# Patient Record
Sex: MALE | Race: WHITE | NOT HISPANIC OR LATINO | Employment: UNEMPLOYED | URBAN - METROPOLITAN AREA
[De-identification: names, ages, dates, MRNs, and addresses within clinical notes are randomized per-mention and may not be internally consistent; named-entity substitution may affect disease eponyms.]

---
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
## 2018-01-01 LAB
ABO + RH BLD: NORMAL
ANISOCYTOSIS BLD QL SMEAR: SLIGHT
BASOPHILS # BLD AUTO: 0.01 K/UL
BASOPHILS NFR BLD: 1.4 %
BLD GP AB SCN CELLS X3 SERPL QL: NORMAL
BLD PROD TYP BPU: NORMAL
BLOOD UNIT EXPIRATION DATE: NORMAL
BLOOD UNIT TYPE CODE: 6200
BLOOD UNIT TYPE: NORMAL
CODING SYSTEM: NORMAL
DIFFERENTIAL METHOD: ABNORMAL
DISPENSE STATUS: NORMAL
EOSINOPHIL # BLD AUTO: 0 K/UL
EOSINOPHIL NFR BLD: 0 %
ERYTHROCYTE [DISTWIDTH] IN BLOOD BY AUTOMATED COUNT: 13.8 %
HCT VFR BLD AUTO: 23.1 %
HGB BLD-MCNC: 8.6 G/DL
IMM GRANULOCYTES # BLD AUTO: 0.01 K/UL
IMM GRANULOCYTES NFR BLD AUTO: 1.4 %
LYMPHOCYTES # BLD AUTO: 0.6 K/UL
LYMPHOCYTES NFR BLD: 87 %
MCH RBC QN AUTO: 30.5 PG
MCHC RBC AUTO-ENTMCNC: 37.2 G/DL
MCV RBC AUTO: 82 FL
MONOCYTES # BLD AUTO: 0 K/UL
MONOCYTES NFR BLD: 5.8 %
NEUTROPHILS # BLD AUTO: 0 K/UL
NEUTROPHILS NFR BLD: 4.4 %
NRBC BLD-RTO: 0 /100 WBC
NUM UNITS TRANS WBC-POOR PLATPHERESIS: NORMAL
PLATELET # BLD AUTO: 7 K/UL
PLATELET BLD QL SMEAR: ABNORMAL
PMV BLD AUTO: ABNORMAL FL
RBC # BLD AUTO: 2.82 M/UL
WBC # BLD AUTO: 0.69 K/UL

## 2018-01-01 PROCEDURE — 25000003 PHARM REV CODE 250: Performed by: PEDIATRICS

## 2018-01-01 PROCEDURE — 25000003 PHARM REV CODE 250: Performed by: STUDENT IN AN ORGANIZED HEALTH CARE EDUCATION/TRAINING PROGRAM

## 2018-01-01 PROCEDURE — 63600175 PHARM REV CODE 636 W HCPCS: Performed by: PEDIATRICS

## 2018-01-01 PROCEDURE — 86644 CMV ANTIBODY: CPT

## 2018-01-01 PROCEDURE — 63600175 PHARM REV CODE 636 W HCPCS: Performed by: STUDENT IN AN ORGANIZED HEALTH CARE EDUCATION/TRAINING PROGRAM

## 2018-01-01 PROCEDURE — 99232 SBSQ HOSP IP/OBS MODERATE 35: CPT | Mod: ,,, | Performed by: PEDIATRICS

## 2018-01-01 PROCEDURE — 11300000 HC PEDIATRIC PRIVATE ROOM

## 2018-01-01 PROCEDURE — P9037 PLATE PHERES LEUKOREDU IRRAD: HCPCS

## 2018-01-01 PROCEDURE — 86920 COMPATIBILITY TEST SPIN: CPT

## 2018-01-01 PROCEDURE — 85025 COMPLETE CBC W/AUTO DIFF WBC: CPT

## 2018-01-01 PROCEDURE — 86850 RBC ANTIBODY SCREEN: CPT

## 2018-01-01 RX ORDER — HYDROCODONE BITARTRATE AND ACETAMINOPHEN 500; 5 MG/1; MG/1
TABLET ORAL
Status: DISCONTINUED | OUTPATIENT
Start: 2018-01-01 | End: 2018-01-04

## 2018-01-01 RX ADMIN — DIPHENHYDRAMINE HYDROCHLORIDE 25 MG: 25 CAPSULE ORAL at 10:01

## 2018-01-01 RX ADMIN — ACETAMINOPHEN 650 MG: 325 TABLET, FILM COATED ORAL at 10:01

## 2018-01-01 RX ADMIN — ACYCLOVIR 400 MG: 200 CAPSULE ORAL at 08:01

## 2018-01-01 RX ADMIN — ACYCLOVIR 400 MG: 200 CAPSULE ORAL at 09:01

## 2018-01-01 RX ADMIN — HEPARIN 300 UNITS: 100 SYRINGE at 10:01

## 2018-01-01 RX ADMIN — PANTOPRAZOLE SODIUM 40 MG: 40 TABLET, DELAYED RELEASE ORAL at 09:01

## 2018-01-01 RX ADMIN — CEFEPIME 2 G: 2 INJECTION, POWDER, FOR SOLUTION INTRAVENOUS at 10:01

## 2018-01-01 RX ADMIN — POSACONAZOLE 300 MG: 100 TABLET, COATED ORAL at 09:01

## 2018-01-01 RX ADMIN — CEFEPIME 2 G: 2 INJECTION, POWDER, FOR SOLUTION INTRAVENOUS at 08:01

## 2018-01-01 RX ADMIN — HEPARIN 300 UNITS: 100 SYRINGE at 04:01

## 2018-01-01 NOTE — PLAN OF CARE
Problem: Patient Care Overview  Goal: Plan of Care Review  Outcome: Ongoing (interventions implemented as appropriate)  Mom and leydi present at the bedside this AM. Pt resting in between care. Meds administered as ordered. Pt remains on Cefepime every 12hrs. VSS. Afebrile. No distress noted. Pt tolerating PO. Dennies pain,nausea, or vomiting. Pt received unit of plts. Tolerated well. No reaction noted. Premedicated with tylenol and benadryl. Plan of care reviewed. Verbalized understanding. Will monitor.

## 2018-01-01 NOTE — SUBJECTIVE & OBJECTIVE
Interval History: NAEON.    Scheduled Meds:   acyclovir  400 mg Oral BID    ceFEPIme in dextrose 5%  2 g Intravenous Q12H    pantoprazole  40 mg Oral Daily    posaconazole  300 mg Oral Daily     Continuous Infusions:  PRN Meds:sodium chloride, acetaminophen, diphenhydrAMINE, heparin, porcine (PF), ondansetron, polyethylene glycol    Review of Systems  Objective:     Vital Signs (Most Recent):  Temp: 99.2 °F (37.3 °C) (01/01/18 0418)  Pulse: 96 (01/01/18 0418)  Resp: 20 (01/01/18 0418)  BP: (!) 113/58 (01/01/18 0418)  SpO2: 100 % (01/01/18 0418) Vital Signs (24h Range):  Temp:  [97 °F (36.1 °C)-99.2 °F (37.3 °C)] 99.2 °F (37.3 °C)  Pulse:  [] 96  Resp:  [16-20] 20  SpO2:  [97 %-100 %] 100 %  BP: (103-121)/(51-58) 113/58     Patient Vitals for the past 72 hrs (Last 3 readings):   Weight   12/31/17 2018 78 kg (171 lb 15.3 oz)   12/30/17 1945 78.2 kg (172 lb 6.4 oz)   12/29/17 2010 78.3 kg (172 lb 9.9 oz)     Body mass index is 26.93 kg/m².    Intake/Output - Last 3 Shifts       12/30 0700 - 12/31 0659 12/31 0700 - 01/01 0659 01/01 0700 - 01/02 0659    P.O. 600 1800     Blood       IV Piggyback 100 50     Total Intake(mL/kg) 700 (9) 1850 (23.7)     Net +700 +1850             Stool Occurrence  3 x           Lines/Drains/Airways     Central Venous Catheter Line                 Port A Cath Double Lumen 10/31/17 1826 left subclavian 61 days                Physical Exam   Constitutional: He is oriented to person, place, and time. He appears well-developed. No distress.   Awake, interactive   HENT:   Head: Normocephalic.   Mouth/Throat: Oropharynx is clear and moist.   alopecia   Eyes: Conjunctivae are normal.   Neck: Normal range of motion.   Cardiovascular: Normal rate and regular rhythm.    No murmur heard.  Pulmonary/Chest: Breath sounds normal. No respiratory distress. He has no wheezes.   Abdominal: Soft. Bowel sounds are normal. He exhibits no distension.   Neurological: He is alert and oriented to person,  place, and time.   Skin: Skin is warm and dry. Capillary refill takes less than 2 seconds.   Scattered ecchymoses       Significant Labs:  Recent Results (from the past 24 hour(s))   CBC auto differential    Collection Time: 01/01/18  4:26 AM   Result Value Ref Range    WBC 0.69 (LL) 3.90 - 12.70 K/uL    RBC 2.82 (L) 4.60 - 6.20 M/uL    Hemoglobin 8.6 (L) 14.0 - 18.0 g/dL    Hematocrit 23.1 (L) 40.0 - 54.0 %    MCV 82 82 - 98 fL    MCH 30.5 27.0 - 31.0 pg    MCHC 37.2 (H) 32.0 - 36.0 g/dL    RDW 13.8 11.5 - 14.5 %    Platelets 7 (LL) 150 - 350 K/uL    MPV SEE COMMENT 9.2 - 12.9 fL    Immature Granulocytes 1.4 (H) 0.0 - 0.5 %    Gran # 0.0 (L) 1.8 - 7.7 K/uL    Immature Grans (Abs) 0.01 0.00 - 0.04 K/uL    Lymph # 0.6 (L) 1.0 - 4.8 K/uL    Mono # 0.0 (L) 0.3 - 1.0 K/uL    Eos # 0.0 0.0 - 0.5 K/uL    Baso # 0.01 0.00 - 0.20 K/uL    nRBC 0 0 /100 WBC    Gran% 4.4 (L) 38.0 - 73.0 %    Lymph% 87.0 (H) 18.0 - 48.0 %    Mono% 5.8 4.0 - 15.0 %    Eosinophil% 0.0 0.0 - 8.0 %    Basophil% 1.4 0.0 - 1.9 %    Platelet Estimate Decreased (A)     Aniso Slight     Differential Method Automated    Type & Screen    Collection Time: 01/01/18  4:26 AM   Result Value Ref Range    Group & Rh AB POS     Indirect Nathan NEG          Significant Imaging: I have reviewed and interpreted all pertinent imaging results/findings within the past 24 hours.

## 2018-01-01 NOTE — ASSESSMENT & PLAN NOTE
Hema, 18 yo white male with AML, s/p induction 2 of treatment with cytarabine, doxorubicin & etoposide on 12/20/17.  Now presents with fever and neutropenia. Well-appearing except for pallor. Remains stable and afebrile since hospitalization.    #Neutropenia and fever: Afebrile. Awaiting count recovery  - Continue Cefepime.  - Posaconazole prophylaxis  - Bactrim Fri/Sat/Sun  - CBC daily  - BMP every other day  - repeat blood culture if febrile    #Anemia/Thrombocytopenia:  -Transfuse w/ leukoreduced & irradiated pRBC for Hg < 7  -Transfuse for PLT <10 - PLT of 7 this AM    Dispo:  -Pending evidence of count recovery

## 2018-01-01 NOTE — PLAN OF CARE
Problem: Patient Care Overview  Goal: Plan of Care Review  Outcome: Ongoing (interventions implemented as appropriate)  Reviewed plan of care with pt and he verbalized understanding and concerns addressed.Pt vss, afebrile, no distress noted. Pt playing video games/watching netflix, and awake throughout the shift. Pt ambulated this evening to celebrate new year fireworks view on different floor. Pt reaccessed and Administered new  antibx time as ordered. Labs collected, both ports + blood return noted, and both hep locked. Tolerating po well, voiding well. Will continue to monitor.

## 2018-01-01 NOTE — PROGRESS NOTES
"[facility] Medicine  Progress Note    Patient Name: Hema Kuo  MRN: [id number]  Admission Date: 12/24/2017  Hospital Length of Stay: 8  Code Status: Full Code   Primary Care Physician: Ann Reyna NP  Principal Problem: Neutropenic fever    Subjective:     HPI:  Hema Kuo 18yo white male, with AML, s/p cycle 2 of treatment from 12/13/17 to 12/20/17. He received cytarabine, doxorobicin & etoposide.  He was discharged home on 12/20/2017.  He came in today reporting "not feeling well" and fever at home.  Reports decrease appetite otherwise well.  Denies fatigue, activity change, n/v/d or c.    Went to [facility]; started on abx and labs drawn and reports Hb=6 & plt=7k.    Hospital Course:  No notes on file    Scheduled Meds:   acyclovir  400 mg Oral BID    ceFEPIme in dextrose 5%  2 g Intravenous Q12H    pantoprazole  40 mg Oral Daily    posaconazole  300 mg Oral Daily     Continuous Infusions:  PRN Meds:sodium chloride, acetaminophen, diphenhydrAMINE, heparin, porcine (PF), ondansetron, polyethylene glycol    Interval History: NAEON.    Scheduled Meds:   acyclovir  400 mg Oral BID    ceFEPIme in dextrose 5%  2 g Intravenous Q12H    pantoprazole  40 mg Oral Daily    posaconazole  300 mg Oral Daily     Continuous Infusions:  PRN Meds:sodium chloride, acetaminophen, diphenhydrAMINE, heparin, porcine (PF), ondansetron, polyethylene glycol    Review of Systems  Objective:     Vital Signs (Most Recent):  Temp: 99.2 °F (37.3 °C) (01/01/18 0418)  Pulse: 96 (01/01/18 0418)  Resp: 20 (01/01/18 0418)  BP: (!) 113/58 (01/01/18 0418)  SpO2: 100 % (01/01/18 0418) Vital Signs (24h Range):  Temp:  [97 °F (36.1 °C)-99.2 °F (37.3 °C)] 99.2 °F (37.3 °C)  Pulse:  [] 96  Resp:  [16-20] 20  SpO2:  [97 %-100 %] 100 %  BP: (103-121)/(51-58) 113/58     Patient Vitals for the past 72 hrs (Last 3 readings):   Weight   12/31/17 2018 78 kg (171 lb 15.3 oz)   12/30/17 1945 78.2 kg " (172 lb 6.4 oz)   12/29/17 2010 78.3 kg (172 lb 9.9 oz)     Body mass index is 26.93 kg/m².    Intake/Output - Last 3 Shifts       12/30 0700 - 12/31 0659 12/31 0700 - 01/01 0659 01/01 0700 - 01/02 0659    P.O. 600 1800     Blood       IV Piggyback 100 50     Total Intake(mL/kg) 700 (9) 1850 (23.7)     Net +700 +1850             Stool Occurrence  3 x           Lines/Drains/Airways     Central Venous Catheter Line                 Port A Cath Double Lumen 10/31/17 1826 left subclavian 61 days                Physical Exam   Constitutional: He is oriented to person, place, and time. He appears well-developed. No distress.   Awake, interactive   HENT:   Head: Normocephalic.   Mouth/Throat: Oropharynx is clear and moist.   alopecia   Eyes: Conjunctivae are normal.   Neck: Normal range of motion.   Cardiovascular: Normal rate and regular rhythm.    No murmur heard.  Pulmonary/Chest: Breath sounds normal. No respiratory distress. He has no wheezes.   Abdominal: Soft. Bowel sounds are normal. He exhibits no distension.   Neurological: He is alert and oriented to person, place, and time.   Skin: Skin is warm and dry. Capillary refill takes less than 2 seconds.   Scattered ecchymoses       Significant Labs:  Recent Results (from the past 24 hour(s))   CBC auto differential    Collection Time: 01/01/18  4:26 AM   Result Value Ref Range    WBC 0.69 (LL) 3.90 - 12.70 K/uL    RBC 2.82 (L) 4.60 - 6.20 M/uL    Hemoglobin 8.6 (L) 14.0 - 18.0 g/dL    Hematocrit 23.1 (L) 40.0 - 54.0 %    MCV 82 82 - 98 fL    MCH 30.5 27.0 - 31.0 pg    MCHC 37.2 (H) 32.0 - 36.0 g/dL    RDW 13.8 11.5 - 14.5 %    Platelets 7 (LL) 150 - 350 K/uL    MPV SEE COMMENT 9.2 - 12.9 fL    Immature Granulocytes 1.4 (H) 0.0 - 0.5 %    Gran # 0.0 (L) 1.8 - 7.7 K/uL    Immature Grans (Abs) 0.01 0.00 - 0.04 K/uL    Lymph # 0.6 (L) 1.0 - 4.8 K/uL    Mono # 0.0 (L) 0.3 - 1.0 K/uL    Eos # 0.0 0.0 - 0.5 K/uL    Baso # 0.01 0.00 - 0.20 K/uL    nRBC 0 0 /100 WBC    Gran%  4.4 (L) 38.0 - 73.0 %    Lymph% 87.0 (H) 18.0 - 48.0 %    Mono% 5.8 4.0 - 15.0 %    Eosinophil% 0.0 0.0 - 8.0 %    Basophil% 1.4 0.0 - 1.9 %    Platelet Estimate Decreased (A)     Aniso Slight     Differential Method Automated    Type & Screen    Collection Time: 01/01/18  4:26 AM   Result Value Ref Range    Group & Rh AB POS     Indirect Nathan NEG          Significant Imaging: I have reviewed and interpreted all pertinent imaging results/findings within the past 24 hours.    Assessment/Plan:     Oncology   * Neutropenic fever    Hema, 16 yo white male with AML, s/p induction 2 of treatment with cytarabine, doxorubicin & etoposide on 12/20/17.  Now presents with fever and neutropenia. Well-appearing except for pallor. Remains stable and afebrile since hospitalization.    #Neutropenia and fever: Afebrile. Awaiting count recovery  - Continue Cefepime.  - Posaconazole prophylaxis  - Bactrim Fri/Sat/Sun  - CBC daily  - BMP every other day  - repeat blood culture if febrile    #Anemia/Thrombocytopenia:  -Transfuse w/ leukoreduced & irradiated pRBC for Hg < 7  -Transfuse for PLT <10 - PLT of 7 this AM; transfuse 1 unit  - No petichiae, signs of mucosal bleeding on exam.    Dispo:  -Pending evidence of count recovery                Anticipated Disposition: Home or Self Care    Deyvi Hu MD  Pediatric Hospital Medicine   [facility]

## 2018-01-02 LAB
ANION GAP SERPL CALC-SCNC: 9 MMOL/L
ANISOCYTOSIS BLD QL SMEAR: SLIGHT
BASOPHILS # BLD AUTO: 0 K/UL
BASOPHILS NFR BLD: 0 %
BUN SERPL-MCNC: 12 MG/DL
CALCIUM SERPL-MCNC: 9 MG/DL
CHLORIDE SERPL-SCNC: 103 MMOL/L
CO2 SERPL-SCNC: 28 MMOL/L
CREAT SERPL-MCNC: 0.8 MG/DL
DIFFERENTIAL METHOD: ABNORMAL
EOSINOPHIL # BLD AUTO: 0 K/UL
EOSINOPHIL NFR BLD: 0 %
ERYTHROCYTE [DISTWIDTH] IN BLOOD BY AUTOMATED COUNT: 13.5 %
EST. GFR  (AFRICAN AMERICAN): >60 ML/MIN/1.73 M^2
EST. GFR  (NON AFRICAN AMERICAN): >60 ML/MIN/1.73 M^2
GLUCOSE SERPL-MCNC: 118 MG/DL
HCT VFR BLD AUTO: 23.3 %
HGB BLD-MCNC: 8.7 G/DL
IMM GRANULOCYTES # BLD AUTO: 0 K/UL
IMM GRANULOCYTES NFR BLD AUTO: 0 %
LYMPHOCYTES # BLD AUTO: 0.6 K/UL
LYMPHOCYTES NFR BLD: 97 %
MCH RBC QN AUTO: 30.5 PG
MCHC RBC AUTO-ENTMCNC: 37.3 G/DL
MCV RBC AUTO: 82 FL
MONOCYTES # BLD AUTO: 0 K/UL
MONOCYTES NFR BLD: 1.5 %
NEUTROPHILS # BLD AUTO: 0 K/UL
NEUTROPHILS NFR BLD: 1.5 %
NRBC BLD-RTO: 0 /100 WBC
PLATELET # BLD AUTO: 20 K/UL
PLATELET BLD QL SMEAR: ABNORMAL
PMV BLD AUTO: 9.7 FL
POTASSIUM SERPL-SCNC: 3.7 MMOL/L
RBC # BLD AUTO: 2.85 M/UL
SODIUM SERPL-SCNC: 140 MMOL/L
WBC # BLD AUTO: 0.66 K/UL

## 2018-01-02 PROCEDURE — 63600175 PHARM REV CODE 636 W HCPCS: Performed by: STUDENT IN AN ORGANIZED HEALTH CARE EDUCATION/TRAINING PROGRAM

## 2018-01-02 PROCEDURE — 80048 BASIC METABOLIC PNL TOTAL CA: CPT

## 2018-01-02 PROCEDURE — 25000003 PHARM REV CODE 250: Performed by: PEDIATRICS

## 2018-01-02 PROCEDURE — 25000003 PHARM REV CODE 250: Performed by: STUDENT IN AN ORGANIZED HEALTH CARE EDUCATION/TRAINING PROGRAM

## 2018-01-02 PROCEDURE — 63600175 PHARM REV CODE 636 W HCPCS: Performed by: PEDIATRICS

## 2018-01-02 PROCEDURE — 11300000 HC PEDIATRIC PRIVATE ROOM

## 2018-01-02 PROCEDURE — 99233 SBSQ HOSP IP/OBS HIGH 50: CPT | Mod: ,,, | Performed by: PEDIATRICS

## 2018-01-02 PROCEDURE — 36592 COLLECT BLOOD FROM PICC: CPT

## 2018-01-02 PROCEDURE — 85025 COMPLETE CBC W/AUTO DIFF WBC: CPT

## 2018-01-02 RX ADMIN — CEFEPIME 2 G: 2 INJECTION, POWDER, FOR SOLUTION INTRAVENOUS at 08:01

## 2018-01-02 RX ADMIN — HEPARIN 300 UNITS: 100 SYRINGE at 03:01

## 2018-01-02 RX ADMIN — ACYCLOVIR 400 MG: 200 CAPSULE ORAL at 09:01

## 2018-01-02 RX ADMIN — HEPARIN 300 UNITS: 100 SYRINGE at 10:01

## 2018-01-02 RX ADMIN — CEFEPIME 2 G: 2 INJECTION, POWDER, FOR SOLUTION INTRAVENOUS at 09:01

## 2018-01-02 RX ADMIN — HEPARIN 300 UNITS: 100 SYRINGE at 11:01

## 2018-01-02 RX ADMIN — PANTOPRAZOLE SODIUM 40 MG: 40 TABLET, DELAYED RELEASE ORAL at 09:01

## 2018-01-02 RX ADMIN — POSACONAZOLE 300 MG: 100 TABLET, COATED ORAL at 09:01

## 2018-01-02 NOTE — ASSESSMENT & PLAN NOTE
Hema, 16 yo white male with AML, s/p induction 2 of treatment with cytarabine, doxorubicin & etoposide on 12/20/17.  Now presents with fever and neutropenia. Well-appearing except for pallor. Remains stable and afebrile since hospitalization.    #Neutropenia and fever: Afebrile. Awaiting count recovery  - Continue Cefepime.  - Posaconazole prophylaxis  - Bactrim Fri/Sat/Sun  - CBC daily  - BMP every other day  - repeat blood culture if febrile    #Anemia/Thrombocytopenia:  -Transfuse w/ leukoreduced & irradiated pRBC for Hg < 7  -Transfuse for PLT <10    Dispo:  -Pending evidence of count recovery

## 2018-01-02 NOTE — PLAN OF CARE
Problem: Patient Care Overview  Goal: Plan of Care Review  Outcome: Ongoing (interventions implemented as appropriate)  Reviewed plan of care with pt and he verbalized understanding and concerns addressed.Pt vss, afebrile, no distress noted. Pt playing video games/watching netflix, and awake throughout the shift. Pt ambulated this evening to another pt room to hang out.  Labs collected, both ports + blood return noted, and both hep locked. Tolerating po well, voiding well. Will continue to monitor.

## 2018-01-02 NOTE — PROGRESS NOTES
"[facility] Medicine  Progress Note    Patient Name: Hema Kuo  MRN: [id number]  Admission Date: 12/24/2017  Hospital Length of Stay: 9  Code Status: Full Code   Primary Care Physician: Ann Reyna NP  Principal Problem: Neutropenic fever    Subjective:     HPI:  Hema Kuo 18yo white male, with AML, s/p cycle 2 of treatment from 12/13/17 to 12/20/17. He received cytarabine, doxorobicin & etoposide.  He was discharged home on 12/20/2017.  He came in today reporting "not feeling well" and fever at home.  Reports decrease appetite otherwise well.  Denies fatigue, activity change, n/v/d or c.    Went to [facility]; started on abx and labs drawn and reports Hb=6 & plt=7k.    Hospital Course:  No notes on file    Scheduled Meds:   acyclovir  400 mg Oral BID    ceFEPIme in dextrose 5%  2 g Intravenous Q12H    pantoprazole  40 mg Oral Daily    posaconazole  300 mg Oral Daily     Continuous Infusions:  PRN Meds:sodium chloride, sodium chloride, acetaminophen, diphenhydrAMINE, heparin, porcine (PF), ondansetron, polyethylene glycol    Interval History: NAEON. S/p 1 unit PLT transfusion yesterday. Tolerated well. No calls to night float overnight.    Scheduled Meds:   acyclovir  400 mg Oral BID    ceFEPIme in dextrose 5%  2 g Intravenous Q12H    pantoprazole  40 mg Oral Daily    posaconazole  300 mg Oral Daily     Continuous Infusions:  PRN Meds:sodium chloride, sodium chloride, acetaminophen, diphenhydrAMINE, heparin, porcine (PF), ondansetron, polyethylene glycol    Review of Systems  Objective:     Vital Signs (Most Recent):  Temp: 98.6 °F (37 °C) (01/02/18 0344)  Pulse: (!) 125 (01/02/18 0344)  Resp: 20 (01/02/18 0344)  BP: 103/61 (01/02/18 0344)  SpO2: 100 % (01/02/18 0344) Vital Signs (24h Range):  Temp:  [97.6 °F (36.4 °C)-98.6 °F (37 °C)] 98.6 °F (37 °C)  Pulse:  [] 125  Resp:  [16-20] 20  SpO2:  [98 %-100 %] 100 %  BP: ()/(51-61) 103/61     Patient " Vitals for the past 72 hrs (Last 3 readings):   Weight   01/01/18 1935 79.7 kg (175 lb 11.3 oz)   12/31/17 2018 78 kg (171 lb 15.3 oz)   12/30/17 1945 78.2 kg (172 lb 6.4 oz)     Body mass index is 27.52 kg/m².    Intake/Output - Last 3 Shifts       12/31 0700 - 01/01 0659 01/01 0700 - 01/02 0659 01/02 0700 - 01/03 0659    P.O. 1800 600     I.V. (mL/kg)  30 (0.4)     IV Piggyback 50 100     Total Intake(mL/kg) 1850 (23.7) 730 (9.2)     Net +1850 +730             Urine Occurrence  2 x     Stool Occurrence 3 x            Lines/Drains/Airways     Central Venous Catheter Line                 Port A Cath Double Lumen 10/31/17 1826 left subclavian 62 days                Physical Exam    Significant Labs:  Recent Results (from the past 24 hour(s))   CBC auto differential    Collection Time: 01/02/18  3:46 AM   Result Value Ref Range    WBC 0.66 (LL) 3.90 - 12.70 K/uL    RBC 2.85 (L) 4.60 - 6.20 M/uL    Hemoglobin 8.7 (L) 14.0 - 18.0 g/dL    Hematocrit 23.3 (L) 40.0 - 54.0 %    MCV 82 82 - 98 fL    MCH 30.5 27.0 - 31.0 pg    MCHC 37.3 (H) 32.0 - 36.0 g/dL    RDW 13.5 11.5 - 14.5 %    Platelets 20 (LL) 150 - 350 K/uL    MPV 9.7 9.2 - 12.9 fL    Immature Granulocytes 0.0 0.0 - 0.5 %    Gran # 0.0 (L) 1.8 - 7.7 K/uL    Immature Grans (Abs) 0.00 0.00 - 0.04 K/uL    Lymph # 0.6 (L) 1.0 - 4.8 K/uL    Mono # 0.0 (L) 0.3 - 1.0 K/uL    Eos # 0.0 0.0 - 0.5 K/uL    Baso # 0.00 0.00 - 0.20 K/uL    nRBC 0 0 /100 WBC    Gran% 1.5 (L) 38.0 - 73.0 %    Lymph% 97.0 (H) 18.0 - 48.0 %    Mono% 1.5 (L) 4.0 - 15.0 %    Eosinophil% 0.0 0.0 - 8.0 %    Basophil% 0.0 0.0 - 1.9 %    Platelet Estimate Decreased (A)     Aniso Slight     Differential Method Automated    Basic metabolic panel    Collection Time: 01/02/18  3:46 AM   Result Value Ref Range    Sodium 140 136 - 145 mmol/L    Potassium 3.7 3.5 - 5.1 mmol/L    Chloride 103 95 - 110 mmol/L    CO2 28 23 - 29 mmol/L    Glucose 118 (H) 70 - 110 mg/dL    BUN, Bld 12 6 - 20 mg/dL    Creatinine 0.8  0.5 - 1.4 mg/dL    Calcium 9.0 8.7 - 10.5 mg/dL    Anion Gap 9 8 - 16 mmol/L    eGFR if African American >60.0 >60 mL/min/1.73 m^2    eGFR if non African American >60.0 >60 mL/min/1.73 m^2         Significant Imaging: I have reviewed and interpreted all pertinent imaging results/findings within the past 24 hours.    Assessment/Plan:     Oncology   * Neutropenic fever    Hema, 18 yo white male with AML, s/p induction 2 of treatment with cytarabine, doxorubicin & etoposide on 12/20/17.  Now presents with fever and neutropenia. Well-appearing except for pallor. Remains stable and afebrile since hospitalization.    #Neutropenia and fever: Afebrile. Awaiting count recovery  - Continue Cefepime.  - Posaconazole prophylaxis  - Bactrim Fri/Sat/Sun  - CBC daily  - BMP every other day  - repeat blood culture if febrile    #Anemia/Thrombocytopenia:  -Transfuse w/ leukoreduced & irradiated pRBC for Hg < 7  -Transfuse for PLT <10    Dispo:  -Pending evidence of count recovery                Anticipated Disposition: Home or Self Care    Deyvi Hu MD  Pediatric Hospital Medicine   [facility]

## 2018-01-02 NOTE — SUBJECTIVE & OBJECTIVE
Interval History: NAEON. S/p 1 unit PLT transfusion yesterday. Tolerated well. No calls to night float overnight.    Scheduled Meds:   acyclovir  400 mg Oral BID    ceFEPIme in dextrose 5%  2 g Intravenous Q12H    pantoprazole  40 mg Oral Daily    posaconazole  300 mg Oral Daily     Continuous Infusions:  PRN Meds:sodium chloride, sodium chloride, acetaminophen, diphenhydrAMINE, heparin, porcine (PF), ondansetron, polyethylene glycol    Review of Systems  Objective:     Vital Signs (Most Recent):  Temp: 98.6 °F (37 °C) (01/02/18 0344)  Pulse: (!) 125 (01/02/18 0344)  Resp: 20 (01/02/18 0344)  BP: 103/61 (01/02/18 0344)  SpO2: 100 % (01/02/18 0344) Vital Signs (24h Range):  Temp:  [97.6 °F (36.4 °C)-98.6 °F (37 °C)] 98.6 °F (37 °C)  Pulse:  [] 125  Resp:  [16-20] 20  SpO2:  [98 %-100 %] 100 %  BP: ()/(51-61) 103/61     Patient Vitals for the past 72 hrs (Last 3 readings):   Weight   01/01/18 1935 79.7 kg (175 lb 11.3 oz)   12/31/17 2018 78 kg (171 lb 15.3 oz)   12/30/17 1945 78.2 kg (172 lb 6.4 oz)     Body mass index is 27.52 kg/m².    Intake/Output - Last 3 Shifts       12/31 0700 - 01/01 0659 01/01 0700 - 01/02 0659 01/02 0700 - 01/03 0659    P.O. 1800 600     I.V. (mL/kg)  30 (0.4)     IV Piggyback 50 100     Total Intake(mL/kg) 1850 (23.7) 730 (9.2)     Net +1850 +730             Urine Occurrence  2 x     Stool Occurrence 3 x            Lines/Drains/Airways     Central Venous Catheter Line                 Port A Cath Double Lumen 10/31/17 1826 left subclavian 62 days                Physical Exam    Significant Labs:  Recent Results (from the past 24 hour(s))   CBC auto differential    Collection Time: 01/02/18  3:46 AM   Result Value Ref Range    WBC 0.66 (LL) 3.90 - 12.70 K/uL    RBC 2.85 (L) 4.60 - 6.20 M/uL    Hemoglobin 8.7 (L) 14.0 - 18.0 g/dL    Hematocrit 23.3 (L) 40.0 - 54.0 %    MCV 82 82 - 98 fL    MCH 30.5 27.0 - 31.0 pg    MCHC 37.3 (H) 32.0 - 36.0 g/dL    RDW 13.5 11.5 - 14.5 %     Platelets 20 (LL) 150 - 350 K/uL    MPV 9.7 9.2 - 12.9 fL    Immature Granulocytes 0.0 0.0 - 0.5 %    Gran # 0.0 (L) 1.8 - 7.7 K/uL    Immature Grans (Abs) 0.00 0.00 - 0.04 K/uL    Lymph # 0.6 (L) 1.0 - 4.8 K/uL    Mono # 0.0 (L) 0.3 - 1.0 K/uL    Eos # 0.0 0.0 - 0.5 K/uL    Baso # 0.00 0.00 - 0.20 K/uL    nRBC 0 0 /100 WBC    Gran% 1.5 (L) 38.0 - 73.0 %    Lymph% 97.0 (H) 18.0 - 48.0 %    Mono% 1.5 (L) 4.0 - 15.0 %    Eosinophil% 0.0 0.0 - 8.0 %    Basophil% 0.0 0.0 - 1.9 %    Platelet Estimate Decreased (A)     Aniso Slight     Differential Method Automated    Basic metabolic panel    Collection Time: 01/02/18  3:46 AM   Result Value Ref Range    Sodium 140 136 - 145 mmol/L    Potassium 3.7 3.5 - 5.1 mmol/L    Chloride 103 95 - 110 mmol/L    CO2 28 23 - 29 mmol/L    Glucose 118 (H) 70 - 110 mg/dL    BUN, Bld 12 6 - 20 mg/dL    Creatinine 0.8 0.5 - 1.4 mg/dL    Calcium 9.0 8.7 - 10.5 mg/dL    Anion Gap 9 8 - 16 mmol/L    eGFR if African American >60.0 >60 mL/min/1.73 m^2    eGFR if non African American >60.0 >60 mL/min/1.73 m^2         Significant Imaging: I have reviewed and interpreted all pertinent imaging results/findings within the past 24 hours.

## 2018-01-03 LAB
ANISOCYTOSIS BLD QL SMEAR: SLIGHT
BASOPHILS # BLD AUTO: 0 K/UL
BASOPHILS NFR BLD: 0 %
DIFFERENTIAL METHOD: ABNORMAL
EOSINOPHIL # BLD AUTO: 0 K/UL
EOSINOPHIL NFR BLD: 0 %
ERYTHROCYTE [DISTWIDTH] IN BLOOD BY AUTOMATED COUNT: 13.3 %
HCT VFR BLD AUTO: 20.8 %
HGB BLD-MCNC: 7.8 G/DL
HYPOCHROMIA BLD QL SMEAR: ABNORMAL
IMM GRANULOCYTES # BLD AUTO: 0.03 K/UL
IMM GRANULOCYTES NFR BLD AUTO: 4.3 %
LYMPHOCYTES # BLD AUTO: 0.6 K/UL
LYMPHOCYTES NFR BLD: 85.5 %
MCH RBC QN AUTO: 30.2 PG
MCHC RBC AUTO-ENTMCNC: 37.5 G/DL
MCV RBC AUTO: 81 FL
MONOCYTES # BLD AUTO: 0 K/UL
MONOCYTES NFR BLD: 5.8 %
NEUTROPHILS # BLD AUTO: 0 K/UL
NEUTROPHILS NFR BLD: 4.4 %
NRBC BLD-RTO: 0 /100 WBC
OVALOCYTES BLD QL SMEAR: ABNORMAL
PLATELET # BLD AUTO: 12 K/UL
PMV BLD AUTO: 10.9 FL
POIKILOCYTOSIS BLD QL SMEAR: SLIGHT
POLYCHROMASIA BLD QL SMEAR: ABNORMAL
RBC # BLD AUTO: 2.58 M/UL
WBC # BLD AUTO: 0.69 K/UL

## 2018-01-03 PROCEDURE — 36592 COLLECT BLOOD FROM PICC: CPT

## 2018-01-03 PROCEDURE — 63600175 PHARM REV CODE 636 W HCPCS: Performed by: STUDENT IN AN ORGANIZED HEALTH CARE EDUCATION/TRAINING PROGRAM

## 2018-01-03 PROCEDURE — 25000003 PHARM REV CODE 250: Performed by: STUDENT IN AN ORGANIZED HEALTH CARE EDUCATION/TRAINING PROGRAM

## 2018-01-03 PROCEDURE — 25000003 PHARM REV CODE 250: Performed by: PEDIATRICS

## 2018-01-03 PROCEDURE — 11300000 HC PEDIATRIC PRIVATE ROOM

## 2018-01-03 PROCEDURE — 99233 SBSQ HOSP IP/OBS HIGH 50: CPT | Mod: ,,, | Performed by: PEDIATRICS

## 2018-01-03 PROCEDURE — 85025 COMPLETE CBC W/AUTO DIFF WBC: CPT

## 2018-01-03 RX ADMIN — HEPARIN 300 UNITS: 100 SYRINGE at 09:01

## 2018-01-03 RX ADMIN — ACYCLOVIR 400 MG: 200 CAPSULE ORAL at 08:01

## 2018-01-03 RX ADMIN — POSACONAZOLE 300 MG: 100 TABLET, COATED ORAL at 09:01

## 2018-01-03 RX ADMIN — CEFEPIME 2 G: 2 INJECTION, POWDER, FOR SOLUTION INTRAVENOUS at 08:01

## 2018-01-03 RX ADMIN — ACYCLOVIR 400 MG: 200 CAPSULE ORAL at 09:01

## 2018-01-03 RX ADMIN — HEPARIN 300 UNITS: 100 SYRINGE at 04:01

## 2018-01-03 RX ADMIN — PANTOPRAZOLE SODIUM 40 MG: 40 TABLET, DELAYED RELEASE ORAL at 09:01

## 2018-01-03 RX ADMIN — CEFEPIME 2 G: 2 INJECTION, POWDER, FOR SOLUTION INTRAVENOUS at 09:01

## 2018-01-03 NOTE — PLAN OF CARE
Problem: Patient Care Overview  Goal: Plan of Care Review  Awake, alert. Vss. Denies discomfort. No change in labs. Will cont cefepine, labs in am. Mom at bedside, mom and erasmo verb plan of care

## 2018-01-03 NOTE — PLAN OF CARE
Problem: Patient Care Overview  Goal: Plan of Care Review  Outcome: Ongoing (interventions implemented as appropriate)  Mom present at the bedside throughout this shift. Pt resting in between care. No distress noted. Tmax 100.1 this shift. Pt tolerating PO. Denies pain, nausea, or vomiting. Playing video games this afternoon. Plan of care reviewed. Verbalized understanding. Will monitor.

## 2018-01-03 NOTE — SUBJECTIVE & OBJECTIVE
Interval History: NAEON.    Scheduled Meds:   acyclovir  400 mg Oral BID    ceFEPIme in dextrose 5%  2 g Intravenous Q12H    pantoprazole  40 mg Oral Daily    posaconazole  300 mg Oral Daily     Continuous Infusions:  PRN Meds:sodium chloride, sodium chloride, acetaminophen, diphenhydrAMINE, heparin, porcine (PF), ondansetron, polyethylene glycol    Review of Systems  Objective:     Vital Signs (Most Recent):  Temp: 99.6 °F (37.6 °C) (01/03/18 0301)  Pulse: 98 (01/03/18 0301)  Resp: 20 (01/03/18 0301)  BP: (!) 116/55 (01/03/18 0301)  SpO2: 100 % (01/03/18 0301) Vital Signs (24h Range):  Temp:  [99 °F (37.2 °C)-100.3 °F (37.9 °C)] 99.6 °F (37.6 °C)  Pulse:  [] 98  Resp:  [16-20] 20  SpO2:  [97 %-100 %] 100 %  BP: (105-120)/(52-61) 116/55     Patient Vitals for the past 72 hrs (Last 3 readings):   Weight   01/02/18 2005 78.7 kg (173 lb 8 oz)   01/01/18 1935 79.7 kg (175 lb 11.3 oz)   12/31/17 2018 78 kg (171 lb 15.3 oz)     Body mass index is 27.17 kg/m².    Intake/Output - Last 3 Shifts       01/01 0700 - 01/02 0659 01/02 0700 - 01/03 0659 01/03 0700 - 01/04 0659    P.O. 600 1200     I.V. (mL/kg) 30 (0.4)      IV Piggyback 100 100     Total Intake(mL/kg) 730 (9.2) 1300 (16.5)     Net +730 +1300             Urine Occurrence 2 x 5 x           Lines/Drains/Airways     Central Venous Catheter Line                 Port A Cath Double Lumen 10/31/17 1826 left subclavian 63 days                Physical Exam   Constitutional: He is oriented to person, place, and time. He appears well-developed. No distress.   Awake, interactive   HENT:   Head: Normocephalic.   Mouth/Throat: Oropharynx is clear and moist.   alopecia   Eyes: Conjunctivae are normal.   Neck: Normal range of motion.   Cardiovascular: Normal rate and regular rhythm.    No murmur heard.  Pulmonary/Chest: Breath sounds normal. No respiratory distress. He has no wheezes.   Abdominal: Soft. Bowel sounds are normal. He exhibits no distension.   Neurological:  He is alert and oriented to person, place, and time.   Skin: Skin is warm and dry. Capillary refill takes less than 2 seconds.   Scattered ecchymoses; no petichiae       Significant Labs:  Recent Results (from the past 24 hour(s))   CBC auto differential    Collection Time: 01/03/18  4:22 AM   Result Value Ref Range    WBC 0.69 (LL) 3.90 - 12.70 K/uL    RBC 2.58 (L) 4.60 - 6.20 M/uL    Hemoglobin 7.8 (L) 14.0 - 18.0 g/dL    Hematocrit 20.8 (L) 40.0 - 54.0 %    MCV 81 (L) 82 - 98 fL    MCH 30.2 27.0 - 31.0 pg    MCHC 37.5 (H) 32.0 - 36.0 g/dL    RDW 13.3 11.5 - 14.5 %    Platelets 12 (LL) 150 - 350 K/uL    MPV 10.9 9.2 - 12.9 fL    Immature Granulocytes 4.3 (H) 0.0 - 0.5 %    Gran # 0.0 (L) 1.8 - 7.7 K/uL    Immature Grans (Abs) 0.03 0.00 - 0.04 K/uL    Lymph # 0.6 (L) 1.0 - 4.8 K/uL    Mono # 0.0 (L) 0.3 - 1.0 K/uL    Eos # 0.0 0.0 - 0.5 K/uL    Baso # 0.00 0.00 - 0.20 K/uL    nRBC 0 0 /100 WBC    Gran% 4.4 (L) 38.0 - 73.0 %    Lymph% 85.5 (H) 18.0 - 48.0 %    Mono% 5.8 4.0 - 15.0 %    Eosinophil% 0.0 0.0 - 8.0 %    Basophil% 0.0 0.0 - 1.9 %    Aniso Slight     Poik Slight     Poly Occasional     Hypo Occasional     Ovalocytes Occasional     Differential Method Automated        Significant Imaging: I have reviewed and interpreted all pertinent imaging results/findings within the past 24 hours.

## 2018-01-03 NOTE — PLAN OF CARE
Problem: Patient Care Overview  Goal: Plan of Care Review  Outcome: Ongoing (interventions implemented as appropriate)  Reviewed plan of care with pt and mom,  they verbalized understanding and concerns addressed.Pt vss, afebrile, but did have elevated temp of 100.3, notified dr. gomez which resolved without intervention, no distress noted. Pt playing video games/watching netflix, and awake till approx 2 am this shift. Labs collected, both ports + blood return noted, and both hep locked. Tolerating po well, voiding well. Will continue to monitor.

## 2018-01-03 NOTE — PROGRESS NOTES
"[facility] Pediatric Hospital Medicine  Progress Note    Patient Name: Hema Kuo  MRN: [id number]  Admission Date: 12/24/2017  Hospital Length of Stay: 10  Code Status: Full Code   Primary Care Physician: Ann Reyna NP  Principal Problem: Neutropenic fever    Subjective:     HPI:  Hema Kuo 16yo white male, with AML, s/p cycle 2 of treatment from 12/13/17 to 12/20/17. He received cytarabine, doxorobicin & etoposide.  He was discharged home on 12/20/2017.  He came in today reporting "not feeling well" and fever at home.  Reports decrease appetite otherwise well.  Denies fatigue, activity change, n/v/d or c.    Went to [facility]; started on abx and labs drawn and reports Hb=6 & plt=7k.    Hospital Course:  No notes on file    Scheduled Meds:   acyclovir  400 mg Oral BID    ceFEPIme in dextrose 5%  2 g Intravenous Q12H    pantoprazole  40 mg Oral Daily    posaconazole  300 mg Oral Daily     Continuous Infusions:  PRN Meds:sodium chloride, sodium chloride, acetaminophen, diphenhydrAMINE, heparin, porcine (PF), ondansetron, polyethylene glycol    Interval History: NAEON. Temps to 100.3, resolved quickly with removing blankets.    Scheduled Meds:   acyclovir  400 mg Oral BID    ceFEPIme in dextrose 5%  2 g Intravenous Q12H    pantoprazole  40 mg Oral Daily    posaconazole  300 mg Oral Daily     Continuous Infusions:  PRN Meds:sodium chloride, sodium chloride, acetaminophen, diphenhydrAMINE, heparin, porcine (PF), ondansetron, polyethylene glycol    Review of Systems  Objective:     Vital Signs (Most Recent):  Temp: 99.6 °F (37.6 °C) (01/03/18 0301)  Pulse: 98 (01/03/18 0301)  Resp: 20 (01/03/18 0301)  BP: (!) 116/55 (01/03/18 0301)  SpO2: 100 % (01/03/18 0301) Vital Signs (24h Range):  Temp:  [99 °F (37.2 °C)-100.3 °F (37.9 °C)] 99.6 °F (37.6 °C)  Pulse:  [] 98  Resp:  [16-20] 20  SpO2:  [97 %-100 %] 100 %  BP: (105-120)/(52-61) 116/55     Patient Vitals for the past 72 hrs " (Last 3 readings):   Weight   01/02/18 2005 78.7 kg (173 lb 8 oz)   01/01/18 1935 79.7 kg (175 lb 11.3 oz)   12/31/17 2018 78 kg (171 lb 15.3 oz)     Body mass index is 27.17 kg/m².    Intake/Output - Last 3 Shifts       01/01 0700 - 01/02 0659 01/02 0700 - 01/03 0659 01/03 0700 - 01/04 0659    P.O. 600 1200     I.V. (mL/kg) 30 (0.4)      IV Piggyback 100 100     Total Intake(mL/kg) 730 (9.2) 1300 (16.5)     Net +730 +1300             Urine Occurrence 2 x 5 x           Lines/Drains/Airways     Central Venous Catheter Line                 Port A Cath Double Lumen 10/31/17 1826 left subclavian 63 days                Physical Exam   Constitutional: He is oriented to person, place, and time. He appears well-developed. No distress.   Awake, interactive   HENT:   Head: Normocephalic.   Mouth/Throat: Oropharynx is clear and moist.   alopecia   Eyes: Conjunctivae are normal.   Neck: Normal range of motion.   Cardiovascular: Normal rate and regular rhythm.    No murmur heard.  Pulmonary/Chest: Breath sounds normal. No respiratory distress. He has no wheezes.   Abdominal: Soft. Bowel sounds are normal. He exhibits no distension.   Neurological: He is alert and oriented to person, place, and time.   Skin: Skin is warm and dry. Capillary refill takes less than 2 seconds.   Scattered ecchymoses; no petichiae       Significant Labs:  Recent Results (from the past 24 hour(s))   CBC auto differential    Collection Time: 01/03/18  4:22 AM   Result Value Ref Range    WBC 0.69 (LL) 3.90 - 12.70 K/uL    RBC 2.58 (L) 4.60 - 6.20 M/uL    Hemoglobin 7.8 (L) 14.0 - 18.0 g/dL    Hematocrit 20.8 (L) 40.0 - 54.0 %    MCV 81 (L) 82 - 98 fL    MCH 30.2 27.0 - 31.0 pg    MCHC 37.5 (H) 32.0 - 36.0 g/dL    RDW 13.3 11.5 - 14.5 %    Platelets 12 (LL) 150 - 350 K/uL    MPV 10.9 9.2 - 12.9 fL    Immature Granulocytes 4.3 (H) 0.0 - 0.5 %    Gran # 0.0 (L) 1.8 - 7.7 K/uL    Immature Grans (Abs) 0.03 0.00 - 0.04 K/uL    Lymph # 0.6 (L) 1.0 - 4.8 K/uL     Mono # 0.0 (L) 0.3 - 1.0 K/uL    Eos # 0.0 0.0 - 0.5 K/uL    Baso # 0.00 0.00 - 0.20 K/uL    nRBC 0 0 /100 WBC    Gran% 4.4 (L) 38.0 - 73.0 %    Lymph% 85.5 (H) 18.0 - 48.0 %    Mono% 5.8 4.0 - 15.0 %    Eosinophil% 0.0 0.0 - 8.0 %    Basophil% 0.0 0.0 - 1.9 %    Aniso Slight     Poik Slight     Poly Occasional     Hypo Occasional     Ovalocytes Occasional     Differential Method Automated        Significant Imaging: I have reviewed and interpreted all pertinent imaging results/findings within the past 24 hours.    Assessment/Plan:     Oncology   * Neutropenic fever    Hema, 16 yo white male with AML, s/p induction 2 of treatment with cytarabine, doxorubicin & etoposide on 12/20/17.  Now presents with fever and neutropenia. Well-appearing except for pallor. Remains stable and afebrile since hospitalization.    #Neutropenia and fever: Afebrile. Awaiting count recovery  - Continue Cefepime  - CBC daily  - BMP every other day  - repeat blood culture if febrile    #immunosuppression 2/2 Chemotherapy  -posaconazole prophylaxis  -acylovir prophylaxis  -bactrim fri/sat/sun    #Anemia/Thrombocytopenia:  -Transfuse w/ leukoreduced & irradiated pRBC for Hg < 7  -Transfuse for PLT <10    Dispo:  -Pending evidence of count recovery              Anticipated Disposition: Home or Self Care    Deyvi Hu MD  Pediatric Hospital Medicine   [facility]

## 2018-01-03 NOTE — PLAN OF CARE
01/02/18 1630   Discharge Reassessment   Assessment Type Discharge Planning Assessment   Provided patient/caregiver education on the expected discharge date and the discharge plan Yes   Do you have any problems affording any of your prescribed medications? Yes   Discharge Plan A Home with family   Discharge Plan B Home with family   Patient choice form signed by patient/caregiver N/A   Can the patient answer the patient profile reliably? Yes, cognitively intact   How does the patient rate their overall health at the present time? Fair   Pt remains neutropenic, may dc this weekend if counts recover. Will follow for dc needs.

## 2018-01-04 LAB
ABO + RH BLD: NORMAL
ANION GAP SERPL CALC-SCNC: 7 MMOL/L
ANISOCYTOSIS BLD QL SMEAR: SLIGHT
BASOPHILS # BLD AUTO: 0 K/UL
BASOPHILS NFR BLD: 0 %
BLD GP AB SCN CELLS X3 SERPL QL: NORMAL
BLD PROD TYP BPU: NORMAL
BLOOD UNIT EXPIRATION DATE: NORMAL
BLOOD UNIT TYPE CODE: 8400
BLOOD UNIT TYPE: NORMAL
BUN SERPL-MCNC: 9 MG/DL
CALCIUM SERPL-MCNC: 8.9 MG/DL
CHLORIDE SERPL-SCNC: 102 MMOL/L
CO2 SERPL-SCNC: 27 MMOL/L
CODING SYSTEM: NORMAL
CREAT SERPL-MCNC: 0.7 MG/DL
DIFFERENTIAL METHOD: ABNORMAL
DISPENSE STATUS: NORMAL
EOSINOPHIL # BLD AUTO: 0 K/UL
EOSINOPHIL NFR BLD: 0 %
ERYTHROCYTE [DISTWIDTH] IN BLOOD BY AUTOMATED COUNT: 13.4 %
EST. GFR  (AFRICAN AMERICAN): >60 ML/MIN/1.73 M^2
EST. GFR  (NON AFRICAN AMERICAN): >60 ML/MIN/1.73 M^2
GLUCOSE SERPL-MCNC: 100 MG/DL
HCT VFR BLD AUTO: 21.2 %
HGB BLD-MCNC: 7.9 G/DL
HYPOCHROMIA BLD QL SMEAR: ABNORMAL
IMM GRANULOCYTES # BLD AUTO: 0.02 K/UL
IMM GRANULOCYTES NFR BLD AUTO: 2.7 %
LYMPHOCYTES # BLD AUTO: 0.7 K/UL
LYMPHOCYTES NFR BLD: 89.2 %
MCH RBC QN AUTO: 30.2 PG
MCHC RBC AUTO-ENTMCNC: 37.3 G/DL
MCV RBC AUTO: 81 FL
MONOCYTES # BLD AUTO: 0 K/UL
MONOCYTES NFR BLD: 4.1 %
NEUTROPHILS # BLD AUTO: 0 K/UL
NEUTROPHILS NFR BLD: 4 %
NRBC BLD-RTO: 0 /100 WBC
NUM UNITS TRANS WBC-POOR PLATPHERESIS: NORMAL
OVALOCYTES BLD QL SMEAR: ABNORMAL
PLATELET # BLD AUTO: 9 K/UL
PMV BLD AUTO: 11.2 FL
POIKILOCYTOSIS BLD QL SMEAR: SLIGHT
POLYCHROMASIA BLD QL SMEAR: ABNORMAL
POTASSIUM SERPL-SCNC: 4.1 MMOL/L
RBC # BLD AUTO: 2.62 M/UL
SODIUM SERPL-SCNC: 136 MMOL/L
WBC # BLD AUTO: 0.74 K/UL

## 2018-01-04 PROCEDURE — 86920 COMPATIBILITY TEST SPIN: CPT

## 2018-01-04 PROCEDURE — 36592 COLLECT BLOOD FROM PICC: CPT

## 2018-01-04 PROCEDURE — 85025 COMPLETE CBC W/AUTO DIFF WBC: CPT

## 2018-01-04 PROCEDURE — 25000003 PHARM REV CODE 250: Performed by: PEDIATRICS

## 2018-01-04 PROCEDURE — P9038 RBC IRRADIATED: HCPCS

## 2018-01-04 PROCEDURE — 80048 BASIC METABOLIC PNL TOTAL CA: CPT

## 2018-01-04 PROCEDURE — 11300000 HC PEDIATRIC PRIVATE ROOM

## 2018-01-04 PROCEDURE — 86644 CMV ANTIBODY: CPT

## 2018-01-04 PROCEDURE — 27201040 HC RC 50 FILTER

## 2018-01-04 PROCEDURE — 63600175 PHARM REV CODE 636 W HCPCS: Performed by: STUDENT IN AN ORGANIZED HEALTH CARE EDUCATION/TRAINING PROGRAM

## 2018-01-04 PROCEDURE — 25000003 PHARM REV CODE 250: Performed by: STUDENT IN AN ORGANIZED HEALTH CARE EDUCATION/TRAINING PROGRAM

## 2018-01-04 PROCEDURE — P9037 PLATE PHERES LEUKOREDU IRRAD: HCPCS

## 2018-01-04 PROCEDURE — 36430 TRANSFUSION BLD/BLD COMPNT: CPT

## 2018-01-04 PROCEDURE — 86850 RBC ANTIBODY SCREEN: CPT

## 2018-01-04 PROCEDURE — 99233 SBSQ HOSP IP/OBS HIGH 50: CPT | Mod: ,,, | Performed by: PEDIATRICS

## 2018-01-04 RX ORDER — HYDROCODONE BITARTRATE AND ACETAMINOPHEN 500; 5 MG/1; MG/1
TABLET ORAL
Status: DISCONTINUED | OUTPATIENT
Start: 2018-01-04 | End: 2018-01-05 | Stop reason: HOSPADM

## 2018-01-04 RX ADMIN — ACYCLOVIR 400 MG: 200 CAPSULE ORAL at 08:01

## 2018-01-04 RX ADMIN — ACYCLOVIR 400 MG: 200 CAPSULE ORAL at 10:01

## 2018-01-04 RX ADMIN — DIPHENHYDRAMINE HYDROCHLORIDE 25 MG: 25 CAPSULE ORAL at 11:01

## 2018-01-04 RX ADMIN — PANTOPRAZOLE SODIUM 40 MG: 40 TABLET, DELAYED RELEASE ORAL at 10:01

## 2018-01-04 RX ADMIN — ACETAMINOPHEN 650 MG: 325 TABLET, FILM COATED ORAL at 11:01

## 2018-01-04 RX ADMIN — POLYETHYLENE GLYCOL 3350 17 G: 17 POWDER, FOR SOLUTION ORAL at 08:01

## 2018-01-04 RX ADMIN — POSACONAZOLE 300 MG: 100 TABLET, COATED ORAL at 10:01

## 2018-01-04 RX ADMIN — HEPARIN 300 UNITS: 100 SYRINGE at 11:01

## 2018-01-04 RX ADMIN — HEPARIN 300 UNITS: 100 SYRINGE at 03:01

## 2018-01-04 NOTE — PSYCH
Attempted to meet with patient, who is known to this writer, but he was asleep.  Will continue to follow throughout inpatient hospitalization.    No LOS

## 2018-01-04 NOTE — PROGRESS NOTES
"[facility] Pediatric [facility] Medicine  Progress Note    Patient Name: Hema Kuo  MRN: [id number]  Admission Date: 12/24/2017  Hospital Length of Stay: 11  Code Status: Full Code   Primary Care Physician: Ann Reyna NP  Principal Problem: Neutropenic fever    Subjective:     HPI:  Hema Kuo 18yo white male, with AML, s/p cycle 2 of treatment from 12/13/17 to 12/20/17. He received cytarabine, doxorobicin & etoposide.  He was discharged home on 12/20/2017.  He came in today reporting "not feeling well" and fever at home.  Reports decrease appetite otherwise well.  Denies fatigue, activity change, n/v/d or c.    Went to [facility]; started on abx and labs drawn and reports Hb=6 & plt=7k.    Hospital Course:  No notes on file    Scheduled Meds:   acyclovir  400 mg Oral BID    ceFEPIme in dextrose 5%  2 g Intravenous Q12H    pantoprazole  40 mg Oral Daily    posaconazole  300 mg Oral Daily     Continuous Infusions:  PRN Meds:sodium chloride, sodium chloride, acetaminophen, diphenhydrAMINE, heparin, porcine (PF), ondansetron, polyethylene glycol    Interval History: NAEON. Highest temp yesterday was 100.1 at 9am. PLT of 9 this morning. ANC ~30 today.    Scheduled Meds:   acyclovir  400 mg Oral BID    ceFEPIme in dextrose 5%  2 g Intravenous Q12H    pantoprazole  40 mg Oral Daily    posaconazole  300 mg Oral Daily     Continuous Infusions:  PRN Meds:sodium chloride, sodium chloride, acetaminophen, diphenhydrAMINE, heparin, porcine (PF), ondansetron, polyethylene glycol    Review of Systems  Objective:     Vital Signs (Most Recent):  Temp: 99.2 °F (37.3 °C) (01/04/18 0400)  Pulse: 95 (01/04/18 0400)  Resp: 20 (01/04/18 0400)  BP: (!) 109/53 (01/04/18 0400)  SpO2: 100 % (01/04/18 0400) Vital Signs (24h Range):  Temp:  [98.5 °F (36.9 °C)-100.1 °F (37.8 °C)] 99.2 °F (37.3 °C)  Pulse:  [94-97] 95  Resp:  [18-20] 20  SpO2:  [98 %-100 %] 100 %  BP: (109-117)/(53-62) 109/53     Patient " Vitals for the past 72 hrs (Last 3 readings):   Weight   01/03/18 2037 78.2 kg (172 lb 6.4 oz)   01/02/18 2005 78.7 kg (173 lb 8 oz)   01/01/18 1935 79.7 kg (175 lb 11.3 oz)     Body mass index is 27 kg/m².    Intake/Output - Last 3 Shifts       01/02 0700 - 01/03 0659 01/03 0700 - 01/04 0659    P.O. 1200 480    IV Piggyback 100 100    Total Intake(mL/kg) 1300 (16.5) 580 (7.4)    Net +1300 +580          Urine Occurrence 5 x 2 x          Lines/Drains/Airways     Central Venous Catheter Line                 Port A Cath Double Lumen 10/31/17 1826 left subclavian 64 days                Physical Exam   Constitutional: He is oriented to person, place, and time. He appears well-developed. No distress.   Awake, interactive   HENT:   Head: Normocephalic.   Mouth/Throat: Oropharynx is clear and moist.   alopecia   Eyes: Conjunctivae are normal.   Neck: Normal range of motion. Neck supple.   Cardiovascular: Normal rate and regular rhythm.    No murmur heard.  Pulmonary/Chest: Breath sounds normal. No respiratory distress. He has no wheezes.   Abdominal: Soft. Bowel sounds are normal. He exhibits no distension.   Neurological: He is alert and oriented to person, place, and time.   Skin: Skin is warm and dry. Capillary refill takes less than 2 seconds.   Scattered ecchymoses; no petichiae       Significant Labs:  .  Recent Results (from the past 24 hour(s))   CBC auto differential    Collection Time: 01/04/18  4:05 AM   Result Value Ref Range    WBC 0.74 (LL) 3.90 - 12.70 K/uL    RBC 2.62 (L) 4.60 - 6.20 M/uL    Hemoglobin 7.9 (L) 14.0 - 18.0 g/dL    Hematocrit 21.2 (L) 40.0 - 54.0 %    MCV 81 (L) 82 - 98 fL    MCH 30.2 27.0 - 31.0 pg    MCHC 37.3 (H) 32.0 - 36.0 g/dL    RDW 13.4 11.5 - 14.5 %    Platelets 9 (LL) 150 - 350 K/uL    MPV 11.2 9.2 - 12.9 fL    Immature Granulocytes 2.7 (H) 0.0 - 0.5 %    Gran # 0.0 (L) 1.8 - 7.7 K/uL    Immature Grans (Abs) 0.02 0.00 - 0.04 K/uL    Lymph # 0.7 (L) 1.0 - 4.8 K/uL    Mono # 0.0 (L)  0.3 - 1.0 K/uL    Eos # 0.0 0.0 - 0.5 K/uL    Baso # 0.00 0.00 - 0.20 K/uL    nRBC 0 0 /100 WBC    Gran% 4.0 (L) 38.0 - 73.0 %    Lymph% 89.2 (H) 18.0 - 48.0 %    Mono% 4.1 4.0 - 15.0 %    Eosinophil% 0.0 0.0 - 8.0 %    Basophil% 0.0 0.0 - 1.9 %    Aniso Slight     Poik Slight     Poly Occasional     Hypo Occasional     Ovalocytes Occasional     Differential Method Automated    Basic metabolic panel    Collection Time: 01/04/18  4:05 AM   Result Value Ref Range    Sodium 136 136 - 145 mmol/L    Potassium 4.1 3.5 - 5.1 mmol/L    Chloride 102 95 - 110 mmol/L    CO2 27 23 - 29 mmol/L    Glucose 100 70 - 110 mg/dL    BUN, Bld 9 6 - 20 mg/dL    Creatinine 0.7 0.5 - 1.4 mg/dL    Calcium 8.9 8.7 - 10.5 mg/dL    Anion Gap 7 (L) 8 - 16 mmol/L    eGFR if African American >60.0 >60 mL/min/1.73 m^2    eGFR if non African American >60.0 >60 mL/min/1.73 m^2         Significant Imaging: I have reviewed and interpreted all pertinent imaging results/findings within the past 24 hours.    Assessment/Plan:     Oncology   * Neutropenic fever    Hema, 16 yo white male with AML, s/p induction 2 of treatment with cytarabine, doxorubicin & etoposide on 12/20/17.  Admitted here for fever and neutropenia. Well-appearing except for pallor. Remains stable and afebrile since hospitalization.    #Neutropenia and fever: Afebrile. Awaiting count recovery  - Disontinue Cefepime after dose today.  - CBC daily  - BMP every other day  - repeat blood culture if febrile    #Immunosuppression 2/2 chemotherapy  - posaconazole prophylaxis  - acyclovic prophylaxis  - bactrim fri/sat/sun  - will go home on home cipro    #Anemia/Thrombocytopenia:  -Transfuse w/ leukoreduced & irradiated pRBC for Hg < 7; giving 2 units pRBC in PM today  -Transfuse for PLT <10; giving 1 unit PLT in AM today    Dispo:  -Pending evidence of count recovery                Anticipated Disposition: Home or Self Care    Deyvi Hu MD  Pediatric Hospital Medicine   [facility]

## 2018-01-04 NOTE — PLAN OF CARE
Problem: Patient Care Overview  Goal: Plan of Care Review  Outcome: Ongoing (interventions implemented as appropriate)  VS stable, afebrile, no distress noted. all meds given per order no PRN meds given. tolerating PO intake. voiding well, no BM this shift.  both ports hep locked. labs collected this AM, blood return noted. WBC 0.74, platelets 9, Dr. Ga notified, no new orders at this time. Plan of care reviewed with pt and mother, verbalized understanding, will continue to monitor.

## 2018-01-04 NOTE — SUBJECTIVE & OBJECTIVE
Interval History: NAEON. Highest temp yesterday was 100.1 at 9am. PLT of 9 this morning. ANC ~30 today.    Scheduled Meds:   acyclovir  400 mg Oral BID    ceFEPIme in dextrose 5%  2 g Intravenous Q12H    pantoprazole  40 mg Oral Daily    posaconazole  300 mg Oral Daily     Continuous Infusions:  PRN Meds:sodium chloride, sodium chloride, acetaminophen, diphenhydrAMINE, heparin, porcine (PF), ondansetron, polyethylene glycol    Review of Systems  Objective:     Vital Signs (Most Recent):  Temp: 99.2 °F (37.3 °C) (01/04/18 0400)  Pulse: 95 (01/04/18 0400)  Resp: 20 (01/04/18 0400)  BP: (!) 109/53 (01/04/18 0400)  SpO2: 100 % (01/04/18 0400) Vital Signs (24h Range):  Temp:  [98.5 °F (36.9 °C)-100.1 °F (37.8 °C)] 99.2 °F (37.3 °C)  Pulse:  [94-97] 95  Resp:  [18-20] 20  SpO2:  [98 %-100 %] 100 %  BP: (109-117)/(53-62) 109/53     Patient Vitals for the past 72 hrs (Last 3 readings):   Weight   01/03/18 2037 78.2 kg (172 lb 6.4 oz)   01/02/18 2005 78.7 kg (173 lb 8 oz)   01/01/18 1935 79.7 kg (175 lb 11.3 oz)     Body mass index is 27 kg/m².    Intake/Output - Last 3 Shifts       01/02 0700 - 01/03 0659 01/03 0700 - 01/04 0659    P.O. 1200 480    IV Piggyback 100 100    Total Intake(mL/kg) 1300 (16.5) 580 (7.4)    Net +1300 +580          Urine Occurrence 5 x 2 x          Lines/Drains/Airways     Central Venous Catheter Line                 Port A Cath Double Lumen 10/31/17 1826 left subclavian 64 days                Physical Exam   Constitutional: He is oriented to person, place, and time. He appears well-developed. No distress.   Awake, interactive   HENT:   Head: Normocephalic.   Mouth/Throat: Oropharynx is clear and moist.   alopecia   Eyes: Conjunctivae are normal.   Neck: Normal range of motion. Neck supple.   Cardiovascular: Normal rate and regular rhythm.    No murmur heard.  Pulmonary/Chest: Breath sounds normal. No respiratory distress. He has no wheezes.   Abdominal: Soft. Bowel sounds are normal. He  exhibits no distension.   Neurological: He is alert and oriented to person, place, and time.   Skin: Skin is warm and dry. Capillary refill takes less than 2 seconds.   Scattered ecchymoses; no petichiae       Significant Labs:  .  Recent Results (from the past 24 hour(s))   CBC auto differential    Collection Time: 01/04/18  4:05 AM   Result Value Ref Range    WBC 0.74 (LL) 3.90 - 12.70 K/uL    RBC 2.62 (L) 4.60 - 6.20 M/uL    Hemoglobin 7.9 (L) 14.0 - 18.0 g/dL    Hematocrit 21.2 (L) 40.0 - 54.0 %    MCV 81 (L) 82 - 98 fL    MCH 30.2 27.0 - 31.0 pg    MCHC 37.3 (H) 32.0 - 36.0 g/dL    RDW 13.4 11.5 - 14.5 %    Platelets 9 (LL) 150 - 350 K/uL    MPV 11.2 9.2 - 12.9 fL    Immature Granulocytes 2.7 (H) 0.0 - 0.5 %    Gran # 0.0 (L) 1.8 - 7.7 K/uL    Immature Grans (Abs) 0.02 0.00 - 0.04 K/uL    Lymph # 0.7 (L) 1.0 - 4.8 K/uL    Mono # 0.0 (L) 0.3 - 1.0 K/uL    Eos # 0.0 0.0 - 0.5 K/uL    Baso # 0.00 0.00 - 0.20 K/uL    nRBC 0 0 /100 WBC    Gran% 4.0 (L) 38.0 - 73.0 %    Lymph% 89.2 (H) 18.0 - 48.0 %    Mono% 4.1 4.0 - 15.0 %    Eosinophil% 0.0 0.0 - 8.0 %    Basophil% 0.0 0.0 - 1.9 %    Aniso Slight     Poik Slight     Poly Occasional     Hypo Occasional     Ovalocytes Occasional     Differential Method Automated    Basic metabolic panel    Collection Time: 01/04/18  4:05 AM   Result Value Ref Range    Sodium 136 136 - 145 mmol/L    Potassium 4.1 3.5 - 5.1 mmol/L    Chloride 102 95 - 110 mmol/L    CO2 27 23 - 29 mmol/L    Glucose 100 70 - 110 mg/dL    BUN, Bld 9 6 - 20 mg/dL    Creatinine 0.7 0.5 - 1.4 mg/dL    Calcium 8.9 8.7 - 10.5 mg/dL    Anion Gap 7 (L) 8 - 16 mmol/L    eGFR if African American >60.0 >60 mL/min/1.73 m^2    eGFR if non African American >60.0 >60 mL/min/1.73 m^2         Significant Imaging: I have reviewed and interpreted all pertinent imaging results/findings within the past 24 hours.

## 2018-01-04 NOTE — ASSESSMENT & PLAN NOTE
Hema, 18 yo white male with AML, s/p induction 2 of treatment with cytarabine, doxorubicin & etoposide on 12/20/17.  Admitted here for fever and neutropenia. Well-appearing except for pallor. Remains stable and afebrile since hospitalization.    #Neutropenia and fever: Afebrile. Awaiting count recovery  - Continue Cefepime.  - CBC daily  - BMP every other day  - repeat blood culture if febrile    #Immunosuppression 2/2 chemotherapy  - posaconazole prophylaxis  - acyclovic prophylaxis  - bactrim fri/sat/sun    #Anemia/Thrombocytopenia:  -Transfuse w/ leukoreduced & irradiated pRBC for Hg < 7  -Transfuse for PLT <10    Dispo:  -Pending evidence of count recovery

## 2018-01-05 VITALS
BODY MASS INDEX: 27.2 KG/M2 | HEART RATE: 72 BPM | HEIGHT: 67 IN | SYSTOLIC BLOOD PRESSURE: 112 MMHG | TEMPERATURE: 98 F | WEIGHT: 173.31 LBS | OXYGEN SATURATION: 100 % | RESPIRATION RATE: 18 BRPM | DIASTOLIC BLOOD PRESSURE: 55 MMHG

## 2018-01-05 PROBLEM — Z51.5 COMFORT MEASURES ONLY STATUS: Status: ACTIVE | Noted: 2018-01-05

## 2018-01-05 LAB
ANISOCYTOSIS BLD QL SMEAR: SLIGHT
BASOPHILS # BLD AUTO: 0 K/UL
BASOPHILS NFR BLD: 0 %
BLD PROD TYP BPU: NORMAL
BLOOD UNIT EXPIRATION DATE: NORMAL
BLOOD UNIT TYPE CODE: 5100
BLOOD UNIT TYPE CODE: 8400
BLOOD UNIT TYPE: NORMAL
CODING SYSTEM: NORMAL
DACRYOCYTES BLD QL SMEAR: ABNORMAL
DIFFERENTIAL METHOD: ABNORMAL
DISPENSE STATUS: NORMAL
EOSINOPHIL # BLD AUTO: 0 K/UL
EOSINOPHIL NFR BLD: 0 %
ERYTHROCYTE [DISTWIDTH] IN BLOOD BY AUTOMATED COUNT: 13.2 %
HCT VFR BLD AUTO: 25.5 %
HGB BLD-MCNC: 9.3 G/DL
HYPOCHROMIA BLD QL SMEAR: ABNORMAL
IMM GRANULOCYTES # BLD AUTO: 0.02 K/UL
IMM GRANULOCYTES NFR BLD AUTO: 1.8 %
LYMPHOCYTES # BLD AUTO: 1 K/UL
LYMPHOCYTES NFR BLD: 87.6 %
MCH RBC QN AUTO: 29.2 PG
MCHC RBC AUTO-ENTMCNC: 36.5 G/DL
MCV RBC AUTO: 80 FL
MONOCYTES # BLD AUTO: 0.1 K/UL
MONOCYTES NFR BLD: 6.2 %
NEUTROPHILS # BLD AUTO: 0.1 K/UL
NEUTROPHILS NFR BLD: 4.4 %
NRBC BLD-RTO: 0 /100 WBC
NUM UNITS TRANS PACKED RBC: NORMAL
NUM UNITS TRANS WBC-POOR PLATPHERESIS: NORMAL
OVALOCYTES BLD QL SMEAR: ABNORMAL
PLATELET # BLD AUTO: 18 K/UL
PLATELET BLD QL SMEAR: ABNORMAL
PMV BLD AUTO: 9 FL
POIKILOCYTOSIS BLD QL SMEAR: SLIGHT
POLYCHROMASIA BLD QL SMEAR: ABNORMAL
RBC # BLD AUTO: 3.19 M/UL
WBC # BLD AUTO: 1.13 K/UL

## 2018-01-05 PROCEDURE — P9037 PLATE PHERES LEUKOREDU IRRAD: HCPCS

## 2018-01-05 PROCEDURE — P9038 RBC IRRADIATED: HCPCS

## 2018-01-05 PROCEDURE — 85025 COMPLETE CBC W/AUTO DIFF WBC: CPT

## 2018-01-05 PROCEDURE — 86644 CMV ANTIBODY: CPT

## 2018-01-05 PROCEDURE — 99239 HOSP IP/OBS DSCHRG MGMT >30: CPT | Mod: ,,, | Performed by: PEDIATRICS

## 2018-01-05 PROCEDURE — 25000003 PHARM REV CODE 250: Performed by: PEDIATRICS

## 2018-01-05 PROCEDURE — 36430 TRANSFUSION BLD/BLD COMPNT: CPT

## 2018-01-05 PROCEDURE — 36592 COLLECT BLOOD FROM PICC: CPT

## 2018-01-05 PROCEDURE — 63600175 PHARM REV CODE 636 W HCPCS: Performed by: STUDENT IN AN ORGANIZED HEALTH CARE EDUCATION/TRAINING PROGRAM

## 2018-01-05 PROCEDURE — 25000003 PHARM REV CODE 250: Performed by: STUDENT IN AN ORGANIZED HEALTH CARE EDUCATION/TRAINING PROGRAM

## 2018-01-05 RX ORDER — CIPROFLOXACIN 500 MG/1
500 TABLET ORAL EVERY 12 HOURS
Qty: 120 TABLET | Refills: 3 | Status: ON HOLD | OUTPATIENT
Start: 2018-01-05 | End: 2018-05-31 | Stop reason: HOSPADM

## 2018-01-05 RX ORDER — POSACONAZOLE 40 MG/ML
300 SUSPENSION ORAL
Qty: 225 ML | Refills: 4 | Status: SHIPPED | OUTPATIENT
Start: 2018-01-05 | End: 2018-02-04

## 2018-01-05 RX ORDER — ACYCLOVIR 400 MG/1
400 TABLET ORAL 2 TIMES DAILY
Qty: 60 TABLET | Refills: 11 | Status: SHIPPED | OUTPATIENT
Start: 2018-01-05 | End: 2018-07-20

## 2018-01-05 RX ORDER — HYDROCODONE BITARTRATE AND ACETAMINOPHEN 500; 5 MG/1; MG/1
TABLET ORAL
Status: DISCONTINUED | OUTPATIENT
Start: 2018-01-05 | End: 2018-01-05 | Stop reason: HOSPADM

## 2018-01-05 RX ADMIN — POSACONAZOLE 300 MG: 100 TABLET, COATED ORAL at 10:01

## 2018-01-05 RX ADMIN — PANTOPRAZOLE SODIUM 40 MG: 40 TABLET, DELAYED RELEASE ORAL at 10:01

## 2018-01-05 RX ADMIN — HEPARIN 300 UNITS: 100 SYRINGE at 11:01

## 2018-01-05 RX ADMIN — DIPHENHYDRAMINE HYDROCHLORIDE 25 MG: 25 CAPSULE ORAL at 10:01

## 2018-01-05 RX ADMIN — ACETAMINOPHEN 650 MG: 325 TABLET, FILM COATED ORAL at 10:01

## 2018-01-05 RX ADMIN — ACYCLOVIR 400 MG: 200 CAPSULE ORAL at 10:01

## 2018-01-05 RX ADMIN — HEPARIN 300 UNITS: 100 SYRINGE at 05:01

## 2018-01-05 NOTE — PLAN OF CARE
Problem: Patient Care Overview  Goal: Plan of Care Review  Outcome: Ongoing (interventions implemented as appropriate)  Patient doing well this shift. 1 unit of Plts given per orders, tolerated well. 2 units PRBCs to be given in PM, T&S ordered and collected. Free from distress throughout shift. IV abx dc'd. VSS, afebrile. Good PO intake, good UOP, no BM this shift. Plan of care discussed with patient and mother throughout shift, verbalized understanding to all.

## 2018-01-05 NOTE — PLAN OF CARE
01/05/18 1102   Discharge Reassessment   Assessment Type Discharge Planning Reassessment   Provided patient/caregiver education on the expected discharge date and the discharge plan Yes   Do you have any problems affording any of your prescribed medications? No   Discharge Plan A Home with family   Discharge Plan B Home with family   Patient choice form signed by patient/caregiver N/A   Can the patient answer the patient profile reliably? Yes, cognitively intact   How does the patient rate their overall health at the present time? Fair   Describe the patient's ability to walk at the present time. No restrictions   How often would a person be available to care for the patient? Whenever needed   Number of comorbid conditions (as recorded on the chart) None   Pt to discharge home today after finishes plt transfusion, labs to be drawn in [place] on Monday. No other dc needs at this time.

## 2018-01-05 NOTE — HOSPITAL COURSE
Hema Kuo is a 17 year old man with AML s/p cycle 2 of his chemotherapy regimen of cytarabine, doxorubicin and etoposide. He was admitted for febrile neutropenia. His ANC remained less than 50 for his entire hospitalization. He received cefepime for the majority of his stay as well as prophylactic acyclovir, posaconazole and bactrim. He remained afebrile and symptomatic throughout his stay. All blood cultures and urine cultures from admission remained negative. He received multiple platelet transfusions and pRBC transfusions for thrombocytopenia and anemia 2/2 chemotherapy.    He was discharged on ciprofloxacin, acyclovir, posaconazole and bactrim.     By problem:    #Neutropenia and fever  Received cefepime.     #Immunosuppression 2/2 chemotherapy  - discharged on ciprofloxacin, posaconazole, acyclovir and bactrim fri/sat/sun prophylaxis     #Anemia/Thrombocytopenia:  -Transfused w/ leukoreduced & irradiated pRBC for Hg < 7  -Transfused for PLT <10

## 2018-01-05 NOTE — SUBJECTIVE & OBJECTIVE
Interval History: NAEON.    Scheduled Meds:   acyclovir  400 mg Oral BID    pantoprazole  40 mg Oral Daily    posaconazole  300 mg Oral Daily     Continuous Infusions:  PRN Meds:sodium chloride, sodium chloride, acetaminophen, diphenhydrAMINE, heparin, porcine (PF), ondansetron, polyethylene glycol    Review of Systems  Objective:     Vital Signs (Most Recent):  Temp: 97.6 °F (36.4 °C) (01/05/18 0430)  Pulse: 72 (01/05/18 0430)  Resp: 20 (01/05/18 0430)  BP: (!) 101/51 (01/05/18 0430)  SpO2: 99 % (01/05/18 0430) Vital Signs (24h Range):  Temp:  [97.2 °F (36.2 °C)-99.3 °F (37.4 °C)] 97.6 °F (36.4 °C)  Pulse:  [] 72  Resp:  [14-20] 20  SpO2:  [98 %-100 %] 99 %  BP: ()/(40-62) 101/51     Patient Vitals for the past 72 hrs (Last 3 readings):   Weight   01/04/18 1952 78.6 kg (173 lb 4.5 oz)   01/03/18 2037 78.2 kg (172 lb 6.4 oz)   01/02/18 2005 78.7 kg (173 lb 8 oz)     Body mass index is 27.14 kg/m².    Intake/Output - Last 3 Shifts       01/03 0700 - 01/04 0659 01/04 0700 - 01/05 0659 01/05 0700 - 01/06 0659    P.O. 480 1770     Blood  865.1     IV Piggyback 100      Total Intake(mL/kg) 580 (7.4) 2635.1 (33.5)     Net +580 +2635.1             Urine Occurrence 2 x 5 x     Stool Occurrence  0 x     Emesis Occurrence  0 x           Lines/Drains/Airways     Central Venous Catheter Line                 Port A Cath Double Lumen 10/31/17 1826 left subclavian 65 days                Physical Exam   Constitutional: He is oriented to person, place, and time. He appears well-developed. No distress.   Awake, interactive   HENT:   Head: Normocephalic.   Mouth/Throat: Oropharynx is clear and moist.   alopecia   Eyes: Conjunctivae are normal.   Neck: Normal range of motion. Neck supple.   Cardiovascular: Normal rate and regular rhythm.    No murmur heard.  Pulmonary/Chest: Breath sounds normal. No respiratory distress. He has no wheezes.   Abdominal: Soft. Bowel sounds are normal. He exhibits no distension.    Neurological: He is alert and oriented to person, place, and time.   Skin: Skin is warm and dry. Capillary refill takes less than 2 seconds.   Scattered ecchymoses; no petichiae       Significant Labs:    Recent Results (from the past 24 hour(s))   Type & Screen    Collection Time: 01/04/18  5:40 PM   Result Value Ref Range    Group & Rh AB POS     Indirect Nathan NEG    Prepare RBC 1 Unit    Collection Time: 01/04/18  5:40 PM   Result Value Ref Range    UNIT NUMBER [id number]     PRODUCT CODE F8468L92     DISPENSE STATUS ISSUED     CODING SYSTEM TQHY479     Unit Blood Type Code 5100     Unit Blood Type O POS     Unit Expiration [id number]    CBC auto differential    Collection Time: 01/05/18  5:35 AM   Result Value Ref Range    WBC 1.13 (LL) 3.90 - 12.70 K/uL    RBC 3.19 (L) 4.60 - 6.20 M/uL    Hemoglobin 9.3 (L) 14.0 - 18.0 g/dL    Hematocrit 25.5 (L) 40.0 - 54.0 %    MCV 80 (L) 82 - 98 fL    MCH 29.2 27.0 - 31.0 pg    MCHC 36.5 (H) 32.0 - 36.0 g/dL    RDW 13.2 11.5 - 14.5 %    MPV 9.0 (L) 9.2 - 12.9 fL         Significant Imaging: I have reviewed and interpreted all pertinent imaging results/findings within the past 24 hours.

## 2018-01-05 NOTE — NURSING
Reviewed d/c instructions inc meds, when to call md, and f/u appt. Mom verb understanding. D/c to home with mom and instructions

## 2018-01-05 NOTE — PROGRESS NOTES
"[facility] Pediatric Hospital Medicine  Progress Note    Patient Name: Hema Kuo  MRN: [id number]  Admission Date: 12/24/2017  Hospital Length of Stay: 12  Code Status: Full Code   Primary Care Physician: Ann Reyna NP  Principal Problem: Neutropenic fever    Subjective:     HPI:  Hema Kuo 18yo white male, with AML, s/p cycle 2 of treatment from 12/13/17 to 12/20/17. He received cytarabine, doxorobicin & etoposide.  He was discharged home on 12/20/2017.  He came in today reporting "not feeling well" and fever at home.  Reports decrease appetite otherwise well.  Denies fatigue, activity change, n/v/d or c.    Went to [facility]; started on abx and labs drawn and reports Hb=6 & plt=7k.    Hospital Course:  No notes on file    Scheduled Meds:   acyclovir  400 mg Oral BID    pantoprazole  40 mg Oral Daily    posaconazole  300 mg Oral Daily     Continuous Infusions:  PRN Meds:sodium chloride, sodium chloride, acetaminophen, diphenhydrAMINE, heparin, porcine (PF), ondansetron, polyethylene glycol    Interval History: NAEON.    Scheduled Meds:   acyclovir  400 mg Oral BID    pantoprazole  40 mg Oral Daily    posaconazole  300 mg Oral Daily     Continuous Infusions:  PRN Meds:sodium chloride, sodium chloride, acetaminophen, diphenhydrAMINE, heparin, porcine (PF), ondansetron, polyethylene glycol    Review of Systems  Objective:     Vital Signs (Most Recent):  Temp: 97.6 °F (36.4 °C) (01/05/18 0430)  Pulse: 72 (01/05/18 0430)  Resp: 20 (01/05/18 0430)  BP: (!) 101/51 (01/05/18 0430)  SpO2: 99 % (01/05/18 0430) Vital Signs (24h Range):  Temp:  [97.2 °F (36.2 °C)-99.3 °F (37.4 °C)] 97.6 °F (36.4 °C)  Pulse:  [] 72  Resp:  [14-20] 20  SpO2:  [98 %-100 %] 99 %  BP: ()/(40-62) 101/51     Patient Vitals for the past 72 hrs (Last 3 readings):   Weight   01/04/18 1952 78.6 kg (173 lb 4.5 oz)   01/03/18 2037 78.2 kg (172 lb 6.4 oz)   01/02/18 2005 78.7 kg (173 lb 8 oz)     Body mass " index is 27.14 kg/m².    Intake/Output - Last 3 Shifts       01/03 0700 - 01/04 0659 01/04 0700 - 01/05 0659 01/05 0700 - 01/06 0659    P.O. 480 1770     Blood  865.1     IV Piggyback 100      Total Intake(mL/kg) 580 (7.4) 2635.1 (33.5)     Net +580 +2635.1             Urine Occurrence 2 x 5 x     Stool Occurrence  0 x     Emesis Occurrence  0 x           Lines/Drains/Airways     Central Venous Catheter Line                 Port A Cath Double Lumen 10/31/17 1826 left subclavian 65 days                Physical Exam   Constitutional: He is oriented to person, place, and time. He appears well-developed. No distress.   Awake, interactive   HENT:   Head: Normocephalic.   Mouth/Throat: Oropharynx is clear and moist.   alopecia   Eyes: Conjunctivae are normal.   Neck: Normal range of motion. Neck supple.   Cardiovascular: Normal rate and regular rhythm.    No murmur heard.  Pulmonary/Chest: Breath sounds normal. No respiratory distress. He has no wheezes.   Abdominal: Soft. Bowel sounds are normal. He exhibits no distension.   Neurological: He is alert and oriented to person, place, and time.   Skin: Skin is warm and dry. Capillary refill takes less than 2 seconds.   Scattered ecchymoses; no petichiae       Significant Labs:    Recent Results (from the past 24 hour(s))   Type & Screen    Collection Time: 01/04/18  5:40 PM   Result Value Ref Range    Group & Rh AB POS     Indirect Nathan NEG    Prepare RBC 1 Unit    Collection Time: 01/04/18  5:40 PM   Result Value Ref Range    UNIT NUMBER [id number]     PRODUCT CODE [id number]     DISPENSE STATUS ISSUED     CODING SYSTEM ISNR349     Unit Blood Type Code 5100     Unit Blood Type O POS     Unit Expiration [id number]    CBC auto differential    Collection Time: 01/05/18  5:35 AM   Result Value Ref Range    WBC 1.13 (LL) 3.90 - 12.70 K/uL    RBC 3.19 (L) 4.60 - 6.20 M/uL    Hemoglobin 9.3 (L) 14.0 - 18.0 g/dL    Hematocrit 25.5 (L) 40.0 - 54.0 %    MCV 80 (L) 82 - 98 fL     MCH 29.2 27.0 - 31.0 pg    MCHC 36.5 (H) 32.0 - 36.0 g/dL    RDW 13.2 11.5 - 14.5 %    MPV 9.0 (L) 9.2 - 12.9 fL         Significant Imaging: I have reviewed and interpreted all pertinent imaging results/findings within the past 24 hours.    Assessment/Plan:     Oncology   * Neutropenic fever    Hema, 18 yo white male with AML, s/p induction 2 of treatment with cytarabine, doxorubicin & etoposide on 12/20/17.  Admitted here for fever and neutropenia. Well-appearing except for pallor. Remains stable and afebrile since hospitalization.    #Neutropenia and fever: Afebrile. Awaiting count recovery  - CBC daily  - BMP every other day  - repeat blood culture if febrile (100.3 or greater)    #Immunosuppression 2/2 chemotherapy  - will go home on ciprofloxacin  - posaconazole prophylaxis  - acyclovic prophylaxis  - bactrim fri/sat/sun    #Anemia/Thrombocytopenia:  -Transfuse w/ leukoreduced & irradiated pRBC for Hg < 7  -Transfuse for PLT <10    Dispo:  -Pending evidence of count recovery or 2 weeks of being afebrile in hospital                Anticipated Disposition: Home or Self Care    Deyvi Hu MD  Pediatric Hospital Medicine   [facility]

## 2018-01-05 NOTE — ASSESSMENT & PLAN NOTE
Hema, 16 yo white male with AML, s/p induction 2 of treatment with cytarabine, doxorubicin & etoposide on 12/20/17.  Admitted here for fever and neutropenia. Well-appearing except for pallor. Remains stable and afebrile since hospitalization.    #Neutropenia and fever: Afebrile. Awaiting count recovery  - CBC daily  - BMP every other day  - repeat blood culture if febrile (100.3 or greater)    #Immunosuppression 2/2 chemotherapy  - will go home on ciprofloxacin  - posaconazole prophylaxis  - acyclovic prophylaxis  - bactrim fri/sat/sun    #Anemia/Thrombocytopenia:  -Transfuse w/ leukoreduced & irradiated pRBC for Hg < 7  -Transfuse for PLT <10    Dispo:  -Pending evidence of count recovery or 2 weeks of being afebrile in hospital

## 2018-01-05 NOTE — PLAN OF CARE
01/05/18 1538   Final Note   Assessment Type Final Discharge Note   Discharge Disposition Home

## 2018-01-05 NOTE — PLAN OF CARE
Problem: Patient Care Overview  Goal: Plan of Care Review  Outcome: Outcome(s) achieved Date Met: 01/05/18  sleeping quietly, easily aroused. Denies discomfort. Vss. Afebrile>24hrs off abx. anc improving, 49 this am. Received 1 unit platlets, tolerated well. deaccessed PAC.  Will d/c to home

## 2018-01-05 NOTE — PLAN OF CARE
Problem: Patient Care Overview  Goal: Plan of Care Review  Outcome: Ongoing (interventions implemented as appropriate)  Afebrile. No distress noted. Pt received 2 units PRBCs overnight; tolerated well with no s/s of reaction. Voiding well. + blood return noted to both PACs; currently HL. POC discussed with mother and pt; verbalized understanding. Will continue to monitor.

## 2018-01-09 ENCOUNTER — HISTORICAL (OUTPATIENT)
Dept: INFUSION THERAPY | Facility: HOSPITAL | Age: 18
End: 2018-01-09

## 2018-01-09 LAB
ABS NEUT (OLG): 0.12 X10(3)/MCL (ref 2.1–9.2)
ANISOCYTOSIS BLD QL SMEAR: ABNORMAL
BASOPHILS NFR BLD MANUAL: 0 %
EOSINOPHIL NFR BLD MANUAL: 0 %
ERYTHROCYTE [DISTWIDTH] IN BLOOD BY AUTOMATED COUNT: 12.9 % (ref 11.5–14.5)
GRANULOCYTES NFR BLD MANUAL: 8 % (ref 43–75)
HCT VFR BLD AUTO: 31.7 % (ref 40–51)
HGB BLD-MCNC: 11.5 GM/DL (ref 13.5–17.5)
HYPOCHROMIA BLD QL SMEAR: ABNORMAL
LYMPHOCYTES NFR BLD MANUAL: 8 %
LYMPHOCYTES NFR BLD MANUAL: 80 % (ref 20.5–51.1)
MACROCYTES BLD QL SMEAR: ABNORMAL
MCH RBC QN AUTO: 29.7 PG (ref 26–34)
MCHC RBC AUTO-ENTMCNC: 36.3 GM/DL (ref 31–37)
MCV RBC AUTO: 81.9 FL (ref 80–100)
MONOCYTES NFR BLD MANUAL: 4 % (ref 2–9)
PLATELET # BLD AUTO: 10 X10(3)/MCL (ref 130–400)
PLATELET # BLD EST: ABNORMAL 10*3/UL
PMV BLD AUTO: 8.4 FL (ref 7.4–10.4)
POIKILOCYTOSIS BLD QL SMEAR: ABNORMAL
RBC # BLD AUTO: 3.87 X10(6)/MCL (ref 4.5–5.9)
RBC MORPH BLD: ABNORMAL
RET# (OHS): 0.01 X10(6)/MCL (ref 0.02–0.09)
RETICULOCYTE COUNT AUTOMATED (OLG): 0.2 % (ref 0.5–1.5)
TRANSFUSION ORDER: NORMAL
WBC # SPEC AUTO: 1.7 X10(3)/MCL (ref 4.5–11)

## 2018-01-09 NOTE — DISCHARGE SUMMARY
"[facility] Pediatric Hospital Medicine  Discharge Summary      Patient Name: Hema Kuo  MRN: [id number]  Admission Date: 12/24/2017  Hospital Length of Stay: 12 days  Discharge Date and Time:  01/09/2018 3:51 PM  Discharging Provider: Deyvi Hu MD  Primary Care Provider: Ann Reyna NP    Reason for Admission: Febrile Neutropenia 2/2 Chemotherapy    HPI:   Hema Kuo is 18yo male, with AML, s/p cycle 2 of treatment from 12/13/17 to 12/20/17. He received cytarabine, doxorobicin & etoposide.  He was discharged home on 12/20/2017.  He came in today reporting "not feeling well" and fever at home.  Reports decrease appetite otherwise well.  Denies fatigue, activity change, n/v/d or c.    Went to [facility]; started on abx and labs drawn and reports Hb=6 & plt=7k.    * No surgery found *      Indwelling Lines/Drains at time of discharge:   Lines/Drains/Airways     Central Venous Catheter Line                 Port A Cath Double Lumen 10/31/17 1826 left subclavian 69 days                Hospital Course: Hema Kuo is a 17 year old man with AML s/p cycle 2 of his chemotherapy regimen of cytarabine, doxorubicin and etoposide. He was admitted for febrile neutropenia. His ANC remained less than 50 for his entire hospitalization. He received cefepime for the majority of his stay as well as prophylactic acyclovir, posaconazole and bactrim. He remained afebrile and symptomatic throughout his stay. All blood cultures and urine cultures from admission remained negative. He received multiple platelet transfusions and pRBC transfusions for thrombocytopenia and anemia 2/2 chemotherapy.    He was discharged on ciprofloxacin, acyclovir, posaconazole and bactrim.     By problem:    #Neutropenia and fever  Received cefepime.     #Immunosuppression 2/2 chemotherapy  - discharged on ciprofloxacin, posaconazole, acyclovir and bactrim fri/sat/sun prophylaxis     #Anemia/Thrombocytopenia:  -Transfused w/ " leukoreduced & irradiated pRBC for Hg < 7  -Transfused for PLT <10     Consults:     Significant Labs: All pertinent lab results from the past 24 hours have been reviewed.    Significant Imaging: I have reviewed and interpreted all pertinent imaging results/findings within the past 24 hours.    Pending Diagnostic Studies:     None          Final Active Diagnoses:    Diagnosis Date Noted POA    PRINCIPAL PROBLEM:  Neutropenic fever [D70.9, R50.81] 12/24/2017 Yes    Comfort measures only status [Z51.5] 01/05/2018 Not Applicable      Problems Resolved During this Admission:    Diagnosis Date Noted Date Resolved POA        Discharged Condition: stable    Disposition: Home or Self Care    Follow Up:  Follow-up Information     Sanford Bucio MD. Go in 2 weeks.    Specialties:  Pediatric Hematology and Oncology, Pediatric Hematology  Contact information:  [address]  [facility] [id number] [phone]                 Patient Instructions:     Notify your health care provider if you experience any of the following:  temperature >100.4     Notify your health care provider if you experience any of the following:  redness, tenderness, or signs of infection (pain, swelling, redness, odor or green/yellow discharge around incision site)     Notify your health care provider if you experience any of the following:  severe persistent headache     Notify your health care provider if you experience any of the following:  persistent nausea and vomiting or diarrhea       Medications:  Reconciled Home Medications:   Discharge Medication List as of 1/5/2018 12:01 PM      START taking these medications    Details   acyclovir (ZOVIRAX) 400 MG tablet Take 1 tablet (400 mg total) by mouth 2 (two) times daily., Starting Fri 1/5/2018, Until Sat 1/5/2019, Normal      ciprofloxacin HCl (CIPRO) 500 MG tablet Take 1 tablet (500 mg total) by mouth every 12 (twelve) hours., Starting Fri 1/5/2018, Normal      posaconazole 200 mg/5ml, 40  mg/ml, (NOXAFIL) 200 mg/5 mL (40 mg/mL) Susp Take 7.5 mLs (300 mg total) by mouth daily with breakfast., Starting Fri 1/5/2018, Until Sun 2/4/2018, Normal         CONTINUE these medications which have NOT CHANGED    Details   sulfamethoxazole-trimethoprim 800-160mg (BACTRIM DS) 800-160 mg Tab Take 1 tablet by mouth twice daily only on Friday, Saturday, Sunday., Starting Fri 11/24/2017, Normal      acyclovir (ZOVIRAX) 200 MG capsule Take 2 capsules (400 mg total) by mouth 2 (two) times daily., Starting Mon 11/20/2017, Until Wed 12/20/2017, Normal      lisdexamfetamine (VYVANSE) 60 MG capsule Take 60 mg by mouth every morning., Historical Med      ondansetron (ZOFRAN-ODT) 8 MG TbDL Take 1 tablet (8 mg total) by mouth every 8 (eight) hours as needed., Starting Mon 11/20/2017, Normal      pantoprazole (PROTONIX) 40 MG tablet Take 1 tablet (40 mg total) by mouth once daily., Starting Wed 12/20/2017, Until Fri 1/19/2018, Normal      polyethylene glycol (GLYCOLAX) 17 gram PwPk Take 17 g by mouth daily as needed (No bowel movement)., Starting Mon 11/20/2017, Normal              Deyvi Hu MD  Pediatric Hospital Medicine  [facility]

## 2018-01-12 ENCOUNTER — HISTORICAL (OUTPATIENT)
Dept: HEMATOLOGY/ONCOLOGY | Facility: CLINIC | Age: 18
End: 2018-01-12

## 2018-01-12 LAB
ABS NEUT (OLG): 0.26 X10(3)/MCL
ANISOCYTOSIS BLD QL SMEAR: ABNORMAL
BASOPHILS NFR BLD MANUAL: 0 %
EOSINOPHIL NFR BLD MANUAL: 0 %
ERYTHROCYTE [DISTWIDTH] IN BLOOD BY AUTOMATED COUNT: 12.7 % (ref 11.5–14.5)
GRANULOCYTES NFR BLD MANUAL: 9 % (ref 43–75)
HCT VFR BLD AUTO: 28.6 % (ref 40–51)
HGB BLD-MCNC: 10.3 GM/DL (ref 13.5–17.5)
HYPOCHROMIA BLD QL SMEAR: ABNORMAL
LYMPHOCYTES NFR BLD MANUAL: 6 %
LYMPHOCYTES NFR BLD MANUAL: 75 % (ref 20.5–51.1)
MACROCYTES BLD QL SMEAR: ABNORMAL
MCH RBC QN AUTO: 29.3 PG (ref 26–34)
MCHC RBC AUTO-ENTMCNC: 36 GM/DL (ref 31–37)
MCV RBC AUTO: 81.5 FL (ref 80–100)
MICROCYTES BLD QL SMEAR: ABNORMAL
MONOCYTES NFR BLD MANUAL: 8 % (ref 2–9)
NEUTS BAND NFR BLD MANUAL: 2 % (ref 0–10)
OVALOCYTES BLD QL SMEAR: ABNORMAL
PLATELET # BLD AUTO: 24 X10(3)/MCL (ref 130–400)
PLATELET # BLD EST: ABNORMAL 10*3/UL
PMV BLD AUTO: 12.2 FL (ref 7.4–10.4)
POIKILOCYTOSIS BLD QL SMEAR: ABNORMAL
RBC # BLD AUTO: 3.51 X10(6)/MCL (ref 4.5–5.9)
RBC MORPH BLD: ABNORMAL
RET# (OHS): 0.01
RETICULOCYTE COUNT AUTOMATED (OLG): 0.2 % (ref 0.5–1.5)
SCHISTOCYTES BLD QL AUTO: ABNORMAL
WBC # SPEC AUTO: 2.6 X10(3)/MCL (ref 4.5–11)

## 2018-01-15 ENCOUNTER — HISTORICAL (OUTPATIENT)
Dept: HEMATOLOGY/ONCOLOGY | Facility: CLINIC | Age: 18
End: 2018-01-15

## 2018-01-15 LAB
ABS NEUT (OLG): 0.29 X10(3)/MCL
ANISOCYTOSIS BLD QL SMEAR: ABNORMAL
BASOPHILS NFR BLD MANUAL: 0 %
EOSINOPHIL NFR BLD MANUAL: 0 %
ERYTHROCYTE [DISTWIDTH] IN BLOOD BY AUTOMATED COUNT: 12.7 % (ref 11.5–14.5)
GRANULOCYTES NFR BLD MANUAL: 19 % (ref 43–75)
HCT VFR BLD AUTO: 27.1 % (ref 40–51)
HGB BLD-MCNC: 10 GM/DL (ref 13.5–17.5)
HYPOCHROMIA BLD QL SMEAR: ABNORMAL
LYMPHOCYTES NFR BLD MANUAL: 77 % (ref 20.5–51.1)
MCH RBC QN AUTO: 29.9 PG (ref 26–34)
MCHC RBC AUTO-ENTMCNC: 36.9 GM/DL (ref 31–37)
MCV RBC AUTO: 80.9 FL (ref 80–100)
MICROCYTES BLD QL SMEAR: ABNORMAL
MONOCYTES NFR BLD MANUAL: 4 % (ref 2–9)
PLATELET # BLD AUTO: 16 X10(3)/MCL (ref 130–400)
PLATELET # BLD EST: ABNORMAL 10*3/UL
PMV BLD AUTO: 12 FL (ref 7.4–10.4)
POIKILOCYTOSIS BLD QL SMEAR: ABNORMAL
RBC # BLD AUTO: 3.35 X10(6)/MCL (ref 4.5–5.9)
RBC MORPH BLD: ABNORMAL
RET# (OHS): 0.01
RETICULOCYTE COUNT AUTOMATED (OLG): 0.4 % (ref 0.5–1.5)
WBC # SPEC AUTO: 2.6 X10(3)/MCL (ref 4.5–11)

## 2018-01-19 ENCOUNTER — HISTORICAL (OUTPATIENT)
Dept: HEMATOLOGY/ONCOLOGY | Facility: CLINIC | Age: 18
End: 2018-01-19

## 2018-01-19 LAB
ABS NEUT (OLG): 0.32 X10(3)/MCL (ref 2.1–9.2)
ANISOCYTOSIS BLD QL SMEAR: ABNORMAL
BASOPHILS NFR BLD MANUAL: 0 %
EOSINOPHIL NFR BLD MANUAL: 0 %
ERYTHROCYTE [DISTWIDTH] IN BLOOD BY AUTOMATED COUNT: 12.5 % (ref 11.5–14.5)
GRANULOCYTES NFR BLD MANUAL: 16 % (ref 43–75)
HCT VFR BLD AUTO: 22.5 % (ref 40–51)
HGB BLD-MCNC: 8.1 GM/DL (ref 13.5–17.5)
HYPOCHROMIA BLD QL SMEAR: ABNORMAL
LYMPHOCYTES NFR BLD MANUAL: 78 % (ref 20.5–51.1)
MCH RBC QN AUTO: 29.5 PG (ref 26–34)
MCHC RBC AUTO-ENTMCNC: 36 GM/DL (ref 31–37)
MCV RBC AUTO: 81.8 FL (ref 80–100)
MICROCYTES BLD QL SMEAR: ABNORMAL
MONOCYTES NFR BLD MANUAL: 2 % (ref 2–9)
NEUTS BAND NFR BLD MANUAL: 4 % (ref 0–10)
PLATELET # BLD AUTO: 14 X10(3)/MCL (ref 130–400)
PLATELET # BLD EST: ABNORMAL 10*3/UL
PMV BLD AUTO: 12 FL (ref 7.4–10.4)
POLYCHROMASIA BLD QL SMEAR: ABNORMAL
RBC # BLD AUTO: 2.75 X10(6)/MCL (ref 4.5–5.9)
RBC MORPH BLD: ABNORMAL
RET# (OHS): 0.02 X10(6)/MCL (ref 0.02–0.09)
RETICULOCYTE COUNT AUTOMATED (OLG): 0.7 % (ref 0.5–1.5)
WBC # SPEC AUTO: 1.8 X10(3)/MCL (ref 4.5–11)

## 2018-01-22 ENCOUNTER — PATIENT MESSAGE (OUTPATIENT)
Dept: PEDIATRIC HEMATOLOGY/ONCOLOGY | Facility: CLINIC | Age: 18
End: 2018-01-22

## 2018-01-22 ENCOUNTER — TELEPHONE (OUTPATIENT)
Dept: PEDIATRIC HEMATOLOGY/ONCOLOGY | Facility: CLINIC | Age: 18
End: 2018-01-22

## 2018-01-22 ENCOUNTER — HISTORICAL (OUTPATIENT)
Dept: HEMATOLOGY/ONCOLOGY | Facility: CLINIC | Age: 18
End: 2018-01-22

## 2018-01-22 LAB
ABS NEUT (OLG): 0.24 X10(3)/MCL
ANISOCYTOSIS BLD QL SMEAR: NORMAL
ERYTHROCYTE [DISTWIDTH] IN BLOOD BY AUTOMATED COUNT: 12.8 % (ref 11.5–14.5)
HCT VFR BLD AUTO: 23.3 % (ref 40–51)
HGB BLD-MCNC: 8.3 GM/DL (ref 13.5–17.5)
LYMPHOCYTES # BLD AUTO: 1.68 X10(3)/MCL
LYMPHOCYTES NFR BLD AUTO: 81 % (ref 23–43)
MCH RBC QN AUTO: 29.6 PG (ref 26–34)
MCHC RBC AUTO-ENTMCNC: 35.6 GM/DL (ref 31–37)
MCV RBC AUTO: 83.2 FL (ref 80–100)
MICROCYTES BLD QL SMEAR: NORMAL
MONOCYTES # BLD AUTO: 0.16 X10(3)/MCL
MONOCYTES NFR BLD AUTO: 8 % (ref 0–10)
NEUTROPHILS # BLD AUTO: 0.24 X10(3)/MCL
NEUTROPHILS NFR BLD AUTO: 12 %
PLATELET # BLD AUTO: 21 X10(3)/MCL (ref 130–400)
PLATELET # BLD EST: NORMAL 10*3/UL
PMV BLD AUTO: 12.3 FL (ref 7.4–10.4)
RBC # BLD AUTO: 2.8 X10(6)/MCL (ref 4.5–5.9)
RBC MORPH BLD: NORMAL
RET# (OHS): 0.03
RETICULOCYTE COUNT AUTOMATED (OLG): 1 % (ref 0.5–1.5)
WBC # SPEC AUTO: 2.1 X10(3)/MCL (ref 4.5–11)

## 2018-01-22 NOTE — TELEPHONE ENCOUNTER
Spoke to pt mother, Madeline, and informed her that I received her [facility] message with pt questions. Informed her of the pt lab results today and that it is taking him so long to recover from the last round of chemo because the more chemo you get the longer it takes and that this is completely normal. Stated that based off of his counts, we are guessing that he will be cleared for next course of treatment in 2 weeks if platelets greater than 75 and ANC greater than 1000. Stated that I did fax the pt PCP a letter for him to continue Vyvanse. Pt mother stated that the pt no longer has primary insurance and will now have Medicaid. Stated that they received a letter from Medicaid stating that they will not cover the noxafil the way it is written and that she will email us the letter to see if we can help them out as it is $2800. Informed her that I will look at the letter and we will figure it out as soon as we get it. She stated that he has enough med to last until next admit hopefully. No further needs noted.

## 2018-01-24 ENCOUNTER — PATIENT MESSAGE (OUTPATIENT)
Dept: INFUSION THERAPY | Facility: HOSPITAL | Age: 18
End: 2018-01-24

## 2018-01-29 ENCOUNTER — HISTORICAL (OUTPATIENT)
Dept: INFUSION THERAPY | Facility: HOSPITAL | Age: 18
End: 2018-01-29

## 2018-01-29 ENCOUNTER — TELEPHONE (OUTPATIENT)
Dept: INFUSION THERAPY | Facility: HOSPITAL | Age: 18
End: 2018-01-29

## 2018-01-29 LAB
ABS NEUT (OLG): 0.15 X10(3)/MCL (ref 2.1–9.2)
ANISOCYTOSIS BLD QL SMEAR: ABNORMAL
BASOPHILS NFR BLD MANUAL: 0 %
CROSSMATCH INTERPRETATION: NORMAL
DACRYOCYTES BLD QL SMEAR: ABNORMAL
EOSINOPHIL NFR BLD MANUAL: 0 %
ERYTHROCYTE [DISTWIDTH] IN BLOOD BY AUTOMATED COUNT: 14.9 % (ref 11.5–14.5)
GRANULOCYTES NFR BLD MANUAL: 14 % (ref 43–75)
HCT VFR BLD AUTO: 19.4 % (ref 40–51)
HGB BLD-MCNC: 6.8 GM/DL (ref 13.5–17.5)
HYPOCHROMIA BLD QL SMEAR: ABNORMAL
LYMPHOCYTES # BLD AUTO: 1.16 X10(3)/MCL
LYMPHOCYTES NFR BLD AUTO: 80 % (ref 23–43)
LYMPHOCYTES NFR BLD MANUAL: 78 % (ref 20.5–51.1)
MACROCYTES BLD QL SMEAR: ABNORMAL
MCH RBC QN AUTO: 30.2 PG (ref 26–34)
MCHC RBC AUTO-ENTMCNC: 35.1 GM/DL (ref 31–37)
MCV RBC AUTO: 86.2 FL (ref 80–100)
MONOCYTES # BLD AUTO: 0.15 X10(3)/MCL
MONOCYTES NFR BLD AUTO: 10 % (ref 0–10)
MONOCYTES NFR BLD MANUAL: 6 % (ref 2–9)
NEUTROPHILS # BLD AUTO: 0.15 X10(3)/MCL
NEUTROPHILS NFR BLD AUTO: 10 X10(3)/MCL
NEUTS BAND NFR BLD MANUAL: 2 % (ref 0–10)
OVALOCYTES BLD QL SMEAR: ABNORMAL
PLATELET # BLD AUTO: 25 X10(3)/MCL (ref 130–400)
PLATELET # BLD EST: ABNORMAL 10*3/UL
PMV BLD AUTO: 11.5 FL (ref 7.4–10.4)
POIKILOCYTOSIS BLD QL SMEAR: ABNORMAL
POLYCHROMASIA BLD QL SMEAR: ABNORMAL
PRODUCT READY: NORMAL
RBC # BLD AUTO: 2.25 X10(6)/MCL (ref 4.5–5.9)
RBC MORPH BLD: ABNORMAL
RET# (OHS): 0.07 X10(6)/MCL (ref 0.02–0.09)
RETICULOCYTE COUNT AUTOMATED (OLG): 3 % (ref 0.5–1.5)
TRANSFUSION ORDER: NORMAL
WBC # SPEC AUTO: 1.5 X10(3)/MCL (ref 4.5–11)

## 2018-01-29 NOTE — TELEPHONE ENCOUNTER
CTRB:  Received phone call from Rigoberto @ [facility] lab with panic Hgb 6.8, Hct 19.4, + platelet 25 this am.  Dr. Bucio notified.  Pt to be transfused @ local hospital.

## 2018-02-05 ENCOUNTER — TELEPHONE (OUTPATIENT)
Dept: PEDIATRIC HEMATOLOGY/ONCOLOGY | Facility: CLINIC | Age: 18
End: 2018-02-05

## 2018-02-05 ENCOUNTER — HISTORICAL (OUTPATIENT)
Dept: HEMATOLOGY/ONCOLOGY | Facility: CLINIC | Age: 18
End: 2018-02-05

## 2018-02-05 LAB
ABS NEUT (OLG): 0.25 X10(3)/MCL
ANISOCYTOSIS BLD QL SMEAR: NORMAL
ERYTHROCYTE [DISTWIDTH] IN BLOOD BY AUTOMATED COUNT: 20.1 % (ref 11.5–14.5)
HCT VFR BLD AUTO: 25.3 % (ref 40–51)
HGB BLD-MCNC: 8.6 GM/DL (ref 13.5–17.5)
LYMPHOCYTES # BLD AUTO: 1.01 X10(3)/MCL
LYMPHOCYTES NFR BLD AUTO: 72 % (ref 23–43)
MCH RBC QN AUTO: 29.9 PG (ref 26–34)
MCHC RBC AUTO-ENTMCNC: 34 GM/DL (ref 31–37)
MCV RBC AUTO: 87.8 FL (ref 80–100)
MONOCYTES # BLD AUTO: 0.14 X10(3)/MCL
MONOCYTES NFR BLD AUTO: 10 % (ref 0–10)
NEUTROPHILS # BLD AUTO: 0.25 X10(3)/MCL
NEUTROPHILS NFR BLD AUTO: 18 %
PLATELET # BLD AUTO: 35 X10(3)/MCL (ref 130–400)
PLATELET # BLD EST: NORMAL 10*3/UL
PMV BLD AUTO: 11.4 FL (ref 7.4–10.4)
POLYCHROMASIA BLD QL SMEAR: NORMAL
RBC # BLD AUTO: 2.88 X10(6)/MCL (ref 4.5–5.9)
RBC MORPH BLD: NORMAL
RET# (OHS): 0.1 X10(6)/MCL (ref 0.02–0.09)
RETICULOCYTE COUNT AUTOMATED (OLG): 3.3 % (ref 0.5–1.5)
SCHISTOCYTES BLD QL AUTO: NORMAL
WBC # SPEC AUTO: 1.4 X10(3)/MCL (ref 4.5–11)

## 2018-02-05 NOTE — TELEPHONE ENCOUNTER
Pt had labs done locally, reviewed by Dr Bucio--WBC 1.4, hgb 8.6, plt 35, , states for pt to repeat counts locally next Monday. Spoke with Lulú at [facility], informed her of above, states she will schedule pt for next Monday morning for repeat CBC and retic. Called pt, informed him of above, he repeated back and verbalized complete understanding.

## 2018-02-07 ENCOUNTER — TELEPHONE (OUTPATIENT)
Dept: PEDIATRIC HEMATOLOGY/ONCOLOGY | Facility: CLINIC | Age: 18
End: 2018-02-07

## 2018-02-07 NOTE — TELEPHONE ENCOUNTER
Pt's mom called, reports pt woke up with a sore throat, runny nose, and cough, states pt's brother had URI last week. Mom states pt has not run fever. Informed mom to encourage pt to drink plenty of fluids, and he can have anything over the counter for above symptoms as long as it does not have tylenol, ibuprofen, or aspirin in it. Mom states she wants to give pt Mucinex DM, does not have any ingredient in it that treats fever. Told mom that would be fine, and to call back if pt runs fever > 100.4. Mom verbalized complete understanding. Dr Bucio informed of above, no new orders given.

## 2018-02-12 ENCOUNTER — HISTORICAL (OUTPATIENT)
Dept: HEMATOLOGY/ONCOLOGY | Facility: CLINIC | Age: 18
End: 2018-02-12

## 2018-02-12 LAB
ACANTHOCYTES (OLG): ABNORMAL
ANISOCYTOSIS BLD QL SMEAR: ABNORMAL
BASOPHILS NFR BLD MANUAL: 2 %
DACRYOCYTES BLD QL SMEAR: ABNORMAL
EOSINOPHIL NFR BLD MANUAL: 0 %
ERYTHROCYTE [DISTWIDTH] IN BLOOD BY AUTOMATED COUNT: 23.6 % (ref 11.5–14.5)
GRANULOCYTES NFR BLD MANUAL: 26 % (ref 43–75)
HCT VFR BLD AUTO: 26.9 % (ref 40–51)
HGB BLD-MCNC: 9.2 GM/DL (ref 13.5–17.5)
HYPOCHROMIA BLD QL SMEAR: ABNORMAL
LYMPHOCYTES NFR BLD MANUAL: 68 % (ref 20.5–51.1)
MCH RBC QN AUTO: 31 PG (ref 26–34)
MCHC RBC AUTO-ENTMCNC: 34.2 GM/DL (ref 31–37)
MCV RBC AUTO: 90.6 FL (ref 80–100)
MONOCYTES NFR BLD MANUAL: 2 % (ref 2–9)
NEUTS BAND NFR BLD MANUAL: 2 % (ref 0–10)
PLATELET # BLD AUTO: 53 X10(3)/MCL (ref 130–400)
PLATELET # BLD EST: ABNORMAL 10*3/UL
PMV BLD AUTO: 11.5 FL (ref 7.4–10.4)
POLYCHROMASIA BLD QL SMEAR: ABNORMAL
RBC # BLD AUTO: 2.97 X10(6)/MCL (ref 4.5–5.9)
RBC MORPH BLD: ABNORMAL
RET# (OHS): 0.09 X10(6)/MCL (ref 0.02–0.09)
RETICULOCYTE COUNT AUTOMATED (OLG): 2.9 % (ref 0.5–1.5)
WBC # SPEC AUTO: 2 X10(3)/MCL (ref 4.5–11)

## 2018-02-14 ENCOUNTER — TELEPHONE (OUTPATIENT)
Dept: PEDIATRIC HEMATOLOGY/ONCOLOGY | Facility: CLINIC | Age: 18
End: 2018-02-14

## 2018-02-14 DIAGNOSIS — C92.00 AML (ACUTE MYELOBLASTIC LEUKEMIA): Primary | ICD-10-CM

## 2018-02-14 NOTE — TELEPHONE ENCOUNTER
Spoke to pt and mother and informed them of pt lab results from Monday and stated that Dr. Bucio believes that his blood work will be ready to start chemotherapy again next week. Pt and mother given appt information for BM/LP/chemo admit and verbalized an understanding with no further needs noted.

## 2018-02-15 DIAGNOSIS — C92.01 ACUTE MYELOID LEUKEMIA IN REMISSION: Primary | ICD-10-CM

## 2018-02-15 RX ORDER — LORAZEPAM 2 MG/ML
1 INJECTION INTRAMUSCULAR EVERY 6 HOURS PRN
Status: CANCELLED | OUTPATIENT
Start: 2018-02-20 | End: 2018-02-21

## 2018-02-15 RX ORDER — SODIUM CHLORIDE 0.9 % (FLUSH) 0.9 %
10 SYRINGE (ML) INJECTION
Status: CANCELLED | OUTPATIENT
Start: 2018-02-20

## 2018-02-15 RX ORDER — ONDANSETRON 2 MG/ML
16 INJECTION INTRAMUSCULAR; INTRAVENOUS EVERY 24 HOURS
Status: CANCELLED | OUTPATIENT
Start: 2018-02-20

## 2018-02-15 RX ORDER — HEPARIN 100 UNIT/ML
300 SYRINGE INTRAVENOUS
Status: CANCELLED | OUTPATIENT
Start: 2018-02-20

## 2018-02-15 RX ORDER — DIPHENHYDRAMINE HYDROCHLORIDE 50 MG/ML
25 INJECTION INTRAMUSCULAR; INTRAVENOUS EVERY 6 HOURS PRN
Status: CANCELLED | OUTPATIENT
Start: 2018-02-20 | End: 2018-02-21

## 2018-02-20 ENCOUNTER — HOSPITAL ENCOUNTER (INPATIENT)
Facility: HOSPITAL | Age: 18
LOS: 5 days | Discharge: HOME | DRG: 837 | End: 2018-02-25
Attending: PEDIATRICS | Admitting: PEDIATRICS
Payer: MEDICAID

## 2018-02-20 ENCOUNTER — OFFICE VISIT (OUTPATIENT)
Dept: PEDIATRIC HEMATOLOGY/ONCOLOGY | Facility: CLINIC | Age: 18
DRG: 837 | End: 2018-02-20
Payer: MEDICAID

## 2018-02-20 ENCOUNTER — PATIENT MESSAGE (OUTPATIENT)
Dept: PEDIATRIC HEMATOLOGY/ONCOLOGY | Facility: CLINIC | Age: 18
End: 2018-02-20

## 2018-02-20 ENCOUNTER — HOSPITAL ENCOUNTER (OUTPATIENT)
Dept: INFUSION THERAPY | Facility: HOSPITAL | Age: 18
Discharge: HOME | End: 2018-02-20
Attending: NURSE PRACTITIONER
Payer: MEDICAID

## 2018-02-20 ENCOUNTER — ANESTHESIA (OUTPATIENT)
Dept: SURGERY | Facility: HOSPITAL | Age: 18
DRG: 837 | End: 2018-02-20
Payer: MEDICAID

## 2018-02-20 ENCOUNTER — ANESTHESIA EVENT (OUTPATIENT)
Dept: SURGERY | Facility: HOSPITAL | Age: 18
DRG: 837 | End: 2018-02-20
Payer: MEDICAID

## 2018-02-20 VITALS
DIASTOLIC BLOOD PRESSURE: 56 MMHG | TEMPERATURE: 98 F | HEART RATE: 81 BPM | HEIGHT: 65 IN | RESPIRATION RATE: 20 BRPM | WEIGHT: 194.88 LBS | SYSTOLIC BLOOD PRESSURE: 108 MMHG | BODY MASS INDEX: 32.47 KG/M2

## 2018-02-20 VITALS
BODY MASS INDEX: 32.47 KG/M2 | HEIGHT: 65 IN | HEART RATE: 81 BPM | TEMPERATURE: 98 F | SYSTOLIC BLOOD PRESSURE: 108 MMHG | DIASTOLIC BLOOD PRESSURE: 56 MMHG | WEIGHT: 194.88 LBS | RESPIRATION RATE: 20 BRPM

## 2018-02-20 DIAGNOSIS — T45.1X5A IMMUNOSUPPRESSED DUE TO CHEMOTHERAPY: ICD-10-CM

## 2018-02-20 DIAGNOSIS — C92.01 ACUTE MYELOID LEUKEMIA IN REMISSION: ICD-10-CM

## 2018-02-20 DIAGNOSIS — T45.1X5A ANTINEOPLASTIC CHEMOTHERAPY INDUCED PANCYTOPENIA: ICD-10-CM

## 2018-02-20 DIAGNOSIS — D84.821 IMMUNOSUPPRESSED DUE TO CHEMOTHERAPY: ICD-10-CM

## 2018-02-20 DIAGNOSIS — C92.01 ACUTE MYELOID LEUKEMIA IN REMISSION: Primary | ICD-10-CM

## 2018-02-20 DIAGNOSIS — D61.810 ANTINEOPLASTIC CHEMOTHERAPY INDUCED PANCYTOPENIA: ICD-10-CM

## 2018-02-20 DIAGNOSIS — Z79.899 IMMUNOSUPPRESSED DUE TO CHEMOTHERAPY: ICD-10-CM

## 2018-02-20 DIAGNOSIS — C92.00 AML (ACUTE MYELOBLASTIC LEUKEMIA): Primary | ICD-10-CM

## 2018-02-20 PROBLEM — Z51.5 COMFORT MEASURES ONLY STATUS: Status: RESOLVED | Noted: 2018-01-05 | Resolved: 2018-02-20

## 2018-02-20 PROBLEM — R50.81 NEUTROPENIC FEVER: Status: RESOLVED | Noted: 2017-12-24 | Resolved: 2018-02-20

## 2018-02-20 PROBLEM — D61.818 ACQUIRED PANCYTOPENIA: Status: RESOLVED | Noted: 2017-11-01 | Resolved: 2018-02-20

## 2018-02-20 PROBLEM — D70.9 NEUTROPENIC FEVER: Status: RESOLVED | Noted: 2017-12-24 | Resolved: 2018-02-20

## 2018-02-20 LAB
ALBUMIN SERPL BCP-MCNC: 3.6 G/DL
ALP SERPL-CCNC: 69 U/L
ALT SERPL W/O P-5'-P-CCNC: 27 U/L
ANION GAP SERPL CALC-SCNC: 7 MMOL/L
ANISOCYTOSIS BLD QL SMEAR: SLIGHT
AST SERPL-CCNC: 17 U/L
BASOPHILS # BLD AUTO: 0.01 K/UL
BASOPHILS NFR BLD: 0.4 %
BASOPHILS NFR CSF MANUAL: 1 %
BILIRUB SERPL-MCNC: 0.5 MG/DL
BUN SERPL-MCNC: 11 MG/DL
CALCIUM SERPL-MCNC: 8.7 MG/DL
CHLORIDE SERPL-SCNC: 107 MMOL/L
CLARITY CSF: ABNORMAL
CO2 SERPL-SCNC: 26 MMOL/L
COLOR CSF: ABNORMAL
CREAT SERPL-MCNC: 0.7 MG/DL
DIFFERENTIAL METHOD: ABNORMAL
EOSINOPHIL # BLD AUTO: 0 K/UL
EOSINOPHIL NFR BLD: 0.4 %
EOSINOPHIL NFR CSF MANUAL: 2 %
ERYTHROCYTE [DISTWIDTH] IN BLOOD BY AUTOMATED COUNT: 26.3 %
EST. GFR  (AFRICAN AMERICAN): >60 ML/MIN/1.73 M^2
EST. GFR  (NON AFRICAN AMERICAN): >60 ML/MIN/1.73 M^2
GLUCOSE SERPL-MCNC: 95 MG/DL
HCT VFR BLD AUTO: 28.4 %
HGB BLD-MCNC: 9.2 G/DL
LYMPHOCYTES # BLD AUTO: 1.1 K/UL
LYMPHOCYTES NFR BLD: 38.6 %
LYMPHOCYTES NFR CSF MANUAL: 44 %
MCH RBC QN AUTO: 31.5 PG
MCHC RBC AUTO-ENTMCNC: 32.4 G/DL
MCV RBC AUTO: 97 FL
MONOCYTES # BLD AUTO: 0.3 K/UL
MONOCYTES NFR BLD: 10.1 %
MONOS+MACROS NFR CSF MANUAL: 4 %
NEUTROPHILS # BLD AUTO: 1.4 K/UL
NEUTROPHILS NFR BLD: 50.5 %
NEUTROPHILS NFR CSF MANUAL: 49 %
PATHOLOGIST INTERPRETATION, HEM/ONC CSF: NORMAL
PLATELET # BLD AUTO: 65 K/UL
PLATELET BLD QL SMEAR: ABNORMAL
PMV BLD AUTO: 10.1 FL
POLYCHROMASIA BLD QL SMEAR: ABNORMAL
POTASSIUM SERPL-SCNC: 4.2 MMOL/L
PROT SERPL-MCNC: 6.5 G/DL
RBC # BLD AUTO: 2.92 M/UL
RBC # CSF: 3235 /CU MM
RETICS/RBC NFR AUTO: 2.8 %
SODIUM SERPL-SCNC: 140 MMOL/L
SPECIMEN VOL CSF: 0.7 ML
WBC # BLD AUTO: 2.77 K/UL
WBC # CSF: 3 /CU MM

## 2018-02-20 PROCEDURE — 36415 COLL VENOUS BLD VENIPUNCTURE: CPT | Mod: PO

## 2018-02-20 PROCEDURE — 88341 IMHCHEM/IMCYTCHM EA ADD ANTB: CPT | Performed by: PATHOLOGY

## 2018-02-20 PROCEDURE — 88313 SPECIAL STAINS GROUP 2: CPT | Mod: 26,,, | Performed by: PATHOLOGY

## 2018-02-20 PROCEDURE — 85045 AUTOMATED RETICULOCYTE COUNT: CPT | Mod: PO

## 2018-02-20 PROCEDURE — 80053 COMPREHEN METABOLIC PANEL: CPT

## 2018-02-20 PROCEDURE — 25000003 PHARM REV CODE 250: Performed by: PEDIATRICS

## 2018-02-20 PROCEDURE — 88342 IMHCHEM/IMCYTCHM 1ST ANTB: CPT | Performed by: PATHOLOGY

## 2018-02-20 PROCEDURE — 07DR3ZX EXTRACTION OF ILIAC BONE MARROW, PERCUTANEOUS APPROACH, DIAGNOSTIC: ICD-10-PCS | Performed by: PEDIATRICS

## 2018-02-20 PROCEDURE — 36591 DRAW BLOOD OFF VENOUS DEVICE: CPT | Mod: PO

## 2018-02-20 PROCEDURE — 88185 FLOWCYTOMETRY/TC ADD-ON: CPT | Mod: 59 | Performed by: PATHOLOGY

## 2018-02-20 PROCEDURE — 88237 TISSUE CULTURE BONE MARROW: CPT

## 2018-02-20 PROCEDURE — 85025 COMPLETE CBC W/AUTO DIFF WBC: CPT | Mod: PO

## 2018-02-20 PROCEDURE — 88184 FLOWCYTOMETRY/ TC 1 MARKER: CPT | Performed by: PATHOLOGY

## 2018-02-20 PROCEDURE — 25000003 PHARM REV CODE 250: Performed by: NURSE ANESTHETIST, CERTIFIED REGISTERED

## 2018-02-20 PROCEDURE — D9220A PRA ANESTHESIA: Mod: CRNA,,, | Performed by: NURSE ANESTHETIST, CERTIFIED REGISTERED

## 2018-02-20 PROCEDURE — 63600175 PHARM REV CODE 636 W HCPCS: Performed by: NURSE ANESTHETIST, CERTIFIED REGISTERED

## 2018-02-20 PROCEDURE — 63600175 PHARM REV CODE 636 W HCPCS: Performed by: PEDIATRICS

## 2018-02-20 PROCEDURE — 99223 1ST HOSP IP/OBS HIGH 75: CPT | Mod: ,,, | Performed by: PEDIATRICS

## 2018-02-20 PROCEDURE — 25000003 PHARM REV CODE 250: Performed by: STUDENT IN AN ORGANIZED HEALTH CARE EDUCATION/TRAINING PROGRAM

## 2018-02-20 PROCEDURE — 88305 TISSUE EXAM BY PATHOLOGIST: CPT | Mod: 26,,, | Performed by: PATHOLOGY

## 2018-02-20 PROCEDURE — 88271 CYTOGENETICS DNA PROBE: CPT

## 2018-02-20 PROCEDURE — D9220A PRA ANESTHESIA: Mod: ANES,,, | Performed by: ANESTHESIOLOGY

## 2018-02-20 PROCEDURE — 88342 IMHCHEM/IMCYTCHM 1ST ANTB: CPT | Mod: 26,59,, | Performed by: PATHOLOGY

## 2018-02-20 PROCEDURE — 89051 BODY FLUID CELL COUNT: CPT

## 2018-02-20 PROCEDURE — 25000003 PHARM REV CODE 250: Mod: PO | Performed by: PEDIATRICS

## 2018-02-20 PROCEDURE — 00HU33Z INSERTION OF INFUSION DEVICE INTO SPINAL CANAL, PERCUTANEOUS APPROACH: ICD-10-PCS | Performed by: PEDIATRICS

## 2018-02-20 PROCEDURE — 88275 CYTOGENETICS 100-300: CPT

## 2018-02-20 PROCEDURE — 88189 FLOWCYTOMETRY/READ 16 & >: CPT | Mod: ,,, | Performed by: PATHOLOGY

## 2018-02-20 PROCEDURE — 88305 TISSUE EXAM BY PATHOLOGIST: CPT | Performed by: PATHOLOGY

## 2018-02-20 PROCEDURE — 99214 OFFICE O/P EST MOD 30 MIN: CPT | Mod: PBBFAC,25 | Performed by: PEDIATRICS

## 2018-02-20 PROCEDURE — 96523 IRRIG DRUG DELIVERY DEVICE: CPT | Mod: PO

## 2018-02-20 PROCEDURE — 99999 PR PBB SHADOW E&M-EST. PATIENT-LVL IV: CPT | Mod: PBBFAC,,, | Performed by: PEDIATRICS

## 2018-02-20 PROCEDURE — 85097 BONE MARROW INTERPRETATION: CPT | Mod: ,,, | Performed by: PATHOLOGY

## 2018-02-20 PROCEDURE — 88341 IMHCHEM/IMCYTCHM EA ADD ANTB: CPT | Mod: 26,59,, | Performed by: PATHOLOGY

## 2018-02-20 PROCEDURE — 20600001 HC STEP DOWN PRIVATE ROOM

## 2018-02-20 PROCEDURE — 88311 DECALCIFY TISSUE: CPT | Mod: 26,,, | Performed by: PATHOLOGY

## 2018-02-20 PROCEDURE — 63600175 PHARM REV CODE 636 W HCPCS: Mod: PO | Performed by: PEDIATRICS

## 2018-02-20 RX ORDER — LORAZEPAM 2 MG/ML
1 INJECTION INTRAMUSCULAR EVERY 6 HOURS PRN
Status: ACTIVE | OUTPATIENT
Start: 2018-02-20 | End: 2018-02-21

## 2018-02-20 RX ORDER — SODIUM CHLORIDE 0.9 % (FLUSH) 0.9 %
10 SYRINGE (ML) INJECTION
Status: DISCONTINUED | OUTPATIENT
Start: 2018-02-20 | End: 2018-02-25 | Stop reason: HOSPADM

## 2018-02-20 RX ORDER — MIDAZOLAM HYDROCHLORIDE 1 MG/ML
INJECTION, SOLUTION INTRAMUSCULAR; INTRAVENOUS
Status: DISCONTINUED | OUTPATIENT
Start: 2018-02-20 | End: 2018-02-20

## 2018-02-20 RX ORDER — ACETAMINOPHEN 650 MG/20.3ML
650 LIQUID ORAL EVERY 4 HOURS PRN
Status: DISCONTINUED | OUTPATIENT
Start: 2018-02-20 | End: 2018-02-20

## 2018-02-20 RX ORDER — FENTANYL CITRATE 50 UG/ML
INJECTION, SOLUTION INTRAMUSCULAR; INTRAVENOUS
Status: DISCONTINUED | OUTPATIENT
Start: 2018-02-20 | End: 2018-02-20

## 2018-02-20 RX ORDER — ACETAMINOPHEN 325 MG/1
650 TABLET ORAL EVERY 4 HOURS PRN
Status: DISCONTINUED | OUTPATIENT
Start: 2018-02-20 | End: 2018-02-25 | Stop reason: HOSPADM

## 2018-02-20 RX ORDER — CHLORHEXIDINE GLUCONATE ORAL RINSE 1.2 MG/ML
15 SOLUTION DENTAL 4 TIMES DAILY
Status: DISCONTINUED | OUTPATIENT
Start: 2018-02-20 | End: 2018-02-25 | Stop reason: HOSPADM

## 2018-02-20 RX ORDER — SODIUM CHLORIDE 9 MG/ML
INJECTION, SOLUTION INTRAVENOUS CONTINUOUS PRN
Status: DISCONTINUED | OUTPATIENT
Start: 2018-02-20 | End: 2018-02-20

## 2018-02-20 RX ORDER — PROPOFOL 10 MG/ML
VIAL (ML) INTRAVENOUS CONTINUOUS PRN
Status: DISCONTINUED | OUTPATIENT
Start: 2018-02-20 | End: 2018-02-20

## 2018-02-20 RX ORDER — PANTOPRAZOLE SODIUM 40 MG/1
40 TABLET, DELAYED RELEASE ORAL DAILY
Status: DISCONTINUED | OUTPATIENT
Start: 2018-02-21 | End: 2018-02-25 | Stop reason: HOSPADM

## 2018-02-20 RX ORDER — CHLORHEXIDINE GLUCONATE ORAL RINSE 1.2 MG/ML
SOLUTION DENTAL
Refills: 0 | COMMUNITY
Start: 2018-01-05 | End: 2018-07-20

## 2018-02-20 RX ORDER — DIPHENHYDRAMINE HYDROCHLORIDE 50 MG/ML
25 INJECTION INTRAMUSCULAR; INTRAVENOUS EVERY 6 HOURS PRN
Status: ACTIVE | OUTPATIENT
Start: 2018-02-20 | End: 2018-02-21

## 2018-02-20 RX ORDER — SULFAMETHOXAZOLE AND TRIMETHOPRIM 800; 160 MG/1; MG/1
1 TABLET ORAL
Status: DISCONTINUED | OUTPATIENT
Start: 2018-02-23 | End: 2018-02-25

## 2018-02-20 RX ORDER — ACYCLOVIR 200 MG/1
400 CAPSULE ORAL 2 TIMES DAILY
Status: DISCONTINUED | OUTPATIENT
Start: 2018-02-20 | End: 2018-02-25 | Stop reason: HOSPADM

## 2018-02-20 RX ORDER — ONDANSETRON 2 MG/ML
16 INJECTION INTRAMUSCULAR; INTRAVENOUS EVERY 24 HOURS
Status: DISCONTINUED | OUTPATIENT
Start: 2018-02-20 | End: 2018-02-25 | Stop reason: HOSPADM

## 2018-02-20 RX ORDER — LIDOCAINE HCL/PF 100 MG/5ML
SYRINGE (ML) INTRAVENOUS
Status: DISCONTINUED | OUTPATIENT
Start: 2018-02-20 | End: 2018-02-20

## 2018-02-20 RX ORDER — HEPARIN 100 UNIT/ML
300 SYRINGE INTRAVENOUS
Status: DISCONTINUED | OUTPATIENT
Start: 2018-02-20 | End: 2018-02-25 | Stop reason: HOSPADM

## 2018-02-20 RX ORDER — OXYCODONE HYDROCHLORIDE 5 MG/1
5 TABLET ORAL EVERY 6 HOURS PRN
Status: DISCONTINUED | OUTPATIENT
Start: 2018-02-20 | End: 2018-02-25 | Stop reason: HOSPADM

## 2018-02-20 RX ORDER — PROPOFOL 10 MG/ML
VIAL (ML) INTRAVENOUS
Status: DISCONTINUED | OUTPATIENT
Start: 2018-02-20 | End: 2018-02-20

## 2018-02-20 RX ADMIN — SODIUM CHLORIDE: 0.9 INJECTION, SOLUTION INTRAVENOUS at 12:02

## 2018-02-20 RX ADMIN — PROPOFOL 60 MG: 10 INJECTION, EMULSION INTRAVENOUS at 01:02

## 2018-02-20 RX ADMIN — FENTANYL CITRATE 25 MCG: 50 INJECTION, SOLUTION INTRAMUSCULAR; INTRAVENOUS at 01:02

## 2018-02-20 RX ADMIN — ONDANSETRON 16 MG: 2 INJECTION INTRAMUSCULAR; INTRAVENOUS at 09:02

## 2018-02-20 RX ADMIN — PROPOFOL 200 MCG/KG/MIN: 10 INJECTION, EMULSION INTRAVENOUS at 01:02

## 2018-02-20 RX ADMIN — PROPOFOL 40 MG: 10 INJECTION, EMULSION INTRAVENOUS at 01:02

## 2018-02-20 RX ADMIN — ACYCLOVIR 400 MG: 200 CAPSULE ORAL at 10:02

## 2018-02-20 RX ADMIN — CYTARABINE 1940 MG: 100 INJECTION, SOLUTION INTRATHECAL; INTRAVENOUS; SUBCUTANEOUS at 10:02

## 2018-02-20 RX ADMIN — OXYCODONE HYDROCHLORIDE 5 MG: 5 TABLET ORAL at 11:02

## 2018-02-20 RX ADMIN — PROPOFOL 20 MG: 10 INJECTION, EMULSION INTRAVENOUS at 01:02

## 2018-02-20 RX ADMIN — LIDOCAINE HYDROCHLORIDE 40 MG: 20 INJECTION, SOLUTION INTRAVENOUS at 01:02

## 2018-02-20 RX ADMIN — CYTARABINE 70 MG: 20 INJECTION, SOLUTION INTRATHECAL; INTRAVENOUS; SUBCUTANEOUS at 01:02

## 2018-02-20 RX ADMIN — MIDAZOLAM HYDROCHLORIDE 2 MG: 1 INJECTION, SOLUTION INTRAMUSCULAR; INTRAVENOUS at 01:02

## 2018-02-20 RX ADMIN — DEXAMETHASONE 2 DROP: 1 SUSPENSION OPHTHALMIC at 09:02

## 2018-02-20 NOTE — ANESTHESIA POSTPROCEDURE EVALUATION
"Anesthesia Post Evaluation    Patient: Hema Kuo    Procedure(s) Performed: Procedure(s) (LRB):  BIOPSY-BONE MARROW (N/A)  PUNCTURE-LUMBAR (N/A)    Final Anesthesia Type: general  Patient location during evaluation: PACU  Patient participation: Yes- Able to Participate  Level of consciousness: awake and alert  Post-procedure vital signs: reviewed and stable  Pain management: adequate  Airway patency: patent  PONV status at discharge: No PONV  Anesthetic complications: no      Cardiovascular status: blood pressure returned to baseline  Respiratory status: unassisted, spontaneous ventilation and room air  Hydration status: euvolemic  Follow-up not needed.        Visit Vitals  BP (!) 97/55   Pulse 65   Temp 36.7 °C (98 °F) (Temporal)   Resp 20   Ht 5' 6" (1.676 m)   Wt 88.4 kg (194 lb 14.2 oz)   SpO2 99%   BMI 31.46 kg/m²       Pain/Waldo Score: Pain Assessment Performed: Yes (2/20/2018  3:00 PM)  Presence of Pain: denies (2/20/2018  3:00 PM)  Waldo Score: 10 (2/20/2018  3:00 PM)      "

## 2018-02-20 NOTE — SUBJECTIVE & OBJECTIVE
Oncology Treatment Plan:     PEDS [id number]  ARM A - LR Risk Patients    Medications:  Continuous Infusions:  Scheduled Meds:   acyclovir  400 mg Oral BID    chlorhexidine  15 mL Mouth/Throat QID    [START ON 2/21/2018] pantoprazole  40 mg Oral Daily    [START ON 2/23/2018] sulfamethoxazole-trimethoprim 800-160mg  1 tablet Oral twice daily on Friday, Saturday, Sunday     PRN Meds:     Review of patient's allergies indicates:   Allergen Reactions    Nsaids (non-steroidal anti-inflammatory drug) Anaphylaxis    Nuts [tree nut] Anaphylaxis    [place]         Past Medical History:   Diagnosis Date    AML (acute myeloblastic leukemia) 10/2017    Asthma     Encounter for blood transfusion     Seasonal allergies      Past Surgical History:   Procedure Laterality Date    PORTACATH PLACEMENT  10/31/2017    TONSILLECTOMY       Family History     None        Social History Main Topics    Smoking status: Former Smoker     Packs/day: 0.50     Types: Cigarettes     Quit date: 10/30/2017    Smokeless tobacco: Never Used    Alcohol use Yes      Comment: rare occasions    Drug use: No    Sexual activity: Yes     Partners: Female       Review of Systems   Constitutional: Negative for activity change, appetite change, fatigue and fever.   HENT: Negative for congestion, ear pain, mouth sores, rhinorrhea, sinus pain and sore throat.    Eyes: Negative for photophobia and pain.   Respiratory: Negative for cough and shortness of breath.    Cardiovascular: Negative for chest pain and palpitations.   Gastrointestinal: Negative for abdominal pain, diarrhea, nausea and vomiting.   Genitourinary: Negative for dysuria.   Musculoskeletal: Negative for gait problem, neck pain and neck stiffness.   Skin: Negative for pallor and rash.   Allergic/Immunologic: Positive for immunocompromised state.   Neurological: Negative for weakness and headaches.   Hematological: Negative for adenopathy. Does not bruise/bleed easily.      Objective:     Vital Signs (Most Recent):  Temp: 97.8 °F (36.6 °C) (02/20/18 1608)  Pulse: 68 (02/20/18 1608)  Resp: 17 (02/20/18 1608)  BP: (!) 104/52 (02/20/18 1608)  SpO2: 98 % (02/20/18 1608) Vital Signs (24h Range):  Temp:  [97.8 °F (36.6 °C)-98.6 °F (37 °C)] 97.8 °F (36.6 °C)  Pulse:  [61-81] 68  Resp:  [17-20] 17  SpO2:  [97 %-100 %] 98 %  BP: ()/(34-56) 104/52     Weight: 89 kg (196 lb 1.6 oz)  Body mass index is 31.65 kg/m².  Body surface area is 2.04 meters squared.    No intake or output data in the 24 hours ending 02/20/18 1659    Physical Exam   Constitutional: He is oriented to person, place, and time. He appears well-developed and well-nourished. No distress.   Resting comfortably in bed, watching TV.   HENT:   Head: Normocephalic and atraumatic.   Mouth/Throat: Oropharynx is clear and moist.   No mouth lesions.   Eyes: Conjunctivae and EOM are normal. Pupils are equal, round, and reactive to light.   Neck: Normal range of motion. Neck supple.   Cardiovascular: Normal rate, regular rhythm, normal heart sounds and intact distal pulses.  Exam reveals no friction rub.    No murmur heard.  Pulmonary/Chest: Effort normal and breath sounds normal. No respiratory distress. He has no wheezes.   Port at R chest accessed, nontender without erythema, dressing c/d/i.   Abdominal: Soft. Bowel sounds are normal. He exhibits no distension and no mass. There is no tenderness. There is no guarding.   Musculoskeletal: Normal range of motion. He exhibits no edema, tenderness or deformity.   BM biopsy site at R hip with dressing c/d/i.   Lymphadenopathy:     He has no cervical adenopathy.   Neurological: He is alert and oriented to person, place, and time.   Skin: Skin is warm and dry. Capillary refill takes less than 2 seconds. No rash noted.   Psychiatric: He has a normal mood and affect.   Vitals reviewed.      Labs:   Recent Results (from the past 24 hour(s))   Comprehensive metabolic panel    Collection  Time: 02/20/18  8:40 AM   Result Value Ref Range    Sodium 140 136 - 145 mmol/L    Potassium 4.2 3.5 - 5.1 mmol/L    Chloride 107 95 - 110 mmol/L    CO2 26 23 - 29 mmol/L    Glucose 95 70 - 110 mg/dL    BUN, Bld 11 6 - 20 mg/dL    Creatinine 0.7 0.5 - 1.4 mg/dL    Calcium 8.7 8.7 - 10.5 mg/dL    Total Protein 6.5 6.0 - 8.4 g/dL    Albumin 3.6 3.5 - 5.2 g/dL    Total Bilirubin 0.5 0.1 - 1.0 mg/dL    Alkaline Phosphatase 69 55 - 135 U/L    AST 17 10 - 40 U/L    ALT 27 10 - 44 U/L    Anion Gap 7 (L) 8 - 16 mmol/L    eGFR if African American >60.0 >60 mL/min/1.73 m^2    eGFR if non African American >60.0 >60 mL/min/1.73 m^2   CBC auto differential    Collection Time: 02/20/18  9:04 AM   Result Value Ref Range    WBC 2.77 (L) 3.90 - 12.70 K/uL    RBC 2.92 (L) 4.60 - 6.20 M/uL    Hemoglobin 9.2 (L) 14.0 - 18.0 g/dL    Hematocrit 28.4 (L) 40.0 - 54.0 %    MCV 97 82 - 98 fL    MCH 31.5 (H) 27.0 - 31.0 pg    MCHC 32.4 32.0 - 36.0 g/dL    RDW 26.3 (H) 11.5 - 14.5 %    Platelets 65 (L) 150 - 350 K/uL    MPV 10.1 9.2 - 12.9 fL    Gran # (ANC) 1.4 (L) 1.8 - 7.7 K/uL    Lymph # 1.1 1.0 - 4.8 K/uL    Mono # 0.3 0.3 - 1.0 K/uL    Eos # 0.0 0.0 - 0.5 K/uL    Baso # 0.01 0.00 - 0.20 K/uL    Gran% 50.5 38.0 - 73.0 %    Lymph% 38.6 18.0 - 48.0 %    Mono% 10.1 4.0 - 15.0 %    Eosinophil% 0.4 0.0 - 8.0 %    Basophil% 0.4 0.0 - 1.9 %    Platelet Estimate Decreased (A)     Aniso Slight     Poly Occasional     Differential Method Automated    Reticulocytes    Collection Time: 02/20/18  9:04 AM   Result Value Ref Range    Retic 2.8 (H) 0.4 - 2.0 %   CSF cell count with differential    Collection Time: 02/20/18 10:44 AM   Result Value Ref Range    Heme Aliquot 0.7 mL    Appearance, CSF Cloudy (A) Clear    Color, CSF Red (A) Colorless    WBC, CSF 3 0 - 5 /cu mm    RBC, CSF 3235 (A) 0 /cu mm    Segmented Neutrophils, CSF 49 (H) 0 - 6 %    Lymphs, CSF 44 40 - 80 %    Mono/Macrophage, CSF 4 (L) 15 - 45 %    Eosinophils, CSF 2 (A) %    Baso, CSF 1  (A) %   Pathologist Interpretation, Hem/Onc CSF    Collection Time: 02/20/18 10:44 AM   Result Value Ref Range    Path Interpretation Hem/Onc CSF Review required    Leukemia/Lymphoma Screen - Bone Marrow Right Posterior Iliac Crest    Collection Time: 02/20/18 10:44 AM   Result Value Ref Range    Clinical Diagnosis - Bone Marrow LEUK     Body Site - Bone Marrow RPI      Diagnostic Results:  None.

## 2018-02-20 NOTE — NURSING
Blood counts checked per Dr. Bucio.  OK to proceed with procedure + admit into hospital for next round of chemotherapy. Pt sent across street for LP with IT chemo + bone marrow biopsy.  Accompanied by mom.  Left double upper chest PAC left in place, tegaderm intact.  Site without any noted redness or swelling.  Npo status maintained.

## 2018-02-20 NOTE — NURSING
Left upper chest double PAC accessed without difficulty using sterile technique.  See doc flowsheet for full assessment.  Good blood return noted to left outer pac, then labs drawn as ordered.  Labs labeled @ bedside, then sent to lab as ordered.  Both inner + outer left upper chest PAC each flushed with normal saline.  Needles left in place to await for lab results.  Tegaderm applied.  Pt tolerated procedure well. Will monitor pt closely.

## 2018-02-20 NOTE — PLAN OF CARE
Problem: Patient Care Overview  Goal: Plan of Care Review  1.  Pt has a left double upper chest pac.  2.  Use 1 inch needle to access each PAC.  3.  Pt likes to keller.  Outcome: Ongoing (interventions implemented as appropriate)  Pt stated that he has been doing well @ home.  His only problem today is that he is starving from fasting for procedures, + he is ready to eat.  Pt's blood counts good to start next cycle of chemo today.  Pt sent to [facility] for procedures.

## 2018-02-20 NOTE — ANESTHESIA PREPROCEDURE EVALUATION
Past Medical History:   Diagnosis Date    AML (acute myeloblastic leukemia) 10/2017    Asthma     Encounter for blood transfusion     Seasonal allergies      Past Surgical History:   Procedure Laterality Date    PORTACATH PLACEMENT  10/31/2017    TONSILLECTOMY       Patient Active Problem List   Diagnosis    Acute leukemia    Acquired pancytopenia    Acute leukemia not having achieved remission    Psychological factor affecting cancer    Antineoplastic chemotherapy induced pancytopenia    Immunosuppressed due to chemotherapy    AML (acute myeloblastic leukemia)    Antineoplastic and immunosuppressive drugs causing adverse effect in therapeutic use    Neutropenic fever    Comfort measures only status     Please See ROS/PMH and Active Problem List above                                                                                                             02/20/2018  Hema Kuo is a 17 y.o., male.    Anesthesia Evaluation    I have reviewed the Patient Summary Reports.    I have reviewed the Nursing Notes.   I have reviewed the Medications.     Review of Systems  Anesthesia Hx:  No problems with previous Anesthesia    Hematology/Oncology:        Hematology Comments: AML   Cardiovascular:  Cardiovascular Normal     Pulmonary:  Pulmonary Normal    Renal/:  Renal/ Normal     Neurological:  Neurology Normal        Physical Exam  General:  Obesity, Well nourished    Airway/Jaw/Neck:  Airway Findings: Mouth Opening: Normal Tongue: Normal  General Airway Assessment: Adult  Mallampati: II  Improves to II with phonation.  TM Distance: Normal, at least 6 cm      Dental:  Dental Findings: In tact   Chest/Lungs:  Chest/Lungs Findings: Clear to auscultation     Heart/Vascular:  Heart Findings: Rate: Normal  Rhythm: Regular Rhythm  Sounds: Normal        Mental Status:  Mental Status Findings:  Cooperative, Alert and Oriented         Anesthesia Plan  Type of Anesthesia, risks & benefits  discussed:  Anesthesia Type:  general  Patient's Preference: gen  Intra-op Monitoring Plan: standard ASA monitors  Intra-op Monitoring Plan Comments:   Post Op Pain Control Plan:   Post Op Pain Control Plan Comments: Iv>po  Induction:   IV  Beta Blocker:  Patient is not currently on a Beta-Blocker (No further documentation required).       Informed Consent: Patient understands risks and agrees with Anesthesia plan.  Questions answered. Anesthesia consent signed with patient.  ASA Score: 2     Day of Surgery Review of History & Physical:    H&P update referred to the surgeon.     Anesthesia Plan Notes: PL 65K        Ready For Surgery From Anesthesia Perspective.

## 2018-02-20 NOTE — TRANSFER OF CARE
"Anesthesia Transfer of Care Note    Patient: Hema Kuo    Procedure(s) Performed: Procedure(s) (LRB):  BIOPSY-BONE MARROW (N/A)  PUNCTURE-LUMBAR (N/A)    Patient location: PACU    Anesthesia Type: general    Transport from OR: Transported from OR on 6-10 L/min O2 by face mask with adequate spontaneous ventilation    Post pain: adequate analgesia    Post assessment: no apparent anesthetic complications and tolerated procedure well    Post vital signs: stable    Level of consciousness: awake and alert    Nausea/Vomiting: no nausea/vomiting    Complications: none    Transfer of care protocol was followed      Last vitals:   Visit Vitals  BP 92/37   Pulse 72   Temp 37 °C (98.6 °F) (Temporal)   Resp 12   Ht 5' 6" (1.676 m)   Wt 88.4 kg (194 lb 14.2 oz)   SpO2 100%   BMI 31.46 kg/m²     "

## 2018-02-20 NOTE — HPI
Hema is a 17-year-old M with AML (t8;21) receiving chemotherapy under [facility] [id number] Arm A who is admitted today s/p bone marrow biopsy and LP to begin Intensification I with high dose cytarabine and etoposide x5 days. His AML is t8;21, c-kit mutation negative, CNS negative, and MRD negative after Induction I. He reports feeling well, with no recent fevers, decrease in appetite, nausea/vomiting, rash, or bruising.

## 2018-02-20 NOTE — H&P
[facility]  Pediatric Hematology/Oncology  H&P    Patient Name: Hema Kuo  MRN: [id number]  Admission Date: 2/20/2018  Code Status: Full Code   Attending Provider: Christian Emerson MD  Primary Care Physician: Ann Reyna NP    Subjective:     Principal Problem:AML (acute myeloblastic leukemia)    HPI:  Hema is a 17-year-old M with AML (t8;21) receiving chemotherapy under [facility] [id number] Arm A who is admitted today s/p bone marrow biopsy and LP to begin Intensification I with high dose cytarabine and etoposide x5 days. His AML is t8;21, c-kit mutation negative, CNS negative, and MRD negative after Induction I. He reports feeling well, with no recent fevers, decrease in appetite, nausea/vomiting, rash, or bruising.    Oncology Treatment Plan:     PEDS [id number]  ARM A - LR Risk Patients    Medications:  Continuous Infusions:  Scheduled Meds:   acyclovir  400 mg Oral BID    chlorhexidine  15 mL Mouth/Throat QID    [START ON 2/21/2018] pantoprazole  40 mg Oral Daily    [START ON 2/23/2018] sulfamethoxazole-trimethoprim 800-160mg  1 tablet Oral twice daily on Friday, Saturday, Sunday     PRN Meds:     Review of patient's allergies indicates:   Allergen Reactions    Nsaids (non-steroidal anti-inflammatory drug) Anaphylaxis    Nuts [tree nut] Anaphylaxis    [place]         Past Medical History:   Diagnosis Date    AML (acute myeloblastic leukemia) 10/2017    Asthma     Encounter for blood transfusion     Seasonal allergies      Past Surgical History:   Procedure Laterality Date    PORTACATH PLACEMENT  10/31/2017    TONSILLECTOMY       Family History     None        Social History Main Topics    Smoking status: Former Smoker     Packs/day: 0.50     Types: Cigarettes     Quit date: 10/30/2017    Smokeless tobacco: Never Used    Alcohol use Yes      Comment: rare occasions    Drug use: No    Sexual activity: Yes     Partners: Female       Review of Systems   Constitutional: Negative  for activity change, appetite change, fatigue and fever.   HENT: Negative for congestion, ear pain, mouth sores, rhinorrhea, sinus pain and sore throat.    Eyes: Negative for photophobia and pain.   Respiratory: Negative for cough and shortness of breath.    Cardiovascular: Negative for chest pain and palpitations.   Gastrointestinal: Negative for abdominal pain, diarrhea, nausea and vomiting.   Genitourinary: Negative for dysuria.   Musculoskeletal: Negative for gait problem, neck pain and neck stiffness.   Skin: Negative for pallor and rash.   Allergic/Immunologic: Positive for immunocompromised state.   Neurological: Negative for weakness and headaches.   Hematological: Negative for adenopathy. Does not bruise/bleed easily.     Objective:     Vital Signs (Most Recent):  Temp: 97.8 °F (36.6 °C) (02/20/18 1608)  Pulse: 68 (02/20/18 1608)  Resp: 17 (02/20/18 1608)  BP: (!) 104/52 (02/20/18 1608)  SpO2: 98 % (02/20/18 1608) Vital Signs (24h Range):  Temp:  [97.8 °F (36.6 °C)-98.6 °F (37 °C)] 97.8 °F (36.6 °C)  Pulse:  [61-81] 68  Resp:  [17-20] 17  SpO2:  [97 %-100 %] 98 %  BP: ()/(34-56) 104/52     Weight: 89 kg (196 lb 1.6 oz)  Body mass index is 31.65 kg/m².  Body surface area is 2.04 meters squared.    No intake or output data in the 24 hours ending 02/20/18 1659    Physical Exam   Constitutional: He is oriented to person, place, and time. He appears well-developed and well-nourished. No distress.   Resting comfortably in bed, watching TV.   HENT:   Head: Normocephalic and atraumatic.   Mouth/Throat: Oropharynx is clear and moist.   No mouth lesions.   Eyes: Conjunctivae and EOM are normal. Pupils are equal, round, and reactive to light.   Neck: Normal range of motion. Neck supple.   Cardiovascular: Normal rate, regular rhythm, normal heart sounds and intact distal pulses.  Exam reveals no friction rub.    No murmur heard.  Pulmonary/Chest: Effort normal and breath sounds normal. No respiratory distress. He  has no wheezes.   Port at R chest accessed, nontender without erythema, dressing c/d/i.   Abdominal: Soft. Bowel sounds are normal. He exhibits no distension and no mass. There is no tenderness. There is no guarding.   Musculoskeletal: Normal range of motion. He exhibits no edema, tenderness or deformity.   BM biopsy site at R hip with dressing c/d/i.   Lymphadenopathy:     He has no cervical adenopathy.   Neurological: He is alert and oriented to person, place, and time.   Skin: Skin is warm and dry. Capillary refill takes less than 2 seconds. No rash noted.   Psychiatric: He has a normal mood and affect.   Vitals reviewed.      Labs:   Recent Results (from the past 24 hour(s))   Comprehensive metabolic panel    Collection Time: 02/20/18  8:40 AM   Result Value Ref Range    Sodium 140 136 - 145 mmol/L    Potassium 4.2 3.5 - 5.1 mmol/L    Chloride 107 95 - 110 mmol/L    CO2 26 23 - 29 mmol/L    Glucose 95 70 - 110 mg/dL    BUN, Bld 11 6 - 20 mg/dL    Creatinine 0.7 0.5 - 1.4 mg/dL    Calcium 8.7 8.7 - 10.5 mg/dL    Total Protein 6.5 6.0 - 8.4 g/dL    Albumin 3.6 3.5 - 5.2 g/dL    Total Bilirubin 0.5 0.1 - 1.0 mg/dL    Alkaline Phosphatase 69 55 - 135 U/L    AST 17 10 - 40 U/L    ALT 27 10 - 44 U/L    Anion Gap 7 (L) 8 - 16 mmol/L    eGFR if African American >60.0 >60 mL/min/1.73 m^2    eGFR if non African American >60.0 >60 mL/min/1.73 m^2   CBC auto differential    Collection Time: 02/20/18  9:04 AM   Result Value Ref Range    WBC 2.77 (L) 3.90 - 12.70 K/uL    RBC 2.92 (L) 4.60 - 6.20 M/uL    Hemoglobin 9.2 (L) 14.0 - 18.0 g/dL    Hematocrit 28.4 (L) 40.0 - 54.0 %    MCV 97 82 - 98 fL    MCH 31.5 (H) 27.0 - 31.0 pg    MCHC 32.4 32.0 - 36.0 g/dL    RDW 26.3 (H) 11.5 - 14.5 %    Platelets 65 (L) 150 - 350 K/uL    MPV 10.1 9.2 - 12.9 fL    Gran # (ANC) 1.4 (L) 1.8 - 7.7 K/uL    Lymph # 1.1 1.0 - 4.8 K/uL    Mono # 0.3 0.3 - 1.0 K/uL    Eos # 0.0 0.0 - 0.5 K/uL    Baso # 0.01 0.00 - 0.20 K/uL    Gran% 50.5 38.0 - 73.0  %    Lymph% 38.6 18.0 - 48.0 %    Mono% 10.1 4.0 - 15.0 %    Eosinophil% 0.4 0.0 - 8.0 %    Basophil% 0.4 0.0 - 1.9 %    Platelet Estimate Decreased (A)     Aniso Slight     Poly Occasional     Differential Method Automated    Reticulocytes    Collection Time: 02/20/18  9:04 AM   Result Value Ref Range    Retic 2.8 (H) 0.4 - 2.0 %   CSF cell count with differential    Collection Time: 02/20/18 10:44 AM   Result Value Ref Range    Heme Aliquot 0.7 mL    Appearance, CSF Cloudy (A) Clear    Color, CSF Red (A) Colorless    WBC, CSF 3 0 - 5 /cu mm    RBC, CSF 3235 (A) 0 /cu mm    Segmented Neutrophils, CSF 49 (H) 0 - 6 %    Lymphs, CSF 44 40 - 80 %    Mono/Macrophage, CSF 4 (L) 15 - 45 %    Eosinophils, CSF 2 (A) %    Baso, CSF 1 (A) %   Pathologist Interpretation, Hem/Onc CSF    Collection Time: 02/20/18 10:44 AM   Result Value Ref Range    Path Interpretation Hem/Onc CSF Review required    Leukemia/Lymphoma Screen - Bone Marrow Right Posterior Iliac Crest    Collection Time: 02/20/18 10:44 AM   Result Value Ref Range    Clinical Diagnosis - Bone Marrow LEUK     Body Site - Bone Marrow RPI      Diagnostic Results:  None.    Assessment/Plan:     Acute leukemia    Hema is a 17-year-old M with AML (t8;21) receiving chemotherapy under [facility] [id number] Arm A who is admitted today s/p bone marrow biopsy and LP to begin Intensification I with high dose cytarabine and etoposide x5 days. ANC 1398. He is currently afebrile and comfortable.    Acute myelogenous leukemia:  - High dose cytarabine followed by etoposide daily x5 days (start 2/20)  - No need for prehydration  - Dexamethasone eye drops q6hr for prevention of keratitis while receiving cytarabine  - CBC and CMP on Day 4 of treatment    Chemotherapy-induced nausea and vomiting:  - Ondansetron 16mg IV daily prior to cytarabine  - Diphenhydramine 25mg IV q6hr PRN nausea (1st choice)  - Lorazepam 1mg IV q6hr PRN nausea (2nd choice)    At risk for infection:  - Continue  prophylactic TMP/SMX 1 tab PO BID on Fri/Sat/Sun  - Continue acyclovir 400mg PO BID  - Hold ciprofloxacin and posaconazole prophylaxis until chemotherapy completed  - If febrile:   - Obtain blood culture   - Start empiric ceftriaxone and vancomycin    Diet:  - Regular diet as tolerated  - No need for maintenance IVF  - Continue pantoprazole 40mg PO daily    Dispo: Pending completion of chemotherapy x5 days.              Steph Avendaño MD  Pediatric Hematology/Oncology  [facility]

## 2018-02-20 NOTE — PLAN OF CARE
Problem: Patient Care Overview  Goal: Plan of Care Review  Outcome: Ongoing (interventions implemented as appropriate)  POC reviewed with patient; understanding verbalized. Pt is independent. He voids in the urinal; no BM this shift. Regular diet with good appetite. Pt. with nonskid footwear on, bed in lowest position, and locked with bed rails up x2.  Pt. has call light and personal items within reach.  VSS and afebrile this shift. All questions and concerns address at this time. Will continue to monitor.

## 2018-02-20 NOTE — ANESTHESIA RELEASE NOTE
Anesthesia Release from PACU Note    Patient: Hema Kuo    Procedure(s) Performed: Procedure(s) (LRB):  BIOPSY-BONE MARROW (N/A)  PUNCTURE-LUMBAR (N/A)    Anesthesia type: general    Post pain: Adequate analgesia    Post assessment: no apparent anesthetic complications and tolerated procedure well    Last Vitals:   Vitals:    02/20/18 1500   BP: (!) 97/55   Pulse: 65   Resp: 20   Temp:          Post vital signs: stable    Level of consciousness: awake and alert     Nausea/Vomiting: no nausea/no vomiting    Complications: none    Airway Patency: patent    Respiratory: unassisted    Cardiovascular: stable and blood pressure at baseline    Hydration: euvolemic

## 2018-02-20 NOTE — ASSESSMENT & PLAN NOTE
Hema is a 17-year-old M with AML (t8;21) receiving chemotherapy under [facility] [id number] [facility] who is admitted today s/p bone marrow biopsy and LP to begin Intensification I with high dose cytarabine and etoposide x5 days. ANC 1398. He is currently afebrile and comfortable.    Acute myelogenous leukemia:  - High dose cytarabine followed by etoposide daily x5 days (start 2/20)  - No need for prehydration  - Dexamethasone eye drops q6hr for prevention of keratitis while receiving cytarabine  - CBC and CMP on Day 4 of treatment    Chemotherapy-induced nausea and vomiting:  - Ondansetron 16mg IV daily prior to cytarabine  - Diphenhydramine 25mg IV q6hr PRN nausea (1st choice)  - Lorazepam 1mg IV q6hr PRN nausea (2nd choice)    At risk for infection:  - Continue prophylactic TMP/SMX 1 tab PO BID on Fri/Sat/Sun  - Continue acyclovir 400mg PO BID  - Hold ciprofloxacin and posaconazole prophylaxis until chemotherapy completed  - If febrile:   - Obtain blood culture   - Start empiric ceftriaxone and vancomycin    Diet:  - Regular diet as tolerated  - No need for maintenance IVF  - Continue pantoprazole 40mg PO daily    Dispo: Pending completion of chemotherapy x5 days.

## 2018-02-20 NOTE — PROGRESS NOTES
Pediatric Hematology Note      Subjective:       Patient ID: Hema Kuo is a 17 y.o. male      Chief Complaint:    Chief Complaint   Patient presents with    AML in remission       History of Present Illness:   Hema Kuo is a 17 y.o. male with AML s/p Induction therapy following [id number] (Arm A) here today for start of Intensification therapy. He is accompanied by his mother today.  Hema reports that he has been feeling very well since last visit    He reports no fevers, no abdominal pain, vomiting, diarrhea or constipation and no excessive bruising or unusual bleeding.  Good appetite and energy level.  No other complaints.      Past Medical History:   Diagnosis Date    AML (acute myeloblastic leukemia) 10/2017    Asthma     Encounter for blood transfusion     Seasonal allergies      Past Surgical History:   Procedure Laterality Date    PORTACATH PLACEMENT  10/31/2017    TONSILLECTOMY         ROS:   Gen: Negative for fever. Negative for night sweats.    HEENT: Negative for nosebleeds.  Negative for sore throat.  Negative for visual problems.  Pulm: Negative for cough.  Negative for shortness of breath.  CV: Negative for chest pain.  Negative for cyanosis.  GI: Negative for abdominal pain, nausea or vomiting.    : Negative for changes in frequency or dysuria.   Skin: Negative for bruising.  Negative for rash.    MS: Negative for joint swelling or pain.  Neuro:  Negative for headaches, seizures, weakness.  Hematology:  Positive for AML.  Positive for recent chemotherapy  Endocrine:  Negative for heat or cold intolerance.  Negative for increased thirst.  Psych: Negative for hyperactivity.  Negative for behavioral issues.      Physical Examination:   Vitals:    02/20/18 0839   BP: (!) 108/56   Pulse: 81   Resp: 20   Temp: 98.3 °F (36.8 °C)     General: well developed, well nourished, no distress.    HENT: Head:normocephalic, atraumatic. Ears:bilateral TM's  and external ear canals normal. Nose: Nares normal. Mucosa normal. No discharge. Throat: lips, mucosa, and tongue normal; teeth and gums normal and no throat erythema.  Eyes: conjunctivae pale/corneas clear. PERRL.   Neck: supple, symmetrical, and thyroid not enlarged, symmetric, no tenderness/mass/nodules  Lungs:  clear to auscultation bilaterally and normal respiratory effort  Cardiovascular: regular rate and rhythm, S1, S2 normal, no murmur, click, rub or gallop.   Chest Wall: Port site healing  Extremities: no cyanosis or edema, or clubbing. Pulses: 2+ and symmetric.  Abdomen: soft, non-tender non-distented; bowel sounds normal; no masses,  no organomegaly.   Skin: Pale.  Texture and turgor normal. No rashes or lesions  Musculoskeletal: No obvious joint swelling or erythema  Lymph Nodes: No cervical or supraclavicular adenopathy. No axillary or inguinal adenopathy.  Neurologic: Cranial nerves intact.  Normal strength and tone. No focal numbness or weakness  Psych: appropriate mood and affect    Objective:     Pathology:  Initial BM bx:    BONE MARROW, RIGHT ILIAC CREST, ASPIRATE, CLOT, AND CORE BIOPSY:  --Cellular marrow, positive for acute myeloid leukemia, with blasts representing approximately 25% of cellularity, see comment  --Background developing myeloid cells present with some cytologic atypia, see comment  COMMENT: Concomitantly submitted flow cytometric analysis detects an increased population of blasts consistent with acute myeloid leukemia, non-M3 subtype. Additional immunophenotyping was performed on peripheral blood flow cytometric studies (please see separate report). The marrow blasts showing expression of CD34, CD13, and cytoplasmic myeloperoxidase as well as dim CD19. The population however is negative for TdT, and cytoplasmic CD22 and CD79a. These findings are similar to that seen on the peripheral blood study and are consistent with acute myeloid leukemia, non-M3 subtype by JOVANNI classification.  " Given the dim expression of CD19, a translocation of chromosomes 8 and 21 is a diagnostic possibility. The cytologic atypia/dysplasia seen in the myeloid series can also be seen with this translocation. Clinical correlation and correlation with any available cytogenetic and molecular studies is required for definitive classification of this process.    AML FISH: The result is abnormal and indicates RBWH1L7/RUNX1 fusion in 90.6% of nuclei. This result is most consistent with acute myeloid leukemia with t(8;21)(q22;q22) (Grimwade et al., Blood 116:354-65, 2010; Zander et al., Am J Hematol 89:3423-1459, 2014; Bernarda and Radha, Am J Clin Pathol 144:6-18, 2015). For monitoring response to therapy in this patient, we suggest using FISH for GRCY1F0/RUNX1 fusion."    Peripheral blood, FLT3 mutation analysis: Negative. Neither expansion of the region susceptible to internal tandem duplication (FLT-ITD) nor changes involving codon D835 and/or I836 in the tyrosine kinase domain (FLT3-TKD) was detected."     KIT genetic alteration analysis, exons 8, 9, 10, 11, and 17: Negative.  No pathogenic genetic alterations were detected in the  KIT  gene, exons 8, 9, 10, 11, and 17.     Cytogenetics:  45,X,-Y,nevin(8)t(8;21)(q22;q22),nevin(20)t(20;21)(p13;q22)t(8;21),nevin(21)t(8;21)t(20;21)[17]/46,XY[3]  Comments: The result is abnormal. Of 20 metaphases, 3 metaphases were normal, and 17 metaphases had a complex translocation involving chromosomes 8, 21, and 20. FISH studies confirmed QLET2O1/RUNX1 fusion associated with this translocation (reported separately). Fusion of KYZY6J9/RUNX1 is reportedly associated with a favorable prognosis in AML (Grimwade et al., Blood 116:354-365, 2010). For monitoring response to therapy in this patient, FISH for EVFK4D1/RUNX1 fusion is available, test AMLF (AML, FISH)."    End of Induction I:  BONE MARROW, RIGHT ILIAC CREST, ASPIRATE AND CLOT:  --Cellular marrow, approximately 60%, with trilineage " "hematopoiesis, see comment  --No definitive morphologic evidence of residual/recurrent acute myeloid leukemia, see comment  --Increased stainable iron  COMMENT: Concomitantly submitted flow cytometric analysis detects no diagnostic abnormal hematopoietic population. B cells are polyclonal with a subset of CD19/CD10 positive cells favored to be hematogones, and T cells are immunophenotypically unremarkable. The blast gate is not increased.    AML FISH:  This result is within normal limits for the DDTG5Z3 and RUNX1 gene regions."      Labs:   Results for HEMA BHATIA (MRN [id number]) as of 2/20/2018 11:32   2/20/2018 08:40 2/20/2018 09:04   WBC  2.77 (L)   RBC  2.92 (L)   Hemoglobin  9.2 (L)   Hematocrit  28.4 (L)   MCV  97   MCH  31.5 (H)   MCHC  32.4   RDW  26.3 (H)   Platelets  65 (L)   MPV  10.1   Gran%  50.5   Gran # (ANC)  1.4 (L)   Lymph%  38.6   Lymph #  1.1   Mono%  10.1   Mono #  0.3   Eosinophil%  0.4   Eos #  0.0   Basophil%  0.4   Baso #  0.01   Aniso  Slight   Poly  Occasional   Platelet Estimate  Decreased (A)   Retic  2.8 (H)   Sodium 140    Potassium 4.2    Chloride 107    CO2 26    Anion Gap 7 (L)    BUN, Bld 11    Creatinine 0.7    eGFR if non African American >60.0    eGFR if African American >60.0    Glucose 95    Calcium 8.7    Alkaline Phosphatase 69    Total Protein 6.5    Albumin 3.6    Total Bilirubin 0.5    AST 17    ALT 27    Differential Method  Automated           Assessment/Plan:   Hema was seen today for aml in remission.    Diagnoses and all orders for this visit:    AML (acute myeloblastic leukemia)  -     CBC auto differential  -     Reticulocytes  -     Comprehensive metabolic panel  -     CBC auto differential; Standing  -     CBC auto differential  -     Reticulocytes; Standing  -     Reticulocytes  -     Place in Outpatient; Standing  -     CSF cell count with differential  -     Pathologist Interpretation, Hem/Onc CSF  -     Leukemia/Lymphoma Screen - Bone Marrow Right Posterior " Iliac Crest; Standing  -     Acute Myeloid Leukemia, FISH, Bone Marrow; Standing  -     Bone Marrow Prep and Stain; Standing  -     Tissue Specimen to Pathology, Bone Marrow Aspiration/Biopsy Procedure; Standing  -     Leukemia/Lymphoma Screen - Bone Marrow Right Posterior Iliac Crest  -     Acute Myeloid Leukemia, FISH, Bone Marrow  -     Bone Marrow Prep and Stain  -     Tissue Specimen to Pathology, Bone Marrow Aspiration/Biopsy Procedure        Discussion:   17 y.o. young man with AML (t 8;21) here for chemotherapy.  He is Day 25 of Cycle I of therapy following [id number] (Arm A).  He reports feeling well since discharge home and was well appearing today in clinic.    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  CNS negative.  MRD negative after Induction I.    -Treating per [facility] [id number] (ARM A).  Intensification I today.    For his chemotherapy induced pancytopenia  - Resolving. No transfusions today.     For his immunosuppression secondary to chemotherapy  -He will resume ciprofloxacin and posaconazole prophylaxis after completion of chemotherapy.  -Continue Bactrim on Fri, Sat and Sun  -Continue Peridex    He have labs in [place] next Wednesday.      Sanfodr Bucio    Total time 30 minutes with >50% spent in face-to-face counseling regarding plan of care, chemo side effects and arranging coordination of care.

## 2018-02-21 LAB
BODY SITE - BONE MARROW: NORMAL
BONE MARROW WRIGHT STAIN COMMENT: NORMAL
CLINICAL DIAGNOSIS - BONE MARROW: NORMAL
FLOW CYTOMETRY ANTIBODIES ANALYZED - BONE MARROW: NORMAL
FLOW CYTOMETRY COMMENT - BONE MARROW: NORMAL
FLOW CYTOMETRY INTERPRETATION - BONE MARROW: NORMAL
PATH INTERP FLD-IMP: NORMAL

## 2018-02-21 PROCEDURE — 25000003 PHARM REV CODE 250: Performed by: STUDENT IN AN ORGANIZED HEALTH CARE EDUCATION/TRAINING PROGRAM

## 2018-02-21 PROCEDURE — 25000003 PHARM REV CODE 250: Performed by: PEDIATRICS

## 2018-02-21 PROCEDURE — 99233 SBSQ HOSP IP/OBS HIGH 50: CPT | Mod: ,,, | Performed by: PEDIATRICS

## 2018-02-21 PROCEDURE — 20600001 HC STEP DOWN PRIVATE ROOM

## 2018-02-21 PROCEDURE — 63600175 PHARM REV CODE 636 W HCPCS: Performed by: PEDIATRICS

## 2018-02-21 RX ORDER — DIPHENHYDRAMINE HYDROCHLORIDE 50 MG/ML
25 INJECTION INTRAMUSCULAR; INTRAVENOUS EVERY 6 HOURS PRN
Status: DISCONTINUED | OUTPATIENT
Start: 2018-02-21 | End: 2018-02-25 | Stop reason: HOSPADM

## 2018-02-21 RX ORDER — OXYCODONE HYDROCHLORIDE 5 MG/1
5 TABLET ORAL ONCE
Status: COMPLETED | OUTPATIENT
Start: 2018-02-21 | End: 2018-02-21

## 2018-02-21 RX ORDER — LORAZEPAM 2 MG/ML
1 INJECTION INTRAMUSCULAR EVERY 6 HOURS PRN
Status: DISCONTINUED | OUTPATIENT
Start: 2018-02-21 | End: 2018-02-25 | Stop reason: HOSPADM

## 2018-02-21 RX ADMIN — CHLORHEXIDINE GLUCONATE 15 ML: 1.2 RINSE ORAL at 05:02

## 2018-02-21 RX ADMIN — CHLORHEXIDINE GLUCONATE 15 ML: 1.2 RINSE ORAL at 11:02

## 2018-02-21 RX ADMIN — ACYCLOVIR 400 MG: 200 CAPSULE ORAL at 09:02

## 2018-02-21 RX ADMIN — OXYCODONE HYDROCHLORIDE 5 MG: 5 TABLET ORAL at 07:02

## 2018-02-21 RX ADMIN — CYTARABINE 1940 MG: 100 INJECTION, SOLUTION INTRATHECAL; INTRAVENOUS; SUBCUTANEOUS at 06:02

## 2018-02-21 RX ADMIN — DIPHENHYDRAMINE HYDROCHLORIDE 25 MG: 50 INJECTION, SOLUTION INTRAMUSCULAR; INTRAVENOUS at 06:02

## 2018-02-21 RX ADMIN — ACYCLOVIR 400 MG: 200 CAPSULE ORAL at 08:02

## 2018-02-21 RX ADMIN — CHLORHEXIDINE GLUCONATE 15 ML: 1.2 RINSE ORAL at 06:02

## 2018-02-21 RX ADMIN — ONDANSETRON 16 MG: 2 INJECTION INTRAMUSCULAR; INTRAVENOUS at 08:02

## 2018-02-21 RX ADMIN — DEXAMETHASONE 2 DROP: 1 SUSPENSION OPHTHALMIC at 11:02

## 2018-02-21 RX ADMIN — ETOPOSIDE 292 MG: 20 INJECTION, SOLUTION INTRAVENOUS at 09:02

## 2018-02-21 RX ADMIN — OXYCODONE HYDROCHLORIDE 5 MG: 5 TABLET ORAL at 09:02

## 2018-02-21 RX ADMIN — CYTARABINE 1940 MG: 100 INJECTION, SOLUTION INTRATHECAL; INTRAVENOUS; SUBCUTANEOUS at 08:02

## 2018-02-21 RX ADMIN — DEXAMETHASONE 2 DROP: 1 SUSPENSION OPHTHALMIC at 05:02

## 2018-02-21 RX ADMIN — DEXAMETHASONE 2 DROP: 1 SUSPENSION OPHTHALMIC at 06:02

## 2018-02-21 RX ADMIN — ETOPOSIDE 292 MG: 20 INJECTION, SOLUTION INTRAVENOUS at 12:02

## 2018-02-21 RX ADMIN — PANTOPRAZOLE SODIUM 40 MG: 40 TABLET, DELAYED RELEASE ORAL at 08:02

## 2018-02-21 NOTE — PROGRESS NOTES
Chemo note: cytarabine (PF) (CYTOSAR) 1,000 mg/m2 = 1,940 mg in sodium chloride 0.9% 250 mL chemo infusion started infusing to L chest PAC. Positive blood return noted. Consent in chart. Chemo precautions maintained. Premedications given as ordered. Will monitor patient.     0100: etoposide (VEPESID) 150 mg/m2 = 292 mg in sodium chloride 0.9% 500 mL chemo infusion started infusion to L chest PAC. Blood return noted. Will monitor.

## 2018-02-21 NOTE — SUBJECTIVE & OBJECTIVE
Subjective:     Interval History: Started on high dose cytarabine and etoposide. Afebrile overnight. Oxy x 1 for back pain.     Oncology Treatment Plan:     PEDS [id number]  ARM A - LR Risk Patients    Medications:  Continuous Infusions:  Scheduled Meds:   acyclovir  400 mg Oral BID    chlorhexidine  15 mL Mouth/Throat QID    cytarabine (CYTOSAR) chemo infusion  1,000 mg/m2 (Treatment Plan Recorded) Intravenous Q12H    dexamethasone  2 drop Both Eyes Q6H    etoposide (VEPESID) chemo infusion  150 mg/m2 (Treatment Plan Recorded) Intravenous Q24H    ondansetron  16 mg Intravenous Daily    pantoprazole  40 mg Oral Daily    [START ON 2/23/2018] sulfamethoxazole-trimethoprim 800-160mg  1 tablet Oral twice daily on Friday, Saturday, Sunday     PRN Meds:acetaminophen, alteplase, diphenhydrAMINE, heparin, porcine (PF), lorazepam, oxyCODONE, sodium chloride 0.9%     Review of Systems   Constitutional: Negative for activity change, appetite change, fatigue and fever.   HENT: Negative for congestion, ear pain, mouth sores, rhinorrhea, sinus pain and sore throat.    Eyes: Negative for photophobia and pain.   Respiratory: Negative for cough and shortness of breath.    Cardiovascular: Negative for chest pain and palpitations.   Gastrointestinal: Negative for abdominal pain, diarrhea, nausea and vomiting.   Genitourinary: Negative for dysuria.   Musculoskeletal: Negative for gait problem, neck pain and neck stiffness.   Skin: Negative for pallor and rash.   Allergic/Immunologic: Positive for immunocompromised state.   Neurological: Negative for weakness and headaches.   Hematological: Negative for adenopathy. Does not bruise/bleed easily.     Objective:     Vital Signs (Most Recent):  Temp: 96.1 °F (35.6 °C) (02/21/18 0318)  Pulse: 77 (02/21/18 0318)  Resp: 20 (02/21/18 0318)  BP: (!) 118/58 (02/21/18 0318)  SpO2: (!) 93 % (02/21/18 0318) Vital Signs (24h Range):  Temp:  [96.1 °F (35.6 °C)-98.6 °F (37 °C)] 96.1 °F (35.6  °C)  Pulse:  [61-81] 77  Resp:  [16-20] 20  SpO2:  [93 %-100 %] 93 %  BP: ()/(34-58) 118/58     Weight: 89 kg (196 lb 1.6 oz)  Body mass index is 31.65 kg/m².  Body surface area is 2.04 meters squared.    No intake or output data in the 24 hours ending 02/21/18 0630    Physical Exam    Labs:   Recent Lab Results       02/20/18  1426 02/20/18  1045 02/20/18  1044 02/20/18  0904 02/20/18  0840      Eosinophils, CSF   2(A)       Appearance, CSF   Cloudy(A)       Mono/Macrophage, CSF   4(L)       Baso, CSF   1(A)       Segmented Neutrophils, CSF   49(H)       Heme Aliquot   0.7       WBC, CSF   3       RBC, CSF   3235(A)       Lymphs, CSF   44       Path Interpretation Hem/Onc CSF   Review required       Albumin     3.6     Alkaline Phosphatase     69     ALT     27     AML FISH Reason for Referral (BM)  AML        AML FISH Specimen Source (BM)  Bone Marrow        Anion Gap     7(L)     Aniso    Slight      AST     17     Baso #    0.01      Basophil%    0.4      Total Bilirubin     0.5  Comment:  For infants and newborns, interpretation of results should be based  on gestational age, weight and in agreement with clinical  observations.  Premature Infant recommended reference ranges:  Up to 24 hours.............<8.0 mg/dL  Up to 48 hours............<12.0 mg/dL  3-5 days..................<15.0 mg/dL  6-29 days.................<15.0 mg/dL       Body Site - Bone Marrow   RPI       BUN, Bld     11     Calcium     8.7     Chloride     107     Clinical Diagnosis - Bone Marrow   LEUK       CO2     26     Color, CSF   Red(A)       Creatinine     0.7     Differential Method    Automated      eGFR if      >60.0     eGFR if non      >60.0  Comment:  Calculation used to obtain the estimated glomerular filtration  rate (eGFR) is the CKD-EPI equation.        Eos #    0.0      Eosinophil%    0.4      Glucose     95     Gran # (ANC)    1.4(L)      Gran%    50.5      Hematocrit    28.4(L)       Hemoglobin    9.2(L)      Lymph #    1.1      Lymph%    38.6      MCH    31.5(H)      MCHC    32.4      MCV    97      Mono #    0.3      Mono%    10.1      MPV    10.1      Platelet Estimate    Decreased(A)      Platelets    65(L)      Poly    Occasional      Potassium     4.2     Total Protein     6.5     RBC    2.92(L)      RDW    26.3(H)      Reason for Referral AML         Retic    2.8(H)      Sodium     140     WBC    2.77(L)

## 2018-02-21 NOTE — PROGRESS NOTES
[facility]  Pediatric Hematology/Oncology  Progress Note    Patient Name: Hema Kuo  Admission Date: 2/20/2018  Hospital Length of Stay: 1 days  Code Status: Full Code     Subjective:     Interval History: S/P LP and BM biopsy. Started on high dose cytarabine and etoposide. Afebrile overnight. Oxy x 1 for back pain. Benadryl x 1 for nausea and vomiting.     Oncology Treatment Plan:     PEDS [id number]  ARM A - LR Risk Patients    Medications:  Continuous Infusions:  Scheduled Meds:   acyclovir  400 mg Oral BID    chlorhexidine  15 mL Mouth/Throat QID    cytarabine (CYTOSAR) chemo infusion  1,000 mg/m2 (Treatment Plan Recorded) Intravenous Q12H    dexamethasone  2 drop Both Eyes Q6H    etoposide (VEPESID) chemo infusion  150 mg/m2 (Treatment Plan Recorded) Intravenous Q24H    ondansetron  16 mg Intravenous Daily    pantoprazole  40 mg Oral Daily    [START ON 2/23/2018] sulfamethoxazole-trimethoprim 800-160mg  1 tablet Oral twice daily on Friday, Saturday, Sunday     PRN Meds:acetaminophen, alteplase, diphenhydrAMINE, heparin, porcine (PF), lorazepam, oxyCODONE, sodium chloride 0.9%     Review of Systems   Constitutional: Negative for activity change, appetite change, fatigue and fever.   HENT: Negative for congestion, ear pain, mouth sores, rhinorrhea, sinus pain and sore throat.    Eyes: Negative for photophobia and pain.   Respiratory: Negative for cough and shortness of breath.    Cardiovascular: Negative for chest pain and palpitations.   Gastrointestinal: Negative for abdominal pain, diarrhea, nausea and vomiting.   Genitourinary: Negative for dysuria.   Musculoskeletal: Negative for gait problem, neck pain and neck stiffness.   Skin: Negative for pallor and rash.   Allergic/Immunologic: Positive for immunocompromised state.   Neurological: Negative for weakness and headaches.   Hematological: Negative for adenopathy. Does not bruise/bleed easily.     Objective:     Vital Signs (Most  Recent):  Temp: 96.1 °F (35.6 °C) (02/21/18 0318)  Pulse: 77 (02/21/18 0318)  Resp: 20 (02/21/18 0318)  BP: (!) 118/58 (02/21/18 0318)  SpO2: (!) 93 % (02/21/18 0318) Vital Signs (24h Range):  Temp:  [96.1 °F (35.6 °C)-98.6 °F (37 °C)] 96.1 °F (35.6 °C)  Pulse:  [61-81] 77  Resp:  [16-20] 20  SpO2:  [93 %-100 %] 93 %  BP: ()/(34-58) 118/58     Weight: 89 kg (196 lb 1.6 oz)  Body mass index is 31.65 kg/m².  Body surface area is 2.04 meters squared.    No intake or output data in the 24 hours ending 02/21/18 0630    Physical Exam    Labs:   Recent Lab Results       02/20/18  1426 02/20/18  1045 02/20/18  1044 02/20/18  0904 02/20/18  0840      Eosinophils, CSF   2(A)       Appearance, CSF   Cloudy(A)       Mono/Macrophage, CSF   4(L)       Baso, CSF   1(A)       Segmented Neutrophils, CSF   49(H)       Heme Aliquot   0.7       WBC, CSF   3       RBC, CSF   3235(A)       Lymphs, CSF   44       Path Interpretation Hem/Onc CSF   Review required       Albumin     3.6     Alkaline Phosphatase     69     ALT     27     AML FISH Reason for Referral (BM)  AML        AML FISH Specimen Source (BM)  Bone Marrow        Anion Gap     7(L)     Aniso    Slight      AST     17     Baso #    0.01      Basophil%    0.4      Total Bilirubin     0.5  Comment:  For infants and newborns, interpretation of results should be based  on gestational age, weight and in agreement with clinical  observations.  Premature Infant recommended reference ranges:  Up to 24 hours.............<8.0 mg/dL  Up to 48 hours............<12.0 mg/dL  3-5 days..................<15.0 mg/dL  6-29 days.................<15.0 mg/dL       Body Site - Bone Marrow   RPI       BUN, Bld     11     Calcium     8.7     Chloride     107     Clinical Diagnosis - Bone Marrow   LEUK       CO2     26     Color, CSF   Red(A)       Creatinine     0.7     Differential Method    Automated      eGFR if      >60.0     eGFR if non African American      >60.0  Comment:  Calculation used to obtain the estimated glomerular filtration  rate (eGFR) is the CKD-EPI equation.        Eos #    0.0      Eosinophil%    0.4      Glucose     95     Gran # (ANC)    1.4(L)      Gran%    50.5      Hematocrit    28.4(L)      Hemoglobin    9.2(L)      Lymph #    1.1      Lymph%    38.6      MCH    31.5(H)      MCHC    32.4      MCV    97      Mono #    0.3      Mono%    10.1      MPV    10.1      Platelet Estimate    Decreased(A)      Platelets    65(L)      Poly    Occasional      Potassium     4.2     Total Protein     6.5     RBC    2.92(L)      RDW    26.3(H)      Reason for Referral AML         Retic    2.8(H)      Sodium     140     WBC    2.77(L)                          Assessment/Plan:     Acute leukemia    Hema is a 17-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 ANC 1398. He is currently afebrile and comfortable.    Acute myelogenous leukemia:  - High dose cytarabine followed by etoposide daily x5 days. Day 2 today. (started on 2/20)  - No need for prehydration  - Dexamethasone eye drops q6hr for prevention of keratitis while receiving cytarabine  - CBC and CMP on Day 4 of treatment    Chemotherapy-induced nausea and vomiting:  - Ondansetron 16mg IV daily prior to cytarabine  - Diphenhydramine 25mg IV q6hr PRN nausea (1st choice)  - Lorazepam 1mg IV q6hr PRN nausea (2nd choice)    At risk for infection:  - Continue prophylactic TMP/SMX 1 tab PO BID on Fri/Sat/Sun  - Continue acyclovir 400mg PO BID  - Hold ciprofloxacin and posaconazole prophylaxis until chemotherapy completed  - If febrile:   - Obtain blood culture   - Start empiric ceftriaxone and vancomycin    Diet:  - Regular diet as tolerated  - No need for maintenance IVF  - Continue pantoprazole 40mg PO daily    Dispo: Pending completion of chemotherapy  x5 days.              Beata Bunn MD  Pediatric Hematology/Oncology  [facility]         [id number] Comprehensive

## 2018-02-21 NOTE — PLAN OF CARE
Problem: Patient Care Overview  Goal: Plan of Care Review  Outcome: Ongoing (interventions implemented as appropriate)  Pt admitted for chemotherapy. Tolerated without complaints. Pain to IT chemo site. Oxycodone administered with full relief. Instructed patient to call for assistance, bed low and locked, call bell within reach, nonskid socks on, patient verbalized understanding.

## 2018-02-21 NOTE — ASSESSMENT & PLAN NOTE
Hema is a 17-year-old M with AML (t8;21) receiving cycle 3 of chemotherapy under [facility] [id number] Arm A who is admitted s/p bone marrow biopsy and LP to begin Intensification I with high dose cytarabine and etoposide x5 days. ANC 1398. He is currently afebrile and comfortable.    Acute myelogenous leukemia:  - High dose cytarabine followed by etoposide daily x5 days. Day 2 today. (started on 2/20)  - No need for prehydration  - Dexamethasone eye drops q6hr for prevention of keratitis while receiving cytarabine  - CBC and CMP on Day 4 of treatment    Chemotherapy-induced nausea and vomiting:  - Ondansetron 16mg IV daily prior to cytarabine  - Diphenhydramine 25mg IV q6hr PRN nausea (1st choice)  - Lorazepam 1mg IV q6hr PRN nausea (2nd choice)    At risk for infection:  - Continue prophylactic TMP/SMX 1 tab PO BID on Fri/Sat/Sun  - Continue acyclovir 400mg PO BID  - Hold ciprofloxacin and posaconazole prophylaxis until chemotherapy completed  - If febrile:   - Obtain blood culture   - Start empiric ceftriaxone and vancomycin    Diet:  - Regular diet as tolerated  - No need for maintenance IVF  - Continue pantoprazole 40mg PO daily    Dispo: Pending completion of chemotherapy x5 days.

## 2018-02-21 NOTE — PROGRESS NOTES
02/21/18 0757   Vital Signs   BP (!) 90/44   BP Location Left arm   BP Method Manual   Patient Position Lying    MD notified about hypotension. No new orders at this time. Okay to hang chemo. Will continue to monitor.

## 2018-02-21 NOTE — PROGRESS NOTES
Chemotherapy Note:  Consent & CAR in chart. Cytarabine and patient verified at bedside by two chemo certified RNs, Corey & Kina. Positive blood noted to left chest port. Cytarabine 1940mg in sodium chloride 0.9% 250 mL IVPB administered to left chest port  over 2 hours at 125ml/hr. Chemotherapy precautions maintained & patient educated. Frequent vital signs done as ordered. Will continue to monitor.

## 2018-02-21 NOTE — PLAN OF CARE
Problem: Patient Care Overview  Goal: Plan of Care Review  Outcome: Ongoing (interventions implemented as appropriate)  POC reviewed with patient; understanding verbalized. Chemo administered this shift. Pt C/O nausea and received PRN Diphenhydramine. Pt is independent. He voids in the urinal; no BM this shift. Regular diet with poor appetite. Boost ordered. Pt. with nonskid footwear on, bed in lowest position, and locked with bed rails up x2.  Pt. has call light and personal items within reach.  VSS and afebrile this shift. All questions and concerns address at this time. Will continue to monitor.

## 2018-02-22 PROCEDURE — 25000003 PHARM REV CODE 250: Performed by: STUDENT IN AN ORGANIZED HEALTH CARE EDUCATION/TRAINING PROGRAM

## 2018-02-22 PROCEDURE — 99233 SBSQ HOSP IP/OBS HIGH 50: CPT | Mod: ,,, | Performed by: PEDIATRICS

## 2018-02-22 PROCEDURE — 63600175 PHARM REV CODE 636 W HCPCS: Performed by: PEDIATRICS

## 2018-02-22 PROCEDURE — 25000003 PHARM REV CODE 250: Performed by: PEDIATRICS

## 2018-02-22 PROCEDURE — 20600001 HC STEP DOWN PRIVATE ROOM

## 2018-02-22 RX ORDER — OXYCODONE HCL 10 MG/1
10 TABLET, FILM COATED, EXTENDED RELEASE ORAL ONCE
Status: DISCONTINUED | OUTPATIENT
Start: 2018-02-22 | End: 2018-02-22

## 2018-02-22 RX ORDER — DEXTROSE MONOHYDRATE AND SODIUM CHLORIDE 5; .9 G/100ML; G/100ML
INJECTION, SOLUTION INTRAVENOUS CONTINUOUS
Status: DISCONTINUED | OUTPATIENT
Start: 2018-02-22 | End: 2018-02-25 | Stop reason: HOSPADM

## 2018-02-22 RX ORDER — OXYCODONE HYDROCHLORIDE 5 MG/1
10 TABLET ORAL ONCE
Status: COMPLETED | OUTPATIENT
Start: 2018-02-22 | End: 2018-02-22

## 2018-02-22 RX ADMIN — PANTOPRAZOLE SODIUM 40 MG: 40 TABLET, DELAYED RELEASE ORAL at 08:02

## 2018-02-22 RX ADMIN — DEXTROSE AND SODIUM CHLORIDE: 5; .9 INJECTION, SOLUTION INTRAVENOUS at 08:02

## 2018-02-22 RX ADMIN — CYTARABINE 1940 MG: 100 INJECTION, SOLUTION INTRATHECAL; INTRAVENOUS; SUBCUTANEOUS at 05:02

## 2018-02-22 RX ADMIN — LORAZEPAM 1 MG: 2 INJECTION INTRAMUSCULAR; INTRAVENOUS at 04:02

## 2018-02-22 RX ADMIN — CHLORHEXIDINE GLUCONATE 15 ML: 1.2 RINSE ORAL at 05:02

## 2018-02-22 RX ADMIN — ACETAMINOPHEN 650 MG: 325 TABLET ORAL at 08:02

## 2018-02-22 RX ADMIN — DEXTROSE AND SODIUM CHLORIDE: 5; .9 INJECTION, SOLUTION INTRAVENOUS at 09:02

## 2018-02-22 RX ADMIN — LORAZEPAM 1 MG: 2 INJECTION INTRAMUSCULAR; INTRAVENOUS at 09:02

## 2018-02-22 RX ADMIN — OXYCODONE HYDROCHLORIDE 5 MG: 5 TABLET ORAL at 09:02

## 2018-02-22 RX ADMIN — OXYCODONE HYDROCHLORIDE 10 MG: 5 TABLET ORAL at 09:02

## 2018-02-22 RX ADMIN — ONDANSETRON 16 MG: 2 INJECTION INTRAMUSCULAR; INTRAVENOUS at 05:02

## 2018-02-22 RX ADMIN — OXYCODONE HYDROCHLORIDE 5 MG: 5 TABLET ORAL at 12:02

## 2018-02-22 RX ADMIN — CHLORHEXIDINE GLUCONATE 15 ML: 1.2 RINSE ORAL at 11:02

## 2018-02-22 RX ADMIN — ACYCLOVIR 400 MG: 200 CAPSULE ORAL at 08:02

## 2018-02-22 RX ADMIN — DIPHENHYDRAMINE HYDROCHLORIDE 25 MG: 50 INJECTION, SOLUTION INTRAMUSCULAR; INTRAVENOUS at 12:02

## 2018-02-22 RX ADMIN — DIPHENHYDRAMINE HYDROCHLORIDE 25 MG: 50 INJECTION, SOLUTION INTRAMUSCULAR; INTRAVENOUS at 09:02

## 2018-02-22 RX ADMIN — DEXAMETHASONE 2 DROP: 1 SUSPENSION OPHTHALMIC at 05:02

## 2018-02-22 RX ADMIN — OXYCODONE HYDROCHLORIDE 5 MG: 5 TABLET ORAL at 04:02

## 2018-02-22 RX ADMIN — ETOPOSIDE 292 MG: 20 INJECTION, SOLUTION INTRAVENOUS at 09:02

## 2018-02-22 RX ADMIN — DEXAMETHASONE 2 DROP: 1 SUSPENSION OPHTHALMIC at 11:02

## 2018-02-22 NOTE — PROGRESS NOTES
[facility]  Pediatric Hematology/Oncology  Progress Note    Patient Name: Hema Kuo  Admission Date: 2/20/2018  Hospital Length of Stay: 2 days  Code Status: Full Code     Subjective:     Interval History: 3rd day of high dose cytarabine and etoposide. Afebrile. Oxy x 3 for back pain. Benadryl x 2 for nausea and vomiting. Decreased Po intake.     Oncology Treatment Plan:     PEDS [id number]  ARM A - LR Risk Patients    Medications:  Continuous Infusions:  Scheduled Meds:   acyclovir  400 mg Oral BID    chlorhexidine  15 mL Mouth/Throat QID    cytarabine (CYTOSAR) chemo infusion  1,000 mg/m2 (Treatment Plan Recorded) Intravenous Q12H    dexamethasone  2 drop Both Eyes Q6H    etoposide (VEPESID) chemo infusion  150 mg/m2 (Treatment Plan Recorded) Intravenous Q24H    ondansetron  16 mg Intravenous Daily    pantoprazole  40 mg Oral Daily    [START ON 2/23/2018] sulfamethoxazole-trimethoprim 800-160mg  1 tablet Oral twice daily on Friday, Saturday, Sunday     PRN Meds:acetaminophen, alteplase, diphenhydrAMINE, heparin, porcine (PF), lorazepam, oxyCODONE, sodium chloride 0.9%     Review of Systems  Objective:     Vital Signs (Most Recent):  Temp: 98.3 °F (36.8 °C) (02/22/18 0415)  Pulse: 60 (02/22/18 0415)  Resp: 14 (02/22/18 0415)  BP: (!) 109/54 (02/22/18 0415)  SpO2: 99 % (02/22/18 0415) Vital Signs (24h Range):  Temp:  [96.2 °F (35.7 °C)-98.5 °F (36.9 °C)] 98.3 °F (36.8 °C)  Pulse:  [60-95] 60  Resp:  [14-20] 14  SpO2:  [97 %-100 %] 99 %  BP: ()/(44-76) 109/54     Weight: 89 kg (196 lb 1.6 oz)  Body mass index is 31.65 kg/m².  Body surface area is 2.04 meters squared.      Intake/Output Summary (Last 24 hours) at 02/22/18 0601  Last data filed at 02/22/18 0558   Gross per 24 hour   Intake             1680 ml   Output              500 ml   Net             1180 ml       Physical Exam   Constitutional: He appears well-developed and well-nourished. No distress.   Eyes: Conjunctivae and  EOM are normal. Right eye exhibits no discharge. Left eye exhibits no discharge. No scleral icterus.   Mouth: No oral sores   Neck: Normal range of motion. Neck supple.   Cardiovascular: Normal rate, regular rhythm and normal heart sounds.    Pulmonary/Chest: Effort normal and breath sounds normal. No respiratory distress. Port in place.  Abdominal: Soft. He exhibits no distension and no mass. There is no tenderness. There is no rebound and no guarding. No hernia.   Musculoskeletal: Normal range of motion.   Neurological: He is alert.   Skin: Skin is warm. Capillary refill takes less than 2 seconds. He is not diaphoretic.             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 ANC 1398. He is currently afebrile and comfortable.    Acute myelogenous leukemia:  - High dose cytarabine followed by etoposide daily x5 days. Day 3 today. (started on 2/20)  - Dexamethasone eye drops q6hr for prevention of keratitis while receiving cytarabine  - CBC and CMP on Day 4 of treatment    Chemotherapy-induced nausea and vomiting:  - Ondansetron 16mg IV daily prior to cytarabine  - Diphenhydramine 25mg IV q6hr PRN nausea (1st choice)  - Lorazepam 1mg IV q6hr PRN nausea (2nd choice)    At risk for infection:  - Continue prophylactic TMP/SMX 1 tab PO BID on Fri/Sat/Sun  - Continue acyclovir 400mg PO BID  - Hold ciprofloxacin and posaconazole prophylaxis until chemotherapy completed  - If febrile:   - Obtain blood culture   - Start empiric ceftriaxone and vancomycin    Diet:  - Regular diet as tolerated  - Started on mIVF d5NS  - Continue pantoprazole 40mg PO daily    Dispo: Pending completion of chemotherapy x5 days.              Beata Bunn MD  Pediatric Hematology/Oncology  [facility]

## 2018-02-22 NOTE — PROGRESS NOTES
etoposide (VEPESID) 150 mg/m2 = 292 mg in sodium chloride 0.9% 500 mL chemo infusion started infusion to L chest PAC. Blood return noted. Will monitor

## 2018-02-22 NOTE — PROGRESS NOTES
Chemo note: cytarabine (PF) (CYTOSAR) 1,000 mg/m2 = 1,940 mg in sodium chloride 0.9% 250 mL chemo infusion started infusing to L chest PAC. Positive blood return noted. Consent in chart. Chemo precautions maintained. Will monitor patient.

## 2018-02-22 NOTE — PLAN OF CARE
Problem: Patient Care Overview  Goal: Plan of Care Review  Outcome: Ongoing (interventions implemented as appropriate)  Pt receiving chemo as ordered. Prn nausea medications given as needed. Oxycodone given x 3 for pain to back. Mother at bedside. Instructed patient to call for assistance, bed low and locked, call bell within reach, nonskid socks on, patient verbalized understanding.

## 2018-02-22 NOTE — SUBJECTIVE & OBJECTIVE
Subjective:     Interval History: 2nd day of high dose cytarabine and etoposide. Afebrile. Oxy x 3 for back pain. Benadryl x 2 for nausea and vomiting.     Oncology Treatment Plan:     [facility] [id number]  ARM A - LR Risk Patients    Medications:  Continuous Infusions:  Scheduled Meds:   acyclovir  400 mg Oral BID    chlorhexidine  15 mL Mouth/Throat QID    cytarabine (CYTOSAR) chemo infusion  1,000 mg/m2 (Treatment Plan Recorded) Intravenous Q12H    dexamethasone  2 drop Both Eyes Q6H    etoposide (VEPESID) chemo infusion  150 mg/m2 (Treatment Plan Recorded) Intravenous Q24H    ondansetron  16 mg Intravenous Daily    pantoprazole  40 mg Oral Daily    [START ON 2/23/2018] sulfamethoxazole-trimethoprim 800-160mg  1 tablet Oral twice daily on Friday, Saturday, Sunday     PRN Meds:acetaminophen, alteplase, diphenhydrAMINE, heparin, porcine (PF), lorazepam, oxyCODONE, sodium chloride 0.9%     Review of Systems  Objective:     Vital Signs (Most Recent):  Temp: 98.3 °F (36.8 °C) (02/22/18 0415)  Pulse: 60 (02/22/18 0415)  Resp: 14 (02/22/18 0415)  BP: (!) 109/54 (02/22/18 0415)  SpO2: 99 % (02/22/18 0415) Vital Signs (24h Range):  Temp:  [96.2 °F (35.7 °C)-98.5 °F (36.9 °C)] 98.3 °F (36.8 °C)  Pulse:  [60-95] 60  Resp:  [14-20] 14  SpO2:  [97 %-100 %] 99 %  BP: ()/(44-76) 109/54     Weight: 89 kg (196 lb 1.6 oz)  Body mass index is 31.65 kg/m².  Body surface area is 2.04 meters squared.      Intake/Output Summary (Last 24 hours) at 02/22/18 0638  Last data filed at 02/22/18 0558   Gross per 24 hour   Intake             1680 ml   Output              500 ml   Net             1180 ml       Physical Exam   Constitutional: He appears well-developed and well-nourished. No distress.   Eyes: Conjunctivae and EOM are normal. Right eye exhibits no discharge. Left eye exhibits no discharge. No scleral icterus.   Neck: Normal range of motion. Neck supple.   Cardiovascular: Normal rate, regular rhythm and normal heart  sounds.    Pulmonary/Chest: Effort normal and breath sounds normal. No respiratory distress.   Abdominal: Soft. He exhibits no distension and no mass. There is no tenderness. There is no rebound and no guarding. No hernia.   Musculoskeletal: Normal range of motion.   Neurological: He is alert.   Skin: Skin is warm. Capillary refill takes less than 2 seconds. He is not diaphoretic.

## 2018-02-22 NOTE — ASSESSMENT & PLAN NOTE
Hema is a 17-year-old M with AML (t8;21) receiving cycle 3 of [place] [id number] who is admitted s/p bone marrow biopsy and LP to begin Intensification I with high dose cytarabine and etoposide x5 days. ANC 1398. He is currently afebrile and comfortable.    Acute myelogenous leukemia:  - High dose cytarabine followed by etoposide daily x5 days. Day 3 today. (started on 2/20)  - Dexamethasone eye drops q6hr for prevention of keratitis while receiving cytarabine  - CBC and CMP on Day 4 of treatment    Chemotherapy-induced nausea and vomiting:  - Ondansetron 16mg IV daily prior to cytarabine  - Diphenhydramine 25mg IV q6hr PRN nausea (1st choice)  - Lorazepam 1mg IV q6hr PRN nausea (2nd choice)    At risk for infection:  - Continue prophylactic TMP/SMX 1 tab PO BID on Fri/Sat/Sun  - Continue acyclovir 400mg PO BID  - Hold ciprofloxacin and posaconazole prophylaxis until chemotherapy completed  - If febrile:   - Obtain blood culture   - Start empiric ceftriaxone and vancomycin    Diet:  - Regular diet as tolerated  - Started on mIVF d5NS  - Continue pantoprazole 40mg PO daily    Dispo: Pending completion of chemotherapy x5 days.

## 2018-02-23 PROCEDURE — 63600175 PHARM REV CODE 636 W HCPCS: Performed by: STUDENT IN AN ORGANIZED HEALTH CARE EDUCATION/TRAINING PROGRAM

## 2018-02-23 PROCEDURE — 63600175 PHARM REV CODE 636 W HCPCS: Performed by: PEDIATRICS

## 2018-02-23 PROCEDURE — 25000003 PHARM REV CODE 250: Performed by: STUDENT IN AN ORGANIZED HEALTH CARE EDUCATION/TRAINING PROGRAM

## 2018-02-23 PROCEDURE — 25000003 PHARM REV CODE 250: Performed by: PEDIATRICS

## 2018-02-23 PROCEDURE — 11300000 HC PEDIATRIC PRIVATE ROOM

## 2018-02-23 PROCEDURE — 99233 SBSQ HOSP IP/OBS HIGH 50: CPT | Mod: ,,, | Performed by: PEDIATRICS

## 2018-02-23 RX ORDER — ONDANSETRON 2 MG/ML
16 INJECTION INTRAMUSCULAR; INTRAVENOUS ONCE
Status: COMPLETED | OUTPATIENT
Start: 2018-02-23 | End: 2018-02-23

## 2018-02-23 RX ORDER — LIDOCAINE AND PRILOCAINE 25; 25 MG/G; MG/G
CREAM TOPICAL ONCE
Status: COMPLETED | OUTPATIENT
Start: 2018-02-23 | End: 2018-02-23

## 2018-02-23 RX ORDER — ONDANSETRON 8 MG/1
16 TABLET, ORALLY DISINTEGRATING ORAL ONCE
Status: DISCONTINUED | OUTPATIENT
Start: 2018-02-23 | End: 2018-02-23

## 2018-02-23 RX ADMIN — OXYCODONE HYDROCHLORIDE 5 MG: 5 TABLET ORAL at 08:02

## 2018-02-23 RX ADMIN — ACETAMINOPHEN 650 MG: 325 TABLET ORAL at 10:02

## 2018-02-23 RX ADMIN — SULFAMETHOXAZOLE AND TRIMETHOPRIM 1 TABLET: 800; 160 TABLET ORAL at 08:02

## 2018-02-23 RX ADMIN — LIDOCAINE AND PRILOCAINE: 25; 25 CREAM TOPICAL at 09:02

## 2018-02-23 RX ADMIN — PANTOPRAZOLE SODIUM 40 MG: 40 TABLET, DELAYED RELEASE ORAL at 08:02

## 2018-02-23 RX ADMIN — OXYCODONE HYDROCHLORIDE 5 MG: 5 TABLET ORAL at 04:02

## 2018-02-23 RX ADMIN — ETOPOSIDE 292 MG: 20 INJECTION, SOLUTION INTRAVENOUS at 07:02

## 2018-02-23 RX ADMIN — DEXTROSE AND SODIUM CHLORIDE: 5; .9 INJECTION, SOLUTION INTRAVENOUS at 08:02

## 2018-02-23 RX ADMIN — DEXAMETHASONE 2 DROP: 1 SUSPENSION OPHTHALMIC at 11:02

## 2018-02-23 RX ADMIN — CHLORHEXIDINE GLUCONATE 15 ML: 1.2 RINSE ORAL at 11:02

## 2018-02-23 RX ADMIN — SULFAMETHOXAZOLE AND TRIMETHOPRIM 1 TABLET: 800; 160 TABLET ORAL at 10:02

## 2018-02-23 RX ADMIN — ACYCLOVIR 400 MG: 200 CAPSULE ORAL at 08:02

## 2018-02-23 RX ADMIN — CHLORHEXIDINE GLUCONATE 15 ML: 1.2 RINSE ORAL at 06:02

## 2018-02-23 RX ADMIN — CYTARABINE 1940 MG: 100 INJECTION, SOLUTION INTRATHECAL; INTRAVENOUS; SUBCUTANEOUS at 06:02

## 2018-02-23 RX ADMIN — ONDANSETRON 16 MG: 2 INJECTION INTRAMUSCULAR; INTRAVENOUS at 09:02

## 2018-02-23 RX ADMIN — HEPARIN 300 UNITS: 100 SYRINGE at 10:02

## 2018-02-23 RX ADMIN — DEXAMETHASONE 2 DROP: 1 SUSPENSION OPHTHALMIC at 06:02

## 2018-02-23 RX ADMIN — CYTARABINE 1940 MG: 100 INJECTION, SOLUTION INTRATHECAL; INTRAVENOUS; SUBCUTANEOUS at 03:02

## 2018-02-23 RX ADMIN — ALTEPLASE 2 MG: 2.2 INJECTION, POWDER, LYOPHILIZED, FOR SOLUTION INTRAVENOUS at 06:02

## 2018-02-23 RX ADMIN — HEPARIN 300 UNITS: 100 SYRINGE at 09:02

## 2018-02-23 RX ADMIN — ACETAMINOPHEN 650 MG: 325 TABLET ORAL at 06:02

## 2018-02-23 RX ADMIN — HEPARIN 300 UNITS: 100 SYRINGE at 06:02

## 2018-02-23 RX ADMIN — OXYCODONE HYDROCHLORIDE 5 MG: 5 TABLET ORAL at 10:02

## 2018-02-23 RX ADMIN — ACYCLOVIR 400 MG: 200 CAPSULE ORAL at 10:02

## 2018-02-23 NOTE — SUBJECTIVE & OBJECTIVE
Subjective:     Interval History: 4th day of chemo. Got Oxy 5mg x 2 and 10 mg x 1.   Benadryl x 1, Lorazepam x 2  Oncology Treatment Plan:     PEDS [id number]  ARM A - LR Risk Patients    Medications:  Continuous Infusions:   dextrose 5 % and 0.9 % NaCl 100 mL/hr at 02/22/18 2021     Scheduled Meds:   acyclovir  400 mg Oral BID    chlorhexidine  15 mL Mouth/Throat QID    cytarabine (CYTOSAR) chemo infusion  1,000 mg/m2 (Treatment Plan Recorded) Intravenous Q12H    dexamethasone  2 drop Both Eyes Q6H    etoposide (VEPESID) chemo infusion  150 mg/m2 (Treatment Plan Recorded) Intravenous Q24H    ondansetron  16 mg Intravenous Daily    pantoprazole  40 mg Oral Daily    sulfamethoxazole-trimethoprim 800-160mg  1 tablet Oral twice daily on Friday, Saturday, Sunday     PRN Meds:acetaminophen, alteplase, diphenhydrAMINE, heparin, porcine (PF), lorazepam, oxyCODONE, sodium chloride 0.9%     Review of Systems  Objective:     Vital Signs (Most Recent):  Temp: 98.3 °F (36.8 °C) (02/23/18 0400)  Pulse: 64 (02/23/18 0400)  Resp: 20 (02/23/18 0400)  BP: (!) 103/50 (02/23/18 0417)  SpO2: 99 % (02/23/18 0400) Vital Signs (24h Range):  Temp:  [97.7 °F (36.5 °C)-98.3 °F (36.8 °C)] 98.3 °F (36.8 °C)  Pulse:  [59-72] 64  Resp:  [14-20] 20  SpO2:  [95 %-99 %] 99 %  BP: (102-114)/(45-55) 103/50     Weight: 89 kg (196 lb 1.6 oz)  Body mass index is 31.65 kg/m².  Body surface area is 2.04 meters squared.      Intake/Output Summary (Last 24 hours) at 02/23/18 0658  Last data filed at 02/22/18 2102   Gross per 24 hour   Intake           163.33 ml   Output             1000 ml   Net          -836.67 ml       Physical Exam   Constitutional: He appears well-developed and well-nourished. No distress.   Eyes: Conjunctivae and EOM are normal. Right eye exhibits no discharge. Left eye exhibits no discharge. No scleral icterus.   Neck: Normal range of motion. Neck supple.   Cardiovascular: Normal rate, regular rhythm and normal heart sounds.     Pulmonary/Chest: Effort normal and breath sounds normal. No respiratory distress.   Abdominal: Soft. He exhibits no distension and no mass. There is no tenderness. There is no rebound and no guarding. No hernia.   Musculoskeletal: Normal range of motion.   Neurological: He is alert.   Skin: Skin is warm. Capillary refill takes less than 2 seconds. He is not diaphoretic.

## 2018-02-23 NOTE — PLAN OF CARE
"Problem: Patient Care Overview  Goal: Plan of Care Review  Outcome: Ongoing (interventions implemented as appropriate)  Patient AAOX4 and up independently to bathroom. Day 3 of chemotherapy. Oral hydration and nutrition encouraged; patient stating, "I'm not hungry." PRN oxycodone given for pain, as ordered. Patient stable, will continue to monitor.      "

## 2018-02-23 NOTE — PROGRESS NOTES
Chemotherapy consent in chart. Drug, dose, and two patient identifiers verified by second chemotherapy certified RN. Small blood flash noted to PAC, prior to infusion, but is very positional. Cytarabine 1940 mg infusing to right lumen of PAC. Patient instructed to call with issues, call light within reach. Will continue to monitor.

## 2018-02-23 NOTE — PLAN OF CARE
Problem: Patient Care Overview  Goal: Plan of Care Review  Outcome: Ongoing (interventions implemented as appropriate)  AAOx4, bed in low and locked positon, call bell within reach, voids via urinal. Patient complained of nausea and vomited twice; benadryl was administered. Oxy 5mg was given and a one time dose of oxy 10mg for pain. Chemo regimen continues through half of shift. Low blood pressure was reported to PEDS MD; no new orders were given. Patient remains afebrile and free from falls. Patient stable, vitals stable, will continue to monitor.

## 2018-02-23 NOTE — PROGRESS NOTES
[facility]  Pediatric Hematology/Oncology  Progress Note    Patient Name: Hema Kuo  Admission Date: 2/20/2018  Hospital Length of Stay: 3 days  Code Status: Full Code     Subjective:     Interval History: 4th day of chemo. Got Oxy 5mg x 2 and 10 mg x 1.   Benadryl x 1, Lorazepam x 2  Oncology Treatment Plan:     PEDS [id number]  ARM A - LR Risk Patients    Medications:  Continuous Infusions:   dextrose 5 % and 0.9 % NaCl 100 mL/hr at 02/22/18 2021     Scheduled Meds:   acyclovir  400 mg Oral BID    chlorhexidine  15 mL Mouth/Throat QID    cytarabine (CYTOSAR) chemo infusion  1,000 mg/m2 (Treatment Plan Recorded) Intravenous Q12H    dexamethasone  2 drop Both Eyes Q6H    etoposide (VEPESID) chemo infusion  150 mg/m2 (Treatment Plan Recorded) Intravenous Q24H    ondansetron  16 mg Intravenous Daily    pantoprazole  40 mg Oral Daily    sulfamethoxazole-trimethoprim 800-160mg  1 tablet Oral twice daily on Friday, Saturday, Sunday     PRN Meds:acetaminophen, alteplase, diphenhydrAMINE, heparin, porcine (PF), lorazepam, oxyCODONE, sodium chloride 0.9%     Review of Systems  Objective:     Vital Signs (Most Recent):  Temp: 98.3 °F (36.8 °C) (02/23/18 0400)  Pulse: 64 (02/23/18 0400)  Resp: 20 (02/23/18 0400)  BP: (!) 103/50 (02/23/18 0417)  SpO2: 99 % (02/23/18 0400) Vital Signs (24h Range):  Temp:  [97.7 °F (36.5 °C)-98.3 °F (36.8 °C)] 98.3 °F (36.8 °C)  Pulse:  [59-72] 64  Resp:  [14-20] 20  SpO2:  [95 %-99 %] 99 %  BP: (102-114)/(45-55) 103/50     Weight: 89 kg (196 lb 1.6 oz)  Body mass index is 31.65 kg/m².  Body surface area is 2.04 meters squared.      Intake/Output Summary (Last 24 hours) at 02/23/18 0658  Last data filed at 02/22/18 2102   Gross per 24 hour   Intake           163.33 ml   Output             1000 ml   Net          -836.67 ml       Physical Exam   Constitutional: He appears well-developed and well-nourished. No distress.   Eyes: Conjunctivae and EOM are normal. Right eye  exhibits no discharge. Left eye exhibits no discharge. No scleral icterus.   Neck: Normal range of motion. Neck supple.   Cardiovascular: Normal rate, regular rhythm and normal heart sounds.    Pulmonary/Chest: Effort normal and breath sounds normal. No respiratory distress.   Abdominal: Soft. He exhibits no distension and no mass. There is no tenderness. There is no rebound and no guarding. No hernia.   Musculoskeletal: Normal range of motion.   Neurological: He is alert.   Skin: Skin is warm. Capillary refill takes less than 2 seconds. He is not diaphoretic.             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 ANC 1398. He is currently afebrile and comfortable.    Acute myelogenous leukemia:  - High dose cytarabine followed by etoposide daily x5 days. Day 4 today. (started on 2/20)  - Dexamethasone eye drops q6hr for prevention of keratitis while receiving cytarabine  - CBC and CMP in AM    Chemotherapy-induced nausea and vomiting:  - Ondansetron 16mg IV daily prior to cytarabine  - Diphenhydramine 25mg IV q6hr PRN nausea (1st choice)  - Lorazepam 1mg IV q6hr PRN nausea (2nd choice)    At risk for infection:  - Continue prophylactic TMP/SMX 1 tab PO BID on Fri/Sat/Sun  - Continue acyclovir 400mg PO BID  - Hold ciprofloxacin and posaconazole prophylaxis until chemotherapy completed  - If febrile:   - Obtain blood culture   - Start empiric ceftriaxone and vancomycin    Back pain:    - Oxy 5 mg q6 prn  - Tylenol prn    Diet:  - Regular diet as tolerated  - On mIVF d5NS  - Continue pantoprazole 40mg PO daily    Dispo: Pending completion of chemotherapy x5 days.              Beata Bunn MD  Pediatric Hematology/Oncology  [facility]

## 2018-02-23 NOTE — PLAN OF CARE
Dr Emerson asked to arrange a Bhouse room with the [employer] for 2/25. Sw arranged for the room should pt want to use it.

## 2018-02-23 NOTE — PROGRESS NOTES
02/23/18 0400   Vital Signs   Temp 98.3 °F (36.8 °C)   Temp src Oral   Pulse 64   Heart Rate Source Monitor   Resp 20   SpO2 99 %   O2 Device (Oxygen Therapy) room air   BP (!) 102/45   MAP (mmHg) 65   BP Location Right arm   Patient Position Lying     MD notified of blood pressure; no new orders at this time.

## 2018-02-23 NOTE — ASSESSMENT & PLAN NOTE
Hema is a 17-year-old M with AML (t8;21) receiving cycle 3 of chemotherapy under [facility] [id number] Arm A who is admitted s/p bone marrow biopsy and LP to begin Intensification I with high dose cytarabine and etoposide x5 days. ANC 1398. He is currently afebrile and comfortable.    Acute myelogenous leukemia:  - High dose cytarabine followed by etoposide daily x5 days. Day 4 today. (started on 2/20)  - Dexamethasone eye drops q6hr for prevention of keratitis while receiving cytarabine  - CBC and CMP in AM    Chemotherapy-induced nausea and vomiting:  - Ondansetron 16mg IV daily prior to cytarabine  - Diphenhydramine 25mg IV q6hr PRN nausea (1st choice)  - Lorazepam 1mg IV q6hr PRN nausea (2nd choice)    At risk for infection:  - Continue prophylactic TMP/SMX 1 tab PO BID on Fri/Sat/Sun  - Continue acyclovir 400mg PO BID  - Hold ciprofloxacin and posaconazole prophylaxis until chemotherapy completed  - If febrile:   - Obtain blood culture   - Start empiric ceftriaxone and vancomycin    Diet:  - Regular diet as tolerated  - On mIVF d5NS  - Continue pantoprazole 40mg PO daily    Dispo: Pending completion of chemotherapy x5 days.

## 2018-02-23 NOTE — PLAN OF CARE
Problem: Patient Care Overview  Goal: Plan of Care Review  Outcome: Ongoing (interventions implemented as appropriate)  Fall, infection , and chemotherapy precautions continued. Chemotherapy given as ordered. Nausea and pain managed with PRN medications. Patient stable, will continue to monitor.

## 2018-02-23 NOTE — ASSESSMENT & PLAN NOTE
Hema is a 17-year-old M with AML (t8;21) receiving cycle 3 of chemotherapy [place] [id number] who is admitted s/p bone marrow biopsy and LP to begin Intensification I with high dose cytarabine and etoposide x5 days.     Acute myelogenous leukemia:  - High dose cytarabine followed by etoposide daily x5 days. Day 5 today. (started on 2/20)  - Dexamethasone eye drops q6hr for prevention of keratitis while receiving cytarabine    Chemotherapy-induced nausea and vomiting:  - Ondansetron 16mg IV daily prior to cytarabine  - Diphenhydramine 25mg IV q6hr PRN nausea (1st choice)  - Lorazepam 1mg IV q6hr PRN nausea (2nd choice)    At risk for infection:  - Continue prophylactic TMP/SMX 1 tab PO BID on Fri/Sat/Sun  - Continue acyclovir 400mg PO BID  - Hold ciprofloxacin and posaconazole prophylaxis until chemotherapy completed  - If febrile:   - Obtain blood culture   - Start empiric ceftriaxone and vancomycin    Back pain:  - Oxy 5 mg q6 prn  - Tylenol prn    Diet:  - Regular diet as tolerated  - On mIVF d5NS  - Continue pantoprazole 40mg PO daily  - Start famotidine 20 mg BID to help with epigastric pain    Dispo: Pending completion of chemotherapy x5 days. D/C tomorrow to stay at [facility].

## 2018-02-23 NOTE — PROGRESS NOTES
Chemotherapy orders checked and CAR in chart with consent. Port site assessed and blood return noted before chemo administration. Cytarabine checked with Leydi Holcomb RN. Infusion to complete over two hours as ordered. Patient stable, will continue to monitor.

## 2018-02-24 LAB
ALBUMIN SERPL BCP-MCNC: 3.4 G/DL
ALP SERPL-CCNC: 70 U/L
ALT SERPL W/O P-5'-P-CCNC: 38 U/L
ANION GAP SERPL CALC-SCNC: 10 MMOL/L
ANISOCYTOSIS BLD QL SMEAR: SLIGHT
AST SERPL-CCNC: 24 U/L
BASOPHILS NFR BLD: 0 %
BILIRUB SERPL-MCNC: 0.8 MG/DL
BUN SERPL-MCNC: 9 MG/DL
CALCIUM SERPL-MCNC: 8.6 MG/DL
CHLORIDE SERPL-SCNC: 107 MMOL/L
CO2 SERPL-SCNC: 23 MMOL/L
CREAT SERPL-MCNC: 0.7 MG/DL
DIFFERENTIAL METHOD: ABNORMAL
EOSINOPHIL NFR BLD: 0 %
ERYTHROCYTE [DISTWIDTH] IN BLOOD BY AUTOMATED COUNT: ABNORMAL %
EST. GFR  (AFRICAN AMERICAN): >60 ML/MIN/1.73 M^2
EST. GFR  (NON AFRICAN AMERICAN): >60 ML/MIN/1.73 M^2
GLUCOSE SERPL-MCNC: 114 MG/DL
HCT VFR BLD AUTO: 23.7 %
HGB BLD-MCNC: 8.5 G/DL
HYPOCHROMIA BLD QL SMEAR: ABNORMAL
IMM GRANULOCYTES # BLD AUTO: ABNORMAL K/UL
IMM GRANULOCYTES NFR BLD AUTO: ABNORMAL %
LYMPHOCYTES NFR BLD: 8 %
MCH RBC QN AUTO: 32.2 PG
MCHC RBC AUTO-ENTMCNC: 35.9 G/DL
MCV RBC AUTO: 90 FL
MONOCYTES NFR BLD: 0 %
NEUTROPHILS NFR BLD: 92 %
NRBC BLD-RTO: 0 /100 WBC
OVALOCYTES BLD QL SMEAR: ABNORMAL
PLATELET # BLD AUTO: 46 K/UL
PLATELET BLD QL SMEAR: ABNORMAL
PMV BLD AUTO: 11.2 FL
POIKILOCYTOSIS BLD QL SMEAR: SLIGHT
POLYCHROMASIA BLD QL SMEAR: ABNORMAL
POTASSIUM SERPL-SCNC: 3.9 MMOL/L
PROT SERPL-MCNC: 6.3 G/DL
RBC # BLD AUTO: 2.64 M/UL
SODIUM SERPL-SCNC: 140 MMOL/L
WBC # BLD AUTO: 1.79 K/UL

## 2018-02-24 PROCEDURE — 80053 COMPREHEN METABOLIC PANEL: CPT

## 2018-02-24 PROCEDURE — 11300000 HC PEDIATRIC PRIVATE ROOM

## 2018-02-24 PROCEDURE — 27000135

## 2018-02-24 PROCEDURE — 85027 COMPLETE CBC AUTOMATED: CPT

## 2018-02-24 PROCEDURE — 36592 COLLECT BLOOD FROM PICC: CPT

## 2018-02-24 PROCEDURE — 25000003 PHARM REV CODE 250: Performed by: STUDENT IN AN ORGANIZED HEALTH CARE EDUCATION/TRAINING PROGRAM

## 2018-02-24 PROCEDURE — 85007 BL SMEAR W/DIFF WBC COUNT: CPT

## 2018-02-24 PROCEDURE — 63600175 PHARM REV CODE 636 W HCPCS: Performed by: PEDIATRICS

## 2018-02-24 PROCEDURE — 25000003 PHARM REV CODE 250: Performed by: PEDIATRICS

## 2018-02-24 PROCEDURE — 27000210 HC KIT, CHEMO WASTE

## 2018-02-24 PROCEDURE — 99233 SBSQ HOSP IP/OBS HIGH 50: CPT | Mod: ,,, | Performed by: PEDIATRICS

## 2018-02-24 RX ORDER — FAMOTIDINE 20 MG/1
20 TABLET, FILM COATED ORAL 2 TIMES DAILY
Status: DISCONTINUED | OUTPATIENT
Start: 2018-02-24 | End: 2018-02-25 | Stop reason: HOSPADM

## 2018-02-24 RX ADMIN — DEXAMETHASONE 2 DROP: 1 SUSPENSION OPHTHALMIC at 11:02

## 2018-02-24 RX ADMIN — CYTARABINE 1940 MG: 100 INJECTION, SOLUTION INTRATHECAL; INTRAVENOUS; SUBCUTANEOUS at 04:02

## 2018-02-24 RX ADMIN — DEXTROSE AND SODIUM CHLORIDE: 5; .9 INJECTION, SOLUTION INTRAVENOUS at 12:02

## 2018-02-24 RX ADMIN — ONDANSETRON 16 MG: 2 INJECTION INTRAMUSCULAR; INTRAVENOUS at 12:02

## 2018-02-24 RX ADMIN — CHLORHEXIDINE GLUCONATE 15 ML: 1.2 RINSE ORAL at 07:02

## 2018-02-24 RX ADMIN — FAMOTIDINE 20 MG: 20 TABLET, FILM COATED ORAL at 07:02

## 2018-02-24 RX ADMIN — OXYCODONE HYDROCHLORIDE 5 MG: 5 TABLET ORAL at 05:02

## 2018-02-24 RX ADMIN — SULFAMETHOXAZOLE AND TRIMETHOPRIM 1 TABLET: 800; 160 TABLET ORAL at 10:02

## 2018-02-24 RX ADMIN — ACETAMINOPHEN 650 MG: 325 TABLET ORAL at 03:02

## 2018-02-24 RX ADMIN — ETOPOSIDE 292 MG: 20 INJECTION, SOLUTION INTRAVENOUS at 03:02

## 2018-02-24 RX ADMIN — CHLORHEXIDINE GLUCONATE 15 ML: 1.2 RINSE ORAL at 06:02

## 2018-02-24 RX ADMIN — DEXAMETHASONE 2 DROP: 1 SUSPENSION OPHTHALMIC at 06:02

## 2018-02-24 RX ADMIN — CYTARABINE 1940 MG: 100 INJECTION, SOLUTION INTRATHECAL; INTRAVENOUS; SUBCUTANEOUS at 05:02

## 2018-02-24 RX ADMIN — PANTOPRAZOLE SODIUM 40 MG: 40 TABLET, DELAYED RELEASE ORAL at 10:02

## 2018-02-24 RX ADMIN — DEXAMETHASONE 2 DROP: 1 SUSPENSION OPHTHALMIC at 07:02

## 2018-02-24 RX ADMIN — CHLORHEXIDINE GLUCONATE 15 ML: 1.2 RINSE ORAL at 12:02

## 2018-02-24 RX ADMIN — FAMOTIDINE 20 MG: 20 TABLET, FILM COATED ORAL at 10:02

## 2018-02-24 RX ADMIN — ACYCLOVIR 400 MG: 200 CAPSULE ORAL at 10:02

## 2018-02-24 RX ADMIN — SULFAMETHOXAZOLE AND TRIMETHOPRIM 1 TABLET: 800; 160 TABLET ORAL at 07:02

## 2018-02-24 RX ADMIN — ACYCLOVIR 400 MG: 200 CAPSULE ORAL at 07:02

## 2018-02-24 NOTE — SUBJECTIVE & OBJECTIVE
Interval History: Port was not flushing adequately and pt was in pain when meds/fluids infused through it. Etoposide paused, pt deaccessed and reaccessed, port functioning well after this with no pain with infusion. Also c/o pain to procedure sites and epigastric abdominal pain-stated he thinks it's acid reflux. Received oxy and scheduled PPI.     Scheduled Meds:   acyclovir  400 mg Oral BID    chlorhexidine  15 mL Mouth/Throat QID    cytarabine (CYTOSAR) chemo infusion  1,000 mg/m2 (Treatment Plan Recorded) Intravenous Q12H    dexamethasone  2 drop Both Eyes Q6H    etoposide (VEPESID) chemo infusion  150 mg/m2 (Treatment Plan Recorded) Intravenous Q24H    famotidine  20 mg Oral BID    ondansetron  16 mg Intravenous Daily    pantoprazole  40 mg Oral Daily    sulfamethoxazole-trimethoprim 800-160mg  1 tablet Oral twice daily on Friday, Saturday, Sunday     Continuous Infusions:   dextrose 5 % and 0.9 % NaCl 100 mL/hr at 02/24/18 0009     PRN Meds:acetaminophen, alteplase, diphenhydrAMINE, heparin, porcine (PF), lorazepam, oxyCODONE, sodium chloride 0.9%    Review of Systems   Constitutional: Positive for appetite change and fatigue. Negative for activity change and fever.   HENT: Negative for congestion, ear pain, mouth sores, rhinorrhea, sinus pain and sore throat.    Eyes: Negative for photophobia and pain.   Respiratory: Negative for cough and shortness of breath.    Cardiovascular: Negative for chest pain and palpitations.   Gastrointestinal: Positive for abdominal pain and nausea. Negative for diarrhea and vomiting.   Genitourinary: Negative for dysuria.   Musculoskeletal: Negative for gait problem, neck pain and neck stiffness.   Skin: Negative for pallor and rash.   Allergic/Immunologic: Positive for immunocompromised state.   Neurological: Negative for weakness and headaches.   Hematological: Negative for adenopathy. Does not bruise/bleed easily.     Objective:     Vital Signs (Most Recent):  Temp:  98.5 °F (36.9 °C) (02/24/18 0411)  Pulse: (!) 50 (02/24/18 0411)  Resp: 16 (02/24/18 0411)  BP: (!) 116/56 (02/24/18 0411)  SpO2: 100 % (02/24/18 0411) Vital Signs (24h Range):  Temp:  [97.8 °F (36.6 °C)-98.5 °F (36.9 °C)] 98.5 °F (36.9 °C)  Pulse:  [50-70] 50  Resp:  [16-20] 16  SpO2:  [98 %-100 %] 100 %  BP: (111-130)/(53-70) 116/56     Patient Vitals for the past 72 hrs (Last 3 readings):   Weight   02/23/18 2235 84.9 kg (187 lb 2.7 oz)     Body mass index is 30.21 kg/m².    Intake/Output - Last 3 Shifts       02/22 0700 - 02/23 0659 02/23 0700 - 02/24 0659 02/24 0700 - 02/25 0659    P.O. 50 120     I.V. (mL/kg) 113.3 (1.3) 2871.3 (33.8)     IV Piggyback 750 1075     Total Intake(mL/kg) 913.3 (10.3) 4066.3 (47.9)     Urine (mL/kg/hr) 1000 (0.5) 900 (0.4)     Emesis/NG output 0 (0) 0 (0)     Stool  0 (0)     Total Output 1000 900      Net -86.7 +3166.3             Urine Occurrence  5 x     Stool Occurrence  0 x     Emesis Occurrence 2 x 1 x           Lines/Drains/Airways     Central Venous Catheter Line                 Port A Cath Double Lumen 10/31/17 1826 left subclavian 115 days                Physical Exam   Constitutional: He appears well-developed and well-nourished. No distress.   HENT:   Head: Normocephalic and atraumatic.   Right Ear: External ear normal.   Left Ear: External ear normal.   Nose: Nose normal.   Eyes: Conjunctivae and EOM are normal. Right eye exhibits no discharge. Left eye exhibits no discharge. No scleral icterus.   Neck: Normal range of motion. Neck supple.   Cardiovascular: Normal rate, regular rhythm and normal heart sounds.    Double lumen port dressing c/d/i  No surrounding erythema   Pulmonary/Chest: Effort normal and breath sounds normal. No respiratory distress.   Abdominal: Soft. He exhibits no distension and no mass. There is no tenderness. There is no rebound and no guarding. No hernia.   Musculoskeletal: Normal range of motion.   Neurological: He is alert.   Skin: Skin is  warm. Capillary refill takes less than 2 seconds. He is not diaphoretic.       Significant Labs:  No results for input(s): POCTGLUCOSE in the last 48 hours.        Significant Imaging: none

## 2018-02-24 NOTE — PLAN OF CARE
Problem: Patient Care Overview  Goal: Plan of Care Review  Outcome: Ongoing (interventions implemented as appropriate)  Pt stable, afebrile, ate 1/2 bowl of cereal with milk this shift, pt has no appetite, complained of nausea before dose of Zofran was given, Tylenol x 1 for backache with good results noted, Oxycodone x 1 for headache 10/10 pain rating, pt fell asleep shortly after oxycodone given, Left chest double PAC positive for blood return from both lines, inner port heparin locked, outer port infusing day 4/5 Chemo meds and fluids, pt received etoposide without incidence, cytarabine infusing at this time, plan of care reviewed, will continue to monitor

## 2018-02-24 NOTE — PROGRESS NOTES
[facility]  Pediatric Hematology/Oncology  Progress Note    Patient Name: Hema Kuo  Admission Date: 2/20/2018  Hospital Length of Stay: 4 days  Code Status: Full Code     Subjective:     Interval History: Port was not flushing adequately and pt was in pain when meds/fluids infused through it. Etoposide paused, pt deaccessed and reaccessed, port functioning well after this with no pain with infusion. Also c/o pain to procedure sites and epigastric abdominal pain-stated he thinks it's acid reflux. Received oxy and scheduled PPI.     Oncology Treatment Plan:     PEDS [id number]  ARM A - LR Risk Patients    Medications:  Continuous Infusions:   dextrose 5 % and 0.9 % NaCl 100 mL/hr at 02/24/18 0009     Scheduled Meds:   acyclovir  400 mg Oral BID    chlorhexidine  15 mL Mouth/Throat QID    cytarabine (CYTOSAR) chemo infusion  1,000 mg/m2 (Treatment Plan Recorded) Intravenous Q12H    dexamethasone  2 drop Both Eyes Q6H    etoposide (VEPESID) chemo infusion  150 mg/m2 (Treatment Plan Recorded) Intravenous Q24H    famotidine  20 mg Oral BID    ondansetron  16 mg Intravenous Daily    pantoprazole  40 mg Oral Daily    sulfamethoxazole-trimethoprim 800-160mg  1 tablet Oral twice daily on Friday, Saturday, Sunday     PRN Meds:acetaminophen, alteplase, diphenhydrAMINE, heparin, porcine (PF), lorazepam, oxyCODONE, sodium chloride 0.9%     Review of Systems   Constitutional: Positive for appetite change and fatigue. Negative for activity change and fever.   HENT: Negative for congestion, ear pain, mouth sores, rhinorrhea, sinus pain and sore throat.    Eyes: Negative for photophobia and pain.   Respiratory: Negative for cough and shortness of breath.    Cardiovascular: Negative for chest pain and palpitations.   Gastrointestinal: Positive for abdominal pain and nausea. Negative for diarrhea and vomiting.   Genitourinary: Negative for dysuria.   Musculoskeletal: Negative for gait problem, neck pain  and neck stiffness.   Skin: Negative for pallor and rash.   Allergic/Immunologic: Positive for immunocompromised state.   Neurological: Negative for weakness and headaches.   Hematological: Negative for adenopathy. Does not bruise/bleed easily.     Objective:     Vital Signs (Most Recent):  Temp: 98.5 °F (36.9 °C) (02/24/18 0411)  Pulse: (!) 50 (02/24/18 0411)  Resp: 16 (02/24/18 0411)  BP: (!) 116/56 (02/24/18 0411)  SpO2: 100 % (02/24/18 0411) Vital Signs (24h Range):  Temp:  [97.8 °F (36.6 °C)-98.5 °F (36.9 °C)] 98.5 °F (36.9 °C)  Pulse:  [50-70] 50  Resp:  [16-20] 16  SpO2:  [99 %-100 %] 100 %  BP: (111-130)/(53-70) 116/56     Weight: 84.9 kg (187 lb 2.7 oz)  Body mass index is 30.21 kg/m².  Body surface area is 1.99 meters squared.      Intake/Output Summary (Last 24 hours) at 02/24/18 0754  Last data filed at 02/24/18 0600   Gross per 24 hour   Intake          4066.33 ml   Output              900 ml   Net          3166.33 ml       Physical Exam   Constitutional: He appears well-developed and well-nourished. No distress.   HENT:   Head: Normocephalic and atraumatic.   Right Ear: External ear normal.   Left Ear: External ear normal.   Nose: Nose normal.   Eyes: Conjunctivae and EOM are normal. Right eye exhibits no discharge. Left eye exhibits no discharge. No scleral icterus.   Neck: Normal range of motion. Neck supple.   Cardiovascular: Normal rate, regular rhythm and normal heart sounds.    Double lumen port dressing c/d/i  No surrounding erythema   Pulmonary/Chest: Effort normal and breath sounds normal. No respiratory distress.   Abdominal: Soft. He exhibits no distension and no mass. There is no tenderness. There is no rebound and no guarding. No hernia.   Musculoskeletal: Normal range of motion.   Neurological: He is alert.   Skin: Skin is warm. Capillary refill takes less than 2 seconds. He is not diaphoretic.       Labs:   Recent Lab Results       02/24/18  0410      Albumin 3.4(L)     Alkaline  Phosphatase 70     ALT 38     Anion Gap 10     AST 24     Total Bilirubin 0.8  Comment:  For infants and newborns, interpretation of results should be based  on gestational age, weight and in agreement with clinical  observations.  Premature Infant recommended reference ranges:  Up to 24 hours.............<8.0 mg/dL  Up to 48 hours............<12.0 mg/dL  3-5 days..................<15.0 mg/dL  6-29 days.................<15.0 mg/dL       BUN, Bld 9     Calcium 8.6(L)     Chloride 107     CO2 23     Creatinine 0.7     eGFR if African American >60.0     eGFR if non  >60.0  Comment:  Calculation used to obtain the estimated glomerular filtration  rate (eGFR) is the CKD-EPI equation.        Glucose 114(H)     Hematocrit 23.7(L)     Hemoglobin 8.5(L)     MCH 32.2(H)     MCHC 35.9     MCV 90     MPV 11.2     Platelets 46(L)     Potassium 3.9     Total Protein 6.3     RBC 2.64(L)     RDW SEE COMMENT  Comment:  Result not available.     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     Acute myelogenous leukemia:  - High dose cytarabine followed by etoposide daily x5 days. Day 5 today. (started on 2/20)  - Dexamethasone eye drops q6hr for prevention of keratitis while receiving cytarabine    Chemotherapy-induced nausea and vomiting:  - Ondansetron 16mg IV daily prior to cytarabine  - Diphenhydramine 25mg IV q6hr PRN nausea (1st choice)  - Lorazepam 1mg IV q6hr PRN nausea (2nd choice)    At risk for infection:  - Continue prophylactic TMP/SMX 1 tab PO BID on Fri/Sat/Sun  - Continue acyclovir 400mg PO BID  - Hold  ciprofloxacin and posaconazole prophylaxis until chemotherapy completed  - If febrile:   - Obtain blood culture   - Start empiric ceftriaxone and vancomycin    Back pain:  - Oxy 5 mg q6 prn  - Tylenol prn    Diet:  - Regular diet as tolerated  - On mIVF d5NS  - Continue pantoprazole 40mg PO daily  - Start famotidine 20 mg BID to help with epigastric pain    Dispo: Pending completion of chemotherapy x5 days. D/C tomorrow to stay at [facility].               Jeanne Orozco MD   Pediatrics, PGY-1  Pediatric Hematology/Oncology  [facility]

## 2018-02-24 NOTE — PLAN OF CARE
Problem: Patient Care Overview  Goal: Plan of Care Review  Outcome: Ongoing (interventions implemented as appropriate)  Pt c/o chest pain 8/10 on arrival to floor. Dr. Bunn notified and aware. VSS. Sats 98% on room air. No respiratory distress. Pt states that pain is sharp but that he is not having trouble breathing. Pt states he think it may be from vomiting yesterday. Cytarabine infusing at this time. L chest double PAC cdi; Dressing changed, line flushing easier now. Etoposide to be given next per orders. Decadron eye drops to be given q6h.  All questions and concerns addressed. Verbalized understanding. Will continue to monitor.

## 2018-02-24 NOTE — SUBJECTIVE & OBJECTIVE
Subjective:     Interval History: Port was not flushing adequately and pt was in pain when meds/fluids infused through it. Etoposide paused, pt deaccessed and reaccessed, port functioning well after this with no pain with infusion. Also c/o pain to procedure sites and epigastric abdominal pain-stated he thinks it's acid reflux. Received oxy and scheduled PPI.     Oncology Treatment Plan:     PEDS [id number]  ARM A - LR Risk Patients    Medications:  Continuous Infusions:   dextrose 5 % and 0.9 % NaCl 100 mL/hr at 02/24/18 0009     Scheduled Meds:   acyclovir  400 mg Oral BID    chlorhexidine  15 mL Mouth/Throat QID    cytarabine (CYTOSAR) chemo infusion  1,000 mg/m2 (Treatment Plan Recorded) Intravenous Q12H    dexamethasone  2 drop Both Eyes Q6H    etoposide (VEPESID) chemo infusion  150 mg/m2 (Treatment Plan Recorded) Intravenous Q24H    famotidine  20 mg Oral BID    ondansetron  16 mg Intravenous Daily    pantoprazole  40 mg Oral Daily    sulfamethoxazole-trimethoprim 800-160mg  1 tablet Oral twice daily on Friday, Saturday, Sunday     PRN Meds:acetaminophen, alteplase, diphenhydrAMINE, heparin, porcine (PF), lorazepam, oxyCODONE, sodium chloride 0.9%     Review of Systems   Constitutional: Positive for appetite change and fatigue. Negative for activity change and fever.   HENT: Negative for congestion, ear pain, mouth sores, rhinorrhea, sinus pain and sore throat.    Eyes: Negative for photophobia and pain.   Respiratory: Negative for cough and shortness of breath.    Cardiovascular: Negative for chest pain and palpitations.   Gastrointestinal: Positive for abdominal pain and nausea. Negative for diarrhea and vomiting.   Genitourinary: Negative for dysuria.   Musculoskeletal: Negative for gait problem, neck pain and neck stiffness.   Skin: Negative for pallor and rash.   Allergic/Immunologic: Positive for immunocompromised state.   Neurological: Negative for weakness and headaches.   Hematological:  Negative for adenopathy. Does not bruise/bleed easily.     Objective:     Vital Signs (Most Recent):  Temp: 98.5 °F (36.9 °C) (02/24/18 0411)  Pulse: (!) 50 (02/24/18 0411)  Resp: 16 (02/24/18 0411)  BP: (!) 116/56 (02/24/18 0411)  SpO2: 100 % (02/24/18 0411) Vital Signs (24h Range):  Temp:  [97.8 °F (36.6 °C)-98.5 °F (36.9 °C)] 98.5 °F (36.9 °C)  Pulse:  [50-70] 50  Resp:  [16-20] 16  SpO2:  [99 %-100 %] 100 %  BP: (111-130)/(53-70) 116/56     Weight: 84.9 kg (187 lb 2.7 oz)  Body mass index is 30.21 kg/m².  Body surface area is 1.99 meters squared.      Intake/Output Summary (Last 24 hours) at 02/24/18 0754  Last data filed at 02/24/18 0600   Gross per 24 hour   Intake          4066.33 ml   Output              900 ml   Net          3166.33 ml       Physical Exam   Constitutional: He appears well-developed and well-nourished. No distress.   HENT:   Head: Normocephalic and atraumatic.   Right Ear: External ear normal.   Left Ear: External ear normal.   Nose: Nose normal.   Eyes: Conjunctivae and EOM are normal. Right eye exhibits no discharge. Left eye exhibits no discharge. No scleral icterus.   Neck: Normal range of motion. Neck supple.   Cardiovascular: Normal rate, regular rhythm and normal heart sounds.    Double lumen port dressing c/d/i  No surrounding erythema   Pulmonary/Chest: Effort normal and breath sounds normal. No respiratory distress.   Abdominal: Soft. He exhibits no distension and no mass. There is no tenderness. There is no rebound and no guarding. No hernia.   Musculoskeletal: Normal range of motion.   Neurological: He is alert.   Skin: Skin is warm. Capillary refill takes less than 2 seconds. He is not diaphoretic.       Labs:   Recent Lab Results       02/24/18 0410      Albumin 3.4(L)     Alkaline Phosphatase 70     ALT 38     Anion Gap 10     AST 24     Total Bilirubin 0.8  Comment:  For infants and newborns, interpretation of results should be based  on gestational age, weight and in  agreement with clinical  observations.  Premature Infant recommended reference ranges:  Up to 24 hours.............<8.0 mg/dL  Up to 48 hours............<12.0 mg/dL  3-5 days..................<15.0 mg/dL  6-29 days.................<15.0 mg/dL       BUN, Bld 9     Calcium 8.6(L)     Chloride 107     CO2 23     Creatinine 0.7     eGFR if African American >60.0     eGFR if non  >60.0  Comment:  Calculation used to obtain the estimated glomerular filtration  rate (eGFR) is the CKD-EPI equation.        Glucose 114(H)     Hematocrit 23.7(L)     Hemoglobin 8.5(L)     MCH 32.2(H)     MCHC 35.9     MCV 90     MPV 11.2     Platelets 46(L)     Potassium 3.9     Total Protein 6.3     RBC 2.64(L)     RDW SEE COMMENT  Comment:  Result not available.     Sodium 140     WBC 1.79  Comment:  WBC   critical result(s) called and verbal readback obtained from   Meredith Bancroft, RN, 02/24/2018 05:42  (LL)           Diagnostic Results:

## 2018-02-24 NOTE — ASSESSMENT & PLAN NOTE
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 ANC 1398. He is currently afebrile, will give famotidine to help with epigastric pain.      Acute myelogenous leukemia:  - High dose cytarabine followed by etoposide daily x5 days. Day 5 today. (started on 2/20)  - Dexamethasone eye drops q6hr for prevention of keratitis while receiving cytarabine  - CBC and CMP in AM     Chemotherapy-induced nausea and vomiting:  - Ondansetron 16mg IV daily prior to cytarabine  - Diphenhydramine 25mg IV q6hr PRN nausea (1st choice)  - Lorazepam 1mg IV q6hr PRN nausea (2nd choice)     At risk for infection:  - Continue prophylactic TMP/SMX 1 tab PO BID on Fri/Sat/Sun  - Continue acyclovir 400mg PO BID  - Hold ciprofloxacin and posaconazole prophylaxis until chemotherapy completed  - If febrile:              - Obtain blood culture              - Start empiric ceftriaxone and vancomycin     Back pain:  - Oxy 5 mg q6 prn  - Tylenol prn     Diet:  - Regular diet as tolerated  - On mIVF d5NS  - Continue pantoprazole 40mg PO daily  - famotidine 20 mg BID- to help with epigastric pain     Dispo: Pending completion of chemotherapy x5 days. D/c tomorrow.

## 2018-02-24 NOTE — PLAN OF CARE
Problem: Patient Care Overview  Goal: Plan of Care Review  Outcome: Ongoing (interventions implemented as appropriate)  Reviewed plan of care with patient. Awake, alert, interactive on exam. Reports pain tonight. Patient complained of head and neck pain intermittently throughout the night, Tylenol and Oxycodone given as needed. Patient also complaining of pain to left side of chest around port, pain worsens with flushing, port difficult to flush and positional but blood return noted from both sites. Etoposide infusing when patient complained of pain, paused infusion, no redness or swelling noted but tender with palpation, Dr. MARY Orozco notified. Patient deaccessed and emla cream applied to site. Both ports reaccessed. Patient denies pain to port site after reaccess, ports flush and have positive blood return. Etoposide restarted, see MAR for times. Vital signs stable, afebrile. Respirations even and non labored. Breath sounds clear. Bowel sounds active in all four quadrants. Poor appetite, only drinking soda tonight. Vomiting x1, daily dose of zofran given with relief reported. Patient also complaining of mid chest/epigastric-like pain tonight. Dr. Orozco aware. Patient also reports mild photosensitivity, MDs aware, on Dex eye drops. Voids per urinal. Occasional family and friends visiting intermittently tonight. Monitoring

## 2018-02-25 ENCOUNTER — NURSE TRIAGE (OUTPATIENT)
Dept: ADMINISTRATIVE | Facility: CLINIC | Age: 18
End: 2018-02-25

## 2018-02-25 VITALS
OXYGEN SATURATION: 100 % | HEIGHT: 66 IN | BODY MASS INDEX: 30.29 KG/M2 | SYSTOLIC BLOOD PRESSURE: 112 MMHG | TEMPERATURE: 98 F | RESPIRATION RATE: 18 BRPM | HEART RATE: 65 BPM | WEIGHT: 188.5 LBS | DIASTOLIC BLOOD PRESSURE: 56 MMHG

## 2018-02-25 PROCEDURE — 25000003 PHARM REV CODE 250: Performed by: STUDENT IN AN ORGANIZED HEALTH CARE EDUCATION/TRAINING PROGRAM

## 2018-02-25 PROCEDURE — 25000003 PHARM REV CODE 250: Performed by: PEDIATRICS

## 2018-02-25 PROCEDURE — 99239 HOSP IP/OBS DSCHRG MGMT >30: CPT | Mod: ,,, | Performed by: PEDIATRICS

## 2018-02-25 PROCEDURE — 63600175 PHARM REV CODE 636 W HCPCS: Performed by: PEDIATRICS

## 2018-02-25 RX ORDER — SULFAMETHOXAZOLE AND TRIMETHOPRIM 800; 160 MG/1; MG/1
1 TABLET ORAL ONCE
Status: COMPLETED | OUTPATIENT
Start: 2018-02-25 | End: 2018-02-25

## 2018-02-25 RX ORDER — ACYCLOVIR 200 MG/1
400 CAPSULE ORAL ONCE
Status: COMPLETED | OUTPATIENT
Start: 2018-02-25 | End: 2018-02-25

## 2018-02-25 RX ADMIN — HEPARIN 300 UNITS: 100 SYRINGE at 12:02

## 2018-02-25 RX ADMIN — DEXAMETHASONE 2 DROP: 1 SUSPENSION OPHTHALMIC at 10:02

## 2018-02-25 RX ADMIN — PANTOPRAZOLE SODIUM 40 MG: 40 TABLET, DELAYED RELEASE ORAL at 10:02

## 2018-02-25 RX ADMIN — ACYCLOVIR 400 MG: 200 CAPSULE ORAL at 12:02

## 2018-02-25 RX ADMIN — DEXAMETHASONE 2 DROP: 1 SUSPENSION OPHTHALMIC at 12:02

## 2018-02-25 RX ADMIN — SULFAMETHOXAZOLE AND TRIMETHOPRIM 1 TABLET: 800; 160 TABLET ORAL at 10:02

## 2018-02-25 RX ADMIN — SULFAMETHOXAZOLE AND TRIMETHOPRIM 1 TABLET: 800; 160 TABLET ORAL at 12:02

## 2018-02-25 RX ADMIN — CHLORHEXIDINE GLUCONATE 15 ML: 1.2 RINSE ORAL at 12:02

## 2018-02-25 RX ADMIN — OXYCODONE HYDROCHLORIDE 5 MG: 5 TABLET ORAL at 02:02

## 2018-02-25 RX ADMIN — FAMOTIDINE 20 MG: 20 TABLET, FILM COATED ORAL at 10:02

## 2018-02-25 RX ADMIN — DEXTROSE AND SODIUM CHLORIDE: 5; .9 INJECTION, SOLUTION INTRAVENOUS at 02:02

## 2018-02-25 RX ADMIN — ACYCLOVIR 400 MG: 200 CAPSULE ORAL at 10:02

## 2018-02-25 RX ADMIN — CYTARABINE 1940 MG: 100 INJECTION, SOLUTION INTRATHECAL; INTRAVENOUS; SUBCUTANEOUS at 04:02

## 2018-02-25 NOTE — ASSESSMENT & PLAN NOTE
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     Acute myelogenous leukemia:  - High dose cytarabine followed by etoposide daily x5 days. Post chemo today. (started on 2/20)  - Dexamethasone eye drops q6hr for prevention of keratitis while receiving cytarabine    Chemotherapy-induced nausea and vomiting:  - Ondansetron 16mg IV daily prior to cytarabine  - Diphenhydramine 25mg IV q6hr PRN nausea (1st choice)  - Lorazepam 1mg IV q6hr PRN nausea (2nd choice)    At risk for infection:  - Continue prophylactic TMP/SMX 1 tab PO BID on Fri/Sat/Sun  - Continue acyclovir 400mg PO BID  - Ciprofloxacin and posaconazole prophylaxis held until chemotherapy completed  - If febrile:   - Obtain blood culture   - Start empiric ceftriaxone and vancomycin    Back pain:  - Oxy 5 mg q6 prn  - Tylenol prn    Diet:  - Regular diet as tolerated  - Continue pantoprazole 40mg PO daily    Dispo: D/C today to stay at [facility].

## 2018-02-25 NOTE — SUBJECTIVE & OBJECTIVE
Subjective:     Interval History: Afebrile. Oxy x 1 for headache. Chemo completed.     Oncology Treatment Plan:     PEDS [id number]  ARM A - LR Risk Patients    Medications:  Continuous Infusions:   dextrose 5 % and 0.9 % NaCl 100 mL/hr at 02/25/18 0201     Scheduled Meds:   acyclovir  400 mg Oral BID    chlorhexidine  15 mL Mouth/Throat QID    dexamethasone  2 drop Both Eyes Q6H    famotidine  20 mg Oral BID    ondansetron  16 mg Intravenous Daily    pantoprazole  40 mg Oral Daily    sulfamethoxazole-trimethoprim 800-160mg  1 tablet Oral twice daily on Friday, Saturday, Sunday     PRN Meds:acetaminophen, alteplase, diphenhydrAMINE, heparin, porcine (PF), lorazepam, oxyCODONE, sodium chloride 0.9%     Review of Systems  Objective:     Vital Signs (Most Recent):  Temp: 97.6 °F (36.4 °C) (02/25/18 1012)  Pulse: (!) 57 (02/25/18 1012)  Resp: 18 (02/25/18 1012)  BP: (!) 105/51 (02/25/18 1012)  SpO2: 100 % (02/25/18 1012) Vital Signs (24h Range):  Temp:  [97.6 °F (36.4 °C)-98 °F (36.7 °C)] 97.6 °F (36.4 °C)  Pulse:  [54-69] 57  Resp:  [16-20] 18  SpO2:  [96 %-100 %] 100 %  BP: (105-117)/(49-59) 105/51     Weight: 85.5 kg (188 lb 7.9 oz)  Body mass index is 30.42 kg/m².  Body surface area is 2 meters squared.      Intake/Output Summary (Last 24 hours) at 02/25/18 1024  Last data filed at 02/25/18 0700   Gross per 24 hour   Intake             2819 ml   Output             2000 ml   Net              819 ml       Physical Exam   Constitutional: He appears well-developed and well-nourished. No distress.   HENT:   Head: Normocephalic and atraumatic.   Right Ear: External ear normal.   Left Ear: External ear normal.   Nose: Nose normal.   Eyes: Conjunctivae and EOM are normal. Right eye exhibits no discharge. Left eye exhibits no discharge. No scleral icterus.   Neck: Normal range of motion. Neck supple.   Cardiovascular: Normal rate, regular rhythm and normal heart sounds.    Double lumen port dressing c/d/i  No  surrounding erythema   Pulmonary/Chest: Effort normal and breath sounds normal. No respiratory distress.   Abdominal: Soft. He exhibits no distension and no mass. There is no tenderness. There is no rebound and no guarding. No hernia.   Musculoskeletal: Normal range of motion.   Neurological: He is alert.   Skin: Skin is warm. Capillary refill takes less than 2 seconds. He is not diaphoretic.       Labs:   Recent Lab Results     None

## 2018-02-25 NOTE — PLAN OF CARE
"Problem: Patient Care Overview  Goal: Plan of Care Review  Outcome: Ongoing (interventions implemented as appropriate)  Pt stable overnight, VSS, headache relieved with oxycodone x1, pt reports feeling fatigued/"sick" with activity, no other prns required. L chest PAC x2 remain intact, inner remains infusing IVF and chemo without difficulty, good blood return noted, outer remains HL. Chemo completed, pt administering decadron eye drops and mouth wash with reinforcement. Pt with minimal PO intake, adequate UOP, no BM this shift. Stepfather and brother visited at bedside, attentive and appropriate, aware of plan of care.      "

## 2018-02-25 NOTE — PROGRESS NOTES
[facility]  Pediatric Hematology/Oncology  Progress Note    Patient Name: Hema Kuo  Admission Date: 2/20/2018  Hospital Length of Stay: 5 days  Code Status: Full Code     Subjective:     Interval History: Afebrile. Oxy x 1 for headache. Chemo completed.     Oncology Treatment Plan:     PEDS [id number]  ARM A - LR Risk Patients    Medications:  Continuous Infusions:   dextrose 5 % and 0.9 % NaCl 100 mL/hr at 02/25/18 0201     Scheduled Meds:   acyclovir  400 mg Oral BID    chlorhexidine  15 mL Mouth/Throat QID    dexamethasone  2 drop Both Eyes Q6H    famotidine  20 mg Oral BID    ondansetron  16 mg Intravenous Daily    pantoprazole  40 mg Oral Daily    sulfamethoxazole-trimethoprim 800-160mg  1 tablet Oral twice daily on Friday, Saturday, Sunday     PRN Meds:acetaminophen, alteplase, diphenhydrAMINE, heparin, porcine (PF), lorazepam, oxyCODONE, sodium chloride 0.9%     Review of Systems  Objective:     Vital Signs (Most Recent):  Temp: 97.6 °F (36.4 °C) (02/25/18 1012)  Pulse: (!) 57 (02/25/18 1012)  Resp: 18 (02/25/18 1012)  BP: (!) 105/51 (02/25/18 1012)  SpO2: 100 % (02/25/18 1012) Vital Signs (24h Range):  Temp:  [97.6 °F (36.4 °C)-98 °F (36.7 °C)] 97.6 °F (36.4 °C)  Pulse:  [54-69] 57  Resp:  [16-20] 18  SpO2:  [96 %-100 %] 100 %  BP: (105-117)/(49-59) 105/51     Weight: 85.5 kg (188 lb 7.9 oz)  Body mass index is 30.42 kg/m².  Body surface area is 2 meters squared.      Intake/Output Summary (Last 24 hours) at 02/25/18 1024  Last data filed at 02/25/18 0700   Gross per 24 hour   Intake             2819 ml   Output             2000 ml   Net              819 ml       Physical Exam   Constitutional: He appears well-developed and well-nourished. No distress.   HENT:   Head: Normocephalic and atraumatic.   Right Ear: External ear normal.   Left Ear: External ear normal.   Nose: Nose normal.   Eyes: Conjunctivae and EOM are normal. Right eye exhibits no discharge. Left eye exhibits no  discharge. No scleral icterus.   Neck: Normal range of motion. Neck supple.   Cardiovascular: Normal rate, regular rhythm and normal heart sounds.    Double lumen port dressing c/d/i  No surrounding erythema   Pulmonary/Chest: Effort normal and breath sounds normal. No respiratory distress.   Abdominal: Soft. He exhibits no distension and no mass. There is no tenderness. There is no rebound and no guarding. No hernia.   Musculoskeletal: Normal range of motion.   Neurological: He is alert.   Skin: Skin is warm. Capillary refill takes less than 2 seconds. He is not diaphoretic.       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     Acute myelogenous leukemia:  - High dose cytarabine followed by etoposide daily x5 days. Post chemo today. (started on 2/20)  - Dexamethasone eye drops q6hr for prevention of keratitis while receiving cytarabine    Chemotherapy-induced nausea and vomiting:  - Ondansetron 16mg IV daily prior to cytarabine  - Diphenhydramine 25mg IV q6hr PRN nausea (1st choice)  - Lorazepam 1mg IV q6hr PRN nausea (2nd choice)    At risk for infection:  - Continue prophylactic TMP/SMX 1 tab PO BID on Fri/Sat/Sun  - Continue acyclovir 400mg PO BID  - Ciprofloxacin and posaconazole prophylaxis held until chemotherapy completed  - If febrile:   - Obtain blood culture   - Start empiric ceftriaxone and vancomycin    Back pain:  - Oxy 5 mg q6 prn  - Tylenol prn    Diet:  - Regular diet as tolerated  - Continue pantoprazole 40mg PO daily    Dispo: D/C today to stay at [facility].               Beata Bunn MD  Pediatric Hematology/Oncology  [facility]

## 2018-02-25 NOTE — PROGRESS NOTES
Pt stable, afebrile, tolerating po intake, left chest double port de-accessed, both ports are positive for blood return, both ports flushed with NS and heparin locked, discharge instructions given to pt verbally and in printed form including follow-up appointment and review of medications, pt verbalized understanding of said instructions, pt off unit in wheelchair with his brother at his side, pt being discharged to the [facility]

## 2018-02-26 ENCOUNTER — CLINICAL SUPPORT (OUTPATIENT)
Dept: PEDIATRIC HEMATOLOGY/ONCOLOGY | Facility: CLINIC | Age: 18
DRG: 837 | End: 2018-02-26
Payer: MEDICAID

## 2018-02-26 VITALS
WEIGHT: 185.5 LBS | SYSTOLIC BLOOD PRESSURE: 106 MMHG | RESPIRATION RATE: 20 BRPM | BODY MASS INDEX: 30.91 KG/M2 | TEMPERATURE: 98 F | HEIGHT: 65 IN | DIASTOLIC BLOOD PRESSURE: 53 MMHG | HEART RATE: 74 BPM

## 2018-02-26 DIAGNOSIS — C92.01 ACUTE MYELOID LEUKEMIA IN REMISSION: ICD-10-CM

## 2018-02-26 LAB
ABO + RH BLD: NORMAL
BLD GP AB SCN CELLS X3 SERPL QL: NORMAL

## 2018-02-26 PROCEDURE — 99214 OFFICE O/P EST MOD 30 MIN: CPT | Mod: S$PBB,,, | Performed by: PEDIATRICS

## 2018-02-26 PROCEDURE — 96523 IRRIG DRUG DELIVERY DEVICE: CPT | Mod: PBBFAC

## 2018-02-26 PROCEDURE — 36591 DRAW BLOOD OFF VENOUS DEVICE: CPT | Mod: PBBFAC

## 2018-02-26 PROCEDURE — 86901 BLOOD TYPING SEROLOGIC RH(D): CPT

## 2018-02-26 PROCEDURE — 99999 PR PBB SHADOW E&M-EST. PATIENT-LVL III: CPT | Mod: PBBFAC,,, | Performed by: PEDIATRICS

## 2018-02-26 PROCEDURE — 99213 OFFICE O/P EST LOW 20 MIN: CPT | Mod: PBBFAC,25 | Performed by: PEDIATRICS

## 2018-02-26 PROCEDURE — 36415 COLL VENOUS BLD VENIPUNCTURE: CPT | Mod: PO

## 2018-02-26 PROCEDURE — 3008F BODY MASS INDEX DOCD: CPT | Mod: ,,, | Performed by: PEDIATRICS

## 2018-02-26 RX ORDER — LORAZEPAM 1 MG/1
1 TABLET ORAL EVERY 6 HOURS PRN
Qty: 20 TABLET | Refills: 2 | Status: ON HOLD | OUTPATIENT
Start: 2018-02-26 | End: 2018-05-31 | Stop reason: HOSPADM

## 2018-02-26 RX ORDER — ONDANSETRON 8 MG/1
8 TABLET, ORALLY DISINTEGRATING ORAL EVERY 8 HOURS PRN
Qty: 30 TABLET | Refills: 3 | Status: SHIPPED | OUTPATIENT
Start: 2018-02-26 | End: 2018-07-20

## 2018-02-26 NOTE — TELEPHONE ENCOUNTER
Reason for Disposition   [1] Fever AND [2] weak immune system (sickle cell disease, HIV, splenectomy, chemotherapy, organ transplant, chronic oral steroids, etc)    Protocols used: ST NAUSEA-P-AH    Patient called with c/o severe nausea and vomiting and he just had chemo today. Patient advised to go to the ED for an evaluation. He verbalized understanding.

## 2018-02-26 NOTE — PROGRESS NOTES
1150--Pt's L upper chest inner PAC remains accessed from ED visit yesterday. + blood return obtained, T&S drawn, labeled at bedside and sent to lab, and PAC flushed with 10 ml NS, heplocked with 500 units heparin, and deaccessed per central line guidelines. Needle intact, dry gauze with bandaid applied. Pt tolerated procedure well. Pt to return to clinic Wed 2/28 at 0945 to check counts and for possible transfusion, appt slip given to pt, and instructed him to call with any needs or concerns prior to Wed appt. Pt repeated back and verbalized complete understanding.

## 2018-02-26 NOTE — DISCHARGE SUMMARY
[facility]  Pediatric Hematology/Oncology  Discharge Summary      Patient Name: Hema Kuo  MRN: [id number]  Admission Date: 2/20/2018  Hospital Length of Stay: 5 days  Discharge Date and Time: 2/25/2018  1:15 PM  Attending Physician: No att. providers found   Discharging Provider: Beata Bunn MD  Primary Care Provider: Ann Reyna NP    HPI:  Hema is a 17-year-old M with AML (t8;21) receiving chemotherapy under [facility] [place] [id number] Arm A who is admitted today s/p bone marrow biopsy and LP to begin Intensification I with high dose cytarabine and etoposide x5 days. His AML is t8;21, c-kit mutation negative, CNS negative, and MRD negative after Induction I. He reports feeling well, with no recent fevers, decrease in appetite, nausea/vomiting, rash, or bruising.      Hospital Course:  Hema is a 17-year-old M with AML (t8;21) receiving cycle 3 of chemotherapy under [facility] [id number] Arm A. He was admitted s/p bone marrow biopsy and LP to begin Intensification I with high dose cytarabine and etoposide x 5 days. He received Dexamethasone eye drops q6hr for prevention of keratitis. Nausea and vomiting was managed with Ondansetron prior to cytarabine, Diphenhydramine, Lorazepam. For minimal PO intake he was started on mIVF. Pantoprazole 40mg PO daily was continued. He was given Oxy q6 prn for back pain. Infection prophylaxis with TMP/SMX on Fri/Sat/Sun and Acyclovir 400mg PO BID. Ciprofloxacin and posaconazole prophylaxis were held. He remained vitally stable and tolerated chemo.         Significant Diagnostic Studies: Labs:   CMP   Recent Labs  Lab 02/24/18  0410      K 3.9      CO2 23   *   BUN 9   CREATININE 0.7   CALCIUM 8.6*   PROT 6.3   ALBUMIN 3.4*   BILITOT 0.8   ALKPHOS 70   AST 24   ALT 38   ANIONGAP 10   ESTGFRAFRICA >60.0   EGFRNONAA >60.0    and CBC   Recent Labs  Lab 02/24/18  0410   WBC 1.79*   HGB 8.5*   HCT 23.7*   PLT 46*       Pending Diagnostic Studies:     None         Final Active Diagnoses:    Diagnosis Date Noted POA    PRINCIPAL PROBLEM:  AML (acute myeloblastic leukemia) [C92.00] 12/12/2017 Yes    Acute leukemia [C95.00] 10/30/2017 Yes      Problems Resolved During this Admission:    Diagnosis Date Noted Date Resolved POA      Discharged Condition: good    Disposition: Home or Self Care    Follow Up:  Follow-up Information     Sanford Bucio MD On 2/26/2018.    Specialties:  Pediatric Hematology and Oncology, Pediatric Hematology  Why:  At 11:00am to check in.  Contact information:  [address]  [facility] [address] [phone]                 Patient Instructions:     Activity as tolerated     Notify your health care provider if you experience any of the following:  temperature >100.4     Notify your health care provider if you experience any of the following:  persistent nausea and vomiting or diarrhea     Notify your health care provider if you experience any of the following:  severe uncontrolled pain     Notify your health care provider if you experience any of the following:  persistent dizziness, light-headedness, or visual disturbances     Notify your health care provider if you experience any of the following:  increased confusion or weakness       Medications:         Current Outpatient Prescriptions   Medication Sig Dispense Refill    acyclovir (ZOVIRAX) 400 MG tablet Take 1 tablet (400 mg total) by mouth 2 (two) times daily. 60 tablet 11    ciprofloxacin HCl (CIPRO) 500 MG tablet Take 1 tablet (500 mg total) by mouth every 12 (twelve) hours. 120 tablet 3    pantoprazole (PROTONIX) 40 MG tablet Take 1 tablet (40 mg total) by mouth once daily. 30 tablet 1    sulfamethoxazole-trimethoprim 800-160mg (BACTRIM DS) 800-160 mg Tab Take 1 tablet by mouth twice daily only on Friday, Saturday, Sunday. 30 tablet 3    chlorhexidine (PERIDEX) 0.12 % solution   0    lisdexamfetamine (VYVANSE) 60 MG capsule Take 60 mg by mouth every morning.       ondansetron (ZOFRAN-ODT) 8 MG TbDL Take 1 tablet (8 mg total) by mouth every 8 (eight) hours as needed. 30 tablet 3    polyethylene glycol (GLYCOLAX) 17 gram PwPk Take 17 g by mouth daily as needed (No bowel movement). 100 each 0       Beata Bunn MD  Pediatric Hematology/Oncology  [facility]

## 2018-02-26 NOTE — PROGRESS NOTES
Pediatric Hematology Note      Subjective:       Patient ID: Hema Kuo is a 17 y.o. male      Chief Complaint:    Chief Complaint   Patient presents with    Follow-up       History of Present Illness:   Hema Kuo is a 17 y.o. male with AML s/p Induction therapy following [id number] (Arm A) here today for f/u from hospitalization for Intensification therapy.  He was seen in ED last night for refractory N/V, received Emend + Dex with improvement, states he is feeling better today.  He is accompanied by his mother today.  Hema reports that he has been feeling very well since last visit    He reports no fevers, no abdominal pain, vomiting, diarrhea or constipation and no excessive bruising or unusual bleeding.  Good appetite and energy level.  No other complaints.      Past Medical History:   Diagnosis Date    AML (acute myeloblastic leukemia) 10/2017    Asthma     Encounter for blood transfusion     Seasonal allergies      Past Surgical History:   Procedure Laterality Date    PORTACATH PLACEMENT  10/31/2017    TONSILLECTOMY         ROS:   Gen: Negative for fever. Negative for night sweats.    HEENT: Negative for nosebleeds.  Negative for sore throat.  Negative for visual problems.  Pulm: Negative for cough.  Negative for shortness of breath.  CV: Negative for chest pain.  Negative for cyanosis.  GI: Negative for abdominal pain, nausea or vomiting.    : Negative for changes in frequency or dysuria.   Skin: Negative for bruising.  Negative for rash.    MS: Negative for joint swelling or pain.  Neuro:  Negative for headaches, seizures, weakness.  Hematology:  Positive for AML.  Positive for recent chemotherapy  Endocrine:  Negative for heat or cold intolerance.  Negative for increased thirst.  Psych: Negative for hyperactivity.  Negative for behavioral issues.      Physical Examination:   Vitals:    02/26/18 1110   BP: (!) 106/53   Pulse: 74   Resp: 20   Temp:  98.3 °F (36.8 °C)     General: well developed, well nourished, no distress.    HENT: Head:normocephalic, atraumatic. Ears:bilateral TM's and external ear canals normal. Nose: Nares normal. Mucosa normal. No discharge. Throat: lips, mucosa, and tongue normal; teeth and gums normal and no throat erythema.  Eyes: conjunctivae pale/corneas clear. PERRL.   Neck: supple, symmetrical, and thyroid not enlarged, symmetric, no tenderness/mass/nodules  Lungs:  clear to auscultation bilaterally and normal respiratory effort  Cardiovascular: regular rate and rhythm, S1, S2 normal, no murmur, click, rub or gallop.   Chest Wall: Port site healing  Extremities: no cyanosis or edema, or clubbing. Pulses: 2+ and symmetric.  Abdomen: soft, non-tender non-distented; bowel sounds normal; no masses,  no organomegaly.   Skin: Pale.  Texture and turgor normal. No rashes or lesions  Musculoskeletal: No obvious joint swelling or erythema  Lymph Nodes: No cervical or supraclavicular adenopathy. No axillary or inguinal adenopathy.  Neurologic: Cranial nerves intact.  Normal strength and tone. No focal numbness or weakness  Psych: appropriate mood and affect    Objective:     Pathology:  Initial BM bx:    BONE MARROW, RIGHT ILIAC CREST, ASPIRATE, CLOT, AND CORE BIOPSY:  --Cellular marrow, positive for acute myeloid leukemia, with blasts representing approximately 25% of cellularity, see comment  --Background developing myeloid cells present with some cytologic atypia, see comment  COMMENT: Concomitantly submitted flow cytometric analysis detects an increased population of blasts consistent with acute myeloid leukemia, non-M3 subtype. Additional immunophenotyping was performed on peripheral blood flow cytometric studies (please see separate report). The marrow blasts showing expression of CD34, CD13, and cytoplasmic myeloperoxidase as well as dim CD19. The population however is negative for TdT, and cytoplasmic CD22 and CD79a. These findings are  "similar to that seen on the peripheral blood study and are consistent with acute myeloid leukemia, non-M3 subtype by JOVANNI classification.  Given the dim expression of CD19, a translocation of chromosomes 8 and 21 is a diagnostic possibility. The cytologic atypia/dysplasia seen in the myeloid series can also be seen with this translocation. Clinical correlation and correlation with any available cytogenetic and molecular studies is required for definitive classification of this process.    AML FISH: The result is abnormal and indicates JURK8C6/RUNX1 fusion in 90.6% of nuclei. This result is most consistent with acute myeloid leukemia with t(8;21)(q22;q22) (Grimwade et al., Blood 116:354-65, 2010; Solh et al., Am J Hematol 89:6087-7553, 2014; Bernarda and Radha, Am J Clin Pathol 144:6-18, 2015). For monitoring response to therapy in this patient, we suggest using FISH for XFBG9G2/RUNX1 fusion."    Peripheral blood, FLT3 mutation analysis: Negative. Neither expansion of the region susceptible to internal tandem duplication (FLT-ITD) nor changes involving codon D835 and/or I836 in the tyrosine kinase domain (FLT3-TKD) was detected."     KIT genetic alteration analysis, exons 8, 9, 10, 11, and 17: Negative.  No pathogenic genetic alterations were detected in the  KIT  gene, exons 8, 9, 10, 11, and 17.     Cytogenetics:  45,X,-Y,nevin(8)t(8;21)(q22;q22),nevin(20)t(20;21)(p13;q22)t(8;21),nevin(21)t(8;21)t(20;21)[17]/46,XY[3]  Comments: The result is abnormal. Of 20 metaphases, 3 metaphases were normal, and 17 metaphases had a complex translocation involving chromosomes 8, 21, and 20. FISH studies confirmed SDTD6R7/RUNX1 fusion associated with this translocation (reported separately). Fusion of KDYX7V3/RUNX1 is reportedly associated with a favorable prognosis in AML (Grimwade et al., Blood 116:354-365, 2010). For monitoring response to therapy in this patient, FISH for TJWX4J0/RUNX1 fusion is available, test AMLF (AML, " "FISH)."    End of Induction I:  BONE MARROW, RIGHT ILIAC CREST, ASPIRATE AND CLOT:  --Cellular marrow, approximately 60%, with trilineage hematopoiesis, see comment  --No definitive morphologic evidence of residual/recurrent acute myeloid leukemia, see comment  --Increased stainable iron  COMMENT: Concomitantly submitted flow cytometric analysis detects no diagnostic abnormal hematopoietic population. B cells are polyclonal with a subset of CD19/CD10 positive cells favored to be hematogones, and T cells are immunophenotypically unremarkable. The blast gate is not increased.    AML FISH:  This result is within normal limits for the HBWY2N0 and RUNX1 gene regions."      Labs:   Results for HEMA BHATIA (MRN [id number]) as of 2/27/2018 11:18   2/24/2018 04:10   WBC 1.79 (LL)   RBC 2.64 (L)   Hemoglobin 8.5 (L)   Hematocrit 23.7 (L)   MCV 90   MCH 32.2 (H)   MCHC 35.9   RDW SEE COMMENT   Platelets 46 (L)   MPV 11.2   Gran% 92.0 (H)   Immature Granulocytes CANCELED   Immature Grans (Abs) CANCELED   Lymph% 8.0 (L)   Mono% 0.0 (L)   Eosinophil% 0.0   Basophil% 0.0   nRBC 0   Ovalocytes Occasional   Aniso Slight   Poik Slight   Poly Occasional   Hypo Occasional   Platelet Estimate Decreased (A)   Sodium 140   Potassium 3.9   Chloride 107   CO2 23   Anion Gap 10   BUN, Bld 9   Creatinine 0.7   eGFR if non African American >60.0   eGFR if African American >60.0   Glucose 114 (H)   Calcium 8.6 (L)   Alkaline Phosphatase 70   Total Protein 6.3   Albumin 3.4 (L)   Total Bilirubin 0.8   AST 24   ALT 38   Differential Method Manual       Assessment/Plan:   Hema was seen today for follow-up.    Diagnoses and all orders for this visit:    Acute myeloid leukemia in remission  -     ondansetron (ZOFRAN-ODT) 8 MG TbDL; Take 1 tablet (8 mg total) by mouth every 8 (eight) hours as needed.  -     LORazepam (ATIVAN) 1 MG tablet; Take 1 tablet (1 mg total) by mouth every 6 (six) hours as needed (Nausea).    Other orders  -     Type & Screen; " Standing  -     Type & Screen        Discussion:   17 y.o. young man with AML (t 8;21) here for chemotherapy.  He is Day 25 of Cycle I of therapy following [id number] (Arm A).  He reports feeling well since discharge home and was well appearing today in clinic.    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  CNS negative.  MRD negative after Induction I.    -Treating per COG [id number] (ARM A).  Intensification I day 7 today.    For his chemotherapy induced pancytopenia  -No transfusions today.   -Discussed inpatient vs outpatient management of neutropenia    For his immunosuppression secondary to chemotherapy  -He will resume acyclovir, ciprofloxacin and posaconazole prophylaxis today  -Continue Bactrim on Fri, Sat and Sun  -Continue Peridex    Return to clinic Wednesday.      Sanford Bucio    Total time 30 minutes with >50% spent in face-to-face counseling regarding plan of care, chemo side effects and arranging coordination of care.

## 2018-02-26 NOTE — HOSPITAL COURSE
Hema is a 17-year-old M with AML (t8;21) receiving cycle 3 of chemotherapy under [facility] [id number] Arm A. He was admitted s/p bone marrow biopsy and LP to begin Intensification I with high dose cytarabine and etoposide x 5 days. He received Dexamethasone eye drops q6hr for prevention of keratitis. Nausea and vomiting was managed with Ondansetron prior to cytarabine, Diphenhydramine, Lorazepam. For minimal PO intake he was started on mIVF. Pantoprazole 40mg PO daily was continued. He was given Oxy q6 prn for back pain. Infection prophylaxis with TMP/SMX on Fri/Sat/Sun and Acyclovir 400mg PO BID. Ciprofloxacin and posaconazole prophylaxis were held. He remained vitally stable and tolerated chemo.

## 2018-02-27 LAB
AML FISH ADDITIONAL INFORMATION (BM): NORMAL
AML FISH DISCLAIMER (BM): NORMAL
AML FISH REASON FOR REFERRAL (BM): NORMAL
AML FISH RELEASED BY (BM): NORMAL
AML FISH RESULT (BM): NORMAL
AML FISH RESULT SUMMARY (BM): NORMAL
AML FISH RESULT TABLE (BM): NORMAL
CLINICAL CYTOGENETICIST REVIEW: NORMAL
FAMLB SPECIMEN: NORMAL
REF LAB TEST METHOD: NORMAL
SPECIMEN SOURCE: NORMAL

## 2018-02-28 ENCOUNTER — HOSPITAL ENCOUNTER (OUTPATIENT)
Dept: INFUSION THERAPY | Facility: HOSPITAL | Age: 18
Discharge: HOME | End: 2018-02-28
Attending: NURSE PRACTITIONER
Payer: MEDICAID

## 2018-02-28 ENCOUNTER — OFFICE VISIT (OUTPATIENT)
Dept: PEDIATRIC HEMATOLOGY/ONCOLOGY | Facility: CLINIC | Age: 18
End: 2018-02-28
Payer: MEDICAID

## 2018-02-28 VITALS
BODY MASS INDEX: 30.41 KG/M2 | DIASTOLIC BLOOD PRESSURE: 55 MMHG | RESPIRATION RATE: 20 BRPM | WEIGHT: 184.75 LBS | HEART RATE: 80 BPM | TEMPERATURE: 98 F | SYSTOLIC BLOOD PRESSURE: 105 MMHG

## 2018-02-28 VITALS
HEART RATE: 86 BPM | DIASTOLIC BLOOD PRESSURE: 57 MMHG | TEMPERATURE: 98 F | RESPIRATION RATE: 20 BRPM | WEIGHT: 184.75 LBS | SYSTOLIC BLOOD PRESSURE: 111 MMHG | BODY MASS INDEX: 30.41 KG/M2

## 2018-02-28 DIAGNOSIS — C92.01 AML (ACUTE MYELOID LEUKEMIA) IN REMISSION: Primary | ICD-10-CM

## 2018-02-28 DIAGNOSIS — T45.1X5A ANTINEOPLASTIC CHEMOTHERAPY INDUCED PANCYTOPENIA: ICD-10-CM

## 2018-02-28 DIAGNOSIS — T45.1X5A IMMUNOSUPPRESSED DUE TO CHEMOTHERAPY: ICD-10-CM

## 2018-02-28 DIAGNOSIS — C92.01 ACUTE MYELOID LEUKEMIA IN REMISSION: ICD-10-CM

## 2018-02-28 DIAGNOSIS — Z79.899 IMMUNOSUPPRESSED DUE TO CHEMOTHERAPY: ICD-10-CM

## 2018-02-28 DIAGNOSIS — C92.01 ACUTE MYELOID LEUKEMIA IN REMISSION: Primary | ICD-10-CM

## 2018-02-28 DIAGNOSIS — D84.821 IMMUNOSUPPRESSED DUE TO CHEMOTHERAPY: ICD-10-CM

## 2018-02-28 DIAGNOSIS — D61.810 ANTINEOPLASTIC CHEMOTHERAPY INDUCED PANCYTOPENIA: ICD-10-CM

## 2018-02-28 LAB
ALBUMIN SERPL BCP-MCNC: 3.7 G/DL
ALP SERPL-CCNC: 73 U/L
ALT SERPL W/O P-5'-P-CCNC: 29 U/L
ANION GAP SERPL CALC-SCNC: 10 MMOL/L
AST SERPL-CCNC: 14 U/L
BASOPHILS # BLD AUTO: 0 K/UL
BASOPHILS NFR BLD: 0 %
BILIRUB SERPL-MCNC: 0.7 MG/DL
BLD PROD TYP BPU: NORMAL
BLD PROD TYP BPU: NORMAL
BLOOD UNIT EXPIRATION DATE: NORMAL
BLOOD UNIT EXPIRATION DATE: NORMAL
BLOOD UNIT TYPE CODE: 2800
BLOOD UNIT TYPE CODE: 6200
BLOOD UNIT TYPE: NORMAL
BLOOD UNIT TYPE: NORMAL
BUN SERPL-MCNC: 16 MG/DL
CALCIUM SERPL-MCNC: 8.9 MG/DL
CHLORIDE SERPL-SCNC: 106 MMOL/L
CHROM BANDING METHOD: NORMAL
CHROMOSOME ANALYSIS BM ADDITIONAL INFORMATION: NORMAL
CHROMOSOME ANALYSIS BM RELEASED BY: NORMAL
CHROMOSOME ANALYSIS BM RESULT SUMMARY: NORMAL
CLINICAL CYTOGENETICIST REVIEW: NORMAL
CO2 SERPL-SCNC: 21 MMOL/L
CODING SYSTEM: NORMAL
CODING SYSTEM: NORMAL
CREAT SERPL-MCNC: 0.7 MG/DL
DIFFERENTIAL METHOD: ABNORMAL
DISPENSE STATUS: NORMAL
DISPENSE STATUS: NORMAL
EOSINOPHIL # BLD AUTO: 0 K/UL
EOSINOPHIL NFR BLD: 0 %
ERYTHROCYTE [DISTWIDTH] IN BLOOD BY AUTOMATED COUNT: 21.4 %
EST. GFR  (AFRICAN AMERICAN): >60 ML/MIN/1.73 M^2
EST. GFR  (NON AFRICAN AMERICAN): >60 ML/MIN/1.73 M^2
GLUCOSE SERPL-MCNC: 89 MG/DL
HCT VFR BLD AUTO: 24.4 %
HGB BLD-MCNC: 8.3 G/DL
KARYOTYP MAR: NORMAL
LYMPHOCYTES # BLD AUTO: 0.4 K/UL
LYMPHOCYTES NFR BLD: 76.1 %
MCH RBC QN AUTO: 31.7 PG
MCHC RBC AUTO-ENTMCNC: 34 G/DL
MCV RBC AUTO: 93 FL
MONOCYTES # BLD AUTO: 0 K/UL
MONOCYTES NFR BLD: 0 %
NEUTROPHILS # BLD AUTO: 0.1 K/UL
NEUTROPHILS NFR BLD: 23.9 %
NUM UNITS TRANS PACKED RBC: NORMAL
NUM UNITS TRANS WBC-POOR PLATPHERESIS: NORMAL
PLATELET # BLD AUTO: 14 K/UL
PLATELET BLD QL SMEAR: ABNORMAL
PMV BLD AUTO: 11.1 FL
POTASSIUM SERPL-SCNC: 3.9 MMOL/L
PROT SERPL-MCNC: 6.4 G/DL
RBC # BLD AUTO: 2.62 M/UL
REASON FOR REFERRAL (NARRATIVE): NORMAL
REF LAB TEST METHOD: NORMAL
RETICS/RBC NFR AUTO: 0.1 %
SODIUM SERPL-SCNC: 137 MMOL/L
SPECIMEN SOURCE: NORMAL
SPECIMEN: NORMAL
WBC # BLD AUTO: 0.46 K/UL

## 2018-02-28 PROCEDURE — 99999 PR PBB SHADOW E&M-EST. PATIENT-LVL IV: CPT | Mod: PBBFAC,,, | Performed by: PEDIATRICS

## 2018-02-28 PROCEDURE — 96365 THER/PROPH/DIAG IV INF INIT: CPT | Mod: PO

## 2018-02-28 PROCEDURE — A4216 STERILE WATER/SALINE, 10 ML: HCPCS | Mod: PO | Performed by: PEDIATRICS

## 2018-02-28 PROCEDURE — 3008F BODY MASS INDEX DOCD: CPT | Mod: ,,, | Performed by: PEDIATRICS

## 2018-02-28 PROCEDURE — 27201040 HC RC 50 FILTER

## 2018-02-28 PROCEDURE — 36430 TRANSFUSION BLD/BLD COMPNT: CPT | Mod: PO

## 2018-02-28 PROCEDURE — 85045 AUTOMATED RETICULOCYTE COUNT: CPT | Mod: PO

## 2018-02-28 PROCEDURE — 85025 COMPLETE CBC W/AUTO DIFF WBC: CPT | Mod: PO

## 2018-02-28 PROCEDURE — P9038 RBC IRRADIATED: HCPCS

## 2018-02-28 PROCEDURE — P9037 PLATE PHERES LEUKOREDU IRRAD: HCPCS

## 2018-02-28 PROCEDURE — 86920 COMPATIBILITY TEST SPIN: CPT

## 2018-02-28 PROCEDURE — 25000003 PHARM REV CODE 250: Mod: PO | Performed by: PEDIATRICS

## 2018-02-28 PROCEDURE — 63600175 PHARM REV CODE 636 W HCPCS: Mod: PO | Performed by: PEDIATRICS

## 2018-02-28 PROCEDURE — 80053 COMPREHEN METABOLIC PANEL: CPT

## 2018-02-28 PROCEDURE — 99214 OFFICE O/P EST MOD 30 MIN: CPT | Mod: PBBFAC | Performed by: PEDIATRICS

## 2018-02-28 PROCEDURE — 99214 OFFICE O/P EST MOD 30 MIN: CPT | Mod: S$PBB,,, | Performed by: PEDIATRICS

## 2018-02-28 RX ORDER — HEPARIN 100 UNIT/ML
500 SYRINGE INTRAVENOUS
Status: DISCONTINUED | OUTPATIENT
Start: 2018-02-28 | End: 2018-03-01 | Stop reason: HOSPADM

## 2018-02-28 RX ORDER — ACETAMINOPHEN 325 MG/1
650 TABLET ORAL
Status: COMPLETED | OUTPATIENT
Start: 2018-02-28 | End: 2018-02-28

## 2018-02-28 RX ORDER — SODIUM CHLORIDE 0.9 % (FLUSH) 0.9 %
10 SYRINGE (ML) INJECTION
Status: DISCONTINUED | OUTPATIENT
Start: 2018-02-28 | End: 2018-03-01 | Stop reason: HOSPADM

## 2018-02-28 RX ORDER — DIPHENHYDRAMINE HYDROCHLORIDE 50 MG/ML
25 INJECTION INTRAMUSCULAR; INTRAVENOUS
Status: COMPLETED | OUTPATIENT
Start: 2018-02-28 | End: 2018-02-28

## 2018-02-28 RX ORDER — HYDROCODONE BITARTRATE AND ACETAMINOPHEN 500; 5 MG/1; MG/1
TABLET ORAL ONCE
Status: COMPLETED | OUTPATIENT
Start: 2018-02-28 | End: 2018-02-28

## 2018-02-28 RX ADMIN — SODIUM CHLORIDE: 9 INJECTION, SOLUTION INTRAVENOUS at 02:02

## 2018-02-28 RX ADMIN — SODIUM CHLORIDE, PRESERVATIVE FREE 10 ML: 5 INJECTION INTRAVENOUS at 02:02

## 2018-02-28 RX ADMIN — HEPARIN 500 UNITS: 100 SYRINGE at 02:02

## 2018-02-28 RX ADMIN — ACETAMINOPHEN 650 MG: 325 TABLET, FILM COATED ORAL at 10:02

## 2018-02-28 RX ADMIN — DIPHENHYDRAMINE HYDROCHLORIDE 25 MG: 50 INJECTION INTRAMUSCULAR; INTRAVENOUS at 11:02

## 2018-02-28 NOTE — NURSING
Pt's L upper chest outer PAC accessed with 20 g 1 inch currie needle using sterile technique at 1000, + blood obtained. Labs were drawn, labeled at bedside and sent to lab. Tegaderm applied to PAC, remains clean, dry, and intact. Labs reviewed by Dr Bucio, states pt to get 1 unit PRBC and 1 unit platelets in clinic today, recheck counts locally on Monday morning 3/5. Updated pt and grandmother on above plan, they repeated back and verbalized complete understanding. Called [facility], [phone], left message for Rigoberto that pt coming Monday AM for labs.

## 2018-02-28 NOTE — NURSING
1 unit PRBC transfusion complete at this time. Patient tolerated the transfusion without difficulty. No S+S of adverse reactions noted. Vital signs stable and afebrile. + blood return noted from left upper chest PAC, then flushed with 10 ml normal saline, hep locked with 500 units heparin. Deaccessed per central line guidelines. Needle intact. Gauze and band aid applied to site. Patient tolerated procedure well. Patient instructed to go for local labs on 3/5/18. Patient instructed to call for any problems or concerns. Patient repeated back instructions, and verbalized understanding.

## 2018-02-28 NOTE — NURSING
Benadryl infusion complete at this time. Patient tolerated the infusion without difficulty. No S+S of adverse reactions noted.Patient PAC flushed with 10 ml normal saline.

## 2018-02-28 NOTE — PROGRESS NOTES
Pediatric Hematology Note      Subjective:       Patient ID: Hema Kuo is a 17 y.o. male      Chief Complaint:    Chief Complaint   Patient presents with    Follow-up       History of Present Illness:   Hema Kuo is a 17 y.o. male with AML s/p Induction therapy following [id number] (Arm A) here today for f/u from hospitalization for Intensification therapy.  He was seen in ED last night for refractory N/V, received Emend + Dex with improvement, states he is feeling better today.  He is accompanied by his mother today.  Hema reports that he has been feeling very well since last visit    He reports no fevers, no abdominal pain, vomiting, diarrhea or constipation and no excessive bruising or unusual bleeding.  Good appetite and energy level.  No other complaints.      Past Medical History:   Diagnosis Date    AML (acute myeloblastic leukemia) 10/2017    Asthma     Encounter for blood transfusion     Seasonal allergies      Past Surgical History:   Procedure Laterality Date    PORTACATH PLACEMENT  10/31/2017    TONSILLECTOMY         ROS:   Gen: Negative for fever. Negative for night sweats.    HEENT: Negative for nosebleeds.  Negative for sore throat.  Negative for visual problems.  Pulm: Negative for cough.  Negative for shortness of breath.  CV: Negative for chest pain.  Negative for cyanosis.  GI: Negative for abdominal pain, nausea or vomiting.    : Negative for changes in frequency or dysuria.   Skin: Negative for bruising.  Negative for rash.    MS: Negative for joint swelling or pain.  Neuro:  Negative for headaches, seizures, weakness.  Hematology:  Positive for AML.  Positive for recent chemotherapy  Endocrine:  Negative for heat or cold intolerance.  Negative for increased thirst.  Psych: Negative for hyperactivity.  Negative for behavioral issues.      Physical Examination:   Vitals:    02/28/18 0943   BP: (!) 111/57   Pulse: 86   Resp: 20   Temp:  98 °F (36.7 °C)     General: well developed, well nourished, no distress.    HENT: Head:normocephalic, atraumatic. Ears:bilateral TM's and external ear canals normal. Nose: Nares normal. Mucosa normal. No discharge. Throat: lips, mucosa, and tongue normal; teeth and gums normal and no throat erythema.  Eyes: conjunctivae pale/corneas clear. PERRL.   Neck: supple, symmetrical, and thyroid not enlarged, symmetric, no tenderness/mass/nodules  Lungs:  clear to auscultation bilaterally and normal respiratory effort  Cardiovascular: regular rate and rhythm, S1, S2 normal, no murmur, click, rub or gallop.   Chest Wall: Port site healing  Extremities: no cyanosis or edema, or clubbing. Pulses: 2+ and symmetric.  Abdomen: soft, non-tender non-distented; bowel sounds normal; no masses,  no organomegaly.   Skin: Pale.  Texture and turgor normal. No rashes or lesions  Musculoskeletal: No obvious joint swelling or erythema  Lymph Nodes: No cervical or supraclavicular adenopathy. No axillary or inguinal adenopathy.  Neurologic: Cranial nerves intact.  Normal strength and tone. No focal numbness or weakness  Psych: appropriate mood and affect    Objective:     Pathology:  Initial BM bx:    BONE MARROW, RIGHT ILIAC CREST, ASPIRATE, CLOT, AND CORE BIOPSY:  --Cellular marrow, positive for acute myeloid leukemia, with blasts representing approximately 25% of cellularity, see comment  --Background developing myeloid cells present with some cytologic atypia, see comment  COMMENT: Concomitantly submitted flow cytometric analysis detects an increased population of blasts consistent with acute myeloid leukemia, non-M3 subtype. Additional immunophenotyping was performed on peripheral blood flow cytometric studies (please see separate report). The marrow blasts showing expression of CD34, CD13, and cytoplasmic myeloperoxidase as well as dim CD19. The population however is negative for TdT, and cytoplasmic CD22 and CD79a. These findings are  "similar to that seen on the peripheral blood study and are consistent with acute myeloid leukemia, non-M3 subtype by JOVANNI classification.  Given the dim expression of CD19, a translocation of chromosomes 8 and 21 is a diagnostic possibility. The cytologic atypia/dysplasia seen in the myeloid series can also be seen with this translocation. Clinical correlation and correlation with any available cytogenetic and molecular studies is required for definitive classification of this process.    AML FISH: The result is abnormal and indicates IKYB2E3/RUNX1 fusion in 90.6% of nuclei. This result is most consistent with acute myeloid leukemia with t(8;21)(q22;q22) (Grimwade et al., Blood 116:354-65, 2010; Solh et al., Am J Hematol 89:7657-2576, 2014; Bernarda and Radha, Am J Clin Pathol 144:6-18, 2015). For monitoring response to therapy in this patient, we suggest using FISH for ACXV5J9/RUNX1 fusion."    Peripheral blood, FLT3 mutation analysis: Negative. Neither expansion of the region susceptible to internal tandem duplication (FLT-ITD) nor changes involving codon D835 and/or I836 in the tyrosine kinase domain (FLT3-TKD) was detected."     KIT genetic alteration analysis, exons 8, 9, 10, 11, and 17: Negative.  No pathogenic genetic alterations were detected in the  KIT  gene, exons 8, 9, 10, 11, and 17.     Cytogenetics:  45,X,-Y,nevin(8)t(8;21)(q22;q22),nevin(20)t(20;21)(p13;q22)t(8;21),nevin(21)t(8;21)t(20;21)[17]/46,XY[3]  Comments: The result is abnormal. Of 20 metaphases, 3 metaphases were normal, and 17 metaphases had a complex translocation involving chromosomes 8, 21, and 20. FISH studies confirmed WRNT7D7/RUNX1 fusion associated with this translocation (reported separately). Fusion of EIHS5W4/RUNX1 is reportedly associated with a favorable prognosis in AML (Grimwade et al., Blood 116:354-365, 2010). For monitoring response to therapy in this patient, FISH for PPVJ8V8/RUNX1 fusion is available, test AMLF (AML, " "FISH)."    End of Induction I:  BONE MARROW, RIGHT ILIAC CREST, ASPIRATE AND CLOT:  --Cellular marrow, approximately 60%, with trilineage hematopoiesis, see comment  --No definitive morphologic evidence of residual/recurrent acute myeloid leukemia, see comment  --Increased stainable iron  COMMENT: Concomitantly submitted flow cytometric analysis detects no diagnostic abnormal hematopoietic population. B cells are polyclonal with a subset of CD19/CD10 positive cells favored to be hematogones, and T cells are immunophenotypically unremarkable. The blast gate is not increased.    AML FISH:  This result is within normal limits for the RTXD2D6 and RUNX1 gene regions."      Labs:   Results for HEMA BHATIA (MRN [id number]) as of 2/28/2018 10:59   2/28/2018 09:46   WBC 0.46 (LL)   RBC 2.62 (L)   Hemoglobin 8.3 (L)   Hematocrit 24.4 (L)   MCV 93   MCH 31.7 (H)   MCHC 34.0   RDW 21.4 (H)   Platelets 14 (LL)   MPV 11.1   Gran% 23.9 (L)   Gran # (ANC) 0.1 (L)   Lymph% 76.1 (H)   Lymph # 0.4 (L)   Mono% 0.0 (L)   Mono # 0.0 (L)   Eosinophil% 0.0   Eos # 0.0   Basophil% 0.0   Baso # 0.00   Platelet Estimate Decreased (A)   Retic 0.1 (L)   Differential Method Automated       Assessment/Plan:   Hema was seen today for follow-up.    Diagnoses and all orders for this visit:    AML (acute myeloblastic leukemia)  -     Comprehensive metabolic panel    AML (acute myeloid leukemia) in remission  -     CBC auto differential  -     Reticulocytes        Discussion:   17 y.o. young man with AML (t 8;21) here for chemotherapy.  He is Day 25 of Cycle I of therapy following [id number] (Arm A).  He reports feeling well since discharge home and was well appearing today in clinic.    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  CNS negative.  MRD negative after Induction I.    -Treating per COG [id number] (ARM A).  Intensification I day 9 today.  -BM biopsy at start of Intensification shows CR.  FISH for t(8;21) negative.    For his chemotherapy " induced pancytopenia  -Transfuse PRBCs and Plts today  -Discussed inpatient vs outpatient management of neutropenia, will tentatively plan to admit next Monday.    For his immunosuppression secondary to chemotherapy  -He will resume acyclovir, ciprofloxacin and posaconazole prophylaxis today  -Continue Bactrim on Fri, Sat and Sun  -Continue Peridex    Return to clinic Monday.      Sanford Bucio    Total time 30 minutes with >50% spent in face-to-face counseling regarding plan of care, chemo side effects and arranging coordination of care.

## 2018-02-28 NOTE — PLAN OF CARE
Problem: Patient Care Overview  Goal: Plan of Care Review  1.  Pt has a left double upper chest pac.  2.  Use 1 inch needle to access each PAC.  3.  Pt likes to keller.   Outcome: Ongoing (interventions implemented as appropriate)  Patient tolerated platelet and PRBC transfusion without difficulty. No S+S of adverse reactions noted. Vital signs stable and afebrile throughout the transfusion. Patient slept comfortably in chair the transfusion. Patient updated on plan of care.

## 2018-02-28 NOTE — NURSING
Good blood return noted from left upper chest PAC. Benadryl infusion initiated at this time. Will continue to monitor the patient.

## 2018-02-28 NOTE — NURSING
1 unit platelets complete at this time. Patient tolerated the transfusion without difficulty. No S+S of adverse reactions noted. Vital signs stable and afebrile. PAC flushed with 10 ml normal saline. Good blood return noted from PAC.

## 2018-03-05 ENCOUNTER — HISTORICAL (OUTPATIENT)
Dept: INFUSION THERAPY | Facility: HOSPITAL | Age: 18
End: 2018-03-05

## 2018-03-05 ENCOUNTER — HOSPITAL ENCOUNTER (INPATIENT)
Facility: HOSPITAL | Age: 18
LOS: 18 days | Discharge: HOME | DRG: 809 | End: 2018-03-23
Attending: PEDIATRICS | Admitting: PEDIATRICS
Payer: MEDICAID

## 2018-03-05 DIAGNOSIS — C92.00 AML (ACUTE MYELOBLASTIC LEUKEMIA): ICD-10-CM

## 2018-03-05 DIAGNOSIS — R50.81 FEVER AND NEUTROPENIA: ICD-10-CM

## 2018-03-05 DIAGNOSIS — Z79.899 IMMUNOSUPPRESSED DUE TO CHEMOTHERAPY: ICD-10-CM

## 2018-03-05 DIAGNOSIS — C92.01 ACUTE MYELOID LEUKEMIA IN REMISSION: Primary | ICD-10-CM

## 2018-03-05 DIAGNOSIS — D70.9 FEVER AND NEUTROPENIA: ICD-10-CM

## 2018-03-05 DIAGNOSIS — K12.31 NEUTROPENIA ASSOCIATED WITH MUCOSITIS DUE TO ANTINEOPLASTIC THERAPY: ICD-10-CM

## 2018-03-05 DIAGNOSIS — C92.01 ACUTE MYELOID LEUKEMIA IN REMISSION: ICD-10-CM

## 2018-03-05 DIAGNOSIS — T45.1X5A ANTINEOPLASTIC CHEMOTHERAPY INDUCED PANCYTOPENIA: Primary | ICD-10-CM

## 2018-03-05 DIAGNOSIS — T45.1X5S NEUTROPENIA ASSOCIATED WITH MUCOSITIS DUE TO ANTINEOPLASTIC THERAPY: ICD-10-CM

## 2018-03-05 DIAGNOSIS — D61.810 ANTINEOPLASTIC CHEMOTHERAPY INDUCED PANCYTOPENIA: Primary | ICD-10-CM

## 2018-03-05 DIAGNOSIS — R22.1 MASS OF LATERAL NECK: ICD-10-CM

## 2018-03-05 DIAGNOSIS — D70.1 NEUTROPENIA ASSOCIATED WITH MUCOSITIS DUE TO ANTINEOPLASTIC THERAPY: ICD-10-CM

## 2018-03-05 DIAGNOSIS — D84.821 IMMUNOSUPPRESSED DUE TO CHEMOTHERAPY: ICD-10-CM

## 2018-03-05 DIAGNOSIS — I88.9 LYMPHADENITIS: ICD-10-CM

## 2018-03-05 DIAGNOSIS — T45.1X5A IMMUNOSUPPRESSED DUE TO CHEMOTHERAPY: ICD-10-CM

## 2018-03-05 LAB
ABS NEUT (OLG): 0.01 X10(3)/MCL
ANISOCYTOSIS BLD QL SMEAR: ABNORMAL
BASOPHILS NFR BLD MANUAL: 0 %
EOSINOPHIL NFR BLD MANUAL: 0 %
ERYTHROCYTE [DISTWIDTH] IN BLOOD BY AUTOMATED COUNT: 18.7 % (ref 11.5–14.5)
GRANULOCYTES NFR BLD MANUAL: 0 % (ref 43–75)
HCT VFR BLD AUTO: 21.7 % (ref 40–51)
HGB BLD-MCNC: 7.8 GM/DL (ref 13.5–17.5)
HYPOCHROMIA BLD QL SMEAR: ABNORMAL
LYMPHOCYTES NFR BLD MANUAL: 99 % (ref 20.5–51.1)
MCH RBC QN AUTO: 32.1 PG (ref 26–34)
MCHC RBC AUTO-ENTMCNC: 35.9 GM/DL (ref 31–37)
MCV RBC AUTO: 89.3 FL (ref 80–100)
MICROCYTES BLD QL SMEAR: ABNORMAL
MONOCYTES NFR BLD MANUAL: 1 % (ref 2–9)
PLATELET # BLD AUTO: 7 X10(3)/MCL (ref 130–400)
PLATELET # BLD EST: ABNORMAL 10*3/UL
PMV BLD AUTO: 8.7 FL (ref 7.4–10.4)
POIKILOCYTOSIS BLD QL SMEAR: ABNORMAL
RBC # BLD AUTO: 2.43 X10(6)/MCL (ref 4.5–5.9)
RBC MORPH BLD: ABNORMAL
RET# (OHS): 0.01
RETICULOCYTE COUNT AUTOMATED (OLG): 0.2 % (ref 0.5–1.5)
TRANSFUSION ORDER: NORMAL
WBC # SPEC AUTO: 0.8 X10(3)/MCL (ref 4.5–11)

## 2018-03-05 PROCEDURE — 11300000 HC PEDIATRIC PRIVATE ROOM

## 2018-03-06 ENCOUNTER — PATIENT MESSAGE (OUTPATIENT)
Dept: PEDIATRIC HEMATOLOGY/ONCOLOGY | Facility: CLINIC | Age: 18
End: 2018-03-06

## 2018-03-06 PROCEDURE — 25000003 PHARM REV CODE 250: Performed by: STUDENT IN AN ORGANIZED HEALTH CARE EDUCATION/TRAINING PROGRAM

## 2018-03-06 PROCEDURE — 99223 1ST HOSP IP/OBS HIGH 75: CPT | Mod: ,,, | Performed by: PEDIATRICS

## 2018-03-06 PROCEDURE — 20600001 HC STEP DOWN PRIVATE ROOM

## 2018-03-06 RX ORDER — ACYCLOVIR 200 MG/1
400 CAPSULE ORAL 2 TIMES DAILY
Status: DISCONTINUED | OUTPATIENT
Start: 2018-03-06 | End: 2018-03-13

## 2018-03-06 RX ORDER — POLYETHYLENE GLYCOL 3350 17 G/17G
17 POWDER, FOR SOLUTION ORAL DAILY PRN
Status: DISCONTINUED | OUTPATIENT
Start: 2018-03-06 | End: 2018-03-23 | Stop reason: HOSPADM

## 2018-03-06 RX ORDER — SULFAMETHOXAZOLE AND TRIMETHOPRIM 800; 160 MG/1; MG/1
1 TABLET ORAL
Status: DISCONTINUED | OUTPATIENT
Start: 2018-03-09 | End: 2018-03-23 | Stop reason: HOSPADM

## 2018-03-06 RX ORDER — CIPROFLOXACIN 500 MG/1
500 TABLET ORAL EVERY 12 HOURS
Status: DISCONTINUED | OUTPATIENT
Start: 2018-03-06 | End: 2018-03-23 | Stop reason: HOSPADM

## 2018-03-06 RX ORDER — CHLORHEXIDINE GLUCONATE ORAL RINSE 1.2 MG/ML
15 SOLUTION DENTAL 2 TIMES DAILY
Status: DISCONTINUED | OUTPATIENT
Start: 2018-03-06 | End: 2018-03-23 | Stop reason: HOSPADM

## 2018-03-06 RX ORDER — ONDANSETRON 8 MG/1
8 TABLET, ORALLY DISINTEGRATING ORAL EVERY 8 HOURS PRN
Status: DISCONTINUED | OUTPATIENT
Start: 2018-03-06 | End: 2018-03-23 | Stop reason: HOSPADM

## 2018-03-06 RX ORDER — LISDEXAMFETAMINE DIMESYLATE 60 MG/1
60 CAPSULE ORAL EVERY MORNING
Status: DISCONTINUED | OUTPATIENT
Start: 2018-03-06 | End: 2018-03-06

## 2018-03-06 RX ORDER — LORAZEPAM 0.5 MG/1
1 TABLET ORAL EVERY 6 HOURS PRN
Status: DISCONTINUED | OUTPATIENT
Start: 2018-03-06 | End: 2018-03-23 | Stop reason: HOSPADM

## 2018-03-06 RX ORDER — POSACONAZOLE 100 MG/1
300 TABLET, DELAYED RELEASE ORAL 2 TIMES DAILY
Status: DISCONTINUED | OUTPATIENT
Start: 2018-03-06 | End: 2018-03-23 | Stop reason: HOSPADM

## 2018-03-06 RX ADMIN — ACYCLOVIR 400 MG: 200 CAPSULE ORAL at 09:03

## 2018-03-06 RX ADMIN — POSACONAZOLE 300 MG: 100 TABLET, COATED ORAL at 08:03

## 2018-03-06 RX ADMIN — CIPROFLOXACIN HYDROCHLORIDE 500 MG: 500 TABLET, FILM COATED ORAL at 09:03

## 2018-03-06 RX ADMIN — ACYCLOVIR 400 MG: 200 CAPSULE ORAL at 08:03

## 2018-03-06 RX ADMIN — Medication 5 ML: at 02:03

## 2018-03-06 RX ADMIN — CHLORHEXIDINE GLUCONATE 15 ML: 1.2 RINSE ORAL at 09:03

## 2018-03-06 RX ADMIN — POSACONAZOLE 300 MG: 100 TABLET, COATED ORAL at 11:03

## 2018-03-06 RX ADMIN — CHLORHEXIDINE GLUCONATE 15 ML: 1.2 RINSE ORAL at 08:03

## 2018-03-06 RX ADMIN — Medication 5 ML: at 08:03

## 2018-03-06 RX ADMIN — CIPROFLOXACIN HYDROCHLORIDE 500 MG: 500 TABLET, FILM COATED ORAL at 08:03

## 2018-03-06 NOTE — SUBJECTIVE & OBJECTIVE
Chief Complaint:  neutropenia    Past Medical History:   Diagnosis Date    AML (acute myeloblastic leukemia) 10/2017    Asthma     Encounter for blood transfusion     Seasonal allergies        Past Surgical History:   Procedure Laterality Date    PORTACATH PLACEMENT  10/31/2017    TONSILLECTOMY         Review of patient's allergies indicates:   Allergen Reactions    Nsaids (non-steroidal anti-inflammatory drug) Anaphylaxis    Nuts [tree nut] Anaphylaxis    Strawberry        No current facility-administered medications on file prior to encounter.      Current Outpatient Prescriptions on File Prior to Encounter   Medication Sig    acyclovir (ZOVIRAX) 400 MG tablet Take 1 tablet (400 mg total) by mouth 2 (two) times daily.    chlorhexidine (PERIDEX) 0.12 % solution     ciprofloxacin HCl (CIPRO) 500 MG tablet Take 1 tablet (500 mg total) by mouth every 12 (twelve) hours.    lisdexamfetamine (VYVANSE) 60 MG capsule Take 60 mg by mouth every morning.    LORazepam (ATIVAN) 1 MG tablet Take 1 tablet (1 mg total) by mouth every 6 (six) hours as needed (Nausea).    ondansetron (ZOFRAN-ODT) 8 MG TbDL Take 1 tablet (8 mg total) by mouth every 8 (eight) hours as needed.    polyethylene glycol (GLYCOLAX) 17 gram PwPk Take 17 g by mouth daily as needed (No bowel movement).    sulfamethoxazole-trimethoprim 800-160mg (BACTRIM DS) 800-160 mg Tab Take 1 tablet by mouth twice daily only on Friday, Saturday, Sunday.    pantoprazole (PROTONIX) 40 MG tablet Take 1 tablet (40 mg total) by mouth once daily.        Family History     Problem Relation (Age of Onset)    Cancer Mother    Heart disease Mother    No Known Problems Father        Social History Main Topics    Smoking status: Former Smoker     Packs/day: 0.50     Types: Cigarettes     Quit date: 10/30/2017    Smokeless tobacco: Never Used    Alcohol use Yes      Comment: rare occasions    Drug use: No    Sexual activity: Yes     Partners: Female     Review of  Systems   Constitutional: Positive for fatigue (mild). Negative for activity change, appetite change, chills, diaphoresis, fever and unexpected weight change.   HENT: Negative for congestion, facial swelling, hearing loss, mouth sores, nosebleeds, postnasal drip, rhinorrhea, sinus pressure, sneezing, sore throat, tinnitus, trouble swallowing and voice change.    Eyes: Negative for photophobia, pain, discharge, redness, itching and visual disturbance.   Respiratory: Negative for apnea, cough, choking, chest tightness, shortness of breath, wheezing and stridor.    Cardiovascular: Negative for chest pain, palpitations and leg swelling.   Gastrointestinal: Negative for abdominal distention, abdominal pain, constipation, diarrhea, nausea and vomiting.   Endocrine: Negative for cold intolerance, heat intolerance, polydipsia, polyphagia and polyuria.   Genitourinary: Negative for decreased urine volume, difficulty urinating, dysuria, enuresis, frequency, hematuria and urgency.   Musculoskeletal: Negative for arthralgias, back pain, gait problem, joint swelling, myalgias, neck pain and neck stiffness.   Skin: Negative for color change, pallor, rash and wound.   Allergic/Immunologic: Positive for immunocompromised state.   Neurological: Negative for dizziness, tremors, seizures, syncope, facial asymmetry, speech difficulty, weakness, light-headedness, numbness and headaches.   Hematological: Negative for adenopathy. Does not bruise/bleed easily.   Psychiatric/Behavioral: Negative for agitation and confusion.     Objective:     Vital Signs (Most Recent):  Temp: 98.4 °F (36.9 °C) (03/06/18 0426)  Pulse: 86 (03/06/18 0426)  Resp: (!) 24 (03/06/18 0426)  BP: 125/60 (03/06/18 0426)  SpO2: 100 % (03/06/18 0426) Vital Signs (24h Range):  Temp:  [98.1 °F (36.7 °C)-98.8 °F (37.1 °C)] 98.4 °F (36.9 °C)  Pulse:  [] 86  Resp:  [16-24] 24  SpO2:  [100 %] 100 %  BP: (125-127)/(60-73) 125/60     Patient Vitals for the past 72 hrs  (Last 3 readings):   Weight   03/05/18 2030 83.9 kg (184 lb 15.5 oz)     Body mass index is 30.78 kg/m².    Intake/Output - Last 3 Shifts       03/04 0700 - 03/05 0659 03/05 0700 - 03/06 0659    P.O.  600    Total Intake(mL/kg)  600 (7.2)    Urine (mL/kg/hr)  240    Total Output   240    Net   +360                Lines/Drains/Airways     Central Venous Catheter Line                 Port A Cath Double Lumen 10/31/17 1826 left subclavian 125 days                Physical Exam   Constitutional: He is oriented to person, place, and time. He appears well-developed and well-nourished. No distress.   HENT:   Head: Normocephalic and atraumatic.   Right Ear: External ear normal.   Left Ear: External ear normal.   Nose: Nose normal.   Mouth/Throat: Oropharynx is clear and moist. No oropharyngeal exudate.   Eyes: Conjunctivae and EOM are normal. Pupils are equal, round, and reactive to light. Right eye exhibits no discharge. Left eye exhibits no discharge. No scleral icterus.   Neck: Normal range of motion. Neck supple. No JVD present. No tracheal deviation present. No thyromegaly present.   Cardiovascular: Normal rate, regular rhythm, normal heart sounds and intact distal pulses.  Exam reveals no friction rub.    No murmur heard.  Pulmonary/Chest: Breath sounds normal. No stridor. No respiratory distress. He has no wheezes. He has no rales. He exhibits no tenderness.   Abdominal: Soft. Bowel sounds are normal. He exhibits no distension and no mass. There is no tenderness. There is no rebound and no guarding. No hernia.   Musculoskeletal: Normal range of motion. He exhibits no edema, tenderness or deformity.   Lymphadenopathy:     He has no cervical adenopathy.   Neurological: He is alert and oriented to person, place, and time.   Skin: Skin is warm. Capillary refill takes less than 2 seconds. He is not diaphoretic.   Psychiatric: He has a normal mood and affect.   Nursing note and vitals reviewed.      Significant Labs:    No  results found for this or any previous visit (from the past 24 hour(s)).]    Significant Imaging:    Imaging Results    None

## 2018-03-06 NOTE — HPI
Mr. Hema Kuo is a 17 y.o. male with AML (t8;21) receiving chemotherapy following COG [id number] ([facility] A) here today for neutropenia (without fever).  patient underwent bone marrow biopsy and lumbar puncture last month and began Intensification I Phase of his chemo regimen with high dose cytarabine and etoposide x 5 days. Following Intensification, he was seen in ED for refractory N/V, received Emend + Dex with improvement.  Since then, he states he has been feeling fine. Endorses mild tiredness, but otherwise he reports no fevers, no abdominal pain, vomiting, diarrhea or constipation and no excessive bruising or unusual bleeding.  Good appetite.  No other complaints.      The day of admission, he received a platelet transfusion and post-transfusion labs were notable for neutropenia so patient was sent [place] [facility] [place] for [place]orpenia (no fevers)

## 2018-03-06 NOTE — SUBJECTIVE & OBJECTIVE
Subjective:     Interval History: No acute events overnight. Patient was afebrile. No complaints of pain.     Oncology Treatment Plan:     PEDS [id number]-1031  ARM A - LR Risk Patients    Medications:  Continuous Infusions:  Scheduled Meds:   acyclovir  400 mg Oral BID    chlorhexidine  15 mL Mouth/Throat BID    ciprofloxacin HCl  500 mg Oral Q12H    lisdexamfetamine  60 mg Oral QAM    [START ON 3/9/2018] sulfamethoxazole-trimethoprim 800-160mg  1 tablet Oral twice daily on Friday, Saturday, Sunday     PRN Meds:LORazepam, ondansetron, polyethylene glycol     Review of Systems  Objective:     Vital Signs (Most Recent):  Temp: 98.4 °F (36.9 °C) (03/06/18 0426)  Pulse: 86 (03/06/18 0426)  Resp: (!) 24 (03/06/18 0426)  BP: 125/60 (03/06/18 0426)  SpO2: 100 % (03/06/18 0426) Vital Signs (24h Range):  Temp:  [98.1 °F (36.7 °C)-98.8 °F (37.1 °C)] 98.4 °F (36.9 °C)  Pulse:  [] 86  Resp:  [16-24] 24  SpO2:  [100 %] 100 %  BP: (125-127)/(60-73) 125/60     Weight: 83.9 kg (184 lb 15.5 oz)  Body mass index is 30.78 kg/m².  Body surface area is 1.96 meters squared.      Intake/Output Summary (Last 24 hours) at 03/06/18 0718  Last data filed at 03/05/18 2300   Gross per 24 hour   Intake              600 ml   Output              240 ml   Net              360 ml       Physical Exam   Constitutional: He is oriented to person, place, and time. He appears well-developed and well-nourished. No distress.   HENT:   Head: Normocephalic and atraumatic.   Right Ear: External ear normal.   Left Ear: External ear normal.   Nose: Nose normal.   Mouth/Throat: Oropharynx is clear and moist. No oropharyngeal exudate.   Eyes: Conjunctivae and EOM are normal. Pupils are equal, round, and reactive to light. Right eye exhibits no discharge. Left eye exhibits no discharge. No scleral icterus.   Neck: Normal range of motion. Neck supple. No JVD present. No tracheal deviation present. No thyromegaly present.   Cardiovascular: Normal rate,  regular rhythm, normal heart sounds and intact distal pulses.  Exam reveals no friction rub.    No murmur heard.  Pulmonary/Chest: Breath sounds normal. No stridor. No respiratory distress. He has no wheezes. He has no rales. He exhibits no tenderness.   Abdominal: Soft. Bowel sounds are normal. He exhibits no distension and no mass. There is no tenderness. There is no rebound and no guarding. No hernia.   Musculoskeletal: Normal range of motion. He exhibits no edema, tenderness or deformity.   Lymphadenopathy:     He has no cervical adenopathy.   Neurological: He is alert and oriented to person, place, and time.   Skin: Skin is warm. Capillary refill takes less than 2 seconds. He is not diaphoretic.   Psychiatric: He has a normal mood and affect.   Nursing note and vitals reviewed.      Labs:   Recent Lab Results     None        Labs:   None    Imaging:   None    Micro:  None

## 2018-03-06 NOTE — PLAN OF CARE
Problem: Patient Care Overview  Goal: Plan of Care Review  Outcome: Ongoing (interventions implemented as appropriate)  Pt has done well throughout the day. Pt has remained afebrile with VSS. Pt tolerating PO well for lunch. Pain noted to R inner lower lip lesion site. Magic mouthwash used for pain relief with good results. Pt remains on neutropenic precautions. Pt updated on plan of care. Will monitor pt status.

## 2018-03-06 NOTE — PROGRESS NOTES
[facility]  Pediatric Hematology/Oncology  Progress Note    Patient Name: Hema Kuo  Admission Date: 3/5/2018  Hospital Length of Stay: 1 days  Code Status: Full Code     Subjective:     Interval History: No acute events overnight. Patient was afebrile. No complaints of pain.     Oncology Treatment Plan:     PEDS AAML-1031  ARM A - LR Risk Patients    Medications:  Continuous Infusions:  Scheduled Meds:   acyclovir  400 mg Oral BID    chlorhexidine  15 mL Mouth/Throat BID    ciprofloxacin HCl  500 mg Oral Q12H    lisdexamfetamine  60 mg Oral QAM    [START ON 3/9/2018] sulfamethoxazole-trimethoprim 800-160mg  1 tablet Oral twice daily on Friday, Saturday, Sunday     PRN Meds:LORazepam, ondansetron, polyethylene glycol     Review of Systems  Objective:     Vital Signs (Most Recent):  Temp: 98.4 °F (36.9 °C) (03/06/18 0426)  Pulse: 86 (03/06/18 0426)  Resp: (!) 24 (03/06/18 0426)  BP: 125/60 (03/06/18 0426)  SpO2: 100 % (03/06/18 0426) Vital Signs (24h Range):  Temp:  [98.1 °F (36.7 °C)-98.8 °F (37.1 °C)] 98.4 °F (36.9 °C)  Pulse:  [] 86  Resp:  [16-24] 24  SpO2:  [100 %] 100 %  BP: (125-127)/(60-73) 125/60     Weight: 83.9 kg (184 lb 15.5 oz)  Body mass index is 30.78 kg/m².  Body surface area is 1.96 meters squared.      Intake/Output Summary (Last 24 hours) at 03/06/18 0718  Last data filed at 03/05/18 2300   Gross per 24 hour   Intake              600 ml   Output              240 ml   Net              360 ml     Physical Exam   Constitutional: He is oriented to person, place, and time. He appears well-developed and well-nourished. No distress.   HENT:   Head: Normocephalic and atraumatic.   Right Ear: External ear normal.   Left Ear: External ear normal.   Nose: Nose normal.   Mouth/Throat: Oropharynx is clear and moist. No oropharyngeal exudate.   Eyes: Conjunctivae and EOM are normal. Pupils are equal, round, and reactive to light. Right eye exhibits no discharge. Left eye exhibits  no discharge. No scleral icterus.   Neck: Normal range of motion. Neck supple. No JVD present. No tracheal deviation present. No thyromegaly present.   Cardiovascular: Normal rate, regular rhythm, normal heart sounds and intact distal pulses.  Exam reveals no friction rub.    No murmur heard.  Pulmonary/Chest: Breath sounds normal. No stridor. No respiratory distress. He has no wheezes. He has no rales. He exhibits no tenderness.   Abdominal: Soft. Bowel sounds are normal. He exhibits no distension and no mass. There is no tenderness. There is no rebound and no guarding. No hernia.   Musculoskeletal: Normal range of motion. He exhibits no edema, tenderness or deformity.   Lymphadenopathy:     He has no cervical adenopathy.   Neurological: He is alert and oriented to person, place, and time.   Skin: Skin is warm. Capillary refill takes less than 2 seconds. He is not diaphoretic.   Psychiatric: He has a normal mood and affect.   Nursing note and vitals reviewed.      Labs:   Recent Lab Results     None        Labs:   None    Imaging:   None    Micro:  None    Assessment/Plan:     Hema is a 17 y.o. male with AML (t8;21) receiving chemotherapy following COG [id number] ([place] A) here today for neutropenia (without fever).  Currently does not need platelets or fluids.    - CBC, CMP, Type and Screen MWF  - Continue all home meds   - Acyclovir 400 mg Oral BID   - Chlorhexidine 15 ml Oral BID   - Ciprofloxacin 500 mg Oral q12 hours   - Lisdexamfetamine 60 mg oral qAM   - Bactrim 800-160 mg Oral BID on Friday, Saturday, and Sunday   - Posaconazole 200 mg TID  PRN Meds: Lorazepam, Ondansetron, Miralax  - Continue cipro, posconazole, and bactrim  - if febrile, can culture from both bports and start Vanc and cefpeime     Dispo: pending resolution of counts    Pascual Archer MD, MARKY  Pediatric Hematology/Oncology  [facility]

## 2018-03-06 NOTE — PLAN OF CARE
"Problem: Patient Care Overview  Goal: Plan of Care Review  Outcome: Ongoing (interventions implemented as appropriate)  Pt in good spirits this shift. VS stable; afebrile. Denies pain. Port-a-Cath accessed prior to admission from [facility] in [place], [place]. Tolerating diet; voiding; no BM this shift. Sore in lip noted; no drainage. Pt reports he "has a bad habit of biting his lip and bit it before last chemo started and it never healed". No family at bedside at this time. Reviewed plan of care with pt; verbalized understanding; safety maintained; neutropenic precautions maintained; will continue to monitor.      "

## 2018-03-06 NOTE — H&P
[facility] Pediatric Hospital Medicine  History & Physical    Patient Name: Hema Kuo  MRN: [id number]  Admission Date: 3/5/2018  Code Status: Full Code   Primary Care Physician: Ann Reyna NP  Principal Problem:<principal problem not specified>    Patient information was obtained from patient and past medical records    Subjective:     HPI:   Mr. Hema Kuo is a 17 y.o. male with AML (t8;21) receiving chemotherapy following COG [id number] ([place] A) here today for neutropenia (without fever).   patient underwent bone marrow biopsy and lumbar puncture last month and began Intensification I Phase of his chemo regimen with high dose cytarabine and etoposide x 5 days. Following Intensification, he was seen in ED for refractory N/V, received Emend + Dex with improvement.  Since then, he states he has been feeling fine. Endorses mild tiredness, but otherwise he reports no fevers, no abdominal pain, vomiting, diarrhea or constipation and no excessive bruising or unusual bleeding.  Good appetite.  No other complaints.      The day of admission, he received a platelet transfusion and post-transfusion labs were notable for neutropenia so patient was sent to [facility] for admission for neutorpenia (no fevers)       Chief Complaint:  neutropenia    Past Medical History:   Diagnosis Date    AML (acute myeloblastic leukemia) 10/2017    Asthma     Encounter for blood transfusion     Seasonal allergies        Past Surgical History:   Procedure Laterality Date    PORTACATH PLACEMENT  10/31/2017    TONSILLECTOMY         Review of patient's allergies indicates:   Allergen Reactions    Nsaids (non-steroidal anti-inflammatory drug) Anaphylaxis    Nuts [tree nut] Anaphylaxis    Strawberry        No current facility-administered medications on file prior to encounter.      Current Outpatient Prescriptions on File Prior to Encounter   Medication Sig    acyclovir (ZOVIRAX) 400 MG tablet Take 1 tablet (400 mg total) by  mouth 2 (two) times daily.    chlorhexidine (PERIDEX) 0.12 % solution     ciprofloxacin HCl (CIPRO) 500 MG tablet Take 1 tablet (500 mg total) by mouth every 12 (twelve) hours.    lisdexamfetamine (VYVANSE) 60 MG capsule Take 60 mg by mouth every morning.    LORazepam (ATIVAN) 1 MG tablet Take 1 tablet (1 mg total) by mouth every 6 (six) hours as needed (Nausea).    ondansetron (ZOFRAN-ODT) 8 MG TbDL Take 1 tablet (8 mg total) by mouth every 8 (eight) hours as needed.    polyethylene glycol (GLYCOLAX) 17 gram PwPk Take 17 g by mouth daily as needed (No bowel movement).    sulfamethoxazole-trimethoprim 800-160mg (BACTRIM DS) 800-160 mg Tab Take 1 tablet by mouth twice daily only on Friday, Saturday, Sunday.    pantoprazole (PROTONIX) 40 MG tablet Take 1 tablet (40 mg total) by mouth once daily.        Family History     Problem Relation (Age of Onset)    Cancer Mother    Heart disease Mother    No Known Problems Father        Social History Main Topics    Smoking status: Former Smoker     Packs/day: 0.50     Types: Cigarettes     Quit date: 10/30/2017    Smokeless tobacco: Never Used    Alcohol use Yes      Comment: rare occasions    Drug use: No    Sexual activity: Yes     Partners: Female     Review of Systems   Constitutional: Positive for fatigue (mild). Negative for activity change, appetite change, chills, diaphoresis, fever and unexpected weight change.   HENT: Negative for congestion, facial swelling, hearing loss, mouth sores, nosebleeds, postnasal drip, rhinorrhea, sinus pressure, sneezing, sore throat, tinnitus, trouble swallowing and voice change.    Eyes: Negative for photophobia, pain, discharge, redness, itching and visual disturbance.   Respiratory: Negative for apnea, cough, choking, chest tightness, shortness of breath, wheezing and stridor.    Cardiovascular: Negative for chest pain, palpitations and leg swelling.   Gastrointestinal: Negative for abdominal distention, abdominal pain,  constipation, diarrhea, nausea and vomiting.   Endocrine: Negative for cold intolerance, heat intolerance, polydipsia, polyphagia and polyuria.   Genitourinary: Negative for decreased urine volume, difficulty urinating, dysuria, enuresis, frequency, hematuria and urgency.   Musculoskeletal: Negative for arthralgias, back pain, gait problem, joint swelling, myalgias, neck pain and neck stiffness.   Skin: Negative for color change, pallor, rash and wound.   Allergic/Immunologic: Positive for immunocompromised state.   Neurological: Negative for dizziness, tremors, seizures, syncope, facial asymmetry, speech difficulty, weakness, light-headedness, numbness and headaches.   Hematological: Negative for adenopathy. Does not bruise/bleed easily.   Psychiatric/Behavioral: Negative for agitation and confusion.     Objective:     Vital Signs (Most Recent):  Temp: 98.4 °F (36.9 °C) (03/06/18 0426)  Pulse: 86 (03/06/18 0426)  Resp: (!) 24 (03/06/18 0426)  BP: 125/60 (03/06/18 0426)  SpO2: 100 % (03/06/18 0426) Vital Signs (24h Range):  Temp:  [98.1 °F (36.7 °C)-98.8 °F (37.1 °C)] 98.4 °F (36.9 °C)  Pulse:  [] 86  Resp:  [16-24] 24  SpO2:  [100 %] 100 %  BP: (125-127)/(60-73) 125/60     Patient Vitals for the past 72 hrs (Last 3 readings):   Weight   03/05/18 2030 83.9 kg (184 lb 15.5 oz)     Body mass index is 30.78 kg/m².    Intake/Output - Last 3 Shifts       03/04 0700 - 03/05 0659 03/05 0700 - 03/06 0659    P.O.  600    Total Intake(mL/kg)  600 (7.2)    Urine (mL/kg/hr)  240    Total Output   240    Net   +360                Lines/Drains/Airways     Central Venous Catheter Line                 Port A Cath Double Lumen 10/31/17 1826 left subclavian 125 days                Physical Exam   Constitutional: He is oriented to person, place, and time. He appears well-developed and well-nourished. No distress.   HENT:   Head: Normocephalic and atraumatic.   Right Ear: External ear normal.   Left Ear: External ear normal.    Nose: Nose normal.   Mouth/Throat: Oropharynx is clear and moist. No oropharyngeal exudate.   Eyes: Conjunctivae and EOM are normal. Pupils are equal, round, and reactive to light. Right eye exhibits no discharge. Left eye exhibits no discharge. No scleral icterus.   Neck: Normal range of motion. Neck supple. No JVD present. No tracheal deviation present. No thyromegaly present.   Cardiovascular: Normal rate, regular rhythm, normal heart sounds and intact distal pulses.  Exam reveals no friction rub.    No murmur heard.  Pulmonary/Chest: Breath sounds normal. No stridor. No respiratory distress. He has no wheezes. He has no rales. He exhibits no tenderness.   Abdominal: Soft. Bowel sounds are normal. He exhibits no distension and no mass. There is no tenderness. There is no rebound and no guarding. No hernia.   Musculoskeletal: Normal range of motion. He exhibits no edema, tenderness or deformity.   Lymphadenopathy:     He has no cervical adenopathy.   Neurological: He is alert and oriented to person, place, and time.   Skin: Skin is warm. Capillary refill takes less than 2 seconds. He is not diaphoretic.   Psychiatric: He has a normal mood and affect.   Nursing note and vitals reviewed.      Significant Labs:    No results found for this or any previous visit (from the past 24 hour(s)).]    Significant Imaging:    Imaging Results    None           Assessment and Plan:     Oncology   AML (acute myeloblastic leukemia)    This  is a 17 y.o. male with AML (t8;21) receiving chemotherapy following COG [id number] ([place] A) here today for neutropenia (without fever).  Currently does not need platelets or fluids.  - CBC, CMP, Type and Screen MWF  - cw all home meds  - cw cipro, posconazole, and bactrim  - if febrile, can culture from both bports and start Vanc and cefpeime    Dispo: pending resolution of counts             Tylor Adamson MD [facility] Internal Medicine/Pediatrics - PGY I  [facility]

## 2018-03-06 NOTE — PLAN OF CARE
03/06/18 1722   Discharge Assessment   Assessment Type Discharge Planning Assessment   Confirmed/corrected address and phone number on facesheet? Yes   Assessment information obtained from? Patient   Expected Length of Stay (days) 7   Communicated expected length of stay with patient/caregiver yes   Prior to hospitilization cognitive status: Alert/Oriented   Prior to hospitalization functional status: Independent   Current cognitive status: Alert/Oriented   Current Functional Status: Independent   Lives With parent(s);sibling(s)   Able to Return to Prior Arrangements yes   Is patient able to care for self after discharge? Yes   Who are your caregiver(s) and their phone number(s)? mother: Madeline Coughlin [phone]   Patient's perception of discharge disposition admitted as an inpatient   Readmission Within The Last 30 Days no previous admission in last 30 days   Patient currently being followed by outpatient case management? No   Patient currently receives any other outside agency services? No   Equipment Currently Used at Home none   Do you have any problems affording any of your prescribed medications? No   Is the patient taking medications as prescribed? yes   Does the patient have transportation home? Yes   Transportation Available car   Does the patient receive services at the [facility]? No   Discharge Plan A Home with family   Discharge Plan B Home with family   Patient/Family In Agreement With Plan yes   Pt admitted with neutropenia post chemo, watching counts, will start abx after cultures if febrile. Pt lives with his mother and siblings, has transportation home and has [facility] for inusurance. Verified al information as correct with pt, will follow for dc needs.

## 2018-03-06 NOTE — ASSESSMENT & PLAN NOTE
This  is a 17 y.o. male with AML (t8;21) receiving chemotherapy following COG [id number] (Arm A) here today for neutropenia (without fever).  Currently does not need platelets or fluids.  - CBC, CMP, Type and Screen MWF  - cw all home meds  - cw cipro, posconazole, and bactrim  - if febrile, can culture from both bports and start Vanc and cefpeime    Dispo: pending resolution of counts

## 2018-03-07 ENCOUNTER — TELEPHONE (OUTPATIENT)
Dept: PEDIATRIC HEMATOLOGY/ONCOLOGY | Facility: CLINIC | Age: 18
End: 2018-03-07

## 2018-03-07 LAB
ABO + RH BLD: NORMAL
ALBUMIN SERPL BCP-MCNC: 3.7 G/DL
ALP SERPL-CCNC: 85 U/L
ALT SERPL W/O P-5'-P-CCNC: 19 U/L
ANION GAP SERPL CALC-SCNC: 8 MMOL/L
AST SERPL-CCNC: 10 U/L
BASOPHILS # BLD AUTO: 0 K/UL
BASOPHILS NFR BLD: 0 %
BILIRUB SERPL-MCNC: 0.8 MG/DL
BLD GP AB SCN CELLS X3 SERPL QL: NORMAL
BLD PROD TYP BPU: NORMAL
BLD PROD TYP BPU: NORMAL
BLOOD UNIT EXPIRATION DATE: NORMAL
BLOOD UNIT EXPIRATION DATE: NORMAL
BLOOD UNIT TYPE CODE: 6200
BLOOD UNIT TYPE CODE: 8400
BLOOD UNIT TYPE: NORMAL
BLOOD UNIT TYPE: NORMAL
BUN SERPL-MCNC: 15 MG/DL
CALCIUM SERPL-MCNC: 9.3 MG/DL
CHLORIDE SERPL-SCNC: 104 MMOL/L
CO2 SERPL-SCNC: 26 MMOL/L
CODING SYSTEM: NORMAL
CODING SYSTEM: NORMAL
CREAT SERPL-MCNC: 0.8 MG/DL
DIFFERENTIAL METHOD: ABNORMAL
DISPENSE STATUS: NORMAL
DISPENSE STATUS: NORMAL
EOSINOPHIL # BLD AUTO: 0 K/UL
EOSINOPHIL NFR BLD: 1.4 %
ERYTHROCYTE [DISTWIDTH] IN BLOOD BY AUTOMATED COUNT: 18.4 %
EST. GFR  (AFRICAN AMERICAN): >60 ML/MIN/1.73 M^2
EST. GFR  (NON AFRICAN AMERICAN): >60 ML/MIN/1.73 M^2
GLUCOSE SERPL-MCNC: 101 MG/DL
HCT VFR BLD AUTO: 18.4 %
HGB BLD-MCNC: 6.6 G/DL
IMM GRANULOCYTES # BLD AUTO: 0 K/UL
IMM GRANULOCYTES NFR BLD AUTO: 0 %
LYMPHOCYTES # BLD AUTO: 0.7 K/UL
LYMPHOCYTES NFR BLD: 98.6 %
MCH RBC QN AUTO: 31.6 PG
MCHC RBC AUTO-ENTMCNC: 35.9 G/DL
MCV RBC AUTO: 88 FL
MONOCYTES # BLD AUTO: 0 K/UL
MONOCYTES NFR BLD: 0 %
NEUTROPHILS # BLD AUTO: 0 K/UL
NEUTROPHILS NFR BLD: 0 %
NRBC BLD-RTO: 0 /100 WBC
NUM UNITS TRANS PACKED RBC: NORMAL
NUM UNITS TRANS PACKED RBC: NORMAL
PLATELET # BLD AUTO: 17 K/UL
PMV BLD AUTO: 12.7 FL
POTASSIUM SERPL-SCNC: 3.8 MMOL/L
PROT SERPL-MCNC: 6.7 G/DL
RBC # BLD AUTO: 2.09 M/UL
SODIUM SERPL-SCNC: 138 MMOL/L
WBC # BLD AUTO: 0.74 K/UL

## 2018-03-07 PROCEDURE — 36430 TRANSFUSION BLD/BLD COMPNT: CPT

## 2018-03-07 PROCEDURE — 25000003 PHARM REV CODE 250: Performed by: STUDENT IN AN ORGANIZED HEALTH CARE EDUCATION/TRAINING PROGRAM

## 2018-03-07 PROCEDURE — P9038 RBC IRRADIATED: HCPCS

## 2018-03-07 PROCEDURE — 99233 SBSQ HOSP IP/OBS HIGH 50: CPT | Mod: ,,, | Performed by: PEDIATRICS

## 2018-03-07 PROCEDURE — 86920 COMPATIBILITY TEST SPIN: CPT

## 2018-03-07 PROCEDURE — 27201040 HC RC 50 FILTER: Mod: 91

## 2018-03-07 PROCEDURE — 86850 RBC ANTIBODY SCREEN: CPT

## 2018-03-07 PROCEDURE — 80053 COMPREHEN METABOLIC PANEL: CPT

## 2018-03-07 PROCEDURE — 86644 CMV ANTIBODY: CPT

## 2018-03-07 PROCEDURE — 85025 COMPLETE CBC W/AUTO DIFF WBC: CPT

## 2018-03-07 PROCEDURE — 20600001 HC STEP DOWN PRIVATE ROOM

## 2018-03-07 RX ORDER — DIPHENHYDRAMINE HCL 25 MG
25 CAPSULE ORAL ONCE
Status: COMPLETED | OUTPATIENT
Start: 2018-03-07 | End: 2018-03-07

## 2018-03-07 RX ORDER — HYDROCODONE BITARTRATE AND ACETAMINOPHEN 500; 5 MG/1; MG/1
TABLET ORAL
Status: DISCONTINUED | OUTPATIENT
Start: 2018-03-07 | End: 2018-03-13

## 2018-03-07 RX ORDER — DIPHENHYDRAMINE HCL 25 MG
25 CAPSULE ORAL ONCE
Status: DISCONTINUED | OUTPATIENT
Start: 2018-03-07 | End: 2018-05-31 | Stop reason: HOSPADM

## 2018-03-07 RX ORDER — ACETAMINOPHEN 325 MG/1
650 TABLET ORAL EVERY 6 HOURS PRN
Status: DISCONTINUED | OUTPATIENT
Start: 2018-03-07 | End: 2018-03-09

## 2018-03-07 RX ADMIN — ACYCLOVIR 400 MG: 200 CAPSULE ORAL at 08:03

## 2018-03-07 RX ADMIN — POSACONAZOLE 300 MG: 100 TABLET, COATED ORAL at 08:03

## 2018-03-07 RX ADMIN — DIPHENHYDRAMINE HYDROCHLORIDE 25 MG: 25 CAPSULE ORAL at 10:03

## 2018-03-07 RX ADMIN — CHLORHEXIDINE GLUCONATE 15 ML: 1.2 RINSE ORAL at 08:03

## 2018-03-07 RX ADMIN — ACETAMINOPHEN 650 MG: 325 TABLET, FILM COATED ORAL at 10:03

## 2018-03-07 RX ADMIN — CIPROFLOXACIN HYDROCHLORIDE 500 MG: 500 TABLET, FILM COATED ORAL at 08:03

## 2018-03-07 RX ADMIN — Medication 5 ML: at 08:03

## 2018-03-07 NOTE — PROGRESS NOTES
1st of 2 ordered units RBCs transfusing at this time per MD order for H/H=6.6/18.4.  Blood consent in chart; type and screen current.  Pt premedicated with tylenol and benadryl prior to start of transfusion.  RBCs checked against order and patient at bedside by 2 RNs per protocol.  Pt tolerating well; no distress noted; all VSS.  Will continue to monitor.

## 2018-03-07 NOTE — TELEPHONE ENCOUNTER
Spoke to pt mother, Madeline, and informed her that I received her message and that the pt was moved to the 8th floor due to him being over 18 and the Peds floor being full and needing beds for pts under 18. Informed her that it may be possible for the pt to be moved back but that the same issue may arise again and that they may just leave him on the 8th floor to prevent the issue again. Gave the pt mother lab results from today and informed her of his status and that if she would like a daily update on the pt, that her best bet to know what is going on is for her son to call her and put her on speaker phone when the MDs are rounding on him to discuss care. Pt mother agreed and stated she will discuss with the pt. No further needs noted. Dr. Bucio notified and Dr. Emerson who is on service this week.

## 2018-03-07 NOTE — PLAN OF CARE
Problem: Patient Care Overview  Goal: Plan of Care Review  Outcome: Ongoing (interventions implemented as appropriate)  Has done well throughout shift. Ambulating on arrival, but fatigued since. Oriented to room, call light placed in reach, communication board up to date. Juice provided on request. No other needs to address.Remained afebrile with VSS. Denies pain. Magic mouthwash for PRN  oral pain relief. Neutropenic precautions maintained. Updated on plan of care. Will continue to monitor .

## 2018-03-07 NOTE — PROGRESS NOTES
[facility]  Pediatric Hematology/Oncology  Progress Note    Patient Name: Hema Kuo  Admission Date: 3/5/2018  Hospital Length of Stay: 2 days  Code Status: Full Code     Subjective:     Interval History: No acute events overnight. Patient transferred from Peds floor to eighth floor. Patient has no complaints. Denies any pain. Afebrile overnight. Will transfuse two units of PRBC's this morning for hemoglobin of 6.6. Will likely transfuse platelets on 3/9, depending on platelet level.     Oncology Treatment Plan:     [facility] [id number]  ARM A - LR Risk Patients    Medications:  Continuous Infusions:  Scheduled Meds:   acyclovir  400 mg Oral BID    chlorhexidine  15 mL Mouth/Throat BID    ciprofloxacin HCl  500 mg Oral Q12H    posaconazole  300 mg Oral BID    [START ON 3/9/2018] sulfamethoxazole-trimethoprim 800-160mg  1 tablet Oral twice daily on Friday, Saturday, Sunday     PRN Meds:(Magic mouthwash) 1:1:1 Benadryl 12.5mg/5ml liq, aluminum & magnesium hydroxide-simehticone (Maalox), lidocaine viscous 2%, LORazepam, ondansetron, polyethylene glycol     Review of Systems  Objective:     Vital Signs (Most Recent):  Temp: 98.4 °F (36.9 °C) (03/07/18 0331)  Pulse: 101 (03/07/18 0331)  Resp: 17 (03/07/18 0331)  BP: (!) 99/53 (03/07/18 0331)  SpO2: 99 % (03/07/18 0331) Vital Signs (24h Range):  Temp:  [98.3 °F (36.8 °C)-99.2 °F (37.3 °C)] 98.4 °F (36.9 °C)  Pulse:  [] 101  Resp:  [17-20] 17  SpO2:  [98 %-100 %] 99 %  BP: ()/(53-64) 99/53     Weight: 83.9 kg (184 lb 15.5 oz)  Body mass index is 30.78 kg/m².  Body surface area is 1.96 meters squared.    Intake/Output Summary (Last 24 hours) at 03/07/18 0643  Last data filed at 03/06/18 1823   Gross per 24 hour   Intake              905 ml   Output              840 ml   Net               65 ml     Physical Exam   Constitutional: He is oriented to person, place, and time. He appears well-developed and well-nourished. No distress.   HENT:    Head: Normocephalic and atraumatic.   Right Ear: External ear normal.   Left Ear: External ear normal.   Nose: Nose normal.   Mouth/Throat: Oropharynx is clear and moist. No oropharyngeal exudate.   Eyes: Conjunctivae and EOM are normal. Pupils are equal, round, and reactive to light. Right eye exhibits no discharge. Left eye exhibits no discharge. No scleral icterus.   Neck: Normal range of motion. Neck supple. No JVD present. No tracheal deviation present. No thyromegaly present.   Cardiovascular: Normal rate, regular rhythm, normal heart sounds and intact distal pulses.  Exam reveals no friction rub.    No murmur heard.  Pulmonary/Chest: Breath sounds normal. No stridor. No respiratory distress. He has no wheezes. He has no rales. He exhibits no tenderness.   Abdominal: Soft. Bowel sounds are normal. He exhibits no distension and no mass. There is no tenderness. There is no rebound and no guarding. No hernia.   Musculoskeletal: Normal range of motion. He exhibits no edema, tenderness or deformity.   Lymphadenopathy:     He has no cervical adenopathy.   Neurological: He is alert and oriented to person, place, and time.   Skin: Skin is warm. Capillary refill takes less than 2 seconds. He is not diaphoretic.   Psychiatric: He has a normal mood and affect.   Nursing note and vitals reviewed.    Labs:   Results for SEGUN BHATIA (MRN [id number]) as of 3/7/2018 06:48   3/7/2018 03:58   WBC 0.74 (LL)   RBC 2.09 (L)   Hemoglobin 6.6 (L)   Hematocrit 18.4 (LL)   MCV 88   MCH 31.6 (H)   MCHC 35.9   RDW 18.4 (H)   Platelets 17 (LL)   MPV 12.7   Gran% 0.0 (L)   Gran # (ANC) 0.0 (L)   Immature Granulocytes 0.0   Immature Grans (Abs) 0.00   Lymph% 98.6 (H)   Lymph # 0.7 (L)   Mono% 0.0 (L)   Mono # 0.0 (L)   Eosinophil% 1.4   Eos # 0.0   Basophil% 0.0   Baso # 0.00   nRBC 0   Sodium 138   Potassium 3.8   Chloride 104   CO2 26   Anion Gap 8   BUN, Bld 15   Creatinine 0.8   eGFR if non African American >60.0   eGFR if   American >60.0   Glucose 101   Calcium 9.3   Alkaline Phosphatase 85   Total Protein 6.7   Albumin 3.7   Total Bilirubin 0.8   AST 10   ALT 19     Imaging:   None    Micro:   None    Assessment/Plan:     Hema is a 17 y.o. male with AML (t8;21) receiving chemotherapy following COG [id number] (Arm A) here today for neutropenia (without fever).  Currently does not need platelets or fluids.     - CBC, CMP, Type and Screen MWF  - Continue all home meds              - Acyclovir 400 mg Oral BID              - Chlorhexidine 15 ml Oral BID              - Ciprofloxacin 500 mg Oral q12 hours              - Lisdexamfetamine 60 mg oral qAM              - Bactrim 800-160 mg Oral BID on Friday, Saturday, and Sunday              - Posaconazole 300 mg BID  PRN Meds: Lorazepam, Ondansetron, Miralax  - Continue cipro, posconazole, and bactrim  - Transfuse 2 units PRBC's on 3/7 for hemoglobin of 6.6  - if febrile, can culture from both bports and start Vanc and cefpeime     Dispo: pending resolution of counts    Pascual Archer MD, MARKY  Pediatric Hematology/Oncology  [facility]

## 2018-03-07 NOTE — PLAN OF CARE
Problem: Patient Care Overview  Goal: Plan of Care Review  Outcome: Ongoing (interventions implemented as appropriate)  Pt involved in plan of care and communicating needs throughout shift.  Up in room independently; no c/o pain or discomfort today.  Tolerating regular diet, voiding without difficulty.  2 units RBCs transfusing this afternoon as ordered for H/H=6.6/18.4; pt tolerating well.  All VSS; no acute events so far this shift.  Pt remaining free from falls or injury throughout shift; bed in lowest position; call light within reach.  Pt instructed to call for assistance as needed.  Q1H rounding done on pt.

## 2018-03-07 NOTE — PROGRESS NOTES
2nd of 2 ordered units RBCs transfusing at this time per MD order for H/H=6.6/18.4.  Blood consent in chart; type and screen current.  Pt premedicated with tylenol and benadryl prior to start of prior transfusion.  RBCs checked against order and patient at bedside by 2 RNs per protocol.  Pt tolerating well; no distress noted; all VSS.  Will continue to monitor.

## 2018-03-07 NOTE — NURSING TRANSFER
Nursing Transfer Note    Sending Transfer Note      3/6/2018 8:41 PM  Transfer via wheelchair  From Peds 449 to Oncology 808   Transfered with belongings.  Transported by: JULIANN Ocampo  Report given as documented in PER Handoff on Doc Flowsheet  VS's per Doc Flowsheet  Medicines sent: Yes  Chart sent with patient: Yes  ERIN Quiñones RN  3/6/2018 8:41 PM

## 2018-03-07 NOTE — SUBJECTIVE & OBJECTIVE
Subjective:     Interval History:     Oncology Treatment Plan:     [facility] [id number]  ARM A - LR Risk Patients    Medications:  Continuous Infusions:  Scheduled Meds:   acyclovir  400 mg Oral BID    chlorhexidine  15 mL Mouth/Throat BID    ciprofloxacin HCl  500 mg Oral Q12H    posaconazole  300 mg Oral BID    [START ON 3/9/2018] sulfamethoxazole-trimethoprim 800-160mg  1 tablet Oral twice daily on Friday, Saturday, Sunday     PRN Meds:(Magic mouthwash) 1:1:1 Benadryl 12.5mg/5ml liq, aluminum & magnesium hydroxide-simehticone (Maalox), lidocaine viscous 2%, LORazepam, ondansetron, polyethylene glycol     Review of Systems  Objective:     Vital Signs (Most Recent):  Temp: 98.4 °F (36.9 °C) (03/07/18 0331)  Pulse: 101 (03/07/18 0331)  Resp: 17 (03/07/18 0331)  BP: (!) 99/53 (03/07/18 0331)  SpO2: 99 % (03/07/18 0331) Vital Signs (24h Range):  Temp:  [98.3 °F (36.8 °C)-99.2 °F (37.3 °C)] 98.4 °F (36.9 °C)  Pulse:  [] 101  Resp:  [17-20] 17  SpO2:  [98 %-100 %] 99 %  BP: ()/(53-64) 99/53     Weight: 83.9 kg (184 lb 15.5 oz)  Body mass index is 30.78 kg/m².  Body surface area is 1.96 meters squared.      Intake/Output Summary (Last 24 hours) at 03/07/18 0643  Last data filed at 03/06/18 1823   Gross per 24 hour   Intake              905 ml   Output              840 ml   Net               65 ml       Physical Exam   Constitutional: He is oriented to person, place, and time. He appears well-developed and well-nourished. No distress.   HENT:   Head: Normocephalic and atraumatic.   Right Ear: External ear normal.   Left Ear: External ear normal.   Nose: Nose normal.   Mouth/Throat: Oropharynx is clear and moist. No oropharyngeal exudate.   Eyes: Conjunctivae and EOM are normal. Pupils are equal, round, and reactive to light. Right eye exhibits no discharge. Left eye exhibits no discharge. No scleral icterus.   Neck: Normal range of motion. Neck supple. No JVD present. No tracheal deviation present. No  thyromegaly present.   Cardiovascular: Normal rate, regular rhythm, normal heart sounds and intact distal pulses.  Exam reveals no friction rub.    No murmur heard.  Pulmonary/Chest: Breath sounds normal. No stridor. No respiratory distress. He has no wheezes. He has no rales. He exhibits no tenderness.   Abdominal: Soft. Bowel sounds are normal. He exhibits no distension and no mass. There is no tenderness. There is no rebound and no guarding. No hernia.   Musculoskeletal: Normal range of motion. He exhibits no edema, tenderness or deformity.   Lymphadenopathy:     He has no cervical adenopathy.   Neurological: He is alert and oriented to person, place, and time.   Skin: Skin is warm. Capillary refill takes less than 2 seconds. He is not diaphoretic.   Psychiatric: He has a normal mood and affect.   Nursing note and vitals reviewed.    Labs:     Imaging:     Micro:

## 2018-03-08 PROCEDURE — 25000003 PHARM REV CODE 250: Performed by: STUDENT IN AN ORGANIZED HEALTH CARE EDUCATION/TRAINING PROGRAM

## 2018-03-08 PROCEDURE — 99233 SBSQ HOSP IP/OBS HIGH 50: CPT | Mod: ,,, | Performed by: PEDIATRICS

## 2018-03-08 PROCEDURE — 20600001 HC STEP DOWN PRIVATE ROOM

## 2018-03-08 RX ADMIN — ACYCLOVIR 400 MG: 200 CAPSULE ORAL at 09:03

## 2018-03-08 RX ADMIN — CIPROFLOXACIN HYDROCHLORIDE 500 MG: 500 TABLET, FILM COATED ORAL at 08:03

## 2018-03-08 RX ADMIN — CHLORHEXIDINE GLUCONATE 15 ML: 1.2 RINSE ORAL at 08:03

## 2018-03-08 RX ADMIN — POSACONAZOLE 300 MG: 100 TABLET, COATED ORAL at 08:03

## 2018-03-08 RX ADMIN — Medication 5 ML: at 01:03

## 2018-03-08 RX ADMIN — POSACONAZOLE 300 MG: 100 TABLET, COATED ORAL at 09:03

## 2018-03-08 RX ADMIN — CHLORHEXIDINE GLUCONATE 15 ML: 1.2 RINSE ORAL at 09:03

## 2018-03-08 RX ADMIN — ACYCLOVIR 400 MG: 200 CAPSULE ORAL at 08:03

## 2018-03-08 RX ADMIN — CIPROFLOXACIN HYDROCHLORIDE 500 MG: 500 TABLET, FILM COATED ORAL at 09:03

## 2018-03-08 NOTE — PLAN OF CARE
Problem: Patient Care Overview  Goal: Plan of Care Review  Outcome: Ongoing (interventions implemented as appropriate)  Patient AAOx4, VSS, afebrile, and without injury. Fall precautions maintained. Patient instructed on how to contact the nurse. Patient without distress on room air; on regular diet with good appetite. No complaints of nausea; complaints of pain related to lesions in mouth (inner lower lip, and left cheek). Chlorhexidine proves to be more helpful than the magic mouthwash. Patient up ad chilo. Questions and concerns have been addressed; will continue to monitor.

## 2018-03-08 NOTE — SUBJECTIVE & OBJECTIVE
Subjective:     Interval History: 2nd of 2 ordered units RBCs transfusing at this time per MD order for H/H=6.6/18.4.  Blood consent in chart; type and screen current.  Pt premedicated with tylenol and benadryl prior to start of prior transfusion.  RBCs checked against order and patient at bedside by 2 RNs per protocol.  Pt tolerating well; no distress noted; all VSS.  Will continue to monitor.   Pt involved in plan of care and communicating needs throughout shift.  Up in room independently; no c/o pain or discomfort today.  Tolerating regular diet, voiding without difficulty.  2 units RBCs transfusing this afternoon as ordered for H/H=6.6/18.4; pt tolerating well.  All VSS; no acute events so far this shift.  Pt remaining free from falls or injury throughout shift; bed in lowest position; call light within reach.  Pt instructed to call for assistance as needed.  Q1H rounding done on pt.    1st of 2 ordered units RBCs transfusing at this time per MD order for H/H=6.6/18.4.  Blood consent in chart; type and screen current.  Pt premedicated with tylenol and benadryl prior to start of transfusion.  RBCs checked against order and patient at bedside by 2 RNs per protocol.  Pt tolerating well; no distress noted; all VSS.  Will continue to monitor.       Oncology Treatment Plan:     [facility] [id number]  ARM A - LR Risk Patients    Medications:  Continuous Infusions:  Scheduled Meds:   acyclovir  400 mg Oral BID    chlorhexidine  15 mL Mouth/Throat BID    ciprofloxacin HCl  500 mg Oral Q12H    posaconazole  300 mg Oral BID    [START ON 3/9/2018] sulfamethoxazole-trimethoprim 800-160mg  1 tablet Oral twice daily on Friday, Saturday, Sunday     PRN Meds:(Magic mouthwash) 1:1:1 Benadryl 12.5mg/5ml liq, aluminum & magnesium hydroxide-simehticone (Maalox), lidocaine viscous 2%, sodium chloride, acetaminophen, LORazepam, ondansetron, polyethylene glycol     Review of Systems  Objective:     Vital Signs (Most Recent):  Temp:  99.7 °F (37.6 °C) (03/08/18 0345)  Pulse: 85 (03/08/18 0345)  Resp: 20 (03/08/18 0345)  BP: (!) 100/51 (03/08/18 0345)  SpO2: 98 % (03/08/18 0345) Vital Signs (24h Range):  Temp:  [98.3 °F (36.8 °C)-99.7 °F (37.6 °C)] 99.7 °F (37.6 °C)  Pulse:  [77-99] 85  Resp:  [15-20] 20  SpO2:  [98 %-100 %] 98 %  BP: (100-128)/(51-63) 100/51     Weight: 83.4 kg (183 lb 15.6 oz)  Body mass index is 30.61 kg/m².  Body surface area is 1.96 meters squared.      Intake/Output Summary (Last 24 hours) at 03/08/18 0634  Last data filed at 03/07/18 2230   Gross per 24 hour   Intake             1700 ml   Output              680 ml   Net             1020 ml       Physical Exam   Constitutional: He is oriented to person, place, and time. He appears well-developed and well-nourished. No distress.   HENT:   Head: Normocephalic and atraumatic.   Right Ear: External ear normal.   Left Ear: External ear normal.   Nose: Nose normal.   Mouth/Throat: Oropharynx is clear and moist. No oropharyngeal exudate.   Eyes: Conjunctivae and EOM are normal. Pupils are equal, round, and reactive to light. Right eye exhibits no discharge. Left eye exhibits no discharge. No scleral icterus.   Neck: Normal range of motion. Neck supple. No JVD present. No tracheal deviation present. No thyromegaly present.   Cardiovascular: Normal rate, regular rhythm, normal heart sounds and intact distal pulses.  Exam reveals no friction rub.    No murmur heard.  Pulmonary/Chest: Breath sounds normal. No stridor. No respiratory distress. He has no wheezes. He has no rales. He exhibits no tenderness.   Abdominal: Soft. Bowel sounds are normal. He exhibits no distension and no mass. There is no tenderness. There is no rebound and no guarding. No hernia.   Musculoskeletal: Normal range of motion. He exhibits no edema, tenderness or deformity.   Lymphadenopathy:     He has no cervical adenopathy.   Neurological: He is alert and oriented to person, place, and time.   Skin: Skin is  warm. Capillary refill takes less than 2 seconds. He is not diaphoretic.   Psychiatric: He has a normal mood and affect.   Nursing note and vitals reviewed.      Labs:     Imaging:     Micro:

## 2018-03-09 LAB
ALBUMIN SERPL BCP-MCNC: 3.5 G/DL
ALP SERPL-CCNC: 90 U/L
ALT SERPL W/O P-5'-P-CCNC: 30 U/L
ANION GAP SERPL CALC-SCNC: 8 MMOL/L
ANISOCYTOSIS BLD QL SMEAR: SLIGHT
AST SERPL-CCNC: 16 U/L
BASOPHILS # BLD AUTO: ABNORMAL K/UL
BASOPHILS NFR BLD: 0 %
BILIRUB SERPL-MCNC: 0.8 MG/DL
BLD PROD TYP BPU: NORMAL
BLOOD UNIT EXPIRATION DATE: NORMAL
BLOOD UNIT TYPE CODE: 2800
BLOOD UNIT TYPE CODE: 2800
BLOOD UNIT TYPE CODE: 6200
BLOOD UNIT TYPE: NORMAL
BUN SERPL-MCNC: 9 MG/DL
CALCIUM SERPL-MCNC: 9.3 MG/DL
CHLORIDE SERPL-SCNC: 104 MMOL/L
CO2 SERPL-SCNC: 27 MMOL/L
CODING SYSTEM: NORMAL
CREAT SERPL-MCNC: 0.8 MG/DL
DIFFERENTIAL METHOD: ABNORMAL
DISPENSE STATUS: NORMAL
EOSINOPHIL # BLD AUTO: ABNORMAL K/UL
EOSINOPHIL NFR BLD: 1.3 %
ERYTHROCYTE [DISTWIDTH] IN BLOOD BY AUTOMATED COUNT: 17.2 %
EST. GFR  (AFRICAN AMERICAN): >60 ML/MIN/1.73 M^2
EST. GFR  (NON AFRICAN AMERICAN): >60 ML/MIN/1.73 M^2
GLUCOSE SERPL-MCNC: 103 MG/DL
HCT VFR BLD AUTO: 19.5 %
HGB BLD-MCNC: 6.9 G/DL
HYPOCHROMIA BLD QL SMEAR: ABNORMAL
IMM GRANULOCYTES # BLD AUTO: ABNORMAL K/UL
IMM GRANULOCYTES NFR BLD AUTO: ABNORMAL %
LYMPHOCYTES # BLD AUTO: ABNORMAL K/UL
LYMPHOCYTES NFR BLD: 98.7 %
MCH RBC QN AUTO: 30.7 PG
MCHC RBC AUTO-ENTMCNC: 36.3 G/DL
MCV RBC AUTO: 84 FL
MONOCYTES # BLD AUTO: ABNORMAL K/UL
MONOCYTES NFR BLD: 0 %
NEUTROPHILS NFR BLD: 0 %
NRBC BLD-RTO: 0 /100 WBC
NUM UNITS TRANS PACKED RBC: NORMAL
NUM UNITS TRANS PACKED RBC: NORMAL
NUM UNITS TRANS WBC-POOR PLATPHERESIS: NORMAL
PLATELET # BLD AUTO: 7 K/UL
PLATELET BLD QL SMEAR: ABNORMAL
PMV BLD AUTO: ABNORMAL FL
POTASSIUM SERPL-SCNC: 3.7 MMOL/L
PROT SERPL-MCNC: 6.5 G/DL
RBC # BLD AUTO: 2.25 M/UL
SODIUM SERPL-SCNC: 139 MMOL/L
WBC # BLD AUTO: 0.69 K/UL

## 2018-03-09 PROCEDURE — 86644 CMV ANTIBODY: CPT

## 2018-03-09 PROCEDURE — 25000003 PHARM REV CODE 250: Performed by: STUDENT IN AN ORGANIZED HEALTH CARE EDUCATION/TRAINING PROGRAM

## 2018-03-09 PROCEDURE — P9037 PLATE PHERES LEUKOREDU IRRAD: HCPCS

## 2018-03-09 PROCEDURE — 99233 SBSQ HOSP IP/OBS HIGH 50: CPT | Mod: ,,, | Performed by: PEDIATRICS

## 2018-03-09 PROCEDURE — P9040 RBC LEUKOREDUCED IRRADIATED: HCPCS

## 2018-03-09 PROCEDURE — 36430 TRANSFUSION BLD/BLD COMPNT: CPT

## 2018-03-09 PROCEDURE — 20600001 HC STEP DOWN PRIVATE ROOM

## 2018-03-09 PROCEDURE — 25000003 PHARM REV CODE 250: Performed by: PEDIATRICS

## 2018-03-09 PROCEDURE — 80053 COMPREHEN METABOLIC PANEL: CPT

## 2018-03-09 PROCEDURE — 63600175 PHARM REV CODE 636 W HCPCS: Performed by: STUDENT IN AN ORGANIZED HEALTH CARE EDUCATION/TRAINING PROGRAM

## 2018-03-09 PROCEDURE — 85025 COMPLETE CBC W/AUTO DIFF WBC: CPT

## 2018-03-09 RX ORDER — ACETAMINOPHEN 325 MG/1
650 TABLET ORAL EVERY 6 HOURS PRN
Status: DISCONTINUED | OUTPATIENT
Start: 2018-03-09 | End: 2018-03-10

## 2018-03-09 RX ORDER — LIDOCAINE HYDROCHLORIDE 20 MG/ML
5 SOLUTION OROPHARYNGEAL
Status: DISCONTINUED | OUTPATIENT
Start: 2018-03-09 | End: 2018-03-09

## 2018-03-09 RX ORDER — LIDOCAINE HYDROCHLORIDE 20 MG/ML
5 SOLUTION OROPHARYNGEAL
Status: DISCONTINUED | OUTPATIENT
Start: 2018-03-09 | End: 2018-03-23 | Stop reason: HOSPADM

## 2018-03-09 RX ORDER — DIPHENHYDRAMINE HCL 25 MG
CAPSULE ORAL
Status: DISCONTINUED
Start: 2018-03-09 | End: 2018-03-09 | Stop reason: WASHOUT

## 2018-03-09 RX ORDER — DIPHENHYDRAMINE HYDROCHLORIDE 50 MG/ML
INJECTION INTRAMUSCULAR; INTRAVENOUS
Status: DISPENSED
Start: 2018-03-09 | End: 2018-03-09

## 2018-03-09 RX ORDER — DIPHENHYDRAMINE HYDROCHLORIDE 50 MG/ML
25 INJECTION INTRAMUSCULAR; INTRAVENOUS EVERY 6 HOURS PRN
Status: DISCONTINUED | OUTPATIENT
Start: 2018-03-09 | End: 2018-03-19

## 2018-03-09 RX ORDER — HYDROCODONE BITARTRATE AND ACETAMINOPHEN 500; 5 MG/1; MG/1
TABLET ORAL
Status: DISCONTINUED | OUTPATIENT
Start: 2018-03-09 | End: 2018-03-13

## 2018-03-09 RX ADMIN — ACETAMINOPHEN 650 MG: 325 TABLET, FILM COATED ORAL at 11:03

## 2018-03-09 RX ADMIN — ACYCLOVIR 400 MG: 200 CAPSULE ORAL at 08:03

## 2018-03-09 RX ADMIN — CHLORHEXIDINE GLUCONATE 15 ML: 1.2 RINSE ORAL at 08:03

## 2018-03-09 RX ADMIN — CIPROFLOXACIN HYDROCHLORIDE 500 MG: 500 TABLET, FILM COATED ORAL at 08:03

## 2018-03-09 RX ADMIN — LIDOCAINE HYDROCHLORIDE 5 ML: 20 SOLUTION ORAL; TOPICAL at 03:03

## 2018-03-09 RX ADMIN — ACETAMINOPHEN 650 MG: 325 TABLET, FILM COATED ORAL at 08:03

## 2018-03-09 RX ADMIN — DIPHENHYDRAMINE HYDROCHLORIDE 25 MG: 50 INJECTION, SOLUTION INTRAMUSCULAR; INTRAVENOUS at 08:03

## 2018-03-09 RX ADMIN — DIPHENHYDRAMINE HYDROCHLORIDE 25 MG: 50 INJECTION, SOLUTION INTRAMUSCULAR; INTRAVENOUS at 11:03

## 2018-03-09 RX ADMIN — SULFAMETHOXAZOLE AND TRIMETHOPRIM 1 TABLET: 800; 160 TABLET ORAL at 08:03

## 2018-03-09 RX ADMIN — POSACONAZOLE 300 MG: 100 TABLET, COATED ORAL at 08:03

## 2018-03-09 NOTE — NURSING
Patient received 1 unit of blood at this time, premeds of tylenol 650mg po and benadryl 25mg po.  Initially started at 75cc/hr and will increase to 100cc after 15 min.

## 2018-03-09 NOTE — PROGRESS NOTES
[facility]  Pediatric Hematology/Oncology  Progress Note    Patient Name: Hema Kuo  Admission Date: 3/5/2018  Hospital Length of Stay: 4 days  Code Status: Full Code     Subjective:     Interval History: No acute events overnight. Will transfuse platelets this morning.     Oncology Treatment Plan:     PEDS [id number]  ARM A - LR Risk Patients    Medications:  Continuous Infusions:  Scheduled Meds:   acyclovir  400 mg Oral BID    chlorhexidine  15 mL Mouth/Throat BID    ciprofloxacin HCl  500 mg Oral Q12H    posaconazole  300 mg Oral BID    sulfamethoxazole-trimethoprim 800-160mg  1 tablet Oral twice daily on Friday, Saturday, Sunday     PRN Meds:(Magic mouthwash) 1:1:1 Benadryl 12.5mg/5ml liq, aluminum & magnesium hydroxide-simehticone (Maalox), lidocaine viscous 2%, sodium chloride, acetaminophen, LORazepam, ondansetron, polyethylene glycol     Review of Systems  Objective:     Vital Signs (Most Recent):  Temp: 98.4 °F (36.9 °C) (03/09/18 0445)  Pulse: 81 (03/09/18 0445)  Resp: 18 (03/09/18 0445)  BP: (!) 118/59 (03/09/18 0445)  SpO2: 100 % (03/09/18 0445) Vital Signs (24h Range):  Temp:  [98.1 °F (36.7 °C)-99.1 °F (37.3 °C)] 98.4 °F (36.9 °C)  Pulse:  [80-92] 81  Resp:  [18-20] 18  SpO2:  [97 %-100 %] 100 %  BP: (103-125)/(50-59) 118/59     Weight: 83.4 kg (183 lb 15.6 oz)  Body mass index is 30.61 kg/m².  Body surface area is 1.96 meters squared.      Intake/Output Summary (Last 24 hours) at 03/09/18 0659  Last data filed at 03/08/18 1714   Gross per 24 hour   Intake              960 ml   Output                0 ml   Net              960 ml       Physical Exam   Constitutional: He is oriented to person, place, and time. He appears well-developed and well-nourished. No distress.   HENT:   Head: Normocephalic and atraumatic.   Right Ear: External ear normal.   Left Ear: External ear normal.   Nose: Nose normal.   Mouth/Throat: Oropharynx is clear and moist. No oropharyngeal exudate.    Eyes: Conjunctivae and EOM are normal. Pupils are equal, round, and reactive to light. Right eye exhibits no discharge. Left eye exhibits no discharge. No scleral icterus.   Neck: Normal range of motion. Neck supple. No JVD present. No tracheal deviation present. No thyromegaly present.   Cardiovascular: Normal rate, regular rhythm, normal heart sounds and intact distal pulses.  Exam reveals no friction rub.    No murmur heard.  Pulmonary/Chest: Breath sounds normal. No stridor. No respiratory distress. He has no wheezes. He has no rales. He exhibits no tenderness.   Abdominal: Soft. Bowel sounds are normal. He exhibits no distension and no mass. There is no tenderness. There is no rebound and no guarding. No hernia.   Musculoskeletal: Normal range of motion. He exhibits no edema, tenderness or deformity.   Lymphadenopathy:     He has no cervical adenopathy.   Neurological: He is alert and oriented to person, place, and time.   Skin: Skin is warm. Capillary refill takes less than 2 seconds. He is not diaphoretic.   Psychiatric: He has a normal mood and affect.   Nursing note and vitals reviewed.    Labs:   Results for HEMA BHATIA (MRN [id number]) as of 3/9/2018 06:59   3/9/2018 04:30   WBC 0.69 (LL)   RBC 2.25 (L)   Hemoglobin 6.9 (L)   Hematocrit 19.5 (LL)   MCV 84   MCH 30.7   MCHC 36.3 (H)   RDW 17.2 (H)   MPV SEE COMMENT   Sodium 139   Potassium 3.7   Chloride 104   CO2 27   Anion Gap 8   BUN, Bld 9   Creatinine 0.8   eGFR if non African American >60.0   eGFR if African American >60.0   Glucose 103   Calcium 9.3   Alkaline Phosphatase 90   Total Protein 6.5   Albumin 3.5   Total Bilirubin 0.8   AST 16   ALT 30     Micro:   None    Imaging:   None    Assessment/Plan:     Hema is a 17 y.o. male with AML (t8;21) receiving chemotherapy following COG [id number] (Arm A) here today for neutropenia (without fever).  Currently does not need platelets or fluids.     - CBC, CMP, Type and Screen MWF  - Continue all home  meds              - Acyclovir 400 mg Oral BID              - Chlorhexidine 15 ml Oral BID              - Ciprofloxacin 500 mg Oral q12 hours              - Lisdexamfetamine 60 mg oral qAM              - Bactrim 800-160 mg Oral BID on Friday, Saturday, and Sunday              - Posaconazole 300 mg BID  PRN Meds: Lorazepam, Ondansetron, Miralax  - Continue cipro, posconazole, and bactrim  - Transfuse 2 units PRBC's on 3/7 for hemoglobin of 6.6  - if febrile, can culture from both bports and start Vanc and cefpeime     Dispo: pending resolution of counts     Pascual Archer MD, MARKY  Pediatric Hematology/Oncology  [facility]

## 2018-03-09 NOTE — PLAN OF CARE
Problem: Patient Care Overview  Goal: Plan of Care Review  Outcome: Ongoing (interventions implemented as appropriate)  Plan of care reviewed with patient.  Patient received 1 unit of blood and need 1 more and 1 unit of platelets today.  Family at the bedside.  No complaints voiced.  Afebrile.  Ambulating, voiding and tolerating a regular diet without difficulty.

## 2018-03-09 NOTE — NURSING
Patient received 2nd unit of blood at this time.  Premeds given prior to first unit of blood tylenol 650mg and benadryl 25mg po.  Initially started at 100cc/hr and will increase to 125cc/hr without difficulty.

## 2018-03-09 NOTE — SUBJECTIVE & OBJECTIVE
Subjective:     Interval History:     Oncology Treatment Plan:     [facility] AAML-1031  ARM A - LR Risk Patients    Medications:  Continuous Infusions:  Scheduled Meds:   acyclovir  400 mg Oral BID    chlorhexidine  15 mL Mouth/Throat BID    ciprofloxacin HCl  500 mg Oral Q12H    posaconazole  300 mg Oral BID    sulfamethoxazole-trimethoprim 800-160mg  1 tablet Oral twice daily on Friday, Saturday, Sunday     PRN Meds:(Magic mouthwash) 1:1:1 Benadryl 12.5mg/5ml liq, aluminum & magnesium hydroxide-simehticone (Maalox), lidocaine viscous 2%, sodium chloride, acetaminophen, LORazepam, ondansetron, polyethylene glycol     Review of Systems  Objective:     Vital Signs (Most Recent):  Temp: 98.4 °F (36.9 °C) (03/09/18 0445)  Pulse: 81 (03/09/18 0445)  Resp: 18 (03/09/18 0445)  BP: (!) 118/59 (03/09/18 0445)  SpO2: 100 % (03/09/18 0445) Vital Signs (24h Range):  Temp:  [98.1 °F (36.7 °C)-99.1 °F (37.3 °C)] 98.4 °F (36.9 °C)  Pulse:  [80-92] 81  Resp:  [18-20] 18  SpO2:  [97 %-100 %] 100 %  BP: (103-125)/(50-59) 118/59     Weight: 83.4 kg (183 lb 15.6 oz)  Body mass index is 30.61 kg/m².  Body surface area is 1.96 meters squared.      Intake/Output Summary (Last 24 hours) at 03/09/18 0659  Last data filed at 03/08/18 1714   Gross per 24 hour   Intake              960 ml   Output                0 ml   Net              960 ml       Physical Exam   Constitutional: He is oriented to person, place, and time. He appears well-developed and well-nourished. No distress.   HENT:   Head: Normocephalic and atraumatic.   Right Ear: External ear normal.   Left Ear: External ear normal.   Nose: Nose normal.   Mouth/Throat: Oropharynx is clear and moist. No oropharyngeal exudate.   Eyes: Conjunctivae and EOM are normal. Pupils are equal, round, and reactive to light. Right eye exhibits no discharge. Left eye exhibits no discharge. No scleral icterus.   Neck: Normal range of motion. Neck supple. No JVD present. No tracheal deviation  present. No thyromegaly present.   Cardiovascular: Normal rate, regular rhythm, normal heart sounds and intact distal pulses.  Exam reveals no friction rub.    No murmur heard.  Pulmonary/Chest: Breath sounds normal. No stridor. No respiratory distress. He has no wheezes. He has no rales. He exhibits no tenderness.   Abdominal: Soft. Bowel sounds are normal. He exhibits no distension and no mass. There is no tenderness. There is no rebound and no guarding. No hernia.   Musculoskeletal: Normal range of motion. He exhibits no edema, tenderness or deformity.   Lymphadenopathy:     He has no cervical adenopathy.   Neurological: He is alert and oriented to person, place, and time.   Skin: Skin is warm. Capillary refill takes less than 2 seconds. He is not diaphoretic.   Psychiatric: He has a normal mood and affect.   Nursing note and vitals reviewed.    Labs:   Results for SEGUN BHATIA (MRN [id number]) as of 3/9/2018 06:59   3/9/2018 04:30   WBC 0.69 (LL)   RBC 2.25 (L)   Hemoglobin 6.9 (L)   Hematocrit 19.5 (LL)   MCV 84   MCH 30.7   MCHC 36.3 (H)   RDW 17.2 (H)   MPV SEE COMMENT   Sodium 139   Potassium 3.7   Chloride 104   CO2 27   Anion Gap 8   BUN, Bld 9   Creatinine 0.8   eGFR if non African American >60.0   eGFR if African American >60.0   Glucose 103   Calcium 9.3   Alkaline Phosphatase 90   Total Protein 6.5   Albumin 3.5   Total Bilirubin 0.8   AST 16   ALT 30

## 2018-03-09 NOTE — PLAN OF CARE
Problem: Patient Care Overview  Goal: Plan of Care Review  Outcome: Ongoing (interventions implemented as appropriate)  Pt is AAOx3.Pt able to perform all ADL's without assistance. Pt denies pian and/or discomfort at this time.No breakdown noted, po fluids encouraged.Standard precautions maintained.  Pt turns independently, pt is aware of bony area and pressure reduction positions V/S stable, within normal limits.Pt remains injury and fall free, non skid footwear donned, call light within reach, personal items within reach, bed in low/locked position, pt able to voice needs all needs voiced have been met at this time.

## 2018-03-10 PROCEDURE — 99233 SBSQ HOSP IP/OBS HIGH 50: CPT | Mod: ,,, | Performed by: PEDIATRICS

## 2018-03-10 PROCEDURE — 25000003 PHARM REV CODE 250: Performed by: PEDIATRICS

## 2018-03-10 PROCEDURE — 25000003 PHARM REV CODE 250: Performed by: STUDENT IN AN ORGANIZED HEALTH CARE EDUCATION/TRAINING PROGRAM

## 2018-03-10 PROCEDURE — 36430 TRANSFUSION BLD/BLD COMPNT: CPT

## 2018-03-10 PROCEDURE — 11300000 HC PEDIATRIC PRIVATE ROOM

## 2018-03-10 RX ORDER — ACETAMINOPHEN 325 MG/1
650 TABLET ORAL EVERY 6 HOURS PRN
Status: DISCONTINUED | OUTPATIENT
Start: 2018-03-10 | End: 2018-03-23 | Stop reason: HOSPADM

## 2018-03-10 RX ADMIN — LIDOCAINE HYDROCHLORIDE 5 ML: 20 SOLUTION ORAL; TOPICAL at 06:03

## 2018-03-10 RX ADMIN — SULFAMETHOXAZOLE AND TRIMETHOPRIM 1 TABLET: 800; 160 TABLET ORAL at 09:03

## 2018-03-10 RX ADMIN — CIPROFLOXACIN HYDROCHLORIDE 500 MG: 500 TABLET, FILM COATED ORAL at 08:03

## 2018-03-10 RX ADMIN — ACYCLOVIR 400 MG: 200 CAPSULE ORAL at 08:03

## 2018-03-10 RX ADMIN — POSACONAZOLE 300 MG: 100 TABLET, COATED ORAL at 09:03

## 2018-03-10 RX ADMIN — POSACONAZOLE 300 MG: 100 TABLET, COATED ORAL at 08:03

## 2018-03-10 RX ADMIN — ACYCLOVIR 400 MG: 200 CAPSULE ORAL at 09:03

## 2018-03-10 RX ADMIN — CHLORHEXIDINE GLUCONATE 15 ML: 1.2 RINSE ORAL at 08:03

## 2018-03-10 RX ADMIN — CHLORHEXIDINE GLUCONATE 15 ML: 1.2 RINSE ORAL at 09:03

## 2018-03-10 RX ADMIN — SULFAMETHOXAZOLE AND TRIMETHOPRIM 1 TABLET: 800; 160 TABLET ORAL at 08:03

## 2018-03-10 RX ADMIN — CIPROFLOXACIN HYDROCHLORIDE 500 MG: 500 TABLET, FILM COATED ORAL at 09:03

## 2018-03-10 NOTE — ASSESSMENT & PLAN NOTE
This  is a 17 y.o. male with AML (t8;21) receiving chemotherapy following [facility] [id number] ([place]) here w neutropenia (without fever).      #neutorpenia: in setting og AML (t8;21) being treated with chemo per [facility] AAML 1031 (ARM A). S/p 2 units PRBC's on 3/7 for hemoglobin of 6.6; 2 U of pRBCs and 1U Platelets 3/9.  - CBC, CMP, Type and Screen MWF  - cw all home meds              - Acyclovir 400 mg Oral BID              - Chlorhexidine 15 ml Oral BID              - Ciprofloxacin 500 mg Oral q12 hours              - Lisdexamfetamine 60 mg oral qAM              - Bactrim 800-160 mg Oral BID on Friday, Saturday, and Sunday              - Posaconazole 300 mg BID  - PRN Meds: Lorazepam, Ondansetron, Miralax  - if febrile, can culture from both bports and start Vanc and cefpeime    Dispo: pending resolution of counts

## 2018-03-10 NOTE — PLAN OF CARE
Problem: Patient Care Overview  Goal: Plan of Care Review  Outcome: Ongoing (interventions implemented as appropriate)  Pt remained free from falls during shift. VS stable. AAOx4. Up ad chilo. Playing video games on couch. Pt participating in plan of care. Call light within reach. Will continue to monitor.

## 2018-03-10 NOTE — PLAN OF CARE
Problem: Patient Care Overview  Goal: Plan of Care Review  Outcome: Ongoing (interventions implemented as appropriate)  AAOx4, bed in low and locked position, call bell within reach, voids via urinal. Patient has 1 unit of platelets administered; tolerated well. No complaints of pain or nausea and he remains afebrile. No falls or injuries during shift. Patient stable, vitals stable, will continue to monitor.

## 2018-03-10 NOTE — SUBJECTIVE & OBJECTIVE
Interval History: NAEON. Received platelets and prbcs yesterday. Tolerated well. Continues to play significant amounts of video games.    Scheduled Meds:   acyclovir  400 mg Oral BID    chlorhexidine  15 mL Mouth/Throat BID    ciprofloxacin HCl  500 mg Oral Q12H    posaconazole  300 mg Oral BID    sulfamethoxazole-trimethoprim 800-160mg  1 tablet Oral twice daily on Friday, Saturday, Sunday     Continuous Infusions:  PRN Meds:(Magic mouthwash) 1:1:1 Benadryl 12.5mg/5ml liq, aluminum & magnesium hydroxide-simehticone (Maalox), lidocaine viscous 2%, sodium chloride, sodium chloride, acetaminophen, diphenhydrAMINE, lidocaine HCl 2%, LORazepam, ondansetron, polyethylene glycol    Review of Systems   Constitutional: Positive for fatigue (mild). Negative for activity change, appetite change, chills, diaphoresis, fever and unexpected weight change.   HENT: Negative for congestion, facial swelling, hearing loss, mouth sores, nosebleeds, postnasal drip, rhinorrhea, sinus pressure, sneezing, sore throat, tinnitus, trouble swallowing and voice change.    Eyes: Negative for photophobia, pain, discharge, redness, itching and visual disturbance.   Respiratory: Negative for apnea, cough, choking, chest tightness, shortness of breath, wheezing and stridor.    Cardiovascular: Negative for chest pain, palpitations and leg swelling.   Gastrointestinal: Negative for abdominal distention, abdominal pain, constipation, diarrhea, nausea and vomiting.   Endocrine: Negative for cold intolerance, heat intolerance, polydipsia, polyphagia and polyuria.   Genitourinary: Negative for decreased urine volume, difficulty urinating, dysuria, enuresis, frequency, hematuria and urgency.   Musculoskeletal: Negative for arthralgias, back pain, gait problem, joint swelling, myalgias, neck pain and neck stiffness.   Skin: Negative for color change, pallor, rash and wound.   Allergic/Immunologic: Positive for immunocompromised state.   Neurological:  Negative for dizziness, tremors, seizures, syncope, facial asymmetry, speech difficulty, weakness, light-headedness, numbness and headaches.   Hematological: Negative for adenopathy. Does not bruise/bleed easily.   Psychiatric/Behavioral: Negative for agitation and confusion.     Objective:     Vital Signs (Most Recent):  Temp: 98.6 °F (37 °C) (03/10/18 0329)  Pulse: 70 (03/10/18 0329)  Resp: 20 (03/10/18 0329)  BP: (!) 123/59 (03/10/18 0329)  SpO2: 99 % (03/10/18 0329) Vital Signs (24h Range):  Temp:  [98.3 °F (36.8 °C)-99.9 °F (37.7 °C)] 98.6 °F (37 °C)  Pulse:  [] 70  Resp:  [16-20] 20  SpO2:  [99 %-100 %] 99 %  BP: (103-128)/(54-65) 123/59     Patient Vitals for the past 72 hrs (Last 3 readings):   Weight   03/10/18 0400 83.5 kg (184 lb 1.4 oz)   03/08/18 0400 83.4 kg (183 lb 15.6 oz)     Body mass index is 30.63 kg/m².    Intake/Output - Last 3 Shifts       03/08 0700 - 03/09 0659 03/09 0700 - 03/10 0659    P.O. 960 680    Blood  986    Total Intake(mL/kg) 960 (11.5) 1666 (20)    Net +960 +1666          Urine Occurrence 3 x 5 x    Stool Occurrence  1 x          Lines/Drains/Airways     Central Venous Catheter Line                 Port A Cath Double Lumen 10/31/17 1826 left subclavian 129 days                Physical Exam   Constitutional: He is oriented to person, place, and time. He appears well-developed and well-nourished. No distress.   HENT:   Head: Normocephalic and atraumatic.   Right Ear: External ear normal.   Left Ear: External ear normal.   Nose: Nose normal.   Mouth/Throat: Oropharynx is clear and moist. No oropharyngeal exudate.   Eyes: Conjunctivae and EOM are normal. Pupils are equal, round, and reactive to light. Right eye exhibits no discharge. Left eye exhibits no discharge. No scleral icterus.   Neck: Normal range of motion. Neck supple. No JVD present. No tracheal deviation present. No thyromegaly present.   Cardiovascular: Normal rate, regular rhythm, normal heart sounds and intact  distal pulses.  Exam reveals no friction rub.    No murmur heard.  Pulmonary/Chest: Breath sounds normal. No stridor. No respiratory distress. He has no wheezes. He has no rales. He exhibits no tenderness.   Abdominal: Soft. Bowel sounds are normal. He exhibits no distension and no mass. There is no tenderness. There is no rebound and no guarding. No hernia.   Musculoskeletal: Normal range of motion. He exhibits no edema, tenderness or deformity.   Lymphadenopathy:     He has no cervical adenopathy.   Neurological: He is alert and oriented to person, place, and time.   Skin: Skin is warm. Capillary refill takes less than 2 seconds. He is not diaphoretic.   Psychiatric: He has a normal mood and affect.   Nursing note and vitals reviewed.      Significant Labs:  No results found for this or any previous visit (from the past 24 hour(s)).]    Significant Imaging:   Imaging Results    None

## 2018-03-10 NOTE — PROGRESS NOTES
[facility] Pediatric Hospital Medicine  Progress Note    Patient Name: Hema Kuo  MRN: [id number]  Admission Date: 3/5/2018  Hospital Length of Stay: 5  Code Status: Full Code   Primary Care Physician: Ann Reyna NP  Principal Problem: <principal problem not specified>    Subjective:     HPI:  Mr. Hema Kuo is a 17 y.o. male with AML (t8;21) receiving chemotherapy following [facility] [id number] ([place] A) here today for neutropenia (without fever).   patient underwent bone marrow biopsy and lumbar puncture last month and began Intensification I Phase of his chemo regimen with high dose cytarabine and etoposide x 5 days. Following Intensification, he was seen in ED for refractory N/V, received Emend + Dex with improvement.  Since then, he states he has been feeling fine. Endorses mild tiredness, but otherwise he reports no fevers, no abdominal pain, vomiting, diarrhea or constipation and no excessive bruising or unusual bleeding.  Good appetite.  No other complaints.      The day of admission, he received a platelet transfusion and post-transfusion labs were notable for neutropenia so patient was sent to [facility] [place] for admission for neutorpenia (no fevers)       Hospital Course:  No notes on file    Scheduled Meds:   acyclovir  400 mg Oral BID    chlorhexidine  15 mL Mouth/Throat BID    ciprofloxacin HCl  500 mg Oral Q12H    posaconazole  300 mg Oral BID    sulfamethoxazole-trimethoprim 800-160mg  1 tablet Oral twice daily on Friday, Saturday, Sunday     Continuous Infusions:  PRN Meds:(Magic mouthwash) 1:1:1 Benadryl 12.5mg/5ml liq, aluminum & magnesium hydroxide-simehticone (Maalox), lidocaine viscous 2%, sodium chloride, sodium chloride, acetaminophen, diphenhydrAMINE, lidocaine HCl 2%, LORazepam, ondansetron, polyethylene glycol    Interval History: NAEON. Received platelets and prbcs yesterday. Tolerated well. Continues to play significant amounts of video games.    Scheduled Meds:   acyclovir  400  mg Oral BID    chlorhexidine  15 mL Mouth/Throat BID    ciprofloxacin HCl  500 mg Oral Q12H    posaconazole  300 mg Oral BID    sulfamethoxazole-trimethoprim 800-160mg  1 tablet Oral twice daily on Friday, Saturday, Sunday     Continuous Infusions:  PRN Meds:(Magic mouthwash) 1:1:1 Benadryl 12.5mg/5ml liq, aluminum & magnesium hydroxide-simehticone (Maalox), lidocaine viscous 2%, sodium chloride, sodium chloride, acetaminophen, diphenhydrAMINE, lidocaine HCl 2%, LORazepam, ondansetron, polyethylene glycol    Review of Systems   Constitutional: Positive for fatigue (mild). Negative for activity change, appetite change, chills, diaphoresis, fever and unexpected weight change.   HENT: Negative for congestion, facial swelling, hearing loss, mouth sores, nosebleeds, postnasal drip, rhinorrhea, sinus pressure, sneezing, sore throat, tinnitus, trouble swallowing and voice change.    Eyes: Negative for photophobia, pain, discharge, redness, itching and visual disturbance.   Respiratory: Negative for apnea, cough, choking, chest tightness, shortness of breath, wheezing and stridor.    Cardiovascular: Negative for chest pain, palpitations and leg swelling.   Gastrointestinal: Negative for abdominal distention, abdominal pain, constipation, diarrhea, nausea and vomiting.   Endocrine: Negative for cold intolerance, heat intolerance, polydipsia, polyphagia and polyuria.   Genitourinary: Negative for decreased urine volume, difficulty urinating, dysuria, enuresis, frequency, hematuria and urgency.   Musculoskeletal: Negative for arthralgias, back pain, gait problem, joint swelling, myalgias, neck pain and neck stiffness.   Skin: Negative for color change, pallor, rash and wound.   Allergic/Immunologic: Positive for immunocompromised state.   Neurological: Negative for dizziness, tremors, seizures, syncope, facial asymmetry, speech difficulty, weakness, light-headedness, numbness and headaches.   Hematological: Negative for  adenopathy. Does not bruise/bleed easily.   Psychiatric/Behavioral: Negative for agitation and confusion.     Objective:     Vital Signs (Most Recent):  Temp: 98.6 °F (37 °C) (03/10/18 0329)  Pulse: 70 (03/10/18 0329)  Resp: 20 (03/10/18 0329)  BP: (!) 123/59 (03/10/18 0329)  SpO2: 99 % (03/10/18 0329) Vital Signs (24h Range):  Temp:  [98.3 °F (36.8 °C)-99.9 °F (37.7 °C)] 98.6 °F (37 °C)  Pulse:  [] 70  Resp:  [16-20] 20  SpO2:  [99 %-100 %] 99 %  BP: (103-128)/(54-65) 123/59     Patient Vitals for the past 72 hrs (Last 3 readings):   Weight   03/10/18 0400 83.5 kg (184 lb 1.4 oz)   03/08/18 0400 83.4 kg (183 lb 15.6 oz)     Body mass index is 30.63 kg/m².    Intake/Output - Last 3 Shifts       03/08 0700 - 03/09 0659 03/09 0700 - 03/10 0659    P.O. 960 680    Blood  986    Total Intake(mL/kg) 960 (11.5) 1666 (20)    Net +960 +1666          Urine Occurrence 3 x 5 x    Stool Occurrence  1 x          Lines/Drains/Airways     Central Venous Catheter Line                 Port A Cath Double Lumen 10/31/17 1826 left subclavian 129 days                Physical Exam   Constitutional: He is oriented to person, place, and time. He appears well-developed and well-nourished. No distress.   HENT:   Head: Normocephalic and atraumatic.   Right Ear: External ear normal.   Left Ear: External ear normal.   Nose: Nose normal.   Mouth/Throat: Oropharynx is clear and moist. No oropharyngeal exudate.   Eyes: Conjunctivae and EOM are normal. Pupils are equal, round, and reactive to light. Right eye exhibits no discharge. Left eye exhibits no discharge. No scleral icterus.   Neck: Normal range of motion. Neck supple. No JVD present. No tracheal deviation present. No thyromegaly present.   Cardiovascular: Normal rate, regular rhythm, normal heart sounds and intact distal pulses.  Exam reveals no friction rub.    No murmur heard.  Pulmonary/Chest: Breath sounds normal. No stridor. No respiratory distress. He has no wheezes. He has no  rales. He exhibits no tenderness.   Abdominal: Soft. Bowel sounds are normal. He exhibits no distension and no mass. There is no tenderness. There is no rebound and no guarding. No hernia.   Musculoskeletal: Normal range of motion. He exhibits no edema, tenderness or deformity.   Lymphadenopathy:     He has no cervical adenopathy.   Neurological: He is alert and oriented to person, place, and time.   Skin: Skin is warm. Capillary refill takes less than 2 seconds. He is not diaphoretic.   Psychiatric: He has a normal mood and affect.   Nursing note and vitals reviewed.      Significant Labs:  No results found for this or any previous visit (from the past 24 hour(s)).]    Significant Imaging:   Imaging Results    None           Assessment/Plan:     Oncology   AML (acute myeloblastic leukemia)    This  is a 17 y.o. male with AML (t8;21) receiving chemotherapy following [facility] [place] [place] [id number] ([place] A) here w neutropenia (without fever).      #neutorpenia: in setting og AML (t8;21) being treated with chemo per [facility] AAML 1031 (ARM A). S/p 2 units PRBC's on 3/7 for hemoglobin of 6.6; 2 U of pRBCs and 1U Platelets 3/9.  - CBC, CMP, Type and Screen MWF  - cw all home meds              - Acyclovir 400 mg Oral BID              - Chlorhexidine 15 ml Oral BID              - Ciprofloxacin 500 mg Oral q12 hours              - Lisdexamfetamine 60 mg oral qAM              - Bactrim 800-160 mg Oral BID on Friday, Saturday, and Sunday              - Posaconazole 300 mg BID  - PRN Meds: Lorazepam, Ondansetron, Miralax  - if febrile, can culture from both bports and start Vanc and cefpeime    Dispo: pending resolution of counts          Anticipated Disposition: Home or Self Care    Tylor Adamson MD [facility] Internal Medicine/Pediatrics - PGY I  [facility]

## 2018-03-11 PROCEDURE — 11300000 HC PEDIATRIC PRIVATE ROOM

## 2018-03-11 PROCEDURE — 25000003 PHARM REV CODE 250: Performed by: STUDENT IN AN ORGANIZED HEALTH CARE EDUCATION/TRAINING PROGRAM

## 2018-03-11 PROCEDURE — 99233 SBSQ HOSP IP/OBS HIGH 50: CPT | Mod: ,,, | Performed by: PEDIATRICS

## 2018-03-11 PROCEDURE — 25000003 PHARM REV CODE 250: Performed by: PEDIATRICS

## 2018-03-11 RX ADMIN — POSACONAZOLE 300 MG: 100 TABLET, COATED ORAL at 10:03

## 2018-03-11 RX ADMIN — CIPROFLOXACIN HYDROCHLORIDE 500 MG: 500 TABLET, FILM COATED ORAL at 08:03

## 2018-03-11 RX ADMIN — LIDOCAINE HYDROCHLORIDE 5 ML: 20 SOLUTION ORAL; TOPICAL at 08:03

## 2018-03-11 RX ADMIN — Medication 5 ML: at 08:03

## 2018-03-11 RX ADMIN — CIPROFLOXACIN HYDROCHLORIDE 500 MG: 500 TABLET, FILM COATED ORAL at 10:03

## 2018-03-11 RX ADMIN — CHLORHEXIDINE GLUCONATE 15 ML: 1.2 RINSE ORAL at 08:03

## 2018-03-11 RX ADMIN — SULFAMETHOXAZOLE AND TRIMETHOPRIM 1 TABLET: 800; 160 TABLET ORAL at 08:03

## 2018-03-11 RX ADMIN — ACYCLOVIR 400 MG: 200 CAPSULE ORAL at 08:03

## 2018-03-11 RX ADMIN — ACETAMINOPHEN 650 MG: 325 TABLET ORAL at 11:03

## 2018-03-11 RX ADMIN — ACYCLOVIR 400 MG: 200 CAPSULE ORAL at 10:03

## 2018-03-11 RX ADMIN — POSACONAZOLE 300 MG: 100 TABLET, COATED ORAL at 08:03

## 2018-03-11 RX ADMIN — CHLORHEXIDINE GLUCONATE 15 ML: 1.2 RINSE ORAL at 10:03

## 2018-03-11 RX ADMIN — SULFAMETHOXAZOLE AND TRIMETHOPRIM 1 TABLET: 800; 160 TABLET ORAL at 10:03

## 2018-03-11 NOTE — NURSING TRANSFER
Nursing Transfer Note      3/11/2018     Transfer: 8th floor to Peds rm 440    Transfer via ambulatory    Transfer with RN    Transported by RN    Medicines sent: Yes    Chart send with patient: Yes    Notified: Yes    Patient reassessed at: 3/11/17 9978    Upon arrival to floor:

## 2018-03-11 NOTE — NURSING TRANSFER
Nursing Transfer Note      3/10/2018     Transfer To:  4th floor  Transfer via  Ambulated with nurse  Transfer with  Belongings  Transported by nurse    Medicines sent: no    Chart send with patient: Yes    Notified: patient will notify his own family    Report given via phone

## 2018-03-11 NOTE — SUBJECTIVE & OBJECTIVE
Subjective:     Interval History: Afebrile. Mouth sore causing pain.   Oncology Treatment Plan:     [facility] AAML-1031  ARM A - LR Risk Patients    Medications:  Continuous Infusions:  Scheduled Meds:   acyclovir  400 mg Oral BID    chlorhexidine  15 mL Mouth/Throat BID    ciprofloxacin HCl  500 mg Oral Q12H    posaconazole  300 mg Oral BID    sulfamethoxazole-trimethoprim 800-160mg  1 tablet Oral twice daily on Friday, Saturday, Sunday     PRN Meds:(Magic mouthwash) 1:1:1 Benadryl 12.5mg/5ml liq, aluminum & magnesium hydroxide-simehticone (Maalox), lidocaine viscous 2%, sodium chloride, sodium chloride, acetaminophen, diphenhydrAMINE, lidocaine HCl 2%, LORazepam, ondansetron, polyethylene glycol     Review of Systems  Objective:     Vital Signs (Most Recent):  Temp: 99.6 °F (37.6 °C) (03/11/18 0413)  Pulse: 94 (03/11/18 0413)  Resp: 16 (03/11/18 0413)  BP: 137/65 (03/11/18 0413)  SpO2: 100 % (03/11/18 0413) Vital Signs (24h Range):  Temp:  [98 °F (36.7 °C)-99.6 °F (37.6 °C)] 99.6 °F (37.6 °C)  Pulse:  [] 94  Resp:  [16-20] 16  SpO2:  [98 %-100 %] 100 %  BP: (111-137)/(57-67) 137/65     Weight: 83.5 kg (184 lb 1.4 oz)  Body mass index is 30.63 kg/m².  Body surface area is 1.96 meters squared.      Intake/Output Summary (Last 24 hours) at 03/11/18 0919  Last data filed at 03/11/18 0628   Gross per 24 hour   Intake             1560 ml   Output                0 ml   Net             1560 ml       Physical Exam     Constitutional: He is oriented to person, place, and time. He appears well-developed and well-nourished. No distress.   HENT:   Head: Normocephalic and atraumatic.   Right Ear: External ear normal.   Left Ear: External ear normal.   Nose: Nose normal.   Mouth/Throat: Oropharynx is clear and moist. No oropharyngeal exudate. A sore on the inner side of lower lip.  Eyes: Conjunctivae and EOM are normal. Pupils are equal, round, and reactive to light. Right eye exhibits no discharge. Left eye exhibits no  discharge. No scleral icterus.   Neck: Normal range of motion. Neck supple. No JVD present. No tracheal deviation present. No thyromegaly present.   Cardiovascular: Normal rate, regular rhythm, normal heart sounds and intact distal pulses.  Exam reveals no friction rub.    No murmur heard.  Pulmonary/Chest: Breath sounds normal. No stridor. No respiratory distress. He has no wheezes. He has no rales. He exhibits no tenderness.   Abdominal: Soft. Bowel sounds are normal. He exhibits no distension and no mass. There is no tenderness. There is no rebound and no guarding. No hernia.   Musculoskeletal: Normal range of motion. He exhibits no edema, tenderness or deformity.   Lymphadenopathy:     He has no cervical adenopathy.   Neurological: He is alert and oriented to person, place, and time.   Skin: Skin is warm. Capillary refill takes less than 2 seconds. He is not diaphoretic.   Psychiatric: He has a normal mood and affect.     Labs:   Recent Lab Results     None

## 2018-03-11 NOTE — PROGRESS NOTES
[facility]  Pediatric Hematology/Oncology  Progress Note    Patient Name: Hema Kuo  Admission Date: 3/5/2018  Hospital Length of Stay: 6 days  Code Status: Full Code     Subjective:     Interval History: Afebrile. Mouth sore causing pain.   Oncology Treatment Plan:     PEDS AAML-1031  ARM A - LR Risk Patients    Medications:  Continuous Infusions:  Scheduled Meds:   acyclovir  400 mg Oral BID    chlorhexidine  15 mL Mouth/Throat BID    ciprofloxacin HCl  500 mg Oral Q12H    posaconazole  300 mg Oral BID    sulfamethoxazole-trimethoprim 800-160mg  1 tablet Oral twice daily on Friday, Saturday, Sunday     PRN Meds:(Magic mouthwash) 1:1:1 Benadryl 12.5mg/5ml liq, aluminum & magnesium hydroxide-simehticone (Maalox), lidocaine viscous 2%, sodium chloride, sodium chloride, acetaminophen, diphenhydrAMINE, lidocaine HCl 2%, LORazepam, ondansetron, polyethylene glycol     Review of Systems  Objective:     Vital Signs (Most Recent):  Temp: 99.6 °F (37.6 °C) (03/11/18 0413)  Pulse: 94 (03/11/18 0413)  Resp: 16 (03/11/18 0413)  BP: 137/65 (03/11/18 0413)  SpO2: 100 % (03/11/18 0413) Vital Signs (24h Range):  Temp:  [98 °F (36.7 °C)-99.6 °F (37.6 °C)] 99.6 °F (37.6 °C)  Pulse:  [] 94  Resp:  [16-20] 16  SpO2:  [98 %-100 %] 100 %  BP: (111-137)/(57-67) 137/65     Weight: 83.5 kg (184 lb 1.4 oz)  Body mass index is 30.63 kg/m².  Body surface area is 1.96 meters squared.      Intake/Output Summary (Last 24 hours) at 03/11/18 0919  Last data filed at 03/11/18 0628   Gross per 24 hour   Intake             1560 ml   Output                0 ml   Net             1560 ml       Physical Exam     Constitutional: He is oriented to person, place, and time. He appears well-developed and well-nourished. No distress.   HENT:   Head: Normocephalic and atraumatic.   Right Ear: External ear normal.   Left Ear: External ear normal.   Nose: Nose normal.   Mouth/Throat: Oropharynx is clear and moist. No oropharyngeal  exudate. A sore on the inner side of lower lip.  Eyes: Conjunctivae and EOM are normal. Pupils are equal, round, and reactive to light. Right eye exhibits no discharge. Left eye exhibits no discharge. No scleral icterus.   Neck: Normal range of motion. Neck supple. No JVD present. No tracheal deviation present. No thyromegaly present.   Cardiovascular: Normal rate, regular rhythm, normal heart sounds and intact distal pulses.  Exam reveals no friction rub.    No murmur heard.  Pulmonary/Chest: Breath sounds normal. No stridor. No respiratory distress. He has no wheezes. He has no rales. He exhibits no tenderness.   Abdominal: Soft. Bowel sounds are normal. He exhibits no distension and no mass. There is no tenderness. There is no rebound and no guarding. No hernia.   Musculoskeletal: Normal range of motion. He exhibits no edema, tenderness or deformity.   Lymphadenopathy:     He has no cervical adenopathy.   Neurological: He is alert and oriented to person, place, and time.   Skin: Skin is warm. Capillary refill takes less than 2 seconds. He is not diaphoretic.   Psychiatric: He has a normal mood and affect.     Labs:   Recent Lab Results     None              Assessment/Plan:     AML (acute myeloblastic leukemia)    This  is a 17 y.o. male with AML (t8;21) receiving chemotherapy following [facility] [id number] ([place]) here w neutropenia (without fever).       # Neutopenia: in setting og AML (t8;21) being treated with chemo per [facility]G AAML 1031 (ARM A). S/p 2 units PRBC's on 3/7 for hemoglobin of 6.6; 2 U of pRBCs and 1U Platelets 3/9.  - CBC, CMP, Type and Screen MWF  - cw all home meds              - Acyclovir 400 mg Oral BID              - Chlorhexidine 15 ml Oral BID              - Ciprofloxacin 500 mg Oral q12 hours              - Lisdexamfetamine 60 mg oral qAM              - Bactrim 800-160 mg Oral BID on Friday, Saturday, and Sunday              - Posaconazole 300 mg BID  - PRN Meds: Lorazepam, Ondansetron, Miralax  -  if febrile, culture from both bports and start Vanc and cefpeime     Dispo: pending resolution of counts              Beata Bunn MD  Pediatric Hematology/Oncology  [facility]

## 2018-03-11 NOTE — PROGRESS NOTES
Pt reported increased pain on inside of right lower lip, flaking skin on inside of oral mucosa, and firmness at the site. Increased swelling noted w/ open wound. Pt given PRN viscous lidocaine for pain. Peds ONC team notified and will come to bedside.

## 2018-03-11 NOTE — RESIDENT HANDOFF
Hema Kuo is a 17 y.o. male with AML (t8;21) receiving chemotherapy following [facility] [id number] ([place]) here today for neutropenia (without fever). Patient underwent bone marrow biopsy and lumbar puncture last month and began Intensification I Phase of his chemo regimen with high dose cytarabine and etoposide x 5 days. Following Intensification, he was seen in ED for refractory N/V, received Emend + Dex with improvement but was found to be neutropenic.     While admitted, he was continued on Cipro, Posaconazole, Acyclovir, and Bactrim. Transfusion threshold was < 10,000 for platelets and < 7,000 for Hemoglobin. Patient was transfused two units of PRBC's on 3/7 foe hemoglobin of 6.6. He was transfused an additional two units of PRBC's as well as one unit of platelets on 3/9. Patient was monitored daily for fevers while neutropenic.

## 2018-03-11 NOTE — ASSESSMENT & PLAN NOTE
This  is a 17 y.o. male with AML (t8;21) receiving chemotherapy following [facility] [id number] ([place]) here w neutropenia (without fever).       # Neutopenia: in setting og AML (t8;21) being treated with chemo per [facility] AAML 1031 (ARM A). S/p 2 units PRBC's on 3/7 for hemoglobin of 6.6; 2 U of pRBCs and 1U Platelets 3/9.  - CBC, CMP, Type and Screen MWF  - cw all home meds              - Acyclovir 400 mg Oral BID              - Chlorhexidine 15 ml Oral BID              - Ciprofloxacin 500 mg Oral q12 hours              - Lisdexamfetamine 60 mg oral qAM              - Bactrim 800-160 mg Oral BID on Friday, Saturday, and Sunday              - Posaconazole 300 mg BID  - PRN Meds: Lorazepam, Ondansetron, Miralax  - if febrile, culture from both bports and start Vanc and cefpeime     Dispo: pending resolution of counts

## 2018-03-11 NOTE — NURSING
Plan of care reviewed with patient. VS stable. Port site CDI. Lesion to bottom lip and inside mouth. Patient PO intake well. Voiding well. All questions and concerns answered. Safety maintained. Will continue to monitor.

## 2018-03-12 LAB
ALBUMIN SERPL BCP-MCNC: 3.9 G/DL
ALP SERPL-CCNC: 101 U/L
ALT SERPL W/O P-5'-P-CCNC: 41 U/L
ANION GAP SERPL CALC-SCNC: 11 MMOL/L
ANISOCYTOSIS BLD QL SMEAR: SLIGHT
AST SERPL-CCNC: 19 U/L
BASOPHILS NFR BLD: 0 %
BILIRUB SERPL-MCNC: 1.3 MG/DL
BUN SERPL-MCNC: 8 MG/DL
CALCIUM SERPL-MCNC: 9.3 MG/DL
CHLORIDE SERPL-SCNC: 104 MMOL/L
CO2 SERPL-SCNC: 23 MMOL/L
CREAT SERPL-MCNC: 0.9 MG/DL
DIFFERENTIAL METHOD: ABNORMAL
EOSINOPHIL NFR BLD: 0 %
ERYTHROCYTE [DISTWIDTH] IN BLOOD BY AUTOMATED COUNT: 15.5 %
EST. GFR  (AFRICAN AMERICAN): >60 ML/MIN/1.73 M^2
EST. GFR  (NON AFRICAN AMERICAN): >60 ML/MIN/1.73 M^2
GLUCOSE SERPL-MCNC: 118 MG/DL
HCT VFR BLD AUTO: 29 %
HGB BLD-MCNC: 9.4 G/DL
IMM GRANULOCYTES # BLD AUTO: ABNORMAL K/UL
IMM GRANULOCYTES NFR BLD AUTO: ABNORMAL %
LYMPHOCYTES NFR BLD: 100 %
MCH RBC QN AUTO: 31 PG
MCHC RBC AUTO-ENTMCNC: 32 G/DL
MCV RBC AUTO: 94 FL
MONOCYTES NFR BLD: 0 %
NEUTROPHILS NFR BLD: 0 %
NRBC BLD-RTO: 0 /100 WBC
PLATELET # BLD AUTO: 20 K/UL
PLATELET BLD QL SMEAR: ABNORMAL
PMV BLD AUTO: 11.7 FL
POTASSIUM SERPL-SCNC: 3.3 MMOL/L
PROT SERPL-MCNC: 7.4 G/DL
RBC # BLD AUTO: 3.08 M/UL
SODIUM SERPL-SCNC: 138 MMOL/L
WBC # BLD AUTO: 0.73 K/UL

## 2018-03-12 PROCEDURE — 99233 SBSQ HOSP IP/OBS HIGH 50: CPT | Mod: ,,, | Performed by: PEDIATRICS

## 2018-03-12 PROCEDURE — 11300000 HC PEDIATRIC PRIVATE ROOM

## 2018-03-12 PROCEDURE — 25000003 PHARM REV CODE 250: Performed by: STUDENT IN AN ORGANIZED HEALTH CARE EDUCATION/TRAINING PROGRAM

## 2018-03-12 PROCEDURE — 27000135

## 2018-03-12 PROCEDURE — 80053 COMPREHEN METABOLIC PANEL: CPT

## 2018-03-12 PROCEDURE — 25000003 PHARM REV CODE 250: Performed by: PEDIATRICS

## 2018-03-12 RX ORDER — MORPHINE SULFATE 1 MG/ML
INJECTION INTRAVENOUS CONTINUOUS
Status: DISCONTINUED | OUTPATIENT
Start: 2018-03-12 | End: 2018-03-19

## 2018-03-12 RX ORDER — NALOXONE HCL 0.4 MG/ML
0.4 VIAL (ML) INJECTION
Status: DISCONTINUED | OUTPATIENT
Start: 2018-03-12 | End: 2018-03-23

## 2018-03-12 RX ADMIN — LIDOCAINE HYDROCHLORIDE 5 ML: 20 SOLUTION ORAL; TOPICAL at 09:03

## 2018-03-12 RX ADMIN — POSACONAZOLE 300 MG: 100 TABLET, COATED ORAL at 08:03

## 2018-03-12 RX ADMIN — CIPROFLOXACIN HYDROCHLORIDE 500 MG: 500 TABLET, FILM COATED ORAL at 08:03

## 2018-03-12 RX ADMIN — ACETAMINOPHEN 650 MG: 325 TABLET ORAL at 09:03

## 2018-03-12 RX ADMIN — Medication: at 12:03

## 2018-03-12 RX ADMIN — CHLORHEXIDINE GLUCONATE 15 ML: 1.2 RINSE ORAL at 08:03

## 2018-03-12 RX ADMIN — Medication 5 ML: at 09:03

## 2018-03-12 RX ADMIN — ACYCLOVIR 400 MG: 200 CAPSULE ORAL at 08:03

## 2018-03-12 RX ADMIN — POSACONAZOLE 300 MG: 100 TABLET, COATED ORAL at 09:03

## 2018-03-12 RX ADMIN — CHLORHEXIDINE GLUCONATE 15 ML: 1.2 RINSE ORAL at 09:03

## 2018-03-12 RX ADMIN — CIPROFLOXACIN HYDROCHLORIDE 500 MG: 500 TABLET, FILM COATED ORAL at 09:03

## 2018-03-12 RX ADMIN — ACYCLOVIR 400 MG: 200 CAPSULE ORAL at 09:03

## 2018-03-12 NOTE — ASSESSMENT & PLAN NOTE
This  is a 17 y.o. male with AML (t8;21) receiving chemotherapy following [facility] [id number] ([place]) here w neutropenia (without fever).  Today is day 22 of cycle.    #neutropenia: in setting of AML (t8;21) being treated with chemo per COG AAML 1031 (ARM A). S/p 2 units PRBC's on 3/7 for hemoglobin of 6.6; 2 U of pRBCs and 1U Platelets 3/9.  - Daily CBC and CMP. Type and Screen q72h.  - cw all home meds              - Acyclovir 400 mg Oral BID              - Chlorhexidine 15 ml Oral BID              - Ciprofloxacin 500 mg Oral q12 hours              - Lisdexamfetamine 60 mg oral qAM              - Bactrim 800-160 mg Oral BID on Friday, Saturday, and Sunday              - Posaconazole 300 mg BID  - PRN Meds: Lorazepam, Ondansetron, Miralax  - if febrile, culture from both lumens and start Vanc and cefpeime    Mucositis:  - morphine PCA : 0.5mg/h basal rate, 1mg on-demand 15min lock-out (max total 4.5mg/h)  - monitor PO, electrolytes, daily weights      Dispo: pending resolution of counts

## 2018-03-12 NOTE — PROGRESS NOTES
[facility] Pediatric Hospital Medicine  Progress Note     Patient Name: Hema Kuo  MRN: [id number]  Admission Date: 3/5/2018  Hospital Length of Stay: 7  Code Status: Full Code   Primary Care Physician: Ann Reyna NP  Principal Problem: <principal problem not specified>     Subjective:      HPI:  Mr. Hema Kuo is a 17 y.o. male with AML (t8;21) receiving chemotherapy following [facility]G [id number] ([place]) here today for neutropenia (without fever).   patient underwent bone marrow biopsy and lumbar puncture last month and began Intensification I Phase of his chemo regimen with high dose cytarabine and etoposide x 5 days. Following Intensification, he was seen in ED for refractory N/V, received Emend + Dex with improvement.  Since then, he states he has been feeling fine. Endorses mild tiredness, but otherwise he reports no fevers, no abdominal pain, vomiting, diarrhea or constipation and no excessive bruising or unusual bleeding.  Good appetite.  No other complaints.       The day of admission, he received a platelet transfusion and post-transfusion labs were notable for neutropenia so patient was sent to [facility] [place] admission for neutorpenia (no fevers)        Hospital Course:  No notes on file     Scheduled Meds:   acyclovir  400 mg Oral BID    chlorhexidine  15 mL Mouth/Throat BID    ciprofloxacin HCl  500 mg Oral Q12H    posaconazole  300 mg Oral BID    sulfamethoxazole-trimethoprim 800-160mg  1 tablet Oral twice daily on Friday, Saturday, Sunday      Continuous Infusions:   morphine        PRN Meds:(Magic mouthwash) 1:1:1 Benadryl 12.5mg/5ml liq, aluminum & magnesium hydroxide-simehticone (Maalox), lidocaine viscous 2%, sodium chloride, sodium chloride, acetaminophen, diphenhydrAMINE, lidocaine HCl 2%, LORazepam, naloxone, ondansetron, polyethylene glycol     Interval History: NAEON. Developed mouth sore on R side lower lip. Magic mouthwash not particularly effective. Today day 22 of  cycle.     Scheduled Meds:   acyclovir  400 mg Oral BID    chlorhexidine  15 mL Mouth/Throat BID    ciprofloxacin HCl  500 mg Oral Q12H    posaconazole  300 mg Oral BID    sulfamethoxazole-trimethoprim 800-160mg  1 tablet Oral twice daily on Friday, Saturday, Sunday      Continuous Infusions:   morphine        PRN Meds:(Magic mouthwash) 1:1:1 Benadryl 12.5mg/5ml liq, aluminum & magnesium hydroxide-simehticone (Maalox), lidocaine viscous 2%, sodium chloride, sodium chloride, acetaminophen, diphenhydrAMINE, lidocaine HCl 2%, LORazepam, naloxone, ondansetron, polyethylene glycol     Review of Systems   Constitutional: Negative for fever.   HENT: Positive for mouth sores. Negative for congestion and rhinorrhea.    Eyes: Negative for photophobia and discharge.   Respiratory: Negative for shortness of breath.    Gastrointestinal: Negative for abdominal pain, constipation, diarrhea, nausea and vomiting.   Genitourinary: Negative for dysuria and flank pain.   Musculoskeletal: Negative for joint swelling.   Skin: Negative for pallor and rash.   Neurological: Negative for headaches.   Psychiatric/Behavioral: Negative for sleep disturbance.      Objective:      Vital Signs (Most Recent):  Temp: 98.4 °F (36.9 °C) (03/12/18 1253)  Pulse: 71 (03/12/18 1253)  Resp: 13 (03/12/18 1440)  BP: (!) 104/51 (03/12/18 1253)  SpO2: 99 % (03/12/18 1253) Vital Signs (24h Range):  Temp:  [98.4 °F (36.9 °C)-99.2 °F (37.3 °C)] 98.4 °F (36.9 °C)  Pulse:  [] 71  Resp:  [12-20] 13  SpO2:  [96 %-100 %] 99 %  BP: (104-134)/(51-66) 104/51      Patient Vitals for the past 72 hrs (Last 3 readings):    Weight   03/11/18 2000 84.4 kg (186 lb 1.1 oz)   03/10/18 0400 83.5 kg (184 lb 1.4 oz)      Body mass index is 30.96 kg/m².            Intake/Output - Last 3 Shifts        03/10 0700 - 03/11 0659 03/11 0700 - 03/12 0659 03/12 0700 - 03/13 0659     P.O. 1560 480 480     Blood           Total Intake(mL/kg) 1560 (18.7) 480 (5.7) 480 (5.7)     Net  +1560 +480 +480                 Urine Occurrence 4 x 5 x                   Lines/Drains/Airways            Central Venous Catheter Line                          Port A Cath Double Lumen 10/31/17 1826 left subclavian 131 days                     Physical Exam   Constitutional: He is oriented to person, place, and time. He appears well-developed and well-nourished. No distress.   HENT:   Head: Normocephalic and atraumatic.   Right Ear: External ear normal.   Left Ear: External ear normal.   Nose: Nose normal.   Mouth/Throat: Oropharynx is clear and moist. No oropharyngeal exudate.   Whitish ulcerative mucosal lesion on R lower lip   Eyes: Conjunctivae and EOM are normal. Pupils are equal, round, and reactive to light. Right eye exhibits no discharge. Left eye exhibits no discharge. No scleral icterus.   Neck: Normal range of motion. Neck supple. No JVD present. No tracheal deviation present. No thyromegaly present.   Cardiovascular: Normal rate, regular rhythm, normal heart sounds and intact distal pulses.  Exam reveals no friction rub.    No murmur heard.  Pulmonary/Chest: Breath sounds normal. No stridor. No respiratory distress. He has no wheezes. He has no rales. He exhibits no tenderness.   Abdominal: Soft. Bowel sounds are normal. He exhibits no distension and no mass. There is no tenderness. There is no rebound and no guarding. No hernia.   Genitourinary:   Genitourinary Comments: deferred   Musculoskeletal: Normal range of motion. He exhibits no edema, tenderness or deformity.   Lymphadenopathy:     He has no cervical adenopathy.   Neurological: He is alert and oriented to person, place, and time.   Skin: Skin is warm. Capillary refill takes less than 2 seconds. He is not diaphoretic.   Psychiatric: He has a normal mood and affect.   Nursing note and vitals reviewed.        Significant Labs:  No results for input(s): POCTGLUCOSE in the last 48 hours.           Recent Lab Results        03/12/18  9733        Immature Granulocytes CANCELED  Comment:  Result canceled by the ancillary       Immature Grans (Abs) CANCELED  Comment:  Mild elevation in immature granulocytes is non specific and   can be seen in a variety of conditions including stress response,   acute inflammation, trauma and pregnancy. Correlation with other   laboratory and clinical findings is essential.     Result canceled by the ancillary          Albumin 3.9       Alkaline Phosphatase 101       ALT 41       Anion Gap 11       Aniso Slight       AST 19       Basophil% 0.0  Comment:  Corrected result; previously reported as 1.4 on %DDDDDDDD% at %TTT%.[C]       Total Bilirubin 1.3  Comment:  For infants and newborns, interpretation of results should be based  on gestational age, weight and in agreement with clinical  observations.  Premature Infant recommended reference ranges:  Up to 24 hours.............<8.0 mg/dL  Up to 48 hours............<12.0 mg/dL  3-5 days..................<15.0 mg/dL  6-29 days.................<15.0 mg/dL  (H)       BUN, Bld 8       Calcium 9.3       Chloride 104       CO2 23       Creatinine 0.9       Differential Method Manual       eGFR if  >60.0       eGFR if non  >60.0  Comment:  Calculation used to obtain the estimated glomerular filtration  rate (eGFR) is the CKD-EPI equation.           Eosinophil% 0.0       Glucose 118(H)       Gran% 0.0  Comment:  Corrected result; previously reported as 6.9 on %DDDDDDDD% at %TTT%.(L)[C]       Hematocrit 29.0(L)       Hemoglobin 9.4(L)       Lymph% 100.0  Comment:  Corrected result; previously reported as 78.1 on %DDDDDDDD% at %TTT%.(H)[C]       MCH 31.0       MCHC 32.0       MCV 94       Mono% 0.0  Comment:  Corrected result; previously reported as 6.8 on %DDDDDDDD% at %TTT%.(L)[C]       MPV 11.7       nRBC 0       Platelet Estimate Decreased(A)       Platelets 20  Comment:  Preliminary Platelet critical result(s) called and verbal readback   obtained from  Ana Mcgraw RN, result confirmed and finalized.,   03/12/2018 05:28  (LL)       Potassium 3.3(L)       Total Protein 7.4       RBC 3.08(L)       RDW 15.5(H)       Sodium 138       WBC 0.73  Comment:  wbc  critical result(s) called and verbal readback obtained from   ana mcgraw rn, 03/12/2018 04:36  (LL)               Significant Imaging: CT: No results found in the last 24 hours.  CXR: No results found in the last 24 hours.  U/S: No results found in the last 24 hours.     Assessment/Plan:          Oncology   AML (acute myeloblastic leukemia)     This  is a 17 y.o. male with AML (t8;21) receiving chemotherapy following [facility] [id number] ([place]) here w neutropenia (without fever).  Today is day 22 of cycle.     #neutropenia: in setting of AML (t8;21) being treated with chemo per COG AAML 1031 (ARM A). S/p 2 units PRBC's on 3/7 for hemoglobin of 6.6; 2 U of pRBCs and 1U Platelets 3/9.  - Daily CBC and CMP. Type and Screen q72h.  - cw all home meds              - Acyclovir 400 mg Oral BID              - Chlorhexidine 15 ml Oral BID              - Ciprofloxacin 500 mg Oral q12 hours              - Lisdexamfetamine 60 mg oral qAM              - Bactrim 800-160 mg Oral BID on Friday, Saturday, and Sunday              - Posaconazole 300 mg BID  - PRN Meds: Lorazepam, Ondansetron, Miralax  - if febrile, culture from both lumens and start Vanc and cefpeime     Mucositis:  - morphine PCA : 0.5mg/h basal rate, 1mg on-demand 15min lock-out (max total 4.5mg/h)  - monitor PO, electrolytes, daily weights        Dispo: pending resolution of counts                   Anticipated Disposition: Home or Self Care     Victoria Araya MD  Pediatric Hospital Medicine   [facility]

## 2018-03-12 NOTE — PLAN OF CARE
03/12/18 0946   Discharge Reassessment   Assessment Type Discharge Planning Reassessment   Provided patient/caregiver education on the expected discharge date and the discharge plan Yes   Do you have any problems affording any of your prescribed medications? Yes   Discharge Plan A Home with family   Discharge Plan B Home with family   Patient choice form signed by patient/caregiver N/A   Can the patient answer the patient profile reliably? Yes, cognitively intact   How does the patient rate their overall health at the present time? Good   Describe the patient's ability to walk at the present time. No restrictions   How often would a person be available to care for the patient? Whenever needed   Number of comorbid conditions (as recorded on the chart) None   During the past month, has the patient often been bothered by feeling down, depressed or hopeless? No   During the past month, has the patient often been bothered by little interest or pleasure in doing things? No   Pt remains on peds floor, still neutropenic, on prophylactic abx, no fevers. Remaining in house until counts recover.

## 2018-03-12 NOTE — SUBJECTIVE & OBJECTIVE
Interval History: NAEON. Developed mouth sore on R side lower lip. Magic mouthwash not particularly effective. Today day 22 of cycle.    Scheduled Meds:   acyclovir  400 mg Oral BID    chlorhexidine  15 mL Mouth/Throat BID    ciprofloxacin HCl  500 mg Oral Q12H    posaconazole  300 mg Oral BID    sulfamethoxazole-trimethoprim 800-160mg  1 tablet Oral twice daily on Friday, Saturday, Sunday     Continuous Infusions:   morphine       PRN Meds:(Magic mouthwash) 1:1:1 Benadryl 12.5mg/5ml liq, aluminum & magnesium hydroxide-simehticone (Maalox), lidocaine viscous 2%, sodium chloride, sodium chloride, acetaminophen, diphenhydrAMINE, lidocaine HCl 2%, LORazepam, naloxone, ondansetron, polyethylene glycol    Review of Systems   Constitutional: Negative for fever.   HENT: Positive for mouth sores. Negative for congestion and rhinorrhea.    Eyes: Negative for photophobia and discharge.   Respiratory: Negative for shortness of breath.    Gastrointestinal: Negative for abdominal pain, constipation, diarrhea, nausea and vomiting.   Genitourinary: Negative for dysuria and flank pain.   Musculoskeletal: Negative for joint swelling.   Skin: Negative for pallor and rash.   Neurological: Negative for headaches.   Psychiatric/Behavioral: Negative for sleep disturbance.     Objective:     Vital Signs (Most Recent):  Temp: 98.4 °F (36.9 °C) (03/12/18 1253)  Pulse: 71 (03/12/18 1253)  Resp: 13 (03/12/18 1440)  BP: (!) 104/51 (03/12/18 1253)  SpO2: 99 % (03/12/18 1253) Vital Signs (24h Range):  Temp:  [98.4 °F (36.9 °C)-99.2 °F (37.3 °C)] 98.4 °F (36.9 °C)  Pulse:  [] 71  Resp:  [12-20] 13  SpO2:  [96 %-100 %] 99 %  BP: (104-134)/(51-66) 104/51     Patient Vitals for the past 72 hrs (Last 3 readings):   Weight   03/11/18 2000 84.4 kg (186 lb 1.1 oz)   03/10/18 0400 83.5 kg (184 lb 1.4 oz)     Body mass index is 30.96 kg/m².    Intake/Output - Last 3 Shifts       03/10 0700 - 03/11 0659 03/11 0700 - 03/12 0659 03/12 0700 -  03/13 0659    P.O. 1560 480 480    Blood       Total Intake(mL/kg) 1560 (18.7) 480 (5.7) 480 (5.7)    Net +1560 +480 +480           Urine Occurrence 4 x 5 x           Lines/Drains/Airways     Central Venous Catheter Line                 Port A Cath Double Lumen 10/31/17 1826 left subclavian 131 days                Physical Exam   Constitutional: He is oriented to person, place, and time. He appears well-developed and well-nourished. No distress.   HENT:   Head: Normocephalic and atraumatic.   Right Ear: External ear normal.   Left Ear: External ear normal.   Nose: Nose normal.   Mouth/Throat: Oropharynx is clear and moist. No oropharyngeal exudate.   Whitish ulcerative mucosal lesion on R lower lip   Eyes: Conjunctivae and EOM are normal. Pupils are equal, round, and reactive to light. Right eye exhibits no discharge. Left eye exhibits no discharge. No scleral icterus.   Neck: Normal range of motion. Neck supple. No JVD present. No tracheal deviation present. No thyromegaly present.   Cardiovascular: Normal rate, regular rhythm, normal heart sounds and intact distal pulses.  Exam reveals no friction rub.    No murmur heard.  Pulmonary/Chest: Breath sounds normal. No stridor. No respiratory distress. He has no wheezes. He has no rales. He exhibits no tenderness.   Abdominal: Soft. Bowel sounds are normal. He exhibits no distension and no mass. There is no tenderness. There is no rebound and no guarding. No hernia.   Genitourinary:   Genitourinary Comments: deferred   Musculoskeletal: Normal range of motion. He exhibits no edema, tenderness or deformity.   Lymphadenopathy:     He has no cervical adenopathy.   Neurological: He is alert and oriented to person, place, and time.   Skin: Skin is warm. Capillary refill takes less than 2 seconds. He is not diaphoretic.   Psychiatric: He has a normal mood and affect.   Nursing note and vitals reviewed.      Significant Labs:  No results for input(s): POCTGLUCOSE in the last  48 hours.    Recent Lab Results       03/12/18  0312      Immature Granulocytes CANCELED  Comment:  Result canceled by the ancillary     Immature Grans (Abs) CANCELED  Comment:  Mild elevation in immature granulocytes is non specific and   can be seen in a variety of conditions including stress response,   acute inflammation, trauma and pregnancy. Correlation with other   laboratory and clinical findings is essential.    Result canceled by the ancillary       Albumin 3.9     Alkaline Phosphatase 101     ALT 41     Anion Gap 11     Aniso Slight     AST 19     Basophil% 0.0  Comment:  Corrected result; previously reported as 1.4 on %DDDDDDDD% at %TTT%.[C]     Total Bilirubin 1.3  Comment:  For infants and newborns, interpretation of results should be based  on gestational age, weight and in agreement with clinical  observations.  Premature Infant recommended reference ranges:  Up to 24 hours.............<8.0 mg/dL  Up to 48 hours............<12.0 mg/dL  3-5 days..................<15.0 mg/dL  6-29 days.................<15.0 mg/dL  (H)     BUN, Bld 8     Calcium 9.3     Chloride 104     CO2 23     Creatinine 0.9     Differential Method Manual     eGFR if African American >60.0     eGFR if non  >60.0  Comment:  Calculation used to obtain the estimated glomerular filtration  rate (eGFR) is the CKD-EPI equation.        Eosinophil% 0.0     Glucose 118(H)     Gran% 0.0  Comment:  Corrected result; previously reported as 6.9 on %DDDDDDDD% at %TTT%.(L)[C]     Hematocrit 29.0(L)     Hemoglobin 9.4(L)     Lymph% 100.0  Comment:  Corrected result; previously reported as 78.1 on %DDDDDDDD% at %TTT%.(H)[C]     MCH 31.0     MCHC 32.0     MCV 94     Mono% 0.0  Comment:  Corrected result; previously reported as 6.8 on %DDDDDDDD% at %TTT%.(L)[C]     MPV 11.7     nRBC 0     Platelet Estimate Decreased(A)     Platelets 20  Comment:  Preliminary Platelet critical result(s) called and verbal readback   obtained from Guadalupe  JULIANN Mcgraw, result confirmed and finalized.,   03/12/2018 05:28  (LL)     Potassium 3.3(L)     Total Protein 7.4     RBC 3.08(L)     RDW 15.5(H)     Sodium 138     WBC 0.73  Comment:  wbc  critical result(s) called and verbal readback obtained from   ana mcgraw rn, 03/12/2018 04:36  (LL)           Significant Imaging: CT: No results found in the last 24 hours.  CXR: No results found in the last 24 hours.  U/S: No results found in the last 24 hours.

## 2018-03-12 NOTE — PROGRESS NOTES
[facility] Pediatric Hospital Medicine  Progress Note    Patient Name: Hema Kuo  MRN: [id number]  Admission Date: 3/5/2018  Hospital Length of Stay: 7  Code Status: Full Code   Primary Care Physician: Ann Reyna NP  Principal Problem: <principal problem not specified>    Subjective:     HPI:  Mr. Hema Kuo is a 17 y.o. male with AML (t8;21) receiving chemotherapy following [facility] [id number] (Arm A) here today for neutropenia (without fever).   patient underwent bone marrow biopsy and lumbar puncture last month and began Intensification I Phase of his chemo regimen with high dose cytarabine and etoposide x 5 days. Following Intensification, he was seen in ED for refractory N/V, received Emend + Dex with improvement.  Since then, he states he has been feeling fine. Endorses mild tiredness, but otherwise he reports no fevers, no abdominal pain, vomiting, diarrhea or constipation and no excessive bruising or unusual bleeding.  Good appetite.  No other complaints.      The day of admission, he received a platelet transfusion and post-transfusion labs were notable for neutropenia so patient was sent to [facility] [place] [facility] for admission for neutorpenia (no fevers)       Hospital Course:  No notes on file    Scheduled Meds:   acyclovir  400 mg Oral BID    chlorhexidine  15 mL Mouth/Throat BID    ciprofloxacin HCl  500 mg Oral Q12H    posaconazole  300 mg Oral BID    sulfamethoxazole-trimethoprim 800-160mg  1 tablet Oral twice daily on Friday, Saturday, Sunday     Continuous Infusions:   morphine       PRN Meds:(Magic mouthwash) 1:1:1 Benadryl 12.5mg/5ml liq, aluminum & magnesium hydroxide-simehticone (Maalox), lidocaine viscous 2%, sodium chloride, sodium chloride, acetaminophen, diphenhydrAMINE, lidocaine HCl 2%, LORazepam, naloxone, ondansetron, polyethylene glycol    Interval History: NAEON. Developed mouth sore on R side lower lip. Magic mouthwash not particularly effective. Today day 22 of cycle.    Scheduled  Meds:   acyclovir  400 mg Oral BID    chlorhexidine  15 mL Mouth/Throat BID    ciprofloxacin HCl  500 mg Oral Q12H    posaconazole  300 mg Oral BID    sulfamethoxazole-trimethoprim 800-160mg  1 tablet Oral twice daily on Friday, Saturday, Sunday     Continuous Infusions:   morphine       PRN Meds:(Magic mouthwash) 1:1:1 Benadryl 12.5mg/5ml liq, aluminum & magnesium hydroxide-simehticone (Maalox), lidocaine viscous 2%, sodium chloride, sodium chloride, acetaminophen, diphenhydrAMINE, lidocaine HCl 2%, LORazepam, naloxone, ondansetron, polyethylene glycol    Review of Systems   Constitutional: Negative for fever.   HENT: Positive for mouth sores. Negative for congestion and rhinorrhea.    Eyes: Negative for photophobia and discharge.   Respiratory: Negative for shortness of breath.    Gastrointestinal: Negative for abdominal pain, constipation, diarrhea, nausea and vomiting.   Genitourinary: Negative for dysuria and flank pain.   Musculoskeletal: Negative for joint swelling.   Skin: Negative for pallor and rash.   Neurological: Negative for headaches.   Psychiatric/Behavioral: Negative for sleep disturbance.     Objective:     Vital Signs (Most Recent):  Temp: 98.4 °F (36.9 °C) (03/12/18 1253)  Pulse: 71 (03/12/18 1253)  Resp: 13 (03/12/18 1440)  BP: (!) 104/51 (03/12/18 1253)  SpO2: 99 % (03/12/18 1253) Vital Signs (24h Range):  Temp:  [98.4 °F (36.9 °C)-99.2 °F (37.3 °C)] 98.4 °F (36.9 °C)  Pulse:  [] 71  Resp:  [12-20] 13  SpO2:  [96 %-100 %] 99 %  BP: (104-134)/(51-66) 104/51     Patient Vitals for the past 72 hrs (Last 3 readings):   Weight   03/11/18 2000 84.4 kg (186 lb 1.1 oz)   03/10/18 0400 83.5 kg (184 lb 1.4 oz)     Body mass index is 30.96 kg/m².    Intake/Output - Last 3 Shifts       03/10 0700 - 03/11 0659 03/11 0700 - 03/12 0659 03/12 0700 - 03/13 0659    P.O. 1560 480 480    Blood       Total Intake(mL/kg) 1560 (18.7) 480 (5.7) 480 (5.7)    Net +1560 +480 +480           Urine Occurrence 4  x 5 x           Lines/Drains/Airways     Central Venous Catheter Line                 Port A Cath Double Lumen 10/31/17 1826 left subclavian 131 days                Physical Exam   Constitutional: He is oriented to person, place, and time. He appears well-developed and well-nourished. No distress.   HENT:   Head: Normocephalic and atraumatic.   Right Ear: External ear normal.   Left Ear: External ear normal.   Nose: Nose normal.   Mouth/Throat: Oropharynx is clear and moist. No oropharyngeal exudate.   Whitish ulcerative mucosal lesion on R lower lip   Eyes: Conjunctivae and EOM are normal. Pupils are equal, round, and reactive to light. Right eye exhibits no discharge. Left eye exhibits no discharge. No scleral icterus.   Neck: Normal range of motion. Neck supple. No JVD present. No tracheal deviation present. No thyromegaly present.   Cardiovascular: Normal rate, regular rhythm, normal heart sounds and intact distal pulses.  Exam reveals no friction rub.    No murmur heard.  Pulmonary/Chest: Breath sounds normal. No stridor. No respiratory distress. He has no wheezes. He has no rales. He exhibits no tenderness.   Abdominal: Soft. Bowel sounds are normal. He exhibits no distension and no mass. There is no tenderness. There is no rebound and no guarding. No hernia.   Genitourinary:   Genitourinary Comments: deferred   Musculoskeletal: Normal range of motion. He exhibits no edema, tenderness or deformity.   Lymphadenopathy:     He has no cervical adenopathy.   Neurological: He is alert and oriented to person, place, and time.   Skin: Skin is warm. Capillary refill takes less than 2 seconds. He is not diaphoretic.   Psychiatric: He has a normal mood and affect.   Nursing note and vitals reviewed.      Significant Labs:  No results for input(s): POCTGLUCOSE in the last 48 hours.    Recent Lab Results       03/12/18  0312      Immature Granulocytes CANCELED  Comment:  Result canceled by the ancillary     Immature Grans  (Abs) CANCELED  Comment:  Mild elevation in immature granulocytes is non specific and   can be seen in a variety of conditions including stress response,   acute inflammation, trauma and pregnancy. Correlation with other   laboratory and clinical findings is essential.    Result canceled by the ancillary       Albumin 3.9     Alkaline Phosphatase 101     ALT 41     Anion Gap 11     Aniso Slight     AST 19     Basophil% 0.0  Comment:  Corrected result; previously reported as 1.4 on %DDDDDDDD% at %TTT%.[C]     Total Bilirubin 1.3  Comment:  For infants and newborns, interpretation of results should be based  on gestational age, weight and in agreement with clinical  observations.  Premature Infant recommended reference ranges:  Up to 24 hours.............<8.0 mg/dL  Up to 48 hours............<12.0 mg/dL  3-5 days..................<15.0 mg/dL  6-29 days.................<15.0 mg/dL  (H)     BUN, Bld 8     Calcium 9.3     Chloride 104     CO2 23     Creatinine 0.9     Differential Method Manual     eGFR if African American >60.0     eGFR if non  >60.0  Comment:  Calculation used to obtain the estimated glomerular filtration  rate (eGFR) is the CKD-EPI equation.        Eosinophil% 0.0     Glucose 118(H)     Gran% 0.0  Comment:  Corrected result; previously reported as 6.9 on %DDDDDDDD% at %TTT%.(L)[C]     Hematocrit 29.0(L)     Hemoglobin 9.4(L)     Lymph% 100.0  Comment:  Corrected result; previously reported as 78.1 on %DDDDDDDD% at %TTT%.(H)[C]     MCH 31.0     MCHC 32.0     MCV 94     Mono% 0.0  Comment:  Corrected result; previously reported as 6.8 on %DDDDDDDD% at %TTT%.(L)[C]     MPV 11.7     nRBC 0     Platelet Estimate Decreased(A)     Platelets 20  Comment:  Preliminary Platelet critical result(s) called and verbal readback   obtained from Guadalupe Vaughn RN, result confirmed and finalized.,   03/12/2018 05:28  (LL)     Potassium 3.3(L)     Total Protein 7.4     RBC 3.08(L)     RDW 15.5(H)      Sodium 138     WBC 0.73  Comment:  wbc  critical result(s) called and verbal readback obtained from   ana mcgraw rn, 03/12/2018 04:36  (LL)           Significant Imaging: CT: No results found in the last 24 hours.  CXR: No results found in the last 24 hours.  U/S: No results found in the last 24 hours.    Assessment/Plan:     Oncology   AML (acute myeloblastic leukemia)    This  is a 17 y.o. male with AML (t8;21) receiving chemotherapy following [facility] [id number] (Arm A) here w neutropenia (without fever).  Today is day 22 of cycle.    #neutropenia: in setting of AML (t8;21) being treated with chemo per [facility]G AAML 1031 (ARM A). S/p 2 units PRBC's on 3/7 for hemoglobin of 6.6; 2 U of pRBCs and 1U Platelets 3/9.  - Daily CBC and CMP. Type and Screen q72h.  - cw all home meds              - Acyclovir 400 mg Oral BID              - Chlorhexidine 15 ml Oral BID              - Ciprofloxacin 500 mg Oral q12 hours              - Lisdexamfetamine 60 mg oral qAM              - Bactrim 800-160 mg Oral BID on Friday, Saturday, and Sunday              - Posaconazole 300 mg BID  - PRN Meds: Lorazepam, Ondansetron, Miralax  - if febrile, culture from both lumens and start Vanc and cefpeime    Mucositis:  - morphine PCA : 0.5mg/h basal rate, 1mg on-demand 15min lock-out (max total 4.5mg/h)  - monitor PO, electrolytes, daily weights      Dispo: pending resolution of counts              Anticipated Disposition: Home or Self Care    Victoria Araya MD  Pediatric Hospital Medicine   [facility]

## 2018-03-12 NOTE — PLAN OF CARE
Plan of care reviewed with patient. VS stable. Port site CDI. Patient re-accessed and dressing changed. Lesion to bottom lip and inside mouth. Complaints of 10/10 pain. Tylenol given x1 for pain. Patient drinking well. Will not eat due to lip pain. Voiding well. All questions and concerns answered. Safety maintained. Will continue to monitor.

## 2018-03-12 NOTE — PLAN OF CARE
Problem: Patient Care Overview  Goal: Plan of Care Review  Outcome: Ongoing (interventions implemented as appropriate)  Patient's vss, t-max 99.4 oral today. Patient taking po fluids ( cold water mostly ) well, only pudding eaten today  - tried other solids ( like burger on bun ) reported too painful with mouth/lip lesion present. Voiding well. No stool noted or reported today. Patient noted sleeping in morning - aroused to voice - he reported he was up late , last night . Left chest double pac's noted, one pac noted accessed,clampe/heparin locked - dressing noted clean,dry and intact - no redness,swelling or drainage noted at site. No active bleeding noted or reported. Neutropenic precautions maintained. Patient noted with mask in use when out of room. No visitors/family noted or reported at bedside today.

## 2018-03-13 PROBLEM — D70.1 NEUTROPENIA ASSOCIATED WITH MUCOSITIS DUE TO ANTINEOPLASTIC THERAPY: Status: ACTIVE | Noted: 2018-03-13

## 2018-03-13 PROBLEM — T45.1X5S NEUTROPENIA ASSOCIATED WITH MUCOSITIS DUE TO ANTINEOPLASTIC THERAPY: Status: ACTIVE | Noted: 2018-03-13

## 2018-03-13 PROBLEM — K12.31 NEUTROPENIA ASSOCIATED WITH MUCOSITIS DUE TO ANTINEOPLASTIC THERAPY: Status: ACTIVE | Noted: 2018-03-13

## 2018-03-13 PROBLEM — D70.9 FEVER AND NEUTROPENIA: Status: ACTIVE | Noted: 2018-03-13

## 2018-03-13 PROBLEM — R50.81 FEVER AND NEUTROPENIA: Status: ACTIVE | Noted: 2018-03-13

## 2018-03-13 LAB
ABO + RH BLD: NORMAL
ALBUMIN SERPL BCP-MCNC: 3.3 G/DL
ALP SERPL-CCNC: 91 U/L
ALT SERPL W/O P-5'-P-CCNC: 35 U/L
ANION GAP SERPL CALC-SCNC: 8 MMOL/L
ANISOCYTOSIS BLD QL SMEAR: SLIGHT
AST SERPL-CCNC: 20 U/L
BASOPHILS # BLD AUTO: 0 K/UL
BASOPHILS NFR BLD: 0 %
BILIRUB SERPL-MCNC: 1.3 MG/DL
BLD GP AB SCN CELLS X3 SERPL QL: NORMAL
BUN SERPL-MCNC: 8 MG/DL
CALCIUM SERPL-MCNC: 8.8 MG/DL
CHLORIDE SERPL-SCNC: 101 MMOL/L
CO2 SERPL-SCNC: 24 MMOL/L
CREAT SERPL-MCNC: 0.9 MG/DL
DIFFERENTIAL METHOD: ABNORMAL
EOSINOPHIL # BLD AUTO: 0 K/UL
EOSINOPHIL NFR BLD: 0 %
ERYTHROCYTE [DISTWIDTH] IN BLOOD BY AUTOMATED COUNT: 14.9 %
EST. GFR  (AFRICAN AMERICAN): >60 ML/MIN/1.73 M^2
EST. GFR  (NON AFRICAN AMERICAN): >60 ML/MIN/1.73 M^2
GLUCOSE SERPL-MCNC: 106 MG/DL
HCT VFR BLD AUTO: 21.1 %
HGB BLD-MCNC: 7.9 G/DL
IMM GRANULOCYTES # BLD AUTO: 0 K/UL
IMM GRANULOCYTES NFR BLD AUTO: 0 %
LYMPHOCYTES # BLD AUTO: 0.5 K/UL
LYMPHOCYTES NFR BLD: 100 %
MCH RBC QN AUTO: 30.9 PG
MCHC RBC AUTO-ENTMCNC: 37.4 G/DL
MCV RBC AUTO: 82 FL
MONOCYTES # BLD AUTO: 0 K/UL
MONOCYTES NFR BLD: 0 %
NEUTROPHILS # BLD AUTO: 0 K/UL
NEUTROPHILS NFR BLD: 0 %
NRBC BLD-RTO: 0 /100 WBC
PLATELET # BLD AUTO: 11 K/UL
PLATELET BLD QL SMEAR: ABNORMAL
PMV BLD AUTO: 12.5 FL
POTASSIUM SERPL-SCNC: 4 MMOL/L
PROT SERPL-MCNC: 6.3 G/DL
RBC # BLD AUTO: 2.56 M/UL
SODIUM SERPL-SCNC: 133 MMOL/L
WBC # BLD AUTO: 0.51 K/UL

## 2018-03-13 PROCEDURE — 25000003 PHARM REV CODE 250: Performed by: STUDENT IN AN ORGANIZED HEALTH CARE EDUCATION/TRAINING PROGRAM

## 2018-03-13 PROCEDURE — 25500020 PHARM REV CODE 255: Performed by: PEDIATRICS

## 2018-03-13 PROCEDURE — 86920 COMPATIBILITY TEST SPIN: CPT

## 2018-03-13 PROCEDURE — 87040 BLOOD CULTURE FOR BACTERIA: CPT | Mod: 59

## 2018-03-13 PROCEDURE — 25000003 PHARM REV CODE 250: Performed by: PEDIATRICS

## 2018-03-13 PROCEDURE — 63600175 PHARM REV CODE 636 W HCPCS: Performed by: PEDIATRICS

## 2018-03-13 PROCEDURE — 63600175 PHARM REV CODE 636 W HCPCS: Performed by: STUDENT IN AN ORGANIZED HEALTH CARE EDUCATION/TRAINING PROGRAM

## 2018-03-13 PROCEDURE — 99233 SBSQ HOSP IP/OBS HIGH 50: CPT | Mod: ,,, | Performed by: PEDIATRICS

## 2018-03-13 PROCEDURE — 85025 COMPLETE CBC W/AUTO DIFF WBC: CPT

## 2018-03-13 PROCEDURE — 80053 COMPREHEN METABOLIC PANEL: CPT

## 2018-03-13 PROCEDURE — 86850 RBC ANTIBODY SCREEN: CPT

## 2018-03-13 PROCEDURE — 11300000 HC PEDIATRIC PRIVATE ROOM

## 2018-03-13 PROCEDURE — 87103 BLOOD FUNGUS CULTURE: CPT

## 2018-03-13 PROCEDURE — 36592 COLLECT BLOOD FROM PICC: CPT

## 2018-03-13 RX ORDER — HEPARIN 100 UNIT/ML
330 SYRINGE INTRAVENOUS
Status: DISCONTINUED | OUTPATIENT
Start: 2018-03-13 | End: 2018-03-23 | Stop reason: HOSPADM

## 2018-03-13 RX ORDER — VANCOMYCIN/0.9 % SOD CHLORIDE 1 G/100 ML
1000 PLASTIC BAG, INJECTION (ML) INTRAVENOUS
Status: DISCONTINUED | OUTPATIENT
Start: 2018-03-13 | End: 2018-03-15

## 2018-03-13 RX ORDER — ACYCLOVIR 200 MG/1
800 CAPSULE ORAL 3 TIMES DAILY
Status: DISCONTINUED | OUTPATIENT
Start: 2018-03-13 | End: 2018-03-22

## 2018-03-13 RX ADMIN — CHLORHEXIDINE GLUCONATE 15 ML: 1.2 RINSE ORAL at 09:03

## 2018-03-13 RX ADMIN — CEFEPIME HYDROCHLORIDE 2 G: 2 INJECTION, POWDER, FOR SOLUTION INTRAVENOUS at 05:03

## 2018-03-13 RX ADMIN — Medication 1 G: at 06:03

## 2018-03-13 RX ADMIN — HEPARIN 330 UNITS: 100 SYRINGE at 04:03

## 2018-03-13 RX ADMIN — DIPHENHYDRAMINE HYDROCHLORIDE 25 MG: 50 INJECTION, SOLUTION INTRAMUSCULAR; INTRAVENOUS at 06:03

## 2018-03-13 RX ADMIN — Medication: at 01:03

## 2018-03-13 RX ADMIN — IOHEXOL 75 ML: 350 INJECTION, SOLUTION INTRAVENOUS at 06:03

## 2018-03-13 RX ADMIN — CEFEPIME HYDROCHLORIDE 2 G: 2 INJECTION, POWDER, FOR SOLUTION INTRAVENOUS at 04:03

## 2018-03-13 RX ADMIN — CIPROFLOXACIN HYDROCHLORIDE 500 MG: 500 TABLET, FILM COATED ORAL at 09:03

## 2018-03-13 RX ADMIN — ACYCLOVIR 800 MG: 200 CAPSULE ORAL at 03:03

## 2018-03-13 RX ADMIN — POSACONAZOLE 300 MG: 100 TABLET, COATED ORAL at 09:03

## 2018-03-13 RX ADMIN — ACYCLOVIR 800 MG: 200 CAPSULE ORAL at 09:03

## 2018-03-13 RX ADMIN — DIPHENHYDRAMINE HYDROCHLORIDE 25 MG: 50 INJECTION, SOLUTION INTRAMUSCULAR; INTRAVENOUS at 12:03

## 2018-03-13 RX ADMIN — ACYCLOVIR 400 MG: 200 CAPSULE ORAL at 09:03

## 2018-03-13 RX ADMIN — ACETAMINOPHEN 650 MG: 325 TABLET ORAL at 03:03

## 2018-03-13 RX ADMIN — ACETAMINOPHEN 650 MG: 325 TABLET ORAL at 05:03

## 2018-03-13 RX ADMIN — Medication 1 G: at 05:03

## 2018-03-13 RX ADMIN — Medication: at 07:03

## 2018-03-13 NOTE — PLAN OF CARE
Problem: Patient Care Overview  Goal: Plan of Care Review  Outcome: Ongoing (interventions implemented as appropriate)  Pt is currently resting in bed. Tmax this shift 101.7. Tylenol administered and MD notified. Blood cultures and antibiotics ordered. Antibiotics administered per MD orders. Good oral intake of liquids and good urine output noted. Pt reported pain on right side of neck this shift. Swelling noted, MD notified. No changes in Mucositis. Right lower lip and left inner cheek lesions noted. Good urine output noted this shift. No BM reported. Lab reported critical values of WBC: 0.51 and Platelets: 11. POC reviewed, verbalized understanding.

## 2018-03-13 NOTE — SUBJECTIVE & OBJECTIVE
Subjective:     Interval History: Fever overnight.     Oncology Treatment Plan:     PEDS [id number]  ARM A - LR Risk Patients    Medications:  Continuous Infusions:   morphine       Scheduled Meds:   acyclovir  400 mg Oral BID    cefepime 2 g in dextrose 5% 50 mL IVPB (ready to mix system)  2 g Intravenous Q12H    chlorhexidine  15 mL Mouth/Throat BID    ciprofloxacin HCl  500 mg Oral Q12H    posaconazole  300 mg Oral BID    sulfamethoxazole-trimethoprim 800-160mg  1 tablet Oral twice daily on Friday, Saturday, Sunday    vancomycin (VANCOCIN) IVPB  1,000 mg Intravenous Q12H     PRN Meds:(Magic mouthwash) 1:1:1 Benadryl 12.5mg/5ml liq, aluminum & magnesium hydroxide-simehticone (Maalox), lidocaine viscous 2%, sodium chloride, sodium chloride, acetaminophen, diphenhydrAMINE, heparin, porcine (PF), lidocaine HCl 2%, LORazepam, naloxone, ondansetron, polyethylene glycol     Review of Systems   Constitutional: Positive for fever.   HENT: Positive for mouth sores. Negative for congestion and rhinorrhea.         R-side neck pain   Eyes: Negative for photophobia and discharge.   Respiratory: Negative for shortness of breath.    Gastrointestinal: Negative for abdominal pain, constipation, diarrhea, nausea and vomiting.   Genitourinary: Negative for dysuria and flank pain.   Musculoskeletal: Negative for joint swelling.   Skin: Negative for pallor and rash.   Neurological: Negative for headaches.   Psychiatric/Behavioral: Negative for sleep disturbance.     Objective:     Vital Signs (Most Recent):  Temp: (!) 101.7 °F (38.7 °C) (03/13/18 0341)  Pulse: 85 (03/13/18 0341)  Resp: 13 (03/13/18 0341)  BP: (!) 128/57 (03/13/18 0341)  SpO2: 98 % (03/13/18 0341) Vital Signs (24h Range):  Temp:  [98.3 °F (36.8 °C)-101.7 °F (38.7 °C)] 101.7 °F (38.7 °C)  Pulse:  [59-92] 85  Resp:  [11-18] 13  SpO2:  [96 %-99 %] 98 %  BP: (104-130)/(51-60) 128/57     Weight: 83 kg (182 lb 15.7 oz)  Body mass index is 30.45 kg/m².  Body surface  area is 1.95 meters squared.      Intake/Output Summary (Last 24 hours) at 03/13/18 0557  Last data filed at 03/12/18 1930   Gross per 24 hour   Intake           1457.3 ml   Output                0 ml   Net           1457.3 ml       Physical Exam   Constitutional: He is oriented to person, place, and time. He appears well-developed. No distress.   HENT:   Whitish ulcerative mucosal lesion on R lower lip.  Diffuse erythematous, warm, swelling of R neck post to SCM. Tender to palpation. Non-fluctuant.   Eyes: Conjunctivae and EOM are normal. Pupils are equal, round, and reactive to light. Right eye exhibits no discharge. Left eye exhibits no discharge. No scleral icterus.   Neck: Normal range of motion. Neck supple.   Cardiovascular: Normal rate, regular rhythm, normal heart sounds and intact distal pulses.  Exam reveals no friction rub.    No murmur heard.  Pulmonary/Chest: Effort normal and breath sounds normal. No stridor. No respiratory distress. He has no wheezes.   Abdominal: Soft. Bowel sounds are normal. He exhibits no distension. There is no tenderness. There is no guarding.   Genitourinary:   Genitourinary Comments: deferred   Musculoskeletal: Normal range of motion. He exhibits no edema, tenderness or deformity.   Neurological: He is alert and oriented to person, place, and time.   Skin: Skin is warm. Capillary refill takes less than 2 seconds.   Psychiatric: He has a normal mood and affect.   Nursing note and vitals reviewed.      Labs:   Recent Lab Results       03/13/18  0425      Immature Granulocytes 0.0     Immature Grans (Abs) 0.00  Comment:  Mild elevation in immature granulocytes is non specific and   can be seen in a variety of conditions including stress response,   acute inflammation, trauma and pregnancy. Correlation with other   laboratory and clinical findings is essential.       Aniso Slight     Baso # 0.00     Basophil% 0.0     Differential Method Automated     Eos # 0.0     Eosinophil% 0.0      Gran # (ANC) 0.0(L)     Gran% 0.0(L)     Group & Rh AB POS     Hematocrit 21.1(L)     Hemoglobin 7.9(L)     INDIRECT LARRY NEG     Lymph # 0.5(L)     Lymph% 100.0(H)     MCH 30.9     MCHC 37.4(H)     MCV 82     Mono # 0.0(L)     Mono% 0.0(L)     MPV 12.5     nRBC 0     Platelet Estimate Decreased(A)     Platelets 11  Comment:  PLT COUNT  critical result(s) called and verbal readback obtained   from TAYLA AGUILAR RN, 03/13/2018 05:17  (LL)     RBC 2.56(L)     RDW 14.9(H)     WBC 0.51  Comment:  WBC   critical result(s) called and verbal readback obtained from   Tayla Aguilar RN. Prelim PLT given, 03/13/2018 04:52  (LL)           Diagnostic Results:  none

## 2018-03-13 NOTE — ASSESSMENT & PLAN NOTE
Oncology   AML (acute myeloblastic leukemia)     This  is a 17 y.o. male with AML (t8;21) receiving chemotherapy following [facility] [id number] (Arm A) here w neutropenia (without fever initially). Today is day 23 of cycle. Febrile to 101.3 night of 3/12. cultured port (both lumens), added cefepime and vanc.11 button pushes of morphine PCA overnight.     #neutropenia: in setting of AML (t8;21) being treated with chemo per [facility] AAML 1031 (ARM A). S/p 2 units PRBC's on 3/7 for hemoglobin of 6.6; 2 U of pRBCs and 1U Platelets 3/9.  - Daily CBC and CMP. Type and Screen q72h.  - cw all home meds              - Chlorhexidine 15 ml Oral BID              - Ciprofloxacin 500 mg Oral q12 hours              - Lisdexamfetamine 60 mg oral qAM              - Bactrim 800-160 mg Oral BID on Friday, Saturday, and Sunday              - Posaconazole 300 mg BID  - PRN Meds: Lorazepam, Ondansetron, Miralax  -f/u cultures (from both lumens)  - Vancomycin (since 3/12)  - cefepime (since 3/12)  - daily cultures x3d, reculture also if new fever or hypotensive  - monitor for bleeding, transfuse platelets if actively bleeding  - monitor BPs closely     Mucositis:  - morphine PCA : 0.5mg/h basal rate, 1mg on-demand 15min lock-out (max total 4.5mg/h), consider increasing basal rate overnight  - monitor PO, electrolytes, daily weights  - Acyclovir 800 mg TID PO        Dispo: pending resolution of counts

## 2018-03-13 NOTE — NURSING
RN called to bedside by pt. Pt reports feeling his R neck swelling and pain has worsened. MD Mcginnis aware, radiology contacted to expedite ordered CT scan. Radiology informed nursing that one CT machine down and 7+ stat CTs ordered need done prior to pt turn. Safety in place, will monitor.

## 2018-03-13 NOTE — PROGRESS NOTES
[facility]  Pediatric Hematology/Oncology  Progress Note    Patient Name: Hema Kuo  Admission Date: 3/5/2018  Hospital Length of Stay: 8 days  Code Status: Full Code     Subjective:     Interval History: Fever overnight.     Oncology Treatment Plan:     PEDS [id number]-1031  ARM A - LR Risk Patients    Medications:  Continuous Infusions:   morphine       Scheduled Meds:   acyclovir  400 mg Oral BID    cefepime 2 g in dextrose 5% 50 mL IVPB (ready to mix system)  2 g Intravenous Q12H    chlorhexidine  15 mL Mouth/Throat BID    ciprofloxacin HCl  500 mg Oral Q12H    posaconazole  300 mg Oral BID    sulfamethoxazole-trimethoprim 800-160mg  1 tablet Oral twice daily on Friday, Saturday, Sunday    vancomycin (VANCOCIN) IVPB  1,000 mg Intravenous Q12H     PRN Meds:(Magic mouthwash) 1:1:1 Benadryl 12.5mg/5ml liq, aluminum & magnesium hydroxide-simehticone (Maalox), lidocaine viscous 2%, sodium chloride, sodium chloride, acetaminophen, diphenhydrAMINE, heparin, porcine (PF), lidocaine HCl 2%, LORazepam, naloxone, ondansetron, polyethylene glycol     Review of Systems   Constitutional: Positive for fever.   HENT: Positive for mouth sores. Negative for congestion and rhinorrhea.         R-side neck pain   Eyes: Negative for photophobia and discharge.   Respiratory: Negative for shortness of breath.    Gastrointestinal: Negative for abdominal pain, constipation, diarrhea, nausea and vomiting.   Genitourinary: Negative for dysuria and flank pain.   Musculoskeletal: Negative for joint swelling.   Skin: Negative for pallor and rash.   Neurological: Negative for headaches.   Psychiatric/Behavioral: Negative for sleep disturbance.     Objective:     Vital Signs (Most Recent):  Temp: (!) 101.7 °F (38.7 °C) (03/13/18 0341)  Pulse: 85 (03/13/18 0341)  Resp: 13 (03/13/18 0341)  BP: (!) 128/57 (03/13/18 0341)  SpO2: 98 % (03/13/18 0341) Vital Signs (24h Range):  Temp:  [98.3 °F (36.8 °C)-101.7 °F (38.7 °C)] 101.7  °F (38.7 °C)  Pulse:  [59-92] 85  Resp:  [11-18] 13  SpO2:  [96 %-99 %] 98 %  BP: (104-130)/(51-60) 128/57     Weight: 83 kg (182 lb 15.7 oz)  Body mass index is 30.45 kg/m².  Body surface area is 1.95 meters squared.      Intake/Output Summary (Last 24 hours) at 03/13/18 0557  Last data filed at 03/12/18 1930   Gross per 24 hour   Intake           1457.3 ml   Output                0 ml   Net           1457.3 ml       Physical Exam   Constitutional: He is oriented to person, place, and time. He appears well-developed. No distress.   HENT:   Whitish ulcerative mucosal lesion on R lower lip.  Diffuse erythematous, warm, swelling of R neck post to SCM. Tender to palpation. Non-fluctuant.   Eyes: Conjunctivae and EOM are normal. Pupils are equal, round, and reactive to light. Right eye exhibits no discharge. Left eye exhibits no discharge. No scleral icterus.   Neck: Normal range of motion. Neck supple.   Cardiovascular: Normal rate, regular rhythm, normal heart sounds and intact distal pulses.  Exam reveals no friction rub.    No murmur heard.  Pulmonary/Chest: Effort normal and breath sounds normal. No stridor. No respiratory distress. He has no wheezes.   Abdominal: Soft. Bowel sounds are normal. He exhibits no distension. There is no tenderness. There is no guarding.   Genitourinary:   Genitourinary Comments: deferred   Musculoskeletal: Normal range of motion. He exhibits no edema, tenderness or deformity.   Neurological: He is alert and oriented to person, place, and time.   Skin: Skin is warm. Capillary refill takes less than 2 seconds.   Psychiatric: He has a normal mood and affect.   Nursing note and vitals reviewed.      Labs:   Recent Lab Results       03/13/18  0425      Immature Granulocytes 0.0     Immature Grans (Abs) 0.00  Comment:  Mild elevation in immature granulocytes is non specific and   can be seen in a variety of conditions including stress response,   acute inflammation, trauma and pregnancy.  Correlation with other   laboratory and clinical findings is essential.       Aniso Slight     Baso # 0.00     Basophil% 0.0     Differential Method Automated     Eos # 0.0     Eosinophil% 0.0     Gran # (ANC) 0.0(L)     Gran% 0.0(L)     Group & Rh AB POS     Hematocrit 21.1(L)     Hemoglobin 7.9(L)     INDIRECT LARRY NEG     Lymph # 0.5(L)     Lymph% 100.0(H)     MCH 30.9     MCHC 37.4(H)     MCV 82     Mono # 0.0(L)     Mono% 0.0(L)     MPV 12.5     nRBC 0     Platelet Estimate Decreased(A)     Platelets 11  Comment:  PLT COUNT  critical result(s) called and verbal readback obtained   from TAYLA AGUILAR RN, 03/13/2018 05:17  (LL)     RBC 2.56(L)     RDW 14.9(H)     WBC 0.51  Comment:  WBC   critical result(s) called and verbal readback obtained from   Tayla Aguilar RN. Prelim PLT given, 03/13/2018 04:52  (LL)           Diagnostic Results:  none        Assessment/Plan:     AML (acute myeloblastic leukemia)      Oncology   AML (acute myeloblastic leukemia)     This  is a 17 y.o. male with AML (t8;21) receiving chemotherapy following [facility]G [id number] ([place]) here w neutropenia (without fever initially). Today is day 23 of cycle. Febrile to 101.3 night of 3/12.  cultured port (both lumens), added cefepime and vanc.11 button pushes of morphine PCA overnight.     #neutropenia: in setting of AML (t8;21) being treated with chemo per COG AAML 1031 (ARM A). S/p 2 units PRBC's on 3/7 for hemoglobin of 6.6; 2 U of pRBCs and 1U Platelets 3/9.  - Daily CBC and CMP. Type and Screen q72h.  - cw all home meds              - Chlorhexidine 15 ml Oral BID              - Ciprofloxacin 500 mg Oral q12 hours              - Lisdexamfetamine 60 mg oral qAM              - Bactrim 800-160 mg Oral BID on Friday, Saturday, and Sunday              - Posaconazole 300 mg BID  - PRN Meds: Lorazepam, Ondansetron, Miralax  -f/u cultures (from both lumens)  - Vancomycin (since 3/12)  - cefepime (since 3/12)  - daily cultures x3d,  reculture also if new fever or hypotensive  - monitor for bleeding, transfuse platelets if actively bleeding  - monitor BPs closely     Mucositis:  - morphine PCA : 0.5mg/h basal rate, 1mg on-demand 15min lock-out (max total 4.5mg/h), consider increasing basal rate overnight  - monitor PO, electrolytes, daily weights  - Acyclovir 800 mg TID PO        Dispo: pending resolution of counts                 Victoria Araya MD  Pediatric Hematology/Oncology  [facility]

## 2018-03-13 NOTE — ASSESSMENT & PLAN NOTE
AML (acute myeloblastic leukemia)  This  is a 17 y.o. male with AML (t8;21) receiving chemotherapy following [facility] [id number] ([place] A) here w neutropenia (without fever initially). Today is day 23 of cycle. Febrile to 101.3 night of 3/12. cultured port (both lumens), added cefepime and vanc.  Not eating much 2/2 pain. Neck swelling increased. Pain better well-controlled on morphine pca.  - Afebrile since 2am 3/14.   - Better pain control overnight        Fever and neutropenia:   In setting of AML (t8;21) being treated with chemo per [facility] AAML 1031 (ARM A). S/p 2 units PRBC's on 3/7 for hemoglobin of 6.6; 2 U of pRBCs and 1U Platelets 3/9. On 3/14 received 1u plts and 2u pRBCs.  - Daily CBC and CMP. Type and Screen q72h.  - Continue all home meds              - Chlorhexidine 15 ml Oral BID              - Ciprofloxacin 500 mg Oral q12 hours              - Lisdexamfetamine 60 mg oral qAM              - Bactrim 800-160 mg Oral BID on Friday, Saturday, and Sunday              - Posaconazole 300 mg BID  - PRN Meds: Lorazepam, Ondansetron, Miralax  -f/u cultures (from both lumens)  - Vancomycin, follow trough (since 3/12)  - Cefepime (since 3/12)--> transition to meropenem 3/15  - 3x blood cx NGTD, reculture also if new fever or hypotensive  - Monitor for bleeding, transfuse platelets if actively bleeding  - Monitor BPs closely     Mucositis:  - Morphine PCA : 1mg/h basal rate, 1.5mg on-demand 15min lock-out (max total 4.5mg/h), consider increasing basal rate overnight  - Monitor PO, electrolytes, daily weights  - Acyclovir 800 mg TID PO  - Miralax and cholace     Hematemesis:   - night of 3/14: 20-30 ml juan brown/red blood noted with small clots  - transfused 1u plts and 2u pRBCs, with no further episodes  - continue to monitor      Neck swelling  - Pain control with morphine pca as above  - Antimicrobial coverage as above  - Consulted ENT. Appreciate recommendations.     Disposition: Pending resolution of counts and clinical  improvement.

## 2018-03-13 NOTE — SUBJECTIVE & OBJECTIVE
Interval History: Patient was seen by ID and ENT yesterday. ID recommended blood CMV PCR, HSV PCR of mucosal lesion and TB testing. ENT evaluated patient and recommended no surgical intervention at this point. Repeat Ultrasound suggestive of suppurative lymph node in right side of neck.    Scheduled Meds:   acyclovir  800 mg Oral TID    cefepime 2 g in dextrose 5% 50 mL IVPB (ready to mix system)  2 g Intravenous Q12H    chlorhexidine  15 mL Mouth/Throat BID    ciprofloxacin HCl  500 mg Oral Q12H    posaconazole  300 mg Oral BID    sulfamethoxazole-trimethoprim 800-160mg  1 tablet Oral twice daily on Friday, Saturday, Sunday    vancomycin (VANCOCIN) IVPB  1,000 mg Intravenous Q12H     Continuous Infusions:   morphine       PRN Meds:(Magic mouthwash) 1:1:1 Benadryl 12.5mg/5ml liq, aluminum & magnesium hydroxide-simehticone (Maalox), lidocaine viscous 2%, sodium chloride, sodium chloride, acetaminophen, diphenhydrAMINE, heparin, porcine (PF), lidocaine HCl 2%, LORazepam, naloxone, ondansetron, polyethylene glycol    Review of Systems   Constitutional: Negative for fever. Activity change: improved. Appetite change: improved.   HENT: Positive for mouth sores. Negative for congestion and rhinorrhea.         R-side neck pain   Eyes: Negative for photophobia and discharge.   Respiratory: Negative for shortness of breath.    Gastrointestinal: Negative for abdominal pain, constipation, diarrhea, nausea and vomiting.   Endocrine: Negative for cold intolerance and heat intolerance.   Genitourinary: Negative for dysuria and flank pain.   Musculoskeletal: Negative for joint swelling.   Skin: Negative for pallor and rash.   Allergic/Immunologic: Positive for immunocompromised state.   Neurological: Negative for numbness and headaches.   Psychiatric/Behavioral: Negative for sleep disturbance.     Objective:     Vital Signs (Most Recent):  Temp: (!) 100.4 °F (38 °C) (03/13/18 1606)  Pulse: 96 (03/13/18 1606)  Resp: 16  (03/13/18 1606)  BP: (!) 115/57 (03/13/18 1606)  SpO2: 97 % (03/13/18 1606) Vital Signs (24h Range):  Temp:  [98.3 °F (36.8 °C)-101.7 °F (38.7 °C)] 100.4 °F (38 °C)  Pulse:  [77-96] 96  Resp:  [11-18] 16  SpO2:  [96 %-98 %] 97 %  BP: (103-130)/(52-79) 115/57     Patient Vitals for the past 72 hrs (Last 3 readings):   Weight   03/12/18 1925 83 kg (182 lb 15.7 oz)   03/11/18 2000 84.4 kg (186 lb 1.1 oz)     Body mass index is 30.45 kg/m².    Intake/Output - Last 3 Shifts       03/11 0700 - 03/12 0659 03/12 0700 - 03/13 0659 03/13 0700 - 03/14 0659    P.O. 480 1700 480    I.V. (mL/kg)  145.2 (1.7) 383 (4.6)    IV Piggyback  300     Total Intake(mL/kg) 480 (5.7) 2145.2 (25.8) 863 (10.4)    Urine (mL/kg/hr)   675 (0.8)    Total Output     675    Net +480 +2145.2 +188           Urine Occurrence 5 x 3 x     Stool Occurrence  0 x     Emesis Occurrence  0 x           Lines/Drains/Airways     Central Venous Catheter Line                 Port A Cath Double Lumen 10/31/17 1826 left subclavian 132 days                Physical Exam   Constitutional: He is oriented to person, place, and time. He appears well-developed. No distress.   HENT:   Head: Normocephalic.   White ulcerative mucosal lesion on R lower lip. Second lesion on the left inner cheek (mucosal surface_  Diffuse nonerythematous, swelling of R neck post to SCM. Tender to palpation. Non-fluctuant.   Eyes: Conjunctivae and EOM are normal. Pupils are equal, round, and reactive to light. Right eye exhibits no discharge. Left eye exhibits no discharge. No scleral icterus.   Neck: Normal range of motion. Neck supple.   Cardiovascular: Normal rate, regular rhythm, normal heart sounds and intact distal pulses.  Exam reveals no friction rub.    No murmur heard.  Pulmonary/Chest: Effort normal and breath sounds normal. No stridor. No respiratory distress. He has no wheezes.   Abdominal: Soft. Bowel sounds are normal. He exhibits no distension. There is no tenderness. There is no  guarding.   Genitourinary:   Genitourinary Comments: deferred   Musculoskeletal: Normal range of motion. He exhibits no edema, tenderness or deformity.   Neurological: He is alert and oriented to person, place, and time.   Skin: Skin is warm. Capillary refill takes less than 2 seconds.   Psychiatric: He has a normal mood and affect.   Nursing note and vitals reviewed.      Significant Labs:  Lab Results   Component Value Date    WBC 0.60 (LL) 03/16/2018    RBC 3.14 (L) 03/16/2018    HGB 9.3 (L) 03/16/2018    HCT 25.2 (L) 03/16/2018    MCV 80 (L) 03/16/2018    MCH 29.6 03/16/2018    MCHC 36.9 (H) 03/16/2018    RDW 14.0 03/16/2018    PLT 15 (LL) 03/16/2018    MPV SEE COMMENT 03/16/2018    GRAN 0.0 (L) 03/16/2018    LYMPH Test Not Performed 03/16/2018    LYMPH 100.0 (H) 03/16/2018    MONO Test Not Performed 03/16/2018    MONO 0.0 (L) 03/16/2018    EOS Test Not Performed 03/16/2018    BASO Test Not Performed 03/16/2018    EOSINOPHIL 0.0 03/16/2018    BASOPHIL 0.0 03/16/2018     CMP  Sodium   Date Value Ref Range Status   03/16/2018 137 136 - 145 mmol/L Final     Potassium   Date Value Ref Range Status   03/16/2018 4.2 3.5 - 5.1 mmol/L Final     Chloride   Date Value Ref Range Status   03/16/2018 101 95 - 110 mmol/L Final     CO2   Date Value Ref Range Status   03/16/2018 28 23 - 29 mmol/L Final     Glucose   Date Value Ref Range Status   03/16/2018 100 70 - 110 mg/dL Final     BUN, Bld   Date Value Ref Range Status   03/16/2018 7 6 - 20 mg/dL Final     Creatinine   Date Value Ref Range Status   03/16/2018 0.7 0.5 - 1.4 mg/dL Final     Calcium   Date Value Ref Range Status   03/16/2018 8.6 (L) 8.7 - 10.5 mg/dL Final     Total Protein   Date Value Ref Range Status   03/16/2018 6.2 6.0 - 8.4 g/dL Final     Albumin   Date Value Ref Range Status   03/16/2018 3.0 (L) 3.5 - 5.2 g/dL Final     Total Bilirubin   Date Value Ref Range Status   03/16/2018 0.8 0.1 - 1.0 mg/dL Final     Comment:     For infants and newborns,  interpretation of results should be based  on gestational age, weight and in agreement with clinical  observations.  Premature Infant recommended reference ranges:  Up to 24 hours.............<8.0 mg/dL  Up to 48 hours............<12.0 mg/dL  3-5 days..................<15.0 mg/dL  6-29 days.................<15.0 mg/dL       Alkaline Phosphatase   Date Value Ref Range Status   03/16/2018 126 55 - 135 U/L Final     AST   Date Value Ref Range Status   03/16/2018 20 10 - 40 U/L Final     ALT   Date Value Ref Range Status   03/16/2018 46 (H) 10 - 44 U/L Final     Anion Gap   Date Value Ref Range Status   03/16/2018 8 8 - 16 mmol/L Final     eGFR if    Date Value Ref Range Status   03/16/2018 >60.0 >60 mL/min/1.73 m^2 Final     eGFR if non    Date Value Ref Range Status   03/16/2018 >60.0 >60 mL/min/1.73 m^2 Final     Comment:     Calculation used to obtain the estimated glomerular filtration  rate (eGFR) is the CKD-EPI equation.            Significant Imaging:   Ultrasound Neck  3/15/2018  Abnormal hypoechoic mass posterior to the right sternocleidomastoid muscle with significant internal vascular flow.  While non-specific, this is favored to represent a pathologic or suppurative lymph node.  Clinical correlation is advised.  Histologic sampling may be warranted.

## 2018-03-13 NOTE — PLAN OF CARE
Problem: Patient Care Overview  Goal: Plan of Care Review  Outcome: Ongoing (interventions implemented as appropriate)  Patient's vss afebrile , t-max 99.2 today. Patient with c/o oral /lip pain due to lesions noted. Prn ;tylenol, magic mouth wash,&  topical lidocaine in use- little improvement in relief of discomfort/pain noted/ reported this am. Morphine pca begun today at 1230 as ordered - continuous and pca dosing ordered. Patient reported improvement in pain control. Patient taking cold water well today, also drank 8oz. Boost , kid essential drink . Voiding well. No stool noted or reported today. Patient denies any abdominal discomfort or nausea. Patient's wbc 0.73, plts - 20,000, Hct 29, Hgb 9.4. Patient maintains mask in use when out of room / in harmon. Patient awake for afternoon/ evening - playing video games .

## 2018-03-13 NOTE — PROGRESS NOTES
03/13/18 0341   Vital Signs   Temp (!) 101.7 °F (38.7 °C)   Dr. Cormier notified. Tylenol administered. Will continue to monitor.

## 2018-03-14 LAB
ALBUMIN SERPL BCP-MCNC: 3.6 G/DL
ALP SERPL-CCNC: 186 U/L
ALT SERPL W/O P-5'-P-CCNC: 70 U/L
ANION GAP SERPL CALC-SCNC: 10 MMOL/L
AST SERPL-CCNC: 40 U/L
BASOPHILS # BLD AUTO: ABNORMAL K/UL
BASOPHILS NFR BLD: 0 %
BILIRUB SERPL-MCNC: 1.1 MG/DL
BLD PROD TYP BPU: NORMAL
BLOOD UNIT EXPIRATION DATE: NORMAL
BLOOD UNIT TYPE CODE: 6200
BLOOD UNIT TYPE: NORMAL
BUN SERPL-MCNC: 8 MG/DL
CALCIUM SERPL-MCNC: 9 MG/DL
CHLORIDE SERPL-SCNC: 102 MMOL/L
CO2 SERPL-SCNC: 25 MMOL/L
CODING SYSTEM: NORMAL
CREAT SERPL-MCNC: 0.9 MG/DL
DIFFERENTIAL METHOD: ABNORMAL
DISPENSE STATUS: NORMAL
EOSINOPHIL # BLD AUTO: ABNORMAL K/UL
EOSINOPHIL NFR BLD: 0 %
ERYTHROCYTE [DISTWIDTH] IN BLOOD BY AUTOMATED COUNT: 14.8 %
EST. GFR  (AFRICAN AMERICAN): >60 ML/MIN/1.73 M^2
EST. GFR  (NON AFRICAN AMERICAN): >60 ML/MIN/1.73 M^2
GLUCOSE SERPL-MCNC: 108 MG/DL
HCT VFR BLD AUTO: 17.9 %
HGB BLD-MCNC: 6.7 G/DL
IMM GRANULOCYTES # BLD AUTO: ABNORMAL K/UL
IMM GRANULOCYTES NFR BLD AUTO: ABNORMAL %
LYMPHOCYTES # BLD AUTO: ABNORMAL K/UL
LYMPHOCYTES NFR BLD: 100 %
MCH RBC QN AUTO: 29.6 PG
MCHC RBC AUTO-ENTMCNC: 37.4 G/DL
MCV RBC AUTO: 79 FL
MONOCYTES # BLD AUTO: ABNORMAL K/UL
MONOCYTES NFR BLD: 0 %
NEUTROPHILS NFR BLD: 0 %
NRBC BLD-RTO: 0 /100 WBC
NUM UNITS TRANS WBC-POOR PLATPHERESIS: NORMAL
PLATELET # BLD AUTO: 16 K/UL
PLATELET BLD QL SMEAR: ABNORMAL
PMV BLD AUTO: 12.3 FL
POTASSIUM SERPL-SCNC: 3.9 MMOL/L
PROT SERPL-MCNC: 7.1 G/DL
RBC # BLD AUTO: 2.26 M/UL
SODIUM SERPL-SCNC: 137 MMOL/L
VANCOMYCIN TROUGH SERPL-MCNC: 7.8 UG/ML
WBC # BLD AUTO: 1.15 K/UL

## 2018-03-14 PROCEDURE — 80202 ASSAY OF VANCOMYCIN: CPT

## 2018-03-14 PROCEDURE — 85007 BL SMEAR W/DIFF WBC COUNT: CPT

## 2018-03-14 PROCEDURE — 63600175 PHARM REV CODE 636 W HCPCS: Performed by: STUDENT IN AN ORGANIZED HEALTH CARE EDUCATION/TRAINING PROGRAM

## 2018-03-14 PROCEDURE — P9038 RBC IRRADIATED: HCPCS

## 2018-03-14 PROCEDURE — 99233 SBSQ HOSP IP/OBS HIGH 50: CPT | Mod: ,,, | Performed by: PEDIATRICS

## 2018-03-14 PROCEDURE — P9037 PLATE PHERES LEUKOREDU IRRAD: HCPCS

## 2018-03-14 PROCEDURE — 11300000 HC PEDIATRIC PRIVATE ROOM

## 2018-03-14 PROCEDURE — 94640 AIRWAY INHALATION TREATMENT: CPT

## 2018-03-14 PROCEDURE — 94761 N-INVAS EAR/PLS OXIMETRY MLT: CPT

## 2018-03-14 PROCEDURE — 25000003 PHARM REV CODE 250: Performed by: STUDENT IN AN ORGANIZED HEALTH CARE EDUCATION/TRAINING PROGRAM

## 2018-03-14 PROCEDURE — 63600175 PHARM REV CODE 636 W HCPCS: Performed by: PEDIATRICS

## 2018-03-14 PROCEDURE — 80053 COMPREHEN METABOLIC PANEL: CPT

## 2018-03-14 PROCEDURE — 25000242 PHARM REV CODE 250 ALT 637 W/ HCPCS: Performed by: PEDIATRICS

## 2018-03-14 PROCEDURE — 25000003 PHARM REV CODE 250: Performed by: PEDIATRICS

## 2018-03-14 PROCEDURE — 87040 BLOOD CULTURE FOR BACTERIA: CPT | Mod: 59

## 2018-03-14 PROCEDURE — 36592 COLLECT BLOOD FROM PICC: CPT

## 2018-03-14 PROCEDURE — 85027 COMPLETE CBC AUTOMATED: CPT

## 2018-03-14 PROCEDURE — 36415 COLL VENOUS BLD VENIPUNCTURE: CPT

## 2018-03-14 RX ORDER — ALBUTEROL SULFATE 0.83 MG/ML
5 SOLUTION RESPIRATORY (INHALATION) ONCE
Status: DISCONTINUED | OUTPATIENT
Start: 2018-03-15 | End: 2018-03-14

## 2018-03-14 RX ORDER — HYDROCODONE BITARTRATE AND ACETAMINOPHEN 500; 5 MG/1; MG/1
TABLET ORAL
Status: DISCONTINUED | OUTPATIENT
Start: 2018-03-14 | End: 2018-03-15

## 2018-03-14 RX ORDER — ALBUTEROL SULFATE 0.83 MG/ML
5 SOLUTION RESPIRATORY (INHALATION) ONCE
Status: COMPLETED | OUTPATIENT
Start: 2018-03-14 | End: 2018-03-14

## 2018-03-14 RX ORDER — DIPHENHYDRAMINE HCL 25 MG
25 CAPSULE ORAL EVERY 6 HOURS PRN
Status: DISCONTINUED | OUTPATIENT
Start: 2018-03-14 | End: 2018-03-23 | Stop reason: HOSPADM

## 2018-03-14 RX ORDER — SODIUM CHLORIDE 0.9 % (FLUSH) 0.9 %
5 SYRINGE (ML) INJECTION
Status: DISCONTINUED | OUTPATIENT
Start: 2018-03-14 | End: 2018-03-23

## 2018-03-14 RX ADMIN — Medication 1 G: at 06:03

## 2018-03-14 RX ADMIN — CEFEPIME HYDROCHLORIDE 2 G: 2 INJECTION, POWDER, FOR SOLUTION INTRAVENOUS at 05:03

## 2018-03-14 RX ADMIN — Medication: at 11:03

## 2018-03-14 RX ADMIN — Medication: at 01:03

## 2018-03-14 RX ADMIN — ACYCLOVIR 800 MG: 200 CAPSULE ORAL at 11:03

## 2018-03-14 RX ADMIN — DIPHENHYDRAMINE HYDROCHLORIDE 25 MG: 25 CAPSULE ORAL at 07:03

## 2018-03-14 RX ADMIN — CIPROFLOXACIN HYDROCHLORIDE 500 MG: 500 TABLET, FILM COATED ORAL at 08:03

## 2018-03-14 RX ADMIN — ALBUTEROL SULFATE 5 MG: 2.5 SOLUTION RESPIRATORY (INHALATION) at 11:03

## 2018-03-14 RX ADMIN — POSACONAZOLE 300 MG: 100 TABLET, COATED ORAL at 09:03

## 2018-03-14 RX ADMIN — HEPARIN 330 UNITS: 100 SYRINGE at 08:03

## 2018-03-14 RX ADMIN — CIPROFLOXACIN HYDROCHLORIDE 500 MG: 500 TABLET, FILM COATED ORAL at 09:03

## 2018-03-14 RX ADMIN — ACYCLOVIR 800 MG: 200 CAPSULE ORAL at 08:03

## 2018-03-14 RX ADMIN — HEPARIN 330 UNITS: 100 SYRINGE at 05:03

## 2018-03-14 RX ADMIN — Medication: at 12:03

## 2018-03-14 RX ADMIN — CHLORHEXIDINE GLUCONATE 15 ML: 1.2 RINSE ORAL at 09:03

## 2018-03-14 RX ADMIN — ACYCLOVIR 800 MG: 200 CAPSULE ORAL at 05:03

## 2018-03-14 RX ADMIN — DIPHENHYDRAMINE HYDROCHLORIDE 25 MG: 50 INJECTION, SOLUTION INTRAMUSCULAR; INTRAVENOUS at 05:03

## 2018-03-14 RX ADMIN — CHLORHEXIDINE GLUCONATE 15 ML: 1.2 RINSE ORAL at 08:03

## 2018-03-14 NOTE — PROGRESS NOTES
03/13/18 1606   Vital Signs   Temp (!) 100.4 °F (38 °C)   Temp src Oral     MD Mcginnis notified. Tylenol admin. Blood and fungal cultures sent from 2/2 lumen to PAC. Will cont to monitor.

## 2018-03-14 NOTE — SUBJECTIVE & OBJECTIVE
Subjective:     Interval History: Fever around 1700 yesterday following which blood and fungal cultures were drawn. CT scan overnight was performed. Pain in neck worse overnight requiring increase in PCA dosing. Patient had fever of upto 100.7 overnight.     This morning he reports feeling better and is in good spirits. Reporting better pain control now.    Oncology Treatment Plan:     PEDS [id number]  ARM A - LR Risk Patients    Medications:  Continuous Infusions:   morphine       Scheduled Meds:   acyclovir  800 mg Oral TID    cefepime 2 g in dextrose 5% 50 mL IVPB (ready to mix system)  2 g Intravenous Q12H    chlorhexidine  15 mL Mouth/Throat BID    ciprofloxacin HCl  500 mg Oral Q12H    posaconazole  300 mg Oral BID    sulfamethoxazole-trimethoprim 800-160mg  1 tablet Oral twice daily on Friday, Saturday, Sunday    vancomycin (VANCOCIN) IVPB  1,000 mg Intravenous Q12H     PRN Meds:(Magic mouthwash) 1:1:1 Benadryl 12.5mg/5ml liq, aluminum & magnesium hydroxide-simehticone (Maalox), lidocaine viscous 2%, acetaminophen, diphenhydrAMINE, heparin, porcine (PF), lidocaine HCl 2%, LORazepam, naloxone, ondansetron, polyethylene glycol     Review of Systems   Constitutional: Positive for diaphoresis. Negative for activity change and fever (afebrile for 24h). Appetite change: Improved.   HENT: Positive for mouth sores. Negative for congestion, rhinorrhea, sore throat and trouble swallowing.         R-side neck pain   Eyes: Negative for photophobia and discharge.   Respiratory: Negative for cough and shortness of breath.    Gastrointestinal: Positive for constipation (last stooled 3d ago) and vomiting (hematemesis x1). Negative for abdominal pain, diarrhea and nausea.   Genitourinary: Negative for dysuria and flank pain.   Musculoskeletal: Negative for joint swelling.   Skin: Negative for pallor and rash.   Neurological: Negative for headaches.   Psychiatric/Behavioral: Negative for sleep disturbance.      Objective:     Vital Signs (Most Recent):  Temp: 99.5 °F (37.5 °C) (03/14/18 0845)  Pulse: 92 (03/14/18 0845)  Resp: 14 (03/14/18 0845)  BP: (!) 126/59 (03/14/18 0845)  SpO2: 95 % (03/14/18 0845) Vital Signs (24h Range):  Temp:  [98.9 °F (37.2 °C)-100.7 °F (38.2 °C)] 99.5 °F (37.5 °C)  Pulse:  [84-99] 92  Resp:  [11-20] 14  SpO2:  [93 %-98 %] 95 %  BP: (115-133)/(55-79) 126/59     Weight: 83 kg (182 lb 15.7 oz)  Body mass index is 30.45 kg/m².  Body surface area is 1.95 meters squared.      Intake/Output Summary (Last 24 hours) at 03/14/18 0951  Last data filed at 03/14/18 0617   Gross per 24 hour   Intake          3448.65 ml   Output             1425 ml   Net          2023.65 ml       Physical Exam   Constitutional: He is oriented to person, place, and time. He appears well-developed. No distress.   HENT:   Whitish ulcerative mucosal lesion on R lower lip with surrounding mucosal swelling and erythema. Second lesion on left side mucosal surface in the mouth anterior to tonsillar pillar.  Lips swollen.   Eyes: Conjunctivae and EOM are normal. Pupils are equal, round, and reactive to light. Right eye exhibits no discharge. Left eye exhibits no discharge. No scleral icterus.   Neck: Normal range of motion. Neck supple.   Diffuse swelling of R neck post to SCM, increased in size from yesterday. Tender to palpation. Firm. Non-fluctuant.   Cardiovascular: Normal rate, regular rhythm, normal heart sounds and intact distal pulses.  Exam reveals no friction rub.    No murmur heard.  Pulmonary/Chest: Effort normal and breath sounds normal. No stridor. No respiratory distress. He has no wheezes.   Abdominal: Soft. Bowel sounds are normal. He exhibits no distension. There is no tenderness. There is no guarding.   Genitourinary:   Genitourinary Comments: deferred   Musculoskeletal: Normal range of motion. He exhibits no edema, tenderness or deformity.   Neurological: He is alert and oriented to person, place, and time.    Skin: Skin is warm. Capillary refill takes less than 2 seconds.   Psychiatric: He has a normal mood and affect.   Nursing note and vitals reviewed.      Labs:     Lab Results   Component Value Date    WBC 1.15 (LL) 03/14/2018    RBC 2.26 (L) 03/14/2018    HGB 6.7 (L) 03/14/2018    HCT 17.9 (LL) 03/14/2018    MCV 79 (L) 03/14/2018    MCH 29.6 03/14/2018    MCHC 37.4 (H) 03/14/2018    RDW 14.8 (H) 03/14/2018    PLT 16 (LL) 03/14/2018    MPV 12.3 03/14/2018    GRAN 0.0 (L) 03/14/2018    LYMPH Test Not Performed 03/14/2018    LYMPH 100.0 (H) 03/14/2018    MONO Test Not Performed 03/14/2018    MONO 0.0 (L) 03/14/2018    EOS Test Not Performed 03/14/2018    BASO Test Not Performed 03/14/2018    EOSINOPHIL 0.0 03/14/2018    BASOPHIL 0.0 03/14/2018     CMP  Sodium   Date Value Ref Range Status   03/14/2018 137 136 - 145 mmol/L Final     Potassium   Date Value Ref Range Status   03/14/2018 3.9 3.5 - 5.1 mmol/L Final     Chloride   Date Value Ref Range Status   03/14/2018 102 95 - 110 mmol/L Final     CO2   Date Value Ref Range Status   03/14/2018 25 23 - 29 mmol/L Final     Glucose   Date Value Ref Range Status   03/14/2018 108 70 - 110 mg/dL Final     BUN, Bld   Date Value Ref Range Status   03/14/2018 8 6 - 20 mg/dL Final     Creatinine   Date Value Ref Range Status   03/14/2018 0.9 0.5 - 1.4 mg/dL Final     Calcium   Date Value Ref Range Status   03/14/2018 9.0 8.7 - 10.5 mg/dL Final     Total Protein   Date Value Ref Range Status   03/14/2018 7.1 6.0 - 8.4 g/dL Final     Albumin   Date Value Ref Range Status   03/14/2018 3.6 3.5 - 5.2 g/dL Final     Total Bilirubin   Date Value Ref Range Status   03/14/2018 1.1 (H) 0.1 - 1.0 mg/dL Final     Comment:     For infants and newborns, interpretation of results should be based  on gestational age, weight and in agreement with clinical  observations.  Premature Infant recommended reference ranges:  Up to 24 hours.............<8.0 mg/dL  Up to 48 hours............<12.0  mg/dL  3-5 days..................<15.0 mg/dL  6-29 days.................<15.0 mg/dL       Alkaline Phosphatase   Date Value Ref Range Status   03/14/2018 186 (H) 55 - 135 U/L Final     AST   Date Value Ref Range Status   03/14/2018 40 10 - 40 U/L Final     ALT   Date Value Ref Range Status   03/14/2018 70 (H) 10 - 44 U/L Final     Anion Gap   Date Value Ref Range Status   03/14/2018 10 8 - 16 mmol/L Final     eGFR if    Date Value Ref Range Status   03/14/2018 >60.0 >60 mL/min/1.73 m^2 Final     eGFR if non    Date Value Ref Range Status   03/14/2018 >60.0 >60 mL/min/1.73 m^2 Final     Comment:     Calculation used to obtain the estimated glomerular filtration  rate (eGFR) is the CKD-EPI equation.          Recent Lab Results       03/14/18  0200 03/13/18  1714      Immature Granulocytes CANCELED  Comment:  Result canceled by the ancillary      Immature Grans (Abs) CANCELED  Comment:  Mild elevation in immature granulocytes is non specific and   can be seen in a variety of conditions including stress response,   acute inflammation, trauma and pregnancy. Correlation with other   laboratory and clinical findings is essential.    Result canceled by the ancillary        Albumin 3.6      Alkaline Phosphatase 186(H)      ALT 70(H)      Anion Gap 10      AST 40      Baso # Test Not Performed  Comment:  Corrected result; previously reported as 0.00 on %DDDDDDDD% at %TTT%.[C]      Basophil% 0.0      Total Bilirubin 1.1  Comment:  For infants and newborns, interpretation of results should be based  on gestational age, weight and in agreement with clinical  observations.  Premature Infant recommended reference ranges:  Up to 24 hours.............<8.0 mg/dL  Up to 48 hours............<12.0 mg/dL  3-5 days..................<15.0 mg/dL  6-29 days.................<15.0 mg/dL  (H)      Blood Culture, Routine  No Growth to date[P]       No Growth to date[P]     BUN, Bld 8      Calcium 9.0      Chloride  102      CO2 25      Creatinine 0.9      Differential Method Manual      eGFR if African American >60.0      eGFR if non  >60.0  Comment:  Calculation used to obtain the estimated glomerular filtration  rate (eGFR) is the CKD-EPI equation.         Eos # Test Not Performed  Comment:  Corrected result; previously reported as 0.0 on %DDDDDDDD% at %TTT%.[C]      Eosinophil% 0.0      Glucose 108      Gran% 0.0  Comment:  Corrected result; previously reported as 0.8 on %DDDDDDDD% at %TTT%.(L)[C]      Hematocrit 17.9  Comment:  WBC HCT critical result(s) called and verbal readback obtained from   April Vergara RN, 03/14/2018 02:49  (LL)      Hemoglobin 6.7(L)      Lymph # Test Not Performed  Comment:  Corrected result; previously reported as 1.1 on %DDDDDDDD% at %TTT%.[C]      Lymph% 100.0  Comment:  Corrected result; previously reported as 98.3 on %DDDDDDDD% at %TTT%.(H)[C]      MCH 29.6      MCHC 37.4(H)      MCV 79(L)      Mono # Test Not Performed  Comment:  Corrected result; previously reported as 0.0 on %DDDDDDDD% at %TTT%.[C]      Mono% 0.0  Comment:  Corrected result; previously reported as 0.9 on %DDDDDDDD% at %TTT%.(L)[C]      MPV 12.3      nRBC 0      Platelet Estimate Decreased(A)      Platelets 16  Comment:  Preliminary Platelet critical result(s) called and verbal readback   obtained from April Vergara RN, result confirmed and finalized.,   03/14/2018 03:57  (LL)      Potassium 3.9      Total Protein 7.1      RBC 2.26(L)      RDW 14.8(H)      Sodium 137      WBC 1.15  Comment:  WBC HCT critical result(s) called and verbal readback obtained from   April Vergara RN, 03/14/2018 02:49  (LL)            Diagnostic Results:  CT Soft Tissue Neck with and without contrast  03/13/2018  CT: 2.2 x 1.5 cm enhancing soft tissue lesion posterior to the right sternocleidomastoid muscle which is swollen relative to the other side with adjacent overlying soft tissue swelling. Findings may represent  an underlying infectious process such as cellulitis with reactive/suppurative lymph node/phlegmon in this patient with history of AML.  Neoplastic process is not excluded.  Clinical correlation recommended.

## 2018-03-14 NOTE — PHYSICIAN QUERY
PT Name: Hema Kuo  MR #: [id number]    Physician Query Form - Cause and Effect Relationship Clarification      CDS/: Sophia Aguilar RN              Contact information: [phone]    This form is a permanent document in the medical record.     Query Date: March 14, 2018    By submitting this query, we are merely seeking further clarification of documentation. Please utilize your independent clinical judgment when addressing the question(s) below.    The Medical record contains the following:  Supporting Clinical Findings   Location in record     AML- day 23 of cycle    Receiving chemotherapy .....                                                                                                                                                                                        Pediatric Hematology note 3/14                                                                          mucositis           Whitish ulcerative mucosal lesion on R lower lip. Second lesion on left side mucosal surface in the mouth anterior to tonsillar pillar                                                                                                                Pediatric Hematology note 3/14         Provider, please clarify if there is any correlation between ___ oral mucositis ________ and ____chemotherapy______________.           Are the conditions:     [X] Due to or associated with each other     [  ] Unrelated to each other     [  ] Other (Please Specify): _________________________     [  ] Clinically Undetermined

## 2018-03-14 NOTE — PLAN OF CARE
Problem: Patient Care Overview  Goal: Plan of Care Review  Outcome: Ongoing (interventions implemented as appropriate)  Pt VVS, afebrile, and no distress noted. PT complains of lip, mouth, and neck pain 10/10, with some relief from PCA pump, 7/10. Moderate liquid intake during shift. Poor PO intake, encouraged to order food throughout shift, pt denies wanting food.  Good urine output this shift. No BM this shift. Antibiotics administrated per MD orders. Pt verbalizes plan of care. No furthur questions at this time. Will continue to monitor.

## 2018-03-14 NOTE — PLAN OF CARE
Problem: Patient Care Overview  Goal: Plan of Care Review  Outcome: Ongoing (interventions implemented as appropriate)  T max 100.7; self resolved. Pt with increased swelling to right neck since beginning of shift; Dr. Cormier aware. Pain well controlled with PCA and pt reports improving relief since increased in dosage. Great fluid intake noted; pt ordered sushi for dinner but only able to eat 2 sushi rolls. Voiding. No BM this shift. Blood return noted to PAC x2. Blood clx from each lumen drawn this AM. Pt ambulating in harmon and wearing mask ad chilo. POC discussed with pt; verbalized understanding. Will continue to monitor closely.

## 2018-03-14 NOTE — PROGRESS NOTES
[facility]  Pediatric Hematology/Oncology  Progress Note    Patient Name: Hema Kuo  Admission Date: 3/5/2018  Hospital Length of Stay: 9 days  Code Status: Full Code     Subjective:     Interval History: Fever around 1700 yesterday following which blood and fungal cultures were drawn. CT scan overnight was performed. Pain in neck worse overnight requiring increase in PCA dosing. Patient had fever of upto 100.7 overnight.     This morning he reports feeling better and is in good spirits. Reporting better pain control now.    Oncology Treatment Plan:     PEDS AAML-1031  ARM A - LR Risk Patients    Medications:  Continuous Infusions:   morphine       Scheduled Meds:   acyclovir  800 mg Oral TID    cefepime 2 g in dextrose 5% 50 mL IVPB (ready to mix system)  2 g Intravenous Q12H    chlorhexidine  15 mL Mouth/Throat BID    ciprofloxacin HCl  500 mg Oral Q12H    posaconazole  300 mg Oral BID    sulfamethoxazole-trimethoprim 800-160mg  1 tablet Oral twice daily on Friday, Saturday, Sunday    vancomycin (VANCOCIN) IVPB  1,000 mg Intravenous Q12H     PRN Meds:(Magic mouthwash) 1:1:1 Benadryl 12.5mg/5ml liq, aluminum & magnesium hydroxide-simehticone (Maalox), lidocaine viscous 2%, acetaminophen, diphenhydrAMINE, heparin, porcine (PF), lidocaine HCl 2%, LORazepam, naloxone, ondansetron, polyethylene glycol     Review of Systems   Constitutional: Positive for fever. Negative for activity change. Appetite change: Improved.   HENT: Positive for mouth sores. Negative for congestion and rhinorrhea.         R-side neck pain   Eyes: Negative for photophobia and discharge.   Respiratory: Negative for shortness of breath.    Gastrointestinal: Negative for abdominal pain, constipation, diarrhea, nausea and vomiting.   Genitourinary: Negative for dysuria and flank pain.   Musculoskeletal: Negative for joint swelling.   Skin: Negative for pallor and rash.   Neurological: Negative for headaches.    Psychiatric/Behavioral: Negative for sleep disturbance.     Objective:     Vital Signs (Most Recent):  Temp: 99.5 °F (37.5 °C) (03/14/18 0845)  Pulse: 92 (03/14/18 0845)  Resp: 14 (03/14/18 0845)  BP: (!) 126/59 (03/14/18 0845)  SpO2: 95 % (03/14/18 0845) Vital Signs (24h Range):  Temp:  [98.9 °F (37.2 °C)-100.7 °F (38.2 °C)] 99.5 °F (37.5 °C)  Pulse:  [84-99] 92  Resp:  [11-20] 14  SpO2:  [93 %-98 %] 95 %  BP: (115-133)/(55-79) 126/59     Weight: 83 kg (182 lb 15.7 oz)  Body mass index is 30.45 kg/m².  Body surface area is 1.95 meters squared.      Intake/Output Summary (Last 24 hours) at 03/14/18 0951  Last data filed at 03/14/18 0617   Gross per 24 hour   Intake          3448.65 ml   Output             1425 ml   Net          2023.65 ml       Physical Exam   Constitutional: He is oriented to person, place, and time. He appears well-developed. No distress.   HENT:   Whitish ulcerative mucosal lesion on R lower lip. Second lesion on left side mucosal surface in the mouth anterior to tonsillar pillar   Eyes: Conjunctivae and EOM are normal. Pupils are equal, round, and reactive to light. Right eye exhibits no discharge. Left eye exhibits no discharge. No scleral icterus.   Neck: Normal range of motion. Neck supple.   Diffuse erythematous, warm, swelling of R neck post to SCM. Tender to palpation. Non-fluctuant.   Cardiovascular: Normal rate, regular rhythm, normal heart sounds and intact distal pulses.  Exam reveals no friction rub.    No murmur heard.  Pulmonary/Chest: Effort normal and breath sounds normal. No stridor. No respiratory distress. He has no wheezes.   Abdominal: Soft. Bowel sounds are normal. He exhibits no distension. There is no tenderness. There is no guarding.   Genitourinary:   Genitourinary Comments: deferred   Musculoskeletal: Normal range of motion. He exhibits no edema, tenderness or deformity.   Neurological: He is alert and oriented to person, place, and time.   Skin: Skin is warm.  Capillary refill takes less than 2 seconds.   Psychiatric: He has a normal mood and affect.   Nursing note and vitals reviewed.      Labs:     Lab Results   Component Value Date    WBC 1.15 (LL) 03/14/2018    RBC 2.26 (L) 03/14/2018    HGB 6.7 (L) 03/14/2018    HCT 17.9 (LL) 03/14/2018    MCV 79 (L) 03/14/2018    MCH 29.6 03/14/2018    MCHC 37.4 (H) 03/14/2018    RDW 14.8 (H) 03/14/2018    PLT 16 (LL) 03/14/2018    MPV 12.3 03/14/2018    GRAN 0.0 (L) 03/14/2018    LYMPH Test Not Performed 03/14/2018    LYMPH 100.0 (H) 03/14/2018    MONO Test Not Performed 03/14/2018    MONO 0.0 (L) 03/14/2018    EOS Test Not Performed 03/14/2018    BASO Test Not Performed 03/14/2018    EOSINOPHIL 0.0 03/14/2018    BASOPHIL 0.0 03/14/2018     CMP  Sodium   Date Value Ref Range Status   03/14/2018 137 136 - 145 mmol/L Final     Potassium   Date Value Ref Range Status   03/14/2018 3.9 3.5 - 5.1 mmol/L Final     Chloride   Date Value Ref Range Status   03/14/2018 102 95 - 110 mmol/L Final     CO2   Date Value Ref Range Status   03/14/2018 25 23 - 29 mmol/L Final     Glucose   Date Value Ref Range Status   03/14/2018 108 70 - 110 mg/dL Final     BUN, Bld   Date Value Ref Range Status   03/14/2018 8 6 - 20 mg/dL Final     Creatinine   Date Value Ref Range Status   03/14/2018 0.9 0.5 - 1.4 mg/dL Final     Calcium   Date Value Ref Range Status   03/14/2018 9.0 8.7 - 10.5 mg/dL Final     Total Protein   Date Value Ref Range Status   03/14/2018 7.1 6.0 - 8.4 g/dL Final     Albumin   Date Value Ref Range Status   03/14/2018 3.6 3.5 - 5.2 g/dL Final     Total Bilirubin   Date Value Ref Range Status   03/14/2018 1.1 (H) 0.1 - 1.0 mg/dL Final     Comment:     For infants and newborns, interpretation of results should be based  on gestational age, weight and in agreement with clinical  observations.  Premature Infant recommended reference ranges:  Up to 24 hours.............<8.0 mg/dL  Up to 48 hours............<12.0 mg/dL  3-5  days..................<15.0 mg/dL  6-29 days.................<15.0 mg/dL       Alkaline Phosphatase   Date Value Ref Range Status   03/14/2018 186 (H) 55 - 135 U/L Final     AST   Date Value Ref Range Status   03/14/2018 40 10 - 40 U/L Final     ALT   Date Value Ref Range Status   03/14/2018 70 (H) 10 - 44 U/L Final     Anion Gap   Date Value Ref Range Status   03/14/2018 10 8 - 16 mmol/L Final     eGFR if    Date Value Ref Range Status   03/14/2018 >60.0 >60 mL/min/1.73 m^2 Final     eGFR if non    Date Value Ref Range Status   03/14/2018 >60.0 >60 mL/min/1.73 m^2 Final     Comment:     Calculation used to obtain the estimated glomerular filtration  rate (eGFR) is the CKD-EPI equation.          Recent Lab Results       03/14/18  0200 03/13/18  1714      Immature Granulocytes CANCELED  Comment:  Result canceled by the ancillary      Immature Grans (Abs) CANCELED  Comment:  Mild elevation in immature granulocytes is non specific and   can be seen in a variety of conditions including stress response,   acute inflammation, trauma and pregnancy. Correlation with other   laboratory and clinical findings is essential.    Result canceled by the ancillary        Albumin 3.6      Alkaline Phosphatase 186(H)      ALT 70(H)      Anion Gap 10      AST 40      Baso # Test Not Performed  Comment:  Corrected result; previously reported as 0.00 on %DDDDDDDD% at %TTT%.[C]      Basophil% 0.0      Total Bilirubin 1.1  Comment:  For infants and newborns, interpretation of results should be based  on gestational age, weight and in agreement with clinical  observations.  Premature Infant recommended reference ranges:  Up to 24 hours.............<8.0 mg/dL  Up to 48 hours............<12.0 mg/dL  3-5 days..................<15.0 mg/dL  6-29 days.................<15.0 mg/dL  (H)      Blood Culture, Routine  No Growth to date[P]       No Growth to date[P]     BUN, Bld 8      Calcium 9.0      Chloride 102       CO2 25      Creatinine 0.9      Differential Method Manual      eGFR if African American >60.0      eGFR if non  >60.0  Comment:  Calculation used to obtain the estimated glomerular filtration  rate (eGFR) is the CKD-EPI equation.         Eos # Test Not Performed  Comment:  Corrected result; previously reported as 0.0 on %DDDDDDDD% at %TTT%.[C]      Eosinophil% 0.0      Glucose 108      Gran% 0.0  Comment:  Corrected result; previously reported as 0.8 on %DDDDDDDD% at %TTT%.(L)[C]      Hematocrit 17.9  Comment:  WBC HCT critical result(s) called and verbal readback obtained from   April Vergara RN, 03/14/2018 02:49  (LL)      Hemoglobin 6.7(L)      Lymph # Test Not Performed  Comment:  Corrected result; previously reported as 1.1 on %DDDDDDDD% at %TTT%.[C]      Lymph% 100.0  Comment:  Corrected result; previously reported as 98.3 on %DDDDDDDD% at %TTT%.(H)[C]      MCH 29.6      MCHC 37.4(H)      MCV 79(L)      Mono # Test Not Performed  Comment:  Corrected result; previously reported as 0.0 on %DDDDDDDD% at %TTT%.[C]      Mono% 0.0  Comment:  Corrected result; previously reported as 0.9 on %DDDDDDDD% at %TTT%.(L)[C]      MPV 12.3      nRBC 0      Platelet Estimate Decreased(A)      Platelets 16  Comment:  Preliminary Platelet critical result(s) called and verbal readback   obtained from April Vergara RN, result confirmed and finalized.,   03/14/2018 03:57  (LL)      Potassium 3.9      Total Protein 7.1      RBC 2.26(L)      RDW 14.8(H)      Sodium 137      WBC 1.15  Comment:  WBC HCT critical result(s) called and verbal readback obtained from   April Vergara RN, 03/14/2018 02:49  (LL)            Diagnostic Results:  CT Soft Tissue Neck with and without contrast  03/13/2018  CT: 2.2 x 1.5 cm enhancing soft tissue lesion posterior to the right sternocleidomastoid muscle which is swollen relative to the other side with adjacent overlying soft tissue swelling. Findings may represent an  underlying infectious process such as cellulitis with reactive/suppurative lymph node/phlegmon in this patient with history of AML.  Neoplastic process is not excluded.  Clinical correlation recommended.        Assessment/Plan:     AML (acute myeloblastic leukemia)      Oncology   AML (acute myeloblastic leukemia)     This  is a 17 y.o. male with AML (t8;21) receiving [place] [facility] [id number] here w neutropenia (without fever initially). Today is day 23 of cycle. Febrile to 101.3 night of 3/12.  cultured port (both lumens), added cefepime and vanc.11 button pushes of morphine PCA overnight.     #neutropenia: in setting of AML (t8;21) being treated with chemo [place] [facility] [place]AML 10[place] (ARM A). S/p 2 units PRBC's on 3/7 for hemoglobin of 6.6; 2 U of pRBCs and 1U Platelets 3/9.  - Daily CBC and CMP. Type and Screen q72h.  - cw all home meds              - Chlorhexidine 15 ml Oral BID              - Ciprofloxacin 500 mg Oral q12 hours              - Lisdexamfetamine 60 mg oral qAM              - Bactrim 800-160 mg Oral BID on Friday, Saturday, and Sunday              - Posaconazole 300 mg BID  - PRN Meds: Lorazepam, Ondansetron, Miralax  -f/u cultures (from both lumens)  - Vancomycin (since 3/12)  - cefepime (since 3/12)  - daily cultures x3d, reculture also if new fever or hypotensive  - monitor for bleeding, transfuse platelets if actively bleeding  - monitor BPs closely     Mucositis:  - morphine PCA : 1mg/h basal rate, 1.5mg on-demand 15min lock-out (max total 4.5mg/h), consider increasing basal rate overnight  - monitor PO, electrolytes, daily weights  - Acyclovir 800 mg TID PO     Neck Swelling:  - CT scan indicating that no requirement of drainage. Likely secondary to herpetic lesion.  - Will continue to clinically monitor      Dispo: Pending resolution of counts                 Farshad Mcginnis MD  Pediatric Hematology/Oncology  [facility]

## 2018-03-14 NOTE — PROGRESS NOTES
03/14/18 0209   Vital Signs   Temp (!) 100.7 °F (38.2 °C)   Temp src Axillary   Pulse 98   Resp 18   SpO2 98 %   ETCO2 (mmHg) 50 mmHg   O2 Device (Oxygen Therapy) room air   /65   MAP (mmHg) 89   BP Location Right arm   Patient Position Sitting     Dr. Cormier aware. Labs and Blood clx drawn. Pt denies wanting Tylenol at this time. Will continue to monitor closely.

## 2018-03-15 ENCOUNTER — PATIENT MESSAGE (OUTPATIENT)
Dept: PEDIATRIC HEMATOLOGY/ONCOLOGY | Facility: CLINIC | Age: 18
End: 2018-03-15

## 2018-03-15 PROBLEM — R22.1 MASS OF LATERAL NECK: Status: ACTIVE | Noted: 2018-03-15

## 2018-03-15 LAB
ALBUMIN SERPL BCP-MCNC: 3.2 G/DL
ALP SERPL-CCNC: 141 U/L
ALT SERPL W/O P-5'-P-CCNC: 56 U/L
ANION GAP SERPL CALC-SCNC: 8 MMOL/L
AST SERPL-CCNC: 27 U/L
BASOPHILS # BLD AUTO: 0 K/UL
BASOPHILS NFR BLD: 0 %
BILIRUB SERPL-MCNC: 1.6 MG/DL
BLD PROD TYP BPU: NORMAL
BLD PROD TYP BPU: NORMAL
BLOOD UNIT EXPIRATION DATE: NORMAL
BLOOD UNIT EXPIRATION DATE: NORMAL
BLOOD UNIT TYPE CODE: 8400
BLOOD UNIT TYPE CODE: 8400
BLOOD UNIT TYPE: NORMAL
BLOOD UNIT TYPE: NORMAL
BUN SERPL-MCNC: 8 MG/DL
CALCIUM SERPL-MCNC: 8.8 MG/DL
CHLORIDE SERPL-SCNC: 104 MMOL/L
CO2 SERPL-SCNC: 27 MMOL/L
CODING SYSTEM: NORMAL
CODING SYSTEM: NORMAL
CREAT SERPL-MCNC: 0.8 MG/DL
DIFFERENTIAL METHOD: ABNORMAL
DISPENSE STATUS: NORMAL
DISPENSE STATUS: NORMAL
EOSINOPHIL # BLD AUTO: 0 K/UL
EOSINOPHIL NFR BLD: 0 %
ERYTHROCYTE [DISTWIDTH] IN BLOOD BY AUTOMATED COUNT: 14.2 %
EST. GFR  (AFRICAN AMERICAN): >60 ML/MIN/1.73 M^2
EST. GFR  (NON AFRICAN AMERICAN): >60 ML/MIN/1.73 M^2
GLUCOSE SERPL-MCNC: 97 MG/DL
HCT VFR BLD AUTO: 26.9 %
HGB BLD-MCNC: 9.6 G/DL
IMM GRANULOCYTES # BLD AUTO: 0 K/UL
IMM GRANULOCYTES NFR BLD AUTO: 0 %
LYMPHOCYTES # BLD AUTO: 1.1 K/UL
LYMPHOCYTES NFR BLD: 97.3 %
MCH RBC QN AUTO: 29.9 PG
MCHC RBC AUTO-ENTMCNC: 35.7 G/DL
MCV RBC AUTO: 84 FL
MONOCYTES # BLD AUTO: 0 K/UL
MONOCYTES NFR BLD: 1.8 %
NEUTROPHILS # BLD AUTO: 0 K/UL
NEUTROPHILS NFR BLD: 0.9 %
NRBC BLD-RTO: 0 /100 WBC
NUM UNITS TRANS PACKED RBC: NORMAL
NUM UNITS TRANS PACKED RBC: NORMAL
PLATELET # BLD AUTO: 21 K/UL
PLATELET BLD QL SMEAR: ABNORMAL
PMV BLD AUTO: 10.7 FL
POTASSIUM SERPL-SCNC: 4.3 MMOL/L
PROT SERPL-MCNC: 6.5 G/DL
RBC # BLD AUTO: 3.21 M/UL
SODIUM SERPL-SCNC: 139 MMOL/L
WBC # BLD AUTO: 1.1 K/UL

## 2018-03-15 PROCEDURE — 25000003 PHARM REV CODE 250: Performed by: STUDENT IN AN ORGANIZED HEALTH CARE EDUCATION/TRAINING PROGRAM

## 2018-03-15 PROCEDURE — 99233 SBSQ HOSP IP/OBS HIGH 50: CPT | Mod: ,,, | Performed by: PEDIATRICS

## 2018-03-15 PROCEDURE — 86580 TB INTRADERMAL TEST: CPT | Performed by: STUDENT IN AN ORGANIZED HEALTH CARE EDUCATION/TRAINING PROGRAM

## 2018-03-15 PROCEDURE — 80053 COMPREHEN METABOLIC PANEL: CPT

## 2018-03-15 PROCEDURE — 99232 SBSQ HOSP IP/OBS MODERATE 35: CPT | Mod: ,,, | Performed by: OTOLARYNGOLOGY

## 2018-03-15 PROCEDURE — 97802 MEDICAL NUTRITION INDIV IN: CPT

## 2018-03-15 PROCEDURE — 63600175 PHARM REV CODE 636 W HCPCS: Performed by: PEDIATRICS

## 2018-03-15 PROCEDURE — 85025 COMPLETE CBC W/AUTO DIFF WBC: CPT

## 2018-03-15 PROCEDURE — 25000003 PHARM REV CODE 250: Performed by: PEDIATRICS

## 2018-03-15 PROCEDURE — 87529 HSV DNA AMP PROBE: CPT | Mod: 59

## 2018-03-15 PROCEDURE — 63600175 PHARM REV CODE 636 W HCPCS: Performed by: STUDENT IN AN ORGANIZED HEALTH CARE EDUCATION/TRAINING PROGRAM

## 2018-03-15 PROCEDURE — 11300000 HC PEDIATRIC PRIVATE ROOM

## 2018-03-15 PROCEDURE — 87040 BLOOD CULTURE FOR BACTERIA: CPT | Mod: 59

## 2018-03-15 PROCEDURE — P9038 RBC IRRADIATED: HCPCS

## 2018-03-15 RX ORDER — MORPHINE SULFATE 2 MG/ML
1.5 INJECTION, SOLUTION INTRAMUSCULAR; INTRAVENOUS ONCE
Status: COMPLETED | OUTPATIENT
Start: 2018-03-15 | End: 2018-03-15

## 2018-03-15 RX ORDER — POLYETHYLENE GLYCOL 3350 17 G/17G
17 POWDER, FOR SOLUTION ORAL DAILY
Status: DISCONTINUED | OUTPATIENT
Start: 2018-03-15 | End: 2018-03-23 | Stop reason: HOSPADM

## 2018-03-15 RX ORDER — MEROPENEM 1 G/1
1 INJECTION, POWDER, FOR SOLUTION INTRAVENOUS
Status: DISCONTINUED | OUTPATIENT
Start: 2018-03-15 | End: 2018-03-15

## 2018-03-15 RX ORDER — MEROPENEM AND SODIUM CHLORIDE 1 G/50ML
1 INJECTION, SOLUTION INTRAVENOUS
Status: DISCONTINUED | OUTPATIENT
Start: 2018-03-15 | End: 2018-03-23 | Stop reason: HOSPADM

## 2018-03-15 RX ORDER — DOCUSATE SODIUM 100 MG/1
100 CAPSULE, LIQUID FILLED ORAL DAILY
Status: DISCONTINUED | OUTPATIENT
Start: 2018-03-15 | End: 2018-03-23 | Stop reason: HOSPADM

## 2018-03-15 RX ADMIN — POSACONAZOLE 300 MG: 100 TABLET, COATED ORAL at 08:03

## 2018-03-15 RX ADMIN — CEFEPIME HYDROCHLORIDE 2 G: 2 INJECTION, POWDER, FOR SOLUTION INTRAVENOUS at 04:03

## 2018-03-15 RX ADMIN — Medication: at 06:03

## 2018-03-15 RX ADMIN — Medication: at 08:03

## 2018-03-15 RX ADMIN — DIPHENHYDRAMINE HYDROCHLORIDE 25 MG: 25 CAPSULE ORAL at 03:03

## 2018-03-15 RX ADMIN — POLYETHYLENE GLYCOL 3350 17 G: 17 POWDER, FOR SOLUTION ORAL at 11:03

## 2018-03-15 RX ADMIN — CHLORHEXIDINE GLUCONATE 15 ML: 1.2 RINSE ORAL at 08:03

## 2018-03-15 RX ADMIN — DOCUSATE SODIUM 100 MG: 100 CAPSULE, LIQUID FILLED ORAL at 11:03

## 2018-03-15 RX ADMIN — CHLORHEXIDINE GLUCONATE 15 ML: 1.2 RINSE ORAL at 09:03

## 2018-03-15 RX ADMIN — ACYCLOVIR 800 MG: 200 CAPSULE ORAL at 09:03

## 2018-03-15 RX ADMIN — MEROPENEM AND SODIUM CHLORIDE 1 G: 1 INJECTION, SOLUTION INTRAVENOUS at 11:03

## 2018-03-15 RX ADMIN — CIPROFLOXACIN HYDROCHLORIDE 500 MG: 500 TABLET, FILM COATED ORAL at 09:03

## 2018-03-15 RX ADMIN — ACYCLOVIR 800 MG: 200 CAPSULE ORAL at 08:03

## 2018-03-15 RX ADMIN — ACYCLOVIR 800 MG: 200 CAPSULE ORAL at 03:03

## 2018-03-15 RX ADMIN — MEROPENEM AND SODIUM CHLORIDE 1 G: 1 INJECTION, SOLUTION INTRAVENOUS at 07:03

## 2018-03-15 RX ADMIN — Medication 1 G: at 06:03

## 2018-03-15 RX ADMIN — CIPROFLOXACIN HYDROCHLORIDE 500 MG: 500 TABLET, FILM COATED ORAL at 08:03

## 2018-03-15 RX ADMIN — HEPARIN 330 UNITS: 100 SYRINGE at 06:03

## 2018-03-15 RX ADMIN — VANCOMYCIN HYDROCHLORIDE 1250 MG: 1 INJECTION, POWDER, LYOPHILIZED, FOR SOLUTION INTRAVENOUS at 06:03

## 2018-03-15 RX ADMIN — Medication 5 UNITS: at 06:03

## 2018-03-15 RX ADMIN — ACETAMINOPHEN 650 MG: 325 TABLET ORAL at 02:03

## 2018-03-15 RX ADMIN — MORPHINE SULFATE 1.5 MG: 2 INJECTION, SOLUTION INTRAMUSCULAR; INTRAVENOUS at 09:03

## 2018-03-15 NOTE — PLAN OF CARE
Plan of care reviewed with patient. VS stable. Patient remains afebrile. Port-a-cath side CDI. Flushes and draws back without complication. x1 episode of moderate bloody emesis. Platelets and 2 units of blood given to patient. Patient developed wheezing in all lung fields between platelets and first unit of blood. Blood paused and stat albuterol treatment given. Patient lung fields improved to clear. Blood restarted. No further complications with blood transfusions. Vancomycin and cefepime given per order. Patient drinking well. Voiding well. Complains of right neck and right lower lip pain. Morphine PCA infusing continuous per order. Safety maintained. Tylenol given x1 for headache. All questions and concerns answered. Will continue to monitor.

## 2018-03-15 NOTE — PLAN OF CARE
Problem: Patient Care Overview  Goal: Plan of Care Review  Outcome: Ongoing (interventions implemented as appropriate)  Pt VSS, afebrile, and no acute distress noted. Pt slept most of the day.  Pt consumed one meal today. Pt drank water and 1 chocolate Boost during the shift. Pt is concerned about neck swelling feel as if the pain and swelling is increasing. Pt sent to Ct at 1815. Administered ordered TB test during shift. Pain has been controlled through PCA pump, changed the syringe twice during the shift. Will continue to monitor.

## 2018-03-15 NOTE — PROGRESS NOTES
Dr. Cormier notified of pt's bloody emesis.  Approximately 20-30 ml juan brown/red blood noted with small clots.  Orders present for platelets overnight, will release and transfuse asap

## 2018-03-15 NOTE — PROGRESS NOTES
Nutrition Assessment    Dx: neutropenia    Weight: 84.6kg  Height: 165.1cm  BMI: 30.78    Percentiles   Weight/Age: 84%  Height/Age: 5%  BMI/Age: 96%    Estimated Needs:  2369kcals (28kcal/kg)  85-102g protein (1-1.2g/kg protein)  1mL/kcal    Diet: Regular    Meds: reviewed  Labs: Alb 3.2    24 hr I/Os:   Total intake: 4178.8mL (49.4mL/kg)  UOP: 1.3mL/kg/hr, +I/O    Nutrition Hx: 18yo male with hx AML, gets chemo. Pt reports poor appetite, only ate 1 piece of pizza yesterday. Pt reports mouth sores affecting his appetite. States that he does like Boost. Encouraged cold, soft foods like ice cream and pudding.     Nutrition Diagnosis: Inadequate energy intake r/t decreased appetite AEB PO intake <25%, mouth sores - new.     Intervention:   1. Continue current diet order.     2. Recommend Boost Plus TID.     3. Encouraged good PO intake of meals and to consume soft, cold foods for mouth sores.     4. Weights weekly.     Goal: Pt to meet % EEN and EPN - new.   Monitor: PO intake, wts, labs  1X/week    Nutrition Discharge Planning: D/c with regular diet.

## 2018-03-15 NOTE — PROGRESS NOTES
[facility]  Pediatric Hematology/Oncology  Progress Note    Patient Name: Hema Kuo  Admission Date: 3/5/2018  Hospital Length of Stay: 10 days  Code Status: Full Code     Subjective:     Interval History: hematemesis x1 yesterday evening, transfused plts and pRBCs. Developed wheezing during transfusion, got albuterol x1 with resolution of symptoms.    Oncology Treatment Plan:     PEDS [id number]-1031  ARM A - LR Risk Patients    Medications:  Continuous Infusions:   morphine       Scheduled Meds:   acyclovir  800 mg Oral TID    cefepime 2 g in dextrose 5% 50 mL IVPB (ready to mix system)  2 g Intravenous Q12H    chlorhexidine  15 mL Mouth/Throat BID    ciprofloxacin HCl  500 mg Oral Q12H    posaconazole  300 mg Oral BID    sulfamethoxazole-trimethoprim 800-160mg  1 tablet Oral twice daily on Friday, Saturday, Sunday    vancomycin (VANCOCIN) IVPB  1,000 mg Intravenous Q12H     PRN Meds:(Magic mouthwash) 1:1:1 Benadryl 12.5mg/5ml liq, aluminum & magnesium hydroxide-simehticone (Maalox), lidocaine viscous 2%, acetaminophen, diphenhydrAMINE, heparin, porcine (PF), lidocaine HCl 2%, LORazepam, naloxone, ondansetron, polyethylene glycol     Review of Systems   Constitutional:  Negative for activity change and fever (afebrile for 24h).    HENT: Positive for mouth sores. Negative for congestion, rhinorrhea, sore throat and trouble swallowing.         R-side neck pain   Eyes: Negative for photophobia and discharge.   Respiratory: Negative for cough and shortness of breath.    Gastrointestinal: Positive for constipation (last stooled 3d ago) and vomiting (hematemesis x1). Negative for abdominal pain, diarrhea and nausea.   Genitourinary: Negative for dysuria and flank pain.   Musculoskeletal: Negative for joint swelling.   Skin: Negative for pallor and rash.   Neurological: Negative for headaches.   Psychiatric/Behavioral: Negative for sleep disturbance.     Objective:     Vital Signs (Most  Recent):  Temp: 99.5 °F (37.5 °C) (03/14/18 0845)  Pulse: 92 (03/14/18 0845)  Resp: 14 (03/14/18 0845)  BP: (!) 126/59 (03/14/18 0845)  SpO2: 95 % (03/14/18 0845) Vital Signs (24h Range):  Temp:  [98.9 °F (37.2 °C)-100.7 °F (38.2 °C)] 99.5 °F (37.5 °C)  Pulse:  [84-99] 92  Resp:  [11-20] 14  SpO2:  [93 %-98 %] 95 %  BP: (115-133)/(55-79) 126/59     Weight: 83 kg (182 lb 15.7 oz)  Body mass index is 30.45 kg/m².  Body surface area is 1.95 meters squared.      Intake/Output Summary (Last 24 hours) at 03/14/18 0951  Last data filed at 03/14/18 0617   Gross per 24 hour   Intake          3448.65 ml   Output             1425 ml   Net          2023.65 ml       Physical Exam   Constitutional: He is oriented to person, place, and time. He appears well-developed. No distress.   HENT:   Whitish ulcerative mucosal lesion on R lower lip with surrounding mucosal swelling and erythema. Second lesion on left side mucosal surface in the mouth anterior to tonsillar pillar.  Lips swollen.   Eyes: Conjunctivae and EOM are normal. Pupils are equal, round, and reactive to light. Right eye exhibits no discharge. Left eye exhibits no discharge. No scleral icterus.   Neck: Normal range of motion. Neck supple.   Diffuse swelling of R neck post to SCM, increased in size from yesterday. Tender to palpation. Firm. Non-fluctuant.   Cardiovascular: Normal rate, regular rhythm, normal heart sounds and intact distal pulses.  Exam reveals no friction rub.    No murmur heard.  Pulmonary/Chest: Effort normal and breath sounds normal. No stridor. No respiratory distress. He has no wheezes.   Abdominal: Soft. Bowel sounds are normal. He exhibits no distension. There is no tenderness. There is no guarding.   Genitourinary:   Genitourinary Comments: deferred   Musculoskeletal: Normal range of motion. He exhibits no edema, tenderness or deformity.   Neurological: He is alert and oriented to person, place, and time.   Skin: Skin is warm. Capillary refill  takes less than 2 seconds.   Psychiatric: He has a normal mood and affect.   Nursing note and vitals reviewed.      Labs:     Lab Results   Component Value Date    WBC 1.15 (LL) 03/14/2018    RBC 2.26 (L) 03/14/2018    HGB 6.7 (L) 03/14/2018    HCT 17.9 (LL) 03/14/2018    MCV 79 (L) 03/14/2018    MCH 29.6 03/14/2018    MCHC 37.4 (H) 03/14/2018    RDW 14.8 (H) 03/14/2018    PLT 16 (LL) 03/14/2018    MPV 12.3 03/14/2018    GRAN 0.0 (L) 03/14/2018    LYMPH Test Not Performed 03/14/2018    LYMPH 100.0 (H) 03/14/2018    MONO Test Not Performed 03/14/2018    MONO 0.0 (L) 03/14/2018    EOS Test Not Performed 03/14/2018    BASO Test Not Performed 03/14/2018    EOSINOPHIL 0.0 03/14/2018    BASOPHIL 0.0 03/14/2018     CMP  Sodium   Date Value Ref Range Status   03/14/2018 137 136 - 145 mmol/L Final     Potassium   Date Value Ref Range Status   03/14/2018 3.9 3.5 - 5.1 mmol/L Final     Chloride   Date Value Ref Range Status   03/14/2018 102 95 - 110 mmol/L Final     CO2   Date Value Ref Range Status   03/14/2018 25 23 - 29 mmol/L Final     Glucose   Date Value Ref Range Status   03/14/2018 108 70 - 110 mg/dL Final     BUN, Bld   Date Value Ref Range Status   03/14/2018 8 6 - 20 mg/dL Final     Creatinine   Date Value Ref Range Status   03/14/2018 0.9 0.5 - 1.4 mg/dL Final     Calcium   Date Value Ref Range Status   03/14/2018 9.0 8.7 - 10.5 mg/dL Final     Total Protein   Date Value Ref Range Status   03/14/2018 7.1 6.0 - 8.4 g/dL Final     Albumin   Date Value Ref Range Status   03/14/2018 3.6 3.5 - 5.2 g/dL Final     Total Bilirubin   Date Value Ref Range Status   03/14/2018 1.1 (H) 0.1 - 1.0 mg/dL Final     Comment:     For infants and newborns, interpretation of results should be based  on gestational age, weight and in agreement with clinical  observations.  Premature Infant recommended reference ranges:  Up to 24 hours.............<8.0 mg/dL  Up to 48 hours............<12.0 mg/dL  3-5 days..................<15.0  mg/dL  6-29 days.................<15.0 mg/dL       Alkaline Phosphatase   Date Value Ref Range Status   03/14/2018 186 (H) 55 - 135 U/L Final     AST   Date Value Ref Range Status   03/14/2018 40 10 - 40 U/L Final     ALT   Date Value Ref Range Status   03/14/2018 70 (H) 10 - 44 U/L Final     Anion Gap   Date Value Ref Range Status   03/14/2018 10 8 - 16 mmol/L Final     eGFR if    Date Value Ref Range Status   03/14/2018 >60.0 >60 mL/min/1.73 m^2 Final     eGFR if non    Date Value Ref Range Status   03/14/2018 >60.0 >60 mL/min/1.73 m^2 Final     Comment:     Calculation used to obtain the estimated glomerular filtration  rate (eGFR) is the CKD-EPI equation.          Recent Lab Results       03/14/18  0200 03/13/18  1714      Immature Granulocytes CANCELED  Comment:  Result canceled by the ancillary      Immature Grans (Abs) CANCELED  Comment:  Mild elevation in immature granulocytes is non specific and   can be seen in a variety of conditions including stress response,   acute inflammation, trauma and pregnancy. Correlation with other   laboratory and clinical findings is essential.    Result canceled by the ancillary        Albumin 3.6      Alkaline Phosphatase 186(H)      ALT 70(H)      Anion Gap 10      AST 40      Baso # Test Not Performed  Comment:  Corrected result; previously reported as 0.00 on %DDDDDDDD% at %TTT%.[C]      Basophil% 0.0      Total Bilirubin 1.1  Comment:  For infants and newborns, interpretation of results should be based  on gestational age, weight and in agreement with clinical  observations.  Premature Infant recommended reference ranges:  Up to 24 hours.............<8.0 mg/dL  Up to 48 hours............<12.0 mg/dL  3-5 days..................<15.0 mg/dL  6-29 days.................<15.0 mg/dL  (H)      Blood Culture, Routine  No Growth to date[P]       No Growth to date[P]     BUN, Bld 8      Calcium 9.0      Chloride 102      CO2 25      Creatinine 0.9       Differential Method Manual      eGFR if African American >60.0      eGFR if non  >60.0  Comment:  Calculation used to obtain the estimated glomerular filtration  rate (eGFR) is the CKD-EPI equation.         Eos # Test Not Performed  Comment:  Corrected result; previously reported as 0.0 on %DDDDDDDD% at %TTT%.[C]      Eosinophil% 0.0      Glucose 108      Gran% 0.0  Comment:  Corrected result; previously reported as 0.8 on %DDDDDDDD% at %TTT%.(L)[C]      Hematocrit 17.9  Comment:  WBC HCT critical result(s) called and verbal readback obtained from   April Vergara RN, 03/14/2018 02:49  (LL)      Hemoglobin 6.7(L)      Lymph # Test Not Performed  Comment:  Corrected result; previously reported as 1.1 on %DDDDDDDD% at %TTT%.[C]      Lymph% 100.0  Comment:  Corrected result; previously reported as 98.3 on %DDDDDDDD% at %TTT%.(H)[C]      MCH 29.6      MCHC 37.4(H)      MCV 79(L)      Mono # Test Not Performed  Comment:  Corrected result; previously reported as 0.0 on %DDDDDDDD% at %TTT%.[C]      Mono% 0.0  Comment:  Corrected result; previously reported as 0.9 on %DDDDDDDD% at %TTT%.(L)[C]      MPV 12.3      nRBC 0      Platelet Estimate Decreased(A)      Platelets 16  Comment:  Preliminary Platelet critical result(s) called and verbal readback   obtained from April Vergara RN, result confirmed and finalized.,   03/14/2018 03:57  (LL)      Potassium 3.9      Total Protein 7.1      RBC 2.26(L)      RDW 14.8(H)      Sodium 137      WBC 1.15  Comment:  WBC HCT critical result(s) called and verbal readback obtained from   April Vergara RN, 03/14/2018 02:49  (LL)            Diagnostic Results:  CT Soft Tissue Neck with and without contrast  03/13/2018  CT: 2.2 x 1.5 cm enhancing soft tissue lesion posterior to the right sternocleidomastoid muscle which is swollen relative to the other side with adjacent overlying soft tissue swelling. Findings may represent an underlying infectious process such  as cellulitis with reactive/suppurative lymph node/phlegmon in this patient with history of AML.  Neoplastic process is not excluded.  Clinical correlation recommended.        Assessment/Plan:     AML (acute myeloblastic leukemia)      Oncology   AML (acute myeloblastic leukemia)     This  is a 17 y.o. male with AML (t8;21) [place] [facility] [id number] here w neutropenia (without fever initially). Today is day 23 of cycle. Febrile to 101.3 night of 3/12. cultured port (both lumens), added cefepime and vanc.  Not eating much 2/2 pain. Neck swelling increased. Pain moderately well-controlled on morphine pca.   afebrile since 2am 3/14.   18 button pushes, 13 delivered. 1 episode hematemesis yesterday, transfused plts and prbcs.      #fever and neutropenia: in setting of AML (t8;21) being treated with chemo per COG AAML 1031 (ARM A). S/p 2 units PRBC's on 3/7 for hemoglobin of 6.6; 2 U of pRBCs and 1U Platelets 3/9. On 3/14 received 1u plts and 2u pRBCs.  - Daily CBC and CMP. Type and Screen q72h.  - cw all home meds              - Chlorhexidine 15 ml Oral BID              - Ciprofloxacin 500 mg Oral q12 hours              - Lisdexamfetamine 60 mg oral qAM              - Bactrim 800-160 mg Oral BID on Friday, Saturday, and Sunday              - Posaconazole 300 mg BID  - PRN Meds: Lorazepam, Ondansetron, Miralax  -f/u cultures (from both lumens)  - Vancomycin, follow trough (since 3/12)  - cefepime (since 3/12)--> transition to meropenem 3/15  - 3x blood cx NGTD, reculture also if new fever or hypotensive  - monitor for bleeding, transfuse platelets if actively bleeding  - monitor BPs closely     Mucositis:  - morphine PCA : 1mg/h basal rate, 1.5mg on-demand 15min lock-out (max total 4.5mg/h), consider increasing basal rate overnight  - monitor PO, electrolytes, daily weights  - Acyclovir 800 mg TID PO  - miralax and cholace     Hematemesis:   - night of 3/14: 20-30 ml juan brown/red blood  noted with small clots  - transfused 1u plts and 2u pRBCs, with no further episodes  - continue to monitor     Neck swelling  - pain control with morphine pca as above  - antimicrobial coverage as above  - consult ENT    Dispo: pending resolution of counts                 Victoria Araya MD  Pediatric Hematology/Oncology  [facility]

## 2018-03-15 NOTE — NURSING
Notified Dr. Mcginnis that quanteferon gold could only be collected M-TH before noon and lab would not be drawn until Monday. No new orders noted.

## 2018-03-15 NOTE — ASSESSMENT & PLAN NOTE
Oncology   AML (acute myeloblastic leukemia)     This  is a 17 y.o. male with AML (t8;21) receiving chemotherapy following COG [id number] (Arm A) here w neutropenia (without fever initially). Today is day 23 of cycle. Febrile to 101.3 night of 3/12. cultured port (both lumens), added cefepime and vanc.  Not eating much 2/2 pain. Neck swelling increased. Pain moderately well-controlled on morphine pca.   afebrile since 2am 3/14.   18 button pushes, 13 delivered. 1 episode hematemesis yesterday, transfused plts and prbcs.      #fever and neutropenia: in setting of AML (t8;21) being treated with chemo per COG AAML 1031 (ARM A). S/p 2 units PRBC's on 3/7 for hemoglobin of 6.6; 2 U of pRBCs and 1U Platelets 3/9. On 3/14 received 1u plts and 2u pRBCs.  - Daily CBC and CMP. Type and Screen q72h.  - cw all home meds              - Chlorhexidine 15 ml Oral BID              - Ciprofloxacin 500 mg Oral q12 hours              - Lisdexamfetamine 60 mg oral qAM              - Bactrim 800-160 mg Oral BID on Friday, Saturday, and Sunday              - Posaconazole 300 mg BID  - PRN Meds: Lorazepam, Ondansetron, Miralax  -f/u cultures (from both lumens)  - Vancomycin (since 3/12)  - cefepime (since 3/12)  - daily cultures x3d, reculture also if new fever or hypotensive  - monitor for bleeding, transfuse platelets if actively bleeding  - monitor BPs closely     Mucositis:  - morphine PCA : 1mg/h basal rate, 1.5mg on-demand 15min lock-out (max total 4.5mg/h), consider increasing basal rate overnight  - monitor PO, electrolytes, daily weights  - Acyclovir 800 mg TID PO     Hematemesis:   - night of 3/14: 20-30 ml juan brown/red blood noted with small clots  - transfused 1u plts and 2u pRBCs, with no further episodes  - continue to monitor     Dispo: pending resolution of counts

## 2018-03-15 NOTE — NURSING
Patient complaining of wheezing in chest. All lung fields expiratory wheezes upon auscultation. Dr. Cormier notified. Orders to stop blood. Patient to receive stat albuterol treatment. Will continue to transfuse blood if lungs fields improve.

## 2018-03-15 NOTE — CONSULTS
[facility]  Otorhinolaryngology-Head & Neck Surgery  Consult Note    Patient Name: Hema Kuo  MRN: [id number]  Code Status: Full Code  Admission Date: 3/5/2018  Hospital Length of Stay: 10 days  Attending Physician: Christian Emerson MD  Primary Care Provider: Ann Reyna NP    Patient information was obtained from patient and past medical records.     Inpatient consult to Pediatric ENT  Consult performed by: MYLENE MELARA  Consult ordered by: RICHARDSON MEANS        Subjective:     Chief Complaint/Reason for Admission: Right neck mass    History of Present Illness: Hema Kuo is a 18yo young man undergoing treatment for AML admitted on 3/15 for neutropenia. He has right neck pain and swelling worsening for the last 4 days, limiting his active ROM.  He also has painful ulcers in his mouth.      Subjective: Patient sleeping, easily awakened and pleasant.  Complains of right neck tenderness, warmth, and swelling.  He denies difficulty breathing or speaking.  He is able to swallow but has painful oral ulcers.      Medications:  Continuous Infusions:   morphine       Scheduled Meds:   acyclovir  800 mg Oral TID    chlorhexidine  15 mL Mouth/Throat BID    ciprofloxacin HCl  500 mg Oral Q12H    docusate sodium  100 mg Oral Daily    meropenem-0.9% sodium chloride  1 g Intravenous Q8H    polyethylene glycol  17 g Oral Daily    posaconazole  300 mg Oral BID    sulfamethoxazole-trimethoprim 800-160mg  1 tablet Oral twice daily on Friday, Saturday, Sunday    tuberculin  5 Units Intradermal Once    vancomycin (VANCOCIN) IVPB  1,250 mg Intravenous Q12H     PRN Meds:(Magic mouthwash) 1:1:1 Benadryl 12.5mg/5ml liq, aluminum & magnesium hydroxide-simehticone (Maalox), lidocaine viscous 2%, acetaminophen, diphenhydrAMINE, diphenhydrAMINE, heparin, porcine (PF), lidocaine HCl 2%, LORazepam, naloxone, ondansetron, polyethylene glycol, sodium chloride 0.9%     No current facility-administered  medications on file prior to encounter.      Current Outpatient Prescriptions on File Prior to Encounter   Medication Sig    acyclovir (ZOVIRAX) 400 MG tablet Take 1 tablet (400 mg total) by mouth 2 (two) times daily.    chlorhexidine (PERIDEX) 0.12 % solution     ciprofloxacin HCl (CIPRO) 500 MG tablet Take 1 tablet (500 mg total) by mouth every 12 (twelve) hours.    lisdexamfetamine (VYVANSE) 60 MG capsule Take 60 mg by mouth every morning.    LORazepam (ATIVAN) 1 MG tablet Take 1 tablet (1 mg total) by mouth every 6 (six) hours as needed (Nausea).    ondansetron (ZOFRAN-ODT) 8 MG TbDL Take 1 tablet (8 mg total) by mouth every 8 (eight) hours as needed.    polyethylene glycol (GLYCOLAX) 17 gram PwPk Take 17 g by mouth daily as needed (No bowel movement).    sulfamethoxazole-trimethoprim 800-160mg (BACTRIM DS) 800-160 mg Tab Take 1 tablet by mouth twice daily only on Friday, Saturday, Sunday.    pantoprazole (PROTONIX) 40 MG tablet Take 1 tablet (40 mg total) by mouth once daily.       Review of patient's allergies indicates:   Allergen Reactions    Nsaids (non-steroidal anti-inflammatory drug) Anaphylaxis    Nuts [tree nut] Anaphylaxis    Strawberry     Vancomycin analogues Itching     Premedicate with benadryl and admin over 2hr.       Past Medical History:   Diagnosis Date    AML (acute myeloblastic leukemia) 10/2017    Asthma     Encounter for blood transfusion     Seasonal allergies      Past Surgical History:   Procedure Laterality Date    PORTACATH PLACEMENT  10/31/2017    TONSILLECTOMY       Family History     Problem Relation (Age of Onset)    Cancer Mother    Heart disease Mother    No Known Problems Father        Social History Main Topics    Smoking status: Former Smoker     Packs/day: 0.50     Types: Cigarettes     Quit date: 10/30/2017    Smokeless tobacco: Never Used    Alcohol use Yes      Comment: rare occasions    Drug use: No    Sexual activity: Yes     Partners: Female      Review of Systems  Objective:     Vital Signs (Most Recent):  Temp: 99.1 °F (37.3 °C) (03/15/18 1551)  Pulse: 86 (03/15/18 1551)  Resp: 12 (03/15/18 1551)  BP: (!) 116/54 (03/15/18 1551)  SpO2: (!) 94 % (03/15/18 1551) Vital Signs (24h Range):  Temp:  [97.8 °F (36.6 °C)-100 °F (37.8 °C)] 99.1 °F (37.3 °C)  Pulse:  [] 86  Resp:  [12-20] 12  SpO2:  [94 %-100 %] 94 %  BP: (115-143)/(53-67) 116/54     Weight: 84.6 kg (186 lb 8.2 oz)  Body mass index is 31.04 kg/m².     Physical Exam  Sleeping easily awakened, appropriate and pleasant  Normocephalic  EOMI, pupils equal round and reactive  External nose normal  No epistaxis, no nasal lesions on anterior rhinoscopy  Oral cavity with superficial erythematous erosions of oral vestibule and buccal mucosa  Right neck erythematous and warm, tender along SCM, no palpable fluctuance; left neck normal to palpation  Normal voice, normal work of breathing, no stridor    Significant Labs:  BMP:   Recent Labs  Lab 03/15/18  0700   GLU 97      CO2 27   BUN 8   CREATININE 0.8   CALCIUM 8.8     CBC:   Recent Labs  Lab 03/15/18  0700   WBC 1.10*   RBC 3.21*   HGB 9.6*   HCT 26.9*   PLT 21*   MCV 84   MCH 29.9   MCHC 35.7       Significant Diagnostics:  CT: I have reviewed all pertinent results/findings within the past 24 hours and my personal findings are:  right high level 2b heterogenous mass, possibly a suppurative lymph node    Assessment/Plan:     Mass of lateral neck    Seen and discussed with staff Dr. Lee  Posterior right neck mass is concerning for an infective lymphadenitis vs. neoplasm  Recommend ultrasound to evaluate for fluid collection  If fluid present, would recommend US-guided drainage over surgery given neutropenia/thrombocytopenia            VTE Risk Mitigation         Ordered     High Risk of VTE  Once      03/13/18 1517     Reason for no Mechanical VTE Prophylaxis  Once      03/13/18 1517     heparin, porcine (PF) 100 unit/mL injection flush 330  Units  As needed (PRN)     Route:  Intravenous        03/13/18 0413     Reason for no Mechanical VTE Prophylaxis  Once      03/12/18 1817          Thank you for your consult. I will follow-up with patient. Please contact us if you have any additional questions.    Marco A Echeverria MD  Otorhinolaryngology-Head & Neck Surgery  [facility]

## 2018-03-15 NOTE — SUBJECTIVE & OBJECTIVE
Subjective: Patient sleeping, easily awakened and pleasant.  Complains of right neck tenderness, warmth, and swelling.  He denies difficulty breathing or speaking.  He is able to swallow but has painful oral ulcers.      Medications:  Continuous Infusions:   morphine       Scheduled Meds:   acyclovir  800 mg Oral TID    chlorhexidine  15 mL Mouth/Throat BID    ciprofloxacin HCl  500 mg Oral Q12H    docusate sodium  100 mg Oral Daily    meropenem-0.9% sodium chloride  1 g Intravenous Q8H    polyethylene glycol  17 g Oral Daily    posaconazole  300 mg Oral BID    sulfamethoxazole-trimethoprim 800-160mg  1 tablet Oral twice daily on Friday, Saturday, Sunday    tuberculin  5 Units Intradermal Once    vancomycin (VANCOCIN) IVPB  1,250 mg Intravenous Q12H     PRN Meds:(Magic mouthwash) 1:1:1 Benadryl 12.5mg/5ml liq, aluminum & magnesium hydroxide-simehticone (Maalox), lidocaine viscous 2%, acetaminophen, diphenhydrAMINE, diphenhydrAMINE, heparin, porcine (PF), lidocaine HCl 2%, LORazepam, naloxone, ondansetron, polyethylene glycol, sodium chloride 0.9%     No current facility-administered medications on file prior to encounter.      Current Outpatient Prescriptions on File Prior to Encounter   Medication Sig    acyclovir (ZOVIRAX) 400 MG tablet Take 1 tablet (400 mg total) by mouth 2 (two) times daily.    chlorhexidine (PERIDEX) 0.12 % solution     ciprofloxacin HCl (CIPRO) 500 MG tablet Take 1 tablet (500 mg total) by mouth every 12 (twelve) hours.    lisdexamfetamine (VYVANSE) 60 MG capsule Take 60 mg by mouth every morning.    LORazepam (ATIVAN) 1 MG tablet Take 1 tablet (1 mg total) by mouth every 6 (six) hours as needed (Nausea).    ondansetron (ZOFRAN-ODT) 8 MG TbDL Take 1 tablet (8 mg total) by mouth every 8 (eight) hours as needed.    polyethylene glycol (GLYCOLAX) 17 gram PwPk Take 17 g by mouth daily as needed (No bowel movement).    sulfamethoxazole-trimethoprim 800-160mg (BACTRIM DS) 800-160  mg Tab Take 1 tablet by mouth twice daily only on Friday, Saturday, Sunday.    pantoprazole (PROTONIX) 40 MG tablet Take 1 tablet (40 mg total) by mouth once daily.       Review of patient's allergies indicates:   Allergen Reactions    Nsaids (non-steroidal anti-inflammatory drug) Anaphylaxis    Nuts [tree nut] Anaphylaxis    Strawberry     Vancomycin analogues Itching     Premedicate with benadryl and admin over 2hr.       Past Medical History:   Diagnosis Date    AML (acute myeloblastic leukemia) 10/2017    Asthma     Encounter for blood transfusion     Seasonal allergies      Past Surgical History:   Procedure Laterality Date    PORTACATH PLACEMENT  10/31/2017    TONSILLECTOMY       Family History     Problem Relation (Age of Onset)    Cancer Mother    Heart disease Mother    No Known Problems Father        Social History Main Topics    Smoking status: Former Smoker     Packs/day: 0.50     Types: Cigarettes     Quit date: 10/30/2017    Smokeless tobacco: Never Used    Alcohol use Yes      Comment: rare occasions    Drug use: No    Sexual activity: Yes     Partners: Female     Review of Systems  Objective:     Vital Signs (Most Recent):  Temp: 99.1 °F (37.3 °C) (03/15/18 1551)  Pulse: 86 (03/15/18 1551)  Resp: 12 (03/15/18 1551)  BP: (!) 116/54 (03/15/18 1551)  SpO2: (!) 94 % (03/15/18 1551) Vital Signs (24h Range):  Temp:  [97.8 °F (36.6 °C)-100 °F (37.8 °C)] 99.1 °F (37.3 °C)  Pulse:  [] 86  Resp:  [12-20] 12  SpO2:  [94 %-100 %] 94 %  BP: (115-143)/(53-67) 116/54     Weight: 84.6 kg (186 lb 8.2 oz)  Body mass index is 31.04 kg/m².     Physical Exam  Sleeping easily awakened, appropriate and pleasant  Normocephalic  EOMI, pupils equal round and reactive  External nose normal  No epistaxis, no nasal lesions on anterior rhinoscopy  Oral cavity with superficial erythematous erosions of oral vestibule and buccal mucosa  Right neck erythematous and warm, tender along SCM, no palpable fluctuance;  left neck normal to palpation  Normal voice, normal work of breathing, no stridor    Significant Labs:  BMP:   Recent Labs  Lab 03/15/18  0700   GLU 97      CO2 27   BUN 8   CREATININE 0.8   CALCIUM 8.8     CBC:   Recent Labs  Lab 03/15/18  0700   WBC 1.10*   RBC 3.21*   HGB 9.6*   HCT 26.9*   PLT 21*   MCV 84   MCH 29.9   MCHC 35.7       Significant Diagnostics:  CT: I have reviewed all pertinent results/findings within the past 24 hours and my personal findings are:  right high level 2b heterogenous mass, possibly a suppurative lymph node

## 2018-03-15 NOTE — HPI
Hema Kuo is a 16yo young man undergoing treatment for AML admitted on 3/15 for neutropenia. He has right neck pain and swelling worsening for the last 4 days, limiting his active ROM.  He also has painful ulcers in his mouth.

## 2018-03-15 NOTE — CONSULTS
[facility]  Pediatric Infectious Disease  Consult Note    Patient Name: Hema Kuo  MRN: [id number]  Admission Date: 3/5/2018  Hospital Length of Stay: 10 days  Attending Physician: Christian Emerson MD  Primary Care Provider: Ann Reyna NP     Isolation Status: No active isolations    Patient information was obtained from patient and past medical records.      Inpatient consult to Pediatric Infectious Disease  Consult performed by: VEDA ROSAS  Consult ordered by: RICHARDSON MEANS        Subjective:     Principal Problem:     HPI: Hema Kuo is a 18yo young man with 8:21 AML in CR1, was admitted on 3/15 for neutropenia without fever. He is currently day 25 of intensification 1 (he received cytarabine, etoposide). Upon admission, he was on ciprofloxacin, posaconazole, acyclovir, bactrim for prophylaxis. He had been complaining of mucositis starting around 3/10. On 3/11 complained of right neck pain and swelling. On 3/12/2018 he developed a fever, blood cultures were taken, and he was started on vancomycin and cefepime. Changed to meropenem on 3/15. On 3/13 also changed to treatment dosing of acyclovir secondary to mouth ulcers, and got imaging of neck swelling. Had one episode of hematemesis yesterday evening.     The neck swelling has been growing since it was first noticed, and becoming a lot more painful, and somewhat limiting his neck ROM because of pain. He states he had some pain in that area for a few months now, but not enough to really bother him, and he had never felt any masses there before. The neck is hurting him a lot more than the ulcers in his mouth. He has had no travel out of the country, no recent travel, last dental work was last summer (almost a year ago), though he has had some tooth pain recently on and off. He has not been exposed to anyone with Tuberculosis, nor any prisoners, though knows some people who work in a long-term, he does not live with those people.      Past Medical History:   Diagnosis Date    AML (acute myeloblastic leukemia) 10/2017    Asthma     Encounter for blood transfusion     Seasonal allergies        Past Surgical History:   Procedure Laterality Date    PORTACATH PLACEMENT  10/31/2017    TONSILLECTOMY         Review of patient's allergies indicates:   Allergen Reactions    Nsaids (non-steroidal anti-inflammatory drug) Anaphylaxis    Nuts [tree nut] Anaphylaxis    Strawberry     Vancomycin analogues Itching     Premedicate with benadryl and admin over 2hr.       Medications:  Prescriptions Prior to Admission   Medication Sig    acyclovir (ZOVIRAX) 400 MG tablet Take 1 tablet (400 mg total) by mouth 2 (two) times daily.    chlorhexidine (PERIDEX) 0.12 % solution     ciprofloxacin HCl (CIPRO) 500 MG tablet Take 1 tablet (500 mg total) by mouth every 12 (twelve) hours.    lisdexamfetamine (VYVANSE) 60 MG capsule Take 60 mg by mouth every morning.    LORazepam (ATIVAN) 1 MG tablet Take 1 tablet (1 mg total) by mouth every 6 (six) hours as needed (Nausea).    ondansetron (ZOFRAN-ODT) 8 MG TbDL Take 1 tablet (8 mg total) by mouth every 8 (eight) hours as needed.    polyethylene glycol (GLYCOLAX) 17 gram PwPk Take 17 g by mouth daily as needed (No bowel movement).    sulfamethoxazole-trimethoprim 800-160mg (BACTRIM DS) 800-160 mg Tab Take 1 tablet by mouth twice daily only on Friday, Saturday, Sunday.    pantoprazole (PROTONIX) 40 MG tablet Take 1 tablet (40 mg total) by mouth once daily.     Antibiotics     Start     Stop Route Frequency Ordered    03/15/18 1730  vancomycin (VANCOCIN) 1,250 mg in sodium chloride 0.9% 250 mL IVPB      -- IV Every 12 hours (non-standard times) 03/15/18 0954    03/15/18 1130  meropenem-0.9% sodium chloride 1 g/50 mL IVPB      -- IV Every 8 hours (non-standard times) 03/15/18 1028    03/09/18 0900  sulfamethoxazole-trimethoprim 800-160mg per tablet 1 tablet      -- Oral twice daily on Friday, Saturday, Sunday  03/06/18 0408    03/06/18 0900  ciprofloxacin HCl tablet 500 mg      -- Oral Every 12 hours 03/06/18 0408        Antifungals     Start     Stop Route Frequency Ordered    03/06/18 1115  posaconazole tablet 300 mg      -- Oral 2 times daily 03/06/18 1013        Antivirals         Stop Route Frequency     acyclovir      -- Oral 3 times daily           There is no immunization history for the selected administration types on file for this patient.    Family History     Problem Relation (Age of Onset)    Cancer Mother    Heart disease Mother    No Known Problems Father        Social History     Social History    Marital status: Single     Spouse name: N/A    Number of children: N/A    Years of education: N/A     Social History Main Topics    Smoking status: Former Smoker     Packs/day: 0.50     Types: Cigarettes     Quit date: 10/30/2017    Smokeless tobacco: Never Used    Alcohol use Yes      Comment: rare occasions    Drug use: No    Sexual activity: Yes     Partners: Female     Other Topics Concern    None     Social History Narrative    None     Travel History:   Has patient traveled outside of the [facility]?  No  Has patient traveled outside of [place]? No      Review of Systems   Constitutional: Positive for fever.   HENT: Positive for mouth sores, nosebleeds (With cannula in nose) and sore throat. Negative for congestion, ear discharge, ear pain, rhinorrhea, sinus pressure and trouble swallowing.    Eyes: Negative for photophobia, pain, discharge, redness, itching and visual disturbance.   Respiratory: Negative for apnea, cough, choking, chest tightness, shortness of breath and wheezing.    Cardiovascular: Negative for chest pain.   Gastrointestinal: Negative for abdominal distention, abdominal pain, constipation, diarrhea and vomiting.   Genitourinary: Negative for difficulty urinating.   Musculoskeletal: Positive for neck stiffness (Secondary to pain with movement of neck). Negative for joint  swelling and myalgias.   Skin: Negative for rash (No rashes outside of mouth and neck as per HPI, and petechia).   Neurological: Negative for dizziness and headaches.   Psychiatric/Behavioral: Negative for confusion.     Objective:     Vital Signs (Most Recent):  Temp: 97.8 °F (36.6 °C) (03/15/18 0553)  Pulse: 72 (03/15/18 0823)  Resp: 16 (03/15/18 0553)  BP: 115/62 (03/15/18 0823)  SpO2: 97 % (03/15/18 0823) Vital Signs (24h Range):  Temp:  [97.8 °F (36.6 °C)-100 °F (37.8 °C)] 97.8 °F (36.6 °C)  Pulse:  [] 72  Resp:  [14-20] 16  SpO2:  [95 %-100 %] 97 %  BP: (115-143)/(53-70) 115/62     Weight: 84.6 kg (186 lb 8.2 oz)  Body mass index is 31.04 kg/m².    Estimated Creatinine Clearance: 148.5 mL/min (based on SCr of 0.8 mg/dL).    Physical Exam   Constitutional: He is oriented to person, place, and time. He appears well-developed and well-nourished.   HENT:   There are two ulcerative lesions, one in the right inner lower lip, and one on the left cheek. They are small, painful, and with a light green film. There is no other obvious mucositis visible in the oral cavity or pharynx.    Eyes: Conjunctivae and EOM are normal. Right eye exhibits no discharge. Left eye exhibits no discharge.   Neck:   There is a mass on the right neck, with no obvious overlying erythema or cellulitis. It is hard and not fluctuant in nature, but exquisitely tender to palpation. The mass extends along the sternocleidomastoid and just anterior to the ear. It is not mobile. There is some warmth over the area. The patient is able to move his head side to side, but ROM is limited by pain that this causes. He can touch his chin to chest and look up at the ceiling. He is not at all tender of his cervical vertebra. There is some tenderness in the right supraclavicular, though there is no node there that I can palpate. I cannot feel any lymphadenopathy on the left.    Cardiovascular: Normal rate, regular rhythm, normal heart sounds and intact  distal pulses.    Pulmonary/Chest: Effort normal and breath sounds normal. No respiratory distress. He has no wheezes.   Abdominal: Soft. He exhibits no distension. There is no tenderness. There is no guarding.   No palpable HSM   Musculoskeletal: Normal range of motion. He exhibits no edema or tenderness.   Neurological: He is alert and oriented to person, place, and time. He exhibits normal muscle tone.   Skin: Skin is warm. Capillary refill takes less than 2 seconds.   There is a petechial rash on the arms. There are no other lesions that I can see.    Psychiatric: His behavior is normal.       Significant Labs:   Results for SEGUN BHATIA (MRN [id number]) as of 3/15/2018 12:05   Ref. Range 3/13/2018 04:25 3/14/2018 02:00 3/15/2018 07:00   WBC Latest Ref Range: 3.90 - 12.70 K/uL 0.51 (LL) 1.15 (LL) 1.10 (LL)   RBC Latest Ref Range: 4.60 - 6.20 M/uL 2.56 (L) 2.26 (L) 3.21 (L)   Hemoglobin Latest Ref Range: 14.0 - 18.0 g/dL 7.9 (L) 6.7 (L) 9.6 (L)   Hematocrit Latest Ref Range: 40.0 - 54.0 % 21.1 (L) 17.9 (LL) 26.9 (L)   MCV Latest Ref Range: 82 - 98 fL 82 79 (L) 84   MCH Latest Ref Range: 27.0 - 31.0 pg 30.9 29.6 29.9   MCHC Latest Ref Range: 32.0 - 36.0 g/dL 37.4 (H) 37.4 (H) 35.7   RDW Latest Ref Range: 11.5 - 14.5 % 14.9 (H) 14.8 (H) 14.2   Platelets Latest Ref Range: 150 - 350 K/uL 11 (LL) 16 (LL) 21 (LL)   MPV Latest Ref Range: 9.2 - 12.9 fL 12.5 12.3 10.7   Gran% Latest Ref Range: 38.0 - 73.0 % 0.0 (L) 0.0 (L) 0.9 (L)   Gran # (ANC) Latest Ref Range: 1.8 - 7.7 K/uL 0.0 (L)  0.0 (L)   Immature Granulocytes Latest Ref Range: 0.0 - 0.5 % 0.0 CANCELED 0.0   Immature Grans (Abs) Latest Ref Range: 0.00 - 0.04 K/uL 0.00 CANCELED 0.00   Lymph% Latest Ref Range: 18.0 - 48.0 % 100.0 (H) 100.0 (H) 97.3 (H)   Lymph # Latest Ref Range: 1.0 - 4.8 K/uL 0.5 (L) Test Not Performed 1.1   Mono% Latest Ref Range: 4.0 - 15.0 % 0.0 (L) 0.0 (L) 1.8 (L)   Mono # Latest Ref Range: 0.3 - 1.0 K/uL 0.0 (L) Test Not Performed 0.0 (L)        Significant Imaging:  CT Soft Tissue Neck with and without contrast                03/13/2018  CT: 2.2 x 1.5 cm enhancing soft tissue lesion posterior to the right sternocleidomastoid muscle which is swollen relative to the other side with adjacent overlying soft tissue swelling. Findings may represent an underlying infectious process such as cellulitis with reactive/suppurative lymph node/phlegmon in this patient with history of AML.  Neoplastic process is not excluded.  Clinical correlation recommended.    Assessment/Plan:     Active Diagnoses:    Diagnosis Date Noted POA    Neutropenia associated with mucositis due to antineoplastic therapy [D70.8, K12.32] 03/13/2018 Yes    Fever and neutropenia [D70.9, R50.81] 03/13/2018 No    AML (acute myeloblastic leukemia) [C92.00] 12/12/2017 Yes      Problems Resolved During this Admission:    Diagnosis Date Noted Date Resolved SOREN Garrido is a 16yo young man with AML, neutropenic and febrile, with a right neck mass and oral ulcers. Right neck mass has been growing over the last few days, and on imaging appeared as a lesion posterior to the right sternocleidomastoid. Could represent lymphadenitis versus other soft tissue lesion. Bacterial lymphadenitis is certainly a possibility given the rapid progression, although the patient has been broadly covered and continues to have worsening. Agree with broadening coverage to include anaerobes this am. Mycobacteria are also classically associated with lymphadenitis, though the patient doesn't have any clear risk factors for MTB. Viral causes can result in inflammation and also mouth ulcers, particularly CMV. Finally, given the patient's neutropenia, fungal disease must be on the differential. Given the broad differential, a biopsy of the specimen would be very helpful in making the diagnosis. The mouth ulcers are not classically herpetic appearing, but would continue treatment dose of acyclovir pending the HSV lesion  PCR.     - CMV PCR and CMV IgM  - TST and Quant gold  - HSV PCR of the lesion  - If biopsy is taken, send specimen for: bacterial (anaerobic and aerobic) culture, fungal culture, AFB culture, and send the specimen for pathology  - Consider repeat imaging to better understand progression of the lesion  - Continue current antimicrobials:        - Vancomycin: Currently receiving about 15mg/kg q12 hours. Last vanc trough was 7.8. Had been receiving 1g q12 previous to this. Would keep vancomycin trough at least 10-15. If it needs to be adjusted after this most recent change, can go from q12 to q8.         - Acyclovir: receiving about 10mg/kg/dose q8 hours         - Meropenem: 1g q8 hours    Thank you for your consult. I will follow-up with patient. Please contact us if you have any additional questions.    Maureen Tabor MD  Pediatric Infectious Disease  [facility]

## 2018-03-15 NOTE — ASSESSMENT & PLAN NOTE
Seen and discussed with staff Dr. Lee  Posterior right neck mass is concerning for an infective lymphadenitis vs. neoplasm  Recommend ultrasound to evaluate for fluid collection  If fluid present, would recommend US-guided drainage over surgery given neutropenia/thrombocytopenia

## 2018-03-16 PROBLEM — R22.1 MASS OF LATERAL NECK: Status: ACTIVE | Noted: 2018-03-16

## 2018-03-16 LAB
ABO + RH BLD: NORMAL
ALBUMIN SERPL BCP-MCNC: 3 G/DL
ALP SERPL-CCNC: 126 U/L
ALT SERPL W/O P-5'-P-CCNC: 46 U/L
ANION GAP SERPL CALC-SCNC: 8 MMOL/L
ANISOCYTOSIS BLD QL SMEAR: SLIGHT
AST SERPL-CCNC: 20 U/L
BASOPHILS # BLD AUTO: ABNORMAL K/UL
BASOPHILS NFR BLD: 0 %
BILIRUB SERPL-MCNC: 0.8 MG/DL
BLD GP AB SCN CELLS X3 SERPL QL: NORMAL
BUN SERPL-MCNC: 7 MG/DL
CALCIUM SERPL-MCNC: 8.6 MG/DL
CHLORIDE SERPL-SCNC: 101 MMOL/L
CO2 SERPL-SCNC: 28 MMOL/L
CREAT SERPL-MCNC: 0.7 MG/DL
DIFFERENTIAL METHOD: ABNORMAL
EOSINOPHIL # BLD AUTO: ABNORMAL K/UL
EOSINOPHIL NFR BLD: 0 %
ERYTHROCYTE [DISTWIDTH] IN BLOOD BY AUTOMATED COUNT: 14 %
EST. GFR  (AFRICAN AMERICAN): >60 ML/MIN/1.73 M^2
EST. GFR  (NON AFRICAN AMERICAN): >60 ML/MIN/1.73 M^2
GLUCOSE SERPL-MCNC: 100 MG/DL
HCT VFR BLD AUTO: 25.2 %
HGB BLD-MCNC: 9.3 G/DL
HSV1 DNA SPEC QL NAA+PROBE: NEGATIVE
HSV1 DNA SPEC QL NAA+PROBE: NEGATIVE
HSV2 DNA SPEC QL NAA+PROBE: NEGATIVE
HSV2 DNA SPEC QL NAA+PROBE: NEGATIVE
HYPOCHROMIA BLD QL SMEAR: ABNORMAL
IMM GRANULOCYTES # BLD AUTO: ABNORMAL K/UL
IMM GRANULOCYTES NFR BLD AUTO: ABNORMAL %
LYMPHOCYTES # BLD AUTO: ABNORMAL K/UL
LYMPHOCYTES NFR BLD: 100 %
MCH RBC QN AUTO: 29.6 PG
MCHC RBC AUTO-ENTMCNC: 36.9 G/DL
MCV RBC AUTO: 80 FL
MONOCYTES # BLD AUTO: ABNORMAL K/UL
MONOCYTES NFR BLD: 0 %
NEUTROPHILS NFR BLD: 0 %
NRBC BLD-RTO: 0 /100 WBC
OVALOCYTES BLD QL SMEAR: ABNORMAL
PLATELET # BLD AUTO: 15 K/UL
PMV BLD AUTO: ABNORMAL FL
POIKILOCYTOSIS BLD QL SMEAR: SLIGHT
POLYCHROMASIA BLD QL SMEAR: ABNORMAL
POTASSIUM SERPL-SCNC: 4.2 MMOL/L
PROT SERPL-MCNC: 6.2 G/DL
RBC # BLD AUTO: 3.14 M/UL
SODIUM SERPL-SCNC: 137 MMOL/L
SPECIMEN SOURCE: NORMAL
SPECIMEN SOURCE: NORMAL
WBC # BLD AUTO: 0.6 K/UL

## 2018-03-16 PROCEDURE — 63600175 PHARM REV CODE 636 W HCPCS: Performed by: STUDENT IN AN ORGANIZED HEALTH CARE EDUCATION/TRAINING PROGRAM

## 2018-03-16 PROCEDURE — 99233 SBSQ HOSP IP/OBS HIGH 50: CPT | Mod: ,,, | Performed by: PEDIATRICS

## 2018-03-16 PROCEDURE — 25000003 PHARM REV CODE 250: Performed by: STUDENT IN AN ORGANIZED HEALTH CARE EDUCATION/TRAINING PROGRAM

## 2018-03-16 PROCEDURE — 86481 TB AG RESPONSE T-CELL SUSP: CPT

## 2018-03-16 PROCEDURE — 99232 SBSQ HOSP IP/OBS MODERATE 35: CPT | Mod: ,,, | Performed by: OTOLARYNGOLOGY

## 2018-03-16 PROCEDURE — 36415 COLL VENOUS BLD VENIPUNCTURE: CPT

## 2018-03-16 PROCEDURE — 85027 COMPLETE CBC AUTOMATED: CPT

## 2018-03-16 PROCEDURE — 11300000 HC PEDIATRIC PRIVATE ROOM

## 2018-03-16 PROCEDURE — 85007 BL SMEAR W/DIFF WBC COUNT: CPT

## 2018-03-16 PROCEDURE — 36592 COLLECT BLOOD FROM PICC: CPT

## 2018-03-16 PROCEDURE — 63600175 PHARM REV CODE 636 W HCPCS: Performed by: PEDIATRICS

## 2018-03-16 PROCEDURE — 80053 COMPREHEN METABOLIC PANEL: CPT

## 2018-03-16 PROCEDURE — 63600175 PHARM REV CODE 636 W HCPCS: Mod: JG | Performed by: STUDENT IN AN ORGANIZED HEALTH CARE EDUCATION/TRAINING PROGRAM

## 2018-03-16 PROCEDURE — 86645 CMV ANTIBODY IGM: CPT

## 2018-03-16 PROCEDURE — 86850 RBC ANTIBODY SCREEN: CPT

## 2018-03-16 RX ADMIN — CHLORHEXIDINE GLUCONATE 15 ML: 1.2 RINSE ORAL at 08:03

## 2018-03-16 RX ADMIN — MEROPENEM AND SODIUM CHLORIDE 1 G: 1 INJECTION, SOLUTION INTRAVENOUS at 08:03

## 2018-03-16 RX ADMIN — Medication: at 03:03

## 2018-03-16 RX ADMIN — VANCOMYCIN HYDROCHLORIDE 1250 MG: 1 INJECTION, POWDER, LYOPHILIZED, FOR SOLUTION INTRAVENOUS at 05:03

## 2018-03-16 RX ADMIN — CIPROFLOXACIN HYDROCHLORIDE 500 MG: 500 TABLET, FILM COATED ORAL at 08:03

## 2018-03-16 RX ADMIN — POSACONAZOLE 300 MG: 100 TABLET, COATED ORAL at 08:03

## 2018-03-16 RX ADMIN — DIPHENHYDRAMINE HYDROCHLORIDE 25 MG: 50 INJECTION, SOLUTION INTRAMUSCULAR; INTRAVENOUS at 04:03

## 2018-03-16 RX ADMIN — SULFAMETHOXAZOLE AND TRIMETHOPRIM 1 TABLET: 800; 160 TABLET ORAL at 08:03

## 2018-03-16 RX ADMIN — ACYCLOVIR 800 MG: 200 CAPSULE ORAL at 08:03

## 2018-03-16 RX ADMIN — DIPHENHYDRAMINE HYDROCHLORIDE 25 MG: 25 CAPSULE ORAL at 08:03

## 2018-03-16 RX ADMIN — DIPHENHYDRAMINE HYDROCHLORIDE 25 MG: 25 CAPSULE ORAL at 05:03

## 2018-03-16 RX ADMIN — MEROPENEM AND SODIUM CHLORIDE 1 G: 1 INJECTION, SOLUTION INTRAVENOUS at 03:03

## 2018-03-16 RX ADMIN — HEPARIN 330 UNITS: 100 SYRINGE at 11:03

## 2018-03-16 RX ADMIN — ACYCLOVIR 800 MG: 200 CAPSULE ORAL at 03:03

## 2018-03-16 RX ADMIN — ALTEPLASE 2 MG: 2.2 INJECTION, POWDER, LYOPHILIZED, FOR SOLUTION INTRAVENOUS at 05:03

## 2018-03-16 RX ADMIN — MEROPENEM AND SODIUM CHLORIDE 1 G: 1 INJECTION, SOLUTION INTRAVENOUS at 11:03

## 2018-03-16 NOTE — ASSESSMENT & PLAN NOTE
-Exam concerning for an  lymphadenitis vs. neoplasm   US done, no definite drainable fluid collection  -Favor image guided biopsy (for path/specimen/cultures) over open surgery given thrombocytopenia   Will discuss with staff  -Will continue to follow

## 2018-03-16 NOTE — ASSESSMENT & PLAN NOTE
-Exam concerning for an infective lymphadenitis vs. neoplasm   US done  -Favor image guided biopsy (for path/specimen/cultures) over open surgery given thrombocytopenia   Will discuss with staff  -Will continue to follow

## 2018-03-16 NOTE — SUBJECTIVE & OBJECTIVE
Interval History: NAEON. Patient obtained neck US yesterday. Stating that right neck mass seems more tender this AM.     Medications:  Continuous Infusions:   morphine       Scheduled Meds:   acyclovir  800 mg Oral TID    chlorhexidine  15 mL Mouth/Throat BID    ciprofloxacin HCl  500 mg Oral Q12H    docusate sodium  100 mg Oral Daily    meropenem-0.9% sodium chloride  1 g Intravenous Q8H    polyethylene glycol  17 g Oral Daily    posaconazole  300 mg Oral BID    sulfamethoxazole-trimethoprim 800-160mg  1 tablet Oral twice daily on Friday, Saturday, Sunday    vancomycin (VANCOCIN) IVPB  1,250 mg Intravenous Q12H     PRN Meds:(Magic mouthwash) 1:1:1 Benadryl 12.5mg/5ml liq, aluminum & magnesium hydroxide-simehticone (Maalox), lidocaine viscous 2%, acetaminophen, diphenhydrAMINE, diphenhydrAMINE, heparin, porcine (PF), lidocaine HCl 2%, LORazepam, naloxone, ondansetron, polyethylene glycol, sodium chloride 0.9%     Review of patient's allergies indicates:   Allergen Reactions    Nsaids (non-steroidal anti-inflammatory drug) Anaphylaxis    Nuts [tree nut] Anaphylaxis    Strawberry     Vancomycin analogues Itching     Premedicate with benadryl and admin over 2hr.     Objective:     Vital Signs (24h Range):  Temp:  [99 °F (37.2 °C)-99.8 °F (37.7 °C)] 99 °F (37.2 °C)  Pulse:  [75-95] 75  Resp:  [10-16] 16  SpO2:  [94 %-98 %] 98 %  BP: (116-140)/(54-72) 125/62       Date 03/16/18 0700 - 03/17/18 0659   Shift 6663-1708 9734-0523 0727-1127 24 Hour Total   I  N  T  A  K  E   P.O. 100   100    Shift Total  (mL/kg) 100  (1.2)   100  (1.2)   O  U  T  P  U  T   Urine  (mL/kg/hr) 800   800    Shift Total  (mL/kg) 800  (9.5)   800  (9.5)   Weight (kg) 84.6 84.6 84.6 84.6     Lines/Drains/Airways     Central Venous Catheter Line                 Port A Cath Double Lumen 10/31/17 1826 left subclavian 135 days                Physical Exam    Sleeping easily awakened, appropriate and pleasant  Normocephalic  EOMI, pupils  equal round and reactive  External nose normal  No epistaxis, no nasal lesions on anterior rhinoscopy  Oral cavity with superficial erythematous erosions of oral vestibule and buccal mucosa  Right neck erythematous and warm, tender along SCM, no palpable fluctuance; left neck normal to palpation  Normal voice, normal work of breathing, no stridor      Significant Labs:  BMP:     Recent Labs  Lab 03/16/18  0456         CO2 28   BUN 7   CREATININE 0.7   CALCIUM 8.6*     CBC:     Recent Labs  Lab 03/16/18  0456   WBC 0.60*   RBC 3.14*   HGB 9.3*   HCT 25.2*   PLT 15*   MCV 80*   MCH 29.6   MCHC 36.9*       Significant Diagnostics:  US: No definite fluid collection    Review of Systems

## 2018-03-16 NOTE — SUBJECTIVE & OBJECTIVE
Interval History: NAEON. Patient obtained neck US yesterday. Stating that right neck mass seems more tender this AM.     Medications:  Continuous Infusions:   morphine       Scheduled Meds:   acyclovir  800 mg Oral TID    chlorhexidine  15 mL Mouth/Throat BID    ciprofloxacin HCl  500 mg Oral Q12H    docusate sodium  100 mg Oral Daily    meropenem-0.9% sodium chloride  1 g Intravenous Q8H    polyethylene glycol  17 g Oral Daily    posaconazole  300 mg Oral BID    sulfamethoxazole-trimethoprim 800-160mg  1 tablet Oral twice daily on Friday, Saturday, Sunday    vancomycin (VANCOCIN) IVPB  1,250 mg Intravenous Q12H     PRN Meds:(Magic mouthwash) 1:1:1 Benadryl 12.5mg/5ml liq, aluminum & magnesium hydroxide-simehticone (Maalox), lidocaine viscous 2%, acetaminophen, diphenhydrAMINE, diphenhydrAMINE, heparin, porcine (PF), lidocaine HCl 2%, LORazepam, naloxone, ondansetron, polyethylene glycol, sodium chloride 0.9%     Review of patient's allergies indicates:   Allergen Reactions    Nsaids (non-steroidal anti-inflammatory drug) Anaphylaxis    Nuts [tree nut] Anaphylaxis    Strawberry     Vancomycin analogues Itching     Premedicate with benadryl and admin over 2hr.     Objective:     Vital Signs (24h Range):  Temp:  [99 °F (37.2 °C)-99.8 °F (37.7 °C)] 99.1 °F (37.3 °C)  Pulse:  [72-95] 82  Resp:  [10-16] 11  SpO2:  [94 %-100 %] 95 %  BP: (115-140)/(54-72) 138/63        Lines/Drains/Airways     Central Venous Catheter Line                 Port A Cath Double Lumen 10/31/17 1826 left subclavian 135 days                Physical Exam  Sleeping easily awakened, appropriate and pleasant  Normocephalic  EOMI, pupils equal round and reactive  External nose normal  No epistaxis, no nasal lesions on anterior rhinoscopy  Oral cavity with superficial erythematous erosions of oral vestibule and buccal mucosa  Right neck erythematous and warm, tender along SCM, no palpable fluctuance; left neck normal to palpation  Normal  voice, normal work of breathing, no stridor      Significant Labs:  BMP:   Recent Labs  Lab 03/16/18  0456         CO2 28   BUN 7   CREATININE 0.7   CALCIUM 8.6*     CBC:   Recent Labs  Lab 03/15/18  0700 03/16/18  0456   WBC 1.10* 0.60*   RBC 3.21* 3.14*   HGB 9.6* 9.3*   HCT 26.9* 25.2*   PLT 21*  --    MCV 84 80*   MCH 29.9 29.6   MCHC 35.7 36.9*       Significant Diagnostics:  US: I have reviewed all pertinent results/findings within the past 24 hours and my personal findings are:  Per read, favors supporative lymph node or pathologic process

## 2018-03-16 NOTE — PROGRESS NOTES
[facility]  Pediatric Hematology/Oncology  Progress Note    Patient Name: Hema Kuo  Admission Date: 3/5/2018  Hospital Length of Stay: 11 days  Code Status: Full Code   Interval History: Patient was seen by ID and ENT yesterday. ID recommended blood CMV PCR, HSV PCR of mucosal lesion and TB testing. ENT evaluated patient and recommended no surgical intervention at this point. Repeat Ultrasound suggestive of suppurative lymph node in right side of neck.    Scheduled Meds:   acyclovir  800 mg Oral TID    cefepime 2 g in dextrose 5% 50 mL IVPB (ready to mix system)  2 g Intravenous Q12H    chlorhexidine  15 mL Mouth/Throat BID    ciprofloxacin HCl  500 mg Oral Q12H    posaconazole  300 mg Oral BID    sulfamethoxazole-trimethoprim 800-160mg  1 tablet Oral twice daily on Friday, Saturday, Sunday    vancomycin (VANCOCIN) IVPB  1,000 mg Intravenous Q12H     Continuous Infusions:   morphine       PRN Meds:(Magic mouthwash) 1:1:1 Benadryl 12.5mg/5ml liq, aluminum & magnesium hydroxide-simehticone (Maalox), lidocaine viscous 2%, sodium chloride, sodium chloride, acetaminophen, diphenhydrAMINE, heparin, porcine (PF), lidocaine HCl 2%, LORazepam, naloxone, ondansetron, polyethylene glycol    Review of Systems   Constitutional: Negative for fever. Activity change: improved. Appetite change: improved.   HENT: Positive for mouth sores. Negative for congestion and rhinorrhea.         R-side neck pain   Eyes: Negative for photophobia and discharge.   Respiratory: Negative for shortness of breath.    Gastrointestinal: Negative for abdominal pain, constipation, diarrhea, nausea and vomiting.   Endocrine: Negative for cold intolerance and heat intolerance.   Genitourinary: Negative for dysuria and flank pain.   Musculoskeletal: Negative for joint swelling.   Skin: Negative for pallor and rash.   Allergic/Immunologic: Positive for immunocompromised state.   Neurological: Negative for numbness and headaches.    Psychiatric/Behavioral: Negative for sleep disturbance.     Objective:     Vital Signs (Most Recent):  Temp: (!) 100.4 °F (38 °C) (03/13/18 1606)  Pulse: 96 (03/13/18 1606)  Resp: 16 (03/13/18 1606)  BP: (!) 115/57 (03/13/18 1606)  SpO2: 97 % (03/13/18 1606) Vital Signs (24h Range):  Temp:  [98.3 °F (36.8 °C)-101.7 °F (38.7 °C)] 100.4 °F (38 °C)  Pulse:  [77-96] 96  Resp:  [11-18] 16  SpO2:  [96 %-98 %] 97 %  BP: (103-130)/(52-79) 115/57     Patient Vitals for the past 72 hrs (Last 3 readings):   Weight   03/12/18 1925 83 kg (182 lb 15.7 oz)   03/11/18 2000 84.4 kg (186 lb 1.1 oz)     Body mass index is 30.45 kg/m².    Intake/Output - Last 3 Shifts       03/11 0700 - 03/12 0659 03/12 0700 - 03/13 0659 03/13 0700 - 03/14 0659    P.O. 480 1700 480    I.V. (mL/kg)  145.2 (1.7) 383 (4.6)    IV Piggyback  300     Total Intake(mL/kg) 480 (5.7) 2145.2 (25.8) 863 (10.4)    Urine (mL/kg/hr)   675 (0.8)    Total Output     675    Net +480 +2145.2 +188           Urine Occurrence 5 x 3 x     Stool Occurrence  0 x     Emesis Occurrence  0 x           Lines/Drains/Airways     Central Venous Catheter Line                 Port A Cath Double Lumen 10/31/17 1826 left subclavian 132 days                Physical Exam   Constitutional: He is oriented to person, place, and time. He appears well-developed. No distress.   HENT:   Head: Normocephalic.   White ulcerative mucosal lesion on R lower lip. Second lesion on the left inner cheek (mucosal surface_  Diffuse nonerythematous, swelling of R neck post to SCM. Tender to palpation. Non-fluctuant.   Eyes: Conjunctivae and EOM are normal. Pupils are equal, round, and reactive to light. Right eye exhibits no discharge. Left eye exhibits no discharge. No scleral icterus.   Neck: Normal range of motion. Neck supple.   Cardiovascular: Normal rate, regular rhythm, normal heart sounds and intact distal pulses.  Exam reveals no friction rub.    No murmur heard.  Pulmonary/Chest: Effort normal and  breath sounds normal. No stridor. No respiratory distress. He has no wheezes.   Abdominal: Soft. Bowel sounds are normal. He exhibits no distension. There is no tenderness. There is no guarding.   Genitourinary:   Genitourinary Comments: deferred   Musculoskeletal: Normal range of motion. He exhibits no edema, tenderness or deformity.   Neurological: He is alert and oriented to person, place, and time.   Skin: Skin is warm. Capillary refill takes less than 2 seconds.   Psychiatric: He has a normal mood and affect.   Nursing note and vitals reviewed.      Significant Labs:  Lab Results   Component Value Date    WBC 0.60 (LL) 03/16/2018    RBC 3.14 (L) 03/16/2018    HGB 9.3 (L) 03/16/2018    HCT 25.2 (L) 03/16/2018    MCV 80 (L) 03/16/2018    MCH 29.6 03/16/2018    MCHC 36.9 (H) 03/16/2018    RDW 14.0 03/16/2018    PLT 15 (LL) 03/16/2018    MPV SEE COMMENT 03/16/2018    GRAN 0.0 (L) 03/16/2018    LYMPH Test Not Performed 03/16/2018    LYMPH 100.0 (H) 03/16/2018    MONO Test Not Performed 03/16/2018    MONO 0.0 (L) 03/16/2018    EOS Test Not Performed 03/16/2018    BASO Test Not Performed 03/16/2018    EOSINOPHIL 0.0 03/16/2018    BASOPHIL 0.0 03/16/2018     CMP  Sodium   Date Value Ref Range Status   03/16/2018 137 136 - 145 mmol/L Final     Potassium   Date Value Ref Range Status   03/16/2018 4.2 3.5 - 5.1 mmol/L Final     Chloride   Date Value Ref Range Status   03/16/2018 101 95 - 110 mmol/L Final     CO2   Date Value Ref Range Status   03/16/2018 28 23 - 29 mmol/L Final     Glucose   Date Value Ref Range Status   03/16/2018 100 70 - 110 mg/dL Final     BUN, Bld   Date Value Ref Range Status   03/16/2018 7 6 - 20 mg/dL Final     Creatinine   Date Value Ref Range Status   03/16/2018 0.7 0.5 - 1.4 mg/dL Final     Calcium   Date Value Ref Range Status   03/16/2018 8.6 (L) 8.7 - 10.5 mg/dL Final     Total Protein   Date Value Ref Range Status   03/16/2018 6.2 6.0 - 8.4 g/dL Final     Albumin   Date Value Ref Range  Status   03/16/2018 3.0 (L) 3.5 - 5.2 g/dL Final     Total Bilirubin   Date Value Ref Range Status   03/16/2018 0.8 0.1 - 1.0 mg/dL Final     Comment:     For infants and newborns, interpretation of results should be based  on gestational age, weight and in agreement with clinical  observations.  Premature Infant recommended reference ranges:  Up to 24 hours.............<8.0 mg/dL  Up to 48 hours............<12.0 mg/dL  3-5 days..................<15.0 mg/dL  6-29 days.................<15.0 mg/dL       Alkaline Phosphatase   Date Value Ref Range Status   03/16/2018 126 55 - 135 U/L Final     AST   Date Value Ref Range Status   03/16/2018 20 10 - 40 U/L Final     ALT   Date Value Ref Range Status   03/16/2018 46 (H) 10 - 44 U/L Final     Anion Gap   Date Value Ref Range Status   03/16/2018 8 8 - 16 mmol/L Final     eGFR if    Date Value Ref Range Status   03/16/2018 >60.0 >60 mL/min/1.73 m^2 Final     eGFR if non    Date Value Ref Range Status   03/16/2018 >60.0 >60 mL/min/1.73 m^2 Final     Comment:     Calculation used to obtain the estimated glomerular filtration  rate (eGFR) is the CKD-EPI equation.            Significant Imaging:   Ultrasound Neck  3/15/2018  Abnormal hypoechoic mass posterior to the right sternocleidomastoid muscle with significant internal vascular flow.  While non-specific, this is favored to represent a pathologic or suppurative lymph node.  Clinical correlation is advised.  Histologic sampling may be warranted.      Assessment/Plan:     AML (acute myeloblastic leukemia)    AML (acute myeloblastic leukemia)  This  is a 17 y.o. male with AML (t8;21) receiving chemotherapy following COG [id number] ([facility]) here w neutropenia (without fever initially). Today is day 23 of cycle. Febrile to 101.3 night of 3/12. cultured port (both lumens), added cefepime and vanc.  Not eating much 2/2 pain. Neck swelling increased. Pain better well-controlled on morphine pca.  -  Afebrile since 2am 3/14.   - Better pain control overnight        Fever and neutropenia:   In setting of AML (t8;21) being treated with chemo per COG AAML 1031 (ARM A). S/p 2 units PRBC's on 3/7 for hemoglobin of 6.6; 2 U of pRBCs and 1U Platelets 3/9. On 3/14 received 1u plts and 2u pRBCs.  - Daily CBC and CMP. Type and Screen q72h.  - Continue all home meds              - Chlorhexidine 15 ml Oral BID              - Ciprofloxacin 500 mg Oral q12 hours              - Lisdexamfetamine 60 mg oral qAM              - Bactrim 800-160 mg Oral BID on Friday, Saturday, and Sunday              - Posaconazole 300 mg BID  - PRN Meds: Lorazepam, Ondansetron, Miralax  -f/u cultures (from both lumens)  - Vancomycin, follow trough (since 3/12)  - Cefepime (since 3/12)--> transition to meropenem 3/15  - 3x blood cx NGTD, reculture also if new fever or hypotensive  - Monitor for bleeding, transfuse platelets if actively bleeding  - Monitor BPs closely     Mucositis:  - Morphine PCA : 1mg/h basal rate, 1.5mg on-demand 15min lock-out (max total 4.5mg/h), consider increasing basal rate overnight  - Monitor PO, electrolytes, daily weights  - Acyclovir 800 mg TID PO  - Miralax and cholace     Hematemesis:   - night of 3/14: 20-30 ml juan brown/red blood noted with small clots  - transfused 1u plts and 2u pRBCs, with no further episodes  - continue to monitor      Neck swelling  - Pain control with morphine pca as above  - Antimicrobial coverage as above  - Consulted ENT. Appreciate recommendations.     Disposition: Pending resolution of counts and clinical improvement.                  Farshad Mcginnis MD  Pediatric Hematology/Oncology  [facility]

## 2018-03-16 NOTE — PLAN OF CARE
Problem: Patient Care Overview  Goal: Plan of Care Review  Outcome: Ongoing (interventions implemented as appropriate)  Pt stable overnight, afebrile. Right lateral neck area swollen, pain ranged 7-10 in right lateral neck, reports pain well controlled with PCA. Mouth and lip lesions still present, swish and swallow mouth solution administered. Developed rash on bilateral arms, benadryl administered. Good PO intake. Plan of care reviewed with pt, verbalized understanding. Will continue to monitor.

## 2018-03-16 NOTE — PROGRESS NOTES
[facility]  Otorhinolaryngology-Head & Neck Surgery  Progress Note    Subjective:     Post-Op Info:  * No surgery found *      Hospital Day: 12     Interval History: NAEON. Patient obtained neck US yesterday. Stating that right neck mass seems more tender this AM.     Medications:  Continuous Infusions:   morphine       Scheduled Meds:   acyclovir  800 mg Oral TID    chlorhexidine  15 mL Mouth/Throat BID    ciprofloxacin HCl  500 mg Oral Q12H    docusate sodium  100 mg Oral Daily    meropenem-0.9% sodium chloride  1 g Intravenous Q8H    polyethylene glycol  17 g Oral Daily    posaconazole  300 mg Oral BID    sulfamethoxazole-trimethoprim 800-160mg  1 tablet Oral twice daily on Friday, Saturday, Sunday    vancomycin (VANCOCIN) IVPB  1,250 mg Intravenous Q12H     PRN Meds:(Magic mouthwash) 1:1:1 Benadryl 12.5mg/5ml liq, aluminum & magnesium hydroxide-simehticone (Maalox), lidocaine viscous 2%, acetaminophen, diphenhydrAMINE, diphenhydrAMINE, heparin, porcine (PF), lidocaine HCl 2%, LORazepam, naloxone, ondansetron, polyethylene glycol, sodium chloride 0.9%     Review of patient's allergies indicates:   Allergen Reactions    Nsaids (non-steroidal anti-inflammatory drug) Anaphylaxis    Nuts [tree nut] Anaphylaxis    Strawberry     Vancomycin analogues Itching     Premedicate with benadryl and admin over 2hr.     Objective:     Vital Signs (24h Range):  Temp:  [99 °F (37.2 °C)-99.8 °F (37.7 °C)] 99.1 °F (37.3 °C)  Pulse:  [72-95] 82  Resp:  [10-16] 11  SpO2:  [94 %-100 %] 95 %  BP: (115-140)/(54-72) 138/63        Lines/Drains/Airways     Central Venous Catheter Line                 Port A Cath Double Lumen 10/31/17 1826 left subclavian 135 days                Physical Exam  Sleeping easily awakened, appropriate and pleasant  Normocephalic  EOMI, pupils equal round and reactive  External nose normal  No epistaxis, no nasal lesions on anterior rhinoscopy  Oral cavity with superficial  erythematous erosions of oral vestibule and buccal mucosa  Right neck erythematous and warm, tender along SCM, no palpable fluctuance; left neck normal to palpation  Normal voice, normal work of breathing, no stridor      Significant Labs:  BMP:   Recent Labs  Lab 03/16/18  0456         CO2 28   BUN 7   CREATININE 0.7   CALCIUM 8.6*     CBC:   Recent Labs  Lab 03/15/18  0700 03/16/18  0456   WBC 1.10* 0.60*   RBC 3.21* 3.14*   HGB 9.6* 9.3*   HCT 26.9* 25.2*   PLT 21*  --    MCV 84 80*   MCH 29.9 29.6   MCHC 35.7 36.9*       Significant Diagnostics:  US: I have reviewed all pertinent results/findings within the past 24 hours and my personal findings are:  Per read, favors supporative lymph node or pathologic process    Assessment/Plan:     Mass of lateral neck    -Exam concerning for an infective lymphadenitis vs. neoplasm   US done  -Favor image guided biopsy (for path/specimen/cultures) over open surgery given thrombocytopenia   Will discuss with staff  -Will continue to follow            Yuval Gerber MD  Otorhinolaryngology-Head & Neck Surgery  [facility]

## 2018-03-16 NOTE — PROGRESS NOTES
[facility]  Otorhinolaryngology-Head & Neck Surgery  Progress Note    Subjective:     Post-Op Info:  * No surgery found *      Hospital Day: 12     Interval History: NAEON. Patient obtained neck US yesterday. Stating that right neck mass seems more tender this AM.     Medications:  Continuous Infusions:   morphine       Scheduled Meds:   acyclovir  800 mg Oral TID    chlorhexidine  15 mL Mouth/Throat BID    ciprofloxacin HCl  500 mg Oral Q12H    docusate sodium  100 mg Oral Daily    meropenem-0.9% sodium chloride  1 g Intravenous Q8H    polyethylene glycol  17 g Oral Daily    posaconazole  300 mg Oral BID    sulfamethoxazole-trimethoprim 800-160mg  1 tablet Oral twice daily on Friday, Saturday, Sunday    vancomycin (VANCOCIN) IVPB  1,250 mg Intravenous Q12H     PRN Meds:(Magic mouthwash) 1:1:1 Benadryl 12.5mg/5ml liq, aluminum & magnesium hydroxide-simehticone (Maalox), lidocaine viscous 2%, acetaminophen, diphenhydrAMINE, diphenhydrAMINE, heparin, porcine (PF), lidocaine HCl 2%, LORazepam, naloxone, ondansetron, polyethylene glycol, sodium chloride 0.9%     Review of patient's allergies indicates:   Allergen Reactions    Nsaids (non-steroidal anti-inflammatory drug) Anaphylaxis    Nuts [tree nut] Anaphylaxis    Strawberry     Vancomycin analogues Itching     Premedicate with benadryl and admin over 2hr.     Objective:     Vital Signs (24h Range):  Temp:  [99 °F (37.2 °C)-99.8 °F (37.7 °C)] 99 °F (37.2 °C)  Pulse:  [75-95] 75  Resp:  [10-16] 16  SpO2:  [94 %-98 %] 98 %  BP: (116-140)/(54-72) 125/62       Date 03/16/18 0700 - 03/17/18 0659   Shift 5079-6866 7854-1858 5288-2207 24 Hour Total   I  N  T  A  K  E   P.O. 100   100    Shift Total  (mL/kg) 100  (1.2)   100  (1.2)   O  U  T  P  U  T   Urine  (mL/kg/hr) 800   800    Shift Total  (mL/kg) 800  (9.5)   800  (9.5)   Weight (kg) 84.6 84.6 84.6 84.6     Lines/Drains/Airways     Central Venous Catheter Line                 Port A  Cath Double Lumen 10/31/17 1826 left subclavian 135 days                Physical Exam    Sleeping easily awakened, appropriate and pleasant  Normocephalic  EOMI, pupils equal round and reactive  External nose normal  No epistaxis, no nasal lesions on anterior rhinoscopy  Oral cavity with superficial erythematous erosions of oral vestibule and buccal mucosa  Right neck erythematous and warm, tender along SCM, no palpable fluctuance; left neck normal to palpation  Normal voice, normal work of breathing, no stridor      Significant Labs:  BMP:     Recent Labs  Lab 03/16/18  0456         CO2 28   BUN 7   CREATININE 0.7   CALCIUM 8.6*     CBC:     Recent Labs  Lab 03/16/18  0456   WBC 0.60*   RBC 3.14*   HGB 9.3*   HCT 25.2*   PLT 15*   MCV 80*   MCH 29.6   MCHC 36.9*       Significant Diagnostics:  US: No definite fluid collection    Review of Systems        Assessment/Plan:     Mass of lateral neck    -Exam concerning for an  lymphadenitis vs. neoplasm   US done, no definite drainable fluid collection  -Favor image guided biopsy (for path/specimen/cultures) over open surgery given thrombocytopenia   Will discuss with staff  -Will continue to follow            Yuval Gerber MD  Otorhinolaryngology-Head & Neck Surgery  [facility]

## 2018-03-17 LAB
ALBUMIN SERPL BCP-MCNC: 3.5 G/DL
ALP SERPL-CCNC: 126 U/L
ALT SERPL W/O P-5'-P-CCNC: 42 U/L
ANION GAP SERPL CALC-SCNC: 8 MMOL/L
AST SERPL-CCNC: 18 U/L
BASOPHILS # BLD AUTO: 0.02 K/UL
BASOPHILS NFR BLD: 2 %
BILIRUB SERPL-MCNC: 1 MG/DL
BUN SERPL-MCNC: 7 MG/DL
CALCIUM SERPL-MCNC: 9 MG/DL
CHLORIDE SERPL-SCNC: 102 MMOL/L
CMV IGM TITR SERPL: <8 U/ML
CO2 SERPL-SCNC: 26 MMOL/L
CREAT SERPL-MCNC: 0.8 MG/DL
DIFFERENTIAL METHOD: ABNORMAL
EOSINOPHIL # BLD AUTO: 0 K/UL
EOSINOPHIL NFR BLD: 0 %
ERYTHROCYTE [DISTWIDTH] IN BLOOD BY AUTOMATED COUNT: 13.5 %
EST. GFR  (AFRICAN AMERICAN): >60 ML/MIN/1.73 M^2
EST. GFR  (NON AFRICAN AMERICAN): >60 ML/MIN/1.73 M^2
GLUCOSE SERPL-MCNC: 109 MG/DL
HCT VFR BLD AUTO: 26 %
HGB BLD-MCNC: 9.3 G/DL
IMM GRANULOCYTES # BLD AUTO: 0.03 K/UL
IMM GRANULOCYTES NFR BLD AUTO: 3 %
LYMPHOCYTES # BLD AUTO: 0.9 K/UL
LYMPHOCYTES NFR BLD: 86 %
MCH RBC QN AUTO: 29.3 PG
MCHC RBC AUTO-ENTMCNC: 35.8 G/DL
MCV RBC AUTO: 82 FL
MONOCYTES # BLD AUTO: 0.1 K/UL
MONOCYTES NFR BLD: 7 %
NEUTROPHILS # BLD AUTO: 0 K/UL
NEUTROPHILS NFR BLD: 2 %
NRBC BLD-RTO: 2 /100 WBC
PLATELET # BLD AUTO: 18 K/UL
PMV BLD AUTO: 12.9 FL
POTASSIUM SERPL-SCNC: 4 MMOL/L
PROT SERPL-MCNC: 6.6 G/DL
RBC # BLD AUTO: 3.17 M/UL
SODIUM SERPL-SCNC: 136 MMOL/L
TB INDURATION 48 - 72 HR READ: 0 MM
VANCOMYCIN TROUGH SERPL-MCNC: 7.2 UG/ML
WBC # BLD AUTO: 1 K/UL

## 2018-03-17 PROCEDURE — 63600175 PHARM REV CODE 636 W HCPCS: Performed by: STUDENT IN AN ORGANIZED HEALTH CARE EDUCATION/TRAINING PROGRAM

## 2018-03-17 PROCEDURE — 25000003 PHARM REV CODE 250: Performed by: STUDENT IN AN ORGANIZED HEALTH CARE EDUCATION/TRAINING PROGRAM

## 2018-03-17 PROCEDURE — 85025 COMPLETE CBC W/AUTO DIFF WBC: CPT

## 2018-03-17 PROCEDURE — 99233 SBSQ HOSP IP/OBS HIGH 50: CPT | Mod: ,,, | Performed by: PEDIATRICS

## 2018-03-17 PROCEDURE — 36592 COLLECT BLOOD FROM PICC: CPT

## 2018-03-17 PROCEDURE — 99232 SBSQ HOSP IP/OBS MODERATE 35: CPT | Mod: ,,, | Performed by: INTERNAL MEDICINE

## 2018-03-17 PROCEDURE — 63600175 PHARM REV CODE 636 W HCPCS: Performed by: PEDIATRICS

## 2018-03-17 PROCEDURE — 80202 ASSAY OF VANCOMYCIN: CPT

## 2018-03-17 PROCEDURE — 11300000 HC PEDIATRIC PRIVATE ROOM

## 2018-03-17 PROCEDURE — 80053 COMPREHEN METABOLIC PANEL: CPT

## 2018-03-17 RX ADMIN — MEROPENEM AND SODIUM CHLORIDE 1 G: 1 INJECTION, SOLUTION INTRAVENOUS at 12:03

## 2018-03-17 RX ADMIN — DIPHENHYDRAMINE HYDROCHLORIDE 25 MG: 25 CAPSULE ORAL at 05:03

## 2018-03-17 RX ADMIN — MEROPENEM AND SODIUM CHLORIDE 1 G: 1 INJECTION, SOLUTION INTRAVENOUS at 07:03

## 2018-03-17 RX ADMIN — CIPROFLOXACIN HYDROCHLORIDE 500 MG: 500 TABLET, FILM COATED ORAL at 08:03

## 2018-03-17 RX ADMIN — ACYCLOVIR 800 MG: 200 CAPSULE ORAL at 08:03

## 2018-03-17 RX ADMIN — ACYCLOVIR 800 MG: 200 CAPSULE ORAL at 03:03

## 2018-03-17 RX ADMIN — HEPARIN 330 UNITS: 100 SYRINGE at 03:03

## 2018-03-17 RX ADMIN — MEROPENEM AND SODIUM CHLORIDE 1 G: 1 INJECTION, SOLUTION INTRAVENOUS at 03:03

## 2018-03-17 RX ADMIN — POSACONAZOLE 300 MG: 100 TABLET, COATED ORAL at 10:03

## 2018-03-17 RX ADMIN — VANCOMYCIN HYDROCHLORIDE 1250 MG: 1 INJECTION, POWDER, LYOPHILIZED, FOR SOLUTION INTRAVENOUS at 05:03

## 2018-03-17 RX ADMIN — CHLORHEXIDINE GLUCONATE 15 ML: 1.2 RINSE ORAL at 10:03

## 2018-03-17 RX ADMIN — Medication: at 03:03

## 2018-03-17 RX ADMIN — SULFAMETHOXAZOLE AND TRIMETHOPRIM 1 TABLET: 800; 160 TABLET ORAL at 10:03

## 2018-03-17 RX ADMIN — SULFAMETHOXAZOLE AND TRIMETHOPRIM 1 TABLET: 800; 160 TABLET ORAL at 08:03

## 2018-03-17 RX ADMIN — POSACONAZOLE 300 MG: 100 TABLET, COATED ORAL at 08:03

## 2018-03-17 RX ADMIN — CIPROFLOXACIN HYDROCHLORIDE 500 MG: 500 TABLET, FILM COATED ORAL at 10:03

## 2018-03-17 RX ADMIN — ACYCLOVIR 800 MG: 200 CAPSULE ORAL at 10:03

## 2018-03-17 RX ADMIN — HEPARIN 330 UNITS: 100 SYRINGE at 08:03

## 2018-03-17 RX ADMIN — CHLORHEXIDINE GLUCONATE 15 ML: 1.2 RINSE ORAL at 08:03

## 2018-03-17 NOTE — SUBJECTIVE & OBJECTIVE
Interval History: No acute events overnight. Reports minimal right-sided neck discomfort- pain overall well-controlled with Morphine PCA, with only 2 bolus doses given over 24 hours. Remains afebrile since 3/14.     Scheduled Meds:   acyclovir  800 mg Oral TID    chlorhexidine  15 mL Mouth/Throat BID    ciprofloxacin HCl  500 mg Oral Q12H    docusate sodium  100 mg Oral Daily    meropenem-0.9% sodium chloride  1 g Intravenous Q8H    polyethylene glycol  17 g Oral Daily    posaconazole  300 mg Oral BID    sulfamethoxazole-trimethoprim 800-160mg  1 tablet Oral twice daily on Friday, Saturday, Sunday    vancomycin (VANCOCIN) IVPB  1,250 mg Intravenous Q12H     Continuous Infusions:   morphine       PRN Meds:(Magic mouthwash) 1:1:1 Benadryl 12.5mg/5ml liq, aluminum & magnesium hydroxide-simehticone (Maalox), lidocaine viscous 2%, acetaminophen, diphenhydrAMINE, diphenhydrAMINE, heparin, porcine (PF), lidocaine HCl 2%, LORazepam, naloxone, ondansetron, polyethylene glycol, sodium chloride 0.9%    Review of Systems   Constitutional: Positive for appetite change. Negative for activity change and fever.   HENT: Positive for mouth sores. Negative for congestion.         R-side neck pain   Eyes: Negative for discharge.   Respiratory: Negative for cough.    Cardiovascular: Negative for chest pain.   Gastrointestinal: Negative for abdominal distention and abdominal pain.   Genitourinary: Negative for decreased urine volume.   Musculoskeletal: Negative for myalgias.     Objective:     Vital Signs (Most Recent):  Temp: 98.9 °F (37.2 °C) (03/17/18 0830)  Pulse: 78 (03/17/18 0830)  Resp: 12 (03/17/18 0830)  BP: (!) 129/57 (03/17/18 0830)  SpO2: 98 % (03/17/18 0830) Vital Signs (24h Range):  Temp:  [98.7 °F (37.1 °C)-99.3 °F (37.4 °C)] 98.9 °F (37.2 °C)  Pulse:  [69-90] 78  Resp:  [7-22] 12  SpO2:  [97 %-100 %] 98 %  BP: (115-134)/(53-64) 129/57     Patient Vitals for the past 72 hrs (Last 3 readings):   Weight   03/16/18  1955 84.3 kg (185 lb 13.6 oz)   03/15/18 2134 84.6 kg (186 lb 8.2 oz)   03/14/18 2136 84.6 kg (186 lb 8.2 oz)     Body mass index is 30.93 kg/m².    Intake/Output - Last 3 Shifts       03/15 0700 - 03/16 0659 03/16 0700 - 03/17 0659 03/17 0700 - 03/18 0659    P.O. 1540 1020     I.V. (mL/kg) 389 (4.6) 612.1 (7.3)     Blood       IV Piggyback 650 650     Total Intake(mL/kg) 2579 (30.5) 2282.1 (27.1)     Urine (mL/kg/hr) 325 (0.2) 1950 (1) 380 (2.9)    Total Output 325 1950 380    Net +2254 +332.1 -380           Urine Occurrence 4 x 3 x     Stool Occurrence  0 x           Lines/Drains/Airways     Central Venous Catheter Line                 Port A Cath Double Lumen 10/31/17 1826 left subclavian 136 days                Physical Exam   Constitutional: He is oriented to person, place, and time. He appears well-developed and well-nourished. No distress.   HENT:   Head: Normocephalic.   White ulcerative mucosal lesion on R lower lip. Second lesion on the left inner cheek (mucosal surface)     Eyes: Conjunctivae are normal. Right eye exhibits no discharge. Left eye exhibits no discharge.   Neck: Normal range of motion.   Diffuse nonerythematous, swelling of R neck posterior to SCM. Tender to palpation. Non-fluctuant   Cardiovascular: Normal rate and regular rhythm.    No murmur heard.  Pulmonary/Chest: Effort normal and breath sounds normal. No respiratory distress.   Abdominal: Soft. Bowel sounds are normal. He exhibits no distension.   Musculoskeletal: He exhibits no deformity.   Neurological: He is alert and oriented to person, place, and time.   Skin: Skin is warm. Capillary refill takes less than 2 seconds. No rash noted.   Nursing note and vitals reviewed.      Significant Labs:  Recent Results (from the past 24 hour(s))   CBC auto differential    Collection Time: 03/17/18  3:42 AM   Result Value Ref Range    WBC 1.00 (LL) 3.90 - 12.70 K/uL    RBC 3.17 (L) 4.60 - 6.20 M/uL    Hemoglobin 9.3 (L) 14.0 - 18.0 g/dL     Hematocrit 26.0 (L) 40.0 - 54.0 %    MCV 82 82 - 98 fL    MCH 29.3 27.0 - 31.0 pg    MCHC 35.8 32.0 - 36.0 g/dL    RDW 13.5 11.5 - 14.5 %    Platelets 18 (LL) 150 - 350 K/uL    MPV 12.9 9.2 - 12.9 fL    Immature Granulocytes 3.0 (H) 0.0 - 0.5 %    Gran # (ANC) 0.0 (L) 1.8 - 7.7 K/uL    Immature Grans (Abs) 0.03 0.00 - 0.04 K/uL    Lymph # 0.9 (L) 1.0 - 4.8 K/uL    Mono # 0.1 (L) 0.3 - 1.0 K/uL    Eos # 0.0 0.0 - 0.5 K/uL    Baso # 0.02 0.00 - 0.20 K/uL    nRBC 2 (A) 0 /100 WBC    Gran% 2.0 (L) 38.0 - 73.0 %    Lymph% 86.0 (H) 18.0 - 48.0 %    Mono% 7.0 4.0 - 15.0 %    Eosinophil% 0.0 0.0 - 8.0 %    Basophil% 2.0 (H) 0.0 - 1.9 %    Differential Method Automated    Comprehensive metabolic panel    Collection Time: 03/17/18  3:42 AM   Result Value Ref Range    Sodium 136 136 - 145 mmol/L    Potassium 4.0 3.5 - 5.1 mmol/L    Chloride 102 95 - 110 mmol/L    CO2 26 23 - 29 mmol/L    Glucose 109 70 - 110 mg/dL    BUN, Bld 7 6 - 20 mg/dL    Creatinine 0.8 0.5 - 1.4 mg/dL    Calcium 9.0 8.7 - 10.5 mg/dL    Total Protein 6.6 6.0 - 8.4 g/dL    Albumin 3.5 3.5 - 5.2 g/dL    Total Bilirubin 1.0 0.1 - 1.0 mg/dL    Alkaline Phosphatase 126 55 - 135 U/L    AST 18 10 - 40 U/L    ALT 42 10 - 44 U/L    Anion Gap 8 8 - 16 mmol/L    eGFR if African American >60.0 >60 mL/min/1.73 m^2    eGFR if non African American >60.0 >60 mL/min/1.73 m^2         Significant Imaging: no new imaging

## 2018-03-17 NOTE — PROGRESS NOTES
[facility] Medicine  Progress Note    Patient Name: Hema Kuo  MRN: [id number]  Admission Date: 3/5/2018  Hospital Length of Stay: 12  Code Status: Full Code   Primary Care Physician: Ann Reyna NP  Principal Problem: <principal problem not specified>    Subjective:     HPI:  Mr. Hema Kuo is a 17 y.o. male with AML (t8;21) receiving chemotherapy following COG [id number] (Arm A) here today for neutropenia (without fever).   patient underwent bone marrow biopsy and lumbar puncture last month and began Intensification I Phase of his chemo regimen with high dose cytarabine and etoposide x 5 days. Following Intensification, he was seen in ED for refractory N/V, received Emend + Dex with improvement.  Since then, he states he has been feeling fine. Endorses mild tiredness, but otherwise he reports no fevers, no abdominal pain, vomiting, diarrhea or constipation and no excessive bruising or unusual bleeding.  Good appetite.  No other complaints.      The day of admission, he received a platelet transfusion and post-transfusion labs were notable for neutropenia so patient was sent to [facility] for admission for neutorpenia (no fevers)       Hospital Course:  No notes on file    Scheduled Meds:   acyclovir  800 mg Oral TID    chlorhexidine  15 mL Mouth/Throat BID    ciprofloxacin HCl  500 mg Oral Q12H    docusate sodium  100 mg Oral Daily    meropenem-0.9% sodium chloride  1 g Intravenous Q8H    polyethylene glycol  17 g Oral Daily    posaconazole  300 mg Oral BID    sulfamethoxazole-trimethoprim 800-160mg  1 tablet Oral twice daily on Friday, Saturday, Sunday    vancomycin (VANCOCIN) IVPB  1,250 mg Intravenous Q12H     Continuous Infusions:   morphine       PRN Meds:(Magic mouthwash) 1:1:1 Benadryl 12.5mg/5ml liq, aluminum & magnesium hydroxide-simehticone (Maalox), lidocaine viscous 2%, acetaminophen, diphenhydrAMINE, diphenhydrAMINE, heparin, porcine (PF), lidocaine HCl 2%,  LORazepam, naloxone, ondansetron, polyethylene glycol, sodium chloride 0.9%    Interval History: No acute events overnight. Reports minimal right-sided neck discomfort- pain overall well-controlled with Morphine PCA, with only 2 bolus doses given over 24 hours. Remains afebrile since 3/14.     Scheduled Meds:   acyclovir  800 mg Oral TID    chlorhexidine  15 mL Mouth/Throat BID    ciprofloxacin HCl  500 mg Oral Q12H    docusate sodium  100 mg Oral Daily    meropenem-0.9% sodium chloride  1 g Intravenous Q8H    polyethylene glycol  17 g Oral Daily    posaconazole  300 mg Oral BID    sulfamethoxazole-trimethoprim 800-160mg  1 tablet Oral twice daily on Friday, Saturday, Sunday    vancomycin (VANCOCIN) IVPB  1,250 mg Intravenous Q12H     Continuous Infusions:   morphine       PRN Meds:(Magic mouthwash) 1:1:1 Benadryl 12.5mg/5ml liq, aluminum & magnesium hydroxide-simehticone (Maalox), lidocaine viscous 2%, acetaminophen, diphenhydrAMINE, diphenhydrAMINE, heparin, porcine (PF), lidocaine HCl 2%, LORazepam, naloxone, ondansetron, polyethylene glycol, sodium chloride 0.9%    Review of Systems   Constitutional: Positive for appetite change. Negative for activity change and fever.   HENT: Positive for mouth sores. Negative for congestion.         R-side neck pain   Eyes: Negative for discharge.   Respiratory: Negative for cough.    Cardiovascular: Negative for chest pain.   Gastrointestinal: Negative for abdominal distention and abdominal pain.   Genitourinary: Negative for decreased urine volume.   Musculoskeletal: Negative for myalgias.     Objective:     Vital Signs (Most Recent):  Temp: 98.9 °F (37.2 °C) (03/17/18 0830)  Pulse: 78 (03/17/18 0830)  Resp: 12 (03/17/18 0830)  BP: (!) 129/57 (03/17/18 0830)  SpO2: 98 % (03/17/18 0830) Vital Signs (24h Range):  Temp:  [98.7 °F (37.1 °C)-99.3 °F (37.4 °C)] 98.9 °F (37.2 °C)  Pulse:  [69-90] 78  Resp:  [7-22] 12  SpO2:  [97 %-100 %] 98 %  BP: (115-134)/(53-64) 129/57      Patient Vitals for the past 72 hrs (Last 3 readings):   Weight   03/16/18 1955 84.3 kg (185 lb 13.6 oz)   03/15/18 2134 84.6 kg (186 lb 8.2 oz)   03/14/18 2136 84.6 kg (186 lb 8.2 oz)     Body mass index is 30.93 kg/m².    Intake/Output - Last 3 Shifts       03/15 0700 - 03/16 0659 03/16 0700 - 03/17 0659 03/17 0700 - 03/18 0659    P.O. 1540 1020     I.V. (mL/kg) 389 (4.6) 612.1 (7.3)     Blood       IV Piggyback 650 650     Total Intake(mL/kg) 2579 (30.5) 2282.1 (27.1)     Urine (mL/kg/hr) 325 (0.2) 1950 (1) 380 (2.9)    Total Output 325 1950 380    Net +2254 +332.1 -380           Urine Occurrence 4 x 3 x     Stool Occurrence  0 x           Lines/Drains/Airways     Central Venous Catheter Line                 Port A Cath Double Lumen 10/31/17 1826 left subclavian 136 days                Physical Exam   Constitutional: He is oriented to person, place, and time. He appears well-developed and well-nourished. No distress.   HENT:   Head: Normocephalic.   White ulcerative mucosal lesion on R lower lip. Second lesion on the left inner cheek (mucosal surface)     Eyes: Conjunctivae are normal. Right eye exhibits no discharge. Left eye exhibits no discharge.   Neck: Normal range of motion.   Diffuse nonerythematous, swelling of R neck posterior to SCM. Tender to palpation. Non-fluctuant   Cardiovascular: Normal rate and regular rhythm.    No murmur heard.  Pulmonary/Chest: Effort normal and breath sounds normal. No respiratory distress.   Abdominal: Soft. Bowel sounds are normal. He exhibits no distension.   Musculoskeletal: He exhibits no deformity.   Neurological: He is alert and oriented to person, place, and time.   Skin: Skin is warm. Capillary refill takes less than 2 seconds. No rash noted.   Nursing note and vitals reviewed.      Significant Labs:  Recent Results (from the past 24 hour(s))   CBC auto differential    Collection Time: 03/17/18  3:42 AM   Result Value Ref Range    WBC 1.00 (LL) 3.90 - 12.70 K/uL     RBC 3.17 (L) 4.60 - 6.20 M/uL    Hemoglobin 9.3 (L) 14.0 - 18.0 g/dL    Hematocrit 26.0 (L) 40.0 - 54.0 %    MCV 82 82 - 98 fL    MCH 29.3 27.0 - 31.0 pg    MCHC 35.8 32.0 - 36.0 g/dL    RDW 13.5 11.5 - 14.5 %    Platelets 18 (LL) 150 - 350 K/uL    MPV 12.9 9.2 - 12.9 fL    Immature Granulocytes 3.0 (H) 0.0 - 0.5 %    Gran # (ANC) 0.0 (L) 1.8 - 7.7 K/uL    Immature Grans (Abs) 0.03 0.00 - 0.04 K/uL    Lymph # 0.9 (L) 1.0 - 4.8 K/uL    Mono # 0.1 (L) 0.3 - 1.0 K/uL    Eos # 0.0 0.0 - 0.5 K/uL    Baso # 0.02 0.00 - 0.20 K/uL    nRBC 2 (A) 0 /100 WBC    Gran% 2.0 (L) 38.0 - 73.0 %    Lymph% 86.0 (H) 18.0 - 48.0 %    Mono% 7.0 4.0 - 15.0 %    Eosinophil% 0.0 0.0 - 8.0 %    Basophil% 2.0 (H) 0.0 - 1.9 %    Differential Method Automated    Comprehensive metabolic panel    Collection Time: 03/17/18  3:42 AM   Result Value Ref Range    Sodium 136 136 - 145 mmol/L    Potassium 4.0 3.5 - 5.1 mmol/L    Chloride 102 95 - 110 mmol/L    CO2 26 23 - 29 mmol/L    Glucose 109 70 - 110 mg/dL    BUN, Bld 7 6 - 20 mg/dL    Creatinine 0.8 0.5 - 1.4 mg/dL    Calcium 9.0 8.7 - 10.5 mg/dL    Total Protein 6.6 6.0 - 8.4 g/dL    Albumin 3.5 3.5 - 5.2 g/dL    Total Bilirubin 1.0 0.1 - 1.0 mg/dL    Alkaline Phosphatase 126 55 - 135 U/L    AST 18 10 - 40 U/L    ALT 42 10 - 44 U/L    Anion Gap 8 8 - 16 mmol/L    eGFR if African American >60.0 >60 mL/min/1.73 m^2    eGFR if non African American >60.0 >60 mL/min/1.73 m^2         Significant Imaging: no new imaging     Assessment/Plan:     Oncology   AML (acute myeloblastic leukemia)    AML (acute myeloblastic leukemia)  This  is a 17 y.o. male with AML (t8;21) receiving chemotherapy following COG [id number] ([place] A) here w neutropenia (without fever initially). Today is day 24 of cycle. Febrile to 101.3 night of 3/12 and was started on cefepime + vanc. Now afebrile since 3/14. Continues to have neck swelling, though pain better well-controlled on morphine pca.       Fever and neutropenia:   In  setting of AML (t8;21) being treated with chemo per COG AAML 1031 (ARM A). S/p 2 units PRBC's on 3/7 for hemoglobin of 6.6; 2 U of pRBCs and 1U Platelets 3/9. On 3/14 received 1u plts and 2u pRBCs.  - Daily CBC and CMP. Type and Screen q72h.  - Continue all home meds              - Chlorhexidine 15 ml Oral BID              - Ciprofloxacin 500 mg Oral q12 hours              - Lisdexamfetamine 60 mg oral qAM              - Bactrim 800-160 mg Oral BID on Friday, Saturday, and Sunday              - Posaconazole 300 mg BID  - PRN Meds: Lorazepam, Ondansetron, Miralax  -f/u cultures (from both lumens)  - Vancomycin, follow trough (since 3/12)  - Cefepime (since 3/12)--> transition to meropenem 3/15  - 3x blood cx NGTD, reculture if new fever or hypotensive  - Monitor for bleeding, transfuse platelets if actively bleeding  - Monitor BPs closely     Mucositis:  - Morphine PCA : will decrease on-demand to 1.0mg 15min lock-out (max total 4.5mg/h)  - Monitor PO, electrolytes, daily weights  - Acyclovir 800 mg TID PO  - Miralax and cholace     Hematemesis:   - night of 3/14: 20-30 ml juan brown/red blood noted with small clots  - transfused 1u plts and 2u pRBCs, with no further episodes  - continue to monitor      Neck swelling  - Pain control with morphine pca as above  - Antimicrobial coverage as above  - ENT following. Swelling likely 2/2 to lymphadenitis vs. neoplasm. Image-guided biopsy may be warranted     Disposition: Pending resolution of counts and clinical improvement.                  Maryanne Wagner, DO  Pediatrics PGY1

## 2018-03-17 NOTE — SUBJECTIVE & OBJECTIVE
Interval History: No acute events overnight. Patient reports that his neck pain has decreased.     Medications:  Continuous Infusions:   morphine       Scheduled Meds:   acyclovir  800 mg Oral TID    chlorhexidine  15 mL Mouth/Throat BID    ciprofloxacin HCl  500 mg Oral Q12H    docusate sodium  100 mg Oral Daily    meropenem-0.9% sodium chloride  1 g Intravenous Q8H    polyethylene glycol  17 g Oral Daily    posaconazole  300 mg Oral BID    sulfamethoxazole-trimethoprim 800-160mg  1 tablet Oral twice daily on Friday, Saturday, Sunday    vancomycin (VANCOCIN) IVPB  1,250 mg Intravenous Q12H     PRN Meds:(Magic mouthwash) 1:1:1 Benadryl 12.5mg/5ml liq, aluminum & magnesium hydroxide-simehticone (Maalox), lidocaine viscous 2%, acetaminophen, diphenhydrAMINE, diphenhydrAMINE, heparin, porcine (PF), lidocaine HCl 2%, LORazepam, naloxone, ondansetron, polyethylene glycol, sodium chloride 0.9%     Review of patient's allergies indicates:   Allergen Reactions    Nsaids (non-steroidal anti-inflammatory drug) Anaphylaxis    Nuts [tree nut] Anaphylaxis    Strawberry     Vancomycin analogues Itching     Premedicate with benadryl and admin over 2hr.     Objective:     Vital Signs (24h Range):  Temp:  [98.7 °F (37.1 °C)-99.3 °F (37.4 °C)] 98.9 °F (37.2 °C)  Pulse:  [69-90] 78  Resp:  [9-22] 12  SpO2:  [97 %-100 %] 98 %  BP: (115-134)/(53-64) 129/57       Date 03/17/18 0700 - 03/18/18 0659   Shift 9573-6249 8674-5194 5604-0739 24 Hour Total   I  N  T  A  K  E   Shift Total  (mL/kg)       O  U  T  P  U  T   Urine  (mL/kg/hr) 380   380    Shift Total  (mL/kg) 380  (4.5)   380  (4.5)   Weight (kg) 84.3 84.3 84.3 84.3     Lines/Drains/Airways     Central Venous Catheter Line                 Port A Cath Double Lumen 10/31/17 1826 left subclavian 136 days                Physical Exam  Sleeping easily awakened, appropriate and pleasant  Normocephalic  EOMI, pupils equal round and reactive  External nose normal  No  epistaxis, no nasal lesions on anterior rhinoscopy  Oral cavity with superficial erythematous erosions of oral vestibule and buccal mucosa  Right neck  tender along SCM; improving tenderness; no palpable fluctuance; left neck normal to palpation  Normal voice, normal work of breathing, no stridor      Significant Labs:  BMP:   Recent Labs  Lab 03/17/18  0342         CO2 26   BUN 7   CREATININE 0.8   CALCIUM 9.0     CBC:   Recent Labs  Lab 03/17/18  0342   WBC 1.00*   RBC 3.17*   HGB 9.3*   HCT 26.0*   PLT 18*   MCV 82   MCH 29.3   MCHC 35.8       Significant Diagnostics:  None

## 2018-03-17 NOTE — PLAN OF CARE
Problem: Patient Care Overview  Goal: Plan of Care Review  Outcome: Ongoing (interventions implemented as appropriate)  Morphine PCA in place, bolus dose changed from 1.5mg to 1.0mg. Continuous rate of 1mg/hr, 15min LO, max 8mg/hr. Pt c/o discomfort 8/10 to mouth lesions and neck area but states Morphine PCA is helping. Voiding well. Tolerating po. VSS. Afebrile. All meds on schedule including IV antibiotics. PAC to L chest cdi, infusing well. Family at bedside. Will continue to monitor.

## 2018-03-17 NOTE — PLAN OF CARE
Problem: Patient Care Overview  Goal: Plan of Care Review  Outcome: Ongoing (interventions implemented as appropriate)  VS stable, afebrile, no distress noted.continuous morphine PCA pump in use, no PRN meds given. all meds given per order. tolerating PO intake, no distress noted. Right sided neck pain and mouth pain well controlled with morphine PCA pump.labs drawn this AM. Left PAC clean dry intact. Both lumens flush well, blood return present to both lumens. TB skin test to be read today. Plan of care reviewed with patient, verbalized understanding, will continue to monitor.

## 2018-03-17 NOTE — PROGRESS NOTES
[facility]  Otorhinolaryngology-Head & Neck Surgery  Progress Note    Subjective:     Post-Op Info:  * No surgery found *      Hospital Day: 13     Interval History: No acute events overnight. Patient reports that his neck pain has decreased.     Medications:  Continuous Infusions:   morphine       Scheduled Meds:   acyclovir  800 mg Oral TID    chlorhexidine  15 mL Mouth/Throat BID    ciprofloxacin HCl  500 mg Oral Q12H    docusate sodium  100 mg Oral Daily    meropenem-0.9% sodium chloride  1 g Intravenous Q8H    polyethylene glycol  17 g Oral Daily    posaconazole  300 mg Oral BID    sulfamethoxazole-trimethoprim 800-160mg  1 tablet Oral twice daily on Friday, Saturday, Sunday    vancomycin (VANCOCIN) IVPB  1,250 mg Intravenous Q12H     PRN Meds:(Magic mouthwash) 1:1:1 Benadryl 12.5mg/5ml liq, aluminum & magnesium hydroxide-simehticone (Maalox), lidocaine viscous 2%, acetaminophen, diphenhydrAMINE, diphenhydrAMINE, heparin, porcine (PF), lidocaine HCl 2%, LORazepam, naloxone, ondansetron, polyethylene glycol, sodium chloride 0.9%     Review of patient's allergies indicates:   Allergen Reactions    Nsaids (non-steroidal anti-inflammatory drug) Anaphylaxis    Nuts [tree nut] Anaphylaxis    Strawberry     Vancomycin analogues Itching     Premedicate with benadryl and admin over 2hr.     Objective:     Vital Signs (24h Range):  Temp:  [98.7 °F (37.1 °C)-99.3 °F (37.4 °C)] 98.9 °F (37.2 °C)  Pulse:  [69-90] 78  Resp:  [9-22] 12  SpO2:  [97 %-100 %] 98 %  BP: (115-134)/(53-64) 129/57       Date 03/17/18 0700 - 03/18/18 0659   Shift [id number] [id number] 24 Hour Total   I  N  T  A  K  E   Shift Total  (mL/kg)       O  U  T  P  U  T   Urine  (mL/kg/hr) 380   380    Shift Total  (mL/kg) 380  (4.5)   380  (4.5)   Weight (kg) 84.3 84.3 84.3 84.3     Lines/Drains/Airways     Central Venous Catheter Line                 Port A Cath Double Lumen 10/31/17 1826 left subclavian 136 days                 Physical Exam  Sleeping easily awakened, appropriate and pleasant  Normocephalic  EOMI, pupils equal round and reactive  External nose normal  No epistaxis, no nasal lesions on anterior rhinoscopy  Oral cavity with superficial erythematous erosions of oral vestibule and buccal mucosa  Right neck  tender along SCM; improving tenderness; no palpable fluctuance; left neck normal to palpation  Normal voice, normal work of breathing, no stridor      Significant Labs:  BMP:   Recent Labs  Lab 03/17/18  0342         CO2 26   BUN 7   CREATININE 0.8   CALCIUM 9.0     CBC:   Recent Labs  Lab 03/17/18  0342   WBC 1.00*   RBC 3.17*   HGB 9.3*   HCT 26.0*   PLT 18*   MCV 82   MCH 29.3   MCHC 35.8       Significant Diagnostics:  None    Assessment/Plan:     Mass of lateral neck    -Exam concerning for an  lymphadenitis vs. neoplasm   US done, no definite drainable fluid collection  -Patient currently on broad spectrum Abx   Can continue  -If primary team desire tissue, favor image guided biopsy (for path/specimen/cultures) over open surgery given thrombocytopenia  -Will continue to follow            Yuval Gerber MD  Otorhinolaryngology-Head & Neck Surgery  [facility]

## 2018-03-17 NOTE — ASSESSMENT & PLAN NOTE
-Exam concerning for an  lymphadenitis vs. neoplasm   US done, no definite drainable fluid collection  -Patient currently on broad spectrum Abx   Can continue  -If primary team desire tissue, favor image guided biopsy (for path/specimen/cultures) over open surgery given thrombocytopenia  -Will continue to follow

## 2018-03-17 NOTE — ASSESSMENT & PLAN NOTE
AML (acute myeloblastic leukemia)  This  is a 17 y.o. male with AML (t8;21) receiving chemotherapy following [facility] [id number] (Arm A) here w neutropenia (without fever initially). Today is day 24 of cycle. Febrile to 101.3 night of 3/12 and was started on cefepime + vanc. Now afebrile since 3/14. Continues to have neck swelling, though pain better well-controlled on morphine pca.       Fever and neutropenia:   In setting of AML (t8;21) being treated with chemo per [facility] AAML 1031 (ARM A). S/p 2 units PRBC's on 3/7 for hemoglobin of 6.6; 2 U of pRBCs and 1U Platelets 3/9. On 3/14 received 1u plts and 2u pRBCs.  - Daily CBC and CMP. Type and Screen q72h.  - Continue all home meds              - Chlorhexidine 15 ml Oral BID              - Ciprofloxacin 500 mg Oral q12 hours              - Lisdexamfetamine 60 mg oral qAM              - Bactrim 800-160 mg Oral BID on Friday, Saturday, and Sunday              - Posaconazole 300 mg BID  - PRN Meds: Lorazepam, Ondansetron, Miralax  -f/u cultures (from both lumens)  - Vancomycin, follow trough (since 3/12)  - Cefepime (since 3/12)--> transition to meropenem 3/15  - 3x blood cx NGTD, reculture if new fever or hypotensive  - Monitor for bleeding, transfuse platelets if actively bleeding  - Monitor BPs closely     Mucositis:  - Morphine PCA : will decrease on-demand to 1.0mg 15min lock-out (max total 4.5mg/h)  - Monitor PO, electrolytes, daily weights  - Acyclovir 800 mg TID PO  - Miralax and cholace     Hematemesis:   - night of 3/14: 20-30 ml juan brown/red blood noted with small clots  - transfused 1u plts and 2u pRBCs, with no further episodes  - continue to monitor      Neck swelling  - Pain control with morphine pca as above  - Antimicrobial coverage as above  - ENT following. Swelling likely 2/2 to lymphadenitis vs. neoplasm. Image-guided biopsy may be warranted     Disposition: Pending resolution of counts and clinical improvement.

## 2018-03-17 NOTE — PLAN OF CARE
Plan of care reviewed with patient. Patient stable. VS stable. Remains afebrile. Port a cath site CDI. Inner port flushes, draws back without complication. Outer port TPA injected and successful. Flushes and draws back without complications. Patient in better spirits. States feeling much better. Pain in neck 8/10 and pain in mouth 6/10. Swelling in right neck present with no change. Morphine PCA intact and running continuously per order. Vancomycin and merepenem given per order. PRN benadryl x2 given with vancomycin for itching. TB spot test sent to lab. Patient drinking well. Patient did not eat today. Voiding well. All questions and concerns answered. Safety maintained. Will continue to monitor.

## 2018-03-17 NOTE — PROGRESS NOTES
[facility]  Pediatric Infectious Disease  Progress Note    Patient Name: Hema Kuo  MRN: [id number]  Admission Date: 3/5/2018  Length of Stay: 12 days  Attending Physician: Christian Emerson MD  Primary Care Provider: Ann Reyna NP    Isolation Status: No active isolations    Subjective:     Principal Problem:<principal problem not specified>    Interval History: Has not had any further fevers in the last 24 hours. Continues with pain in the neck and mouth that is controlled with morphine PCA.     Review of Systems   Constitutional: Positive for fatigue. Negative for fever.   HENT: Positive for mouth sores. Negative for congestion and sinus pain.    Eyes: Negative for pain.   Respiratory: Negative for cough and shortness of breath.    Gastrointestinal: Negative for abdominal pain.   Musculoskeletal: Positive for neck pain.     Objective:     Vital Signs (Most Recent):  Temp: 98.9 °F (37.2 °C) (03/17/18 0830)  Pulse: 78 (03/17/18 0830)  Resp: 12 (03/17/18 0830)  BP: (!) 129/57 (03/17/18 0830)  SpO2: 98 % (03/17/18 0830) Vital Signs (24h Range):  Temp:  [98.7 °F (37.1 °C)-99.3 °F (37.4 °C)] 98.9 °F (37.2 °C)  Pulse:  [69-90] 78  Resp:  [7-22] 12  SpO2:  [97 %-100 %] 98 %  BP: (115-134)/(53-64) 129/57     Weight: 84.3 kg (185 lb 13.6 oz)  Body mass index is 30.93 kg/m².    Estimated Creatinine Clearance: 148.3 mL/min (based on SCr of 0.8 mg/dL).    Physical Exam   Constitutional: He appears well-developed. No distress.   HENT:   Nose: Nose normal.   Mouth sores with some improvement, no facial swelling or tenderness   Eyes: Conjunctivae are normal.   Neck:   The right neck mass is stable from yesterday, continues with tenderness to palpation, and pt still with pain with movement of the neck   Pulmonary/Chest: Effort normal.   Abdominal: Soft.   Musculoskeletal: He exhibits no edema.   Skin: Skin is warm. Capillary refill takes less than 2 seconds.       Significant Labs:   Results for WARE,  SEGUN (MRN [id number]) as of 3/17/2018 14:32   Ref. Range 3/16/2018 04:56 3/17/2018 03:42   WBC Latest Ref Range: 3.90 - 12.70 K/uL 0.60 (LL) 1.00 (LL)   RBC Latest Ref Range: 4.60 - 6.20 M/uL 3.14 (L) 3.17 (L)   Hemoglobin Latest Ref Range: 14.0 - 18.0 g/dL 9.3 (L) 9.3 (L)   Hematocrit Latest Ref Range: 40.0 - 54.0 % 25.2 (L) 26.0 (L)   MCV Latest Ref Range: 82 - 98 fL 80 (L) 82   MCH Latest Ref Range: 27.0 - 31.0 pg 29.6 29.3   MCHC Latest Ref Range: 32.0 - 36.0 g/dL 36.9 (H) 35.8   RDW Latest Ref Range: 11.5 - 14.5 % 14.0 13.5   Platelets Latest Ref Range: 150 - 350 K/uL 15 (LL) 18 (LL)   MPV Latest Ref Range: 9.2 - 12.9 fL SEE COMMENT 12.9   Gran% Latest Ref Range: 38.0 - 73.0 % 0.0 (L) 2.0 (L)   Gran # (ANC) Latest Ref Range: 1.8 - 7.7 K/uL  0.0 (L)   Immature Granulocytes Latest Ref Range: 0.0 - 0.5 % CANCELED 3.0 (H)   Immature Grans (Abs) Latest Ref Range: 0.00 - 0.04 K/uL CANCELED 0.03   Lymph% Latest Ref Range: 18.0 - 48.0 % 100.0 (H) 86.0 (H)     Results for SEGUN BHATIA (MRN [id number]) as of 3/17/2018 14:32   Ref. Range 3/15/2018 15:45 3/16/2018 04:56   Cytomegalovirus IgM Ab Latest Ref Range: <30.0 U/mL  <8.0   HSV PCR Source Unknown Lesion in cheek    HSV1, PCR Latest Ref Range: Negative  Negative    HSV2, PCR Latest Ref Range: Negative  Negative        Significant Imaging:  US Soft Tissue Head Neck Thyroid [[id number]] (Abnormal) Resulted: 03/15/18 2107   Order Status: Completed Updated: 03/15/18 2111   Narrative:     EXAMINATION:  US SOFT TISSUE HEAD NECK THYROID    CLINICAL HISTORY:  right sided swelling and lesion. Kindly evaluate. Thank you.;    TECHNIQUE:  Ultrasound of the thyroid and cervical lymph nodes was performed.    COMPARISON:  CT soft tissue neck 03/13/2018.    FINDINGS:  The scanned region of interest is in the area posterior to the right sternocleidomastoid muscle.  Within this region there is a hypoechoic mass which measures 1.6 x 0.7 x 2.0 cm.  There is significant internal vascular flow  "associated with this mass.  A few normal sized lymph nodes are seen in the adjacent soft tissues.   Impression:       Abnormal hypoechoic mass posterior to the right sternocleidomastoid muscle with significant internal vascular flow.  While non-specific, this is favored to represent a pathologic or suppurative lymph node.  Clinical correlation is advised.  Histologic sampling may be warranted.    This report was flagged in Epic as abnormal.       Assessment/Plan:      Active Diagnoses:    Diagnosis Date Noted POA    Mass of lateral neck [R22.1] 03/16/2018 No    Neutropenia associated with mucositis due to antineoplastic therapy [D70.8, K12.32] 03/13/2018 Yes    Fever and neutropenia [D70.9, R50.81] 03/13/2018 No    AML (acute myeloblastic leukemia) [C92.00] 12/12/2017 Yes      Problems Resolved During this Admission:    Diagnosis Date Noted Date Resolved SOREN Garrido is a 18yo young man with AML, neutropenic and febrile, with a right neck mass and oral ulcers. Right neck mass has been stable over last two days, and on repeat imaging on 3/15 mass appeared stable in size, was discrete, with "significant internal vascular flow... Favored to represent a pathologic or suppurative lymph node." Per ENT, location of node and pt risk of bleeding not conduce at this time to sampling of the node. Of infectious etiologies, most likely diagnosis is a bacterial lymphadenitis; most common bacterial causes include Staph and Strep; however, oral anaerobes are also a possibility, as well as Bartonella. For these organisms we are currently covering well. Another possibility is mycobacterium, including MTB and M abscessus, which are not currently covered. T-Spot is pending and TST will be read today, however in an immunocompromised patient neither of these tests is very reliable, and would be helpful if positive, but cannot rule out if negative. If the lesion continues to worsen despite the good coverage he is on currently for the " other possible bacterial sources of infection, we must consider this possibility. But in order to make this diagnosis, a biopsy of the lesion is really needed, which currently is not feasible. However, the lesion has remained stable over the past few days, which is reassuring. Viral illnesses can also result in a picture like this; CMV IgM was negative, pending PCR. Fungal is considerably less likely given the discrete nature of the mass seen in the most recent ultrasound. Mouth ulcers likely more mucositis and not HSV given negative HSV PCR (sampled both lesions).      - Pending CMV PCR, IgM was negative  - TST and T-Spot pending  - Would continue current therapeutic antimicrobials:        - Vancomycin: Currently receiving about 15mg/kg q12 hours. Last vanc trough was 7.8. Had been receiving 1g q12 previous to this. Would keep vancomycin trough at least 10-15. If it needs to be adjusted after this most recent change, can go from q12 to q8.         - Acyclovir: receiving about 10mg/kg/dose q8 hours         - Meropenem: 1g q8 hours     Thank you for your consult. I will follow-up with patient. Please contact us if you have any additional questions.      Maureen Tabor MD  Pediatric Infectious Disease  [facility]

## 2018-03-18 PROBLEM — I88.9 LYMPHADENITIS: Status: ACTIVE | Noted: 2018-03-18

## 2018-03-18 LAB
ALBUMIN SERPL BCP-MCNC: 3.2 G/DL
ALP SERPL-CCNC: 114 U/L
ALT SERPL W/O P-5'-P-CCNC: 38 U/L
ANION GAP SERPL CALC-SCNC: 8 MMOL/L
ANISOCYTOSIS BLD QL SMEAR: SLIGHT
AST SERPL-CCNC: 20 U/L
BACTERIA BLD CULT: NORMAL
BASOPHILS # BLD AUTO: ABNORMAL K/UL
BASOPHILS NFR BLD: 0 %
BILIRUB SERPL-MCNC: 0.7 MG/DL
BLD PROD TYP BPU: NORMAL
BLOOD UNIT EXPIRATION DATE: NORMAL
BLOOD UNIT TYPE CODE: 6200
BLOOD UNIT TYPE: NORMAL
BUN SERPL-MCNC: 6 MG/DL
CALCIUM SERPL-MCNC: 9 MG/DL
CHLORIDE SERPL-SCNC: 105 MMOL/L
CO2 SERPL-SCNC: 25 MMOL/L
CODING SYSTEM: NORMAL
CREAT SERPL-MCNC: 0.7 MG/DL
DIFFERENTIAL METHOD: ABNORMAL
DISPENSE STATUS: NORMAL
EOSINOPHIL # BLD AUTO: ABNORMAL K/UL
EOSINOPHIL NFR BLD: 0 %
ERYTHROCYTE [DISTWIDTH] IN BLOOD BY AUTOMATED COUNT: 13.3 %
EST. GFR  (AFRICAN AMERICAN): >60 ML/MIN/1.73 M^2
EST. GFR  (NON AFRICAN AMERICAN): >60 ML/MIN/1.73 M^2
GLUCOSE SERPL-MCNC: 105 MG/DL
HCT VFR BLD AUTO: 24.8 %
HGB BLD-MCNC: 9.1 G/DL
HYPOCHROMIA BLD QL SMEAR: ABNORMAL
IMM GRANULOCYTES # BLD AUTO: ABNORMAL K/UL
IMM GRANULOCYTES NFR BLD AUTO: ABNORMAL %
LYMPHOCYTES # BLD AUTO: ABNORMAL K/UL
LYMPHOCYTES NFR BLD: 90.5 %
MCH RBC QN AUTO: 30 PG
MCHC RBC AUTO-ENTMCNC: 36.7 G/DL
MCV RBC AUTO: 82 FL
MONOCYTES # BLD AUTO: ABNORMAL K/UL
MONOCYTES NFR BLD: 4.7 %
NEUTROPHILS NFR BLD: 4.8 %
NRBC BLD-RTO: 0 /100 WBC
NUM UNITS TRANS WBC-POOR PLATPHERESIS: NORMAL
OVALOCYTES BLD QL SMEAR: ABNORMAL
PLATELET # BLD AUTO: 9 K/UL
PLATELET BLD QL SMEAR: ABNORMAL
PMV BLD AUTO: 12.4 FL
POIKILOCYTOSIS BLD QL SMEAR: SLIGHT
POLYCHROMASIA BLD QL SMEAR: ABNORMAL
POTASSIUM SERPL-SCNC: 4.2 MMOL/L
PROT SERPL-MCNC: 6.4 G/DL
RBC # BLD AUTO: 3.03 M/UL
SODIUM SERPL-SCNC: 138 MMOL/L
SPHEROCYTES BLD QL SMEAR: ABNORMAL
WBC # BLD AUTO: 0.72 K/UL

## 2018-03-18 PROCEDURE — 25000003 PHARM REV CODE 250: Performed by: STUDENT IN AN ORGANIZED HEALTH CARE EDUCATION/TRAINING PROGRAM

## 2018-03-18 PROCEDURE — 99233 SBSQ HOSP IP/OBS HIGH 50: CPT | Mod: ,,, | Performed by: PEDIATRICS

## 2018-03-18 PROCEDURE — 63600175 PHARM REV CODE 636 W HCPCS: Performed by: STUDENT IN AN ORGANIZED HEALTH CARE EDUCATION/TRAINING PROGRAM

## 2018-03-18 PROCEDURE — 85007 BL SMEAR W/DIFF WBC COUNT: CPT

## 2018-03-18 PROCEDURE — 99232 SBSQ HOSP IP/OBS MODERATE 35: CPT | Mod: ,,, | Performed by: INTERNAL MEDICINE

## 2018-03-18 PROCEDURE — 63600175 PHARM REV CODE 636 W HCPCS: Performed by: PEDIATRICS

## 2018-03-18 PROCEDURE — 80053 COMPREHEN METABOLIC PANEL: CPT

## 2018-03-18 PROCEDURE — 86644 CMV ANTIBODY: CPT

## 2018-03-18 PROCEDURE — P9037 PLATE PHERES LEUKOREDU IRRAD: HCPCS

## 2018-03-18 PROCEDURE — 36592 COLLECT BLOOD FROM PICC: CPT

## 2018-03-18 PROCEDURE — 85027 COMPLETE CBC AUTOMATED: CPT

## 2018-03-18 PROCEDURE — 11300000 HC PEDIATRIC PRIVATE ROOM

## 2018-03-18 RX ORDER — VANCOMYCIN/0.9 % SOD CHLORIDE 1 G/100 ML
1000 PLASTIC BAG, INJECTION (ML) INTRAVENOUS EVERY 8 HOURS
Status: DISCONTINUED | OUTPATIENT
Start: 2018-03-18 | End: 2018-03-23 | Stop reason: HOSPADM

## 2018-03-18 RX ORDER — HYDROCODONE BITARTRATE AND ACETAMINOPHEN 500; 5 MG/1; MG/1
TABLET ORAL
Status: DISCONTINUED | OUTPATIENT
Start: 2018-03-18 | End: 2018-03-23

## 2018-03-18 RX ADMIN — Medication: at 12:03

## 2018-03-18 RX ADMIN — SULFAMETHOXAZOLE AND TRIMETHOPRIM 1 TABLET: 800; 160 TABLET ORAL at 09:03

## 2018-03-18 RX ADMIN — ACYCLOVIR 800 MG: 200 CAPSULE ORAL at 08:03

## 2018-03-18 RX ADMIN — POSACONAZOLE 300 MG: 100 TABLET, COATED ORAL at 09:03

## 2018-03-18 RX ADMIN — ACYCLOVIR 800 MG: 200 CAPSULE ORAL at 03:03

## 2018-03-18 RX ADMIN — ACETAMINOPHEN 650 MG: 325 TABLET ORAL at 12:03

## 2018-03-18 RX ADMIN — POSACONAZOLE 300 MG: 100 TABLET, COATED ORAL at 08:03

## 2018-03-18 RX ADMIN — HEPARIN 330 UNITS: 100 SYRINGE at 01:03

## 2018-03-18 RX ADMIN — SULFAMETHOXAZOLE AND TRIMETHOPRIM 1 TABLET: 800; 160 TABLET ORAL at 11:03

## 2018-03-18 RX ADMIN — CHLORHEXIDINE GLUCONATE 15 ML: 1.2 RINSE ORAL at 09:03

## 2018-03-18 RX ADMIN — CIPROFLOXACIN HYDROCHLORIDE 500 MG: 500 TABLET, FILM COATED ORAL at 08:03

## 2018-03-18 RX ADMIN — DIPHENHYDRAMINE HYDROCHLORIDE 25 MG: 25 CAPSULE ORAL at 05:03

## 2018-03-18 RX ADMIN — MEROPENEM AND SODIUM CHLORIDE 1 G: 1 INJECTION, SOLUTION INTRAVENOUS at 03:03

## 2018-03-18 RX ADMIN — DIPHENHYDRAMINE HYDROCHLORIDE 25 MG: 25 CAPSULE ORAL at 09:03

## 2018-03-18 RX ADMIN — HYDROCORTISONE SODIUM SUCCINATE 100 MG: 100 INJECTION, POWDER, FOR SOLUTION INTRAMUSCULAR; INTRAVENOUS at 12:03

## 2018-03-18 RX ADMIN — MEROPENEM AND SODIUM CHLORIDE 1 G: 1 INJECTION, SOLUTION INTRAVENOUS at 11:03

## 2018-03-18 RX ADMIN — HEPARIN 330 UNITS: 100 SYRINGE at 03:03

## 2018-03-18 RX ADMIN — MEROPENEM AND SODIUM CHLORIDE 1 G: 1 INJECTION, SOLUTION INTRAVENOUS at 07:03

## 2018-03-18 RX ADMIN — ACYCLOVIR 800 MG: 200 CAPSULE ORAL at 09:03

## 2018-03-18 RX ADMIN — Medication 1 G: at 10:03

## 2018-03-18 RX ADMIN — DOCUSATE SODIUM 100 MG: 100 CAPSULE, LIQUID FILLED ORAL at 08:03

## 2018-03-18 RX ADMIN — DIPHENHYDRAMINE HYDROCHLORIDE 25 MG: 25 CAPSULE ORAL at 12:03

## 2018-03-18 RX ADMIN — CHLORHEXIDINE GLUCONATE 15 ML: 1.2 RINSE ORAL at 08:03

## 2018-03-18 RX ADMIN — VANCOMYCIN HYDROCHLORIDE 1250 MG: 1 INJECTION, POWDER, LYOPHILIZED, FOR SOLUTION INTRAVENOUS at 05:03

## 2018-03-18 RX ADMIN — Medication 1 G: at 03:03

## 2018-03-18 RX ADMIN — CIPROFLOXACIN HYDROCHLORIDE 500 MG: 500 TABLET, FILM COATED ORAL at 09:03

## 2018-03-18 NOTE — SUBJECTIVE & OBJECTIVE
Interval History: No acute events overnight. Patient stating that tenderness of neck mass is reduced from yesterday.     Medications:  Continuous Infusions:   morphine       Scheduled Meds:   acyclovir  800 mg Oral TID    chlorhexidine  15 mL Mouth/Throat BID    ciprofloxacin HCl  500 mg Oral Q12H    docusate sodium  100 mg Oral Daily    meropenem-0.9% sodium chloride  1 g Intravenous Q8H    polyethylene glycol  17 g Oral Daily    posaconazole  300 mg Oral BID    sulfamethoxazole-trimethoprim 800-160mg  1 tablet Oral twice daily on Friday, Saturday, Sunday    vancomycin (VANCOCIN) IVPB  1,250 mg Intravenous Q12H     PRN Meds:(Magic mouthwash) 1:1:1 Benadryl 12.5mg/5ml liq, aluminum & magnesium hydroxide-simehticone (Maalox), lidocaine viscous 2%, acetaminophen, diphenhydrAMINE, diphenhydrAMINE, heparin, porcine (PF), lidocaine HCl 2%, LORazepam, naloxone, ondansetron, polyethylene glycol, sodium chloride 0.9%     Review of patient's allergies indicates:   Allergen Reactions    Nsaids (non-steroidal anti-inflammatory drug) Anaphylaxis    Nuts [tree nut] Anaphylaxis    Strawberry     Vancomycin analogues Itching     Premedicate with benadryl and admin over 2hr.     Objective:     Vital Signs (24h Range):  Temp:  [97.4 °F (36.3 °C)-99.3 °F (37.4 °C)] 98.4 °F (36.9 °C)  Pulse:  [70-87] 70  Resp:  [18-20] 18  SpO2:  [96 %-100 %] 99 %  BP: ()/(49-80) 94/49        Lines/Drains/Airways     Central Venous Catheter Line                 Port A Cath Double Lumen 10/31/17 1826 left subclavian 137 days                Physical Exam  No acute distress, appropriate and pleasant  Normocephalic  EOMI, pupils equal round and reactive  External nose normal  No epistaxis, no nasal lesions on anterior rhinoscopy  Oral cavity with superficial erythematous erosions of oral vestibule and buccal mucosa  Right neck  tender along SCM; improving tenderness; no palpable fluctuance; left neck normal to palpation  Normal voice,  normal work of breathing, no stridor      Significant Labs:  BMP:   Recent Labs  Lab 03/18/18  0352         CO2 25   BUN 6   CREATININE 0.7   CALCIUM 9.0     CBC:   Recent Labs  Lab 03/18/18 0352   WBC 0.72*   RBC 3.03*   HGB 9.1*   HCT 24.8*   PLT 9*   MCV 82   MCH 30.0   MCHC 36.7*     Coagulation: No results for input(s): LABPROT, INR, APTT in the last 168 hours.  CRP: No results for input(s): CRP in the last 168 hours.  ESR: No results for input(s): ERYTHROCYTES in the last 168 hours.  LDH: No results for input(s): LDH in the last 168 hours.  LFTs:   Recent Labs  Lab 03/18/18 0352   ALT 38   AST 20   ALKPHOS 114   BILITOT 0.7   PROT 6.4   ALBUMIN 3.2*       Significant Diagnostics:  None

## 2018-03-18 NOTE — ASSESSMENT & PLAN NOTE
Oncology   AML (acute myeloblastic leukemia)     This  is a 17 y.o. male with AML (t8;21) receiving chemotherapy following [facility] [id number] (Arm A) here w neutropenia (without fever initially). Today is day 23 of cycle. Febrile to 101.3 night of 3/12. cultured port (both lumens), added cefepime and vanc.  PO improving, still decreased. Neck swelling stable. Pain well-controlled on morphine pca.   afebrile since 2am 3/14.   3 on-demand doses delivered overnight.     #fever and neutropenia: in setting of AML (t8;21) being treated with chemo per [facility] AAML [id number] (ARM A). S/p 2 units PRBC's on 3/7 for hemoglobin of 6.6; 2 U of pRBCs and 1U Platelets 3/9. On 3/14 received 1u plts and 2u pRBCs.  - platelet transfusion today  - Daily CBC and CMP. Type and Screen q72h.  - cw all home meds              - Chlorhexidine 15 ml Oral BID              - Ciprofloxacin 500 mg Oral q12 hours              - Lisdexamfetamine 60 mg oral qAM              - Bactrim 800-160 mg Oral BID on Friday, Saturday, and Sunday              - Posaconazole 300 mg BID  - PRN Meds: Lorazepam, Ondansetron, Miralax  -f/u cultures (from both lumens)  - Vancomycin (since 3/12)  - cefepime (since 3/12)  - daily cultures x3d, reculture also if new fever or hypotensive  - monitor for bleeding, transfuse platelets if actively bleeding  - monitor BPs closely     Mucositis:  - morphine PCA : D/C basal rate- 1mg on-demand only  - monitor PO, electrolytes, daily weights  - Acyclovir 800 mg TID PO     Hematemesis:   - night of 3/14: 20-30 ml juan brown/red blood noted with small clots  - transfused 1u plts and 2u pRBCs, with no further episodes  - continue to monitor     Dispo: pending resolution of counts

## 2018-03-18 NOTE — PROGRESS NOTES
[facility]  Pediatric Infectious Disease  Progress Note    Patient Name: Hema Kuo  MRN: [id number]  Admission Date: 3/5/2018  Length of Stay: 13 days  Attending Physician: Christian Emerson MD  Primary Care Provider: Ann Reyna NP    Isolation Status: No active isolations    Subjective:     Principal Problem:     Interval History: Has not had any further fevers in the last 24 hours. Continues with pain in the neck and mouth that is controlled with morphine PCA. Pain is much improved from yesterday, Hema appears to be feeling much better compared to yesterday.     Review of Systems   Constitutional: Positive for fatigue. Negative for fever.   HENT: Positive for mouth sores. Negative for congestion and sinus pain.    Eyes: Negative for pain.   Respiratory: Negative for cough and shortness of breath.    Gastrointestinal: Negative for abdominal pain.   Musculoskeletal: Positive for neck pain.     Objective:     Vital Signs (Most Recent):  Temp: 98.4 °F (36.9 °C) (03/18/18 0857)  Pulse: 70 (03/18/18 0857)  Resp: 18 (03/18/18 0857)  BP: (!) 94/49 (03/18/18 0857)  SpO2: 99 % (03/18/18 0857) Vital Signs (24h Range):  Temp:  [97.4 °F (36.3 °C)-99.3 °F (37.4 °C)] 98.4 °F (36.9 °C)  Pulse:  [70-87] 70  Resp:  [18-20] 18  SpO2:  [96 %-100 %] 99 %  BP: ()/(49-80) 94/49     Weight: 83.3 kg (183 lb 10.3 oz)  Body mass index is 30.56 kg/m².    Estimated Creatinine Clearance: 168.5 mL/min (based on SCr of 0.7 mg/dL).    Physical Exam   Constitutional: He appears well-developed. No distress.   HENT:   Nose: Nose normal.   Mouth sores with some improvement, no facial swelling or tenderness   Eyes: Conjunctivae are normal.   Neck:   The right neck mass is stable from yesterday, continues with tenderness to palpation, but improved from yesterday   Pulmonary/Chest: Effort normal.   Abdominal: Soft.   Musculoskeletal: He exhibits no edema.   Skin: Skin is warm. Capillary refill takes less than 2 seconds.        Significant Labs:   Results for SEGUN BHATIA (MRN [id number]) as of 3/18/2018 12:09   Ref. Range 3/18/2018 03:52   WBC Latest Ref Range: 3.90 - 12.70 K/uL 0.72 (LL)   RBC Latest Ref Range: 4.60 - 6.20 M/uL 3.03 (L)   Hemoglobin Latest Ref Range: 14.0 - 18.0 g/dL 9.1 (L)   Hematocrit Latest Ref Range: 40.0 - 54.0 % 24.8 (L)   MCV Latest Ref Range: 82 - 98 fL 82   MCH Latest Ref Range: 27.0 - 31.0 pg 30.0   MCHC Latest Ref Range: 32.0 - 36.0 g/dL 36.7 (H)   RDW Latest Ref Range: 11.5 - 14.5 % 13.3   Platelets Latest Ref Range: 150 - 350 K/uL 9 (LL)   MPV Latest Ref Range: 9.2 - 12.9 fL 12.4   Gran% Latest Ref Range: 38.0 - 73.0 % 4.8 (L)   Immature Granulocytes Latest Ref Range: 0.0 - 0.5 % CANCELED   Immature Grans (Abs) Latest Ref Range: 0.00 - 0.04 K/uL CANCELED   Lymph% Latest Ref Range: 18.0 - 48.0 % 90.5 (H)   Lymph # Latest Ref Range: 1.0 - 4.8 K/uL CANCELED   Mono% Latest Ref Range: 4.0 - 15.0 % 4.7     Results for SEGUN BHATIA (MRN [id number]) as of 3/18/2018 12:09   Ref. Range 3/18/2018 03:52   Sodium Latest Ref Range: 136 - 145 mmol/L 138   Potassium Latest Ref Range: 3.5 - 5.1 mmol/L 4.2   Chloride Latest Ref Range: 95 - 110 mmol/L 105   CO2 Latest Ref Range: 23 - 29 mmol/L 25   Anion Gap Latest Ref Range: 8 - 16 mmol/L 8   BUN, Bld Latest Ref Range: 6 - 20 mg/dL 6   Creatinine Latest Ref Range: 0.5 - 1.4 mg/dL 0.7   eGFR if non African American Latest Ref Range: >60 mL/min/1.73 m^2 >60.0   eGFR if African American Latest Ref Range: >60 mL/min/1.73 m^2 >60.0   Glucose Latest Ref Range: 70 - 110 mg/dL 105   Calcium Latest Ref Range: 8.7 - 10.5 mg/dL 9.0   Alkaline Phosphatase Latest Ref Range: 55 - 135 U/L 114   Total Protein Latest Ref Range: 6.0 - 8.4 g/dL 6.4   Albumin Latest Ref Range: 3.5 - 5.2 g/dL 3.2 (L)   Total Bilirubin Latest Ref Range: 0.1 - 1.0 mg/dL 0.7   AST Latest Ref Range: 10 - 40 U/L 20   ALT Latest Ref Range: 10 - 44 U/L 38       Assessment/Plan:      Active Diagnoses:    Diagnosis Date  "Noted POA    Mass of lateral neck [R22.1] 03/16/2018 No    Neutropenia associated with mucositis due to antineoplastic therapy [D70.8, K12.32] 03/13/2018 Yes    Fever and neutropenia [D70.9, R50.81] 03/13/2018 No    AML (acute myeloblastic leukemia) [C92.00] 12/12/2017 Yes      Problems Resolved During this Admission:    Diagnosis Date Noted Date Resolved SOREN Garrido is a 16yo young man with AML, neutropenic and febrile, with a right neck mass and oral ulcers. Right neck mass has been stable over last two days, and on repeat imaging on 3/15 mass appeared stable in size, was discrete, with "significant internal vascular flow... Favored to represent a pathologic or suppurative lymph node." Per ENT, location of node and pt risk of bleeding not conduce at this time to sampling of the node. Of infectious etiologies, most likely diagnosis is a bacterial lymphadenitis; most common bacterial causes include Staph and Strep; however, oral anaerobes are also a possibility, as well as Bartonella. For these organisms we are currently covering well. Another possibility is mycobacterium, including MTB and M abscessus, which are not currently covered. T-Spot is pending and TST will be read today, however in an immunocompromised patient neither of these tests is very reliable, and would be helpful if positive, but cannot rule out if negative. If the lesion continues to worsen despite the good coverage he is on currently for the other possible bacterial sources of infection, we must consider this possibility. But in order to make this diagnosis, a biopsy of the lesion is really needed, which currently is not feasible. However, the lesion has remained stable over the past few days, which is reassuring. Viral illnesses can also result in a picture like this; CMV IgM was negative, pending PCR. Fungal is considerably less likely given the discrete nature of the mass seen in the most recent ultrasound. Mouth ulcers likely more " mucositis and not HSV given negative HSV PCR (sampled both lesions).      - Pending CMV PCR, IgM was negative  - TST negative, and T-Spot pending  - Would continue current therapeutic antimicrobials, as we cannot narrow without an organism:        - Vancomycin: Currently receiving about 15mg/kg q12 hours. Last vanc trough was 7.8. Had been receiving 1g q12 previous to this. Would keep vancomycin trough at least 10-15. If it needs to be adjusted after this most recent change, can go from q12 to q8.         - Acyclovir: receiving about 10mg/kg/dose q8 hours         - Meropenem: 1g q8 hours  - May discontinue therapeutic acyclovir back to prophylactic dosing once CMV PCR is negative  - If there is any worsening of the neck mass, get repeat imaging, and get either CXR or CT Chest.   - If the mass continues to improve, would consider imaging in 2 weeks time to monitor how it has changed.      Thank you for your consult. I will follow-up with patient. Please contact us if you have any additional questions.      Maureen Tabor MD  Pediatric Infectious Disease  [facility]

## 2018-03-18 NOTE — PROGRESS NOTES
[facility]  Pediatric Hematology/Oncology  Progress Note    Patient Name: Hema Kuo  Admission Date: 3/5/2018  Hospital Length of Stay: 13 days  Code Status: Full Code     Subjective:     Interval History: NAEON, VSS, afebrile. Hasn't stooled in 1 wk.    Oncology Treatment Plan:     PEDS AAML-1031  ARM A - LR Risk Patients    Medications:  Continuous Infusions:   morphine       Scheduled Meds:   acyclovir  800 mg Oral TID    chlorhexidine  15 mL Mouth/Throat BID    ciprofloxacin HCl  500 mg Oral Q12H    docusate sodium  100 mg Oral Daily    meropenem-0.9% sodium chloride  1 g Intravenous Q8H    polyethylene glycol  17 g Oral Daily    posaconazole  300 mg Oral BID    sulfamethoxazole-trimethoprim 800-160mg  1 tablet Oral twice daily on Friday, Saturday, Sunday    vancomycin (VANCOCIN) IVPB  1,250 mg Intravenous Q12H     PRN Meds:(Magic mouthwash) 1:1:1 Benadryl 12.5mg/5ml liq, aluminum & magnesium hydroxide-simehticone (Maalox), lidocaine viscous 2%, acetaminophen, diphenhydrAMINE, diphenhydrAMINE, heparin, porcine (PF), lidocaine HCl 2%, LORazepam, naloxone, ondansetron, polyethylene glycol, sodium chloride 0.9%     Review of Systems   Constitutional: Positive for appetite change (improving). Negative for activity change and fever.   HENT: Positive for mouth sores. Negative for congestion, nosebleeds and sore throat.         R-side neck pain   Eyes: Negative for discharge.   Respiratory: Negative for cough.    Cardiovascular: Negative for chest pain.   Gastrointestinal: Negative for abdominal distention and abdominal pain.   Genitourinary: Negative for decreased urine volume.   Musculoskeletal: Negative for myalgias.     Objective:     Vital Signs (Most Recent):  Temp: 98.4 °F (36.9 °C) (03/18/18 0857)  Pulse: 70 (03/18/18 0857)  Resp: 18 (03/18/18 0857)  BP: (!) 94/49 (03/18/18 0857)  SpO2: 99 % (03/18/18 0857) Vital Signs (24h Range):  Temp:  [97.4 °F (36.3 °C)-99.3 °F (37.4 °C)]  98.4 °F (36.9 °C)  Pulse:  [70-87] 70  Resp:  [18-20] 18  SpO2:  [96 %-100 %] 99 %  BP: ()/(49-80) 94/49     Weight: 83.3 kg (183 lb 10.3 oz)  Body mass index is 30.56 kg/m².  Body surface area is 1.95 meters squared.      Intake/Output Summary (Last 24 hours) at 03/18/18 1100  Last data filed at 03/18/18 0600   Gross per 24 hour   Intake           2683.6 ml   Output                0 ml   Net           2683.6 ml       Physical Exam   Constitutional: He is oriented to person, place, and time. He appears well-developed and well-nourished. No distress.   HENT:   Head: Normocephalic.   White ulcerative mucosal lesion on R lower lip. Second lesion on the left inner cheek (mucosal surface). Both improving from prior.     Eyes: Conjunctivae are normal. Right eye exhibits no discharge. Left eye exhibits no discharge.   Neck: Neck supple.   Diffuse nonerythematous firm swelling of R neck posterior to SCM. Tender to palpation, although improved from prior. Non-fluctuant. Improving ROM.   Cardiovascular: Normal rate and regular rhythm.    No murmur heard.  Pulmonary/Chest: Effort normal and breath sounds normal. No respiratory distress.   Abdominal: Soft. Bowel sounds are normal. He exhibits no distension.   Musculoskeletal: He exhibits no deformity.   Neurological: He is alert and oriented to person, place, and time.   Skin: Skin is warm. Capillary refill takes less than 2 seconds. No rash noted.   Nursing note and vitals reviewed.      Labs:   Recent Lab Results       03/18/18  0352 03/17/18  1716 03/17/18  1417      Immature Granulocytes CANCELED  Comment:  Result canceled by the ancillary       Immature Grans (Abs) CANCELED  Comment:  Mild elevation in immature granulocytes is non specific and   can be seen in a variety of conditions including stress response,   acute inflammation, trauma and pregnancy. Correlation with other   laboratory and clinical findings is essential.    Result canceled by the ancillary          TB Induration 48 - 72 hr read   0  Comment:  negative     Albumin 3.2(L)       Alkaline Phosphatase 114       ALT 38       Anion Gap 8       Aniso Slight       AST 20       Baso # CANCELED  Comment:  Result canceled by the ancillary       Basophil% 0.0       Total Bilirubin 0.7  Comment:  For infants and newborns, interpretation of results should be based  on gestational age, weight and in agreement with clinical  observations.  Premature Infant recommended reference ranges:  Up to 24 hours.............<8.0 mg/dL  Up to 48 hours............<12.0 mg/dL  3-5 days..................<15.0 mg/dL  6-29 days.................<15.0 mg/dL         BUN, Bld 6       Calcium 9.0       Chloride 105       CO2 25       Creatinine 0.7       Differential Method Manual  Comment:  Corrected result; previously reported as Automated on 03/18/2018 at   04:44.  [C]       eGFR if  >60.0       eGFR if non  >60.0  Comment:  Calculation used to obtain the estimated glomerular filtration  rate (eGFR) is the CKD-EPI equation.          Eos # CANCELED  Comment:  Result canceled by the ancillary       Eosinophil% 0.0       Glucose 105       Gran% 4.8(L)       Hematocrit 24.8(L)       Hemoglobin 9.1(L)       Hypo Occasional       Lymph # CANCELED  Comment:  Result canceled by the ancillary       Lymph% 90.5(H)       MCH 30.0       MCHC 36.7(H)       MCV 82       Mono # CANCELED  Comment:  Result canceled by the ancillary       Mono% 4.7       MPV 12.4       nRBC 0       Ovalocytes Occasional       Platelet Estimate Decreased(A)       Platelets 9  Comment:  PLT CT    critical result(s) called and verbal readback obtained from   Florencia Tena RN, 03/18/2018 04:46  (LL)       Poik Slight       Poly Occasional       Potassium 4.2       Total Protein 6.4       RBC 3.03(L)       RDW 13.3       Sodium 138       Spherocytes Occasional       Vancomycin-Trough  7.2(L)      WBC 0.72  Comment:  WBC   critical result(s) called  and verbal readback obtained from   MIGUEL PEREZ RN, 03/18/2018 04:23  (LL)             Diagnostic Results:  none        Assessment/Plan:     AML (acute myeloblastic leukemia)      Oncology   AML (acute myeloblastic leukemia)     This  is a 17 y.o. male with AML (t8;21) receiving chemotherapy following [facility] [place] [facility] [id number] (Arm A) here w neutropenia (without fever initially). Today is day 23 of cycle. Febrile to 101.3 night of 3/12. cultured port (both lumens), added cefepime and vanc.  PO improving, still decreased. Neck swelling stable. Pain well-controlled on morphine pca.   afebrile since 2am 3/14.   3 on-demand doses delivered overnight.     #fever and neutropenia: in setting of AML (t8;21) being treated with chemo per [facility] AAML 1031 (ARM A). S/p 2 units PRBC's on 3/7 for hemoglobin of 6.6; 2 U of pRBCs and 1U Platelets 3/9. On 3/14 received 1u plts and 2u pRBCs.  - platelet transfusion today  - Daily CBC and CMP. Type and Screen q72h.  - cw all home meds              - Chlorhexidine 15 ml Oral BID              - Ciprofloxacin 500 mg Oral q12 hours              - Lisdexamfetamine 60 mg oral qAM              - Bactrim 800-160 mg Oral BID on Friday, Saturday, and Sunday              - Posaconazole 300 mg BID  - PRN Meds: Lorazepam, Ondansetron, Miralax  -f/u cultures (from both lumens)  - Vancomycin (since 3/12)  - cefepime (since 3/12)  - daily cultures x3d, reculture also if new fever or hypotensive  - monitor for bleeding, transfuse platelets if actively bleeding  - monitor BPs closely     Mucositis:  - morphine PCA : D/C basal rate- 1mg on-demand only  - monitor PO, electrolytes, daily weights  - Acyclovir 800 mg TID PO     Hematemesis:   - night of 3/14: 20-30 ml juan brown/red blood noted with small clots  - transfused 1u plts and 2u pRBCs, with no further episodes  - continue to monitor     Dispo: pending resolution of counts                 Victoria Araya MD  Pediatric Hematology/Oncology  [facility]

## 2018-03-18 NOTE — PLAN OF CARE
Problem: Patient Care Overview  Goal: Plan of Care Review  POC discussed w pt, questions and concerns addressed. Pt stable, NAD noted. Afebrile thus fair this shift. Pain well controlled by current PCA settings, will d/c continuous rate as ordered. Pt reports improvement in pain to oral lesions and R neck. Intake remains primarily liquids, u/o good. Pt awake and playing video games. Drsg to PAC cdi. Pt to rec plt this shift, will premedicate with tylenol, benadryl, and hydrocortisone. Friends at bedside in am. Safety in place, will cont to monitor.

## 2018-03-18 NOTE — PLAN OF CARE
Problem: Patient Care Overview  Goal: Plan of Care Review  Outcome: Ongoing (interventions implemented as appropriate)  VS stable, afebrile, no distress noted.continuous morphine PCA pump in use, no PRN meds given. all meds given per order. tolerating PO intake, no distress noted. Right sided neck pain and mouth pain well controlled with morphine PCA pump.labs drawn this AM. Left PAC clean dry intact. Both lumens flush well, blood return present to both lumens.Plan of care reviewed with patient, verbalized understanding, will continue to monitor.

## 2018-03-18 NOTE — ASSESSMENT & PLAN NOTE
-Exam concerning for an  lymphadenitis vs. neoplasm   US done, no definite drainable fluid collection  -Patient currently on broad spectrum Abx   Subjectively improving  -Currently recommend against invasive intervention  -Will continue to follow

## 2018-03-18 NOTE — PROGRESS NOTES
[facility]  Otorhinolaryngology-Head & Neck Surgery  Progress Note    Subjective:     Post-Op Info:  * No surgery found *      Hospital Day: 14     Interval History: No acute events overnight. Patient stating that tenderness of neck mass is reduced from yesterday.     Medications:  Continuous Infusions:   morphine       Scheduled Meds:   acyclovir  800 mg Oral TID    chlorhexidine  15 mL Mouth/Throat BID    ciprofloxacin HCl  500 mg Oral Q12H    docusate sodium  100 mg Oral Daily    meropenem-0.9% sodium chloride  1 g Intravenous Q8H    polyethylene glycol  17 g Oral Daily    posaconazole  300 mg Oral BID    sulfamethoxazole-trimethoprim 800-160mg  1 tablet Oral twice daily on Friday, Saturday, Sunday    vancomycin (VANCOCIN) IVPB  1,250 mg Intravenous Q12H     PRN Meds:(Magic mouthwash) 1:1:1 Benadryl 12.5mg/5ml liq, aluminum & magnesium hydroxide-simehticone (Maalox), lidocaine viscous 2%, acetaminophen, diphenhydrAMINE, diphenhydrAMINE, heparin, porcine (PF), lidocaine HCl 2%, LORazepam, naloxone, ondansetron, polyethylene glycol, sodium chloride 0.9%     Review of patient's allergies indicates:   Allergen Reactions    Nsaids (non-steroidal anti-inflammatory drug) Anaphylaxis    Nuts [tree nut] Anaphylaxis    Strawberry     Vancomycin analogues Itching     Premedicate with benadryl and admin over 2hr.     Objective:     Vital Signs (24h Range):  Temp:  [97.4 °F (36.3 °C)-99.3 °F (37.4 °C)] 98.4 °F (36.9 °C)  Pulse:  [70-87] 70  Resp:  [18-20] 18  SpO2:  [96 %-100 %] 99 %  BP: ()/(49-80) 94/49        Lines/Drains/Airways     Central Venous Catheter Line                 Port A Cath Double Lumen 10/31/17 1826 left subclavian 137 days                Physical Exam  No acute distress, appropriate and pleasant  Normocephalic  EOMI, pupils equal round and reactive  External nose normal  No epistaxis, no nasal lesions on anterior rhinoscopy  Oral cavity with superficial erythematous  erosions of oral vestibule and buccal mucosa  Right neck  tender along SCM; improving tenderness; no palpable fluctuance; left neck normal to palpation  Normal voice, normal work of breathing, no stridor      Significant Labs:  BMP:   Recent Labs  Lab 03/18/18  0352         CO2 25   BUN 6   CREATININE 0.7   CALCIUM 9.0     CBC:   Recent Labs  Lab 03/18/18 0352   WBC 0.72*   RBC 3.03*   HGB 9.1*   HCT 24.8*   PLT 9*   MCV 82   MCH 30.0   MCHC 36.7*     Coagulation: No results for input(s): LABPROT, INR, APTT in the last 168 hours.  CRP: No results for input(s): CRP in the last 168 hours.  ESR: No results for input(s): ERYTHROCYTES in the last 168 hours.  LDH: No results for input(s): LDH in the last 168 hours.  LFTs:   Recent Labs  Lab 03/18/18  0352   ALT 38   AST 20   ALKPHOS 114   BILITOT 0.7   PROT 6.4   ALBUMIN 3.2*       Significant Diagnostics:  None    Assessment/Plan:     Mass of lateral neck    -Exam concerning for an  lymphadenitis vs. neoplasm   US done, no definite drainable fluid collection  -Patient currently on broad spectrum Abx   Subjectively improving  -Currently recommend against invasive intervention  -Will continue to follow            Yuval Gerber MD  Otorhinolaryngology-Head & Neck Surgery  [facility]

## 2018-03-18 NOTE — SUBJECTIVE & OBJECTIVE
Subjective:     Interval History: NAEON, VSS, afebrile. Hasn't stooled in 1 wk.    Oncology Treatment Plan:     PEDS AAML-1031  ARM A - LR Risk Patients    Medications:  Continuous Infusions:   morphine       Scheduled Meds:   acyclovir  800 mg Oral TID    chlorhexidine  15 mL Mouth/Throat BID    ciprofloxacin HCl  500 mg Oral Q12H    docusate sodium  100 mg Oral Daily    meropenem-0.9% sodium chloride  1 g Intravenous Q8H    polyethylene glycol  17 g Oral Daily    posaconazole  300 mg Oral BID    sulfamethoxazole-trimethoprim 800-160mg  1 tablet Oral twice daily on Friday, Saturday, Sunday    vancomycin (VANCOCIN) IVPB  1,250 mg Intravenous Q12H     PRN Meds:(Magic mouthwash) 1:1:1 Benadryl 12.5mg/5ml liq, aluminum & magnesium hydroxide-simehticone (Maalox), lidocaine viscous 2%, acetaminophen, diphenhydrAMINE, diphenhydrAMINE, heparin, porcine (PF), lidocaine HCl 2%, LORazepam, naloxone, ondansetron, polyethylene glycol, sodium chloride 0.9%     Review of Systems   Constitutional: Positive for appetite change (improving). Negative for activity change and fever.   HENT: Positive for mouth sores. Negative for congestion, nosebleeds and sore throat.         R-side neck pain   Eyes: Negative for discharge.   Respiratory: Negative for cough.    Cardiovascular: Negative for chest pain.   Gastrointestinal: Negative for abdominal distention and abdominal pain.   Genitourinary: Negative for decreased urine volume.   Musculoskeletal: Negative for myalgias.     Objective:     Vital Signs (Most Recent):  Temp: 98.4 °F (36.9 °C) (03/18/18 0857)  Pulse: 70 (03/18/18 0857)  Resp: 18 (03/18/18 0857)  BP: (!) 94/49 (03/18/18 0857)  SpO2: 99 % (03/18/18 0857) Vital Signs (24h Range):  Temp:  [97.4 °F (36.3 °C)-99.3 °F (37.4 °C)] 98.4 °F (36.9 °C)  Pulse:  [70-87] 70  Resp:  [18-20] 18  SpO2:  [96 %-100 %] 99 %  BP: ()/(49-80) 94/49     Weight: 83.3 kg (183 lb 10.3 oz)  Body mass index is 30.56 kg/m².  Body surface  area is 1.95 meters squared.      Intake/Output Summary (Last 24 hours) at 03/18/18 1100  Last data filed at 03/18/18 0600   Gross per 24 hour   Intake           2683.6 ml   Output                0 ml   Net           2683.6 ml       Physical Exam   Constitutional: He is oriented to person, place, and time. He appears well-developed and well-nourished. No distress.   HENT:   Head: Normocephalic.   White ulcerative mucosal lesion on R lower lip. Second lesion on the left inner cheek (mucosal surface). Both improving from prior.     Eyes: Conjunctivae are normal. Right eye exhibits no discharge. Left eye exhibits no discharge.   Neck: Neck supple.   Diffuse nonerythematous firm swelling of R neck posterior to SCM. Tender to palpation, although improved from prior. Non-fluctuant. Improving ROM.   Cardiovascular: Normal rate and regular rhythm.    No murmur heard.  Pulmonary/Chest: Effort normal and breath sounds normal. No respiratory distress.   Abdominal: Soft. Bowel sounds are normal. He exhibits no distension.   Musculoskeletal: He exhibits no deformity.   Neurological: He is alert and oriented to person, place, and time.   Skin: Skin is warm. Capillary refill takes less than 2 seconds. No rash noted.   Nursing note and vitals reviewed.      Labs:   Recent Lab Results       03/18/18  0352 03/17/18  1716 03/17/18  1417      Immature Granulocytes CANCELED  Comment:  Result canceled by the ancillary       Immature Grans (Abs) CANCELED  Comment:  Mild elevation in immature granulocytes is non specific and   can be seen in a variety of conditions including stress response,   acute inflammation, trauma and pregnancy. Correlation with other   laboratory and clinical findings is essential.    Result canceled by the ancillary         TB Induration 48 - 72 hr read   0  Comment:  negative     Albumin 3.2(L)       Alkaline Phosphatase 114       ALT 38       Anion Gap 8       Aniso Slight       AST 20       Baso #  CANCELED  Comment:  Result canceled by the ancillary       Basophil% 0.0       Total Bilirubin 0.7  Comment:  For infants and newborns, interpretation of results should be based  on gestational age, weight and in agreement with clinical  observations.  Premature Infant recommended reference ranges:  Up to 24 hours.............<8.0 mg/dL  Up to 48 hours............<12.0 mg/dL  3-5 days..................<15.0 mg/dL  6-29 days.................<15.0 mg/dL         BUN, Bld 6       Calcium 9.0       Chloride 105       CO2 25       Creatinine 0.7       Differential Method Manual  Comment:  Corrected result; previously reported as Automated on 03/18/2018 at   04:44.  [C]       eGFR if  >60.0       eGFR if non  >60.0  Comment:  Calculation used to obtain the estimated glomerular filtration  rate (eGFR) is the CKD-EPI equation.          Eos # CANCELED  Comment:  Result canceled by the ancillary       Eosinophil% 0.0       Glucose 105       Gran% 4.8(L)       Hematocrit 24.8(L)       Hemoglobin 9.1(L)       Hypo Occasional       Lymph # CANCELED  Comment:  Result canceled by the ancillary       Lymph% 90.5(H)       MCH 30.0       MCHC 36.7(H)       MCV 82       Mono # CANCELED  Comment:  Result canceled by the ancillary       Mono% 4.7       MPV 12.4       nRBC 0       Ovalocytes Occasional       Platelet Estimate Decreased(A)       Platelets 9  Comment:  PLT CT    critical result(s) called and verbal readback obtained from   Florencia Tena RN, 03/18/2018 04:46  (LL)       Poik Slight       Poly Occasional       Potassium 4.2       Total Protein 6.4       RBC 3.03(L)       RDW 13.3       Sodium 138       Spherocytes Occasional       Vancomycin-Trough  7.2(L)      WBC 0.72  Comment:  WBC   critical result(s) called and verbal readback obtained from   FLORENCIA TENA RN, 03/18/2018 04:23  (LL)             Diagnostic Results:  none

## 2018-03-19 LAB
ABO + RH BLD: NORMAL
ALBUMIN SERPL BCP-MCNC: 3.2 G/DL
ALP SERPL-CCNC: 103 U/L
ALT SERPL W/O P-5'-P-CCNC: 32 U/L
ANION GAP SERPL CALC-SCNC: 10 MMOL/L
AST SERPL-CCNC: 17 U/L
BACTERIA BLD CULT: NORMAL
BACTERIA BLD CULT: NORMAL
BASOPHILS # BLD AUTO: 0 K/UL
BASOPHILS NFR BLD: 0 %
BILIRUB SERPL-MCNC: 0.6 MG/DL
BLD GP AB SCN CELLS X3 SERPL QL: NORMAL
BUN SERPL-MCNC: 8 MG/DL
CALCIUM SERPL-MCNC: 8.8 MG/DL
CHLORIDE SERPL-SCNC: 104 MMOL/L
CMV DNA SERPL NAA+PROBE-ACNC: NORMAL IU/ML
CO2 SERPL-SCNC: 25 MMOL/L
CREAT SERPL-MCNC: 0.8 MG/DL
DIFFERENTIAL METHOD: ABNORMAL
EOSINOPHIL # BLD AUTO: 0 K/UL
EOSINOPHIL NFR BLD: 0 %
ERYTHROCYTE [DISTWIDTH] IN BLOOD BY AUTOMATED COUNT: 13.6 %
EST. GFR  (AFRICAN AMERICAN): >60 ML/MIN/1.73 M^2
EST. GFR  (NON AFRICAN AMERICAN): >60 ML/MIN/1.73 M^2
GLUCOSE SERPL-MCNC: 100 MG/DL
HCT VFR BLD AUTO: 21.8 %
HGB BLD-MCNC: 8.1 G/DL
IMM GRANULOCYTES # BLD AUTO: 0.01 K/UL
IMM GRANULOCYTES NFR BLD AUTO: 1.1 %
LYMPHOCYTES # BLD AUTO: 0.9 K/UL
LYMPHOCYTES NFR BLD: 90.4 %
MCH RBC QN AUTO: 29.7 PG
MCHC RBC AUTO-ENTMCNC: 37.2 G/DL
MCV RBC AUTO: 80 FL
MONOCYTES # BLD AUTO: 0.1 K/UL
MONOCYTES NFR BLD: 5.3 %
NEUTROPHILS # BLD AUTO: 0 K/UL
NEUTROPHILS NFR BLD: 3.2 %
NRBC BLD-RTO: 0 /100 WBC
PLATELET # BLD AUTO: 20 K/UL
PLATELET BLD QL SMEAR: ABNORMAL
PMV BLD AUTO: 10.4 FL
POTASSIUM SERPL-SCNC: 4 MMOL/L
PROT SERPL-MCNC: 6.3 G/DL
RBC # BLD AUTO: 2.73 M/UL
SODIUM SERPL-SCNC: 139 MMOL/L
VANCOMYCIN TROUGH SERPL-MCNC: 13.1 UG/ML
WBC # BLD AUTO: 0.94 K/UL

## 2018-03-19 PROCEDURE — 11300000 HC PEDIATRIC PRIVATE ROOM

## 2018-03-19 PROCEDURE — 36592 COLLECT BLOOD FROM PICC: CPT

## 2018-03-19 PROCEDURE — 25000003 PHARM REV CODE 250: Performed by: STUDENT IN AN ORGANIZED HEALTH CARE EDUCATION/TRAINING PROGRAM

## 2018-03-19 PROCEDURE — 99233 SBSQ HOSP IP/OBS HIGH 50: CPT | Mod: ,,, | Performed by: PEDIATRICS

## 2018-03-19 PROCEDURE — 63600175 PHARM REV CODE 636 W HCPCS: Performed by: STUDENT IN AN ORGANIZED HEALTH CARE EDUCATION/TRAINING PROGRAM

## 2018-03-19 PROCEDURE — 80202 ASSAY OF VANCOMYCIN: CPT

## 2018-03-19 PROCEDURE — 63600175 PHARM REV CODE 636 W HCPCS: Performed by: PEDIATRICS

## 2018-03-19 PROCEDURE — 85025 COMPLETE CBC W/AUTO DIFF WBC: CPT

## 2018-03-19 PROCEDURE — 80053 COMPREHEN METABOLIC PANEL: CPT

## 2018-03-19 PROCEDURE — 86850 RBC ANTIBODY SCREEN: CPT

## 2018-03-19 RX ORDER — OXYCODONE HYDROCHLORIDE 5 MG/1
5 TABLET ORAL EVERY 6 HOURS PRN
Status: DISCONTINUED | OUTPATIENT
Start: 2018-03-19 | End: 2018-03-21

## 2018-03-19 RX ADMIN — ACYCLOVIR 800 MG: 200 CAPSULE ORAL at 09:03

## 2018-03-19 RX ADMIN — DIPHENHYDRAMINE HYDROCHLORIDE 25 MG: 25 CAPSULE ORAL at 05:03

## 2018-03-19 RX ADMIN — DIPHENHYDRAMINE HYDROCHLORIDE 25 MG: 25 CAPSULE ORAL at 10:03

## 2018-03-19 RX ADMIN — Medication 1 G: at 03:03

## 2018-03-19 RX ADMIN — ACYCLOVIR 800 MG: 200 CAPSULE ORAL at 03:03

## 2018-03-19 RX ADMIN — OXYCODONE HYDROCHLORIDE 5 MG: 5 TABLET ORAL at 08:03

## 2018-03-19 RX ADMIN — CIPROFLOXACIN HYDROCHLORIDE 500 MG: 500 TABLET, FILM COATED ORAL at 08:03

## 2018-03-19 RX ADMIN — CHLORHEXIDINE GLUCONATE 15 ML: 1.2 RINSE ORAL at 09:03

## 2018-03-19 RX ADMIN — DIPHENHYDRAMINE HYDROCHLORIDE 25 MG: 50 INJECTION, SOLUTION INTRAMUSCULAR; INTRAVENOUS at 01:03

## 2018-03-19 RX ADMIN — HEPARIN 330 UNITS: 100 SYRINGE at 04:03

## 2018-03-19 RX ADMIN — MEROPENEM AND SODIUM CHLORIDE 1 G: 1 INJECTION, SOLUTION INTRAVENOUS at 09:03

## 2018-03-19 RX ADMIN — POSACONAZOLE 300 MG: 100 TABLET, COATED ORAL at 09:03

## 2018-03-19 RX ADMIN — HEPARIN 330 UNITS: 100 SYRINGE at 01:03

## 2018-03-19 RX ADMIN — Medication 1 G: at 05:03

## 2018-03-19 RX ADMIN — HEPARIN 330 UNITS: 100 SYRINGE at 03:03

## 2018-03-19 RX ADMIN — Medication: at 01:03

## 2018-03-19 RX ADMIN — MEROPENEM AND SODIUM CHLORIDE 1 G: 1 INJECTION, SOLUTION INTRAVENOUS at 12:03

## 2018-03-19 RX ADMIN — CIPROFLOXACIN HYDROCHLORIDE 500 MG: 500 TABLET, FILM COATED ORAL at 09:03

## 2018-03-19 RX ADMIN — CHLORHEXIDINE GLUCONATE 15 ML: 1.2 RINSE ORAL at 08:03

## 2018-03-19 RX ADMIN — Medication 5 ML: at 02:03

## 2018-03-19 RX ADMIN — ACYCLOVIR 800 MG: 200 CAPSULE ORAL at 08:03

## 2018-03-19 RX ADMIN — MEROPENEM AND SODIUM CHLORIDE 1 G: 1 INJECTION, SOLUTION INTRAVENOUS at 03:03

## 2018-03-19 RX ADMIN — Medication 1 G: at 10:03

## 2018-03-19 RX ADMIN — POSACONAZOLE 300 MG: 100 TABLET, COATED ORAL at 08:03

## 2018-03-19 NOTE — ASSESSMENT & PLAN NOTE
Oncology   AML (acute myeloblastic leukemia)     This  is a 17 y.o. male with AML (t8;21) receiving chemotherapy following [facility] [place] [id number] (Arm A) here w neutropenia (without fever initially). Today is day 23 of cycle. Febrile to 101.3 night of 3/12. cultured port (both lumens), added cefepime and vanc.  PO improving, still decreased. Neck swelling stable. Pain well-controlled on morphine pca, on-demand only.   afebrile since 2am 3/14.    Counts improving, clinically improving.    #fever and neutropenia: in setting of AML (t8;21) being treated with chemo per [facility] [place] [facility] AAML 1031 (ARM A). S/p 2 units PRBC's on 3/7 for hemoglobin of 6.6; 2 U of pRBCs and 1U Platelets 3/9. On 3/14 received 1u plts and 2u pRBCs.  - Daily CBC and CMP. Type and Screen q72h.  - cw all home meds              - Chlorhexidine 15 ml Oral BID              - Ciprofloxacin 500 mg Oral q12 hours              - Lisdexamfetamine 60 mg oral qAM              - Bactrim 800-160 mg Oral BID on Friday, Saturday, and Sunday              - Posaconazole 300 mg BID  - PRN Meds: Lorazepam, Ondansetron, Miralax  -f/u cultures (from both lumens)  - Vancomycin (since 3/12)  - cefepime (since 3/12)  - D/C abx when -400s  - daily cultures x3d, reculture also if new fever or hypotensive  - monitor for bleeding, transfuse platelets if actively bleeding  - monitor BPs closely     Mucositis:  - oxycodone prn  - monitor PO, electrolytes, daily weights  - Acyclovir 800 mg TID PO     Hematemesis:   - night of 3/14: 20-30 ml juan brown/red blood noted with small clots  - transfused 1u plts and 2u pRBCs, with no further episodes  - continue to monitor     Dispo: pending resolution of counts

## 2018-03-19 NOTE — PLAN OF CARE
Problem: Patient Care Overview  Goal: Plan of Care Review  Outcome: Ongoing (interventions implemented as appropriate)  POC reviewed with pt. VSS. Lt PAC dressing CDI. Morphine PCA discontinued per order, pt sleeping throughout this shift, pt awake most of the previous night. No PRN medications needed. Vancomycin trough obtained. Benadryl administered before Vancomycin infusion. Meropenem administered per order. Pt ate breakfast biscuit from [employer], drinking water, appple juice.

## 2018-03-19 NOTE — SUBJECTIVE & OBJECTIVE
Subjective:     Interval History: NAEON, VSS, afebrile. In good spirits, notably improved pain.      Oncology Treatment Plan:     PEDS [id number]-1031  ARM A - LR Risk Patients    Medications:  Continuous Infusions:  Scheduled Meds:   acyclovir  800 mg Oral TID    chlorhexidine  15 mL Mouth/Throat BID    ciprofloxacin HCl  500 mg Oral Q12H    docusate sodium  100 mg Oral Daily    meropenem-0.9% sodium chloride  1 g Intravenous Q8H    polyethylene glycol  17 g Oral Daily    posaconazole  300 mg Oral BID    sulfamethoxazole-trimethoprim 800-160mg  1 tablet Oral twice daily on Friday, Saturday, Sunday    vancomycin (VANCOCIN) IVPB  1,000 mg Intravenous Q8H     PRN Meds:(Magic mouthwash) 1:1:1 Benadryl 12.5mg/5ml liq, aluminum & magnesium hydroxide-simehticone (Maalox), lidocaine viscous 2%, sodium chloride, acetaminophen, diphenhydrAMINE, diphenhydrAMINE, heparin, porcine (PF), lidocaine HCl 2%, LORazepam, naloxone, ondansetron, oxyCODONE, polyethylene glycol, sodium chloride 0.9%     Review of Systems   Constitutional: Positive for appetite change (improving). Negative for activity change and fever.   HENT: Positive for mouth sores (improving). Negative for congestion, nosebleeds and sore throat.         R-side neck pain   Eyes: Negative for discharge.   Respiratory: Negative for cough.    Cardiovascular: Negative for chest pain.   Gastrointestinal: Negative for abdominal distention and abdominal pain.   Genitourinary: Negative for decreased urine volume.   Musculoskeletal: Negative for myalgias.     Objective:     Vital Signs (Most Recent):  Temp: 98.9 °F (37.2 °C) (03/19/18 0851)  Pulse: 72 (03/19/18 0851)  Resp: 20 (03/19/18 0851)  BP: (!) 123/53 (03/19/18 0851)  SpO2: 100 % (03/19/18 0851) Vital Signs (24h Range):  Temp:  [97.8 °F (36.6 °C)-99 °F (37.2 °C)] 98.9 °F (37.2 °C)  Pulse:  [60-81] 72  Resp:  [16-20] 20  SpO2:  [96 %-100 %] 100 %  BP: (112-128)/(53-63) 123/53     Weight: 82.4 kg (181 lb 10.5  oz)  Body mass index is 30.23 kg/m².  Body surface area is 1.94 meters squared.      Intake/Output Summary (Last 24 hours) at 03/19/18 1043  Last data filed at 03/19/18 0600   Gross per 24 hour   Intake          2831.62 ml   Output                0 ml   Net          2831.62 ml       Physical Exam   Constitutional: He is oriented to person, place, and time. He appears well-developed and well-nourished. No distress.   HENT:   Head: Normocephalic.   Healing mucosal lesions on R lower lip and left inner cheek. Both significantly improving from prior.     Eyes: Conjunctivae and EOM are normal. Pupils are equal, round, and reactive to light. Right eye exhibits no discharge. Left eye exhibits no discharge. No scleral icterus.   Neck: Neck supple.   Diffuse nonerythematous firm swelling of R neck posterior to SCM, improving.   Cardiovascular: Normal rate and regular rhythm.    No murmur heard.  Pulmonary/Chest: Effort normal and breath sounds normal. No respiratory distress.   Abdominal: Soft. Bowel sounds are normal. He exhibits no distension.   Musculoskeletal: He exhibits no deformity.   Neurological: He is alert and oriented to person, place, and time.   Skin: Skin is warm. Capillary refill takes less than 2 seconds. No rash noted.   Psychiatric: He has a normal mood and affect.   Nursing note and vitals reviewed.      Labs:   Recent Lab Results       03/19/18  0431 03/19/18  0416      Immature Granulocytes  1.1(H)     Immature Grans (Abs)  0.01  Comment:  Mild elevation in immature granulocytes is non specific and   can be seen in a variety of conditions including stress response,   acute inflammation, trauma and pregnancy. Correlation with other   laboratory and clinical findings is essential.       Albumin  3.2(L)     Alkaline Phosphatase  103     ALT  32     Anion Gap  10     AST  17     Baso #  0.00     Basophil%  0.0     Total Bilirubin  0.6  Comment:  For infants and newborns, interpretation of results should be  based  on gestational age, weight and in agreement with clinical  observations.  Premature Infant recommended reference ranges:  Up to 24 hours.............<8.0 mg/dL  Up to 48 hours............<12.0 mg/dL  3-5 days..................<15.0 mg/dL  6-29 days.................<15.0 mg/dL       BUN, Bld  8     Calcium  8.8     Chloride  104     CO2  25     Creatinine  0.8     Differential Method  Automated     eGFR if African American  >60.0     eGFR if non   >60.0  Comment:  Calculation used to obtain the estimated glomerular filtration  rate (eGFR) is the CKD-EPI equation.        Eos #  0.0     Eosinophil%  0.0     Glucose  100     Gran # (ANC)  0.0(L)     Gran%  3.2(L)     Group & Rh AB POS      Hematocrit  21.8(L)     Hemoglobin  8.1(L)     INDIRECT LARRY NEG      Lymph #  0.9(L)     Lymph%  90.4(H)     MCH  29.7     MCHC  37.2(H)     MCV  80(L)     Mono #  0.1(L)     Mono%  5.3     MPV  10.4     nRBC  0     Platelet Estimate  Decreased(A)     Platelets  20  Comment:  PLT   critical result(s) called and verbal readback obtained from   MACHO PEREZ RN , 03/19/2018 05:46  (LL)     Potassium  4.0     Total Protein  6.3     RBC  2.73(L)     RDW  13.6     Sodium  139     WBC  0.94  Comment:  wbc critical result(s) called and verbal readback obtained from   macho perez rn, 03/19/2018 04:37  (LL)           Diagnostic Results:  None

## 2018-03-19 NOTE — PROGRESS NOTES
[facility]  Pediatric Hematology/Oncology  Progress Note    Patient Name: Hema Kuo  Admission Date: 3/5/2018  Hospital Length of Stay: 14 days  Code Status: Full Code     Subjective:     Interval History: NAEON, VSS, afebrile. In good spirits, notably improved pain.      Oncology Treatment Plan:     PEDS AAML-1031  ARM A - LR Risk Patients    Medications:  Continuous Infusions:  Scheduled Meds:   acyclovir  800 mg Oral TID    chlorhexidine  15 mL Mouth/Throat BID    ciprofloxacin HCl  500 mg Oral Q12H    docusate sodium  100 mg Oral Daily    meropenem-0.9% sodium chloride  1 g Intravenous Q8H    polyethylene glycol  17 g Oral Daily    posaconazole  300 mg Oral BID    sulfamethoxazole-trimethoprim 800-160mg  1 tablet Oral twice daily on Friday, Saturday, Sunday    vancomycin (VANCOCIN) IVPB  1,000 mg Intravenous Q8H     PRN Meds:(Magic mouthwash) 1:1:1 Benadryl 12.5mg/5ml liq, aluminum & magnesium hydroxide-simehticone (Maalox), lidocaine viscous 2%, sodium chloride, acetaminophen, diphenhydrAMINE, diphenhydrAMINE, heparin, porcine (PF), lidocaine HCl 2%, LORazepam, naloxone, ondansetron, oxyCODONE, polyethylene glycol, sodium chloride 0.9%     Review of Systems   Constitutional: Positive for appetite change (improving). Negative for activity change and fever.   HENT: Positive for mouth sores (improving). Negative for congestion, nosebleeds and sore throat.         R-side neck pain   Eyes: Negative for discharge.   Respiratory: Negative for cough.    Cardiovascular: Negative for chest pain.   Gastrointestinal: Negative for abdominal distention and abdominal pain.   Genitourinary: Negative for decreased urine volume.   Musculoskeletal: Negative for myalgias.     Objective:     Vital Signs (Most Recent):  Temp: 98.9 °F (37.2 °C) (03/19/18 0851)  Pulse: 72 (03/19/18 0851)  Resp: 20 (03/19/18 0851)  BP: (!) 123/53 (03/19/18 0851)  SpO2: 100 % (03/19/18 0851) Vital Signs (24h Range):  Temp:   [97.8 °F (36.6 °C)-99 °F (37.2 °C)] 98.9 °F (37.2 °C)  Pulse:  [60-81] 72  Resp:  [16-20] 20  SpO2:  [96 %-100 %] 100 %  BP: (112-128)/(53-63) 123/53     Weight: 82.4 kg (181 lb 10.5 oz)  Body mass index is 30.23 kg/m².  Body surface area is 1.94 meters squared.      Intake/Output Summary (Last 24 hours) at 03/19/18 1043  Last data filed at 03/19/18 0600   Gross per 24 hour   Intake          2831.62 ml   Output                0 ml   Net          2831.62 ml       Physical Exam   Constitutional: He is oriented to person, place, and time. He appears well-developed and well-nourished. No distress.   HENT:   Head: Normocephalic.   Healing mucosal lesions on R lower lip and left inner cheek. Both significantly improving from prior.     Eyes: Conjunctivae and EOM are normal. Pupils are equal, round, and reactive to light. Right eye exhibits no discharge. Left eye exhibits no discharge. No scleral icterus.   Neck: Neck supple.   Diffuse nonerythematous firm swelling of R neck posterior to SCM, improving.   Cardiovascular: Normal rate and regular rhythm.    No murmur heard.  Pulmonary/Chest: Effort normal and breath sounds normal. No respiratory distress.   Abdominal: Soft. Bowel sounds are normal. He exhibits no distension.   Musculoskeletal: He exhibits no deformity.   Neurological: He is alert and oriented to person, place, and time.   Skin: Skin is warm. Capillary refill takes less than 2 seconds. No rash noted.   Psychiatric: He has a normal mood and affect.   Nursing note and vitals reviewed.      Labs:   Recent Lab Results       03/19/18  0431 03/19/18  0416      Immature Granulocytes  1.1(H)     Immature Grans (Abs)  0.01  Comment:  Mild elevation in immature granulocytes is non specific and   can be seen in a variety of conditions including stress response,   acute inflammation, trauma and pregnancy. Correlation with other   laboratory and clinical findings is essential.       Albumin  3.2(L)     Alkaline  Phosphatase  103     ALT  32     Anion Gap  10     AST  17     Baso #  0.00     Basophil%  0.0     Total Bilirubin  0.6  Comment:  For infants and newborns, interpretation of results should be based  on gestational age, weight and in agreement with clinical  observations.  Premature Infant recommended reference ranges:  Up to 24 hours.............<8.0 mg/dL  Up to 48 hours............<12.0 mg/dL  3-5 days..................<15.0 mg/dL  6-29 days.................<15.0 mg/dL       BUN, Bld  8     Calcium  8.8     Chloride  104     CO2  25     Creatinine  0.8     Differential Method  Automated     eGFR if African American  >60.0     eGFR if non   >60.0  Comment:  Calculation used to obtain the estimated glomerular filtration  rate (eGFR) is the CKD-EPI equation.        Eos #  0.0     Eosinophil%  0.0     Glucose  100     Gran # (ANC)  0.0(L)     Gran%  3.2(L)     Group & Rh AB POS      Hematocrit  21.8(L)     Hemoglobin  8.1(L)     INDIRECT LARRY NEG      Lymph #  0.9(L)     Lymph%  90.4(H)     MCH  29.7     MCHC  37.2(H)     MCV  80(L)     Mono #  0.1(L)     Mono%  5.3     MPV  10.4     nRBC  0     Platelet Estimate  Decreased(A)     Platelets  20  Comment:  PLT   critical result(s) called and verbal readback obtained from   MACHO PEREZ RN , 03/19/2018 05:46  (LL)     Potassium  4.0     Total Protein  6.3     RBC  2.73(L)     RDW  13.6     Sodium  139     WBC  0.94  Comment:  wbc critical result(s) called and verbal readback obtained from   macho perez rn, 03/19/2018 04:37  (LL)           Diagnostic Results:  None        Assessment/Plan:     AML (acute myeloblastic leukemia)      Oncology   AML (acute myeloblastic leukemia)     This  is a 17 y.o. male with AML (t8;21) receiving chemotherapy following COG [id number] ([place]) here w neutropenia (without fever initially). Today is day 23 of cycle. Febrile to 101.3 night of 3/12. cultured port (both lumens), added cefepime and vanc.  PO improving,  still decreased. Neck swelling stable. Pain well-controlled on morphine pca, on-demand only.   afebrile since 2am 3/14.    Counts improving, clinically improving.    #fever and neutropenia: in setting of AML (t8;21) being treated with chemo per COG AAML 1031 (ARM A). S/p 2 units PRBC's on 3/7 for hemoglobin of 6.6; 2 U of pRBCs and 1U Platelets 3/9. On 3/14 received 1u plts and 2u pRBCs.  - Daily CBC and CMP. Type and Screen q72h.  - cw all home meds              - Chlorhexidine 15 ml Oral BID              - Ciprofloxacin 500 mg Oral q12 hours              - Lisdexamfetamine 60 mg oral qAM              - Bactrim 800-160 mg Oral BID on Friday, Saturday, and Sunday              - Posaconazole 300 mg BID  - PRN Meds: Lorazepam, Ondansetron, Miralax  -f/u cultures (from both lumens)  - Vancomycin (since 3/12)  - cefepime (since 3/12)  - D/C abx when -400s  - daily cultures x3d, reculture also if new fever or hypotensive  - monitor for bleeding, transfuse platelets if actively bleeding  - monitor BPs closely     Mucositis:  - oxycodone prn  - monitor PO, electrolytes, daily weights  - Acyclovir 800 mg TID PO     Hematemesis:   - night of 3/14: 20-30 ml juan brown/red blood noted with small clots  - transfused 1u plts and 2u pRBCs, with no further episodes  - continue to monitor     Dispo: pending resolution of counts                 Victoria Araya MD  Pediatric Hematology/Oncology  [facility]

## 2018-03-19 NOTE — SUBJECTIVE & OBJECTIVE
Interval History: Neck tenderness continues to improve. Reporting some discomfort of the gums.     Medications:  Continuous Infusions:   morphine       Scheduled Meds:   acyclovir  800 mg Oral TID    chlorhexidine  15 mL Mouth/Throat BID    ciprofloxacin HCl  500 mg Oral Q12H    docusate sodium  100 mg Oral Daily    meropenem-0.9% sodium chloride  1 g Intravenous Q8H    polyethylene glycol  17 g Oral Daily    posaconazole  300 mg Oral BID    sulfamethoxazole-trimethoprim 800-160mg  1 tablet Oral twice daily on Friday, Saturday, Sunday    vancomycin (VANCOCIN) IVPB  1,000 mg Intravenous Q8H     PRN Meds:(Magic mouthwash) 1:1:1 Benadryl 12.5mg/5ml liq, aluminum & magnesium hydroxide-simehticone (Maalox), lidocaine viscous 2%, sodium chloride, acetaminophen, diphenhydrAMINE, diphenhydrAMINE, heparin, porcine (PF), lidocaine HCl 2%, LORazepam, naloxone, ondansetron, polyethylene glycol, sodium chloride 0.9%     Review of patient's allergies indicates:   Allergen Reactions    Nsaids (non-steroidal anti-inflammatory drug) Anaphylaxis    Nuts [tree nut] Anaphylaxis    Strawberry     Vancomycin analogues Itching     Premedicate with benadryl and admin over 2hr.     Objective:     Vital Signs (24h Range):  Temp:  [97.8 °F (36.6 °C)-99 °F (37.2 °C)] 98.4 °F (36.9 °C)  Pulse:  [60-81] 70  Resp:  [16-20] 20  SpO2:  [96 %-100 %] 100 %  BP: ()/(49-63) 120/55        Lines/Drains/Airways     Central Venous Catheter Line                 Port A Cath Double Lumen 10/31/17 1826 left subclavian 138 days                Physical Exam  No acute distress, appropriate and pleasant  Normocephalic  EOMI, pupils equal round and reactive  External nose normal  No epistaxis, no nasal lesions on anterior rhinoscopy  Oral cavity with superficial erythematous erosions of oral vestibule and buccal mucosa  Right neck  tender along SCM; improving tenderness; no palpable fluctuance; left neck normal to palpation  Normal voice, normal  work of breathing, no stridor      Significant Labs:  BMP:   Recent Labs  Lab 03/19/18  0416         CO2 25   BUN 8   CREATININE 0.8   CALCIUM 8.8     CBC:   Recent Labs  Lab 03/19/18 0416   WBC 0.94*   RBC 2.73*   HGB 8.1*   HCT 21.8*   PLT 20*   MCV 80*   MCH 29.7   MCHC 37.2*       Significant Diagnostics:  None

## 2018-03-19 NOTE — ASSESSMENT & PLAN NOTE
-Exam concerning for an  lymphadenitis vs. neoplasm   US done, no definite drainable fluid collection  -Patient currently on broad spectrum Abx   Subjectively improving  -Currently recommend against invasive intervention

## 2018-03-19 NOTE — PROGRESS NOTES
[facility]  Otorhinolaryngology-Head & Neck Surgery  Progress Note    Subjective:     Post-Op Info:  * No surgery found *      Hospital Day: 15     Interval History: Neck tenderness continues to improve. Reporting some discomfort of the gums.     Medications:  Continuous Infusions:   morphine       Scheduled Meds:   acyclovir  800 mg Oral TID    chlorhexidine  15 mL Mouth/Throat BID    ciprofloxacin HCl  500 mg Oral Q12H    docusate sodium  100 mg Oral Daily    meropenem-0.9% sodium chloride  1 g Intravenous Q8H    polyethylene glycol  17 g Oral Daily    posaconazole  300 mg Oral BID    sulfamethoxazole-trimethoprim 800-160mg  1 tablet Oral twice daily on Friday, Saturday, Sunday    vancomycin (VANCOCIN) IVPB  1,000 mg Intravenous Q8H     PRN Meds:(Magic mouthwash) 1:1:1 Benadryl 12.5mg/5ml liq, aluminum & magnesium hydroxide-simehticone (Maalox), lidocaine viscous 2%, sodium chloride, acetaminophen, diphenhydrAMINE, diphenhydrAMINE, heparin, porcine (PF), lidocaine HCl 2%, LORazepam, naloxone, ondansetron, polyethylene glycol, sodium chloride 0.9%     Review of patient's allergies indicates:   Allergen Reactions    Nsaids (non-steroidal anti-inflammatory drug) Anaphylaxis    Nuts [tree nut] Anaphylaxis    Strawberry     Vancomycin analogues Itching     Premedicate with benadryl and admin over 2hr.     Objective:     Vital Signs (24h Range):  Temp:  [97.8 °F (36.6 °C)-99 °F (37.2 °C)] 98.4 °F (36.9 °C)  Pulse:  [60-81] 70  Resp:  [16-20] 20  SpO2:  [96 %-100 %] 100 %  BP: ()/(49-63) 120/55        Lines/Drains/Airways     Central Venous Catheter Line                 Port A Cath Double Lumen 10/31/17 1826 left subclavian 138 days                Physical Exam  No acute distress, appropriate and pleasant  Normocephalic  EOMI, pupils equal round and reactive  External nose normal  No epistaxis, no nasal lesions on anterior rhinoscopy  Oral cavity with superficial erythematous  erosions of oral vestibule and buccal mucosa  Right neck  tender along SCM; improving tenderness; no palpable fluctuance; left neck normal to palpation  Normal voice, normal work of breathing, no stridor      Significant Labs:  BMP:   Recent Labs  Lab 03/19/18  0416         CO2 25   BUN 8   CREATININE 0.8   CALCIUM 8.8     CBC:   Recent Labs  Lab 03/19/18  0416   WBC 0.94*   RBC 2.73*   HGB 8.1*   HCT 21.8*   PLT 20*   MCV 80*   MCH 29.7   MCHC 37.2*       Significant Diagnostics:  None    Assessment/Plan:     Mass of lateral neck    -Exam concerning for an  lymphadenitis vs. neoplasm   US done, no definite drainable fluid collection  -Patient currently on broad spectrum Abx   Subjectively improving  -Currently recommend against invasive intervention            Yuval Gerber MD  Otorhinolaryngology-Head & Neck Surgery  [facility]

## 2018-03-19 NOTE — PLAN OF CARE
03/19/18 1136   Discharge Reassessment   Assessment Type Discharge Planning Reassessment   Provided patient/caregiver education on the expected discharge date and the discharge plan Yes   Do you have any problems affording any of your prescribed medications? No   Discharge Plan A Home with family   Discharge Plan B Home with family   Patient choice form signed by patient/caregiver N/A   Can the patient answer the patient profile reliably? Yes, cognitively intact   How does the patient rate their overall health at the present time? Fair   Describe the patient's ability to walk at the present time. No restrictions   How often would a person be available to care for the patient? Whenever needed   Number of comorbid conditions (as recorded on the chart) None   During the past month, has the patient often been bothered by feeling down, depressed or hopeless? No   During the past month, has the patient often been bothered by little interest or pleasure in doing things? No   Pt remains on IV abx, counts recovering slowly, will remain in house for recovery. Will follow for dc needs.

## 2018-03-19 NOTE — PLAN OF CARE
Problem: Patient Care Overview  Goal: Plan of Care Review  Outcome: Ongoing (interventions implemented as appropriate)  VS stable, afebrile, no distress noted.continuous morphine PCA pump in use. all meds given per order. tolerating PO intake, no distress noted. Right sided neck pain and mouth pain well controlled with morphine PCA pump. Magic mouth wash given x1, relief noted. labs drawn this AM. Left PAC clean dry intact. Both lumens flush well, blood return present to both lumens.Plan of care reviewed with patient, verbalized understanding, will continue to monitor.

## 2018-03-20 LAB
ALBUMIN SERPL BCP-MCNC: 2.9 G/DL
ALP SERPL-CCNC: 90 U/L
ALT SERPL W/O P-5'-P-CCNC: 32 U/L
ANION GAP SERPL CALC-SCNC: 5 MMOL/L
ANISOCYTOSIS BLD QL SMEAR: SLIGHT
AST SERPL-CCNC: 21 U/L
BACTERIA BLD CULT: NORMAL
BACTERIA BLD CULT: NORMAL
BASOPHILS # BLD AUTO: 0 K/UL
BASOPHILS NFR BLD: 0 %
BILIRUB SERPL-MCNC: 0.4 MG/DL
BUN SERPL-MCNC: 9 MG/DL
CALCIUM SERPL-MCNC: 8.5 MG/DL
CHLORIDE SERPL-SCNC: 106 MMOL/L
CO2 SERPL-SCNC: 26 MMOL/L
CREAT SERPL-MCNC: 0.7 MG/DL
DIFFERENTIAL METHOD: ABNORMAL
EOSINOPHIL # BLD AUTO: 0 K/UL
EOSINOPHIL NFR BLD: 0 %
ERYTHROCYTE [DISTWIDTH] IN BLOOD BY AUTOMATED COUNT: 13.3 %
EST. GFR  (AFRICAN AMERICAN): >60 ML/MIN/1.73 M^2
EST. GFR  (NON AFRICAN AMERICAN): >60 ML/MIN/1.73 M^2
GLUCOSE SERPL-MCNC: 100 MG/DL
HCT VFR BLD AUTO: 21.3 %
HGB BLD-MCNC: 7.8 G/DL
HYPOCHROMIA BLD QL SMEAR: ABNORMAL
IMM GRANULOCYTES # BLD AUTO: 0.01 K/UL
IMM GRANULOCYTES NFR BLD AUTO: 1.2 %
LYMPHOCYTES # BLD AUTO: 0.8 K/UL
LYMPHOCYTES NFR BLD: 89.3 %
MCH RBC QN AUTO: 30 PG
MCHC RBC AUTO-ENTMCNC: 36.6 G/DL
MCV RBC AUTO: 82 FL
MONOCYTES # BLD AUTO: 0.1 K/UL
MONOCYTES NFR BLD: 7.1 %
NEUTROPHILS # BLD AUTO: 0 K/UL
NEUTROPHILS NFR BLD: 2.4 %
NRBC BLD-RTO: 0 /100 WBC
PLATELET # BLD AUTO: 15 K/UL
PLATELET BLD QL SMEAR: ABNORMAL
PMV BLD AUTO: 12.1 FL
POLYCHROMASIA BLD QL SMEAR: ABNORMAL
POTASSIUM SERPL-SCNC: 4.2 MMOL/L
PROT SERPL-MCNC: 5.6 G/DL
RBC # BLD AUTO: 2.6 M/UL
SODIUM SERPL-SCNC: 137 MMOL/L
WBC # BLD AUTO: 0.84 K/UL

## 2018-03-20 PROCEDURE — 25000003 PHARM REV CODE 250: Performed by: STUDENT IN AN ORGANIZED HEALTH CARE EDUCATION/TRAINING PROGRAM

## 2018-03-20 PROCEDURE — 63600175 PHARM REV CODE 636 W HCPCS: Performed by: PEDIATRICS

## 2018-03-20 PROCEDURE — 63600175 PHARM REV CODE 636 W HCPCS: Performed by: STUDENT IN AN ORGANIZED HEALTH CARE EDUCATION/TRAINING PROGRAM

## 2018-03-20 PROCEDURE — 36415 COLL VENOUS BLD VENIPUNCTURE: CPT

## 2018-03-20 PROCEDURE — 99233 SBSQ HOSP IP/OBS HIGH 50: CPT | Mod: ,,, | Performed by: PEDIATRICS

## 2018-03-20 PROCEDURE — 85025 COMPLETE CBC W/AUTO DIFF WBC: CPT

## 2018-03-20 PROCEDURE — 80053 COMPREHEN METABOLIC PANEL: CPT

## 2018-03-20 PROCEDURE — 11300000 HC PEDIATRIC PRIVATE ROOM

## 2018-03-20 RX ADMIN — HEPARIN 330 UNITS: 100 SYRINGE at 04:03

## 2018-03-20 RX ADMIN — HEPARIN 330 UNITS: 100 SYRINGE at 03:03

## 2018-03-20 RX ADMIN — OXYCODONE HYDROCHLORIDE 5 MG: 5 TABLET ORAL at 06:03

## 2018-03-20 RX ADMIN — Medication 1 G: at 02:03

## 2018-03-20 RX ADMIN — CIPROFLOXACIN HYDROCHLORIDE 500 MG: 500 TABLET, FILM COATED ORAL at 09:03

## 2018-03-20 RX ADMIN — POSACONAZOLE 300 MG: 100 TABLET, COATED ORAL at 09:03

## 2018-03-20 RX ADMIN — HEPARIN 330 UNITS: 100 SYRINGE at 12:03

## 2018-03-20 RX ADMIN — ACETAMINOPHEN 650 MG: 325 TABLET ORAL at 09:03

## 2018-03-20 RX ADMIN — DIPHENHYDRAMINE HYDROCHLORIDE 25 MG: 25 CAPSULE ORAL at 10:03

## 2018-03-20 RX ADMIN — Medication 1 G: at 11:03

## 2018-03-20 RX ADMIN — DIPHENHYDRAMINE HYDROCHLORIDE 25 MG: 25 CAPSULE ORAL at 02:03

## 2018-03-20 RX ADMIN — ACYCLOVIR 800 MG: 200 CAPSULE ORAL at 09:03

## 2018-03-20 RX ADMIN — CHLORHEXIDINE GLUCONATE 15 ML: 1.2 RINSE ORAL at 09:03

## 2018-03-20 RX ADMIN — MEROPENEM AND SODIUM CHLORIDE 1 G: 1 INJECTION, SOLUTION INTRAVENOUS at 10:03

## 2018-03-20 RX ADMIN — MEROPENEM AND SODIUM CHLORIDE 1 G: 1 INJECTION, SOLUTION INTRAVENOUS at 05:03

## 2018-03-20 RX ADMIN — MEROPENEM AND SODIUM CHLORIDE 1 G: 1 INJECTION, SOLUTION INTRAVENOUS at 01:03

## 2018-03-20 RX ADMIN — Medication 1 G: at 06:03

## 2018-03-20 RX ADMIN — OXYCODONE HYDROCHLORIDE 5 MG: 5 TABLET ORAL at 01:03

## 2018-03-20 RX ADMIN — HEPARIN 330 UNITS: 100 SYRINGE at 09:03

## 2018-03-20 RX ADMIN — OXYCODONE HYDROCHLORIDE 5 MG: 5 TABLET ORAL at 10:03

## 2018-03-20 NOTE — PROGRESS NOTES
[facility]  Otorhinolaryngology-Head & Neck Surgery  Progress Note    Subjective:     Post-Op Info:  * No surgery found *      Hospital Day: 16     Interval History: Neck tenderness stable    Medications:  Continuous Infusions:  Scheduled Meds:   acyclovir  800 mg Oral TID    chlorhexidine  15 mL Mouth/Throat BID    ciprofloxacin HCl  500 mg Oral Q12H    docusate sodium  100 mg Oral Daily    meropenem-0.9% sodium chloride  1 g Intravenous Q8H    polyethylene glycol  17 g Oral Daily    posaconazole  300 mg Oral BID    sulfamethoxazole-trimethoprim 800-160mg  1 tablet Oral twice daily on Friday, Saturday, Sunday    vancomycin (VANCOCIN) IVPB  1,000 mg Intravenous Q8H     PRN Meds:(Magic mouthwash) 1:1:1 Benadryl 12.5mg/5ml liq, aluminum & magnesium hydroxide-simehticone (Maalox), lidocaine viscous 2%, sodium chloride, acetaminophen, diphenhydrAMINE, heparin, porcine (PF), lidocaine HCl 2%, LORazepam, naloxone, ondansetron, oxyCODONE, polyethylene glycol, sodium chloride 0.9%     Review of patient's allergies indicates:   Allergen Reactions    Nsaids (non-steroidal anti-inflammatory drug) Anaphylaxis    Nuts [tree nut] Anaphylaxis    Strawberry     Vancomycin analogues Itching     Premedicate with benadryl and admin over 2hr.     Objective:     Vital Signs (24h Range):  Temp:  [98.6 °F (37 °C)-99.8 °F (37.7 °C)] 99.8 °F (37.7 °C)  Pulse:  [70-93] 93  Resp:  [18-20] 20  SpO2:  [97 %-100 %] 98 %  BP: (105-139)/(47-60) 139/60        Lines/Drains/Airways     Central Venous Catheter Line                 Port A Cath Double Lumen 10/31/17 1826 left subclavian 139 days                Physical Exam  No acute distress, appropriate and pleasant  Normocephalic  EOMI, pupils equal round and reactive  External nose normal  No epistaxis, no nasal lesions on anterior rhinoscopy  Oral cavity with superficial erythematous erosions of oral vestibule and buccal mucosa  Right neck  tender along SCM;  improving tenderness; no palpable fluctuance; left neck normal to palpation  Normal voice, normal work of breathing, no stridor      Significant Labs:  BMP:   Recent Labs  Lab 03/20/18  0312         CO2 26   BUN 9   CREATININE 0.7   CALCIUM 8.5*     CBC:   Recent Labs  Lab 03/20/18  0312   WBC 0.84*   RBC 2.60*   HGB 7.8*   HCT 21.3*   PLT 15*   MCV 82   MCH 30.0   MCHC 36.6*       Significant Diagnostics:  None    Assessment/Plan:     Mass of lateral neck    -Exam concerning for an  lymphadenitis vs. neoplasm   US done, no definite drainable fluid collection  -Patient currently on broad spectrum Abx   Subjectively improving  -Will discuss with staff  -Currently prefer against invasive intervention  -Continue care per primary            Yuval Gerber MD  Otorhinolaryngology-Head & Neck Surgery  [facility]

## 2018-03-20 NOTE — PROGRESS NOTES
[facility]  Pediatric Hematology/Oncology  Progress Note    Patient Name: Hema Kuo  Admission Date: 3/5/2018  Hospital Length of Stay: 15 days  Code Status: Full Code     Subjective:     Interval History: NAEON, VSS, afebrile. In good spirits. Received 1x oxycodone prn this morning for neck pain.      Oncology Treatment Plan:     PEDS [id number]-1031  ARM A - LR Risk Patients    Medications:  Continuous Infusions:  Scheduled Meds:   acyclovir  800 mg Oral TID    chlorhexidine  15 mL Mouth/Throat BID    ciprofloxacin HCl  500 mg Oral Q12H    docusate sodium  100 mg Oral Daily    meropenem-0.9% sodium chloride  1 g Intravenous Q8H    polyethylene glycol  17 g Oral Daily    posaconazole  300 mg Oral BID    sulfamethoxazole-trimethoprim 800-160mg  1 tablet Oral twice daily on Friday, Saturday, Sunday    vancomycin (VANCOCIN) IVPB  1,000 mg Intravenous Q8H     PRN Meds:(Magic mouthwash) 1:1:1 Benadryl 12.5mg/5ml liq, aluminum & magnesium hydroxide-simehticone (Maalox), lidocaine viscous 2%, sodium chloride, acetaminophen, diphenhydrAMINE, heparin, porcine (PF), lidocaine HCl 2%, LORazepam, naloxone, ondansetron, oxyCODONE, polyethylene glycol, sodium chloride 0.9%     Review of Systems   Constitutional: Positive for appetite change (improving). Negative for activity change and fever.   HENT: Positive for mouth sores (improving). Negative for congestion, nosebleeds and sore throat.         R-side neck pain   Eyes: Negative for discharge.   Respiratory: Negative for cough.    Cardiovascular: Negative for chest pain.   Gastrointestinal: Negative for abdominal distention and abdominal pain.   Genitourinary: Negative for decreased urine volume.   Musculoskeletal: Negative for myalgias.     Objective:     Vital Signs (Most Recent):  Temp: 99 °F (37.2 °C) (03/20/18 0755)  Pulse: 73 (03/20/18 0755)  Resp: 15 (03/20/18 0755)  BP: (!) 115/57 (03/20/18 0755)  SpO2: 100 % (03/20/18 0755) Vital Signs (24h  Range):  Temp:  [98.6 °F (37 °C)-99.8 °F (37.7 °C)] 99 °F (37.2 °C)  Pulse:  [70-93] 73  Resp:  [15-20] 15  SpO2:  [97 %-100 %] 100 %  BP: (105-139)/(47-60) 115/57     Weight: 84 kg (185 lb 3 oz)  Body mass index is 30.82 kg/m².  Body surface area is 1.96 meters squared.      Intake/Output Summary (Last 24 hours) at 03/20/18 1001  Last data filed at 03/20/18 0553   Gross per 24 hour   Intake             1720 ml   Output                0 ml   Net             1720 ml       Physical Exam   Constitutional: He is oriented to person, place, and time. He appears well-developed and well-nourished. No distress.   HENT:   Head: Normocephalic.   Healing mucosal lesions on R lower lip and left inner cheek. Both significantly improving from prior.     Eyes: Conjunctivae and EOM are normal. Pupils are equal, round, and reactive to light. Right eye exhibits no discharge. Left eye exhibits no discharge. No scleral icterus.   Neck: Neck supple.   Diffuse nonerythematous firm swelling of R neck posterior to SCM, improving.   Cardiovascular: Normal rate and regular rhythm.    No murmur heard.  Pulmonary/Chest: Effort normal and breath sounds normal. No respiratory distress.   Abdominal: Soft. Bowel sounds are normal. He exhibits no distension.   Musculoskeletal: He exhibits no deformity.   Neurological: He is alert and oriented to person, place, and time.   Skin: Skin is warm. Capillary refill takes less than 2 seconds. No rash noted.   Psychiatric: He has a normal mood and affect.   Nursing note and vitals reviewed.      Labs:   Recent Lab Results       03/20/18  0312 03/19/18  1405      Immature Granulocytes 1.2(H)      Immature Grans (Abs) 0.01  Comment:  Mild elevation in immature granulocytes is non specific and   can be seen in a variety of conditions including stress response,   acute inflammation, trauma and pregnancy. Correlation with other   laboratory and clinical findings is essential.        Albumin 2.9(L)      Alkaline  Phosphatase 90      ALT 32      Anion Gap 5(L)      Aniso Slight      AST 21      Baso # 0.00      Basophil% 0.0      Total Bilirubin 0.4  Comment:  For infants and newborns, interpretation of results should be based  on gestational age, weight and in agreement with clinical  observations.  Premature Infant recommended reference ranges:  Up to 24 hours.............<8.0 mg/dL  Up to 48 hours............<12.0 mg/dL  3-5 days..................<15.0 mg/dL  6-29 days.................<15.0 mg/dL        BUN, Bld 9      Calcium 8.5(L)      Chloride 106      CO2 26      Creatinine 0.7      Differential Method Automated      eGFR if African American >60.0      eGFR if non  >60.0  Comment:  Calculation used to obtain the estimated glomerular filtration  rate (eGFR) is the CKD-EPI equation.         Eos # 0.0      Eosinophil% 0.0      Glucose 100      Gran # (ANC) 0.0(L)      Gran% 2.4(L)      Hematocrit 21.3(L)      Hemoglobin 7.8(L)      Hypo Occasional      Lymph # 0.8(L)      Lymph% 89.3(H)      MCH 30.0      MCHC 36.6(H)      MCV 82      Mono # 0.1(L)      Mono% 7.1      MPV 12.1      nRBC 0      Platelet Estimate Decreased(A)      Platelets 15  Comment:  PLT COUNT   critical result(s) called and verbal readback obtained   from FABIEN GUTIERREZ RN, 03/20/2018 04:05  (LL)      Poly Occasional      Potassium 4.2      Total Protein 5.6(L)      RBC 2.60(L)      RDW 13.3      Sodium 137      Vancomycin-Trough  13.1     WBC 0.84  Comment:  WBC/PRELIM PLT   critical result(s) called and verbal readback   obtained from FABIEN GUTIERREZ RN AT 0336 ON 03/20/2018 BY BEL,   03/20/2018 03:35  (LL)            Diagnostic Results:  None        Assessment/Plan:     * AML (acute myeloblastic leukemia)      Oncology   AML (acute myeloblastic leukemia)     This  is a 17 y.o. male with AML (t8;21) receiving chemotherapy following [facility] [id number] ([place]) here w neutropenia (without fever initially). Today is day 23 of cycle.  Febrile to 101.3 night of 3/12. cultured port (both lumens), added cefepime and vanc.  PO improving, still decreased. Neck swelling stable. Pain well-controlled on morphine pca, on-demand only.   afebrile since 2am 3/14.    Counts improving, clinically improving.  Mucositis improving. Mouth and neck pain significantly improved.    #fever and neutropenia: in setting of AML (t8;21) being treated with chemo per COG AAML 1031 (ARM A). S/p 2 units PRBC's on 3/7 for hemoglobin of 6.6; 2 U of pRBCs and 1U Platelets 3/9. On 3/14 received 1u plts and 2u pRBCs.  - Daily CBC and CMP. Type and Screen q72h.  - cw all home meds              - Chlorhexidine 15 ml Oral BID              - Ciprofloxacin 500 mg Oral q12 hours              - Lisdexamfetamine 60 mg oral qAM              - Bactrim 800-160 mg Oral BID on Friday, Saturday, and Sunday              - Posaconazole 300 mg BID  - PRN Meds: Lorazepam, Ondansetron, Miralax  -f/u cultures (from both lumens)  - Vancomycin (since 3/12)  - cefepime (since 3/12)  - D/C abx when -400s  - daily cultures x3d, reculture also if new fever or hypotensive  - monitor for bleeding, transfuse platelets if actively bleeding  - monitor BPs closely     Mucositis:  - oxycodone prn  - monitor PO, electrolytes, daily weights  - Acyclovir 800 mg TID PO     Hematemesis:   - night of 3/14: 20-30 ml juan brown/red blood noted with small clots  - transfused 1u plts and 2u pRBCs, with no further episodes  - continue to monitor     Dispo: pending resolution of counts                 Victoria Araya MD  Pediatric Hematology/Oncology  [facility]

## 2018-03-20 NOTE — PLAN OF CARE
Problem: Patient Care Overview  Goal: Plan of Care Review  Outcome: Ongoing (interventions implemented as appropriate)  Pt VSS throughout shift, afebrile.  Oxycodone x1 for mouth pain, relief noted.  Vancomycin and meropenem administered per schedule.  Benadryl given prior to vancomycin.  Labs drawn this morning, ANC 20.  POC discussed with pt; understanding verbalized and all questions answered.  Will continue to monitor.

## 2018-03-20 NOTE — SUBJECTIVE & OBJECTIVE
Subjective:     Interval History: NAEON, VSS, afebrile. In good spirits. Received 1x oxycodone prn this morning for neck pain.      Oncology Treatment Plan:     PEDS [id number]-1031  ARM A - LR Risk Patients    Medications:  Continuous Infusions:  Scheduled Meds:   acyclovir  800 mg Oral TID    chlorhexidine  15 mL Mouth/Throat BID    ciprofloxacin HCl  500 mg Oral Q12H    docusate sodium  100 mg Oral Daily    meropenem-0.9% sodium chloride  1 g Intravenous Q8H    polyethylene glycol  17 g Oral Daily    posaconazole  300 mg Oral BID    sulfamethoxazole-trimethoprim 800-160mg  1 tablet Oral twice daily on Friday, Saturday, Sunday    vancomycin (VANCOCIN) IVPB  1,000 mg Intravenous Q8H     PRN Meds:(Magic mouthwash) 1:1:1 Benadryl 12.5mg/5ml liq, aluminum & magnesium hydroxide-simehticone (Maalox), lidocaine viscous 2%, sodium chloride, acetaminophen, diphenhydrAMINE, heparin, porcine (PF), lidocaine HCl 2%, LORazepam, naloxone, ondansetron, oxyCODONE, polyethylene glycol, sodium chloride 0.9%     Review of Systems   Constitutional: Positive for appetite change (improving). Negative for activity change and fever.   HENT: Positive for mouth sores (improving). Negative for congestion, nosebleeds and sore throat.         R-side neck pain   Eyes: Negative for discharge.   Respiratory: Negative for cough.    Cardiovascular: Negative for chest pain.   Gastrointestinal: Negative for abdominal distention and abdominal pain.   Genitourinary: Negative for decreased urine volume.   Musculoskeletal: Negative for myalgias.     Objective:     Vital Signs (Most Recent):  Temp: 99 °F (37.2 °C) (03/20/18 0755)  Pulse: 73 (03/20/18 0755)  Resp: 15 (03/20/18 0755)  BP: (!) 115/57 (03/20/18 0755)  SpO2: 100 % (03/20/18 0755) Vital Signs (24h Range):  Temp:  [98.6 °F (37 °C)-99.8 °F (37.7 °C)] 99 °F (37.2 °C)  Pulse:  [70-93] 73  Resp:  [15-20] 15  SpO2:  [97 %-100 %] 100 %  BP: (105-139)/(47-60) 115/57     Weight: 84 kg (185 lb 3  oz)  Body mass index is 30.82 kg/m².  Body surface area is 1.96 meters squared.      Intake/Output Summary (Last 24 hours) at 03/20/18 1001  Last data filed at 03/20/18 0553   Gross per 24 hour   Intake             1720 ml   Output                0 ml   Net             1720 ml       Physical Exam   Constitutional: He is oriented to person, place, and time. He appears well-developed and well-nourished. No distress.   HENT:   Head: Normocephalic.   Healing mucosal lesions on R lower lip and left inner cheek. Both significantly improving from prior.     Eyes: Conjunctivae and EOM are normal. Pupils are equal, round, and reactive to light. Right eye exhibits no discharge. Left eye exhibits no discharge. No scleral icterus.   Neck: Neck supple.   Diffuse nonerythematous firm swelling of R neck posterior to SCM, improving.   Cardiovascular: Normal rate and regular rhythm.    No murmur heard.  Pulmonary/Chest: Effort normal and breath sounds normal. No respiratory distress.   Abdominal: Soft. Bowel sounds are normal. He exhibits no distension.   Musculoskeletal: He exhibits no deformity.   Neurological: He is alert and oriented to person, place, and time.   Skin: Skin is warm. Capillary refill takes less than 2 seconds. No rash noted.   Psychiatric: He has a normal mood and affect.   Nursing note and vitals reviewed.      Labs:   Recent Lab Results       03/20/18  0312 03/19/18  1405      Immature Granulocytes 1.2(H)      Immature Grans (Abs) 0.01  Comment:  Mild elevation in immature granulocytes is non specific and   can be seen in a variety of conditions including stress response,   acute inflammation, trauma and pregnancy. Correlation with other   laboratory and clinical findings is essential.        Albumin 2.9(L)      Alkaline Phosphatase 90      ALT 32      Anion Gap 5(L)      Aniso Slight      AST 21      Baso # 0.00      Basophil% 0.0      Total Bilirubin 0.4  Comment:  For infants and newborns, interpretation of  results should be based  on gestational age, weight and in agreement with clinical  observations.  Premature Infant recommended reference ranges:  Up to 24 hours.............<8.0 mg/dL  Up to 48 hours............<12.0 mg/dL  3-5 days..................<15.0 mg/dL  6-29 days.................<15.0 mg/dL        BUN, Bld 9      Calcium 8.5(L)      Chloride 106      CO2 26      Creatinine 0.7      Differential Method Automated      eGFR if African American >60.0      eGFR if non  >60.0  Comment:  Calculation used to obtain the estimated glomerular filtration  rate (eGFR) is the CKD-EPI equation.         Eos # 0.0      Eosinophil% 0.0      Glucose 100      Gran # (ANC) 0.0(L)      Gran% 2.4(L)      Hematocrit 21.3(L)      Hemoglobin 7.8(L)      Hypo Occasional      Lymph # 0.8(L)      Lymph% 89.3(H)      MCH 30.0      MCHC 36.6(H)      MCV 82      Mono # 0.1(L)      Mono% 7.1      MPV 12.1      nRBC 0      Platelet Estimate Decreased(A)      Platelets 15  Comment:  PLT COUNT   critical result(s) called and verbal readback obtained   from FABIEN GUTIERREZ RN, 03/20/2018 04:05  (LL)      Poly Occasional      Potassium 4.2      Total Protein 5.6(L)      RBC 2.60(L)      RDW 13.3      Sodium 137      Vancomycin-Trough  13.1     WBC 0.84  Comment:  WBC/PRELIM PLT   critical result(s) called and verbal readback   obtained from FABIEN GUTIERREZ RN AT 0336 ON 03/20/2018 BY BEL,   03/20/2018 03:35  (LL)            Diagnostic Results:  None

## 2018-03-20 NOTE — PLAN OF CARE
Problem: Patient Care Overview  Goal: Plan of Care Review  Outcome: Ongoing (interventions implemented as appropriate)  POC reviewed with pt. VSS, pt afebrile throughout this shift. Lt PAC CDI, double lumen flushed with blood return heplocked at this time. Benadryl administered before Vancomycin infusion. Tylenol x1 and Oxycodone x1 administered for pain control of mouth. Pt playing video games in room, sleeping between care. Pt tolerated PO intake.

## 2018-03-20 NOTE — ASSESSMENT & PLAN NOTE
Oncology   AML (acute myeloblastic leukemia)     This  is a 17 y.o. male with AML (t8;21) receiving chemotherapy following [facility] [id number] ([place]) here w neutropenia (without fever initially). Today is day 23 of cycle. Febrile to 101.3 night of 3/12. cultured port (both lumens), added cefepime and vanc.  PO improving, still decreased. Neck swelling stable. Pain well-controlled on morphine pca, on-demand only.   afebrile since 2am 3/14.    Counts improving, clinically improving.  Mucositis improving. Mouth and neck pain significantly improved.    #fever and neutropenia: in setting of AML (t8;21) being treated with chemo per [facility] AAML 1031 (ARM A). S/p 2 units PRBC's on 3/7 for hemoglobin of 6.6; 2 U of pRBCs and 1U Platelets 3/9. On 3/14 received 1u plts and 2u pRBCs.  - Daily CBC and CMP. Type and Screen q72h.  - cw all home meds              - Chlorhexidine 15 ml Oral BID              - Ciprofloxacin 500 mg Oral q12 hours              - Lisdexamfetamine 60 mg oral qAM              - Bactrim 800-160 mg Oral BID on Friday, Saturday, and Sunday              - Posaconazole 300 mg BID  - PRN Meds: Lorazepam, Ondansetron, Miralax  -f/u cultures (from both lumens)  - Vancomycin (since 3/12)  - cefepime (since 3/12)  - D/C abx when -400s  - daily cultures x3d, reculture also if new fever or hypotensive  - monitor for bleeding, transfuse platelets if actively bleeding  - monitor BPs closely     Mucositis:  - oxycodone prn  - monitor PO, electrolytes, daily weights  - Acyclovir 800 mg TID PO     Hematemesis:   - night of 3/14: 20-30 ml juan brown/red blood noted with small clots  - transfused 1u plts and 2u pRBCs, with no further episodes  - continue to monitor     Dispo: pending resolution of counts

## 2018-03-20 NOTE — ASSESSMENT & PLAN NOTE
-Exam concerning for an  lymphadenitis vs. neoplasm   US done, no definite drainable fluid collection  -Patient currently on broad spectrum Abx   Subjectively improving  -Will discuss with staff  -Currently prefer against invasive intervention  -Continue care per primary

## 2018-03-20 NOTE — SUBJECTIVE & OBJECTIVE
Interval History: Neck tenderness stable    Medications:  Continuous Infusions:  Scheduled Meds:   acyclovir  800 mg Oral TID    chlorhexidine  15 mL Mouth/Throat BID    ciprofloxacin HCl  500 mg Oral Q12H    docusate sodium  100 mg Oral Daily    meropenem-0.9% sodium chloride  1 g Intravenous Q8H    polyethylene glycol  17 g Oral Daily    posaconazole  300 mg Oral BID    sulfamethoxazole-trimethoprim 800-160mg  1 tablet Oral twice daily on Friday, Saturday, Sunday    vancomycin (VANCOCIN) IVPB  1,000 mg Intravenous Q8H     PRN Meds:(Magic mouthwash) 1:1:1 Benadryl 12.5mg/5ml liq, aluminum & magnesium hydroxide-simehticone (Maalox), lidocaine viscous 2%, sodium chloride, acetaminophen, diphenhydrAMINE, heparin, porcine (PF), lidocaine HCl 2%, LORazepam, naloxone, ondansetron, oxyCODONE, polyethylene glycol, sodium chloride 0.9%     Review of patient's allergies indicates:   Allergen Reactions    Nsaids (non-steroidal anti-inflammatory drug) Anaphylaxis    Nuts [tree nut] Anaphylaxis    Strawberry     Vancomycin analogues Itching     Premedicate with benadryl and admin over 2hr.     Objective:     Vital Signs (24h Range):  Temp:  [98.6 °F (37 °C)-99.8 °F (37.7 °C)] 99.8 °F (37.7 °C)  Pulse:  [70-93] 93  Resp:  [18-20] 20  SpO2:  [97 %-100 %] 98 %  BP: (105-139)/(47-60) 139/60        Lines/Drains/Airways     Central Venous Catheter Line                 Port A Cath Double Lumen 10/31/17 1826 left subclavian 139 days                Physical Exam  No acute distress, appropriate and pleasant  Normocephalic  EOMI, pupils equal round and reactive  External nose normal  No epistaxis, no nasal lesions on anterior rhinoscopy  Oral cavity with superficial erythematous erosions of oral vestibule and buccal mucosa  Right neck  tender along SCM; improving tenderness; no palpable fluctuance; left neck normal to palpation  Normal voice, normal work of breathing, no stridor      Significant Labs:  BMP:   Recent Labs  Lab  03/20/18  0312         CO2 26   BUN 9   CREATININE 0.7   CALCIUM 8.5*     CBC:   Recent Labs  Lab 03/20/18 0312   WBC 0.84*   RBC 2.60*   HGB 7.8*   HCT 21.3*   PLT 15*   MCV 82   MCH 30.0   MCHC 36.6*       Significant Diagnostics:  None

## 2018-03-21 LAB
ALBUMIN SERPL BCP-MCNC: 3.3 G/DL
ALP SERPL-CCNC: 101 U/L
ALT SERPL W/O P-5'-P-CCNC: 35 U/L
ANION GAP SERPL CALC-SCNC: 9 MMOL/L
ANISOCYTOSIS BLD QL SMEAR: SLIGHT
AST SERPL-CCNC: 20 U/L
BASOPHILS # BLD AUTO: 0 K/UL
BASOPHILS NFR BLD: 0 %
BILIRUB SERPL-MCNC: 0.4 MG/DL
BLD PROD TYP BPU: NORMAL
BLOOD UNIT EXPIRATION DATE: NORMAL
BLOOD UNIT TYPE CODE: 6200
BLOOD UNIT TYPE: NORMAL
BUN SERPL-MCNC: 8 MG/DL
CALCIUM SERPL-MCNC: 9 MG/DL
CHLORIDE SERPL-SCNC: 104 MMOL/L
CO2 SERPL-SCNC: 25 MMOL/L
CODING SYSTEM: NORMAL
CREAT SERPL-MCNC: 0.7 MG/DL
DIFFERENTIAL METHOD: ABNORMAL
DISPENSE STATUS: NORMAL
EOSINOPHIL # BLD AUTO: 0 K/UL
EOSINOPHIL NFR BLD: 0 %
ERYTHROCYTE [DISTWIDTH] IN BLOOD BY AUTOMATED COUNT: 13.2 %
EST. GFR  (AFRICAN AMERICAN): >60 ML/MIN/1.73 M^2
EST. GFR  (NON AFRICAN AMERICAN): >60 ML/MIN/1.73 M^2
GLUCOSE SERPL-MCNC: 109 MG/DL
HCT VFR BLD AUTO: 21.5 %
HGB BLD-MCNC: 8.1 G/DL
HYPOCHROMIA BLD QL SMEAR: ABNORMAL
IMM GRANULOCYTES # BLD AUTO: 0.01 K/UL
IMM GRANULOCYTES NFR BLD AUTO: 1.1 %
LYMPHOCYTES # BLD AUTO: 0.8 K/UL
LYMPHOCYTES NFR BLD: 80 %
MCH RBC QN AUTO: 30 PG
MCHC RBC AUTO-ENTMCNC: 34.5 G/DL
MCV RBC AUTO: 80 FL
MONOCYTES # BLD AUTO: 0.1 K/UL
MONOCYTES NFR BLD: 11.6 %
NEUTROPHILS # BLD AUTO: 0.1 K/UL
NEUTROPHILS NFR BLD: 7.3 %
NRBC BLD-RTO: 0 /100 WBC
NUM UNITS TRANS WBC-POOR PLATPHERESIS: NORMAL
OVALOCYTES BLD QL SMEAR: ABNORMAL
PLATELET # BLD AUTO: 9 K/UL
PMV BLD AUTO: 9.7 FL
POIKILOCYTOSIS BLD QL SMEAR: SLIGHT
POLYCHROMASIA BLD QL SMEAR: ABNORMAL
POTASSIUM SERPL-SCNC: 3.8 MMOL/L
PROT SERPL-MCNC: 6.3 G/DL
RBC # BLD AUTO: 2.7 M/UL
SODIUM SERPL-SCNC: 138 MMOL/L
T-SPOT TB SCREENING TEST: NORMAL
WBC # BLD AUTO: 0.95 K/UL

## 2018-03-21 PROCEDURE — 63600175 PHARM REV CODE 636 W HCPCS: Performed by: STUDENT IN AN ORGANIZED HEALTH CARE EDUCATION/TRAINING PROGRAM

## 2018-03-21 PROCEDURE — 25000003 PHARM REV CODE 250: Performed by: STUDENT IN AN ORGANIZED HEALTH CARE EDUCATION/TRAINING PROGRAM

## 2018-03-21 PROCEDURE — P9037 PLATE PHERES LEUKOREDU IRRAD: HCPCS

## 2018-03-21 PROCEDURE — 99233 SBSQ HOSP IP/OBS HIGH 50: CPT | Mod: ,,, | Performed by: PEDIATRICS

## 2018-03-21 PROCEDURE — 36415 COLL VENOUS BLD VENIPUNCTURE: CPT

## 2018-03-21 PROCEDURE — 11300000 HC PEDIATRIC PRIVATE ROOM

## 2018-03-21 PROCEDURE — 63600175 PHARM REV CODE 636 W HCPCS: Performed by: PEDIATRICS

## 2018-03-21 PROCEDURE — 80053 COMPREHEN METABOLIC PANEL: CPT

## 2018-03-21 PROCEDURE — 86644 CMV ANTIBODY: CPT

## 2018-03-21 PROCEDURE — 85025 COMPLETE CBC W/AUTO DIFF WBC: CPT

## 2018-03-21 RX ORDER — HYDROCODONE BITARTRATE AND ACETAMINOPHEN 500; 5 MG/1; MG/1
TABLET ORAL
Status: DISCONTINUED | OUTPATIENT
Start: 2018-03-21 | End: 2018-03-23

## 2018-03-21 RX ORDER — OXYCODONE HYDROCHLORIDE 5 MG/1
10 TABLET ORAL EVERY 6 HOURS PRN
Status: DISCONTINUED | OUTPATIENT
Start: 2018-03-21 | End: 2018-03-23 | Stop reason: HOSPADM

## 2018-03-21 RX ADMIN — ACETAMINOPHEN 650 MG: 325 TABLET ORAL at 01:03

## 2018-03-21 RX ADMIN — MEROPENEM AND SODIUM CHLORIDE 1 G: 1 INJECTION, SOLUTION INTRAVENOUS at 04:03

## 2018-03-21 RX ADMIN — DIPHENHYDRAMINE HYDROCHLORIDE 25 MG: 25 CAPSULE ORAL at 06:03

## 2018-03-21 RX ADMIN — CHLORHEXIDINE GLUCONATE 15 ML: 1.2 RINSE ORAL at 08:03

## 2018-03-21 RX ADMIN — CIPROFLOXACIN HYDROCHLORIDE 500 MG: 500 TABLET, FILM COATED ORAL at 08:03

## 2018-03-21 RX ADMIN — POSACONAZOLE 300 MG: 100 TABLET, COATED ORAL at 08:03

## 2018-03-21 RX ADMIN — Medication 1 G: at 06:03

## 2018-03-21 RX ADMIN — MEROPENEM AND SODIUM CHLORIDE 1 G: 1 INJECTION, SOLUTION INTRAVENOUS at 08:03

## 2018-03-21 RX ADMIN — ACYCLOVIR 800 MG: 200 CAPSULE ORAL at 03:03

## 2018-03-21 RX ADMIN — ACYCLOVIR 800 MG: 200 CAPSULE ORAL at 08:03

## 2018-03-21 RX ADMIN — DIPHENHYDRAMINE HYDROCHLORIDE 25 MG: 25 CAPSULE ORAL at 11:03

## 2018-03-21 RX ADMIN — Medication 1 G: at 11:03

## 2018-03-21 RX ADMIN — Medication 1 G: at 04:03

## 2018-03-21 RX ADMIN — HEPARIN 330 UNITS: 100 SYRINGE at 09:03

## 2018-03-21 RX ADMIN — DIPHENHYDRAMINE HYDROCHLORIDE 25 MG: 25 CAPSULE ORAL at 01:03

## 2018-03-21 RX ADMIN — MEROPENEM AND SODIUM CHLORIDE 1 G: 1 INJECTION, SOLUTION INTRAVENOUS at 03:03

## 2018-03-21 RX ADMIN — HYDROCORTISONE SODIUM SUCCINATE 100 MG: 100 INJECTION, POWDER, FOR SOLUTION INTRAMUSCULAR; INTRAVENOUS at 01:03

## 2018-03-21 RX ADMIN — OXYCODONE HYDROCHLORIDE 5 MG: 5 TABLET ORAL at 08:03

## 2018-03-21 NOTE — PROGRESS NOTES
[facility]  Pediatric Hematology/Oncology  Progress Note    Patient Name: Hema Kuo  Admission Date: 3/5/2018  Hospital Length of Stay: 16 days  Code Status: Full Code     Subjective:     Interval History: NAEON, VSS, afebrile. Received oxydocone prn x2 for neck pain yesterday.    Oncology Treatment Plan:     PEDS [id number]-1031  ARM A - LR Risk Patients    Medications:  Continuous Infusions:  Scheduled Meds:   acyclovir  800 mg Oral TID    chlorhexidine  15 mL Mouth/Throat BID    ciprofloxacin HCl  500 mg Oral Q12H    docusate sodium  100 mg Oral Daily    meropenem-0.9% sodium chloride  1 g Intravenous Q8H    polyethylene glycol  17 g Oral Daily    posaconazole  300 mg Oral BID    sulfamethoxazole-trimethoprim 800-160mg  1 tablet Oral twice daily on Friday, Saturday, Sunday    vancomycin (VANCOCIN) IVPB  1,000 mg Intravenous Q8H     PRN Meds:(Magic mouthwash) 1:1:1 Benadryl 12.5mg/5ml liq, aluminum & magnesium hydroxide-simehticone (Maalox), lidocaine viscous 2%, sodium chloride, sodium chloride, acetaminophen, diphenhydrAMINE, heparin, porcine (PF), hydrocortisone sodium succinate, lidocaine HCl 2%, LORazepam, naloxone, ondansetron, oxyCODONE, polyethylene glycol, sodium chloride 0.9%     Review of Systems   Constitutional: Positive for appetite change (improving). Negative for activity change and fever.   HENT: Positive for mouth sores (improving). Negative for congestion, nosebleeds and sore throat.         R-side neck pain   Eyes: Negative for discharge.   Respiratory: Negative for cough.    Cardiovascular: Negative for chest pain.   Gastrointestinal: Negative for abdominal distention and abdominal pain.   Genitourinary: Negative for decreased urine volume.   Musculoskeletal: Negative for myalgias.     Objective:     Vital Signs (Most Recent):  Temp: 98.7 °F (37.1 °C) (03/21/18 0730)  Pulse: 69 (03/21/18 0730)  Resp: 17 (03/21/18 0730)  BP: 121/61 (03/21/18 0730)  SpO2: 100 % (03/21/18  0730) Vital Signs (24h Range):  Temp:  [97.8 °F (36.6 °C)-99.7 °F (37.6 °C)] 98.7 °F (37.1 °C)  Pulse:  [67-86] 69  Resp:  [16-17] 17  SpO2:  [99 %-100 %] 100 %  BP: (115-137)/(57-65) 121/61     Weight: 82.8 kg (182 lb 8.7 oz)  Body mass index is 30.38 kg/m².  Body surface area is 1.95 meters squared.      Intake/Output Summary (Last 24 hours) at 03/21/18 0945  Last data filed at 03/21/18 0500   Gross per 24 hour   Intake             2110 ml   Output                0 ml   Net             2110 ml       Physical Exam   Constitutional: He is oriented to person, place, and time. He appears well-developed and well-nourished. No distress.   HENT:   Head: Normocephalic.   Healing mucosal lesions on R lower lip and left inner cheek. Both significantly improving from prior.     Eyes: Conjunctivae and EOM are normal. Pupils are equal, round, and reactive to light. Right eye exhibits no discharge. Left eye exhibits no discharge. No scleral icterus.   Neck: Neck supple.   Diffuse, tender, nonerythematous, firm swelling of R neck posterior to SCM, improving.   Cardiovascular: Normal rate and regular rhythm.    No murmur heard.  Pulmonary/Chest: Effort normal and breath sounds normal. No respiratory distress.   Abdominal: Soft. Bowel sounds are normal. He exhibits no distension.   Genitourinary:   Genitourinary Comments: Deferred     Musculoskeletal: He exhibits no deformity.   Neurological: He is alert and oriented to person, place, and time.   Skin: Skin is warm. Capillary refill takes less than 2 seconds. No rash noted.   Psychiatric: He has a normal mood and affect.   Nursing note and vitals reviewed.      Labs:   Recent Lab Results       03/21/18  0513      Immature Granulocytes 1.1(H)     Immature Grans (Abs) 0.01  Comment:  Mild elevation in immature granulocytes is non specific and   can be seen in a variety of conditions including stress response,   acute inflammation, trauma and pregnancy. Correlation with other    laboratory and clinical findings is essential.       Albumin 3.3(L)     Alkaline Phosphatase 101     ALT 35     Anion Gap 9     Aniso Slight     AST 20     Baso # 0.00     Basophil% 0.0     Total Bilirubin 0.4  Comment:  For infants and newborns, interpretation of results should be based  on gestational age, weight and in agreement with clinical  observations.  Premature Infant recommended reference ranges:  Up to 24 hours.............<8.0 mg/dL  Up to 48 hours............<12.0 mg/dL  3-5 days..................<15.0 mg/dL  6-29 days.................<15.0 mg/dL       BUN, Bld 8     Calcium 9.0     Chloride 104     CO2 25     Creatinine 0.7     Differential Method Automated     eGFR if African American >60.0     eGFR if non  >60.0  Comment:  Calculation used to obtain the estimated glomerular filtration  rate (eGFR) is the CKD-EPI equation.        Eos # 0.0     Eosinophil% 0.0     Glucose 109     Gran # (ANC) 0.1(L)     Gran% 7.3(L)     Hematocrit 21.5  Comment:  turbidity correction of indices(L)     Hemoglobin 8.1(L)     Hypo Occasional     Lymph # 0.8(L)     Lymph% 80.0(H)     MCH 30.0     MCHC 34.5  Comment:  Corrected result; previously reported as 37.7 on 03/21/2018 at 05:52.[C]     MCV 80(L)     Mono # 0.1(L)     Mono% 11.6     MPV 9.7     nRBC 0     Ovalocytes Occasional     Platelets 9  Comment:  wbc and plt  critical result(s) called and verbal readback obtained   from margoth fishman rn, 03/21/2018 05:55  (LL)     Poik Slight     Poly Occasional     Potassium 3.8     Total Protein 6.3     RBC 2.70(L)     RDW 13.2     Sodium 138     WBC 0.95  Comment:  wbc and plt  critical result(s) called and verbal readback obtained   from margoth fishman rn, 03/21/2018 05:55  (LL)           Diagnostic Results:  None        Assessment/Plan:     * AML (acute myeloblastic leukemia)      Oncology   AML (acute myeloblastic leukemia)     This  is a 17 y.o. male with AML (t8;21) receiving chemotherapy following [facility]  [id number] (Arm A) here w neutropenia (without fever initially). Today is day 23 of cycle. Febrile to 101.3 night of 3/12. cultured port (both lumens), added cefepime and vanc.  PO improving, still decreased. Neck swelling stable. Pain well-controlled on morphine pca, on-demand only.   afebrile since 2am 3/14.    Counts improving, clinically improving.  Mucositis improving. Mouth and neck pain significantly improved.    #fever and neutropenia: in setting of AML (t8;21) being treated with chemo per [facility] AAML 1031 (ARM A). S/p 2 units PRBC's on 3/7 for hemoglobin of 6.6; 2 U of pRBCs and 1U Platelets 3/9. On 3/14 received 1u plts and 2u pRBCs.    - platelet transfusion today, pre-med with hydrocortisone  - Daily CBC. Type and Screen q72h. Twice weekly CMP.  - cw all home meds              - Chlorhexidine 15 ml Oral BID              - Ciprofloxacin 500 mg Oral q12 hours              - Lisdexamfetamine 60 mg oral qAM              - Bactrim 800-160 mg Oral BID on Friday, Saturday, and Sunday              - Posaconazole 300 mg BID  - PRN Meds: Lorazepam, Ondansetron, Miralax  -f/u cultures (from both lumens)  - Vancomycin (since 3/12)  - meropenem (since 3/15)  - s/p cefepime (since 3/12)  - daily cultures x3d, reculture also if new fever or hypotensive  - monitor for bleeding, transfuse platelets if actively bleeding  - monitor BPs closely     Mucositis:  - oxycodone prn  - magic mouthwash  - monitor PO, electrolytes, daily weights  - Acyclovir 800 mg TID PO- transition to prophylactic dose      Hematemesis:   - night of 3/14: 20-30 ml juan brown/red blood noted with small clots  - transfused 1u plts and 2u pRBCs, with no further episodes  - continue to monitor     Dispo: pending resolution of counts                 Victoria Araya MD  Pediatric Hematology/Oncology  [facility]

## 2018-03-21 NOTE — SUBJECTIVE & OBJECTIVE
Subjective:     Interval History: NAEON, VSS, afebrile. Received oxydocone prn x2 for neck pain yesterday.    Oncology Treatment Plan:     PEDS AAML-1031  ARM A - LR Risk Patients    Medications:  Continuous Infusions:  Scheduled Meds:   acyclovir  800 mg Oral TID    chlorhexidine  15 mL Mouth/Throat BID    ciprofloxacin HCl  500 mg Oral Q12H    docusate sodium  100 mg Oral Daily    meropenem-0.9% sodium chloride  1 g Intravenous Q8H    polyethylene glycol  17 g Oral Daily    posaconazole  300 mg Oral BID    sulfamethoxazole-trimethoprim 800-160mg  1 tablet Oral twice daily on Friday, Saturday, Sunday    vancomycin (VANCOCIN) IVPB  1,000 mg Intravenous Q8H     PRN Meds:(Magic mouthwash) 1:1:1 Benadryl 12.5mg/5ml liq, aluminum & magnesium hydroxide-simehticone (Maalox), lidocaine viscous 2%, sodium chloride, sodium chloride, acetaminophen, diphenhydrAMINE, heparin, porcine (PF), hydrocortisone sodium succinate, lidocaine HCl 2%, LORazepam, naloxone, ondansetron, oxyCODONE, polyethylene glycol, sodium chloride 0.9%     Review of Systems   Constitutional: Positive for appetite change (improving). Negative for activity change and fever.   HENT: Positive for mouth sores (improving). Negative for congestion, nosebleeds and sore throat.         R-side neck pain   Eyes: Negative for discharge.   Respiratory: Negative for cough.    Cardiovascular: Negative for chest pain.   Gastrointestinal: Negative for abdominal distention and abdominal pain.   Genitourinary: Negative for decreased urine volume.   Musculoskeletal: Negative for myalgias.     Objective:     Vital Signs (Most Recent):  Temp: 98.7 °F (37.1 °C) (03/21/18 0730)  Pulse: 69 (03/21/18 0730)  Resp: 17 (03/21/18 0730)  BP: 121/61 (03/21/18 0730)  SpO2: 100 % (03/21/18 0730) Vital Signs (24h Range):  Temp:  [97.8 °F (36.6 °C)-99.7 °F (37.6 °C)] 98.7 °F (37.1 °C)  Pulse:  [67-86] 69  Resp:  [16-17] 17  SpO2:  [99 %-100 %] 100 %  BP: (115-137)/(57-65) 121/61      Weight: 82.8 kg (182 lb 8.7 oz)  Body mass index is 30.38 kg/m².  Body surface area is 1.95 meters squared.      Intake/Output Summary (Last 24 hours) at 03/21/18 0945  Last data filed at 03/21/18 0500   Gross per 24 hour   Intake             2110 ml   Output                0 ml   Net             2110 ml       Physical Exam   Constitutional: He is oriented to person, place, and time. He appears well-developed and well-nourished. No distress.   HENT:   Head: Normocephalic.   Healing mucosal lesions on R lower lip and left inner cheek. Both significantly improving from prior.     Eyes: Conjunctivae and EOM are normal. Pupils are equal, round, and reactive to light. Right eye exhibits no discharge. Left eye exhibits no discharge. No scleral icterus.   Neck: Neck supple.   Diffuse, tender, nonerythematous, firm swelling of R neck posterior to SCM, improving.   Cardiovascular: Normal rate and regular rhythm.    No murmur heard.  Pulmonary/Chest: Effort normal and breath sounds normal. No respiratory distress.   Abdominal: Soft. Bowel sounds are normal. He exhibits no distension.   Genitourinary:   Genitourinary Comments: Deferred     Musculoskeletal: He exhibits no deformity.   Neurological: He is alert and oriented to person, place, and time.   Skin: Skin is warm. Capillary refill takes less than 2 seconds. No rash noted.   Psychiatric: He has a normal mood and affect.   Nursing note and vitals reviewed.      Labs:   Recent Lab Results       03/21/18  0513      Immature Granulocytes 1.1(H)     Immature Grans (Abs) 0.01  Comment:  Mild elevation in immature granulocytes is non specific and   can be seen in a variety of conditions including stress response,   acute inflammation, trauma and pregnancy. Correlation with other   laboratory and clinical findings is essential.       Albumin 3.3(L)     Alkaline Phosphatase 101     ALT 35     Anion Gap 9     Aniso Slight     AST 20     Baso # 0.00     Basophil% 0.0     Total  Bilirubin 0.4  Comment:  For infants and newborns, interpretation of results should be based  on gestational age, weight and in agreement with clinical  observations.  Premature Infant recommended reference ranges:  Up to 24 hours.............<8.0 mg/dL  Up to 48 hours............<12.0 mg/dL  3-5 days..................<15.0 mg/dL  6-29 days.................<15.0 mg/dL       BUN, Bld 8     Calcium 9.0     Chloride 104     CO2 25     Creatinine 0.7     Differential Method Automated     eGFR if African American >60.0     eGFR if non  >60.0  Comment:  Calculation used to obtain the estimated glomerular filtration  rate (eGFR) is the CKD-EPI equation.        Eos # 0.0     Eosinophil% 0.0     Glucose 109     Gran # (ANC) 0.1(L)     Gran% 7.3(L)     Hematocrit 21.5  Comment:  turbidity correction of indices(L)     Hemoglobin 8.1(L)     Hypo Occasional     Lymph # 0.8(L)     Lymph% 80.0(H)     MCH 30.0     MCHC 34.5  Comment:  Corrected result; previously reported as 37.7 on 03/21/2018 at 05:52.[C]     MCV 80(L)     Mono # 0.1(L)     Mono% 11.6     MPV 9.7     nRBC 0     Ovalocytes Occasional     Platelets 9  Comment:  wbc and plt  critical result(s) called and verbal readback obtained   from margoth fishman rn, 03/21/2018 05:55  (LL)     Poik Slight     Poly Occasional     Potassium 3.8     Total Protein 6.3     RBC 2.70(L)     RDW 13.2     Sodium 138     WBC 0.95  Comment:  wbc and plt  critical result(s) called and verbal readback obtained   from margoth fishman rn, 03/21/2018 05:55  (LL)           Diagnostic Results:  None

## 2018-03-21 NOTE — SUBJECTIVE & OBJECTIVE
Interval History: No active complaints this AM. Neck mass remains tender but less compared to initial exam.     Medications:  Continuous Infusions:  Scheduled Meds:   acyclovir  800 mg Oral TID    chlorhexidine  15 mL Mouth/Throat BID    ciprofloxacin HCl  500 mg Oral Q12H    docusate sodium  100 mg Oral Daily    meropenem-0.9% sodium chloride  1 g Intravenous Q8H    polyethylene glycol  17 g Oral Daily    posaconazole  300 mg Oral BID    sulfamethoxazole-trimethoprim 800-160mg  1 tablet Oral twice daily on Friday, Saturday, Sunday    vancomycin (VANCOCIN) IVPB  1,000 mg Intravenous Q8H     PRN Meds:(Magic mouthwash) 1:1:1 Benadryl 12.5mg/5ml liq, aluminum & magnesium hydroxide-simehticone (Maalox), lidocaine viscous 2%, sodium chloride, sodium chloride, acetaminophen, diphenhydrAMINE, heparin, porcine (PF), hydrocortisone sodium succinate, lidocaine HCl 2%, LORazepam, naloxone, ondansetron, oxyCODONE, polyethylene glycol, sodium chloride 0.9%     Review of patient's allergies indicates:   Allergen Reactions    Nsaids (non-steroidal anti-inflammatory drug) Anaphylaxis    Nuts [tree nut] Anaphylaxis    Strawberry     Vancomycin analogues Itching     Premedicate with benadryl and admin over 2hr.     Objective:     Vital Signs (24h Range):  Temp:  [97.8 °F (36.6 °C)-99.7 °F (37.6 °C)] 98.7 °F (37.1 °C)  Pulse:  [67-86] 69  Resp:  [16-17] 17  SpO2:  [99 %-100 %] 100 %  BP: (115-137)/(57-65) 121/61        Lines/Drains/Airways     Central Venous Catheter Line                 Port A Cath Double Lumen 10/31/17 1826 left subclavian 140 days                Physical Exam  No acute distress, appropriate and pleasant  Normocephalic  EOMI, pupils equal round and reactive  External nose normal  No epistaxis, no nasal lesions on anterior rhinoscopy  Oral cavity with superficial erythematous erosions of oral vestibule and buccal mucosa  Right neck  tender along SCM; improving tenderness; no palpable fluctuance; left  neck normal to palpation  Normal voice, normal work of breathing, no stridor    Significant Labs:  BMP:   Recent Labs  Lab 03/21/18  0513         CO2 25   BUN 8   CREATININE 0.7   CALCIUM 9.0     CBC:   Recent Labs  Lab 03/21/18  0513   WBC 0.95*   RBC 2.70*   HGB 8.1*   HCT 21.5*   PLT 9*   MCV 80*   MCH 30.0   MCHC 34.5       Significant Diagnostics:  None

## 2018-03-21 NOTE — PLAN OF CARE
Problem: Patient Care Overview  Goal: Plan of Care Review  Outcome: Ongoing (interventions implemented as appropriate)  Pt awake most of shift, oxycodone given x1 for c/o mouth pain, tolerated po, afebrile, labs sent this am. Both pac ports flushed easily with good blood return.

## 2018-03-21 NOTE — PROGRESS NOTES
[facility]  Pediatric Infectious Disease  Progress Note    Patient Name: Hema Kuo  MRN: [id number]  Admission Date: 3/5/2018  Length of Stay: 16 days  Attending Physician: Sanford Bucio MD  Primary Care Provider: Ann Reyna NP    Isolation Status: No active isolations    Subjective:     Principal Problem:     Interval History: Has not had any fevers for several days. Was transitioned off PCA. Pain is improving, swelling is somewhat improving. Mouth sores significantly improved.     Review of Systems   Constitutional: Positive for fatigue. Negative for fever.   HENT: Positive for mouth sores. Negative for congestion and sinus pain.    Eyes: Negative for pain.   Respiratory: Negative for cough and shortness of breath.    Gastrointestinal: Negative for abdominal pain.   Musculoskeletal: Positive for neck pain.     Objective:     Vital Signs (Most Recent):  Temp: 98.7 °F (37.1 °C) (03/21/18 1140)  Pulse: 68 (03/21/18 1140)  Resp: 16 (03/21/18 1140)  BP: (!) 107/57 (Pt was asleep during the blood pressure reading) (03/21/18 1140)  SpO2: 100 % (03/21/18 1140) Vital Signs (24h Range):  Temp:  [98.2 °F (36.8 °C)-98.7 °F (37.1 °C)] 98.7 °F (37.1 °C)  Pulse:  [68-86] 68  Resp:  [16-17] 16  SpO2:  [99 %-100 %] 100 %  BP: (107-137)/(57-65) 107/57     Weight: 82.8 kg (182 lb 8.7 oz)  Body mass index is 30.38 kg/m².    Estimated Creatinine Clearance: 168.1 mL/min (based on SCr of 0.7 mg/dL).    Physical Exam   Constitutional: He appears well-developed. No distress.   HENT:   Nose: Nose normal.   Mouth sores with some improvement, no facial swelling or tenderness   Eyes: Conjunctivae are normal.   Neck:   The right neck mass is stable from yesterday, somewhat decreased swelling, continues with tenderness to palpation, but improved from yesterday   Pulmonary/Chest: Effort normal.   Musculoskeletal: He exhibits no edema.   Skin: Skin is warm. Capillary refill takes less than 2 seconds.       Significant  Labs:   Results for HEMA BHATIA (MRN [id number]) as of 3/21/2018 13:30   Ref. Range 3/19/2018 04:16   WBC Latest Ref Range: 3.90 - 12.70 K/uL 0.94 (LL)   RBC Latest Ref Range: 4.60 - 6.20 M/uL 2.73 (L)   Hemoglobin Latest Ref Range: 14.0 - 18.0 g/dL 8.1 (L)   Hematocrit Latest Ref Range: 40.0 - 54.0 % 21.8 (L)   MCV Latest Ref Range: 82 - 98 fL 80 (L)   MCH Latest Ref Range: 27.0 - 31.0 pg 29.7   MCHC Latest Ref Range: 32.0 - 36.0 g/dL 37.2 (H)   RDW Latest Ref Range: 11.5 - 14.5 % 13.6   Platelets Latest Ref Range: 150 - 350 K/uL 20 (LL)   MPV Latest Ref Range: 9.2 - 12.9 fL 10.4   Gran% Latest Ref Range: 38.0 - 73.0 % 3.2 (L)   Gran # (ANC) Latest Ref Range: 1.8 - 7.7 K/uL 0.0 (L)   Immature Granulocytes Latest Ref Range: 0.0 - 0.5 % 1.1 (H)   Immature Grans (Abs) Latest Ref Range: 0.00 - 0.04 K/uL 0.01   Lymph% Latest Ref Range: 18.0 - 48.0 % 90.4 (H)   Lymph # Latest Ref Range: 1.0 - 4.8 K/uL 0.9 (L)   Mono% Latest Ref Range: 4.0 - 15.0 % 5.3       Assessment/Plan:      Active Diagnoses:    Diagnosis Date Noted POA    Mass of lateral neck [R22.1] 03/16/2018 No    Neutropenia associated with mucositis due to antineoplastic therapy [D70.8, K12.32] 03/13/2018 Yes    Fever and neutropenia [D70.9, R50.81] 03/13/2018 No    AML (acute myeloblastic leukemia) [C92.00] 12/12/2017 Yes      Problems Resolved During this Admission:    Diagnosis Date Noted Date Resolved SOREN Garrido is a 18yo young man with AML, neutropenic and febrile, with a right neck mass and oral ulcers. Right neck mass has been stable and is most likely a bacterial lymphadenitis. He has been on broad spectrum antibiotics, vancomycin and meropenem. Would continue on broad coverage until he is ready to go home; appropriate home oral therapy would be ciprofloxacin and amoxicillin/clavulanic acid. These would provide continued broad coverage; only missing MRSA. Would monitor Hema on this regimen to ensure continued improvement; if it worsens would  broaden to cover MRSA.      - Please decrease acyclovir to previous prophylactic dosing  - TST negative, and T-Spot pending  - Continue current therapeutic antimicrobials, as we cannot narrow without an organism:        - Vancomycin: 13 is an acceptable trough, may continue with current dose         - Meropenem: 1g q8 hours  - If there is any worsening of the neck mass, get repeat imaging, and get either CXR or CT Chest.   - If the mass continues to improve, would consider imaging in 2 weeks time if it persists to monitor change  - If the patient goes home on oral antibiotics, ciprofloxacin (750mg q12 hours) and augmentin (875/125 q12hr) would be appropriate therapy. Would recommend a total duration of about 14 days, monitoring his improvement carefully and extending if necessary     Thank you for your consult. I will follow-up with patient. Please contact us if you have any additional questions.      Maureen Tabor MD  Pediatric Infectious Disease  [facility]

## 2018-03-21 NOTE — ASSESSMENT & PLAN NOTE
Oncology   AML (acute myeloblastic leukemia)     This  is a 17 y.o. male with AML (t8;21) receiving chemotherapy following [facility] [id number] ([place] A) here w neutropenia (without fever initially). Today is day 23 of cycle. Febrile to 101.3 night of 3/12. cultured port (both lumens), added cefepime and vanc.  PO improving, still decreased. Neck swelling stable. Pain well-controlled on morphine pca, on-demand only.   afebrile since 2am 3/14.    Counts improving, clinically improving.  Mucositis improving. Mouth and neck pain significantly improved.    #fever and neutropenia: in setting of AML (t8;21) being treated with chemo per [facility] AAML 1031 (ARM A). S/p 2 units PRBC's on 3/7 for hemoglobin of 6.6; 2 U of pRBCs and 1U Platelets 3/9. On 3/14 received 1u plts and 2u pRBCs.    - platelet transfusion today, pre-med with hydrocortisone  - Daily CBC. Type and Screen q72h. Twice weekly CMP.  - cw all home meds              - Chlorhexidine 15 ml Oral BID              - Ciprofloxacin 500 mg Oral q12 hours              - Lisdexamfetamine 60 mg oral qAM              - Bactrim 800-160 mg Oral BID on Friday, Saturday, and Sunday              - Posaconazole 300 mg BID  - PRN Meds: Lorazepam, Ondansetron, Miralax  -f/u cultures (from both lumens)  - Vancomycin (since 3/12)  - meropenem (since 3/15)  - s/p cefepime (since 3/12)  - daily cultures x3d, reculture also if new fever or hypotensive  - monitor for bleeding, transfuse platelets if actively bleeding  - monitor BPs closely     Mucositis:  - oxycodone prn  - magic mouthwash  - monitor PO, electrolytes, daily weights  - Acyclovir 800 mg TID PO- transition to prophylactic dose      Hematemesis:   - night of 3/14: 20-30 ml juan brown/red blood noted with small clots  - transfused 1u plts and 2u pRBCs, with no further episodes  - continue to monitor     Dispo: pending resolution of counts

## 2018-03-21 NOTE — PROGRESS NOTES
[facility]  Otorhinolaryngology-Head & Neck Surgery  Progress Note    Subjective:     Post-Op Info:  * No surgery found *      Hospital Day: 17     Interval History: No active complaints this AM. Neck mass remains tender but less compared to initial exam.     Medications:  Continuous Infusions:  Scheduled Meds:   acyclovir  800 mg Oral TID    chlorhexidine  15 mL Mouth/Throat BID    ciprofloxacin HCl  500 mg Oral Q12H    docusate sodium  100 mg Oral Daily    meropenem-0.9% sodium chloride  1 g Intravenous Q8H    polyethylene glycol  17 g Oral Daily    posaconazole  300 mg Oral BID    sulfamethoxazole-trimethoprim 800-160mg  1 tablet Oral twice daily on Friday, Saturday, Sunday    vancomycin (VANCOCIN) IVPB  1,000 mg Intravenous Q8H     PRN Meds:(Magic mouthwash) 1:1:1 Benadryl 12.5mg/5ml liq, aluminum & magnesium hydroxide-simehticone (Maalox), lidocaine viscous 2%, sodium chloride, sodium chloride, acetaminophen, diphenhydrAMINE, heparin, porcine (PF), hydrocortisone sodium succinate, lidocaine HCl 2%, LORazepam, naloxone, ondansetron, oxyCODONE, polyethylene glycol, sodium chloride 0.9%     Review of patient's allergies indicates:   Allergen Reactions    Nsaids (non-steroidal anti-inflammatory drug) Anaphylaxis    Nuts [tree nut] Anaphylaxis    Strawberry     Vancomycin analogues Itching     Premedicate with benadryl and admin over 2hr.     Objective:     Vital Signs (24h Range):  Temp:  [97.8 °F (36.6 °C)-99.7 °F (37.6 °C)] 98.7 °F (37.1 °C)  Pulse:  [67-86] 69  Resp:  [16-17] 17  SpO2:  [99 %-100 %] 100 %  BP: (115-137)/(57-65) 121/61        Lines/Drains/Airways     Central Venous Catheter Line                 Port A Cath Double Lumen 10/31/17 1826 left subclavian 140 days                Physical Exam  No acute distress, appropriate and pleasant  Normocephalic  EOMI, pupils equal round and reactive  External nose normal  No epistaxis, no nasal lesions on anterior rhinoscopy  Oral  cavity with superficial erythematous erosions of oral vestibule and buccal mucosa  Right neck  tender along SCM; improving tenderness; no palpable fluctuance; left neck normal to palpation  Normal voice, normal work of breathing, no stridor    Significant Labs:  BMP:   Recent Labs  Lab 03/21/18  0513         CO2 25   BUN 8   CREATININE 0.7   CALCIUM 9.0     CBC:   Recent Labs  Lab 03/21/18  0513   WBC 0.95*   RBC 2.70*   HGB 8.1*   HCT 21.5*   PLT 9*   MCV 80*   MCH 30.0   MCHC 34.5       Significant Diagnostics:  None    Assessment/Plan:     Mass of lateral neck    -Exam concerning for an  lymphadenitis vs. neoplasm   US done, no definite drainable fluid collection  -Patient currently on broad spectrum Abx   Subjectively improving  -Will discuss with staff  -Currently prefer against invasive intervention  -Continue care per primary            Yuval Gerber MD  Otorhinolaryngology-Head & Neck Surgery  [facility]

## 2018-03-21 NOTE — PLAN OF CARE
Problem: Patient Care Overview  Goal: Plan of Care Review  Outcome: Ongoing (interventions implemented as appropriate)  POC reviewed with pt. VSS, pt afebrile throughout this shift. Lt PAC CDI, double lumen flushes with blood return. Oxycodone administered x1 for pain control. Pt given pre-med before platelet infusion, pre-meds of Tylenol, Benadryl and Hydrocortisone administered, 250ml of Platelets administered, pt tolerated infusion of platelets with no reaction noted. Notified Dr. Tse pt received Benadryl before platelet infusion, too soon to receive another dose before Vancomycin, will administer Vancomycin over 3hrs and monitor for any reaction. Pt sleeping most of this shift, family at bedside.

## 2018-03-22 LAB
ABO + RH BLD: NORMAL
ANISOCYTOSIS BLD QL SMEAR: SLIGHT
BASOPHILS # BLD AUTO: ABNORMAL K/UL
BASOPHILS NFR BLD: 0 %
BLD GP AB SCN CELLS X3 SERPL QL: NORMAL
DIFFERENTIAL METHOD: ABNORMAL
EOSINOPHIL # BLD AUTO: ABNORMAL K/UL
EOSINOPHIL NFR BLD: 0 %
ERYTHROCYTE [DISTWIDTH] IN BLOOD BY AUTOMATED COUNT: 13.7 %
HCT VFR BLD AUTO: 22 %
HGB BLD-MCNC: 8.2 G/DL
IMM GRANULOCYTES # BLD AUTO: ABNORMAL K/UL
IMM GRANULOCYTES NFR BLD AUTO: ABNORMAL %
LYMPHOCYTES # BLD AUTO: ABNORMAL K/UL
LYMPHOCYTES NFR BLD: 79 %
MCH RBC QN AUTO: 29.6 PG
MCHC RBC AUTO-ENTMCNC: 37.2 G/DL
MCV RBC AUTO: 79 FL
MONOCYTES # BLD AUTO: ABNORMAL K/UL
MONOCYTES NFR BLD: 10 %
NEUTROPHILS NFR BLD: 11 %
NRBC BLD-RTO: 0 /100 WBC
OVALOCYTES BLD QL SMEAR: ABNORMAL
PLATELET # BLD AUTO: 34 K/UL
PLATELET BLD QL SMEAR: ABNORMAL
PMV BLD AUTO: 10.2 FL
POIKILOCYTOSIS BLD QL SMEAR: SLIGHT
POLYCHROMASIA BLD QL SMEAR: ABNORMAL
RBC # BLD AUTO: 2.77 M/UL
WBC # BLD AUTO: 1.39 K/UL

## 2018-03-22 PROCEDURE — 85007 BL SMEAR W/DIFF WBC COUNT: CPT

## 2018-03-22 PROCEDURE — 25000003 PHARM REV CODE 250: Performed by: STUDENT IN AN ORGANIZED HEALTH CARE EDUCATION/TRAINING PROGRAM

## 2018-03-22 PROCEDURE — 11300000 HC PEDIATRIC PRIVATE ROOM

## 2018-03-22 PROCEDURE — 36592 COLLECT BLOOD FROM PICC: CPT

## 2018-03-22 PROCEDURE — 63600175 PHARM REV CODE 636 W HCPCS: Performed by: STUDENT IN AN ORGANIZED HEALTH CARE EDUCATION/TRAINING PROGRAM

## 2018-03-22 PROCEDURE — 97803 MED NUTRITION INDIV SUBSEQ: CPT

## 2018-03-22 PROCEDURE — 63600175 PHARM REV CODE 636 W HCPCS: Performed by: PEDIATRICS

## 2018-03-22 PROCEDURE — 36415 COLL VENOUS BLD VENIPUNCTURE: CPT

## 2018-03-22 PROCEDURE — 86901 BLOOD TYPING SEROLOGIC RH(D): CPT

## 2018-03-22 PROCEDURE — 99233 SBSQ HOSP IP/OBS HIGH 50: CPT | Mod: ,,, | Performed by: PEDIATRICS

## 2018-03-22 PROCEDURE — 85027 COMPLETE CBC AUTOMATED: CPT

## 2018-03-22 RX ORDER — ACYCLOVIR 200 MG/1
400 CAPSULE ORAL 2 TIMES DAILY
Status: DISCONTINUED | OUTPATIENT
Start: 2018-03-22 | End: 2018-03-23 | Stop reason: HOSPADM

## 2018-03-22 RX ORDER — AMOXICILLIN AND CLAVULANATE POTASSIUM 875; 125 MG/1; MG/1
1 TABLET, FILM COATED ORAL EVERY 12 HOURS
Qty: 28 TABLET | Refills: 0 | Status: SHIPPED | OUTPATIENT
Start: 2018-03-22 | End: 2018-04-05

## 2018-03-22 RX ORDER — CIPROFLOXACIN 750 MG/1
750 TABLET, FILM COATED ORAL EVERY 12 HOURS
Qty: 28 TABLET | Refills: 0 | Status: SHIPPED | OUTPATIENT
Start: 2018-03-22 | End: 2018-04-05

## 2018-03-22 RX ORDER — POSACONAZOLE 100 MG/1
300 TABLET, DELAYED RELEASE ORAL 2 TIMES DAILY
Qty: 180 TABLET | Refills: 0 | Status: SHIPPED | OUTPATIENT
Start: 2018-03-22 | End: 2018-03-23 | Stop reason: SDUPTHER

## 2018-03-22 RX ORDER — OXYCODONE HYDROCHLORIDE 10 MG/1
10 TABLET ORAL EVERY 6 HOURS PRN
Qty: 20 TABLET | Refills: 0 | Status: SHIPPED | OUTPATIENT
Start: 2018-03-22 | End: 2018-07-20

## 2018-03-22 RX ADMIN — DIPHENHYDRAMINE HYDROCHLORIDE 25 MG: 25 CAPSULE ORAL at 06:03

## 2018-03-22 RX ADMIN — POSACONAZOLE 300 MG: 100 TABLET, COATED ORAL at 09:03

## 2018-03-22 RX ADMIN — MEROPENEM AND SODIUM CHLORIDE 1 G: 1 INJECTION, SOLUTION INTRAVENOUS at 09:03

## 2018-03-22 RX ADMIN — DIPHENHYDRAMINE HYDROCHLORIDE 25 MG: 25 CAPSULE ORAL at 09:03

## 2018-03-22 RX ADMIN — MEROPENEM AND SODIUM CHLORIDE 1 G: 1 INJECTION, SOLUTION INTRAVENOUS at 05:03

## 2018-03-22 RX ADMIN — ACYCLOVIR 800 MG: 200 CAPSULE ORAL at 08:03

## 2018-03-22 RX ADMIN — Medication 1 G: at 10:03

## 2018-03-22 RX ADMIN — HEPARIN 330 UNITS: 100 SYRINGE at 10:03

## 2018-03-22 RX ADMIN — DIPHENHYDRAMINE HYDROCHLORIDE 25 MG: 25 CAPSULE ORAL at 01:03

## 2018-03-22 RX ADMIN — HEPARIN 330 UNITS: 100 SYRINGE at 08:03

## 2018-03-22 RX ADMIN — HEPARIN 330 UNITS: 100 SYRINGE at 04:03

## 2018-03-22 RX ADMIN — DOCUSATE SODIUM 100 MG: 100 CAPSULE, LIQUID FILLED ORAL at 08:03

## 2018-03-22 RX ADMIN — CHLORHEXIDINE GLUCONATE 15 ML: 1.2 RINSE ORAL at 09:03

## 2018-03-22 RX ADMIN — MEROPENEM AND SODIUM CHLORIDE 1 G: 1 INJECTION, SOLUTION INTRAVENOUS at 01:03

## 2018-03-22 RX ADMIN — CIPROFLOXACIN HYDROCHLORIDE 500 MG: 500 TABLET, FILM COATED ORAL at 09:03

## 2018-03-22 RX ADMIN — HEPARIN 330 UNITS: 100 SYRINGE at 01:03

## 2018-03-22 RX ADMIN — Medication 1 G: at 01:03

## 2018-03-22 RX ADMIN — CIPROFLOXACIN HYDROCHLORIDE 500 MG: 500 TABLET, FILM COATED ORAL at 08:03

## 2018-03-22 RX ADMIN — ACYCLOVIR 400 MG: 200 CAPSULE ORAL at 09:03

## 2018-03-22 RX ADMIN — POSACONAZOLE 300 MG: 100 TABLET, COATED ORAL at 08:03

## 2018-03-22 RX ADMIN — Medication 1 G: at 06:03

## 2018-03-22 NOTE — SUBJECTIVE & OBJECTIVE
Subjective:     Interval History: NAEON, VSS, afebrile. Doing well. Minimal pain       Oncology Treatment Plan:     PEDS [id number]  ARM A - LR Risk Patients    Medications:  Continuous Infusions:  Scheduled Meds:   acyclovir  800 mg Oral TID    chlorhexidine  15 mL Mouth/Throat BID    ciprofloxacin HCl  500 mg Oral Q12H    docusate sodium  100 mg Oral Daily    meropenem-0.9% sodium chloride  1 g Intravenous Q8H    polyethylene glycol  17 g Oral Daily    posaconazole  300 mg Oral BID    sulfamethoxazole-trimethoprim 800-160mg  1 tablet Oral twice daily on Friday, Saturday, Sunday    vancomycin (VANCOCIN) IVPB  1,000 mg Intravenous Q8H     PRN Meds:(Magic mouthwash) 1:1:1 Benadryl 12.5mg/5ml liq, aluminum & magnesium hydroxide-simehticone (Maalox), lidocaine viscous 2%, sodium chloride, sodium chloride, sodium chloride, acetaminophen, diphenhydrAMINE, heparin, porcine (PF), lidocaine HCl 2%, LORazepam, naloxone, ondansetron, oxyCODONE, polyethylene glycol, sodium chloride 0.9%     Review of Systems   Constitutional: Positive for appetite change (improving). Negative for activity change and fever.   HENT: Positive for mouth sores (improving). Negative for congestion, nosebleeds and sore throat.         R-side neck pain   Eyes: Negative for discharge.   Respiratory: Negative for cough.    Cardiovascular: Negative for chest pain.   Gastrointestinal: Negative for abdominal distention and abdominal pain.   Genitourinary: Negative for decreased urine volume.   Musculoskeletal: Negative for myalgias.     Objective:     Vital Signs (Most Recent):  Temp: 98.9 °F (37.2 °C) (03/22/18 0831)  Pulse: 65 (03/22/18 0831)  Resp: 17 (03/22/18 0831)  BP: (!) 141/67 (03/22/18 0831)  SpO2: 98 % (03/22/18 0831) Vital Signs (24h Range):  Temp:  [98.1 °F (36.7 °C)-98.9 °F (37.2 °C)] 98.9 °F (37.2 °C)  Pulse:  [62-80] 65  Resp:  [16-18] 17  SpO2:  [96 %-100 %] 98 %  BP: (107-141)/(53-74) 141/67     Weight: 83 kg (182 lb 15.7  oz)  Body mass index is 30.45 kg/m².  Body surface area is 1.95 meters squared.      Intake/Output Summary (Last 24 hours) at 03/22/18 0937  Last data filed at 03/22/18 0616   Gross per 24 hour   Intake             2880 ml   Output                0 ml   Net             2880 ml       Physical Exam   Constitutional: He is oriented to person, place, and time. He appears well-developed and well-nourished. No distress.   HENT:   Head: Normocephalic.   Healing mucosal lesions on R lower lip and left inner cheek. Both significantly improving from prior.     Eyes: Conjunctivae and EOM are normal. Pupils are equal, round, and reactive to light. Right eye exhibits no discharge. Left eye exhibits no discharge. No scleral icterus.   Neck: Neck supple.   Diffuse, tender, nonerythematous, firm swelling of R neck posterior to SCM, improving.   Cardiovascular: Normal rate and regular rhythm.    No murmur heard.  Pulmonary/Chest: Effort normal and breath sounds normal. No respiratory distress.   Abdominal: Soft. Bowel sounds are normal. He exhibits no distension.   Genitourinary:   Genitourinary Comments: Deferred     Musculoskeletal: He exhibits no deformity.   Neurological: He is alert and oriented to person, place, and time.   Skin: Skin is warm. Capillary refill takes less than 2 seconds. No rash noted.   Psychiatric: He has a normal mood and affect.   Nursing note and vitals reviewed.      Labs:   Recent Lab Results       03/22/18  0525      Immature Granulocytes CANCELED  Comment:  Result canceled by the ancillary     Immature Grans (Abs) CANCELED  Comment:  Mild elevation in immature granulocytes is non specific and   can be seen in a variety of conditions including stress response,   acute inflammation, trauma and pregnancy. Correlation with other   laboratory and clinical findings is essential.    Result canceled by the ancillary       Aniso Slight     Baso # Test Not Performed  Comment:  Corrected result; previously  reported as 0.01 on %DDDDDDDD% at %TTT%.[C]     Basophil% 0.0  Comment:  Corrected result; previously reported as 0.7 on %DDDDDDDD% at %TTT%.[C]     Differential Method Manual     Eos # Test Not Performed  Comment:  Corrected result; previously reported as 0.0 on %DDDDDDDD% at %TTT%.[C]     Eosinophil% 0.0     Gran% 11.0  Comment:  Corrected result; previously reported as 12.2 on %DDDDDDDD% at %TTT%.(L)[C]     Group & Rh AB POS     Hematocrit 22.0(L)     Hemoglobin 8.2(L)     INDIRECT LARRY NEG     Lymph # Test Not Performed  Comment:  Corrected result; previously reported as 0.9 on %DDDDDDDD% at %TTT%.[C]     Lymph% 79.0  Comment:  Corrected result; previously reported as 63.3 on %DDDDDDDD% at %TTT%.(H)[C]     MCH 29.6     MCHC 37.2(H)     MCV 79(L)     Mono # Test Not Performed  Comment:  Corrected result; previously reported as 0.3 on %DDDDDDDD% at %TTT%.[C]     Mono% 10.0  Comment:  Corrected result; previously reported as 18.0 on %DDDDDDDD% at %TTT%.[C]     MPV 10.2     nRBC 0     Ovalocytes Occasional     Platelet Estimate Decreased(A)     Platelets 34  Comment:  plt   critical result(s) called and verbal readback obtained from   forest waldrop rn, 03/22/2018 08:43  (LL)     Poik Slight     Poly Occasional     RBC 2.77(L)     RDW 13.7     WBC 1.39  Comment:  WBC critical result(s) called and verbal readback obtained from   FOREST YANG RN, 03/22/2018 08:13  (LL)           Diagnostic Results:  None

## 2018-03-22 NOTE — ASSESSMENT & PLAN NOTE
Oncology   AML (acute myeloblastic leukemia)     This  is a 17 y.o. male with AML (t8;21) receiving chemotherapy following [facility] [id number] (Arm A) here w neutropenia (without fever initially). Today is day 23 of cycle. Febrile to 101.3 night of 3/12. cultured port (both lumens), added cefepime and vanc.  PO improving, still decreased. Neck swelling stable. Pain well-controlled on morphine pca, on-demand only.   afebrile since 2am 3/14.    Counts improving, clinically improving.  Mucositis improving. Mouth and neck pain significantly improved.    #fever and neutropenia: in setting of AML (t8;21) being treated with chemo per [facility] AAML 1031 (ARM A). S/p 2 units PRBC's on 3/7 for hemoglobin of 6.6; 2 U of pRBCs and 1U Platelets 3/9. On 3/14 received 1u plts and 2u pRBCs.    - Daily CBC. Type and Screen q72h. Twice weekly CMP.  - cw all home meds              - Chlorhexidine 15 ml Oral BID              - Ciprofloxacin 500 mg Oral q12 hours              - Lisdexamfetamine 60 mg oral qAM              - Bactrim 800-160 mg Oral BID on Friday, Saturday, and Sunday              - Posaconazole 300 mg BID  - PRN Meds: Lorazepam, Ondansetron, Miralax  -f/u cultures (from both lumens)  - Vancomycin (since 3/12)  - meropenem (since 3/15)  - s/p cefepime (since 3/12)  - daily cultures x3d, reculture also if new fever or hypotensive  - monitor for bleeding, transfuse platelets if actively bleeding  - monitor BPs closely     Mucositis:  - oxycodone prn  - magic mouthwash  - monitor PO, electrolytes, daily weights  - Acyclovir 800 mg TID PO- transition to prophylactic dose      Hematemesis:   - night of 3/14: 20-30 ml juan brown/red blood noted with small clots  - transfused 1u plts and 2u pRBCs, with no further episodes  - continue to monitor     Dispo: pending resolution of counts, on home augmentin and treatment dose of ciprofloxacin for close outpt follow up of counts and neck swelling.

## 2018-03-22 NOTE — PROGRESS NOTES
[facility]  Pediatric Hematology/Oncology  Progress Note    Patient Name: Hema Kuo  Admission Date: 3/5/2018  Hospital Length of Stay: 17 days  Code Status: Full Code     Subjective:     Interval History: NAEON, VSS, afebrile. Doing well. Minimal pain       Oncology Treatment Plan:     PEDS [id number]-1031  ARM A - LR Risk Patients    Medications:  Continuous Infusions:  Scheduled Meds:   acyclovir  800 mg Oral TID    chlorhexidine  15 mL Mouth/Throat BID    ciprofloxacin HCl  500 mg Oral Q12H    docusate sodium  100 mg Oral Daily    meropenem-0.9% sodium chloride  1 g Intravenous Q8H    polyethylene glycol  17 g Oral Daily    posaconazole  300 mg Oral BID    sulfamethoxazole-trimethoprim 800-160mg  1 tablet Oral twice daily on Friday, Saturday, Sunday    vancomycin (VANCOCIN) IVPB  1,000 mg Intravenous Q8H     PRN Meds:(Magic mouthwash) 1:1:1 Benadryl 12.5mg/5ml liq, aluminum & magnesium hydroxide-simehticone (Maalox), lidocaine viscous 2%, sodium chloride, sodium chloride, sodium chloride, acetaminophen, diphenhydrAMINE, heparin, porcine (PF), lidocaine HCl 2%, LORazepam, naloxone, ondansetron, oxyCODONE, polyethylene glycol, sodium chloride 0.9%     Review of Systems   Constitutional: Positive for appetite change (improving). Negative for activity change and fever.   HENT: Positive for mouth sores (improving). Negative for congestion, nosebleeds and sore throat.         R-side neck pain   Eyes: Negative for discharge.   Respiratory: Negative for cough.    Cardiovascular: Negative for chest pain.   Gastrointestinal: Negative for abdominal distention and abdominal pain.   Genitourinary: Negative for decreased urine volume.   Musculoskeletal: Negative for myalgias.     Objective:     Vital Signs (Most Recent):  Temp: 98.9 °F (37.2 °C) (03/22/18 0831)  Pulse: 65 (03/22/18 0831)  Resp: 17 (03/22/18 0831)  BP: (!) 141/67 (03/22/18 0831)  SpO2: 98 % (03/22/18 0831) Vital Signs (24h  Range):  Temp:  [98.1 °F (36.7 °C)-98.9 °F (37.2 °C)] 98.9 °F (37.2 °C)  Pulse:  [62-80] 65  Resp:  [16-18] 17  SpO2:  [96 %-100 %] 98 %  BP: (107-141)/(53-74) 141/67     Weight: 83 kg (182 lb 15.7 oz)  Body mass index is 30.45 kg/m².  Body surface area is 1.95 meters squared.      Intake/Output Summary (Last 24 hours) at 03/22/18 0937  Last data filed at 03/22/18 0616   Gross per 24 hour   Intake             2880 ml   Output                0 ml   Net             2880 ml       Physical Exam   Constitutional: He is oriented to person, place, and time. He appears well-developed and well-nourished. No distress.   HENT:   Head: Normocephalic.   Healing mucosal lesions on R lower lip and left inner cheek. Both significantly improving from prior.     Eyes: Conjunctivae and EOM are normal. Pupils are equal, round, and reactive to light. Right eye exhibits no discharge. Left eye exhibits no discharge. No scleral icterus.   Neck: Neck supple.   Diffuse, tender, nonerythematous, firm swelling of R neck posterior to SCM, improving.   Cardiovascular: Normal rate and regular rhythm.    No murmur heard.  Pulmonary/Chest: Effort normal and breath sounds normal. No respiratory distress.   Abdominal: Soft. Bowel sounds are normal. He exhibits no distension.   Genitourinary:   Genitourinary Comments: Deferred     Musculoskeletal: He exhibits no deformity.   Neurological: He is alert and oriented to person, place, and time.   Skin: Skin is warm. Capillary refill takes less than 2 seconds. No rash noted.   Psychiatric: He has a normal mood and affect.   Nursing note and vitals reviewed.      Labs:   Recent Lab Results       03/22/18  0525      Immature Granulocytes CANCELED  Comment:  Result canceled by the ancillary     Immature Grans (Abs) CANCELED  Comment:  Mild elevation in immature granulocytes is non specific and   can be seen in a variety of conditions including stress response,   acute inflammation, trauma and pregnancy.  Correlation with other   laboratory and clinical findings is essential.    Result canceled by the ancillary       Aniso Slight     Baso # Test Not Performed  Comment:  Corrected result; previously reported as 0.01 on %DDDDDDDD% at %TTT%.[C]     Basophil% 0.0  Comment:  Corrected result; previously reported as 0.7 on %DDDDDDDD% at %TTT%.[C]     Differential Method Manual     Eos # Test Not Performed  Comment:  Corrected result; previously reported as 0.0 on %DDDDDDDD% at %TTT%.[C]     Eosinophil% 0.0     Gran% 11.0  Comment:  Corrected result; previously reported as 12.2 on %DDDDDDDD% at %TTT%.(L)[C]     Group & Rh AB POS     Hematocrit 22.0(L)     Hemoglobin 8.2(L)     INDIRECT LARRY NEG     Lymph # Test Not Performed  Comment:  Corrected result; previously reported as 0.9 on %DDDDDDDD% at %TTT%.[C]     Lymph% 79.0  Comment:  Corrected result; previously reported as 63.3 on %DDDDDDDD% at %TTT%.(H)[C]     MCH 29.6     MCHC 37.2(H)     MCV 79(L)     Mono # Test Not Performed  Comment:  Corrected result; previously reported as 0.3 on %DDDDDDDD% at %TTT%.[C]     Mono% 10.0  Comment:  Corrected result; previously reported as 18.0 on %DDDDDDDD% at %TTT%.[C]     MPV 10.2     nRBC 0     Ovalocytes Occasional     Platelet Estimate Decreased(A)     Platelets 34  Comment:  plt   critical result(s) called and verbal readback obtained from   forest waldrop rn, 03/22/2018 08:43  (LL)     Poik Slight     Poly Occasional     RBC 2.77(L)     RDW 13.7     WBC 1.39  Comment:  WBC critical result(s) called and verbal readback obtained from   FOREST YANG RN, 03/22/2018 08:13  (LL)           Diagnostic Results:  None        Assessment/Plan:     * AML (acute myeloblastic leukemia)      Oncology   AML (acute myeloblastic leukemia)     This  is a 17 y.o. male with AML (t8;21) receiving chemotherapy following [facility] [id number] (Arm A) here w neutropenia (without fever initially). Today is day 23 of cycle. Febrile to 101.3 night of 3/12.  cultured port (both lumens), added cefepime and vanc.  PO improving, still decreased. Neck swelling stable. Pain well-controlled on morphine pca, on-demand only.   afebrile since 2am 3/14.    Counts improving, clinically improving.  Mucositis improving. Mouth and neck pain significantly improved.    #fever and neutropenia: in setting of AML (t8;21) being treated with chemo per [facility] AAML 1031 (ARM A). S/p 2 units PRBC's on 3/7 for hemoglobin of 6.6; 2 U of pRBCs and 1U Platelets 3/9. On 3/14 received 1u plts and 2u pRBCs.    - Daily CBC. Type and Screen q72h. Twice weekly CMP.  - cw all home meds              - Chlorhexidine 15 ml Oral BID              - Ciprofloxacin 500 mg Oral q12 hours              - Lisdexamfetamine 60 mg oral qAM              - Bactrim 800-160 mg Oral BID on Friday, Saturday, and Sunday              - Posaconazole 300 mg BID  - PRN Meds: Lorazepam, Ondansetron, Miralax  -f/u cultures (from both lumens)  - Vancomycin (since 3/12)  - meropenem (since 3/15)  - s/p cefepime (since 3/12)  - daily cultures x3d, reculture also if new fever or hypotensive  - monitor for bleeding, transfuse platelets if actively bleeding  - monitor BPs closely     Mucositis:  - oxycodone prn  - magic mouthwash  - monitor PO, electrolytes, daily weights  - Acyclovir 800 mg TID PO- transition to prophylactic dose      Hematemesis:   - night of 3/14: 20-30 ml juan brown/red blood noted with small clots  - transfused 1u plts and 2u pRBCs, with no further episodes  - continue to monitor     Dispo: pending resolution of counts, on home augmentin and treatment dose of ciprofloxacin for close outpt follow up of counts and neck swelling.                 Victoria Araya MD  Pediatric Hematology/Oncology  [facility]

## 2018-03-22 NOTE — PLAN OF CARE
Problem: Patient Care Overview  Goal: Plan of Care Review  Hema has been awake the majority of the day and has been afebrile throughout the day.  Ate lunch well and denies any mouth pain or discomfort.  Voiding adequately and receiving antibiotics as scheduled with no signs of reaction noted.  Hema ambulated in harmon to playroom and visited with another patient throughout the day.  Hema in a very good mood anticipating discharge tomorrow.

## 2018-03-22 NOTE — PLAN OF CARE
Problem: Patient Care Overview  Goal: Plan of Care Review  Outcome: Ongoing (interventions implemented as appropriate)  Pt VSS throughout shift, afebrile.  Vancomycin and meropenem adminsitered to alternating ports.  Pt premedicated with benadryl prior to vancomycin administration, no issues noted.  Localized swelling noted to right side of neck and pt c/o pain at the site.  No PRN meds needed.  Labs to be drawn this morning.  POC discussed with pt; understanding verbalized and all questions answered.  Will continue to monitor.

## 2018-03-22 NOTE — PROGRESS NOTES
Nutrition Assessment    Dx: neutropenia    Weight: 83kg  Height: 165.1cm  BMI: 30.8    Percentiles   Weight/Age: 84%  Height/Age: 5%  BMI/Age: 96%    Estimated Needs:  86330pgbl (28kcal/kg)  83-100g protein (1-1.2g/kg protein)  1mL/kcal    Diet: Regular    Meds: Polyethylene glycol, docusate, acylovir   Labs: Alb 3.3, WBC .95, Hgb 8.1, Hct 21.5, Plts 9    24 hr I/Os:   Total intake: 2880mL (34.7mL/kg)  UOP: noted, +I/O    Nutrition Hx: 18yo male with hx AML, gets chemo. Pt reports eating all meals with tolerance at the time of visit. Pt reports mouth sores not affecting his appetite anymore.     Nutrition Diagnosis: Inadequate energy intake r/t decreased appetite AEB PO intake <25%, mouth sores - resolved.     Intervention:   1. Continue current diet order.     2. If PO intake <50%, , recommend Boost Plus TID for additional calories and protein.     4. Weights weekly.     Goal: Pt to meet % EEN and EPN - met.   Monitor: PO intake, wts, labs  1X/week    Nutrition Discharge Planning: D/c with regular diet.

## 2018-03-23 VITALS
HEIGHT: 65 IN | SYSTOLIC BLOOD PRESSURE: 131 MMHG | DIASTOLIC BLOOD PRESSURE: 59 MMHG | WEIGHT: 181.44 LBS | HEART RATE: 68 BPM | OXYGEN SATURATION: 100 % | RESPIRATION RATE: 18 BRPM | TEMPERATURE: 99 F | BODY MASS INDEX: 30.23 KG/M2

## 2018-03-23 PROBLEM — Z51.5 COMFORT MEASURES ONLY STATUS: Status: ACTIVE | Noted: 2018-03-23

## 2018-03-23 LAB
ANISOCYTOSIS BLD QL SMEAR: SLIGHT
BASOPHILS # BLD AUTO: ABNORMAL K/UL
BASOPHILS NFR BLD: 0 %
DIFFERENTIAL METHOD: ABNORMAL
EOSINOPHIL # BLD AUTO: ABNORMAL K/UL
EOSINOPHIL NFR BLD: 0 %
ERYTHROCYTE [DISTWIDTH] IN BLOOD BY AUTOMATED COUNT: 13.2 %
HCT VFR BLD AUTO: 21.8 %
HGB BLD-MCNC: 8.1 G/DL
IMM GRANULOCYTES # BLD AUTO: ABNORMAL K/UL
IMM GRANULOCYTES NFR BLD AUTO: ABNORMAL %
LYMPHOCYTES # BLD AUTO: ABNORMAL K/UL
LYMPHOCYTES NFR BLD: 73 %
MCH RBC QN AUTO: 29.8 PG
MCHC RBC AUTO-ENTMCNC: 37.2 G/DL
MCV RBC AUTO: 80 FL
MONOCYTES # BLD AUTO: ABNORMAL K/UL
MONOCYTES NFR BLD: 12 %
NEUTROPHILS NFR BLD: 15 %
NRBC BLD-RTO: 0 /100 WBC
PLATELET # BLD AUTO: 25 K/UL
PLATELET BLD QL SMEAR: ABNORMAL
PMV BLD AUTO: 9.8 FL
RBC # BLD AUTO: 2.72 M/UL
WBC # BLD AUTO: 1.28 K/UL

## 2018-03-23 PROCEDURE — 63600175 PHARM REV CODE 636 W HCPCS: Performed by: STUDENT IN AN ORGANIZED HEALTH CARE EDUCATION/TRAINING PROGRAM

## 2018-03-23 PROCEDURE — 25000003 PHARM REV CODE 250: Performed by: STUDENT IN AN ORGANIZED HEALTH CARE EDUCATION/TRAINING PROGRAM

## 2018-03-23 PROCEDURE — 85027 COMPLETE CBC AUTOMATED: CPT

## 2018-03-23 PROCEDURE — 85007 BL SMEAR W/DIFF WBC COUNT: CPT

## 2018-03-23 PROCEDURE — 99239 HOSP IP/OBS DSCHRG MGMT >30: CPT | Mod: ,,, | Performed by: PEDIATRICS

## 2018-03-23 PROCEDURE — 63600175 PHARM REV CODE 636 W HCPCS: Performed by: PEDIATRICS

## 2018-03-23 RX ORDER — POSACONAZOLE 100 MG/1
300 TABLET, DELAYED RELEASE ORAL DAILY
Qty: 90 TABLET | Refills: 0 | Status: ON HOLD | OUTPATIENT
Start: 2018-03-23 | End: 2018-04-24

## 2018-03-23 RX ADMIN — HEPARIN 330 UNITS: 100 SYRINGE at 12:03

## 2018-03-23 RX ADMIN — SULFAMETHOXAZOLE AND TRIMETHOPRIM 1 TABLET: 800; 160 TABLET ORAL at 08:03

## 2018-03-23 RX ADMIN — MEROPENEM AND SODIUM CHLORIDE 1 G: 1 INJECTION, SOLUTION INTRAVENOUS at 04:03

## 2018-03-23 RX ADMIN — HEPARIN 330 UNITS: 100 SYRINGE at 05:03

## 2018-03-23 RX ADMIN — HEPARIN: 100 SYRINGE at 12:03

## 2018-03-23 RX ADMIN — Medication 1 G: at 05:03

## 2018-03-23 RX ADMIN — DOCUSATE SODIUM 100 MG: 100 CAPSULE, LIQUID FILLED ORAL at 08:03

## 2018-03-23 RX ADMIN — DIPHENHYDRAMINE HYDROCHLORIDE 25 MG: 25 CAPSULE ORAL at 04:03

## 2018-03-23 RX ADMIN — CHLORHEXIDINE GLUCONATE 15 ML: 1.2 RINSE ORAL at 08:03

## 2018-03-23 RX ADMIN — CIPROFLOXACIN HYDROCHLORIDE 500 MG: 500 TABLET, FILM COATED ORAL at 08:03

## 2018-03-23 RX ADMIN — POSACONAZOLE 300 MG: 100 TABLET, COATED ORAL at 08:03

## 2018-03-23 RX ADMIN — ACYCLOVIR 400 MG: 200 CAPSULE ORAL at 08:03

## 2018-03-23 NOTE — PLAN OF CARE
03/23/18 1640   Final Note   Assessment Type Final Discharge Note   Discharge Disposition Home

## 2018-03-23 NOTE — SUBJECTIVE & OBJECTIVE
Subjective:     Interval History: NAEON, VSS, afebrile. In good spirits.    Oncology Treatment Plan:     PEDS [id number]  ARM A - LR Risk Patients    Medications:  Continuous Infusions:  Scheduled Meds:   acyclovir  400 mg Oral BID    chlorhexidine  15 mL Mouth/Throat BID    ciprofloxacin HCl  500 mg Oral Q12H    docusate sodium  100 mg Oral Daily    meropenem-0.9% sodium chloride  1 g Intravenous Q8H    polyethylene glycol  17 g Oral Daily    posaconazole  300 mg Oral BID    sulfamethoxazole-trimethoprim 800-160mg  1 tablet Oral twice daily on Friday, Saturday, Sunday    vancomycin (VANCOCIN) IVPB  1,000 mg Intravenous Q8H     PRN Meds:(Magic mouthwash) 1:1:1 Benadryl 12.5mg/5ml liq, aluminum & magnesium hydroxide-simehticone (Maalox), lidocaine viscous 2%, acetaminophen, diphenhydrAMINE, heparin, porcine (PF), lidocaine HCl 2%, LORazepam, ondansetron, oxyCODONE, polyethylene glycol     Review of Systems   Constitutional: Negative for activity change, appetite change and fever.   HENT: Positive for mouth sores (improving). Negative for congestion, nosebleeds and sore throat.         R-side neck pain, improving   Eyes: Negative for discharge.   Respiratory: Negative for cough.    Cardiovascular: Negative for chest pain.   Gastrointestinal: Negative for abdominal distention and abdominal pain.   Genitourinary: Negative for decreased urine volume.   Musculoskeletal: Negative for myalgias.     Objective:     Vital Signs (Most Recent):  Temp: 98.9 °F (37.2 °C) (03/23/18 0842)  Pulse: 68 (03/23/18 0842)  Resp: 18 (03/23/18 0842)  BP: (!) 131/59 (03/23/18 0842)  SpO2: 100 % (03/23/18 0842) Vital Signs (24h Range):  Temp:  [98.3 °F (36.8 °C)-99 °F (37.2 °C)] 98.9 °F (37.2 °C)  Pulse:  [63-80] 68  Resp:  [18-20] 18  SpO2:  [98 %-100 %] 100 %  BP: ()/(55-70) 131/59     Weight: 82.3 kg (181 lb 7 oz)  Body mass index is 30.19 kg/m².  Body surface area is 1.94 meters squared.      Intake/Output Summary (Last 24  hours) at 03/23/18 1500  Last data filed at 03/23/18 0536   Gross per 24 hour   Intake             1520 ml   Output                0 ml   Net             1520 ml       Physical Exam   Constitutional: He is oriented to person, place, and time. He appears well-developed and well-nourished. No distress.   HENT:   Head: Normocephalic.   Healing mucosal lesions on R lower lip and left inner cheek. Both significantly improving from prior.     Eyes: Conjunctivae and EOM are normal. Pupils are equal, round, and reactive to light. Right eye exhibits no discharge. Left eye exhibits no discharge. No scleral icterus.   Neck: Neck supple.   Diffuse, tender, nonerythematous, firm swelling of R neck posterior to SCM, improving.   Cardiovascular: Normal rate, regular rhythm, normal heart sounds and intact distal pulses.    No murmur heard.  Pulmonary/Chest: Effort normal and breath sounds normal. No respiratory distress. He has no wheezes.   Abdominal: Soft. Bowel sounds are normal. He exhibits no distension. There is no tenderness. There is no guarding.   Genitourinary:   Genitourinary Comments: Deferred     Musculoskeletal: He exhibits no deformity.   Neurological: He is alert and oriented to person, place, and time.   Skin: Skin is warm. Capillary refill takes less than 2 seconds. No rash noted.   Psychiatric: He has a normal mood and affect.   Nursing note and vitals reviewed.      Labs:   Recent Lab Results       03/23/18  0501      Immature Granulocytes CANCELED  Comment:  Result canceled by the ancillary     Immature Grans (Abs) CANCELED  Comment:  Mild elevation in immature granulocytes is non specific and   can be seen in a variety of conditions including stress response,   acute inflammation, trauma and pregnancy. Correlation with other   laboratory and clinical findings is essential.    Result canceled by the ancillary       Aniso Slight     Baso # Test Not Performed  Comment:  Corrected result; previously reported as  0.02 on %DDDDDDDD% at %TTT%.[C]     Basophil% 0.0  Comment:  Corrected result; previously reported as 1.6 on %DDDDDDDD% at %TTT%.[C]     Differential Method Manual     Eos # Test Not Performed  Comment:  Corrected result; previously reported as 0.0 on %DDDDDDDD% at %TTT%.[C]     Eosinophil% 0.0     Gran% 15.0  Comment:  Corrected result; previously reported as 10.1 on %DDDDDDDD% at %TTT%.(L)[C]     Hematocrit 21.8(L)     Hemoglobin 8.1(L)     Lymph # Test Not Performed  Comment:  Corrected result; previously reported as 0.9 on %DDDDDDDD% at %TTT%.[C]     Lymph% 73.0  Comment:  Corrected result; previously reported as 70.3 on %DDDDDDDD% at %TTT%.(H)[C]     MCH 29.8     MCHC 37.2(H)     MCV 80(L)     Mono # Test Not Performed  Comment:  Corrected result; previously reported as 0.2 on %DDDDDDDD% at %TTT%.[C]     Mono% 12.0  Comment:  Corrected result; previously reported as 12.5 on %DDDDDDDD% at %TTT%.[C]     MPV 9.8     nRBC 0     Platelet Estimate Decreased(A)     Platelets 25  Comment:  PLATELET COUNT critical result(s) called and verbal readback obtained   from MEREDITH BANCROFT, RN, 03/23/2018 08:03  (LL)     RBC 2.72(L)     RDW 13.2     WBC 1.28  Comment:  WBC   critical result(s) called and verbal readback obtained from   Meredith Bancroft, RN. Prelim PLT given, 03/23/2018 05:56  (LL)           Diagnostic Results:  None

## 2018-03-23 NOTE — DISCHARGE SUMMARY
[facility]  Pediatric Hematology/Oncology  Discharge Summary      Patient Name: Hema Kuo  MRN: [id number]  Admission Date: 3/5/2018  Hospital Length of Stay: 18 days  Discharge Date and Time: 3/23/2018 12:35 PM  Attending Physician: No att. providers found   Discharging Provider: Victoria Araya MD  Primary Care Provider: Ann Reyna NP    HPI:  No notes on file    * No surgery found *     Hospital Course:    Admitted for close monitoring while neutropenic 2/2 receiving chemotherapy following COG [id number] (Arm A). Febrile over 24h period (tmax 101.3) 3/12-13.  Port cultures NG (both lumens). Received cefepime- switched to meropenem, and vancomycin.  Significant mucositis- herpetic appearing lesions to R lower lip and L posterior cheek mucosa. Treated with acyclovir. Morphine PCA for pain, transitioned to oxycodone prn.   Developed R-sided painful neck swelling. Seen by ENT and ID, nothing to drain, differential includes lymphadenitis 2/2 viral mouth ulcers vs myositis.     Both mucositis and neck swelling improved with increasing ANC.  For pancytopenia, required several pRBC and platelet transfusions for thresholds Hb<6, plts<10. Developed wheezing during platelet transfusion once, responded to albuterol, tolerated subsequent transfusions with hydrocortisone pre-med.  Developed hematemesis the night of 3/14: 20-30 ml juan brown/red blood noted with small clots, transfused 1u plts and 2u pRBCs. No further episodes.      Continued home meds:              - Chlorhexidine 15 ml Oral BID              - Ciprofloxacin 500 mg Oral q12 hours              - Lisdexamfetamine 60 mg oral qAM              - Bactrim 800-160 mg Oral BID on Friday, Saturday, and Sunday              - Posaconazole 300 mg BID     Home on augmentin and treatment dose of ciprofloxacin.           Consults         Status Ordering Provider     Inpatient consult to Pediatric ENT  Once     Provider:  (Not yet assigned)     Completed RICHARDSON MEANS     Inpatient consult to Pediatric Infectious Disease  Once     Provider:  Law Lacey MD    Completed RICHARDSON MEANS          Significant Diagnostic Studies: Labs:   CBC   Recent Labs  Lab 03/22/18  0525 03/23/18  0501   WBC 1.39* 1.28*   HGB 8.2* 8.1*   HCT 22.0* 21.8*   PLT 34* 25*    and All labs within the past 24 hours have been reviewed  Microbiology:   Blood Culture   Lab Results   Component Value Date    LABBLOO No growth after 5 days. 03/15/2018    LABBLOO No growth after 5 days. 03/15/2018     Radiology: No results found in the last 24 hours.    Pending Diagnostic Studies:     None        Final Active Diagnoses:    Diagnosis Date Noted POA    PRINCIPAL PROBLEM:  AML (acute myeloblastic leukemia) [C92.00] 12/12/2017 Yes    Comfort measures only status [Z51.5] 03/23/2018 Not Applicable    Lymphadenitis [I88.9] 03/18/2018 No    Mass of lateral neck [R22.1] 03/16/2018 No    Neutropenia associated with mucositis due to antineoplastic therapy [D70.8, K12.32] 03/13/2018 Yes    Fever and neutropenia [D70.9, R50.81] 03/13/2018 No      Problems Resolved During this Admission:    Diagnosis Date Noted Date Resolved POA      Discharged Condition: fair    Disposition: Home or Self Care    Follow Up:  Follow-up Information     Ann Reyna NP.    Specialty:  Family Medicine  Why:  As needed  Contact information:  [address] [place] [id number]  [phone]             Sanford Bucio MD.    Specialties:  Pediatric Hematology and Oncology, Pediatric Hematology  Why:  As per their direction  Contact information:  [address] [facility] [id number] [phone]                 Patient Instructions:     Diet Adult Regular     Activity as tolerated     Notify your health care provider if you experience any of the following:  increased confusion or weakness     Notify your health care provider if you experience any of the following:  persistent dizziness,  light-headedness, or visual disturbances     Notify your health care provider if you experience any of the following:  worsening rash     Notify your health care provider if you experience any of the following:  severe persistent headache     Notify your health care provider if you experience any of the following:  difficulty breathing or increased cough     Notify your health care provider if you experience any of the following:  redness, tenderness, or signs of infection (pain, swelling, redness, odor or green/yellow discharge around incision site)     Notify your health care provider if you experience any of the following:  severe uncontrolled pain     Notify your health care provider if you experience any of the following:  persistent nausea and vomiting or diarrhea     Notify your health care provider if you experience any of the following:  temperature >100.4       Medications:  Reconciled Home Medications:   Discharge Medication List as of 3/23/2018 12:29 PM      START taking these medications    Details   amoxicillin-clavulanate 875-125mg (AUGMENTIN) 875-125 mg per tablet Take 1 tablet by mouth every 12 (twelve) hours., Starting Thu 3/22/2018, Until Thu 4/5/2018, Normal      !! ciprofloxacin HCl (CIPRO) 750 MG tablet Take 1 tablet (750 mg total) by mouth every 12 (twelve) hours., Starting Thu 3/22/2018, Until Thu 4/5/2018, Normal      oxyCODONE (ROXICODONE) 10 mg Tab immediate release tablet Take 1 tablet (10 mg total) by mouth every 6 (six) hours as needed., Starting Thu 3/22/2018, Print      posaconazole 100 mg TbEC tablet Take 3 tablets (300 mg total) by mouth 2 (two) times daily., Starting Thu 3/22/2018, Until Sat 4/21/2018, Normal       !! - Potential duplicate medications found. Please discuss with provider.      CONTINUE these medications which have NOT CHANGED    Details   acyclovir (ZOVIRAX) 400 MG tablet Take 1 tablet (400 mg total) by mouth 2 (two) times daily., Starting Fri 1/5/2018, Until Sat  1/5/2019, Normal      chlorhexidine (PERIDEX) 0.12 % solution Starting Fri 1/5/2018, Historical Med      !! ciprofloxacin HCl (CIPRO) 500 MG tablet Take 1 tablet (500 mg total) by mouth every 12 (twelve) hours., Starting Fri 1/5/2018, Normal      lisdexamfetamine (VYVANSE) 60 MG capsule Take 60 mg by mouth every morning., Historical Med      LORazepam (ATIVAN) 1 MG tablet Take 1 tablet (1 mg total) by mouth every 6 (six) hours as needed (Nausea)., Starting Mon 2/26/2018, Until Wed 3/28/2018, Normal      ondansetron (ZOFRAN-ODT) 8 MG TbDL Take 1 tablet (8 mg total) by mouth every 8 (eight) hours as needed., Starting Mon 2/26/2018, Normal      pantoprazole (PROTONIX) 40 MG tablet Take 1 tablet (40 mg total) by mouth once daily., Starting Wed 12/20/2017, Until Sun 2/25/2018, Normal      polyethylene glycol (GLYCOLAX) 17 gram PwPk Take 17 g by mouth daily as needed (No bowel movement)., Starting Mon 11/20/2017, Normal      sulfamethoxazole-trimethoprim 800-160mg (BACTRIM DS) 800-160 mg Tab Take 1 tablet by mouth twice daily only on Friday, Saturday, Sunday., Starting Fri 11/24/2017, Normal       !! - Potential duplicate medications found. Please discuss with provider.          Victoria Araya MD  Pediatric Hematology/Oncology  [facility]

## 2018-03-23 NOTE — HOSPITAL COURSE
Admitted for close monitoring while neutropenic 2/2 receiving chemotherapy following COG [id number] (Arm A). Febrile over 24h period (tmax 101.3) 3/12-13. Port cultures NG (both lumens). Received cefepime- switched to meropenem, and vancomycin.  Significant mucositis- herpetic appearing lesions to R lower lip and L posterior cheek mucosa. Treated with acyclovir. Morphine PCA for pain, transitioned to oxycodone prn.   Developed R-sided painful neck swelling. Seen by ENT and ID, nothing to drain, differential includes lymphadenitis 2/2 viral mouth ulcers vs myositis.     Both mucositis and neck swelling improved with increasing ANC.  For pancytopenia, required several pRBC and platelet transfusions for thresholds Hb<6, plts<10. Developed wheezing during platelet transfusion once, responded to albuterol, tolerated subsequent transfusions with hydrocortisone pre-med.  Developed hematemesis the night of 3/14: 20-30 ml juan brown/red blood noted with small clots, transfused 1u plts and 2u pRBCs. No further episodes.      Continued home meds:              - Chlorhexidine 15 ml Oral BID              - Ciprofloxacin 500 mg Oral q12 hours              - Lisdexamfetamine 60 mg oral qAM              - Bactrim 800-160 mg Oral BID on Friday, Saturday, and Sunday              - Posaconazole 300 mg BID     Home on augmentin and treatment dose of ciprofloxacin.

## 2018-03-23 NOTE — PROGRESS NOTES
[facility]  Pediatric Hematology/Oncology  Progress Note    Patient Name: Hema Kuo  Admission Date: 3/5/2018  Hospital Length of Stay: 18 days  Code Status: Full Code     Subjective:     Interval History: NAEON, VSS, afebrile. In good spirits.    Oncology Treatment Plan:     PEDS [id number]  ARM A - LR Risk Patients    Medications:  Continuous Infusions:  Scheduled Meds:   acyclovir  400 mg Oral BID    chlorhexidine  15 mL Mouth/Throat BID    ciprofloxacin HCl  500 mg Oral Q12H    docusate sodium  100 mg Oral Daily    meropenem-0.9% sodium chloride  1 g Intravenous Q8H    polyethylene glycol  17 g Oral Daily    posaconazole  300 mg Oral BID    sulfamethoxazole-trimethoprim 800-160mg  1 tablet Oral twice daily on Friday, Saturday, Sunday    vancomycin (VANCOCIN) IVPB  1,000 mg Intravenous Q8H     PRN Meds:(Magic mouthwash) 1:1:1 Benadryl 12.5mg/5ml liq, aluminum & magnesium hydroxide-simehticone (Maalox), lidocaine viscous 2%, acetaminophen, diphenhydrAMINE, heparin, porcine (PF), lidocaine HCl 2%, LORazepam, ondansetron, oxyCODONE, polyethylene glycol     Review of Systems   Constitutional: Negative for activity change, appetite change and fever.   HENT: Positive for mouth sores (improving). Negative for congestion, nosebleeds and sore throat.         R-side neck pain, improving   Eyes: Negative for discharge.   Respiratory: Negative for cough.    Cardiovascular: Negative for chest pain.   Gastrointestinal: Negative for abdominal distention and abdominal pain.   Genitourinary: Negative for decreased urine volume.   Musculoskeletal: Negative for myalgias.     Objective:     Vital Signs (Most Recent):  Temp: 98.9 °F (37.2 °C) (03/23/18 0842)  Pulse: 68 (03/23/18 0842)  Resp: 18 (03/23/18 0842)  BP: (!) 131/59 (03/23/18 0842)  SpO2: 100 % (03/23/18 0842) Vital Signs (24h Range):  Temp:  [98.3 °F (36.8 °C)-99 °F (37.2 °C)] 98.9 °F (37.2 °C)  Pulse:  [63-80] 68  Resp:  [18-20] 18  SpO2:  [98  %-100 %] 100 %  BP: ()/(55-70) 131/59     Weight: 82.3 kg (181 lb 7 oz)  Body mass index is 30.19 kg/m².  Body surface area is 1.94 meters squared.      Intake/Output Summary (Last 24 hours) at 03/23/18 1500  Last data filed at 03/23/18 0536   Gross per 24 hour   Intake             1520 ml   Output                0 ml   Net             1520 ml       Physical Exam   Constitutional: He is oriented to person, place, and time. He appears well-developed and well-nourished. No distress.   HENT:   Head: Normocephalic.   Healing mucosal lesions on R lower lip and left inner cheek. Both significantly improving from prior.     Eyes: Conjunctivae and EOM are normal. Pupils are equal, round, and reactive to light. Right eye exhibits no discharge. Left eye exhibits no discharge. No scleral icterus.   Neck: Neck supple.   Diffuse, tender, nonerythematous, firm swelling of R neck posterior to SCM, improving.   Cardiovascular: Normal rate, regular rhythm, normal heart sounds and intact distal pulses.    No murmur heard.  Pulmonary/Chest: Effort normal and breath sounds normal. No respiratory distress. He has no wheezes.   Abdominal: Soft. Bowel sounds are normal. He exhibits no distension. There is no tenderness. There is no guarding.   Genitourinary:   Genitourinary Comments: Deferred     Musculoskeletal: He exhibits no deformity.   Neurological: He is alert and oriented to person, place, and time.   Skin: Skin is warm. Capillary refill takes less than 2 seconds. No rash noted.   Psychiatric: He has a normal mood and affect.   Nursing note and vitals reviewed.      Labs:   Recent Lab Results       03/23/18  0501      Immature Granulocytes CANCELED  Comment:  Result canceled by the ancillary     Immature Grans (Abs) CANCELED  Comment:  Mild elevation in immature granulocytes is non specific and   can be seen in a variety of conditions including stress response,   acute inflammation, trauma and pregnancy. Correlation with  other   laboratory and clinical findings is essential.    Result canceled by the ancillary       Aniso Slight     Baso # Test Not Performed  Comment:  Corrected result; previously reported as 0.02 on %DDDDDDDD% at %TTT%.[C]     Basophil% 0.0  Comment:  Corrected result; previously reported as 1.6 on %DDDDDDDD% at %TTT%.[C]     Differential Method Manual     Eos # Test Not Performed  Comment:  Corrected result; previously reported as 0.0 on %DDDDDDDD% at %TTT%.[C]     Eosinophil% 0.0     Gran% 15.0  Comment:  Corrected result; previously reported as 10.1 on %DDDDDDDD% at %TTT%.(L)[C]     Hematocrit 21.8(L)     Hemoglobin 8.1(L)     Lymph # Test Not Performed  Comment:  Corrected result; previously reported as 0.9 on %DDDDDDDD% at %TTT%.[C]     Lymph% 73.0  Comment:  Corrected result; previously reported as 70.3 on %DDDDDDDD% at %TTT%.(H)[C]     MCH 29.8     MCHC 37.2(H)     MCV 80(L)     Mono # Test Not Performed  Comment:  Corrected result; previously reported as 0.2 on %DDDDDDDD% at %TTT%.[C]     Mono% 12.0  Comment:  Corrected result; previously reported as 12.5 on %DDDDDDDD% at %TTT%.[C]     MPV 9.8     nRBC 0     Platelet Estimate Decreased(A)     Platelets 25  Comment:  PLATELET COUNT critical result(s) called and verbal readback obtained   from MEREDITH BANCROFT, RN, 03/23/2018 08:03  (LL)     RBC 2.72(L)     RDW 13.2     WBC 1.28  Comment:  WBC   critical result(s) called and verbal readback obtained from   Meredith Bancroft, RN. Prelim PLT given, 03/23/2018 05:56  (LL)           Diagnostic Results:  None        Assessment/Plan:     * AML (acute myeloblastic leukemia)      Oncology   AML (acute myeloblastic leukemia)     This  is a 17 y.o. male with AML (t8;21) receiving chemotherapy following COG [id number] (Arm A) here w neutropenia (without fever initially). Febrile to 101.3 night of 3/12. cultured port (both lumens), added cefepime and vanc.  PO improving, still decreased. Neck swelling stable. Pain  improved.   afebrile since 2am 3/14.    Counts improving, clinically improving.  Mucositis improving. Mouth and neck pain significantly improved.    #fever and neutropenia: in setting of AML (t8;21) being treated with chemo per COG AAML 1031 (ARM A). S/p 2 units PRBC's on 3/7 for hemoglobin of 6.6; 2 U of pRBCs and 1U Platelets 3/9. On 3/14 received 1u plts and 2u pRBCs.    - Daily CBC. Type and Screen q72h. Twice weekly CMP.  - cw all home meds              - Chlorhexidine 15 ml Oral BID              - Ciprofloxacin 500 mg Oral q12 hours              - Lisdexamfetamine 60 mg oral qAM              - Bactrim 800-160 mg Oral BID on Friday, Saturday, and Sunday              - Posaconazole 300 mg BID  - PRN Meds: Lorazepam, Ondansetron, Miralax  -f/u cultures (from both lumens)  - Vancomycin (since 3/12)  - meropenem (since 3/15)  - s/p cefepime (since 3/12)  - daily cultures x3d, reculture also if new fever or hypotensive  - monitor for bleeding, transfuse platelets if actively bleeding  - monitor BPs closely     Mucositis:  - oxycodone prn  - magic mouthwash  - monitor PO, electrolytes, daily weights  - Acyclovir prophylactic dosing      Hematemesis:   - night of 3/14: 20-30 ml juan brown/red blood noted with small clots  - transfused 1u plts and 2u pRBCs, with no further episodes  - continue to monitor     Dispo: pending resolution of counts, likely today- on home augmentin and treatment dose of ciprofloxacin for close outpt follow up of counts and neck swelling.                 Victoria Araya MD  Pediatric Hematology/Oncology  [facility]

## 2018-03-23 NOTE — SUBJECTIVE & OBJECTIVE
Interval History: NAEON, VSS, afebrile. In good spirits.       Scheduled Meds:   acyclovir  400 mg Oral BID    chlorhexidine  15 mL Mouth/Throat BID    ciprofloxacin HCl  500 mg Oral Q12H    docusate sodium  100 mg Oral Daily    meropenem-0.9% sodium chloride  1 g Intravenous Q8H    polyethylene glycol  17 g Oral Daily    posaconazole  300 mg Oral BID    sulfamethoxazole-trimethoprim 800-160mg  1 tablet Oral twice daily on Friday, Saturday, Sunday    vancomycin (VANCOCIN) IVPB  1,000 mg Intravenous Q8H     Continuous Infusions:  PRN Meds:(Magic mouthwash) 1:1:1 Benadryl 12.5mg/5ml liq, aluminum & magnesium hydroxide-simehticone (Maalox), lidocaine viscous 2%, acetaminophen, diphenhydrAMINE, heparin, porcine (PF), lidocaine HCl 2%, LORazepam, ondansetron, oxyCODONE, polyethylene glycol    Review of Systems   Constitutional: Negative for activity change, appetite change (improving) and fever.   HENT: Positive for mouth sores (improving). Negative for congestion, nosebleeds and sore throat.         R-side neck pain, improving   Eyes: Negative for discharge.   Respiratory: Negative for cough.    Cardiovascular: Negative for chest pain.   Gastrointestinal: Negative for abdominal distention and abdominal pain.   Genitourinary: Negative for decreased urine volume.   Musculoskeletal: Negative for myalgias.     Objective:     Vital Signs (Most Recent):  Temp: 98.9 °F (37.2 °C) (03/23/18 0842)  Pulse: 68 (03/23/18 0842)  Resp: 18 (03/23/18 0842)  BP: (!) 131/59 (03/23/18 0842)  SpO2: 100 % (03/23/18 0842) Vital Signs (24h Range):  Temp:  [98.3 °F (36.8 °C)-99 °F (37.2 °C)] 98.9 °F (37.2 °C)  Pulse:  [63-80] 68  Resp:  [18-20] 18  SpO2:  [98 %-100 %] 100 %  BP: ()/(55-70) 131/59     Patient Vitals for the past 72 hrs (Last 3 readings):   Weight   03/22/18 2055 82.3 kg (181 lb 7 oz)   03/21/18 2104 83 kg (182 lb 15.7 oz)   03/20/18 2200 82.8 kg (182 lb 8.7 oz)     Body mass index is 30.19 kg/m².    Intake/Output  - Last 3 Shifts       03/21 0700 - 03/22 0659 03/22 0700 - 03/23 0659 03/23 0700 - 03/24 0659    P.O. 1420 620     I.V. (mL/kg) 60 (0.7)      Blood 250      IV Piggyback 1150 900     Total Intake(mL/kg) 2880 (34.7) 1520 (18.5)     Net +2880 +1520             Urine Occurrence 7 x 5 x     Stool Occurrence 1 x 1 x     Emesis Occurrence  0 x           Lines/Drains/Airways          No matching active lines, drains, or airways          Physical Exam   Constitutional: He is oriented to person, place, and time. He appears well-developed and well-nourished. No distress.   HENT:   Head: Normocephalic.   Healing mucosal lesions on R lower lip and left inner cheek. Both significantly improving from prior.     Eyes: Conjunctivae and EOM are normal. Pupils are equal, round, and reactive to light. Right eye exhibits no discharge. Left eye exhibits no discharge. No scleral icterus.   Neck: Neck supple.   Diffuse, tender, nonerythematous, firm swelling of R neck posterior to SCM, improving.   Cardiovascular: Normal rate, regular rhythm, normal heart sounds and intact distal pulses.    No murmur heard.  Pulmonary/Chest: Effort normal and breath sounds normal. No respiratory distress. He has no wheezes.   Abdominal: Soft. Bowel sounds are normal. He exhibits no distension. There is no tenderness. There is no guarding.   Genitourinary:   Genitourinary Comments: Deferred     Musculoskeletal: He exhibits no deformity.   Neurological: He is alert and oriented to person, place, and time.   Skin: Skin is warm. Capillary refill takes less than 2 seconds. No rash noted.   Psychiatric: He has a normal mood and affect.   Nursing note and vitals reviewed.      Significant Labs:  No results for input(s): POCTGLUCOSE in the last 48 hours.    Recent Lab Results       03/23/18  0501      Immature Granulocytes CANCELED  Comment:  Result canceled by the ancillary     Immature Grans (Abs) CANCELED  Comment:  Mild elevation in immature granulocytes is  non specific and   can be seen in a variety of conditions including stress response,   acute inflammation, trauma and pregnancy. Correlation with other   laboratory and clinical findings is essential.    Result canceled by the ancillary       Aniso Slight     Baso # Test Not Performed  Comment:  Corrected result; previously reported as 0.02 on %DDDDDDDD% at %TTT%.[C]     Basophil% 0.0  Comment:  Corrected result; previously reported as 1.6 on %DDDDDDDD% at %TTT%.[C]     Differential Method Manual     Eos # Test Not Performed  Comment:  Corrected result; previously reported as 0.0 on %DDDDDDDD% at %TTT%.[C]     Eosinophil% 0.0     Gran% 15.0  Comment:  Corrected result; previously reported as 10.1 on %DDDDDDDD% at %TTT%.(L)[C]     Hematocrit 21.8(L)     Hemoglobin 8.1(L)     Lymph # Test Not Performed  Comment:  Corrected result; previously reported as 0.9 on %DDDDDDDD% at %TTT%.[C]     Lymph% 73.0  Comment:  Corrected result; previously reported as 70.3 on %DDDDDDDD% at %TTT%.(H)[C]     MCH 29.8     MCHC 37.2(H)     MCV 80(L)     Mono # Test Not Performed  Comment:  Corrected result; previously reported as 0.2 on %DDDDDDDD% at %TTT%.[C]     Mono% 12.0  Comment:  Corrected result; previously reported as 12.5 on %DDDDDDDD% at %TTT%.[C]     MPV 9.8     nRBC 0     Platelet Estimate Decreased(A)     Platelets 25  Comment:  PLATELET COUNT critical result(s) called and verbal readback obtained   from MEREDITH BANCROFT, RN, 03/23/2018 08:03  (LL)     RBC 2.72(L)     RDW 13.2     WBC 1.28  Comment:  WBC   critical result(s) called and verbal readback obtained from   Meredith Bancroft, RN. Prelim PLT given, 03/23/2018 05:56  (LL)           Significant Imaging: CT: No results found in the last 24 hours.  CXR: No results found in the last 24 hours.  U/S: No results found in the last 24 hours.

## 2018-03-23 NOTE — ASSESSMENT & PLAN NOTE
Oncology   AML (acute myeloblastic leukemia)     This  is a 17 y.o. male with AML (t8;21) receiving chemotherapy following [facility] [id number] (Arm A) here w neutropenia (without fever initially). Febrile to 101.3 night of 3/12. cultured port (both lumens), added cefepime and vanc.  PO improving, still decreased. Neck swelling stable. Pain improved.   afebrile since 2am 3/14.    Counts improving, clinically improving.  Mucositis improving. Mouth and neck pain significantly improved.    #fever and neutropenia: in setting of AML (t8;21) being treated with chemo per [facility] AAML 1031 (ARM A). S/p 2 units PRBC's on 3/7 for hemoglobin of 6.6; 2 U of pRBCs and 1U Platelets 3/9. On 3/14 received 1u plts and 2u pRBCs.    - Daily CBC. Type and Screen q72h. Twice weekly CMP.  - cw all home meds              - Chlorhexidine 15 ml Oral BID              - Ciprofloxacin 500 mg Oral q12 hours              - Lisdexamfetamine 60 mg oral qAM              - Bactrim 800-160 mg Oral BID on Friday, Saturday, and Sunday              - Posaconazole 300 mg BID  - PRN Meds: Lorazepam, Ondansetron, Miralax  -f/u cultures (from both lumens)  - Vancomycin (since 3/12)  - meropenem (since 3/15)  - s/p cefepime (since 3/12)  - daily cultures x3d, reculture also if new fever or hypotensive  - monitor for bleeding, transfuse platelets if actively bleeding  - monitor BPs closely     Mucositis:  - oxycodone prn  - magic mouthwash  - monitor PO, electrolytes, daily weights  - Acyclovir prophylactic dosing      Hematemesis:   - night of 3/14: 20-30 ml juan brown/red blood noted with small clots  - transfused 1u plts and 2u pRBCs, with no further episodes  - continue to monitor     Dispo: pending resolution of counts, likely today- on home augmentin and treatment dose of ciprofloxacin for close outpt follow up of counts and neck swelling.

## 2018-03-23 NOTE — PLAN OF CARE
Problem: Patient Care Overview  Goal: Plan of Care Review  Outcome: Ongoing (interventions implemented as appropriate)  Reviewed plan of care with patient. Vital signs stable, afebrile. No complaints of pain tonight. Double ports both with good blood return, flush with ease, and heparin locked between antibiotics. Tolerating neutropenic diet with good appetite. Skin pale, occasional bruise noted. Lesion to inner lip, mostly healed. Vanc given over 2 hours and premed with benadryl. Swelling noted to right side of neck, tender with palpation. Visitors in and out tonight. Monitoring

## 2018-03-23 NOTE — ASSESSMENT & PLAN NOTE
AML (acute myeloblastic leukemia)  This  is a 17 y.o. male with AML (t8;21) receiving chemotherapy following [facility] [id number] (Arm A) here w neutropenia (without fever initially). Today is day 24 of cycle. Febrile to 101.3 night of 3/12 and was started on cefepime + vanc. Now afebrile since 3/14. Continues to have neck swelling, though pain better well-controlled on morphine pca.       Fever and neutropenia:   In setting of AML (t8;21) being treated with chemo per [facility] AAML 1031 (ARM A). S/p 2 units PRBC's on 3/7 for hemoglobin of 6.6; 2 U of pRBCs and 1U Platelets 3/9. On 3/14 received 1u plts and 2u pRBCs.  - Daily CBC and CMP. Type and Screen q72h.  - Continue all home meds              - Chlorhexidine 15 ml Oral BID              - Ciprofloxacin 500 mg Oral q12 hours              - Lisdexamfetamine 60 mg oral qAM              - Bactrim 800-160 mg Oral BID on Friday, Saturday, and Sunday              - Posaconazole 300 mg BID  - PRN Meds: Lorazepam, Ondansetron, Miralax  -f/u cultures (from both lumens)  - Vancomycin, follow trough (since 3/12)  - Cefepime (since 3/12)--> transition to meropenem 3/15  - 3x blood cx NGTD, reculture if new fever or hypotensive  - Monitor for bleeding, transfuse platelets if actively bleeding  - Monitor BPs closely     Mucositis:  - Morphine PCA : will decrease on-demand to 1.0mg 15min lock-out (max total 4.5mg/h)  - Monitor PO, electrolytes, daily weights  - Acyclovir 800 mg TID PO  - Miralax and cholace     Hematemesis:   - night of 3/14: 20-30 ml juan brown/red blood noted with small clots  - transfused 1u plts and 2u pRBCs, with no further episodes  - continue to monitor      Neck swelling  - Pain control with morphine pca as above  - Antimicrobial coverage as above  - ENT following. Swelling likely 2/2 to lymphadenitis vs. neoplasm. Image-guided biopsy may be warranted     Disposition: Pending resolution of counts and clinical improvement.

## 2018-03-26 ENCOUNTER — HISTORICAL (OUTPATIENT)
Dept: ADMINISTRATIVE | Facility: HOSPITAL | Age: 18
End: 2018-03-26

## 2018-03-26 LAB
ABS NEUT (OLG): 0.47 X10(3)/MCL
ALBUMIN SERPL-MCNC: 3.5 GM/DL (ref 3.4–5)
ALBUMIN/GLOB SERPL: 1 RATIO (ref 1–2)
ALP SERPL-CCNC: 106 UNIT/L (ref 45–117)
ALT SERPL-CCNC: 53 UNIT/L (ref 12–78)
ANISOCYTOSIS BLD QL SMEAR: ABNORMAL
AST SERPL-CCNC: 17 UNIT/L (ref 15–37)
BASOPHILS NFR BLD MANUAL: 0 %
BILIRUB SERPL-MCNC: 0.3 MG/DL (ref 0.2–1)
BILIRUBIN DIRECT+TOT PNL SERPL-MCNC: <0.1 MG/DL
BILIRUBIN DIRECT+TOT PNL SERPL-MCNC: ABNORMAL MG/DL
BUN SERPL-MCNC: 9 MG/DL (ref 7–18)
CALCIUM SERPL-MCNC: 8.4 MG/DL (ref 8.5–10.1)
CHLORIDE SERPL-SCNC: 107 MMOL/L (ref 98–107)
CO2 SERPL-SCNC: 29 MMOL/L (ref 21–32)
CREAT SERPL-MCNC: 0.6 MG/DL (ref 0.6–1.3)
EOSINOPHIL NFR BLD MANUAL: 0 %
ERYTHROCYTE [DISTWIDTH] IN BLOOD BY AUTOMATED COUNT: 13.4 % (ref 11.5–14.5)
GLOBULIN SER-MCNC: 3.7 GM/ML (ref 2.3–3.5)
GLUCOSE SERPL-MCNC: 84 MG/DL (ref 74–106)
GRANULOCYTES NFR BLD MANUAL: 36 % (ref 43–75)
HCT VFR BLD AUTO: 23.2 % (ref 40–51)
HGB BLD-MCNC: 8.2 GM/DL (ref 13.5–17.5)
LYMPHOCYTES NFR BLD MANUAL: 53 % (ref 20.5–51.1)
MCH RBC QN AUTO: 29.2 PG (ref 26–34)
MCHC RBC AUTO-ENTMCNC: 35.3 GM/DL (ref 31–37)
MCV RBC AUTO: 82.6 FL (ref 80–100)
MICROCYTES BLD QL SMEAR: ABNORMAL
MONOCYTES NFR BLD MANUAL: 11 % (ref 2–9)
PLATELET # BLD AUTO: 12 X10(3)/MCL (ref 130–400)
PLATELET # BLD EST: ABNORMAL 10*3/UL
PMV BLD AUTO: 8.8 FL (ref 7.4–10.4)
POIKILOCYTOSIS BLD QL SMEAR: ABNORMAL
POTASSIUM SERPL-SCNC: 3.4 MMOL/L (ref 3.5–5.1)
PROT SERPL-MCNC: 7.2 GM/DL (ref 6.4–8.2)
RBC # BLD AUTO: 2.81 X10(6)/MCL (ref 4.5–5.9)
RBC MORPH BLD: ABNORMAL
RET# (OHS): 0.01
RETICULOCYTE COUNT AUTOMATED (OLG): 0.2 % (ref 0.5–1.5)
SODIUM SERPL-SCNC: 143 MMOL/L (ref 136–145)
TRANSFUSION ORDER: NORMAL
WBC # SPEC AUTO: 1.6 X10(3)/MCL (ref 4.5–11)

## 2018-03-27 ENCOUNTER — HISTORICAL (OUTPATIENT)
Dept: INFUSION THERAPY | Facility: HOSPITAL | Age: 18
End: 2018-03-27

## 2018-03-29 ENCOUNTER — TELEPHONE (OUTPATIENT)
Dept: PEDIATRIC HEMATOLOGY/ONCOLOGY | Facility: CLINIC | Age: 18
End: 2018-03-29

## 2018-03-29 NOTE — TELEPHONE ENCOUNTER
Spoke to pt mother, Madeline, and inquired about why the pt did not go get labs today and how important it is. She stated that they could not find a ride from anyone and too expensive to use [employer]. She stated that the pt feels fine with no signs of needing transfusion but reinforced if anything occurs before Monday to go to the ED, as the local infusion facility is closed tomorrow. Pt mother also inquired about the pt neck swelling has no change and that the local oncologist was concerned and not thinking it was a lymph node. Informed her that I spoke to Dr. Bucio and he stated that it is not a lymph node and is an infection and that since there is no change with the pt being on the antibiotics, he would like to see it next week. Appt made for pt to be seen and pt mom verbalized an understanding with no further needs noted.

## 2018-04-02 ENCOUNTER — HISTORICAL (OUTPATIENT)
Dept: HEMATOLOGY/ONCOLOGY | Facility: CLINIC | Age: 18
End: 2018-04-02

## 2018-04-02 LAB
ABS NEUT (OLG): 0.69 X10(3)/MCL (ref 2.1–9.2)
ANISOCYTOSIS BLD QL SMEAR: ABNORMAL
BASOPHILS NFR BLD MANUAL: 0 %
EOSINOPHIL NFR BLD MANUAL: 0 %
ERYTHROCYTE [DISTWIDTH] IN BLOOD BY AUTOMATED COUNT: 13.7 % (ref 11.5–14.5)
GRANULOCYTES NFR BLD MANUAL: 40 % (ref 43–75)
HCT VFR BLD AUTO: 21.3 % (ref 40–51)
HGB BLD-MCNC: 7.3 GM/DL (ref 13.5–17.5)
HYPOCHROMIA BLD QL SMEAR: ABNORMAL
LYMPHOCYTES NFR BLD MANUAL: 50 % (ref 20.5–51.1)
MCH RBC QN AUTO: 29.7 PG (ref 26–34)
MCHC RBC AUTO-ENTMCNC: 34.3 GM/DL (ref 31–37)
MCV RBC AUTO: 86.6 FL (ref 80–100)
MICROCYTES BLD QL SMEAR: ABNORMAL
MONOCYTES NFR BLD MANUAL: 6 % (ref 2–9)
NEUTS BAND NFR BLD MANUAL: 4 % (ref 0–10)
PLATELET # BLD AUTO: 39 X10(3)/MCL (ref 130–400)
PLATELET # BLD EST: ABNORMAL 10*3/UL
PMV BLD AUTO: 12 FL (ref 7.4–10.4)
POLYCHROMASIA BLD QL SMEAR: ABNORMAL
RBC # BLD AUTO: 2.46 X10(6)/MCL (ref 4.5–5.9)
RBC MORPH BLD: ABNORMAL
WBC # SPEC AUTO: 1.8 X10(3)/MCL (ref 4.5–11)

## 2018-04-05 ENCOUNTER — HOSPITAL ENCOUNTER (OUTPATIENT)
Dept: INFUSION THERAPY | Facility: HOSPITAL | Age: 18
Discharge: HOME | End: 2018-04-05
Attending: NURSE PRACTITIONER
Payer: MEDICAID

## 2018-04-05 ENCOUNTER — OFFICE VISIT (OUTPATIENT)
Dept: PEDIATRIC HEMATOLOGY/ONCOLOGY | Facility: CLINIC | Age: 18
End: 2018-04-05
Payer: MEDICAID

## 2018-04-05 VITALS
SYSTOLIC BLOOD PRESSURE: 104 MMHG | HEART RATE: 85 BPM | WEIGHT: 196.13 LBS | TEMPERATURE: 98 F | BODY MASS INDEX: 30.78 KG/M2 | DIASTOLIC BLOOD PRESSURE: 55 MMHG | RESPIRATION RATE: 20 BRPM | HEIGHT: 67 IN

## 2018-04-05 DIAGNOSIS — I88.9 LYMPHADENITIS: ICD-10-CM

## 2018-04-05 DIAGNOSIS — C95.01 ACUTE LEUKEMIA IN REMISSION: Primary | ICD-10-CM

## 2018-04-05 DIAGNOSIS — Z79.899 IMMUNOSUPPRESSED DUE TO CHEMOTHERAPY: ICD-10-CM

## 2018-04-05 DIAGNOSIS — C92.00 AML (ACUTE MYELOBLASTIC LEUKEMIA): ICD-10-CM

## 2018-04-05 DIAGNOSIS — T45.1X5A IMMUNOSUPPRESSED DUE TO CHEMOTHERAPY: ICD-10-CM

## 2018-04-05 DIAGNOSIS — D84.821 IMMUNOSUPPRESSED DUE TO CHEMOTHERAPY: ICD-10-CM

## 2018-04-05 DIAGNOSIS — C92.01 AML (ACUTE MYELOID LEUKEMIA) IN REMISSION: Primary | ICD-10-CM

## 2018-04-05 DIAGNOSIS — D61.810 ANTINEOPLASTIC CHEMOTHERAPY INDUCED PANCYTOPENIA: ICD-10-CM

## 2018-04-05 DIAGNOSIS — T45.1X5A ANTINEOPLASTIC CHEMOTHERAPY INDUCED PANCYTOPENIA: ICD-10-CM

## 2018-04-05 PROBLEM — D70.9 FEVER AND NEUTROPENIA: Status: RESOLVED | Noted: 2018-03-13 | Resolved: 2018-04-05

## 2018-04-05 PROBLEM — R50.81 FEVER AND NEUTROPENIA: Status: RESOLVED | Noted: 2018-03-13 | Resolved: 2018-04-05

## 2018-04-05 LAB
ALBUMIN SERPL BCP-MCNC: 3.6 G/DL
ALP SERPL-CCNC: 78 U/L
ALT SERPL W/O P-5'-P-CCNC: 28 U/L
ANION GAP SERPL CALC-SCNC: 8 MMOL/L
AST SERPL-CCNC: 15 U/L
BASOPHILS # BLD AUTO: 0 K/UL
BASOPHILS NFR BLD: 0 %
BILIRUB SERPL-MCNC: 0.3 MG/DL
BUN SERPL-MCNC: 11 MG/DL
CALCIUM SERPL-MCNC: 8.9 MG/DL
CHLORIDE SERPL-SCNC: 106 MMOL/L
CO2 SERPL-SCNC: 25 MMOL/L
CREAT SERPL-MCNC: 0.8 MG/DL
DIFFERENTIAL METHOD: ABNORMAL
EOSINOPHIL # BLD AUTO: 0 K/UL
EOSINOPHIL NFR BLD: 0 %
ERYTHROCYTE [DISTWIDTH] IN BLOOD BY AUTOMATED COUNT: 14.8 %
EST. GFR  (AFRICAN AMERICAN): >60 ML/MIN/1.73 M^2
EST. GFR  (NON AFRICAN AMERICAN): >60 ML/MIN/1.73 M^2
GLUCOSE SERPL-MCNC: 112 MG/DL
HCT VFR BLD AUTO: 21.2 %
HGB BLD-MCNC: 7.2 G/DL
LYMPHOCYTES # BLD AUTO: 1 K/UL
LYMPHOCYTES NFR BLD: 42.1 %
MCH RBC QN AUTO: 30.4 PG
MCHC RBC AUTO-ENTMCNC: 34 G/DL
MCV RBC AUTO: 90 FL
MONOCYTES # BLD AUTO: 0.2 K/UL
MONOCYTES NFR BLD: 10 %
NEUTROPHILS # BLD AUTO: 1.2 K/UL
NEUTROPHILS NFR BLD: 47.9 %
PLATELET # BLD AUTO: 42 K/UL
PMV BLD AUTO: 11.5 FL
POTASSIUM SERPL-SCNC: 3.8 MMOL/L
PROT SERPL-MCNC: 6.4 G/DL
RBC # BLD AUTO: 2.37 M/UL
RETICS/RBC NFR AUTO: 2.6 %
SODIUM SERPL-SCNC: 139 MMOL/L
WBC # BLD AUTO: 2.4 K/UL

## 2018-04-05 PROCEDURE — A4216 STERILE WATER/SALINE, 10 ML: HCPCS | Mod: PO | Performed by: PEDIATRICS

## 2018-04-05 PROCEDURE — 36415 COLL VENOUS BLD VENIPUNCTURE: CPT | Mod: PO

## 2018-04-05 PROCEDURE — 80053 COMPREHEN METABOLIC PANEL: CPT

## 2018-04-05 PROCEDURE — 36591 DRAW BLOOD OFF VENOUS DEVICE: CPT | Mod: PO

## 2018-04-05 PROCEDURE — 85025 COMPLETE CBC W/AUTO DIFF WBC: CPT | Mod: PO

## 2018-04-05 PROCEDURE — 96523 IRRIG DRUG DELIVERY DEVICE: CPT | Mod: PO

## 2018-04-05 PROCEDURE — 63600175 PHARM REV CODE 636 W HCPCS: Mod: PO | Performed by: PEDIATRICS

## 2018-04-05 PROCEDURE — 99214 OFFICE O/P EST MOD 30 MIN: CPT | Mod: S$PBB,,, | Performed by: PEDIATRICS

## 2018-04-05 PROCEDURE — 25000003 PHARM REV CODE 250: Mod: PO | Performed by: PEDIATRICS

## 2018-04-05 PROCEDURE — 85045 AUTOMATED RETICULOCYTE COUNT: CPT | Mod: PO

## 2018-04-05 RX ORDER — HEPARIN 100 UNIT/ML
500 SYRINGE INTRAVENOUS
Status: COMPLETED | OUTPATIENT
Start: 2018-04-05 | End: 2018-04-05

## 2018-04-05 RX ORDER — SODIUM CHLORIDE 0.9 % (FLUSH) 0.9 %
10 SYRINGE (ML) INJECTION
Status: DISCONTINUED | OUTPATIENT
Start: 2018-04-05 | End: 2018-04-06 | Stop reason: HOSPADM

## 2018-04-05 RX ADMIN — SODIUM CHLORIDE, PRESERVATIVE FREE 10 ML: 5 INJECTION INTRAVENOUS at 10:04

## 2018-04-05 RX ADMIN — HEPARIN 500 UNITS: 100 SYRINGE at 10:04

## 2018-04-05 NOTE — PROGRESS NOTES
Pediatric Hematology Note      Subjective:       Patient ID: Hema Kuo is a 17 y.o. male      Chief Complaint:    Chief Complaint   Patient presents with    Follow-up       History of Present Illness:   Hema Kuo is a 17 y.o. male with AML s/p Induction therapy following [id number] (Arm A) here today for f/u from hospitalization for Intensification therapy.  Hema reports that he has been feeling very well since discharget  His neck swelling continues to improve and is no longer painful.  Completed 2 weeks of Cipro/Augmentin today.  He reports no fevers, no abdominal pain, vomiting, diarrhea or constipation and no excessive bruising or unusual bleeding.  Good appetite and energy level.  No other complaints.      Past Medical History:   Diagnosis Date    AML (acute myeloblastic leukemia) 10/2017    Asthma     Encounter for blood transfusion     Seasonal allergies      Past Surgical History:   Procedure Laterality Date    PORTACATH PLACEMENT  10/31/2017    TONSILLECTOMY         ROS:   Gen: Negative for fever. Negative for night sweats.    HEENT: Negative for nosebleeds.  Negative for sore throat.  Negative for visual problems.  Pulm: Negative for cough.  Negative for shortness of breath.  CV: Negative for chest pain.  Negative for cyanosis.  GI: Negative for abdominal pain, nausea or vomiting.    : Negative for changes in frequency or dysuria.   Skin: Negative for bruising.  Negative for rash.    MS: Negative for joint swelling or pain.  Neuro:  Negative for headaches, seizures, weakness.  Hematology:  Positive for AML.  Positive for recent chemotherapy  Endocrine:  Negative for heat or cold intolerance.  Negative for increased thirst.  Psych: Negative for hyperactivity.  Negative for behavioral issues.      Physical Examination:   There were no vitals filed for this visit.  General: well developed, well nourished, no distress.    HENT: Head:normocephalic,  atraumatic. Ears:bilateral TM's and external ear canals normal. Nose: Nares normal. Mucosa normal. No discharge. Throat: lips, mucosa, and tongue normal; teeth and gums normal and no throat erythema.  Right sided anterior cervical swelling, greatly improved since last exam, non-tender but still firm.  ~6nrh5ov.  Eyes: conjunctivae pale/corneas clear. PERRL.   Neck: supple, symmetrical, and thyroid not enlarged, symmetric, no tenderness/mass/nodules  Lungs:  clear to auscultation bilaterally and normal respiratory effort  Cardiovascular: regular rate and rhythm, S1, S2 normal, no murmur, click, rub or gallop.   Chest Wall: Port site healing  Extremities: no cyanosis or edema, or clubbing. Pulses: 2+ and symmetric.  Abdomen: soft, non-tender non-distented; bowel sounds normal; no masses,  no organomegaly.   Skin: Pale.  Texture and turgor normal. No rashes or lesions  Musculoskeletal: No obvious joint swelling or erythema  Lymph Nodes: No cervical or supraclavicular adenopathy. No axillary or inguinal adenopathy.  Neurologic: Cranial nerves intact.  Normal strength and tone. No focal numbness or weakness  Psych: appropriate mood and affect    Objective:     Pathology:  Initial BM bx:    BONE MARROW, RIGHT ILIAC CREST, ASPIRATE, CLOT, AND CORE BIOPSY:  --Cellular marrow, positive for acute myeloid leukemia, with blasts representing approximately 25% of cellularity, see comment  --Background developing myeloid cells present with some cytologic atypia, see comment  COMMENT: Concomitantly submitted flow cytometric analysis detects an increased population of blasts consistent with acute myeloid leukemia, non-M3 subtype. Additional immunophenotyping was performed on peripheral blood flow cytometric studies (please see separate report). The marrow blasts showing expression of CD34, CD13, and cytoplasmic myeloperoxidase as well as dim CD19. The population however is negative for TdT, and cytoplasmic CD22 and CD79a. These  "findings are similar to that seen on the peripheral blood study and are consistent with acute myeloid leukemia, non-M3 subtype by JOVANNI classification.  Given the dim expression of CD19, a translocation of chromosomes 8 and 21 is a diagnostic possibility. The cytologic atypia/dysplasia seen in the myeloid series can also be seen with this translocation. Clinical correlation and correlation with any available cytogenetic and molecular studies is required for definitive classification of this process.    AML FISH: The result is abnormal and indicates OMTD9H5/RUNX1 fusion in 90.6% of nuclei. This result is most consistent with acute myeloid leukemia with t(8;21)(q22;q22) (Grimwade et al., Blood 116:354-65, 2010; Solh et al., Am J Hematol 89:0348-7115, 2014; Bernarda and Radha, Am J Clin Pathol 144:6-18, 2015). For monitoring response to therapy in this patient, we suggest using FISH for WQQN7K1/RUNX1 fusion."    Peripheral blood, FLT3 mutation analysis: Negative. Neither expansion of the region susceptible to internal tandem duplication (FLT-ITD) nor changes involving codon D835 and/or I836 in the tyrosine kinase domain (FLT3-TKD) was detected."     KIT genetic alteration analysis, exons 8, 9, 10, 11, and 17: Negative.  No pathogenic genetic alterations were detected in the  KIT  gene, exons 8, 9, 10, 11, and 17.     Cytogenetics:  45,X,-Y,nevin(8)t(8;21)(q22;q22),nevin(20)t(20;21)(p13;q22)t(8;21),nevin(21)t(8;21)t(20;21)[17]/46,XY[3]  Comments: The result is abnormal. Of 20 metaphases, 3 metaphases were normal, and 17 metaphases had a complex translocation involving chromosomes 8, 21, and 20. FISH studies confirmed DCAE8K0/RUNX1 fusion associated with this translocation (reported separately). Fusion of QPAQ0D7/RUNX1 is reportedly associated with a favorable prognosis in AML (Grimwade et al., Blood 116:354-365, 2010). For monitoring response to therapy in this patient, FISH for HPPP5R6/RUNX1 fusion is available, test AMLF " "(AML, FISH)."    End of Induction I:  BONE MARROW, RIGHT ILIAC CREST, ASPIRATE AND CLOT:  --Cellular marrow, approximately 60%, with trilineage hematopoiesis, see comment  --No definitive morphologic evidence of residual/recurrent acute myeloid leukemia, see comment  --Increased stainable iron  COMMENT: Concomitantly submitted flow cytometric analysis detects no diagnostic abnormal hematopoietic population. B cells are polyclonal with a subset of CD19/CD10 positive cells favored to be hematogones, and T cells are immunophenotypically unremarkable. The blast gate is not increased.    AML FISH:  This result is within normal limits for the CEOJ3G0 and RUNX1 gene regions."      Labs:   Results for HEMA BHATIA (MRN [id number]) as of 4/5/2018 12:41   4/5/2018 10:05   WBC 2.40 (L)   RBC 2.37 (L)   Hemoglobin 7.2 (L)   Hematocrit 21.2 (L)   MCV 90   MCH 30.4   MCHC 34.0   RDW 14.8 (H)   Platelets 42 (L)   MPV 11.5   Gran% 47.9   Gran # (ANC) 1.2 (L)   Lymph% 42.1   Lymph # 1.0   Mono% 10.0   Mono # 0.2 (L)   Eosinophil% 0.0   Eos # 0.0   Basophil% 0.0   Baso # 0.00   Differential Method Automated       Assessment/Plan:   Hema was seen today for follow-up.    Diagnoses and all orders for this visit:    Lymphadenitis  -     US Soft Tissue Head Neck Thyroid; Future    AML (acute myeloblastic leukemia)  -     CBC auto differential  -     Reticulocytes  -     Comprehensive metabolic panel  -     Reticulocytes; Standing  -     Reticulocytes  -     CBC auto differential; Future  -     Comprehensive metabolic panel; Future  -     CBC auto differential  -     Comprehensive metabolic panel    Acute leukemia not having achieved remission  -     CBC auto differential  -     Reticulocytes  -     Comprehensive metabolic panel  -     Reticulocytes; Standing  -     Reticulocytes  -     CBC auto differential; Future  -     Comprehensive metabolic panel; Future  -     CBC auto differential  -     Comprehensive metabolic " panel        Discussion:   17 y.o. young man with AML (t 8;21) here for chemotherapy.  He is Day 25 of Cycle I of therapy following [id number] (Arm A).  He reports feeling well since discharge home and was well appearing today in clinic.    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  CNS negative.  MRD negative after Induction I.    -[place]ing [place] [id number] (ARM A).  S/p Intensification I today.  -BM biopsy at start of Intensification shows CR.  FISH for t(8;21) negative.  -Awaiting count recovery to start Intensification II    For his chemotherapy induced pancytopenia  -Resolving    For his lymphadenitis  -Much improved, though still with palpable mass.    -Will obtain U/S at next visit if not resolved.    For his immunosuppression secondary to chemotherapy  -Continue acyclovir, ciprofloxacin and posaconazole prophylaxis  -Continue Bactrim on Fri, Sat and Sun  -Continue Peridex    Return to clinic Thursday for admit..      Sanford Bucio    Total time 30 minutes with >50% spent in face-to-face counseling regarding plan of care, chemo side effects and arranging coordination of care.

## 2018-04-05 NOTE — NURSING
0955:  Left upper inner chest PAC accessed without difficulty using sterile technique.  See doc flowsheet for full assessment.  Good blood return noted to left inner pac, then labs drawn as ordered.  Labs labeled @ bedside, then sent to lab as ordered.  Left inner PAC flushed with normal saline.  Needle left in place to await for lab results.  Tegaderm applied.  Pt tolerated procedure well. Will monitor pt closely.

## 2018-04-05 NOTE — PLAN OF CARE
Problem: Patient Care Overview  Goal: Plan of Care Review  1.  Pt has a left double upper chest pac.  2.  Use 1 inch needle to access each PAC.  3.  Pt likes to keller.   Outcome: Ongoing (interventions implemented as appropriate)  Pt stated that he has been feeling good since his discharge from the hospital.  No problems reported today.  Pt's Hgb/Hct is same as beginning of week, so no transfusions required today.  Pt is happy to be going back home.  Will return to clinic in 1 week for admit into hospital for next cycle of chemo if counts sufficient @ that time.  Pt verbalized complete understanding.

## 2018-04-05 NOTE — NURSING
Blood counts reviewed per Bucio.  No transfusions required today.  Good blood return noted to left inner upper chest PAC, then flushed with 10 ml normal saline, heplocked with 500 units heparin, + deaccessed per central line guidelines.  Needle intact.  Band-aid applied to site.  Pt tolerated procedure well.  Pt + his mom instructed to return to clinic in 1 week for repeat blood counts, + to call clinic for any problems or concerns.  They repeated back instructions, + verbalized complete understanding.

## 2018-04-09 ENCOUNTER — TELEPHONE (OUTPATIENT)
Dept: PEDIATRIC HEMATOLOGY/ONCOLOGY | Facility: CLINIC | Age: 18
End: 2018-04-09

## 2018-04-09 ENCOUNTER — HISTORICAL (OUTPATIENT)
Dept: HEMATOLOGY/ONCOLOGY | Facility: CLINIC | Age: 18
End: 2018-04-09

## 2018-04-09 DIAGNOSIS — C92.00 AML (ACUTE MYELOBLASTIC LEUKEMIA): Primary | ICD-10-CM

## 2018-04-09 LAB
ABS NEUT (OLG): 0.82 X10(3)/MCL (ref 2.1–9.2)
ANISOCYTOSIS BLD QL SMEAR: ABNORMAL
BASOPHILS NFR BLD MANUAL: 2 %
EOSINOPHIL NFR BLD MANUAL: 0 %
ERYTHROCYTE [DISTWIDTH] IN BLOOD BY AUTOMATED COUNT: 20 % (ref 11.5–14.5)
GRANULOCYTES NFR BLD MANUAL: 40 % (ref 43–75)
HCT VFR BLD AUTO: 21.5 % (ref 40–51)
HGB BLD-MCNC: 7 GM/DL (ref 13.5–17.5)
HYPOCHROMIA BLD QL SMEAR: ABNORMAL
LYMPHOCYTES NFR BLD MANUAL: 50 % (ref 20.5–51.1)
MCH RBC QN AUTO: 30.6 PG (ref 26–34)
MCHC RBC AUTO-ENTMCNC: 32.6 GM/DL (ref 31–37)
MCV RBC AUTO: 93.9 FL (ref 80–100)
MONOCYTES NFR BLD MANUAL: 4 % (ref 2–9)
NEUTS BAND NFR BLD MANUAL: 4 % (ref 0–10)
OVALOCYTES BLD QL SMEAR: ABNORMAL
PLATELET # BLD AUTO: 56 X10(3)/MCL (ref 130–400)
PLATELET # BLD EST: ABNORMAL 10*3/UL
PMV BLD AUTO: 12 FL (ref 7.4–10.4)
POLYCHROMASIA BLD QL SMEAR: ABNORMAL
RBC # BLD AUTO: 2.29 X10(6)/MCL (ref 4.5–5.9)
RBC MORPH BLD: ABNORMAL
RET# (OHS): 0.1 X10(6)/MCL (ref 0.02–0.09)
RETICULOCYTE COUNT AUTOMATED (OLG): 4.5 % (ref 0.5–1.5)
WBC # SPEC AUTO: 1.8 X10(3)/MCL (ref 4.5–11)

## 2018-04-09 NOTE — TELEPHONE ENCOUNTER
Spoke to pt mother, Madeline, and informed her that I spoke to JULIANN Franklin today and she stated that the pt WBC= 1.8, ANC= 820, plt=56, and hgb= 7.0. Dr. Bucio notified and stated that he would like the pt to continue to come in on Thursday for last admit for chemotherapy as he thinks the pt counts will be where they need to be by then. Pt mother stated he is doing well, just tired. Aware of appts on Thursday. No further needs noted.

## 2018-04-12 ENCOUNTER — OFFICE VISIT (OUTPATIENT)
Dept: PEDIATRIC HEMATOLOGY/ONCOLOGY | Facility: CLINIC | Age: 18
End: 2018-04-12
Payer: MEDICAID

## 2018-04-12 ENCOUNTER — HOSPITAL ENCOUNTER (OUTPATIENT)
Dept: RADIOLOGY | Facility: HOSPITAL | Age: 18
Discharge: HOME | End: 2018-04-12
Attending: PEDIATRICS
Payer: MEDICAID

## 2018-04-12 ENCOUNTER — HOSPITAL ENCOUNTER (OUTPATIENT)
Dept: INFUSION THERAPY | Facility: HOSPITAL | Age: 18
Discharge: HOME | End: 2018-04-12
Attending: NURSE PRACTITIONER
Payer: MEDICAID

## 2018-04-12 ENCOUNTER — CLINICAL SUPPORT (OUTPATIENT)
Dept: PEDIATRIC CARDIOLOGY | Facility: CLINIC | Age: 18
End: 2018-04-12
Attending: PEDIATRICS
Payer: MEDICAID

## 2018-04-12 VITALS
RESPIRATION RATE: 20 BRPM | HEIGHT: 66 IN | SYSTOLIC BLOOD PRESSURE: 112 MMHG | BODY MASS INDEX: 32.41 KG/M2 | TEMPERATURE: 98 F | DIASTOLIC BLOOD PRESSURE: 53 MMHG | WEIGHT: 201.63 LBS | HEART RATE: 95 BPM | RESPIRATION RATE: 20 BRPM | DIASTOLIC BLOOD PRESSURE: 53 MMHG | TEMPERATURE: 98 F | SYSTOLIC BLOOD PRESSURE: 112 MMHG | HEART RATE: 95 BPM | BODY MASS INDEX: 32.41 KG/M2 | WEIGHT: 201.63 LBS | HEIGHT: 66 IN

## 2018-04-12 DIAGNOSIS — C95.00 ACUTE LEUKEMIA NOT HAVING ACHIEVED REMISSION: ICD-10-CM

## 2018-04-12 DIAGNOSIS — I88.9 LYMPHADENITIS: ICD-10-CM

## 2018-04-12 DIAGNOSIS — D84.821 IMMUNOSUPPRESSED DUE TO CHEMOTHERAPY: ICD-10-CM

## 2018-04-12 DIAGNOSIS — T45.1X5A ANTINEOPLASTIC CHEMOTHERAPY INDUCED PANCYTOPENIA: ICD-10-CM

## 2018-04-12 DIAGNOSIS — C92.01 ACUTE MYELOID LEUKEMIA IN REMISSION: Primary | ICD-10-CM

## 2018-04-12 DIAGNOSIS — Z79.899 IMMUNOSUPPRESSED DUE TO CHEMOTHERAPY: ICD-10-CM

## 2018-04-12 DIAGNOSIS — T45.1X5A IMMUNOSUPPRESSED DUE TO CHEMOTHERAPY: ICD-10-CM

## 2018-04-12 DIAGNOSIS — D61.810 ANTINEOPLASTIC CHEMOTHERAPY INDUCED PANCYTOPENIA: ICD-10-CM

## 2018-04-12 PROBLEM — K12.31 NEUTROPENIA ASSOCIATED WITH MUCOSITIS DUE TO ANTINEOPLASTIC THERAPY: Status: RESOLVED | Noted: 2018-03-13 | Resolved: 2018-04-12

## 2018-04-12 PROBLEM — T45.1X5S NEUTROPENIA ASSOCIATED WITH MUCOSITIS DUE TO ANTINEOPLASTIC THERAPY: Status: RESOLVED | Noted: 2018-03-13 | Resolved: 2018-04-12

## 2018-04-12 PROBLEM — D70.1 NEUTROPENIA ASSOCIATED WITH MUCOSITIS DUE TO ANTINEOPLASTIC THERAPY: Status: RESOLVED | Noted: 2018-03-13 | Resolved: 2018-04-12

## 2018-04-12 PROBLEM — Z51.5 COMFORT MEASURES ONLY STATUS: Status: RESOLVED | Noted: 2018-03-23 | Resolved: 2018-04-12

## 2018-04-12 LAB
ABO + RH BLD: NORMAL
ALBUMIN SERPL BCP-MCNC: 3.5 G/DL
ALP SERPL-CCNC: 77 U/L
ALT SERPL W/O P-5'-P-CCNC: 47 U/L
ANION GAP SERPL CALC-SCNC: 6 MMOL/L
ANISOCYTOSIS BLD QL SMEAR: SLIGHT
AST SERPL-CCNC: 19 U/L
BASOPHILS # BLD AUTO: ABNORMAL K/UL
BASOPHILS NFR BLD: 0 %
BILIRUB SERPL-MCNC: 0.4 MG/DL
BLD GP AB SCN CELLS X3 SERPL QL: NORMAL
BUN SERPL-MCNC: 14 MG/DL
CALCIUM SERPL-MCNC: 9 MG/DL
CHLORIDE SERPL-SCNC: 105 MMOL/L
CO2 SERPL-SCNC: 28 MMOL/L
CREAT SERPL-MCNC: 0.7 MG/DL
DIFFERENTIAL METHOD: ABNORMAL
EOSINOPHIL # BLD AUTO: ABNORMAL K/UL
EOSINOPHIL NFR BLD: 0 %
ERYTHROCYTE [DISTWIDTH] IN BLOOD BY AUTOMATED COUNT: 22.1 %
EST. GFR  (AFRICAN AMERICAN): >60 ML/MIN/1.73 M^2
EST. GFR  (NON AFRICAN AMERICAN): >60 ML/MIN/1.73 M^2
FUNGUS BLD CULT: NORMAL
FUNGUS BLD CULT: NORMAL
GLUCOSE SERPL-MCNC: 92 MG/DL
HCT VFR BLD AUTO: 19.7 %
HGB BLD-MCNC: 6.5 G/DL
HYPOCHROMIA BLD QL SMEAR: ABNORMAL
LYMPHOCYTES # BLD AUTO: ABNORMAL K/UL
LYMPHOCYTES NFR BLD: 49 %
MCH RBC QN AUTO: 32 PG
MCHC RBC AUTO-ENTMCNC: 33 G/DL
MCV RBC AUTO: 97 FL
MONOCYTES # BLD AUTO: ABNORMAL K/UL
MONOCYTES NFR BLD: 16 %
NEUTROPHILS NFR BLD: 34 %
NEUTS BAND NFR BLD MANUAL: 1 %
PLATELET # BLD AUTO: 61 K/UL
PLATELET BLD QL SMEAR: ABNORMAL
PMV BLD AUTO: 11 FL
POIKILOCYTOSIS BLD QL SMEAR: SLIGHT
POLYCHROMASIA BLD QL SMEAR: ABNORMAL
POTASSIUM SERPL-SCNC: 4.2 MMOL/L
PROT SERPL-MCNC: 6.2 G/DL
RBC # BLD AUTO: 2.03 M/UL
RETICS/RBC NFR AUTO: 5.1 %
SCHISTOCYTES BLD QL SMEAR: ABNORMAL
SCHISTOCYTES BLD QL SMEAR: PRESENT
SODIUM SERPL-SCNC: 139 MMOL/L
WBC # BLD AUTO: 1.64 K/UL

## 2018-04-12 PROCEDURE — 96523 IRRIG DRUG DELIVERY DEVICE: CPT | Mod: PO

## 2018-04-12 PROCEDURE — 25000003 PHARM REV CODE 250: Mod: PO | Performed by: PEDIATRICS

## 2018-04-12 PROCEDURE — 93321 DOPPLER ECHO F-UP/LMTD STD: CPT | Mod: PBBFAC,PO | Performed by: PEDIATRICS

## 2018-04-12 PROCEDURE — 63600175 PHARM REV CODE 636 W HCPCS: Mod: PO | Performed by: PEDIATRICS

## 2018-04-12 PROCEDURE — 93325 DOPPLER ECHO COLOR FLOW MAPG: CPT | Mod: 26,S$PBB,, | Performed by: PEDIATRICS

## 2018-04-12 PROCEDURE — 93321 DOPPLER ECHO F-UP/LMTD STD: CPT | Mod: 26,S$PBB,, | Performed by: PEDIATRICS

## 2018-04-12 PROCEDURE — 93304 ECHO TRANSTHORACIC: CPT | Mod: 26,S$PBB,, | Performed by: PEDIATRICS

## 2018-04-12 PROCEDURE — 93325 DOPPLER ECHO COLOR FLOW MAPG: CPT | Mod: PBBFAC,PO | Performed by: PEDIATRICS

## 2018-04-12 PROCEDURE — 99999 PR PBB SHADOW E&M-EST. PATIENT-LVL III: CPT | Mod: PBBFAC,,, | Performed by: PEDIATRICS

## 2018-04-12 PROCEDURE — 76536 US EXAM OF HEAD AND NECK: CPT | Mod: TC

## 2018-04-12 PROCEDURE — 36591 DRAW BLOOD OFF VENOUS DEVICE: CPT | Mod: PO

## 2018-04-12 PROCEDURE — 36415 COLL VENOUS BLD VENIPUNCTURE: CPT | Mod: PO

## 2018-04-12 PROCEDURE — A4216 STERILE WATER/SALINE, 10 ML: HCPCS | Mod: PO | Performed by: PEDIATRICS

## 2018-04-12 PROCEDURE — 76536 US EXAM OF HEAD AND NECK: CPT | Mod: 26,,, | Performed by: RADIOLOGY

## 2018-04-12 PROCEDURE — 85007 BL SMEAR W/DIFF WBC COUNT: CPT | Mod: PO

## 2018-04-12 PROCEDURE — 93304 ECHO TRANSTHORACIC: CPT | Mod: PBBFAC,PO | Performed by: PEDIATRICS

## 2018-04-12 PROCEDURE — 99214 OFFICE O/P EST MOD 30 MIN: CPT | Mod: S$PBB,,, | Performed by: PEDIATRICS

## 2018-04-12 PROCEDURE — 85045 AUTOMATED RETICULOCYTE COUNT: CPT | Mod: PO

## 2018-04-12 PROCEDURE — 99213 OFFICE O/P EST LOW 20 MIN: CPT | Mod: PBBFAC,25 | Performed by: PEDIATRICS

## 2018-04-12 PROCEDURE — 86901 BLOOD TYPING SEROLOGIC RH(D): CPT

## 2018-04-12 PROCEDURE — 85027 COMPLETE CBC AUTOMATED: CPT | Mod: PO

## 2018-04-12 PROCEDURE — 80053 COMPREHEN METABOLIC PANEL: CPT

## 2018-04-12 RX ORDER — SODIUM CHLORIDE 0.9 % (FLUSH) 0.9 %
10 SYRINGE (ML) INJECTION
Status: DISCONTINUED | OUTPATIENT
Start: 2018-04-12 | End: 2018-04-13 | Stop reason: HOSPADM

## 2018-04-12 RX ORDER — ACETAMINOPHEN 325 MG/1
650 TABLET ORAL
Status: DISCONTINUED | OUTPATIENT
Start: 2018-04-12 | End: 2018-04-13 | Stop reason: HOSPADM

## 2018-04-12 RX ORDER — DIPHENHYDRAMINE HCL 25 MG
25 CAPSULE ORAL
Status: DISCONTINUED | OUTPATIENT
Start: 2018-04-12 | End: 2018-04-13 | Stop reason: HOSPADM

## 2018-04-12 RX ORDER — HEPARIN 100 UNIT/ML
500 SYRINGE INTRAVENOUS
Status: DISCONTINUED | OUTPATIENT
Start: 2018-04-12 | End: 2018-04-13 | Stop reason: HOSPADM

## 2018-04-12 RX ORDER — HYDROCODONE BITARTRATE AND ACETAMINOPHEN 500; 5 MG/1; MG/1
TABLET ORAL ONCE
Status: DISCONTINUED | OUTPATIENT
Start: 2018-04-12 | End: 2018-04-13 | Stop reason: HOSPADM

## 2018-04-12 RX ADMIN — SODIUM CHLORIDE, PRESERVATIVE FREE 10 ML: 5 INJECTION INTRAVENOUS at 10:04

## 2018-04-12 RX ADMIN — HEPARIN 500 UNITS: 100 SYRINGE at 11:04

## 2018-04-12 NOTE — NURSING
Labs reviewed by Dr Bucio, pt's counts not high enough to start chemo today, will come back in 1 week to reassess counts and proceed with last cycle of chemo. Pt's hgb low, Dr Bucio ordered PRBC transfusion but after discussing with pt, plan is for pt to get transfused locally tomorrow. Pt staying accessed to PAC for transfusion tomorrow. Inner and outer PAC heplocked with 500 units each of heparin per central line guidelines. Tegaderm remains clean, dry, and intact to double PAC. Reviewed plan of care with pt and mom instructed to call with any issues or concerns, they verbalized complete understanding.

## 2018-04-12 NOTE — NURSING
L upper chest double PAC accessed with 20g 1 inch currie needle x 2 using sterile technique. + blood return noted from outer PAC, labs drawn per central line guidelines, labeled at bedside and sent to lab. Outer PAC then flushed and saline locked with 10 ml NS. + blood return noted from inner PAC, then flushed and saline locked with 10 ml NS. Tegaderm applied to double PAC site, remains clean, dry, and intact. Pt tolerated procedure well. Will await counts.

## 2018-04-12 NOTE — PLAN OF CARE
Problem: Chemotherapy Effects (Adult)  Goal: Signs and Symptoms of Listed Potential Problems Will be Absent, Minimized or Managed (Chemotherapy Effects)  Signs and symptoms of listed potential problems will be absent, minimized or managed by discharge/transition of care (reference Chemotherapy Effects (Adult) CPG).   Outcome: Ongoing (interventions implemented as appropriate)  Pt to receive PRBC transfusion locally tomorrow.     Problem: Patient Care Overview  Goal: Plan of Care Review  1.  Pt has a left double upper chest pac.  2.  Use 1 inch needle to access each PAC.  3.  Pt likes to keller.   Outcome: Ongoing (interventions implemented as appropriate)  Pt reports he is doing better since last appt, no issues currently. Pt could not stay for transfusion today, as his grandmother drove he and his mother down today in anticipation of leaving them here for pt's chemo admit, grandmother has to return home for work by 3 PM, pt will get transfused PRBC locally tomorrow.

## 2018-04-12 NOTE — PROGRESS NOTES
Pediatric Hematology Note      Subjective:       Patient ID: Hema Kuo is a 17 y.o. male      Chief Complaint:    Chief Complaint   Patient presents with    Leukemia       History of Present Illness:   Hema Kuo is a 17 y.o. male with AML s/p Induction therapy following [id number] ([facility]) here today for f/u from hospitalization for Intensification therapy.  Hema reports that he has been feeling very well since discharget  His neck swelling continues to improve and is no longer painful.  Completed 2 weeks of Cipro/Augmentin today.  He reports no fevers, no abdominal pain, vomiting, diarrhea or constipation and no excessive bruising or unusual bleeding.  Good appetite and energy level.  No other complaints.      Past Medical History:   Diagnosis Date    AML (acute myeloblastic leukemia) 10/2017    Asthma     Encounter for blood transfusion     Seasonal allergies      Past Surgical History:   Procedure Laterality Date    PORTACATH PLACEMENT  10/31/2017    TONSILLECTOMY         ROS:   Gen: Negative for fever. Negative for night sweats.    HEENT: Negative for nosebleeds.  Negative for sore throat.  Negative for visual problems.  Pulm: Negative for cough.  Negative for shortness of breath.  CV: Negative for chest pain.  Negative for cyanosis.  GI: Negative for abdominal pain, nausea or vomiting.    : Negative for changes in frequency or dysuria.   Skin: Negative for bruising.  Negative for rash.    MS: Negative for joint swelling or pain.  Neuro:  Negative for headaches, seizures, weakness.  Hematology:  Positive for AML.  Positive for recent chemotherapy  Endocrine:  Negative for heat or cold intolerance.  Negative for increased thirst.  Psych: Negative for hyperactivity.  Negative for behavioral issues.      Physical Examination:   Vitals:    04/12/18 1001   BP: (!) 112/53   Pulse: 95   Resp: 20   Temp: 98.1 °F (36.7 °C)     General: well developed, well  nourished, no distress.    HENT: Head:normocephalic, atraumatic. Ears:bilateral TM's and external ear canals normal. Nose: Nares normal. Mucosa normal. No discharge. Throat: lips, mucosa, and tongue normal; teeth and gums normal and no throat erythema.  Right sided anterior cervical swelling, improved since last exam, non-tender but still firm.  ~4dyr8kn.  Eyes: conjunctivae pale/corneas clear. PERRL.   Neck: supple, symmetrical, and thyroid not enlarged, symmetric, no tenderness/mass/nodules  Lungs:  clear to auscultation bilaterally and normal respiratory effort  Cardiovascular: regular rate and rhythm, S1, S2 normal, no murmur, click, rub or gallop.   Chest Wall: Port site healing  Extremities: no cyanosis or edema, or clubbing. Pulses: 2+ and symmetric.  Abdomen: soft, non-tender non-distented; bowel sounds normal; no masses,  no organomegaly.   Skin: Pale.  Texture and turgor normal. No rashes or lesions  Musculoskeletal: No obvious joint swelling or erythema  Lymph Nodes: No cervical or supraclavicular adenopathy. No axillary or inguinal adenopathy.  Neurologic: Cranial nerves intact.  Normal strength and tone. No focal numbness or weakness  Psych: appropriate mood and affect    Objective:     Pathology:  Initial BM bx:    BONE MARROW, RIGHT ILIAC CREST, ASPIRATE, CLOT, AND CORE BIOPSY:  --Cellular marrow, positive for acute myeloid leukemia, with blasts representing approximately 25% of cellularity, see comment  --Background developing myeloid cells present with some cytologic atypia, see comment  COMMENT: Concomitantly submitted flow cytometric analysis detects an increased population of blasts consistent with acute myeloid leukemia, non-M3 subtype. Additional immunophenotyping was performed on peripheral blood flow cytometric studies (please see separate report). The marrow blasts showing expression of CD34, CD13, and cytoplasmic myeloperoxidase as well as dim CD19. The population however is negative for  "TdT, and cytoplasmic CD22 and CD79a. These findings are similar to that seen on the peripheral blood study and are consistent with acute myeloid leukemia, non-M3 subtype by JOVANNI classification.  Given the dim expression of CD19, a translocation of chromosomes 8 and 21 is a diagnostic possibility. The cytologic atypia/dysplasia seen in the myeloid series can also be seen with this translocation. Clinical correlation and correlation with any available cytogenetic and molecular studies is required for definitive classification of this process.    AML FISH: The result is abnormal and indicates CONN6R8/RUNX1 fusion in 90.6% of nuclei. This result is most consistent with acute myeloid leukemia with t(8;21)(q22;q22) (Grimwade et al., Blood 116:354-65, 2010; Solh et al., Am J Hematol 89:4060-2256, 2014; Sean, Am J Clin Pathol 144:6-18, 2015). For monitoring response to therapy in this patient, we suggest using FISH for VSJW4M8/RUNX1 fusion."    Peripheral blood, FLT3 mutation analysis: Negative. Neither expansion of the region susceptible to internal tandem duplication (FLT-ITD) nor changes involving codon D835 and/or I836 in the tyrosine kinase domain (FLT3-TKD) was detected."     KIT genetic alteration analysis, exons 8, 9, 10, 11, and 17: Negative.  No pathogenic genetic alterations were detected in the  KIT  gene, exons 8, 9, 10, 11, and 17.     Cytogenetics:  45,X,-Y,nevin(8)t(8;21)(q22;q22),nevin(20)t(20;21)(p13;q22)t(8;21),nevin(21)t(8;21)t(20;21)[17]/46,XY[3]  Comments: The result is abnormal. Of 20 metaphases, 3 metaphases were normal, and 17 metaphases had a complex translocation involving chromosomes 8, 21, and 20. FISH studies confirmed EDEJ1R3/RUNX1 fusion associated with this translocation (reported separately). Fusion of EFMY8J7/RUNX1 is reportedly associated with a favorable prognosis in AML (Grimwade et al., Blood 116:354-365, 2010). For monitoring response to therapy in this patient, FISH for " "JEFR5K1/RUNX1 fusion is available, test AMLF (AML, FISH)."    End of Induction I:  BONE MARROW, RIGHT ILIAC CREST, ASPIRATE AND CLOT:  --Cellular marrow, approximately 60%, with trilineage hematopoiesis, see comment  --No definitive morphologic evidence of residual/recurrent acute myeloid leukemia, see comment  --Increased stainable iron  COMMENT: Concomitantly submitted flow cytometric analysis detects no diagnostic abnormal hematopoietic population. B cells are polyclonal with a subset of CD19/CD10 positive cells favored to be hematogones, and T cells are immunophenotypically unremarkable. The blast gate is not increased.    AML FISH:  This result is within normal limits for the YYPT4N8 and RUNX1 gene regions."    Labs:   Results for HEMA BHATIA (MRN [id number]) as of 4/12/2018 13:58   4/12/2018 10:05   WBC 1.64 (LL)   RBC 2.03 (L)   Hemoglobin 6.5 (L)   Hematocrit 19.7 (LL)   MCV 97   MCH 32.0 (H)   MCHC 33.0   RDW 22.1 (H)   Platelets 61 (L)   MPV 11.0   Gran% 34.0 (L)   Lymph% 49.0 (H)   Lymph # CANCELED   Mono% 16.0 (H)   Mono # CANCELED   Eosinophil% 0.0   Eos # CANCELED   Basophil% 0.0   Baso # CANCELED   BANDS 1.0   Aniso Slight   Poik Slight   Poly Occasional   Hypo Occasional   Platelet Estimate Decreased (A)   Schistocytes Present   Retic 5.1 (H)   Differential Method Manual   Fragmented Cells Occasional     Assessment/Plan:   Hema was seen today for leukemia.    Diagnoses and all orders for this visit:    AML (acute myeloblastic leukemia)  -     Comprehensive metabolic panel  -     CBC auto differential  -     Reticulocytes  -     Type & Screen; Future  -     Type & Screen; Standing  -     Type & Screen    Antineoplastic chemotherapy induced pancytopenia    Immunosuppressed due to chemotherapy    Lymphadenitis        Discussion:   17 y.o. young man with AML (t 8;21) here for chemotherapy.  He is Day 25 of Cycle I of therapy following [id number] ([facility] A).  He reports feeling well since discharge home and was " well appearing today in clinic.    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  CNS negative.  MRD negative after Induction I.    -Treating per COG [id number] (ARM A).  S/p Intensification I today.  -BM biopsy at start of Intensification shows CR.  FISH for t(8;21) negative.  -Awaiting count recovery to start Intensification II, defer for 1 week    For his chemotherapy induced pancytopenia  -To receive PRBC transfusion in [place] tomorrow    For his lymphadenitis  -Much improved, though still with palpable mass.    -U/S today shows complex fluid collection, will discuss with ENT.      For his immunosuppression secondary to chemotherapy  -Continue acyclovir, ciprofloxacin and posaconazole prophylaxis  -Continue Bactrim on Fri, Sat and Sun  -Continue Peridex    Return to clinic next Thursday for admit.    Sanford Bucio    Total time 30 minutes with >50% spent in face-to-face counseling regarding plan of care, chemo side effects and arranging coordination of care.

## 2018-04-13 ENCOUNTER — HISTORICAL (OUTPATIENT)
Dept: INFUSION THERAPY | Facility: HOSPITAL | Age: 18
End: 2018-04-13

## 2018-04-13 LAB
ABS NEUT (OLG): 0.89 X10(3)/MCL (ref 2.1–9.2)
ANISOCYTOSIS BLD QL SMEAR: ABNORMAL
BASOPHILS NFR BLD MANUAL: 0 %
CROSSMATCH INTERPRETATION: NORMAL
DACRYOCYTES BLD QL SMEAR: ABNORMAL
EOSINOPHIL NFR BLD MANUAL: 2 %
ERYTHROCYTE [DISTWIDTH] IN BLOOD BY AUTOMATED COUNT: 23.9 % (ref 11.5–14.5)
GRANULOCYTES NFR BLD MANUAL: 48 % (ref 43–75)
HCT VFR BLD AUTO: 22.6 % (ref 40–51)
HGB BLD-MCNC: 7.3 GM/DL (ref 13.5–17.5)
LYMPHOCYTES NFR BLD MANUAL: 26 % (ref 20.5–51.1)
MACROCYTES BLD QL SMEAR: ABNORMAL
MCH RBC QN AUTO: 31.6 PG (ref 26–34)
MCHC RBC AUTO-ENTMCNC: 32.3 GM/DL (ref 31–37)
MCV RBC AUTO: 97.8 FL (ref 80–100)
MICROCYTES BLD QL SMEAR: ABNORMAL
MONOCYTES NFR BLD MANUAL: 22 % (ref 2–9)
NEUTS BAND NFR BLD MANUAL: 2 % (ref 0–10)
PLATELET # BLD AUTO: 60 X10(3)/MCL (ref 130–400)
PLATELET # BLD EST: ABNORMAL 10*3/UL
PMV BLD AUTO: 11.7 FL (ref 7.4–10.4)
POLYCHROMASIA BLD QL SMEAR: ABNORMAL
PRODUCT READY: NORMAL
RBC # BLD AUTO: 2.31 X10(6)/MCL (ref 4.5–5.9)
RBC MORPH BLD: ABNORMAL
TRANSFUSION ORDER: NORMAL
WBC # SPEC AUTO: 1.9 X10(3)/MCL (ref 4.5–11)

## 2018-04-13 RX ORDER — SODIUM CHLORIDE 0.9 % (FLUSH) 0.9 %
10 SYRINGE (ML) INJECTION
Status: CANCELLED | OUTPATIENT
Start: 2018-05-17

## 2018-04-13 RX ORDER — LORAZEPAM 2 MG/ML
2 INJECTION INTRAMUSCULAR EVERY 6 HOURS PRN
Status: CANCELLED
Start: 2018-05-17

## 2018-04-13 RX ORDER — HEPARIN 100 UNIT/ML
500 SYRINGE INTRAVENOUS
Status: CANCELLED | OUTPATIENT
Start: 2018-05-17

## 2018-04-13 RX ORDER — DIPHENHYDRAMINE HYDROCHLORIDE 50 MG/ML
50 INJECTION INTRAMUSCULAR; INTRAVENOUS EVERY 6 HOURS PRN
Status: CANCELLED
Start: 2018-05-17

## 2018-04-13 RX ORDER — EPHEDRINE SULFATE/0.9% NACL/PF 10 MG/5 ML
SYRINGE (ML) INTRAVENOUS DAILY
Status: CANCELLED
Start: 2018-05-17

## 2018-04-13 RX ORDER — DEXAMETHASONE SODIUM PHOSPHATE 1 MG/ML
2 SOLUTION/ DROPS OPHTHALMIC EVERY 6 HOURS
Status: CANCELLED | OUTPATIENT
Start: 2018-05-17

## 2018-04-18 ENCOUNTER — HISTORICAL (OUTPATIENT)
Dept: HEMATOLOGY/ONCOLOGY | Facility: CLINIC | Age: 18
End: 2018-04-18

## 2018-04-18 ENCOUNTER — TELEPHONE (OUTPATIENT)
Dept: PEDIATRIC HEMATOLOGY/ONCOLOGY | Facility: CLINIC | Age: 18
End: 2018-04-18

## 2018-04-18 LAB
ABS NEUT (OLG): 1.33 X10(3)/MCL (ref 2.1–9.2)
BASOPHILS # BLD AUTO: 0.02 X10(3)/MCL
BASOPHILS NFR BLD AUTO: 1 %
EOSINOPHIL # BLD AUTO: 0.01 X10(3)/MCL
EOSINOPHIL NFR BLD AUTO: 0 %
ERYTHROCYTE [DISTWIDTH] IN BLOOD BY AUTOMATED COUNT: 24.5 % (ref 11.5–14.5)
HCT VFR BLD AUTO: 30.6 % (ref 40–51)
HGB BLD-MCNC: 10 GM/DL (ref 13.5–17.5)
IMM GRANULOCYTES # BLD AUTO: 0.01 10*3/UL
IMM GRANULOCYTES NFR BLD AUTO: 0 %
LYMPHOCYTES # BLD AUTO: 0.86 X10(3)/MCL
LYMPHOCYTES NFR BLD AUTO: 34 % (ref 13–40)
MCH RBC QN AUTO: 32.3 PG (ref 26–34)
MCHC RBC AUTO-ENTMCNC: 32.7 GM/DL (ref 31–37)
MCV RBC AUTO: 98.7 FL (ref 80–100)
MONOCYTES # BLD AUTO: 0.34 X10(3)/MCL
MONOCYTES NFR BLD AUTO: 13 % (ref 0–10)
NEUTROPHILS # BLD AUTO: 1.33 X10(3)/MCL
NEUTROPHILS NFR BLD AUTO: 52 X10(3)/MCL
PLATELET # BLD AUTO: 66 X10(3)/MCL (ref 130–400)
PMV BLD AUTO: 12.1 FL (ref 7.4–10.4)
RBC # BLD AUTO: 3.1 X10(6)/MCL (ref 4.5–5.9)
WBC # SPEC AUTO: 2.6 X10(3)/MCL (ref 4.5–11)

## 2018-04-18 NOTE — TELEPHONE ENCOUNTER
Spoke to pt mother, Madeline, and informed her that we received local labs for pt today and that Dr. Bucio would like the pt to continue to come tomorrow for last chemo admit even though plt were 66 and need to be 75 because they are so close. Also informed her of WBC=2.6, hgb=10, ZLE=[id number]. Pt mother verbalized an understanding with no further needs noted.

## 2018-04-19 ENCOUNTER — OFFICE VISIT (OUTPATIENT)
Dept: PEDIATRIC HEMATOLOGY/ONCOLOGY | Facility: CLINIC | Age: 18
DRG: 837 | End: 2018-04-19
Payer: MEDICAID

## 2018-04-19 ENCOUNTER — HOSPITAL ENCOUNTER (INPATIENT)
Facility: HOSPITAL | Age: 18
LOS: 6 days | Discharge: HOME | DRG: 837 | End: 2018-04-25
Attending: PEDIATRICS | Admitting: PEDIATRICS
Payer: MEDICAID

## 2018-04-19 ENCOUNTER — HOSPITAL ENCOUNTER (OUTPATIENT)
Dept: INFUSION THERAPY | Facility: HOSPITAL | Age: 18
Discharge: HOME | End: 2018-04-19
Attending: NURSE PRACTITIONER
Payer: MEDICAID

## 2018-04-19 VITALS
RESPIRATION RATE: 20 BRPM | DIASTOLIC BLOOD PRESSURE: 53 MMHG | BODY MASS INDEX: 33.15 KG/M2 | SYSTOLIC BLOOD PRESSURE: 106 MMHG | TEMPERATURE: 98 F | HEIGHT: 66 IN | WEIGHT: 206.25 LBS | HEART RATE: 75 BPM

## 2018-04-19 DIAGNOSIS — C92.01 ACUTE MYELOID LEUKEMIA IN REMISSION: ICD-10-CM

## 2018-04-19 DIAGNOSIS — T45.1X5A CINV (CHEMOTHERAPY-INDUCED NAUSEA AND VOMITING): ICD-10-CM

## 2018-04-19 DIAGNOSIS — D84.821 IMMUNOSUPPRESSED DUE TO CHEMOTHERAPY: ICD-10-CM

## 2018-04-19 DIAGNOSIS — Z79.899 IMMUNOSUPPRESSED DUE TO CHEMOTHERAPY: ICD-10-CM

## 2018-04-19 DIAGNOSIS — T45.1X5A ANTINEOPLASTIC CHEMOTHERAPY INDUCED PANCYTOPENIA: Primary | ICD-10-CM

## 2018-04-19 DIAGNOSIS — C95.00 ACUTE LEUKEMIA NOT HAVING ACHIEVED REMISSION: ICD-10-CM

## 2018-04-19 DIAGNOSIS — I88.9 LYMPHADENITIS: ICD-10-CM

## 2018-04-19 DIAGNOSIS — C92.00 AML (ACUTE MYELOBLASTIC LEUKEMIA): Primary | ICD-10-CM

## 2018-04-19 DIAGNOSIS — D61.810 ANTINEOPLASTIC CHEMOTHERAPY INDUCED PANCYTOPENIA: Primary | ICD-10-CM

## 2018-04-19 DIAGNOSIS — C92.00 AML (ACUTE MYELOBLASTIC LEUKEMIA): ICD-10-CM

## 2018-04-19 DIAGNOSIS — T45.1X5A IMMUNOSUPPRESSED DUE TO CHEMOTHERAPY: ICD-10-CM

## 2018-04-19 DIAGNOSIS — R11.2 CINV (CHEMOTHERAPY-INDUCED NAUSEA AND VOMITING): ICD-10-CM

## 2018-04-19 LAB
ALBUMIN SERPL BCP-MCNC: 3.5 G/DL
ALP SERPL-CCNC: 71 U/L
ALT SERPL W/O P-5'-P-CCNC: 63 U/L
ANION GAP SERPL CALC-SCNC: 8 MMOL/L
AST SERPL-CCNC: 35 U/L
BASOPHILS # BLD AUTO: 0.01 K/UL
BASOPHILS NFR BLD: 0.4 %
BILIRUB SERPL-MCNC: 0.5 MG/DL
BUN SERPL-MCNC: 14 MG/DL
CALCIUM SERPL-MCNC: 9.2 MG/DL
CHLORIDE SERPL-SCNC: 105 MMOL/L
CLARITY CSF: CLEAR
CO2 SERPL-SCNC: 28 MMOL/L
COLOR CSF: COLORLESS
CREAT SERPL-MCNC: 0.7 MG/DL
DIFFERENTIAL METHOD: ABNORMAL
EOSINOPHIL # BLD AUTO: 0 K/UL
EOSINOPHIL NFR BLD: 0.4 %
ERYTHROCYTE [DISTWIDTH] IN BLOOD BY AUTOMATED COUNT: 24.7 %
EST. GFR  (AFRICAN AMERICAN): >60 ML/MIN/1.73 M^2
EST. GFR  (NON AFRICAN AMERICAN): >60 ML/MIN/1.73 M^2
GLUCOSE SERPL-MCNC: 97 MG/DL
HCT VFR BLD AUTO: 31.2 %
HGB BLD-MCNC: 10.3 G/DL
LYMPHOCYTES # BLD AUTO: 0.7 K/UL
LYMPHOCYTES NFR BLD: 31.7 %
LYMPHOCYTES NFR CSF MANUAL: 43 %
MCH RBC QN AUTO: 32.4 PG
MCHC RBC AUTO-ENTMCNC: 33 G/DL
MCV RBC AUTO: 98 FL
MONOCYTES # BLD AUTO: 0.3 K/UL
MONOCYTES NFR BLD: 14.7 %
MONOS+MACROS NFR CSF MANUAL: 43 %
NEUTROPHILS # BLD AUTO: 1.2 K/UL
NEUTROPHILS NFR BLD: 52.8 %
NEUTROPHILS NFR CSF MANUAL: 14 %
PATH INTERP FLD-IMP: NORMAL
PATHOLOGIST INTERPRETATION, HEM/ONC CSF: NORMAL
PLATELET # BLD AUTO: 70 K/UL
PMV BLD AUTO: 11.4 FL
POTASSIUM SERPL-SCNC: 3.9 MMOL/L
PROT SERPL-MCNC: 6.3 G/DL
RBC # BLD AUTO: 3.18 M/UL
RBC # CSF: 184 /CU MM
RETICS/RBC NFR AUTO: 4.2 %
SODIUM SERPL-SCNC: 141 MMOL/L
SPECIMEN VOL CSF: 0.5 ML
WBC # BLD AUTO: 2.24 K/UL
WBC # CSF: 1 /CU MM

## 2018-04-19 PROCEDURE — 63600175 PHARM REV CODE 636 W HCPCS: Performed by: PEDIATRICS

## 2018-04-19 PROCEDURE — 99223 1ST HOSP IP/OBS HIGH 75: CPT | Mod: 25,,, | Performed by: PEDIATRICS

## 2018-04-19 PROCEDURE — 99999 PR PBB SHADOW E&M-EST. PATIENT-LVL II: CPT | Mod: PBBFAC,,, | Performed by: PEDIATRICS

## 2018-04-19 PROCEDURE — 80053 COMPREHEN METABOLIC PANEL: CPT

## 2018-04-19 PROCEDURE — 20600001 HC STEP DOWN PRIVATE ROOM

## 2018-04-19 PROCEDURE — 63600175 PHARM REV CODE 636 W HCPCS: Mod: PO | Performed by: PEDIATRICS

## 2018-04-19 PROCEDURE — 85025 COMPLETE CBC W/AUTO DIFF WBC: CPT | Mod: PO

## 2018-04-19 PROCEDURE — 96365 THER/PROPH/DIAG IV INF INIT: CPT | Mod: PO

## 2018-04-19 PROCEDURE — 96450 CHEMOTHERAPY INTO CNS: CPT | Mod: PBBFAC | Performed by: PEDIATRICS

## 2018-04-19 PROCEDURE — 85045 AUTOMATED RETICULOCYTE COUNT: CPT | Mod: PO

## 2018-04-19 PROCEDURE — 96375 TX/PRO/DX INJ NEW DRUG ADDON: CPT | Mod: PO

## 2018-04-19 PROCEDURE — 25000003 PHARM REV CODE 250: Performed by: PEDIATRICS

## 2018-04-19 PROCEDURE — 89051 BODY FLUID CELL COUNT: CPT

## 2018-04-19 PROCEDURE — 99212 OFFICE O/P EST SF 10 MIN: CPT | Mod: PBBFAC,25 | Performed by: PEDIATRICS

## 2018-04-19 PROCEDURE — A4216 STERILE WATER/SALINE, 10 ML: HCPCS | Mod: PO | Performed by: PEDIATRICS

## 2018-04-19 PROCEDURE — C9285 PATCH, LIDOCAINE/TETRACAINE: HCPCS | Mod: PO | Performed by: PEDIATRICS

## 2018-04-19 PROCEDURE — 25000003 PHARM REV CODE 250: Mod: PO | Performed by: PEDIATRICS

## 2018-04-19 RX ORDER — DIPHENHYDRAMINE HYDROCHLORIDE 50 MG/ML
50 INJECTION INTRAMUSCULAR; INTRAVENOUS EVERY 6 HOURS PRN
Status: DISCONTINUED | OUTPATIENT
Start: 2018-04-19 | End: 2018-04-25 | Stop reason: HOSPADM

## 2018-04-19 RX ORDER — DEXAMETHASONE SODIUM PHOSPHATE 1 MG/ML
2 SOLUTION/ DROPS OPHTHALMIC EVERY 6 HOURS
Status: DISCONTINUED | OUTPATIENT
Start: 2018-04-19 | End: 2018-04-25 | Stop reason: HOSPADM

## 2018-04-19 RX ORDER — SODIUM CHLORIDE 0.9 % (FLUSH) 0.9 %
10 SYRINGE (ML) INJECTION
Status: DISCONTINUED | OUTPATIENT
Start: 2018-04-19 | End: 2018-04-20 | Stop reason: HOSPADM

## 2018-04-19 RX ORDER — EPHEDRINE SULFATE/0.9% NACL/PF 10 MG/5 ML
SYRINGE (ML) INTRAVENOUS DAILY
Status: DISCONTINUED | OUTPATIENT
Start: 2018-04-19 | End: 2018-04-20 | Stop reason: HOSPADM

## 2018-04-19 RX ORDER — LORAZEPAM 2 MG/ML
1 INJECTION INTRAMUSCULAR
Status: COMPLETED | OUTPATIENT
Start: 2018-04-19 | End: 2018-04-19

## 2018-04-19 RX ORDER — HEPARIN 100 UNIT/ML
500 SYRINGE INTRAVENOUS
Status: DISCONTINUED | OUTPATIENT
Start: 2018-04-19 | End: 2018-04-25 | Stop reason: HOSPADM

## 2018-04-19 RX ORDER — SODIUM CHLORIDE 0.9 % (FLUSH) 0.9 %
10 SYRINGE (ML) INJECTION
Status: DISCONTINUED | OUTPATIENT
Start: 2018-04-19 | End: 2018-04-25 | Stop reason: HOSPADM

## 2018-04-19 RX ORDER — LORAZEPAM 2 MG/ML
2 INJECTION INTRAMUSCULAR EVERY 6 HOURS PRN
Status: DISCONTINUED | OUTPATIENT
Start: 2018-04-19 | End: 2018-04-25 | Stop reason: HOSPADM

## 2018-04-19 RX ADMIN — LIDOCAINE AND TETRACAINE 1 EACH: 70; 70 PATCH CUTANEOUS at 10:04

## 2018-04-19 RX ADMIN — LORAZEPAM 2 MG: 2 INJECTION, SOLUTION INTRAMUSCULAR; INTRAVENOUS at 09:04

## 2018-04-19 RX ADMIN — CYTARABINE 2090 MG: 100 INJECTION, SOLUTION INTRATHECAL; INTRAVENOUS; SUBCUTANEOUS at 05:04

## 2018-04-19 RX ADMIN — SODIUM CHLORIDE, PRESERVATIVE FREE 10 ML: 5 INJECTION INTRAVENOUS at 10:04

## 2018-04-19 RX ADMIN — LORAZEPAM 1 MG: 2 INJECTION INTRAMUSCULAR; INTRAVENOUS at 11:04

## 2018-04-19 RX ADMIN — DEXAMETHASONE 2 DROP: 1 SUSPENSION OPHTHALMIC at 05:04

## 2018-04-19 RX ADMIN — CYTARABINE 70 MG: 20 INJECTION, SOLUTION INTRATHECAL; INTRAVENOUS; SUBCUTANEOUS at 11:04

## 2018-04-19 RX ADMIN — SODIUM CHLORIDE, PRESERVATIVE FREE 10 ML: 5 INJECTION INTRAVENOUS at 11:04

## 2018-04-19 RX ADMIN — ONDANSETRON: 2 INJECTION, SOLUTION INTRAMUSCULAR; INTRAVENOUS at 09:04

## 2018-04-19 NOTE — PROGRESS NOTES
Pediatric Hematology Note      Subjective:       Patient ID: Hema Kuo is a 17 y.o. male      Chief Complaint:    Chief Complaint   Patient presents with    Leukemia       History of Present Illness:   Hema Kuo is a 17 y.o. male with AML s/p Intensification I therapy following [id number] ([facility] A) here today for start of Intensification II therapy.  Hema reports that he has been feeling very well since last visit.  His neck swelling has resolved.  He reports no fevers, no abdominal pain, vomiting, diarrhea or constipation and no excessive bruising or unusual bleeding.  Good appetite and energy level.  No other complaints.      Past Medical History:   Diagnosis Date    AML (acute myeloblastic leukemia) 10/2017    Asthma     Encounter for blood transfusion     Seasonal allergies      Past Surgical History:   Procedure Laterality Date    PORTACATH PLACEMENT  10/31/2017    TONSILLECTOMY         ROS:   Gen: Negative for fever. Negative for night sweats.    HEENT: Negative for nosebleeds.  Negative for sore throat.  Negative for visual problems.  Pulm: Negative for cough.  Negative for shortness of breath.  CV: Negative for chest pain.  Negative for cyanosis.  GI: Negative for abdominal pain, nausea or vomiting.    : Negative for changes in frequency or dysuria.   Skin: Negative for bruising.  Negative for rash.    MS: Negative for joint swelling or pain.  Neuro:  Negative for headaches, seizures, weakness.  Hematology:  Positive for AML.  Positive for recent chemotherapy  Endocrine:  Negative for heat or cold intolerance.  Negative for increased thirst.  Psych: Negative for hyperactivity.  Negative for behavioral issues.      Physical Examination:   There were no vitals filed for this visit.  General: well developed, well nourished, no distress.    HENT: Head:normocephalic, atraumatic. Ears:bilateral TM's and external ear canals normal. Nose: Nares normal.  Mucosa normal. No discharge. Throat: lips, mucosa, and tongue normal; teeth and gums normal and no throat erythema.  Right sided anterior cervical swelling resolved.  Eyes: conjunctivae pale/corneas clear. PERRL.   Neck: supple, symmetrical, and thyroid not enlarged, symmetric, no tenderness/mass/nodules  Lungs:  clear to auscultation bilaterally and normal respiratory effort  Cardiovascular: regular rate and rhythm, S1, S2 normal, no murmur, click, rub or gallop.   Chest Wall: Port site healing  Extremities: no cyanosis or edema, or clubbing. Pulses: 2+ and symmetric.  Abdomen: soft, non-tender non-distented; bowel sounds normal; no masses,  no organomegaly.   Skin: Pale.  Texture and turgor normal. No rashes or lesions  Musculoskeletal: No obvious joint swelling or erythema  Lymph Nodes: No cervical or supraclavicular adenopathy. No axillary or inguinal adenopathy.  Neurologic: Cranial nerves intact.  Normal strength and tone. No focal numbness or weakness  Psych: appropriate mood and affect    Objective:     Pathology:  Initial BM bx:    BONE MARROW, RIGHT ILIAC CREST, ASPIRATE, CLOT, AND CORE BIOPSY:  --Cellular marrow, positive for acute myeloid leukemia, with blasts representing approximately 25% of cellularity, see comment  --Background developing myeloid cells present with some cytologic atypia, see comment  COMMENT: Concomitantly submitted flow cytometric analysis detects an increased population of blasts consistent with acute myeloid leukemia, non-M3 subtype. Additional immunophenotyping was performed on peripheral blood flow cytometric studies (please see separate report). The marrow blasts showing expression of CD34, CD13, and cytoplasmic myeloperoxidase as well as dim CD19. The population however is negative for TdT, and cytoplasmic CD22 and CD79a. These findings are similar to that seen on the peripheral blood study and are consistent with acute myeloid leukemia, non-M3 subtype by JOVANNI classification.   "Given the dim expression of CD19, a translocation of chromosomes 8 and 21 is a diagnostic possibility. The cytologic atypia/dysplasia seen in the myeloid series can also be seen with this translocation. Clinical correlation and correlation with any available cytogenetic and molecular studies is required for definitive classification of this process.    AML FISH: The result is abnormal and indicates PGZW7M2/RUNX1 fusion in 90.6% of nuclei. This result is most consistent with acute myeloid leukemia with t(8;21)(q22;q22) (Grimwade et al., Blood 116:354-65, 2010; Zander et al., Am J Hematol 89:8848-3241, 2014; Bernarda and Radha, Am J Clin Pathol 144:6-18, 2015). For monitoring response to therapy in this patient, we suggest using FISH for GWNV6Y4/RUNX1 fusion."    Peripheral blood, FLT3 mutation analysis: Negative. Neither expansion of the region susceptible to internal tandem duplication (FLT-ITD) nor changes involving codon D835 and/or I836 in the tyrosine kinase domain (FLT3-TKD) was detected."     KIT genetic alteration analysis, exons 8, 9, 10, 11, and 17: Negative.  No pathogenic genetic alterations were detected in the  KIT  gene, exons 8, 9, 10, 11, and 17.     Cytogenetics:  45,X,-Y,nevin(8)t(8;21)(q22;q22),nevin(20)t(20;21)(p13;q22)t(8;21),nevin(21)t(8;21)t(20;21)[17]/46,XY[3]  Comments: The result is abnormal. Of 20 metaphases, 3 metaphases were normal, and 17 metaphases had a complex translocation involving chromosomes 8, 21, and 20. FISH studies confirmed ENIK3N3/RUNX1 fusion associated with this translocation (reported separately). Fusion of CNGD7Y4/RUNX1 is reportedly associated with a favorable prognosis in AML (Grimwade et al., Blood 116:354-365, 2010). For monitoring response to therapy in this patient, FISH for DAAP6Q6/RUNX1 fusion is available, test AMLF (AML, FISH)."    End of Induction I:  BONE MARROW, RIGHT ILIAC CREST, ASPIRATE AND CLOT:  --Cellular marrow, approximately 60%, with trilineage " "hematopoiesis, see comment  --No definitive morphologic evidence of residual/recurrent acute myeloid leukemia, see comment  --Increased stainable iron  COMMENT: Concomitantly submitted flow cytometric analysis detects no diagnostic abnormal hematopoietic population. B cells are polyclonal with a subset of CD19/CD10 positive cells favored to be hematogones, and T cells are immunophenotypically unremarkable. The blast gate is not increased.    AML FISH:  This result is within normal limits for the FAZN7O0 and RUNX1 gene regions."    Labs:   Results for HEMA BHATIA (MRN [id number]) as of 4/19/2018 10:37   4/19/2018 08:59   WBC 2.24 (L)   RBC 3.18 (L)   Hemoglobin 10.3 (L)   Hematocrit 31.2 (L)   MCV 98   MCH 32.4 (H)   MCHC 33.0   RDW 24.7 (H)   Platelets 70 (L)   MPV 11.4   Gran% 52.8   Gran # (ANC) 1.2 (L)   Lymph% 31.7   Lymph # 0.7 (L)   Mono% 14.7   Mono # 0.3   Eosinophil% 0.4   Eos # 0.0   Basophil% 0.4   Baso # 0.01   Retic 4.2 (H)   Differential Method Automated     Assessment/Plan:   Hema was seen today for leukemia.    Diagnoses and all orders for this visit:    Antineoplastic chemotherapy induced pancytopenia    Acute myeloid leukemia in remission  -     Comprehensive metabolic panel  -     CBC auto differential  -     Reticulocytes  -     Cancel: cytarabine (PF) (CYTOSAR) 70 mg in sodium chloride 0.9% 5 mL INTRATHECAL chemo injection; 70 mg by Intrathecal route once.    Immunosuppressed due to chemotherapy    Lymphadenitis    Other orders  -     cytarabine (PF) (CYTOSAR) 1,000 mg/m2 = 2,090 mg in sodium chloride 0.9% 250 mL chemo infusion; Inject 2,090 mg into the vein every 12 (twelve) hours.  -     dexrazoxane HCl (ZINECARD) 1,254 mg in lactated ringers 250 mL infusion; Inject 1,254 mg into the vein once.  -     mitoXANTRONE (NOVANTRONE) 12 mg/m2 = 25 mg in sodium chloride 0.9% 50 mL chemo infusion; Inject 25 mg into the vein every 24 hours.        Discussion:   17 y.o. young man with AML (t 8;21) here " for chemotherapy.  He is Day 25 of Cycle I of therapy following [id number] (Arm A).  He reports feeling well since discharge home and was well appearing today in clinic.    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  CNS negative.  MRD negative after Induction I.    -Treating per [facility] [id number] (ARM A).  S/p Intensification I.  -BM biopsy at start of Intensification shows CR.  FISH for t(8;21) negative.  -Admit to start Intensification II therapy:  HD NITO-C + Mitox.  IT Nito-C    For his chemotherapy induced pancytopenia  -Resolving    For his lymphadenitis  -Resolved    For his immunosuppression secondary to chemotherapy  -Resume acyclovir, ciprofloxacin and posaconazole prophylaxis upon completion of chemotherapy  -Continue Bactrim on Fri, Sat and Sun  -Continue Peridex    Admit to [facility] [place].    Sanford Bucio    Total time 30 minutes with >50% spent in face-to-face counseling regarding plan of care, chemo side effects and arranging coordination of care.

## 2018-04-19 NOTE — NURSING
1345- Dr. Bucio checked patient CBC and confirmed pt ready for admit today. Careplan reviewed with pt and mother. PAC drsg CDI with no changes and SL'd. Report given to JULIANN Rodriguez. Pt ambulated to Room 805 with mom in NAD.

## 2018-04-19 NOTE — NURSING
Pt supine x 30 minutes. Pt up to sitting and then standing at this time. Bandaid to LP site remains clean, dry, and intact. Pt remains without any complaints. Pt walked out to infusion area. Awaiting bed availability on 8th floor oncology to send pt for chemo admit. Pt and mom verbalized complete understanding.

## 2018-04-19 NOTE — NURSING
LP with IT chemo done at this time per Dr Bucio. Time out called by Dr Bucio prior to start of procedure. Pt tolerated procedure well with no S&S of adverse reaction noted. Bandaid to LP site remains clean, dry, and intact. Pt now to lay supine x 30 minutes. Pt without any complaints at this time. Will continue to monitor closely.

## 2018-04-19 NOTE — NURSING
IV zofran 16 mg complete at this time to inner PAC, infused without difficulty. Pt tolerated infusion well with no S&S of adverse reaction noted. + blood return noted from inner PAC, then flushed and saline locked with 10 ml NS. Tegaderm remains clean, dry, and intact. Awaiting IT chemo from pharmacy to proceed with LP with IT chemo procedure with Dr Bucio. Pt remains without any complaints, is watching TV in recliner. Will continue to monitor closely.

## 2018-04-19 NOTE — PLAN OF CARE
Problem: Patient Care Overview  Goal: Plan of Care Review  Outcome: Ongoing (interventions implemented as appropriate)  Pt remained free of falls during shift. Receiving chemotherapy treatment for AML. VS stable. Afebrile. Independent and up ad chilo. Pt participating in plan of care. All questions answered. Tolerating regular diet. Bed rails up x2. Bed locked and in lowest position. Chemotherapy precautions in place. Will continue to monitor.

## 2018-04-19 NOTE — PLAN OF CARE
Problem: Patient Care Overview  Goal: Plan of Care Review  1.  Pt has a left double upper chest pac.  2.  Use 1 inch needle to access each PAC.  3.  Pt likes to keller.   Outcome: Ongoing (interventions implemented as appropriate)  Pt without complaints.   Tolerated LP well with Synera patch used.   Excited about last chemo admit and seeing friends in the hospital.   Mom with patient for support and pt preference.

## 2018-04-19 NOTE — PROGRESS NOTES
Cytarabine initiated over 2 hours to L chest PAC with positive blood return. Chemotherapy consent on chart. Drug, 2 pt identifiers, BSA and chemo calculation checked with second certified RN. Instructed pt to call with any problems/discomfort. Verbalized understanding. Call light within reach.

## 2018-04-19 NOTE — PROCEDURES
Intrathecal Chemotherapy with Lumbar Puncture  Date/Time: 4/19/2018 11:26 AM  Performed by: LONNIE ALMONTE  Authorized by: LONNIE ALMONTE   Consent Done: Yes  Indications: evaluate and treat Leukemia  Anesthesia: local infiltration    Anesthesia:  Local Anesthetic: lidocaine 1% without epinephrine  Anesthetic total: 3 mL  Preparation: Patient was prepped and draped in the usual sterile fashion.  Lumbar space: L4-L5 interspace  Patient's position: left lateral decubitus  Needle gauge: 18  Needle type: spinal needle - Quincke tip  Needle length: 3.5 in  Number of attempts: 1  Fluid appearance: clear  Tubes of fluid: 3  Total volume: 3 ml  Post-procedure: site cleaned and adhesive bandage applied  Patient tolerance: Patient tolerated the procedure well with no immediate complications

## 2018-04-20 PROCEDURE — 63600175 PHARM REV CODE 636 W HCPCS: Performed by: PEDIATRICS

## 2018-04-20 PROCEDURE — 25000003 PHARM REV CODE 250: Performed by: PEDIATRICS

## 2018-04-20 PROCEDURE — 20600001 HC STEP DOWN PRIVATE ROOM

## 2018-04-20 PROCEDURE — 99233 SBSQ HOSP IP/OBS HIGH 50: CPT | Mod: ,,, | Performed by: PEDIATRICS

## 2018-04-20 RX ORDER — SULFAMETHOXAZOLE AND TRIMETHOPRIM 800; 160 MG/1; MG/1
1 TABLET ORAL
Status: DISCONTINUED | OUTPATIENT
Start: 2018-04-20 | End: 2018-04-25 | Stop reason: HOSPADM

## 2018-04-20 RX ADMIN — ONDANSETRON 12 MG: 4 TABLET, ORALLY DISINTEGRATING ORAL at 10:04

## 2018-04-20 RX ADMIN — DEXAMETHASONE 2 DROP: 1 SUSPENSION OPHTHALMIC at 11:04

## 2018-04-20 RX ADMIN — DEXAMETHASONE 2 DROP: 1 SUSPENSION OPHTHALMIC at 12:04

## 2018-04-20 RX ADMIN — SULFAMETHOXAZOLE AND TRIMETHOPRIM 1 TABLET: 800; 160 TABLET ORAL at 09:04

## 2018-04-20 RX ADMIN — ONDANSETRON 12 MG: 4 TABLET, ORALLY DISINTEGRATING ORAL at 09:04

## 2018-04-20 RX ADMIN — DEXAMETHASONE 2 DROP: 1 SUSPENSION OPHTHALMIC at 06:04

## 2018-04-20 RX ADMIN — CYTARABINE 2090 MG: 100 INJECTION, SOLUTION INTRATHECAL; INTRAVENOUS; SUBCUTANEOUS at 10:04

## 2018-04-20 RX ADMIN — DEXAMETHASONE 2 DROP: 1 SUSPENSION OPHTHALMIC at 01:04

## 2018-04-20 RX ADMIN — CYTARABINE 2090 MG: 100 INJECTION, SOLUTION INTRATHECAL; INTRAVENOUS; SUBCUTANEOUS at 09:04

## 2018-04-20 NOTE — SUBJECTIVE & OBJECTIVE
Chief Complaint:  Intensification II chemotherapy for AML     Past Medical History:   Diagnosis Date    AML (acute myeloblastic leukemia) 10/2017    Asthma     Encounter for blood transfusion     Seasonal allergies        Past Surgical History:   Procedure Laterality Date    PORTACATH PLACEMENT  10/31/2017    TONSILLECTOMY         Review of patient's allergies indicates:   Allergen Reactions    Nsaids (non-steroidal anti-inflammatory drug) Anaphylaxis    Nuts [tree nut] Anaphylaxis    Strawberry     Vancomycin analogues Itching     Premedicate with benadryl and admin over 2hr.       No current facility-administered medications on file prior to encounter.      Current Outpatient Prescriptions on File Prior to Encounter   Medication Sig    acyclovir (ZOVIRAX) 400 MG tablet Take 1 tablet (400 mg total) by mouth 2 (two) times daily.    chlorhexidine (PERIDEX) 0.12 % solution     ciprofloxacin HCl (CIPRO) 500 MG tablet Take 1 tablet (500 mg total) by mouth every 12 (twelve) hours.    lisdexamfetamine (VYVANSE) 60 MG capsule Take 60 mg by mouth every morning.    LORazepam (ATIVAN) 1 MG tablet Take 1 tablet (1 mg total) by mouth every 6 (six) hours as needed (Nausea).    ondansetron (ZOFRAN-ODT) 8 MG TbDL Take 1 tablet (8 mg total) by mouth every 8 (eight) hours as needed.    oxyCODONE (ROXICODONE) 10 mg Tab immediate release tablet Take 1 tablet (10 mg total) by mouth every 6 (six) hours as needed.    pantoprazole (PROTONIX) 40 MG tablet Take 1 tablet (40 mg total) by mouth once daily.    polyethylene glycol (GLYCOLAX) 17 gram PwPk Take 17 g by mouth daily as needed (No bowel movement).    posaconazole 100 mg TbEC tablet Take 3 tablets (300 mg total) by mouth once daily.    sulfamethoxazole-trimethoprim 800-160mg (BACTRIM DS) 800-160 mg Tab Take 1 tablet by mouth twice daily only on Friday, Saturday, Sunday.        Family History     Problem Relation (Age of Onset)    Cancer Mother    Heart disease  Mother    No Known Problems Father        Social History Main Topics    Smoking status: Former Smoker     Packs/day: 0.50     Types: Cigarettes     Quit date: 10/30/2017    Smokeless tobacco: Never Used    Alcohol use Yes      Comment: rare occasions    Drug use: No    Sexual activity: Yes     Partners: Female     Review of Systems   Constitutional: Negative for activity change, appetite change and fever.   HENT: Negative for congestion, facial swelling and mouth sores.    Eyes: Negative for discharge and visual disturbance.   Respiratory: Negative for cough and shortness of breath.    Cardiovascular: Negative for leg swelling.   Gastrointestinal: Positive for nausea. Negative for abdominal distention and vomiting.   Genitourinary: Negative for decreased urine volume and difficulty urinating.   Musculoskeletal: Negative for myalgias.   Skin: Negative for color change and rash.   Neurological: Negative for light-headedness and headaches.     Objective:     Vital Signs (Most Recent):  Temp: 98.6 °F (37 °C) (04/19/18 1905)  Pulse: 62 (04/19/18 1905)  Resp: 20 (04/19/18 1905)  BP: (!) 105/46 (04/19/18 1905)  SpO2: 97 % (04/19/18 1905) Vital Signs (24h Range):  Temp:  [97.8 °F (36.6 °C)-98.6 °F (37 °C)] 98.6 °F (37 °C)  Pulse:  [62-86] 62  Resp:  [18-20] 20  SpO2:  [94 %-99 %] 97 %  BP: (105-116)/(46-58) 105/46     Patient Vitals for the past 72 hrs (Last 3 readings):   Weight   04/19/18 1410 93.6 kg (206 lb 7.4 oz)     Body mass index is 33.32 kg/m².    Intake/Output - Last 3 Shifts     None          Lines/Drains/Airways     Central Venous Catheter Line                 Port A Cath Double Lumen 10/31/17 1826 left subclavian 170 days                Physical Exam   Constitutional: He is oriented to person, place, and time. He appears well-developed and well-nourished. No distress.   HENT:   Head: Normocephalic.   Nose: Nose normal.   Mouth/Throat: Oropharynx is clear and moist.   Eyes: Conjunctivae and EOM are normal.  Pupils are equal, round, and reactive to light.   Neck: Normal range of motion. Neck supple.   No swelling or mass appreciated    Cardiovascular: Normal rate and regular rhythm.    No murmur heard.  L-sided port site    Pulmonary/Chest: Effort normal and breath sounds normal. No respiratory distress.   Abdominal: Soft. He exhibits no distension and no mass.   Musculoskeletal: Normal range of motion. He exhibits edema.   Lymphadenopathy:     He has no cervical adenopathy.   Neurological: He is alert and oriented to person, place, and time. He exhibits normal muscle tone.   Skin: Skin is warm. Capillary refill takes less than 2 seconds. No rash noted.   Vitals reviewed.      Significant Labs:  Recent Results (from the past 24 hour(s))   Comprehensive metabolic panel    Collection Time: 04/19/18  8:59 AM   Result Value Ref Range    Sodium 141 136 - 145 mmol/L    Potassium 3.9 3.5 - 5.1 mmol/L    Chloride 105 95 - 110 mmol/L    CO2 28 23 - 29 mmol/L    Glucose 97 70 - 110 mg/dL    BUN, Bld 14 6 - 20 mg/dL    Creatinine 0.7 0.5 - 1.4 mg/dL    Calcium 9.2 8.7 - 10.5 mg/dL    Total Protein 6.3 6.0 - 8.4 g/dL    Albumin 3.5 3.5 - 5.2 g/dL    Total Bilirubin 0.5 0.1 - 1.0 mg/dL    Alkaline Phosphatase 71 55 - 135 U/L    AST 35 10 - 40 U/L    ALT 63 (H) 10 - 44 U/L    Anion Gap 8 8 - 16 mmol/L    eGFR if African American >60.0 >60 mL/min/1.73 m^2    eGFR if non African American >60.0 >60 mL/min/1.73 m^2   CBC auto differential    Collection Time: 04/19/18  8:59 AM   Result Value Ref Range    WBC 2.24 (L) 3.90 - 12.70 K/uL    RBC 3.18 (L) 4.60 - 6.20 M/uL    Hemoglobin 10.3 (L) 14.0 - 18.0 g/dL    Hematocrit 31.2 (L) 40.0 - 54.0 %    MCV 98 82 - 98 fL    MCH 32.4 (H) 27.0 - 31.0 pg    MCHC 33.0 32.0 - 36.0 g/dL    RDW 24.7 (H) 11.5 - 14.5 %    Platelets 70 (L) 150 - 350 K/uL    MPV 11.4 9.2 - 12.9 fL    Gran # (ANC) 1.2 (L) 1.8 - 7.7 K/uL    Lymph # 0.7 (L) 1.0 - 4.8 K/uL    Mono # 0.3 0.3 - 1.0 K/uL    Eos # 0.0 0.0 - 0.5  K/uL    Baso # 0.01 0.00 - 0.20 K/uL    Gran% 52.8 38.0 - 73.0 %    Lymph% 31.7 18.0 - 48.0 %    Mono% 14.7 4.0 - 15.0 %    Eosinophil% 0.4 0.0 - 8.0 %    Basophil% 0.4 0.0 - 1.9 %    Differential Method Automated    Reticulocytes    Collection Time: 04/19/18  8:59 AM   Result Value Ref Range    Retic 4.2 (H) 0.4 - 2.0 %   CSF cell count with differential    Collection Time: 04/19/18 11:30 AM   Result Value Ref Range    Heme Aliquot 0.5 mL    Appearance, CSF Clear Clear    Color, CSF Colorless Colorless    WBC, CSF 1 0 - 5 /cu mm    RBC,  (A) 0 /cu mm    Segmented Neutrophils, CSF 14 (H) 0 - 6 %    Lymphs, CSF 43 40 - 80 %    Mono/Macrophage, CSF 43 15 - 45 %   Pathologist Interpretation, Hem/Onc CSF    Collection Time: 04/19/18 11:30 AM   Result Value Ref Range    Path Interpretation Hem/Onc CSF Review required    Pathologist Review of Laboratory Test    Collection Time: 04/19/18 11:30 AM   Result Value Ref Range    Pathologist Review, Body Fluid REVIEWED          Significant Imaging: none

## 2018-04-20 NOTE — H&P
[facility] Pediatric Hospital Medicine  History & Physical    Patient Name: Hema Kuo  MRN: [id number]  Admission Date: 4/19/2018  Code Status: Prior   Primary Care Physician: Ann Reyna NP  Principal Problem:<principal problem not specified>    Patient information was obtained from patient    Subjective:     HPI:   Hema Kuo is a 17 y.o. male with AML s/p Intensification I therapy following [id number] (Arm A) here today for start of Intensification II therapy.    Hema reports that he has been feeling well with good appetite and energy levels. Previous R-sided neck swelling concerning for lymphadenitis is now resolved. Does feel a little nauseous this evening.      Chief Complaint:  Intensification II chemotherapy for AML     Past Medical History:   Diagnosis Date    AML (acute myeloblastic leukemia) 10/2017    Asthma     Encounter for blood transfusion     Seasonal allergies        Past Surgical History:   Procedure Laterality Date    PORTACATH PLACEMENT  10/31/2017    TONSILLECTOMY         Review of patient's allergies indicates:   Allergen Reactions    Nsaids (non-steroidal anti-inflammatory drug) Anaphylaxis    Nuts [tree nut] Anaphylaxis    Strawberry     Vancomycin analogues Itching     Premedicate with benadryl and admin over 2hr.       No current facility-administered medications on file prior to encounter.      Current Outpatient Prescriptions on File Prior to Encounter   Medication Sig    acyclovir (ZOVIRAX) 400 MG tablet Take 1 tablet (400 mg total) by mouth 2 (two) times daily.    chlorhexidine (PERIDEX) 0.12 % solution     ciprofloxacin HCl (CIPRO) 500 MG tablet Take 1 tablet (500 mg total) by mouth every 12 (twelve) hours.    lisdexamfetamine (VYVANSE) 60 MG capsule Take 60 mg by mouth every morning.    LORazepam (ATIVAN) 1 MG tablet Take 1 tablet (1 mg total) by mouth every 6 (six) hours as needed (Nausea).    ondansetron (ZOFRAN-ODT) 8 MG TbDL Take 1 tablet (8  mg total) by mouth every 8 (eight) hours as needed.    oxyCODONE (ROXICODONE) 10 mg Tab immediate release tablet Take 1 tablet (10 mg total) by mouth every 6 (six) hours as needed.    pantoprazole (PROTONIX) 40 MG tablet Take 1 tablet (40 mg total) by mouth once daily.    polyethylene glycol (GLYCOLAX) 17 gram PwPk Take 17 g by mouth daily as needed (No bowel movement).    posaconazole 100 mg TbEC tablet Take 3 tablets (300 mg total) by mouth once daily.    sulfamethoxazole-trimethoprim 800-160mg (BACTRIM DS) 800-160 mg Tab Take 1 tablet by mouth twice daily only on Friday, Saturday, Sunday.        Family History     Problem Relation (Age of Onset)    Cancer Mother    Heart disease Mother    No Known Problems Father        Social History Main Topics    Smoking status: Former Smoker     Packs/day: 0.50     Types: Cigarettes     Quit date: 10/30/2017    Smokeless tobacco: Never Used    Alcohol use Yes      Comment: rare occasions    Drug use: No    Sexual activity: Yes     Partners: Female     Review of Systems   Constitutional: Negative for activity change, appetite change and fever.   HENT: Negative for congestion, facial swelling and mouth sores.    Eyes: Negative for discharge and visual disturbance.   Respiratory: Negative for cough and shortness of breath.    Cardiovascular: Negative for leg swelling.   Gastrointestinal: Positive for nausea. Negative for abdominal distention and vomiting.   Genitourinary: Negative for decreased urine volume and difficulty urinating.   Musculoskeletal: Negative for myalgias.   Skin: Negative for color change and rash.   Neurological: Negative for light-headedness and headaches.     Objective:     Vital Signs (Most Recent):  Temp: 98.6 °F (37 °C) (04/19/18 1905)  Pulse: 62 (04/19/18 1905)  Resp: 20 (04/19/18 1905)  BP: (!) 105/46 (04/19/18 1905)  SpO2: 97 % (04/19/18 1905) Vital Signs (24h Range):  Temp:  [97.8 °F (36.6 °C)-98.6 °F (37 °C)] 98.6 °F (37 °C)  Pulse:   [62-86] 62  Resp:  [18-20] 20  SpO2:  [94 %-99 %] 97 %  BP: (105-116)/(46-58) 105/46     Patient Vitals for the past 72 hrs (Last 3 readings):   Weight   04/19/18 1410 93.6 kg (206 lb 7.4 oz)     Body mass index is 33.32 kg/m².    Intake/Output - Last 3 Shifts     None          Lines/Drains/Airways     Central Venous Catheter Line                 Port A Cath Double Lumen 10/31/17 1826 left subclavian 170 days                Physical Exam   Constitutional: He is oriented to person, place, and time. He appears well-developed and well-nourished. No distress.   HENT:   Head: Normocephalic.   Nose: Nose normal.   Mouth/Throat: Oropharynx is clear and moist.   Eyes: Conjunctivae and EOM are normal. Pupils are equal, round, and reactive to light.   Neck: Normal range of motion. Neck supple.   No swelling or mass appreciated    Cardiovascular: Normal rate and regular rhythm.    No murmur heard.  L-sided port site    Pulmonary/Chest: Effort normal and breath sounds normal. No respiratory distress.   Abdominal: Soft. He exhibits no distension and no mass.   Musculoskeletal: Normal range of motion. He exhibits edema.   Lymphadenopathy:     He has no cervical adenopathy.   Neurological: He is alert and oriented to person, place, and time. He exhibits normal muscle tone.   Skin: Skin is warm. Capillary refill takes less than 2 seconds. No rash noted.   Vitals reviewed.      Significant Labs:  Recent Results (from the past 24 hour(s))   Comprehensive metabolic panel    Collection Time: 04/19/18  8:59 AM   Result Value Ref Range    Sodium 141 136 - 145 mmol/L    Potassium 3.9 3.5 - 5.1 mmol/L    Chloride 105 95 - 110 mmol/L    CO2 28 23 - 29 mmol/L    Glucose 97 70 - 110 mg/dL    BUN, Bld 14 6 - 20 mg/dL    Creatinine 0.7 0.5 - 1.4 mg/dL    Calcium 9.2 8.7 - 10.5 mg/dL    Total Protein 6.3 6.0 - 8.4 g/dL    Albumin 3.5 3.5 - 5.2 g/dL    Total Bilirubin 0.5 0.1 - 1.0 mg/dL    Alkaline Phosphatase 71 55 - 135 U/L    AST 35 10 - 40  U/L    ALT 63 (H) 10 - 44 U/L    Anion Gap 8 8 - 16 mmol/L    eGFR if African American >60.0 >60 mL/min/1.73 m^2    eGFR if non African American >60.0 >60 mL/min/1.73 m^2   CBC auto differential    Collection Time: 04/19/18  8:59 AM   Result Value Ref Range    WBC 2.24 (L) 3.90 - 12.70 K/uL    RBC 3.18 (L) 4.60 - 6.20 M/uL    Hemoglobin 10.3 (L) 14.0 - 18.0 g/dL    Hematocrit 31.2 (L) 40.0 - 54.0 %    MCV 98 82 - 98 fL    MCH 32.4 (H) 27.0 - 31.0 pg    MCHC 33.0 32.0 - 36.0 g/dL    RDW 24.7 (H) 11.5 - 14.5 %    Platelets 70 (L) 150 - 350 K/uL    MPV 11.4 9.2 - 12.9 fL    Gran # (ANC) 1.2 (L) 1.8 - 7.7 K/uL    Lymph # 0.7 (L) 1.0 - 4.8 K/uL    Mono # 0.3 0.3 - 1.0 K/uL    Eos # 0.0 0.0 - 0.5 K/uL    Baso # 0.01 0.00 - 0.20 K/uL    Gran% 52.8 38.0 - 73.0 %    Lymph% 31.7 18.0 - 48.0 %    Mono% 14.7 4.0 - 15.0 %    Eosinophil% 0.4 0.0 - 8.0 %    Basophil% 0.4 0.0 - 1.9 %    Differential Method Automated    Reticulocytes    Collection Time: 04/19/18  8:59 AM   Result Value Ref Range    Retic 4.2 (H) 0.4 - 2.0 %   CSF cell count with differential    Collection Time: 04/19/18 11:30 AM   Result Value Ref Range    Heme Aliquot 0.5 mL    Appearance, CSF Clear Clear    Color, CSF Colorless Colorless    WBC, CSF 1 0 - 5 /cu mm    RBC,  (A) 0 /cu mm    Segmented Neutrophils, CSF 14 (H) 0 - 6 %    Lymphs, CSF 43 40 - 80 %    Mono/Macrophage, CSF 43 15 - 45 %   Pathologist Interpretation, Hem/Onc CSF    Collection Time: 04/19/18 11:30 AM   Result Value Ref Range    Path Interpretation Hem/Onc CSF Review required    Pathologist Review of Laboratory Test    Collection Time: 04/19/18 11:30 AM   Result Value Ref Range    Pathologist Review, Body Fluid REVIEWED          Significant Imaging: none    Assessment and Plan:     Oncology   AML (acute myeloblastic leukemia)    17 y.o. young man with AML (t 8;21) admitted for start of Intensification II therapy.  HD NITO-C + Mitox.  IT Nito-C     He is Day 25 of Cycle I of therapy  following [id number] (Arm A).      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  CNS negative.  MRD negative after Induction I.    - Treating per COG [id number] (ARM A) and has completed Intensification I.  - BM biopsy at start of Intensification shows CR.  FISH for t(8;21) negative.  -   Received Intrathecal Hattie-C in clinic prior to admission      Chemotherapy-induced pancytopenia  - Resolving; WBC, Hgb and plt counts continue to improve  - Received pRBCs on 4/13     CINV  - 2mg Ativan q6h prn       Immunosuppression 2/2 chemotherapy  -Will hold acyclovir, ciprofloxacin and posaconazole prophylaxis while receiving chemotherapy  -Continue Bactrim on Fri, Sat and Sun  -Continue Peridex                    Maryanne Wagner DO  Pediatric Hospital Medicine   [facility]

## 2018-04-20 NOTE — PLAN OF CARE
Problem: Patient Care Overview  Goal: Plan of Care Review  Outcome: Ongoing (interventions implemented as appropriate)  Pt remained free from falls. AAOx4. VS stable. Afebrile. Needs encouragement to eat and drink. Chemotherapy precautions. Up ad chilo and independent. Mother at bedside. Pt participating in plan of care. Questions answered. Bed rails up x 2. Bed locked and in lowest position. Pt playing video games all day. Will continue to monitor.

## 2018-04-20 NOTE — HPI
Hema Kuo is a 17 y.o. male with AML s/p Intensification I therapy following [id number] (Arm A) here today for start of Intensification II therapy.    Hema reports that he has been feeling well with good appetite and energy levels. Previous R-sided neck swelling concerning for lymphadenitis is now resolved. Does feel a little nauseous this evening.

## 2018-04-20 NOTE — NURSING
Cytarabine 2090mg in 250cc NS administered via RSCL double lumen port to run over 2 hours per chemo protocol.  Blood return noted.  Chemo precautions maintained.  Patient has no questions/concerns at this time.  CAR and chemo consent verified by two chemotherapy certified nurses.

## 2018-04-20 NOTE — PLAN OF CARE
Problem: Patient Care Overview  Goal: Plan of Care Review  Outcome: Ongoing (interventions implemented as appropriate)  Pt aa&ox4, independent. remained free of falls during shift. Receiving chemotherapy treatment for AML. Cytarabine noted to be infusing at start of my shift and completed without issues. Dex eye gtts given as ordered. Patient did report nausea and received ativan IV once and responded well.  VS stable. Afebrile. Pt participating in plan of care. All questions answered. Tolerating regular diet. Bed rails up x2. Bed locked and in lowest position. Chemotherapy precautions in place. Will continue to monitor.

## 2018-04-20 NOTE — ASSESSMENT & PLAN NOTE
17 y.o. young man with AML (t 8;21) admitted for start of Intensification II therapy.  HD NITO-C + Mitox.  IT Nito-C     He is Day 25 of Cycle I of therapy following [id number] (Arm A).      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  CNS negative.  MRD negative after Induction I.    - Treating per [facility] [id number] (ARM A) and has completed Intensification I.  - BM biopsy at start of Intensification shows CR.  FISH for t(8;21) negative.  -  Received Intrathecal Nito-C in clinic prior to admission      Chemotherapy-induced pancytopenia  - Resolving; WBC, Hgb and plt counts continue to improve  - Received pRBCs on 4/13     CINV  - 2mg Ativan q6h prn       Immunosuppression 2/2 chemotherapy  -Will hold acyclovir, ciprofloxacin and posaconazole prophylaxis while receiving chemotherapy  -Continue Bactrim on Fri, Sat and Sun  -Continue Peridex

## 2018-04-21 PROCEDURE — 25000003 PHARM REV CODE 250: Performed by: PEDIATRICS

## 2018-04-21 PROCEDURE — 63600175 PHARM REV CODE 636 W HCPCS: Performed by: PEDIATRICS

## 2018-04-21 PROCEDURE — 20600001 HC STEP DOWN PRIVATE ROOM

## 2018-04-21 PROCEDURE — 99233 SBSQ HOSP IP/OBS HIGH 50: CPT | Mod: ,,, | Performed by: PEDIATRICS

## 2018-04-21 PROCEDURE — 63600175 PHARM REV CODE 636 W HCPCS

## 2018-04-21 RX ORDER — LORAZEPAM 2 MG/ML
INJECTION INTRAMUSCULAR
Status: DISPENSED
Start: 2018-04-21 | End: 2018-04-22

## 2018-04-21 RX ORDER — DEXAMETHASONE 4 MG/1
12 TABLET ORAL ONCE
Status: COMPLETED | OUTPATIENT
Start: 2018-04-21 | End: 2018-04-21

## 2018-04-21 RX ADMIN — LORAZEPAM 2 MG: 2 INJECTION, SOLUTION INTRAMUSCULAR; INTRAVENOUS at 03:04

## 2018-04-21 RX ADMIN — SULFAMETHOXAZOLE AND TRIMETHOPRIM 1 TABLET: 800; 160 TABLET ORAL at 09:04

## 2018-04-21 RX ADMIN — PROMETHAZINE HYDROCHLORIDE 25 MG: 25 INJECTION INTRAMUSCULAR; INTRAVENOUS at 11:04

## 2018-04-21 RX ADMIN — DEXAMETHASONE 2 DROP: 1 SUSPENSION OPHTHALMIC at 11:04

## 2018-04-21 RX ADMIN — SULFAMETHOXAZOLE AND TRIMETHOPRIM 1 TABLET: 800; 160 TABLET ORAL at 11:04

## 2018-04-21 RX ADMIN — CYTARABINE 2090 MG: 100 INJECTION, SOLUTION INTRATHECAL; INTRAVENOUS; SUBCUTANEOUS at 10:04

## 2018-04-21 RX ADMIN — DEXAMETHASONE 12 MG: 4 TABLET ORAL at 04:04

## 2018-04-21 RX ADMIN — DEXAMETHASONE 2 DROP: 1 SUSPENSION OPHTHALMIC at 06:04

## 2018-04-21 RX ADMIN — DEXAMETHASONE 2 DROP: 1 SUSPENSION OPHTHALMIC at 05:04

## 2018-04-21 RX ADMIN — LORAZEPAM 2 MG: 2 INJECTION, SOLUTION INTRAMUSCULAR; INTRAVENOUS at 10:04

## 2018-04-21 RX ADMIN — CYTARABINE 2090 MG: 100 INJECTION, SOLUTION INTRATHECAL; INTRAVENOUS; SUBCUTANEOUS at 09:04

## 2018-04-21 RX ADMIN — SODIUM CHLORIDE 150 MG: 0.9 INJECTION, SOLUTION INTRAVENOUS at 04:04

## 2018-04-21 NOTE — PLAN OF CARE
Problem: Patient Care Overview  Goal: Plan of Care Review  Outcome: Ongoing (interventions implemented as appropriate)  Pt aa&ox4, independent. remained free of falls during shift. Receiving chemotherapy treatment for AML. Cytarabine given my shift without any issues. Dex eye gtts given as ordered. No nausea this shift. was given zofran ODT as ordered before giving chemo. VS stable. Afebrile. Pt participating in plan of care. All questions answered. Tolerating regular diet. Bed rails up x2. Bed locked and in lowest position. Chemotherapy precautions in place. Will continue to monitor.

## 2018-04-21 NOTE — PLAN OF CARE
Problem: Patient Care Overview  Goal: Plan of Care Review  Outcome: Ongoing (interventions implemented as appropriate)  Pt remained free from falls during shift. AAOx4. Afebrile. VS stable. Ambulated independently to bathroom. Pt needs encouragement to eat and drink. Nausea treated with PRN meds. Pt with two episodes of emesis. Bed locked and in lowest position. Call light within reach. Will continue to monitor.

## 2018-04-21 NOTE — ASSESSMENT & PLAN NOTE
AML (acute myeloblastic leukemia)         17 y.o. young man with AML (t 8;21) admitted for start of Intensification II therapy.  CNS negative.MRD negative after induction I.  HD NITO-C + Mitox.  IT Nito-C. He is Day 26 of Cycle I of therapy following Arm B of [id number]     AML Treating per COG [id number] (ARM A) and has completed Intensification I. Today is Day 3-4  -Received Intrathecal Nito-C in clinic prior to admission   -Day 1: IT cytarabine, nito-c,  -Day 1-4: Cytarabine Q12 for 8 doses, Mitoxantrone + zinecard Q24h for 4 doses     Chemotherapy-induced pancytopenia  - Resolving; WBC, Hgb and plt counts continue to improve  - Received pRBCs on 4/13     CINV  - Scheduled zofran and PRN ativan, benadryl, and phenergen as needed.         Immunosuppression 2/2 chemotherapy  -Continue Bactrim on Fri, Sat and Sun  -Continue Peridex  -Will hold acyclovir, ciprofloxacin and posaconazole prophylaxis while receiving chemotherapy

## 2018-04-21 NOTE — PROGRESS NOTES
[facility]  Pediatric Hematology/Oncology  Progress Note    Patient Name: Hema uKo  Admission Date: 4/19/2018  Hospital Length of Stay: 1 days  Code Status: Prior     Subjective:     Interval History: yesterday evening admitted for chemotherapy. Overnight no acute event. Tolerating chemo well. Nausea well controlled. Low appetite. Afebrile and hemodynamically stable.    Oncology Treatment Plan:     PEDS [id number] Intensification II  ARM B (MA) - LOW RISK PATIENTS    Medications:  Continuous Infusions:  Scheduled Meds:   cytarabine (CYTOSAR) chemo infusion  1,000 mg/m2 (Treatment Plan Recorded) Intravenous Q12H    dexamethasone  2 drop Both Eyes Q6H    [START ON 4/21/2018] dexrazoxane (ZINECARD) infusion  600 mg/m2 (Treatment Plan Recorded) Intravenous Once    [START ON 4/21/2018] mitoxantrone (NOVANTRONE) chemo infusion  12 mg/m2 (Treatment Plan Recorded) Intravenous Q24H    sulfamethoxazole-trimethoprim 800-160mg  1 tablet Oral twice daily on Friday, Saturday, Sunday     PRN Meds:alteplase, diphenhydrAMINE, heparin, porcine (PF), lorazepam, promethazine (PHENERGAN) IVPB, sodium chloride 0.9%     Review of Systems  Objective:     Vital Signs (Most Recent):  Temp: 98.4 °F (36.9 °C) (04/20/18 1903)  Pulse: 67 (04/20/18 1903)  Resp: 20 (04/20/18 1903)  BP: (!) 99/56 (04/20/18 1903)  SpO2: 98 % (04/20/18 1903) Vital Signs (24h Range):  Temp:  [97.7 °F (36.5 °C)-98.4 °F (36.9 °C)] 98.4 °F (36.9 °C)  Pulse:  [67-89] 67  Resp:  [16-20] 20  SpO2:  [96 %-98 %] 98 %  BP: ()/(46-62) 99/56     Weight: 93.6 kg (206 lb 7.4 oz)  Body mass index is 33.32 kg/m².  Body surface area is 2.09 meters squared.      Intake/Output Summary (Last 24 hours) at 04/20/18 1936  Last data filed at 04/20/18 1738   Gross per 24 hour   Intake              370 ml   Output                0 ml   Net              370 ml       Physical Exam   Constitutional: He is oriented to person, place, and time. No distress.   Patient  was awake and watching TV.   Eyes: Conjunctivae and EOM are normal. Pupils are equal, round, and reactive to light.   Cardiovascular: Normal rate, regular rhythm, normal heart sounds and intact distal pulses.    No murmur heard.  Pulmonary/Chest: Effort normal and breath sounds normal. No respiratory distress. He has no wheezes. He has no rales. He exhibits no tenderness.   Abdominal: Soft. Bowel sounds are normal. He exhibits no distension. There is no tenderness. There is no guarding.   Musculoskeletal: Normal range of motion.   Neurological: He is alert and oriented to person, place, and time.   Skin: Skin is warm. Capillary refill takes less than 2 seconds. He is not diaphoretic.   Vitals reviewed.      Labs:   Recent Lab Results     None          Assessment/Plan:     Active Diagnoses:    Diagnosis Date Noted POA    AML (acute myeloblastic leukemia) [C92.00] 12/12/2017 Yes      Problems Resolved During this Admission:    Diagnosis Date Noted Date Resolved POA     AML (acute myeloblastic leukemia)     Hema Kuo is a 17 y.o. male with AML s/p Intensification I therapy following [id number] (Arm A) is admitted for Intensification II therapy: HD NITO-C + Mitox.  IT Nito-C     Plan:  AML: s/p LP and intrathecal chemotherapy in the clinic prior to admission  - will get chemotherapy per protocol: AAML 1031 intensification II ARM B (MA)- low risk patients  -Cytarabine  q 12 hr for total of 8 doses (started on 4/19)  - will get Mitoxantrone q 24hr from 4/21 for total of 4 doses  - will get Zinecard prior to mitoxantrone  - dexamethasone eye drops every 6 hours        CINV  - will give zofran 12 mg prior to chemotherapy  -Phenergan 25 mg  q 6 hr PRN  -Ativan 2 mg q 6hr PRN   -Benadryl 50 mg q 6hr  PRN     Immunosuppression 2/2 chemotherapy  -Will hold acyclovir, ciprofloxacin and posaconazole prophylaxis while receiving chemotherapy  -Continue Bactrim on Fri, Sat and Sun  -Continue Peridex     Dispo: pending completion of  chemotherapy                      Daniela Kaiser MD  Pediatric Hematology/Oncology  [facility]

## 2018-04-21 NOTE — SUBJECTIVE & OBJECTIVE
Subjective:     Interval History: No acute events overnight. Did have 1 episode of emesis. Some nausea. Otherwise afebrile, stable vitals.     Oncology Treatment Plan:     PEDS [id number] Intensification II  ARM B (MA) - LOW RISK PATIENTS    Medications:  Continuous Infusions:  Scheduled Meds:   cytarabine (CYTOSAR) chemo infusion  1,000 mg/m2 (Treatment Plan Recorded) Intravenous Q12H    dexamethasone  2 drop Both Eyes Q6H    [START ON 4/22/2018] dexrazoxane (ZINECARD) infusion  600 mg/m2 Intravenous Q24H    mitoxantrone (NOVANTRONE) chemo infusion  12 mg/m2 (Treatment Plan Recorded) Intravenous Q24H    sulfamethoxazole-trimethoprim 800-160mg  1 tablet Oral twice daily on Friday, Saturday, Sunday     PRN Meds:alteplase, diphenhydrAMINE, heparin, porcine (PF), lorazepam, promethazine (PHENERGAN) IVPB, sodium chloride 0.9%     Review of Systems   Constitutional: Negative for activity change, appetite change and fever.   HENT: Negative for congestion, facial swelling and mouth sores.    Eyes: Negative for discharge and visual disturbance.   Respiratory: Negative for cough and shortness of breath.    Cardiovascular: Negative for leg swelling.   Gastrointestinal: Positive for nausea. Negative for abdominal distention and vomiting.   Genitourinary: Negative for decreased urine volume and difficulty urinating.   Musculoskeletal: Negative for myalgias.   Skin: Negative for color change and rash.   Neurological: Negative for light-headedness and headaches.     Objective:     Vital Signs (Most Recent):  Temp: 98.9 °F (37.2 °C) (04/21/18 1131)  Pulse: 110 (04/21/18 1131)  Resp: 16 (04/21/18 1131)  BP: (!) 121/56 (04/21/18 1131)  SpO2: 99 % (04/21/18 1131) Vital Signs (24h Range):  Temp:  [98.1 °F (36.7 °C)-98.9 °F (37.2 °C)] 98.9 °F (37.2 °C)  Pulse:  [] 110  Resp:  [16-20] 16  SpO2:  [96 %-99 %] 99 %  BP: ()/(51-58) 121/56     Weight: 93.6 kg (206 lb 7.4 oz)  Body mass index is 33.32 kg/m².  Body surface  area is 2.09 meters squared.      Intake/Output Summary (Last 24 hours) at 04/21/18 1434  Last data filed at 04/21/18 0600   Gross per 24 hour   Intake              670 ml   Output                0 ml   Net              670 ml       Physical Exam   Constitutional: He is oriented to person, place, and time. He appears well-developed and well-nourished. No distress.   Just waking up, smiling, no acute distress.    HENT:   Head: Normocephalic.   Nose: Nose normal.   Mouth/Throat: Oropharynx is clear and moist.   Eyes: Conjunctivae and EOM are normal. Pupils are equal, round, and reactive to light.   Neck: Normal range of motion. Neck supple.   No swelling or mass appreciated    Cardiovascular: Normal rate and regular rhythm.    No murmur heard.  L-sided port site    Pulmonary/Chest: Effort normal and breath sounds normal. No respiratory distress.   Abdominal: Soft. He exhibits no distension and no mass.   Musculoskeletal: Normal range of motion. He exhibits edema.   Lymphadenopathy:     He has no cervical adenopathy.   Neurological: He is alert and oriented to person, place, and time. He exhibits normal muscle tone.   Skin: Skin is warm. Capillary refill takes less than 2 seconds. No rash noted.   Vitals reviewed.      Labs:   Recent Lab Results     None          Diagnostic Results:  Imaging Results    None

## 2018-04-21 NOTE — ASSESSMENT & PLAN NOTE
AML (acute myeloblastic leukemia)         17 y.o. young man with AML (t 8;21) admitted for start of Intensification II therapy.  CNS negative.MRD negative after induction I.  HD NITO-C + Mitox.  IT Nito-C. He is Day 26 of Cycle I of therapy following Arm B of [id number]     AML Treating per COG [id number] (ARM A) and has completed Intensification I. Today is Day 2  -Received Intrathecal Nito-C in clinic prior to admission   -Day 1: IT cytarabine, nito-c,  -Day 1-4: Cytarabine Q12 for 8 doses, Mitoxantrone + zinecard Q24h for 4 doses     Chemotherapy-induced pancytopenia  - Resolving; WBC, Hgb and plt counts continue to improve  - Received pRBCs on 4/13     CINV  - Scheduled zofran and PRN ativan, benadryl, and phenergen as needed.         Immunosuppression 2/2 chemotherapy  -Continue Bactrim on Fri, Sat and Sun  -Continue Peridex  -Will hold acyclovir, ciprofloxacin and posaconazole prophylaxis while receiving chemotherapy

## 2018-04-21 NOTE — PROGRESS NOTES
[facility]  Pediatric Hematology/Oncology  Progress Note    Patient Name: Hema Kuo  Admission Date: 4/19/2018  Hospital Length of Stay: 2 days  Code Status: Prior   No new subjective & objective note has been filed under this hospital service since the last note was generated.      Assessment/Plan:     AML (acute myeloblastic leukemia)    AML (acute myeloblastic leukemia)         17 y.o. young man with AML (t 8;21) admitted for start of Intensification II therapy.  CNS negative.MRD negative after induction I.  HD ASHWIN-C + Mitox.  IT Ashwin-C. He is Day 26 of Cycle I of therapy following Arm B of [id number]     AML Treating per COG [id number] (ARM A) and has completed Intensification I. Today is Day 3  -Received Intrathecal Ashwin-C in clinic prior to admission   -Day 1: IT cytarabine, ashwin-c,  -Day 1-4: Cytarabine Q12 for 8 doses, Mitoxantrone + zinecard Q24h for 4 doses     Chemotherapy-induced pancytopenia  - Resolving; WBC, Hgb and plt counts continue to improve  - Received pRBCs on 4/13     CINV  - Scheduled zofran and PRN ativan, benadryl, and phenergen as needed.         Immunosuppression 2/2 chemotherapy  -Continue Bactrim on Fri, Sat and Sun  -Continue Peridex  -Will hold acyclovir, ciprofloxacin and posaconazole prophylaxis while receiving chemotherapy                                 Cindy Jacinto MD  Pediatric Hematology/Oncology  [facility]

## 2018-04-22 PROCEDURE — 63600175 PHARM REV CODE 636 W HCPCS: Mod: JG | Performed by: PEDIATRICS

## 2018-04-22 PROCEDURE — 20600001 HC STEP DOWN PRIVATE ROOM

## 2018-04-22 PROCEDURE — 99233 SBSQ HOSP IP/OBS HIGH 50: CPT | Mod: ,,, | Performed by: PEDIATRICS

## 2018-04-22 PROCEDURE — 25000003 PHARM REV CODE 250: Performed by: PEDIATRICS

## 2018-04-22 RX ORDER — LORAZEPAM 2 MG/ML
INJECTION INTRAMUSCULAR
Status: DISPENSED
Start: 2018-04-22 | End: 2018-04-22

## 2018-04-22 RX ADMIN — CYTARABINE 2090 MG: 100 INJECTION, SOLUTION INTRATHECAL; INTRAVENOUS; SUBCUTANEOUS at 10:04

## 2018-04-22 RX ADMIN — SULFAMETHOXAZOLE AND TRIMETHOPRIM 1 TABLET: 800; 160 TABLET ORAL at 10:04

## 2018-04-22 RX ADMIN — LORAZEPAM 2 MG: 2 INJECTION, SOLUTION INTRAMUSCULAR; INTRAVENOUS at 04:04

## 2018-04-22 RX ADMIN — LORAZEPAM 2 MG: 2 INJECTION, SOLUTION INTRAMUSCULAR; INTRAVENOUS at 10:04

## 2018-04-22 RX ADMIN — DEXAMETHASONE 2 DROP: 1 SUSPENSION OPHTHALMIC at 12:04

## 2018-04-22 RX ADMIN — DEXAMETHASONE 2 DROP: 1 SUSPENSION OPHTHALMIC at 05:04

## 2018-04-22 RX ADMIN — Medication 1254 MG: at 06:04

## 2018-04-22 RX ADMIN — MITOXANTRONE HYDROCHLORIDE 25 MG: 2 INJECTION, SOLUTION INTRAVENOUS at 07:04

## 2018-04-22 RX ADMIN — DEXAMETHASONE 2 DROP: 1 SUSPENSION OPHTHALMIC at 06:04

## 2018-04-22 NOTE — PROGRESS NOTES
[facility]  Pediatric Hematology/Oncology  Progress Note    Patient Name: Hema Kuo  Admission Date: 4/19/2018  Hospital Length of Stay: 3 days  Code Status: Prior     Subjective:     Interval History: Afebrile. Received Cytarabine and Mitoxantrone.     Oncology Treatment Plan:     [facility] [id number] Intensification II  ARM B (MA) - LOW RISK PATIENTS    Medications:  Continuous Infusions:  Scheduled Meds:   cytarabine (CYTOSAR) chemo infusion  1,000 mg/m2 (Treatment Plan Recorded) Intravenous Q12H    dexamethasone  2 drop Both Eyes Q6H    dexrazoxane (ZINECARD) infusion  600 mg/m2 Intravenous Q24H    lorazepam        mitoxantrone (NOVANTRONE) chemo infusion  12 mg/m2 (Treatment Plan Recorded) Intravenous Q24H    sulfamethoxazole-trimethoprim 800-160mg  1 tablet Oral twice daily on Friday, Saturday, Sunday     PRN Meds:alteplase, diphenhydrAMINE, heparin, porcine (PF), lorazepam, promethazine (PHENERGAN) IVPB, sodium chloride 0.9%     Review of Systems  Objective:     Vital Signs (Most Recent):  Temp: 98.1 °F (36.7 °C) (04/22/18 1140)  Pulse: 105 (04/22/18 1140)  Resp: 16 (04/22/18 0842)  BP: (!) 113/54 (04/22/18 1140)  SpO2: 98 % (04/22/18 1140) Vital Signs (24h Range):  Temp:  [98.1 °F (36.7 °C)-99.3 °F (37.4 °C)] 98.1 °F (36.7 °C)  Pulse:  [] 105  Resp:  [16-20] 16  SpO2:  [94 %-98 %] 98 %  BP: ()/(47-57) 113/54     Weight: 93.6 kg (206 lb 7.4 oz)  Body mass index is 33.32 kg/m².  Body surface area is 2.09 meters squared.      Intake/Output Summary (Last 24 hours) at 04/22/18 1242  Last data filed at 04/22/18 1043   Gross per 24 hour   Intake             1300 ml   Output                0 ml   Net             1300 ml       Physical Exam     Constitutional: He is oriented to person, place, and time. No distress.   Sleeping comfortably.   Cardiovascular: Normal rate, regular rhythm, normal heart sounds and intact distal pulses.    No murmur heard.  Pulmonary/Chest: Effort normal and  breath sounds normal. No respiratory distress. He has no wheezes. He has no rales. He exhibits no tenderness.   Abdominal: Soft. Bowel sounds are normal. He exhibits no distension. There is no tenderness. There is no guarding.   Musculoskeletal: Normal range of motion.   Neurological: He is alert and oriented to person, place, and time.   Skin: Skin is warm. Capillary refill takes less than 2 seconds. He is not diaphoretic.   Vitals reviewed.    Labs:   Recent Lab Results     None              Assessment/Plan:     AML (acute myeloblastic leukemia)    AML (acute myeloblastic leukemia)         17 y.o. young man with AML (t 8;21) admitted for start of Intensification II therapy.  CNS negative.MRD negative after induction I.  HD ASHWIN-C + Mitox.  IT Ashwin-C. He is Day 26 of Cycle I of therapy following Arm B of [id number]     AML Treating per COG [id number] (ARM A) and has completed Intensification I. Today is Day 3-4  -Received Intrathecal Ashwin-C in clinic prior to admission   -Day 1: IT cytarabine, ashwin-c,  -Day 1-4: Cytarabine Q12 for 8 doses, Mitoxantrone + zinecard Q24h for 4 doses     Chemotherapy-induced pancytopenia  - Resolving; WBC, Hgb and plt counts continue to improve  - Received pRBCs on 4/13     CINV  - Scheduled zofran and PRN ativan, benadryl, and phenergen as needed.         Immunosuppression 2/2 chemotherapy  -Continue Bactrim on Fri, Sat and Sun  -Continue Peridex  -Will hold acyclovir, ciprofloxacin and posaconazole prophylaxis while receiving chemotherapy                                 Beata Bunn MD  Pediatric Hematology/Oncology  [facility]

## 2018-04-22 NOTE — PROGRESS NOTES
Chemo note: mitoxantrone 25mg in 50cc normal saline up and infusing to left chest double port. Prior to beginning infusion line was checked for patency and excellent blood return noted. Chemo consent on chart. Chemo protocol in use. All lines taped and secured. Checked with jesika cabezas RN. This is a 15min infusion at 200cc/hr.

## 2018-04-22 NOTE — PROGRESS NOTES
Chemo note: Cytarabine 2090mg in 250cc NS administered via  double lumen port to run over 2 hours per chemo protocol.  Blood return noted.  Chemo precautions maintained.  Patient has no questions/concerns at this time. Ativan IV prn given for nausea.  CAR and chemo consent verified by two chemotherapy certified nurses.    Patient went on a walk in the hospital with his friend. That is the reasoning why chemo is slightly delayed.

## 2018-04-22 NOTE — PROGRESS NOTES
Chemo note: Cytarabine 2090mg in 250cc NS administered via  double lumen port to run over 2 hours per chemo protocol.  Blood return noted.  Chemo precautions maintained.  Patient has no questions/concerns at this time. Ativan IV prn given for nausea.  CAR and chemo consent verified by two chemotherapy certified nurses.

## 2018-04-22 NOTE — PLAN OF CARE
Problem: Patient Care Overview  Goal: Plan of Care Review  Outcome: Ongoing (interventions implemented as appropriate)  Pt aa&ox4, independent. remained free of falls during shift. Receiving chemotherapy treatment for AML. Cytarabine given my shift without any issues. Dex eye gtts given as ordered. No vomiting this shift. Patient given IV phenergan after his chemo infusion. VS stable. Afebrile. Pt participating in plan of care. All questions answered. Did not eat any of dinner tray.  Bed rails up x2. Bed locked and in lowest position. Chemotherapy precautions in place. Will continue to monitor.

## 2018-04-22 NOTE — PROGRESS NOTES
Chemo note: cytarabine 2,090 mg in 250cc normal saline up and infusing to left chest double port. Prior to beginning infusion line was checked for patency and excellent blood return noted. Chemo consent on chart. Chemo protocol in use, all lines taped and secured. This is a 2 hour infusion at 125cc/hr. Checked with Izzy Mcknight RN.

## 2018-04-22 NOTE — PLAN OF CARE
Problem: Patient Care Overview  Goal: Plan of Care Review  Pt remained free of falls during shift. AAOx4. VS stable. Afebrile. Pt participating in plan of care. All questions answered. Chemotherapy regime followed, please see progress notes. UP ad chilo and independent. Needs encouragement to eat and drink. Bed locked and in lowest position. Side rails up x2. Call light within reach. Non skid socks worn. Friend at bedside. Will continue to monitor.

## 2018-04-22 NOTE — SUBJECTIVE & OBJECTIVE
Subjective:     Interval History: Afebrile. Received Cytarabine and Mitoxantrone.     Oncology Treatment Plan:     [facility] [id number] Intensification II  ARM B (MA) - LOW RISK PATIENTS    Medications:  Continuous Infusions:  Scheduled Meds:   cytarabine (CYTOSAR) chemo infusion  1,000 mg/m2 (Treatment Plan Recorded) Intravenous Q12H    dexamethasone  2 drop Both Eyes Q6H    dexrazoxane (ZINECARD) infusion  600 mg/m2 Intravenous Q24H    lorazepam        mitoxantrone (NOVANTRONE) chemo infusion  12 mg/m2 (Treatment Plan Recorded) Intravenous Q24H    sulfamethoxazole-trimethoprim 800-160mg  1 tablet Oral twice daily on Friday, Saturday, Sunday     PRN Meds:alteplase, diphenhydrAMINE, heparin, porcine (PF), lorazepam, promethazine (PHENERGAN) IVPB, sodium chloride 0.9%     Review of Systems  Objective:     Vital Signs (Most Recent):  Temp: 98.1 °F (36.7 °C) (04/22/18 1140)  Pulse: 105 (04/22/18 1140)  Resp: 16 (04/22/18 0842)  BP: (!) 113/54 (04/22/18 1140)  SpO2: 98 % (04/22/18 1140) Vital Signs (24h Range):  Temp:  [98.1 °F (36.7 °C)-99.3 °F (37.4 °C)] 98.1 °F (36.7 °C)  Pulse:  [] 105  Resp:  [16-20] 16  SpO2:  [94 %-98 %] 98 %  BP: ()/(47-57) 113/54     Weight: 93.6 kg (206 lb 7.4 oz)  Body mass index is 33.32 kg/m².  Body surface area is 2.09 meters squared.      Intake/Output Summary (Last 24 hours) at 04/22/18 1242  Last data filed at 04/22/18 1043   Gross per 24 hour   Intake             1300 ml   Output                0 ml   Net             1300 ml       Physical Exam     Constitutional: He is oriented to person, place, and time. No distress.   Sleeping comfortably.   Cardiovascular: Normal rate, regular rhythm, normal heart sounds and intact distal pulses.    No murmur heard.  Pulmonary/Chest: Effort normal and breath sounds normal. No respiratory distress. He has no wheezes. He has no rales. He exhibits no tenderness.   Abdominal: Soft. Bowel sounds are normal. He exhibits no distension. There  is no tenderness. There is no guarding.   Musculoskeletal: Normal range of motion.   Neurological: He is alert and oriented to person, place, and time.   Skin: Skin is warm. Capillary refill takes less than 2 seconds. He is not diaphoretic.   Vitals reviewed.    Labs:   Recent Lab Results     None

## 2018-04-22 NOTE — PROGRESS NOTES
Informed Dr. Ruiz that patient will receive dose 5 of cytarabine at 2100 tonight and mitoxantrone will be give at 7 am on 4/22. Following the chemo orders. He stated that is correct. Pharmacy retimed the mitoxantrone.

## 2018-04-23 PROCEDURE — 99233 SBSQ HOSP IP/OBS HIGH 50: CPT | Mod: ,,, | Performed by: PEDIATRICS

## 2018-04-23 PROCEDURE — 63600175 PHARM REV CODE 636 W HCPCS: Performed by: PEDIATRICS

## 2018-04-23 PROCEDURE — 20600001 HC STEP DOWN PRIVATE ROOM

## 2018-04-23 PROCEDURE — 25000003 PHARM REV CODE 250: Performed by: PEDIATRICS

## 2018-04-23 RX ADMIN — DEXAMETHASONE 2 DROP: 1 SUSPENSION OPHTHALMIC at 06:04

## 2018-04-23 RX ADMIN — PROMETHAZINE HYDROCHLORIDE 25 MG: 25 INJECTION INTRAMUSCULAR; INTRAVENOUS at 12:04

## 2018-04-23 RX ADMIN — Medication 1254 MG: at 06:04

## 2018-04-23 RX ADMIN — DEXAMETHASONE 2 DROP: 1 SUSPENSION OPHTHALMIC at 12:04

## 2018-04-23 RX ADMIN — DEXAMETHASONE 2 DROP: 1 SUSPENSION OPHTHALMIC at 05:04

## 2018-04-23 RX ADMIN — CYTARABINE 2090 MG: 100 INJECTION, SOLUTION INTRATHECAL; INTRAVENOUS; SUBCUTANEOUS at 10:04

## 2018-04-23 RX ADMIN — DEXAMETHASONE 2 DROP: 1 SUSPENSION OPHTHALMIC at 11:04

## 2018-04-23 RX ADMIN — MITOXANTRONE HYDROCHLORIDE 25 MG: 2 INJECTION, SOLUTION INTRAVENOUS at 07:04

## 2018-04-23 NOTE — ASSESSMENT & PLAN NOTE
AML (acute myeloblastic leukemia)         17 y.o. young man with AML (t 8;21) admitted for start of Intensification II therapy.  CNS negative.MRD negative after induction I.  HD NITO-C + Mitox.  IT Nito-C. He is Day 26 of Cycle I of therapy following Arm B of [id number]     AML Treating per COG [id number] (ARM A) and has completed Intensification I. Today is Day 4-5  -Received Intrathecal Nito-C in clinic prior to admission   -Day 1: IT cytarabine, nito-c,  -Day 1-4: Cytarabine Q12 for 8 doses, Mitoxantrone + zinecard Q24h for 4 doses  -Final dose tomorrow. Discharge tomorrow.   -CBC in AM      Chemotherapy-induced pancytopenia  - Resolving; WBC, Hgb and plt counts continue to improve  - Received pRBCs on 4/13     CINV  - Scheduled zofran and PRN ativan, benadryl, and phenergen as needed.      Immunosuppression 2/2 chemotherapy  -Continue Bactrim on Fri, Sat and Sun  -Continue Peridex  -Will hold acyclovir, ciprofloxacin and posaconazole prophylaxis while receiving chemotherapy

## 2018-04-23 NOTE — PROGRESS NOTES
[facility]  Pediatric Hematology/Oncology  Progress Note    Patient Name: Hema Kuo  Admission Date: 4/19/2018  Hospital Length of Stay: 4 days  Code Status: Prior     Subjective:     Interval History: No acute events overnight. Slept well.     Oncology Treatment Plan:     PEDS [id number] Intensification II  ARM B (MA) - LOW RISK PATIENTS    Medications:  Continuous Infusions:  Scheduled Meds:   cytarabine (CYTOSAR) chemo infusion  1,000 mg/m2 (Treatment Plan Recorded) Intravenous Q12H    dexamethasone  2 drop Both Eyes Q6H    mitoxantrone (NOVANTRONE) chemo infusion  12 mg/m2 (Treatment Plan Recorded) Intravenous Q24H    sulfamethoxazole-trimethoprim 800-160mg  1 tablet Oral twice daily on Friday, Saturday, Sunday     PRN Meds:alteplase, diphenhydrAMINE, heparin, porcine (PF), lorazepam, promethazine (PHENERGAN) IVPB, sodium chloride 0.9%     Review of Systems   Constitutional: Negative for activity change, appetite change and fever.     Objective:     Vital Signs (Most Recent):  Temp: 97.8 °F (36.6 °C) (04/23/18 0001)  Pulse: 81 (04/23/18 0001)  Resp: 20 (04/23/18 0001)  BP: (!) 102/52 (04/23/18 0001)  SpO2: 97 % (04/23/18 0001) Vital Signs (24h Range):  Temp:  [97.8 °F (36.6 °C)-98.2 °F (36.8 °C)] 97.8 °F (36.6 °C)  Pulse:  [] 81  Resp:  [18-20] 20  SpO2:  [97 %-100 %] 97 %  BP: (102-113)/(52-57) 102/52     Weight: 93.6 kg (206 lb 7.4 oz)  Body mass index is 33.32 kg/m².  Body surface area is 2.09 meters squared.      Intake/Output Summary (Last 24 hours) at 04/23/18 0904  Last data filed at 04/23/18 0700   Gross per 24 hour   Intake             1610 ml   Output                0 ml   Net             1610 ml       Physical Exam   Constitutional: He is oriented to person, place, and time. He appears well-developed and well-nourished. No distress.   Just waking up, smiling, no acute distress.    HENT:   Head: Normocephalic.   Nose: Nose normal.   Eyes: Conjunctivae and EOM are normal.    Neck: Normal range of motion.   No swelling or mass appreciated    Cardiovascular:   L-sided port site    Pulmonary/Chest: Effort normal. No respiratory distress.   Neurological: He is alert and oriented to person, place, and time. He exhibits normal muscle tone.   Vitals reviewed.      Labs:   Recent Lab Results     None          Diagnostic Results:  Imaging Results    None             Assessment/Plan:     AML (acute myeloblastic leukemia)    AML (acute myeloblastic leukemia)         17 y.o. young man with AML (t 8;21) admitted for start of Intensification II therapy.  CNS negative.MRD negative after induction I.  HD NITO-C + Mitox.  IT Nito-C. He is Day 26 of Cycle I of therapy following Arm B of [id number]     AML Treating per COG [id number] (ARM A) and has completed Intensification I. Today is Day 4-5  -Received Intrathecal Nito-C in clinic prior to admission   -Day 1: IT cytarabine, nito-c,  -Day 1-4: Cytarabine Q12 for 8 doses, Mitoxantrone + zinecard Q24h for 4 doses  -Final dose tomorrow. Discharge tomorrow.   -CBC in AM      Chemotherapy-induced pancytopenia  - Resolving; WBC, Hgb and plt counts continue to improve  - Received pRBCs on 4/13     CINV  - Scheduled zofran and PRN ativan, benadryl, and phenergen as needed.      Immunosuppression 2/2 chemotherapy  -Continue Bactrim on Fri, Sat and Sun  -Continue Peridex  -Will hold acyclovir, ciprofloxacin and posaconazole prophylaxis while receiving chemotherapy                             Cindy Jacinto MD  Pediatric Hematology/Oncology  [facility]

## 2018-04-23 NOTE — PLAN OF CARE
Problem: Patient Care Overview  Goal: Plan of Care Review  Outcome: Ongoing (interventions implemented as appropriate)  Pt aa&ox4, independent. remained free of falls during shift. Receiving chemotherapy treatment for AML. Cytarabine, mitatraxone  given my shift without any issues. Dex eye gtts given as ordered. No vomiting this shift. Patient given IV phenergan after his chemo infusion. VS stable. Afebrile. Pt participating in plan of care. All questions answered.  Bed rails up x2. Bed locked and in lowest position. Chemotherapy precautions in place. Will continue to monitor.

## 2018-04-23 NOTE — PROGRESS NOTES
Chemo note: cytarabine 2,090 mg in 250cc normal saline up and infusing to left side double port. Prior to beginning infusion line was checked for patency and excellent blood return noted. Chemo consent on chart. Chemo precautions maintained. All lines taped and secured per protocol. This is a 2 hour infusion at 125cc/hr. Checked with Ne Hinds RN.

## 2018-04-23 NOTE — SUBJECTIVE & OBJECTIVE
Subjective:     Interval History: No acute events overnight. Slept well.     Oncology Treatment Plan:     [facility] [id number] Intensification II  ARM B (MA) - LOW RISK PATIENTS    Medications:  Continuous Infusions:  Scheduled Meds:   cytarabine (CYTOSAR) chemo infusion  1,000 mg/m2 (Treatment Plan Recorded) Intravenous Q12H    dexamethasone  2 drop Both Eyes Q6H    mitoxantrone (NOVANTRONE) chemo infusion  12 mg/m2 (Treatment Plan Recorded) Intravenous Q24H    sulfamethoxazole-trimethoprim 800-160mg  1 tablet Oral twice daily on Friday, Saturday, Sunday     PRN Meds:alteplase, diphenhydrAMINE, heparin, porcine (PF), lorazepam, promethazine (PHENERGAN) IVPB, sodium chloride 0.9%     Review of Systems   Constitutional: Negative for activity change, appetite change and fever.     Objective:     Vital Signs (Most Recent):  Temp: 97.8 °F (36.6 °C) (04/23/18 0001)  Pulse: 81 (04/23/18 0001)  Resp: 20 (04/23/18 0001)  BP: (!) 102/52 (04/23/18 0001)  SpO2: 97 % (04/23/18 0001) Vital Signs (24h Range):  Temp:  [97.8 °F (36.6 °C)-98.2 °F (36.8 °C)] 97.8 °F (36.6 °C)  Pulse:  [] 81  Resp:  [18-20] 20  SpO2:  [97 %-100 %] 97 %  BP: (102-113)/(52-57) 102/52     Weight: 93.6 kg (206 lb 7.4 oz)  Body mass index is 33.32 kg/m².  Body surface area is 2.09 meters squared.      Intake/Output Summary (Last 24 hours) at 04/23/18 0904  Last data filed at 04/23/18 0700   Gross per 24 hour   Intake             1610 ml   Output                0 ml   Net             1610 ml       Physical Exam   Constitutional: He is oriented to person, place, and time. He appears well-developed and well-nourished. No distress.   Just waking up, smiling, no acute distress.    HENT:   Head: Normocephalic.   Nose: Nose normal.   Eyes: Conjunctivae and EOM are normal.   Neck: Normal range of motion.   No swelling or mass appreciated    Cardiovascular:   L-sided port site    Pulmonary/Chest: Effort normal. No respiratory distress.   Neurological: He is  alert and oriented to person, place, and time. He exhibits normal muscle tone.   Vitals reviewed.      Labs:   Recent Lab Results     None          Diagnostic Results:  Imaging Results    None

## 2018-04-23 NOTE — PLAN OF CARE
Problem: Patient Care Overview  Goal: Plan of Care Review  Outcome: Ongoing (interventions implemented as appropriate)  Plan of care reviewed with patient and family.  FAll precautions maintained, side rails up x2, call light in reach, bed in low position and locked.  Encouraged to eat a  Regular diet without difficulty.  Ambulating and voiding .  Received cytarabine today and tolerated her chemo without difficulty.  No complaints voiced.

## 2018-04-23 NOTE — NURSING
Patient given cytarabine 2090mg in sodium chloride q12 hours infusing at 125cc/hr over 2 hours to left chest port.  Good blood return.  No signs/symptoms of reaction noted.

## 2018-04-24 PROBLEM — D64.9 ANEMIA: Status: ACTIVE | Noted: 2018-04-24

## 2018-04-24 LAB
ANISOCYTOSIS BLD QL SMEAR: SLIGHT
BASOPHILS NFR BLD: 0 %
DIFFERENTIAL METHOD: ABNORMAL
EOSINOPHIL NFR BLD: 3 %
ERYTHROCYTE [DISTWIDTH] IN BLOOD BY AUTOMATED COUNT: 22.6 %
HCT VFR BLD AUTO: 28.5 %
HGB BLD-MCNC: 9.8 G/DL
HYPOCHROMIA BLD QL SMEAR: ABNORMAL
IMM GRANULOCYTES # BLD AUTO: ABNORMAL K/UL
IMM GRANULOCYTES NFR BLD AUTO: ABNORMAL %
LYMPHOCYTES NFR BLD: 13 %
MCH RBC QN AUTO: 33 PG
MCHC RBC AUTO-ENTMCNC: 34.4 G/DL
MCV RBC AUTO: 96 FL
MONOCYTES NFR BLD: 0 %
NEUTROPHILS NFR BLD: 84 %
NRBC BLD-RTO: 0 /100 WBC
PLATELET # BLD AUTO: 61 K/UL
PLATELET BLD QL SMEAR: ABNORMAL
PMV BLD AUTO: 11.9 FL
POIKILOCYTOSIS BLD QL SMEAR: SLIGHT
POLYCHROMASIA BLD QL SMEAR: ABNORMAL
RBC # BLD AUTO: 2.97 M/UL
SCHISTOCYTES BLD QL SMEAR: PRESENT
SPHEROCYTES BLD QL SMEAR: ABNORMAL
WBC # BLD AUTO: 1.62 K/UL

## 2018-04-24 PROCEDURE — 85007 BL SMEAR W/DIFF WBC COUNT: CPT

## 2018-04-24 PROCEDURE — 85027 COMPLETE CBC AUTOMATED: CPT

## 2018-04-24 PROCEDURE — 25000003 PHARM REV CODE 250: Performed by: PEDIATRICS

## 2018-04-24 PROCEDURE — 63600175 PHARM REV CODE 636 W HCPCS: Mod: JG | Performed by: PEDIATRICS

## 2018-04-24 PROCEDURE — 20600001 HC STEP DOWN PRIVATE ROOM

## 2018-04-24 PROCEDURE — 99233 SBSQ HOSP IP/OBS HIGH 50: CPT | Mod: ,,, | Performed by: PEDIATRICS

## 2018-04-24 RX ORDER — POSACONAZOLE 100 MG/1
300 TABLET, DELAYED RELEASE ORAL DAILY
Qty: 90 TABLET | Refills: 0 | Status: SHIPPED | OUTPATIENT
Start: 2018-04-24 | End: 2018-04-24

## 2018-04-24 RX ORDER — POSACONAZOLE 100 MG/1
300 TABLET, DELAYED RELEASE ORAL DAILY
Qty: 90 TABLET | Refills: 0 | Status: ON HOLD | OUTPATIENT
Start: 2018-04-24 | End: 2018-05-31

## 2018-04-24 RX ADMIN — DEXAMETHASONE 2 DROP: 1 SUSPENSION OPHTHALMIC at 06:04

## 2018-04-24 RX ADMIN — MITOXANTRONE HYDROCHLORIDE 25 MG: 2 INJECTION, SOLUTION INTRAVENOUS at 07:04

## 2018-04-24 RX ADMIN — DEXAMETHASONE 2 DROP: 1 SUSPENSION OPHTHALMIC at 11:04

## 2018-04-24 RX ADMIN — DEXAMETHASONE 2 DROP: 1 SUSPENSION OPHTHALMIC at 07:04

## 2018-04-24 NOTE — PROGRESS NOTES
[facility]  Pediatric Hematology/Oncology  Progress Note    Patient Name: Hema Kuo  Admission Date: 4/19/2018  Hospital Length of Stay: 5 days  Code Status: Prior     Subjective:     Interval History: Afebrile.    Oncology Treatment Plan:     PEDS [id number] Intensification II  ARM B (MA) - LOW RISK PATIENTS    Medications:  Continuous Infusions:  Scheduled Meds:   dexamethasone  2 drop Both Eyes Q6H    mitoxantrone (NOVANTRONE) chemo infusion  12 mg/m2 (Treatment Plan Recorded) Intravenous Q24H    [START ON 4/25/2018] mitoxantrone (NOVANTRONE) chemo infusion  12 mg/m2 (Treatment Plan Recorded) Intravenous Once    sulfamethoxazole-trimethoprim 800-160mg  1 tablet Oral twice daily on Friday, Saturday, Sunday     PRN Meds:alteplase, diphenhydrAMINE, heparin, porcine (PF), lorazepam, promethazine (PHENERGAN) IVPB, sodium chloride 0.9%     Review of Systems  Objective:     Vital Signs (Most Recent):  Temp: 98.3 °F (36.8 °C) (04/24/18 1139)  Pulse: 68 (04/24/18 1139)  Resp: 20 (04/24/18 1139)  BP: (!) 95/53 (04/24/18 1139)  SpO2: 100 % (04/24/18 1139) Vital Signs (24h Range):  Temp:  [97.7 °F (36.5 °C)-98.3 °F (36.8 °C)] 98.3 °F (36.8 °C)  Pulse:  [66-78] 68  Resp:  [18-20] 20  SpO2:  [97 %-100 %] 100 %  BP: ()/(52-56) 95/53     Weight: 93.6 kg (206 lb 7.4 oz)  Body mass index is 33.32 kg/m².  Body surface area is 2.09 meters squared.      Intake/Output Summary (Last 24 hours) at 04/24/18 1306  Last data filed at 04/24/18 1100   Gross per 24 hour   Intake             1020 ml   Output                0 ml   Net             1020 ml       Physical Exam    Constitutional: He is oriented to person, place, and time. He appears well-developed and well-nourished. No distress.   HENT:   Head: Normocephalic.   Nose: Nose normal.   Eyes: Conjunctivae and EOM are normal.   Neck: Normal range of motion.   No swelling or mass appreciated    Cardiovascular:   L-sided port site    Pulmonary/Chest: Effort  normal. No respiratory distress.   Neurological: He is alert and oriented to person, place, and time. He exhibits normal muscle tone.   Vitals reviewed.    Labs:   Recent Lab Results       04/24/18  0401      Immature Granulocytes CANCELED  Comment:  Result canceled by the ancillary     Immature Grans (Abs) CANCELED  Comment:  Mild elevation in immature granulocytes is non specific and   can be seen in a variety of conditions including stress response,   acute inflammation, trauma and pregnancy. Correlation with other   laboratory and clinical findings is essential.    Result canceled by the ancillary       Aniso Slight     Basophil% 0.0     Differential Method Manual     Eosinophil% 3.0  Comment:  Corrected result; previously reported as 1.2 on 04/24/2018 at 05:24.[C]     Gran% 84.0(H)     Hematocrit 28.5(L)     Hemoglobin 9.8(L)     Hypo Occasional     Lymph% 13.0  Comment:  Corrected result; previously reported as 14.8 on 04/24/2018 at 05:24.(L)[C]     MCH 33.0(H)     MCHC 34.4     MCV 96     Mono% 0.0(L)     MPV 11.9     nRBC 0     Platelet Estimate Clumped(A)     Platelets 61(L)     Poik Slight     Poly Occasional     RBC 2.97(L)     RDW 22.6(H)     Schistocytes Present     Spherocytes Occasional     WBC 1.62  Comment:  wbc   critical result(s) called and verbal readback obtained from   victoriano molina rn , 04/24/2018 05:24  (LL)                   Assessment/Plan:     * AML (acute myeloblastic leukemia)    AML (acute myeloblastic leukemia)         17 y.o. young man with AML (t 8;21) admitted for start of Intensification II therapy.  CNS negative.MRD negative after induction I.  HD NITO-C + Mitox.  IT Nito-C. He is Day 26 of Cycle I of therapy following Arm B of [id number]     AML Treating per COG [id number] (ARM A) and has completed Intensification I. Today is Day 5  -Received Intrathecal Nito-C in clinic prior to admission   -Day 1: IT cytarabine, nito-c,  -Day 1-4: Cytarabine Q12 for 8 doses, Mitoxantrone + zinecard Q24h  for 4 doses  -Final dose today of Mitoxantrone. Discharge tomorrow.   -CBC in AM      Chemotherapy-induced pancytopenia  - Resolving; WBC, Hgb and plt counts continue to improve  - Received pRBCs on 4/13     CINV  - Scheduled zofran and PRN ativan, benadryl, and phenergen as needed.      Immunosuppression 2/2 chemotherapy  -Continue Bactrim on Fri, Sat and Sun  -Continue Peridex  -Will hold acyclovir, ciprofloxacin and posaconazole prophylaxis while receiving chemotherapy                             Beata Bunn MD  Pediatric Hematology/Oncology  [facility]

## 2018-04-24 NOTE — ASSESSMENT & PLAN NOTE
AML (acute myeloblastic leukemia)         17 y.o. young man with AML (t 8;21) admitted for start of Intensification II therapy.  CNS negative.MRD negative after induction I.  HD NITO-C + Mitox.  IT Nito-C. He is Day 26 of Cycle I of therapy following Arm B of [id number]     AML Treating per COG [id number] (ARM A) and has completed Intensification I. Today is Day 5  -Received Intrathecal Nito-C in clinic prior to admission   -Day 1: IT cytarabine, nito-c,  -Day 1-4: Cytarabine Q12 for 8 doses, Mitoxantrone + zinecard Q24h for 4 doses  -Final dose today of Mitoxantrone. Discharge tomorrow.   -CBC in AM      Chemotherapy-induced pancytopenia  - Resolving; WBC, Hgb and plt counts continue to improve  - Received pRBCs on 4/13     CINV  - Scheduled zofran and PRN ativan, benadryl, and phenergen as needed.      Immunosuppression 2/2 chemotherapy  -Continue Bactrim on Fri, Sat and Sun  -Continue Peridex  -Will hold acyclovir, ciprofloxacin and posaconazole prophylaxis while receiving chemotherapy

## 2018-04-24 NOTE — PROGRESS NOTES
mitoXANTRONE (NOVANTRONE) 12 mg/m2 = 25 mg in sodium chloride 0.9% 50 mL chemo infusion   started through right chest port noted for having positive blood return. Chemotherapy dosage and BSA checked by two chemotherapy certified nurses prior to administration. Chemotherapy education done prior to hanging of chemotherapy.  Chemotherapeutic precautions in place throughout therapy. Will continue to monitor.

## 2018-04-24 NOTE — PLAN OF CARE
Problem: Patient Care Overview  Goal: Plan of Care Review  Outcome: Ongoing (interventions implemented as appropriate)  Afebrile. Free from falls or injury. No complaints of pain. Dex eyedrops given as scheduled. Bed locked in lowest position, non skid socks on, call light within reach. Pt instructed to call if any assistance is needed. Vitals stable. Mother at bedside. Will cont to mihai pt.

## 2018-04-24 NOTE — SUBJECTIVE & OBJECTIVE
Subjective:     Interval History: Afebrile.    Oncology Treatment Plan:     PEDS [id number] Intensification II  ARM B (MA) - LOW RISK PATIENTS    Medications:  Continuous Infusions:  Scheduled Meds:   dexamethasone  2 drop Both Eyes Q6H    mitoxantrone (NOVANTRONE) chemo infusion  12 mg/m2 (Treatment Plan Recorded) Intravenous Q24H    [START ON 4/25/2018] mitoxantrone (NOVANTRONE) chemo infusion  12 mg/m2 (Treatment Plan Recorded) Intravenous Once    sulfamethoxazole-trimethoprim 800-160mg  1 tablet Oral twice daily on Friday, Saturday, Sunday     PRN Meds:alteplase, diphenhydrAMINE, heparin, porcine (PF), lorazepam, promethazine (PHENERGAN) IVPB, sodium chloride 0.9%     Review of Systems  Objective:     Vital Signs (Most Recent):  Temp: 98.3 °F (36.8 °C) (04/24/18 1139)  Pulse: 68 (04/24/18 1139)  Resp: 20 (04/24/18 1139)  BP: (!) 95/53 (04/24/18 1139)  SpO2: 100 % (04/24/18 1139) Vital Signs (24h Range):  Temp:  [97.7 °F (36.5 °C)-98.3 °F (36.8 °C)] 98.3 °F (36.8 °C)  Pulse:  [66-78] 68  Resp:  [18-20] 20  SpO2:  [97 %-100 %] 100 %  BP: ()/(52-56) 95/53     Weight: 93.6 kg (206 lb 7.4 oz)  Body mass index is 33.32 kg/m².  Body surface area is 2.09 meters squared.      Intake/Output Summary (Last 24 hours) at 04/24/18 1306  Last data filed at 04/24/18 1100   Gross per 24 hour   Intake             1020 ml   Output                0 ml   Net             1020 ml       Physical Exam    Constitutional: He is oriented to person, place, and time. He appears well-developed and well-nourished. No distress.   HENT:   Head: Normocephalic.   Nose: Nose normal.   Eyes: Conjunctivae and EOM are normal.   Neck: Normal range of motion.   No swelling or mass appreciated    Cardiovascular:   L-sided port site    Pulmonary/Chest: Effort normal. No respiratory distress.   Neurological: He is alert and oriented to person, place, and time. He exhibits normal muscle tone.   Vitals reviewed.    Labs:   Recent Lab Results        04/24/18  0401      Immature Granulocytes CANCELED  Comment:  Result canceled by the ancillary     Immature Grans (Abs) CANCELED  Comment:  Mild elevation in immature granulocytes is non specific and   can be seen in a variety of conditions including stress response,   acute inflammation, trauma and pregnancy. Correlation with other   laboratory and clinical findings is essential.    Result canceled by the ancillary       Aniso Slight     Basophil% 0.0     Differential Method Manual     Eosinophil% 3.0  Comment:  Corrected result; previously reported as 1.2 on 04/24/2018 at 05:24.[C]     Gran% 84.0(H)     Hematocrit 28.5(L)     Hemoglobin 9.8(L)     Hypo Occasional     Lymph% 13.0  Comment:  Corrected result; previously reported as 14.8 on 04/24/2018 at 05:24.(L)[C]     MCH 33.0(H)     MCHC 34.4     MCV 96     Mono% 0.0(L)     MPV 11.9     nRBC 0     Platelet Estimate Clumped(A)     Platelets 61(L)     Poik Slight     Poly Occasional     RBC 2.97(L)     RDW 22.6(H)     Schistocytes Present     Spherocytes Occasional     WBC 1.62  Comment:  wbc   critical result(s) called and verbal readback obtained from   victoriano molina rn , 04/24/2018 05:24  (LL)

## 2018-04-24 NOTE — PLAN OF CARE
Problem: Patient Care Overview  Goal: Plan of Care Review  Outcome: Ongoing (interventions implemented as appropriate)  Plan of care reviewed with patient and family.  FAll precautions maintained, side rails up x2, call light in reach, bed in low position, and locked.  Mother at the bedside.  Ambulating , voiding and tolerating a regular diet without difficulty.

## 2018-04-25 VITALS
DIASTOLIC BLOOD PRESSURE: 56 MMHG | OXYGEN SATURATION: 100 % | HEART RATE: 57 BPM | WEIGHT: 206.44 LBS | BODY MASS INDEX: 33.18 KG/M2 | RESPIRATION RATE: 16 BRPM | HEIGHT: 66 IN | SYSTOLIC BLOOD PRESSURE: 100 MMHG | TEMPERATURE: 98 F

## 2018-04-25 LAB
ANISOCYTOSIS BLD QL SMEAR: SLIGHT
BASOPHILS # BLD AUTO: 0.01 K/UL
BASOPHILS NFR BLD: 1 %
DACRYOCYTES BLD QL SMEAR: ABNORMAL
DIFFERENTIAL METHOD: ABNORMAL
EOSINOPHIL # BLD AUTO: 0 K/UL
EOSINOPHIL NFR BLD: 1 %
ERYTHROCYTE [DISTWIDTH] IN BLOOD BY AUTOMATED COUNT: 22 %
HCT VFR BLD AUTO: 29.2 %
HGB BLD-MCNC: 10 G/DL
IMM GRANULOCYTES # BLD AUTO: 0.01 K/UL
IMM GRANULOCYTES NFR BLD AUTO: 1 %
LYMPHOCYTES # BLD AUTO: 0.4 K/UL
LYMPHOCYTES NFR BLD: 35.6 %
MCH RBC QN AUTO: 32.2 PG
MCHC RBC AUTO-ENTMCNC: 34.2 G/DL
MCV RBC AUTO: 94 FL
MONOCYTES # BLD AUTO: 0 K/UL
MONOCYTES NFR BLD: 0 %
NEUTROPHILS # BLD AUTO: 0.6 K/UL
NEUTROPHILS NFR BLD: 61.4 %
NRBC BLD-RTO: 0 /100 WBC
OVALOCYTES BLD QL SMEAR: ABNORMAL
PLATELET # BLD AUTO: 59 K/UL
PMV BLD AUTO: 12.6 FL
POIKILOCYTOSIS BLD QL SMEAR: SLIGHT
POLYCHROMASIA BLD QL SMEAR: ABNORMAL
RBC # BLD AUTO: 3.11 M/UL
WBC # BLD AUTO: 1.01 K/UL

## 2018-04-25 PROCEDURE — 25000003 PHARM REV CODE 250: Performed by: PEDIATRICS

## 2018-04-25 PROCEDURE — 85025 COMPLETE CBC W/AUTO DIFF WBC: CPT

## 2018-04-25 PROCEDURE — 99239 HOSP IP/OBS DSCHRG MGMT >30: CPT | Mod: ,,, | Performed by: PEDIATRICS

## 2018-04-25 PROCEDURE — 63600175 PHARM REV CODE 636 W HCPCS: Performed by: PEDIATRICS

## 2018-04-25 RX ADMIN — DEXAMETHASONE 2 DROP: 1 SUSPENSION OPHTHALMIC at 06:04

## 2018-04-25 RX ADMIN — DEXAMETHASONE 2 DROP: 1 SUSPENSION OPHTHALMIC at 12:04

## 2018-04-25 RX ADMIN — HEPARIN 500 UNITS: 100 SYRINGE at 10:04

## 2018-04-25 RX ADMIN — MITOXANTRONE HYDROCHLORIDE 25 MG: 2 INJECTION, SOLUTION INTRAVENOUS at 06:04

## 2018-04-25 NOTE — SUBJECTIVE & OBJECTIVE
Subjective:     Interval History: Received mitaxantrone. Afebrile.     Oncology Treatment Plan:     [facility] [id number] Intensification II  ARM B (MA) - LOW RISK PATIENTS    Medications:  Continuous Infusions:  Scheduled Meds:   dexamethasone  2 drop Both Eyes Q6H    mitoxantrone (NOVANTRONE) chemo infusion  12 mg/m2 (Treatment Plan Recorded) Intravenous Q24H    mitoxantrone (NOVANTRONE) chemo infusion  12 mg/m2 (Treatment Plan Recorded) Intravenous Once    sulfamethoxazole-trimethoprim 800-160mg  1 tablet Oral twice daily on Friday, Saturday, Sunday     PRN Meds:alteplase, diphenhydrAMINE, heparin, porcine (PF), lorazepam, promethazine (PHENERGAN) IVPB, sodium chloride 0.9%     Review of Systems  Objective:     Vital Signs (Most Recent):  Temp: 97 °F (36.1 °C) (04/25/18 0348)  Pulse: (!) 59 (04/25/18 0348)  Resp: 16 (04/25/18 0348)  BP: (!) 104/50 (04/25/18 0348)  SpO2: 96 % (04/25/18 0348) Vital Signs (24h Range):  Temp:  [97 °F (36.1 °C)-98.4 °F (36.9 °C)] 97 °F (36.1 °C)  Pulse:  [59-76] 59  Resp:  [16-20] 16  SpO2:  [96 %-100 %] 96 %  BP: ()/(50-63) 104/50     Weight: 93.6 kg (206 lb 7.4 oz)  Body mass index is 33.32 kg/m².  Body surface area is 2.09 meters squared.      Intake/Output Summary (Last 24 hours) at 04/25/18 0616  Last data filed at 04/24/18 1500   Gross per 24 hour   Intake              760 ml   Output                0 ml   Net              760 ml       Physical Exam    Constitutional: He is oriented to person, place, and time. He appears well-developed and well-nourished. No distress.   HENT:   Head: Normocephalic.   Nose: Nose normal.   Eyes: Conjunctivae and EOM are normal.   Neck: Normal range of motion.   No swelling or mass appreciated    Cardiovascular: RRR.   L-sided port site    Pulmonary/Chest: Effort normal. No respiratory distress.   Neurological: He is alert and oriented to person, place, and time. He exhibits normal muscle tone.   Vitals reviewed.    Labs:   Recent Lab Results        04/25/18  0400      Hematocrit 29.2(L)     Hemoglobin 10.0(L)     MCH 32.2(H)     MCHC 34.2     MCV 94     MPV 12.6     Platelets 59(L)     RBC 3.11(L)     RDW 22.0(H)     WBC 1.01  Comment:  WBC Previously reported results for this analyte were similarly   abnormal.  Call not needed.  Documented, 04/25/2018 04:33  (LL)

## 2018-04-25 NOTE — PROGRESS NOTES
[facility]  Pediatric Hematology/Oncology  Progress Note    Patient Name: Hema Kuo  Admission Date: 4/19/2018  Hospital Length of Stay: 6 days  Code Status: Prior     Subjective:     Interval History: Received mitaxantrone. Afebrile.     Oncology Treatment Plan:     PEDS [id number] Intensification II  ARM B (MA) - LOW RISK PATIENTS    Medications:  Continuous Infusions:  Scheduled Meds:   dexamethasone  2 drop Both Eyes Q6H    mitoxantrone (NOVANTRONE) chemo infusion  12 mg/m2 (Treatment Plan Recorded) Intravenous Q24H    mitoxantrone (NOVANTRONE) chemo infusion  12 mg/m2 (Treatment Plan Recorded) Intravenous Once    sulfamethoxazole-trimethoprim 800-160mg  1 tablet Oral twice daily on Friday, Saturday, Sunday     PRN Meds:alteplase, diphenhydrAMINE, heparin, porcine (PF), lorazepam, promethazine (PHENERGAN) IVPB, sodium chloride 0.9%     Review of Systems  Objective:     Vital Signs (Most Recent):  Temp: 97 °F (36.1 °C) (04/25/18 0348)  Pulse: (!) 59 (04/25/18 0348)  Resp: 16 (04/25/18 0348)  BP: (!) 104/50 (04/25/18 0348)  SpO2: 96 % (04/25/18 0348) Vital Signs (24h Range):  Temp:  [97 °F (36.1 °C)-98.4 °F (36.9 °C)] 97 °F (36.1 °C)  Pulse:  [59-76] 59  Resp:  [16-20] 16  SpO2:  [96 %-100 %] 96 %  BP: ()/(50-63) 104/50     Weight: 93.6 kg (206 lb 7.4 oz)  Body mass index is 33.32 kg/m².  Body surface area is 2.09 meters squared.      Intake/Output Summary (Last 24 hours) at 04/25/18 0616  Last data filed at 04/24/18 1500   Gross per 24 hour   Intake              760 ml   Output                0 ml   Net              760 ml       Physical Exam    Constitutional: He is oriented to person, place, and time. He appears well-developed and well-nourished. No distress.   HENT:   Head: Normocephalic.   Nose: Nose normal.   Eyes: Conjunctivae and EOM are normal.   Neck: Normal range of motion.   No swelling or mass appreciated    Cardiovascular: RRR.   L-sided port site    Pulmonary/Chest:  Effort normal. No respiratory distress.   Neurological: He is alert and oriented to person, place, and time. He exhibits normal muscle tone.   Vitals reviewed.    Labs:   Recent Lab Results       04/25/18  0400      Hematocrit 29.2(L)     Hemoglobin 10.0(L)     MCH 32.2(H)     MCHC 34.2     MCV 94     MPV 12.6     Platelets 59(L)     RBC 3.11(L)     RDW 22.0(H)     WBC 1.01  Comment:  WBC Previously reported results for this analyte were similarly   abnormal.  Call not needed.  Documented, 04/25/2018 04:33  (LL)                 Assessment/Plan:     * AML (acute myeloblastic leukemia)    AML (acute myeloblastic leukemia)         17 y.o. young man with AML (t 8;21) admitted for start of Intensification II therapy.  CNS negative.MRD negative after induction I.  HD NITO-C + Mitox.  IT Nito-C. He is Day 26 of Cycle I of therapy following Arm B of [id number]     AML Treating per COG [id number] (ARM A) and has completed Intensification I. Today is Day 6  -Received Intrathecal Nito-C in clinic prior to admission   -Day 1: IT cytarabine, ntio-c,  -Day 1-4: Cytarabine Q12 for 8 doses, Mitoxantrone + zinecard Q24h for 4 doses  -Final dose today of Mitoxantrone. Discharge today.       Chemotherapy-induced pancytopenia  - Resolving; WBC, Hgb and plt counts continue to improve  - Received pRBCs on 4/13     CINV  - Scheduled zofran and PRN ativan, benadryl, and phenergen as needed.      Immunosuppression 2/2 chemotherapy  -Continue Bactrim on Fri, Sat and Sun  -Continue Peridex  -Will hold acyclovir, ciprofloxacin and posaconazole prophylaxis while receiving chemotherapy. Will resume at d/c                             Beata Bunn MD  Pediatric Hematology/Oncology  [facility]

## 2018-04-25 NOTE — PLAN OF CARE
Problem: Patient Care Overview  Goal: Plan of Care Review  Outcome: Ongoing (interventions implemented as appropriate)  Patient is progressing and involved with plan of care. Frequent rounds made to assess pain and safety. Pt communicating needs throughout shift. Up ad chilo. Tolerating diet, voiding without difficulty. No c/o pain. Dexamethazone eye drops qiven Q6 as ordered. Pt to receive last dose of mitoxantrone this AM, then D/C.  All vitals stable; no acute events this shift. Pt. Remaining free from falls or injury throughout shift; bed in lowest position; side rails up X2; call light within reach; pt instructed to call for assistance as needed - verbalized understanding. Q2h rounding on patient. Will continue to monitor.

## 2018-04-25 NOTE — ASSESSMENT & PLAN NOTE
AML (acute myeloblastic leukemia)         17 y.o. young man with AML (t 8;21) admitted for start of Intensification II therapy.  CNS negative.MRD negative after induction I.  HD NITO-C + Mitox.  IT Nito-C. He is Day 26 of Cycle I of therapy following Arm B of [id number]     AML Treating per COG [id number] (ARM A) and has completed Intensification I. Today is Day 6  -Received Intrathecal Nito-C in clinic prior to admission   -Day 1: IT cytarabine, nito-c,  -Day 1-4: Cytarabine Q12 for 8 doses, Mitoxantrone + zinecard Q24h for 4 doses  -Final dose today of Mitoxantrone. Discharge today.       Chemotherapy-induced pancytopenia  - Resolving; WBC, Hgb and plt counts continue to improve  - Received pRBCs on 4/13     CINV  - Scheduled zofran and PRN ativan, benadryl, and phenergen as needed.      Immunosuppression 2/2 chemotherapy  -Continue Bactrim on Fri, Sat and Sun  -Continue Peridex  -Will hold acyclovir, ciprofloxacin and posaconazole prophylaxis while receiving chemotherapy. Will resume at d/c

## 2018-04-25 NOTE — HPI
Hema Kuo is a 17 y.o. male with AML s/p Intensification I therapy following [id number] (Arm A) here today for start of Intensification II therapy.    Hema reports that he has been feeling well with good appetite and energy levels. Previous R-sided neck swelling concerning for lymphadenitis is now resolved. Does feel a little nauseous this evening.

## 2018-04-25 NOTE — NURSING
Road Test:    Oxygen: Pt tolerated well on room air.  Ambulation:  Pt up ad chilo.  Devices:  Pt going home without any devices, PAC heparinized and de-accessed.  Tolerates (diet):  Pt tolerates a regular diet.  Elimination:  Pt voids without distress.  Self care:  Pt performs self care without assistance.  Teaching:  Written and verbal discharge teaching given.    Requested wheelchair and cart for pt and his belongings.  Mother is at bedside for support.  WCTM until DC

## 2018-04-25 NOTE — PLAN OF CARE
Pt and mother at bedside notified of discharge paperwork ready; pt requested to get more sleep as he was up much of the night.  WCTM and discharge as soon as possible.

## 2018-04-25 NOTE — DISCHARGE SUMMARY
[facility]  Pediatric Hematology/Oncology  Discharge Summary      Patient Name: Hema Kuo  MRN: [id number]  Admission Date: 4/19/2018  Hospital Length of Stay: 6 days  Discharge Date and Time: 4/25/2018 11:07 AM  Attending Physician: No att. providers found   Discharging Provider: Trudy Tracy MD  Primary Care Provider: Ann Reyna NP    HPI:  Hema Kuo is a 17 y.o. male with AML s/p Intensification I therapy following [id number] (Arm A) here today for start of Intensification II therapy.    Hema reports that he has been feeling well with good appetite and energy levels. Previous R-sided neck swelling concerning for lymphadenitis is now resolved. Does feel a little nauseous this evening.    * No surgery found *     Hospital Course:  Hema is a 17 year old male with AML admitted for cycle 4 of Intensification II Arm B following [id number]. He received a 4-day course of cytarabine followed by 6-day course of mitoxantrone with dexamethasone eye drops q6h for keratitis prophylaxis. Zinecard administered prior to first dose of mitoxantrone. Nausea and vomiting were controlled with antiemetic regimen though his appetite remained low and he had to be encouraged to eat and drink. Acyclovir and posaconazole were held but Bactrim and peridex were continued. Upon completion of his chemotherapy he was deemed vitally stable and appropriate to discharge to home with plans for follow-up cell counts at a local lab and close coordination with outpatient Oncology.     Of note, the first day of Mitoxantrone was delayed by pharmacy and so he received two doses of zinecard as the Mitoxantrone was administered as a first dose one day later than planned.     ANC on day of discharge of 630.    Significant Diagnostic Studies:   Recent Results (from the past 24 hour(s))   CBC auto differential    Collection Time: 04/25/18  4:00 AM   Result Value Ref Range    WBC 1.01 (LL) 3.90 - 12.70 K/uL    RBC 3.11 (L) 4.60 - 6.20  M/uL    Hemoglobin 10.0 (L) 14.0 - 18.0 g/dL    Hematocrit 29.2 (L) 40.0 - 54.0 %    MCV 94 82 - 98 fL    MCH 32.2 (H) 27.0 - 31.0 pg    MCHC 34.2 32.0 - 36.0 g/dL    RDW 22.0 (H) 11.5 - 14.5 %    Platelets 59 (L) 150 - 350 K/uL    MPV 12.6 9.2 - 12.9 fL    Immature Granulocytes 1.0 (H) 0.0 - 0.5 %    Gran # (ANC) 0.6 (L) 1.8 - 7.7 K/uL    Immature Grans (Abs) 0.01 0.00 - 0.04 K/uL    Lymph # 0.4 (L) 1.0 - 4.8 K/uL    Mono # 0.0 (L) 0.3 - 1.0 K/uL    Eos # 0.0 0.0 - 0.5 K/uL    Baso # 0.01 0.00 - 0.20 K/uL    nRBC 0 0 /100 WBC    Gran% 61.4 38.0 - 73.0 %    Lymph% 35.6 18.0 - 48.0 %    Mono% 0.0 (L) 4.0 - 15.0 %    Eosinophil% 1.0 0.0 - 8.0 %    Basophil% 1.0 0.0 - 1.9 %    Aniso Slight     Poik Slight     Poly Occasional     Ovalocytes Occasional     Tear Drop Cells Occasional     Differential Method Automated        Pending Diagnostic Studies:     None        Final Active Diagnoses:    Diagnosis Date Noted POA    PRINCIPAL PROBLEM:  AML (acute myeloblastic leukemia) [C92.00] 12/12/2017 Yes    Anemia [D64.9] 04/24/2018 Yes      Problems Resolved During this Admission:    Diagnosis Date Noted Date Resolved POA      Discharged Condition: stable    Disposition: Home or Self Care    Follow Up:  Follow-up Information     Local lab work for CBC On 4/30/2018.    Why:  Your Pediatric Hematologist/Oncologist will review the results and follow-up                Patient Instructions:     Diet Adult Regular     Diet Adult Regular     Activity as tolerated     Notify your health care provider if you experience any of the following:  temperature >100.4     Notify your health care provider if you experience any of the following:  persistent nausea and vomiting or diarrhea     Notify your health care provider if you experience any of the following:  severe uncontrolled pain     Notify your health care provider if you experience any of the following:  increased confusion or weakness     Notify your health care provider if you  experience any of the following:  persistent dizziness, light-headedness, or visual disturbances     Notify your health care provider if you experience any of the following:  worsening rash     Notify your health care provider if you experience any of the following:  severe persistent headache     Notify your health care provider if you experience any of the following:  difficulty breathing or increased cough     Notify your health care provider if you experience any of the following:  redness, tenderness, or signs of infection (pain, swelling, redness, odor or green/yellow discharge around incision site)     Activity as tolerated     Notify your health care provider if you experience any of the following:  temperature >100.4     Notify your health care provider if you experience any of the following:  persistent nausea and vomiting or diarrhea     Notify your health care provider if you experience any of the following:  increased confusion or weakness     Notify your health care provider if you experience any of the following:  difficulty breathing or increased cough     Notify your health care provider if you experience any of the following:  redness, tenderness, or signs of infection (pain, swelling, redness, odor or green/yellow discharge around incision site)     Notify your health care provider if you experience any of the following:  severe uncontrolled pain       Medications:  Reconciled Home Medications:      Medication List      CONTINUE taking these medications    acyclovir 400 MG tablet  Commonly known as:  ZOVIRAX  Take 1 tablet (400 mg total) by mouth 2 (two) times daily.     chlorhexidine 0.12 % solution  Commonly known as:  PERIDEX     ciprofloxacin HCl 500 MG tablet  Commonly known as:  CIPRO  Take 1 tablet (500 mg total) by mouth every 12 (twelve) hours.     lisdexamfetamine 60 MG capsule  Commonly known as:  VYVANSE  Take 60 mg by mouth every morning.     LORazepam 1 MG tablet  Commonly known as:   ATIVAN  Take 1 tablet (1 mg total) by mouth every 6 (six) hours as needed (Nausea).     ondansetron 8 MG Tbdl  Commonly known as:  ZOFRAN-ODT  Take 1 tablet (8 mg total) by mouth every 8 (eight) hours as needed.     oxyCODONE 10 mg Tab immediate release tablet  Commonly known as:  ROXICODONE  Take 1 tablet (10 mg total) by mouth every 6 (six) hours as needed.     pantoprazole 40 MG tablet  Commonly known as:  PROTONIX  Take 1 tablet (40 mg total) by mouth once daily.     polyethylene glycol 17 gram Pwpk  Commonly known as:  GLYCOLAX  Take 17 g by mouth daily as needed (No bowel movement).     posaconazole 100 mg Tbec tablet  Take 3 tablets (300 mg total) by mouth once daily.     sulfamethoxazole-trimethoprim 800-160mg 800-160 mg Tab  Commonly known as:  BACTRIM DS  Take 1 tablet by mouth twice daily only on Friday, Saturday, Sunday.            Trudy Tracy MD  Pediatric Hematology/Oncology, PGYII [facility]

## 2018-04-25 NOTE — HOSPITAL COURSE
Hema is a 17 year old male with AML admitted for cycle 4 of Intensification II Arm B following [id number]. He received a 4-day course of cytarabine followed by 6-day course of mitoxantrone with dexamethasone eye drops q6h for keratitis prophylaxis. Zinecard administered prior to first dose of mitoxantrone. Nausea and vomiting were controlled with antiemetic regimen though his appetite remained low and he had to be encouraged to eat and drink. Acyclovir and posaconazole were held but Bactrim and peridex were continued. Upon completion of his chemotherapy he was deemed vitally stable and appropriate to discharge to home with plans for follow-up cell counts at a local lab and close coordination with outpatient Oncology.     Of note, the first day of Mitoxantrone was delayed by pharmacy and so he received two doses of zinecard as the Mitoxantrone was administered as a first dose one day later than planned.     ANC on day of discharge of 630.

## 2018-04-27 ENCOUNTER — TELEPHONE (OUTPATIENT)
Dept: PEDIATRIC HEMATOLOGY/ONCOLOGY | Facility: CLINIC | Age: 18
End: 2018-04-27

## 2018-04-27 ENCOUNTER — HISTORICAL (OUTPATIENT)
Dept: ADMINISTRATIVE | Facility: HOSPITAL | Age: 18
End: 2018-04-27

## 2018-04-27 LAB
ABS NEUT (OLG): 0.15 X10(3)/MCL
ALBUMIN SERPL-MCNC: 4.1 GM/DL (ref 3.4–5)
ALBUMIN/GLOB SERPL: 1 RATIO (ref 1–2)
ALP SERPL-CCNC: 87 UNIT/L (ref 45–117)
ALT SERPL-CCNC: 49 UNIT/L (ref 12–78)
ANISOCYTOSIS BLD QL SMEAR: ABNORMAL
AST SERPL-CCNC: 40 UNIT/L (ref 15–37)
BASOPHILS NFR BLD MANUAL: 0 %
BILIRUB SERPL-MCNC: 0.6 MG/DL (ref 0.2–1)
BILIRUBIN DIRECT+TOT PNL SERPL-MCNC: 0.1 MG/DL
BILIRUBIN DIRECT+TOT PNL SERPL-MCNC: 0.5 MG/DL
BUN SERPL-MCNC: 16 MG/DL (ref 7–18)
CALCIUM SERPL-MCNC: 8.5 MG/DL (ref 8.5–10.1)
CHLORIDE SERPL-SCNC: 109 MMOL/L (ref 98–107)
CO2 SERPL-SCNC: 28 MMOL/L (ref 21–32)
CREAT SERPL-MCNC: 0.6 MG/DL (ref 0.6–1.3)
EOSINOPHIL NFR BLD MANUAL: 0 %
ERYTHROCYTE [DISTWIDTH] IN BLOOD BY AUTOMATED COUNT: 21 % (ref 11.5–14.5)
GLOBULIN SER-MCNC: 3 GM/ML (ref 2.3–3.5)
GLUCOSE SERPL-MCNC: 96 MG/DL (ref 74–106)
GRANULOCYTES NFR BLD MANUAL: 40 % (ref 43–75)
HCT VFR BLD AUTO: 27.2 % (ref 40–51)
HGB BLD-MCNC: 9.3 GM/DL (ref 13.5–17.5)
HYPOCHROMIA BLD QL SMEAR: ABNORMAL
LYMPHOCYTES NFR BLD MANUAL: 60 % (ref 20.5–51.1)
MACROCYTES BLD QL SMEAR: ABNORMAL
MCH RBC QN AUTO: 32.3 PG (ref 26–34)
MCHC RBC AUTO-ENTMCNC: 34.2 GM/DL (ref 31–37)
MCV RBC AUTO: 94.4 FL (ref 80–100)
MICROCYTES BLD QL SMEAR: ABNORMAL
MONOCYTES NFR BLD MANUAL: 0 % (ref 2–9)
OVALOCYTES BLD QL SMEAR: ABNORMAL
PLATELET # BLD AUTO: 27 X10(3)/MCL (ref 130–400)
PLATELET # BLD EST: ABNORMAL 10*3/UL
PMV BLD AUTO: 12.1 FL (ref 7.4–10.4)
POIKILOCYTOSIS BLD QL SMEAR: ABNORMAL
POLYCHROMASIA BLD QL SMEAR: ABNORMAL
POTASSIUM SERPL-SCNC: 3.7 MMOL/L (ref 3.5–5.1)
PROT SERPL-MCNC: 7.1 GM/DL (ref 6.4–8.2)
RBC # BLD AUTO: 2.88 X10(6)/MCL (ref 4.5–5.9)
RBC MORPH BLD: ABNORMAL
SCHISTOCYTES BLD QL AUTO: ABNORMAL
SODIUM SERPL-SCNC: 143 MMOL/L (ref 136–145)
WBC # SPEC AUTO: 0.6 X10(3)/MCL (ref 4.5–11)

## 2018-04-27 NOTE — TELEPHONE ENCOUNTER
Spoke with Rigoberto at [facility] where pt gets local labs, she reports his labs as the following: WBC 0.6, , hgb 9.3, plt 27. Pt has f/u on Wed at 0730 for repeat labs and possible transfusion. Called mom, informed her of above labs, reinforced that pt is neutropenic and to follow neutropenic precautions--no crowds, no sick contacts, good hand hygiene and call for temp > 100.4. Mom repeated back and verbalized complete understanding. Dr Bucio notified of above, states for pt to go for repeat labs on Monday 4/30 instead of waiting until Wed, as he will most likely need platelet transfusion by then, Called Rigoberto back to inform of above, no answer. Called and informed mom of above, states she will have pt there Monday morning at 7 for labs.

## 2018-04-30 ENCOUNTER — TELEPHONE (OUTPATIENT)
Dept: PEDIATRIC HEMATOLOGY/ONCOLOGY | Facility: CLINIC | Age: 18
End: 2018-04-30

## 2018-04-30 ENCOUNTER — HOSPITAL ENCOUNTER (INPATIENT)
Facility: HOSPITAL | Age: 18
LOS: 31 days | Discharge: HOME | DRG: 870 | End: 2018-05-31
Attending: PEDIATRICS | Admitting: PEDIATRICS
Payer: MEDICAID

## 2018-04-30 ENCOUNTER — HISTORICAL (OUTPATIENT)
Dept: INFUSION THERAPY | Facility: HOSPITAL | Age: 18
End: 2018-04-30

## 2018-04-30 DIAGNOSIS — Z91.89 AT RISK FOR LONG QT SYNDROME: ICD-10-CM

## 2018-04-30 DIAGNOSIS — R65.21 SEPTIC SHOCK: ICD-10-CM

## 2018-04-30 DIAGNOSIS — A41.9 SEPSIS: ICD-10-CM

## 2018-04-30 DIAGNOSIS — I47.10 SVT (SUPRAVENTRICULAR TACHYCARDIA): ICD-10-CM

## 2018-04-30 DIAGNOSIS — Z51.81 MEDICATION MONITORING ENCOUNTER: ICD-10-CM

## 2018-04-30 DIAGNOSIS — R78.81 BACTEREMIA: ICD-10-CM

## 2018-04-30 DIAGNOSIS — F54 PSYCHOLOGICAL FACTOR AFFECTING CANCER: ICD-10-CM

## 2018-04-30 DIAGNOSIS — C92.01 ACUTE MYELOID LEUKEMIA IN REMISSION: Primary | ICD-10-CM

## 2018-04-30 DIAGNOSIS — C92.00 AML (ACUTE MYELOBLASTIC LEUKEMIA): ICD-10-CM

## 2018-04-30 DIAGNOSIS — R41.0 DELIRIUM: ICD-10-CM

## 2018-04-30 DIAGNOSIS — D70.9 NEUTROPENIA: ICD-10-CM

## 2018-04-30 DIAGNOSIS — R00.0 TACHYCARDIA: ICD-10-CM

## 2018-04-30 DIAGNOSIS — C80.1 PSYCHOLOGICAL FACTOR AFFECTING CANCER: ICD-10-CM

## 2018-04-30 DIAGNOSIS — A41.9 SEPTIC SHOCK: ICD-10-CM

## 2018-04-30 LAB
ALBUMIN SERPL-MCNC: 3.9 GM/DL (ref 3.4–5)
ALBUMIN/GLOB SERPL: 1 RATIO (ref 1–2)
ALP SERPL-CCNC: 92 UNIT/L (ref 45–117)
ALT SERPL-CCNC: 34 UNIT/L (ref 12–78)
ANISOCYTOSIS BLD QL SMEAR: ABNORMAL
AST SERPL-CCNC: 18 UNIT/L (ref 15–37)
BASOPHILS NFR BLD MANUAL: 0 %
BILIRUB SERPL-MCNC: 0.3 MG/DL (ref 0.2–1)
BILIRUBIN DIRECT+TOT PNL SERPL-MCNC: 0.1 MG/DL
BILIRUBIN DIRECT+TOT PNL SERPL-MCNC: 0.2 MG/DL
BUN SERPL-MCNC: 16 MG/DL (ref 7–18)
CALCIUM SERPL-MCNC: 8.3 MG/DL (ref 8.5–10.1)
CHLORIDE SERPL-SCNC: 110 MMOL/L (ref 98–107)
CO2 SERPL-SCNC: 27 MMOL/L (ref 21–32)
CREAT SERPL-MCNC: 0.7 MG/DL (ref 0.6–1.3)
EOSINOPHIL NFR BLD MANUAL: 0 %
ERYTHROCYTE [DISTWIDTH] IN BLOOD BY AUTOMATED COUNT: 19.6 % (ref 11.5–14.5)
GLOBULIN SER-MCNC: 2.8 GM/ML (ref 2.3–3.5)
GLUCOSE SERPL-MCNC: 100 MG/DL (ref 74–106)
GRANULOCYTES NFR BLD MANUAL: 0 % (ref 43–75)
HCT VFR BLD AUTO: 23.6 % (ref 40–51)
HGB BLD-MCNC: 8.3 GM/DL (ref 13.5–17.5)
HYPOCHROMIA BLD QL SMEAR: ABNORMAL
LYMPHOCYTES NFR BLD MANUAL: 100 % (ref 20.5–51.1)
MACROCYTES BLD QL SMEAR: ABNORMAL
MCH RBC QN AUTO: 33.1 PG (ref 26–34)
MCHC RBC AUTO-ENTMCNC: 35.2 GM/DL (ref 31–37)
MCV RBC AUTO: 94 FL (ref 80–100)
MICROCYTES BLD QL SMEAR: ABNORMAL
MONOCYTES NFR BLD MANUAL: 0 % (ref 2–9)
OVALOCYTES BLD QL SMEAR: ABNORMAL
PLATELET # BLD AUTO: 4 X10(3)/MCL (ref 130–400)
PLATELET # BLD EST: ABNORMAL 10*3/UL
PMV BLD AUTO: ABNORMAL FL (ref 7.4–10.4)
POIKILOCYTOSIS BLD QL SMEAR: ABNORMAL
POTASSIUM SERPL-SCNC: 3.9 MMOL/L (ref 3.5–5.1)
PROT SERPL-MCNC: 6.7 GM/DL (ref 6.4–8.2)
RBC # BLD AUTO: 2.51 X10(6)/MCL (ref 4.5–5.9)
RBC MORPH BLD: ABNORMAL
RET# (OHS): 0.01
RETICULOCYTE COUNT AUTOMATED (OLG): 0.4 % (ref 0.5–1.5)
SODIUM SERPL-SCNC: 140 MMOL/L (ref 136–145)
TRANSFUSION ORDER: NORMAL
WBC # SPEC AUTO: 0.4 X10(3)/MCL (ref 4.5–11)

## 2018-04-30 PROCEDURE — 20600001 HC STEP DOWN PRIVATE ROOM

## 2018-04-30 PROCEDURE — 25000003 PHARM REV CODE 250: Performed by: STUDENT IN AN ORGANIZED HEALTH CARE EDUCATION/TRAINING PROGRAM

## 2018-04-30 PROCEDURE — 99222 1ST HOSP IP/OBS MODERATE 55: CPT | Mod: ,,, | Performed by: PEDIATRICS

## 2018-04-30 RX ORDER — CIPROFLOXACIN 500 MG/1
500 TABLET ORAL EVERY 12 HOURS
Status: DISCONTINUED | OUTPATIENT
Start: 2018-04-30 | End: 2018-05-02

## 2018-04-30 RX ORDER — OXYCODONE HYDROCHLORIDE 5 MG/1
10 TABLET ORAL EVERY 6 HOURS PRN
Status: DISCONTINUED | OUTPATIENT
Start: 2018-04-30 | End: 2018-05-05

## 2018-04-30 RX ORDER — POSACONAZOLE 100 MG/1
300 TABLET, DELAYED RELEASE ORAL DAILY
Status: DISCONTINUED | OUTPATIENT
Start: 2018-05-01 | End: 2018-05-06

## 2018-04-30 RX ORDER — CHLORHEXIDINE GLUCONATE ORAL RINSE 1.2 MG/ML
15 SOLUTION DENTAL 2 TIMES DAILY
Status: DISCONTINUED | OUTPATIENT
Start: 2018-04-30 | End: 2018-05-09

## 2018-04-30 RX ORDER — ACYCLOVIR 200 MG/1
400 CAPSULE ORAL 2 TIMES DAILY
Status: DISCONTINUED | OUTPATIENT
Start: 2018-04-30 | End: 2018-05-31 | Stop reason: HOSPADM

## 2018-04-30 RX ORDER — POLYETHYLENE GLYCOL 3350 17 G/17G
17 POWDER, FOR SOLUTION ORAL DAILY PRN
Status: DISCONTINUED | OUTPATIENT
Start: 2018-04-30 | End: 2018-05-07

## 2018-04-30 RX ORDER — SULFAMETHOXAZOLE AND TRIMETHOPRIM 800; 160 MG/1; MG/1
1 TABLET ORAL
Status: DISCONTINUED | OUTPATIENT
Start: 2018-05-04 | End: 2018-05-30

## 2018-04-30 RX ORDER — ONDANSETRON 8 MG/1
8 TABLET, ORALLY DISINTEGRATING ORAL EVERY 8 HOURS PRN
Status: DISCONTINUED | OUTPATIENT
Start: 2018-04-30 | End: 2018-05-31 | Stop reason: HOSPADM

## 2018-04-30 RX ADMIN — CIPROFLOXACIN HYDROCHLORIDE 500 MG: 500 TABLET, FILM COATED ORAL at 11:04

## 2018-04-30 RX ADMIN — ACYCLOVIR 400 MG: 200 CAPSULE ORAL at 11:04

## 2018-04-30 RX ADMIN — CHLORHEXIDINE GLUCONATE 15 ML: 1.2 RINSE ORAL at 11:04

## 2018-04-30 NOTE — TELEPHONE ENCOUNTER
Spoke to pt mother, Madeline, and informed her that the pt ANC is zero and that Dr. Bucio would like the pt to be admitted for neutropenia. Pt mother stated that he is currently waiting for his platelet transfusion and that she will send him once she finds him a ride. Pt mother stated that she will call us once the pt is on his way and notified to keep his port needle in place. No further needs noted.

## 2018-05-01 PROBLEM — D70.9 NEUTROPENIA: Status: ACTIVE | Noted: 2018-05-01

## 2018-05-01 LAB
ALBUMIN SERPL BCP-MCNC: 3.7 G/DL
ALP SERPL-CCNC: 79 U/L
ALT SERPL W/O P-5'-P-CCNC: 19 U/L
ANION GAP SERPL CALC-SCNC: 9 MMOL/L
ANISOCYTOSIS BLD QL SMEAR: SLIGHT
AST SERPL-CCNC: 13 U/L
BASOPHILS # BLD AUTO: 0 K/UL
BASOPHILS NFR BLD: 0 %
BILIRUB SERPL-MCNC: 0.3 MG/DL
BUN SERPL-MCNC: 11 MG/DL
CALCIUM SERPL-MCNC: 8.5 MG/DL
CHLORIDE SERPL-SCNC: 109 MMOL/L
CO2 SERPL-SCNC: 23 MMOL/L
CREAT SERPL-MCNC: 0.7 MG/DL
DIFFERENTIAL METHOD: ABNORMAL
EOSINOPHIL # BLD AUTO: 0 K/UL
EOSINOPHIL NFR BLD: 0 %
ERYTHROCYTE [DISTWIDTH] IN BLOOD BY AUTOMATED COUNT: 19.3 %
EST. GFR  (AFRICAN AMERICAN): >60 ML/MIN/1.73 M^2
EST. GFR  (NON AFRICAN AMERICAN): >60 ML/MIN/1.73 M^2
GLUCOSE SERPL-MCNC: 94 MG/DL
HCT VFR BLD AUTO: 21 %
HGB BLD-MCNC: 7.2 G/DL
HYPOCHROMIA BLD QL SMEAR: ABNORMAL
IMM GRANULOCYTES # BLD AUTO: 0 K/UL
IMM GRANULOCYTES NFR BLD AUTO: 0 %
LYMPHOCYTES # BLD AUTO: 0.4 K/UL
LYMPHOCYTES NFR BLD: 100 %
MCH RBC QN AUTO: 31.7 PG
MCHC RBC AUTO-ENTMCNC: 34.3 G/DL
MCV RBC AUTO: 93 FL
MONOCYTES # BLD AUTO: 0 K/UL
MONOCYTES NFR BLD: 0 %
NEUTROPHILS # BLD AUTO: 0 K/UL
NEUTROPHILS NFR BLD: 0 %
NRBC BLD-RTO: 0 /100 WBC
PLATELET # BLD AUTO: 30 K/UL
PLATELET BLD QL SMEAR: ABNORMAL
PMV BLD AUTO: 11.4 FL
POTASSIUM SERPL-SCNC: 3.3 MMOL/L
PROT SERPL-MCNC: 5.9 G/DL
RBC # BLD AUTO: 2.27 M/UL
SODIUM SERPL-SCNC: 141 MMOL/L
WBC # BLD AUTO: 0.35 K/UL

## 2018-05-01 PROCEDURE — 99232 SBSQ HOSP IP/OBS MODERATE 35: CPT | Mod: ,,, | Performed by: PEDIATRICS

## 2018-05-01 PROCEDURE — 25000003 PHARM REV CODE 250: Performed by: STUDENT IN AN ORGANIZED HEALTH CARE EDUCATION/TRAINING PROGRAM

## 2018-05-01 PROCEDURE — 85025 COMPLETE CBC W/AUTO DIFF WBC: CPT

## 2018-05-01 PROCEDURE — 80053 COMPREHEN METABOLIC PANEL: CPT

## 2018-05-01 PROCEDURE — 20600001 HC STEP DOWN PRIVATE ROOM

## 2018-05-01 RX ADMIN — ACYCLOVIR 400 MG: 200 CAPSULE ORAL at 08:05

## 2018-05-01 RX ADMIN — CHLORHEXIDINE GLUCONATE 15 ML: 1.2 RINSE ORAL at 08:05

## 2018-05-01 RX ADMIN — CIPROFLOXACIN HYDROCHLORIDE 500 MG: 500 TABLET, FILM COATED ORAL at 08:05

## 2018-05-01 RX ADMIN — POSACONAZOLE 300 MG: 100 TABLET, COATED ORAL at 08:05

## 2018-05-01 NOTE — HPI
Hema Kuo is a 17 y.o. male with AML s/p Intensification I therapy following [id number] (Arm A) who was admitted for neutropenia/sepsis risk. Patient with ANC of 0 following start of Intensification II therapy.

## 2018-05-01 NOTE — HOSPITAL COURSE
Bacteremia: Admitted on Day 11(4/30/18) of Intensification II therapy for febrile neutropenia and started on Cefepime and Vancomycin. He was stepped up to PICU for septic shock after presenting with waxing mental status, hypotension (unresponsive to fluid bolus and blood transfusion), chest tightness w/ tachypnea, and new oxygen requirement. On admission CXR revealed LLL infiltrates and he was placed on HFNC @ 15L and titrated up to 25L for desaturations to the 80s. Patient was intubated and sedated for worsening respiratory distress with PaOw/Fi ratio concerning for acute lung injury. Patient switched to meropenam from cefepime, for worsening respiratory status and eventually switched back to vancomycin and cefepime once blood cultures grew Strep mitis. Given steroids and lasix. Repeat CXR findings showed marked improvement, with resolving ARDS, and clearing of fluid from lungs. Following extubation He was noted to clinically improve and though initially on high flow nasal cannula, he was weaned to room air and noted to have reassuring saturations. Patient given multiple boluses and blood transfusions to manage hypotension, however was ultimately started on an epinephrine drip. Epinephrine gtt was switched to norepinephrine to help with the bronchodilation and HR. Patient started having improving blood pressures and Norepinephrine drip was weaned off. Towards the end of PICU stay, patient's PICC line noted to be very deep and was noted to have episodes of SVT not resolving with vagal maneuvers or ice. Hema required a one time dose of adenosine and subsequently PICC pulled and no further episodes of SVT. With continued stability on room air, and abx controlling fever curve, patient was stepped down to the pediatric floor. On the pediatric floor, patient continued on Vanc and Cefepime. Developed fever and blood culture obtained, found to be growing VRE on outer port lumen. Repeat cultures drawn and then started on  Linezolid. Two subsequent cultures remained NG, there fore linezolid was discontinued. Patient was discharged to home with improved ANC count in the mid-200s with no antibiotics.     Delirium: Initially at neurological baseline in the PICU. While intubated for respiratory distress, Hema required sedation with Precedex and Fentanyl. Following extubation however, he was noted to be confused and delirious. He was started on Seroquel nightly with no improvement in mental status. Seroquel was then increased in dose and smaller doses tried through the course of the day. Patient still noted to remain delirious and then noted to become increasingly delirious, more paranoid with bizarre fears and unable to sleep. He was noted to try to pull out lines and become agitated and attempt to jump out of bed while confused. PEC was then signed for him and he was switched to Olanzapine. On the pediatric floor, patient was CECd. Psychiatry was consulted and recommended starting on risperdal and titrated up. Eventually, patient started returning to neurologic baseline, however developed tremors 2/2 medication. With improving near exam, Hema was weaned off of risperdal. CT head was obtained and WBL, as well as RPR. Melatonin was used for sleep. Psychologist, Dr. Francia Evans, will follow Hema as an outpatient.    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 Prior to discharge, patient given inhaled pentamidine and told to hold bactrim for the next month (to aid in recovery of cell counts).

## 2018-05-01 NOTE — SUBJECTIVE & OBJECTIVE
Interval: overnight no issues, has been refusing to drink any liquids but apple juice as he fears they are poisoned. Afebrile and denying any pain.     Past Medical History:   Diagnosis Date    AML (acute myeloblastic leukemia) 10/2017    Asthma     Encounter for blood transfusion     Seasonal allergies        Past Surgical History:   Procedure Laterality Date    PORTACATH PLACEMENT  10/31/2017    TONSILLECTOMY         Review of patient's allergies indicates:   Allergen Reactions    Nsaids (non-steroidal anti-inflammatory drug) Anaphylaxis    Nuts [tree nut] Anaphylaxis    Strawberry     Vancomycin analogues Itching     Premedicate with benadryl and admin over 2hr.       No current facility-administered medications on file prior to encounter.      Current Outpatient Prescriptions on File Prior to Encounter   Medication Sig    acyclovir (ZOVIRAX) 400 MG tablet Take 1 tablet (400 mg total) by mouth 2 (two) times daily.    chlorhexidine (PERIDEX) 0.12 % solution     ciprofloxacin HCl (CIPRO) 500 MG tablet Take 1 tablet (500 mg total) by mouth every 12 (twelve) hours.    ondansetron (ZOFRAN-ODT) 8 MG TbDL Take 1 tablet (8 mg total) by mouth every 8 (eight) hours as needed.    oxyCODONE (ROXICODONE) 10 mg Tab immediate release tablet Take 1 tablet (10 mg total) by mouth every 6 (six) hours as needed.    polyethylene glycol (GLYCOLAX) 17 gram PwPk Take 17 g by mouth daily as needed (No bowel movement).    posaconazole 100 mg TbEC tablet Take 3 tablets (300 mg total) by mouth once daily.    sulfamethoxazole-trimethoprim 800-160mg (BACTRIM DS) 800-160 mg Tab Take 1 tablet by mouth twice daily only on Friday, Saturday, Sunday.    lisdexamfetamine (VYVANSE) 60 MG capsule Take 60 mg by mouth every morning.    LORazepam (ATIVAN) 1 MG tablet Take 1 tablet (1 mg total) by mouth every 6 (six) hours as needed (Nausea).    pantoprazole (PROTONIX) 40 MG tablet Take 1 tablet (40 mg total) by mouth once daily.         Family History     Problem Relation (Age of Onset)    Cancer Mother    Heart disease Mother    No Known Problems Father        Social History Main Topics    Smoking status: Former Smoker     Packs/day: 0.50     Types: Cigarettes     Quit date: 10/30/2017    Smokeless tobacco: Never Used    Alcohol use Yes      Comment: rare occasions    Drug use: No    Sexual activity: Yes     Partners: Female     Review of Systems     Objective:     Vital Signs (Most Recent):  Temp: 98.2 °F (36.8 °C) (04/30/18 2057)  Pulse: 85 (04/30/18 2057)  Resp: 18 (04/30/18 2057)  BP: (!) 116/52 (04/30/18 2057)  SpO2: 98 % (04/30/18 2057) Vital Signs (24h Range):  Temp:  [98.2 °F (36.8 °C)] 98.2 °F (36.8 °C)  Pulse:  [85] 85  Resp:  [18] 18  SpO2:  [98 %] 98 %  BP: (116)/(52) 116/52     Patient Vitals for the past 72 hrs (Last 3 readings):   Weight   04/30/18 2057 87.9 kg (193 lb 12.6 oz)     Body mass index is 31.28 kg/m².    Intake/Output - Last 3 Shifts     None          Lines/Drains/Airways     Central Venous Catheter Line                 Port A Cath Double Lumen 10/31/17 1826 left subclavian 181 days                Physical Exam   Constitutional: He is oriented to person, place, and time. He appears well-developed and well-nourished. No distress.   HENT:   Head: Normocephalic.   Right Ear: External ear normal.   Left Ear: External ear normal.   Nose: Nose normal.   Mouth/Throat: Oropharynx is clear and moist. No oropharyngeal exudate.   No mucositis noted.   Eyes: Conjunctivae and EOM are normal. Pupils are equal, round, and reactive to light. Right eye exhibits no discharge. Left eye exhibits no discharge.   Neck: Normal range of motion. Neck supple. No tracheal deviation present.   Cardiovascular: Normal rate, regular rhythm, normal heart sounds and intact distal pulses.    No murmur heard.  Pulmonary/Chest: Effort normal and breath sounds normal. No respiratory distress. He has no wheezes.   Abdominal: Soft. Bowel sounds are  normal. He exhibits no distension and no mass. There is no tenderness.   Genitourinary:   Genitourinary Comments: Deferred.   Musculoskeletal: Normal range of motion. He exhibits no edema.   Lymphadenopathy:     He has no cervical adenopathy.   Neurological: He is alert and oriented to person, place, and time. He displays normal reflexes. No cranial nerve deficit or sensory deficit. He exhibits normal muscle tone. Coordination normal.   Skin: Skin is warm and dry. Capillary refill takes less than 2 seconds. No rash noted. He is not diaphoretic. No erythema. No pallor.   Port site looking clean and dry   Psychiatric: He has a normal mood and affect. His behavior is normal.       Significant Labs:  No results for input(s): POCTGLUCOSE in the last 48 hours.    Lab Results   Component Value Date    WBC 1.01 (LL) 04/25/2018    RBC 3.11 (L) 04/25/2018    HGB 10.0 (L) 04/25/2018    HCT 29.2 (L) 04/25/2018    MCV 94 04/25/2018    MCH 32.2 (H) 04/25/2018    MCHC 34.2 04/25/2018    RDW 22.0 (H) 04/25/2018    PLT 59 (L) 04/25/2018    MPV 12.6 04/25/2018    GRAN 0.6 (L) 04/25/2018    GRAN 61.4 04/25/2018    LYMPH 0.4 (L) 04/25/2018    LYMPH 35.6 04/25/2018    MONO 0.0 (L) 04/25/2018    MONO 0.0 (L) 04/25/2018    EOS 0.0 04/25/2018    BASO 0.01 04/25/2018    EOSINOPHIL 1.0 04/25/2018    BASOPHIL 1.0 04/25/2018       Significant Imaging: No new imaging

## 2018-05-01 NOTE — H&P
[facility]  Pediatric Hematology and Oncology  History & Physical     Patient Name: Hema Kuo  MRN: [id number]  Admission Date: 4/30/2018  Code Status: Full Code   Primary Care Physician: Ann Reyna NP  Principal Problem: Neutropenia     Patient information was obtained from patient     Subjective:      HPI:   Hema Kuo is a 17 y.o. male with AML s/p Intensification I therapy following [id number] (Arm A) who is currently admitted for neutropenia. Patient with ANC of 0 following start of Intensification II therapy.  Hema reports that he has been feeling very well since last visit.  His neck swelling has resolved.  He reports no fevers, no abdominal pain, vomiting, diarrhea or constipation and no excessive bruising or unusual bleeding.  Good appetite and energy level.  No other complaints.      Chief Complaint:  Neutropenia in patient currently on chemotherapy.          Past Medical History:   Diagnosis Date    AML (acute myeloblastic leukemia) 10/2017    Asthma      Encounter for blood transfusion      Seasonal allergies                 Past Surgical History:   Procedure Laterality Date    PORTACATH PLACEMENT   10/31/2017    TONSILLECTOMY                   Review of patient's allergies indicates:   Allergen Reactions    Nsaids (non-steroidal anti-inflammatory drug) Anaphylaxis    Nuts [tree nut] Anaphylaxis    Strawberry      Vancomycin analogues Itching       Premedicate with benadryl and admin over 2hr.         No current facility-administered medications on file prior to encounter.            Current Outpatient Prescriptions on File Prior to Encounter   Medication Sig    acyclovir (ZOVIRAX) 400 MG tablet Take 1 tablet (400 mg total) by mouth 2 (two) times daily.    chlorhexidine (PERIDEX) 0.12 % solution      ciprofloxacin HCl (CIPRO) 500 MG tablet Take 1 tablet (500 mg total) by mouth every 12 (twelve) hours.    ondansetron (ZOFRAN-ODT) 8 MG TbDL Take 1 tablet (8 mg total) by  mouth every 8 (eight) hours as needed.    oxyCODONE (ROXICODONE) 10 mg Tab immediate release tablet Take 1 tablet (10 mg total) by mouth every 6 (six) hours as needed.    polyethylene glycol (GLYCOLAX) 17 gram PwPk Take 17 g by mouth daily as needed (No bowel movement).    posaconazole 100 mg TbEC tablet Take 3 tablets (300 mg total) by mouth once daily.    sulfamethoxazole-trimethoprim 800-160mg (BACTRIM DS) 800-160 mg Tab Take 1 tablet by mouth twice daily only on Friday, Saturday, Sunday.    lisdexamfetamine (VYVANSE) 60 MG capsule Take 60 mg by mouth every morning.    LORazepam (ATIVAN) 1 MG tablet Take 1 tablet (1 mg total) by mouth every 6 (six) hours as needed (Nausea).    pantoprazole (PROTONIX) 40 MG tablet Take 1 tablet (40 mg total) by mouth once daily.              Family History      Problem Relation (Age of Onset)     Cancer Mother     Heart disease Mother     No Known Problems Father                 Social History Main Topics    Smoking status: Former Smoker       Packs/day: 0.50       Types: Cigarettes       Quit date: 10/30/2017    Smokeless tobacco: Never Used    Alcohol use Yes         Comment: rare occasions    Drug use: No    Sexual activity: Yes       Partners: Female      Review of Systems   Constitutional: Negative for activity change, appetite change, fatigue and fever.   HENT: Negative for congestion, drooling, ear discharge, ear pain, rhinorrhea, sinus pain, sinus pressure and sore throat.    Eyes: Negative for photophobia, discharge and redness.   Respiratory: Negative for cough, shortness of breath and wheezing.    Cardiovascular: Negative for chest pain.   Gastrointestinal: Negative for abdominal pain, constipation, diarrhea, nausea and vomiting.   Genitourinary: Negative for dysuria, frequency, hematuria and urgency.   Musculoskeletal: Negative for back pain, myalgias, neck pain and neck stiffness.   Skin: Negative for pallor and rash.   Allergic/Immunologic: Positive for  immunocompromised state.   Neurological: Negative for seizures, syncope, weakness and headaches.   Hematological: Negative for adenopathy.   Psychiatric/Behavioral: Negative for confusion and sleep disturbance.      Objective:      Vital Signs (Most Recent):  Temp: 98.2 °F (36.8 °C) (04/30/18 2057)  Pulse: 85 (04/30/18 2057)  Resp: 18 (04/30/18 2057)  BP: (!) 116/52 (04/30/18 2057)  SpO2: 98 % (04/30/18 2057) Vital Signs (24h Range):  Temp:  [98.2 °F (36.8 °C)] 98.2 °F (36.8 °C)  Pulse:  [85] 85  Resp:  [18] 18  SpO2:  [98 %] 98 %  BP: (116)/(52) 116/52      Patient Vitals for the past 72 hrs (Last 3 readings):    Weight   04/30/18 2057 87.9 kg (193 lb 12.6 oz)      Body mass index is 31.28 kg/m².         Intake/Output - Last 3 Shifts      None                 Lines/Drains/Airways            Central Venous Catheter Line                          Port A Cath Double Lumen 10/31/17 1826 left subclavian 181 days                     Physical Exam   Constitutional: He is oriented to person, place, and time. He appears well-developed and well-nourished. No distress.   HENT:   Head: Normocephalic.   Right Ear: External ear normal.   Left Ear: External ear normal.   Nose: Nose normal.   Mouth/Throat: Oropharynx is clear and moist. No oropharyngeal exudate.   No mucositis noted.   Eyes: Conjunctivae and EOM are normal. Pupils are equal, round, and reactive to light. Right eye exhibits no discharge. Left eye exhibits no discharge.   Neck: Normal range of motion. Neck supple. No tracheal deviation present.   Cardiovascular: Normal rate, regular rhythm, normal heart sounds and intact distal pulses.    No murmur heard.  Pulmonary/Chest: Effort normal and breath sounds normal. No respiratory distress. He has no wheezes.   Abdominal: Soft. Bowel sounds are normal. He exhibits no distension and no mass. There is no tenderness.   Genitourinary:   Genitourinary Comments: Deferred.   Musculoskeletal: Normal range of motion. He exhibits  no edema.   Lymphadenopathy:     He has no cervical adenopathy.   Neurological: He is alert and oriented to person, place, and time. He displays normal reflexes. No cranial nerve deficit or sensory deficit. He exhibits normal muscle tone. Coordination normal.   Skin: Skin is warm and dry. Capillary refill takes less than 2 seconds. No rash noted. He is not diaphoretic. No erythema. No pallor.   Port site looking clean and dry   Psychiatric: He has a normal mood and affect. His behavior is normal.         Significant Labs:  No results for input(s): POCTGLUCOSE in the last 48 hours.           Lab Results   Component Value Date     WBC 1.01 (LL) 04/25/2018     RBC 3.11 (L) 04/25/2018     HGB 10.0 (L) 04/25/2018     HCT 29.2 (L) 04/25/2018     MCV 94 04/25/2018     MCH 32.2 (H) 04/25/2018     MCHC 34.2 04/25/2018     RDW 22.0 (H) 04/25/2018     PLT 59 (L) 04/25/2018     MPV 12.6 04/25/2018     GRAN 0.6 (L) 04/25/2018     GRAN 61.4 04/25/2018     LYMPH 0.4 (L) 04/25/2018     LYMPH 35.6 04/25/2018     MONO 0.0 (L) 04/25/2018     MONO 0.0 (L) 04/25/2018     EOS 0.0 04/25/2018     BASO 0.01 04/25/2018     EOSINOPHIL 1.0 04/25/2018     BASOPHIL 1.0 04/25/2018         Significant Imaging: No new imaging     Assessment and Plan:          Oncology   AML (acute myeloblastic leukemia)     17 y.o. 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  He is Day 11 of Intensification therapy II following [id number] (Arm A).  He reports feeling well since discharge home and is well appearing. Now admitted for neutropenia.     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  CNS negative.  MRD negative after Induction I.    -Treating per COG [id number] (ARM A).  S/p Intensification I and Intensification II started.  -BM biopsy at start of Intensification shows CR.  FISH for t(8;21) negative.     For his chemotherapy induced Neutropenia  - Admitted to inpatient unit.   - Will start neutropenic precautions and continue to monitor.  - Will  get CBC in the am.      For his lymphadenitis  - Resolved     For his immunosuppression secondary to chemotherapy  - Resume acyclovir, ciprofloxacin and posaconazole prophylaxis  - Continue Bactrim on Fri, Sat and Sun  - Continue Peridex                   Farshad Mcginnis MD  Pediatric Hematology and Oncology   [facility]

## 2018-05-01 NOTE — PLAN OF CARE
Problem: Patient Care Overview  Goal: Plan of Care Review  Outcome: Ongoing (interventions implemented as appropriate)  Plan of care reviewed with pt at beginning of shift and updated throughout the day.  Pt admitted for neutropenic fever.  Pt remained afebrile all day today.  Pt is awake, alert, oriented, afebrile and free of injury and falls.  Safety, fall, neutropenic and thrombocytopenic precautions maintained.  He denies having any pain, nausea, SOB or other distress.  Pt ambulates and performs ADLs independently.  He is on ppx acyclovir PO, cipro PO and posaconazole PO.  Bed locked and in lowest position, side rails up x 2, non-skid footwear in place and call light and personal belongings within reach.  Pt instructed to call for assistance when needed and he verbalized understanding.  Will monitor.

## 2018-05-01 NOTE — ASSESSMENT & PLAN NOTE
17 y.o. 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  He is Day 12 of Intensification therapy II following [id number] (Arm A).  He reports feeling well since discharge home and is well appearing. Now admitted for neutropenia.     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  CNS negative.  MRD negative after Induction I.    -Treating per COG [id number] (ARM A).  S/p Intensification I and Intensification II started.  -BM biopsy at start of Intensification shows CR.  FISH for t(8;21) negative.     For his chemotherapy induced Neutropenia:    Wbc = 0.35. ANC = 0  - On neutropenic precautions   - Daily CBC.         For his immunosuppression secondary to chemotherapy  - Resume acyclovir, ciprofloxacin and posaconazole prophylaxis  - Continue Bactrim on Fri, Sat and Sun  - Continue Peridex

## 2018-05-01 NOTE — PROGRESS NOTES
Admit note:  Pt arrived ambulatory from admit office for neutropenia.  Pt oriented to room and call light, MD notified.       Fall note:  Pt ambulates independently. Bed in lowest position, nonskid footwear on pt, call light and possessions within reach, side rails up x2. Pt verbalizes understanding to call for assistance.      Skin note:  Skin intact. No evidence of breakdown noted.           Will continue to monitor.

## 2018-05-01 NOTE — PROGRESS NOTES
[facility]  Pediatric Hematology/Oncology  Progress Note    Patient Name: Hema Kuo  Admission Date: 4/30/2018  Hospital Length of Stay: 1 days  Code Status: Full Code     Subjective:     Interval History: Afebrile. NAEON.     Oncology Treatment Plan:     PEDS [id number] Intensification II  ARM B (MA) - LOW RISK PATIENTS    Medications:  Continuous Infusions:  Scheduled Meds:   acyclovir  400 mg Oral BID    chlorhexidine  15 mL Mouth/Throat BID    ciprofloxacin HCl  500 mg Oral Q12H    posaconazole  300 mg Oral Daily    [START ON 5/4/2018] sulfamethoxazole-trimethoprim 800-160mg  1 tablet Oral twice daily on Friday, Saturday, Sunday     PRN Meds:ondansetron, oxyCODONE, polyethylene glycol     Review of Systems  Objective:     Vital Signs (Most Recent):  Temp: 97.9 °F (36.6 °C) (05/01/18 0809)  Pulse: 79 (05/01/18 0809)  Resp: 17 (05/01/18 0809)  BP: (!) 111/51 (05/01/18 0809)  SpO2: 100 % (05/01/18 0809) Vital Signs (24h Range):  Temp:  [97.9 °F (36.6 °C)-98.6 °F (37 °C)] 97.9 °F (36.6 °C)  Pulse:  [79-89] 79  Resp:  [17-20] 17  SpO2:  [97 %-100 %] 100 %  BP: (104-123)/(51-57) 111/51     Weight: 87.9 kg (193 lb 12.6 oz)  Body mass index is 31.28 kg/m².  Body surface area is 2.02 meters squared.    No intake or output data in the 24 hours ending 05/01/18 0935    Physical Exam    Constitutional: He is oriented to person, place, and time. He appears well-developed and well-nourished. No distress.   HENT:   Head: Normocephalic.   Right Ear: External ear normal.   Left Ear: External ear normal.   Nose: Nose normal.   Mouth/Throat: Oropharynx is clear and moist. No oropharyngeal exudate.   No mucositis noted.   Eyes: Conjunctivae and EOM are normal. Pupils are equal, round, and reactive to light. Right eye exhibits no discharge. Left eye exhibits no discharge.   Neck: Normal range of motion. Neck supple. No tracheal deviation present.   Cardiovascular: Normal rate, regular rhythm, normal heart  sounds and intact distal pulses.    No murmur heard.  Pulmonary/Chest: Effort normal and breath sounds normal. No respiratory distress. He has no wheezes.   Abdominal: Soft. Bowel sounds are normal. He exhibits no distension and no mass. There is no tenderness.   Musculoskeletal: Normal range of motion. He exhibits no edema.   Lymphadenopathy:     He has no cervical adenopathy.   Neurological: He is alert and oriented to person, place, and time. He displays normal reflexes. No cranial nerve deficit or sensory deficit. He exhibits normal muscle tone. Coordination normal.   Skin: Skin is warm and dry. Capillary refill takes less than 2 seconds. No rash noted. He is not diaphoretic. No erythema. No pallor.   Port site C/D/I  Psychiatric: He has a normal mood and affect. His behavior is normal.     Labs:   Recent Lab Results       05/01/18  0400      Immature Granulocytes 0.0     Immature Grans (Abs) 0.00  Comment:  Mild elevation in immature granulocytes is non specific and   can be seen in a variety of conditions including stress response,   acute inflammation, trauma and pregnancy. Correlation with other   laboratory and clinical findings is essential.       Albumin 3.7     Alkaline Phosphatase 79     ALT 19     Anion Gap 9     Aniso Slight     AST 13     Baso # 0.00     Basophil% 0.0     Total Bilirubin 0.3  Comment:  For infants and newborns, interpretation of results should be based  on gestational age, weight and in agreement with clinical  observations.  Premature Infant recommended reference ranges:  Up to 24 hours.............<8.0 mg/dL  Up to 48 hours............<12.0 mg/dL  3-5 days..................<15.0 mg/dL  6-29 days.................<15.0 mg/dL       BUN, Bld 11     Calcium 8.5(L)     Chloride 109     CO2 23     Creatinine 0.7     Differential Method Automated     eGFR if African American >60.0     eGFR if non  >60.0  Comment:  Calculation used to obtain the estimated glomerular  filtration  rate (eGFR) is the CKD-EPI equation.        Eos # 0.0     Eosinophil% 0.0     Glucose 94     Gran # (ANC) 0.0(L)     Gran% 0.0(L)     Hematocrit 21.0  Comment:  Results confirmed, test repeated(L)     Hemoglobin 7.2  Comment:  Results confirmed, test repeated(L)     Hypo Occasional     Lymph # 0.4(L)     Lymph% 100.0(H)     MCH 31.7(H)     MCHC 34.3     MCV 93     Mono # 0.0(L)     Mono% 0.0(L)     MPV 11.4     nRBC 0     Platelet Estimate Decreased(A)     Platelets 30  Comment:  plt count   critical result(s) called and verbal readback obtained   from DENISE SALINAS RN, 05/01/2018 04:58  (LL)     Potassium 3.3(L)     Total Protein 5.9(L)     RBC 2.27(L)     RDW 19.3(H)     Sodium 141     WBC 0.35  Comment:  WBC/PRELIM PLT   critical result(s) called and verbal readback   obtained from DENISE SALINAS RN AT 0430 ON 05/01/2018 BY BEL  FINAL RESULTS VERIFIED BY REPEAT ANALYSIS, 05/01/2018 04:27  (LL)               Assessment/Plan:     Neutropenia    17 y.o. 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  He is Day 12 of Intensification therapy II following [id number] (Arm A).  He reports feeling well since discharge home and is well appearing. Now admitted for neutropenia.     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  CNS negative.  MRD negative after Induction I.    -Treating per COG [id number] (ARM A).  S/p Intensification I and Intensification II started.  -BM biopsy at start of Intensification shows CR.  FISH for t(8;21) negative.     For his chemotherapy induced Neutropenia:    Wbc = 0.35. ANC = 0  - On neutropenic precautions   - Daily CBC.         For his immunosuppression secondary to chemotherapy  - Resume acyclovir, ciprofloxacin and posaconazole prophylaxis  - Continue Bactrim on Fri, Sat and Sun  - Continue Peridex              Beata Bunn MD  Pediatric Hematology/Oncology  [facility]

## 2018-05-01 NOTE — ASSESSMENT & PLAN NOTE
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 On Intensification therapy II following [id number] (Arm A). He was admitted on 4/30/2018 for neutropenia/profund sepsis risk. Stepped up to the PICU for persistent fever >103, tachycardia, and hypotension that was been minimally responsive to fluid resuscitation. PICU stay was complicated by delirium and he currently is PEC'd, needs a one to one sitter.      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 MRD negative after Induction I.    - Treating per COG [id number] (ARM A).  S/p Intensification I and Intensification II started. He was day 13 of intensification II as of 5/2/2018.  - BM biopsy at start of Intensification shows CR.  FISH for t(8;21) negative. Will repeat BM biopsy pending count recovery and clinical improvement  - Heme/Onc on consult/co-management

## 2018-05-01 NOTE — PLAN OF CARE
Problem: Patient Care Overview  Goal: Plan of Care Review  Outcome: Ongoing (interventions implemented as appropriate)  Patient is progressing and involved with plan of care. Frequent rounds made to assess pain and safety. Pt communicating needs throughout shift. Up ad chilo. Tolerating diet, voiding without difficulty. No c/o pain. All vitals stable; no acute events this shift. Pt. Remaining free from falls or injury throughout shift; bed in lowest position; side rails up X2; call light within reach; pt instructed to call for assistance as needed - verbalized understanding. Q2h rounding on patient. Will continue to monitor.

## 2018-05-01 NOTE — SUBJECTIVE & OBJECTIVE
Subjective:     Interval History: Afebrile. NAEON.     Oncology Treatment Plan:     PEDS [id number] Intensification II  ARM B (MA) - LOW RISK PATIENTS    Medications:  Continuous Infusions:  Scheduled Meds:   acyclovir  400 mg Oral BID    chlorhexidine  15 mL Mouth/Throat BID    ciprofloxacin HCl  500 mg Oral Q12H    posaconazole  300 mg Oral Daily    [START ON 5/4/2018] sulfamethoxazole-trimethoprim 800-160mg  1 tablet Oral twice daily on Friday, Saturday, Sunday     PRN Meds:ondansetron, oxyCODONE, polyethylene glycol     Review of Systems  Objective:     Vital Signs (Most Recent):  Temp: 97.9 °F (36.6 °C) (05/01/18 0809)  Pulse: 79 (05/01/18 0809)  Resp: 17 (05/01/18 0809)  BP: (!) 111/51 (05/01/18 0809)  SpO2: 100 % (05/01/18 0809) Vital Signs (24h Range):  Temp:  [97.9 °F (36.6 °C)-98.6 °F (37 °C)] 97.9 °F (36.6 °C)  Pulse:  [79-89] 79  Resp:  [17-20] 17  SpO2:  [97 %-100 %] 100 %  BP: (104-123)/(51-57) 111/51     Weight: 87.9 kg (193 lb 12.6 oz)  Body mass index is 31.28 kg/m².  Body surface area is 2.02 meters squared.    No intake or output data in the 24 hours ending 05/01/18 0935    Physical Exam    Constitutional: He is oriented to person, place, and time. He appears well-developed and well-nourished. No distress.   HENT:   Head: Normocephalic.   Right Ear: External ear normal.   Left Ear: External ear normal.   Nose: Nose normal.   Mouth/Throat: Oropharynx is clear and moist. No oropharyngeal exudate.   No mucositis noted.   Eyes: Conjunctivae and EOM are normal. Pupils are equal, round, and reactive to light. Right eye exhibits no discharge. Left eye exhibits no discharge.   Neck: Normal range of motion. Neck supple. No tracheal deviation present.   Cardiovascular: Normal rate, regular rhythm, normal heart sounds and intact distal pulses.    No murmur heard.  Pulmonary/Chest: Effort normal and breath sounds normal. No respiratory distress. He has no wheezes.   Abdominal: Soft. Bowel sounds are  normal. He exhibits no distension and no mass. There is no tenderness.   Musculoskeletal: Normal range of motion. He exhibits no edema.   Lymphadenopathy:     He has no cervical adenopathy.   Neurological: He is alert and oriented to person, place, and time. He displays normal reflexes. No cranial nerve deficit or sensory deficit. He exhibits normal muscle tone. Coordination normal.   Skin: Skin is warm and dry. Capillary refill takes less than 2 seconds. No rash noted. He is not diaphoretic. No erythema. No pallor.   Port site C/D/I  Psychiatric: He has a normal mood and affect. His behavior is normal.     Labs:   Recent Lab Results       05/01/18  0400      Immature Granulocytes 0.0     Immature Grans (Abs) 0.00  Comment:  Mild elevation in immature granulocytes is non specific and   can be seen in a variety of conditions including stress response,   acute inflammation, trauma and pregnancy. Correlation with other   laboratory and clinical findings is essential.       Albumin 3.7     Alkaline Phosphatase 79     ALT 19     Anion Gap 9     Aniso Slight     AST 13     Baso # 0.00     Basophil% 0.0     Total Bilirubin 0.3  Comment:  For infants and newborns, interpretation of results should be based  on gestational age, weight and in agreement with clinical  observations.  Premature Infant recommended reference ranges:  Up to 24 hours.............<8.0 mg/dL  Up to 48 hours............<12.0 mg/dL  3-5 days..................<15.0 mg/dL  6-29 days.................<15.0 mg/dL       BUN, Bld 11     Calcium 8.5(L)     Chloride 109     CO2 23     Creatinine 0.7     Differential Method Automated     eGFR if African American >60.0     eGFR if non  >60.0  Comment:  Calculation used to obtain the estimated glomerular filtration  rate (eGFR) is the CKD-EPI equation.        Eos # 0.0     Eosinophil% 0.0     Glucose 94     Gran # (ANC) 0.0(L)     Gran% 0.0(L)     Hematocrit 21.0  Comment:  Results confirmed, test  repeated(L)     Hemoglobin 7.2  Comment:  Results confirmed, test repeated(L)     Hypo Occasional     Lymph # 0.4(L)     Lymph% 100.0(H)     MCH 31.7(H)     MCHC 34.3     MCV 93     Mono # 0.0(L)     Mono% 0.0(L)     MPV 11.4     nRBC 0     Platelet Estimate Decreased(A)     Platelets 30  Comment:  plt count   critical result(s) called and verbal readback obtained   from DENISE SALINAS RN, 05/01/2018 04:58  (LL)     Potassium 3.3(L)     Total Protein 5.9(L)     RBC 2.27(L)     RDW 19.3(H)     Sodium 141     WBC 0.35  Comment:  WBC/PRELIM PLT   critical result(s) called and verbal readback   obtained from DENISE SALINAS RN AT 0430 ON 05/01/2018 BY BEL  FINAL RESULTS VERIFIED BY REPEAT ANALYSIS, 05/01/2018 04:27  (LL)

## 2018-05-02 ENCOUNTER — PATIENT MESSAGE (OUTPATIENT)
Dept: PEDIATRIC HEMATOLOGY/ONCOLOGY | Facility: CLINIC | Age: 18
End: 2018-05-02

## 2018-05-02 PROBLEM — I88.9 LYMPHADENITIS: Status: RESOLVED | Noted: 2018-03-18 | Resolved: 2018-05-02

## 2018-05-02 PROBLEM — R22.1 MASS OF LATERAL NECK: Status: RESOLVED | Noted: 2018-03-16 | Resolved: 2018-05-02

## 2018-05-02 LAB
ANISOCYTOSIS BLD QL SMEAR: SLIGHT
BASOPHILS # BLD AUTO: 0 K/UL
BASOPHILS NFR BLD: 0 %
DIFFERENTIAL METHOD: ABNORMAL
EOSINOPHIL # BLD AUTO: 0 K/UL
EOSINOPHIL NFR BLD: 0 %
ERYTHROCYTE [DISTWIDTH] IN BLOOD BY AUTOMATED COUNT: 18.6 %
HCT VFR BLD AUTO: 20.2 %
HGB BLD-MCNC: 7.2 G/DL
IMM GRANULOCYTES # BLD AUTO: 0 K/UL
IMM GRANULOCYTES NFR BLD AUTO: 0 %
LYMPHOCYTES # BLD AUTO: 0.3 K/UL
LYMPHOCYTES NFR BLD: 96.9 %
MCH RBC QN AUTO: 33 PG
MCHC RBC AUTO-ENTMCNC: 35.6 G/DL
MCV RBC AUTO: 93 FL
MONOCYTES # BLD AUTO: 0 K/UL
MONOCYTES NFR BLD: 0 %
NEUTROPHILS # BLD AUTO: 0 K/UL
NEUTROPHILS NFR BLD: 3.1 %
NRBC BLD-RTO: 0 /100 WBC
PLATELET # BLD AUTO: 24 K/UL
PLATELET BLD QL SMEAR: ABNORMAL
PMV BLD AUTO: 13 FL
RBC # BLD AUTO: 2.18 M/UL
WBC # BLD AUTO: 0.32 K/UL

## 2018-05-02 PROCEDURE — 99233 SBSQ HOSP IP/OBS HIGH 50: CPT | Mod: ,,, | Performed by: PEDIATRICS

## 2018-05-02 PROCEDURE — 25000003 PHARM REV CODE 250: Performed by: STUDENT IN AN ORGANIZED HEALTH CARE EDUCATION/TRAINING PROGRAM

## 2018-05-02 PROCEDURE — 87040 BLOOD CULTURE FOR BACTERIA: CPT

## 2018-05-02 PROCEDURE — 20600001 HC STEP DOWN PRIVATE ROOM

## 2018-05-02 PROCEDURE — 87186 SC STD MICRODIL/AGAR DIL: CPT

## 2018-05-02 PROCEDURE — 99900035 HC TECH TIME PER 15 MIN (STAT)

## 2018-05-02 PROCEDURE — 63600175 PHARM REV CODE 636 W HCPCS: Performed by: PEDIATRICS

## 2018-05-02 PROCEDURE — 25000003 PHARM REV CODE 250: Performed by: PEDIATRICS

## 2018-05-02 PROCEDURE — 87077 CULTURE AEROBIC IDENTIFY: CPT

## 2018-05-02 PROCEDURE — 86850 RBC ANTIBODY SCREEN: CPT

## 2018-05-02 PROCEDURE — 63600175 PHARM REV CODE 636 W HCPCS: Performed by: STUDENT IN AN ORGANIZED HEALTH CARE EDUCATION/TRAINING PROGRAM

## 2018-05-02 PROCEDURE — 85025 COMPLETE CBC W/AUTO DIFF WBC: CPT

## 2018-05-02 PROCEDURE — 86920 COMPATIBILITY TEST SPIN: CPT

## 2018-05-02 PROCEDURE — 82803 BLOOD GASES ANY COMBINATION: CPT

## 2018-05-02 PROCEDURE — 83605 ASSAY OF LACTIC ACID: CPT

## 2018-05-02 RX ORDER — DIPHENHYDRAMINE HCL 25 MG
25 CAPSULE ORAL EVERY 6 HOURS PRN
Status: DISCONTINUED | OUTPATIENT
Start: 2018-05-02 | End: 2018-05-11

## 2018-05-02 RX ORDER — ACETAMINOPHEN 325 MG/1
650 TABLET ORAL EVERY 6 HOURS PRN
Status: DISCONTINUED | OUTPATIENT
Start: 2018-05-02 | End: 2018-05-02

## 2018-05-02 RX ORDER — ACETAMINOPHEN 325 MG/1
650 TABLET ORAL EVERY 6 HOURS PRN
Status: DISCONTINUED | OUTPATIENT
Start: 2018-05-02 | End: 2018-05-03

## 2018-05-02 RX ORDER — VANCOMYCIN HYDROCHLORIDE
1500
Status: DISCONTINUED | OUTPATIENT
Start: 2018-05-02 | End: 2018-05-03

## 2018-05-02 RX ORDER — HYDROCODONE BITARTRATE AND ACETAMINOPHEN 500; 5 MG/1; MG/1
TABLET ORAL
Status: DISCONTINUED | OUTPATIENT
Start: 2018-05-03 | End: 2018-05-06

## 2018-05-02 RX ORDER — CEFEPIME HYDROCHLORIDE 2 G/1
2 INJECTION, POWDER, FOR SOLUTION INTRAVENOUS
Status: DISCONTINUED | OUTPATIENT
Start: 2018-05-02 | End: 2018-05-04

## 2018-05-02 RX ORDER — ACETAMINOPHEN 500 MG
500 TABLET ORAL EVERY 6 HOURS PRN
Status: DISCONTINUED | OUTPATIENT
Start: 2018-05-02 | End: 2018-05-02

## 2018-05-02 RX ORDER — VANCOMYCIN HYDROCHLORIDE
1500 3 TIMES DAILY
Status: DISCONTINUED | OUTPATIENT
Start: 2018-05-02 | End: 2018-05-02

## 2018-05-02 RX ADMIN — CEFEPIME 2 G: 2 INJECTION, POWDER, FOR SOLUTION INTRAVENOUS at 06:05

## 2018-05-02 RX ADMIN — CHLORHEXIDINE GLUCONATE 15 ML: 1.2 RINSE ORAL at 08:05

## 2018-05-02 RX ADMIN — SODIUM CHLORIDE 1000 ML: 0.9 INJECTION, SOLUTION INTRAVENOUS at 11:05

## 2018-05-02 RX ADMIN — ACYCLOVIR 400 MG: 200 CAPSULE ORAL at 10:05

## 2018-05-02 RX ADMIN — ACETAMINOPHEN 650 MG: 325 TABLET ORAL at 10:05

## 2018-05-02 RX ADMIN — VANCOMYCIN HYDROCHLORIDE 1500 MG: 10 INJECTION, POWDER, LYOPHILIZED, FOR SOLUTION INTRAVENOUS at 08:05

## 2018-05-02 RX ADMIN — ACETAMINOPHEN 650 MG: 325 TABLET ORAL at 05:05

## 2018-05-02 RX ADMIN — CEFEPIME 2 G: 2 INJECTION, POWDER, FOR SOLUTION INTRAVENOUS at 11:05

## 2018-05-02 RX ADMIN — CIPROFLOXACIN HYDROCHLORIDE 500 MG: 500 TABLET, FILM COATED ORAL at 10:05

## 2018-05-02 RX ADMIN — POSACONAZOLE 300 MG: 100 TABLET, COATED ORAL at 10:05

## 2018-05-02 RX ADMIN — VANCOMYCIN HYDROCHLORIDE 1500 MG: 10 INJECTION, POWDER, LYOPHILIZED, FOR SOLUTION INTRAVENOUS at 12:05

## 2018-05-02 RX ADMIN — CHLORHEXIDINE GLUCONATE 15 ML: 1.2 RINSE ORAL at 10:05

## 2018-05-02 RX ADMIN — OXYCODONE HYDROCHLORIDE 10 MG: 5 TABLET ORAL at 03:05

## 2018-05-02 RX ADMIN — ACETAMINOPHEN 650 MG: 325 TABLET ORAL at 11:05

## 2018-05-02 RX ADMIN — SODIUM CHLORIDE 1000 ML: 0.9 INJECTION, SOLUTION INTRAVENOUS at 09:05

## 2018-05-02 RX ADMIN — ACYCLOVIR 400 MG: 200 CAPSULE ORAL at 08:05

## 2018-05-02 NOTE — PLAN OF CARE
Problem: Patient Care Overview  Goal: Plan of Care Review  Outcome: Ongoing (interventions implemented as appropriate)  Pt involved in plan of care and communicating needs throughout shift as able; pt lethargic with fever today.  Up in room independently; c/o headache this afternoon; prn oxycodone admin with some effect.  Tolerating diet but poor appetite today. Pt is voiding without difficulty.  Neutropenic precautions maintained for WBC=0.32; bleeding precautions maintained for plt=24. Pt has been febrile throughout shift; blood cultures done from port as ordered; IV vanc and cefepime initiated; prn tylenol ordered and admin with some effect; pt continues to be febrile; peds/onc team aware.  Otherwise no acute events so far this shift.  Pt remaining free from falls or injury throughout shift; bed in lowest position; call light within reach.  Pt instructed to call for assistance as needed.  Q1H rounding done on pt.

## 2018-05-02 NOTE — PROGRESS NOTES
[facility]  Pediatric Hematology/Oncology  Progress Note    Patient Name: Hema Kuo  Admission Date: 4/30/2018  Hospital Length of Stay: 2 days  Code Status: Full Code     Subjective:     Interval History: NAEON. Afebrile.     Oncology Treatment Plan:     PEDS [id number] Intensification II  ARM B (MA) - LOW RISK PATIENTS    Medications:  Continuous Infusions:  Scheduled Meds:   acyclovir  400 mg Oral BID    chlorhexidine  15 mL Mouth/Throat BID    ciprofloxacin HCl  500 mg Oral Q12H    posaconazole  300 mg Oral Daily    [START ON 5/4/2018] sulfamethoxazole-trimethoprim 800-160mg  1 tablet Oral twice daily on Friday, Saturday, Sunday     PRN Meds:ondansetron, oxyCODONE, polyethylene glycol     Review of Systems  Objective:     Vital Signs (Most Recent):  Temp: 98.2 °F (36.8 °C) (05/02/18 0413)  Pulse: 99 (05/02/18 0413)  Resp: 20 (05/02/18 0413)  BP: (!) 111/58 (05/02/18 0413)  SpO2: 98 % (05/02/18 0413) Vital Signs (24h Range):  Temp:  [97.8 °F (36.6 °C)-98.4 °F (36.9 °C)] 98.2 °F (36.8 °C)  Pulse:  [70-99] 99  Resp:  [18-20] 20  SpO2:  [98 %-100 %] 98 %  BP: (106-114)/(52-58) 111/58     Weight: 87.9 kg (193 lb 12.6 oz)  Body mass index is 31.28 kg/m².  Body surface area is 2.02 meters squared.      Intake/Output Summary (Last 24 hours) at 05/02/18 0954  Last data filed at 05/01/18 1400   Gross per 24 hour   Intake              900 ml   Output                0 ml   Net              900 ml       Physical Exam     Constitutional: He is oriented to person, place, and time. He appears well-developed and well-nourished. No distress.   HENT:   Head: Normocephalic.   Right Ear: External ear normal.   Left Ear: External ear normal.   Nose: Nose normal.   Mouth/Throat: Oropharynx is clear and moist. No oropharyngeal exudate.   No mucositis noted.   Eyes: Conjunctivae and EOM are normal. Pupils are equal, round, and reactive to light. Right eye exhibits no discharge. Left eye exhibits no discharge.    Neck: Normal range of motion. Neck supple. No tracheal deviation present.   Cardiovascular: Normal rate, regular rhythm, normal heart sounds and intact distal pulses.    No murmur heard.  Pulmonary/Chest: Effort normal and breath sounds normal. No respiratory distress. He has no wheezes.   Abdominal: Soft. Bowel sounds are normal. He exhibits no distension and no mass. There is no tenderness.   Musculoskeletal: Normal range of motion. He exhibits no edema.   Lymphadenopathy:     He has no cervical adenopathy.   Neurological: He is alert and oriented to person, place, and time. He displays normal reflexes. No cranial nerve deficit or sensory deficit. He exhibits normal muscle tone. Coordination normal.   Skin: Skin is warm and dry. Capillary refill takes less than 2 seconds. No rash noted. He is not diaphoretic. No erythema. No pallor.   Port site C/D/I  Psychiatric: He has a normal mood and affect. His behavior is normal.       Labs:   Recent Lab Results       05/02/18  0400      Immature Granulocytes 0.0     Immature Grans (Abs) 0.00  Comment:  Mild elevation in immature granulocytes is non specific and   can be seen in a variety of conditions including stress response,   acute inflammation, trauma and pregnancy. Correlation with other   laboratory and clinical findings is essential.       Aniso Slight     Baso # 0.00     Basophil% 0.0     Differential Method Automated     Eos # 0.0     Eosinophil% 0.0     Gran # (ANC) 0.0(L)     Gran% 3.1(L)     Hematocrit 20.2(L)     Hemoglobin 7.2(L)     Lymph # 0.3(L)     Lymph% 96.9(H)     MCH 33.0(H)     MCHC 35.6     MCV 93     Mono # 0.0(L)     Mono% 0.0(L)     MPV 13.0(H)     nRBC 0     Platelet Estimate Decreased(A)     Platelets 24  Comment:  PLATELET COUNT  critical result(s) called and verbal readback   obtained from NHAN [place] RN, 05/02/2018 07:45  (LL)     RBC 2.18(L)     RDW 18.6(H)     WBC 0.32  Comment:  Previously reported results for this analyte were  similarly   abnormal.  Call not needed.  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  He is Day 13 of Intensification therapy II following [id number] (Arm A).  He reports feeling well since discharge home and is well appearing. Now admitted for neutropenia.     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  CNS negative.  MRD negative after Induction I.    -Treating per COG [id number] (ARM A).  S/p Intensification I and Intensification II started.  -BM biopsy at start of Intensification shows CR.  FISH for t(8;21) negative.     For his chemotherapy induced Neutropenia:    Wbc = 0.32. ANC = 0  - On neutropenic precautions   - Daily CBC and Retic        For his immunosuppression secondary to chemotherapy  - Resume acyclovir, ciprofloxacin and posaconazole prophylaxis  - Continue Bactrim on Fri, Sat and Sun  - Continue Peridex              Beata Bunn MD  Pediatric Hematology/Oncology  [facility]

## 2018-05-02 NOTE — ASSESSMENT & PLAN NOTE
17 y.o. 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  He is Day 13 of Intensification therapy II following [id number] (Arm A).  He reports feeling well since discharge home and is well appearing. Now admitted for neutropenia.     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  CNS negative.  MRD negative after Induction I.    -Treating per COG [id number] (ARM A).  S/p Intensification I and Intensification II started.  -BM biopsy at start of Intensification shows CR.  FISH for t(8;21) negative.     For his chemotherapy induced Neutropenia:    Wbc = 0.32. ANC = 0  - On neutropenic precautions   - Daily CBC and Retic        For his immunosuppression secondary to chemotherapy  - Resume acyclovir, ciprofloxacin and posaconazole prophylaxis  - Continue Bactrim on Fri, Sat and Sun  - Continue Peridex

## 2018-05-02 NOTE — SUBJECTIVE & OBJECTIVE
Subjective:     Interval History: NAEON. Afebrile.     Oncology Treatment Plan:     PEDS [id number] Intensification II  ARM B (MA) - LOW RISK PATIENTS    Medications:  Continuous Infusions:  Scheduled Meds:   acyclovir  400 mg Oral BID    chlorhexidine  15 mL Mouth/Throat BID    ciprofloxacin HCl  500 mg Oral Q12H    posaconazole  300 mg Oral Daily    [START ON 5/4/2018] sulfamethoxazole-trimethoprim 800-160mg  1 tablet Oral twice daily on Friday, Saturday, Sunday     PRN Meds:ondansetron, oxyCODONE, polyethylene glycol     Review of Systems  Objective:     Vital Signs (Most Recent):  Temp: 98.2 °F (36.8 °C) (05/02/18 0413)  Pulse: 99 (05/02/18 0413)  Resp: 20 (05/02/18 0413)  BP: (!) 111/58 (05/02/18 0413)  SpO2: 98 % (05/02/18 0413) Vital Signs (24h Range):  Temp:  [97.8 °F (36.6 °C)-98.4 °F (36.9 °C)] 98.2 °F (36.8 °C)  Pulse:  [70-99] 99  Resp:  [18-20] 20  SpO2:  [98 %-100 %] 98 %  BP: (106-114)/(52-58) 111/58     Weight: 87.9 kg (193 lb 12.6 oz)  Body mass index is 31.28 kg/m².  Body surface area is 2.02 meters squared.      Intake/Output Summary (Last 24 hours) at 05/02/18 0954  Last data filed at 05/01/18 1400   Gross per 24 hour   Intake              900 ml   Output                0 ml   Net              900 ml       Physical Exam     Constitutional: He is oriented to person, place, and time. He appears well-developed and well-nourished. No distress.   HENT:   Head: Normocephalic.   Right Ear: External ear normal.   Left Ear: External ear normal.   Nose: Nose normal.   Mouth/Throat: Oropharynx is clear and moist. No oropharyngeal exudate.   No mucositis noted.   Eyes: Conjunctivae and EOM are normal. Pupils are equal, round, and reactive to light. Right eye exhibits no discharge. Left eye exhibits no discharge.   Neck: Normal range of motion. Neck supple. No tracheal deviation present.   Cardiovascular: Normal rate, regular rhythm, normal heart sounds and intact distal pulses.    No  murmur heard.  Pulmonary/Chest: Effort normal and breath sounds normal. No respiratory distress. He has no wheezes.   Abdominal: Soft. Bowel sounds are normal. He exhibits no distension and no mass. There is no tenderness.   Musculoskeletal: Normal range of motion. He exhibits no edema.   Lymphadenopathy:     He has no cervical adenopathy.   Neurological: He is alert and oriented to person, place, and time. He displays normal reflexes. No cranial nerve deficit or sensory deficit. He exhibits normal muscle tone. Coordination normal.   Skin: Skin is warm and dry. Capillary refill takes less than 2 seconds. No rash noted. He is not diaphoretic. No erythema. No pallor.   Port site C/D/I  Psychiatric: He has a normal mood and affect. His behavior is normal.       Labs:   Recent Lab Results       05/02/18  0400      Immature Granulocytes 0.0     Immature Grans (Abs) 0.00  Comment:  Mild elevation in immature granulocytes is non specific and   can be seen in a variety of conditions including stress response,   acute inflammation, trauma and pregnancy. Correlation with other   laboratory and clinical findings is essential.       Aniso Slight     Baso # 0.00     Basophil% 0.0     Differential Method Automated     Eos # 0.0     Eosinophil% 0.0     Gran # (ANC) 0.0(L)     Gran% 3.1(L)     Hematocrit 20.2(L)     Hemoglobin 7.2(L)     Lymph # 0.3(L)     Lymph% 96.9(H)     MCH 33.0(H)     MCHC 35.6     MCV 93     Mono # 0.0(L)     Mono% 0.0(L)     MPV 13.0(H)     nRBC 0     Platelet Estimate Decreased(A)     Platelets 24  Comment:  PLATELET COUNT  critical result(s) called and verbal readback   obtained from NHAN [place] RN, 05/02/2018 07:45  (LL)     RBC 2.18(L)     RDW 18.6(H)     WBC 0.32  Comment:  Previously reported results for this analyte were similarly   abnormal.  Call not needed.  Documented, 05/02/2018 04:45  (LL)

## 2018-05-02 NOTE — PROGRESS NOTES
Notified Dr. Bucio that pt has temp of 103.3 at this time. Blood culture from port ordered and sent; prn tylenol order obtained.  Resident ordered IV vanc and cefepime.  Will admin and continue to monitor.

## 2018-05-03 ENCOUNTER — TELEPHONE (OUTPATIENT)
Dept: PEDIATRIC HEMATOLOGY/ONCOLOGY | Facility: CLINIC | Age: 18
End: 2018-05-03

## 2018-05-03 ENCOUNTER — PATIENT MESSAGE (OUTPATIENT)
Dept: PEDIATRIC HEMATOLOGY/ONCOLOGY | Facility: CLINIC | Age: 18
End: 2018-05-03

## 2018-05-03 PROBLEM — R78.81 GRAM-POSITIVE BACTEREMIA: Status: ACTIVE | Noted: 2018-05-03

## 2018-05-03 PROBLEM — R65.21 SEPTIC SHOCK: Status: ACTIVE | Noted: 2018-05-03

## 2018-05-03 PROBLEM — A41.9 SEPTIC SHOCK: Status: ACTIVE | Noted: 2018-05-03

## 2018-05-03 PROBLEM — A41.9 SEPSIS: Status: ACTIVE | Noted: 2018-05-03

## 2018-05-03 LAB
ABO + RH BLD: NORMAL
ALBUMIN SERPL BCP-MCNC: 3 G/DL
ALLENS TEST: ABNORMAL
ALLENS TEST: ABNORMAL
ALLENS TEST: NORMAL
ALLENS TEST: NORMAL
ALP SERPL-CCNC: 57 U/L
ALT SERPL W/O P-5'-P-CCNC: 17 U/L
ANION GAP SERPL CALC-SCNC: 9 MMOL/L
ANISOCYTOSIS BLD QL SMEAR: SLIGHT
ANISOCYTOSIS BLD QL SMEAR: SLIGHT
AST SERPL-CCNC: 23 U/L
BASOPHILS # BLD AUTO: 0 K/UL
BASOPHILS NFR BLD: 0 %
BASOPHILS NFR BLD: 0 %
BILIRUB SERPL-MCNC: 1.6 MG/DL
BLD GP AB SCN CELLS X3 SERPL QL: NORMAL
BLD PROD TYP BPU: NORMAL
BLOOD UNIT EXPIRATION DATE: NORMAL
BLOOD UNIT TYPE CODE: 6200
BLOOD UNIT TYPE CODE: 8400
BLOOD UNIT TYPE: NORMAL
BUN SERPL-MCNC: 14 MG/DL
CALCIUM SERPL-MCNC: 7.5 MG/DL
CHLORIDE SERPL-SCNC: 102 MMOL/L
CO2 SERPL-SCNC: 21 MMOL/L
CODING SYSTEM: NORMAL
CORTIS SERPL-MCNC: 21 UG/DL
CREAT SERPL-MCNC: 0.8 MG/DL
DELSYS: ABNORMAL
DELSYS: ABNORMAL
DELSYS: NORMAL
DELSYS: NORMAL
DIFFERENTIAL METHOD: ABNORMAL
DIFFERENTIAL METHOD: ABNORMAL
DISPENSE STATUS: NORMAL
EOSINOPHIL # BLD AUTO: 0 K/UL
EOSINOPHIL NFR BLD: 0 %
EOSINOPHIL NFR BLD: 0 %
ERYTHROCYTE [DISTWIDTH] IN BLOOD BY AUTOMATED COUNT: 17.2 %
ERYTHROCYTE [DISTWIDTH] IN BLOOD BY AUTOMATED COUNT: 18.3 %
ERYTHROCYTE [SEDIMENTATION RATE] IN BLOOD BY WESTERGREN METHOD: 22 MM/H
EST. GFR  (AFRICAN AMERICAN): >60 ML/MIN/1.73 M^2
EST. GFR  (NON AFRICAN AMERICAN): >60 ML/MIN/1.73 M^2
FLOW: 2
FLOW: 2
GLUCOSE SERPL-MCNC: 109 MG/DL
HCO3 UR-SCNC: 21.4 MMOL/L (ref 24–28)
HCO3 UR-SCNC: 22.7 MMOL/L (ref 24–28)
HCT VFR BLD AUTO: 16 %
HCT VFR BLD AUTO: 22.9 %
HCT VFR BLD CALC: 22 %PCV (ref 36–54)
HGB BLD-MCNC: 5.6 G/DL
HGB BLD-MCNC: 8.2 G/DL
IMM GRANULOCYTES # BLD AUTO: 0 K/UL
IMM GRANULOCYTES # BLD AUTO: ABNORMAL K/UL
IMM GRANULOCYTES NFR BLD AUTO: 0 %
IMM GRANULOCYTES NFR BLD AUTO: ABNORMAL %
LDH SERPL L TO P-CCNC: 0.79 MMOL/L (ref 0.5–2.2)
LDH SERPL L TO P-CCNC: 1.19 MMOL/L (ref 0.5–2.2)
LYMPHOCYTES # BLD AUTO: 0 K/UL
LYMPHOCYTES NFR BLD: 100 %
LYMPHOCYTES NFR BLD: 100 %
MCH RBC QN AUTO: 30.8 PG
MCH RBC QN AUTO: 32.6 PG
MCHC RBC AUTO-ENTMCNC: 35 G/DL
MCHC RBC AUTO-ENTMCNC: 35.8 G/DL
MCV RBC AUTO: 86 FL
MCV RBC AUTO: 93 FL
MODE: ABNORMAL
MODE: NORMAL
MONOCYTES # BLD AUTO: 0 K/UL
MONOCYTES NFR BLD: 0 %
MONOCYTES NFR BLD: 0 %
NEUTROPHILS # BLD AUTO: 0 K/UL
NEUTROPHILS NFR BLD: 0 %
NEUTROPHILS NFR BLD: 0 %
NRBC BLD-RTO: 0 /100 WBC
NRBC BLD-RTO: 0 /100 WBC
NUM UNITS TRANS PACKED RBC: NORMAL
NUM UNITS TRANS PACKED RBC: NORMAL
NUM UNITS TRANS WBC-POOR PLATPHERESIS: NORMAL
NUM UNITS TRANS WBC-POOR PLATPHERESIS: NORMAL
OVALOCYTES BLD QL SMEAR: ABNORMAL
PCO2 BLDA: 32.1 MMHG (ref 35–45)
PCO2 BLDA: 34.5 MMHG (ref 35–45)
PH SMN: 7.43 [PH] (ref 7.35–7.45)
PH SMN: 7.43 [PH] (ref 7.35–7.45)
PLATELET # BLD AUTO: 26 K/UL
PLATELET # BLD AUTO: 7 K/UL
PLATELET BLD QL SMEAR: ABNORMAL
PLATELET BLD QL SMEAR: ABNORMAL
PMV BLD AUTO: 11.7 FL
PMV BLD AUTO: 13.5 FL
PO2 BLDA: 36 MMHG (ref 40–60)
PO2 BLDA: 42 MMHG (ref 40–60)
POC BE: -2 MMOL/L
POC BE: -3 MMOL/L
POC IONIZED CALCIUM: 1.02 MMOL/L (ref 1.06–1.42)
POC SATURATED O2: 71 % (ref 95–100)
POC SATURATED O2: 79 % (ref 95–100)
POC TCO2: 22 MMOL/L (ref 24–29)
POC TCO2: 24 MMOL/L (ref 24–29)
POIKILOCYTOSIS BLD QL SMEAR: SLIGHT
POLYCHROMASIA BLD QL SMEAR: ABNORMAL
POTASSIUM BLD-SCNC: 3.2 MMOL/L (ref 3.5–5.1)
POTASSIUM SERPL-SCNC: 3.4 MMOL/L
PROT SERPL-MCNC: 5.6 G/DL
PROVIDER CREDENTIALS: ABNORMAL
PROVIDER CREDENTIALS: NORMAL
PROVIDER NOTIFIED: ABNORMAL
PROVIDER NOTIFIED: NORMAL
RBC # BLD AUTO: 1.72 M/UL
RBC # BLD AUTO: 2.66 M/UL
RETICS/RBC NFR AUTO: 0.6 %
SAMPLE: ABNORMAL
SAMPLE: ABNORMAL
SAMPLE: NORMAL
SAMPLE: NORMAL
SITE: ABNORMAL
SITE: ABNORMAL
SITE: NORMAL
SITE: NORMAL
SODIUM BLD-SCNC: 135 MMOL/L (ref 136–145)
SODIUM SERPL-SCNC: 132 MMOL/L
SP02: 96
SP02: 98
TIME NOTIFIED: 604
TIME NOTIFIED: 604
VANCOMYCIN TROUGH SERPL-MCNC: 12.6 UG/ML
VERBAL RESULT READBACK PERFORMED: YES
VERBAL RESULT READBACK PERFORMED: YES
WBC # BLD AUTO: 0.03 K/UL
WBC # BLD AUTO: 0.27 K/UL

## 2018-05-03 PROCEDURE — 63600175 PHARM REV CODE 636 W HCPCS: Performed by: STUDENT IN AN ORGANIZED HEALTH CARE EDUCATION/TRAINING PROGRAM

## 2018-05-03 PROCEDURE — 99900035 HC TECH TIME PER 15 MIN (STAT)

## 2018-05-03 PROCEDURE — 94640 AIRWAY INHALATION TREATMENT: CPT

## 2018-05-03 PROCEDURE — 87103 BLOOD FUNGUS CULTURE: CPT

## 2018-05-03 PROCEDURE — 25000003 PHARM REV CODE 250: Performed by: STUDENT IN AN ORGANIZED HEALTH CARE EDUCATION/TRAINING PROGRAM

## 2018-05-03 PROCEDURE — 85007 BL SMEAR W/DIFF WBC COUNT: CPT

## 2018-05-03 PROCEDURE — 94799 UNLISTED PULMONARY SVC/PX: CPT

## 2018-05-03 PROCEDURE — 82800 BLOOD PH: CPT

## 2018-05-03 PROCEDURE — P9037 PLATE PHERES LEUKOREDU IRRAD: HCPCS

## 2018-05-03 PROCEDURE — 82330 ASSAY OF CALCIUM: CPT

## 2018-05-03 PROCEDURE — 83605 ASSAY OF LACTIC ACID: CPT

## 2018-05-03 PROCEDURE — 82803 BLOOD GASES ANY COMBINATION: CPT

## 2018-05-03 PROCEDURE — 63600175 PHARM REV CODE 636 W HCPCS: Performed by: PEDIATRICS

## 2018-05-03 PROCEDURE — 85045 AUTOMATED RETICULOCYTE COUNT: CPT

## 2018-05-03 PROCEDURE — P9038 RBC IRRADIATED: HCPCS

## 2018-05-03 PROCEDURE — 85027 COMPLETE CBC AUTOMATED: CPT

## 2018-05-03 PROCEDURE — 27000221 HC OXYGEN, UP TO 24 HOURS

## 2018-05-03 PROCEDURE — 80150 ASSAY OF AMIKACIN: CPT

## 2018-05-03 PROCEDURE — 25000003 PHARM REV CODE 250: Performed by: PEDIATRICS

## 2018-05-03 PROCEDURE — 27000646 HC AEROBIKA DEVICE

## 2018-05-03 PROCEDURE — 36620 INSERTION CATHETER ARTERY: CPT

## 2018-05-03 PROCEDURE — 03HY32Z INSERTION OF MONITORING DEVICE INTO UPPER ARTERY, PERCUTANEOUS APPROACH: ICD-10-PCS | Performed by: PEDIATRICS

## 2018-05-03 PROCEDURE — 94761 N-INVAS EAR/PLS OXIMETRY MLT: CPT

## 2018-05-03 PROCEDURE — 99222 1ST HOSP IP/OBS MODERATE 55: CPT | Mod: 25,,, | Performed by: PEDIATRICS

## 2018-05-03 PROCEDURE — S0028 INJECTION, FAMOTIDINE, 20 MG: HCPCS | Performed by: STUDENT IN AN ORGANIZED HEALTH CARE EDUCATION/TRAINING PROGRAM

## 2018-05-03 PROCEDURE — 25000242 PHARM REV CODE 250 ALT 637 W/ HCPCS: Performed by: STUDENT IN AN ORGANIZED HEALTH CARE EDUCATION/TRAINING PROGRAM

## 2018-05-03 PROCEDURE — 27200680 HC TRANSDUCER, NEONATAL DISP

## 2018-05-03 PROCEDURE — 27100092 HC HIGH FLOW DELIVERY CANNULA

## 2018-05-03 PROCEDURE — 85014 HEMATOCRIT: CPT

## 2018-05-03 PROCEDURE — 27100171 HC OXYGEN HIGH FLOW UP TO 24 HOURS

## 2018-05-03 PROCEDURE — 36415 COLL VENOUS BLD VENIPUNCTURE: CPT

## 2018-05-03 PROCEDURE — 94664 DEMO&/EVAL PT USE INHALER: CPT

## 2018-05-03 PROCEDURE — 84132 ASSAY OF SERUM POTASSIUM: CPT

## 2018-05-03 PROCEDURE — 63600175 PHARM REV CODE 636 W HCPCS: Mod: JG | Performed by: PEDIATRICS

## 2018-05-03 PROCEDURE — 25000242 PHARM REV CODE 250 ALT 637 W/ HCPCS: Performed by: PEDIATRICS

## 2018-05-03 PROCEDURE — 99291 CRITICAL CARE FIRST HOUR: CPT | Mod: ,,, | Performed by: PEDIATRICS

## 2018-05-03 PROCEDURE — 20300000 HC PICU ROOM

## 2018-05-03 PROCEDURE — 36430 TRANSFUSION BLD/BLD COMPNT: CPT

## 2018-05-03 PROCEDURE — 80202 ASSAY OF VANCOMYCIN: CPT

## 2018-05-03 PROCEDURE — 84295 ASSAY OF SERUM SODIUM: CPT

## 2018-05-03 PROCEDURE — 82533 TOTAL CORTISOL: CPT

## 2018-05-03 PROCEDURE — C9285 PATCH, LIDOCAINE/TETRACAINE: HCPCS | Performed by: PEDIATRICS

## 2018-05-03 PROCEDURE — 27201040 HC RC 50 FILTER

## 2018-05-03 PROCEDURE — 80053 COMPREHEN METABOLIC PANEL: CPT

## 2018-05-03 PROCEDURE — 87040 BLOOD CULTURE FOR BACTERIA: CPT

## 2018-05-03 RX ORDER — DIPHENHYDRAMINE HCL 25 MG
25 CAPSULE ORAL
Status: DISCONTINUED | OUTPATIENT
Start: 2018-05-03 | End: 2018-05-03

## 2018-05-03 RX ORDER — ALBUTEROL SULFATE 0.83 MG/ML
5 SOLUTION RESPIRATORY (INHALATION) EVERY 4 HOURS
Status: DISCONTINUED | OUTPATIENT
Start: 2018-05-03 | End: 2018-05-05

## 2018-05-03 RX ORDER — FAMOTIDINE 10 MG/ML
20 INJECTION INTRAVENOUS 2 TIMES DAILY
Status: DISCONTINUED | OUTPATIENT
Start: 2018-05-03 | End: 2018-05-10

## 2018-05-03 RX ORDER — VANCOMYCIN HYDROCHLORIDE
1500
Status: DISCONTINUED | OUTPATIENT
Start: 2018-05-03 | End: 2018-05-04

## 2018-05-03 RX ORDER — ACETAMINOPHEN 10 MG/ML
650 INJECTION, SOLUTION INTRAVENOUS EVERY 6 HOURS
Status: COMPLETED | OUTPATIENT
Start: 2018-05-03 | End: 2018-05-04

## 2018-05-03 RX ORDER — FAMOTIDINE 10 MG/ML
20 INJECTION INTRAVENOUS 2 TIMES DAILY
Status: DISCONTINUED | OUTPATIENT
Start: 2018-05-03 | End: 2018-05-03

## 2018-05-03 RX ORDER — SODIUM CHLORIDE 9 MG/ML
INJECTION, SOLUTION INTRAVENOUS CONTINUOUS
Status: DISCONTINUED | OUTPATIENT
Start: 2018-05-03 | End: 2018-05-04

## 2018-05-03 RX ORDER — ACETAMINOPHEN 325 MG/1
650 TABLET ORAL EVERY 4 HOURS PRN
Status: DISCONTINUED | OUTPATIENT
Start: 2018-05-03 | End: 2018-05-03

## 2018-05-03 RX ORDER — LEVALBUTEROL 1.25 MG/.5ML
1.25 SOLUTION, CONCENTRATE RESPIRATORY (INHALATION) EVERY 4 HOURS PRN
Status: DISCONTINUED | OUTPATIENT
Start: 2018-05-03 | End: 2018-05-06

## 2018-05-03 RX ADMIN — FAMOTIDINE 20 MG: 10 INJECTION INTRAVENOUS at 08:05

## 2018-05-03 RX ADMIN — CHLORHEXIDINE GLUCONATE 15 ML: 1.2 RINSE ORAL at 08:05

## 2018-05-03 RX ADMIN — Medication 3 UNITS/HR: at 01:05

## 2018-05-03 RX ADMIN — ACETAMINOPHEN 650 MG: 10 INJECTION, SOLUTION INTRAVENOUS at 06:05

## 2018-05-03 RX ADMIN — FAMOTIDINE 20 MG: 10 INJECTION INTRAVENOUS at 09:05

## 2018-05-03 RX ADMIN — ACYCLOVIR 400 MG: 200 CAPSULE ORAL at 09:05

## 2018-05-03 RX ADMIN — VANCOMYCIN HYDROCHLORIDE 1500 MG: 10 INJECTION, POWDER, LYOPHILIZED, FOR SOLUTION INTRAVENOUS at 04:05

## 2018-05-03 RX ADMIN — ACYCLOVIR 400 MG: 200 CAPSULE ORAL at 08:05

## 2018-05-03 RX ADMIN — ALBUTEROL SULFATE 5 MG: 2.5 SOLUTION RESPIRATORY (INHALATION) at 03:05

## 2018-05-03 RX ADMIN — FILGRASTIM 480 MCG: 480 INJECTION, SOLUTION INTRAVENOUS; SUBCUTANEOUS at 01:05

## 2018-05-03 RX ADMIN — VANCOMYCIN HYDROCHLORIDE 1500 MG: 10 INJECTION, POWDER, LYOPHILIZED, FOR SOLUTION INTRAVENOUS at 01:05

## 2018-05-03 RX ADMIN — LIDOCAINE AND TETRACAINE 1 EACH: 70; 70 PATCH CUTANEOUS at 11:05

## 2018-05-03 RX ADMIN — ALBUTEROL SULFATE 5 MG: 2.5 SOLUTION RESPIRATORY (INHALATION) at 07:05

## 2018-05-03 RX ADMIN — POSACONAZOLE 300 MG: 100 TABLET, COATED ORAL at 09:05

## 2018-05-03 RX ADMIN — CEFEPIME 2 G: 2 INJECTION, POWDER, FOR SOLUTION INTRAVENOUS at 03:05

## 2018-05-03 RX ADMIN — ALBUTEROL SULFATE 5 MG: 2.5 SOLUTION RESPIRATORY (INHALATION) at 11:05

## 2018-05-03 RX ADMIN — EPINEPHRINE 0.02 MCG/KG/MIN: 1 INJECTION INTRAMUSCULAR; INTRAVENOUS; SUBCUTANEOUS at 08:05

## 2018-05-03 RX ADMIN — VANCOMYCIN HYDROCHLORIDE 1500 MG: 10 INJECTION, POWDER, LYOPHILIZED, FOR SOLUTION INTRAVENOUS at 08:05

## 2018-05-03 RX ADMIN — SODIUM CHLORIDE: 0.9 INJECTION, SOLUTION INTRAVENOUS at 04:05

## 2018-05-03 RX ADMIN — ACETAMINOPHEN 650 MG: 325 TABLET ORAL at 02:05

## 2018-05-03 RX ADMIN — LEVALBUTEROL 1.25 MG: 1.25 SOLUTION, CONCENTRATE RESPIRATORY (INHALATION) at 01:05

## 2018-05-03 RX ADMIN — CEFEPIME 2 G: 2 INJECTION, POWDER, FOR SOLUTION INTRAVENOUS at 07:05

## 2018-05-03 RX ADMIN — ACETAMINOPHEN 650 MG: 10 INJECTION, SOLUTION INTRAVENOUS at 08:05

## 2018-05-03 RX ADMIN — SODIUM CHLORIDE 500 ML: 0.9 INJECTION, SOLUTION INTRAVENOUS at 11:05

## 2018-05-03 RX ADMIN — CHLORHEXIDINE GLUCONATE 15 ML: 1.2 RINSE ORAL at 09:05

## 2018-05-03 RX ADMIN — ACETAMINOPHEN 650 MG: 10 INJECTION, SOLUTION INTRAVENOUS at 11:05

## 2018-05-03 RX ADMIN — CEFEPIME 2 G: 2 INJECTION, POWDER, FOR SOLUTION INTRAVENOUS at 12:05

## 2018-05-03 RX ADMIN — AMIKACIN SULFATE 1475 MG: 500 INJECTION, SOLUTION INTRAMUSCULAR; INTRAVENOUS at 12:05

## 2018-05-03 NOTE — PROGRESS NOTES
05/02/18 2034   Vital Signs   Temp (!) 103.3 °F (39.6 °C)   Temp src Oral   Pulse (!) 128   Resp (!) 22   SpO2 95 %   O2 Device (Oxygen Therapy) room air   BP (!) 105/51    notified of current vitals. Pt also lethargic and complaining of a headache. MD at [facility]. 1L bolus ordered. Will continue to monitor.   Rapid response nurse aware of patient and at bedside.

## 2018-05-03 NOTE — NURSING TRANSFER
Nursing Transfer Note    Receiving Transfer Note    5/3/2018 3:55 AM  Received in transfer from 850 to PICU 05  Report received as documented in PER Handoff on Doc Flowsheet.  See Doc Flowsheet for VS's and complete assessment.  Continuous EKG monitoring in place Yes  Chart received with patient: Yes  What Caregiver / Guardian was Notified of Arrival: Mother  Patient and / or caregiver / guardian oriented to room and nurse call system.  ANTON Perez RN  5/3/2018 3:55 AM

## 2018-05-03 NOTE — TELEPHONE ENCOUNTER
Spoke to pt mother, Madeline, and she stated that the pt called her at midnight last night stating he did not feel good and told her that he had a fever and has not spoken to him since. Informed her that Dr. Bucio is in hospital rounds this week and that I will have him call her once he sees him this am to discuss his care. I also informed her that the pt was transferred to the PICU due to his consistent high temperatures and low blood pressures. Stated that he has LLL opacity seen from a CXR and is on oxygen right now but is being monitored appropriately and on antibiotics for infection. Pt mother verbalized an understanding stating that she will call back if she does not hear from Dr. Bucio in the next few hours and will see if she can get a ride to come be with the pt. Stated that she has low BP right now with a low immune system and enlarged lymph nodes and did not want to come with him and get herself any sicker than she is but is planning on coming today if possible. No further needs noted.

## 2018-05-03 NOTE — PLAN OF CARE
05/03/18 1124   Discharge Assessment   Assessment Type Discharge Planning Assessment   Confirmed/corrected address and phone number on facesheet? Yes   Assessment information obtained from? Caregiver   Expected Length of Stay (days) 10   Communicated expected length of stay with patient/caregiver yes   Prior to hospitilization cognitive status: Alert/Oriented   Prior to hospitalization functional status: Completely Dependent   Current cognitive status: Alert/Oriented   Current Functional Status: Completely Dependent   Lives With parent(s);sibling(s)   Able to Return to Prior Arrangements yes   Is patient able to care for self after discharge? Patient is of pediatric age   Who are your caregiver(s) and their phone number(s)? (Madeline Coughlin [phone])   Patient's perception of discharge disposition admitted as an inpatient   Patient currently being followed by outpatient case management? No   Patient currently receives any other outside agency services? No   Equipment Currently Used at Home none   Do you have any problems affording any of your prescribed medications? No   Is the patient taking medications as prescribed? yes   Does the patient have transportation home? Yes   Transportation Available family or friend will provide;car   Does the patient receive services at the [facility]? No   Discharge Plan A Home with family   Patient/Family In Agreement With Plan yes   Pt transferred to PICU from 8th floor on 05/03. Pt lives at home with parents and siblings. + family transportation

## 2018-05-03 NOTE — HPI
Hema is a 18 y/o male with PMH significant for AML (t 8;21) s/p intensification therapy II per [id number] (Arm A) who was initially admitted on 4/30/2018 for neutropenia/profund sepsis risk. He was stepped up to the PICU for persistent fever >103, tachycardia, and hypotension that has been minimally responsive to resuscitation. S/p 1L NS x 2 and 1/2 units pRBC that is still running. Also reports chest tightness, SOB with new increased oxygen requirement. On 2LPM NC for desaturation to mid 80s. Found to have new LLL airspace opacity on CXR. Finally reports ongoing dull headache throughout the day with no visual disturbances or neuro deficits.    Of note, as of yesterday (5/2/2018) he was Day 13 of intensification therapy II following AAML 1031 (Arm A).

## 2018-05-03 NOTE — ASSESSMENT & PLAN NOTE
18 y/o male with h/o  AML (t 8;21) s/p intensification therapy II per [id number] (Arm A) stepped up to PICU for sepsis after presenting with waxing mental status, hypotension unresponsive to NS bolus x 2 and blood 1/2, chest tightness w/ tachypnea, new oxygen requirement and concern for PNA on CXR. He is currently AOx3 on 15LPM HFNC @ 100% with mild but improving hypotension and tachycardia.    CNS: Reported waxing and waning mental status by floor team. However, AOx3 on PE in PICU. Complaint of dull headache with no visual changes or other focal deficits by patient. Neck supple on exam. Febrile to 103.3.  Mental status (improving)  - Continue to monitor neuro/mental status    Fever  - Scheduled tylenol 650mg, IV, Q6H for fever. No NSAIDs given allergy.   - Cooling blanket/ice packs    Headache: Less concerned for meningitis, also on appropriate abx coverage.  - Continue to monitor    Pain: Controlled.  - Continue PRN oxycodone    CV: Hypotensive with melisa of 82/35 and MAP <60 on floor despite 1L NS bolus x 2 and 1/2 units pRBC.   - Start epinephrine 0.02mcg/kg/min. Titrate to MAP goal >65.  - Continue pRBC and fluid resuscitation  - Vitals per protocol.    PULM: CXR notable for new airspace opacity in LLL. Concerning for PNA. Desaturations mid 80s per floor team.  - Start HFNC 15LPM @ 100%. Will wean O2 as tolerated.  - Start incentive spirometry and CPT  - Continue abx and albuterol nebs  - VBG    FEN/GI:  - Started NS @ 125mL/hr  - NPO while on HFNC, ice chips and mouth sponges permitted.  - Electrolytes 5/1 notable for mild hypokalemia but otherwise stable. Repeat CMP. Will continue to monitor and correct as needed.  - Famotidine 20mg, IV, BID for ppx  - Continue PRN zofran and miralax    HEME/ONC:   Chemotherapy induced neutropenia: Received 1/2 units pRBC on floor.  - Continue 2/2 units of pRBCs. Post-transfusion CBC. Hgb goal >7.  - Will give 2U plt for thrombocytopenia with melisa to 7. Plts goal >10.  - Daily  CBC and reticulocyte     AML: 8;21 translocation, c-kit mutation negative.  CNS negative.  MRD negative after Induction I.    - Treating per COG [id number] (ARM A).  S/p Intensification I and Intensification II started. He was day 13 of intensification II as of 5/2/2018.  - BM biopsy at start of Intensification shows CR.  FISH for t(8;21) negative. 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  - Continue acyclovir ppx  - Continue posaconazole ppx  - Continue bactrim QFSaSu ppx  - Continue peridex ppx    ID: Sepsis meeting 4/4 SIRS criteria. Increased risk in the setting of chemotherapy induced neutropenia and possible concern for PNA on CXR. Previous cultures NGTD. Lactate wnl at 0.7.  - Repeat blood cx and lactate.  - Will obtain fungal cx, cortisol  - Continue to follow previous cultures  - Continue vancomycin and cefepime   - Started on amikacin late 5/2 (almost midnight for 5/3)  - Consider ID consult.     RENAL: BUN/Cr wnl. No active issues.    SOCIAL: Mom not at bedside. With patient's permission, 8th floor nurses updated mom of current status.    DISPO: Critically ill, likely return to floor and then ultimately home pending clinical improvement and outpatient care coordination.    Patient seen and discussed with Dr. MARA Richardson.

## 2018-05-03 NOTE — PROCEDURES
"Hema Kuo is a 17 y.o. male patient.    Temp: 99 °F (37.2 °C) (05/03/18 1200)  Pulse: (!) 118 (05/03/18 1500)  Resp: (!) 35 (05/03/18 1500)  BP: (!) 74/44 (05/03/18 1255)  SpO2: 100 % (05/03/18 1500)  Weight: 87.9 kg (193 lb 12.6 oz) (04/30/18 2057)  Height: 5' 6" (167.6 cm) (04/30/18 2057)       Arterial Line  Date/Time: 5/3/2018 3:35 PM  Location procedure was performed: [facility] PED [facility]  Performed by: NAVEED PALMA  Authorized by: NAVEED PALMA   Assisting provider: NHAN TUTTLE  Pre-op Diagnosis: Sepsis  Post-operative diagnosis: Sepsis  Consent Done: Yes  Consent: Written consent obtained.  Consent given by: patient  Patient understanding: patient states understanding of the procedure being performed  Patient consent: the patient's understanding of the procedure matches consent given  Procedure consent: procedure consent matches procedure scheduled  Relevant documents: relevant documents present and verified  Patient identity confirmed: verbally with patient, MRN and name  Time out: Immediately prior to procedure a "time out" was called to verify the correct patient, procedure, equipment, support staff and site/side marked as required.  Preparation: Patient was prepped and draped in the usual sterile fashion.  Indications: hemodynamic monitoring  Location: right radial    Anesthesia:  Local Anesthetic: topical anesthetic  Patient sedated: no  Needle gauge: 22  Seldinger technique: Seldinger technique used  Number of attempts: 3  Complications: No  Estimated blood loss (mL): 15  Post-procedure: line sutured and dressing applied  Post-procedure CMS: normal  Patient tolerance: Patient tolerated the procedure well with no immediate complications          Naveed Palma  5/3/2018  "

## 2018-05-03 NOTE — SIGNIFICANT EVENT
Rapid Response Nurse Note     Rapid Response Metrics:     Admit Date: 2018  LOS: 3  Code Status: Full Code   Date of Consult: 2018  : 2000  Age: 17 y.o.  Weight:   Wt Readings from Last 1 Encounters:   18 87.9 kg (193 lb 12.6 oz) (90 %, Z= 1.29)*     * Growth percentiles are based on [facility] 2-20 Years data.     Race:   Sex: male  [facility]:   MRN: [id number]  Time Rapid Response Team page Received:   Time Rapid Response Team at Bedside: 0300  Time Rapid Response Team left Bedside: 0400  Was the patient discharged from an ICU this admission? no   Was the patient discharged from a PACU within last 24 hours? no  Did the patient receive conscious sedation/general anesthesia within last 24 hours? no  Was the patient in the ED within the past 24 hours? no  Was the patient started on NIPPV within the past 24 hours? no  Did this progress into an ARC or CPA: no  Attending Physician: Donny Richardson MD  Primary Service: Networked reference to record PCT   Consult Requested By: Donny Richardson MD   Rapid Response Indication(s): Hypotension  Disposition: Upgrade to PICU    SITUATION:     Reason for Call:   Called to evaluate the patient for Circulatory    BACKGROUND:     Why is the patient in the hospital?: neutropenic fevers, AML  What changed?: septic, hypotensive refractory to fluid resuscitation    ASSESSMENT:     What did you find: Proactive rounding was performed earlier this shift.  Recurrent neutropenic fevers, tmax 104 (axillary), 103.3 (oral), associated with tachycardia 120s-130s.  Patient received 1,250 ml NS bolus earlier and currently infusing unit # 2 of 2.  BP continues to be on lower end, MAPs 60-65, see epic charting of VS.  Denies dyspnea, but respiration rate is 30-35, on 2L % but when NC is off patient will drop to 84% on RA.  Labs were WBC 0.03, H/H 5.6/16, platelets 7, lactic 0.79, all labs drawn prior to blood administration.  Patient has persistent headache and  generalized malaise and weakness.  Continues with episodes of lethargy associated with fevers.    RECOMMENDATIONS:     We recommend: In setting of hypotension 2/2 sepsis, recommend upgrade to ICU (PICU) for close monitoring and vasopressor support if necessary.      FOLLOW-UP/CONTINGENCY PLAN:     Patient needs a second visit at : N/A    Call the rapid response Nurse at x [phone] for additional questions or concerns.      PHYSICIAN ESCALATION:     Orders received and case discussed with Dr. Shore, Dr. Gonzalez     Disposition: Upgrade to PICU

## 2018-05-03 NOTE — PLAN OF CARE
Problem: Patient Care Overview  Goal: Plan of Care Review  Outcome: Ongoing (interventions implemented as appropriate)  Mom updated to plan of care over phone, all questions and concerns addressed.  Patient hypotensive at beginning of shift, started on Epinephrine.  Artline placed, MAPs maintained above 60.  Vanc trough sent, Vanc increased to q6h.  Platelets given x2, 500ml NS x1.  Tmax 103.8, decreased to 99 after tylenol given, increased again to 103.4 in afternoon.  Per Dr. Bucio patient will likely be febrile for several days, patient does not need to wear cooling blanket unless requests to.  Complained of head ache in morning, diet increased to clear liquids, MD ok'd patient to drink caffeine to improve headache.  Albuterol q4h.  Started on Neupogen.  Mom plans to arrive at bedside this evening.  Will continue to monitor for changes, please see doc flow sheets for more details.

## 2018-05-03 NOTE — PROGRESS NOTES
Pt continues to be febrile, tachycardic and hypotensive.  aware of vitals and pt status.pt continues to be lethargic.  Rapid response nurse aware of patient. Labs drawn, bolus started, lactic and VBG done.    0030- pt reports a heaviness on chest and head. bolus stopped. MD at bedside along with critical care NP. CXR and prn nebs ordered. Blood products ordered and new antibiotics to be started. Pt continued to be febrile.    Pt's mother, Madeline, informed of changes with patient.

## 2018-05-03 NOTE — CARE UPDATE
RN Proactive Rounding Note  Time of Visit:     Admit Date: 2018  LOS: 2  Code Status: Full Code   Date of Visit: 2018  : 2000  Age: 17 y.o.  Sex: male  [facility]   [facility]: [id number]  Was the patient discharged from an ICU this admission? no   Was the patient discharged from a PACU within last 24 hours? no  Did the patient receive conscious sedation/general anesthesia in last 24 hours? no  Was the patient in the ED within the past 24 hours? no  Was the patient started on NIPPV within the past 24 hours? no  Attending Physician: Sanford Bucio MD  Primary Service: Networked reference to record PCT       ASSESSMENT:     Abnormal Vital Signs: Temp 103.3,   Clinical Issues: Circulatory     INTERVENTIONS/ RECOMMENDATIONS:     Patient with neutropenic fevers, tmax 103.3, has been spiking fevers throughout the day.  Lethargy and increased HR from baseline 70s to 120s-130s noted with fevers.  Patient denies pain at this time.  Last WBC 0.32.  Acetaminophen 650mg q6PRN last given at 1800.  Primary team, Pediatric Oncology, already rounded on patient with change in VS, ordered 1L NS bolus.  Recommend placement of cooling blanket to help regulate fluctuating temperatures and maintain neutropenic precautions.      Discussed plan of care with Malia HUMPHREYS    PHYSICIAN ESCALATION:     Yes/No no    Orders received and case discussed with Dr raines     Disposition: Remain on floor    FOLLOW-UP/CONTINGENCY:       Call back the Rapid Response Nurse at [phone]  for additional questions or concerns

## 2018-05-03 NOTE — SUBJECTIVE & OBJECTIVE
Past Medical History:   Diagnosis Date    AML (acute myeloblastic leukemia) 10/2017    Asthma     Encounter for blood transfusion     Seasonal allergies        Past Surgical History:   Procedure Laterality Date    PORTACATH PLACEMENT  10/31/2017    TONSILLECTOMY         Review of patient's allergies indicates:   Allergen Reactions    Nsaids (non-steroidal anti-inflammatory drug) Anaphylaxis    Nuts [tree nut] Anaphylaxis    Strawberry     Vancomycin analogues Itching     Premedicate with benadryl and admin over 2hr.       Family History     Problem Relation (Age of Onset)    Cancer Mother    Heart disease Mother    No Known Problems Father          Social History Main Topics    Smoking status: Former Smoker     Packs/day: 0.50     Types: Cigarettes     Quit date: 10/30/2017    Smokeless tobacco: Never Used    Alcohol use Yes      Comment: rare occasions    Drug use: No    Sexual activity: Yes     Partners: Female       Review of Systems   Constitutional: Positive for fever.   HENT: Positive for sore throat.    Eyes: Negative for photophobia.   Respiratory: Positive for chest tightness and shortness of breath.    Cardiovascular: Negative for palpitations.   Gastrointestinal: Positive for abdominal pain and diarrhea. Negative for vomiting.   Genitourinary: Negative for decreased urine volume.   Musculoskeletal: Positive for myalgias.   Skin: Negative for rash.   Allergic/Immunologic: Positive for food allergies.   Neurological: Positive for headaches.   Psychiatric/Behavioral: Positive for confusion.        As per HPI       Objective:     Vital Signs Range (Last 24H):  Temp:  [100.1 °F (37.8 °C)-104 °F (40 °C)]   Pulse:  [113-138]   Resp:  [14-30]   BP: ()/(34-55)   SpO2:  [75 %-100 %]     I & O (Last 24H):  Intake/Output Summary (Last 24 hours) at 05/03/18 9251  Last data filed at 05/03/18 0222   Gross per 24 hour   Intake             3502 ml   Output              500 ml   Net             3002 ml        Ventilator Data (Last 24H):     Oxygen Concentration (%):  [100] 100    Hemodynamic Parameters (Last 24H):       Physical Exam:  Physical Exam   Constitutional: He is oriented to person, place, and time. He appears well-developed and well-nourished. He appears ill.   HENT:   Head: Normocephalic and atraumatic.   Mouth/Throat: Oropharynx is clear and moist. No oropharyngeal exudate.   No mucositis appreciated   Eyes: EOM are normal. Pupils are equal, round, and reactive to light. Right eye exhibits no discharge. Left eye exhibits no discharge.   Pale conjunctiva   Neck: Normal range of motion.   Cardiovascular: Regular rhythm, normal heart sounds and intact distal pulses.  Tachycardia present.    No murmur heard.  Pulmonary/Chest: Tachypnea noted. He has no wheezes.   Decreased air movement, mild crackles lower left lung fields. Poor inspiratory effort that improved after HFNC placement.    Abdominal: Soft. He exhibits no distension.   Mild diffuse tenderness.   Genitourinary:   Genitourinary Comments: Deferred   Musculoskeletal: He exhibits no deformity.   Neurological: He is alert and oriented to person, place, and time. No sensory deficit. He exhibits normal muscle tone.   Skin: Skin is warm and dry. Capillary refill takes 2 to 3 seconds. There is pallor.   Psychiatric: His speech is normal.   Mildly    Nursing note and vitals reviewed.      Lines/Drains/Airways     Central Venous Catheter Line                 Port A Cath Double Lumen 10/31/17 1826 left subclavian 183 days                Laboratory (Last 24H):   Recent Results (from the past 24 hour(s))   Blood culture    Collection Time: 05/02/18 11:29 AM   Result Value Ref Range    Blood Culture, Routine No Growth to date    Prepare RBC 2 Units; decompensating    Collection Time: 05/02/18 11:26 PM   Result Value Ref Range    UNIT NUMBER [id number]     PRODUCT CODE O2916O28     DISPENSE STATUS ISSUED     CODING SYSTEM QTGK798     Unit Blood Type Code  8400     Unit Blood Type AB POS     Unit Expiration [id number]     UNIT NUMBER [id number]     PRODUCT CODE Y0786Q99     DISPENSE STATUS ISSUED     CODING SYSTEM PWST012     Unit Blood Type Code 8400     Unit Blood Type AB POS     Unit Expiration [id number]    Type & Screen    Collection Time: 05/02/18 11:36 PM   Result Value Ref Range    Group & Rh AB POS     Indirect Nathan NEG    CBC auto differential    Collection Time: 05/02/18 11:36 PM   Result Value Ref Range    WBC 0.03 (LL) 3.90 - 12.70 K/uL    RBC 1.72 (L) 4.60 - 6.20 M/uL    Hemoglobin 5.6 (LL) 14.0 - 18.0 g/dL    Hematocrit 16.0 (LL) 40.0 - 54.0 %    MCV 93 82 - 98 fL    MCH 32.6 (H) 27.0 - 31.0 pg    MCHC 35.0 32.0 - 36.0 g/dL    RDW 18.3 (H) 11.5 - 14.5 %    Platelets 7 (LL) 150 - 350 K/uL    MPV 13.5 (H) 9.2 - 12.9 fL    Immature Granulocytes 0.0 0.0 - 0.5 %    Gran # (ANC) 0.0 (L) 1.8 - 7.7 K/uL    Immature Grans (Abs) 0.00 0.00 - 0.04 K/uL    Lymph # 0.0 (L) 1.0 - 4.8 K/uL    Mono # 0.0 (L) 0.3 - 1.0 K/uL    Eos # 0.0 0.0 - 0.5 K/uL    Baso # 0.00 0.00 - 0.20 K/uL    nRBC 0 0 /100 WBC    Gran% 0.0 (L) 38.0 - 73.0 %    Lymph% 100.0 (H) 18.0 - 48.0 %    Mono% 0.0 (L) 4.0 - 15.0 %    Eosinophil% 0.0 0.0 - 8.0 %    Basophil% 0.0 0.0 - 1.9 %    Platelet Estimate Decreased (A)     Aniso Slight     Differential Method Automated    ISTAT PROCEDURE    Collection Time: 05/02/18 11:43 PM   Result Value Ref Range    POC PH 7.427 7.35 - 7.45    POC PCO2 34.5 (L) 35 - 45 mmHg    POC PO2 36 (LL) 40 - 60 mmHg    POC HCO3 22.7 (L) 24 - 28 mmol/L    POC BE -2 -2 to 2 mmol/L    POC SATURATED O2 71 (L) 95 - 100 %    POC TCO2 24 24 - 29 mmol/L    Rate 22     Sample VENOUS     Site Other     Allens Test N/A     DelSys Nasal Can     Mode SPONT     Flow 2     Sp02 96    ISTAT Lactate    Collection Time: 05/02/18 11:52 PM   Result Value Ref Range    POC Lactate 0.79 0.5 - 2.2 mmol/L    Sample VENOUS     Site Other     Allens Test N/A     DelSys Nasal Can     Mode  SPONT     Flow 2     Sp02 98      Chest X-Ray 5/3/2018:   FINDINGS:  There is a stable appearance of a left-sided subclavian approach chest port with the tip in the cavoatrial junction.    The trachea is unremarkable.  The cardiac silhouette is enlarged.  The hemidiaphragms are unremarkable.  There is no evidence of free air beneath the hemidiaphragms.  There are no pleural effusions.  There is no evidence of a pneumothorax.  There is no evidence of pneumomediastinum.  There is an evolving airspace opacity in the left lung base.  The osseous structures are unremarkable.    IMPRESSION:  Evolving airspace opacity in the left lung base.    Diagnostic Results:  X-Ray: I have personally reviewed both the image and report

## 2018-05-03 NOTE — CONSULTS
[facility]  Pediatric Hematology/Oncology  Consult Note    Patient Name: Hema Kuo  MRN: [id number]  Admission Date: 4/30/2018  Hospital Length of Stay: 3 days  Code Status: Full Code   Attending Provider: Donny Richardson MD  Primary Care Physician: Ann Reyna NP    Inpatient consult to Pediatric Hematology/Oncology  Consult performed by: LONNIE ALMONTE  Consult ordered by: MICHELLE SMART        Subjective:     Principal Problem:Neutropenia    HPI: 18 y/o M with AML s/p Consolidation II therapy, transferred to PICU overnight after development of fever and septic shock.  Received 2L NS, 2U PRBCs, 1 unit of plts, and was started on amikacin overnight.  Also developed new O2 req't and refractory hypotension.  Started on epi gtt this am.      Oncology Treatment Plan:     PEDS [id number] Intensification II  ARM B (MA) - LOW RISK PATIENTS    Medications:  Continuous Infusions:   sodium chloride 0.9% 125 mL/hr at 05/03/18 1500    epinephrine infusion (NON-TITRATING) 0.02 mcg/kg/min (05/03/18 1500)    heparin(porcine) 3 Units/hr (05/03/18 1500)     Scheduled Meds:   acetaminophen  650 mg Intravenous Q6H    acyclovir  400 mg Oral BID    albuterol sulfate  5 mg Nebulization Q4H    amikacin  20 mg/kg (Adjusted) Intravenous Q24H    ceFEPime (MAXIPIME) IVPB  2 g Intravenous Q8H    chlorhexidine  15 mL Mouth/Throat BID    famotidine (PF)  20 mg Intravenous BID    [START ON 5/4/2018] filgrastim (NEUPOGEN) 20 mcg/mL D5W (PEDS)  5 mcg/kg/day Intravenous Q24H    posaconazole  300 mg Oral Daily    [START ON 5/4/2018] sulfamethoxazole-trimethoprim 800-160mg  1 tablet Oral twice daily on Friday, Saturday, Sunday    vancomycin (VANCOCIN) IVPB  1,500 mg Intravenous Q8H     PRN Meds:sodium chloride, diphenhydrAMINE, levalbuterol, ondansetron, oxyCODONE, polyethylene glycol     Review of patient's allergies indicates:   Allergen Reactions    Nsaids (non-steroidal anti-inflammatory drug)  Anaphylaxis    Nuts [tree nut] Anaphylaxis    Strawberry     Vancomycin analogues Itching     Premedicate with benadryl and admin over 2hr.        Past Medical History:   Diagnosis Date    AML (acute myeloblastic leukemia) 10/2017    Asthma     Encounter for blood transfusion     Seasonal allergies      Past Surgical History:   Procedure Laterality Date    PORTACATH PLACEMENT  10/31/2017    TONSILLECTOMY       Family History     Problem Relation (Age of Onset)    Cancer Mother    Heart disease Mother    No Known Problems Father        Social History Main Topics    Smoking status: Former Smoker     Packs/day: 0.50     Types: Cigarettes     Quit date: 10/30/2017    Smokeless tobacco: Never Used    Alcohol use Yes      Comment: rare occasions    Drug use: No    Sexual activity: Yes     Partners: Female       Review of Systems   Constitutional: Positive for chills and fever. Negative for activity change, appetite change and unexpected weight change.   HENT: Negative.  Negative for nosebleeds.    Eyes: Negative.  Negative for visual disturbance.   Respiratory: Positive for shortness of breath.    Cardiovascular: Negative.    Gastrointestinal: Negative.  Negative for nausea and vomiting.   Endocrine: Negative.    Genitourinary: Negative.    Musculoskeletal: Negative.  Negative for arthralgias.   Skin: Negative.  Negative for rash.   Allergic/Immunologic: Negative.    Neurological: Negative.  Negative for weakness and headaches.   Hematological: Negative.  Does not bruise/bleed easily.   Psychiatric/Behavioral: Negative.    All other systems reviewed and are negative.    Objective:     Vital Signs (Most Recent):  Temp: 99 °F (37.2 °C) (05/03/18 1200)  Pulse: (!) 118 (05/03/18 1500)  Resp: (!) 35 (05/03/18 1500)  BP: (!) 74/44 (05/03/18 1255)  SpO2: 100 % (05/03/18 1500) Vital Signs (24h Range):  Temp:  [34.7 °F (1.5 °C)-104 °F (40 °C)] 99 °F (37.2 °C)  Pulse:  [103-138] 118  Resp:  [14-40] 35  SpO2:  [75  %-100 %] 100 %  BP: ()/(34-92) 74/44     Weight: 87.9 kg (193 lb 12.6 oz)  Body mass index is 31.28 kg/m².  Body surface area is 2.02 meters squared.      Intake/Output Summary (Last 24 hours) at 05/03/18 1537  Last data filed at 05/03/18 1500   Gross per 24 hour   Intake          6354.09 ml   Output             1200 ml   Net          5154.09 ml       Physical Exam   Constitutional: He is oriented to person, place, and time. He appears well-developed and well-nourished. No distress.   HENT:   Head: Normocephalic and atraumatic.   Mouth/Throat: Oropharynx is clear and moist.   Eyes: EOM are normal. Pupils are equal, round, and reactive to light. No scleral icterus.   Neck: Normal range of motion. Neck supple.   Cardiovascular: Normal rate, regular rhythm, normal heart sounds and intact distal pulses.  Exam reveals no gallop and no friction rub.    No murmur heard.  Pulmonary/Chest: Breath sounds normal.   Tachypneic, lungs slightly coarse but otherwise clear.  PAC site clear   Abdominal: Soft. Bowel sounds are normal. He exhibits no distension and no mass. There is no tenderness.   Musculoskeletal: Normal range of motion. He exhibits no edema.   Lymphadenopathy:     He has no cervical adenopathy.   Neurological: He is alert and oriented to person, place, and time. He displays normal reflexes. He exhibits normal muscle tone.   Skin: Skin is warm and dry. Capillary refill takes more than 3 seconds. No rash noted.   Psychiatric: He has a normal mood and affect.   Nursing note and vitals reviewed.      Assessment/Plan:     18 y/o M with AML, s/p consolidation II therapy, admitted for profound neutropenia, now with likely Strep viridans bacteremia and septic shock  -Continue cefepime, vancomycin and amikacin pending culture results  -Would maintain Hgb ~8, plts >10.  -Start G-CSF 480mcg IV daily  -Continue Bactrim, Posaconazole and acyclovir ppx  -Will follow       Sanford Bucio MD  Pediatric  Hematology/Oncology  [facility]

## 2018-05-03 NOTE — PROGRESS NOTES
Aerobika therapy technique for CPT demonstrated and explained. Pt performed aerobika therapy with great effort. Good, strong productive cough stimulated by aerobika. Large amounts of yellow, thick secretions expectorated. Incentive spirometry also performed with great effort with a result of 1500 ml. Will continue therapy and monitor pt closely.

## 2018-05-03 NOTE — H&P
[facility]  Pediatric Critical Care  History & Physical      Patient Name: Hema Kuo  MRN: [id number]  Admission Date: 4/30/2018  Code Status: Full Code   Attending Provider: Donny Richardson MD   Primary Care Physician: Ann Reyna NP  Principal Problem:Neutropenia    Patient information was obtained from patient, past medical records and floor nursing staff    Subjective:     HPI:   Hema is a 16 y/o male with PMH significant for AML (t 8;21) s/p intensification therapy II per [id number] (Arm A) who was initially admitted on 4/30/2018 for neutropenia/profund sepsis risk. He was stepped up to the PICU for persistent fever >103, tachycardia, and hypotension that has been minimally responsive to resuscitation. S/p 1L NS x 2 and 1/2 units pRBC that is still running. Also reports chest tightness, SOB with new increased oxygen requirement. On 2LPM NC for desaturation to mid 80s. Found to have new LLL airspace opacity on CXR. Finally reports ongoing dull headache throughout the day with no visual disturbances or neuro deficits.    Of note, as of yesterday (5/2/2018) he was Day 13 of intensification therapy II following AAML 1031 (Arm A).    Past Medical History:   Diagnosis Date    AML (acute myeloblastic leukemia) 10/2017    Asthma     Encounter for blood transfusion     Seasonal allergies        Past Surgical History:   Procedure Laterality Date    PORTACATH PLACEMENT  10/31/2017    TONSILLECTOMY         Review of patient's allergies indicates:   Allergen Reactions    Nsaids (non-steroidal anti-inflammatory drug) Anaphylaxis    Nuts [tree nut] Anaphylaxis    Strawberry     Vancomycin analogues Itching     Premedicate with benadryl and admin over 2hr.       Family History     Problem Relation (Age of Onset)    Cancer Mother    Heart disease Mother    No Known Problems Father          Social History Main Topics    Smoking status: Former Smoker     Packs/day: 0.50     Types: Cigarettes     Quit  date: 10/30/2017    Smokeless tobacco: Never Used    Alcohol use Yes      Comment: rare occasions    Drug use: No    Sexual activity: Yes     Partners: Female       Review of Systems   Constitutional: Positive for fever.   HENT: Positive for sore throat.    Eyes: Negative for photophobia.   Respiratory: Positive for chest tightness and shortness of breath.    Cardiovascular: Negative for palpitations.   Gastrointestinal: Positive for abdominal pain and diarrhea. Negative for vomiting.   Genitourinary: Negative for decreased urine volume.   Musculoskeletal: Positive for myalgias.   Skin: Negative for rash.   Allergic/Immunologic: Positive for food allergies.   Neurological: Positive for headaches.   Psychiatric/Behavioral: Positive for confusion.        As per HPI       Objective:     Vital Signs Range (Last 24H):  Temp:  [100.1 °F (37.8 °C)-104 °F (40 °C)]   Pulse:  [113-138]   Resp:  [14-30]   BP: ()/(34-55)   SpO2:  [75 %-100 %]     I & O (Last 24H):  Intake/Output Summary (Last 24 hours) at 05/03/18 0431  Last data filed at 05/03/18 0222   Gross per 24 hour   Intake             3502 ml   Output              500 ml   Net             3002 ml       Ventilator Data (Last 24H):     Oxygen Concentration (%):  [100] 100    Hemodynamic Parameters (Last 24H):       Physical Exam:  Physical Exam   Constitutional: He is oriented to person, place, and time. He appears well-developed and well-nourished. He appears ill.   HENT:   Head: Normocephalic and atraumatic.   Mouth/Throat: Oropharynx is clear and moist. No oropharyngeal exudate.   No mucositis appreciated   Eyes: EOM are normal. Pupils are equal, round, and reactive to light. Right eye exhibits no discharge. Left eye exhibits no discharge.   Pale conjunctiva   Neck: Normal range of motion.   Cardiovascular: Regular rhythm, normal heart sounds and intact distal pulses.  Tachycardia present.    No murmur heard.  Pulmonary/Chest: Tachypnea noted. He has no  wheezes.   Decreased air movement, mild crackles lower left lung fields. Poor inspiratory effort that improved after HFNC placement.    Abdominal: Soft. He exhibits no distension.   Mild diffuse tenderness.   Genitourinary:   Genitourinary Comments: Deferred   Musculoskeletal: He exhibits no deformity.   Neurological: He is alert and oriented to person, place, and time. No sensory deficit. He exhibits normal muscle tone.   Skin: Skin is warm and dry. Capillary refill takes 2 to 3 seconds. There is pallor.   Psychiatric: His speech is normal.   Mildly    Nursing note and vitals reviewed.      Lines/Drains/Airways     Central Venous Catheter Line                 Port A Cath Double Lumen 10/31/17 1826 left subclavian 183 days                Laboratory (Last 24H):   Recent Results (from the past 24 hour(s))   Blood culture    Collection Time: 05/02/18 11:29 AM   Result Value Ref Range    Blood Culture, Routine No Growth to date    Prepare RBC 2 Units; decompensating    Collection Time: 05/02/18 11:26 PM   Result Value Ref Range    UNIT NUMBER [id number]     PRODUCT CODE T3656J12     DISPENSE STATUS ISSUED     CODING SYSTEM [id number]     Unit Blood Type Code 8400     Unit Blood Type AB POS     Unit Expiration [id number]     UNIT NUMBER [id number]     PRODUCT CODE S3235P90     DISPENSE STATUS ISSUED     CODING SYSTEM XWDD724     Unit Blood Type Code 8400     Unit Blood Type AB POS     Unit Expiration [id number]    Type & Screen    Collection Time: 05/02/18 11:36 PM   Result Value Ref Range    Group & Rh AB POS     Indirect Nathan NEG    CBC auto differential    Collection Time: 05/02/18 11:36 PM   Result Value Ref Range    WBC 0.03 (LL) 3.90 - 12.70 K/uL    RBC 1.72 (L) 4.60 - 6.20 M/uL    Hemoglobin 5.6 (LL) 14.0 - 18.0 g/dL    Hematocrit 16.0 (LL) 40.0 - 54.0 %    MCV 93 82 - 98 fL    MCH 32.6 (H) 27.0 - 31.0 pg    MCHC 35.0 32.0 - 36.0 g/dL    RDW 18.3 (H) 11.5 - 14.5 %    Platelets 7 (LL) 150 - 350 K/uL    MPV  13.5 (H) 9.2 - 12.9 fL    Immature Granulocytes 0.0 0.0 - 0.5 %    Gran # (ANC) 0.0 (L) 1.8 - 7.7 K/uL    Immature Grans (Abs) 0.00 0.00 - 0.04 K/uL    Lymph # 0.0 (L) 1.0 - 4.8 K/uL    Mono # 0.0 (L) 0.3 - 1.0 K/uL    Eos # 0.0 0.0 - 0.5 K/uL    Baso # 0.00 0.00 - 0.20 K/uL    nRBC 0 0 /100 WBC    Gran% 0.0 (L) 38.0 - 73.0 %    Lymph% 100.0 (H) 18.0 - 48.0 %    Mono% 0.0 (L) 4.0 - 15.0 %    Eosinophil% 0.0 0.0 - 8.0 %    Basophil% 0.0 0.0 - 1.9 %    Platelet Estimate Decreased (A)     Aniso Slight     Differential Method Automated    ISTAT PROCEDURE    Collection Time: 05/02/18 11:43 PM   Result Value Ref Range    POC PH 7.427 7.35 - 7.45    POC PCO2 34.5 (L) 35 - 45 mmHg    POC PO2 36 (LL) 40 - 60 mmHg    POC HCO3 22.7 (L) 24 - 28 mmol/L    POC BE -2 -2 to 2 mmol/L    POC SATURATED O2 71 (L) 95 - 100 %    POC TCO2 24 24 - 29 mmol/L    Rate 22     Sample VENOUS     Site Other     Allens Test N/A     DelSys Nasal Can     Mode SPONT     Flow 2     Sp02 96    ISTAT Lactate    Collection Time: 05/02/18 11:52 PM   Result Value Ref Range    POC Lactate 0.79 0.5 - 2.2 mmol/L    Sample VENOUS     Site Other     Allens Test N/A     DelSys Nasal Can     Mode SPONT     Flow 2     Sp02 98      Chest X-Ray 5/3/2018:   FINDINGS:  There is a stable appearance of a left-sided subclavian approach chest port with the tip in the cavoatrial junction.    The trachea is unremarkable.  The cardiac silhouette is enlarged.  The hemidiaphragms are unremarkable.  There is no evidence of free air beneath the hemidiaphragms.  There are no pleural effusions.  There is no evidence of a pneumothorax.  There is no evidence of pneumomediastinum.  There is an evolving airspace opacity in the left lung base.  The osseous structures are unremarkable.    IMPRESSION:  Evolving airspace opacity in the left lung base.    Diagnostic Results:  X-Ray: I have personally reviewed both the image and report      Assessment/Plan:     Sepsis    18 y/o male with  h/o  AML (t 8;21) s/p intensification therapy II per [id number] (Arm A) stepped up to PICU for sepsis after presenting with waxing mental status, hypotension unresponsive to NS bolus x 2 and blood 1/2, chest tightness w/ tachypnea, new oxygen requirement and concern for PNA on CXR. He is currently AOx3 on 15LPM HFNC @ 100% with mild but improving hypotension and tachycardia.    CNS: Reported waxing and waning mental status by floor team. However, AOx3 on PE in PICU. Complaint of dull headache with no visual changes or other focal deficits by patient. Neck supple on exam. Febrile to 103.3.  Mental status (improving)  - Continue to monitor neuro/mental status    Fever  - Scheduled tylenol 650mg, IV, Q6H for fever. No NSAIDs given allergy.   - Cooling blanket/ice packs    Headache: Less concerned for meningitis, also on appropriate abx coverage.  - Continue to monitor    Pain: Controlled.  - Continue PRN oxycodone    CV: Hypotensive with melisa of 82/35 and MAP <60 on floor despite 1L NS bolus x 2 and 1/2 units pRBC.   - Start epinephrine 0.02mcg/kg/min. Titrate to MAP goal >65.  - Continue pRBC and fluid resuscitation  - Vitals per protocol.    PULM: CXR notable for new airspace opacity in LLL. Concerning for PNA. Desaturations mid 80s per floor team.  - Start HFNC 15LPM @ 100%. Will wean O2 as tolerated.  - Start incentive spirometry and CPT  - Continue abx and albuterol nebs  - VBG    FEN/GI:  - Started NS @ 125mL/hr  - NPO while on HFNC, ice chips and mouth sponges permitted.  - Electrolytes 5/1 notable for mild hypokalemia but otherwise stable. Repeat CMP. Will continue to monitor and correct as needed.  - Famotidine 20mg, IV, BID for ppx  - Continue PRN zofran and miralax    HEME/ONC:   Chemotherapy induced neutropenia: Received 1/2 units pRBC on floor.  - Continue 2/2 units of pRBCs. Post-transfusion CBC. Hgb goal >7.  - Will give 2U plt for thrombocytopenia with melisa to 7. Plts goal >10.  - Daily CBC and  reticulocyte     AML: 8;21 translocation, c-kit mutation negative.  CNS negative.  MRD negative after Induction I.    - Treating per COG [id number] (ARM A).  S/p Intensification I and Intensification II started. He was day 13 of intensification II as of 5/2/2018.  - BM biopsy at start of Intensification shows CR.  FISH for t(8;21) negative. 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  - Continue acyclovir ppx  - Continue posaconazole ppx  - Continue bactrim QFSaSu ppx  - Continue peridex ppx    ID: Sepsis meeting 4/4 SIRS criteria. Increased risk in the setting of chemotherapy induced neutropenia and possible concern for PNA on CXR. Previous cultures NGTD. Lactate wnl at 0.7.  - Repeat blood cx and lactate.  - Will obtain fungal cx, cortisol  - Continue to follow previous cultures  - Continue vancomycin and cefepime   - Started on amikacin late 5/2 (almost midnight for 5/3)  - Consider ID consult.     RENAL: BUN/Cr wnl. No active issues.    SOCIAL: Mom not at bedside. With patient's permission, 8th floor nurses updated mom of current status.    DISPO: Critically ill, likely return to floor and then ultimately home pending clinical improvement and outpatient care coordination.    Patient seen and discussed with Dr. MARA Richardson.            Critical Care Time greater than: 1 Hour    Nata Gonzalez MD  Pediatric Critical Care  [facility]

## 2018-05-04 ENCOUNTER — SOCIAL WORK (OUTPATIENT)
Dept: CASE MANAGEMENT | Facility: HOSPITAL | Age: 18
End: 2018-05-04

## 2018-05-04 LAB
ALBUMIN SERPL BCP-MCNC: 2.6 G/DL
ALLENS TEST: ABNORMAL
ALP SERPL-CCNC: 44 U/L
ALT SERPL W/O P-5'-P-CCNC: 144 U/L
AMIKACIN TROUGH SERPL-MCNC: <2 UG/ML
ANION GAP SERPL CALC-SCNC: 7 MMOL/L
ANISOCYTOSIS BLD QL SMEAR: SLIGHT
AST SERPL-CCNC: 162 U/L
BASOPHILS NFR BLD: 0 %
BILIRUB SERPL-MCNC: 0.8 MG/DL
BLD PROD TYP BPU: NORMAL
BLOOD UNIT EXPIRATION DATE: NORMAL
BLOOD UNIT TYPE CODE: 8400
BLOOD UNIT TYPE: NORMAL
BUN SERPL-MCNC: 9 MG/DL
CALCIUM SERPL-MCNC: 7.8 MG/DL
CHLORIDE SERPL-SCNC: 107 MMOL/L
CO2 SERPL-SCNC: 19 MMOL/L
CODING SYSTEM: NORMAL
CREAT SERPL-MCNC: 0.7 MG/DL
DELSYS: ABNORMAL
DIFFERENTIAL METHOD: ABNORMAL
DISPENSE STATUS: NORMAL
EOSINOPHIL NFR BLD: 0 %
ERYTHROCYTE [DISTWIDTH] IN BLOOD BY AUTOMATED COUNT: 17.4 %
EST. GFR  (AFRICAN AMERICAN): >60 ML/MIN/1.73 M^2
EST. GFR  (NON AFRICAN AMERICAN): >60 ML/MIN/1.73 M^2
FIO2: 100
FLOW: 30
GLUCOSE SERPL-MCNC: 126 MG/DL
HCO3 UR-SCNC: 18 MMOL/L (ref 24–28)
HCO3 UR-SCNC: 21.6 MMOL/L (ref 24–28)
HCT VFR BLD AUTO: 21.6 %
HCT VFR BLD CALC: 21 %PCV (ref 36–54)
HCT VFR BLD CALC: 24 %PCV (ref 36–54)
HGB BLD-MCNC: 8 G/DL
IMM GRANULOCYTES # BLD AUTO: ABNORMAL K/UL
IMM GRANULOCYTES NFR BLD AUTO: ABNORMAL %
LDH SERPL L TO P-CCNC: 1.3 MMOL/L (ref 0.36–1.25)
LDH SERPL L TO P-CCNC: 2.46 MMOL/L (ref 0.36–1.25)
LYMPHOCYTES NFR BLD: 100 %
MAGNESIUM SERPL-MCNC: 1.3 MG/DL
MCH RBC QN AUTO: 31.5 PG
MCHC RBC AUTO-ENTMCNC: 37 G/DL
MCV RBC AUTO: 85 FL
MODE: ABNORMAL
MONOCYTES NFR BLD: 0 %
NEUTROPHILS NFR BLD: 0 %
NRBC BLD-RTO: 0 /100 WBC
NUM UNITS TRANS WBC-POOR PLATPHERESIS: NORMAL
OVALOCYTES BLD QL SMEAR: ABNORMAL
PCO2 BLDA: 27.1 MMHG (ref 35–45)
PCO2 BLDA: 27.6 MMHG (ref 35–45)
PH SMN: 7.42 [PH] (ref 7.35–7.45)
PH SMN: 7.51 [PH] (ref 7.35–7.45)
PHOSPHATE SERPL-MCNC: <1 MG/DL
PLATELET # BLD AUTO: 17 K/UL
PLATELET BLD QL SMEAR: ABNORMAL
PMV BLD AUTO: 9.6 FL
PO2 BLDA: 46 MMHG (ref 80–100)
PO2 BLDA: 55 MMHG (ref 80–100)
POC BE: -1 MMOL/L
POC BE: -6 MMOL/L
POC IONIZED CALCIUM: 1.08 MMOL/L (ref 1.06–1.42)
POC IONIZED CALCIUM: 1.11 MMOL/L (ref 1.06–1.42)
POC SATURATED O2: 87 % (ref 95–100)
POC SATURATED O2: 90 % (ref 95–100)
POC TCO2: 19 MMOL/L (ref 23–27)
POC TCO2: 22 MMOL/L (ref 23–27)
POIKILOCYTOSIS BLD QL SMEAR: SLIGHT
POLYCHROMASIA BLD QL SMEAR: ABNORMAL
POTASSIUM BLD-SCNC: 3 MMOL/L (ref 3.5–5.1)
POTASSIUM BLD-SCNC: 3.1 MMOL/L (ref 3.5–5.1)
POTASSIUM SERPL-SCNC: 3.3 MMOL/L
PROT SERPL-MCNC: 5.3 G/DL
PROVIDER CREDENTIALS: ABNORMAL
PROVIDER NOTIFIED: ABNORMAL
RBC # BLD AUTO: 2.54 M/UL
RETICS/RBC NFR AUTO: 0.3 %
SAMPLE: ABNORMAL
SITE: ABNORMAL
SODIUM BLD-SCNC: 135 MMOL/L (ref 136–145)
SODIUM BLD-SCNC: 137 MMOL/L (ref 136–145)
SODIUM SERPL-SCNC: 133 MMOL/L
SP02: 98
TIME NOTIFIED: 1305
TIME NOTIFIED: 2045
VANCOMYCIN TROUGH SERPL-MCNC: 28.5 UG/ML
VERBAL RESULT READBACK PERFORMED: YES
VERBAL RESULT READBACK PERFORMED: YES
WBC # BLD AUTO: 0.17 K/UL

## 2018-05-04 PROCEDURE — 94664 DEMO&/EVAL PT USE INHALER: CPT

## 2018-05-04 PROCEDURE — 25000003 PHARM REV CODE 250: Performed by: STUDENT IN AN ORGANIZED HEALTH CARE EDUCATION/TRAINING PROGRAM

## 2018-05-04 PROCEDURE — 63600175 PHARM REV CODE 636 W HCPCS: Performed by: PEDIATRICS

## 2018-05-04 PROCEDURE — 27100092 HC HIGH FLOW DELIVERY CANNULA

## 2018-05-04 PROCEDURE — 25000242 PHARM REV CODE 250 ALT 637 W/ HCPCS: Performed by: STUDENT IN AN ORGANIZED HEALTH CARE EDUCATION/TRAINING PROGRAM

## 2018-05-04 PROCEDURE — 63600175 PHARM REV CODE 636 W HCPCS: Performed by: STUDENT IN AN ORGANIZED HEALTH CARE EDUCATION/TRAINING PROGRAM

## 2018-05-04 PROCEDURE — 99900035 HC TECH TIME PER 15 MIN (STAT)

## 2018-05-04 PROCEDURE — 80053 COMPREHEN METABOLIC PANEL: CPT

## 2018-05-04 PROCEDURE — 25000003 PHARM REV CODE 250: Performed by: PEDIATRICS

## 2018-05-04 PROCEDURE — P9037 PLATE PHERES LEUKOREDU IRRAD: HCPCS

## 2018-05-04 PROCEDURE — 93325 DOPPLER ECHO COLOR FLOW MAPG: CPT | Performed by: PEDIATRICS

## 2018-05-04 PROCEDURE — 27000221 HC OXYGEN, UP TO 24 HOURS

## 2018-05-04 PROCEDURE — 82803 BLOOD GASES ANY COMBINATION: CPT

## 2018-05-04 PROCEDURE — 84100 ASSAY OF PHOSPHORUS: CPT

## 2018-05-04 PROCEDURE — S0028 INJECTION, FAMOTIDINE, 20 MG: HCPCS | Performed by: STUDENT IN AN ORGANIZED HEALTH CARE EDUCATION/TRAINING PROGRAM

## 2018-05-04 PROCEDURE — 99291 CRITICAL CARE FIRST HOUR: CPT | Mod: ,,, | Performed by: PEDIATRICS

## 2018-05-04 PROCEDURE — 93304 ECHO TRANSTHORACIC: CPT | Performed by: PEDIATRICS

## 2018-05-04 PROCEDURE — 84295 ASSAY OF SERUM SODIUM: CPT

## 2018-05-04 PROCEDURE — 94761 N-INVAS EAR/PLS OXIMETRY MLT: CPT

## 2018-05-04 PROCEDURE — 27000190 HC CPAP FULL FACE MASK W/VALVE

## 2018-05-04 PROCEDURE — 63600175 PHARM REV CODE 636 W HCPCS: Mod: JG | Performed by: PEDIATRICS

## 2018-05-04 PROCEDURE — 80202 ASSAY OF VANCOMYCIN: CPT

## 2018-05-04 PROCEDURE — 85007 BL SMEAR W/DIFF WBC COUNT: CPT

## 2018-05-04 PROCEDURE — 99234 HOSP IP/OBS SM DT SF/LOW 45: CPT | Mod: ,,, | Performed by: PEDIATRICS

## 2018-05-04 PROCEDURE — 85014 HEMATOCRIT: CPT

## 2018-05-04 PROCEDURE — 94660 CPAP INITIATION&MGMT: CPT

## 2018-05-04 PROCEDURE — 93321 DOPPLER ECHO F-UP/LMTD STD: CPT | Performed by: PEDIATRICS

## 2018-05-04 PROCEDURE — 84132 ASSAY OF SERUM POTASSIUM: CPT

## 2018-05-04 PROCEDURE — 85027 COMPLETE CBC AUTOMATED: CPT

## 2018-05-04 PROCEDURE — 85045 AUTOMATED RETICULOCYTE COUNT: CPT

## 2018-05-04 PROCEDURE — 82330 ASSAY OF CALCIUM: CPT

## 2018-05-04 PROCEDURE — 83735 ASSAY OF MAGNESIUM: CPT

## 2018-05-04 PROCEDURE — 37799 UNLISTED PX VASCULAR SURGERY: CPT

## 2018-05-04 PROCEDURE — 94640 AIRWAY INHALATION TREATMENT: CPT

## 2018-05-04 PROCEDURE — 20300000 HC PICU ROOM

## 2018-05-04 PROCEDURE — 25000242 PHARM REV CODE 250 ALT 637 W/ HCPCS: Performed by: PEDIATRICS

## 2018-05-04 PROCEDURE — 63600175 PHARM REV CODE 636 W HCPCS

## 2018-05-04 PROCEDURE — 27100171 HC OXYGEN HIGH FLOW UP TO 24 HOURS

## 2018-05-04 PROCEDURE — 83605 ASSAY OF LACTIC ACID: CPT

## 2018-05-04 RX ORDER — HYDROCODONE BITARTRATE AND ACETAMINOPHEN 500; 5 MG/1; MG/1
TABLET ORAL
Status: DISCONTINUED | OUTPATIENT
Start: 2018-05-04 | End: 2018-05-06

## 2018-05-04 RX ORDER — FUROSEMIDE 10 MG/ML
10 INJECTION INTRAMUSCULAR; INTRAVENOUS ONCE
Status: COMPLETED | OUTPATIENT
Start: 2018-05-04 | End: 2018-05-04

## 2018-05-04 RX ORDER — FUROSEMIDE 10 MG/ML
INJECTION INTRAMUSCULAR; INTRAVENOUS
Status: COMPLETED
Start: 2018-05-04 | End: 2018-05-04

## 2018-05-04 RX ORDER — LORAZEPAM 2 MG/ML
2 INJECTION INTRAMUSCULAR ONCE
Status: COMPLETED | OUTPATIENT
Start: 2018-05-04 | End: 2018-05-04

## 2018-05-04 RX ORDER — FUROSEMIDE 10 MG/ML
20 INJECTION INTRAMUSCULAR; INTRAVENOUS
Status: DISCONTINUED | OUTPATIENT
Start: 2018-05-04 | End: 2018-05-06

## 2018-05-04 RX ORDER — IPRATROPIUM BROMIDE AND ALBUTEROL SULFATE 2.5; .5 MG/3ML; MG/3ML
3 SOLUTION RESPIRATORY (INHALATION)
Status: COMPLETED | OUTPATIENT
Start: 2018-05-04 | End: 2018-05-04

## 2018-05-04 RX ORDER — VANCOMYCIN HYDROCHLORIDE
1500
Status: DISCONTINUED | OUTPATIENT
Start: 2018-05-05 | End: 2018-05-05

## 2018-05-04 RX ORDER — MAGNESIUM SULFATE/D5W 2 G/50 ML
2 INTRAVENOUS SOLUTION, PIGGYBACK (ML) INTRAVENOUS ONCE
Status: COMPLETED | OUTPATIENT
Start: 2018-05-04 | End: 2018-05-04

## 2018-05-04 RX ORDER — LORAZEPAM 2 MG/ML
1 INJECTION INTRAMUSCULAR EVERY 4 HOURS PRN
Status: DISCONTINUED | OUTPATIENT
Start: 2018-05-04 | End: 2018-05-09

## 2018-05-04 RX ORDER — ACETAMINOPHEN 10 MG/ML
650 INJECTION, SOLUTION INTRAVENOUS EVERY 6 HOURS
Status: COMPLETED | OUTPATIENT
Start: 2018-05-04 | End: 2018-05-05

## 2018-05-04 RX ORDER — FUROSEMIDE 10 MG/ML
10 INJECTION INTRAMUSCULAR; INTRAVENOUS 2 TIMES DAILY
Status: DISCONTINUED | OUTPATIENT
Start: 2018-05-04 | End: 2018-05-04

## 2018-05-04 RX ORDER — INDOMETHACIN 25 MG/1
50 CAPSULE ORAL ONCE
Status: COMPLETED | OUTPATIENT
Start: 2018-05-04 | End: 2018-05-04

## 2018-05-04 RX ADMIN — SODIUM CHLORIDE: 0.9 INJECTION, SOLUTION INTRAVENOUS at 12:05

## 2018-05-04 RX ADMIN — CHLORHEXIDINE GLUCONATE 15 ML: 1.2 RINSE ORAL at 09:05

## 2018-05-04 RX ADMIN — LORAZEPAM 1 MG: 2 INJECTION INTRAMUSCULAR at 10:05

## 2018-05-04 RX ADMIN — VANCOMYCIN HYDROCHLORIDE 1500 MG: 10 INJECTION, POWDER, LYOPHILIZED, FOR SOLUTION INTRAVENOUS at 03:05

## 2018-05-04 RX ADMIN — IPRATROPIUM BROMIDE AND ALBUTEROL SULFATE 3 ML: .5; 3 SOLUTION RESPIRATORY (INHALATION) at 10:05

## 2018-05-04 RX ADMIN — FAMOTIDINE 20 MG: 10 INJECTION INTRAVENOUS at 08:05

## 2018-05-04 RX ADMIN — Medication 2 G: at 07:05

## 2018-05-04 RX ADMIN — FUROSEMIDE 10 MG: 10 INJECTION INTRAMUSCULAR; INTRAVENOUS at 10:05

## 2018-05-04 RX ADMIN — FUROSEMIDE 10 MG: 10 INJECTION, SOLUTION INTRAVENOUS at 10:05

## 2018-05-04 RX ADMIN — ACETAMINOPHEN 650 MG: 10 INJECTION, SOLUTION INTRAVENOUS at 05:05

## 2018-05-04 RX ADMIN — AMIKACIN SULFATE 1475 MG: 500 INJECTION, SOLUTION INTRAMUSCULAR; INTRAVENOUS at 12:05

## 2018-05-04 RX ADMIN — ALBUTEROL SULFATE 5 MG: 2.5 SOLUTION RESPIRATORY (INHALATION) at 04:05

## 2018-05-04 RX ADMIN — POSACONAZOLE 300 MG: 100 TABLET, COATED ORAL at 08:05

## 2018-05-04 RX ADMIN — ACYCLOVIR 400 MG: 200 CAPSULE ORAL at 08:05

## 2018-05-04 RX ADMIN — CHLORHEXIDINE GLUCONATE 15 ML: 1.2 RINSE ORAL at 08:05

## 2018-05-04 RX ADMIN — MEROPENEM 2 G: 1 INJECTION, POWDER, FOR SOLUTION INTRAVENOUS at 12:05

## 2018-05-04 RX ADMIN — Medication 3 UNITS/HR: at 12:05

## 2018-05-04 RX ADMIN — EPINEPHRINE 0.02 MCG/KG/MIN: 1 INJECTION INTRAMUSCULAR; INTRAVENOUS; SUBCUTANEOUS at 04:05

## 2018-05-04 RX ADMIN — SULFAMETHOXAZOLE AND TRIMETHOPRIM 1 TABLET: 800; 160 TABLET ORAL at 08:05

## 2018-05-04 RX ADMIN — MAGNESIUM SULFATE HEPTAHYDRATE: 500 INJECTION, SOLUTION INTRAMUSCULAR; INTRAVENOUS at 12:05

## 2018-05-04 RX ADMIN — FUROSEMIDE 10 MG: 10 INJECTION, SOLUTION INTRAVENOUS at 02:05

## 2018-05-04 RX ADMIN — VANCOMYCIN HYDROCHLORIDE 1500 MG: 10 INJECTION, POWDER, LYOPHILIZED, FOR SOLUTION INTRAVENOUS at 08:05

## 2018-05-04 RX ADMIN — ALBUTEROL SULFATE 5 MG: 2.5 SOLUTION RESPIRATORY (INHALATION) at 03:05

## 2018-05-04 RX ADMIN — LORAZEPAM 1 MG: 2 INJECTION INTRAMUSCULAR at 09:05

## 2018-05-04 RX ADMIN — FUROSEMIDE 20 MG: 10 INJECTION, SOLUTION INTRAVENOUS at 05:05

## 2018-05-04 RX ADMIN — FUROSEMIDE 20 MG: 10 INJECTION, SOLUTION INTRAVENOUS at 11:05

## 2018-05-04 RX ADMIN — ALBUTEROL SULFATE 5 MG: 2.5 SOLUTION RESPIRATORY (INHALATION) at 08:05

## 2018-05-04 RX ADMIN — CEFEPIME 2 G: 2 INJECTION, POWDER, FOR SOLUTION INTRAVENOUS at 02:05

## 2018-05-04 RX ADMIN — LORAZEPAM 2 MG: 2 INJECTION INTRAMUSCULAR at 08:05

## 2018-05-04 RX ADMIN — ALBUTEROL SULFATE 5 MG: 2.5 SOLUTION RESPIRATORY (INHALATION) at 12:05

## 2018-05-04 RX ADMIN — FILGRASTIM 480 MCG: 480 INJECTION, SOLUTION INTRAVENOUS; SUBCUTANEOUS at 02:05

## 2018-05-04 RX ADMIN — Medication 2 G: at 11:05

## 2018-05-04 RX ADMIN — ACETAMINOPHEN 650 MG: 10 INJECTION, SOLUTION INTRAVENOUS at 11:05

## 2018-05-04 RX ADMIN — SODIUM BICARBONATE 50 MEQ: 84 INJECTION, SOLUTION INTRAVENOUS at 02:05

## 2018-05-04 RX ADMIN — MEROPENEM 2 G: 1 INJECTION, POWDER, FOR SOLUTION INTRAVENOUS at 08:05

## 2018-05-04 RX ADMIN — Medication 3 UNITS/HR: at 05:05

## 2018-05-04 RX ADMIN — POTASSIUM PHOSPHATE, MONOBASIC AND POTASSIUM PHOSPHATE, DIBASIC 30 MMOL: 224; 236 INJECTION, SOLUTION, CONCENTRATE INTRAVENOUS at 07:05

## 2018-05-04 NOTE — PLAN OF CARE
Problem: Patient Care Overview  Goal: Plan of Care Review  Outcome: Ongoing (interventions implemented as appropriate)  Mom at bedside throughout shift, plan of care discussed, all questions and concerns addressed.  Increased WOB, patient complains that throat hurts and breathing feels difficult, HFNC increased to 40L, CXR obtained, ABGs q6h.  Epinephrine stopped, Duonebs x3.  Sodium Bicarb x1, Mag x2, KPhos x1. MIVF changed to D5 NS with Mag and KPhos, Total fluid 100ml/hr. Lasix q6h.  Echo today.  300ml Platelets given.  Vanc trough high, afternoon and evening doses held, new trough to be drawn at 2am, dose changed to q12h.  Febrile throughout day, Tmax 103.2.  Cefepime dc'd, started Merrem.  PRN Ativan x1 in morning for sleeplessness/anxiety.  Will continue to monitor for changes, please see doc flow sheets for more details.

## 2018-05-04 NOTE — Clinical Note
Hey,  Just JOSHUA, I booked  for this mom through the weekend at hem/onc's request. But the nurse and I discussed that if they need it past this weekend, we should probably ask them to contribute to the cost.   Thanks!

## 2018-05-04 NOTE — PLAN OF CARE
Problem: Patient Care Overview  Goal: Plan of Care Review  Outcome: Ongoing (interventions implemented as appropriate)  Mother and grandmother at bedside throughout shift, interacting and comforting patient.  Patient and family updated on POC, questions/concerns addressed, no further questions at this time.  Pt continues on HFNC 25L, 100%, tolerating well.  Continues with abdominal muscle use, and tachypnea (30-40s).  Pt remains appropriate to situation, at baseline, slept for majority of shift.  Continues to febrile over shift (103.4 tMax), continues on tylenol IV ATC.  Using ice packs and zoll to cool off per pt request.  Pt continues to be tachycardic, 120-130s.  HR inc to 140-150s with activity.  Pt continues on epi gtt at 0.02 to maintain MAP >60.  Otherwise, VSS.  PT voiding adequately without difficulty.  BM x2.  See flowsheets for additional documentation.  Will continue to monitor.

## 2018-05-04 NOTE — PROGRESS NOTES
SW was contacted by peds hem/onc nurse, who requested [facility] reservations through the weekend while Pt is in the PICU. ALEXANDRU emailed billing authorization to  ([email or web address]) reserving one room in mom's name (Madeline Coughlin) for 05/04/18 through 05/07/18 using the [employer] to cover the charges. Original paperwork retained for SW records.     No further known needs at this time.

## 2018-05-04 NOTE — CONSULTS
[facility]  Pediatric Hematology/Oncology  Consult Note    Patient Name: Hema Kuo  MRN: [id number]  Admission Date: 4/30/2018  Hospital Length of Stay: 4 days  Code Status: Full Code   Attending Provider: Donny Richardson MD  Primary Care Physician: Ann Reyna NP    Consults  Subjective:     Principal Problem:Septic shock    HPI: 18 y/o M with AML s/p Consolidation II therapy, transferred to PICU  after development of fever and septic shock.  Blood cultures growing gran positive cocci resembling Strep.  On cefepime, vanc and amikacin. On HF O2 and epi.      Oncology Treatment Plan:     PEDS [id number] Intensification II  ARM B (MA) - LOW RISK PATIENTS    Medications:  Continuous Infusions:   custom IV infusion builder 14 mL/hr at 05/04/18 1300    epinephrine infusion (NON-TITRATING) Stopped (05/04/18 1059)    heparin(porcine) 3 Units/hr (05/04/18 1300)     Scheduled Meds:   acetaminophen  650 mg Intravenous Q6H    acyclovir  400 mg Oral BID    albuterol sulfate  5 mg Nebulization Q4H    amikacin  20 mg/kg (Adjusted) Intravenous Q24H    chlorhexidine  15 mL Mouth/Throat BID    famotidine (PF)  20 mg Intravenous BID    filgrastim (NEUPOGEN) 20 mcg/mL D5W (PEDS)  5 mcg/kg/day Intravenous Q24H    furosemide  20 mg Intravenous Q6H    meropenem (MERREM) IVPB  2 g Intravenous Q8H    posaconazole  300 mg Oral Daily    sulfamethoxazole-trimethoprim 800-160mg  1 tablet Oral twice daily on Friday, Saturday, Sunday    [START ON 5/5/2018] vancomycin (VANCOCIN) IVPB  1,500 mg Intravenous Q12H     PRN Meds:sodium chloride, sodium chloride, diphenhydrAMINE, levalbuterol, lorazepam, ondansetron, oxyCODONE, polyethylene glycol     Review of patient's allergies indicates:   Allergen Reactions    Nsaids (non-steroidal anti-inflammatory drug) Anaphylaxis    Nuts [tree nut] Anaphylaxis    Strawberry     Vancomycin analogues Itching     Premedicate with benadryl and admin over 2hr.        Past Medical  History:   Diagnosis Date    AML (acute myeloblastic leukemia) 10/2017    Asthma     Encounter for blood transfusion     Seasonal allergies      Past Surgical History:   Procedure Laterality Date    PORTACATH PLACEMENT  10/31/2017    TONSILLECTOMY       Family History     Problem Relation (Age of Onset)    Cancer Mother    Heart disease Mother    No Known Problems Father        Social History Main Topics    Smoking status: Former Smoker     Packs/day: 0.50     Types: Cigarettes     Quit date: 10/30/2017    Smokeless tobacco: Never Used    Alcohol use Yes      Comment: rare occasions    Drug use: No    Sexual activity: Yes     Partners: Female       Review of Systems   Constitutional: Positive for chills and fever. Negative for activity change, appetite change and unexpected weight change.   HENT: Negative.  Negative for nosebleeds.    Eyes: Negative.  Negative for visual disturbance.   Respiratory: Positive for shortness of breath.    Cardiovascular: Negative.    Gastrointestinal: Negative.  Negative for nausea and vomiting.   Endocrine: Negative.    Genitourinary: Negative.    Musculoskeletal: Negative.  Negative for arthralgias.   Skin: Negative.  Negative for rash.   Allergic/Immunologic: Negative.    Neurological: Negative.  Negative for weakness and headaches.   Hematological: Negative.  Does not bruise/bleed easily.   Psychiatric/Behavioral: Negative.    All other systems reviewed and are negative.    Objective:     Vital Signs (Most Recent):  Temp: (!) 101.9 °F (38.8 °C) (05/04/18 1400)  Pulse: (!) 127 (05/04/18 1400)  Resp: (!) 27 (05/04/18 1400)  BP: (!) 91/40 (05/04/18 1220)  SpO2: 97 % (05/04/18 1400) Vital Signs (24h Range):  Temp:  [100 °F (37.8 °C)-103.4 °F (39.7 °C)] 101.9 °F (38.8 °C)  Pulse:  [109-154] 127  Resp:  [22-55] 27  SpO2:  [90 %-100 %] 97 %  BP: (91-98)/(24-40) 91/40     Weight: 87.9 kg (193 lb 12.6 oz)  Body mass index is 31.28 kg/m².  Body surface area is 2.02 meters  squared.      Intake/Output Summary (Last 24 hours) at 05/04/18 1536  Last data filed at 05/04/18 1500   Gross per 24 hour   Intake           4877.8 ml   Output             6100 ml   Net          -1222.2 ml       Physical Exam   Constitutional: He is oriented to person, place, and time. He appears well-developed and well-nourished. No distress.   HENT:   Head: Normocephalic and atraumatic.   Mouth/Throat: Oropharynx is clear and moist.   Eyes: EOM are normal. Pupils are equal, round, and reactive to light. No scleral icterus.   Neck: Normal range of motion. Neck supple.   Cardiovascular: Regular rhythm, normal heart sounds and intact distal pulses.  Exam reveals no gallop and no friction rub.    No murmur heard.  Tachycardic   Pulmonary/Chest: Breath sounds normal.   Tachypneic, lungs slightly coarse and decreased lung sounds   Abdominal: Soft. Bowel sounds are normal. He exhibits no distension and no mass. There is no tenderness.   Musculoskeletal: Normal range of motion. He exhibits no edema.   Lymphadenopathy:     He has no cervical adenopathy.   Neurological: He is alert and oriented to person, place, and time. He displays normal reflexes. He exhibits normal muscle tone.   Skin: Skin is warm and dry. Capillary refill takes more than 3 seconds. No rash noted.   Psychiatric: He has a normal mood and affect.   Nursing note and vitals reviewed.      Assessment/Plan:     16 y/o M with AML, s/p consolidation II therapy, admitted for profound neutropenia, now with likely Strep viridans bacteremia and septic shock in PICU.   -For his sepsis, Recommended stopping cefepime and starting         meropenemand continuing vancomycin and amikacin.  Will follow-up    cultures and  sensitivities and adjust antibiotics. Continue GCSF at    480mcg IV daily.  Recommend repeat CXR, Echo and diuresis.  -For chemotherapy induced pancytopenai, -Would maintain Hgb ~8, plts   >20.  - For chemotherapy induced immunosuppression, Continue  Bactrim,      Posaconazole and acyclovir ppx  -Will continue to follow       Donavon Ruiz Jr, MD  Pediatric Hematology/Oncology  [facility]

## 2018-05-05 LAB
ALBUMIN SERPL BCP-MCNC: 2.4 G/DL
ALLENS TEST: ABNORMAL
ALLENS TEST: NORMAL
ALP SERPL-CCNC: 41 U/L
ALT SERPL W/O P-5'-P-CCNC: 127 U/L
ANION GAP SERPL CALC-SCNC: 8 MMOL/L
ANION GAP SERPL CALC-SCNC: 9 MMOL/L
ANISOCYTOSIS BLD QL SMEAR: SLIGHT
AST SERPL-CCNC: 78 U/L
BASOPHILS NFR BLD: 0 %
BILIRUB SERPL-MCNC: 0.9 MG/DL
BLD PROD TYP BPU: NORMAL
BLOOD UNIT EXPIRATION DATE: NORMAL
BLOOD UNIT TYPE CODE: 8400
BLOOD UNIT TYPE: NORMAL
BUN SERPL-MCNC: 12 MG/DL
BUN SERPL-MCNC: 9 MG/DL
CALCIUM SERPL-MCNC: 8 MG/DL
CALCIUM SERPL-MCNC: 8.6 MG/DL
CHLORIDE SERPL-SCNC: 103 MMOL/L
CHLORIDE SERPL-SCNC: 104 MMOL/L
CO2 SERPL-SCNC: 21 MMOL/L
CO2 SERPL-SCNC: 25 MMOL/L
CODING SYSTEM: NORMAL
CREAT SERPL-MCNC: 0.9 MG/DL
CREAT SERPL-MCNC: 0.9 MG/DL
DACRYOCYTES BLD QL SMEAR: ABNORMAL
DELSYS: ABNORMAL
DIFFERENTIAL METHOD: ABNORMAL
DISPENSE STATUS: NORMAL
EOSINOPHIL NFR BLD: 0 %
ERYTHROCYTE [DISTWIDTH] IN BLOOD BY AUTOMATED COUNT: 18.6 %
ERYTHROCYTE [SEDIMENTATION RATE] IN BLOOD BY WESTERGREN METHOD: 18 MM/H
ERYTHROCYTE [SEDIMENTATION RATE] IN BLOOD BY WESTERGREN METHOD: 18 MM/H
ERYTHROCYTE [SEDIMENTATION RATE] IN BLOOD BY WESTERGREN METHOD: 24 MM/H
EST. GFR  (AFRICAN AMERICAN): >60 ML/MIN/1.73 M^2
EST. GFR  (AFRICAN AMERICAN): >60 ML/MIN/1.73 M^2
EST. GFR  (NON AFRICAN AMERICAN): >60 ML/MIN/1.73 M^2
EST. GFR  (NON AFRICAN AMERICAN): >60 ML/MIN/1.73 M^2
ETCO2: 27
ETCO2: 27
ETCO2: 30
FIO2: 50
FIO2: 50
FIO2: 90
GLUCOSE SERPL-MCNC: 115 MG/DL
GLUCOSE SERPL-MCNC: 122 MG/DL
HCO3 UR-SCNC: 21 MMOL/L (ref 24–28)
HCO3 UR-SCNC: 24.3 MMOL/L (ref 24–28)
HCO3 UR-SCNC: 26 MMOL/L (ref 24–28)
HCO3 UR-SCNC: 26.4 MMOL/L (ref 24–28)
HCT VFR BLD AUTO: 19.7 %
HCT VFR BLD CALC: 19 %PCV (ref 36–54)
HCT VFR BLD CALC: 19 %PCV (ref 36–54)
HCT VFR BLD CALC: 21 %PCV (ref 36–54)
HCT VFR BLD CALC: 25 %PCV (ref 36–54)
HGB BLD-MCNC: 7 G/DL
HYPOCHROMIA BLD QL SMEAR: ABNORMAL
IMM GRANULOCYTES # BLD AUTO: ABNORMAL K/UL
IMM GRANULOCYTES NFR BLD AUTO: ABNORMAL %
LDH SERPL L TO P-CCNC: 1.09 MMOL/L (ref 0.36–1.25)
LYMPHOCYTES NFR BLD: 100 %
MAGNESIUM SERPL-MCNC: 1.9 MG/DL
MAGNESIUM SERPL-MCNC: 2 MG/DL
MCH RBC QN AUTO: 31.1 PG
MCHC RBC AUTO-ENTMCNC: 35.5 G/DL
MCV RBC AUTO: 88 FL
MODE: ABNORMAL
MONOCYTES NFR BLD: 0 %
NEUTROPHILS NFR BLD: 0 %
NRBC BLD-RTO: 0 /100 WBC
NUM UNITS TRANS PACKED RBC: NORMAL
OVALOCYTES BLD QL SMEAR: ABNORMAL
PCO2 BLDA: 28.5 MMHG (ref 35–45)
PCO2 BLDA: 31.7 MMHG (ref 35–45)
PCO2 BLDA: 32.6 MMHG (ref 35–45)
PCO2 BLDA: 33.6 MMHG (ref 35–45)
PEEP: 8
PH SMN: 7.48 [PH] (ref 7.35–7.45)
PH SMN: 7.49 [PH] (ref 7.35–7.45)
PH SMN: 7.5 [PH] (ref 7.35–7.45)
PH SMN: 7.52 [PH] (ref 7.35–7.45)
PHOSPHATE SERPL-MCNC: 1.3 MG/DL
PHOSPHATE SERPL-MCNC: 2.4 MG/DL
PIP: 16
PIP: 16
PIP: 22
PLATELET # BLD AUTO: 19 K/UL
PLATELET BLD QL SMEAR: ABNORMAL
PMV BLD AUTO: 9.6 FL
PO2 BLDA: 222 MMHG (ref 80–100)
PO2 BLDA: 53 MMHG (ref 80–100)
PO2 BLDA: 56 MMHG (ref 80–100)
PO2 BLDA: 62 MMHG (ref 80–100)
POC BE: -3 MMOL/L
POC BE: 1 MMOL/L
POC BE: 3 MMOL/L
POC BE: 3 MMOL/L
POC IONIZED CALCIUM: 1.08 MMOL/L (ref 1.06–1.42)
POC IONIZED CALCIUM: 1.11 MMOL/L (ref 1.06–1.42)
POC IONIZED CALCIUM: 1.14 MMOL/L (ref 1.06–1.42)
POC IONIZED CALCIUM: 1.15 MMOL/L (ref 1.06–1.42)
POC SATURATED O2: 100 % (ref 95–100)
POC SATURATED O2: 90 % (ref 95–100)
POC SATURATED O2: 92 % (ref 95–100)
POC SATURATED O2: 93 % (ref 95–100)
POC TCO2: 22 MMOL/L (ref 23–27)
POC TCO2: 25 MMOL/L (ref 23–27)
POC TCO2: 27 MMOL/L (ref 23–27)
POC TCO2: 27 MMOL/L (ref 23–27)
POIKILOCYTOSIS BLD QL SMEAR: SLIGHT
POLYCHROMASIA BLD QL SMEAR: ABNORMAL
POTASSIUM BLD-SCNC: 2.8 MMOL/L (ref 3.5–5.1)
POTASSIUM BLD-SCNC: 3 MMOL/L (ref 3.5–5.1)
POTASSIUM BLD-SCNC: 3.2 MMOL/L (ref 3.5–5.1)
POTASSIUM BLD-SCNC: 3.4 MMOL/L (ref 3.5–5.1)
POTASSIUM SERPL-SCNC: 2.9 MMOL/L
POTASSIUM SERPL-SCNC: 3 MMOL/L
PROT SERPL-MCNC: 5.4 G/DL
PROVIDER CREDENTIALS: ABNORMAL
PROVIDER CREDENTIALS: ABNORMAL
PROVIDER NOTIFIED: ABNORMAL
PROVIDER NOTIFIED: ABNORMAL
RBC # BLD AUTO: 2.25 M/UL
RETICS/RBC NFR AUTO: 0.1 %
SAMPLE: ABNORMAL
SAMPLE: NORMAL
SCHISTOCYTES BLD QL SMEAR: PRESENT
SITE: ABNORMAL
SITE: NORMAL
SODIUM BLD-SCNC: 136 MMOL/L (ref 136–145)
SODIUM BLD-SCNC: 138 MMOL/L (ref 136–145)
SODIUM BLD-SCNC: 138 MMOL/L (ref 136–145)
SODIUM BLD-SCNC: 139 MMOL/L (ref 136–145)
SODIUM SERPL-SCNC: 133 MMOL/L
SODIUM SERPL-SCNC: 137 MMOL/L
SP02: 100
SP02: 94
SP02: 96
SPHEROCYTES BLD QL SMEAR: ABNORMAL
TIME NOTIFIED: 1010
TIME NOTIFIED: 220
VANCOMYCIN TROUGH SERPL-MCNC: 16.6 UG/ML
VANCOMYCIN TROUGH SERPL-MCNC: 3.4 UG/ML
VERBAL RESULT READBACK PERFORMED: YES
VERBAL RESULT READBACK PERFORMED: YES
WBC # BLD AUTO: 0.06 K/UL

## 2018-05-05 PROCEDURE — 20300000 HC PICU ROOM

## 2018-05-05 PROCEDURE — 82330 ASSAY OF CALCIUM: CPT

## 2018-05-05 PROCEDURE — 63600175 PHARM REV CODE 636 W HCPCS: Performed by: PEDIATRICS

## 2018-05-05 PROCEDURE — 27200966 HC CLOSED SUCTION SYSTEM

## 2018-05-05 PROCEDURE — 94645 CONT INHLJ TX EACH ADDL HOUR: CPT

## 2018-05-05 PROCEDURE — 83735 ASSAY OF MAGNESIUM: CPT | Mod: 91

## 2018-05-05 PROCEDURE — 94668 MNPJ CHEST WALL SBSQ: CPT

## 2018-05-05 PROCEDURE — 84132 ASSAY OF SERUM POTASSIUM: CPT

## 2018-05-05 PROCEDURE — 83735 ASSAY OF MAGNESIUM: CPT

## 2018-05-05 PROCEDURE — 85014 HEMATOCRIT: CPT

## 2018-05-05 PROCEDURE — 37799 UNLISTED PX VASCULAR SURGERY: CPT

## 2018-05-05 PROCEDURE — 25000003 PHARM REV CODE 250: Performed by: STUDENT IN AN ORGANIZED HEALTH CARE EDUCATION/TRAINING PROGRAM

## 2018-05-05 PROCEDURE — 25000003 PHARM REV CODE 250: Performed by: PEDIATRICS

## 2018-05-05 PROCEDURE — 85027 COMPLETE CBC AUTOMATED: CPT

## 2018-05-05 PROCEDURE — 84100 ASSAY OF PHOSPHORUS: CPT | Mod: 91

## 2018-05-05 PROCEDURE — P9045 ALBUMIN (HUMAN), 5%, 250 ML: HCPCS | Mod: JG | Performed by: PEDIATRICS

## 2018-05-05 PROCEDURE — 80053 COMPREHEN METABOLIC PANEL: CPT

## 2018-05-05 PROCEDURE — 84295 ASSAY OF SERUM SODIUM: CPT

## 2018-05-05 PROCEDURE — 80202 ASSAY OF VANCOMYCIN: CPT

## 2018-05-05 PROCEDURE — 85045 AUTOMATED RETICULOCYTE COUNT: CPT

## 2018-05-05 PROCEDURE — 94640 AIRWAY INHALATION TREATMENT: CPT

## 2018-05-05 PROCEDURE — 0BH18EZ INSERTION OF ENDOTRACHEAL AIRWAY INTO TRACHEA, VIA NATURAL OR ARTIFICIAL OPENING ENDOSCOPIC: ICD-10-PCS | Performed by: PEDIATRICS

## 2018-05-05 PROCEDURE — 87070 CULTURE OTHR SPECIMN AEROBIC: CPT

## 2018-05-05 PROCEDURE — P9038 RBC IRRADIATED: HCPCS

## 2018-05-05 PROCEDURE — 63600175 PHARM REV CODE 636 W HCPCS: Mod: JG | Performed by: PEDIATRICS

## 2018-05-05 PROCEDURE — 27201040 HC RC 50 FILTER

## 2018-05-05 PROCEDURE — 63600175 PHARM REV CODE 636 W HCPCS: Performed by: STUDENT IN AN ORGANIZED HEALTH CARE EDUCATION/TRAINING PROGRAM

## 2018-05-05 PROCEDURE — 99900026 HC AIRWAY MAINTENANCE (STAT)

## 2018-05-05 PROCEDURE — S0028 INJECTION, FAMOTIDINE, 20 MG: HCPCS | Performed by: STUDENT IN AN ORGANIZED HEALTH CARE EDUCATION/TRAINING PROGRAM

## 2018-05-05 PROCEDURE — 25000242 PHARM REV CODE 250 ALT 637 W/ HCPCS: Performed by: PEDIATRICS

## 2018-05-05 PROCEDURE — 80048 BASIC METABOLIC PNL TOTAL CA: CPT

## 2018-05-05 PROCEDURE — 94644 CONT INHLJ TX 1ST HOUR: CPT

## 2018-05-05 PROCEDURE — 27000221 HC OXYGEN, UP TO 24 HOURS

## 2018-05-05 PROCEDURE — 5A1955Z RESPIRATORY VENTILATION, GREATER THAN 96 CONSECUTIVE HOURS: ICD-10-PCS | Performed by: PEDIATRICS

## 2018-05-05 PROCEDURE — 99291 CRITICAL CARE FIRST HOUR: CPT | Mod: ,,, | Performed by: PEDIATRICS

## 2018-05-05 PROCEDURE — 82803 BLOOD GASES ANY COMBINATION: CPT

## 2018-05-05 PROCEDURE — 99900035 HC TECH TIME PER 15 MIN (STAT)

## 2018-05-05 PROCEDURE — 85007 BL SMEAR W/DIFF WBC COUNT: CPT

## 2018-05-05 PROCEDURE — 84100 ASSAY OF PHOSPHORUS: CPT

## 2018-05-05 PROCEDURE — 94761 N-INVAS EAR/PLS OXIMETRY MLT: CPT

## 2018-05-05 PROCEDURE — 80202 ASSAY OF VANCOMYCIN: CPT | Mod: 91

## 2018-05-05 PROCEDURE — 87205 SMEAR GRAM STAIN: CPT

## 2018-05-05 PROCEDURE — 94002 VENT MGMT INPAT INIT DAY: CPT

## 2018-05-05 RX ORDER — ROCURONIUM BROMIDE 10 MG/ML
INJECTION, SOLUTION INTRAVENOUS CODE/TRAUMA/SEDATION MEDICATION
Status: COMPLETED | OUTPATIENT
Start: 2018-05-05 | End: 2018-05-05

## 2018-05-05 RX ORDER — INDOMETHACIN 25 MG/1
50 CAPSULE ORAL ONCE
Status: COMPLETED | OUTPATIENT
Start: 2018-05-05 | End: 2018-05-05

## 2018-05-05 RX ORDER — VANCOMYCIN HYDROCHLORIDE
1500
Status: DISCONTINUED | OUTPATIENT
Start: 2018-05-05 | End: 2018-05-13

## 2018-05-05 RX ORDER — ATROPINE SULFATE 0.1 MG/ML
INJECTION INTRAVENOUS
Status: DISPENSED
Start: 2018-05-05 | End: 2018-05-05

## 2018-05-05 RX ORDER — ALBUTEROL SULFATE 5 MG/ML
20 SOLUTION RESPIRATORY (INHALATION) CONTINUOUS
Status: DISCONTINUED | OUTPATIENT
Start: 2018-05-05 | End: 2018-05-08

## 2018-05-05 RX ORDER — MIDAZOLAM HYDROCHLORIDE 1 MG/ML
1 INJECTION, SOLUTION INTRAMUSCULAR; INTRAVENOUS
Status: DISCONTINUED | OUTPATIENT
Start: 2018-05-06 | End: 2018-05-06

## 2018-05-05 RX ORDER — ROCURONIUM BROMIDE 10 MG/ML
INJECTION, SOLUTION INTRAVENOUS
Status: DISPENSED
Start: 2018-05-05 | End: 2018-05-05

## 2018-05-05 RX ORDER — CALCIUM CHLORIDE INJECTION 100 MG/ML
1 INJECTION, SOLUTION INTRAVENOUS ONCE
Status: COMPLETED | OUTPATIENT
Start: 2018-05-05 | End: 2018-05-05

## 2018-05-05 RX ORDER — SODIUM,POTASSIUM PHOSPHATES 280-250MG
1 POWDER IN PACKET (EA) ORAL 2 TIMES DAILY
Status: DISCONTINUED | OUTPATIENT
Start: 2018-05-05 | End: 2018-05-06

## 2018-05-05 RX ORDER — MIDAZOLAM HYDROCHLORIDE 1 MG/ML
INJECTION INTRAMUSCULAR; INTRAVENOUS
Status: DISPENSED
Start: 2018-05-05 | End: 2018-05-05

## 2018-05-05 RX ORDER — FENTANYL CITRATE 50 UG/ML
INJECTION, SOLUTION INTRAMUSCULAR; INTRAVENOUS CODE/TRAUMA/SEDATION MEDICATION
Status: COMPLETED | OUTPATIENT
Start: 2018-05-05 | End: 2018-05-05

## 2018-05-05 RX ORDER — FENTANYL CITRATE-0.9 % NACL/PF 10 MCG/ML
PLASTIC BAG, INJECTION (ML) INTRAVENOUS CONTINUOUS
Status: DISCONTINUED | OUTPATIENT
Start: 2018-05-05 | End: 2018-05-09

## 2018-05-05 RX ORDER — EPINEPHRINE 0.1 MG/ML
INJECTION INTRAVENOUS
Status: DISPENSED
Start: 2018-05-05 | End: 2018-05-05

## 2018-05-05 RX ORDER — MIDAZOLAM HYDROCHLORIDE 1 MG/ML
INJECTION, SOLUTION INTRAMUSCULAR; INTRAVENOUS CODE/TRAUMA/SEDATION MEDICATION
Status: COMPLETED | OUTPATIENT
Start: 2018-05-05 | End: 2018-05-05

## 2018-05-05 RX ORDER — ALBUMIN HUMAN 50 G/1000ML
SOLUTION INTRAVENOUS
Status: DISPENSED
Start: 2018-05-05 | End: 2018-05-05

## 2018-05-05 RX ORDER — FENTANYL CITRATE 50 UG/ML
100 INJECTION, SOLUTION INTRAMUSCULAR; INTRAVENOUS ONCE
Status: DISCONTINUED | OUTPATIENT
Start: 2018-05-05 | End: 2018-05-05

## 2018-05-05 RX ORDER — FENTANYL CITRATE 50 UG/ML
INJECTION, SOLUTION INTRAMUSCULAR; INTRAVENOUS
Status: DISPENSED
Start: 2018-05-05 | End: 2018-05-05

## 2018-05-05 RX ORDER — ALBUTEROL SULFATE 0.83 MG/ML
5 SOLUTION RESPIRATORY (INHALATION) EVERY 4 HOURS PRN
Status: DISCONTINUED | OUTPATIENT
Start: 2018-05-05 | End: 2018-05-06

## 2018-05-05 RX ORDER — MIDAZOLAM HYDROCHLORIDE 1 MG/ML
INJECTION INTRAMUSCULAR; INTRAVENOUS
Status: DISCONTINUED
Start: 2018-05-05 | End: 2018-05-05 | Stop reason: WASHOUT

## 2018-05-05 RX ORDER — MIDAZOLAM HYDROCHLORIDE 1 MG/ML
3 INJECTION, SOLUTION INTRAMUSCULAR; INTRAVENOUS ONCE
Status: DISCONTINUED | OUTPATIENT
Start: 2018-05-05 | End: 2018-05-06

## 2018-05-05 RX ORDER — ROCURONIUM BROMIDE 10 MG/ML
90 INJECTION, SOLUTION INTRAVENOUS ONCE
Status: DISCONTINUED | OUTPATIENT
Start: 2018-05-05 | End: 2018-05-09

## 2018-05-05 RX ORDER — MIDAZOLAM HYDROCHLORIDE 1 MG/ML
2 INJECTION, SOLUTION INTRAMUSCULAR; INTRAVENOUS ONCE
Status: DISCONTINUED | OUTPATIENT
Start: 2018-05-06 | End: 2018-05-06

## 2018-05-05 RX ORDER — ALBUMIN HUMAN 50 G/1000ML
SOLUTION INTRAVENOUS
Status: COMPLETED | OUTPATIENT
Start: 2018-05-05 | End: 2018-05-05

## 2018-05-05 RX ADMIN — ACYCLOVIR 400 MG: 200 CAPSULE ORAL at 08:05

## 2018-05-05 RX ADMIN — ACETAMINOPHEN 650 MG: 10 INJECTION, SOLUTION INTRAVENOUS at 11:05

## 2018-05-05 RX ADMIN — EPINEPHRINE 0.03 MCG/KG/MIN: 1 INJECTION INTRAMUSCULAR; INTRAVENOUS; SUBCUTANEOUS at 01:05

## 2018-05-05 RX ADMIN — ALBUMIN (HUMAN) 60 ML: 12.5 SOLUTION INTRAVENOUS at 05:05

## 2018-05-05 RX ADMIN — SODIUM BICARBONATE 50 MEQ: 84 INJECTION, SOLUTION INTRAVENOUS at 03:05

## 2018-05-05 RX ADMIN — ALBUTEROL SULFATE 20 MG: 5 SOLUTION RESPIRATORY (INHALATION) at 12:05

## 2018-05-05 RX ADMIN — EPINEPHRINE 0.02 MCG/KG/MIN: 1 INJECTION INTRAMUSCULAR; INTRAVENOUS; SUBCUTANEOUS at 05:05

## 2018-05-05 RX ADMIN — MIDAZOLAM 1 MG/HR: 5 INJECTION INTRAMUSCULAR; INTRAVENOUS at 07:05

## 2018-05-05 RX ADMIN — MIDAZOLAM HYDROCHLORIDE 4 MG: 1 INJECTION, SOLUTION INTRAMUSCULAR; INTRAVENOUS at 05:05

## 2018-05-05 RX ADMIN — DEXMEDETOMIDINE HYDROCHLORIDE 0.8 MCG/KG/HR: 100 INJECTION, SOLUTION, CONCENTRATE INTRAVENOUS at 07:05

## 2018-05-05 RX ADMIN — CHLORHEXIDINE GLUCONATE 15 ML: 1.2 RINSE ORAL at 09:05

## 2018-05-05 RX ADMIN — ROCURONIUM BROMIDE 90 MG: 10 INJECTION, SOLUTION INTRAVENOUS at 05:05

## 2018-05-05 RX ADMIN — FILGRASTIM 480 MCG: 480 INJECTION, SOLUTION INTRAVENOUS; SUBCUTANEOUS at 02:05

## 2018-05-05 RX ADMIN — VANCOMYCIN HYDROCHLORIDE 1500 MG: 10 INJECTION, POWDER, LYOPHILIZED, FOR SOLUTION INTRAVENOUS at 07:05

## 2018-05-05 RX ADMIN — FENTANYL CITRATE 100 MCG: 50 INJECTION, SOLUTION INTRAMUSCULAR; INTRAVENOUS at 05:05

## 2018-05-05 RX ADMIN — DEXMEDETOMIDINE HYDROCHLORIDE 0.8 MCG/KG/HR: 100 INJECTION, SOLUTION, CONCENTRATE INTRAVENOUS at 02:05

## 2018-05-05 RX ADMIN — VANCOMYCIN HYDROCHLORIDE 1500 MG: 10 INJECTION, POWDER, LYOPHILIZED, FOR SOLUTION INTRAVENOUS at 12:05

## 2018-05-05 RX ADMIN — DEXMEDETOMIDINE HYDROCHLORIDE 0.4 MCG/KG/HR: 100 INJECTION, SOLUTION, CONCENTRATE INTRAVENOUS at 12:05

## 2018-05-05 RX ADMIN — VANCOMYCIN HYDROCHLORIDE 1500 MG: 10 INJECTION, POWDER, LYOPHILIZED, FOR SOLUTION INTRAVENOUS at 03:05

## 2018-05-05 RX ADMIN — DEXMEDETOMIDINE HYDROCHLORIDE 0.8 MCG/KG/HR: 100 INJECTION, SOLUTION, CONCENTRATE INTRAVENOUS at 06:05

## 2018-05-05 RX ADMIN — MEROPENEM 2 G: 1 INJECTION, POWDER, FOR SOLUTION INTRAVENOUS at 06:05

## 2018-05-05 RX ADMIN — MAGNESIUM SULFATE HEPTAHYDRATE: 500 INJECTION, SOLUTION INTRAMUSCULAR; INTRAVENOUS at 04:05

## 2018-05-05 RX ADMIN — FUROSEMIDE 20 MG: 10 INJECTION, SOLUTION INTRAVENOUS at 10:05

## 2018-05-05 RX ADMIN — CHLORHEXIDINE GLUCONATE 15 ML: 1.2 RINSE ORAL at 08:05

## 2018-05-05 RX ADMIN — ALBUTEROL SULFATE 5 MG: 2.5 SOLUTION RESPIRATORY (INHALATION) at 09:05

## 2018-05-05 RX ADMIN — SULFAMETHOXAZOLE AND TRIMETHOPRIM 1 TABLET: 800; 160 TABLET ORAL at 10:05

## 2018-05-05 RX ADMIN — MEROPENEM 2 G: 1 INJECTION, POWDER, FOR SOLUTION INTRAVENOUS at 12:05

## 2018-05-05 RX ADMIN — ACYCLOVIR 400 MG: 200 CAPSULE ORAL at 10:05

## 2018-05-05 RX ADMIN — CALCIUM CHLORIDE 1 G: 100 INJECTION, SOLUTION INTRAVENOUS at 03:05

## 2018-05-05 RX ADMIN — CHLOROTHIAZIDE SODIUM 500 MG: 500 INJECTION, POWDER, LYOPHILIZED, FOR SOLUTION INTRAVENOUS at 03:05

## 2018-05-05 RX ADMIN — FUROSEMIDE 20 MG: 10 INJECTION, SOLUTION INTRAVENOUS at 11:05

## 2018-05-05 RX ADMIN — ACETAMINOPHEN 650 MG: 10 INJECTION, SOLUTION INTRAVENOUS at 05:05

## 2018-05-05 RX ADMIN — EPINEPHRINE 0.04 MCG/KG/MIN: 1 INJECTION INTRAMUSCULAR; INTRAVENOUS; SUBCUTANEOUS at 01:05

## 2018-05-05 RX ADMIN — FAMOTIDINE 20 MG: 10 INJECTION INTRAVENOUS at 09:05

## 2018-05-05 RX ADMIN — FUROSEMIDE 20 MG: 10 INJECTION, SOLUTION INTRAVENOUS at 03:05

## 2018-05-05 RX ADMIN — AMIKACIN SULFATE 1475 MG: 500 INJECTION, SOLUTION INTRAMUSCULAR; INTRAVENOUS at 12:05

## 2018-05-05 RX ADMIN — LORAZEPAM 1 MG: 2 INJECTION INTRAMUSCULAR at 04:05

## 2018-05-05 RX ADMIN — ALBUTEROL SULFATE 5 MG: 2.5 SOLUTION RESPIRATORY (INHALATION) at 12:05

## 2018-05-05 RX ADMIN — Medication 40 MCG/HR: at 10:05

## 2018-05-05 RX ADMIN — EPINEPHRINE 0.02 MCG/KG/MIN: 1 INJECTION INTRAMUSCULAR; INTRAVENOUS; SUBCUTANEOUS at 12:05

## 2018-05-05 RX ADMIN — POTASSIUM PHOSPHATE, MONOBASIC AND POTASSIUM PHOSPHATE, DIBASIC 30 MMOL: 224; 236 INJECTION, SOLUTION, CONCENTRATE INTRAVENOUS at 08:05

## 2018-05-05 RX ADMIN — FAMOTIDINE 20 MG: 10 INJECTION INTRAVENOUS at 08:05

## 2018-05-05 RX ADMIN — POSACONAZOLE 300 MG: 100 TABLET, COATED ORAL at 10:05

## 2018-05-05 RX ADMIN — ALBUTEROL SULFATE 5 MG: 2.5 SOLUTION RESPIRATORY (INHALATION) at 05:05

## 2018-05-05 RX ADMIN — ALBUTEROL SULFATE 20 MG: 5 SOLUTION RESPIRATORY (INHALATION) at 09:05

## 2018-05-05 RX ADMIN — MEROPENEM 2 G: 1 INJECTION, POWDER, FOR SOLUTION INTRAVENOUS at 08:05

## 2018-05-05 RX ADMIN — POTASSIUM & SODIUM PHOSPHATES POWDER PACK 280-160-250 MG 1 PACKET: 280-160-250 PACK at 08:05

## 2018-05-05 RX ADMIN — SULFAMETHOXAZOLE AND TRIMETHOPRIM 1 TABLET: 800; 160 TABLET ORAL at 08:05

## 2018-05-05 RX ADMIN — ACETAMINOPHEN 650 MG: 10 INJECTION, SOLUTION INTRAVENOUS at 06:05

## 2018-05-05 NOTE — NURSING
"Pt continues with inc WOB and anxious.  Pt placed on BiPAP, not tolerating well with complaints of "feeling like running a marathon" with increased tachypnea and desat episode to 86. MD aware.   Ativan PRN x2.  Pt placed back on HFNC at 40L 100% per MD order.  Tolerating well, but continues with inc WOB.  Per MD order, dex gtt started at 0.4. Will continue to monitor.       "

## 2018-05-05 NOTE — ASSESSMENT & PLAN NOTE
16 y/o male with h/o  AML (t 8;21) s/p intensification therapy II per [id number] (Arm A) stepped up to PICU for sepsis after presenting with waxing mental status, hypotension unresponsive to NS bolus x 2 and blood 1/2, chest tightness w/ tachypnea, new oxygen requirement and concern for PNA on CXR. He is currently intubated and sedated with PaOw/Fi ratio c/w mild ARDS. Pressures maintained on epi. Sedation with fentanyl, precedex, versed gtt. Will start feeds via SUMP.     CNS:  Sedation  - versed drip 1mg/hr  - precedex 0.8mcg/kg/hr  - Fentanyl 50mcg/kg/hr   - Continue to monitor neuro/mental status  - Ativan 1mg Q4PRN    Fever  - Scheduled tylenol 650mg, IV, Q6H for fever. No NSAIDs given allergy.     CV: Hypotensive with melisa of 82/35 and MAP <60 on floor despite 1L NS bolus x 2 and 1/2 units pRBC.   - Titrate epinephrine 0.02mcg/kg/min. Titrate to MAP goal >65.  - Vitals per protocol.    PULM: Intubated 5/5 pressure support mode for concern of ARDS vs. Atelectasis. OI score 6.9. Luke/Fi 246 c/w mild ARDS.   - RR 18, FiO2 50%, PEEP 8  - goal to maintain peak pressures <30  - CPT    FEN/GI:  - Changed fluids to custom - D5 NS 20K Kphos 10mmol and 1g Mg @ 100m/hr  - TFG - 100ml/hr including meds  - NPO  - Famotidine 20mg, IV, BID for ppx  - Continue PRN zofran and miralax  - Replace electrolytes as needed  - Start feeds today: Peptamen via SUMP: start at 10ml/hr and increase q4h to goal of 50mL/hr  - repeat BMP, Mg, Phos PM labs  - KPhos via SUMP     HEME/ONC:   Chemotherapy induced neutropenia: Received 1/2 units pRBC on floor.  - Hgb goal >7.  - Plts goal >20.  - Daily CBC and reticulocyte   - Received 1upRBC overnight    AML: 8;21 translocation, c-kit mutation negative.  CNS negative.  MRD negative after Induction I.    - Treating per [facility] [id number] (ARM A).  S/p Intensification I and Intensification II started. He was day 13 of intensification II as of 5/2/2018.  - BM biopsy at start of Intensification shows CR.   FISH for t(8;21) negative. 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  - Continue acyclovir ppx  - Continue posaconazole ppx  - Continue bactrim QFSaSu ppx  - Continue peridex ppx    ID: BCx - alpha hemolytic strep from 5/2, blood culture 5/3 NGTD  - Continue to follow previous cultures  - Vancomycin 1500mg Q8H (5/3 - )   - Meropenem 2g IV Q8H (5/4 - )  - Amikacin 20mg/kg Q24H (5/4 - )     RENAL: BUN/Cr wnl. No active issues.  - lasix 20 mg q6h  - Strict Is/Os - monitor for SIADH    SOCIAL: Mom at bedside    DISPO: Critically ill, in the PICU

## 2018-05-05 NOTE — NURSING
Pt asleep in bed, HR elevated to upper 140s, low 150s, BP 70/40s with MAP <60, face appears flushed. MD notified then at bedside to see pt.  Epi gtt re-started at 0.02.  Will continue to monitor.

## 2018-05-05 NOTE — SUBJECTIVE & OBJECTIVE
Interval History: Increased sob    Review of Systems  Objective:     Vital Signs Range (Last 24H):  Temp:  [100 °F (37.8 °C)-103.4 °F (39.7 °C)]   Pulse:  [113-171]   Resp:  [21-55]   BP: ()/(40-53)   SpO2:  [89 %-100 %]     I & O (Last 24H):  Intake/Output Summary (Last 24 hours) at 05/04/18 2230  Last data filed at 05/04/18 2200   Gross per 24 hour   Intake          4952.68 ml   Output             5675 ml   Net          -722.32 ml       Ventilator Data (Last 24H):     Oxygen Concentration (%):  [] 55    Hemodynamic Parameters (Last 24H):       Physical Exam:  Physical Exam    Constitutional: He is oriented to person, place, and time. He appears ill.   HENT:   Eyes: EOM are normal. Pupils are equal, round, and reactive to light. Right eye exhibits no discharge. Left eye exhibits no discharge.   Pale conjunctiva   Cardiovascular: Regular rhythm, normal heart sounds and intact distal pulses.  Tachycardia present.    No murmur heard.  Pulmonary/Chest: Tachypnea noted. Wheezing +, increased resp distress, crackles  Abdominal: Soft. He exhibits no distension.   Neurological: He is alert and oriented to person, place, and time. No sensory deficit. He exhibits normal muscle tone.   Skin: Skin is warm and dry. Capillary refill takes 2 to 3 seconds. There is pallor.     Lines/Drains/Airways     Central Venous Catheter Line                 Port A Cath Double Lumen 10/31/17 1826 left subclavian 185 days          Arterial Line                 Arterial Line 05/03/18 1300 Right Radial 1 day                Laboratory (Last 24H):     Recent Results (from the past 20 hour(s))   CBC auto differential    Collection Time: 05/04/18  4:20 AM   Result Value Ref Range    WBC 0.17 (LL) 3.90 - 12.70 K/uL    RBC 2.54 (L) 4.60 - 6.20 M/uL    Hemoglobin 8.0 (L) 14.0 - 18.0 g/dL    Hematocrit 21.6 (L) 40.0 - 54.0 %    MCV 85 82 - 98 fL    MCH 31.5 (H) 27.0 - 31.0 pg    MCHC 37.0 (H) 32.0 - 36.0 g/dL    RDW 17.4 (H) 11.5 - 14.5 %     Platelets 17 (LL) 150 - 350 K/uL    MPV 9.6 9.2 - 12.9 fL    Immature Granulocytes CANCELED 0.0 - 0.5 %    Immature Grans (Abs) CANCELED 0.00 - 0.04 K/uL    nRBC 0 0 /100 WBC    Gran% 0.0 (L) 38.0 - 73.0 %    Lymph% 100.0 (H) 18.0 - 48.0 %    Mono% 0.0 (L) 4.0 - 15.0 %    Eosinophil% 0.0 0.0 - 8.0 %    Basophil% 0.0 0.0 - 1.9 %    Platelet Estimate Decreased (A)     Aniso Slight     Poik Slight     Poly Occasional     Ovalocytes Occasional     Differential Method Manual    Reticulocytes    Collection Time: 05/04/18  4:20 AM   Result Value Ref Range    Retic 0.3 (L) 0.4 - 2.0 %   Comprehensive metabolic panel    Collection Time: 05/04/18  4:20 AM   Result Value Ref Range    Sodium 133 (L) 136 - 145 mmol/L    Potassium 3.3 (L) 3.5 - 5.1 mmol/L    Chloride 107 95 - 110 mmol/L    CO2 19 (L) 23 - 29 mmol/L    Glucose 126 (H) 70 - 110 mg/dL    BUN, Bld 9 6 - 20 mg/dL    Creatinine 0.7 0.5 - 1.4 mg/dL    Calcium 7.8 (L) 8.7 - 10.5 mg/dL    Total Protein 5.3 (L) 6.0 - 8.4 g/dL    Albumin 2.6 (L) 3.5 - 5.2 g/dL    Total Bilirubin 0.8 0.1 - 1.0 mg/dL    Alkaline Phosphatase 44 (L) 55 - 135 U/L     (H) 10 - 40 U/L     (H) 10 - 44 U/L    Anion Gap 7 (L) 8 - 16 mmol/L    eGFR if African American >60.0 >60 mL/min/1.73 m^2    eGFR if non African American >60.0 >60 mL/min/1.73 m^2   Magnesium    Collection Time: 05/04/18  4:20 AM   Result Value Ref Range    Magnesium 1.3 (L) 1.6 - 2.6 mg/dL   Phosphorus    Collection Time: 05/04/18  4:20 AM   Result Value Ref Range    Phosphorus <1.0 (LL) 2.7 - 4.5 mg/dL   ISTAT PROCEDURE    Collection Time: 05/04/18  1:02 PM   Result Value Ref Range    POC PH 7.424 7.35 - 7.45    POC PCO2 27.6 (LL) 35 - 45 mmHg    POC PO2 55 (LL) 80 - 100 mmHg    POC HCO3 18.0 (L) 24 - 28 mmol/L    POC BE -6 -2 to 2 mmol/L    POC SATURATED O2 90 (L) 95 - 100 %    POC Sodium 135 (L) 136 - 145 mmol/L    POC Potassium 3.0 (L) 3.5 - 5.1 mmol/L    POC TCO2 19 (L) 23 - 27 mmol/L    POC Ionized Calcium 1.11  1.06 - 1.42 mmol/L    POC Hematocrit 24 (L) 36 - 54 %PCV    Provider Credentials: MD     Provider Notified: GRADIDGE     Time Notifed: 1305     Sample ARTERIAL     Site Bennie/UAC     Allens Test N/A     DelSys Nasal Can     Mode SPONT     Flow 30     FiO2 100     Sp02 98    ISTAT Lactate    Collection Time: 05/04/18  1:09 PM   Result Value Ref Range    POC Lactate 2.46 (H) 0.36 - 1.25 mmol/L    Sample ARTERIAL     Site Bennie/UAC     Allens Test N/A    VANCOMYCIN, TROUGH before 4th dose    Collection Time: 05/04/18  1:35 PM   Result Value Ref Range    Vancomycin-Trough 28.5 (H) 10.0 - 22.0 ug/mL   ISTAT Lactate    Collection Time: 05/04/18  8:43 PM   Result Value Ref Range    POC Lactate 1.30 (H) 0.36 - 1.25 mmol/L    Verbal Result Readback Performed Yes     Provider Credentials: MD     Provider Notified: TEMPLE     Sample ARTERIAL     Site Bennie/UAC     Allens Test N/A    ISTAT PROCEDURE    Collection Time: 05/04/18  8:44 PM   Result Value Ref Range    POC PH 7.510 (H) 7.35 - 7.45    POC PCO2 27.1 (LL) 35 - 45 mmHg    POC PO2 46 (LL) 80 - 100 mmHg    POC HCO3 21.6 (L) 24 - 28 mmol/L    POC BE -1 -2 to 2 mmol/L    POC SATURATED O2 87 (L) 95 - 100 %    POC Sodium 137 136 - 145 mmol/L    POC Potassium 3.1 (L) 3.5 - 5.1 mmol/L    POC TCO2 22 (L) 23 - 27 mmol/L    POC Ionized Calcium 1.08 1.06 - 1.42 mmol/L    POC Hematocrit 21 (L) 36 - 54 %PCV    Verbal Result Readback Performed Yes     Provider Credentials: MD     Provider Notified: TEMPLE     Time Notifed: 2045     Sample ARTERIAL     Site Bennie/UAC     Allens Test N/A    ]    Chest X-Ray: White out    Diagnostic Results:  No Further

## 2018-05-05 NOTE — PLAN OF CARE
Problem: Patient Care Overview  Goal: Plan of Care Review  Outcome: Ongoing (interventions implemented as appropriate)  Plan of care reviewed with Hema's mother and father who were at the bedside throughout the day. All questions answered and reassurance provided. Encouraged parents to go to the [facility] and rest today while Hema is resting. They expressed appreciation of the staff in the PICU. Hema has remained sedated throughout the day. Increased fentanyl and versed gtts; multiple fentanyl boluses given throughout the day for comfort. Epi remains at 0.01 mcg/kg/min. MAPs in the high 50s at this time - Dr. Amaya aware. Lasix dose held per order. Will continue to monitor. Please see doc flowsheets for full assessments.

## 2018-05-05 NOTE — SUBJECTIVE & OBJECTIVE
Interval History: Marked increased WOB including hyperventilation with tracheal tugging. Tried CPAP and BiPap but patient's anxiety worsened WOB and prn ativan did not help. Progressed to need for intubation, initially vol control but switched to pressure control for concern of ARDS vs atelectasis. NG to low intermittent suction. BPs maintained with epi drip. Now on versed drip and increased precedex drip for sedation.     Review of Systems   Constitutional: Positive for fever.   Respiratory: Positive for shortness of breath.    Psychiatric/Behavioral: The patient is nervous/anxious.      Objective:     Vital Signs Range (Last 24H):  Temp:  [100 °F (37.8 °C)-103.3 °F (39.6 °C)]   Pulse:  [115-187]   Resp:  [21-67]   BP: ()/()   SpO2:  [55 %-100 %]    Fever 103.3 at 0000, persists to 102.6 0400  RR max at 63 0217 prior to intubation  Hypotensive to 65/36 at 0536    I & O (Last 24H):  Intake/Output Summary (Last 24 hours) at 05/05/18 0802  Last data filed at 05/05/18 0700   Gross per 24 hour   Intake          4119.44 ml   Output             5495 ml   Net         -1375.56 ml   In 54ml/kg/day  UOP 2.9ml/kg/hr  Net neg 1,388.9  Since admission +5.3L    Ventilator Data (Last 24H):   RR 18, FiO2 50%, PEEP 8  Vent Mode: PC  Oxygen Concentration (%):  [] 49  Resp Rate Total:  [8 br/min-32 br/min] 32 br/min  PEEP/CPAP:  [8 cmH20] 8 cmH20  Mean Airway Pressure:  [14 jdD45-25 cmH20] 14 cmH20    Physical Exam:  Physical Exam   Constitutional: He appears well-developed and well-nourished.   Intubated and sedated   Eyes:   Pupils constricted   Cardiovascular: Normal rate, regular rhythm, normal heart sounds and intact distal pulses.    No murmur heard.  Pulmonary/Chest: No stridor.   Symmetric expansion with air movement BL  Course inspiratory sounds, R>L   Abdominal: Soft. Bowel sounds are normal. He exhibits distension. There is no guarding.   Musculoskeletal: He exhibits no edema.   Skin: Skin is warm and dry.  Capillary refill takes less than 2 seconds. No rash noted. He is not diaphoretic. No erythema.       Lines/Drains/Airways     Central Venous Catheter Line                 Port A Cath Double Lumen 10/31/17 1826 left subclavian 185 days          Drain                 NG/OG Tube 05/05/18 0545 Replogle 14 Fr. Right nostril less than 1 day          Airway                 Airway - Non-Surgical 05/05/18 0527 Endotracheal Tube less than 1 day          Arterial Line                 Arterial Line 05/03/18 1300 Right Radial 1 day          Peripheral Intravenous Line                 Peripheral IV - Single Lumen 05/05/18 0539 Left Hand less than 1 day         Peripheral IV - Single Lumen 05/05/18 0600 less than 1 day         Peripheral IV - Single Lumen 05/05/18 0600 Right Hand less than 1 day                Recent Results (from the past 12 hour(s))   ISTAT PROCEDURE    Collection Time: 05/05/18  7:03 AM   Result Value Ref Range    POC PH 7.517 (H) 7.35 - 7.45    POC PCO2 32.6 (L) 35 - 45 mmHg    POC PO2 222 (H) 80 - 100 mmHg    POC HCO3 26.4 24 - 28 mmol/L    POC BE 3 -2 to 2 mmol/L    POC SATURATED O2 100 95 - 100 %    POC Sodium 138 136 - 145 mmol/L    POC Potassium 3.2 (L) 3.5 - 5.1 mmol/L    POC TCO2 27 23 - 27 mmol/L    POC Ionized Calcium 1.14 1.06 - 1.42 mmol/L    POC Hematocrit 19 (LL) 36 - 54 %PCV    Rate 24     Sample ARTERIAL     Site Bennie/UAC     Allens Test N/A     DelSys Adult Vent     Mode AC/PRVC     PEEP 8     PiP 22     FiO2 90     ETCO2 27     Sp02 100    ISTAT PROCEDURE    Collection Time: 05/05/18 10:08 AM   Result Value Ref Range    POC PH 7.491 (H) 7.35 - 7.45    POC PCO2 31.7 (L) 35 - 45 mmHg    POC PO2 56 (LL) 80 - 100 mmHg    POC HCO3 24.3 24 - 28 mmol/L    POC BE 1 -2 to 2 mmol/L    POC SATURATED O2 92 (L) 95 - 100 %    POC Sodium 138 136 - 145 mmol/L    POC Potassium 3.4 (L) 3.5 - 5.1 mmol/L    POC TCO2 25 23 - 27 mmol/L    POC Ionized Calcium 1.15 1.06 - 1.42 mmol/L    POC Hematocrit 21 (L) 36 - 54  %PCV    Verbal Result Readback Performed Yes     Provider Credentials: MD     Provider Notified: MARRY     Time Notifed: 1010     Rate 18     Sample ARTERIAL     Site Bennie/UAC     Allens Test N/A     DelSys Adult Vent     Mode AC/PRVC     PEEP 8     PiP 16     FiO2 50     ETCO2 27     Sp02 94    ISTAT Lactate    Collection Time: 05/05/18 10:23 AM   Result Value Ref Range    POC Lactate 1.09 0.36 - 1.25 mmol/L    Sample ARTERIAL     Site Bennie/UAC     Allens Test N/A          Microbiology Results (last 7 days)     Procedure Component Value Units Date/Time    Blood culture [[id number]] Collected:  05/02/18 1129    Order Status:  Completed Specimen:  Blood from Line, Port A Cath Updated:  05/04/18 1420     Blood Culture, Routine Gram stain aer bottle: Gram positive cocci in chains resembling Strep     Blood Culture, Routine Gram stain lilliana bottle: Gram positive cocci in chains resembling Strep      Blood Culture, Routine Results called to and read back by: Berna Perez RN  05/03/2018  06:30     Blood Culture, Routine --     STREPTOCOCCUS MITIS/ORALIS   Susceptibility pending      Narrative:       One set from central line    Blood culture [[id number]] Collected:  05/03/18 0540    Order Status:  Completed Specimen:  Blood from Line, Port A Cath Updated:  05/04/18 1212     Blood Culture, Routine No Growth to date     Blood Culture, Routine No Growth to date    Narrative:       Port A Cath    Fungus Culture, Blood or Bone Marrow [[id number]] Collected:  05/03/18 0540    Order Status:  Sent Specimen:  Blood from Blood Updated:  05/03/18 0657    Blood culture [[id number]]     Order Status:  Canceled Specimen:  Blood       Phos 1.3 low  Na 133 low  K 3 low  ANC 0  PLT 19  Hgb 7.0    Chest X-Ray:   EXAMINATION:  XR CHEST 1 VIEW    CLINICAL HISTORY:  Respiratroy distress, ET placement;.    TECHNIQUE:  Single frontal portable view of the chest was performed.    COMPARISON:  May 5, 2018 at 3:35 a.m.    FINDINGS:  Support devices:  Left subclavian central venous catheter remains.  ET tube has its tip at T4.  NG tube is coiled upon itself in the stomach with its tip overlying the expected location of the gastric fundus.    Diffuse abnormal alveolar opacification, which could be due to edema, infection, or ARDS, has progressed since the previous examination.  There are bilateral pleural effusions.  No pneumothorax is seen.    The cardiac silhouette is obscured but only mildly enlarged.    Visualized osseous structures are stable.  Incidental note is made of diffuse gaseous distension of the visualized intestinal loops.   Impression       Interval progression of the bilateral pulmonary parenchymal opacification since May 5, 2018 at 3:35 a.m.      Electronically signed by: Alyssa Escobar MD  Date: 05/05/2018  Time: 06:50

## 2018-05-05 NOTE — PROGRESS NOTES
[facility]  Pediatric Critical Care  Progress Note    Patient Name: Hema Kuo  MRN: [id number]  Admission Date: 4/30/2018  Hospital Length of Stay: 5 days  Code Status: Full Code   Attending Provider: Donny Richardson MD   Primary Care Physician: Ann Reyna NP    Subjective:     HPI:  Hema is a 18 y/o male with PMH significant for AML (t 8;21) s/p intensification therapy II per [id number] (Arm A) who was initially admitted on 4/30/2018 for neutropenia/profund sepsis risk. He was stepped up to the PICU for persistent fever >103, tachycardia, and hypotension that has been minimally responsive to resuscitation. S/p 1L NS x 2 and 1/2 units pRBC that is still running. Also reports chest tightness, SOB with new increased oxygen requirement. On 2LPM NC for desaturation to mid 80s. Found to have new LLL airspace opacity on CXR. Finally reports ongoing dull headache throughout the day with no visual disturbances or neuro deficits.    Of note, as of yesterday (5/2/2018) he was Day 13 of intensification therapy II following AAML 1031 (Arm A).    Interval History: Marked increased WOB including hyperventilation with tracheal tugging. Tried CPAP and BiPap but patient's anxiety worsened WOB and prn ativan did not help. Progressed to need for intubation, initially vol control but switched to pressure control for concern of ARDS vs atelectasis. NG to low intermittent suction. BPs maintained with epi drip. Now on versed drip and increased precedex drip for sedation.     Review of Systems   Constitutional: Positive for fever.   Respiratory: Positive for shortness of breath.    Psychiatric/Behavioral: The patient is nervous/anxious.      Objective:     Vital Signs Range (Last 24H):  Temp:  [100 °F (37.8 °C)-103.3 °F (39.6 °C)]   Pulse:  [115-187]   Resp:  [21-67]   BP: ()/()   SpO2:  [55 %-100 %]    Fever 103.3 at 0000, persists to 102.6 0400  RR max at 63 0217 prior to intubation  Hypotensive to 65/36 at  0536    I & O (Last 24H):  Intake/Output Summary (Last 24 hours) at 05/05/18 0802  Last data filed at 05/05/18 0700   Gross per 24 hour   Intake          4119.44 ml   Output             5495 ml   Net         -1375.56 ml   In 54ml/kg/day  UOP 2.9ml/kg/hr  Net neg 1,388.9  Since admission +5.3L    Ventilator Data (Last 24H):   RR 18, FiO2 50%, PEEP 8  Vent Mode: PC  Oxygen Concentration (%):  [] 49  Resp Rate Total:  [8 br/min-32 br/min] 32 br/min  PEEP/CPAP:  [8 cmH20] 8 cmH20  Mean Airway Pressure:  [14 ohC94-28 cmH20] 14 cmH20    Physical Exam:  Physical Exam   Constitutional: He appears well-developed and well-nourished.   Intubated and sedated   Eyes:   Pupils constricted   Cardiovascular: Normal rate, regular rhythm, normal heart sounds and intact distal pulses.    No murmur heard.  Pulmonary/Chest: No stridor.   Symmetric expansion with air movement BL  Course inspiratory sounds, R>L   Abdominal: Soft. Bowel sounds are normal. He exhibits distension. There is no guarding.   Musculoskeletal: He exhibits no edema.   Skin: Skin is warm and dry. Capillary refill takes less than 2 seconds. No rash noted. He is not diaphoretic. No erythema.       Lines/Drains/Airways     Central Venous Catheter Line                 Port A Cath Double Lumen 10/31/17 1826 left subclavian 185 days          Drain                 NG/OG Tube 05/05/18 0545 Replogle 14 Fr. Right nostril less than 1 day          Airway                 Airway - Non-Surgical 05/05/18 0527 Endotracheal Tube less than 1 day          Arterial Line                 Arterial Line 05/03/18 1300 Right Radial 1 day          Peripheral Intravenous Line                 Peripheral IV - Single Lumen 05/05/18 0539 Left Hand less than 1 day         Peripheral IV - Single Lumen 05/05/18 0600 less than 1 day         Peripheral IV - Single Lumen 05/05/18 0600 Right Hand less than 1 day                Recent Results (from the past 12 hour(s))   ISTAT PROCEDURE    Collection  Time: 05/05/18  7:03 AM   Result Value Ref Range    POC PH 7.517 (H) 7.35 - 7.45    POC PCO2 32.6 (L) 35 - 45 mmHg    POC PO2 222 (H) 80 - 100 mmHg    POC HCO3 26.4 24 - 28 mmol/L    POC BE 3 -2 to 2 mmol/L    POC SATURATED O2 100 95 - 100 %    POC Sodium 138 136 - 145 mmol/L    POC Potassium 3.2 (L) 3.5 - 5.1 mmol/L    POC TCO2 27 23 - 27 mmol/L    POC Ionized Calcium 1.14 1.06 - 1.42 mmol/L    POC Hematocrit 19 (LL) 36 - 54 %PCV    Rate 24     Sample ARTERIAL     Site Bennie/UAC     Allens Test N/A     DelSys Adult Vent     Mode AC/PRVC     PEEP 8     PiP 22     FiO2 90     ETCO2 27     Sp02 100    ISTAT PROCEDURE    Collection Time: 05/05/18 10:08 AM   Result Value Ref Range    POC PH 7.491 (H) 7.35 - 7.45    POC PCO2 31.7 (L) 35 - 45 mmHg    POC PO2 56 (LL) 80 - 100 mmHg    POC HCO3 24.3 24 - 28 mmol/L    POC BE 1 -2 to 2 mmol/L    POC SATURATED O2 92 (L) 95 - 100 %    POC Sodium 138 136 - 145 mmol/L    POC Potassium 3.4 (L) 3.5 - 5.1 mmol/L    POC TCO2 25 23 - 27 mmol/L    POC Ionized Calcium 1.15 1.06 - 1.42 mmol/L    POC Hematocrit 21 (L) 36 - 54 %PCV    Verbal Result Readback Performed Yes     Provider Credentials: MD     Provider Notified: MARRY     Time Notifed: 1010     Rate 18     Sample ARTERIAL     Site Bennie/UAC     Allens Test N/A     DelSys Adult Vent     Mode AC/PRVC     PEEP 8     PiP 16     FiO2 50     ETCO2 27     Sp02 94    ISTAT Lactate    Collection Time: 05/05/18 10:23 AM   Result Value Ref Range    POC Lactate 1.09 0.36 - 1.25 mmol/L    Sample ARTERIAL     Site Bennie/UAC     Allens Test N/A          Microbiology Results (last 7 days)     Procedure Component Value Units Date/Time    Blood culture [[id number]] Collected:  05/02/18 1129    Order Status:  Completed Specimen:  Blood from Line, Port A Cath Updated:  05/04/18 1420     Blood Culture, Routine Gram stain aer bottle: Gram positive cocci in chains resembling Strep     Blood Culture, Routine Gram stain lilliana bottle: Gram positive cocci in  chains resembling Strep      Blood Culture, Routine Results called to and read back by: Berna Perez RN  05/03/2018  06:30     Blood Culture, Routine --     STREPTOCOCCUS MITIS/ORALIS   Susceptibility pending      Narrative:       One set from central line    Blood culture [[id number]] Collected:  05/03/18 0540    Order Status:  Completed Specimen:  Blood from Line, Port A Cath Updated:  05/04/18 1212     Blood Culture, Routine No Growth to date     Blood Culture, Routine No Growth to date    Narrative:       Port A Cath    Fungus Culture, Blood or Bone Marrow [[id number]] Collected:  05/03/18 0540    Order Status:  Sent Specimen:  Blood from Blood Updated:  05/03/18 0657    Blood culture [[id number]]     Order Status:  Canceled Specimen:  Blood       Phos 1.3 low  Na 133 low  K 3 low  ANC 0  PLT 19  Hgb 7.0    Chest X-Ray:   EXAMINATION:  XR CHEST 1 VIEW    CLINICAL HISTORY:  Respiratroy distress, ET placement;.    TECHNIQUE:  Single frontal portable view of the chest was performed.    COMPARISON:  May 5, 2018 at 3:35 a.m.    FINDINGS:  Support devices: Left subclavian central venous catheter remains.  ET tube has its tip at T4.  NG tube is coiled upon itself in the stomach with its tip overlying the expected location of the gastric fundus.    Diffuse abnormal alveolar opacification, which could be due to edema, infection, or ARDS, has progressed since the previous examination.  There are bilateral pleural effusions.  No pneumothorax is seen.    The cardiac silhouette is obscured but only mildly enlarged.    Visualized osseous structures are stable.  Incidental note is made of diffuse gaseous distension of the visualized intestinal loops.   Impression       Interval progression of the bilateral pulmonary parenchymal opacification since May 5, 2018 at 3:35 a.m.      Electronically signed by: Alyssa Escobar MD  Date: 05/05/2018  Time: 06:50           Assessment/Plan:     Sepsis    16 y/o male with h/o  AML (t 8;21)  s/p intensification therapy II per [id number] (Arm A) stepped up to PICU for sepsis after presenting with waxing mental status, hypotension unresponsive to NS bolus x 2 and blood 1/2, chest tightness w/ tachypnea, new oxygen requirement and concern for PNA on CXR. He is currently intubated and sedated with PaOw/Fi ratio c/w mild ARDS. Pressures maintained on epi. Sedation with fentanyl, precedex, versed gtt. Will start feeds via SUMP.     CNS:  Sedation  - versed drip 1mg/hr  - precedex 0.8mcg/kg/hr  - Fentanyl 50mcg/kg/hr   - Continue to monitor neuro/mental status  - Ativan 1mg Q4PRN    Fever  - Scheduled tylenol 650mg, IV, Q6H for fever. No NSAIDs given allergy.     CV: Hypotensive with melisa of 82/35 and MAP <60 on floor despite 1L NS bolus x 2 and 1/2 units pRBC.   - Titrate epinephrine 0.02mcg/kg/min. Titrate to MAP goal >65.  - Vitals per protocol.    PULM: Intubated 5/5 pressure support mode for concern of ARDS vs. Atelectasis. OI score 6.9. Luke/Fi 246 c/w mild ARDS.   - RR 18, FiO2 50%, PEEP 8  - goal to maintain peak pressures <30  - CPT    FEN/GI:  - Changed fluids to custom - D5 NS 20K Kphos 10mmol and 1g Mg @ 100m/hr  - TFG - 100ml/hr including meds  - NPO  - Famotidine 20mg, IV, BID for ppx  - Continue PRN zofran and miralax  - Replace electrolytes as needed  - Start feeds today: Peptamen via SUMP: start at 10ml/hr and increase q4h to goal of 50mL/hr  - repeat BMP, Mg, Phos PM labs  - KPhos via SUMP     HEME/ONC:   Chemotherapy induced neutropenia: Received 1/2 units pRBC on floor.  - Hgb goal >7.  - Plts goal >20.  - Daily CBC and reticulocyte   - Received 1upRBC overnight    AML: 8;21 translocation, c-kit mutation negative.  CNS negative.  MRD negative after Induction I.    - Treating per [employer] [facility] [employer] [id number] (ARM A).  S/p Intensification I and Intensification II started. He was day 13 of intensification II as of 5/2/2018.  - BM biopsy at start of Intensification shows CR.  FISH for t(8;21) negative. 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  - Continue acyclovir ppx  - Continue posaconazole ppx  - Continue bactrim QFSaSu ppx  - Continue peridex ppx    ID: BCx - alpha hemolytic strep from 5/2, blood culture 5/3 NGTD  - Continue to follow previous cultures  - Vancomycin 1500mg Q8H (5/3 - )   - Meropenem 2g IV Q8H (5/4 - )  - Amikacin 20mg/kg Q24H (5/4 - )     RENAL: BUN/Cr wnl. No active issues.  - lasix 20 mg q6h  - Strict Is/Os - monitor for SIADH    SOCIAL: Mom at bedside    DISPO: Critically ill, in the PICU            Trudy Tracy MD  Pediatric Critical Care, PGYII [facility]

## 2018-05-05 NOTE — PROGRESS NOTES
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 He was stepped up to the PICU for persistent fever >103, tachycardia, and hypotension that has been minimally responsive to resuscitation. S/p 1L NS x 2 and 1/2 units pRBC that is still running. Also reports chest tightness, SOB with new increased oxygen requirement. On 2LPM NC for desaturation to mid 80s. Found to have new LLL airspace opacity on CXR. Finally reports ongoing dull headache throughout the day with no visual disturbances or neuro deficits.    Of note, as of yesterday (5/2/2018) he was Day 13 of intensification therapy II following AAML 1031 (Arm A).    Interval History: Increased sob    Review of Systems  Objective:     Vital Signs Range (Last 24H):  Temp:  [100 °F (37.8 °C)-103.4 °F (39.7 °C)]   Pulse:  [113-171]   Resp:  [21-55]   BP: ()/(40-53)   SpO2:  [89 %-100 %]     I & O (Last 24H):  Intake/Output Summary (Last 24 hours) at 05/04/18 2230  Last data filed at 05/04/18 2200   Gross per 24 hour   Intake          4952.68 ml   Output             5675 ml   Net          -722.32 ml       Ventilator Data (Last 24H):     Oxygen Concentration (%):  [] 55    Hemodynamic Parameters (Last 24H):       Physical Exam:  Physical Exam    Constitutional: He is oriented to person, place, and time. He appears ill.   HENT:   Eyes: EOM are normal. Pupils are equal, round, and reactive to light. Right eye exhibits no discharge. Left eye exhibits no discharge.   Pale conjunctiva   Cardiovascular: Regular rhythm, normal  heart sounds and intact distal pulses.  Tachycardia present.    No murmur heard.  Pulmonary/Chest: Tachypnea noted.  Wheezing +, increased resp distress, crackles  Abdominal: Soft. He exhibits no distension.   Neurological: He is alert and oriented to person, place, and time. No sensory deficit. He exhibits normal muscle tone.   Skin: Skin is warm and dry. Capillary refill takes 2 to 3 seconds. There is pallor.     Lines/Drains/Airways     Central Venous Catheter Line                 Port A Cath Double Lumen 10/31/17 1826 left subclavian 185 days          Arterial Line                 Arterial Line 05/03/18 1300 Right Radial 1 day                Laboratory (Last 24H):     Recent Results (from the past 20 hour(s))   CBC auto differential    Collection Time: 05/04/18  4:20 AM   Result Value Ref Range    WBC 0.17 (LL) 3.90 - 12.70 K/uL    RBC 2.54 (L) 4.60 - 6.20 M/uL    Hemoglobin 8.0 (L) 14.0 - 18.0 g/dL    Hematocrit 21.6 (L) 40.0 - 54.0 %    MCV 85 82 - 98 fL    MCH 31.5 (H) 27.0 - 31.0 pg    MCHC 37.0 (H) 32.0 - 36.0 g/dL    RDW 17.4 (H) 11.5 - 14.5 %    Platelets 17 (LL) 150 - 350 K/uL    MPV 9.6 9.2 - 12.9 fL    Immature Granulocytes CANCELED 0.0 - 0.5 %    Immature Grans (Abs) CANCELED 0.00 - 0.04 K/uL    nRBC 0 0 /100 WBC    Gran% 0.0 (L) 38.0 - 73.0 %    Lymph% 100.0 (H) 18.0 - 48.0 %    Mono% 0.0 (L) 4.0 - 15.0 %    Eosinophil% 0.0 0.0 - 8.0 %    Basophil% 0.0 0.0 - 1.9 %    Platelet Estimate Decreased (A)     Aniso Slight     Poik Slight     Poly Occasional     Ovalocytes Occasional     Differential Method Manual    Reticulocytes    Collection Time: 05/04/18  4:20 AM   Result Value Ref Range    Retic 0.3 (L) 0.4 - 2.0 %   Comprehensive metabolic panel    Collection Time: 05/04/18  4:20 AM   Result Value Ref Range    Sodium 133 (L) 136 - 145 mmol/L    Potassium 3.3 (L) 3.5 - 5.1 mmol/L    Chloride 107 95 - 110 mmol/L    CO2 19 (L) 23 - 29 mmol/L    Glucose 126 (H) 70 - 110 mg/dL    BUN, Bld 9 6 - 20 mg/dL     Creatinine 0.7 0.5 - 1.4 mg/dL    Calcium 7.8 (L) 8.7 - 10.5 mg/dL    Total Protein 5.3 (L) 6.0 - 8.4 g/dL    Albumin 2.6 (L) 3.5 - 5.2 g/dL    Total Bilirubin 0.8 0.1 - 1.0 mg/dL    Alkaline Phosphatase 44 (L) 55 - 135 U/L     (H) 10 - 40 U/L     (H) 10 - 44 U/L    Anion Gap 7 (L) 8 - 16 mmol/L    eGFR if African American >60.0 >60 mL/min/1.73 m^2    eGFR if non African American >60.0 >60 mL/min/1.73 m^2   Magnesium    Collection Time: 05/04/18  4:20 AM   Result Value Ref Range    Magnesium 1.3 (L) 1.6 - 2.6 mg/dL   Phosphorus    Collection Time: 05/04/18  4:20 AM   Result Value Ref Range    Phosphorus <1.0 (LL) 2.7 - 4.5 mg/dL   ISTAT PROCEDURE    Collection Time: 05/04/18  1:02 PM   Result Value Ref Range    POC PH 7.424 7.35 - 7.45    POC PCO2 27.6 (LL) 35 - 45 mmHg    POC PO2 55 (LL) 80 - 100 mmHg    POC HCO3 18.0 (L) 24 - 28 mmol/L    POC BE -6 -2 to 2 mmol/L    POC SATURATED O2 90 (L) 95 - 100 %    POC Sodium 135 (L) 136 - 145 mmol/L    POC Potassium 3.0 (L) 3.5 - 5.1 mmol/L    POC TCO2 19 (L) 23 - 27 mmol/L    POC Ionized Calcium 1.11 1.06 - 1.42 mmol/L    POC Hematocrit 24 (L) 36 - 54 %PCV    Provider Credentials: MD     Provider Notified: GRADIDGE     Time Notifed: 1305     Sample ARTERIAL     Site Bennie/UAC     Allens Test N/A     DelSys Nasal Can     Mode SPONT     Flow 30     FiO2 100     Sp02 98    ISTAT Lactate    Collection Time: 05/04/18  1:09 PM   Result Value Ref Range    POC Lactate 2.46 (H) 0.36 - 1.25 mmol/L    Sample ARTERIAL     Site Bennie/UAC     Allens Test N/A    VANCOMYCIN, TROUGH before 4th dose    Collection Time: 05/04/18  1:35 PM   Result Value Ref Range    Vancomycin-Trough 28.5 (H) 10.0 - 22.0 ug/mL   ISTAT Lactate    Collection Time: 05/04/18  8:43 PM   Result Value Ref Range    POC Lactate 1.30 (H) 0.36 - 1.25 mmol/L    Verbal Result Readback Performed Yes     Provider Credentials: MD     Provider Notified: TEMPLE     Sample ARTERIAL     Site Bennie/CHAPINCITO Hui  Test N/A    ISTAT PROCEDURE    Collection Time: 05/04/18  8:44 PM   Result Value Ref Range    POC PH 7.510 (H) 7.35 - 7.45    POC PCO2 27.1 (LL) 35 - 45 mmHg    POC PO2 46 (LL) 80 - 100 mmHg    POC HCO3 21.6 (L) 24 - 28 mmol/L    POC BE -1 -2 to 2 mmol/L    POC SATURATED O2 87 (L) 95 - 100 %    POC Sodium 137 136 - 145 mmol/L    POC Potassium 3.1 (L) 3.5 - 5.1 mmol/L    POC TCO2 22 (L) 23 - 27 mmol/L    POC Ionized Calcium 1.08 1.06 - 1.42 mmol/L    POC Hematocrit 21 (L) 36 - 54 %PCV    Verbal Result Readback Performed Yes     Provider Credentials: MD     Provider Notified: TEMPLE     Time Notifed: 2045     Sample ARTERIAL     Site Bennie/[facility]     Allens Test N/A    ]    Chest X-Ray: White out    Diagnostic Results:  No Further      Assessment/Plan:     Sepsis    16 y/o male with h/o  AML (t 8;21) s/p intensification therapy II per [id number] (Arm A) stepped up to PICU for sepsis after presenting with waxing mental status, hypotension unresponsive to NS bolus x 2 and blood 1/2, chest tightness w/ tachypnea, new oxygen requirement and concern for PNA on CXR. He is currently AOx3 on 25LPM HFNC @ 100% with worsening resp status this AM.     CNS:  - Continue to monitor neuro/mental status  - Ativan 1mg Q4PRN  Fever  - Scheduled tylenol 650mg, IV, Q6H for fever. No NSAIDs given allergy.   - Cooling blanket/ice packs    Headache: Imprved  - Continue to monitor    Pain: Controlled.  - Continue PRN oxycodone    CV: Hypotensive with melisa of 82/35 and MAP <60 on floor despite 1L NS bolus x 2 and 1/2 units pRBC.   - Titrate epinephrine 0.02mcg/kg/min. Titrate to MAP goal >65.  - Vitals per protocol.    PULM: CXR worse this AM. Increased resp distress  - Start HFNC increased to 25LPM @ 100%.   - Will give 3 back to back doses of Duo-Neb, will re-evaluate.   - Start incentive spirometry and CPT  - Continue abx and albuterol nebs  - VBG PRN    FEN/GI:  - Changed fluids to custom - D5 NS 20K Kphos 10mmol and 1g Mg @ 100m/hr  - TFG -  100ml/hr including meds  - NPO while on HFNC, ice chips and mouth sponges permitted.  - Famotidine 20mg, IV, BID for ppx  - Continue PRN zofran and miralax  - Replace electrolytes as needed    HEME/ONC:   Chemotherapy induced neutropenia: Received 1/2 units pRBC on floor.  - Hgb goal >7.  - Plts goal >20.  - Daily CBC and reticulocyte   - Will give platelets today x1    AML: 8;21 translocation, c-kit mutation negative.  CNS negative.  MRD negative after Induction I.    - Treating per COG [id number] (ARM A).  S/p Intensification I and Intensification II started. He was day 13 of intensification II as of 5/2/2018.  - BM biopsy at start of Intensification shows CR.  FISH for t(8;21) negative. 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  - Continue acyclovir ppx  - Continue posaconazole ppx  - Continue bactrim QFSaSu ppx  - Continue peridex ppx    ID: BCx - alpha hemolytic strep from 5/2  - Continue to follow previous cultures  - Vancomycin 1500mg Q12H (5/3 - )  - Meropenem 2g IV Q8H (5/4 - )  - Amikacin 20mg/kg Q24H (5/4 - )     RENAL: BUN/Cr wnl. No active issues.  - lasix BID - will help pulm also    SOCIAL: Mom at bedside    DISPO: Critically ill, in the PICU            Critical Care Time greater than: 1 Hour    Cathy Metzger MD  Pediatric Critical Care  [facility]

## 2018-05-05 NOTE — PROGRESS NOTES
05/05/18 1800   Art Line (2)   Arterial Line BP 2 89/40   Arterial Line MAP (mmHg) 57 mmHg   Dr. Amaya at bedside. Epi restarted at 0.01 and versed decreased to 1.5mg/hr. Will continue to monitor.

## 2018-05-05 NOTE — ASSESSMENT & PLAN NOTE
18 y/o male with h/o  AML (t 8;21) s/p intensification therapy II per [id number] (Arm A) stepped up to PICU for sepsis after presenting with waxing mental status, hypotension unresponsive to NS bolus x 2 and blood 1/2, chest tightness w/ tachypnea, new oxygen requirement and concern for PNA on CXR. He is currently AOx3 on 25LPM HFNC @ 100% with worsening resp status this AM.     CNS:  - Continue to monitor neuro/mental status  - Ativan 1mg Q4PRN  Fever  - Scheduled tylenol 650mg, IV, Q6H for fever. No NSAIDs given allergy.   - Cooling blanket/ice packs    Headache: Imprved  - Continue to monitor    Pain: Controlled.  - Continue PRN oxycodone    CV: Hypotensive with melisa of 82/35 and MAP <60 on floor despite 1L NS bolus x 2 and 1/2 units pRBC.   - Titrate epinephrine 0.02mcg/kg/min. Titrate to MAP goal >65.  - Vitals per protocol.    PULM: CXR worse this AM. Increased resp distress  - Start HFNC increased to 25LPM @ 100%.   - Will give 3 back to back doses of Duo-Neb, will re-evaluate.   - Start incentive spirometry and CPT  - Continue abx and albuterol nebs  - VBG PRN    FEN/GI:  - Changed fluids to custom - D5 NS 20K Kphos 10mmol and 1g Mg @ 100m/hr  - TFG - 100ml/hr including meds  - NPO while on HFNC, ice chips and mouth sponges permitted.  - Famotidine 20mg, IV, BID for ppx  - Continue PRN zofran and miralax  - Replace electrolytes as needed    HEME/ONC:   Chemotherapy induced neutropenia: Received 1/2 units pRBC on floor.  - Hgb goal >7.  - Plts goal >20.  - Daily CBC and reticulocyte   - Will give platelets today x1    AML: 8;21 translocation, c-kit mutation negative.  CNS negative.  MRD negative after Induction I.    - Treating per [facility] [id number] (ARM A).  S/p Intensification I and Intensification II started. He was day 13 of intensification II as of 5/2/2018.  - BM biopsy at start of Intensification shows CR.  FISH for t(8;21) negative. 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  - Continue acyclovir ppx  - Continue posaconazole ppx  - Continue bactrim QFSaSu ppx  - Continue peridex ppx    ID: BCx - alpha hemolytic strep from 5/2  - Continue to follow previous cultures  - Vancomycin 1500mg Q12H (5/3 - )  - Meropenem 2g IV Q8H (5/4 - )  - Amikacin 20mg/kg Q24H (5/4 - )     RENAL: BUN/Cr wnl. No active issues.  - lasix BID - will help pulm also    SOCIAL: Mom at bedside    DISPO: Critically ill, in the PICU

## 2018-05-05 NOTE — NURSING
Pt with increased WOB, subcostal and suprasternal retractions, nasal flaring, abd muscle use, tachypnea stating he feels exhausted as if he has been running. Pt on BiPap maintaining sats 90-91.  RT at bedside monitoring Bipap. MD notified, MD at bedside, decision to intubate pt.  Pt intubated with  8.0 ETT 25@ gum, without complications.  See code documentation for further assessment.

## 2018-05-05 NOTE — PROGRESS NOTES
Patient started on 20 mg/hour Continuous Albuterol Treatment per MD order. Will continue to Monitor.

## 2018-05-05 NOTE — PLAN OF CARE
Problem: Patient Care Overview  Goal: Plan of Care Review  Outcome: Ongoing (interventions implemented as appropriate)  Mother and father at bedside, updated on POC, questions/concerns addressed.  Pt now vent, tolerating well with no destats or s/s of increased WOB.  ABG continue q6, LA continue q12.  CaChl x1, Bicarb x1.  Pt continues to be febrile over shift, TMAX 103.3, IV tylenol ATC continues.  Pt with stated anxiety and restlessness at beginning of shift PRN ativan x3, dex gtt started now at 0.8.  Pt now medically sedated.  Pt with MAP <60, epi gtt re-started, now currently at 0.02.  1 Unit of blood given for low H/H.  Diuril x1 dose.  Pt voiding adequately without difficulty, negative fluid balance over shift.  See flowsheets for additional documentation will continue to monitor.

## 2018-05-05 NOTE — NURSING
Pt continuing with inc WOB, tachypneic 60's, desatting to 85-86, on HFNC 45L 100%, MD notified.  Pt placed on CPAP.  Pt anxious, stating having harder time of breathing with CPAP.  MD aware, Dex gtt inc to 0.7, ativan PRN x1.  RT at bedside.  Pt placed on Bipap, seems to be tolerating better with sats 100, RR 30-40s.  Will continue to monitor.

## 2018-05-06 LAB
ABO + RH BLD: NORMAL
ALBUMIN SERPL BCP-MCNC: 2.4 G/DL
ALLENS TEST: ABNORMAL
ALLENS TEST: NORMAL
ALLENS TEST: NORMAL
ALP SERPL-CCNC: 40 U/L
ALT SERPL W/O P-5'-P-CCNC: 87 U/L
ANION GAP SERPL CALC-SCNC: 10 MMOL/L
ANISOCYTOSIS BLD QL SMEAR: SLIGHT
ANISOCYTOSIS BLD QL SMEAR: SLIGHT
APTT BLDCRRT: 35.9 SEC
AST SERPL-CCNC: 35 U/L
BASOPHILS # BLD AUTO: 0 K/UL
BASOPHILS NFR BLD: 0 %
BASOPHILS NFR BLD: 0 %
BILIRUB SERPL-MCNC: 1.1 MG/DL
BLD GP AB SCN CELLS X3 SERPL QL: NORMAL
BLD PROD TYP BPU: NORMAL
BLOOD UNIT EXPIRATION DATE: NORMAL
BLOOD UNIT TYPE CODE: 6200
BLOOD UNIT TYPE CODE: 8400
BLOOD UNIT TYPE CODE: 9500
BLOOD UNIT TYPE: NORMAL
BUN SERPL-MCNC: 13 MG/DL
CALCIUM SERPL-MCNC: 8.4 MG/DL
CHLORIDE SERPL-SCNC: 103 MMOL/L
CO2 SERPL-SCNC: 24 MMOL/L
CODING SYSTEM: NORMAL
CREAT SERPL-MCNC: 0.8 MG/DL
D DIMER PPP IA.FEU-MCNC: 2.04 MG/L FEU
DACRYOCYTES BLD QL SMEAR: ABNORMAL
DELSYS: ABNORMAL
DELSYS: NORMAL
DIFFERENTIAL METHOD: ABNORMAL
DIFFERENTIAL METHOD: ABNORMAL
DISPENSE STATUS: NORMAL
EOSINOPHIL # BLD AUTO: 0 K/UL
EOSINOPHIL NFR BLD: 0 %
EOSINOPHIL NFR BLD: 0 %
ERYTHROCYTE [DISTWIDTH] IN BLOOD BY AUTOMATED COUNT: 18.8 %
ERYTHROCYTE [DISTWIDTH] IN BLOOD BY AUTOMATED COUNT: 18.9 %
ERYTHROCYTE [SEDIMENTATION RATE] IN BLOOD BY WESTERGREN METHOD: 15 MM/H
ERYTHROCYTE [SEDIMENTATION RATE] IN BLOOD BY WESTERGREN METHOD: 15 MM/H
ERYTHROCYTE [SEDIMENTATION RATE] IN BLOOD BY WESTERGREN METHOD: 18 MM/H
EST. GFR  (AFRICAN AMERICAN): >60 ML/MIN/1.73 M^2
EST. GFR  (NON AFRICAN AMERICAN): >60 ML/MIN/1.73 M^2
FIBRINOGEN PPP-MCNC: >800 MG/DL
FIO2: 60
FIO2: 70
FIO2: 70
GLUCOSE SERPL-MCNC: 137 MG/DL
HCO3 UR-SCNC: 24.9 MMOL/L (ref 24–28)
HCO3 UR-SCNC: 24.9 MMOL/L (ref 24–28)
HCO3 UR-SCNC: 25.5 MMOL/L (ref 24–28)
HCO3 UR-SCNC: 30.3 MMOL/L (ref 24–28)
HCT VFR BLD AUTO: 21.1 %
HCT VFR BLD AUTO: 24.1 %
HCT VFR BLD CALC: 20 %PCV (ref 36–54)
HCT VFR BLD CALC: 22 %PCV (ref 36–54)
HCT VFR BLD CALC: 24 %PCV (ref 36–54)
HCT VFR BLD CALC: 24 %PCV (ref 36–54)
HGB BLD-MCNC: 7.6 G/DL
HGB BLD-MCNC: 8.4 G/DL
IMM GRANULOCYTES # BLD AUTO: 0 K/UL
IMM GRANULOCYTES # BLD AUTO: ABNORMAL 10*3/UL
IMM GRANULOCYTES NFR BLD AUTO: 0 %
IMM GRANULOCYTES NFR BLD AUTO: ABNORMAL %
INR PPP: 1.1
IP: 16
IP: 8
IP: 8
IT: 0.55
IT: 0.55
IT: 0.7
LDH SERPL L TO P-CCNC: 0.86 MMOL/L (ref 0.36–1.25)
LDH SERPL L TO P-CCNC: 1.12 MMOL/L (ref 0.36–1.25)
LYMPHOCYTES # BLD AUTO: 0 K/UL
LYMPHOCYTES NFR BLD: 100 %
LYMPHOCYTES NFR BLD: 80 %
MAGNESIUM SERPL-MCNC: 1.9 MG/DL
MAP: 14
MAP: 15
MAP: 15
MCH RBC QN AUTO: 31.3 PG
MCH RBC QN AUTO: 31.4 PG
MCHC RBC AUTO-ENTMCNC: 34.9 G/DL
MCHC RBC AUTO-ENTMCNC: 36 G/DL
MCV RBC AUTO: 87 FL
MCV RBC AUTO: 90 FL
MODE: ABNORMAL
MODE: NORMAL
MONOCYTES # BLD AUTO: 0 K/UL
MONOCYTES NFR BLD: 0 %
MONOCYTES NFR BLD: 0 %
MPV, BLUE TOP: ABNORMAL FL
NEUTROPHILS # BLD AUTO: 0 K/UL
NEUTROPHILS NFR BLD: 0 %
NEUTROPHILS NFR BLD: 20 %
NRBC BLD-RTO: 0 /100 WBC
NRBC BLD-RTO: 0 /100 WBC
NUM UNITS TRANS PACKED RBC: NORMAL
NUM UNITS TRANS WBC-POOR PLATPHERESIS: NORMAL
NUM UNITS TRANS WBC-POOR PLATPHERESIS: NORMAL
OVALOCYTES BLD QL SMEAR: ABNORMAL
OVALOCYTES BLD QL SMEAR: ABNORMAL
PCO2 BLDA: 36.4 MMHG (ref 35–45)
PCO2 BLDA: 37.6 MMHG (ref 35–45)
PCO2 BLDA: 37.9 MMHG (ref 35–45)
PCO2 BLDA: 45.6 MMHG (ref 35–45)
PEEP: 12
PEEP: 12
PEEP: 8
PH SMN: 7.42 [PH] (ref 7.35–7.45)
PH SMN: 7.43 [PH] (ref 7.35–7.45)
PH SMN: 7.43 [PH] (ref 7.35–7.45)
PH SMN: 7.45 [PH] (ref 7.35–7.45)
PHOSPHATE SERPL-MCNC: 1.9 MG/DL
PIP: 20
PIP: 20
PIP: 26
PLATELET # BLD AUTO: 9 K/UL
PLATELET # BLD AUTO: ABNORMAL K/UL
PLATELET, BLUE TOP: 20 K/UL
PMV BLD AUTO: 11 FL
PMV BLD AUTO: ABNORMAL FL
PO2 BLDA: 100 MMHG (ref 80–100)
PO2 BLDA: 103 MMHG (ref 80–100)
PO2 BLDA: 44 MMHG (ref 80–100)
PO2 BLDA: 59 MMHG (ref 80–100)
POC BE: 0 MMOL/L
POC BE: 1 MMOL/L
POC BE: 2 MMOL/L
POC BE: 6 MMOL/L
POC IONIZED CALCIUM: 1.1 MMOL/L (ref 1.06–1.42)
POC IONIZED CALCIUM: 1.14 MMOL/L (ref 1.06–1.42)
POC IONIZED CALCIUM: 1.15 MMOL/L (ref 1.06–1.42)
POC IONIZED CALCIUM: 1.15 MMOL/L (ref 1.06–1.42)
POC SATURATED O2: 81 % (ref 95–100)
POC SATURATED O2: 92 % (ref 95–100)
POC SATURATED O2: 98 % (ref 95–100)
POC SATURATED O2: 98 % (ref 95–100)
POC TCO2: 26 MMOL/L (ref 23–27)
POC TCO2: 26 MMOL/L (ref 23–27)
POC TCO2: 27 MMOL/L (ref 23–27)
POC TCO2: 32 MMOL/L (ref 23–27)
POIKILOCYTOSIS BLD QL SMEAR: SLIGHT
POIKILOCYTOSIS BLD QL SMEAR: SLIGHT
POLYCHROMASIA BLD QL SMEAR: ABNORMAL
POLYCHROMASIA BLD QL SMEAR: ABNORMAL
POTASSIUM BLD-SCNC: 2.6 MMOL/L (ref 3.5–5.1)
POTASSIUM BLD-SCNC: 2.9 MMOL/L (ref 3.5–5.1)
POTASSIUM BLD-SCNC: 3.6 MMOL/L (ref 3.5–5.1)
POTASSIUM BLD-SCNC: 3.6 MMOL/L (ref 3.5–5.1)
POTASSIUM SERPL-SCNC: 2.6 MMOL/L
PROT SERPL-MCNC: 5.7 G/DL
PROTHROMBIN TIME: 11.5 SEC
PROVIDER CREDENTIALS: ABNORMAL
PROVIDER CREDENTIALS: NORMAL
PROVIDER NOTIFIED: ABNORMAL
PROVIDER NOTIFIED: NORMAL
RBC # BLD AUTO: 2.42 M/UL
RBC # BLD AUTO: 2.68 M/UL
RETICS/RBC NFR AUTO: 0.1 %
SAMPLE: ABNORMAL
SAMPLE: NORMAL
SAMPLE: NORMAL
SITE: ABNORMAL
SITE: NORMAL
SITE: NORMAL
SODIUM BLD-SCNC: 139 MMOL/L (ref 136–145)
SODIUM BLD-SCNC: 141 MMOL/L (ref 136–145)
SODIUM BLD-SCNC: 142 MMOL/L (ref 136–145)
SODIUM BLD-SCNC: 142 MMOL/L (ref 136–145)
SODIUM SERPL-SCNC: 137 MMOL/L
SP02: 100
SP02: 100
SP02: 85
TIME NOTIFIED: 1412
TIME NOTIFIED: 1412
TIME NOTIFIED: 718
VERBAL RESULT READBACK PERFORMED: YES
VT: 418
VT: 418
VT: 440
WBC # BLD AUTO: 0.05 K/UL
WBC # BLD AUTO: 0.08 K/UL

## 2018-05-06 PROCEDURE — 63600175 PHARM REV CODE 636 W HCPCS: Performed by: STUDENT IN AN ORGANIZED HEALTH CARE EDUCATION/TRAINING PROGRAM

## 2018-05-06 PROCEDURE — 85045 AUTOMATED RETICULOCYTE COUNT: CPT

## 2018-05-06 PROCEDURE — 63600175 PHARM REV CODE 636 W HCPCS: Performed by: PEDIATRICS

## 2018-05-06 PROCEDURE — 94645 CONT INHLJ TX EACH ADDL HOUR: CPT

## 2018-05-06 PROCEDURE — A4216 STERILE WATER/SALINE, 10 ML: HCPCS | Performed by: PEDIATRICS

## 2018-05-06 PROCEDURE — 25000003 PHARM REV CODE 250: Performed by: STUDENT IN AN ORGANIZED HEALTH CARE EDUCATION/TRAINING PROGRAM

## 2018-05-06 PROCEDURE — 86901 BLOOD TYPING SEROLOGIC RH(D): CPT

## 2018-05-06 PROCEDURE — 94770 HC EXHALED C02 TEST: CPT

## 2018-05-06 PROCEDURE — P9037 PLATE PHERES LEUKOREDU IRRAD: HCPCS

## 2018-05-06 PROCEDURE — 27201040 HC RC 50 FILTER

## 2018-05-06 PROCEDURE — P9038 RBC IRRADIATED: HCPCS

## 2018-05-06 PROCEDURE — 02HV33Z INSERTION OF INFUSION DEVICE INTO SUPERIOR VENA CAVA, PERCUTANEOUS APPROACH: ICD-10-PCS | Performed by: PEDIATRICS

## 2018-05-06 PROCEDURE — S0028 INJECTION, FAMOTIDINE, 20 MG: HCPCS | Performed by: STUDENT IN AN ORGANIZED HEALTH CARE EDUCATION/TRAINING PROGRAM

## 2018-05-06 PROCEDURE — 94003 VENT MGMT INPAT SUBQ DAY: CPT

## 2018-05-06 PROCEDURE — 99291 CRITICAL CARE FIRST HOUR: CPT | Mod: ,,, | Performed by: PEDIATRICS

## 2018-05-06 PROCEDURE — 85025 COMPLETE CBC W/AUTO DIFF WBC: CPT

## 2018-05-06 PROCEDURE — 25000003 PHARM REV CODE 250: Performed by: PEDIATRICS

## 2018-05-06 PROCEDURE — 20300000 HC PICU ROOM

## 2018-05-06 PROCEDURE — 87103 BLOOD FUNGUS CULTURE: CPT

## 2018-05-06 PROCEDURE — 80053 COMPREHEN METABOLIC PANEL: CPT

## 2018-05-06 PROCEDURE — 86920 COMPATIBILITY TEST SPIN: CPT

## 2018-05-06 PROCEDURE — P9047 ALBUMIN (HUMAN), 25%, 50ML: HCPCS | Mod: JG | Performed by: PEDIATRICS

## 2018-05-06 PROCEDURE — 36415 COLL VENOUS BLD VENIPUNCTURE: CPT

## 2018-05-06 PROCEDURE — 84100 ASSAY OF PHOSPHORUS: CPT

## 2018-05-06 PROCEDURE — 82330 ASSAY OF CALCIUM: CPT

## 2018-05-06 PROCEDURE — 85014 HEMATOCRIT: CPT

## 2018-05-06 PROCEDURE — 85384 FIBRINOGEN ACTIVITY: CPT

## 2018-05-06 PROCEDURE — 84295 ASSAY OF SERUM SODIUM: CPT

## 2018-05-06 PROCEDURE — 27000221 HC OXYGEN, UP TO 24 HOURS

## 2018-05-06 PROCEDURE — A4217 STERILE WATER/SALINE, 500 ML: HCPCS | Performed by: PEDIATRICS

## 2018-05-06 PROCEDURE — 36430 TRANSFUSION BLD/BLD COMPNT: CPT

## 2018-05-06 PROCEDURE — 25000242 PHARM REV CODE 250 ALT 637 W/ HCPCS: Performed by: PEDIATRICS

## 2018-05-06 PROCEDURE — 99900035 HC TECH TIME PER 15 MIN (STAT)

## 2018-05-06 PROCEDURE — 85379 FIBRIN DEGRADATION QUANT: CPT

## 2018-05-06 PROCEDURE — 85610 PROTHROMBIN TIME: CPT

## 2018-05-06 PROCEDURE — 99292 CRITICAL CARE ADDL 30 MIN: CPT | Mod: ,,, | Performed by: PEDIATRICS

## 2018-05-06 PROCEDURE — 94668 MNPJ CHEST WALL SBSQ: CPT

## 2018-05-06 PROCEDURE — 94761 N-INVAS EAR/PLS OXIMETRY MLT: CPT

## 2018-05-06 PROCEDURE — 37799 UNLISTED PX VASCULAR SURGERY: CPT

## 2018-05-06 PROCEDURE — 27200680 HC TRANSDUCER, NEONATAL DISP

## 2018-05-06 PROCEDURE — 87040 BLOOD CULTURE FOR BACTERIA: CPT

## 2018-05-06 PROCEDURE — 85730 THROMBOPLASTIN TIME PARTIAL: CPT

## 2018-05-06 PROCEDURE — 83605 ASSAY OF LACTIC ACID: CPT

## 2018-05-06 PROCEDURE — 99900026 HC AIRWAY MAINTENANCE (STAT)

## 2018-05-06 PROCEDURE — 99233 SBSQ HOSP IP/OBS HIGH 50: CPT | Mod: ,,, | Performed by: PEDIATRICS

## 2018-05-06 PROCEDURE — 83735 ASSAY OF MAGNESIUM: CPT

## 2018-05-06 PROCEDURE — 85049 AUTOMATED PLATELET COUNT: CPT

## 2018-05-06 PROCEDURE — 84132 ASSAY OF SERUM POTASSIUM: CPT

## 2018-05-06 PROCEDURE — 82803 BLOOD GASES ANY COMBINATION: CPT

## 2018-05-06 PROCEDURE — 86644 CMV ANTIBODY: CPT

## 2018-05-06 RX ORDER — SODIUM,POTASSIUM PHOSPHATES 280-250MG
1 POWDER IN PACKET (EA) ORAL
Status: DISCONTINUED | OUTPATIENT
Start: 2018-05-06 | End: 2018-05-07

## 2018-05-06 RX ORDER — SODIUM CHLORIDE 0.9 % (FLUSH) 0.9 %
10 SYRINGE (ML) INJECTION EVERY 6 HOURS
Status: DISCONTINUED | OUTPATIENT
Start: 2018-05-06 | End: 2018-05-10

## 2018-05-06 RX ORDER — MIDAZOLAM HYDROCHLORIDE 1 MG/ML
2 INJECTION, SOLUTION INTRAMUSCULAR; INTRAVENOUS
Status: DISCONTINUED | OUTPATIENT
Start: 2018-05-06 | End: 2018-05-06

## 2018-05-06 RX ORDER — POTASSIUM CHLORIDE 29.8 MG/ML
40 INJECTION INTRAVENOUS
Status: DISCONTINUED | OUTPATIENT
Start: 2018-05-06 | End: 2018-05-06

## 2018-05-06 RX ORDER — VECURONIUM BROMIDE FOR INJECTION 1 MG/ML
10 INJECTION, POWDER, LYOPHILIZED, FOR SOLUTION INTRAVENOUS
Status: DISCONTINUED | OUTPATIENT
Start: 2018-05-06 | End: 2018-05-09

## 2018-05-06 RX ORDER — NOREPINEPHRINE BITARTRATE/D5W 4MG/250ML
0.02 PLASTIC BAG, INJECTION (ML) INTRAVENOUS CONTINUOUS
Status: DISCONTINUED | OUTPATIENT
Start: 2018-05-06 | End: 2018-05-09

## 2018-05-06 RX ORDER — POTASSIUM CHLORIDE 20 MEQ/15ML
20 SOLUTION ORAL EVERY 12 HOURS PRN
Status: DISCONTINUED | OUTPATIENT
Start: 2018-05-06 | End: 2018-05-14

## 2018-05-06 RX ORDER — MAGNESIUM SULFATE/D5W 2 G/50 ML
2 INTRAVENOUS SOLUTION, PIGGYBACK (ML) INTRAVENOUS ONCE
Status: COMPLETED | OUTPATIENT
Start: 2018-05-06 | End: 2018-05-06

## 2018-05-06 RX ORDER — ALBUMIN HUMAN 250 G/1000ML
12.5 SOLUTION INTRAVENOUS ONCE
Status: COMPLETED | OUTPATIENT
Start: 2018-05-06 | End: 2018-05-06

## 2018-05-06 RX ORDER — HYDROCODONE BITARTRATE AND ACETAMINOPHEN 500; 5 MG/1; MG/1
TABLET ORAL
Status: DISCONTINUED | OUTPATIENT
Start: 2018-05-06 | End: 2018-05-06

## 2018-05-06 RX ORDER — SODIUM CHLORIDE 0.9 % (FLUSH) 0.9 %
10 SYRINGE (ML) INJECTION
Status: DISCONTINUED | OUTPATIENT
Start: 2018-05-06 | End: 2018-05-10

## 2018-05-06 RX ORDER — VECURONIUM BROMIDE FOR INJECTION 1 MG/ML
5 INJECTION, POWDER, LYOPHILIZED, FOR SOLUTION INTRAVENOUS
Status: DISCONTINUED | OUTPATIENT
Start: 2018-05-06 | End: 2018-05-06

## 2018-05-06 RX ORDER — POSACONAZOLE 100 MG/1
400 TABLET, DELAYED RELEASE ORAL 2 TIMES DAILY
Status: DISCONTINUED | OUTPATIENT
Start: 2018-05-06 | End: 2018-05-16

## 2018-05-06 RX ORDER — POTASSIUM CHLORIDE 29.8 G/1000ML
40 INJECTION, SOLUTION INTRAVENOUS
Status: DISCONTINUED | OUTPATIENT
Start: 2018-05-06 | End: 2018-05-14

## 2018-05-06 RX ADMIN — SULFAMETHOXAZOLE AND TRIMETHOPRIM 1 TABLET: 800; 160 TABLET ORAL at 08:05

## 2018-05-06 RX ADMIN — MIDAZOLAM HYDROCHLORIDE 2 MG: 1 INJECTION, SOLUTION INTRAMUSCULAR; INTRAVENOUS at 11:05

## 2018-05-06 RX ADMIN — VECURONIUM BROMIDE 5 MG: 1 INJECTION, POWDER, LYOPHILIZED, FOR SOLUTION INTRAVENOUS at 03:05

## 2018-05-06 RX ADMIN — EPINEPHRINE 0.01 MCG/KG/MIN: 1 INJECTION INTRAMUSCULAR; INTRAVENOUS; SUBCUTANEOUS at 07:05

## 2018-05-06 RX ADMIN — MIDAZOLAM HYDROCHLORIDE 2 MG: 1 INJECTION, SOLUTION INTRAMUSCULAR; INTRAVENOUS at 09:05

## 2018-05-06 RX ADMIN — MEROPENEM 2 G: 1 INJECTION, POWDER, FOR SOLUTION INTRAVENOUS at 08:05

## 2018-05-06 RX ADMIN — VANCOMYCIN HYDROCHLORIDE 1500 MG: 10 INJECTION, POWDER, LYOPHILIZED, FOR SOLUTION INTRAVENOUS at 03:05

## 2018-05-06 RX ADMIN — POTASSIUM & SODIUM PHOSPHATES POWDER PACK 280-160-250 MG 1 PACKET: 280-160-250 PACK at 02:05

## 2018-05-06 RX ADMIN — CHLORHEXIDINE GLUCONATE 15 ML: 1.2 RINSE ORAL at 08:05

## 2018-05-06 RX ADMIN — FILGRASTIM 600 MCG: 300 INJECTION, SOLUTION INTRAVENOUS; SUBCUTANEOUS at 01:05

## 2018-05-06 RX ADMIN — AMIKACIN SULFATE 1475 MG: 500 INJECTION, SOLUTION INTRAMUSCULAR; INTRAVENOUS at 12:05

## 2018-05-06 RX ADMIN — FAMOTIDINE 20 MG: 10 INJECTION INTRAVENOUS at 08:05

## 2018-05-06 RX ADMIN — Medication 2 G: at 09:05

## 2018-05-06 RX ADMIN — METHYLPREDNISOLONE SODIUM SUCCINATE 40 MG: 40 INJECTION, POWDER, FOR SOLUTION INTRAMUSCULAR; INTRAVENOUS at 05:05

## 2018-05-06 RX ADMIN — ALBUTEROL SULFATE 20 MG: 5 SOLUTION RESPIRATORY (INHALATION) at 11:05

## 2018-05-06 RX ADMIN — VECURONIUM BROMIDE 10 MG: 1 INJECTION, POWDER, LYOPHILIZED, FOR SOLUTION INTRAVENOUS at 07:05

## 2018-05-06 RX ADMIN — VANCOMYCIN HYDROCHLORIDE 1500 MG: 10 INJECTION, POWDER, LYOPHILIZED, FOR SOLUTION INTRAVENOUS at 08:05

## 2018-05-06 RX ADMIN — POSACONAZOLE 400 MG: 100 TABLET, COATED ORAL at 08:05

## 2018-05-06 RX ADMIN — Medication 0.01 MCG/KG/MIN: at 02:05

## 2018-05-06 RX ADMIN — VANCOMYCIN HYDROCHLORIDE 1500 MG: 10 INJECTION, POWDER, LYOPHILIZED, FOR SOLUTION INTRAVENOUS at 12:05

## 2018-05-06 RX ADMIN — CHLOROTHIAZIDE SODIUM 0.1 MG/KG/HR: 500 INJECTION, POWDER, LYOPHILIZED, FOR SOLUTION INTRAVENOUS at 07:05

## 2018-05-06 RX ADMIN — Medication 3 UNITS/HR: at 06:05

## 2018-05-06 RX ADMIN — CHLOROTHIAZIDE SODIUM 500 MG: 500 INJECTION, POWDER, LYOPHILIZED, FOR SOLUTION INTRAVENOUS at 11:05

## 2018-05-06 RX ADMIN — DEXMEDETOMIDINE HYDROCHLORIDE 0.8 MCG/KG/HR: 100 INJECTION, SOLUTION, CONCENTRATE INTRAVENOUS at 05:05

## 2018-05-06 RX ADMIN — METHYLPREDNISOLONE SODIUM SUCCINATE 40 MG: 40 INJECTION, POWDER, FOR SOLUTION INTRAMUSCULAR; INTRAVENOUS at 11:05

## 2018-05-06 RX ADMIN — POTASSIUM & SODIUM PHOSPHATES POWDER PACK 280-160-250 MG 1 PACKET: 280-160-250 PACK at 08:05

## 2018-05-06 RX ADMIN — ACETAMINOPHEN 650 MG: 10 INJECTION, SOLUTION INTRAVENOUS at 05:05

## 2018-05-06 RX ADMIN — ACYCLOVIR 400 MG: 200 CAPSULE ORAL at 08:05

## 2018-05-06 RX ADMIN — CHLOROTHIAZIDE SODIUM 0.1 MG/KG/HR: 500 INJECTION, POWDER, LYOPHILIZED, FOR SOLUTION INTRAVENOUS at 03:05

## 2018-05-06 RX ADMIN — ALBUTEROL SULFATE 20 MG: 5 SOLUTION RESPIRATORY (INHALATION) at 09:05

## 2018-05-06 RX ADMIN — DEXMEDETOMIDINE HYDROCHLORIDE 0.8 MCG/KG/HR: 100 INJECTION, SOLUTION, CONCENTRATE INTRAVENOUS at 12:05

## 2018-05-06 RX ADMIN — POTASSIUM CHLORIDE 40 MEQ: 400 INJECTION, SOLUTION INTRAVENOUS at 02:05

## 2018-05-06 RX ADMIN — METHYLPREDNISOLONE SODIUM SUCCINATE 40 MG: 40 INJECTION, POWDER, FOR SOLUTION INTRAMUSCULAR; INTRAVENOUS at 02:05

## 2018-05-06 RX ADMIN — Medication 10 ML: at 05:05

## 2018-05-06 RX ADMIN — Medication 10 ML: at 03:05

## 2018-05-06 RX ADMIN — ACETAMINOPHEN 650 MG: 10 INJECTION, SOLUTION INTRAVENOUS at 11:05

## 2018-05-06 RX ADMIN — MIDAZOLAM 2 MG/HR: 5 INJECTION INTRAMUSCULAR; INTRAVENOUS at 08:05

## 2018-05-06 RX ADMIN — FUROSEMIDE 20 MG: 10 INJECTION, SOLUTION INTRAVENOUS at 05:05

## 2018-05-06 RX ADMIN — DEXMEDETOMIDINE HYDROCHLORIDE 0.4 MCG/KG/HR: 100 INJECTION, SOLUTION, CONCENTRATE INTRAVENOUS at 09:05

## 2018-05-06 RX ADMIN — MEROPENEM 2 G: 1 INJECTION, POWDER, FOR SOLUTION INTRAVENOUS at 01:05

## 2018-05-06 RX ADMIN — ALBUMIN (HUMAN) 12.5 G: 12.5 SOLUTION INTRAVENOUS at 10:05

## 2018-05-06 RX ADMIN — FUROSEMIDE 20 MG: 10 INJECTION, SOLUTION INTRAVENOUS at 11:05

## 2018-05-06 RX ADMIN — Medication 3 UNITS/HR: at 02:05

## 2018-05-06 RX ADMIN — Medication 100 MCG/HR: at 01:05

## 2018-05-06 RX ADMIN — DEXMEDETOMIDINE HYDROCHLORIDE 0.6 MCG/KG/HR: 100 INJECTION, SOLUTION, CONCENTRATE INTRAVENOUS at 12:05

## 2018-05-06 RX ADMIN — POSACONAZOLE 300 MG: 100 TABLET, COATED ORAL at 08:05

## 2018-05-06 RX ADMIN — POTASSIUM CHLORIDE 40 MEQ: 400 INJECTION, SOLUTION INTRAVENOUS at 07:05

## 2018-05-06 RX ADMIN — MIDAZOLAM HYDROCHLORIDE 2 MG: 1 INJECTION, SOLUTION INTRAMUSCULAR; INTRAVENOUS at 03:05

## 2018-05-06 RX ADMIN — DIPHENHYDRAMINE HYDROCHLORIDE 25 MG: 25 CAPSULE ORAL at 02:05

## 2018-05-06 RX ADMIN — MEROPENEM 2 G: 1 INJECTION, POWDER, FOR SOLUTION INTRAVENOUS at 03:05

## 2018-05-06 NOTE — PLAN OF CARE
Problem: Patient Care Overview  Goal: Plan of Care Review  Outcome: Ongoing (interventions implemented as appropriate)  Plan of care reviewed with Hema, his mom, and his dad. All questions answered and reassurance provided. Hema has appeared sedated throughout the day. Remains on fentanyl, versed, and precedex. Precedex weaned by 0.2 this morning. Several fentanyl and versed boluses given. Vec given x 1, remains paralyzed and appears very comfortable at this time. Epi discontinued and norepinephrine initiated. MAPs have remained greater than 65 with norepi @ 0.01 mcg/kg/hr. Lasix/diuril gtt started with great response. PICC line placed this afternoon. Tolerating tube feedings at goal of 50 cc/hr. Patient's mother, grandmother, brother, and other family members currently at bedside and appropriately tearful. Please see doc flowsheets for full assessments, will continue to monitor closely.

## 2018-05-06 NOTE — PROGRESS NOTES
05/06/18 0910   Vital Signs   Pulse (!) 142   Resp (!) 38   SpO2 (!) 76 %   ETCO2 (mmHg) 32 mmHg   Oxygen Concentration (%) 69   Art Line (2)   Arterial Line BP 2 137/45   Arterial Line MAP (mmHg) 72 mmHg   Patient's sats decreased. RN x 2 at bedside. Fio2 increased. Suctioned with johnson, moderate amount of tan thick secretions noted. No increase in sats with suctioning. Breath sounds coarse, wheezy, and tight. Dr. Tracy and Dr. Amaya at bedside. Vent settings increased. Fentanyl and versed boluses given. Mag sulfate administered. Sats returned to baseline. Remains on increased vent settings. Will continue to monitor closely.

## 2018-05-06 NOTE — PROCEDURES
"Hema Kuo is a 17 y.o. male patient.    Temp: (!) 101.3 °F (38.5 °C) (05/06/18 0530)  Pulse: (!) 144 (05/06/18 0600)  Resp: (!) 31 (05/06/18 0600)  BP: (!) 130/48 (05/06/18 0530)  SpO2: (!) 90 % (05/06/18 0600)  Weight: 87.9 kg (193 lb 12.6 oz) (04/30/18 2057)  Height: 5' 6" (167.6 cm) (04/30/18 2057)       Intubation  Date/Time: 5/5/2018 5:04 AM  Location procedure was performed: [facility]  Performed by: DEVI MCINTOSH  Authorized by: DEVI MCINTOSH   Assisting provider: REYNOLD AMADOR  Pre-operative diagnosis: Febrile Neutropenia with ARDS  Post-operative diagnosis: Febrile Neutropenia with ARDS  Consent Done: Emergent Situation  Indications: respiratory failure  Intubation method: direct  Patient status: paralyzed (RSI)  Preoxygenation: BVM  Pretreatment medications: fentanyl  Sedatives: midazolam  Paralytic: rocuronium  Laryngoscope size: Mac 4  Tube size: 8.0 mm  Tube type: cuffed  Number of attempts: 1  Cricoid pressure: no  Cords visualized: yes  Post-procedure assessment: chest rise,  CO2 detector and ETCO2 monitor  Breath sounds: rales/crackles and diminished bilaterally  Cuff inflated: yes  ETT to lip: 25 cm  ETT to teeth: 24 cm  Tube secured with: ETT salazar  Chest x-ray interpreted by me and radiologist.  Chest x-ray findings: endotracheal tube in appropriate position  Patient tolerance: Patient tolerated the procedure well with no immediate complications  Complications: No  Estimated blood loss (mL): 0  Specimens: No  Implants: No  Comments: Desats after sedation given and again after paralytic delayed placement of ETT to give opportunity to bag/mask ventilate to improve oxygenation before attempt.          Devi Mcintosh  5/6/2018  "

## 2018-05-06 NOTE — PLAN OF CARE
Problem: Patient Care Overview  Goal: Plan of Care Review  Outcome: Ongoing (interventions implemented as appropriate)   05/06/18 0525   Coping/Psychosocial   Plan Of Care Reviewed With patient;father;mother     POC reviewed with patient, mom and stepdad at bedside; all questions answered. Remains vented. FiO2 increased d/t desats to mid 80s despite being suctioned and given supplemental O2. RR 30-50s throughout shift. Large amount of white, red streaked thin secretions from ETT. Fentanyl and versed gtt increased d/t agitation. Precedex gtt continued. Sedated but easily awakens with stimulation. PRN fentanyl and versed given for agitation. Tmax 101.5 axillary. Benadryl X1 before blood products. Remains tachycardic all night (120-150s). Epi weaned down and is now off, MAPs above 65 so far. Platelets X1, PRBS X1. KC1 X1. Tolerating TF with minimal residuals; MIVF titrated down. Voiding per condom cath. Turned Q2, tolerated well. BM X1. Will continue to monitor.

## 2018-05-06 NOTE — PROGRESS NOTES
05/06/18 1517   Vital Signs   Pulse (!) 155   Resp (!) 32   SpO2 (!) 85 %   ETCO2 (mmHg) 34 mmHg   Oxygen Concentration (%) 71   Art Line (2)   Arterial Line BP 2 136/81   Arterial Line MAP (mmHg) 98 mmHg   5 mg vecuronium given per order. Pre-bolused with fentanyl. A few minutes after administration, patient became very anxious and awake. Not syncing with vent. EtCO2 increased to 50s. Versed bolus given. Dr. Amaya at bedside. Increased rate and PIP. 2 more fentanyl boluses given. Once patient became adequately sedated, virals returned to baseline. Will continue to monitor closely.

## 2018-05-06 NOTE — PROGRESS NOTES
[facility]  Pediatric Critical Care  Progress Note    Patient Name: Hema Kuo  MRN: [id number]  Admission Date: 4/30/2018  Hospital Length of Stay: 6 days  Code Status: Prior   Attending Provider: Donny Richardson MD   Primary Care Physician: Ann Reyna NP    Subjective:     HPI:  Hema is a 16 y/o male with PMH significant for AML (t 8;21) s/p intensification therapy II per [id number] (Arm A) who was initially admitted on 4/30/2018 for neutropenia/profund sepsis risk. He was stepped up to the PICU for persistent fever >103, tachycardia, and hypotension that has been minimally responsive to resuscitation. S/p 1L NS x 2 and 1/2 units pRBC that is still running. Also reports chest tightness, SOB with new increased oxygen requirement. On 2LPM NC for desaturation to mid 80s. Found to have new LLL airspace opacity on CXR. Finally reports ongoing dull headache throughout the day with no visual disturbances or neuro deficits.    Of note, as of yesterday (5/2/2018) he was Day 13 of intensification therapy II following AAML 1031 (Arm A).    Interval History: Sedated overnight on fentanyl, versed, precedex with prns. Feeds started via SUMP. Got PLT and pRBC for low counts.     PRNs fentanyl x5, KCl x1.     Review of Systems   Constitutional: Positive for fever.     Objective:     Vital Signs Range (Last 24H):  Temp:  [100.2 °F (37.9 °C)-102.5 °F (39.2 °C)]   Pulse:  [115-152]   Resp:  [25-38]   BP: (107-130)/(39-48)   SpO2:  [86 %-100 %]     I & O (Last 24H):    Intake/Output Summary (Last 24 hours) at 05/06/18 0646  Last data filed at 05/06/18 0600   Gross per 24 hour   Intake          4654.36 ml   Output             2803 ml   Net          1851.36 ml   IN 52.7ml/kg/d  UOP 1.3  +1.8L    Ventilator Data (Last 24H):     Vent Mode: PC  Oxygen Concentration (%):  [49-90] 59  Resp Rate Total:  [8 br/min-36 br/min] 36 br/min  PEEP/CPAP:  [8 cmH20] 8 cmH20  Mean Airway Pressure:  [12 trD60-38 cmH20] 14  cmH20    Physical Exam: Per Dr. Tracy  Physical Exam   Constitutional: He appears well-developed and well-nourished.   Intubated and sedated   Eyes:   Pupils constricted but reactive   Cardiovascular: Normal rate, regular rhythm, normal heart sounds and intact distal pulses.    No murmur heard.  Pulmonary/Chest: No stridor.   Symmetric expansion with air movement BL  Course inspiratory sounds w/ inspiratory wheezing, R>L   Abdominal: Soft. Bowel sounds are normal. He exhibits distension. There is no guarding.   Musculoskeletal: He exhibits no edema.   Skin: Skin is warm and dry. Capillary refill takes less than 2 seconds. No rash noted. He is not diaphoretic. No erythema.       Lines/Drains/Airways     Central Venous Catheter Line                 Port A Cath Double Lumen 10/31/17 1826 left subclavian 186 days          Drain                 NG/OG Tube 05/05/18 0545 Replogle 14 Fr. Right nostril less than 1 day    Male External Urinary Catheter 05/05/18 1145 Small less than 1 day          Airway                 Airway - Non-Surgical 05/05/18 0527 Endotracheal Tube 1 day          Arterial Line                 Arterial Line 05/03/18 1300 Right Radial 2 days          Peripheral Intravenous Line                 Peripheral IV - Single Lumen 05/05/18 0539 Left Hand less than 1 day         Peripheral IV - Single Lumen 05/05/18 0600 Right Hand less than 1 day                Laboratory (Last 24H):   Recent Results (from the past 12 hour(s))   VANCOMYCIN, TROUGH before next dose    Collection Time: 05/05/18  7:24 PM   Result Value Ref Range    Vancomycin-Trough 16.6 10.0 - 22.0 ug/mL   Basic metabolic panel    Collection Time: 05/05/18  7:25 PM   Result Value Ref Range    Sodium 137 136 - 145 mmol/L    Potassium 2.9 (L) 3.5 - 5.1 mmol/L    Chloride 104 95 - 110 mmol/L    CO2 25 23 - 29 mmol/L    Glucose 115 (H) 70 - 110 mg/dL    BUN, Bld 12 6 - 20 mg/dL    Creatinine 0.9 0.5 - 1.4 mg/dL    Calcium 8.6 (L) 8.7 - 10.5 mg/dL     Anion Gap 8 8 - 16 mmol/L    eGFR if African American >60.0 >60 mL/min/1.73 m^2    eGFR if non African American >60.0 >60 mL/min/1.73 m^2   Magnesium    Collection Time: 05/05/18  7:25 PM   Result Value Ref Range    Magnesium 2.0 1.6 - 2.6 mg/dL   Phosphorus    Collection Time: 05/05/18  7:25 PM   Result Value Ref Range    Phosphorus 2.4 (L) 2.7 - 4.5 mg/dL   Type & Screen    Collection Time: 05/06/18  1:00 AM   Result Value Ref Range    Group & Rh AB POS     Indirect Nathan NEG    CBC auto differential    Collection Time: 05/06/18  1:00 AM   Result Value Ref Range    WBC 0.05 (LL) 3.90 - 12.70 K/uL    RBC 2.42 (L) 4.60 - 6.20 M/uL    Hemoglobin 7.6 (L) 14.0 - 18.0 g/dL    Hematocrit 21.1 (L) 40.0 - 54.0 %    MCV 87 82 - 98 fL    MCH 31.4 (H) 27.0 - 31.0 pg    MCHC 36.0 32.0 - 36.0 g/dL    RDW 18.9 (H) 11.5 - 14.5 %    Platelets 9 (LL) 150 - 350 K/uL    MPV 11.0 9.2 - 12.9 fL    Immature Granulocytes 0.0 0.0 - 0.5 %    Gran # (ANC) 0.0 (L) 1.8 - 7.7 K/uL    Immature Grans (Abs) 0.00 0.00 - 0.04 K/uL    Lymph # 0.0 (L) 1.0 - 4.8 K/uL    Mono # 0.0 (L) 0.3 - 1.0 K/uL    Eos # 0.0 0.0 - 0.5 K/uL    Baso # 0.00 0.00 - 0.20 K/uL    nRBC 0 0 /100 WBC    Gran% 20.0 (L) 38.0 - 73.0 %    Lymph% 80.0 (H) 18.0 - 48.0 %    Mono% 0.0 (L) 4.0 - 15.0 %    Eosinophil% 0.0 0.0 - 8.0 %    Basophil% 0.0 0.0 - 1.9 %    Aniso Slight     Poik Slight     Poly Occasional     Ovalocytes Occasional     Tear Drop Cells Occasional     Differential Method auto    Comprehensive metabolic panel    Collection Time: 05/06/18  1:00 AM   Result Value Ref Range    Sodium 137 136 - 145 mmol/L    Potassium 2.6 (LL) 3.5 - 5.1 mmol/L    Chloride 103 95 - 110 mmol/L    CO2 24 23 - 29 mmol/L    Glucose 137 (H) 70 - 110 mg/dL    BUN, Bld 13 6 - 20 mg/dL    Creatinine 0.8 0.5 - 1.4 mg/dL    Calcium 8.4 (L) 8.7 - 10.5 mg/dL    Total Protein 5.7 (L) 6.0 - 8.4 g/dL    Albumin 2.4 (L) 3.5 - 5.2 g/dL    Total Bilirubin 1.1 (H) 0.1 - 1.0 mg/dL    Alkaline  Phosphatase 40 (L) 55 - 135 U/L    AST 35 10 - 40 U/L    ALT 87 (H) 10 - 44 U/L    Anion Gap 10 8 - 16 mmol/L    eGFR if African American >60.0 >60 mL/min/1.73 m^2    eGFR if non African American >60.0 >60 mL/min/1.73 m^2   Magnesium    Collection Time: 05/06/18  1:00 AM   Result Value Ref Range    Magnesium 1.9 1.6 - 2.6 mg/dL   Phosphorus    Collection Time: 05/06/18  1:00 AM   Result Value Ref Range    Phosphorus 1.9 (L) 2.7 - 4.5 mg/dL   Reticulocytes    Collection Time: 05/06/18  1:00 AM   Result Value Ref Range    Retic 0.1 (L) 0.4 - 2.0 %   Prepare Platelets 1 Dose    Collection Time: 05/06/18  1:00 AM   Result Value Ref Range    UNIT NUMBER [id number]     PRODUCT CODE [id number]     DISPENSE STATUS ISSUED     CODING SYSTEM ZPVS100     Unit Blood Type Code 6200     Unit Blood Type A POS     Unit Expiration [id number]    Prepare RBC 1 Unit    Collection Time: 05/06/18  1:00 AM   Result Value Ref Range    UNIT NUMBER [id number]     PRODUCT CODE N6250M58     DISPENSE STATUS ISSUED     CODING SYSTEM NITX826     Unit Blood Type Code 8400     Unit Blood Type AB POS     Unit Expiration [id number]    ISTAT PROCEDURE    Collection Time: 05/06/18  1:05 AM   Result Value Ref Range    POC PH 7.454 (H) 7.35 - 7.45    POC PCO2 36.4 35 - 45 mmHg    POC PO2 59 (LL) 80 - 100 mmHg    POC HCO3 25.5 24 - 28 mmol/L    POC BE 2 -2 to 2 mmol/L    POC SATURATED O2 92 (L) 95 - 100 %    POC Sodium 139 136 - 145 mmol/L    POC Potassium 2.6 (LL) 3.5 - 5.1 mmol/L    POC TCO2 27 23 - 27 mmol/L    POC Ionized Calcium 1.15 1.06 - 1.42 mmol/L    POC Hematocrit 20 (LL) 36 - 54 %PCV    Verbal Result Readback Performed Yes     Provider Credentials: MD     Provider Notified: TEMPLE     Sample ARTERIAL     Site Bennie/UAC     Allens Test N/A     DelSys Adult Vent    ISTAT Lactate    Collection Time: 05/06/18  1:07 AM   Result Value Ref Range    POC Lactate 1.12 0.36 - 1.25 mmol/L    Sample ARTERIAL     Site Bennie/[facility]     Allens Test N/A       K 2.6, Phos 1.9, Hgb 7.6, PLT 9    Chest X-Ray: diffuse bilateral haziness worsened per my read.     Diagnostic Results:  Microbiology Results (last 7 days)     Procedure Component Value Units Date/Time    Culture, Respiratory with Gram Stain [[id number]] Collected:  05/05/18 1650    Order Status:  Completed Specimen:  Respiratory from Endotracheal Aspirate Updated:  05/05/18 2016     Gram Stain (Respiratory) <10 epithelial cells per low power field.     Gram Stain (Respiratory) Rare WBC's     Gram Stain (Respiratory) No organisms seen    Blood culture [[id number]] Collected:  05/03/18 0540    Order Status:  Completed Specimen:  Blood from Line, Port A Cath Updated:  05/05/18 1212     Blood Culture, Routine No Growth to date     Blood Culture, Routine No Growth to date     Blood Culture, Routine No Growth to date    Narrative:       Port A Cath    Blood culture [[id number]] Collected:  05/02/18 1129    Order Status:  Completed Specimen:  Blood from Line, Port A Cath Updated:  05/05/18 1014     Blood Culture, Routine Gram stain aer bottle: Gram positive cocci in chains resembling Strep     Blood Culture, Routine Gram stain lilliana bottle: Gram positive cocci in chains resembling Strep      Blood Culture, Routine Results called to and read back by: Berna Perez RN  05/03/2018  06:30     Blood Culture, Routine --     STREPTOCOCCUS MITIS/ORALIS   Susceptibility pending      Narrative:       One set from central line    Fungus Culture, Blood or Bone Marrow [[id number]] Collected:  05/03/18 0540    Order Status:  Sent Specimen:  Blood from Blood Updated:  05/03/18 0657    Blood culture [[id number]]     Order Status:  Canceled Specimen:  Blood               Assessment/Plan:     Sepsis    18 y/o male with h/o  AML (t 8;21) s/p intensification therapy II per [id number] (Arm A) stepped up to PICU for sepsis after presenting with waxing mental status, hypotension unresponsive to NS bolus x 2 and blood 1/2, chest tightness w/  tachypnea, new oxygen requirement and concern for PNA on CXR. He is currently intubated and sedated with PaO2:Fi this AM of 107 with worsened CXR. Pressures maintained on epi. Sedation with fentanyl, precedex, versed gtt. Feeds via SUMP.     CNS:  Sedation  - versed drip 2mg/hr  - precedex 0.8mcg/kg/hr  - Fentanyl 100mcg/kg/hr   - Continue to monitor neuro/mental status  - Ativan 1mg Q4PRN  PRN: Versed, fentanyl, benadryl, Vec  Fever  - Scheduled tylenol 650mg, IV, Q6H for fever. No NSAIDs given allergy.     CV: Hypotensive with melisa of 82/35 and MAP <60 on floor despite 1L NS bolus x 2 and 1/2 units pRBC.   - D/c epi 5/6  - Switch to Norepi start at 0.01mcg/kg/min to a max of 0.1mck/kg/min. Titrate to MAP goal >65.  - Vitals per protocol.    PULM: Intubated 5/5 for concern of ARDS vs. Atelectasis. OI score 14. Luke:Fi worsening, now 107. ARDS worsening.   - Pressure control mode: FiO2 65%, PEEP 12, RR 18, PC: 10.   - goal to maintain peak pressures <30  - CPT Q4 while awake.  - Methylpred 40mg Q6H (5/6 - )  - albuterol 20mg continuous.   - albumin 25% 12.5g x1. Mag x1 today.   - Lasix diuril drip 0.1mg/kg/hr (5/6 - )  - Daily CXR  - ABG - Q6H and Q12 with lactate. Calculate OI and OSI for severity of ARDS with gases.    FEN/GI:  - D/c fluids.   - TFG - 100ml/hr including meds. difficult to achieve, despite max concentrations of the meds.  - Famotidine 20mg, IV, BID for ppx  - Continue PRN zofran and miralax  - Replace electrolytes as needed  - Peptamen via SUMP: at goal of 50mL/hr  - KPhos 1 packet via SUMP Q6H  - Kcl 20mEQ via SUMP BID PRN     HEME/ONC:   Chemotherapy induced neutropenia:   - Hgb goal >7.  - Plts goal >20.  - Daily CBC and reticulocyte   - Total units of transfusion: 4 units of pRBCs and 4 units of platelets.    - 2 more units of platelets today.   - Neupogen 600mcg daily. Increased from 400 today (5/4 - )    AML: 8;21 translocation, c-kit mutation negative.  CNS negative.  MRD negative after  Induction I.    - Treating per [facility] [id number] (ARM A).  S/p Intensification I and Intensification II started. He was day 13 of intensification II as of 5/2/2018.  - BM biopsy at start of Intensification shows CR.  FISH for t(8;21) negative. 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  - Continue acyclovir ppx  - Continue posaconazole 400mg BID- therapeutic dosing.   - Continue bactrim QFSaSu ppx  - Continue peridex ppx    ID: BCx - Strep Mitis (sens pending) from 5/2, blood culture 5/3 NGTD  - Continue to follow previous cultures  - D/c Cefepime 5/4.   - Vancomycin 1500mg Q8H (5/3 - ) . Vanc trough 16.6 - therapeutic  - Meropenem 2g IV Q8H (5/4 - )  - Amikacin 20mg/kg Q24H (5/4 - 5/6)     RENAL: BUN/Cr wnl. No active issues.  - lasix - diuril drip 0.1mg/kg/hr  - Strict Is/Os - monitor for SIADH    SOCIAL: Mom at bedside    DISPO: Critically ill, in the PICU            Critical Care Time greater than: 1 Hour    Cathy Metzger MD  Pediatric Critical Care  [facility]

## 2018-05-06 NOTE — SUBJECTIVE & OBJECTIVE
Interval History: Sedated overnight on fentanyl, versed, precedex with prns. Feeds started via SUMP. Got PLT and pRBC for low counts.     PRNs fentanyl x5, KCl x1.     Review of Systems   Constitutional: Positive for fever.     Objective:     Vital Signs Range (Last 24H):  Temp:  [100.2 °F (37.9 °C)-102.5 °F (39.2 °C)]   Pulse:  [115-152]   Resp:  [25-38]   BP: (107-130)/(39-48)   SpO2:  [86 %-100 %]     I & O (Last 24H):    Intake/Output Summary (Last 24 hours) at 05/06/18 0646  Last data filed at 05/06/18 0600   Gross per 24 hour   Intake          4654.36 ml   Output             2803 ml   Net          1851.36 ml   IN 52.7ml/kg/d  UOP 1.3  +1.8L    Ventilator Data (Last 24H):     Vent Mode: PC  Oxygen Concentration (%):  [49-90] 59  Resp Rate Total:  [8 br/min-36 br/min] 36 br/min  PEEP/CPAP:  [8 cmH20] 8 cmH20  Mean Airway Pressure:  [12 foN66-22 cmH20] 14 cmH20    Physical Exam: Per Dr. Tracy  Physical Exam   Constitutional: He appears well-developed and well-nourished.   Intubated and sedated   Eyes:   Pupils constricted but reactive   Cardiovascular: Normal rate, regular rhythm, normal heart sounds and intact distal pulses.    No murmur heard.  Pulmonary/Chest: No stridor.   Symmetric expansion with air movement BL  Course inspiratory sounds w/ inspiratory wheezing, R>L   Abdominal: Soft. Bowel sounds are normal. He exhibits distension. There is no guarding.   Musculoskeletal: He exhibits no edema.   Skin: Skin is warm and dry. Capillary refill takes less than 2 seconds. No rash noted. He is not diaphoretic. No erythema.       Lines/Drains/Airways     Central Venous Catheter Line                 Port A Cath Double Lumen 10/31/17 1826 left subclavian 186 days          Drain                 NG/OG Tube 05/05/18 0545 Replogle 14 Fr. Right nostril less than 1 day    Male External Urinary Catheter 05/05/18 1145 Small less than 1 day          Airway                 Airway - Non-Surgical 05/05/18 0527 Endotracheal Tube  1 day          Arterial Line                 Arterial Line 05/03/18 1300 Right Radial 2 days          Peripheral Intravenous Line                 Peripheral IV - Single Lumen 05/05/18 0539 Left Hand less than 1 day         Peripheral IV - Single Lumen 05/05/18 0600 Right Hand less than 1 day                Laboratory (Last 24H):   Recent Results (from the past 12 hour(s))   VANCOMYCIN, TROUGH before next dose    Collection Time: 05/05/18  7:24 PM   Result Value Ref Range    Vancomycin-Trough 16.6 10.0 - 22.0 ug/mL   Basic metabolic panel    Collection Time: 05/05/18  7:25 PM   Result Value Ref Range    Sodium 137 136 - 145 mmol/L    Potassium 2.9 (L) 3.5 - 5.1 mmol/L    Chloride 104 95 - 110 mmol/L    CO2 25 23 - 29 mmol/L    Glucose 115 (H) 70 - 110 mg/dL    BUN, Bld 12 6 - 20 mg/dL    Creatinine 0.9 0.5 - 1.4 mg/dL    Calcium 8.6 (L) 8.7 - 10.5 mg/dL    Anion Gap 8 8 - 16 mmol/L    eGFR if African American >60.0 >60 mL/min/1.73 m^2    eGFR if non African American >60.0 >60 mL/min/1.73 m^2   Magnesium    Collection Time: 05/05/18  7:25 PM   Result Value Ref Range    Magnesium 2.0 1.6 - 2.6 mg/dL   Phosphorus    Collection Time: 05/05/18  7:25 PM   Result Value Ref Range    Phosphorus 2.4 (L) 2.7 - 4.5 mg/dL   Type & Screen    Collection Time: 05/06/18  1:00 AM   Result Value Ref Range    Group & Rh AB POS     Indirect Nathan NEG    CBC auto differential    Collection Time: 05/06/18  1:00 AM   Result Value Ref Range    WBC 0.05 (LL) 3.90 - 12.70 K/uL    RBC 2.42 (L) 4.60 - 6.20 M/uL    Hemoglobin 7.6 (L) 14.0 - 18.0 g/dL    Hematocrit 21.1 (L) 40.0 - 54.0 %    MCV 87 82 - 98 fL    MCH 31.4 (H) 27.0 - 31.0 pg    MCHC 36.0 32.0 - 36.0 g/dL    RDW 18.9 (H) 11.5 - 14.5 %    Platelets 9 (LL) 150 - 350 K/uL    MPV 11.0 9.2 - 12.9 fL    Immature Granulocytes 0.0 0.0 - 0.5 %    Gran # (ANC) 0.0 (L) 1.8 - 7.7 K/uL    Immature Grans (Abs) 0.00 0.00 - 0.04 K/uL    Lymph # 0.0 (L) 1.0 - 4.8 K/uL    Mono # 0.0 (L) 0.3 - 1.0 K/uL     Eos # 0.0 0.0 - 0.5 K/uL    Baso # 0.00 0.00 - 0.20 K/uL    nRBC 0 0 /100 WBC    Gran% 20.0 (L) 38.0 - 73.0 %    Lymph% 80.0 (H) 18.0 - 48.0 %    Mono% 0.0 (L) 4.0 - 15.0 %    Eosinophil% 0.0 0.0 - 8.0 %    Basophil% 0.0 0.0 - 1.9 %    Aniso Slight     Poik Slight     Poly Occasional     Ovalocytes Occasional     Tear Drop Cells Occasional     Differential Method auto    Comprehensive metabolic panel    Collection Time: 05/06/18  1:00 AM   Result Value Ref Range    Sodium 137 136 - 145 mmol/L    Potassium 2.6 (LL) 3.5 - 5.1 mmol/L    Chloride 103 95 - 110 mmol/L    CO2 24 23 - 29 mmol/L    Glucose 137 (H) 70 - 110 mg/dL    BUN, Bld 13 6 - 20 mg/dL    Creatinine 0.8 0.5 - 1.4 mg/dL    Calcium 8.4 (L) 8.7 - 10.5 mg/dL    Total Protein 5.7 (L) 6.0 - 8.4 g/dL    Albumin 2.4 (L) 3.5 - 5.2 g/dL    Total Bilirubin 1.1 (H) 0.1 - 1.0 mg/dL    Alkaline Phosphatase 40 (L) 55 - 135 U/L    AST 35 10 - 40 U/L    ALT 87 (H) 10 - 44 U/L    Anion Gap 10 8 - 16 mmol/L    eGFR if African American >60.0 >60 mL/min/1.73 m^2    eGFR if non African American >60.0 >60 mL/min/1.73 m^2   Magnesium    Collection Time: 05/06/18  1:00 AM   Result Value Ref Range    Magnesium 1.9 1.6 - 2.6 mg/dL   Phosphorus    Collection Time: 05/06/18  1:00 AM   Result Value Ref Range    Phosphorus 1.9 (L) 2.7 - 4.5 mg/dL   Reticulocytes    Collection Time: 05/06/18  1:00 AM   Result Value Ref Range    Retic 0.1 (L) 0.4 - 2.0 %   Prepare Platelets 1 Dose    Collection Time: 05/06/18  1:00 AM   Result Value Ref Range    UNIT NUMBER [id number]     PRODUCT CODE D1235W28     DISPENSE STATUS ISSUED     CODING SYSTEM [id number]     Unit Blood Type Code 6200     Unit Blood Type A POS     Unit Expiration [id number]    Prepare RBC 1 Unit    Collection Time: 05/06/18  1:00 AM   Result Value Ref Range    UNIT NUMBER [id number]     PRODUCT CODE O7670X23     DISPENSE STATUS ISSUED     CODING SYSTEM AERB964     Unit Blood Type Code 8400     Unit Blood Type AB POS      Unit Expiration [id number]    ISTAT PROCEDURE    Collection Time: 05/06/18  1:05 AM   Result Value Ref Range    POC PH 7.454 (H) 7.35 - 7.45    POC PCO2 36.4 35 - 45 mmHg    POC PO2 59 (LL) 80 - 100 mmHg    POC HCO3 25.5 24 - 28 mmol/L    POC BE 2 -2 to 2 mmol/L    POC SATURATED O2 92 (L) 95 - 100 %    POC Sodium 139 136 - 145 mmol/L    POC Potassium 2.6 (LL) 3.5 - 5.1 mmol/L    POC TCO2 27 23 - 27 mmol/L    POC Ionized Calcium 1.15 1.06 - 1.42 mmol/L    POC Hematocrit 20 (LL) 36 - 54 %PCV    Verbal Result Readback Performed Yes     Provider Credentials: MD     Provider Notified: TEMPLE     Sample ARTERIAL     Site Bennie/[facility]     Allens Test N/A     DelSys Adult Vent    ISTAT Lactate    Collection Time: 05/06/18  1:07 AM   Result Value Ref Range    POC Lactate 1.12 0.36 - 1.25 mmol/L    Sample ARTERIAL     Site Bennie/UAC     Allens Test N/A      K 2.6, Phos 1.9, Hgb 7.6, PLT 9    Chest X-Ray: diffuse bilateral haziness worsened per my read.     Diagnostic Results:  Microbiology Results (last 7 days)     Procedure Component Value Units Date/Time    Culture, Respiratory with Gram Stain [[id number]] Collected:  05/05/18 1650    Order Status:  Completed Specimen:  Respiratory from Endotracheal Aspirate Updated:  05/05/18 2016     Gram Stain (Respiratory) <10 epithelial cells per low power field.     Gram Stain (Respiratory) Rare WBC's     Gram Stain (Respiratory) No organisms seen    Blood culture [[id number]] Collected:  05/03/18 0540    Order Status:  Completed Specimen:  Blood from Line, Port A Cath Updated:  05/05/18 1212     Blood Culture, Routine No Growth to date     Blood Culture, Routine No Growth to date     Blood Culture, Routine No Growth to date    Narrative:       Port A Cath    Blood culture [[id number]] Collected:  05/02/18 1129    Order Status:  Completed Specimen:  Blood from Line, Port A Cath Updated:  05/05/18 1014     Blood Culture, Routine Gram stain aer bottle: Gram positive cocci in chains  resembling Strep     Blood Culture, Routine Gram stain lilliana bottle: Gram positive cocci in chains resembling Strep      Blood Culture, Routine Results called to and read back by: Berna Perez RN  05/03/2018  06:30     Blood Culture, Routine --     STREPTOCOCCUS MITIS/ORALIS   Susceptibility pending      Narrative:       One set from central line    Fungus Culture, Blood or Bone Marrow [[id number]] Collected:  05/03/18 0540    Order Status:  Sent Specimen:  Blood from Blood Updated:  05/03/18 0657    Blood culture [[id number]]     Order Status:  Canceled Specimen:  Blood

## 2018-05-06 NOTE — CONSULTS
[facility]  Pediatric Hematology/Oncology  Consult Note    Patient Name: Hema Kuo  MRN: [id number]  Admission Date: 4/30/2018  Hospital Length of Stay: 6 days  Code Status: Prior   Attending Provider: Donny Richardson MD  Primary Care Physician: Ann Reyna NP    Consults   Subjective:     Principal Problem:Septic shock    HPI: 18 y/o M with AML s/p Consolidation II therapy, transferred to PICU  after development of fever and septic shock.  Blood cultures growing Strep mitis.  On cefepime, vanc and amikacin. He was intubated overnight on 5/5.  Overnight, his oxygenation worsened and vent settings had to be increased.  His MAPs were improved and Epi was discontinued.      Oncology Treatment Plan:     PEDS [id number] Intensification II  ARM B (MA) - LOW RISK PATIENTS    Medications:  Continuous Infusions:   albuterol      dexmedetomidine (PRECEDEX) infusion (non-titrating) 0.6 mcg/kg/hr (05/06/18 1235)    custom IV infusion builder Stopped (05/06/18 1215)    epinephrine infusion (NON-TITRATING) 0.01 mcg/kg/min (05/06/18 1122)    fentanyl 100 mcg/hr (05/06/18 1346)    furosemide and chlorothiazide (LASIX and DIURIL) IV syringe      heparin(porcine) 3 Units/hr (05/06/18 1100)    heparin in 0.45% NaCl Stopped (05/05/18 0045)    midazolam (VERSED) infusion (non-titrating) 2 mg/hr (05/06/18 1100)    norepinephrine bitartrate-D5W       Scheduled Meds:   acyclovir  400 mg Oral BID    chlorhexidine  15 mL Mouth/Throat BID    famotidine (PF)  20 mg Intravenous BID    filgrastim (NEUPOGEN) 20 mcg/mL D5W (PEDS)  10 mcg/kg/day Intravenous Q24H    meropenem (MERREM) IVPB  2 g Intravenous Q8H    methylPREDNISolone sodium succinate  40 mg Intravenous Q6H    posaconazole  400 mg Oral BID    potassium, sodium phosphates  1 packet Oral Q6H    rocuronium  90 mg Intravenous Once    sodium chloride 0.9%  10 mL Intravenous Q6H    sulfamethoxazole-trimethoprim 800-160mg  1 tablet Oral twice daily on  Friday, Saturday, Sunday    vancomycin (VANCOCIN) IVPB  1,500 mg Intravenous Q8H     PRN Meds:sodium chloride, sodium chloride, sodium chloride, sodium chloride, sodium chloride, albuterol sulfate, diphenhydrAMINE, fentanyl citrate bolus from bag/infusion, levalbuterol, lorazepam, midazolam, ondansetron, polyethylene glycol, potassium chloride, potassium chloride 10%, Flushing PICC Protocol **AND** sodium chloride 0.9% **AND** sodium chloride 0.9%, vecuronium     Review of patient's allergies indicates:   Allergen Reactions    Nsaids (non-steroidal anti-inflammatory drug) Anaphylaxis    Nuts [tree nut] Anaphylaxis    Strawberry     Vancomycin analogues Itching     Premedicate with benadryl and admin over 2hr.        Past Medical History:   Diagnosis Date    AML (acute myeloblastic leukemia) 10/2017    Asthma     Encounter for blood transfusion     Seasonal allergies      Past Surgical History:   Procedure Laterality Date    PORTACATH PLACEMENT  10/31/2017    TONSILLECTOMY       Family History     Problem Relation (Age of Onset)    Cancer Mother    Heart disease Mother    No Known Problems Father        Social History Main Topics    Smoking status: Former Smoker     Packs/day: 0.50     Types: Cigarettes     Quit date: 10/30/2017    Smokeless tobacco: Never Used    Alcohol use Yes      Comment: rare occasions    Drug use: No    Sexual activity: Yes     Partners: Female       Review of Systems   Constitutional: Positive for chills and fever. Negative for activity change, appetite change and unexpected weight change.   HENT: Negative.  Negative for nosebleeds.    Eyes: Negative.  Negative for visual disturbance.   Respiratory: Positive for shortness of breath.    Cardiovascular: Negative.    Gastrointestinal: Negative.  Negative for nausea and vomiting.   Endocrine: Negative.    Genitourinary: Negative.    Musculoskeletal: Negative.  Negative for arthralgias.   Skin: Negative.  Negative for rash.    Allergic/Immunologic: Negative.    Neurological: Negative.  Negative for weakness and headaches.   Hematological: Negative.  Does not bruise/bleed easily.   Psychiatric/Behavioral: Negative.    All other systems reviewed and are negative.    Objective:     Vital Signs (Most Recent):  Temp: 100.1 °F (37.8 °C) (05/06/18 0800)  Pulse: (!) 148 (05/06/18 1131)  Resp: (!) 37 (05/06/18 1131)  BP: (!) 112/46 (05/06/18 0800)  SpO2: (!) 93 % (05/06/18 1131) Vital Signs (24h Range):  Temp:  [100.1 °F (37.8 °C)-101.9 °F (38.8 °C)] 100.1 °F (37.8 °C)  Pulse:  [120-152] 148  Resp:  [26-38] 37  SpO2:  [76 %-99 %] 93 %  BP: (107-130)/(39-48) 112/46     Weight: 87.9 kg (193 lb 12.6 oz)  Body mass index is 31.28 kg/m².  Body surface area is 2.02 meters squared.      Intake/Output Summary (Last 24 hours) at 05/06/18 1409  Last data filed at 05/06/18 1159   Gross per 24 hour   Intake          3881.09 ml   Output             2322 ml   Net          1559.09 ml       Physical Exam   Constitutional: He is oriented to person, place, and time. He appears well-developed and well-nourished. No distress.   HENT:   Head: Normocephalic and atraumatic.   Mouth/Throat: Oropharynx is clear and moist.   Eyes: EOM are normal. Pupils are equal, round, and reactive to light. No scleral icterus.   Neck: Normal range of motion. Neck supple.   Cardiovascular: Regular rhythm, normal heart sounds and intact distal pulses.  Exam reveals no gallop and no friction rub.    No murmur heard.  Tachycardic   Pulmonary/Chest: Breath sounds normal.   Tachypneic, lungs slightly coarse and decreased lung sounds   Abdominal: Soft. Bowel sounds are normal. He exhibits no distension and no mass. There is no tenderness.   Musculoskeletal: Normal range of motion. He exhibits no edema.   Lymphadenopathy:     He has no cervical adenopathy.   Neurological: He is alert and oriented to person, place, and time. He displays normal reflexes. He exhibits normal muscle tone.   Skin:  Skin is warm and dry. Capillary refill takes more than 3 seconds. No rash noted.   Psychiatric: He has a normal mood and affect.   Nursing note and vitals reviewed.      Assessment/Plan:     16 y/o M with AML, s/p consolidation II therapy, admitted for profound neutropenia and  Strep viridans sepsis in PICU, now intubated and on ventilator   -For his sepsis, Recommended stopping amikacin and continuing          Meropenem and vancomycin.  Will follow-up sensitivities and adjust antibiotics.     Increase GCSF to 600 mcg daily.      Change to treatment dosing of posaconazole.   - For chemotherapy induced pancytopenai, -Would maintain Hgb ~8, plts   >20.  Transfused platelets      this morning for platelet count of 9.   - For chemotherapy induced immunosuppression, Continue Bactrim and acyclovir ppx  -Will continue to follow       Doanvon Ruiz Jr, MD  Pediatric Hematology/Oncology  [facility]

## 2018-05-06 NOTE — RESIDENT HANDOFF
DOA: 4/30/2018  Stepped up to the PICU: 5/3/2018    16 y/o male with h/o  AML (t 8;21) s/p intensification therapy I per [id number] (Arm A), and now on intensification therapy II. He was admitted on Day 11(4/30/2018) of Instensification II therapy for febrile neutropenia and started on Cefepime and Vancomycin. He was stepped up to PICU for septic shock - after presenting with waxing mental status, hypotension unresponsive to NS bolus x 2 and 1 unit of blood, chest tightness w/ tachypnea, new oxygen requirement. He is currently intubated and sedated for worsening repsiratory distress with PaOw/Fi ratio concerning for ALI.      CNS: He was mostly at neurological baseline in the PICU. On admission he complained of headache w/o other signs of meningitis, so there was only a low concern for meningitis. Did not get a Lumbar puncture. Prior to intubation he was complainaing of very mild throat pain and significant distress, unable to sleep overnight.     Plan:   Sedation  - versed drip 2mg/hr  - precedex 0.8mcg/kg/hr  - Fentanyl 100mcg/kg/hr   - Continue to monitor neuro/mental status  - Ativan 1mg Q4PRN  PRN: Versed, fentanyl, benadryl, Vec  Fever  - Scheduled tylenol 650mg, IV, Q6H for fever. No NSAIDs given allergy.      CV: Hypotensive to 70s systolic and MAP <60 on floor despite 1L NS bolus x 2 and 1 units pRBC. On admission to the PICU another unit of blood and 2 units of platelets and 500ml NS were given. He was started on Epi 5/3 at 0.02mcg/kg/min for MAP goals >60. His Epi had to be titrated up to 0.04 to maintain pressures. He got an arterial line on 5/3. 5/6 Epi D/dallin and he was started on Norepi to help with the bronchodilation and HR.      Plan:    - Switch to Norepi start at 0.01mcg/kg/min to a max of 0.1mck/kg/min. Titrate to MAP goal >65.  - Follow arterial line pressures, cuff pressures do not correlate.    - Vitals per protocol.     PULM: On admission CXR LLL infiltrates. He was on HFNC @ 15L for  desaturations to the 80s. HF was increased to HFNC to 25 L following day. 5/4 AM he developed respiratory distress with increased work and increased O2 requirement to 25L. Albuterol and Duo-Neb back to back for diffuse wheezing and was increased to continuous albuterol. He also got Magnesium x1. Overnight decompensated, tried CPAP and BiPap but patient's anxiety worsened WOB and prn ativan did not help. Progressed to need for intubation 5/5/18, initially vol control but switched to pressure control for concern of ARDS vs atelectasis. 5/6 ARDS worsening and was started on steroids. He also got a dose of albumin followed by duiril and pEEP was increased to 12.     Current Plan: Intubated 5/5 for concern of ARDS vs. Atelectasis. OI score 14. Luke:Fi worsening, now 107. ARDS worsening.    Pressure control mode: FiO2 65%, PEEP 12, RR 18, PC: 10.   - goal to maintain peak pressures <30  - CPT Q4 while awake.  - Methylpred 40mg Q6H (5/6 - )  - albuterol 20mg continuous.   - albumin 25% 12.5g x1. Mag x1 today.   - Lasix diuril drip 0.1mg/kg/hr (5/6 - )  - Daily CXR  - ABG - Q6H and Q12 with lactate. Calculate OI and OSI for severity of ARDS with gases.       FEN/GI: NPO on admission to PICU (5/3-5/6). He was started on custom fludis on 5/4 for low phos, low magnesium. He also got electrolytes repleted as needed. There was concern for fluid overload and the TFG was maintained at 100ml/hr (including meds and med flushes). He was started on NG/SUMP feeds on 5/6 at 10cc and increased to a goal of 50cc/hr. He was also given KPhos via the SUMP BID (5/5 - ) and Kcl PRN via the SUMP for replacement.     Plan:  - D/c fluids.   - TFG - 100ml/hr including meds. difficult to achieve, despite max concentrations of the meds.  - Famotidine 20mg, IV, BID for ppx  - Continue PRN zofran and miralax  - Replace electrolytes as needed  - Peptamen via SUMP: at goal of 50mL/hr  - KPhos 1 packet via SUMP Q6H  - Kcl 20mEQ via SUMP BID PRN      HEME/ONC:   Chemotherapy induced neutropenia/thrombocytopenia: 4 units of pRBCs and 4 units of platelets since coming to the PICU. He was started on neupogen on 5/3/18 to be continued until the counts recover (>1500 ANC). Neupogen was increased to 600mcg 5/6 from 400 initially.   - Hgb goal >7.  - Plts goal >20.  - Daily CBC and reticulocyte   - Neupogen 600mcg daily. Increased from 400 today (5/3 - )  AML: 8;21 translocation, c-kit mutation negative.  CNS negative.  MRD negative after Induction I.    - Treating per [facility] [id number] (ARM A).  S/p Intensification I and Intensification II started. He was day 13 of intensification II as of 5/2/2018.  - BM biopsy at start of Intensification shows CR.  FISH for t(8;21) negative. Will repeat BM biopsy pending count recovery and clinical improvement  - Heme/Onc on consult/co-management  Immunosuppression 2/2 to chemotherapy: Last WBC 0.03, ANC 0.  - Continue acyclovir ppx  - Continue posaconazole 400mg BID therapeutic dose. Changed today.   - Continue bactrim QFSaSu ppx  - Continue peridex ppx - May have to stop this since he is intubated.      ID: When he was admitted he was on vanc and cefepime. Cefepime switched to meropenam on 5/4. BCx from 5/2 growing strep mitis, sensitivites pending. Amikacin was d/dallin 5/6.     Plan:  - BCx - Strep Mitis (sens pending) from 5/2, blood culture 5/3 NGTD  - Will get culture if temp >101.  - Continue to follow previous cultures  - Vancomycin 1500mg Q8H (5/3 - ) . Vanc trough 16.6 - therapeutic  - Meropenem 2g IV Q8H (5/4 - )  - Amikacin 20mg/kg Q24H (5/4 - 5/6)       RENAL: BUN/Cr wnl. No active issues. He was initially on lasix 20mg Q6H, changed to lasix diuril drip 5/6. Difficult to maintain his TFG.   - Lasix diuril drip 0.1mg/kg/hr (5/6 - )  - Strict Is/Os - monitor for SIADH

## 2018-05-06 NOTE — ASSESSMENT & PLAN NOTE
16 y/o male with h/o  AML (t 8;21) s/p intensification therapy II per [id number] (Arm A) stepped up to PICU for sepsis after presenting with waxing mental status, hypotension unresponsive to NS bolus x 2 and blood 1/2, chest tightness w/ tachypnea, new oxygen requirement and concern for PNA on CXR. He is currently intubated and sedated with PaO2:Fi this AM of 107 with worsened CXR. Pressures maintained on epi. Sedation with fentanyl, precedex, versed gtt. Feeds via SUMP.     CNS:  Sedation  - versed drip 2mg/hr  - precedex 0.8mcg/kg/hr  - Fentanyl 100mcg/kg/hr   - Continue to monitor neuro/mental status  - Ativan 1mg Q4PRN  PRN: Versed, fentanyl, benadryl, Vec  Fever  - Scheduled tylenol 650mg, IV, Q6H for fever. No NSAIDs given allergy.     CV: Hypotensive with melisa of 82/35 and MAP <60 on floor despite 1L NS bolus x 2 and 1/2 units pRBC.   - D/c epi 5/6  - Switch to Norepi start at 0.01mcg/kg/min to a max of 0.1mck/kg/min. Titrate to MAP goal >65.  - Vitals per protocol.    PULM: Intubated 5/5 for concern of ARDS vs. Atelectasis. OI score 14. Luke:Fi worsening, now 107. ARDS worsening.   - Pressure control mode: FiO2 65%, PEEP 12, RR 18, PC: 10.   - goal to maintain peak pressures <30  - CPT Q4 while awake.  - Methylpred 40mg Q6H (5/6 - )  - albuterol 20mg continuous.   - albumin 25% 12.5g x1. Mag x1 today.   - Lasix diuril drip 0.1mg/kg/hr (5/6 - )  - Daily CXR  - ABG - Q6H and Q12 with lactate. Calculate OI and OSI for severity of ARDS with gases.    FEN/GI:  - D/c fluids.   - TFG - 100ml/hr including meds. difficult to achieve, despite max concentrations of the meds.  - Famotidine 20mg, IV, BID for ppx  - Continue PRN zofran and miralax  - Replace electrolytes as needed  - Peptamen via SUMP: at goal of 50mL/hr  - KPhos 1 packet via SUMP Q6H  - Kcl 20mEQ via SUMP BID PRN     HEME/ONC:   Chemotherapy induced neutropenia:   - Hgb goal >7.  - Plts goal >20.  - Daily CBC and reticulocyte   - Total units of  transfusion: 4 units of pRBCs and 4 units of platelets.    - 2 more units of platelets today.   - Neupogen 600mcg daily. Increased from 400 today (5/4 - )    AML: 8;21 translocation, c-kit mutation negative.  CNS negative.  MRD negative after Induction I.    - Treating per COG [id number] (ARM A).  S/p Intensification I and Intensification II started. He was day 13 of intensification II as of 5/2/2018.  - BM biopsy at start of Intensification shows CR.  FISH for t(8;21) negative. 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  - Continue acyclovir ppx  - Continue posaconazole 400mg BID- therapeutic dosing.   - Continue bactrim QFSaSu ppx  - Continue peridex ppx    ID: BCx - Strep Mitis (sens pending) from 5/2, blood culture 5/3 NGTD  - Continue to follow previous cultures  - D/c Cefepime 5/4.   - Vancomycin 1500mg Q8H (5/3 - ) . Vanc trough 16.6 - therapeutic  - Meropenem 2g IV Q8H (5/4 - )  - Amikacin 20mg/kg Q24H (5/4 - 5/6)     RENAL: BUN/Cr wnl. No active issues.  - lasix - diuril drip 0.1mg/kg/hr  - Strict Is/Os - monitor for SIADH    SOCIAL: Mom at bedside    DISPO: Critically ill, in the PICU

## 2018-05-06 NOTE — PROCEDURES
"Hema Kuo is a 17 y.o. male patient.    Temp: 100.1 °F (37.8 °C) (05/06/18 0800)  Pulse: (!) 148 (05/06/18 1131)  Resp: (!) 37 (05/06/18 1131)  BP: (!) 112/46 (05/06/18 0800)  SpO2: (!) 93 % (05/06/18 1131)  Weight: 87.9 kg (193 lb 12.6 oz) (04/30/18 2057)  Height: 5' 6" (167.6 cm) (04/30/18 2057)    PICC  Date/Time: 5/6/2018 11:45 AM  Performed by: CAROLE SAUNDERS JR  Consent Done: Yes  Indications: med administration  Local anesthetic: lidocaine 1% without epinephrine  Anesthetic Total (mL): 2  Preparation: skin prepped with ChloraPrep  Skin prep agent dried: skin prep agent completely dried prior to procedure  Sterile barriers: all five maximum sterile barriers used - cap, mask, sterile gown, sterile gloves, and large sterile sheet  Hand hygiene: hand hygiene performed prior to central venous catheter insertion  Location details: right basilic  Catheter type: double lumen  Catheter size: 5 Fr  Catheter Length: 38cm    Ultrasound guidance: yes  Vessel Caliber: medium, compressibility normal  Vascular Doppler: not done  Needle advanced into vessel with real time Ultrasound guidance.  Guidewire confirmed in vessel.  Sterile sheath used.  Number of attempts: 1  Post-procedure: blood return through all ports, chlorhexidine patch and sterile dressing applied  Technical procedures used: modified seldinger  Specimens: No  Implants: No  Assessment: placement verified by x-ray          Carole Saunders Jr  5/6/2018  "

## 2018-05-07 LAB
ALBUMIN SERPL BCP-MCNC: 2.7 G/DL
ALLENS TEST: ABNORMAL
ALLENS TEST: NORMAL
ALP SERPL-CCNC: 43 U/L
ALT SERPL W/O P-5'-P-CCNC: 64 U/L
ANION GAP SERPL CALC-SCNC: 12 MMOL/L
ANISOCYTOSIS BLD QL SMEAR: SLIGHT
AST SERPL-CCNC: 41 U/L
BACTERIA BLD CULT: NORMAL
BACTERIA SPEC AEROBE CULT: NO GROWTH
BASOPHILS NFR BLD: 0 %
BILIRUB SERPL-MCNC: 0.8 MG/DL
BLD PROD TYP BPU: NORMAL
BLOOD UNIT EXPIRATION DATE: NORMAL
BLOOD UNIT TYPE CODE: 8400
BLOOD UNIT TYPE: NORMAL
BUN SERPL-MCNC: 21 MG/DL
CALCIUM SERPL-MCNC: 8.7 MG/DL
CHLORIDE SERPL-SCNC: 98 MMOL/L
CO2 SERPL-SCNC: 31 MMOL/L
CODING SYSTEM: NORMAL
CREAT SERPL-MCNC: 1 MG/DL
DELSYS: ABNORMAL
DELSYS: NORMAL
DIFFERENTIAL METHOD: ABNORMAL
DISPENSE STATUS: NORMAL
EOSINOPHIL NFR BLD: 0 %
ERYTHROCYTE [DISTWIDTH] IN BLOOD BY AUTOMATED COUNT: 19.2 %
ERYTHROCYTE [SEDIMENTATION RATE] IN BLOOD BY WESTERGREN METHOD: 20 MM/H
EST. GFR  (AFRICAN AMERICAN): >60 ML/MIN/1.73 M^2
EST. GFR  (NON AFRICAN AMERICAN): >60 ML/MIN/1.73 M^2
ETCO2: 47
ETCO2: 47
ETCO2: 48
FIO2: 100
FIO2: 45
GLUCOSE SERPL-MCNC: 175 MG/DL
GRAM STN SPEC: NORMAL
HCO3 UR-SCNC: 34.6 MMOL/L (ref 24–28)
HCO3 UR-SCNC: 34.9 MMOL/L (ref 24–28)
HCO3 UR-SCNC: 36.9 MMOL/L (ref 24–28)
HCO3 UR-SCNC: 37.8 MMOL/L (ref 24–28)
HCT VFR BLD AUTO: 26.1 %
HCT VFR BLD CALC: 23 %PCV (ref 36–54)
HCT VFR BLD CALC: 24 %PCV (ref 36–54)
HCT VFR BLD CALC: 24 %PCV (ref 36–54)
HCT VFR BLD CALC: 25 %PCV (ref 36–54)
HGB BLD-MCNC: 9.4 G/DL
IMM GRANULOCYTES # BLD AUTO: ABNORMAL 10*3/UL
IMM GRANULOCYTES NFR BLD AUTO: ABNORMAL %
IP: 14
IP: 14
LDH SERPL L TO P-CCNC: 1.18 MMOL/L (ref 0.36–1.25)
LDH SERPL L TO P-CCNC: 1.28 MMOL/L (ref 0.36–1.25)
LYMPHOCYTES NFR BLD: 100 %
MAGNESIUM SERPL-MCNC: 2.2 MG/DL
MCH RBC QN AUTO: 31.5 PG
MCHC RBC AUTO-ENTMCNC: 36 G/DL
MCV RBC AUTO: 88 FL
MODE: ABNORMAL
MODE: NORMAL
MONOCYTES NFR BLD: 0 %
NEUTROPHILS NFR BLD: 0 %
NRBC BLD-RTO: 0 /100 WBC
NUM UNITS TRANS WBC-POOR PLATPHERESIS: NORMAL
OVALOCYTES BLD QL SMEAR: ABNORMAL
PCO2 BLDA: 43.5 MMHG (ref 35–45)
PCO2 BLDA: 46.6 MMHG (ref 35–45)
PCO2 BLDA: 53.5 MMHG (ref 35–45)
PCO2 BLDA: 53.6 MMHG (ref 35–45)
PEEP: 10
PH SMN: 7.45 [PH] (ref 7.35–7.45)
PH SMN: 7.46 [PH] (ref 7.35–7.45)
PH SMN: 7.48 [PH] (ref 7.35–7.45)
PH SMN: 7.51 [PH] (ref 7.35–7.45)
PHOSPHATE SERPL-MCNC: 4.7 MG/DL
PIP: 14
PLATELET # BLD AUTO: 19 K/UL
PLATELET BLD QL SMEAR: ABNORMAL
PMV BLD AUTO: 11.1 FL
PO2 BLDA: 112 MMHG (ref 80–100)
PO2 BLDA: 137 MMHG (ref 80–100)
PO2 BLDA: 73 MMHG (ref 80–100)
PO2 BLDA: 84 MMHG (ref 80–100)
POC BE: 11 MMOL/L
POC BE: 12 MMOL/L
POC BE: 13 MMOL/L
POC BE: 14 MMOL/L
POC IONIZED CALCIUM: 1.05 MMOL/L (ref 1.06–1.42)
POC IONIZED CALCIUM: 1.05 MMOL/L (ref 1.06–1.42)
POC IONIZED CALCIUM: 1.1 MMOL/L (ref 1.06–1.42)
POC IONIZED CALCIUM: 1.12 MMOL/L (ref 1.06–1.42)
POC SATURATED O2: 95 % (ref 95–100)
POC SATURATED O2: 97 % (ref 95–100)
POC SATURATED O2: 98 % (ref 95–100)
POC SATURATED O2: 99 % (ref 95–100)
POC TCO2: 36 MMOL/L (ref 23–27)
POC TCO2: 36 MMOL/L (ref 23–27)
POC TCO2: 38 MMOL/L (ref 23–27)
POC TCO2: 39 MMOL/L (ref 23–27)
POIKILOCYTOSIS BLD QL SMEAR: SLIGHT
POLYCHROMASIA BLD QL SMEAR: ABNORMAL
POTASSIUM BLD-SCNC: 2.4 MMOL/L (ref 3.5–5.1)
POTASSIUM BLD-SCNC: 2.7 MMOL/L (ref 3.5–5.1)
POTASSIUM BLD-SCNC: 2.7 MMOL/L (ref 3.5–5.1)
POTASSIUM BLD-SCNC: 2.9 MMOL/L (ref 3.5–5.1)
POTASSIUM SERPL-SCNC: 3 MMOL/L
PROT SERPL-MCNC: 6.7 G/DL
PROVIDER CREDENTIALS: ABNORMAL
PROVIDER CREDENTIALS: NORMAL
PROVIDER NOTIFIED: ABNORMAL
PROVIDER NOTIFIED: NORMAL
RBC # BLD AUTO: 2.98 M/UL
RETICS/RBC NFR AUTO: 0.2 %
SAMPLE: ABNORMAL
SAMPLE: NORMAL
SITE: ABNORMAL
SITE: NORMAL
SODIUM BLD-SCNC: 143 MMOL/L (ref 136–145)
SODIUM BLD-SCNC: 145 MMOL/L (ref 136–145)
SODIUM SERPL-SCNC: 141 MMOL/L
SP02: 100
SP02: 45
SP02: 98
SP02: 98
SP02: 99
TIME NOTIFIED: 1955
TIME NOTIFIED: 2155
VERBAL RESULT READBACK PERFORMED: YES
VERBAL RESULT READBACK PERFORMED: YES
WBC # BLD AUTO: 0.04 K/UL

## 2018-05-07 PROCEDURE — 80053 COMPREHEN METABOLIC PANEL: CPT

## 2018-05-07 PROCEDURE — 99233 SBSQ HOSP IP/OBS HIGH 50: CPT | Mod: ,,, | Performed by: PEDIATRICS

## 2018-05-07 PROCEDURE — 94645 CONT INHLJ TX EACH ADDL HOUR: CPT

## 2018-05-07 PROCEDURE — 25000003 PHARM REV CODE 250: Performed by: PEDIATRICS

## 2018-05-07 PROCEDURE — 63600175 PHARM REV CODE 636 W HCPCS: Performed by: STUDENT IN AN ORGANIZED HEALTH CARE EDUCATION/TRAINING PROGRAM

## 2018-05-07 PROCEDURE — 82803 BLOOD GASES ANY COMBINATION: CPT

## 2018-05-07 PROCEDURE — 83735 ASSAY OF MAGNESIUM: CPT

## 2018-05-07 PROCEDURE — 94761 N-INVAS EAR/PLS OXIMETRY MLT: CPT

## 2018-05-07 PROCEDURE — 99900026 HC AIRWAY MAINTENANCE (STAT)

## 2018-05-07 PROCEDURE — 94770 HC EXHALED C02 TEST: CPT

## 2018-05-07 PROCEDURE — 63600175 PHARM REV CODE 636 W HCPCS: Performed by: PEDIATRICS

## 2018-05-07 PROCEDURE — 85014 HEMATOCRIT: CPT

## 2018-05-07 PROCEDURE — 25000242 PHARM REV CODE 250 ALT 637 W/ HCPCS: Performed by: PEDIATRICS

## 2018-05-07 PROCEDURE — P9037 PLATE PHERES LEUKOREDU IRRAD: HCPCS

## 2018-05-07 PROCEDURE — 94003 VENT MGMT INPAT SUBQ DAY: CPT

## 2018-05-07 PROCEDURE — 20300000 HC PICU ROOM

## 2018-05-07 PROCEDURE — 85007 BL SMEAR W/DIFF WBC COUNT: CPT

## 2018-05-07 PROCEDURE — 25000003 PHARM REV CODE 250: Performed by: STUDENT IN AN ORGANIZED HEALTH CARE EDUCATION/TRAINING PROGRAM

## 2018-05-07 PROCEDURE — 83605 ASSAY OF LACTIC ACID: CPT

## 2018-05-07 PROCEDURE — 99900035 HC TECH TIME PER 15 MIN (STAT)

## 2018-05-07 PROCEDURE — S0028 INJECTION, FAMOTIDINE, 20 MG: HCPCS | Performed by: STUDENT IN AN ORGANIZED HEALTH CARE EDUCATION/TRAINING PROGRAM

## 2018-05-07 PROCEDURE — 63600175 PHARM REV CODE 636 W HCPCS: Mod: JG | Performed by: PEDIATRICS

## 2018-05-07 PROCEDURE — 82800 BLOOD PH: CPT

## 2018-05-07 PROCEDURE — 99292 CRITICAL CARE ADDL 30 MIN: CPT | Mod: ,,, | Performed by: PEDIATRICS

## 2018-05-07 PROCEDURE — 36415 COLL VENOUS BLD VENIPUNCTURE: CPT

## 2018-05-07 PROCEDURE — 36430 TRANSFUSION BLD/BLD COMPNT: CPT

## 2018-05-07 PROCEDURE — 99291 CRITICAL CARE FIRST HOUR: CPT | Mod: ,,, | Performed by: PEDIATRICS

## 2018-05-07 PROCEDURE — 84295 ASSAY OF SERUM SODIUM: CPT

## 2018-05-07 PROCEDURE — 94668 MNPJ CHEST WALL SBSQ: CPT

## 2018-05-07 PROCEDURE — 85045 AUTOMATED RETICULOCYTE COUNT: CPT

## 2018-05-07 PROCEDURE — 37799 UNLISTED PX VASCULAR SURGERY: CPT

## 2018-05-07 PROCEDURE — 84132 ASSAY OF SERUM POTASSIUM: CPT

## 2018-05-07 PROCEDURE — 85027 COMPLETE CBC AUTOMATED: CPT

## 2018-05-07 PROCEDURE — 84100 ASSAY OF PHOSPHORUS: CPT

## 2018-05-07 PROCEDURE — 82330 ASSAY OF CALCIUM: CPT

## 2018-05-07 RX ORDER — HEPARIN 100 UNIT/ML
5 SYRINGE INTRAVENOUS
Status: COMPLETED | OUTPATIENT
Start: 2018-05-07 | End: 2018-05-07

## 2018-05-07 RX ORDER — CALCIUM CHLORIDE INJECTION 100 MG/ML
1 INJECTION, SOLUTION INTRAVENOUS DAILY PRN
Status: DISCONTINUED | OUTPATIENT
Start: 2018-05-07 | End: 2018-05-14

## 2018-05-07 RX ORDER — SENNOSIDES 8.6 MG/1
8.6 TABLET ORAL 2 TIMES DAILY
Status: DISCONTINUED | OUTPATIENT
Start: 2018-05-07 | End: 2018-05-10

## 2018-05-07 RX ORDER — POLYETHYLENE GLYCOL 3350 17 G/17G
17 POWDER, FOR SOLUTION ORAL DAILY
Status: DISCONTINUED | OUTPATIENT
Start: 2018-05-07 | End: 2018-05-11

## 2018-05-07 RX ORDER — SODIUM,POTASSIUM PHOSPHATES 280-250MG
1 POWDER IN PACKET (EA) ORAL 2 TIMES DAILY
Status: DISCONTINUED | OUTPATIENT
Start: 2018-05-07 | End: 2018-05-10

## 2018-05-07 RX ORDER — CALCIUM CHLORIDE INJECTION 100 MG/ML
1 INJECTION, SOLUTION INTRAVENOUS ONCE
Status: COMPLETED | OUTPATIENT
Start: 2018-05-07 | End: 2018-05-07

## 2018-05-07 RX ADMIN — ALBUTEROL SULFATE 20 MG: 5 SOLUTION RESPIRATORY (INHALATION) at 12:05

## 2018-05-07 RX ADMIN — FILGRASTIM 600 MCG: 300 INJECTION, SOLUTION INTRAVENOUS; SUBCUTANEOUS at 02:05

## 2018-05-07 RX ADMIN — VECURONIUM BROMIDE 10 MG: 10 INJECTION, POWDER, LYOPHILIZED, FOR SOLUTION INTRAVENOUS at 05:05

## 2018-05-07 RX ADMIN — CHLORHEXIDINE GLUCONATE 15 ML: 1.2 RINSE ORAL at 08:05

## 2018-05-07 RX ADMIN — POTASSIUM CHLORIDE 40 MEQ: 400 INJECTION, SOLUTION INTRAVENOUS at 10:05

## 2018-05-07 RX ADMIN — VANCOMYCIN HYDROCHLORIDE 1500 MG: 10 INJECTION, POWDER, LYOPHILIZED, FOR SOLUTION INTRAVENOUS at 12:05

## 2018-05-07 RX ADMIN — MIDAZOLAM 2 MG/HR: 5 INJECTION INTRAMUSCULAR; INTRAVENOUS at 11:05

## 2018-05-07 RX ADMIN — ACETAMINOPHEN 650 MG: 10 INJECTION, SOLUTION INTRAVENOUS at 11:05

## 2018-05-07 RX ADMIN — CALCIUM CHLORIDE 1 G: 100 INJECTION INTRAVENOUS; INTRAVENTRICULAR at 03:05

## 2018-05-07 RX ADMIN — MAGNESIUM SULFATE HEPTAHYDRATE: 500 INJECTION, SOLUTION INTRAMUSCULAR; INTRAVENOUS at 06:05

## 2018-05-07 RX ADMIN — ACETAMINOPHEN 650 MG: 10 INJECTION, SOLUTION INTRAVENOUS at 06:05

## 2018-05-07 RX ADMIN — CHLOROTHIAZIDE SODIUM 0.05 MG/KG/HR: 500 INJECTION, POWDER, LYOPHILIZED, FOR SOLUTION INTRAVENOUS at 08:05

## 2018-05-07 RX ADMIN — METHYLPREDNISOLONE SODIUM SUCCINATE 40 MG: 40 INJECTION, POWDER, FOR SOLUTION INTRAMUSCULAR; INTRAVENOUS at 11:05

## 2018-05-07 RX ADMIN — POTASSIUM CHLORIDE 40 MEQ: 400 INJECTION, SOLUTION INTRAVENOUS at 09:05

## 2018-05-07 RX ADMIN — CALCIUM CHLORIDE 1 G: 100 INJECTION INTRAVENOUS; INTRAVENTRICULAR at 10:05

## 2018-05-07 RX ADMIN — ALBUTEROL SULFATE 20 MG: 5 SOLUTION RESPIRATORY (INHALATION) at 07:05

## 2018-05-07 RX ADMIN — VANCOMYCIN HYDROCHLORIDE 1500 MG: 10 INJECTION, POWDER, LYOPHILIZED, FOR SOLUTION INTRAVENOUS at 03:05

## 2018-05-07 RX ADMIN — DEXMEDETOMIDINE HYDROCHLORIDE: 100 INJECTION, SOLUTION, CONCENTRATE INTRAVENOUS at 01:05

## 2018-05-07 RX ADMIN — VANCOMYCIN HYDROCHLORIDE 1500 MG: 10 INJECTION, POWDER, LYOPHILIZED, FOR SOLUTION INTRAVENOUS at 07:05

## 2018-05-07 RX ADMIN — POTASSIUM CHLORIDE 40 MEQ: 400 INJECTION, SOLUTION INTRAVENOUS at 03:05

## 2018-05-07 RX ADMIN — HEPARIN SODIUM: 1000 INJECTION, SOLUTION INTRAVENOUS; SUBCUTANEOUS at 05:05

## 2018-05-07 RX ADMIN — POSACONAZOLE 400 MG: 100 TABLET, COATED ORAL at 08:05

## 2018-05-07 RX ADMIN — HEPARIN SODIUM: 1000 INJECTION, SOLUTION INTRAVENOUS; SUBCUTANEOUS at 06:05

## 2018-05-07 RX ADMIN — HEPARIN 500 UNITS: 100 SYRINGE at 06:05

## 2018-05-07 RX ADMIN — MEROPENEM 2 G: 1 INJECTION, POWDER, FOR SOLUTION INTRAVENOUS at 03:05

## 2018-05-07 RX ADMIN — ACETAMINOPHEN 650 MG: 10 INJECTION, SOLUTION INTRAVENOUS at 12:05

## 2018-05-07 RX ADMIN — Medication 175 MCG: at 05:05

## 2018-05-07 RX ADMIN — POTASSIUM CHLORIDE 40 MEQ: 400 INJECTION, SOLUTION INTRAVENOUS at 04:05

## 2018-05-07 RX ADMIN — METHYLPREDNISOLONE SODIUM SUCCINATE 40 MG: 40 INJECTION, POWDER, FOR SOLUTION INTRAMUSCULAR; INTRAVENOUS at 05:05

## 2018-05-07 RX ADMIN — MEROPENEM 2 G: 1 INJECTION, POWDER, FOR SOLUTION INTRAVENOUS at 01:05

## 2018-05-07 RX ADMIN — POTASSIUM & SODIUM PHOSPHATES POWDER PACK 280-160-250 MG 1 PACKET: 280-160-250 PACK at 08:05

## 2018-05-07 RX ADMIN — CALCIUM CHLORIDE 1 G: 100 INJECTION, SOLUTION INTRAVENOUS at 04:05

## 2018-05-07 RX ADMIN — ACETAMINOPHEN 650 MG: 10 INJECTION, SOLUTION INTRAVENOUS at 08:05

## 2018-05-07 RX ADMIN — POLYETHYLENE GLYCOL 3350 17 G: 17 POWDER, FOR SOLUTION ORAL at 11:05

## 2018-05-07 RX ADMIN — DIPHENHYDRAMINE HYDROCHLORIDE 25 MG: 25 CAPSULE ORAL at 05:05

## 2018-05-07 RX ADMIN — ACETAZOLAMIDE 500 MG: 500 INJECTION, POWDER, LYOPHILIZED, FOR SOLUTION INTRAVENOUS at 11:05

## 2018-05-07 RX ADMIN — ACYCLOVIR 400 MG: 200 CAPSULE ORAL at 08:05

## 2018-05-07 RX ADMIN — SENNOSIDES 8.6 MG: 8.6 TABLET, FILM COATED ORAL at 08:05

## 2018-05-07 RX ADMIN — FAMOTIDINE 20 MG: 10 INJECTION INTRAVENOUS at 08:05

## 2018-05-07 RX ADMIN — DEXMEDETOMIDINE HYDROCHLORIDE 0.4 MCG/KG/HR: 100 INJECTION, SOLUTION, CONCENTRATE INTRAVENOUS at 08:05

## 2018-05-07 RX ADMIN — SENNOSIDES 8.6 MG: 8.6 TABLET, FILM COATED ORAL at 11:05

## 2018-05-07 RX ADMIN — ALBUTEROL SULFATE 20 MG: 5 SOLUTION RESPIRATORY (INHALATION) at 08:05

## 2018-05-07 RX ADMIN — METHYLPREDNISOLONE SODIUM SUCCINATE 40 MG: 40 INJECTION, POWDER, FOR SOLUTION INTRAMUSCULAR; INTRAVENOUS at 08:05

## 2018-05-07 RX ADMIN — MEROPENEM 2 G: 1 INJECTION, POWDER, FOR SOLUTION INTRAVENOUS at 08:05

## 2018-05-07 RX ADMIN — MAGNESIUM SULFATE HEPTAHYDRATE: 500 INJECTION, SOLUTION INTRAMUSCULAR; INTRAVENOUS at 12:05

## 2018-05-07 RX ADMIN — POTASSIUM & SODIUM PHOSPHATES POWDER PACK 280-160-250 MG 1 PACKET: 280-160-250 PACK at 02:05

## 2018-05-07 NOTE — NURSING
Residuals checked with 8PM assessment; residuals 550cc. MD notified. Verbal order to hold feeds and start MIVF; half feeds fed back. Will continue to monitor.

## 2018-05-07 NOTE — PROGRESS NOTES
Nutrition Assessment    Dx: Neutropenia    Weight: 87.9kg  Height: 167.6cm  BMI: 31.28    Percentiles   Weight/Age: 90%  Height/Age: 10%  BMI/Age: 96%    Estimated Needs:  2200kcals (25kcal/kg)  105-123g protein (1.2-1.4g/kg protein)  1mL/kcal    Diet: NPO  EN: Peptamen 1.5 Prebio at 50mL/hr to provide 1800kcal (20kcal/kg), 82g protein (0.9g/kg), and 924mL fluid    Meds: precedex, famotidine, fentanyl, lasix, methylprednisolone, levo  Labs: K 3, BUN 21, Glu 175, P 4.7    24 hr I/Os:   Total intake: 4136mL (47.1mL/kg)  UOP: 3.6mL/kg/hr, +I/O    Nutrition Hx: 16yo male with hx AML s/p chemo admitted for neutropenia. Pt currently intubated and paralyzed. Currently NPO for high residuals.     Nutrition Diagnosis: Inadequate energy intake r/t decreased ability to consume adequate energy AEB TF held, goal TF not meet needs - new.     Intervention:   1. Resume TF once able, recommend goal rate of 65mL/hr to provide 2340kcal, 106g protein.     2. If able to extubate, initiate Neutropenic diet.     Goal: Pt to meet % EEN and EPN - new.   Monitor: TF provision/tolerance, wts, labs  2X/week    Nutrition Discharge Planning: Unclear at this time.

## 2018-05-07 NOTE — SUBJECTIVE & OBJECTIVE
Interval History:   Fentanyl was added to sedation regimen. Patient noted to have reassuring Vancomycin Trough. He was switched from Epinephrine to Norepinephrine drip. Currently running at 0.02mcg/kg/min. Intermittently noted to be fighting the vent. Precedex decreased from 0.8 to 0.4 to minimize volume, however Fentanyl needed to be increased from 100 to 175. Setting changed to lower Peep of 10 (down from 12) and PC of 14 (down from 18). FiO2 45%. Lasix was changed to drip. And he received a dose of albumin followed by diuril. Patient also started on SUMP feeds with KPhos. Noted to have residual of >500ml. Feeds were then stopped an fluids restarted. He continued to do well overall and diuresed well with good urine output. Lasix/Diurin drip was decreased from 0.1 to 0.05. Patient noted to have low platelet count overnight (19) and received one unit of platelets.      Review of Systems   Constitutional: Positive for fever.   Respiratory: Negative for apnea and shortness of breath.    Gastrointestinal: Negative for abdominal distention and vomiting.   Skin: Negative for pallor and rash.   Neurological: Negative for tremors.     Objective:     Vital Signs Range (Last 24H):  Temp:  [98 °F (36.7 °C)-103 °F (39.4 °C)]   Pulse:  [114-159]   Resp:  [16-44]   BP: ()/(33-49)   SpO2:  [85 %-100 %]     I & O (Last 24H):  Intake/Output Summary (Last 24 hours) at 05/07/18 1222  Last data filed at 05/07/18 1200   Gross per 24 hour   Intake           4151.7 ml   Output             8494 ml   Net          -4342.3 ml       Ventilator Data (Last 24H):     Vent Mode: PC  Oxygen Concentration (%):  [] 45  Resp Rate Total:  [18 br/min-35 br/min] 20 br/min  PEEP/CPAP:  [10 ssU69-78 cmH20] 10 cmH20  Mean Airway Pressure:  [14 icC18-76 cmH20] 15 cmH20    Hemodynamic Parameters (Last 24H):       Physical Exam:  Physical Exam   Constitutional: He appears well-developed and well-nourished.   Intubated and sedated   Eyes: Pupils  are equal, round, and reactive to light.   Pupils constricted but reactive   Cardiovascular: Normal rate, regular rhythm, normal heart sounds and intact distal pulses.    No murmur heard.  Pulmonary/Chest: No stridor.   Symmetric expansion with air movement BL. Course inspiratory sounds with improvement in inspiratory wheezing   Abdominal: Soft. Bowel sounds are normal. He exhibits no distension. There is no guarding.   Genitourinary:   Genitourinary Comments: Deferred   Musculoskeletal: He exhibits no edema.   Skin: Skin is warm and dry. Capillary refill takes less than 2 seconds. No rash noted. He is not diaphoretic. No erythema.       Lines/Drains/Airways     Peripherally Inserted Central Catheter Line                 PICC Double Lumen 05/06/18 1144 right basilic 1 day          Central Venous Catheter Line                 Port A Cath Double Lumen 10/31/17 1826 left subclavian 187 days          Drain                 NG/OG Tube 05/05/18 0545 Replogle 14 Fr. Right nostril 2 days    Male External Urinary Catheter 05/05/18 1145 Small 2 days          Airway                 Airway - Non-Surgical 05/05/18 0527 Endotracheal Tube 2 days          Arterial Line                 Arterial Line 05/03/18 1300 Right Radial 3 days          Peripheral Intravenous Line                 Peripheral IV - Single Lumen 05/05/18 0539 Left Hand 2 days                Laboratory (Last 24H):   Lab Results   Component Value Date    WBC 0.04 (LL) 05/07/2018    RBC 2.98 (L) 05/07/2018    HGB 9.4 (L) 05/07/2018    HCT 24 (L) 05/07/2018    MCV 88 05/07/2018    MCH 31.5 (H) 05/07/2018    MCHC 36.0 05/07/2018    RDW 19.2 (H) 05/07/2018    PLT 19 (LL) 05/07/2018    MPV 11.1 05/07/2018    GRAN 0.0 (L) 05/07/2018    LYMPH 100.0 (H) 05/07/2018    MONO 0.0 (L) 05/07/2018    EOS 0.0 05/06/2018    BASO 0.00 05/06/2018    EOSINOPHIL 0.0 05/07/2018    BASOPHIL 0.0 05/07/2018     CMP  Sodium   Date Value Ref Range Status   05/07/2018 141 136 - 145 mmol/L Final      Potassium   Date Value Ref Range Status   05/07/2018 3.0 (L) 3.5 - 5.1 mmol/L Final     Chloride   Date Value Ref Range Status   05/07/2018 98 95 - 110 mmol/L Final     CO2   Date Value Ref Range Status   05/07/2018 31 (H) 23 - 29 mmol/L Final     Glucose   Date Value Ref Range Status   05/07/2018 175 (H) 70 - 110 mg/dL Final     BUN, Bld   Date Value Ref Range Status   05/07/2018 21 (H) 6 - 20 mg/dL Final     Creatinine   Date Value Ref Range Status   05/07/2018 1.0 0.5 - 1.4 mg/dL Final     Calcium   Date Value Ref Range Status   05/07/2018 8.7 8.7 - 10.5 mg/dL Final     Total Protein   Date Value Ref Range Status   05/07/2018 6.7 6.0 - 8.4 g/dL Final     Albumin   Date Value Ref Range Status   05/07/2018 2.7 (L) 3.5 - 5.2 g/dL Final     Total Bilirubin   Date Value Ref Range Status   05/07/2018 0.8 0.1 - 1.0 mg/dL Final     Comment:     For infants and newborns, interpretation of results should be based  on gestational age, weight and in agreement with clinical  observations.  Premature Infant recommended reference ranges:  Up to 24 hours.............<8.0 mg/dL  Up to 48 hours............<12.0 mg/dL  3-5 days..................<15.0 mg/dL  6-29 days.................<15.0 mg/dL       Alkaline Phosphatase   Date Value Ref Range Status   05/07/2018 43 (L) 55 - 135 U/L Final     AST   Date Value Ref Range Status   05/07/2018 41 (H) 10 - 40 U/L Final     ALT   Date Value Ref Range Status   05/07/2018 64 (H) 10 - 44 U/L Final     Anion Gap   Date Value Ref Range Status   05/07/2018 12 8 - 16 mmol/L Final     eGFR if    Date Value Ref Range Status   05/07/2018 >60.0 >60 mL/min/1.73 m^2 Final     eGFR if non    Date Value Ref Range Status   05/07/2018 >60.0 >60 mL/min/1.73 m^2 Final     Comment:     Calculation used to obtain the estimated glomerular filtration  rate (eGFR) is the CKD-EPI equation.           Ref Range & Units 03:02 1d ago 2d ago 3d ago   Retic 0.4 - 2.0 % 0.2   0.1    0.1   0.3                 Ref Range & Units 03:02  (5/7/18) 1d ago  (5/6/18) 2d ago  (5/5/18) 2d ago  (5/5/18)   Magnesium 1.6 - 2.6 mg/dL 2.2  1.9  2.0  1.9                Ref Range & Units 03:02  (5/7/18) 1d ago  (5/6/18) 2d ago  (5/5/18) 2d ago  (5/5/18)   Phosphorus 2.7 - 4.5 mg/dL 4.7   1.9   2.4   1.3             ABG    Recent Labs  Lab 05/07/18  0905   PH 7.458*   PO2 84   PCO2 53.5*   HCO3 37.8*   BE 14       Chest X-Ray:   EXAMINATION:  XR CHEST 1 VIEW    CLINICAL HISTORY:  follow up;    TECHNIQUE:  One view of the chest.    COMPARISON:  05/06/2018.    FINDINGS:  Medical support devices are grossly stable.  Cardiac silhouette is not enlarged and stable in size.  There is persistent bibasilar airspace disease and consolidation, with possible slight improvement when compared with the recent prior exam.  No sizable pleural effusion. No pneumothorax. No detrimental change in lung aeration.   Impression     No detrimental change.      Electronically signed by: Enmanuel Oden MD  Date: 05/07/2018  Time: 04:19       Diagnostic Results:  No Further

## 2018-05-07 NOTE — PLAN OF CARE
Problem: Patient Care Overview  Goal: Plan of Care Review  Outcome: Ongoing (interventions implemented as appropriate)   05/07/18 5620   Coping/Psychosocial   Plan Of Care Reviewed With mother     POC reviewed with mom at bedside; all questions answered. Pt remains intubated; PEEP and FiO2 decreased overnight, pt tolerating well. ABGs Q6, LA Q12. Precedex gtt decreased. Fentanyl gtt increased d/t agitation. Versed gtt remains. PRN fentanyl and versed given for agitation and vent dysynchrony. PRN vec X1 at beginning of shift. Tmax 101.6 axillary. Pt tachycardic throughout shift. Levo gtt increased d/t hypotension. Platelets X1. Pt made NPO after high residuals from NG tube, please see other note for further details. NG tube to LIWS with minimal output. MIVF started. KCl X1. L/D gtt decreased. Voiding per condom cath. No stool. Will continue to monitor.

## 2018-05-07 NOTE — ASSESSMENT & PLAN NOTE
16 y/o male with h/o  AML (t 8;21) s/p intensification therapy II per [id number] (Arm A) stepped up to PICU for sepsis after presenting with waxing mental status, hypotension unresponsive to NS bolus x 2 and blood 1/2, chest tightness w/ tachypnea, new oxygen requirement and concern for PNA on CXR. He is currently intubated and sedated with PaO2:Fi this AM of 107 with worsened CXR. Pressures maintained on epi. Sedation with fentanyl, precedex, versed gtt. Feeds via SUMP.     CNS:  Sedation  - Versed drip 2mg/hr  - Increase Precedex from 0.4 to 0.8mcg/kg/hr  - Decrease Fentanyl from 175 to 100mcg/kg/hr   - Continue to monitor neuro/mental status  - Ativan 1mg Q4PRN  PRN: Versed, fentanyl, benadryl, Vec  Fever  - Scheduled tylenol 650mg, IV, Q6H for fever. No NSAIDs given allergy.     CV: Hypotensive with melisa of 82/35 and MAP <60 on floor despite 1L NS bolus x 2 and 1/2 units pRBC.   - D/c epi 5/6  - Norepi start at 0.01mcg/kg/min to a max of 0.1mck/kg/min. Titrate to MAP goal >80.  - Vitals per protocol.    PULM: Intubated 5/5 for concern of ARDS vs. Atelectasis. OI score 14. Luke:Fi worsening, now 107. ARDS worsening.   - Pressure control mode: FiO2 45%, PEEP 10, RR 20, PC: 14.   - Goal to maintain peak pressures <30  - CPT Q4 while awake.  - Methylpred 40mg Q6H (5/6 - )  - Albuterol 20mg continuous.   - Albumin 25% 12.5g x1. Mag x1 today.   - Lasix diuril drip 0.05mg/kg/hr (5/6 - )  - Daily CXR  - ABG - Q6H and Q12 with lactate. Calculate OI and OSI for severity of ARDS with gases.    FEN/GI:  - D/C fluids.   - TFG - 100ml/hr including meds.   - Famotidine 20mg, IV, BID for ppx  - Continue PRN zofran and miralax  - Replace electrolytes as needed  - Restart Peptamen via SUMP start at Trophic feeds and increase to goal of 50cc/hr.  - KPhos 1 packet via SUMP Q6H  - Kcl 20mEQ via SUMP BID PRN     HEME/ONC:   Chemotherapy induced neutropenia:   - Hgb goal >7.  - Plts goal >20.  - Daily CBC and reticulocyte   - Total  units of transfusion: 4 units of pRBCs and 5 units of platelets.    - Neupogen 600mcg daily. 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  CNS negative.  MRD negative after Induction I.    - Treating per COG [id number] (ARM A).  S/p Intensification I and Intensification II started. He was day 13 of intensification II as of 5/2/2018.  - BM biopsy at start of Intensification shows CR.  FISH for t(8;21) negative. 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  - Continue acyclovir ppx  - Continue posaconazole 400mg BID- therapeutic dosing.   - Continue bactrim QFSaSu ppx  - Continue peridex ppx    ID: BCx - Strep Mitis (sens pending) from 5/2, blood culture 5/3 NGTD  - Continue to follow previous cultures  - D/c Cefepime 5/4.   - Vancomycin 1500mg Q8H (5/3 - ) . Vanc trough 16.6 - therapeutic  - Meropenem 2g IV Q8H (5/4 - )  - Amikacin 20mg/kg Q24H (5/4 - 5/6)     RENAL: BUN/Cr wnl. No active issues.  - Lasix - Diuril drip 0.05mg/kg/hr  - Strict Is/Os - monitor for SIADH    SOCIAL: Mom at bedside    DISPO: Critically ill, in the PICU

## 2018-05-07 NOTE — CONSULTS
[facility]  Pediatric Hematology/Oncology  Progress Note    Patient Name: Hema Kuo  Admission Date: 4/30/2018  Hospital Length of Stay: 7 days  Code Status: Prior   Interval events:   Increased sedation for agitation and fighting vent. Epi changed to norepi for tachycardia and flash cap refill, placed on lasix-diuril drip, given albumin chased with diuril. Trialed multiple vent settings, now with decreased pressure support to 10 and PEEP decreased to 14. Sump feeds started but noted to have 500cc residual, likely ileus, feeds stopped and transitioned back to IVF. Transfused platelets for plt 19, posaconazole increased to treatment dose, neupogen dose increased to 600mg.    Oncology Treatment Plan:     PEDS [id number] Intensification II  ARM B (MA) - LOW RISK PATIENTS    Medications:  Continuous Infusions:   albuterol 20 mg (05/06/18 9999)    custom IV infusion builder 50 mL/hr at 05/07/18 1200    fentanyl 100 mcg/hr (05/07/18 1200)    furosemide and chlorothiazide (LASIX and DIURIL) IV syringe 0.05 mg/kg/hr (05/07/18 0820)    heparin(porcine) Stopped (05/07/18 0551)    heparin in 0.45% NaCl Stopped (05/05/18 0045)    midazolam (VERSED) infusion (non-titrating) 2 mg/hr (05/07/18 1200)    norepinephrine bitartrate-D5W 0.01 mcg/kg/min (05/07/18 1200)    papervine / heparin 3 mL/hr at 05/07/18 1200    custom IV infusion builder (for pharmacist use only) 7.2 mL/hr at 05/07/18 1328     Scheduled Meds:   acetaminophen  650 mg Intravenous Q6H    acyclovir  400 mg Oral BID    chlorhexidine  15 mL Mouth/Throat BID    famotidine (PF)  20 mg Intravenous BID    filgrastim (NEUPOGEN) 20 mcg/mL D5W (PEDS)  10 mcg/kg/day Intravenous Q24H    meropenem (MERREM) IVPB  2 g Intravenous Q8H    methylPREDNISolone sodium succinate  40 mg Intravenous Q12H    polyethylene glycol  17 g Oral Daily    posaconazole  400 mg Oral BID    potassium, sodium phosphates  1 packet Oral BID    rocuronium  90 mg  Intravenous Once    senna  8.6 mg Oral BID    sodium chloride 0.9%  10 mL Intravenous Q6H    sulfamethoxazole-trimethoprim 800-160mg  1 tablet Oral twice daily on Friday, Saturday, Sunday    vancomycin (VANCOCIN) IVPB  1,500 mg Intravenous Q8H     PRN Meds:diphenhydrAMINE, fentanyl citrate bolus from bag/infusion, lorazepam, midazolam, ondansetron, potassium chloride, potassium chloride 10%, Flushing PICC Protocol **AND** sodium chloride 0.9% **AND** sodium chloride 0.9%, vecuronium     Review of patient's allergies indicates:   Allergen Reactions    Nsaids (non-steroidal anti-inflammatory drug) Anaphylaxis    Nuts [tree nut] Anaphylaxis    Strawberry     Vancomycin analogues Itching     Premedicate with benadryl and admin over 2hr.        Past Medical History:   Diagnosis Date    AML (acute myeloblastic leukemia) 10/2017    Asthma     Encounter for blood transfusion     Seasonal allergies      Past Surgical History:   Procedure Laterality Date    PORTACATH PLACEMENT  10/31/2017    TONSILLECTOMY       Family History     Problem Relation (Age of Onset)    Cancer Mother    Heart disease Mother    No Known Problems Father        Social History Main Topics    Smoking status: Former Smoker     Packs/day: 0.50     Types: Cigarettes     Quit date: 10/30/2017    Smokeless tobacco: Never Used    Alcohol use Yes      Comment: rare occasions    Drug use: No    Sexual activity: Yes     Partners: Female       Review of Systems  Objective:     Vital Signs (Most Recent):  Temp: 98.3 °F (36.8 °C) (05/07/18 1200)  Pulse: (!) 122 (05/07/18 1259)  Resp: (!) 24 (05/07/18 1259)  BP: (!) 105/46 (05/07/18 1212)  SpO2: 99 % (05/07/18 1259) Vital Signs (24h Range):  Temp:  [98 °F (36.7 °C)-103 °F (39.4 °C)] 98.3 °F (36.8 °C)  Pulse:  [114-155] 122  Resp:  [16-44] 24  SpO2:  [85 %-100 %] 99 %  BP: ()/(33-49) 105/46     Weight: 87.9 kg (193 lb 12.6 oz)  Body mass index is 31.28 kg/m².  Body surface area is 2.02 meters  squared.      Intake/Output Summary (Last 24 hours) at 05/07/18 1330  Last data filed at 05/07/18 1200   Gross per 24 hour   Intake           3730.2 ml   Output             8119 ml   Net          -4388.8 ml       Physical Exam   Constitutional: He appears well-developed and well-nourished. He is sedated and intubated.   HENT:   Head: Normocephalic and atraumatic.   Nose: Nose normal.   Mouth/Throat: Mucous membranes are normal.   Eyes: Conjunctivae and lids are normal. Pupils are equal, round, and reactive to light.   Pupils constricted but reactive   Cardiovascular: Normal rate, regular rhythm, normal heart sounds and intact distal pulses.    No murmur heard.  Pulmonary/Chest: No stridor. He is intubated. He has no decreased breath sounds. He has no wheezes. He has rhonchi. He has rales.   Symmetric expansion with air movement BL. Course inspiratory sounds with improvement in inspiratory wheezing   Abdominal: Soft. Bowel sounds are normal. He exhibits no distension. There is no hepatosplenomegaly. There is no guarding.   Genitourinary: Penis normal.   Genitourinary Comments: Deferred   Musculoskeletal: Normal range of motion. He exhibits no edema.   Neurological:   sedated   Skin: Skin is warm and dry. Capillary refill takes less than 2 seconds. No rash noted. He is not diaphoretic. No erythema.       Labs:   Recent Lab Results       05/07/18  0905 05/07/18  0905 05/07/18  0309 05/07/18  0302 05/06/18  2148      Immature Granulocytes    CANCELED  Comment:  Result canceled by the ancillary      Immature Grans (Abs)    CANCELED  Comment:  Mild elevation in immature granulocytes is non specific and   can be seen in a variety of conditions including stress response,   acute inflammation, trauma and pregnancy. Correlation with other   laboratory and clinical findings is essential.    Result canceled by the ancillary        Time Notifed:          Provider Notified:          Verbal Result Readback Performed           Albumin    2.7(L)      Alkaline Phosphatase    43(L)      Allens Test N/A N/A N/A  N/A     ALT    64(H)      Anion Gap    12      Aniso    Slight      AST    41(H)      Basophil%    0.0      Total Bilirubin    0.8  Comment:  For infants and newborns, interpretation of results should be based  on gestational age, weight and in agreement with clinical  observations.  Premature Infant recommended reference ranges:  Up to 24 hours.............<8.0 mg/dL  Up to 48 hours............<12.0 mg/dL  3-5 days..................<15.0 mg/dL  6-29 days.................<15.0 mg/dL        Site Bennie/UAC Bennie/UAC Bennie/UAC  Bennie/UAC     BUN, Bld    21(H)      Calcium    8.7      Chloride    98      CO2    31(H)      Creatinine    1.0      DelSys Adult Vent Adult Vent Adult Vent  Adult Vent     Differential Method    Manual  Comment:  Corrected result; previously reported as Automated on 05/07/2018 at   05:00.  [C]      eGFR if     >60.0      eGFR if non     >60.0  Comment:  Calculation used to obtain the estimated glomerular filtration  rate (eGFR) is the CKD-EPI equation.         Eosinophil%    0.0      ETCO2 47 47        FiO2 45 45        Glucose    175(H)      Gran%    0.0(L)      Hematocrit    26.1(L)      Hemoglobin    9.4(L)      IP          IT          Lymph%    100.0(H)      Magnesium    2.2      MAP          MCH    31.5(H)      MCHC    36.0      MCV    88      Mode AC/PRVC AC/PRVC PCV  PCV     Mono%    0.0(L)      MPV    11.1      nRBC    0      Ovalocytes    Occasional      PEEP 10 10        Phosphorus    4.7(H)      PiP          Platelet Estimate    Decreased(A)      Platelets    19  Comment:  plt critical result(s) called and verbal readback obtained from   giovanni cazares rn, 05/07/2018 05:00  (LL)      POC BE  14 11  6     POC HCO3  37.8(H) 34.9(H)  30.3(H)     POC Hematocrit  24(L) 25(L)  24(L)     POC Ionized Calcium  1.12 1.05(L)  1.10     POC Lactate 1.28(H)         POC PCO2  53.5(H)  46.6(H)  45.6(H)     POC PH  7.458(H) 7.483(H)  7.430     POC PO2  84 73(L)  100     POC Potassium  2.4(LL) 2.9(L)  3.6     POC SATURATED O2  97 95  98     POC Sodium  143 143  142     POC TCO2  39(H) 36(H)  32(H)     Poik    Slight      Poly    Occasional      Potassium    3.0(L)      Total Protein    6.7      Provider Credentials:          Rate 20 20        RBC    2.98(L)      RDW    19.2(H)      Retic    0.2(L)      Sample ARTERIAL ARTERIAL ARTERIAL  ARTERIAL     Sodium    141      Sp02 98 98        Vt          WBC    0.04  Comment:  WBC Preliminary PLT critical result(s) called and verbal readback   obtained from Rani Buchanan RN, 05/07/2018 03:41  (LL)                  05/06/18  1410 05/06/18  1410      Immature Granulocytes       Immature Grans (Abs)       Time Notifed: 1412 1412     Provider Notified: MARRY TUTTLE     Verbal Result Readback Performed Yes Yes     Albumin       Alkaline Phosphatase       Allens Test N/A N/A     ALT       Anion Gap       Aniso       AST       Basophil%       Total Bilirubin       Site Bennie/UAC Bennie/UAC     BUN, Bld       Calcium       Chloride       CO2       Creatinine       DelSys Adult Vent Adult Vent     Differential Method       eGFR if        eGFR if non        Eosinophil%       ETCO2       FiO2 70 70     Glucose       Gran%       Hematocrit       Hemoglobin       IP 8 8     IT .55 .55     Lymph%       Magnesium       MAP 15 15     MCH       MCHC       MCV       Mode PCV PCV     Mono%       MPV       nRBC       Ovalocytes       PEEP 12 12     Phosphorus       PiP 20 20     Platelet Estimate       Platelets       POC BE  1     POC HCO3  24.9     POC Hematocrit  22(L)     POC Ionized Calcium  1.14     POC Lactate 0.86      POC PCO2  37.6     POC PH  7.429     POC PO2  103(H)     POC Potassium  3.6     POC SATURATED O2  98     POC Sodium  142     POC TCO2  26     Poik       Poly       Potassium       Total Protein       Provider Credentials:  MD NIEVES     Rate 15 15     RBC       RDW       Retic       Sample ARTERIAL ARTERIAL     Sodium       Sp02 100 100     Vt 418 418     WBC             Diagnostic Results:  I have reviewed all pertinent imaging results/findings within the past 24 hours.   EXAMINATION:  XR CHEST 1 VIEW    CLINICAL HISTORY:  follow up;    TECHNIQUE:  One view of the chest.    COMPARISON:  05/06/2018.    FINDINGS:  Medical support devices are grossly stable.  Cardiac silhouette is not enlarged and stable in size.  There is persistent bibasilar airspace disease and consolidation, with possible slight improvement when compared with the recent prior exam.  No sizable pleural effusion. No pneumothorax. No detrimental change in lung aeration.   Impression       No detrimental change.      Electronically signed by: Enmanuel Oden MD  Date: 05/07/2018  Time: 04:19           Assessment/Plan:     16 y/o M with AML, s/p consolidation II therapy, admitted 4/30 for profound neutropenia, stepped up to PICU 5/4 for hypotension and respiratory distress, now found to have Strep viridans sepsis . Now intubated and on ventilator, slightly improved.    Sepsis  -Continue Meropenem and vancomycin  -Will follow-up sensitivities and adjust antibiotics.   -Continue increased GCSF to 600 mcg daily.    -Continue posaconazole at treatment dose  -Decrease IV methylpred to Q12    For chemotherapy induced pancytopenia  -Would maintain Hgb ~8, plts   >20.  Transfused platelets this morning for platelet count of 19.   -For chemotherapy induced immunosuppression, Continue Bactrim and acyclovir ppx  -Will continue to follow      Malia Shore MD  Pediatric Hematology/Oncology  [facility]

## 2018-05-07 NOTE — PROGRESS NOTES
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 He was stepped up to the PICU for persistent fever >103, tachycardia, and hypotension that has been minimally responsive to resuscitation. S/p 1L NS x 2 and 1/2 units pRBC that is still running. Also reports chest tightness, SOB with new increased oxygen requirement. On 2LPM NC for desaturation to mid 80s. Found to have new LLL airspace opacity on CXR. Finally reports ongoing dull headache throughout the day with no visual disturbances or neuro deficits.    Of note, as of yesterday (5/2/2018) he was Day 13 of intensification therapy II following AAML 1031 (Arm A).    Interval History:   Fentanyl was added to sedation regimen. Patient noted to have reassuring Vancomycin Trough. He was switched from Epinephrine to Norepinephrine drip. Currently running at 0.02mcg/kg/min. Intermittently noted to be fighting the vent. Precedex decreased from 0.8 to 0.4 to minimize volume, however Fentanyl needed to be increased from 100 to 175. Setting changed to lower Peep of 10 (down from 12) and PC of 14 (down from 18). FiO2 45%. Lasix was changed to drip. And he received a dose of albumin followed by diuril. Patient also started on SUMP feeds with KPhos. Noted to have residual of >500ml. Feeds were then stopped an fluids restarted. He continued to do well overall and diuresed well with good urine output. Lasix/Diurin drip was decreased from 0.1 to 0.05. Patient noted to have low platelet count overnight (19) and received one unit of  platelets.      Review of Systems   Constitutional: Positive for fever.   Respiratory: Negative for apnea and shortness of breath.    Gastrointestinal: Negative for abdominal distention and vomiting.   Skin: Negative for pallor and rash.   Neurological: Negative for tremors.     Objective:     Vital Signs Range (Last 24H):  Temp:  [98 °F (36.7 °C)-103 °F (39.4 °C)]   Pulse:  [114-159]   Resp:  [16-44]   BP: ()/(33-49)   SpO2:  [85 %-100 %]     I & O (Last 24H):  Intake/Output Summary (Last 24 hours) at 05/07/18 1222  Last data filed at 05/07/18 1200   Gross per 24 hour   Intake           4151.7 ml   Output             8494 ml   Net          -4342.3 ml       Ventilator Data (Last 24H):     Vent Mode: PC  Oxygen Concentration (%):  [] 45  Resp Rate Total:  [18 br/min-35 br/min] 20 br/min  PEEP/CPAP:  [10 ktK53-40 cmH20] 10 cmH20  Mean Airway Pressure:  [14 jlZ00-99 cmH20] 15 cmH20    Hemodynamic Parameters (Last 24H):       Physical Exam:  Physical Exam   Constitutional: He appears well-developed and well-nourished.   Intubated and sedated   Eyes: Pupils are equal, round, and reactive to light.   Pupils constricted but reactive   Cardiovascular: Normal rate, regular rhythm, normal heart sounds and intact distal pulses.    No murmur heard.  Pulmonary/Chest: No stridor.   Symmetric expansion with air movement BL. Course inspiratory sounds with improvement in inspiratory wheezing   Abdominal: Soft. Bowel sounds are normal. He exhibits no distension. There is no guarding.   Genitourinary:   Genitourinary Comments: Deferred   Musculoskeletal: He exhibits no edema.   Skin: Skin is warm and dry. Capillary refill takes less than 2 seconds. No rash noted. He is not diaphoretic. No erythema.       Lines/Drains/Airways     Peripherally Inserted Central Catheter Line                 PICC Double Lumen 05/06/18 1144 right basilic 1 day          Central Venous Catheter Line                 Port A Cath Double Lumen  10/31/17 1826 left subclavian 187 days          Drain                 NG/OG Tube 05/05/18 0545 Replogle 14 Fr. Right nostril 2 days    Male External Urinary Catheter 05/05/18 1145 Small 2 days          Airway                 Airway - Non-Surgical 05/05/18 0527 Endotracheal Tube 2 days          Arterial Line                 Arterial Line 05/03/18 1300 Right Radial 3 days          Peripheral Intravenous Line                 Peripheral IV - Single Lumen 05/05/18 0539 Left Hand 2 days                Laboratory (Last 24H):   Lab Results   Component Value Date    WBC 0.04 (LL) 05/07/2018    RBC 2.98 (L) 05/07/2018    HGB 9.4 (L) 05/07/2018    HCT 24 (L) 05/07/2018    MCV 88 05/07/2018    MCH 31.5 (H) 05/07/2018    MCHC 36.0 05/07/2018    RDW 19.2 (H) 05/07/2018    PLT 19 (LL) 05/07/2018    MPV 11.1 05/07/2018    GRAN 0.0 (L) 05/07/2018    LYMPH 100.0 (H) 05/07/2018    MONO 0.0 (L) 05/07/2018    EOS 0.0 05/06/2018    BASO 0.00 05/06/2018    EOSINOPHIL 0.0 05/07/2018    BASOPHIL 0.0 05/07/2018     CMP  Sodium   Date Value Ref Range Status   05/07/2018 141 136 - 145 mmol/L Final     Potassium   Date Value Ref Range Status   05/07/2018 3.0 (L) 3.5 - 5.1 mmol/L Final     Chloride   Date Value Ref Range Status   05/07/2018 98 95 - 110 mmol/L Final     CO2   Date Value Ref Range Status   05/07/2018 31 (H) 23 - 29 mmol/L Final     Glucose   Date Value Ref Range Status   05/07/2018 175 (H) 70 - 110 mg/dL Final     BUN, Bld   Date Value Ref Range Status   05/07/2018 21 (H) 6 - 20 mg/dL Final     Creatinine   Date Value Ref Range Status   05/07/2018 1.0 0.5 - 1.4 mg/dL Final     Calcium   Date Value Ref Range Status   05/07/2018 8.7 8.7 - 10.5 mg/dL Final     Total Protein   Date Value Ref Range Status   05/07/2018 6.7 6.0 - 8.4 g/dL Final     Albumin   Date Value Ref Range Status   05/07/2018 2.7 (L) 3.5 - 5.2 g/dL Final     Total Bilirubin   Date Value Ref Range Status   05/07/2018 0.8 0.1 - 1.0 mg/dL Final     Comment:     For  infants and newborns, interpretation of results should be based  on gestational age, weight and in agreement with clinical  observations.  Premature Infant recommended reference ranges:  Up to 24 hours.............<8.0 mg/dL  Up to 48 hours............<12.0 mg/dL  3-5 days..................<15.0 mg/dL  6-29 days.................<15.0 mg/dL       Alkaline Phosphatase   Date Value Ref Range Status   05/07/2018 43 (L) 55 - 135 U/L Final     AST   Date Value Ref Range Status   05/07/2018 41 (H) 10 - 40 U/L Final     ALT   Date Value Ref Range Status   05/07/2018 64 (H) 10 - 44 U/L Final     Anion Gap   Date Value Ref Range Status   05/07/2018 12 8 - 16 mmol/L Final     eGFR if    Date Value Ref Range Status   05/07/2018 >60.0 >60 mL/min/1.73 m^2 Final     eGFR if non    Date Value Ref Range Status   05/07/2018 >60.0 >60 mL/min/1.73 m^2 Final     Comment:     Calculation used to obtain the estimated glomerular filtration  rate (eGFR) is the CKD-EPI equation.           Ref Range & Units 03:02 1d ago 2d ago 3d ago   Retic 0.4 - 2.0 % 0.2   0.1   0.1   0.3                 Ref Range & Units 03:02  (5/7/18) 1d ago  (5/6/18) 2d ago  (5/5/18) 2d ago  (5/5/18)   Magnesium 1.6 - 2.6 mg/dL 2.2  1.9  2.0  1.9                Ref Range & Units 03:02  (5/7/18) 1d ago  (5/6/18) 2d ago  (5/5/18) 2d ago  (5/5/18)   Phosphorus 2.7 - 4.5 mg/dL 4.7   1.9   2.4   1.3             ABG    Recent Labs  Lab 05/07/18  0905   PH 7.458*   PO2 84   PCO2 53.5*   HCO3 37.8*   BE 14       Chest X-Ray:   EXAMINATION:  XR CHEST 1 VIEW    CLINICAL HISTORY:  follow up;    TECHNIQUE:  One view of the chest.    COMPARISON:  05/06/2018.    FINDINGS:  Medical support devices are grossly stable.  Cardiac silhouette is not enlarged and stable in size.  There is persistent bibasilar airspace disease and consolidation, with possible slight improvement when compared with the recent prior exam.  No sizable pleural effusion. No  pneumothorax. No detrimental change in lung aeration.   Impression     No detrimental change.      Electronically signed by: Enmanuel Oden MD  Date: 05/07/2018  Time: 04:19       Diagnostic Results:  No Further      Assessment/Plan:     Sepsis    16 y/o male with h/o  AML (t 8;21) s/p intensification therapy II per [id number] (Arm A) stepped up to PICU for sepsis after presenting with waxing mental status, hypotension unresponsive to NS bolus x 2 and blood 1/2, chest tightness w/ tachypnea, new oxygen requirement and concern for PNA on CXR. He is currently intubated and sedated with PaO2:Fi this AM of 107 with worsened CXR. Pressures maintained on epi. Sedation with fentanyl, precedex, versed gtt. Feeds via SUMP.     CNS:  Sedation  - Versed drip 2mg/hr  - Increase Precedex from 0.4 to 0.8mcg/kg/hr  - Decrease Fentanyl from 175 to 100mcg/kg/hr   - Continue to monitor neuro/mental status  - Ativan 1mg Q4PRN  PRN: Versed, fentanyl, benadryl, Vec  Fever  - Scheduled tylenol 650mg, IV, Q6H for fever. No NSAIDs given allergy.     CV: Hypotensive with melisa of 82/35 and MAP <60 on floor despite 1L NS bolus x 2 and 1/2 units pRBC.   - D/c epi 5/6  - Norepi start at 0.01mcg/kg/min to a max of 0.1mck/kg/min. Titrate to MAP goal >60.  - Vitals per protocol.    PULM: Intubated 5/5 for concern of ARDS vs. Atelectasis. OI score 14. Luke:Fi worsening, now 107. ARDS worsening.   - Pressure control mode: FiO2 45%, PEEP 10, RR 20, PC: 14.   - Goal to maintain peak pressures <30  - CPT Q4 while awake.  - Methylpred 40mg Q6H (5/6 - )  - Albuterol 20mg continuous.   - Albumin 25% 12.5g x1. Mag x1 today.   - Lasix diuril drip 0.05mg/kg/hr (5/6 - )  - Daily CXR  - ABG - Q6H and Q12 with lactate. Calculate OI and OSI for severity of ARDS with gases.    FEN/GI:  - D/C fluids.   - TFG - 100ml/hr including meds.   - Famotidine 20mg, IV, BID for ppx  - Continue PRN zofran and miralax  - Replace electrolytes as needed  - Restart Peptamen via  SUMP start at Trophic feeds and increase to goal of 50cc/hr.  - KPhos 1 packet via SUMP Q6H  - Kcl 20mEQ via SUMP BID PRN     HEME/ONC:   Chemotherapy induced neutropenia:   - Hgb goal >7.  - Plts goal >20.  - Daily CBC and reticulocyte   - Total units of transfusion: 4 units of pRBCs and 5 units of platelets.    - Neupogen 600mcg daily. 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  CNS negative.  MRD negative after Induction I.    - Treating per COG [id number] (ARM A).  S/p Intensification I and Intensification II started. He was day 13 of intensification II as of 5/2/2018.  - BM biopsy at start of Intensification shows CR.  FISH for t(8;21) negative. 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  - Continue acyclovir ppx  - Continue posaconazole 400mg BID- therapeutic dosing.   - Continue bactrim QFSaSu ppx  - Continue peridex ppx    ID: BCx - Strep Mitis (sens pending) from 5/2, blood culture 5/3 NGTD  - Continue to follow previous cultures  - D/c Cefepime 5/4.   - Vancomycin 1500mg Q8H (5/3 - ) . Vanc trough 16.6 - therapeutic  - Meropenem 2g IV Q8H (5/4 - )  - Amikacin 20mg/kg Q24H (5/4 - 5/6)     RENAL: BUN/Cr wnl. No active issues.  - Lasix - Diuril drip 0.05mg/kg/hr  - Strict Is/Os - monitor for SIADH    SOCIAL: Mom at bedside    DISPO: Critically ill, in the PICU            Critical Care Time greater than: 1 Hour    Farshad Mcginnis MD  Pediatric Critical Care  [facility]

## 2018-05-07 NOTE — PLAN OF CARE
05/07/18 1058   Discharge Reassessment   Assessment Type Discharge Planning Reassessment   Provided patient/caregiver education on the expected discharge date and the discharge plan Yes   Do you have any problems affording any of your prescribed medications? Yes   Discharge Plan A Home with family

## 2018-05-07 NOTE — SUBJECTIVE & OBJECTIVE
Interval events:   Increased sedation for agitation and fighting vent. Epi changed to norepi for tachycardia and flash cap refill, placed on lasix-diuril drip, given albumin chased with diuril. Trialed multiple vent settings, now with decreased pressure support to 10 and PEEP decreased to 14. Sump feeds started but noted to have 500cc residual, likely ileus, feeds stopped and transitioned back to IVF. Transfused platelets for plt 19, posaconazole increased to treatment dose, neupogen dose increased to 600mg.    Oncology Treatment Plan:     PEDS [id number] Intensification II  ARM B (MA) - LOW RISK PATIENTS    Medications:  Continuous Infusions:   albuterol 20 mg (05/06/18 2145)    custom IV infusion builder 50 mL/hr at 05/07/18 1200    fentanyl 100 mcg/hr (05/07/18 1200)    furosemide and chlorothiazide (LASIX and DIURIL) IV syringe 0.05 mg/kg/hr (05/07/18 0820)    heparin(porcine) Stopped (05/07/18 0551)    heparin in 0.45% NaCl Stopped (05/05/18 0045)    midazolam (VERSED) infusion (non-titrating) 2 mg/hr (05/07/18 1200)    norepinephrine bitartrate-D5W 0.01 mcg/kg/min (05/07/18 1200)    papervine / heparin 3 mL/hr at 05/07/18 1200    custom IV infusion builder (for pharmacist use only) 7.2 mL/hr at 05/07/18 1328     Scheduled Meds:   acetaminophen  650 mg Intravenous Q6H    acyclovir  400 mg Oral BID    chlorhexidine  15 mL Mouth/Throat BID    famotidine (PF)  20 mg Intravenous BID    filgrastim (NEUPOGEN) 20 mcg/mL D5W (PEDS)  10 mcg/kg/day Intravenous Q24H    meropenem (MERREM) IVPB  2 g Intravenous Q8H    methylPREDNISolone sodium succinate  40 mg Intravenous Q12H    polyethylene glycol  17 g Oral Daily    posaconazole  400 mg Oral BID    potassium, sodium phosphates  1 packet Oral BID    rocuronium  90 mg Intravenous Once    senna  8.6 mg Oral BID    sodium chloride 0.9%  10 mL Intravenous Q6H    sulfamethoxazole-trimethoprim 800-160mg  1 tablet Oral twice daily on Friday, Saturday,  Sunday    vancomycin (VANCOCIN) IVPB  1,500 mg Intravenous Q8H     PRN Meds:diphenhydrAMINE, fentanyl citrate bolus from bag/infusion, lorazepam, midazolam, ondansetron, potassium chloride, potassium chloride 10%, Flushing PICC Protocol **AND** sodium chloride 0.9% **AND** sodium chloride 0.9%, vecuronium     Review of patient's allergies indicates:   Allergen Reactions    Nsaids (non-steroidal anti-inflammatory drug) Anaphylaxis    Nuts [tree nut] Anaphylaxis    Strawberry     Vancomycin analogues Itching     Premedicate with benadryl and admin over 2hr.        Past Medical History:   Diagnosis Date    AML (acute myeloblastic leukemia) 10/2017    Asthma     Encounter for blood transfusion     Seasonal allergies      Past Surgical History:   Procedure Laterality Date    PORTACATH PLACEMENT  10/31/2017    TONSILLECTOMY       Family History     Problem Relation (Age of Onset)    Cancer Mother    Heart disease Mother    No Known Problems Father        Social History Main Topics    Smoking status: Former Smoker     Packs/day: 0.50     Types: Cigarettes     Quit date: 10/30/2017    Smokeless tobacco: Never Used    Alcohol use Yes      Comment: rare occasions    Drug use: No    Sexual activity: Yes     Partners: Female       Review of Systems  Objective:     Vital Signs (Most Recent):  Temp: 98.3 °F (36.8 °C) (05/07/18 1200)  Pulse: (!) 122 (05/07/18 1259)  Resp: (!) 24 (05/07/18 1259)  BP: (!) 105/46 (05/07/18 1212)  SpO2: 99 % (05/07/18 1259) Vital Signs (24h Range):  Temp:  [98 °F (36.7 °C)-103 °F (39.4 °C)] 98.3 °F (36.8 °C)  Pulse:  [114-155] 122  Resp:  [16-44] 24  SpO2:  [85 %-100 %] 99 %  BP: ()/(33-49) 105/46     Weight: 87.9 kg (193 lb 12.6 oz)  Body mass index is 31.28 kg/m².  Body surface area is 2.02 meters squared.      Intake/Output Summary (Last 24 hours) at 05/07/18 1330  Last data filed at 05/07/18 1200   Gross per 24 hour   Intake           3730.2 ml   Output             8119 ml    Net          -4388.8 ml       Physical Exam   Constitutional: He appears well-developed and well-nourished. He is sedated and intubated.   HENT:   Head: Normocephalic and atraumatic.   Nose: Nose normal.   Mouth/Throat: Mucous membranes are normal.   Eyes: Conjunctivae and lids are normal. Pupils are equal, round, and reactive to light.   Pupils constricted but reactive   Cardiovascular: Normal rate, regular rhythm, normal heart sounds and intact distal pulses.    No murmur heard.  Pulmonary/Chest: No stridor. He is intubated. He has no decreased breath sounds. He has no wheezes. He has rhonchi. He has rales.   Symmetric expansion with air movement BL. Course inspiratory sounds with improvement in inspiratory wheezing   Abdominal: Soft. Bowel sounds are normal. He exhibits no distension. There is no hepatosplenomegaly. There is no guarding.   Genitourinary: Penis normal.   Genitourinary Comments: Deferred   Musculoskeletal: Normal range of motion. He exhibits no edema.   Neurological:   sedated   Skin: Skin is warm and dry. Capillary refill takes less than 2 seconds. No rash noted. He is not diaphoretic. No erythema.       Labs:   Recent Lab Results       05/07/18  0905 05/07/18  0905 05/07/18  0309 05/07/18  0302 05/06/18  2148      Immature Granulocytes    CANCELED  Comment:  Result canceled by the ancillary      Immature Grans (Abs)    CANCELED  Comment:  Mild elevation in immature granulocytes is non specific and   can be seen in a variety of conditions including stress response,   acute inflammation, trauma and pregnancy. Correlation with other   laboratory and clinical findings is essential.    Result canceled by the ancillary        Time Notifed:          Provider Notified:          Verbal Result Readback Performed          Albumin    2.7(L)      Alkaline Phosphatase    43(L)      Allens Test N/A N/A N/A  N/A     ALT    64(H)      Anion Gap    12      Aniso    Slight      AST    41(H)      Basophil%    0.0       Total Bilirubin    0.8  Comment:  For infants and newborns, interpretation of results should be based  on gestational age, weight and in agreement with clinical  observations.  Premature Infant recommended reference ranges:  Up to 24 hours.............<8.0 mg/dL  Up to 48 hours............<12.0 mg/dL  3-5 days..................<15.0 mg/dL  6-29 days.................<15.0 mg/dL        Site Bennie/UAC Bennie/UAC Bennie/UAC  Bennie/UAC     BUN, Bld    21(H)      Calcium    8.7      Chloride    98      CO2    31(H)      Creatinine    1.0      DelSys Adult Vent Adult Vent Adult Vent  Adult Vent     Differential Method    Manual  Comment:  Corrected result; previously reported as Automated on 05/07/2018 at   05:00.  [C]      eGFR if     >60.0      eGFR if non     >60.0  Comment:  Calculation used to obtain the estimated glomerular filtration  rate (eGFR) is the CKD-EPI equation.         Eosinophil%    0.0      ETCO2 47 47        FiO2 45 45        Glucose    175(H)      Gran%    0.0(L)      Hematocrit    26.1(L)      Hemoglobin    9.4(L)      IP          IT          Lymph%    100.0(H)      Magnesium    2.2      MAP          MCH    31.5(H)      MCHC    36.0      MCV    88      Mode AC/PRVC AC/PRVC PCV  PCV     Mono%    0.0(L)      MPV    11.1      nRBC    0      Ovalocytes    Occasional      PEEP 10 10        Phosphorus    4.7(H)      PiP          Platelet Estimate    Decreased(A)      Platelets    19  Comment:  plt critical result(s) called and verbal readback obtained from   giovanni cazares rn, 05/07/2018 05:00  (LL)      POC BE  14 11  6     POC HCO3  37.8(H) 34.9(H)  30.3(H)     POC Hematocrit  24(L) 25(L)  24(L)     POC Ionized Calcium  1.12 1.05(L)  1.10     POC Lactate 1.28(H)         POC PCO2  53.5(H) 46.6(H)  45.6(H)     POC PH  7.458(H) 7.483(H)  7.430     POC PO2  84 73(L)  100     POC Potassium  2.4(LL) 2.9(L)  3.6     POC SATURATED O2  97 95  98     POC Sodium  143 143  142      POC TCO2  39(H) 36(H)  32(H)     Poik    Slight      Poly    Occasional      Potassium    3.0(L)      Total Protein    6.7      Provider Credentials:          Rate 20 20        RBC    2.98(L)      RDW    19.2(H)      Retic    0.2(L)      Sample ARTERIAL ARTERIAL ARTERIAL  ARTERIAL     Sodium    141      Sp02 98 98        Vt          WBC    0.04  Comment:  WBC Preliminary PLT critical result(s) called and verbal readback   obtained from Rani Buchanan RN, 05/07/2018 03:41  (LL)                  05/06/18  1410 05/06/18  1410      Immature Granulocytes       Immature Grans (Abs)       Time Notifed: 1412 1412     Provider Notified: MARRY TUTTLE     Verbal Result Readback Performed Yes Yes     Albumin       Alkaline Phosphatase       Allens Test N/A N/A     ALT       Anion Gap       Aniso       AST       Basophil%       Total Bilirubin       Site Bennie/UAC Bennie/UAC     BUN, Bld       Calcium       Chloride       CO2       Creatinine       DelSys Adult Vent Adult Vent     Differential Method       eGFR if        eGFR if non        Eosinophil%       ETCO2       FiO2 70 70     Glucose       Gran%       Hematocrit       Hemoglobin       IP 8 8     IT .55 .55     Lymph%       Magnesium       MAP 15 15     MCH       MCHC       MCV       Mode PCV PCV     Mono%       MPV       nRBC       Ovalocytes       PEEP 12 12     Phosphorus       PiP 20 20     Platelet Estimate       Platelets       POC BE  1     POC HCO3  24.9     POC Hematocrit  22(L)     POC Ionized Calcium  1.14     POC Lactate 0.86      POC PCO2  37.6     POC PH  7.429     POC PO2  103(H)     POC Potassium  3.6     POC SATURATED O2  98     POC Sodium  142     POC TCO2  26     Poik       Poly       Potassium       Total Protein       Provider Credentials: MD NIEVES     Rate 15 15     RBC       RDW       Retic       Sample ARTERIAL ARTERIAL     Sodium       Sp02 100 100     Vt 418 418     WBC             Diagnostic Results:  I have reviewed  all pertinent imaging results/findings within the past 24 hours.   EXAMINATION:  XR CHEST 1 VIEW    CLINICAL HISTORY:  follow up;    TECHNIQUE:  One view of the chest.    COMPARISON:  05/06/2018.    FINDINGS:  Medical support devices are grossly stable.  Cardiac silhouette is not enlarged and stable in size.  There is persistent bibasilar airspace disease and consolidation, with possible slight improvement when compared with the recent prior exam.  No sizable pleural effusion. No pneumothorax. No detrimental change in lung aeration.   Impression       No detrimental change.      Electronically signed by: Enmanuel Oden MD  Date: 05/07/2018  Time: 04:19

## 2018-05-08 LAB
ALBUMIN SERPL BCP-MCNC: 2.7 G/DL
ALLENS TEST: ABNORMAL
ALP SERPL-CCNC: 38 U/L
ALT SERPL W/O P-5'-P-CCNC: 58 U/L
ANION GAP SERPL CALC-SCNC: 10 MMOL/L
ANISOCYTOSIS BLD QL SMEAR: SLIGHT
AST SERPL-CCNC: 44 U/L
BACTERIA BLD CULT: NORMAL
BASOPHILS # BLD AUTO: ABNORMAL K/UL
BASOPHILS NFR BLD: 0 %
BILIRUB SERPL-MCNC: 0.6 MG/DL
BLD PROD TYP BPU: NORMAL
BLD PROD TYP BPU: NORMAL
BLOOD UNIT EXPIRATION DATE: NORMAL
BLOOD UNIT EXPIRATION DATE: NORMAL
BLOOD UNIT TYPE CODE: 5100
BLOOD UNIT TYPE CODE: 6200
BLOOD UNIT TYPE: NORMAL
BLOOD UNIT TYPE: NORMAL
BUN SERPL-MCNC: 39 MG/DL
CALCIUM SERPL-MCNC: 9.1 MG/DL
CHLORIDE SERPL-SCNC: 101 MMOL/L
CO2 SERPL-SCNC: 33 MMOL/L
CODING SYSTEM: NORMAL
CODING SYSTEM: NORMAL
CREAT SERPL-MCNC: 0.9 MG/DL
DELSYS: ABNORMAL
DIFFERENTIAL METHOD: ABNORMAL
DISPENSE STATUS: NORMAL
DISPENSE STATUS: NORMAL
EOSINOPHIL # BLD AUTO: ABNORMAL K/UL
EOSINOPHIL NFR BLD: 0 %
ERYTHROCYTE [DISTWIDTH] IN BLOOD BY AUTOMATED COUNT: 19.6 %
ERYTHROCYTE [SEDIMENTATION RATE] IN BLOOD BY WESTERGREN METHOD: 12 MM/H
ERYTHROCYTE [SEDIMENTATION RATE] IN BLOOD BY WESTERGREN METHOD: 20 MM/H
EST. GFR  (AFRICAN AMERICAN): >60 ML/MIN/1.73 M^2
EST. GFR  (NON AFRICAN AMERICAN): >60 ML/MIN/1.73 M^2
ETCO2: 46
FIO2: 45
FIO2: 45
GLUCOSE SERPL-MCNC: 199 MG/DL
HCO3 UR-SCNC: 34 MMOL/L (ref 24–28)
HCO3 UR-SCNC: 35 MMOL/L (ref 24–28)
HCO3 UR-SCNC: 35.4 MMOL/L (ref 24–28)
HCO3 UR-SCNC: 36.8 MMOL/L (ref 24–28)
HCT VFR BLD AUTO: 26.2 %
HCT VFR BLD CALC: 22 %PCV (ref 36–54)
HCT VFR BLD CALC: 23 %PCV (ref 36–54)
HCT VFR BLD CALC: 27 %PCV (ref 36–54)
HCT VFR BLD CALC: 32 %PCV (ref 36–54)
HGB BLD-MCNC: 9 G/DL
HYPOCHROMIA BLD QL SMEAR: ABNORMAL
IMM GRANULOCYTES # BLD AUTO: ABNORMAL K/UL
IMM GRANULOCYTES NFR BLD AUTO: ABNORMAL %
IP: 14
LDH SERPL L TO P-CCNC: 1.68 MMOL/L (ref 0.36–1.25)
LDH SERPL L TO P-CCNC: 2.07 MMOL/L (ref 0.36–1.25)
LYMPHOCYTES # BLD AUTO: ABNORMAL K/UL
LYMPHOCYTES NFR BLD: 100 %
MAGNESIUM SERPL-MCNC: 2.1 MG/DL
MAP: 15
MCH RBC QN AUTO: 31.4 PG
MCHC RBC AUTO-ENTMCNC: 34.4 G/DL
MCV RBC AUTO: 91 FL
MODE: ABNORMAL
MONOCYTES # BLD AUTO: ABNORMAL K/UL
MONOCYTES NFR BLD: 0 %
NEUTROPHILS NFR BLD: 0 %
NRBC BLD-RTO: 0 /100 WBC
NUM UNITS TRANS WBC-POOR PLATPHERESIS: NORMAL
NUM UNITS TRANS WBC-POOR PLATPHERESIS: NORMAL
OVALOCYTES BLD QL SMEAR: ABNORMAL
PCO2 BLDA: 46.8 MMHG (ref 35–45)
PCO2 BLDA: 47.1 MMHG (ref 35–45)
PCO2 BLDA: 47.5 MMHG (ref 35–45)
PCO2 BLDA: 50.2 MMHG (ref 35–45)
PEEP: 10
PEEP: 8
PH SMN: 7.46 [PH] (ref 7.35–7.45)
PH SMN: 7.47 [PH] (ref 7.35–7.45)
PH SMN: 7.48 [PH] (ref 7.35–7.45)
PH SMN: 7.49 [PH] (ref 7.35–7.45)
PHOSPHATE SERPL-MCNC: 3.7 MG/DL
PIP: 20
PLATELET # BLD AUTO: 13 K/UL
PMV BLD AUTO: 11.9 FL
PO2 BLDA: 120 MMHG (ref 80–100)
PO2 BLDA: 125 MMHG (ref 80–100)
PO2 BLDA: 128 MMHG (ref 80–100)
PO2 BLDA: 154 MMHG (ref 80–100)
POC BE: 10 MMOL/L
POC BE: 12 MMOL/L
POC BE: 12 MMOL/L
POC BE: 13 MMOL/L
POC IONIZED CALCIUM: 1.12 MMOL/L (ref 1.06–1.42)
POC IONIZED CALCIUM: 1.14 MMOL/L (ref 1.06–1.42)
POC IONIZED CALCIUM: 1.15 MMOL/L (ref 1.06–1.42)
POC IONIZED CALCIUM: 1.19 MMOL/L (ref 1.06–1.42)
POC SATURATED O2: 99 % (ref 95–100)
POC TCO2: 35 MMOL/L (ref 23–27)
POC TCO2: 36 MMOL/L (ref 23–27)
POC TCO2: 37 MMOL/L (ref 23–27)
POC TCO2: 38 MMOL/L (ref 23–27)
POIKILOCYTOSIS BLD QL SMEAR: SLIGHT
POLYCHROMASIA BLD QL SMEAR: ABNORMAL
POTASSIUM BLD-SCNC: 3.1 MMOL/L (ref 3.5–5.1)
POTASSIUM BLD-SCNC: 3.2 MMOL/L (ref 3.5–5.1)
POTASSIUM BLD-SCNC: 4 MMOL/L (ref 3.5–5.1)
POTASSIUM BLD-SCNC: 4.1 MMOL/L (ref 3.5–5.1)
POTASSIUM SERPL-SCNC: 3.2 MMOL/L
PROT SERPL-MCNC: 6.3 G/DL
PROVIDER CREDENTIALS: ABNORMAL
PROVIDER NOTIFIED: ABNORMAL
PS: 10
RBC # BLD AUTO: 2.87 M/UL
RETICS/RBC NFR AUTO: 0.1 %
SAMPLE: ABNORMAL
SITE: ABNORMAL
SODIUM BLD-SCNC: 145 MMOL/L (ref 136–145)
SODIUM BLD-SCNC: 146 MMOL/L (ref 136–145)
SODIUM BLD-SCNC: 146 MMOL/L (ref 136–145)
SODIUM BLD-SCNC: 147 MMOL/L (ref 136–145)
SODIUM SERPL-SCNC: 144 MMOL/L
SP02: 100
SP02: 100
TIME NOTIFIED: 2100
TIME NOTIFIED: 2105
VERBAL RESULT READBACK PERFORMED: YES
VT: 500
WBC # BLD AUTO: 0.24 K/UL

## 2018-05-08 PROCEDURE — 37799 UNLISTED PX VASCULAR SURGERY: CPT

## 2018-05-08 PROCEDURE — 63600175 PHARM REV CODE 636 W HCPCS: Performed by: PEDIATRICS

## 2018-05-08 PROCEDURE — 84132 ASSAY OF SERUM POTASSIUM: CPT

## 2018-05-08 PROCEDURE — 25000003 PHARM REV CODE 250: Performed by: PEDIATRICS

## 2018-05-08 PROCEDURE — 63600175 PHARM REV CODE 636 W HCPCS: Performed by: STUDENT IN AN ORGANIZED HEALTH CARE EDUCATION/TRAINING PROGRAM

## 2018-05-08 PROCEDURE — S0028 INJECTION, FAMOTIDINE, 20 MG: HCPCS | Performed by: STUDENT IN AN ORGANIZED HEALTH CARE EDUCATION/TRAINING PROGRAM

## 2018-05-08 PROCEDURE — 99291 CRITICAL CARE FIRST HOUR: CPT | Mod: ,,, | Performed by: PEDIATRICS

## 2018-05-08 PROCEDURE — 94645 CONT INHLJ TX EACH ADDL HOUR: CPT

## 2018-05-08 PROCEDURE — 84295 ASSAY OF SERUM SODIUM: CPT

## 2018-05-08 PROCEDURE — 85045 AUTOMATED RETICULOCYTE COUNT: CPT

## 2018-05-08 PROCEDURE — 84100 ASSAY OF PHOSPHORUS: CPT

## 2018-05-08 PROCEDURE — 82330 ASSAY OF CALCIUM: CPT

## 2018-05-08 PROCEDURE — 94761 N-INVAS EAR/PLS OXIMETRY MLT: CPT

## 2018-05-08 PROCEDURE — 83605 ASSAY OF LACTIC ACID: CPT

## 2018-05-08 PROCEDURE — 85027 COMPLETE CBC AUTOMATED: CPT

## 2018-05-08 PROCEDURE — 80053 COMPREHEN METABOLIC PANEL: CPT

## 2018-05-08 PROCEDURE — 25000242 PHARM REV CODE 250 ALT 637 W/ HCPCS: Performed by: PEDIATRICS

## 2018-05-08 PROCEDURE — 94003 VENT MGMT INPAT SUBQ DAY: CPT

## 2018-05-08 PROCEDURE — 82800 BLOOD PH: CPT

## 2018-05-08 PROCEDURE — 99900035 HC TECH TIME PER 15 MIN (STAT)

## 2018-05-08 PROCEDURE — 25000003 PHARM REV CODE 250: Performed by: STUDENT IN AN ORGANIZED HEALTH CARE EDUCATION/TRAINING PROGRAM

## 2018-05-08 PROCEDURE — 99900026 HC AIRWAY MAINTENANCE (STAT)

## 2018-05-08 PROCEDURE — 85014 HEMATOCRIT: CPT

## 2018-05-08 PROCEDURE — 36430 TRANSFUSION BLD/BLD COMPNT: CPT

## 2018-05-08 PROCEDURE — 82803 BLOOD GASES ANY COMBINATION: CPT

## 2018-05-08 PROCEDURE — 20300000 HC PICU ROOM

## 2018-05-08 PROCEDURE — 94668 MNPJ CHEST WALL SBSQ: CPT

## 2018-05-08 PROCEDURE — 85007 BL SMEAR W/DIFF WBC COUNT: CPT

## 2018-05-08 PROCEDURE — 94770 HC EXHALED C02 TEST: CPT

## 2018-05-08 PROCEDURE — 36415 COLL VENOUS BLD VENIPUNCTURE: CPT

## 2018-05-08 PROCEDURE — 83735 ASSAY OF MAGNESIUM: CPT

## 2018-05-08 PROCEDURE — P9037 PLATE PHERES LEUKOREDU IRRAD: HCPCS

## 2018-05-08 PROCEDURE — A4216 STERILE WATER/SALINE, 10 ML: HCPCS | Performed by: PEDIATRICS

## 2018-05-08 PROCEDURE — 86644 CMV ANTIBODY: CPT

## 2018-05-08 PROCEDURE — 99292 CRITICAL CARE ADDL 30 MIN: CPT | Mod: ,,, | Performed by: PEDIATRICS

## 2018-05-08 RX ORDER — FUROSEMIDE 10 MG/ML
40 INJECTION INTRAMUSCULAR; INTRAVENOUS EVERY 6 HOURS
Status: DISCONTINUED | OUTPATIENT
Start: 2018-05-08 | End: 2018-05-08

## 2018-05-08 RX ORDER — FUROSEMIDE 10 MG/ML
40 INJECTION INTRAMUSCULAR; INTRAVENOUS EVERY 6 HOURS
Status: DISCONTINUED | OUTPATIENT
Start: 2018-05-08 | End: 2018-05-09

## 2018-05-08 RX ORDER — METOCLOPRAMIDE 10 MG/1
10 TABLET ORAL 4 TIMES DAILY
Status: DISCONTINUED | OUTPATIENT
Start: 2018-05-08 | End: 2018-05-10

## 2018-05-08 RX ORDER — ALBUTEROL SULFATE 5 MG/ML
15 SOLUTION RESPIRATORY (INHALATION) CONTINUOUS
Status: DISCONTINUED | OUTPATIENT
Start: 2018-05-08 | End: 2018-05-09

## 2018-05-08 RX ORDER — ALBUTEROL SULFATE 5 MG/ML
10 SOLUTION RESPIRATORY (INHALATION) CONTINUOUS
Status: DISCONTINUED | OUTPATIENT
Start: 2018-05-08 | End: 2018-05-08

## 2018-05-08 RX ORDER — HYDROCODONE BITARTRATE AND ACETAMINOPHEN 500; 5 MG/1; MG/1
TABLET ORAL
Status: DISCONTINUED | OUTPATIENT
Start: 2018-05-08 | End: 2018-05-13

## 2018-05-08 RX ADMIN — METHYLPREDNISOLONE SODIUM SUCCINATE 40 MG: 40 INJECTION, POWDER, FOR SOLUTION INTRAMUSCULAR; INTRAVENOUS at 08:05

## 2018-05-08 RX ADMIN — ALBUTEROL SULFATE 10 MG: 5 SOLUTION RESPIRATORY (INHALATION) at 01:05

## 2018-05-08 RX ADMIN — VANCOMYCIN HYDROCHLORIDE 1500 MG: 10 INJECTION, POWDER, LYOPHILIZED, FOR SOLUTION INTRAVENOUS at 07:05

## 2018-05-08 RX ADMIN — DEXMEDETOMIDINE HYDROCHLORIDE: 100 INJECTION, SOLUTION, CONCENTRATE INTRAVENOUS at 07:05

## 2018-05-08 RX ADMIN — SODIUM CHLORIDE 250 ML: 0.9 INJECTION, SOLUTION INTRAVENOUS at 08:05

## 2018-05-08 RX ADMIN — ALBUTEROL SULFATE 10 MG: 5 SOLUTION RESPIRATORY (INHALATION) at 04:05

## 2018-05-08 RX ADMIN — FAMOTIDINE 20 MG: 10 INJECTION INTRAVENOUS at 08:05

## 2018-05-08 RX ADMIN — ACETAMINOPHEN 650 MG: 10 INJECTION, SOLUTION INTRAVENOUS at 05:05

## 2018-05-08 RX ADMIN — CALCIUM CHLORIDE 1 G: 100 INJECTION INTRAVENOUS; INTRAVENTRICULAR at 09:05

## 2018-05-08 RX ADMIN — POTASSIUM & SODIUM PHOSPHATES POWDER PACK 280-160-250 MG 1 PACKET: 280-160-250 PACK at 08:05

## 2018-05-08 RX ADMIN — CALCIUM CHLORIDE 1 G: 100 INJECTION INTRAVENOUS; INTRAVENTRICULAR at 03:05

## 2018-05-08 RX ADMIN — METOCLOPRAMIDE 10 MG: 10 TABLET ORAL at 12:05

## 2018-05-08 RX ADMIN — FUROSEMIDE 40 MG: 10 INJECTION, SOLUTION INTRAMUSCULAR; INTRAVENOUS at 11:05

## 2018-05-08 RX ADMIN — POSACONAZOLE 400 MG: 100 TABLET, COATED ORAL at 08:05

## 2018-05-08 RX ADMIN — CHLORHEXIDINE GLUCONATE 15 ML: 1.2 RINSE ORAL at 08:05

## 2018-05-08 RX ADMIN — DIPHENHYDRAMINE HYDROCHLORIDE 25 MG: 25 CAPSULE ORAL at 09:05

## 2018-05-08 RX ADMIN — Medication 10 ML: at 11:05

## 2018-05-08 RX ADMIN — Medication: at 01:05

## 2018-05-08 RX ADMIN — HEPARIN SODIUM: 1000 INJECTION, SOLUTION INTRAVENOUS; SUBCUTANEOUS at 12:05

## 2018-05-08 RX ADMIN — MEROPENEM 2 G: 1 INJECTION, POWDER, FOR SOLUTION INTRAVENOUS at 11:05

## 2018-05-08 RX ADMIN — METOCLOPRAMIDE 10 MG: 10 TABLET ORAL at 08:05

## 2018-05-08 RX ADMIN — ACETAMINOPHEN 650 MG: 10 INJECTION, SOLUTION INTRAVENOUS at 12:05

## 2018-05-08 RX ADMIN — METOCLOPRAMIDE 10 MG: 10 TABLET ORAL at 05:05

## 2018-05-08 RX ADMIN — POTASSIUM CHLORIDE 20 MEQ: 20 SOLUTION ORAL at 04:05

## 2018-05-08 RX ADMIN — FUROSEMIDE 40 MG: 10 INJECTION, SOLUTION INTRAMUSCULAR; INTRAVENOUS at 05:05

## 2018-05-08 RX ADMIN — ACYCLOVIR 400 MG: 200 CAPSULE ORAL at 08:05

## 2018-05-08 RX ADMIN — DIPHENHYDRAMINE HYDROCHLORIDE 25 MG: 25 CAPSULE ORAL at 05:05

## 2018-05-08 RX ADMIN — MEROPENEM 2 G: 1 INJECTION, POWDER, FOR SOLUTION INTRAVENOUS at 08:05

## 2018-05-08 RX ADMIN — ALBUTEROL SULFATE 20 MG: 5 SOLUTION RESPIRATORY (INHALATION) at 03:05

## 2018-05-08 RX ADMIN — MEROPENEM 2 G: 1 INJECTION, POWDER, FOR SOLUTION INTRAVENOUS at 03:05

## 2018-05-08 RX ADMIN — Medication 3 UNITS/HR: at 11:05

## 2018-05-08 RX ADMIN — POLYETHYLENE GLYCOL 3350 17 G: 17 POWDER, FOR SOLUTION ORAL at 08:05

## 2018-05-08 RX ADMIN — Medication 2500 MCG: at 10:05

## 2018-05-08 RX ADMIN — MAGNESIUM SULFATE HEPTAHYDRATE: 500 INJECTION, SOLUTION INTRAMUSCULAR; INTRAVENOUS at 06:05

## 2018-05-08 RX ADMIN — ALBUTEROL SULFATE 20 MG: 5 SOLUTION RESPIRATORY (INHALATION) at 08:05

## 2018-05-08 RX ADMIN — SENNOSIDES 8.6 MG: 8.6 TABLET, FILM COATED ORAL at 08:05

## 2018-05-08 RX ADMIN — VANCOMYCIN HYDROCHLORIDE 1500 MG: 10 INJECTION, POWDER, LYOPHILIZED, FOR SOLUTION INTRAVENOUS at 11:05

## 2018-05-08 RX ADMIN — VANCOMYCIN HYDROCHLORIDE 1500 MG: 10 INJECTION, POWDER, LYOPHILIZED, FOR SOLUTION INTRAVENOUS at 03:05

## 2018-05-08 RX ADMIN — ALBUTEROL SULFATE 10 MG: 5 SOLUTION RESPIRATORY (INHALATION) at 02:05

## 2018-05-08 RX ADMIN — FILGRASTIM 600 MCG: 300 INJECTION, SOLUTION INTRAVENOUS; SUBCUTANEOUS at 01:05

## 2018-05-08 RX ADMIN — ACETAMINOPHEN 650 MG: 10 INJECTION, SOLUTION INTRAVENOUS at 11:05

## 2018-05-08 RX ADMIN — CALCIUM CHLORIDE 1 G: 100 INJECTION INTRAVENOUS; INTRAVENTRICULAR at 10:05

## 2018-05-08 RX ADMIN — CHLOROTHIAZIDE SODIUM 0.05 MG/KG/HR: 500 INJECTION, POWDER, LYOPHILIZED, FOR SOLUTION INTRAVENOUS at 07:05

## 2018-05-08 NOTE — SUBJECTIVE & OBJECTIVE
Interval History: Remained vitally stable overnight and did not have fever. Feeds were attempted at trophic but he was noted to not move bowels and residual was noted. Feeds were then stopped. Norepinephrine drip was weaned off and he was able to maintain MAPs of >60.     Review of Systems   Constitutional: Negative for fever.   Respiratory: Negative for apnea and shortness of breath.    Gastrointestinal: Negative for abdominal distention and vomiting.   Skin: Negative for pallor and rash.   Neurological: Negative for tremors.     Objective:     Vital Signs Range (Last 24H):  Temp:  [98 °F (36.7 °C)-99 °F (37.2 °C)]   Pulse:  [106-148]   Resp:  [10-44]   BP: ()/(33-59)   SpO2:  [97 %-100 %]     I & O (Last 24H):  Intake/Output Summary (Last 24 hours) at 05/08/18 0811  Last data filed at 05/08/18 0800   Gross per 24 hour   Intake          5029.28 ml   Output             4204 ml   Net           825.28 ml       Ventilator Data (Last 24H):     Vent Mode: PC  Oxygen Concentration (%):  [] 44  Resp Rate Total:  [0 br/min-24 br/min] 0 br/min  PEEP/CPAP:  [10 cmH20] 10 cmH20  Mean Airway Pressure:  [14 [id number] cmH20] 14 cmH20    Hemodynamic Parameters (Last 24H):       Physical Exam:  Physical Exam   Constitutional: He appears well-developed and well-nourished.   Intubated and sedated   Eyes: Pupils are equal, round, and reactive to light.   Pupils constricted but reactive   Cardiovascular: Normal rate, regular rhythm, normal heart sounds and intact distal pulses.    No murmur heard.  Pulmonary/Chest: No stridor.   Symmetric expansion with air movement BL. Course inspiratory sounds with improvement in inspiratory wheezing   Abdominal: Soft. Bowel sounds are normal. He exhibits no distension. There is no guarding.   Genitourinary:   Genitourinary Comments: Deferred   Musculoskeletal: He exhibits no edema.   Skin: Skin is warm and dry. Capillary refill takes less than 2 seconds. No rash noted. He is not  diaphoretic. No erythema.       Lines/Drains/Airways     Peripherally Inserted Central Catheter Line                 PICC Double Lumen 05/06/18 1144 right basilic 1 day          Central Venous Catheter Line                 Port A Cath Double Lumen 10/31/17 1826 left subclavian 188 days          Drain                 NG/OG Tube 05/05/18 0545 Replogle 14 Fr. Right nostril 3 days    Male External Urinary Catheter 05/05/18 1145 Small 2 days          Airway                 Airway - Non-Surgical 05/05/18 0527 Endotracheal Tube 3 days          Arterial Line                 Arterial Line 05/03/18 1300 Right Radial 4 days          Peripheral Intravenous Line                 Peripheral IV - Single Lumen 05/05/18 0539 Left Hand 3 days                Laboratory (Last 24H):   Lab Results   Component Value Date    WBC 0.24 (LL) 05/08/2018    RBC 2.87 (L) 05/08/2018    HGB 9.0 (L) 05/08/2018    HCT 32 (L) 05/08/2018    MCV 91 05/08/2018    MCH 31.4 (H) 05/08/2018    MCHC 34.4 05/08/2018    RDW 19.6 (H) 05/08/2018    PLT 13 (LL) 05/08/2018    MPV 11.9 05/08/2018    GRAN 0.0 (L) 05/08/2018    LYMPH Test Not Performed 05/08/2018    LYMPH 100.0 (H) 05/08/2018    MONO Test Not Performed 05/08/2018    MONO 0.0 (L) 05/08/2018    EOS Test Not Performed 05/08/2018    BASO Test Not Performed 05/08/2018    EOSINOPHIL 0.0 05/08/2018    BASOPHIL 0.0 05/08/2018     CMP  Sodium   Date Value Ref Range Status   05/08/2018 144 136 - 145 mmol/L Final     Potassium   Date Value Ref Range Status   05/08/2018 3.2 (L) 3.5 - 5.1 mmol/L Final     Chloride   Date Value Ref Range Status   05/08/2018 101 95 - 110 mmol/L Final     CO2   Date Value Ref Range Status   05/08/2018 33 (H) 23 - 29 mmol/L Final     Glucose   Date Value Ref Range Status   05/08/2018 199 (H) 70 - 110 mg/dL Final     BUN, Bld   Date Value Ref Range Status   05/08/2018 39 (H) 6 - 20 mg/dL Final     Creatinine   Date Value Ref Range Status   05/08/2018 0.9 0.5 - 1.4 mg/dL Final      Calcium   Date Value Ref Range Status   05/08/2018 9.1 8.7 - 10.5 mg/dL Final     Total Protein   Date Value Ref Range Status   05/08/2018 6.3 6.0 - 8.4 g/dL Final     Albumin   Date Value Ref Range Status   05/08/2018 2.7 (L) 3.5 - 5.2 g/dL Final     Total Bilirubin   Date Value Ref Range Status   05/08/2018 0.6 0.1 - 1.0 mg/dL Final     Comment:     For infants and newborns, interpretation of results should be based  on gestational age, weight and in agreement with clinical  observations.  Premature Infant recommended reference ranges:  Up to 24 hours.............<8.0 mg/dL  Up to 48 hours............<12.0 mg/dL  3-5 days..................<15.0 mg/dL  6-29 days.................<15.0 mg/dL       Alkaline Phosphatase   Date Value Ref Range Status   05/08/2018 38 (L) 55 - 135 U/L Final     AST   Date Value Ref Range Status   05/08/2018 44 (H) 10 - 40 U/L Final     ALT   Date Value Ref Range Status   05/08/2018 58 (H) 10 - 44 U/L Final     Anion Gap   Date Value Ref Range Status   05/08/2018 10 8 - 16 mmol/L Final     eGFR if    Date Value Ref Range Status   05/08/2018 >60.0 >60 mL/min/1.73 m^2 Final     eGFR if non    Date Value Ref Range Status   05/08/2018 >60.0 >60 mL/min/1.73 m^2 Final     Comment:     Calculation used to obtain the estimated glomerular filtration  rate (eGFR) is the CKD-EPI equation.            Ref Range & Units 03:41 1d ago 2d ago 3d ago    Retic 0.4 - 2.0 % 0.1   0.2   0.1   0.1                  Ref Range & Units 03:41 1d ago 2d ago 3d ago    Magnesium 1.6 - 2.6 mg/dL 2.1  2.2  1.9  2.0                 Ref Range & Units 03:41 1d ago 2d ago 3d ago    Phosphorus 2.7 - 4.5 mg/dL 3.7  4.7   1.9   2.4               Chest X-Ray:   5/8/2018  EXAMINATION:  XR CHEST 1 VIEW  CLINICAL HISTORY:  Eval lung fields and ETT placement;  COMPARISON:  Comparison is made to 05/07/2018.  FINDINGS:  Endotracheal tube tip lies 2.1 cm above the level of the margarito.  Tip of the  enteric tube lies in the superior aspect of the gastric fundus, immediately beneath the margin of the left hemidiaphragm.  Vascular catheters entering from a left subclavian approach and from the right arm have their tips in the superior vena cava near its junction with the right atrium.  Heart size is normal.  There has been significant clearing of previously demonstrated bilateral airspace consolidation since 05/07/2018, the clearing particularly evident in the right perihilar region/lower lung zone.  No new areas of airspace consolidation or volume loss have developed.  No pleural fluid.  No pneumothorax.  Impression  No detrimental interval change in the appearance of the chest since 05/07/2018, with improvement as discussed above.  Electronically signed by: Khoa Hermosillo MD  Date: 05/08/2018  Time: 06:20    Diagnostic Results:  No Further

## 2018-05-08 NOTE — PLAN OF CARE
Problem: Patient Care Overview  Goal: Plan of Care Review  Plan of care reviewed with mom and family at bedside. All questions addressed at this time. stated understanding. Pt remains intubated and sedated. Tolerating ventilator wean well. CPAP trials planned for tonight. Lung sounds clear to coarse. Suctioned several times throughout shift - minimal amounts of thick/white secretions noted. precedex @ 1, fentanyl @ 75mcg/h and versed @ 2mg/h. He has received PRN fentanyl x1, PRN versed x1 this shift. Feeds restarted at 10ml/h after reglan dose given. He has minimal to no bowel sounds and has had no bowel movements. norepi remains off. Lasix/diuril gtt of this am and lasix is now scheduled. Please see flowsheet for details. Will continue to monitor.

## 2018-05-08 NOTE — PROGRESS NOTES
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 He was stepped up to the PICU for persistent fever >103, tachycardia, and hypotension that has been minimally responsive to resuscitation. S/p 1L NS x 2 and 1/2 units pRBC that is still running. Also reports chest tightness, SOB with new increased oxygen requirement. On 2LPM NC for desaturation to mid 80s. Found to have new LLL airspace opacity on CXR. Finally reports ongoing dull headache throughout the day with no visual disturbances or neuro deficits.    Of note, as of yesterday (5/2/2018) he was Day 13 of intensification therapy II following AAML 1031 (Arm A).    Interval History: Remained vitally stable overnight and did not have fever. Feeds were attempted at trophic but he was noted to not move bowels and residual was noted. Feeds were then stopped. Norepinephrine drip was weaned off and he was able to maintain MAPs of >60.     Review of Systems   Constitutional: Negative for fever.   Respiratory: Negative for apnea and shortness of breath.    Gastrointestinal: Negative for abdominal distention and vomiting.   Skin: Negative for pallor and rash.   Neurological: Negative for tremors.     Objective:     Vital Signs Range (Last 24H):  Temp:  [98 °F (36.7 °C)-99 °F (37.2 °C)]   Pulse:  [106-148]   Resp:  [10-44]   BP: ()/(33-59)   SpO2:  [97 %-100 %]     I & O (Last 24H):  Intake/Output Summary (Last 24 hours) at 05/08/18 0811  Last data filed at 05/08/18 0800   Gross per 24 hour   Intake          5029.28 ml   Output              4204 ml   Net           825.28 ml       Ventilator Data (Last 24H):     Vent Mode: PC  Oxygen Concentration (%):  [] 44  Resp Rate Total:  [0 br/min-24 br/min] 0 br/min  PEEP/CPAP:  [10 cmH20] 10 cmH20  Mean Airway Pressure:  [14 siK40-17 cmH20] 14 cmH20    Hemodynamic Parameters (Last 24H):       Physical Exam:  Physical Exam   Constitutional: He appears well-developed and well-nourished.   Intubated and sedated   Eyes: Pupils are equal, round, and reactive to light.   Pupils constricted but reactive   Cardiovascular: Normal rate, regular rhythm, normal heart sounds and intact distal pulses.    No murmur heard.  Pulmonary/Chest: No stridor.   Symmetric expansion with air movement BL. Course inspiratory sounds with improvement in inspiratory wheezing   Abdominal: Soft. Bowel sounds are sluggish. He exhibits no distension. There is no guarding.   Genitourinary:   Genitourinary Comments: Deferred   Musculoskeletal: He exhibits no edema.   Skin: Skin is warm and dry. Capillary refill takes less than 2 seconds. No rash noted. He is not diaphoretic. No erythema.       Lines/Drains/Airways     Peripherally Inserted Central Catheter Line                 PICC Double Lumen 05/06/18 1144 right basilic 1 day          Central Venous Catheter Line                 Port A Cath Double Lumen 10/31/17 1826 left subclavian 188 days          Drain                 NG/OG Tube 05/05/18 0545 Replogle 14 Fr. Right nostril 3 days    Male External Urinary Catheter 05/05/18 1145 Small 2 days          Airway                 Airway - Non-Surgical 05/05/18 0527 Endotracheal Tube 3 days          Arterial Line                 Arterial Line 05/03/18 1300 Right Radial 4 days          Peripheral Intravenous Line                 Peripheral IV - Single Lumen 05/05/18 0539 Left Hand 3 days                Laboratory (Last 24H):   Lab Results   Component Value Date    WBC 0.24 (LL) 05/08/2018    RBC 2.87 (L) 05/08/2018    HGB 9.0 (L) 05/08/2018     HCT 32 (L) 05/08/2018    MCV 91 05/08/2018    MCH 31.4 (H) 05/08/2018    MCHC 34.4 05/08/2018    RDW 19.6 (H) 05/08/2018    PLT 13 (LL) 05/08/2018    MPV 11.9 05/08/2018    GRAN 0.0 (L) 05/08/2018    LYMPH Test Not Performed 05/08/2018    LYMPH 100.0 (H) 05/08/2018    MONO Test Not Performed 05/08/2018    MONO 0.0 (L) 05/08/2018    EOS Test Not Performed 05/08/2018    BASO Test Not Performed 05/08/2018    EOSINOPHIL 0.0 05/08/2018    BASOPHIL 0.0 05/08/2018     CMP  Sodium   Date Value Ref Range Status   05/08/2018 144 136 - 145 mmol/L Final     Potassium   Date Value Ref Range Status   05/08/2018 3.2 (L) 3.5 - 5.1 mmol/L Final     Chloride   Date Value Ref Range Status   05/08/2018 101 95 - 110 mmol/L Final     CO2   Date Value Ref Range Status   05/08/2018 33 (H) 23 - 29 mmol/L Final     Glucose   Date Value Ref Range Status   05/08/2018 199 (H) 70 - 110 mg/dL Final     BUN, Bld   Date Value Ref Range Status   05/08/2018 39 (H) 6 - 20 mg/dL Final     Creatinine   Date Value Ref Range Status   05/08/2018 0.9 0.5 - 1.4 mg/dL Final     Calcium   Date Value Ref Range Status   05/08/2018 9.1 8.7 - 10.5 mg/dL Final     Total Protein   Date Value Ref Range Status   05/08/2018 6.3 6.0 - 8.4 g/dL Final     Albumin   Date Value Ref Range Status   05/08/2018 2.7 (L) 3.5 - 5.2 g/dL Final     Total Bilirubin   Date Value Ref Range Status   05/08/2018 0.6 0.1 - 1.0 mg/dL Final     Comment:     For infants and newborns, interpretation of results should be based  on gestational age, weight and in agreement with clinical  observations.  Premature Infant recommended reference ranges:  Up to 24 hours.............<8.0 mg/dL  Up to 48 hours............<12.0 mg/dL  3-5 days..................<15.0 mg/dL  6-29 days.................<15.0 mg/dL       Alkaline Phosphatase   Date Value Ref Range Status   05/08/2018 38 (L) 55 - 135 U/L Final     AST   Date Value Ref Range Status   05/08/2018 44 (H) 10 - 40 U/L Final     ALT   Date Value Ref  Range Status   05/08/2018 58 (H) 10 - 44 U/L Final     Anion Gap   Date Value Ref Range Status   05/08/2018 10 8 - 16 mmol/L Final     eGFR if    Date Value Ref Range Status   05/08/2018 >60.0 >60 mL/min/1.73 m^2 Final     eGFR if non    Date Value Ref Range Status   05/08/2018 >60.0 >60 mL/min/1.73 m^2 Final     Comment:     Calculation used to obtain the estimated glomerular filtration  rate (eGFR) is the CKD-EPI equation.            Ref Range & Units 03:41 1d ago 2d ago 3d ago    Retic 0.4 - 2.0 % 0.1   0.2   0.1   0.1                  Ref Range & Units 03:41 1d ago 2d ago 3d ago    Magnesium 1.6 - 2.6 mg/dL 2.1  2.2  1.9  2.0                 Ref Range & Units 03:41 1d ago 2d ago 3d ago    Phosphorus 2.7 - 4.5 mg/dL 3.7  4.7   1.9   2.4               Chest X-Ray:   5/8/2018  EXAMINATION:  XR CHEST 1 VIEW  CLINICAL HISTORY:  Eval lung fields and ETT placement;  COMPARISON:  Comparison is made to 05/07/2018.  FINDINGS:  Endotracheal tube tip lies 2.1 cm above the level of the margarito.  Tip of the enteric tube lies in the superior aspect of the gastric fundus, immediately beneath the margin of the left hemidiaphragm.  Vascular catheters entering from a left subclavian approach and from the right arm have their tips in the superior vena cava near its junction with the right atrium.  Heart size is normal.  There has been significant clearing of previously demonstrated bilateral airspace consolidation since 05/07/2018, the clearing particularly evident in the right perihilar region/lower lung zone.  No new areas of airspace consolidation or volume loss have developed.  No pleural fluid.  No pneumothorax.  Impression  No detrimental interval change in the appearance of the chest since 05/07/2018, with improvement as discussed above.  Electronically signed by: Khoa Hermosillo MD  Date: 05/08/2018  Time: 06:20    Diagnostic Results:  No Further      Assessment/Plan:               16 y/o male with  h/o  AML (t 8;21) s/p intensification therapy II per [id number] (Arm A) stepped up to PICU for sepsis after presenting with waxing mental status, hypotension unresponsive to NS bolus x 2 and blood 1/2, chest tightness w/ tachypnea, new oxygen requirement and concern for PNA on CXR. He is currently intubated and sedated with PaO2:Fi this AM of 107 with worsened CXR. Pressures maintained on epi. Sedation with fentanyl, precedex, versed gtt. Feeds via SUMP.     CNS:  Sedation  - Versed drip 2mg/hr  - Precedex 1mcg/kg/hr  - Fentanyl 100mcg/kg/hr   - Continue to monitor neuro/mental status  - Ativan 1mg Q4PRN  PRN: Versed, fentanyl, benadryl, Vec  Fever  - Scheduled tylenol 650mg, IV, Q6H for fever. No NSAIDs given allergy.     CV: Hypotensive with melisa of 82/35 and MAP <60 on floor despite 1L NS bolus x 2 and 1/2 units pRBC.   - D/c epi 5/6  - Off Norepi.   - MAP goal >60.  - Vitals per protocol.    PULM: Intubated 5/5 for concern of ARDS vs. Atelectasis. OI score improving.    -Stop Pressure control mode: FiO2 45%, PEEP 10, RR 20, PC: 14.  - SIMV/PRVC Mode: FiO2 45%, PEEP 8, Rate 16 Pressure Support 10  - Goal to maintain peak pressures <30  - CPT Q4 .  - Methylpred 40mg Q12H (5/6 - )  - Albuterol down to 10mg continuous.   - Stop Lasix diuril drip 0.05mg/kg/hr (5/6 - 5/8)  - Lasix 40mg IV q6h  - Daily CXR  - ABG - Q6H and Q12 with lactate. Continue to calculate OI and OSI for severity of ARDS with gases.    FEN/GI:  - D/C fluids.   - TFG - 100ml/hr including meds.   - Famotidine 20mg, IV, BID for ppx  - Continue PRN zofran and miralax  - Replace electrolytes as needed  - Will consider retrying feeds today. Peptamen via SUMP start at Trophic feeds.  - KPhos 1 packet via SUMP Q6H  - Kcl 20mEQ via SUMP BID PRN  - Metoclopramide 10mg Daily.     HEME/ONC:   Chemotherapy induced neutropenia:   - Hgb goal >7.  - Plts goal >20.  - Daily CBC and reticulocyte   - Total units of transfusion: 4 units of pRBCs and 5 units of  platelets.    - Neupogen 600mcg daily. 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  CNS negative.  MRD negative after Induction I.    - Treating per COG [id number] (ARM A).  S/p Intensification I and Intensification II started. He was day 13 of intensification II as of 5/2/2018.  - BM biopsy at start of Intensification shows CR.  FISH for t(8;21) negative. 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  - Continue acyclovir ppx  - Continue posaconazole 400mg BID- therapeutic dosing.   - Continue bactrim QFSaSu ppx  - Continue peridex ppx    ID: BCx - Strep Mitis (sens pending) from 5/2, blood culture 5/3 NGTD  - Continue to follow previous cultures  - D/c Cefepime 5/4.   - Vancomycin 1500mg Q8H (5/3 - ) . Vanc trough 16.6 - therapeutic  - Meropenem 2g IV Q8H (5/4 - )  - Amikacin 20mg/kg Q24H (5/4 - 5/6)     RENAL: BUN/Cr wnl. No active issues.  - Lasix 40mg IV q6h  - Strict Is/Os - monitor for SIADH    SOCIAL: Mom at bedside    DISPO: Critically ill, in the PICU      Critical Care Time greater than: 1 Hour    Farshad Mcginnis MD  Pediatric Critical Care  [facility]

## 2018-05-08 NOTE — PROGRESS NOTES
[facility]  Pediatric Critical Care  Progress Note    Patient Name: Hema Kuo  MRN: [id number]  Admission Date: 4/30/2018  Hospital Length of Stay: 8 days  Code Status: Prior   Attending Provider: Donny Richardson MD   Primary Care Physician: Ann Reyna NP    Subjective:     HPI:  Hema is a 16 y/o male with PMH significant for AML (t 8;21) s/p intensification therapy II per [id number] (Arm A) who was initially admitted on 4/30/2018 for neutropenia/profund sepsis risk. He was stepped up to the PICU for persistent fever >103, tachycardia, and hypotension that has been minimally responsive to resuscitation. S/p 1L NS x 2 and 1/2 units pRBC that is still running. Also reports chest tightness, SOB with new increased oxygen requirement. On 2LPM NC for desaturation to mid 80s. Found to have new LLL airspace opacity on CXR. Finally reports ongoing dull headache throughout the day with no visual disturbances or neuro deficits.    Of note, as of yesterday (5/2/2018) he was Day 13 of intensification therapy II following AAML 1031 (Arm A).    No new subjective & objective note has been filed under this hospital service since the last note was generated.      Assessment/Plan:     * Sepsis    16 y/o male with h/o  AML (t 8;21) s/p intensification therapy II per [id number] (Arm A) stepped up to PICU for sepsis after presenting with waxing mental status, hypotension unresponsive to NS bolus x 2 and blood 1/2, chest tightness w/ tachypnea, new oxygen requirement and concern for PNA on CXR. He is currently intubated and sedated with PaO2:Fi this AM of 107 with worsened CXR. Pressures maintained on epi. Sedation with fentanyl, precedex, versed gtt. Feeds via SUMP.     CNS:  Sedation  - Versed drip 2mg/hr  - Precedex 1mcg/kg/hr  - Fentanyl 100mcg/kg/hr   - Continue to monitor neuro/mental status  - Ativan 1mg Q4PRN  PRN: Versed, fentanyl, benadryl, Vec  Fever  - Scheduled tylenol 650mg, IV, Q6H for fever. No NSAIDs given  allergy.     CV: Hypotensive with melisa of 82/35 and MAP <60 on floor despite 1L NS bolus x 2 and 1/2 units pRBC.   - D/c epi 5/6  - Off Norepi.   - MAP goal >60.  - Vitals per protocol.    PULM: Intubated 5/5 for concern of ARDS vs. Atelectasis. OI score improving.    -Stop Pressure control mode: FiO2 45%, PEEP 10, RR 20, PC: 14.  - SIMV/PRVC Mode: FiO2 45%, PEEP 8, Rate 16 Pressure Support 10  - Goal to maintain peak pressures <30  - CPT Q4 .  - Methylpred 40mg Q6H (5/6 - )  - Albuterol down to 10mg continuous.   - Stop Lasix diuril drip 0.05mg/kg/hr (5/6 - 5/8)  - Lasix 40mg IV q6h  - Daily CXR  - ABG - Q6H and Q12 with lactate. Continue to calculate OI and OSI for severity of ARDS with gases.    FEN/GI:  - D/C fluids.   - TFG - 100ml/hr including meds.   - Famotidine 20mg, IV, BID for ppx  - Continue PRN zofran and miralax  - Replace electrolytes as needed  - Will consider retrying feeds today. Peptamen via SUMP start at Trophic feeds.  - KPhos 1 packet via SUMP Q6H  - Kcl 20mEQ via SUMP BID PRN     HEME/ONC:   Chemotherapy induced neutropenia:   - Hgb goal >7.  - Plts goal >20.  - Daily CBC and reticulocyte   - Total units of transfusion: 4 units of pRBCs and 5 units of platelets.    - Neupogen 600mcg daily. 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  CNS negative.  MRD negative after Induction I.    - Treating per COG [id number] (ARM A).  S/p Intensification I and Intensification II started. He was day 13 of intensification II as of 5/2/2018.  - BM biopsy at start of Intensification shows CR.  FISH for t(8;21) negative. 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  - Continue acyclovir ppx  - Continue posaconazole 400mg BID- therapeutic dosing.   - Continue bactrim QFSaSu ppx  - Continue peridex ppx    ID: BCx - Strep Mitis (sens pending) from 5/2, blood culture 5/3  NGTD  - Continue to follow previous cultures  - D/c Cefepime 5/4.   - Vancomycin 1500mg Q8H (5/3 - ) . Vanc trough 16.6 - therapeutic  - Meropenem 2g IV Q8H (5/4 - )  - Amikacin 20mg/kg Q24H (5/4 - 5/6)     RENAL: BUN/Cr wnl. No active issues.  - Lasix 40mg IV q6h  - Strict Is/Os - monitor for SIADH    SOCIAL: Mom at bedside    DISPO: Critically ill, in the PICU            Critical Care Time greater than: 1 Hour    Farshad Mcginnis MD  Pediatric Critical Care  [facility]

## 2018-05-08 NOTE — SUBJECTIVE & OBJECTIVE
Subjective:     Interval History: Weaned of norepi. Received acetalozamide x1 for ongoing alkalosis, continues to breath over vent. Continues to have large residuals even with trophic feeds, feeds discontinued and Miralax and senna started.     Oncology Treatment Plan:     PEDS [id number] Intensification II  ARM B (MA) - LOW RISK PATIENTS    Medications:  Continuous Infusions:   albuterol      custom IV infusion builder 71.5 mL/hr at 05/08/18 1000    fentanyl 100 mcg/hr (05/08/18 1000)    heparin(porcine) Stopped (05/07/18 0551)    heparin in 0.45% NaCl Stopped (05/05/18 0045)    midazolam (VERSED) infusion (non-titrating) 2 mg/hr (05/08/18 1000)    norepinephrine bitartrate-D5W Stopped (05/07/18 2145)    papervine / heparin 3 mL/hr at 05/08/18 0700    custom IV infusion builder (for pharmacist use only) 9 mL/hr at 05/08/18 1000     Scheduled Meds:   acetaminophen  650 mg Intravenous Q6H    acyclovir  400 mg Oral BID    chlorhexidine  15 mL Mouth/Throat BID    famotidine (PF)  20 mg Intravenous BID    filgrastim (NEUPOGEN) 20 mcg/mL D5W (PEDS)  10 mcg/kg/day Intravenous Q24H    furosemide  40 mg Intravenous Q6H    meropenem (MERREM) IVPB  2 g Intravenous Q8H    methylPREDNISolone sodium succinate  40 mg Intravenous Q12H    metoclopramide HCl  10 mg Oral QID    polyethylene glycol  17 g Oral Daily    posaconazole  400 mg Oral BID    potassium, sodium phosphates  1 packet Oral BID    rocuronium  90 mg Intravenous Once    senna  8.6 mg Oral BID    sodium chloride 0.9%  10 mL Intravenous Q6H    sulfamethoxazole-trimethoprim 800-160mg  1 tablet Oral twice daily on Friday, Saturday, Sunday    vancomycin (VANCOCIN) IVPB  1,500 mg Intravenous Q8H     PRN Meds:sodium chloride, calcium chloride, diphenhydrAMINE, fentanyl citrate bolus from bag/infusion, lorazepam, midazolam, ondansetron, potassium chloride, potassium chloride 10%, Flushing PICC Protocol **AND** sodium chloride 0.9% **AND** sodium  chloride 0.9%, vecuronium     Review of Systems  Objective:     Vital Signs (Most Recent):  Temp: 97 °F (36.1 °C) (05/08/18 0930)  Pulse: 106 (05/08/18 1100)  Resp: 16 (05/08/18 1100)  BP: (!) 121/55 (05/08/18 0930)  SpO2: 100 % (05/08/18 1100) Vital Signs (24h Range):  Temp:  [97 °F (36.1 °C)-99 °F (37.2 °C)] 97 °F (36.1 °C)  Pulse:  [104-148] 106  Resp:  [10-24] 16  SpO2:  [98 %-100 %] 100 %  BP: ()/(33-59) 121/55     Weight: 87.9 kg (193 lb 12.6 oz)  Body mass index is 31.28 kg/m².  Body surface area is 2.02 meters squared.      Intake/Output Summary (Last 24 hours) at 05/08/18 1116  Last data filed at 05/08/18 1055   Gross per 24 hour   Intake          4894.15 ml   Output             5191 ml   Net          -296.85 ml       Physical Exam   Constitutional: He appears well-developed and well-nourished. He is sedated and intubated.   HENT:   Head: Normocephalic and atraumatic.   Nose: Nose normal.   Mouth/Throat: Mucous membranes are normal.   Eyes: Conjunctivae and lids are normal. Pupils are equal, round, and reactive to light.   Pupils constricted but reactive   Cardiovascular: Normal rate, regular rhythm and intact distal pulses.    Murmur heard.   Systolic murmur is present with a grade of 2/6   Pulmonary/Chest: No stridor. He is intubated. He has no decreased breath sounds. He has no wheezes. He has rhonchi. He has rales.   Symmetric expansion with air movement BL. Coarse breath sounds, improved from yesterday   Abdominal: Soft. He exhibits no distension. Bowel sounds are decreased. There is no hepatosplenomegaly. There is no guarding.   Genitourinary: Penis normal.   Genitourinary Comments: Deferred   Musculoskeletal: Normal range of motion. He exhibits no edema.   Neurological:   sedated   Skin: Skin is warm and dry. Capillary refill takes less than 2 seconds. No rash noted. He is not diaphoretic. No erythema.   Port CDI       Labs:   Recent Lab Results       05/08/18  0949 05/08/18  0944  05/08/18  0342 05/08/18  0341 05/07/18 2154      Immature Granulocytes    CANCELED  Comment:  Result canceled by the ancillary      Immature Grans (Abs)    CANCELED  Comment:  Mild elevation in immature granulocytes is non specific and   can be seen in a variety of conditions including stress response,   acute inflammation, trauma and pregnancy. Correlation with other   laboratory and clinical findings is essential.    Result canceled by the ancillary        Time Notifed:     2155     Provider Notified: CHARLI CHARLI   PALMA     Verbal Result Readback Performed Yes Yes   Yes     Albumin    2.7(L)      Alkaline Phosphatase    38(L)      Allens Test N/A N/A N/A  N/A     ALT    58(H)      Anion Gap    10      Aniso    Slight      AST    44(H)      Baso #    Test Not Performed  Comment:  Corrected result; previously reported as 0.02 on %DDDDDDDD% at %TTT%.[C]      Basophil%    0.0  Comment:  Corrected result; previously reported as 8.3 on %DDDDDDDD% at %TTT%.[C]      Total Bilirubin    0.6  Comment:  For infants and newborns, interpretation of results should be based  on gestational age, weight and in agreement with clinical  observations.  Premature Infant recommended reference ranges:  Up to 24 hours.............<8.0 mg/dL  Up to 48 hours............<12.0 mg/dL  3-5 days..................<15.0 mg/dL  6-29 days.................<15.0 mg/dL        Site Bennie/UAC Bennie/UAC Bennie/UAC  Bennie/UAC     BUN, Bld    39(H)      Calcium    9.1      Chloride    101      CO2    33(H)      Creatinine    0.9      DelSys  Adult Vent Adult Vent  Adult Vent     Differential Method    Manual      eGFR if     >60.0      eGFR if non     >60.0  Comment:  Calculation used to obtain the estimated glomerular filtration  rate (eGFR) is the CKD-EPI equation.         Eos #    Test Not Performed  Comment:  Corrected result; previously reported as 0.0 on %DDDDDDDD% at %TTT%.[C]      Eosinophil%    0.0      ETCO2           FiO2   45  45     Glucose    199(H)      Gran%    0.0  Comment:  Corrected result; previously reported as 16.7 on %DDDDDDDD% at %TTT%.(L)[C]      Hematocrit    26.2(L)      Hemoglobin    9.0(L)      Hypo    Occasional      IP   14  14     Lymph #    Test Not Performed  Comment:  Corrected result; previously reported as 0.1 on %DDDDDDDD% at %TTT%.[C]      Lymph%    100.0  Comment:  Corrected result; previously reported as 45.8 on %DDDDDDDD% at %TTT%.(H)[C]      Magnesium    2.1      MAP   15       MCH    31.4(H)      MCHC    34.4      MCV    91      Mode  PCV PCV  PCV     [place] #    Test Not Performed  Comment:  Corrected result; previously reported as 0.0 on %DDDDDDDD% at %TTT%.[C]      Mono%    0.0  Comment:  Corrected result; previously reported as 12.5 on %DDDDDDDD% at %TTT%.(L)[C]      MPV    11.9      nRBC    0      Ovalocytes    Occasional      PEEP   10  10     Phosphorus    3.7      PiP          Platelets    13  Comment:  plt  critical result(s) called and verbal readback obtained from   giovanni cazares,nurse, 05/08/2018 06:30  (LL)      POC BE  13 12       POC HCO3  36.8(H) 35.4(H)       POC Hematocrit  23(L) 32(L)       POC Ionized Calcium  1.15 1.14       POC Lactate 2.07(H)    1.18     POC PCO2  50.2(H) 46.8(H)       POC PH  7.472(H) 7.487(H)       POC PO2  128(H) 120(H)       POC Potassium  3.2(L) 3.1(L)       POC SATURATED O2  99 99       POC Sodium  146(H) 145       POC TCO2  38(H) 37(H)       Poik    Slight      Poly    Occasional      Potassium    3.2(L)      Total Protein    6.3      Provider Credentials: MD MD NIEVES     Rate   20  20     RBC    2.87(L)      RDW    19.6(H)      Retic    0.1(L)      Sample ARTERIAL ARTERIAL ARTERIAL  ARTERIAL     Sodium    144      Sp02   100  100     WBC    0.24  Comment:  WBC AND PREL PLT critical result(s) called and verbal readback   obtained from GIOVANNI CAZARES RN, 05/08/2018 04:21  (LL)                  05/07/18  2153 05/07/18  1459      Immature Granulocytes        Immature Grans (Abs)       Time Notifed: 1955      Provider Notified: LOUIE      Verbal Result Readback Performed Yes      Albumin       Alkaline Phosphatase       Allens Test N/A N/A     ALT       Anion Gap       Aniso       AST       Baso #       Basophil%       Total Bilirubin       Site Bennie/UAC Bennie/UAC     BUN, Bld       Calcium       Chloride       CO2       Creatinine       DelSys Adult Vent Adult Vent     Differential Method       eGFR if        eGFR if non        Eos #       Eosinophil%       ETCO2  48     FiO2 100 45     Glucose       Gran%       Hematocrit       Hemoglobin       Hypo       IP 14      Lymph #       Lymph%       Magnesium       MAP       MCH       MCHC       MCV       Mode PCV AC/PRVC     Mono #       Mono%       MPV       nRBC       Ovalocytes       PEEP 10 10     Phosphorus       PiP  14     Platelets       POC BE 12 13     POC HCO3 34.6(H) 36.9(H)     POC Hematocrit 23(L) 24(L)     POC Ionized Calcium 1.10 1.05(L)     POC Lactate       POC PCO2 43.5 53.6(H)     POC PH 7.509(H) 7.445     POC PO2 137(H) 112(H)     POC Potassium 2.7(LL) 2.7(LL)     POC SATURATED O2 99 98     POC Sodium 143 145     POC TCO2 36(H) 38(H)     Poik       Poly       Potassium       Total Protein       Provider Credentials: MD      Rate 20 20     RBC       RDW       Retic       Sample ARTERIAL ARTERIAL     Sodium       Sp02 45 99     WBC             Diagnostic Results:  I have reviewed all pertinent imaging results/findings within the past 24 hours.  EXAMINATION:  XR CHEST 1 VIEW    CLINICAL HISTORY:  Eval lung fields and ETT placement;    COMPARISON:  Comparison is made to 05/07/2018.    FINDINGS:  Endotracheal tube tip lies 2.1 cm above the level of the margarito.  Tip of the enteric tube lies in the superior aspect of the gastric fundus, immediately beneath the margin of the left hemidiaphragm.  Vascular catheters entering from a left subclavian approach and from the right  arm have their tips in the superior vena cava near its junction with the right atrium.  Heart size is normal.  There has been significant clearing of previously demonstrated bilateral airspace consolidation since 05/07/2018, the clearing particularly evident in the right perihilar region/lower lung zone.  No new areas of airspace consolidation or volume loss have developed.  No pleural fluid.  No pneumothorax.   Impression       No detrimental interval change in the appearance of the chest since 05/07/2018, with improvement as discussed above.      Electronically signed by: Khoa Hermosillo MD  Date: 05/08/2018  Time: 06:20

## 2018-05-08 NOTE — NURSING
Pt HR increased to 130s, MAPs decreased to 50's. Resident called to bedside. Charge nurse to bedside. ETT suctioned with johnson several times. MD to bedside. MAPs increased temporarily to 70s then back to low 60s. No new orders at this time. 1L NS at bedside to bolus 500ml if this happens again. Pharmacy called to refill Norepi STAT. Will monitor.

## 2018-05-08 NOTE — PLAN OF CARE
Problem: Patient Care Overview  Goal: Plan of Care Review   05/08/18 3704   Coping/Psychosocial   Plan Of Care Reviewed With mother     POC reviewed with mom at bedside; all questions answered. Remains vented, no changes made. Fentanyl, versed, and precedex gtts continued. PRN fentanyl and versed given for agitation. HR and BP stable. Levo turned off. MAPs greater then 60 while off levo. Remains NPO on MIVF. Voiding per condom cath. No BMs. KCl IV X1; PO KCl X1; CaCl X2; benadryl X1; platelets X1. Will continue to monitor.

## 2018-05-08 NOTE — ASSESSMENT & PLAN NOTE
16 y/o male with h/o  AML (t 8;21) s/p intensification therapy II per [id number] (Arm A) stepped up to PICU for sepsis after presenting with waxing mental status, hypotension unresponsive to NS bolus x 2 and blood 1/2, chest tightness w/ tachypnea, new oxygen requirement and concern for PNA on CXR. He is currently intubated and sedated with PaO2:Fi this AM of 107 with worsened CXR. Pressures maintained on epi. Sedation with fentanyl, precedex, versed gtt. Feeds via SUMP.     CNS:  Sedation  - Versed drip 2mg/hr  - Precedex 1mcg/kg/hr  - Fentanyl 100mcg/kg/hr   - Continue to monitor neuro/mental status  - Ativan 1mg Q4PRN  PRN: Versed, fentanyl, benadryl, Vec  Fever  - Scheduled tylenol 650mg, IV, Q6H for fever. No NSAIDs given allergy.     CV: Hypotensive with melisa of 82/35 and MAP <60 on floor despite 1L NS bolus x 2 and 1/2 units pRBC.   - D/c epi 5/6  - Off Norepi.   - MAP goal >80.  - Vitals per protocol.    PULM: Intubated 5/5 for concern of ARDS vs. Atelectasis. OI score 14. Luke:Fi worsening, now 107. ARDS worsening.   - Pressure control mode: FiO2 45%, PEEP 10, RR 20, PC: 14.   - Goal to maintain peak pressures <30  - CPT Q4 while awake.  - Methylpred 40mg Q6H (5/6 - )  - Albuterol 20mg continuous.   - Albumin 25% 12.5g x1. Mag x1 today.   - Lasix diuril drip 0.05mg/kg/hr (5/6 - )  - Daily CXR  - ABG - Q6H and Q12 with lactate. Calculate OI and OSI for severity of ARDS with gases.    FEN/GI:  - D/C fluids.   - TFG - 100ml/hr including meds.   - Famotidine 20mg, IV, BID for ppx  - Continue PRN zofran and miralax  - Replace electrolytes as needed  - Will consider retrying feeds today. Peptamen via SUMP start at Trophic feeds.  - KPhos 1 packet via SUMP Q6H  - Kcl 20mEQ via SUMP BID PRN     HEME/ONC:   Chemotherapy induced neutropenia:   - Hgb goal >7.  - Plts goal >20.  - Daily CBC and reticulocyte   - Total units of transfusion: 4 units of pRBCs and 5 units of platelets.    - Neupogen 600mcg daily. 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  CNS negative.  MRD negative after Induction I.    - Treating per COG [id number] (ARM A).  S/p Intensification I and Intensification II started. He was day 13 of intensification II as of 5/2/2018.  - BM biopsy at start of Intensification shows CR.  FISH for t(8;21) negative. 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  - Continue acyclovir ppx  - Continue posaconazole 400mg BID- therapeutic dosing.   - Continue bactrim QFSaSu ppx  - Continue peridex ppx    ID: [id number] - Strep Mitis (sens pending) from 5/2, blood culture 5/3 NGTD  - Continue to follow previous cultures  - D/c Cefepime 5/4.   - Vancomycin 1500mg Q8H (5/3 - ) . Vanc trough 16.6 - therapeutic  - Meropenem 2g IV Q8H (5/4 - )  - Amikacin 20mg/kg Q24H (5/4 - 5/6)     RENAL: BUN/Cr wnl. No active issues.  - Lasix - Diuril drip 0.05mg/kg/hr  - Strict Is/Os - monitor for SIADH    SOCIAL: Mom at bedside    DISPO: Critically ill, in the PICU

## 2018-05-08 NOTE — PROGRESS NOTES
[facility]  Pediatric Hematology/Oncology  Progress Note    Patient Name: Hema Kuo  Admission Date: 4/30/2018  Hospital Length of Stay: 8 days  Code Status: Prior     Subjective:     Interval History: Weaned of norepi. Received acetalozamide x1 for ongoing alkalosis, continues to breath over vent. Continues to have large residuals even with trophic feeds, feeds discontinued and Miralax and senna started.     Oncology Treatment Plan:     PEDS [id number] Intensification II  ARM B (MA) - LOW RISK PATIENTS    Medications:  Continuous Infusions:   albuterol      custom IV infusion builder 71.5 mL/hr at 05/08/18 1000    fentanyl 100 mcg/hr (05/08/18 1000)    heparin(porcine) Stopped (05/07/18 0551)    heparin in 0.45% NaCl Stopped (05/05/18 0045)    midazolam (VERSED) infusion (non-titrating) 2 mg/hr (05/08/18 1000)    norepinephrine bitartrate-D5W Stopped (05/07/18 2145)    papervine / heparin 3 mL/hr at 05/08/18 0700    custom IV infusion builder (for pharmacist use only) 9 mL/hr at 05/08/18 1000     Scheduled Meds:   acetaminophen  650 mg Intravenous Q6H    acyclovir  400 mg Oral BID    chlorhexidine  15 mL Mouth/Throat BID    famotidine (PF)  20 mg Intravenous BID    filgrastim (NEUPOGEN) 20 mcg/mL D5W (PEDS)  10 mcg/kg/day Intravenous Q24H    furosemide  40 mg Intravenous Q6H    meropenem (MERREM) IVPB  2 g Intravenous Q8H    methylPREDNISolone sodium succinate  40 mg Intravenous Q12H    metoclopramide HCl  10 mg Oral QID    polyethylene glycol  17 g Oral Daily    posaconazole  400 mg Oral BID    potassium, sodium phosphates  1 packet Oral BID    rocuronium  90 mg Intravenous Once    senna  8.6 mg Oral BID    sodium chloride 0.9%  10 mL Intravenous Q6H    sulfamethoxazole-trimethoprim 800-160mg  1 tablet Oral twice daily on Friday, Saturday, Sunday    vancomycin (VANCOCIN) IVPB  1,500 mg Intravenous Q8H     PRN Meds:sodium chloride, calcium chloride, diphenhydrAMINE,  fentanyl citrate bolus from bag/infusion, lorazepam, midazolam, ondansetron, potassium chloride, potassium chloride 10%, Flushing PICC Protocol **AND** sodium chloride 0.9% **AND** sodium chloride 0.9%, vecuronium     Review of Systems  Objective:     Vital Signs (Most Recent):  Temp: 97 °F (36.1 °C) (05/08/18 0930)  Pulse: 106 (05/08/18 1100)  Resp: 16 (05/08/18 1100)  BP: (!) 121/55 (05/08/18 0930)  SpO2: 100 % (05/08/18 1100) Vital Signs (24h Range):  Temp:  [97 °F (36.1 °C)-99 °F (37.2 °C)] 97 °F (36.1 °C)  Pulse:  [104-148] 106  Resp:  [10-24] 16  SpO2:  [98 %-100 %] 100 %  BP: ()/(33-59) 121/55     Weight: 87.9 kg (193 lb 12.6 oz)  Body mass index is 31.28 kg/m².  Body surface area is 2.02 meters squared.      Intake/Output Summary (Last 24 hours) at 05/08/18 1116  Last data filed at 05/08/18 1055   Gross per 24 hour   Intake          4894.15 ml   Output             5191 ml   Net          -296.85 ml       Physical Exam   Constitutional: He appears well-developed and well-nourished. He is sedated and intubated.   HENT:   Head: Normocephalic and atraumatic.   Nose: Nose normal.   Mouth/Throat: Mucous membranes are normal.   Eyes: Conjunctivae and lids are normal. Pupils are equal, round, and reactive to light.   Pupils constricted but reactive   Cardiovascular: Normal rate, regular rhythm and intact distal pulses.    Murmur heard.   Systolic murmur is present with a grade of 2/6   Pulmonary/Chest: No stridor. He is intubated. He has no decreased breath sounds. He has no wheezes. He has rhonchi. He has rales.   Symmetric expansion with air movement BL. Coarse breath sounds, improved from yesterday   Abdominal: Soft. He exhibits no distension. Bowel sounds are decreased. There is no hepatosplenomegaly. There is no guarding.   Genitourinary: Penis normal.   Genitourinary Comments: Deferred   Musculoskeletal: Normal range of motion. He exhibits no edema.   Neurological:   sedated   Skin: Skin is warm and dry.  Capillary refill takes less than 2 seconds. No rash noted. He is not diaphoretic. No erythema.   Port CDI       Labs:   Recent Lab Results       05/08/18  0949 05/08/18  0944 05/08/18  0342 05/08/18  0341 05/07/18 2154      Immature Granulocytes    CANCELED  Comment:  Result canceled by the ancillary      Immature Grans (Abs)    CANCELED  Comment:  Mild elevation in immature granulocytes is non specific and   can be seen in a variety of conditions including stress response,   acute inflammation, trauma and pregnancy. Correlation with other   laboratory and clinical findings is essential.    Result canceled by the ancillary        Time Notifed:     2155     Provider Notified: CHARLI PALMA     Verbal Result Readback Performed Yes Yes   Yes     Albumin    2.7(L)      Alkaline Phosphatase    38(L)      Allens Test N/A N/A N/A  N/A     ALT    58(H)      Anion Gap    10      Aniso    Slight      AST    44(H)      Baso #    Test Not Performed  Comment:  Corrected result; previously reported as 0.02 on %DDDDDDDD% at %TTT%.[C]      Basophil%    0.0  Comment:  Corrected result; previously reported as 8.3 on %DDDDDDDD% at %TTT%.[C]      Total Bilirubin    0.6  Comment:  For infants and newborns, interpretation of results should be based  on gestational age, weight and in agreement with clinical  observations.  Premature Infant recommended reference ranges:  Up to 24 hours.............<8.0 mg/dL  Up to 48 hours............<12.0 mg/dL  3-5 days..................<15.0 mg/dL  6-29 days.................<15.0 mg/dL        Site Bennie/UAC Bennie/UAC Bennie/UAC  Bennie/UAC     BUN, Bld    39(H)      Calcium    9.1      Chloride    101      CO2    33(H)      Creatinine    0.9      DelSys  Adult Vent Adult Vent  Adult Vent     Differential Method    Manual      eGFR if     >60.0      eGFR if non     >60.0  Comment:  Calculation used to obtain the estimated glomerular filtration  rate (eGFR) is the  CKD-EPI equation.         Eos #    Test Not Performed  Comment:  Corrected result; previously reported as 0.0 on %DDDDDDDD% at %TTT%.[C]      Eosinophil%    0.0      ETCO2          FiO2   45  45     Glucose    199(H)      Gran%    0.0  Comment:  Corrected result; previously reported as 16.7 on %DDDDDDDD% at %TTT%.(L)[C]      Hematocrit    26.2(L)      Hemoglobin    9.0(L)      Hypo    Occasional      IP   14  14     Lymph #    Test Not Performed  Comment:  Corrected result; previously reported as 0.1 on %DDDDDDDD% at %TTT%.[C]      Lymph%    100.0  Comment:  Corrected result; previously reported as 45.8 on %DDDDDDDD% at %TTT%.(H)[C]      Magnesium    2.1      MAP   15       MCH    31.4(H)      MCHC    34.4      MCV    91      Mode  PCV PCV  PCV     [place] #    Test Not Performed  Comment:  Corrected result; previously reported as 0.0 on %DDDDDDDD% at %TTT%.[C]      Mono%    0.0  Comment:  Corrected result; previously reported as 12.5 on %DDDDDDDD% at %TTT%.(L)[C]      MPV    11.9      nRBC    0      Ovalocytes    Occasional      PEEP   10  10     Phosphorus    3.7      PiP          Platelets    13  Comment:  plt  critical result(s) called and verbal readback obtained from   giovanni cazaresnurse, 05/08/2018 06:30  (LL)      POC BE  13 12       POC HCO3  36.8(H) 35.4(H)       POC Hematocrit  23(L) 32(L)       POC Ionized Calcium  1.15 1.14       POC Lactate 2.07(H)    1.18     POC PCO2  50.2(H) 46.8(H)       POC PH  7.472(H) 7.487(H)       POC PO2  128(H) 120(H)       POC Potassium  3.2(L) 3.1(L)       POC SATURATED O2  99 99       POC Sodium  146(H) 145       POC TCO2  38(H) 37(H)       Poik    Slight      Poly    Occasional      Potassium    3.2(L)      Total Protein    6.3      Provider Credentials: MD MD NIEVES     Rate   20  20     RBC    2.87(L)      RDW    19.6(H)      Retic    0.1(L)      Sample ARTERIAL ARTERIAL ARTERIAL  ARTERIAL     Sodium    144      Sp02   100  100     WBC    0.24  Comment:  WBC AND PREL PLT  critical result(s) called and verbal readback   obtained from JULIANO VILLANUEVA RN, 05/08/2018 04:21  (LL)                  05/07/18  2153 05/07/18  1459      Immature Granulocytes       Immature Grans (Abs)       Time Notifed: 1955      Provider Notified: LOUIE      Verbal Result Readback Performed Yes      Albumin       Alkaline Phosphatase       Allens Test N/A N/A     ALT       Anion Gap       Aniso       AST       Baso #       Basophil%       Total Bilirubin       Site Bennie/UAC Bennie/UAC     BUN, Bld       Calcium       Chloride       CO2       Creatinine       DelSys Adult Vent Adult Vent     Differential Method       eGFR if        eGFR if non        Eos #       Eosinophil%       ETCO2  48     FiO2 100 45     Glucose       Gran%       Hematocrit       Hemoglobin       Hypo       IP 14      Lymph #       Lymph%       Magnesium       MAP       MCH       MCHC       MCV       Mode PCV AC/PRVC     Mono #       Mono%       MPV       nRBC       Ovalocytes       PEEP 10 10     Phosphorus       PiP  14     Platelets       POC BE 12 13     POC HCO3 34.6(H) 36.9(H)     POC Hematocrit 23(L) 24(L)     POC Ionized Calcium 1.10 1.05(L)     POC Lactate       POC PCO2 43.5 53.6(H)     POC PH 7.509(H) 7.445     POC PO2 137(H) 112(H)     POC Potassium 2.7(LL) 2.7(LL)     POC SATURATED O2 99 98     POC Sodium 143 145     POC TCO2 36(H) 38(H)     Poik       Poly       Potassium       Total Protein       Provider Credentials: MD      Rate 20 20     RBC       RDW       Retic       Sample ARTERIAL ARTERIAL     Sodium       Sp02 45 99     WBC             Diagnostic Results:  I have reviewed all pertinent imaging results/findings within the past 24 hours.  EXAMINATION:  XR CHEST 1 VIEW    CLINICAL HISTORY:  Eval lung fields and ETT placement;    COMPARISON:  Comparison is made to 05/07/2018.    FINDINGS:  Endotracheal tube tip lies 2.1 cm above the level of the margarito.  Tip of the enteric tube lies in  the superior aspect of the gastric fundus, immediately beneath the margin of the left hemidiaphragm.  Vascular catheters entering from a left subclavian approach and from the right arm have their tips in the superior vena cava near its junction with the right atrium.  Heart size is normal.  There has been significant clearing of previously demonstrated bilateral airspace consolidation since 05/07/2018, the clearing particularly evident in the right perihilar region/lower lung zone.  No new areas of airspace consolidation or volume loss have developed.  No pleural fluid.  No pneumothorax.   Impression       No detrimental interval change in the appearance of the chest since 05/07/2018, with improvement as discussed above.      Electronically signed by: Khoa Hermosillo MD  Date: 05/08/2018  Time: 06:20           Assessment/Plan:     18 y/o M with AML, s/p consolidation II therapy, admitted 4/30 for profound neutropenia, stepped up to PICU 5/4 for hypotension and respiratory distress, now found to have Strep viridans sepsis . Now intubated and on ventilator, slightly improved.     Sepsis  -Continue Meropenem and vancomycin  -Will follow-up sensitivities and adjust antibiotics  -Continue increased GCSF to 600 mcg daily.    -Continue posaconazole at treatment dose  -Continue IV methylpred Q12     Ileus  -Hold starting TPN for increased risk of infection  -Continue to give bactrim, posaconazole, acyclovir via NG, give at different time than any feeds to promote passage and absorption    For chemotherapy induced pancytopenia  -Would maintain Hgb ~8, plts   >20.  Transfused platelets this morning for platelet count of 13.  -While thrombocytopenic give 1 unit of platelets per day until maintaining platelets  -For chemotherapy induced immunosuppression, Continue Bactrim and acyclovir ppx  -Will continue to follow     Malia Shore MD  Pediatric Hematology/Oncology  [facility]

## 2018-05-08 NOTE — PLAN OF CARE
Problem: Patient Care Overview  Goal: Plan of Care Review  Outcome: Ongoing (interventions implemented as appropriate)  Plan of care reviewed with mom and family at bedside. All questions addressed at this time. All stated understanding. Pt remains intubated and sedated. Lung sounds coarse and diminished on lower left at times. Suctioned several times throughout shift - moderate amounts of thick/white secretions noted. precedex @ 1, fentanyl @ 100mcg/h and versed @ 2mg/h. He has received PRN fentanyl x3, PRN versed x2 and vec x1 . Feeds restarted at 10ml/h but stopped 4h later due to increased residual. He has had no bowel movements. Please see flowsheet for details. Will continue to monitor.

## 2018-05-09 LAB
ALBUMIN SERPL BCP-MCNC: 2.6 G/DL
ALLENS TEST: ABNORMAL
ALP SERPL-CCNC: 37 U/L
ALT SERPL W/O P-5'-P-CCNC: 46 U/L
ANION GAP SERPL CALC-SCNC: 8 MMOL/L
ANISOCYTOSIS BLD QL SMEAR: SLIGHT
APTT BLDCRRT: 28.3 SEC
AST SERPL-CCNC: 30 U/L
BASOPHILS # BLD AUTO: 0 K/UL
BASOPHILS NFR BLD: 0 %
BILIRUB SERPL-MCNC: 0.7 MG/DL
BUN SERPL-MCNC: 42 MG/DL
CALCIUM SERPL-MCNC: 8.5 MG/DL
CHLORIDE SERPL-SCNC: 107 MMOL/L
CO2 SERPL-SCNC: 32 MMOL/L
CREAT SERPL-MCNC: 0.7 MG/DL
DELSYS: ABNORMAL
DELSYS: ABNORMAL
DIFFERENTIAL METHOD: ABNORMAL
EOSINOPHIL # BLD AUTO: 0 K/UL
EOSINOPHIL NFR BLD: 0 %
ERYTHROCYTE [DISTWIDTH] IN BLOOD BY AUTOMATED COUNT: 19.9 %
EST. GFR  (AFRICAN AMERICAN): >60 ML/MIN/1.73 M^2
EST. GFR  (NON AFRICAN AMERICAN): >60 ML/MIN/1.73 M^2
FIBRINOGEN PPP-MCNC: 407 MG/DL
FIO2: 100
FIO2: 45
FLOW: 8
GLUCOSE SERPL-MCNC: 172 MG/DL
HCO3 UR-SCNC: 32.3 MMOL/L (ref 24–28)
HCO3 UR-SCNC: 35.3 MMOL/L (ref 24–28)
HCT VFR BLD AUTO: 24.1 %
HCT VFR BLD CALC: 23 %PCV (ref 36–54)
HCT VFR BLD CALC: 26 %PCV (ref 36–54)
HGB BLD-MCNC: 8.1 G/DL
HYPOCHROMIA BLD QL SMEAR: ABNORMAL
IMM GRANULOCYTES # BLD AUTO: 0 K/UL
IMM GRANULOCYTES NFR BLD AUTO: 0 %
INR PPP: 1.2
LDH SERPL L TO P-CCNC: 1.69 MMOL/L (ref 0.36–1.25)
LDH SERPL L TO P-CCNC: 1.9 MMOL/L (ref 0.36–1.25)
LYMPHOCYTES # BLD AUTO: 0.1 K/UL
LYMPHOCYTES NFR BLD: 71.4 %
MAGNESIUM SERPL-MCNC: 2.2 MG/DL
MCH RBC QN AUTO: 31.8 PG
MCHC RBC AUTO-ENTMCNC: 33.6 G/DL
MCV RBC AUTO: 95 FL
MODE: ABNORMAL
MODE: ABNORMAL
MONOCYTES # BLD AUTO: 0 K/UL
MONOCYTES NFR BLD: 14.3 %
NEUTROPHILS # BLD AUTO: 0 K/UL
NEUTROPHILS NFR BLD: 14.3 %
NRBC BLD-RTO: 0 /100 WBC
OVALOCYTES BLD QL SMEAR: ABNORMAL
PCO2 BLDA: 44.4 MMHG (ref 35–45)
PCO2 BLDA: 46.7 MMHG (ref 35–45)
PEEP: 7
PH SMN: 7.47 [PH] (ref 7.35–7.45)
PH SMN: 7.49 [PH] (ref 7.35–7.45)
PHOSPHATE SERPL-MCNC: 3.2 MG/DL
PLATELET # BLD AUTO: 27 K/UL
PMV BLD AUTO: 10.5 FL
PO2 BLDA: 106 MMHG (ref 80–100)
PO2 BLDA: 117 MMHG (ref 80–100)
POC BE: 12 MMOL/L
POC BE: 9 MMOL/L
POC IONIZED CALCIUM: 1.12 MMOL/L (ref 1.06–1.42)
POC IONIZED CALCIUM: 1.21 MMOL/L (ref 1.06–1.42)
POC SATURATED O2: 98 % (ref 95–100)
POC SATURATED O2: 99 % (ref 95–100)
POC TCO2: 34 MMOL/L (ref 23–27)
POC TCO2: 37 MMOL/L (ref 23–27)
POIKILOCYTOSIS BLD QL SMEAR: SLIGHT
POLYCHROMASIA BLD QL SMEAR: ABNORMAL
POTASSIUM BLD-SCNC: 4.1 MMOL/L (ref 3.5–5.1)
POTASSIUM BLD-SCNC: 4.3 MMOL/L (ref 3.5–5.1)
POTASSIUM SERPL-SCNC: 4.3 MMOL/L
PROT SERPL-MCNC: 5.6 G/DL
PROTHROMBIN TIME: 12.6 SEC
PROVIDER CREDENTIALS: ABNORMAL
PROVIDER NOTIFIED: ABNORMAL
PS: 12
RBC # BLD AUTO: 2.55 M/UL
RETICS/RBC NFR AUTO: 0 %
SAMPLE: ABNORMAL
SITE: ABNORMAL
SODIUM BLD-SCNC: 149 MMOL/L (ref 136–145)
SODIUM BLD-SCNC: 149 MMOL/L (ref 136–145)
SODIUM SERPL-SCNC: 147 MMOL/L
SP02: 100
SP02: 100
SPONT RATE: 11
TIME NOTIFIED: 1245
WBC # BLD AUTO: 0.07 K/UL

## 2018-05-09 PROCEDURE — 85045 AUTOMATED RETICULOCYTE COUNT: CPT

## 2018-05-09 PROCEDURE — 63600175 PHARM REV CODE 636 W HCPCS: Performed by: PEDIATRICS

## 2018-05-09 PROCEDURE — 27000221 HC OXYGEN, UP TO 24 HOURS

## 2018-05-09 PROCEDURE — 63600175 PHARM REV CODE 636 W HCPCS: Performed by: STUDENT IN AN ORGANIZED HEALTH CARE EDUCATION/TRAINING PROGRAM

## 2018-05-09 PROCEDURE — 25000003 PHARM REV CODE 250: Performed by: PEDIATRICS

## 2018-05-09 PROCEDURE — 27100092 HC HIGH FLOW DELIVERY CANNULA

## 2018-05-09 PROCEDURE — 20300000 HC PICU ROOM

## 2018-05-09 PROCEDURE — 94770 HC EXHALED C02 TEST: CPT

## 2018-05-09 PROCEDURE — 25000003 PHARM REV CODE 250: Performed by: STUDENT IN AN ORGANIZED HEALTH CARE EDUCATION/TRAINING PROGRAM

## 2018-05-09 PROCEDURE — 85014 HEMATOCRIT: CPT

## 2018-05-09 PROCEDURE — 82803 BLOOD GASES ANY COMBINATION: CPT

## 2018-05-09 PROCEDURE — 63600175 PHARM REV CODE 636 W HCPCS: Mod: JG | Performed by: PEDIATRICS

## 2018-05-09 PROCEDURE — 82330 ASSAY OF CALCIUM: CPT

## 2018-05-09 PROCEDURE — S0028 INJECTION, FAMOTIDINE, 20 MG: HCPCS | Performed by: STUDENT IN AN ORGANIZED HEALTH CARE EDUCATION/TRAINING PROGRAM

## 2018-05-09 PROCEDURE — 94761 N-INVAS EAR/PLS OXIMETRY MLT: CPT

## 2018-05-09 PROCEDURE — 85025 COMPLETE CBC W/AUTO DIFF WBC: CPT

## 2018-05-09 PROCEDURE — 94664 DEMO&/EVAL PT USE INHALER: CPT

## 2018-05-09 PROCEDURE — 99292 CRITICAL CARE ADDL 30 MIN: CPT | Mod: ,,, | Performed by: PEDIATRICS

## 2018-05-09 PROCEDURE — 85610 PROTHROMBIN TIME: CPT

## 2018-05-09 PROCEDURE — 99233 SBSQ HOSP IP/OBS HIGH 50: CPT | Mod: ,,, | Performed by: PEDIATRICS

## 2018-05-09 PROCEDURE — 83735 ASSAY OF MAGNESIUM: CPT

## 2018-05-09 PROCEDURE — 80053 COMPREHEN METABOLIC PANEL: CPT

## 2018-05-09 PROCEDURE — 84100 ASSAY OF PHOSPHORUS: CPT

## 2018-05-09 PROCEDURE — 84295 ASSAY OF SERUM SODIUM: CPT

## 2018-05-09 PROCEDURE — 27100171 HC OXYGEN HIGH FLOW UP TO 24 HOURS

## 2018-05-09 PROCEDURE — 99291 CRITICAL CARE FIRST HOUR: CPT | Mod: ,,, | Performed by: PEDIATRICS

## 2018-05-09 PROCEDURE — 99900035 HC TECH TIME PER 15 MIN (STAT)

## 2018-05-09 PROCEDURE — 99900026 HC AIRWAY MAINTENANCE (STAT)

## 2018-05-09 PROCEDURE — 84132 ASSAY OF SERUM POTASSIUM: CPT

## 2018-05-09 PROCEDURE — 25000242 PHARM REV CODE 250 ALT 637 W/ HCPCS: Performed by: PEDIATRICS

## 2018-05-09 PROCEDURE — 25000242 PHARM REV CODE 250 ALT 637 W/ HCPCS: Performed by: STUDENT IN AN ORGANIZED HEALTH CARE EDUCATION/TRAINING PROGRAM

## 2018-05-09 PROCEDURE — 85384 FIBRINOGEN ACTIVITY: CPT

## 2018-05-09 PROCEDURE — 36415 COLL VENOUS BLD VENIPUNCTURE: CPT

## 2018-05-09 PROCEDURE — 37799 UNLISTED PX VASCULAR SURGERY: CPT

## 2018-05-09 PROCEDURE — 94645 CONT INHLJ TX EACH ADDL HOUR: CPT

## 2018-05-09 PROCEDURE — 83605 ASSAY OF LACTIC ACID: CPT

## 2018-05-09 PROCEDURE — 94640 AIRWAY INHALATION TREATMENT: CPT

## 2018-05-09 PROCEDURE — 85730 THROMBOPLASTIN TIME PARTIAL: CPT

## 2018-05-09 RX ORDER — QUETIAPINE FUMARATE 25 MG/1
50 TABLET, FILM COATED ORAL NIGHTLY
Status: DISCONTINUED | OUTPATIENT
Start: 2018-05-09 | End: 2018-05-14

## 2018-05-09 RX ORDER — FUROSEMIDE 20 MG/1
40 TABLET ORAL 2 TIMES DAILY
Status: DISCONTINUED | OUTPATIENT
Start: 2018-05-10 | End: 2018-05-11

## 2018-05-09 RX ORDER — FUROSEMIDE 10 MG/ML
40 INJECTION INTRAMUSCULAR; INTRAVENOUS 2 TIMES DAILY
Status: DISCONTINUED | OUTPATIENT
Start: 2018-05-09 | End: 2018-05-09

## 2018-05-09 RX ORDER — ALBUTEROL SULFATE 0.83 MG/ML
5 SOLUTION RESPIRATORY (INHALATION)
Status: DISCONTINUED | OUTPATIENT
Start: 2018-05-09 | End: 2018-05-10

## 2018-05-09 RX ORDER — LORAZEPAM 2 MG/ML
2 INJECTION INTRAMUSCULAR EVERY 4 HOURS PRN
Status: DISCONTINUED | OUTPATIENT
Start: 2018-05-09 | End: 2018-05-10

## 2018-05-09 RX ORDER — MORPHINE SULFATE 2 MG/ML
2 INJECTION, SOLUTION INTRAMUSCULAR; INTRAVENOUS EVERY 4 HOURS PRN
Status: DISCONTINUED | OUTPATIENT
Start: 2018-05-09 | End: 2018-05-31 | Stop reason: HOSPADM

## 2018-05-09 RX ORDER — ACETAMINOPHEN 325 MG/1
650 TABLET ORAL EVERY 6 HOURS
Status: DISCONTINUED | OUTPATIENT
Start: 2018-05-10 | End: 2018-05-10

## 2018-05-09 RX ORDER — HEPARIN 100 UNIT/ML
5 SYRINGE INTRAVENOUS
Status: DISCONTINUED | OUTPATIENT
Start: 2018-05-09 | End: 2018-05-31 | Stop reason: HOSPADM

## 2018-05-09 RX ADMIN — POLYETHYLENE GLYCOL 3350 17 G: 17 POWDER, FOR SOLUTION ORAL at 09:05

## 2018-05-09 RX ADMIN — ALBUTEROL SULFATE 15 MG: 5 SOLUTION RESPIRATORY (INHALATION) at 12:05

## 2018-05-09 RX ADMIN — POSACONAZOLE 400 MG: 100 TABLET, COATED ORAL at 09:05

## 2018-05-09 RX ADMIN — ACYCLOVIR 400 MG: 200 CAPSULE ORAL at 08:05

## 2018-05-09 RX ADMIN — HEPARIN SODIUM: 1000 INJECTION, SOLUTION INTRAVENOUS; SUBCUTANEOUS at 04:05

## 2018-05-09 RX ADMIN — ALBUTEROL SULFATE 5 MG: 2.5 SOLUTION RESPIRATORY (INHALATION) at 05:05

## 2018-05-09 RX ADMIN — ALBUTEROL SULFATE 5 MG: 2.5 SOLUTION RESPIRATORY (INHALATION) at 03:05

## 2018-05-09 RX ADMIN — Medication 300 UNITS: at 04:05

## 2018-05-09 RX ADMIN — ALBUTEROL SULFATE 15 MG: 5 SOLUTION RESPIRATORY (INHALATION) at 07:05

## 2018-05-09 RX ADMIN — ACETAMINOPHEN 650 MG: 10 INJECTION, SOLUTION INTRAVENOUS at 05:05

## 2018-05-09 RX ADMIN — VANCOMYCIN HYDROCHLORIDE 1500 MG: 10 INJECTION, POWDER, LYOPHILIZED, FOR SOLUTION INTRAVENOUS at 11:05

## 2018-05-09 RX ADMIN — POTASSIUM & SODIUM PHOSPHATES POWDER PACK 280-160-250 MG 1 PACKET: 280-160-250 PACK at 08:05

## 2018-05-09 RX ADMIN — ALBUTEROL SULFATE 5 MG: 2.5 SOLUTION RESPIRATORY (INHALATION) at 10:05

## 2018-05-09 RX ADMIN — VANCOMYCIN HYDROCHLORIDE 1500 MG: 10 INJECTION, POWDER, LYOPHILIZED, FOR SOLUTION INTRAVENOUS at 03:05

## 2018-05-09 RX ADMIN — CALCIUM CHLORIDE 1 G: 100 INJECTION INTRAVENOUS; INTRAVENTRICULAR at 08:05

## 2018-05-09 RX ADMIN — METOCLOPRAMIDE 10 MG: 10 TABLET ORAL at 08:05

## 2018-05-09 RX ADMIN — QUETIAPINE FUMARATE 50 MG: 25 TABLET, FILM COATED ORAL at 10:05

## 2018-05-09 RX ADMIN — SENNOSIDES 8.6 MG: 8.6 TABLET, FILM COATED ORAL at 10:05

## 2018-05-09 RX ADMIN — METOCLOPRAMIDE 10 MG: 10 TABLET ORAL at 01:05

## 2018-05-09 RX ADMIN — ALBUTEROL SULFATE 5 MG: 2.5 SOLUTION RESPIRATORY (INHALATION) at 08:05

## 2018-05-09 RX ADMIN — DEXMEDETOMIDINE HYDROCHLORIDE: 100 INJECTION, SOLUTION INTRAVENOUS at 11:05

## 2018-05-09 RX ADMIN — MAGNESIUM SULFATE HEPTAHYDRATE: 500 INJECTION, SOLUTION INTRAMUSCULAR; INTRAVENOUS at 11:05

## 2018-05-09 RX ADMIN — ALBUTEROL SULFATE 5 MG: 2.5 SOLUTION RESPIRATORY (INHALATION) at 02:05

## 2018-05-09 RX ADMIN — Medication 3 UNITS/HR: at 04:05

## 2018-05-09 RX ADMIN — MEROPENEM 2 G: 1 INJECTION, POWDER, FOR SOLUTION INTRAVENOUS at 12:05

## 2018-05-09 RX ADMIN — SENNOSIDES 8.6 MG: 8.6 TABLET, FILM COATED ORAL at 08:05

## 2018-05-09 RX ADMIN — LORAZEPAM 2 MG: 2 INJECTION INTRAMUSCULAR; INTRAVENOUS at 10:05

## 2018-05-09 RX ADMIN — POTASSIUM & SODIUM PHOSPHATES POWDER PACK 280-160-250 MG 1 PACKET: 280-160-250 PACK at 10:05

## 2018-05-09 RX ADMIN — FUROSEMIDE 40 MG: 10 INJECTION, SOLUTION INTRAMUSCULAR; INTRAVENOUS at 06:05

## 2018-05-09 RX ADMIN — ALBUTEROL SULFATE 5 MG: 2.5 SOLUTION RESPIRATORY (INHALATION) at 12:05

## 2018-05-09 RX ADMIN — FUROSEMIDE 40 MG: 10 INJECTION, SOLUTION INTRAMUSCULAR; INTRAVENOUS at 05:05

## 2018-05-09 RX ADMIN — METOCLOPRAMIDE 10 MG: 10 TABLET ORAL at 09:05

## 2018-05-09 RX ADMIN — ACETAMINOPHEN 650 MG: 10 INJECTION, SOLUTION INTRAVENOUS at 11:05

## 2018-05-09 RX ADMIN — FAMOTIDINE 20 MG: 10 INJECTION INTRAVENOUS at 09:05

## 2018-05-09 RX ADMIN — METHYLPREDNISOLONE SODIUM SUCCINATE 40 MG: 40 INJECTION, POWDER, FOR SOLUTION INTRAMUSCULAR; INTRAVENOUS at 10:05

## 2018-05-09 RX ADMIN — POSACONAZOLE 400 MG: 100 TABLET, COATED ORAL at 11:05

## 2018-05-09 RX ADMIN — VANCOMYCIN HYDROCHLORIDE 1500 MG: 10 INJECTION, POWDER, LYOPHILIZED, FOR SOLUTION INTRAVENOUS at 07:05

## 2018-05-09 RX ADMIN — CHLORHEXIDINE GLUCONATE 15 ML: 1.2 RINSE ORAL at 09:05

## 2018-05-09 RX ADMIN — METHYLPREDNISOLONE SODIUM SUCCINATE 40 MG: 40 INJECTION, POWDER, FOR SOLUTION INTRAMUSCULAR; INTRAVENOUS at 08:05

## 2018-05-09 RX ADMIN — ACYCLOVIR 400 MG: 200 CAPSULE ORAL at 09:05

## 2018-05-09 RX ADMIN — MEROPENEM 2 G: 1 INJECTION, POWDER, FOR SOLUTION INTRAVENOUS at 03:05

## 2018-05-09 RX ADMIN — FAMOTIDINE 20 MG: 10 INJECTION INTRAVENOUS at 08:05

## 2018-05-09 RX ADMIN — METOCLOPRAMIDE 10 MG: 10 TABLET ORAL at 05:05

## 2018-05-09 RX ADMIN — MEROPENEM 2 G: 1 INJECTION, POWDER, FOR SOLUTION INTRAVENOUS at 08:05

## 2018-05-09 RX ADMIN — FILGRASTIM 600 MCG: 300 INJECTION, SOLUTION INTRAVENOUS; SUBCUTANEOUS at 02:05

## 2018-05-09 NOTE — PROGRESS NOTES
Pressure support trials started q6 per Dr Richardson. Pt placed on PS of 10 and PEEP of 7. Pt tolerating well, will continue to monitor pt closely. ABG to be obtained at 20:45.

## 2018-05-09 NOTE — PROGRESS NOTES
Pt extubated to HFNC and is tolerating well. Pt taken off continuous albuterol and switched to Q2 txs. Will continue to monitor.

## 2018-05-09 NOTE — SUBJECTIVE & OBJECTIVE
Interval History: Patient remained clinically well overnight. He wasnoted to be agitated and fighting the ET tube and required increased sedation. Subsequently decrease in MAPs to 50s and got a 250ml bolus. After which MAPs resolved to above 60. This am noted to be awake and asking for ET tube to be removed. Given clinical improvement, decision made to extubate. Extubated to HFNC 10LPM weaned down to 8LPM. Albuterol switched to 5mg q2h. Noted to be cooperative and answering questions. Somewhat confused intermittently.    Review of Systems   Constitutional: Negative for fever.   Respiratory: Negative for apnea and shortness of breath.    Gastrointestinal: Negative for abdominal distention and vomiting.   Skin: Negative for pallor and rash.   Neurological: Negative for tremors.     Objective:     Vital Signs Range (Last 24H):  Temp:  [98.3 °F (36.8 °C)-99.6 °F (37.6 °C)]   Pulse:  []   Resp:  [10-27]   BP: ()/(42-63)   SpO2:  [95 %-100 %]     I & O (Last 24H):  Intake/Output Summary (Last 24 hours) at 05/09/18 1111  Last data filed at 05/09/18 1023   Gross per 24 hour   Intake          4026.95 ml   Output             3716 ml   Net           310.95 ml       Ventilator Data (Last 24H):     Vent Mode: PS/CPAP  Oxygen Concentration (%):  [] 100  Resp Rate Total:  [0 br/min-24 br/min] 11 br/min  Vt Set:  [500 mL] 500 mL  PEEP/CPAP:  [7 cmH20-8 cmH20] 7 cmH20  Pressure Support:  [10 woU51-22.4 cmH20] 12.4 cmH20  Mean Airway Pressure:  [9 lzD67-16 cmH20] 9 cmH20    Hemodynamic Parameters (Last 24H):       Physical Exam:  Physical Exam   Constitutional: He appears well-developed and well-nourished.   Arousing from sedation   HENT:   Head: Normocephalic and atraumatic.   Nose: Nose normal.   Mouth/Throat: Mucous membranes are normal.   With crusted secretions at mouth   Eyes: Conjunctivae and lids are normal.   Cardiovascular: Normal rate and regular rhythm.    Pulmonary/Chest: Effort normal. No stridor. He  has no decreased breath sounds. He has wheezes. He has rhonchi. He has no rales.   Breathing comfortably after extubation. Intact cough. Copious secretions suctioned.   Abdominal: Soft. He exhibits no distension. Bowel sounds are decreased. There is no hepatosplenomegaly. There is no guarding.   Genitourinary:   Genitourinary Comments: Deferred   Musculoskeletal: Normal range of motion. He exhibits no edema.   Neurological:   Arousing from sedation. Able to follow commands. Answering basic questions. Having simple conversations. somewhat confused.   Skin: Skin is warm and dry. Capillary refill takes less than 2 seconds. No rash noted. He is not diaphoretic. No erythema.   Port CDI       Lines/Drains/Airways     Peripherally Inserted Central Catheter Line                 PICC Double Lumen 05/06/18 1144 right basilic 2 days          Central Venous Catheter Line                 Port A Cath Double Lumen 10/31/17 1826 left subclavian 189 days          Drain                 NG/OG Tube 05/05/18 0545 Replogle 14 Fr. Right nostril 4 days          Arterial Line                 Arterial Line 05/03/18 1300 Right Radial 5 days          Peripheral Intravenous Line                 Peripheral IV - Single Lumen 05/05/18 0539 Left Hand 4 days                Laboratory (Last 24H):   Lab Results   Component Value Date    WBC 0.07 (LL) 05/09/2018    RBC 2.55 (L) 05/09/2018    HGB 8.1 (L) 05/09/2018    HCT 26 (L) 05/09/2018    MCV 95 05/09/2018    MCH 31.8 (H) 05/09/2018    MCHC 33.6 05/09/2018    RDW 19.9 (H) 05/09/2018    PLT 27 (LL) 05/09/2018    MPV 10.5 05/09/2018    GRAN 0.0 (L) 05/09/2018    GRAN 14.3 (L) 05/09/2018    LYMPH 0.1 (L) 05/09/2018    LYMPH 71.4 (H) 05/09/2018    MONO 0.0 (L) 05/09/2018    MONO 14.3 05/09/2018    EOS 0.0 05/09/2018    BASO 0.00 05/09/2018    EOSINOPHIL 0.0 05/09/2018    BASOPHIL 0.0 05/09/2018     CMP  Sodium   Date Value Ref Range Status   05/09/2018 147 (H) 136 - 145 mmol/L Final     Potassium   Date  Value Ref Range Status   05/09/2018 4.3 3.5 - 5.1 mmol/L Final     Chloride   Date Value Ref Range Status   05/09/2018 107 95 - 110 mmol/L Final     CO2   Date Value Ref Range Status   05/09/2018 32 (H) 23 - 29 mmol/L Final     Glucose   Date Value Ref Range Status   05/09/2018 172 (H) 70 - 110 mg/dL Final     BUN, Bld   Date Value Ref Range Status   05/09/2018 42 (H) 6 - 20 mg/dL Final     Creatinine   Date Value Ref Range Status   05/09/2018 0.7 0.5 - 1.4 mg/dL Final     Calcium   Date Value Ref Range Status   05/09/2018 8.5 (L) 8.7 - 10.5 mg/dL Final     Total Protein   Date Value Ref Range Status   05/09/2018 5.6 (L) 6.0 - 8.4 g/dL Final     Albumin   Date Value Ref Range Status   05/09/2018 2.6 (L) 3.5 - 5.2 g/dL Final     Total Bilirubin   Date Value Ref Range Status   05/09/2018 0.7 0.1 - 1.0 mg/dL Final     Comment:     For infants and newborns, interpretation of results should be based  on gestational age, weight and in agreement with clinical  observations.  Premature Infant recommended reference ranges:  Up to 24 hours.............<8.0 mg/dL  Up to 48 hours............<12.0 mg/dL  3-5 days..................<15.0 mg/dL  6-29 days.................<15.0 mg/dL       Alkaline Phosphatase   Date Value Ref Range Status   05/09/2018 37 (L) 55 - 135 U/L Final     AST   Date Value Ref Range Status   05/09/2018 30 10 - 40 U/L Final     ALT   Date Value Ref Range Status   05/09/2018 46 (H) 10 - 44 U/L Final     Anion Gap   Date Value Ref Range Status   05/09/2018 8 8 - 16 mmol/L Final     eGFR if    Date Value Ref Range Status   05/09/2018 >60.0 >60 mL/min/1.73 m^2 Final     eGFR if non    Date Value Ref Range Status   05/09/2018 >60.0 >60 mL/min/1.73 m^2 Final     Comment:     Calculation used to obtain the estimated glomerular filtration  rate (eGFR) is the CKD-EPI equation.        [facility]    Recent Labs  Lab 05/09/18  0738   PH 7.486*   PO2 117*   PCO2 46.7*   HCO3 35.3*   BE 12          Ref Range & Units 03:06 1d ago 2d ago    Magnesium 1.6 - 2.6 mg/dL 2.2  2.1  2.2                Ref Range & Units 03:06 1d ago 2d ago    Phosphorus 2.7 - 4.5 mg/dL 3.2  3.7  4.7                 Ref Range & Units 03:06 3d ago 6mo ago    Fibrinogen 182 - 366 mg/dL 407   >800   356                Ref Range & Units 03:06 3d ago 6mo ago    Prothrombin Time 9.0 - 12.5 sec 12.6   11.5  12.0     INR 0.8 - 1.2 1.2  1.1CM  1.1CM                Ref Range & Units 03:06 3d ago 6mo ago    aPTT 21.0 - 32.0 sec 28.3  35.9CM   41.7CM                  Ref Range & Units 07:38 07:38 1d ago    POC Lactate 0.36 - 1.25 mmol/L 1.69    1.68               Chest X-Ray: 5/9/2018  EXAMINATION:  XR CHEST 1 VIEW    CLINICAL HISTORY:  Eval lung fields and ETT placement;    COMPARISON:  Comparison is made to 05/08/2018.    FINDINGS:  Endotracheal tube tip lies 3.1 cm above the level of the margarito.  Distal aspect of the enteric tube is coiled upon itself within the stomach, the tube tip located in the superior aspect of the gastric fundus just beneath the medial margin of the left hemidiaphragm.  Vascular catheters enter from both a left subclavian approach and from the right arm, each catheter tip located in the superior vena cava near its junction with the right atrium.  Allowing for magnification of the cardiomediastinal silhouette related to projection and inspiratory depth level, I do not believe that the heart is significantly enlarged or that the cardiomediastinal silhouette has changed appreciably since the examination referenced above.  Lung zones are stable, with no superimposed airspace consolidation or volume loss evident.  No pleural fluid.  No pneumothorax.    IMPRESSION:  No significant detrimental interval change in the appearance of the chest since 05/08/2018.    Electronically signed by: Khoa Hermosillo MD  Date: 05/09/2018  Time: 06:23    Diagnostic Results:  No Further

## 2018-05-09 NOTE — PLAN OF CARE
Updated patient, mother, and grandparents on POC- verbalized understanding with no further questions. Extubated today to 10 L comfort flow- currently on 7L 70% comfort flow. Albuterol spread to q2h- no wheezing noted. Started accapella today. ABG's stable- now ordered PRN. Calcium given x1. Lasix spread to q12h. D/c'd versed and fentanyl gtt's, put back on precedex gtt d/t agitation and delirium. Pt continues to be confused and restless but has improved with orientation this afternoon. Has had 6 BM's today- small to mod amt, loose, brown. D/c'd NG and condom catheter. D/c'd MIVF.  Tolerating clears PO well. Deaccessed subclavian port d/t irritation and pain.  See doc flowsheets for further details. Will cont to monitor closely.

## 2018-05-09 NOTE — PT/OT/SLP PROGRESS
Physical Therapy   Not Seen Note    Hema Kuo   MRN: [id number]     Attempted to see for PT evaluation this afternoon. Hema in good spirits in bed; however, making little to no sense. Unable to form sentences to converse with therapist (known to this therapist from previous floor visits), quite delirious. Not appropriate for OOB at this time, RN in agreement. Will check back on Thursday AM to proceed with mobility assessment. No billable units this afternoon.    Giorgio Quiles, PT  5/9/2018

## 2018-05-09 NOTE — SUBJECTIVE & OBJECTIVE
Subjective:     Interval History: Patient with increased level of alertness, requiring increased sedation to prevent fighting vent, with an associated episode of sedative induced hypotension. Successfully extubated this morning. Complaining of discomfort 2/2 condom cath.    Oncology Treatment Plan:     PEDS [id number] Intensification II  ARM B (MA) - LOW RISK PATIENTS    Medications:  Continuous Infusions:   custom IV infusion builder 64.4 mL/hr at 05/09/18 0800    heparin(porcine) 3 Units/hr (05/09/18 0800)    heparin in 0.45% NaCl Stopped (05/09/18 0500)    papervine / heparin 3 mL/hr at 05/09/18 0800     Scheduled Meds:   acetaminophen  650 mg Intravenous Q6H    acyclovir  400 mg Oral BID    albuterol sulfate  5 mg Nebulization Q2H    chlorhexidine  15 mL Mouth/Throat BID    famotidine (PF)  20 mg Intravenous BID    filgrastim (NEUPOGEN) 20 mcg/mL D5W (PEDS)  10 mcg/kg/day Intravenous Q24H    furosemide  40 mg Intravenous Q6H    meropenem (MERREM) IVPB  2 g Intravenous Q8H    methylPREDNISolone sodium succinate  40 mg Intravenous Q12H    metoclopramide HCl  10 mg Oral QID    polyethylene glycol  17 g Oral Daily    posaconazole  400 mg Oral BID    potassium, sodium phosphates  1 packet Oral BID    senna  8.6 mg Oral BID    sodium chloride 0.9%  10 mL Intravenous Q6H    sulfamethoxazole-trimethoprim 800-160mg  1 tablet Oral twice daily on Friday, Saturday, Sunday    vancomycin (VANCOCIN) IVPB  1,500 mg Intravenous Q8H     PRN Meds:sodium chloride, calcium chloride, diphenhydrAMINE, lorazepam, morphine, ondansetron, potassium chloride, potassium chloride 10%, Flushing PICC Protocol **AND** sodium chloride 0.9% **AND** sodium chloride 0.9%, vecuronium     Review of Systems  Objective:     Vital Signs (Most Recent):  Temp: 99.5 °F (37.5 °C) (05/09/18 0800)  Pulse: (!) 141 (05/09/18 0828)  Resp: 16 (05/09/18 0828)  BP: (!) 107/43 (05/08/18 2000)  SpO2: 99 % (05/09/18 0828) Vital Signs (24h  Range):  Temp:  [97 °F (36.1 °C)-99.6 °F (37.6 °C)] 99.5 °F (37.5 °C)  Pulse:  [] 141  Resp:  [10-27] 16  SpO2:  [95 %-100 %] 99 %  BP: ()/(42-63) 107/43     Weight: 87.9 kg (193 lb 12.6 oz)  Body mass index is 31.28 kg/m².  Body surface area is 2.02 meters squared.      Intake/Output Summary (Last 24 hours) at 05/09/18 0850  Last data filed at 05/09/18 0800   Gross per 24 hour   Intake          4404.35 ml   Output             4141 ml   Net           263.35 ml       Physical Exam   Constitutional: He appears well-developed and well-nourished.   Arousing from sedation   HENT:   Head: Normocephalic and atraumatic.   Nose: Nose normal.   Mouth/Throat: Mucous membranes are normal.   With crusted secretions at mouth   Eyes: Conjunctivae and lids are normal.   Cardiovascular: Normal rate and regular rhythm.    Pulmonary/Chest: Effort normal. No stridor. He has no decreased breath sounds. He has no wheezes. He has rhonchi. He has rales.   Breathing comfortably after extubation. Intact cough.   Abdominal: Soft. He exhibits no distension. Bowel sounds are decreased. There is no hepatosplenomegaly. There is no guarding.   Genitourinary: Penis normal.   Genitourinary Comments: Deferred   Musculoskeletal: Normal range of motion. He exhibits no edema.   Neurological:   Arousing from sedation. Able to follow commands.   Skin: Skin is warm and dry. Capillary refill takes less than 2 seconds. No rash noted. He is not diaphoretic. No erythema.   Port CDI       Labs:   Recent Lab Results       05/09/18  0738 05/09/18  0738 05/09/18  0306 05/08/18 2101 05/08/18 2057      Immature Granulocytes   0.0       Immature Grans (Abs)   0.00  Comment:  Mild elevation in immature granulocytes is non specific and   can be seen in a variety of conditions including stress response,   acute inflammation, trauma and pregnancy. Correlation with other   laboratory and clinical findings is essential.         Time Notifed:    2105 2100      Provider Notified:    ZORAN ZORAN     Verbal Result Readback Performed          Albumin   2.6(L)       Alkaline Phosphatase   37(L)       Allens Test N/A N/A  N/A N/A     ALT   46(H)       Anion Gap   8       Aniso   Slight       aPTT   28.3  Comment:  aPTT therapeutic range = 39-69 seconds       AST   30       Baso #   0.00       Basophil%   0.0       Total Bilirubin   0.7  Comment:  For infants and newborns, interpretation of results should be based  on gestational age, weight and in agreement with clinical  observations.  Premature Infant recommended reference ranges:  Up to 24 hours.............<8.0 mg/dL  Up to 48 hours............<12.0 mg/dL  3-5 days..................<15.0 mg/dL  6-29 days.................<15.0 mg/dL         Site Bennie/UAC Bennie/UAC  Bennie/UAC Bennie/UAC     BUN, Bld   42(H)       Calcium   8.5(L)       Chloride   107       CO2   32(H)       Creatinine   0.7       DelSys  Adult Vent        Differential Method   Automated       eGFR if    >60.0       eGFR if non    >60.0  Comment:  Calculation used to obtain the estimated glomerular filtration  rate (eGFR) is the CKD-EPI equation.          Eos #   0.0       Eosinophil%   0.0       ETCO2          Fibrinogen   407(H)       FiO2  45        Glucose   172(H)       Gran # (ANC)   0.0(L)       Gran%   14.3(L)       Hematocrit   24.1(L)       Hemoglobin   8.1(L)       Hypo   Occasional       Coumadin Monitoring INR   1.2  Comment:  Coumadin Therapy:  2.0 - 3.0 for INR for all indicators except mechanical heart valves  and antiphospholipid syndromes which should use 2.5 - 3.5.         Lymph #   0.1(L)       Lymph%   71.4(H)       Magnesium   2.2       MCH   31.8(H)       MCHC   33.6       MCV   95       Mode  CPAP        Mono #   0.0(L)       Mono%   14.3       MPV   10.5       nRBC   0       Ovalocytes   Occasional       PEEP  7        Phosphorus   3.2       PiP          Platelets   27  Comment:  PLT  critical result(s)  called and verbal readback obtained from   NURSE ANA, 05/09/2018 05:33  (LL)       POC BE  12   10     POC HCO3  35.3(H)   34.0(H)     POC Hematocrit  26(L)   27(L)     POC Ionized Calcium  1.12   1.12     POC Lactate 1.69(H)   1.68(H)      POC PCO2  46.7(H)   47.5(H)     POC PH  7.486(H)   7.463(H)     POC PO2  117(H)   154(H)     POC Potassium  4.1   4.1     POC SATURATED O2  99   99     POC Sodium  149(H)   147(H)     POC TCO2  37(H)   35(H)     Poik   Slight       Poly   Occasional       Potassium   4.3       Total Protein   5.6(L)       Protime   12.6(H)       Provider Credentials:    MD NIEVES     PS  12        Rate          RBC   2.55(L)       RDW   19.9(H)       Retic   0.0(L)       Sample ARTERIAL ARTERIAL  ARTERIAL ARTERIAL     Sodium   147(H)       Sp02  100        Spont Rate  11        Vt          WBC   0.07  Comment:  WBC, prelim PLT   critical result(s) called and verbal readback   obtained from Katey Montanez RN, 05/09/2018 03:43  (LL)                   05/08/18  1513 05/08/18  0949 05/08/18  0944      Immature Granulocytes        Immature Grans (Abs)        Time Notifed:        Provider Notified: CHARLI CHARLI CHARLI     Verbal Result Readback Performed Yes Yes Yes     Albumin        Alkaline Phosphatase        Allens Test N/A N/A N/A     ALT        Anion Gap        Aniso        aPTT        AST        Baso #        Basophil%        Total Bilirubin        Site Bennie/UAC Bennie/UAC Bennie/UAC     BUN, Bld        Calcium        Chloride        CO2        Creatinine        DelSys Adult Vent  Adult Vent     Differential Method        eGFR if         eGFR if non         Eos #        Eosinophil%        ETCO2 46       Fibrinogen        FiO2 45       Glucose        Gran # (ANC)        Gran%        Hematocrit        Hemoglobin        Hypo        Coumadin Monitoring INR        Lymph #        Lymph%        Magnesium        MCH        MCHC        MCV        Mode SIMV  PCV     Mono #         Mono%        MPV        nRBC        Ovalocytes        PEEP 8       Phosphorus        PiP 20       Platelets        POC BE 12  13     POC HCO3 35.0(H)  36.8(H)     POC Hematocrit 22(L)  23(L)     POC Ionized Calcium 1.19  1.15     POC Lactate  2.07(H)      POC PCO2 47.1(H)  50.2(H)     POC PH 7.480(H)  7.472(H)     POC PO2 125(H)  128(H)     POC Potassium 4.0  3.2(L)     POC SATURATED O2 99  99     POC Sodium 146(H)  146(H)     POC TCO2 36(H)  38(H)     Poik        Poly        Potassium        Total Protein        Protime        Provider Credentials: MD MD NIEVES     PS 10       Rate 12       RBC        RDW        Retic        Sample ARTERIAL ARTERIAL ARTERIAL     Sodium        Sp02 100       Spont Rate        Vt 500       WBC              Diagnostic Results:  I have reviewed and interpreted all pertinent imaging results/findings within the past 24 hours.     EXAMINATION:  XR CHEST 1 VIEW    CLINICAL HISTORY:  Eval lung fields and ETT placement;    COMPARISON:  Comparison is made to 05/08/2018.    FINDINGS:  Endotracheal tube tip lies 3.1 cm above the level of the margarito.  Distal aspect of the enteric tube is coiled upon itself within the stomach, the tube tip located in the superior aspect of the gastric fundus just beneath the medial margin of the left hemidiaphragm.  Vascular catheters enter from both a left subclavian approach and from the right arm, each catheter tip located in the superior vena cava near its junction with the right atrium.  Allowing for magnification of the cardiomediastinal silhouette related to projection and inspiratory depth level, I do not believe that the heart is significantly enlarged or that the cardiomediastinal silhouette has changed appreciably since the examination referenced above.  Lung zones are stable, with no superimposed airspace consolidation or volume loss evident.  No pleural fluid.  No pneumothorax.   Impression       No significant detrimental interval change in the  appearance of the chest since 05/08/2018.      Electronically signed by: Khoa Hermosillo MD  Date: 05/09/2018  Time: 06:23

## 2018-05-09 NOTE — PLAN OF CARE
Problem: Patient Care Overview  Goal: Plan of Care Review  Outcome: Ongoing (interventions implemented as appropriate)   05/09/18 6259   Coping/Psychosocial   Plan Of Care Reviewed With mother     POC reviewed with mom at bedside; all questions answered. Pt remains vented. PS trial X1 overnight, tolerated well. Holding on second PS trial d/t increased agitation and need for additional sedation. Precedex and fentanyl gtts increased d/t agitation. Versed gtt continued. PRN versed and fentanyl given as needed d/t agitation. Please see previous note. Afebrile. Remains tachycardic. BP stable. NPO at midnight for possible extubation today. MIVF started. Voiding per condom cath. No BM this shift. Will continue to monitor.

## 2018-05-09 NOTE — NURSING
Pt noted to be extremely agitated, kicking, biting on ETT and  attempting to pull out lines, tubes, and drains. Resident and Dr. Richadrson at bedside; verbal order to give pt multiple doses of PRN versed and fentanyl. Pt appears comfortable after 6mg of versed and 150mcg of fentanyl. Will continue to monitor.

## 2018-05-09 NOTE — PROGRESS NOTES
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 He was stepped up to the PICU for persistent fever >103, tachycardia, and hypotension that has been minimally responsive to resuscitation. S/p 1L NS x 2 and 1/2 units pRBC that is still running. Also reports chest tightness, SOB with new increased oxygen requirement. On 2LPM NC for desaturation to mid 80s. Found to have new LLL airspace opacity on CXR. Finally reports ongoing dull headache throughout the day with no visual disturbances or neuro deficits.    Of note, as of yesterday (5/2/2018) he was Day 13 of intensification therapy II following AAML 1031 (Arm A).    Interval History: Patient remained clinically well overnight. He wasnoted to be agitated and fighting the ET tube and required increased sedation. Subsequently decrease in MAPs to 50s and got a 250ml bolus. After which MAPs resolved to above 60. This am noted to be awake and asking for ET tube to be removed. Given clinical improvement, decision made to extubate. Extubated to HFNC 10LPM weaned down to 8LPM. Albuterol switched to 5mg q2h. Noted to be cooperative and answering questions. Somewhat confused intermittently.    Review of Systems   Constitutional: Negative for fever.   Respiratory: Negative for apnea and shortness of breath.    Gastrointestinal: Negative for abdominal distention and vomiting.   Skin: Negative for pallor and rash.   Neurological: Negative for tremors.     Objective:     Vital Signs Range (Last 24H):  Temp:  [98.3 °F (36.8 °C)-99.6 °F (37.6 °C)]    Pulse:  []   Resp:  [10-27]   BP: ()/(42-63)   SpO2:  [95 %-100 %]     I & O (Last 24H):  Intake/Output Summary (Last 24 hours) at 05/09/18 1111  Last data filed at 05/09/18 1023   Gross per 24 hour   Intake          4026.95 ml   Output             3716 ml   Net           310.95 ml       Ventilator Data (Last 24H):     Vent Mode: PS/CPAP  Oxygen Concentration (%):  [] 100  Resp Rate Total:  [0 br/min-24 br/min] 11 br/min  Vt Set:  [500 mL] 500 mL  PEEP/CPAP:  [7 cmH20-8 cmH20] 7 cmH20  Pressure Support:  [10 mlI68-95.4 cmH20] 12.4 cmH20  Mean Airway Pressure:  [9 beF13-02 cmH20] 9 cmH20    Hemodynamic Parameters (Last 24H):       Physical Exam:  Physical Exam   Constitutional: He appears well-developed and well-nourished.   Arousing from sedation   HENT:   Head: Normocephalic and atraumatic.   Nose: Nose normal.   Mouth/Throat: Mucous membranes are normal.   With crusted secretions at mouth   Eyes: Conjunctivae and lids are normal.   Cardiovascular: Normal rate and regular rhythm.    Pulmonary/Chest: Effort normal. No stridor. He has no decreased breath sounds. He has wheezes. He has rhonchi. He has no rales.   Breathing comfortably after extubation. Intact cough. Copious secretions suctioned.   Abdominal: Soft. He exhibits no distension. Bowel sounds are decreased. There is no hepatosplenomegaly. There is no guarding.   Genitourinary:   Genitourinary Comments: Deferred   Musculoskeletal: Normal range of motion. He exhibits no edema.   Neurological:   Arousing from sedation. Able to follow commands. Answering basic questions. Having simple conversations. somewhat confused.   Skin: Skin is warm and dry. Capillary refill takes less than 2 seconds. No rash noted. He is not diaphoretic. No erythema.   Port CDI       Lines/Drains/Airways     Peripherally Inserted Central Catheter Line                 PICC Double Lumen 05/06/18 1144 right basilic 2 days          Central Venous Catheter Line                  Port A Cath Double Lumen 10/31/17 1826 left subclavian 189 days          Drain                 NG/OG Tube 05/05/18 0545 Replogle 14 Fr. Right nostril 4 days          Arterial Line                 Arterial Line 05/03/18 1300 Right Radial 5 days          Peripheral Intravenous Line                 Peripheral IV - Single Lumen 05/05/18 0539 Left Hand 4 days                Laboratory (Last 24H):   Lab Results   Component Value Date    WBC 0.07 (LL) 05/09/2018    RBC 2.55 (L) 05/09/2018    HGB 8.1 (L) 05/09/2018    HCT 26 (L) 05/09/2018    MCV 95 05/09/2018    MCH 31.8 (H) 05/09/2018    MCHC 33.6 05/09/2018    RDW 19.9 (H) 05/09/2018    PLT 27 (LL) 05/09/2018    MPV 10.5 05/09/2018    GRAN 0.0 (L) 05/09/2018    GRAN 14.3 (L) 05/09/2018    LYMPH 0.1 (L) 05/09/2018    LYMPH 71.4 (H) 05/09/2018    MONO 0.0 (L) 05/09/2018    MONO 14.3 05/09/2018    EOS 0.0 05/09/2018    BASO 0.00 05/09/2018    EOSINOPHIL 0.0 05/09/2018    BASOPHIL 0.0 05/09/2018     CMP  Sodium   Date Value Ref Range Status   05/09/2018 147 (H) 136 - 145 mmol/L Final     Potassium   Date Value Ref Range Status   05/09/2018 4.3 3.5 - 5.1 mmol/L Final     Chloride   Date Value Ref Range Status   05/09/2018 107 95 - 110 mmol/L Final     CO2   Date Value Ref Range Status   05/09/2018 32 (H) 23 - 29 mmol/L Final     Glucose   Date Value Ref Range Status   05/09/2018 172 (H) 70 - 110 mg/dL Final     BUN, Bld   Date Value Ref Range Status   05/09/2018 42 (H) 6 - 20 mg/dL Final     Creatinine   Date Value Ref Range Status   05/09/2018 0.7 0.5 - 1.4 mg/dL Final     Calcium   Date Value Ref Range Status   05/09/2018 8.5 (L) 8.7 - 10.5 mg/dL Final     Total Protein   Date Value Ref Range Status   05/09/2018 5.6 (L) 6.0 - 8.4 g/dL Final     Albumin   Date Value Ref Range Status   05/09/2018 2.6 (L) 3.5 - 5.2 g/dL Final     Total Bilirubin   Date Value Ref Range Status   05/09/2018 0.7 0.1 - 1.0 mg/dL Final     Comment:     For infants and newborns, interpretation of  results should be based  on gestational age, weight and in agreement with clinical  observations.  Premature Infant recommended reference ranges:  Up to 24 hours.............<8.0 mg/dL  Up to 48 hours............<12.0 mg/dL  3-5 days..................<15.0 mg/dL  6-29 days.................<15.0 mg/dL       Alkaline Phosphatase   Date Value Ref Range Status   05/09/2018 37 (L) 55 - 135 U/L Final     AST   Date Value Ref Range Status   05/09/2018 30 10 - 40 U/L Final     ALT   Date Value Ref Range Status   05/09/2018 46 (H) 10 - 44 U/L Final     Anion Gap   Date Value Ref Range Status   05/09/2018 8 8 - 16 mmol/L Final     eGFR if    Date Value Ref Range Status   05/09/2018 >60.0 >60 mL/min/1.73 m^2 Final     eGFR if non    Date Value Ref Range Status   05/09/2018 >60.0 >60 mL/min/1.73 m^2 Final     Comment:     Calculation used to obtain the estimated glomerular filtration  rate (eGFR) is the CKD-EPI equation.        [facility]    Recent Labs  Lab 05/09/18  0738   PH 7.486*   PO2 117*   PCO2 46.7*   HCO3 35.3*   BE 12         Ref Range & Units 03:06 1d ago 2d ago    Magnesium 1.6 - 2.6 mg/dL 2.2  2.1  2.2                Ref Range & Units 03:06 1d ago 2d ago    Phosphorus 2.7 - 4.5 mg/dL 3.2  3.7  4.7                 Ref Range & Units 03:06 3d ago 6mo ago    Fibrinogen 182 - 366 mg/dL 407   >800   356                Ref Range & Units 03:06 3d ago 6mo ago    Prothrombin Time 9.0 - 12.5 sec 12.6   11.5  12.0     INR 0.8 - 1.2 1.2  1.1CM  1.1CM                Ref Range & Units 03:06 3d ago 6mo ago    aPTT 21.0 - 32.0 sec 28.3  35.9CM   41.7CM                  Ref Range & Units 07:38 07:38 1d ago    POC Lactate 0.36 - 1.25 mmol/L 1.69    1.68               Chest X-Ray: 5/9/2018  EXAMINATION:  XR CHEST 1 VIEW    CLINICAL HISTORY:  Eval lung fields and ETT placement;    COMPARISON:  Comparison is made to 05/08/2018.    FINDINGS:  Endotracheal tube tip lies 3.1 cm above the level of the margarito.   Distal aspect of the enteric tube is coiled upon itself within the stomach, the tube tip located in the superior aspect of the gastric fundus just beneath the medial margin of the left hemidiaphragm.  Vascular catheters enter from both a left subclavian approach and from the right arm, each catheter tip located in the superior vena cava near its junction with the right atrium.  Allowing for magnification of the cardiomediastinal silhouette related to projection and inspiratory depth level, I do not believe that the heart is significantly enlarged or that the cardiomediastinal silhouette has changed appreciably since the examination referenced above.  Lung zones are stable, with no superimposed airspace consolidation or volume loss evident.  No pleural fluid.  No pneumothorax.    IMPRESSION:  No significant detrimental interval change in the appearance of the chest since 05/08/2018.    Electronically signed by: Khoa Hermosillo MD  Date: 05/09/2018  Time: 06:23    Diagnostic Results:  No Further      Assessment/Plan:     * Sepsis    18 y/o male with h/o  AML (t 8;21) s/p intensification therapy II per [id number] (Arm A) stepped up to PICU for sepsis after presenting with waxing mental status, hypotension unresponsive to NS bolus x 2 and blood 1/2, chest tightness w/ tachypnea, new oxygen requirement and concern for PNA on CXR. He is currently intubated and sedated with PaO2:Fi this AM of 107 with worsened CXR. Pressures maintained on epi and then norepi but now off. Maintaining BPs. Extubated now and clinically improving.     CNS:  Sedation  - Now extubated. Awake and alert though still drowsy.  - Precedex 0.7mcg/kg/hr  - Continue to monitor neuro/mental status  - Ativan 2mg q4h PRN  - Morphine 2mg IV q4h PRN  Fever  - Scheduled tylenol 650mg, IV, Q6H for fever. No NSAIDs given allergy.      CV: Hypotensive with melisa of 82/35 and MAP <60 on floor despite 1L NS bolus x 2 and 1/2 units pRBC.   - D/c epi 5/6  - Off Norepi.    - MAP goal >60.  - Vitals per protocol.     PULM: Intubated 5/5 for concern of ARDS vs. Atelectasis. OI score improving.    - Off ventilator  - CPT Q4 .  - Methylpred 40mg Q12H (5/6 - )  - Albuterol nebs 5mg q2h.   - Stop Lasix/Diuril drip (5/6 - 5/8)  - Lasix 40mg IV space to q12h  - Daily CXR  - ABG - One more with lactate. Then PRN.     FEN/GI:  - NPO now.  - mIVF D5NS with KCl.  - TFG - 100ml/hr including meds.   - Famotidine 20mg, IV, BID for ppx  - Continue PRN zofran  - Replace electrolytes as needed  - Will try to advance diet.  - KPhos 1 packet Q6H  - Kcl 20mEQ BID PRN  - Metoclopramide 10mg Daily.     HEME/ONC:   Chemotherapy induced neutropenia:   - Hgb goal >7.  - Plts goal >20.  - Daily CBC and reticulocyte   - Total units of transfusion: 4 units of pRBCs and 5 units of platelets.    - Neupogen 600mcg daily. 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  CNS negative.  MRD negative after Induction I.    - Treating per COG [id number] (ARM A).  S/p Intensification I and Intensification II started. He was day 13 of intensification II as of 5/2/2018.  - BM biopsy at start of Intensification shows CR.  FISH for t(8;21) negative. 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  - Continue acyclovir ppx  - Continue posaconazole 400mg BID- therapeutic dosing.   - Continue bactrim QFSaSu ppx  - Continue peridex ppx     ID: BCx - Strep Mitis (sens pending) from 5/2, blood culture 5/3 NGTD  - Continue to follow previous cultures  - D/c Cefepime 5/4.   - Vancomycin 1500mg Q8H (5/3 - ) . Vanc trough 16.6 - therapeutic  - Meropenem 2g IV Q8H (5/4 - )  - Amikacin 20mg/kg Q24H (5/4 - 5/6)     RENAL: Cr wnl. No active issues.  - BUN rising but consistent with diuretic therapy. Will monitor clinically.  - Strict Is/Os - monitor for SIADH     SOCIAL: Mom at bedside     DISPO:  Critically ill, in the PICU.            Critical Care Time greater than: 1 Hour    Farshad Mcginnis MD  Pediatric Critical Care  [facility]

## 2018-05-09 NOTE — PROGRESS NOTES
[facility]  Pediatric Hematology/Oncology  Progress Note    Patient Name: Hema Kuo  Admission Date: 4/30/2018  Hospital Length of Stay: 9 days  Code Status: Prior     Subjective:     Interval History: Patient with increased level of alertness, requiring increased sedation to prevent fighting vent, with an associated episode of sedative induced hypotension. Successfully extubated this morning. Complaining of discomfort 2/2 condom cath.    Oncology Treatment Plan:     PEDS [id number] Intensification II  ARM B (MA) - LOW RISK PATIENTS    Medications:  Continuous Infusions:   custom IV infusion builder 64.4 mL/hr at 05/09/18 0800    heparin(porcine) 3 Units/hr (05/09/18 0800)    heparin in 0.45% NaCl Stopped (05/09/18 0500)    papervine / heparin 3 mL/hr at 05/09/18 0800     Scheduled Meds:   acetaminophen  650 mg Intravenous Q6H    acyclovir  400 mg Oral BID    albuterol sulfate  5 mg Nebulization Q2H    chlorhexidine  15 mL Mouth/Throat BID    famotidine (PF)  20 mg Intravenous BID    filgrastim (NEUPOGEN) 20 mcg/mL D5W (PEDS)  10 mcg/kg/day Intravenous Q24H    furosemide  40 mg Intravenous Q6H    meropenem (MERREM) IVPB  2 g Intravenous Q8H    methylPREDNISolone sodium succinate  40 mg Intravenous Q12H    metoclopramide HCl  10 mg Oral QID    polyethylene glycol  17 g Oral Daily    posaconazole  400 mg Oral BID    potassium, sodium phosphates  1 packet Oral BID    senna  8.6 mg Oral BID    sodium chloride 0.9%  10 mL Intravenous Q6H    sulfamethoxazole-trimethoprim 800-160mg  1 tablet Oral twice daily on Friday, Saturday, Sunday    vancomycin (VANCOCIN) IVPB  1,500 mg Intravenous Q8H     PRN Meds:sodium chloride, calcium chloride, diphenhydrAMINE, lorazepam, morphine, ondansetron, potassium chloride, potassium chloride 10%, Flushing PICC Protocol **AND** sodium chloride 0.9% **AND** sodium chloride 0.9%, vecuronium     Review of Systems  Objective:     Vital Signs (Most  Recent):  Temp: 99.5 °F (37.5 °C) (05/09/18 0800)  Pulse: (!) 141 (05/09/18 0828)  Resp: 16 (05/09/18 0828)  BP: (!) 107/43 (05/08/18 2000)  SpO2: 99 % (05/09/18 0828) Vital Signs (24h Range):  Temp:  [97 °F (36.1 °C)-99.6 °F (37.6 °C)] 99.5 °F (37.5 °C)  Pulse:  [] 141  Resp:  [10-27] 16  SpO2:  [95 %-100 %] 99 %  BP: ()/(42-63) 107/43     Weight: 87.9 kg (193 lb 12.6 oz)  Body mass index is 31.28 kg/m².  Body surface area is 2.02 meters squared.      Intake/Output Summary (Last 24 hours) at 05/09/18 0850  Last data filed at 05/09/18 0800   Gross per 24 hour   Intake          4404.35 ml   Output             4141 ml   Net           263.35 ml       Physical Exam   Constitutional: He appears well-developed and well-nourished.   Arousing from sedation   HENT:   Head: Normocephalic and atraumatic.   Nose: Nose normal.   Mouth/Throat: Mucous membranes are normal.   With crusted secretions at mouth   Eyes: Conjunctivae and lids are normal.   Cardiovascular: Normal rate and regular rhythm.    Pulmonary/Chest: Effort normal. No stridor. He has no decreased breath sounds. He has no wheezes. He has rhonchi. He has rales.   Breathing comfortably after extubation. Intact cough.   Abdominal: Soft. He exhibits no distension. Bowel sounds are decreased. There is no hepatosplenomegaly. There is no guarding.   Genitourinary: Penis normal.   Genitourinary Comments: Deferred   Musculoskeletal: Normal range of motion. He exhibits no edema.   Neurological:   Arousing from sedation. Able to follow commands.   Skin: Skin is warm and dry. Capillary refill takes less than 2 seconds. No rash noted. He is not diaphoretic. No erythema.   Port CDI       Labs:   Recent Lab Results       05/09/18  0738 05/09/18  0738 05/09/18  0306 05/08/18 2101 05/08/18 2057      Immature Granulocytes   0.0       Immature Grans (Abs)   0.00  Comment:  Mild elevation in immature granulocytes is non specific and   can be seen in a variety of  conditions including stress response,   acute inflammation, trauma and pregnancy. Correlation with other   laboratory and clinical findings is essential.         Time Notifed:    2105 2100     Provider Notified:    ZORAN ZORAN     Verbal Result Readback Performed          Albumin   2.6(L)       Alkaline Phosphatase   37(L)       Allens Test N/A N/A  N/A N/A     ALT   46(H)       Anion Gap   8       Aniso   Slight       aPTT   28.3  Comment:  aPTT therapeutic range = 39-69 seconds       AST   30       Baso #   0.00       Basophil%   0.0       Total Bilirubin   0.7  Comment:  For infants and newborns, interpretation of results should be based  on gestational age, weight and in agreement with clinical  observations.  Premature Infant recommended reference ranges:  Up to 24 hours.............<8.0 mg/dL  Up to 48 hours............<12.0 mg/dL  3-5 days..................<15.0 mg/dL  6-29 days.................<15.0 mg/dL         Site Bennie/UAC Bennie/UAC  Bennie/UAC Bennie/UAC     BUN, Bld   42(H)       Calcium   8.5(L)       Chloride   107       CO2   32(H)       Creatinine   0.7       DelSys  Adult Vent        Differential Method   Automated       eGFR if    >60.0       eGFR if non    >60.0  Comment:  Calculation used to obtain the estimated glomerular filtration  rate (eGFR) is the CKD-EPI equation.          Eos #   0.0       Eosinophil%   0.0       ETCO2          Fibrinogen   407(H)       FiO2  45        Glucose   172(H)       Gran # (ANC)   0.0(L)       Gran%   14.3(L)       Hematocrit   24.1(L)       Hemoglobin   8.1(L)       Hypo   Occasional       Coumadin Monitoring INR   1.2  Comment:  Coumadin Therapy:  2.0 - 3.0 for INR for all indicators except mechanical heart valves  and antiphospholipid syndromes which should use 2.5 - 3.5.         Lymph #   0.1(L)       Lymph%   71.4(H)       Magnesium   2.2       MCH   31.8(H)       MCHC   33.6       MCV   95       Mode  CPAP        Mono #    0.0(L)       Mono%   14.3       MPV   10.5       nRBC   0       Ovalocytes   Occasional       PEEP  7        Phosphorus   3.2       PiP          Platelets   27  Comment:  PLT  critical result(s) called and verbal readback obtained from   NURSE ANA, 05/09/2018 05:33  (LL)       POC BE  12   10     POC HCO3  35.3(H)   34.0(H)     POC Hematocrit  26(L)   27(L)     POC Ionized Calcium  1.12   1.12     POC Lactate 1.69(H)   1.68(H)      POC PCO2  46.7(H)   47.5(H)     POC PH  7.486(H)   7.463(H)     POC PO2  117(H)   154(H)     POC Potassium  4.1   4.1     POC SATURATED O2  99   99     POC Sodium  149(H)   147(H)     POC TCO2  37(H)   35(H)     Poik   Slight       Poly   Occasional       Potassium   4.3       Total Protein   5.6(L)       Protime   12.6(H)       Provider Credentials:    MD NIEVES     PS  12        Rate          RBC   2.55(L)       RDW   19.9(H)       Retic   0.0(L)       Sample ARTERIAL ARTERIAL  ARTERIAL ARTERIAL     Sodium   147(H)       Sp02  100        Spont Rate  11        Vt          WBC   0.07  Comment:  WBC, prelim PLT   critical result(s) called and verbal readback   obtained from Katey Montanez RN, 05/09/2018 03:43  (LL)                   05/08/18  1513 05/08/18  0949 05/08/18  0944      Immature Granulocytes        Immature Grans (Abs)        Time Notifed:        Provider Notified: CHARLI CHARLI CHARLI     Verbal Result Readback Performed Yes Yes Yes     Albumin        Alkaline Phosphatase        Allens Test N/A N/A N/A     ALT        Anion Gap        Aniso        aPTT        AST        Baso #        Basophil%        Total Bilirubin        Site Bennie/UAC Bennie/UAC Bennie/UAC     BUN, Bld        Calcium        Chloride        CO2        Creatinine        DelSys Adult Vent  Adult Vent     Differential Method        eGFR if         eGFR if non         Eos #        Eosinophil%        ETCO2 46       Fibrinogen        FiO2 45       Glucose        Gran # (ANC)         Gran%        Hematocrit        Hemoglobin        Hypo        Coumadin Monitoring INR        Lymph #        Lymph%        Magnesium        MCH        MCHC        MCV        Mode SIMV  PCV     Mono #        Mono%        MPV        nRBC        Ovalocytes        PEEP 8       Phosphorus        PiP 20       Platelets        POC BE 12  13     POC HCO3 35.0(H)  36.8(H)     POC Hematocrit 22(L)  23(L)     POC Ionized Calcium 1.19  1.15     POC Lactate  2.07(H)      POC PCO2 47.1(H)  50.2(H)     POC PH 7.480(H)  7.472(H)     POC PO2 125(H)  128(H)     POC Potassium 4.0  3.2(L)     POC SATURATED O2 99  99     POC Sodium 146(H)  146(H)     POC TCO2 36(H)  38(H)     Poik        Poly        Potassium        Total Protein        Protime        Provider Credentials: MD MD NIEVES     PS 10       Rate 12       RBC        RDW        Retic        Sample ARTERIAL ARTERIAL ARTERIAL     Sodium        Sp02 100       Spont Rate        Vt 500       WBC              Diagnostic Results:  I have reviewed and interpreted all pertinent imaging results/findings within the past 24 hours.     EXAMINATION:  XR CHEST 1 VIEW    CLINICAL HISTORY:  Eval lung fields and ETT placement;    COMPARISON:  Comparison is made to 05/08/2018.    FINDINGS:  Endotracheal tube tip lies 3.1 cm above the level of the margarito.  Distal aspect of the enteric tube is coiled upon itself within the stomach, the tube tip located in the superior aspect of the gastric fundus just beneath the medial margin of the left hemidiaphragm.  Vascular catheters enter from both a left subclavian approach and from the right arm, each catheter tip located in the superior vena cava near its junction with the right atrium.  Allowing for magnification of the cardiomediastinal silhouette related to projection and inspiratory depth level, I do not believe that the heart is significantly enlarged or that the cardiomediastinal silhouette has changed appreciably since the examination referenced above.   Lung zones are stable, with no superimposed airspace consolidation or volume loss evident.  No pleural fluid.  No pneumothorax.   Impression       No significant detrimental interval change in the appearance of the chest since 05/08/2018.      Electronically signed by: Khoa Hermosillo MD  Date: 05/09/2018  Time: 06:23           Assessment/Plan:     16 y/o M with AML, s/p consolidation II therapy, admitted 4/30 for profound neutropenia, stepped up to PICU 5/4 for hypotension and respiratory distress. BCx 5/2 growing S. Mitis/oralis, subsequent cx NGTD. Intubated for worsening respiratory status, extubated successfully this am.     Sepsis  2/2 S mitis/oralis, intermediate to cefepime and ceftriaxone, sensitive to vancomycin. Vanc trough 16.6 on 5/5.  -Continue Meropenem and vancomycin  -Continue increased GCSF to 600 mcg daily.    -Continue posaconazole at treatment dose  -Continue IV methylpred Q12, consider weaning now extubated     Ileus  -Hold starting TPN for increased risk of infection  -Continue to give bactrim, posaconazole, acyclovir via NG/PO, give at different time than any feeds to promote passage and absorption  -Continue to wean sedation/opiate pain control as tolerated     Chemotherapy induced pancytopenia  Now with monocytes today  -Would maintain Hgb ~8, plts  >20  -While thrombocytopenic give 1 unit of platelets per day until maintaining platelets  -Monitor DIC labs as clinically indicated  -For chemotherapy induced immunosuppression, Continue Bactrim and acyclovir ppx  -Will continue to follow        Malia Shore MD  Pediatric Hematology/Oncology  [facility]

## 2018-05-09 NOTE — PT/OT/SLP PROGRESS
Occupational Therapy      Patient Name:  Hema Kuo   MRN:  [id number]    Patient not seen today secondary to Other (Comment) (communicated with PT on this date, pt not appropriate at this time. Will f/u tomorrow.).    KURTIS Chun  5/9/2018

## 2018-05-10 LAB
ABO + RH BLD: NORMAL
ALBUMIN SERPL BCP-MCNC: 2.6 G/DL
ALP SERPL-CCNC: 37 U/L
ALT SERPL W/O P-5'-P-CCNC: 65 U/L
AMORPH CRY UR QL COMP ASSIST: ABNORMAL
ANION GAP SERPL CALC-SCNC: 7 MMOL/L
ANISOCYTOSIS BLD QL SMEAR: SLIGHT
AST SERPL-CCNC: 49 U/L
BACTERIA #/AREA URNS AUTO: ABNORMAL /HPF
BASOPHILS NFR BLD: 0 %
BILIRUB SERPL-MCNC: 1.1 MG/DL
BILIRUB UR QL STRIP: NEGATIVE
BLD GP AB SCN CELLS X3 SERPL QL: NORMAL
BLD PROD TYP BPU: NORMAL
BLOOD UNIT EXPIRATION DATE: NORMAL
BLOOD UNIT TYPE CODE: 6200
BLOOD UNIT TYPE: NORMAL
BUN SERPL-MCNC: 29 MG/DL
CALCIUM SERPL-MCNC: 8.2 MG/DL
CHLORIDE SERPL-SCNC: 105 MMOL/L
CLARITY UR REFRACT.AUTO: ABNORMAL
CO2 SERPL-SCNC: 28 MMOL/L
CODING SYSTEM: NORMAL
COLOR UR AUTO: YELLOW
CREAT SERPL-MCNC: 0.7 MG/DL
DACRYOCYTES BLD QL SMEAR: ABNORMAL
DIFFERENTIAL METHOD: ABNORMAL
DISPENSE STATUS: NORMAL
EOSINOPHIL NFR BLD: 0 %
ERYTHROCYTE [DISTWIDTH] IN BLOOD BY AUTOMATED COUNT: 18.4 %
EST. GFR  (AFRICAN AMERICAN): >60 ML/MIN/1.73 M^2
EST. GFR  (NON AFRICAN AMERICAN): >60 ML/MIN/1.73 M^2
GLUCOSE SERPL-MCNC: 126 MG/DL
GLUCOSE UR QL STRIP: NEGATIVE
HCT VFR BLD AUTO: 24.5 %
HGB BLD-MCNC: 8.2 G/DL
HGB UR QL STRIP: NEGATIVE
HYPOCHROMIA BLD QL SMEAR: ABNORMAL
IMM GRANULOCYTES # BLD AUTO: ABNORMAL 10*3/UL
IMM GRANULOCYTES NFR BLD AUTO: ABNORMAL %
KETONES UR QL STRIP: NEGATIVE
LEUKOCYTE ESTERASE UR QL STRIP: NEGATIVE
LYMPHOCYTES NFR BLD: 100 %
MAGNESIUM SERPL-MCNC: 2.2 MG/DL
MCH RBC QN AUTO: 31.7 PG
MCHC RBC AUTO-ENTMCNC: 33.5 G/DL
MCV RBC AUTO: 95 FL
MICROSCOPIC COMMENT: ABNORMAL
MONOCYTES NFR BLD: 0 %
NEUTROPHILS NFR BLD: 0 %
NITRITE UR QL STRIP: NEGATIVE
NRBC BLD-RTO: 0 /100 WBC
NUM UNITS TRANS WBC-POOR PLATPHERESIS: NORMAL
OVALOCYTES BLD QL SMEAR: ABNORMAL
PH UR STRIP: >8 [PH] (ref 5–8)
PHOSPHATE SERPL-MCNC: 3.1 MG/DL
PLATELET # BLD AUTO: 16 K/UL
PLATELET BLD QL SMEAR: ABNORMAL
PMV BLD AUTO: 10.5 FL
POCT GLUCOSE: 98 MG/DL (ref 70–110)
POIKILOCYTOSIS BLD QL SMEAR: SLIGHT
POLYCHROMASIA BLD QL SMEAR: ABNORMAL
POTASSIUM SERPL-SCNC: 5 MMOL/L
PROT SERPL-MCNC: 5.2 G/DL
PROT UR QL STRIP: NEGATIVE
RBC # BLD AUTO: 2.59 M/UL
RBC #/AREA URNS AUTO: 1 /HPF (ref 0–4)
RETICS/RBC NFR AUTO: 0.1 %
SODIUM SERPL-SCNC: 140 MMOL/L
SP GR UR STRIP: 1.02 (ref 1–1.03)
SPHEROCYTES BLD QL SMEAR: ABNORMAL
URN SPEC COLLECT METH UR: ABNORMAL
UROBILINOGEN UR STRIP-ACNC: NEGATIVE EU/DL
WBC # BLD AUTO: 0.08 K/UL
WBC #/AREA URNS AUTO: 1 /HPF (ref 0–5)

## 2018-05-10 PROCEDURE — 99900035 HC TECH TIME PER 15 MIN (STAT)

## 2018-05-10 PROCEDURE — 97530 THERAPEUTIC ACTIVITIES: CPT

## 2018-05-10 PROCEDURE — A4216 STERILE WATER/SALINE, 10 ML: HCPCS | Performed by: PEDIATRICS

## 2018-05-10 PROCEDURE — 25000242 PHARM REV CODE 250 ALT 637 W/ HCPCS: Performed by: STUDENT IN AN ORGANIZED HEALTH CARE EDUCATION/TRAINING PROGRAM

## 2018-05-10 PROCEDURE — 36415 COLL VENOUS BLD VENIPUNCTURE: CPT

## 2018-05-10 PROCEDURE — 63600175 PHARM REV CODE 636 W HCPCS: Performed by: PEDIATRICS

## 2018-05-10 PROCEDURE — 63600175 PHARM REV CODE 636 W HCPCS: Mod: JG | Performed by: PEDIATRICS

## 2018-05-10 PROCEDURE — 94761 N-INVAS EAR/PLS OXIMETRY MLT: CPT

## 2018-05-10 PROCEDURE — 99291 CRITICAL CARE FIRST HOUR: CPT | Mod: ,,, | Performed by: PEDIATRICS

## 2018-05-10 PROCEDURE — 97161 PT EVAL LOW COMPLEX 20 MIN: CPT

## 2018-05-10 PROCEDURE — 27100171 HC OXYGEN HIGH FLOW UP TO 24 HOURS

## 2018-05-10 PROCEDURE — 84100 ASSAY OF PHOSPHORUS: CPT

## 2018-05-10 PROCEDURE — 27100092 HC HIGH FLOW DELIVERY CANNULA

## 2018-05-10 PROCEDURE — 25000003 PHARM REV CODE 250: Performed by: STUDENT IN AN ORGANIZED HEALTH CARE EDUCATION/TRAINING PROGRAM

## 2018-05-10 PROCEDURE — 25000242 PHARM REV CODE 250 ALT 637 W/ HCPCS: Performed by: PEDIATRICS

## 2018-05-10 PROCEDURE — 25000003 PHARM REV CODE 250: Performed by: PEDIATRICS

## 2018-05-10 PROCEDURE — 94640 AIRWAY INHALATION TREATMENT: CPT

## 2018-05-10 PROCEDURE — 85027 COMPLETE CBC AUTOMATED: CPT

## 2018-05-10 PROCEDURE — 63600175 PHARM REV CODE 636 W HCPCS: Performed by: STUDENT IN AN ORGANIZED HEALTH CARE EDUCATION/TRAINING PROGRAM

## 2018-05-10 PROCEDURE — P9037 PLATE PHERES LEUKOREDU IRRAD: HCPCS

## 2018-05-10 PROCEDURE — 85045 AUTOMATED RETICULOCYTE COUNT: CPT

## 2018-05-10 PROCEDURE — 85007 BL SMEAR W/DIFF WBC COUNT: CPT

## 2018-05-10 PROCEDURE — 93041 RHYTHM ECG TRACING: CPT

## 2018-05-10 PROCEDURE — 94664 DEMO&/EVAL PT USE INHALER: CPT

## 2018-05-10 PROCEDURE — 83735 ASSAY OF MAGNESIUM: CPT

## 2018-05-10 PROCEDURE — 86850 RBC ANTIBODY SCREEN: CPT

## 2018-05-10 PROCEDURE — 80053 COMPREHEN METABOLIC PANEL: CPT

## 2018-05-10 PROCEDURE — 97165 OT EVAL LOW COMPLEX 30 MIN: CPT

## 2018-05-10 PROCEDURE — 81001 URINALYSIS AUTO W/SCOPE: CPT

## 2018-05-10 PROCEDURE — 20300000 HC PICU ROOM

## 2018-05-10 PROCEDURE — 93005 ELECTROCARDIOGRAM TRACING: CPT

## 2018-05-10 PROCEDURE — 93010 ELECTROCARDIOGRAM REPORT: CPT | Mod: 76,,, | Performed by: PEDIATRICS

## 2018-05-10 PROCEDURE — 86644 CMV ANTIBODY: CPT

## 2018-05-10 RX ORDER — ALBUTEROL SULFATE 0.83 MG/ML
5 SOLUTION RESPIRATORY (INHALATION) EVERY 4 HOURS PRN
Status: DISCONTINUED | OUTPATIENT
Start: 2018-05-10 | End: 2018-05-31 | Stop reason: HOSPADM

## 2018-05-10 RX ORDER — ALBUTEROL SULFATE 0.83 MG/ML
5 SOLUTION RESPIRATORY (INHALATION) EVERY 4 HOURS
Status: DISCONTINUED | OUTPATIENT
Start: 2018-05-10 | End: 2018-05-10

## 2018-05-10 RX ORDER — QUETIAPINE FUMARATE 25 MG/1
50 TABLET, FILM COATED ORAL ONCE
Status: COMPLETED | OUTPATIENT
Start: 2018-05-11 | End: 2018-05-10

## 2018-05-10 RX ORDER — FAMOTIDINE 20 MG/1
40 TABLET, FILM COATED ORAL 2 TIMES DAILY
Status: DISCONTINUED | OUTPATIENT
Start: 2018-05-10 | End: 2018-05-10

## 2018-05-10 RX ORDER — ONDANSETRON 2 MG/ML
INJECTION INTRAMUSCULAR; INTRAVENOUS
Status: DISPENSED
Start: 2018-05-10 | End: 2018-05-11

## 2018-05-10 RX ORDER — ADENOSINE 3 MG/ML
INJECTION, SOLUTION INTRAVENOUS
Status: DISPENSED
Start: 2018-05-10 | End: 2018-05-10

## 2018-05-10 RX ORDER — ADENOSINE 3 MG/ML
INJECTION, SOLUTION INTRAVENOUS CODE/TRAUMA/SEDATION MEDICATION
Status: COMPLETED | OUTPATIENT
Start: 2018-05-10 | End: 2018-05-10

## 2018-05-10 RX ORDER — METHYLPREDNISOLONE 4 MG/1
32 TABLET ORAL 2 TIMES DAILY
Status: DISCONTINUED | OUTPATIENT
Start: 2018-05-10 | End: 2018-05-10

## 2018-05-10 RX ORDER — ONDANSETRON 2 MG/ML
8 INJECTION INTRAMUSCULAR; INTRAVENOUS EVERY 6 HOURS PRN
Status: DISCONTINUED | OUTPATIENT
Start: 2018-05-10 | End: 2018-05-14

## 2018-05-10 RX ORDER — FAMOTIDINE 20 MG/1
20 TABLET, FILM COATED ORAL 2 TIMES DAILY
Status: DISCONTINUED | OUTPATIENT
Start: 2018-05-10 | End: 2018-05-18

## 2018-05-10 RX ORDER — LIDOCAINE AND PRILOCAINE 25; 25 MG/G; MG/G
CREAM TOPICAL ONCE
Status: DISCONTINUED | OUTPATIENT
Start: 2018-05-10 | End: 2018-05-10

## 2018-05-10 RX ORDER — LIDOCAINE AND PRILOCAINE 25; 25 MG/G; MG/G
CREAM TOPICAL
Status: DISCONTINUED | OUTPATIENT
Start: 2018-05-10 | End: 2018-05-31 | Stop reason: HOSPADM

## 2018-05-10 RX ADMIN — VANCOMYCIN HYDROCHLORIDE 1500 MG: 10 INJECTION, POWDER, LYOPHILIZED, FOR SOLUTION INTRAVENOUS at 04:05

## 2018-05-10 RX ADMIN — ALBUTEROL SULFATE 5 MG: 2.5 SOLUTION RESPIRATORY (INHALATION) at 04:05

## 2018-05-10 RX ADMIN — FUROSEMIDE 40 MG: 20 TABLET ORAL at 09:05

## 2018-05-10 RX ADMIN — MEROPENEM 2 G: 1 INJECTION, POWDER, FOR SOLUTION INTRAVENOUS at 01:05

## 2018-05-10 RX ADMIN — QUETIAPINE FUMARATE 12.5 MG: 25 TABLET ORAL at 02:05

## 2018-05-10 RX ADMIN — QUETIAPINE FUMARATE 12.5 MG: 25 TABLET ORAL at 10:05

## 2018-05-10 RX ADMIN — POSACONAZOLE 400 MG: 100 TABLET, COATED ORAL at 08:05

## 2018-05-10 RX ADMIN — ACYCLOVIR 400 MG: 200 CAPSULE ORAL at 08:05

## 2018-05-10 RX ADMIN — VANCOMYCIN HYDROCHLORIDE 1500 MG: 10 INJECTION, POWDER, LYOPHILIZED, FOR SOLUTION INTRAVENOUS at 12:05

## 2018-05-10 RX ADMIN — ALTEPLASE 2 MG: 2.2 INJECTION, POWDER, LYOPHILIZED, FOR SOLUTION INTRAVENOUS at 12:05

## 2018-05-10 RX ADMIN — POSACONAZOLE 400 MG: 100 TABLET, COATED ORAL at 09:05

## 2018-05-10 RX ADMIN — MEROPENEM 2 G: 1 INJECTION, POWDER, FOR SOLUTION INTRAVENOUS at 05:05

## 2018-05-10 RX ADMIN — ALTEPLASE 2 MG: 2.2 INJECTION, POWDER, LYOPHILIZED, FOR SOLUTION INTRAVENOUS at 11:05

## 2018-05-10 RX ADMIN — MEROPENEM 2 G: 1 INJECTION, POWDER, FOR SOLUTION INTRAVENOUS at 08:05

## 2018-05-10 RX ADMIN — FAMOTIDINE 20 MG: 20 TABLET, FILM COATED ORAL at 08:05

## 2018-05-10 RX ADMIN — QUETIAPINE FUMARATE 50 MG: 25 TABLET ORAL at 11:05

## 2018-05-10 RX ADMIN — ACETAMINOPHEN 650 MG: 325 TABLET ORAL at 06:05

## 2018-05-10 RX ADMIN — ACYCLOVIR 400 MG: 200 CAPSULE ORAL at 09:05

## 2018-05-10 RX ADMIN — QUETIAPINE FUMARATE 50 MG: 25 TABLET, FILM COATED ORAL at 08:05

## 2018-05-10 RX ADMIN — FUROSEMIDE 40 MG: 20 TABLET ORAL at 06:05

## 2018-05-10 RX ADMIN — FILGRASTIM 600 MCG: 300 INJECTION, SOLUTION INTRAVENOUS; SUBCUTANEOUS at 02:05

## 2018-05-10 RX ADMIN — FAMOTIDINE 20 MG: 20 TABLET, FILM COATED ORAL at 09:05

## 2018-05-10 RX ADMIN — ALBUTEROL SULFATE 5 MG: 2.5 SOLUTION RESPIRATORY (INHALATION) at 07:05

## 2018-05-10 RX ADMIN — POLYETHYLENE GLYCOL 3350 17 G: 17 POWDER, FOR SOLUTION ORAL at 09:05

## 2018-05-10 RX ADMIN — VANCOMYCIN HYDROCHLORIDE 1500 MG: 10 INJECTION, POWDER, LYOPHILIZED, FOR SOLUTION INTRAVENOUS at 07:05

## 2018-05-10 RX ADMIN — ONDANSETRON 8 MG: 2 INJECTION INTRAMUSCULAR; INTRAVENOUS at 04:05

## 2018-05-10 RX ADMIN — ADENOSINE 6 MG: 3 INJECTION, SOLUTION INTRAVENOUS at 03:05

## 2018-05-10 RX ADMIN — ONDANSETRON 8 MG: 4 TABLET, ORALLY DISINTEGRATING ORAL at 08:05

## 2018-05-10 RX ADMIN — Medication 3 UNITS/HR: at 10:05

## 2018-05-10 RX ADMIN — Medication 10 ML: at 06:05

## 2018-05-10 RX ADMIN — POTASSIUM & SODIUM PHOSPHATES POWDER PACK 280-160-250 MG 1 PACKET: 280-160-250 PACK at 09:05

## 2018-05-10 RX ADMIN — ALBUTEROL SULFATE 5 MG: 2.5 SOLUTION RESPIRATORY (INHALATION) at 12:05

## 2018-05-10 RX ADMIN — ACETAMINOPHEN 650 MG: 325 TABLET ORAL at 12:05

## 2018-05-10 NOTE — PLAN OF CARE
Problem: Patient Care Overview  Goal: Plan of Care Review  Pt tolerated treatment session well this afternoon.  Pt performs 3 bouts of static stand for activity tolerance training with CGA from therapist for best time of 1 min.  Pt family present throughout entirety of treatment session.  Pt and pt's family educated on continuation of PT POC during pt hospitalization.  Pt and pt's family verbalize understanding.  Pt will benefit from skilled acute PT intervention to address the above listed impairments and progress functional mobility independence.      Yassine Andrea  5/10/2018

## 2018-05-10 NOTE — NURSING
Pt fell trying to get up to go to the BR. Walked in to room as he was stepping over side rail, assisted down, landed on right knee. Charge nurse, Devika, and Dr. Amaya notified. Pt's knee is atraumatic; no obvious signs of bleeding, trauma, or hematoma.

## 2018-05-10 NOTE — SUBJECTIVE & OBJECTIVE
Interval History: Overnight, patient was noted to be confused and started to pick on PICC line. CXR showed PICC line was too deep and he was noted to have SVT. Valsalva, Ice on face did not help and SVT resolved with dose of Adenosine. PICC was then pulled back and then patient became agitated and confused and pulled on PICC again at which time PICC was removed. Patient noted to be increasingly delirious overnight despite getting a dose of Seroquel 50mg PO, with delusion that overnight nurse was out to get him and was not a real or qualified nurse. Improved during the day though somewhat still confused intermittently.    Review of Systems   Constitutional: Negative for fever.   Respiratory: Negative for apnea and shortness of breath.    Gastrointestinal: Negative for abdominal distention and vomiting.   Skin: Negative for pallor and rash.   Neurological: Negative for tremors.   Psychiatric/Behavioral: Positive for confusion.        Disorientation and Deliriousness.     Objective:     Vital Signs Range (Last 24H):  Temp:  [97.8 °F (36.6 °C)-98.9 °F (37.2 °C)]   Pulse:  []   Resp:  [12-38]   BP: ()/(44-92)   SpO2:  [86 %-100 %]     I & O (Last 24H):  Intake/Output Summary (Last 24 hours) at 05/10/18 1752  Last data filed at 05/10/18 1706   Gross per 24 hour   Intake          3341.57 ml   Output             3670 ml   Net          -328.43 ml       Ventilator Data (Last 24H):     Oxygen Concentration (%):  [70] 70    Hemodynamic Parameters (Last 24H):       Physical Exam:  Physical Exam   Constitutional: He appears well-developed and well-nourished.   Arousing from sedation   HENT:   Head: Normocephalic and atraumatic.   Nose: Nose normal.   Mouth/Throat: Mucous membranes are normal.   Eyes: Conjunctivae and lids are normal.   Neck: Normal range of motion. Neck supple.   Cardiovascular: Normal rate and regular rhythm.    Pulmonary/Chest: Effort normal. No stridor. He has no decreased breath sounds. He has no  wheezes. He has no rales.   Breath sounds markedly improved   Abdominal: Soft. Bowel sounds are normal. He exhibits no distension. There is no hepatosplenomegaly. There is no guarding.   Genitourinary:   Genitourinary Comments: Deferred   Musculoskeletal: Normal range of motion. He exhibits no edema.   Neurological:   Arousing from sedation. Able to follow commands. Answering basic questions. Having simple conversations. somewhat confused.   Skin: Skin is warm and dry. Capillary refill takes less than 2 seconds. No rash noted. He is not diaphoretic. No erythema.   Port CDI   Psychiatric:   Still delirious and intermittently confused though noted to be acting more like baseline today and noted to improve through the course of the day. Somewhat disinhibited.        Lines/Drains/Airways     Central Venous Catheter Line                 Port A Cath Double Lumen 05/10/18 0900 left subclavian less than 1 day                Laboratory (Last 24H):   Lab Results   Component Value Date    WBC 0.08 (LL) 05/10/2018    RBC 2.59 (L) 05/10/2018    HGB 8.2 (L) 05/10/2018    HCT 24.5 (L) 05/10/2018    MCV 95 05/10/2018    MCH 31.7 (H) 05/10/2018    MCHC 33.5 05/10/2018    RDW 18.4 (H) 05/10/2018    PLT 16 (LL) 05/10/2018    MPV 10.5 05/10/2018    GRAN 0.0 (L) 05/10/2018    LYMPH 100.0 (H) 05/10/2018    MONO 0.0 (L) 05/10/2018    EOS 0.0 05/09/2018    BASO 0.00 05/09/2018    EOSINOPHIL 0.0 05/10/2018    BASOPHIL 0.0 05/10/2018     CMP  Sodium   Date Value Ref Range Status   05/10/2018 140 136 - 145 mmol/L Final     Potassium   Date Value Ref Range Status   05/10/2018 5.0 3.5 - 5.1 mmol/L Final     Chloride   Date Value Ref Range Status   05/10/2018 105 95 - 110 mmol/L Final     CO2   Date Value Ref Range Status   05/10/2018 28 23 - 29 mmol/L Final     Glucose   Date Value Ref Range Status   05/10/2018 126 (H) 70 - 110 mg/dL Final     BUN, Bld   Date Value Ref Range Status   05/10/2018 29 (H) 6 - 20 mg/dL Final     Creatinine   Date  Value Ref Range Status   05/10/2018 0.7 0.5 - 1.4 mg/dL Final     Calcium   Date Value Ref Range Status   05/10/2018 8.2 (L) 8.7 - 10.5 mg/dL Final     Total Protein   Date Value Ref Range Status   05/10/2018 5.2 (L) 6.0 - 8.4 g/dL Final     Albumin   Date Value Ref Range Status   05/10/2018 2.6 (L) 3.5 - 5.2 g/dL Final     Total Bilirubin   Date Value Ref Range Status   05/10/2018 1.1 (H) 0.1 - 1.0 mg/dL Final     Comment:     For infants and newborns, interpretation of results should be based  on gestational age, weight and in agreement with clinical  observations.  Premature Infant recommended reference ranges:  Up to 24 hours.............<8.0 mg/dL  Up to 48 hours............<12.0 mg/dL  3-5 days..................<15.0 mg/dL  6-29 days.................<15.0 mg/dL       Alkaline Phosphatase   Date Value Ref Range Status   05/10/2018 37 (L) 55 - 135 U/L Final     AST   Date Value Ref Range Status   05/10/2018 49 (H) 10 - 40 U/L Final     ALT   Date Value Ref Range Status   05/10/2018 65 (H) 10 - 44 U/L Final     Anion Gap   Date Value Ref Range Status   05/10/2018 7 (L) 8 - 16 mmol/L Final     eGFR if    Date Value Ref Range Status   05/10/2018 >60.0 >60 mL/min/1.73 m^2 Final     eGFR if non    Date Value Ref Range Status   05/10/2018 >60.0 >60 mL/min/1.73 m^2 Final     Comment:     Calculation used to obtain the estimated glomerular filtration  rate (eGFR) is the CKD-EPI equation.        Urinalysis    Recent Labs  Lab 05/10/18  1015   COLORU Yellow   SPECGRAV 1.020   PHUR >8.0*   PROTEINUA Negative   BACTERIA Occasional   NITRITE Negative   LEUKOCYTESUR Negative   UROBILINOGEN Negative         Chest X-Ray: 5/10/2018  EXAMINATION:  XR CHEST 1 VIEW    CLINICAL HISTORY:  SVT;    FINDINGS:  Central line tips right atrium.  Heart size is upper normal there is mild edema and no change.    Diagnostic Results:  No Further

## 2018-05-10 NOTE — PLAN OF CARE
Problem: Occupational Therapy Goal  Goal: Occupational Therapy Goal  Goals to be met by: 5/10/2018    Patient will increase functional independence with ADLs by performing:    Feeding with [place].  UE Dressing with [place].  LE Dressing with Minimal Assistance.  Grooming while standing with Minimal Assistance.  Toileting from bedside commode with Modified [place] for hygiene and clothing management.   Bathing from  edge of bed with Modified [place].  Toilet transfer to toilet with Minimal Assistance.    Outcome: Ongoing (interventions implemented as appropriate)  Evaluation completed.  OT plan of care developed and reviewed with patient.     KURTIS Hazel  5/10/2018  Rehab Services

## 2018-05-10 NOTE — NURSING
"HR in 190's, sustained. Pt states feeling "lightheaded", BP 70's/40's. 12 lead ECG obtained, indicating SVT. Dr. Amaya and Dr. Carter at bedside. Adenosine 6 mg pushed, HR paused and returned to SR ('s). Pt now asymptomatic, BP improved. 12 lead ECG obtained after adenosine IVP to confirm rhythm change. Will continue to monitor.  "

## 2018-05-10 NOTE — PT/OT/SLP EVAL
"Physical Therapy Evaluation    Patient Name:  Hema Kuo   MRN:  [id number]    Recommendations:     Discharge Recommendations:  home health PT   Discharge Equipment Recommendations: none   Barriers to discharge: None    Assessment:     Hema Kuo is a 17 y.o. male admitted with a medical diagnosis of Sepsis.  He presents with the following impairments/functional limitations:  weakness, impaired endurance, impaired self care skills, impaired functional mobilty, gait instability, impaired balance, decreased upper extremity function, decreased lower extremity function, decreased safety awareness, impaired cardiopulmonary response to activity.  Pt tolerated treatment session well this afternoon.  Pt performs 3 bouts of static stand for activity tolerance training with CGA from therapist for best time of 1 min.  Pt family present throughout entirety of treatment session.  Pt and pt's family educated on continuation of PT POC during pt hospitalization.  Pt and pt's family verbalize understanding.  Pt will benefit from skilled acute PT intervention to address the above listed impairments and progress functional mobility independence.      Rehab Prognosis:  Good; patient would benefit from acute skilled PT services to address these deficits and reach maximum level of function.      Recent Surgery: * No surgery found *      Plan:     During this hospitalization, patient to be seen 6 x/week to address the above listed problems via gait training, therapeutic activities, therapeutic exercises, neuromuscular re-education  · Plan of Care Expires:  06/09/18   Plan of Care Reviewed with: patient, family    Subjective     Communicated with JULIANN Higuera prior to session.  Patient found awake supine in bed with family present upon PT entry to room, agreeable to evaluation.      Chief Complaint: Sepsis  Patient comments/goals: "Yeah I know that's my hand, thanks"  Pain/Comfort:  · Pain Rating 1: 0/10  · Pain Rating Post-Intervention 1: " 0/10    Patients cultural, spiritual, Rastafari conflicts given the current situation: Patient has no barriers to learning. Patient verbalizes understanding of his/her program and goals and demonstrates them correctly. No cultural, spiritual or educational needs identified.    Living Environment:  Pt reports that he lives with his mom, step dad and brother in a [facility] with no [facility].  Pt states that he will have the assistance from family when discharged from hospital care.  Prior to admission, patients level of function was Independent.  Patient has the following equipment: none.  DME owned (not currently used): none.  Upon discharge, patient will have assistance from family.    Objective:     Patient found with: telemetry, pulse ox (continuous), blood pressure cuff (subclavian port)     General Precautions: Standard, fall   Orthopedic Precautions:N/A   Braces: N/A     Exams:  · Cognitive Exam:  Patient is oriented to Person, Place, Time and Situation and follows 75% of step by step commands   · Postural Exam:  Patient presented with the following abnormalities: Forward head, Abnormal trunk flexion (pt able to correct to upright posture with verbal cues from therapist)  · BLE ROM: Pt displays difficulty flexing hip and knee in seated position  · BLE Strength: Pt able to stand for 3 bouts but had to sit secondary to fatigue and LE weakness (only able to take minimal steps forward with use of RW and CGA from therapist)    Functional Mobility:  · Bed Mobility:     · Rolling Left:  modified independence  · Scooting: modified independence  · Supine to Sit: stand by assistance  · Sit to Supine: stand by assistance  · Transfers:     · Sit to Stand:  contact guard assistance with rolling walker (pt requires frequent cueing from therapist to be reminded for proper sequence of performing sit to stand transfer with RW safely)  Gait: Pt ambulated total of 2 steps forward/backward.  Assist Level: CGA  AD Used: RW  Gait Pattern: Two  point, step to  Gait Deviations: minimal step length (at most 2 inches), decreased foot clearance, gait instability  · Impairments due to: fatigue and LE weakness  · Balance: Pt displays fair static standing balance but becomes unstable when fatigued after static standing durations of less than 1 min.    AM-PAC 6 CLICK MOBILITY  Total Score:17     Therapeutic Activities and Exercises:  Pt performs LE strengthening exercises while standing in RW with skilled instruction from therapist for proper form and muscle activation:  · Marching in place each LE x5  · Heel/Toe raises x5    Pt and pt's family educated on PT POC.  Pt and pt's family verbalize understanding.          Patient left supine with all lines intact, call button in reach, RN Kalen notified and pt's family present.    GOALS:    Physical Therapy Goals        Problem: Physical Therapy Goal    Goal Priority Disciplines Outcome Goal Variances Interventions   Physical Therapy Goal     PT/OT, PT      Description:  Goals to be met by: 18     Patient will increase functional independence with mobility by performin. Supine to sit with Bradford  2. Sit to supine with Bradford  3. Sit to stand transfer with Bradford  4. Bed to chair transfer with Stand-by Assistance using least restrictive AD  5. Gait  x 500 feet with Stand-by Assistance using least restrictive Ad  6. Lower extremity exercise program x30 reps per handout, with supervision                      History:     Past Medical History:   Diagnosis Date    AML (acute myeloblastic leukemia) 10/2017    Asthma     Encounter for blood transfusion     Seasonal allergies        Past Surgical History:   Procedure Laterality Date    PORTACATH PLACEMENT  10/31/2017    TONSILLECTOMY         Clinical Decision Making:     History  Co-morbidities and personal factors that may impact the plan of care Examination  Body Structures and Functions, activity limitations and participation restrictions  that may impact the plan of care Clinical Presentation   Decision Making/ Complexity Score   Co-morbidities:   [] Time since onset of injury / illness / exacerbation  [] Status of current condition  []Patient's cognitive status and safety concerns    [] Multiple Medical Problems (see med hx)  Personal Factors:   [] Patient's age  [] Prior Level of function   [] Patient's home situation (environment and family support)  [] Patient's level of motivation  [] Expected progression of patient      HISTORY:(criteria)    [x] 69379 - no personal factors/history    [] 22752 - has 1-2 personal factor/comorbidity     [] 02386 - has >3 personal factor/comorbidity     Body Regions:  [] Objective examination findings  [] Head     []  Neck  [] Trunk   [] Upper Extremity  [] Lower Extremity    Body Systems:  [] For communication ability, affect, cognition, language, and learning style: the assessment of the ability to make needs known, consciousness, orientation (person, place, and time), expected emotional /behavioral responses, and learning preferences (eg, learning barriers, education  needs)  [] For the neuromuscular system: a general assessment of gross coordinated movement (eg, balance, gait, locomotion, transfers, and transitions) and motor function  (motor control and motor learning)  [x] For the musculoskeletal system: the assessment of gross symmetry, gross range of motion, gross strength, height, and weight  [] For the integumentary system: the assessment of pliability(texture), presence of scar formation, skin color, and skin integrity  [] For cardiovascular/pulmonary system: the assessment of heart rate, respiratory rate, blood pressure, and edema     Activity limitations:    [] Patient's cognitive status and saf ety concerns          [] Status of current condition      [] Weight bearing restriction  [] Cardiopulmunary Restriction    Participation Restrictions:   [] Goals and goal agreement with the patient     [] Rehab  potential (prognosis) and probable outcome      Examination of Body System: (criteria)    [x] 85640 - addressing 1-2 elements    [] 61113 - addressing a total of 3 or more elements     [] 10932 -  Addressing a total of 4 or more elements         Clinical Presentation: (criteria)  Stable - 74280     On examination of body system using standardized tests and measures patient presents with 1-2 elements from any of the following: body structures and functions, activity limitations, and/or participation restrictions.  Leading to a clinical presentation that is considered evolving with changing characteristics                              Clinical Decision Making  (Eval Complexity):  Moderate - 99663     Time Tracking:     PT Received On: 05/10/18  PT Start Time: 1355     PT Stop Time: 1419  PT Total Time (min): 24 min     Billable Minutes: Evaluation 8 and Therapeutic Activity 16      IVET Todd  05/10/2018

## 2018-05-10 NOTE — ASSESSMENT & PLAN NOTE
16 y/o male with h/o  AML (t 8;21) s/p intensification therapy II per [id number] (Arm A) stepped up to PICU for sepsis after presenting with waxing mental status, hypotension unresponsive to NS bolus x 2 and blood 1/2, chest tightness w/ tachypnea, new oxygen requirement and concern for PNA on CXR. He is currently intubated and sedated with PaO2:Fi this AM of 107 with worsened CXR. Pressures maintained on epi and then norepi but now off. Maintaining BPs. Extubated now and clinically improving. However, noted to have delirium which is likely resolving.      CNS:  Delirium  - Continue Seroquel 50mg PO Nightly  - Start Seroquel 12.5mg PO at 0800 and 1400.   - Continue frequent reorientation and attempt to encourage patient to sleep.   - stop and avoid deliriogenic drugs.  Sedation  - Now extubated. Awake and alert.  - Continue to monitor neuro/mental status  - Morphine 2mg IV q4h PRN  Fever  - Scheduled tylenol 650mg, IV, Q6H for fever. No NSAIDs given allergy.      CV: Hypotensive with melisa of 82/35 and MAP <60 on floor despite 1L NS bolus x 2 and 1/2 units pRBC.   - D/c epi 5/6  - Off Norepi.   - MAP goal >60.  - Vitals per protocol.     PULM: Intubated 5/5 for concern of ARDS vs. Atelectasis. OI score improving.    - Off ventilator  - CPT Q4 .  - Methylpred 40mg Q12H (5/6 - )  - Albuterol nebs 5mg q2h.   - Stop Lasix/Diuril drip (5/6 - 5/8)  - Lasix 40mg IV space to q12h  - Daily CXR  - ABG - One more with lactate. Then PRN.     FEN/GI:  - NPO now.  - mIVF D5NS with KCl.  - TFG - 100ml/hr including meds.   - Famotidine 20mg, IV, BID for ppx  - Continue PRN zofran  - Replace electrolytes as needed  - Will try to advance diet.  - KPhos 1 packet Q6H  - Kcl 20mEQ BID PRN  - Metoclopramide 10mg Daily.     HEME/ONC:   Chemotherapy induced neutropenia:   - Hgb goal >7.  - Plts goal >20.  - Daily CBC and reticulocyte   - Total units of transfusion: 4 units of pRBCs and 5 units of platelets.    - Neupogen 600mcg daily. (5/4 -  )     AML: 8;21 translocation, c-kit mutation negative.  CNS negative.  MRD negative after Induction I.    - Treating per COG [id number] (ARM A).  S/p Intensification I and Intensification II started. He was day 13 of intensification II as of 5/2/2018.  - BM biopsy at start of Intensification shows CR.  FISH for t(8;21) negative. 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  - Continue acyclovir ppx  - Continue posaconazole 400mg BID- therapeutic dosing.   - Continue bactrim QFSaSu ppx  - Continue peridex ppx     ID: BCx - Strep Mitis (sens pending) from 5/2, blood culture 5/3 NGTD  - Continue to follow previous cultures  - D/c Cefepime 5/4.   - Vancomycin 1500mg Q8H (5/3 - ) . Vanc trough 16.6 - therapeutic  - Meropenem 2g IV Q8H (5/4 - )  - Amikacin 20mg/kg Q24H (5/4 - 5/6)     RENAL: Cr wnl. No active issues.  - BUN rising but consistent with diuretic therapy. Will monitor clinically.  - Strict Is/Os - monitor for SIADH     SOCIAL: Mom at bedside     DISPO: Critically ill, in the PICU.

## 2018-05-10 NOTE — PROGRESS NOTES
[facility]  Pediatric Critical Care  Progress Note    Patient Name: Hema Kuo  MRN: [id number]  Admission Date: 4/30/2018  Hospital Length of Stay: 10 days  Code Status: Prior   Attending Provider: Donny Richardson MD   Primary Care Physician: Ann Reyna NP    Subjective:     HPI:  Hema is a 16 y/o male with PMH significant for AML (t 8;21) s/p intensification therapy II per [id number] (Arm A) who was initially admitted on 4/30/2018 for neutropenia/profund sepsis risk. He was stepped up to the PICU for persistent fever >103, tachycardia, and hypotension that has been minimally responsive to resuscitation. S/p 1L NS x 2 and 1/2 units pRBC that is still running. Also reports chest tightness, SOB with new increased oxygen requirement. On 2LPM NC for desaturation to mid 80s. Found to have new LLL airspace opacity on CXR. Finally reports ongoing dull headache throughout the day with no visual disturbances or neuro deficits.    Of note, as of yesterday (5/2/2018) he was Day 13 of intensification therapy II following AAML 1031 (Arm A).    Interval History: Overnight, patient was noted to be confused and started to pick on PICC line. CXR showed PICC line was too deep and he was noted to have SVT. Valsalva, Ice on face did not help and SVT resolved with dose of Adenosine. PICC was then pulled back and then patient became agitated and confused and pulled on PICC again at which time PICC was removed. Patient noted to be increasingly delirious overnight despite getting a dose of Seroquel 50mg PO, with delusion that overnight nurse was out to get him and was not a real or qualified nurse. Improved during the day though somewhat still confused intermittently. Difficulty accessing port which resolved with TPA.    Review of Systems   Constitutional: Negative for fever.   Respiratory: Negative for apnea and shortness of breath.    Gastrointestinal: Negative for abdominal distention and vomiting.   Skin: Negative for  pallor and rash.   Neurological: Negative for tremors.   Psychiatric/Behavioral: Positive for confusion.        Disorientation and Deliriousness.     Objective:     Vital Signs Range (Last 24H):  Temp:  [97.8 °F (36.6 °C)-98.9 °F (37.2 °C)]   Pulse:  []   Resp:  [12-38]   BP: ()/(44-92)   SpO2:  [86 %-100 %]     I & O (Last 24H):  Intake/Output Summary (Last 24 hours) at 05/10/18 1752  Last data filed at 05/10/18 1706   Gross per 24 hour   Intake          3341.57 ml   Output             3670 ml   Net          -328.43 ml       Ventilator Data (Last 24H):     Oxygen Concentration (%):  [70] 70    Hemodynamic Parameters (Last 24H):       Physical Exam:  Physical Exam   Constitutional: He appears well-developed and well-nourished. Awake and alert  HENT:   Head: Normocephalic and atraumatic.   Nose: Nose normal.   Mouth/Throat: Mucous membranes are normal.   Eyes: Conjunctivae and lids are normal.   Neck: Normal range of motion. Neck supple.   Cardiovascular: Normal rate and regular rhythm.    Pulmonary/Chest: Effort normal. No stridor. He has no decreased breath sounds. He has no wheezes. He has no rales. Breath sounds markedly improved   Abdominal: Soft. Bowel sounds are normal. He exhibits no distension. There is no hepatosplenomegaly. There is no guarding.   Genitourinary:   Genitourinary Comments: Deferred   Musculoskeletal: Normal range of motion. He exhibits no edema.   Neurological:   Able to follow commands. Noted to be having meaningful conversations with intermittent confusion when not able to recognize people and events.  Skin: Skin is warm and dry. Capillary refill takes less than 2 seconds. No rash noted. He is not diaphoretic. No erythema.   Port CDI   Psychiatric:   Still delirious and intermittently confused though noted to be acting more like baseline today and noted to improve through the course of the day. Somewhat disinhibited.        Lines/Drains/Airways     Central Venous Catheter Line                  Port A Cath Double Lumen 05/10/18 0900 left subclavian less than 1 day                Laboratory (Last 24H):   Lab Results   Component Value Date    WBC 0.08 (LL) 05/10/2018    RBC 2.59 (L) 05/10/2018    HGB 8.2 (L) 05/10/2018    HCT 24.5 (L) 05/10/2018    MCV 95 05/10/2018    MCH 31.7 (H) 05/10/2018    MCHC 33.5 05/10/2018    RDW 18.4 (H) 05/10/2018    PLT 16 (LL) 05/10/2018    MPV 10.5 05/10/2018    GRAN 0.0 (L) 05/10/2018    LYMPH 100.0 (H) 05/10/2018    MONO 0.0 (L) 05/10/2018    EOS 0.0 05/09/2018    BASO 0.00 05/09/2018    EOSINOPHIL 0.0 05/10/2018    BASOPHIL 0.0 05/10/2018     CMP  Sodium   Date Value Ref Range Status   05/10/2018 140 136 - 145 mmol/L Final     Potassium   Date Value Ref Range Status   05/10/2018 5.0 3.5 - 5.1 mmol/L Final     Chloride   Date Value Ref Range Status   05/10/2018 105 95 - 110 mmol/L Final     CO2   Date Value Ref Range Status   05/10/2018 28 23 - 29 mmol/L Final     Glucose   Date Value Ref Range Status   05/10/2018 126 (H) 70 - 110 mg/dL Final     BUN, Bld   Date Value Ref Range Status   05/10/2018 29 (H) 6 - 20 mg/dL Final     Creatinine   Date Value Ref Range Status   05/10/2018 0.7 0.5 - 1.4 mg/dL Final     Calcium   Date Value Ref Range Status   05/10/2018 8.2 (L) 8.7 - 10.5 mg/dL Final     Total Protein   Date Value Ref Range Status   05/10/2018 5.2 (L) 6.0 - 8.4 g/dL Final     Albumin   Date Value Ref Range Status   05/10/2018 2.6 (L) 3.5 - 5.2 g/dL Final     Total Bilirubin   Date Value Ref Range Status   05/10/2018 1.1 (H) 0.1 - 1.0 mg/dL Final     Comment:     For infants and newborns, interpretation of results should be based  on gestational age, weight and in agreement with clinical  observations.  Premature Infant recommended reference ranges:  Up to 24 hours.............<8.0 mg/dL  Up to 48 hours............<12.0 mg/dL  3-5 days..................<15.0 mg/dL  6-29 days.................<15.0 mg/dL       Alkaline Phosphatase   Date Value Ref Range  Status   05/10/2018 37 (L) 55 - 135 U/L Final     AST   Date Value Ref Range Status   05/10/2018 49 (H) 10 - 40 U/L Final     ALT   Date Value Ref Range Status   05/10/2018 65 (H) 10 - 44 U/L Final     Anion Gap   Date Value Ref Range Status   05/10/2018 7 (L) 8 - 16 mmol/L Final     eGFR if    Date Value Ref Range Status   05/10/2018 >60.0 >60 mL/min/1.73 m^2 Final     eGFR if non    Date Value Ref Range Status   05/10/2018 >60.0 >60 mL/min/1.73 m^2 Final     Comment:     Calculation used to obtain the estimated glomerular filtration  rate (eGFR) is the CKD-EPI equation.        Urinalysis    Recent Labs  Lab 05/10/18  1015   COLORU Yellow   SPECGRAV 1.020   PHUR >8.0*   PROTEINUA Negative   BACTERIA Occasional   NITRITE Negative   LEUKOCYTESUR Negative   UROBILINOGEN Negative         Chest X-Ray: 5/10/2018  EXAMINATION:  XR CHEST 1 VIEW    CLINICAL HISTORY:  SVT;    FINDINGS:  Central line tips right atrium.  Heart size is upper normal there is mild edema and no change.    Diagnostic Results:  No Further      Assessment/Plan:     * Sepsis    18 y/o male with h/o  AML (t 8;21) s/p intensification therapy II per [id number] (Arm A) stepped up to PICU for sepsis after presenting with waxing mental status, hypotension unresponsive to NS bolus x 2 and blood 1/2, chest tightness w/ tachypnea, new oxygen requirement and concern for PNA on CXR. He is currently intubated and sedated with PaO2:Fi this AM of 107 with worsened CXR. Pressures maintained on epi and then norepi but now off. Maintaining BPs. Extubated now and clinically improving. However, noted to have delirium which is likely resolving.      CNS:  Delirium  - Continue Seroquel 50mg PO Nightly  - Start Seroquel 12.5mg PO at 0800 and 1400.   - Continue frequent reorientation and attempt to encourage patient to sleep.   - stop and avoid deliriogenic drugs.  Sedation  - Now extubated. Awake and alert.  - Continue to monitor neuro/mental  status  - Morphine 2mg IV q4h PRN  Fever  - Scheduled tylenol 650mg, IV, Q6H for fever. No NSAIDs given allergy.      CV: Initially hypotensive and requiring pressors but now improved. Episode of SVT last night was likely secondary to PICC placement. Will continue to monitor.  - s/p one dose of Adenosine.  - D/c epi 5/6  - Off Norepi.   - MAP goal >60.  - Vitals per protocol.     PULM: Resolving ARDS and now stable on room air.  - Discontinue CPT since patient coughing.  - Discontinue Steroids  - Albuterol nebs q4 PRN.   - Stopped Lasix/Diuril drip (5/6 - 5/8)  - Lasix 40mg wean to PO q12h  - Daily CXR  - ABG - PRN.     FEN/GI:  - NPO now.  - mIVF D5NS with KCl.  - TFG - 100ml/hr including meds.   - Famotidine 20mg, IV, BID for ppx  - Continue PRN zofran  - Replace electrolytes as needed  - Will try to advance diet.  - KPhos 1 packet Q6H  - Kcl 20mEQ BID PRN  - Metoclopramide 10mg Daily.     HEME/ONC:   Chemotherapy induced neutropenia:   - Hgb goal >7.  - Plts goal >20.  - Daily CBC and reticulocyte   - Total units of transfusion: 4 units of pRBCs and 5 units of platelets.    - Neupogen 600mcg daily. 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  CNS negative.  MRD negative after Induction I.    - Treating per COG [id number] (ARM A).  S/p Intensification I and Intensification II started. He was day 13 of intensification II as of 5/2/2018.  - BM biopsy at start of Intensification shows CR.  FISH for t(8;21) negative. 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  - Continue acyclovir ppx  - Continue posaconazole 400mg BID- therapeutic dosing.   - Continue bactrim QFSaSu ppx  - Continue peridex ppx     ID: BCx - Strep Mitis (sens pending) from 5/2, blood culture 5/3 NGTD  - Continue to follow previous cultures  - D/C Cefepime 5/4.   - D/C Amikacin 20mg/kg Q24H (5/4 - 5/6)  -  Vancomycin 1500mg Q8H (5/3 - ) . Vanc trough 16.6 - therapeutic  - Meropenem 2g IV Q8H (5/4 - )      RENAL: Cr wnl. Improving BUN. No active issues.  - Strict Is/Os - monitor for SIADH     SOCIAL: Mom at bedside     DISPO: Critically ill, in the PICU.            Critical Care Time greater than: 1 Hour    Farshad Mcginnis MD  Pediatric Critical Care  [facility]

## 2018-05-10 NOTE — PLAN OF CARE
Problem: Patient Care Overview  Goal: Plan of Care Review  Outcome: Ongoing (interventions implemented as appropriate)    Pt able to sleep in short (1 to 1.5 hour) intervals, on and off. Seroquel 50 mg given x 1 to promote rest. Delirium markedly improved since beginning of shift; avoiding benzodiazepines given ICU induced delirium. Precedex weaning ongoing, goal to have off by am. Afebrile overnight, acetaminophen ATC. Multiple antimicrobials ordered given positive BCx and neutropenia. Weaning HFNC, desats to mid 80's on RA. Spaced out albuterol nebs. Good, productive cough. Solu-medrol changed to po. CVS unremarkable, VSS. Loose BM x 1, bowel regimen ongoing. Clear liquid diet ordered, tolerating well. Good UOP observed. Mother updated on POC, all questions and concerns addressed. Will continue to monitor.      Julio Singh, LORENN, RN  PICU Staff

## 2018-05-10 NOTE — PLAN OF CARE
Updated patient and patient's mother on POC- pt needs reinforcement understanding medical status but mother verbalizes understanding with no further questions. Remains on room air. Albuterol now prn- not needed today. D/c solumedrol. Reaccessed port today- TPA'd both lumens d/t no blood return but now both work. D/c'd reglan and senna, continues on miralax. BM x2. UA sent - WNL. Vomited x2, once this AM and around 1700 after eating cracker- zofran given both times. Speech is much more clear and pt is able to be oriented more easily today, still needs redirection at times. Has not been able to sleep today, started seroquel twice during day. Stood up at bedside with PT.  See doc flowsheets for further details. Will cont to monitor closely.

## 2018-05-10 NOTE — PT/OT/SLP EVAL
Occupational Therapy   Evaluation    Name: Hema Kuo  MRN: [id number]  Admitting Diagnosis:  Sepsis      Recommendations:     Discharge Recommendations: home health PT, home health OT  Discharge Equipment Recommendations:  none  Barriers to discharge:  None    History:     Occupational Profile:  Living Environment: Pt lives with mother, stepfather and brother in a H with 0 SYLVESTER.   Previous level of function: Fully independent with ADLs and self care  Roles and Routines: son, brother, grandson and friend  Equipment Owned:  none  Assistance upon Discharge: mother stays home and is able to assist. His brother is also able to assist.    Past Medical History:   Diagnosis Date    AML (acute myeloblastic leukemia) 10/2017    Asthma     Encounter for blood transfusion     Seasonal allergies        Past Surgical History:   Procedure Laterality Date    PORTACATH PLACEMENT  10/31/2017    TONSILLECTOMY         Subjective     Pt reported he was in a medically induced coma for 5 days. He is aware he is weak and cannot walk on his own.     Chief Complaint: weakness  Patient/Family stated goals: Pt agreeable to progressing towards independence with ADLs and functional mobility.   Communicated with: JULIANN Galeas prior to session.  Pain/Comfort:  · Pain Rating 1: 0/10  · Pain Rating Post-Intervention 1: 0/10    Patients cultural, spiritual, Spiritism conflicts given the current situation: none    Objective:     Patient found with: telemetry, pulse ox (continuous), blood pressure cuff    General Precautions: Standard, fall   Orthopedic Precautions:N/A   Braces: N/A     Occupational Performance:    Bed Mobility:    · Patient completed Rolling/Turning to Left with  modified independence  · Patient completed Rolling/Turning to Right with independence  · Patient completed Scooting/Bridging with independence  · Patient completed Supine to Sit with independence  · Patient completed Sit to Supine with independence    Functional  Mobility/Transfers:  · Patient completed Sit <> Stand Transfer with contact guard assistance  with  rolling walker   · Functional Mobility: After standing for a few minutes pt had a LOB backwards onto the bed. His descent was controlled by PT and OT. Pt progressed to standing longer each time. Also, he was able to take some steps forward and back.     Activities of Daily Living:  · Feeding:  DNT NPO, likely would be independent    · Grooming: likely min A for EOB grooming. Would need mod A in standing due to impaired LE strength and balance.    · Bathing:   DNT  · UB Dressing: minimum assistance    · LB Dressing: total assistance unable to reach feet today  · Toileting: minimum assistance for transfer to [facility]    Cognitive/Visual Perceptual:  Cognitive/Psychosocial Skills:     -       Oriented to: Person, Place and Situation pt is oriented to month and year  Visual/Perceptual:  -Intact    Physical Exam:  Balance: -       needs CGA due to LOB  Upper Extremity Range of Motion:     -       Right Upper Extremity: WFL  -       Left Upper Extremity: WFL  Upper Extremity Strength:    -       Right Upper Extremity: WFL  -       Left Upper Extremity: WFL  Fine Motor Coordination:    -       Intact, is able to tap each finger to thumb but he reports difficulty manipulating small items.  Gross motor coordination:   WFL but has become weak in bed.  Neurological: -       intact    Patient left HOB elevated with all lines intact and call button in reach    AMPA 6 Click:  AMPAC Total Score: 13    Treatment & Education:  · Educated on role of OT and POC  · Educated on safety with sit to stand using RW.  · Neuro Re-education: Pt sat EOB for ~15 minutes.  He performed 3 sit to stands, marching in place and balance exercises shifting weight from heels to toes to challenge balance reactions.    Education:    Assessment:     Hema Kuo is a 17 y.o. male with a medical diagnosis of Sepsis.  He presents with decline in ADLs and functional  "mobility. Pt would benefit from skilled OT services in order to maximize independence with ADLs and facilitate safe discharge.  Performance deficits affecting function are weakness, impaired endurance, impaired self care skills, gait instability, impaired balance, impaired functional mobilty, decreased lower extremity function, decreased upper extremity function, impaired cardiopulmonary response to activity, impaired coordination.      Rehab Prognosis:  Good; patient would benefit from acute skilled OT services to address these deficits and reach maximum level of function.         Clinical Decision Makin.  OT Low:  "Pt evaluation falls under low complexity for evaluation coding due to performance deficits noted in 1-3 areas as stated above and 0 co-morbities affecting current functional status. Data obtained from problem focused assessments. No modifications or assistance was required for completion of evaluation. Only brief occupational profile and history review completed."     Plan:     Patient to be seen 5 x/week to address the above listed problems via self-care/home management, therapeutic activities, therapeutic exercises, neuromuscular re-education, sensory integration  · Plan of Care Expires:   6/10/2018  · .Plan of Care Reviewed with: patient, family    This Plan of care has been discussed with the patient who was involved in its development and understands and is in agreement with the identified goals and treatment plan    GOALS:    Occupational Therapy Goals        Problem: Occupational Therapy Goal    Goal Priority Disciplines Outcome Interventions   Occupational Therapy Goal     OT, PT/OT Ongoing (interventions implemented as appropriate)    Description:  Goals to be met by: 5/10/2018    Patient will increase functional independence with ADLs by performing:    Feeding with [place].  UE Dressing with [place].  LE Dressing with Minimal Assistance.  Grooming while standing with Minimal " Assistance.  Toileting from bedside commode with Modified [place] for hygiene and clothing management.   Bathing from  edge of bed with Modified [place].  Toilet transfer to toilet with Minimal Assistance.                      Time Tracking:     OT Date of Treatment: 05/10/18  OT Start Time: 1353  OT Stop Time: 1421  OT Total Time (min): 28 min    Billable Minutes:Evaluation 20  Neuromuscular Re-education 8    KURTIS Chun  5/10/2018

## 2018-05-11 PROBLEM — R41.0 DELIRIUM: Status: ACTIVE | Noted: 2018-05-11

## 2018-05-11 PROBLEM — R65.21 SEPTIC SHOCK: Status: RESOLVED | Noted: 2018-05-03 | Resolved: 2018-05-11

## 2018-05-11 PROBLEM — A41.9 SEPTIC SHOCK: Status: RESOLVED | Noted: 2018-05-03 | Resolved: 2018-05-11

## 2018-05-11 LAB
ALBUMIN SERPL BCP-MCNC: 2.7 G/DL
ALP SERPL-CCNC: 46 U/L
ALT SERPL W/O P-5'-P-CCNC: 83 U/L
ANION GAP SERPL CALC-SCNC: 10 MMOL/L
ANISOCYTOSIS BLD QL SMEAR: SLIGHT
AST SERPL-CCNC: 45 U/L
BACTERIA BLD CULT: NORMAL
BASOPHILS # BLD AUTO: 0 K/UL
BASOPHILS NFR BLD: 0 %
BILIRUB SERPL-MCNC: 1.4 MG/DL
BUN SERPL-MCNC: 27 MG/DL
CALCIUM SERPL-MCNC: 8 MG/DL
CHLORIDE SERPL-SCNC: 103 MMOL/L
CO2 SERPL-SCNC: 24 MMOL/L
CREAT SERPL-MCNC: 0.6 MG/DL
DIFFERENTIAL METHOD: ABNORMAL
EOSINOPHIL # BLD AUTO: 0 K/UL
EOSINOPHIL NFR BLD: 0 %
ERYTHROCYTE [DISTWIDTH] IN BLOOD BY AUTOMATED COUNT: 16.9 %
EST. GFR  (AFRICAN AMERICAN): >60 ML/MIN/1.73 M^2
EST. GFR  (NON AFRICAN AMERICAN): >60 ML/MIN/1.73 M^2
GLUCOSE SERPL-MCNC: 100 MG/DL
HCT VFR BLD AUTO: 27.5 %
HGB BLD-MCNC: 9.2 G/DL
HYPOCHROMIA BLD QL SMEAR: ABNORMAL
IMM GRANULOCYTES # BLD AUTO: 0.01 K/UL
IMM GRANULOCYTES NFR BLD AUTO: 3.7 %
LYMPHOCYTES # BLD AUTO: 0.3 K/UL
LYMPHOCYTES NFR BLD: 92.6 %
MAGNESIUM SERPL-MCNC: 1.9 MG/DL
MCH RBC QN AUTO: 31.2 PG
MCHC RBC AUTO-ENTMCNC: 33.5 G/DL
MCV RBC AUTO: 93 FL
MONOCYTES # BLD AUTO: 0 K/UL
MONOCYTES NFR BLD: 3.7 %
NEUTROPHILS # BLD AUTO: 0 K/UL
NEUTROPHILS NFR BLD: 0 %
NRBC BLD-RTO: 0 /100 WBC
OVALOCYTES BLD QL SMEAR: ABNORMAL
PHOSPHATE SERPL-MCNC: 2.3 MG/DL
PLATELET # BLD AUTO: 17 K/UL
PMV BLD AUTO: ABNORMAL FL
POIKILOCYTOSIS BLD QL SMEAR: SLIGHT
POLYCHROMASIA BLD QL SMEAR: ABNORMAL
POTASSIUM SERPL-SCNC: 4 MMOL/L
PROT SERPL-MCNC: 5.3 G/DL
RBC # BLD AUTO: 2.95 M/UL
RETICS/RBC NFR AUTO: 0.1 %
SODIUM SERPL-SCNC: 137 MMOL/L
WBC # BLD AUTO: 0.27 K/UL

## 2018-05-11 PROCEDURE — 25000003 PHARM REV CODE 250: Performed by: PEDIATRICS

## 2018-05-11 PROCEDURE — 25000003 PHARM REV CODE 250: Performed by: STUDENT IN AN ORGANIZED HEALTH CARE EDUCATION/TRAINING PROGRAM

## 2018-05-11 PROCEDURE — 85045 AUTOMATED RETICULOCYTE COUNT: CPT

## 2018-05-11 PROCEDURE — 63600175 PHARM REV CODE 636 W HCPCS: Performed by: PEDIATRICS

## 2018-05-11 PROCEDURE — 83735 ASSAY OF MAGNESIUM: CPT

## 2018-05-11 PROCEDURE — 84100 ASSAY OF PHOSPHORUS: CPT

## 2018-05-11 PROCEDURE — 99291 CRITICAL CARE FIRST HOUR: CPT | Mod: ,,, | Performed by: PEDIATRICS

## 2018-05-11 PROCEDURE — 63600175 PHARM REV CODE 636 W HCPCS: Performed by: STUDENT IN AN ORGANIZED HEALTH CARE EDUCATION/TRAINING PROGRAM

## 2018-05-11 PROCEDURE — 97530 THERAPEUTIC ACTIVITIES: CPT

## 2018-05-11 PROCEDURE — 94761 N-INVAS EAR/PLS OXIMETRY MLT: CPT

## 2018-05-11 PROCEDURE — 97535 SELF CARE MNGMENT TRAINING: CPT

## 2018-05-11 PROCEDURE — 85025 COMPLETE CBC W/AUTO DIFF WBC: CPT

## 2018-05-11 PROCEDURE — 63600175 PHARM REV CODE 636 W HCPCS: Mod: JG | Performed by: PEDIATRICS

## 2018-05-11 PROCEDURE — 11300000 HC PEDIATRIC PRIVATE ROOM

## 2018-05-11 PROCEDURE — 80053 COMPREHEN METABOLIC PANEL: CPT

## 2018-05-11 RX ORDER — CEFEPIME HYDROCHLORIDE 1 G/50ML
2 INJECTION, SOLUTION INTRAVENOUS
Status: DISCONTINUED | OUTPATIENT
Start: 2018-05-11 | End: 2018-05-15

## 2018-05-11 RX ORDER — FUROSEMIDE 20 MG/1
20 TABLET ORAL DAILY
Status: DISCONTINUED | OUTPATIENT
Start: 2018-05-13 | End: 2018-05-12

## 2018-05-11 RX ORDER — OLANZAPINE 5 MG/1
5 TABLET, ORALLY DISINTEGRATING ORAL
Status: DISCONTINUED | OUTPATIENT
Start: 2018-05-11 | End: 2018-05-11

## 2018-05-11 RX ORDER — POLYETHYLENE GLYCOL 3350 17 G/17G
17 POWDER, FOR SOLUTION ORAL 2 TIMES DAILY PRN
Status: DISCONTINUED | OUTPATIENT
Start: 2018-05-11 | End: 2018-05-31 | Stop reason: HOSPADM

## 2018-05-11 RX ORDER — OLANZAPINE 5 MG/1
5 TABLET, ORALLY DISINTEGRATING ORAL
Status: DISCONTINUED | OUTPATIENT
Start: 2018-05-11 | End: 2018-05-15

## 2018-05-11 RX ORDER — FUROSEMIDE 20 MG/1
40 TABLET ORAL DAILY
Status: COMPLETED | OUTPATIENT
Start: 2018-05-12 | End: 2018-05-12

## 2018-05-11 RX ORDER — FUROSEMIDE 20 MG/1
40 TABLET ORAL DAILY
Status: DISCONTINUED | OUTPATIENT
Start: 2018-05-12 | End: 2018-05-11

## 2018-05-11 RX ORDER — DIPHENHYDRAMINE HYDROCHLORIDE 50 MG/ML
50 INJECTION INTRAMUSCULAR; INTRAVENOUS ONCE
Status: COMPLETED | OUTPATIENT
Start: 2018-05-11 | End: 2018-05-11

## 2018-05-11 RX ORDER — DIPHENHYDRAMINE HYDROCHLORIDE 50 MG/ML
50 INJECTION INTRAMUSCULAR; INTRAVENOUS ONCE
Status: DISCONTINUED | OUTPATIENT
Start: 2018-05-11 | End: 2018-05-11

## 2018-05-11 RX ORDER — ACETAMINOPHEN, DIPHENHYDRAMINE HCL, PHENYLEPHRINE HCL 325; 25; 5 MG/1; MG/1; MG/1
10 TABLET ORAL NIGHTLY
Status: DISCONTINUED | OUTPATIENT
Start: 2018-05-11 | End: 2018-05-31 | Stop reason: HOSPADM

## 2018-05-11 RX ADMIN — VANCOMYCIN HYDROCHLORIDE 1500 MG: 10 INJECTION, POWDER, LYOPHILIZED, FOR SOLUTION INTRAVENOUS at 08:05

## 2018-05-11 RX ADMIN — CEFEPIME HYDROCHLORIDE 2 G: 2 INJECTION, POWDER, FOR SOLUTION INTRAVENOUS at 11:05

## 2018-05-11 RX ADMIN — CEFEPIME HYDROCHLORIDE 2 G: 2 INJECTION, POWDER, FOR SOLUTION INTRAVENOUS at 06:05

## 2018-05-11 RX ADMIN — ACYCLOVIR 400 MG: 200 CAPSULE ORAL at 08:05

## 2018-05-11 RX ADMIN — DIPHENHYDRAMINE HYDROCHLORIDE 50 MG: 50 INJECTION, SOLUTION INTRAMUSCULAR; INTRAVENOUS at 04:05

## 2018-05-11 RX ADMIN — VANCOMYCIN HYDROCHLORIDE 1500 MG: 10 INJECTION, POWDER, LYOPHILIZED, FOR SOLUTION INTRAVENOUS at 11:05

## 2018-05-11 RX ADMIN — SULFAMETHOXAZOLE AND TRIMETHOPRIM 1 TABLET: 800; 160 TABLET ORAL at 08:05

## 2018-05-11 RX ADMIN — FUROSEMIDE 40 MG: 20 TABLET ORAL at 08:05

## 2018-05-11 RX ADMIN — FILGRASTIM 600 MCG: 300 INJECTION, SOLUTION INTRAVENOUS; SUBCUTANEOUS at 01:05

## 2018-05-11 RX ADMIN — ACETAMINOPHEN, DIPHENHYDRAMINE HCL, PHENYLEPHRINE HCL 10 MG: 325; 25; 5 TABLET ORAL at 08:05

## 2018-05-11 RX ADMIN — FAMOTIDINE 20 MG: 20 TABLET, FILM COATED ORAL at 08:05

## 2018-05-11 RX ADMIN — OLANZAPINE 5 MG: 5 TABLET, ORALLY DISINTEGRATING ORAL at 10:05

## 2018-05-11 RX ADMIN — VANCOMYCIN HYDROCHLORIDE 1500 MG: 10 INJECTION, POWDER, LYOPHILIZED, FOR SOLUTION INTRAVENOUS at 04:05

## 2018-05-11 RX ADMIN — POTASSIUM PHOSPHATE, MONOBASIC AND POTASSIUM PHOSPHATE, DIBASIC 15 MMOL: 224; 236 INJECTION, SOLUTION, CONCENTRATE INTRAVENOUS at 12:05

## 2018-05-11 RX ADMIN — QUETIAPINE FUMARATE 12.5 MG: 25 TABLET ORAL at 08:05

## 2018-05-11 RX ADMIN — QUETIAPINE FUMARATE 50 MG: 25 TABLET, FILM COATED ORAL at 08:05

## 2018-05-11 RX ADMIN — OLANZAPINE 5 MG: 5 TABLET, ORALLY DISINTEGRATING ORAL at 02:05

## 2018-05-11 RX ADMIN — POSACONAZOLE 400 MG: 100 TABLET, COATED ORAL at 08:05

## 2018-05-11 RX ADMIN — Medication 500 UNITS: at 05:05

## 2018-05-11 RX ADMIN — MEROPENEM 2 G: 1 INJECTION, POWDER, FOR SOLUTION INTRAVENOUS at 04:05

## 2018-05-11 NOTE — PLAN OF CARE
"Problem: Patient Care Overview  Goal: Plan of Care Review  Outcome: Ongoing (interventions implemented as appropriate)  Mother at bedside throughout shift, updated on POC, questions/ concerns addressed.  Pt continues on RA without s/s of distress or desats.  Pt afebrile over shift, tylenol ATC d/c'd.  Pt continues to be delirious, having hallucinations and unable to sleep.  Pt stating "5 people are outside his room watching him" and attempting to make phone calls to mother as he stated "she had heart failure and is stuck on the elevator with a man trying to hurt her".  Pt also found turning of his IV pumps.  MD notified, seroquel x1 given.  Pt still unable to sleep.  Later in shift, pt found attempting to get out of bed, and voiding onto floor.  Pt re-oriented and back to bed, bed alarm continues to be set.  MD notified, Benadryl 50mg x1 dose give.  Pt continues to be unable to sleep.  VSS.  Attempting to advance diet to regular diet as tolerated, pt with poor appetite. Pt with 3 loose BM.  See flowsheets for additional documentation.  Will continue to monitor.        "

## 2018-05-11 NOTE — PT/OT/SLP PROGRESS
Occupational Therapy   Treatment    Name: Hema Kuo  MRN: [id number]  Admitting Diagnosis:  Sepsis       Recommendations:     Discharge Recommendations: home health PT, home health OT  Discharge Equipment Recommendations:  none  Barriers to discharge:  None    Subjective     Communicated with: JULIANN Butcher prior to session.  Pain/Comfort:  · Pain Rating 1: 0/10  · Pain Rating Post-Intervention 2: 0/10    Patients cultural, spiritual, Yazdanism conflicts given the current situation: none    Objective:     Patient found with: telemetry    General Precautions: Standard, fall   Orthopedic Precautions:N/A   Braces: N/A     Occupational Performance:    Bed Mobility:    · Patient completed Scooting/Bridging with modified independence  · Patient completed Supine to Sit with CGA  · Patient completed Sit to Supine with mod I, cueing for initiation of task.    Functional Mobility/Transfers:  · Patient completed Sit <> Stand Transfer with CGA  with  no assistive device   · Patient completed Bed <> Chair Transfer using Stand Pivot technique with contact guard assistance with no assistive device  · Functional Mobility: CGA for     Activities of Daily Living:  LB Dressing: stand by assistance for socks  And cues for initiation while sitting up in bed    PT/OT and nurse took pt to the [facility] to help with orienting pt and getting sunlight. Pt wearing mask and hat for protection.  Pt appeared to become calm and making appropriate responses. Pt noted to be sweating around eyes,  at rest. After, ~10 minutes we brought pt to step down room. He was transferred to bed and positioned well. All lines intact, sitter and grandfather present. All bedrails up for patient safety.       [facility] 6 Click:  [facility] Total Score: 15  Education:    Assessment:     Hema Kuo is a 17 y.o. male with a medical diagnosis of Sepsis.  He presents with decline in ADLs and functional mobility. Pt would benefit from skilled OT services in order to maximize  independence with ADLs and facilitate safe discharge. Performance deficits affecting function are weakness, impaired endurance, gait instability, impaired sensation, impaired self care skills, impaired functional mobilty, decreased lower extremity function, decreased upper extremity function, impaired balance, decreased ROM, impaired cardiopulmonary response to activity.      Rehab Prognosis:  Good; patient would benefit from acute skilled OT services to address these deficits and reach maximum level of function.       Plan:     Patient to be seen 5 x/week to address the above listed problems via self-care/home management, therapeutic activities, therapeutic exercises, neuromuscular re-education  · Plan of Care Expires:  6/9/2018  · Plan of Care Reviewed with: grandparent, patient    This Plan of care has been discussed with the patient who was involved in its development and understands and is in agreement with the identified goals and treatment plan    GOALS:    Occupational Therapy Goals        Problem: Occupational Therapy Goal    Goal Priority Disciplines Outcome Interventions   Occupational Therapy Goal     OT, PT/OT Ongoing (interventions implemented as appropriate)    Description:  Goals to be met by: 5/10/2018    Patient will increase functional independence with ADLs by performing:    Feeding with [place].  UE Dressing with [place].  LE Dressing with Minimal Assistance.  Goal met  Grooming while standing with Minimal Assistance.  Toileting from bedside commode with Modified [place] for hygiene and clothing management.   Bathing from  edge of bed with Modified [place].  Toilet transfer to toilet with Minimal Assistance.                        Time Tracking:     OT Date of Treatment: 05/11/18  OT Start Time: 1515  OT Stop Time: 1608  OT Total Time (min): 53 min    Billable Minutes:Self Care/Home Management 27    KURTIS Chun  5/11/2018

## 2018-05-11 NOTE — PLAN OF CARE
Problem: Occupational Therapy Goal  Goal: Occupational Therapy Goal  Goals to be met by: 5/10/2018    Patient will increase functional independence with ADLs by performing:    Feeding with [place].  UE Dressing with [place].  LE Dressing with Minimal Assistance.  Goal met  Grooming while standing with Minimal Assistance.  Toileting from bedside commode with Modified [place] for hygiene and clothing management.   Bathing from  edge of bed with Modified [place].  Toilet transfer to toilet with Minimal Assistance.      Outcome: Ongoing (interventions implemented as appropriate)  Goals remain appropriate, continue with OT POC.    KURTIS Hazel  5/11/2018  Rehab Services

## 2018-05-11 NOTE — PLAN OF CARE
PEC completed to ensure patient safety due to psychosis and hallucinations. Nursing supervisor, staffing office, and  notified. Sitter at bedside. Patient belongings removed and room safety ensured.

## 2018-05-11 NOTE — PLAN OF CARE
Problem: Patient Care Overview  Goal: Plan of Care Review  Hema Kuo tolerated treatment fair this afternoon. There is a significant increase in confusion and AMS compared to yesterday's evaluation. He recalls my face but unable to say what activities he did with PT yesterday. He commented multiple times about people trying to hurt him, reassured him consistently that nobody wants to hurt him. He was able to stand with CGA of therapist, takes 2-3 steps in/out of wheelchair with similar assistance. Brought outside (pt wearing mask) via CVICU balcony for 5-10 minutes to enjoy sunlight, pt quiet but did seem to enjoy it. Grandparents and sitter present throughout, very helpful. Once delirium resolves, I expect he is going to progress very well with PT. Will cont to benefit from PT services during this admit.    Giorgio Quiles, PT  5/11/2018

## 2018-05-11 NOTE — NURSING TRANSFER
Nursing Transfer Note  Nursing Transfer Note    Receiving Transfer Note    5/11/2018 4:42 PM  Received in transfer from PICU to [facility]  Report received as documented in PER Handoff on Doc Flowsheet.  See Doc Flowsheet for VS's and complete assessment.  Continuous EKG monitoring in place N/A  Chart received with patient: Yes  What Caregiver / Guardian was Notified of Arrival: Grandmother and Grandfather  Patient and / or caregiver / guardian oriented to room and nurse call system.  JULIANN Crews  5/11/2018 4:42 PM

## 2018-05-11 NOTE — PT/OT/SLP PROGRESS
Physical Therapy  Treatment    Patient Name:  Hema Kuo   MRN:  [id number]    Recommendations:     Discharge Recommendations:  home health PT, home health OT   Discharge Equipment Recommendations: none   Barriers to discharge: pt confusion and AMS    Assessment:     Hema Kuo tolerated treatment fair this afternoon. There is a significant increase in confusion and AMS compared to yesterday's evaluation. He recalls my face but unable to say what activities he did with PT yesterday. He commented multiple times about people trying to hurt him, reassured him consistently that nobody wants to hurt him. He was able to stand with CGA of therapist, takes 2-3 steps in/out of wheelchair with similar assistance. Brought outside via CVICU balcony for 5-10 minutes to enjoy sunlight, pt quiet but did seem to enjoy it. Grandparents and sitter present throughout, very helpful. Once delirium resolves, I expect he is going to progress very well with PT. Will cont to benefit from PT services during this admit.    He presents with the following impairments/functional limitations:  weakness, impaired endurance, impaired self care skills, impaired functional mobilty, gait instability, impaired balance, impaired cognition, decreased upper extremity function, decreased lower extremity function, decreased safety awareness.    Rehab Prognosis: Good; patient would benefit from acute skilled PT services to address these deficits and reach maximum level of function.      Recent Surgery: * No surgery found *      Plan:     During this hospitalization, patient to be seen 6 x/week to address the above listed problems via gait training, therapeutic activities, therapeutic exercises, neuromuscular re-education  · Plan of Care Expires:  06/09/18   Plan of Care Reviewed with: grandparent, patient (iglesia)    Subjective     Communicated with JULIANN Butcher prior to session.  Patient found resting in (R) sidelying with grandparents and sitter present upon  "PT entry to room, all agreeable to treatment.      Chief Complaint: "They're trying to kill me" (referred 3x to people wanting to hurt or kill him; consistent reassurance that nobody is trying to hurt him)  Patient comments/goals: "You don't get it, I was born like this (non-sensical talk)."    Pain/Comfort:  · Pain Rating 1: 0/10  · Pain Rating Post-Intervention 1: 0/10    Patients cultural, spiritual, Congregation conflicts given the current situation: Family has no barriers to learning. Family verbalizes understanding of his/her program and goals and demonstrates them correctly. No cultural, spiritual or educational needs identified.    Objective:     Patient found with: telemetry     General Precautions: Standard, fall, PEC  Orthopedic Precautions:N/A     Functional Mobility:    · Bed Mobility:  · Scooting: stand by assistance towards EOB in sitting    · Supine to Sit: stand by assistance; req'd max cueing for patient to come to the (L) side of bed (where all of his lines are); he attempted to get OOB on (R) side several times  · Sit to Supine: independence    · Transfers  · Bed to Chair: contact guard assistance with  no AD  using  Stand Pivot x 2 trials; 1st trial from EOB to wheelchair in PICU; 2nd trial from wheelchair to bed on peds floor    · Gait:  · Takes 2-3 steps bed <> wheelchair with CGA of therapist for safety. If delirium was not an issue, he could likely ambulate further distances without difficulty    · Balance:  · Static Sit: sits "travis-cross" in bed for 10-12 minutes while RN and OT gathering supplies to go outside. Able to don his own socks in this position  · Static Stand: SBA/CGA of therapist for safety    AM-PAC 6 CLICK MOBILITY  Turning over in bed (including adjusting bedclothes, sheets and blankets)?: 4  Sitting down on and standing up from a chair with arms (e.g., wheelchair, bedside commode, etc.): 3  Moving from lying on back to sitting on the side of the bed?: 3  Moving to and from a " bed to a chair (including a wheelchair)?: 3  Need to walk in hospital room?: 2  Climbing 3-5 steps with a railing?: 2  Total Score: 17     Therapeutic Activities and Exercises:  1. Assisted pt into wheelchair in the PICU with CGA of therapist. Brought him outside via wheelchair [facility] for 5-10 minutes to enjoy sunlight, pt quiet but did seem to enjoy it.    2. Brought via wheelchair to new room (445A) and he was able to stand from wheelchair with CGA and pivot over to bed. Asked pt to lie down and he was able to independently lie down. Reviewed POC with grandparents, aware that PT Peng will check on patient on .    Patient left right sidelying with all lines intact, call button in reach, bed alarm on and grandparents, sitter present.    GOALS:    Physical Therapy Goals        Problem: Physical Therapy Goal    Goal Priority Disciplines Outcome Goal Variances Interventions   Physical Therapy Goal     PT/OT, PT      Description:  Goals to be met by: 18     Patient will increase functional independence with mobility by performin. Supine to sit with [place] - Not met  2. Sit to supine with [place] - MET ()  3. Sit to stand transfer with [place] - Not met  4. Bed to chair transfer with Stand-by Assistance using least restrictive AD - Not met  5. Gait  x 500 feet with Stand-by Assistance using least restrictive Ad - Not met  6. Lower extremity exercise program x30 reps per handout, with supervision - Not met                    Time Tracking:     PT Received On: 18  PT Start Time: 1515     PT Stop Time: 1608  PT Total Time (min): 53 min     Billable Minutes: Therapeutic Activity 26    Treatment Type: Co-Treatment with OT  PT/PTA: PT       Giorgio Quiles, PT  2018

## 2018-05-11 NOTE — NURSING TRANSFER
Nursing Transfer Note    Sending Transfer Note      5/11/2018 4:15 PM  Transfer via wheelchair  From PICU 5 to Peds floor 445  Transfered with monitor, meds  Transported by: TANIA Au RN  Report given as documented in PER Handoff on Doc Flowsheet  VS's per Doc Flowsheet  Medicines sent: Yes  Chart sent with patient: Yes  What caregiver / guardian was Notified of transfer: Grandmother and Grandfather  TANIA Au RN  5/11/2018 4:15 PM

## 2018-05-11 NOTE — PLAN OF CARE
05/11/18 1156   Discharge Reassessment   Assessment Type Discharge Planning Reassessment   Provided patient/caregiver education on the expected discharge date and the discharge plan Yes   Do you have any problems affording any of your prescribed medications? Yes   Discharge Plan A Home with family

## 2018-05-12 PROBLEM — R78.81 GRAM-POSITIVE BACTEREMIA: Status: RESOLVED | Noted: 2018-05-03 | Resolved: 2018-05-12

## 2018-05-12 PROBLEM — J80 ACUTE RESPIRATORY DISTRESS SYNDROME (ARDS): Status: ACTIVE | Noted: 2018-05-12

## 2018-05-12 PROBLEM — Z00.8 NUTRITIONAL ASSESSMENT: Status: ACTIVE | Noted: 2018-05-12

## 2018-05-12 PROBLEM — A41.9 SEPSIS: Status: RESOLVED | Noted: 2018-05-03 | Resolved: 2018-05-12

## 2018-05-12 LAB
ALBUMIN SERPL BCP-MCNC: 2.6 G/DL
ALP SERPL-CCNC: 51 U/L
ALT SERPL W/O P-5'-P-CCNC: 78 U/L
ANION GAP SERPL CALC-SCNC: 5 MMOL/L
ANISOCYTOSIS BLD QL SMEAR: SLIGHT
AST SERPL-CCNC: 28 U/L
BASOPHILS # BLD AUTO: 0 K/UL
BASOPHILS NFR BLD: 0 %
BILIRUB SERPL-MCNC: 1.3 MG/DL
BLD PROD TYP BPU: NORMAL
BLOOD UNIT EXPIRATION DATE: NORMAL
BLOOD UNIT TYPE CODE: 6200
BLOOD UNIT TYPE: NORMAL
BUN SERPL-MCNC: 24 MG/DL
CALCIUM SERPL-MCNC: 7.9 MG/DL
CHLORIDE SERPL-SCNC: 106 MMOL/L
CO2 SERPL-SCNC: 25 MMOL/L
CODING SYSTEM: NORMAL
CREAT SERPL-MCNC: 0.6 MG/DL
DACRYOCYTES BLD QL SMEAR: ABNORMAL
DIFFERENTIAL METHOD: ABNORMAL
DISPENSE STATUS: NORMAL
EOSINOPHIL # BLD AUTO: 0 K/UL
EOSINOPHIL NFR BLD: 0 %
ERYTHROCYTE [DISTWIDTH] IN BLOOD BY AUTOMATED COUNT: 16.1 %
EST. GFR  (AFRICAN AMERICAN): >60 ML/MIN/1.73 M^2
EST. GFR  (NON AFRICAN AMERICAN): >60 ML/MIN/1.73 M^2
GLUCOSE SERPL-MCNC: 96 MG/DL
HCT VFR BLD AUTO: 24.1 %
HGB BLD-MCNC: 8.3 G/DL
IMM GRANULOCYTES # BLD AUTO: 0 K/UL
IMM GRANULOCYTES NFR BLD AUTO: 0 %
LYMPHOCYTES # BLD AUTO: 0.2 K/UL
LYMPHOCYTES NFR BLD: 94.4 %
MAGNESIUM SERPL-MCNC: 1.9 MG/DL
MCH RBC QN AUTO: 31 PG
MCHC RBC AUTO-ENTMCNC: 34.4 G/DL
MCV RBC AUTO: 90 FL
MONOCYTES # BLD AUTO: 0 K/UL
MONOCYTES NFR BLD: 0 %
NEUTROPHILS # BLD AUTO: 0 K/UL
NEUTROPHILS NFR BLD: 5.6 %
NRBC BLD-RTO: 0 /100 WBC
NUM UNITS TRANS WBC-POOR PLATPHERESIS: NORMAL
OVALOCYTES BLD QL SMEAR: ABNORMAL
PHOSPHATE SERPL-MCNC: 2.7 MG/DL
PLATELET # BLD AUTO: 6 K/UL
PLATELET BLD QL SMEAR: ABNORMAL
PMV BLD AUTO: ABNORMAL FL
POIKILOCYTOSIS BLD QL SMEAR: SLIGHT
POTASSIUM SERPL-SCNC: 4 MMOL/L
PROT SERPL-MCNC: 5 G/DL
RBC # BLD AUTO: 2.68 M/UL
RETICS/RBC NFR AUTO: 0.2 %
SODIUM SERPL-SCNC: 136 MMOL/L
WBC # BLD AUTO: 0.18 K/UL

## 2018-05-12 PROCEDURE — 63600175 PHARM REV CODE 636 W HCPCS: Performed by: PEDIATRICS

## 2018-05-12 PROCEDURE — 84100 ASSAY OF PHOSPHORUS: CPT

## 2018-05-12 PROCEDURE — 25000003 PHARM REV CODE 250: Performed by: PEDIATRICS

## 2018-05-12 PROCEDURE — 63600175 PHARM REV CODE 636 W HCPCS: Performed by: STUDENT IN AN ORGANIZED HEALTH CARE EDUCATION/TRAINING PROGRAM

## 2018-05-12 PROCEDURE — 83735 ASSAY OF MAGNESIUM: CPT

## 2018-05-12 PROCEDURE — 36592 COLLECT BLOOD FROM PICC: CPT

## 2018-05-12 PROCEDURE — 85045 AUTOMATED RETICULOCYTE COUNT: CPT

## 2018-05-12 PROCEDURE — 25000003 PHARM REV CODE 250: Performed by: STUDENT IN AN ORGANIZED HEALTH CARE EDUCATION/TRAINING PROGRAM

## 2018-05-12 PROCEDURE — P9037 PLATE PHERES LEUKOREDU IRRAD: HCPCS

## 2018-05-12 PROCEDURE — 85025 COMPLETE CBC W/AUTO DIFF WBC: CPT

## 2018-05-12 PROCEDURE — 80053 COMPREHEN METABOLIC PANEL: CPT

## 2018-05-12 PROCEDURE — 36415 COLL VENOUS BLD VENIPUNCTURE: CPT

## 2018-05-12 PROCEDURE — 86644 CMV ANTIBODY: CPT

## 2018-05-12 PROCEDURE — 11300000 HC PEDIATRIC PRIVATE ROOM

## 2018-05-12 PROCEDURE — 99233 SBSQ HOSP IP/OBS HIGH 50: CPT | Mod: ,,, | Performed by: PEDIATRICS

## 2018-05-12 RX ORDER — DIPHENHYDRAMINE HYDROCHLORIDE 50 MG/ML
50 INJECTION INTRAMUSCULAR; INTRAVENOUS ONCE
Status: COMPLETED | OUTPATIENT
Start: 2018-05-12 | End: 2018-05-12

## 2018-05-12 RX ADMIN — DIPHENHYDRAMINE HYDROCHLORIDE 50 MG: 50 INJECTION, SOLUTION INTRAMUSCULAR; INTRAVENOUS at 10:05

## 2018-05-12 RX ADMIN — VANCOMYCIN HYDROCHLORIDE 1500 MG: 10 INJECTION, POWDER, LYOPHILIZED, FOR SOLUTION INTRAVENOUS at 08:05

## 2018-05-12 RX ADMIN — SULFAMETHOXAZOLE AND TRIMETHOPRIM 1 TABLET: 800; 160 TABLET ORAL at 09:05

## 2018-05-12 RX ADMIN — ACYCLOVIR 400 MG: 200 CAPSULE ORAL at 09:05

## 2018-05-12 RX ADMIN — ACETAMINOPHEN 650 MG: 10 INJECTION, SOLUTION INTRAVENOUS at 07:05

## 2018-05-12 RX ADMIN — FAMOTIDINE 20 MG: 20 TABLET, FILM COATED ORAL at 09:05

## 2018-05-12 RX ADMIN — POSACONAZOLE 400 MG: 100 TABLET, COATED ORAL at 08:05

## 2018-05-12 RX ADMIN — VANCOMYCIN HYDROCHLORIDE 1500 MG: 10 INJECTION, POWDER, LYOPHILIZED, FOR SOLUTION INTRAVENOUS at 01:05

## 2018-05-12 RX ADMIN — QUETIAPINE FUMARATE 50 MG: 25 TABLET, FILM COATED ORAL at 08:05

## 2018-05-12 RX ADMIN — CEFEPIME HYDROCHLORIDE 2 G: 2 INJECTION, POWDER, FOR SOLUTION INTRAVENOUS at 03:05

## 2018-05-12 RX ADMIN — FUROSEMIDE 40 MG: 20 TABLET ORAL at 09:05

## 2018-05-12 RX ADMIN — POSACONAZOLE 400 MG: 100 TABLET, COATED ORAL at 09:05

## 2018-05-12 RX ADMIN — CEFEPIME HYDROCHLORIDE 2 G: 2 INJECTION, POWDER, FOR SOLUTION INTRAVENOUS at 08:05

## 2018-05-12 RX ADMIN — ACYCLOVIR 400 MG: 200 CAPSULE ORAL at 08:05

## 2018-05-12 RX ADMIN — ACETAMINOPHEN, DIPHENHYDRAMINE HCL, PHENYLEPHRINE HCL 10 MG: 325; 25; 5 TABLET ORAL at 08:05

## 2018-05-12 RX ADMIN — VANCOMYCIN HYDROCHLORIDE 1500 MG: 10 INJECTION, POWDER, LYOPHILIZED, FOR SOLUTION INTRAVENOUS at 04:05

## 2018-05-12 RX ADMIN — FAMOTIDINE 20 MG: 20 TABLET, FILM COATED ORAL at 08:05

## 2018-05-12 RX ADMIN — Medication 500 UNITS: at 10:05

## 2018-05-12 RX ADMIN — SULFAMETHOXAZOLE AND TRIMETHOPRIM 1 TABLET: 800; 160 TABLET ORAL at 08:05

## 2018-05-12 RX ADMIN — CEFEPIME HYDROCHLORIDE 2 G: 2 INJECTION, POWDER, FOR SOLUTION INTRAVENOUS at 01:05

## 2018-05-12 NOTE — ASSESSMENT & PLAN NOTE
- Soft mechanical diet today, off IVF.  - TFG - 100ml/hr including meds.   - Famotidine 20mg, IV, BID for ppx  - Continue PRN zofran  - Replace electrolytes as needed

## 2018-05-12 NOTE — PLAN OF CARE
Problem: Patient Care Overview  Goal: Plan of Care Review  Outcome: Ongoing (interventions implemented as appropriate)  VS stable, afebrile, no distress noted. Pt did not sleep much during the night. He stated that he is not tired. all meds given per order. pt oriented to person, place and birthday. He drank two juices. labs drawn this AM. WBC 0.18, preliminary platlets 6. mother and sitter at bedside.Plan of care reviewed with  mother, verbalized understanding will continue to monitor.

## 2018-05-12 NOTE — ASSESSMENT & PLAN NOTE
Delirium: currently PEC'd and will need a CEC today  - Continue Seroquel 50mg PO Nightly  - Stop Seroquel 12.5mg PO at 0800 and 1400.  - Give one dose of 5mg of Olanzapine. May repeat once to help sleep.    - Continue frequent reorientation and attempt to encourage patient to sleep.   - Stop and avoid deliriogenic drugs.

## 2018-05-12 NOTE — PROGRESS NOTES
[facility] Pediatric Hospital Medicine  Progress Note    Patient Name: Hema Kuo  MRN: [id number]  Admission Date: 4/30/2018  Hospital Length of Stay: 12  Code Status: Prior   Primary Care Physician: Ann Reyna NP  Principal Problem: AML (acute myeloblastic leukemia)    Subjective:     HPI:  Hema Kuo is a 17 y.o. male with AML s/p Intensification I therapy following [id number] (Arm A) who is currently admitted for neutropenia. Patient with ANC of 0 following start of Intensification II therapy.  Hema reports that he has been feeling very well since last visit.  His neck swelling has resolved.  He reports no fevers, no abdominal pain, vomiting, diarrhea or constipation and no excessive bruising or unusual bleeding.  Good appetite and energy level.  No other complaints.     Hospital Course:  Patient admitted to the floor and noted to be comfortable. He reported that he received a platelet transfusion this am and is currently asymptomatic and doing well.    Scheduled Meds:   acyclovir  400 mg Oral BID    cefepime in dextrose 5 %  2 g Intravenous Q8H    famotidine  20 mg Oral BID    melatonin  10 mg Oral QHS    posaconazole  400 mg Oral BID    QUEtiapine  50 mg Oral QHS    sulfamethoxazole-trimethoprim 800-160mg  1 tablet Oral twice daily on Friday, Saturday, Sunday    vancomycin (VANCOCIN) IVPB  1,500 mg Intravenous Q8H     Continuous Infusions:   heparin(porcine) 3 Units/hr (05/11/18 1200)    heparin in 0.45% NaCl 3 Units/hr (05/11/18 1200)     PRN Meds:sodium chloride, albuterol sulfate, alteplase, calcium chloride, heparin, porcine (PF), lidocaine-prilocaine, morphine, olanzapine zydis, ondansetron, ondansetron, polyethylene glycol, potassium chloride, potassium chloride 10%    Interval: overnight no issues, has been refusing to drink any liquids but apple juice as he fears they are poisoned. Afebrile and denying any pain.     Past Medical History:   Diagnosis Date    AML (acute  myeloblastic leukemia) 10/2017    Asthma     Encounter for blood transfusion     Seasonal allergies        Past Surgical History:   Procedure Laterality Date    PORTACATH PLACEMENT  10/31/2017    TONSILLECTOMY         Review of patient's allergies indicates:   Allergen Reactions    Nsaids (non-steroidal anti-inflammatory drug) Anaphylaxis    Nuts [tree nut] Anaphylaxis    Strawberry     Vancomycin analogues Itching     Premedicate with benadryl and admin over 2hr.       No current facility-administered medications on file prior to encounter.      Current Outpatient Prescriptions on File Prior to Encounter   Medication Sig    acyclovir (ZOVIRAX) 400 MG tablet Take 1 tablet (400 mg total) by mouth 2 (two) times daily.    chlorhexidine (PERIDEX) 0.12 % solution     ciprofloxacin HCl (CIPRO) 500 MG tablet Take 1 tablet (500 mg total) by mouth every 12 (twelve) hours.    ondansetron (ZOFRAN-ODT) 8 MG TbDL Take 1 tablet (8 mg total) by mouth every 8 (eight) hours as needed.    oxyCODONE (ROXICODONE) 10 mg Tab immediate release tablet Take 1 tablet (10 mg total) by mouth every 6 (six) hours as needed.    polyethylene glycol (GLYCOLAX) 17 gram PwPk Take 17 g by mouth daily as needed (No bowel movement).    posaconazole 100 mg TbEC tablet Take 3 tablets (300 mg total) by mouth once daily.    sulfamethoxazole-trimethoprim 800-160mg (BACTRIM DS) 800-160 mg Tab Take 1 tablet by mouth twice daily only on Friday, Saturday, Sunday.    lisdexamfetamine (VYVANSE) 60 MG capsule Take 60 mg by mouth every morning.    LORazepam (ATIVAN) 1 MG tablet Take 1 tablet (1 mg total) by mouth every 6 (six) hours as needed (Nausea).    pantoprazole (PROTONIX) 40 MG tablet Take 1 tablet (40 mg total) by mouth once daily.        Family History     Problem Relation (Age of Onset)    Cancer Mother    Heart disease Mother    No Known Problems Father        Social History Main Topics    Smoking status: Former Smoker     Packs/day:  0.50     Types: Cigarettes     Quit date: 10/30/2017    Smokeless tobacco: Never Used    Alcohol use Yes      Comment: rare occasions    Drug use: No    Sexual activity: Yes     Partners: Female     Review of Systems     Objective:     Vital Signs (Most Recent):  Temp: 98.2 °F (36.8 °C) (04/30/18 2057)  Pulse: 85 (04/30/18 2057)  Resp: 18 (04/30/18 2057)  BP: (!) 116/52 (04/30/18 2057)  SpO2: 98 % (04/30/18 2057) Vital Signs (24h Range):  Temp:  [98.2 °F (36.8 °C)] 98.2 °F (36.8 °C)  Pulse:  [85] 85  Resp:  [18] 18  SpO2:  [98 %] 98 %  BP: (116)/(52) 116/52     Patient Vitals for the past 72 hrs (Last 3 readings):   Weight   04/30/18 2057 87.9 kg (193 lb 12.6 oz)     Body mass index is 31.28 kg/m².    Intake/Output - Last 3 Shifts     None          Lines/Drains/Airways     Central Venous Catheter Line                 Port A Cath Double Lumen 10/31/17 1826 left subclavian 181 days                Physical Exam   Constitutional: He is oriented to person, place, and time. He appears well-developed and well-nourished. No distress.   HENT:   Head: Normocephalic.   Right Ear: External ear normal.   Left Ear: External ear normal.   Nose: Nose normal.   Mouth/Throat: Oropharynx is clear and moist. No oropharyngeal exudate.   No mucositis noted.   Eyes: Conjunctivae and EOM are normal. Pupils are equal, round, and reactive to light. Right eye exhibits no discharge. Left eye exhibits no discharge.   Neck: Normal range of motion. Neck supple. No tracheal deviation present.   Cardiovascular: Normal rate, regular rhythm, normal heart sounds and intact distal pulses.    No murmur heard.  Pulmonary/Chest: Effort normal and breath sounds normal. No respiratory distress. He has no wheezes.   Abdominal: Soft. Bowel sounds are normal. He exhibits no distension and no mass. There is no tenderness.   Genitourinary:   Genitourinary Comments: Deferred.   Musculoskeletal: Normal range of motion. He exhibits no edema.   Lymphadenopathy:      He has no cervical adenopathy.   Neurological: He is alert and oriented to person, place, and time. He displays normal reflexes. No cranial nerve deficit or sensory deficit. He exhibits normal muscle tone. Coordination normal.   Skin: Skin is warm and dry. Capillary refill takes less than 2 seconds. No rash noted. He is not diaphoretic. No erythema. No pallor.   Port site looking clean and dry   Psychiatric: He has a normal mood and affect. His behavior is normal.       Significant Labs:  No results for input(s): POCTGLUCOSE in the last 48 hours.    Lab Results   Component Value Date    WBC 1.01 (LL) 04/25/2018    RBC 3.11 (L) 04/25/2018    HGB 10.0 (L) 04/25/2018    HCT 29.2 (L) 04/25/2018    MCV 94 04/25/2018    MCH 32.2 (H) 04/25/2018    MCHC 34.2 04/25/2018    RDW 22.0 (H) 04/25/2018    PLT 59 (L) 04/25/2018    MPV 12.6 04/25/2018    GRAN 0.6 (L) 04/25/2018    GRAN 61.4 04/25/2018    LYMPH 0.4 (L) 04/25/2018    LYMPH 35.6 04/25/2018    MONO 0.0 (L) 04/25/2018    MONO 0.0 (L) 04/25/2018    EOS 0.0 04/25/2018    BASO 0.01 04/25/2018    EOSINOPHIL 1.0 04/25/2018    BASOPHIL 1.0 04/25/2018       Significant Imaging: No new imaging    Assessment/Plan:     Oncology   * AML (acute myeloblastic leukemia)    Hema is a 17 yr young man with AML (t 8;21) here for chemotherapy.  On Intensification therapy II following [id number] (Arm A). He was admitted on 4/30/2018 for neutropenia/profund sepsis risk. Stepped up to the PICU for persistent fever >103, tachycardia, and hypotension that was been minimally responsive to fluid resuscitation. PICU stay was complicated by delirium and he currently is PEC'd, needs a one to one sitter.      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 MRD negative after Induction I.    - Treating per COG [id number] (ARM A).  S/p Intensification I and Intensification II started. He was day 13 of intensification II as of 5/2/2018.  - BM biopsy at start of Intensification shows CR.   FISH for t(8;21) negative. Will repeat BM biopsy pending count recovery and clinical improvement  - Heme/Onc on consult/co-management  - Will obtain PT consult for deconditioning           Neuro   Delirium    Delirium: currently PEC'd and will need a CEC today  - Continue Seroquel 50mg PO Nightly  - Stop Seroquel 12.5mg PO at 0800 and 1400.  - Give one dose of 5mg of Olanzapine. May repeat once to help sleep.    - Continue frequent reorientation and attempt to encourage patient to sleep.   - Stop and avoid deliriogenic drugs.          Immunology/Multi System   Immunosuppressed due to chemotherapy    mmunosuppression 2/2 to chemotherapy: Last WBC 0.03, ANC 0.  - Continue acyclovir ppx  - Continue posaconazole 400mg BID- therapeutic dosing.   - Continue bactrim QFSaSu ppx  - Continue peridex ppx                        Neutropenia    - Soft mechanical diet today, off IVF.  - TFG - 100ml/hr including meds.   - Famotidine 20mg, IV, BID for ppx  - Continue PRN zofran  - Replace electrolytes as needed        Antineoplastic chemotherapy induced pancytopenia    Chemotherapy induced neutropenia:   - Hgb goal >7.  - Plts goal >20.  - Platelet transfusion this am  - Daily CBC and reticulocyte   - Total units of transfusion: 4 units of pRBCs and 7 units of platelets.    - Neupogen 600mcg daily. (5/4 - 5-12/18)                Anticipated Disposition: Will need to remain inpatient, PECd      :Shannan Rubalcava MD  Pediatric Hospital Medicine   [facility]

## 2018-05-12 NOTE — PLAN OF CARE
"Problem: Patient Care Overview  Goal: Plan of Care Review  Outcome: Ongoing (interventions implemented as appropriate)  Hema oriented to person, place, date, birthday, but when asked what his birthday was he stated "wont be a day for much longer," this nurse then asked Hema what he meant and if he had a plan to hurt himself. Denies having suicidal ideations nor wanting to hurt himself. Did not want to drink water from cup because "the man from outside put something in it." Drank one newly opened apple juice with medications. IV abx administered as scheduled via PAC. Seemingly asleep at 2300. Mother, sitter at bedside. POC discussed, emotional support given to mother. Will continue to monitor.      "

## 2018-05-12 NOTE — PLAN OF CARE
"Problem: Patient Care Overview  Goal: Plan of Care Review  VSS; afebrile. No distress noted. Denies pain. Patient oriented to self, place and time but remains paranoid. Patient stated multiple times that "we" are out to kill him. Patient cooperative and taking oral medications as ordered. Sitter at bedside and family at bedside. Plts infused today. IV antibiotics administered per orders. Minimal po intake noted. POC reviewed with patient and family; verbalized understanding. Will continue to monitor.       "

## 2018-05-12 NOTE — ASSESSMENT & PLAN NOTE
Chemotherapy induced neutropenia:   - Hgb goal >7.  - Plts goal >20.  - Daily CBC and reticulocyte   - Total units of transfusion: 4 units of pRBCs and 7 units of platelets.    - Neupogen 600mcg daily. (5/4 - 5-12/18)

## 2018-05-12 NOTE — PROGRESS NOTES
[facility]  Pediatric Critical Care  Progress Note    Patient Name: Hema Kuo  MRN: [id number]  Admission Date: 4/30/2018  Hospital Length of Stay: 12 days  Code Status: Prior   Attending Provider: Christian Emerson MD   Primary Care Physician: Ann Reyna NP    Subjective:     HPI:  Hema is a 16 y/o male with PMH significant for AML (t 8;21) s/p intensification therapy II per [id number] (Arm A) who was initially admitted on 4/30/2018 for neutropenia/profund sepsis risk. He was stepped up to the PICU for persistent fever >103, tachycardia, and hypotension that has been minimally responsive to resuscitation. S/p 1L NS x 2 and 1/2 units pRBC that is still running. Also reports chest tightness, SOB with new increased oxygen requirement. On 2LPM NC for desaturation to mid 80s. Found to have new LLL airspace opacity on CXR. Finally reports ongoing dull headache throughout the day with no visual disturbances or neuro deficits.    Of note, as of yesterday (5/2/2018) he was Day 13 of intensification therapy II following AAML 1031 (Arm A).    Interval History: Overnight, Patient noted to be increasingly delirious. Still not sleeping. Became irate and somewhat agitated. Received extra dose of 50mg Seroquel. Still no improvement. Mother then requested he be given benadryl and he received one dose which did not help with sleep and he continued to remain delirious.     Review of Systems   Constitutional: Negative for fever.   Respiratory: Negative for apnea and shortness of breath.    Gastrointestinal: Negative for abdominal distention and vomiting.   Skin: Negative for pallor and rash.   Neurological: Negative for tremors.   Psychiatric/Behavioral: Positive for confusion.        Disorientation and Deliriousness.     Objective:     Vital Signs Range (Last 24H):  Temp:  [97.8 °F (36.6 °C)-98.9 °F (37.2 °C)]   Pulse:  []   Resp:  [12-38]   BP: ()/(44-92)   SpO2:  [86 %-100 %]     I & O (Last  24H):  Intake/Output Summary (Last 24 hours) at 05/10/18 1752  Last data filed at 05/10/18 1706   Gross per 24 hour   Intake          3341.57 ml   Output             3670 ml   Net          -328.43 ml       Ventilator Data (Last 24H):     Oxygen Concentration (%):  [70] 70    Hemodynamic Parameters (Last 24H):       Physical Exam:  Physical Exam   Constitutional: He appears well-developed and well-nourished. Awake and alert  HENT:   Head: Normocephalic and atraumatic.   Nose: Nose normal.   Mouth/Throat: Mucous membranes are normal.   Eyes: Conjunctivae and lids are normal.   Neck: Normal range of motion. Neck supple.   Cardiovascular: Normal rate and regular rhythm.    Pulmonary/Chest: Effort normal. No stridor. He has no decreased breath sounds. He has no wheezes. He has no rales. Aerating well bilaterally with normal breath sounds.   Abdominal: Soft. Bowel sounds are normal. He exhibits no distension. There is no hepatosplenomegaly. There is no guarding.   Genitourinary: Deferred   Musculoskeletal: Normal range of motion. He exhibits no edema.   Neurological: Able to follow commands. Noted to be having meaningful conversations interspersed with intermittent confusion.  Skin: Skin is warm and dry. Capillary refill takes less than 2 seconds. No rash noted. He is not diaphoretic. No erythema. Port CDI   Psychiatric: More delirious and confused today with delusions of people dying and people being out to get him.        Lines/Drains/Airways     Central Venous Catheter Line                 Port A Cath Double Lumen 05/10/18 0900 left subclavian less than 1 day                Laboratory (Last 24H):   Lab Results   Component Value Date    WBC 0.27 (LL) 05/11/2018    RBC 2.95 (L) 05/11/2018    HGB 9.2 (L) 05/11/2018    HCT 27.5 (L) 05/11/2018    MCV 93 05/11/2018    MCH 31.2 (H) 05/11/2018    MCHC 33.5 05/11/2018    RDW 16.9 (H) 05/11/2018    PLT 17 (LL) 05/11/2018    MPV SEE COMMENT 05/11/2018    GRAN 0.0 (L) 05/11/2018     GRAN 0.0 (L) 05/11/2018    LYMPH 0.3 (L) 05/11/2018    LYMPH 92.6 (H) 05/11/2018    MONO 0.0 (L) 05/11/2018    MONO 3.7 (L) 05/11/2018    EOS 0.0 05/11/2018    BASO 0.00 05/11/2018    EOSINOPHIL 0.0 05/11/2018    BASOPHIL 0.0 05/11/2018     CMP  Sodium   Date Value Ref Range Status   05/11/2018 137 136 - 145 mmol/L Final     Potassium   Date Value Ref Range Status   05/11/2018 4.0 3.5 - 5.1 mmol/L Final     Chloride   Date Value Ref Range Status   05/11/2018 103 95 - 110 mmol/L Final     CO2   Date Value Ref Range Status   05/11/2018 24 23 - 29 mmol/L Final     Glucose   Date Value Ref Range Status   05/11/2018 100 70 - 110 mg/dL Final     BUN, Bld   Date Value Ref Range Status   05/11/2018 27 (H) 6 - 20 mg/dL Final     Creatinine   Date Value Ref Range Status   05/11/2018 0.6 0.5 - 1.4 mg/dL Final     Calcium   Date Value Ref Range Status   05/11/2018 8.0 (L) 8.7 - 10.5 mg/dL Final     Total Protein   Date Value Ref Range Status   05/11/2018 5.3 (L) 6.0 - 8.4 g/dL Final     Albumin   Date Value Ref Range Status   05/11/2018 2.7 (L) 3.5 - 5.2 g/dL Final     Total Bilirubin   Date Value Ref Range Status   05/11/2018 1.4 (H) 0.1 - 1.0 mg/dL Final     Comment:     For infants and newborns, interpretation of results should be based  on gestational age, weight and in agreement with clinical  observations.  Premature Infant recommended reference ranges:  Up to 24 hours.............<8.0 mg/dL  Up to 48 hours............<12.0 mg/dL  3-5 days..................<15.0 mg/dL  6-29 days.................<15.0 mg/dL       Alkaline Phosphatase   Date Value Ref Range Status   05/11/2018 46 (L) 55 - 135 U/L Final     AST   Date Value Ref Range Status   05/11/2018 45 (H) 10 - 40 U/L Final     ALT   Date Value Ref Range Status   05/11/2018 83 (H) 10 - 44 U/L Final     Anion Gap   Date Value Ref Range Status   05/11/2018 10 8 - 16 mmol/L Final     eGFR if    Date Value Ref Range Status   05/11/2018 >60.0 >60  mL/min/1.73 m^2 Final     eGFR if non    Date Value Ref Range Status   05/11/2018 >60.0 >60 mL/min/1.73 m^2 Final     Comment:     Calculation used to obtain the estimated glomerular filtration  rate (eGFR) is the CKD-EPI equation.            Chest X-Ray: No new    Diagnostic Results:  No Further      Assessment/Plan:     * Sepsis    18 y/o male with h/o  AML (t 8;21) s/p intensification therapy II per [id number] (Arm A) stepped up to PICU for sepsis after presenting with waxing mental status, hypotension unresponsive to NS bolus x 2 and blood 1/2, chest tightness w/ tachypnea, new oxygen requirement and concern for PNA on CXR. He is currently intubated and sedated with PaO2:Fi this AM of 107 with worsened CXR. Pressures maintained on epi and then norepi but now off. Maintaining BPs. Extubated now and clinically improving. However, noted to still remain delirious now.      CNS:  Delirium  - Continue Seroquel 50mg PO Nightly  - Stop Seroquel 12.5mg PO at 0800 and 1400.  - Give one dose of 5mg of Olanzapine. May repeat once to help sleep.   - Will PEC at this point given patient is gravely disabled and unable to care for self. Will require sitter and someone to help redirect him.    - Continue frequent reorientation and attempt to encourage patient to sleep.   - Stop and avoid deliriogenic drugs.  Sedation  - Now extubated. Awake and alert.  - Continue to monitor neuro/mental status  - Morphine 2mg IV q4h PRN  Fever  - Scheduled tylenol 650mg, IV, Q6H for fever. No NSAIDs given allergy.      CV: Initially hypotensive and requiring pressors but now improved. Episode of SVT last night was likely secondary to PICC placement. Will continue to monitor.  - s/p one dose of Adenosine.  - D/c epi 5/6  - Off Norepi.   - MAP goal >60.  - Vitals per protocol.     PULM: Resolving ARDS and now stable on room air.  - Discontinue CPT since patient coughing.  - Discontinue Steroids  - Albuterol nebs q4 PRN.   - Stopped  Lasix/Diuril drip (5/6 - 5/8)  - Lasix 40mg wean to PO daily and will decrease to 20mg PO on 5/13/2018.  - Daily CXR  - ABG - PRN.     FEN/GI:  - Clear liquids. Will transition to soft mechanical diet today.  - off IVF.  - TFG - 100ml/hr including meds.   - Famotidine 20mg, IV, BID for ppx  - Continue PRN zofran  - Replace electrolytes as needed  - KPhos 15mmol once today     HEME/ONC:   Chemotherapy induced neutropenia:   - Hgb goal >7.  - Plts goal >20.  - Daily CBC and reticulocyte   - Total units of transfusion: 4 units of pRBCs and 7 units of platelets.    - Neupogen 600mcg daily. 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  CNS negative.  MRD negative after Induction I.    - Treating per [facility] [place] [place] [id number] (ARM A).  S/p Intensification I and Intensification II started. He was day 13 of intensification II as of 5/2/2018.  - BM biopsy at start of Intensification shows CR.  FISH for t(8;21) negative. 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  - Continue acyclovir ppx  - Continue posaconazole 400mg BID- therapeutic dosing.   - Continue bactrim QFSaSu ppx  - Continue peridex ppx     ID: BCx - Strep Mitis (sens pending) from 5/2, blood culture 5/3 NGTD  - Continue to follow previous cultures  - D/C Cefepime 5/4.   - D/C Amikacin 20mg/kg Q24H (5/4 - 5/6)  - Vancomycin 1500mg Q8H (5/3 - ) . Vanc trough 16.6 - therapeutic  - Meropenem 2g IV Q8H (5/4 - 5/11)  - Will switch back to Cefepime today 2g q8h.      RENAL: Cr wnl. Improving BUN. No active issues.  - Strict Is/Os - monitor for SIADH     SOCIAL: Mom at bedside.     LEGAL: PEC at this point.    DISPO: Transition to the floor today.            Critical Care Time greater than: 1 Hour    Farshad Mcginnis MD  Pediatric Critical Care  [facility]

## 2018-05-13 LAB
ABO + RH BLD: NORMAL
ALBUMIN SERPL BCP-MCNC: 2.7 G/DL
ALP SERPL-CCNC: 55 U/L
ALT SERPL W/O P-5'-P-CCNC: 174 U/L
ANION GAP SERPL CALC-SCNC: 7 MMOL/L
ANISOCYTOSIS BLD QL SMEAR: SLIGHT
AST SERPL-CCNC: 76 U/L
BASOPHILS # BLD AUTO: 0 K/UL
BASOPHILS NFR BLD: 0 %
BILIRUB SERPL-MCNC: 0.9 MG/DL
BLD GP AB SCN CELLS X3 SERPL QL: NORMAL
BUN SERPL-MCNC: 20 MG/DL
CALCIUM SERPL-MCNC: 8 MG/DL
CHLORIDE SERPL-SCNC: 107 MMOL/L
CO2 SERPL-SCNC: 22 MMOL/L
CREAT SERPL-MCNC: 0.7 MG/DL
DIFFERENTIAL METHOD: ABNORMAL
EOSINOPHIL # BLD AUTO: 0 K/UL
EOSINOPHIL NFR BLD: 0 %
ERYTHROCYTE [DISTWIDTH] IN BLOOD BY AUTOMATED COUNT: 16 %
EST. GFR  (AFRICAN AMERICAN): >60 ML/MIN/1.73 M^2
EST. GFR  (NON AFRICAN AMERICAN): >60 ML/MIN/1.73 M^2
GLUCOSE SERPL-MCNC: 102 MG/DL
HCT VFR BLD AUTO: 26.1 %
HGB BLD-MCNC: 9.2 G/DL
HYPOCHROMIA BLD QL SMEAR: ABNORMAL
IMM GRANULOCYTES # BLD AUTO: 0 K/UL
IMM GRANULOCYTES NFR BLD AUTO: 0 %
LYMPHOCYTES # BLD AUTO: 0.2 K/UL
LYMPHOCYTES NFR BLD: 100 %
MAGNESIUM SERPL-MCNC: 2.5 MG/DL
MCH RBC QN AUTO: 31.8 PG
MCHC RBC AUTO-ENTMCNC: 35.2 G/DL
MCV RBC AUTO: 90 FL
MONOCYTES # BLD AUTO: 0 K/UL
MONOCYTES NFR BLD: 0 %
NEUTROPHILS # BLD AUTO: 0 K/UL
NEUTROPHILS NFR BLD: 0 %
NRBC BLD-RTO: 0 /100 WBC
OVALOCYTES BLD QL SMEAR: ABNORMAL
PHOSPHATE SERPL-MCNC: 2.7 MG/DL
PLATELET # BLD AUTO: 32 K/UL
PMV BLD AUTO: 12.3 FL
POIKILOCYTOSIS BLD QL SMEAR: SLIGHT
POLYCHROMASIA BLD QL SMEAR: ABNORMAL
POTASSIUM SERPL-SCNC: 4.4 MMOL/L
PROT SERPL-MCNC: 5.7 G/DL
RBC # BLD AUTO: 2.89 M/UL
RETICS/RBC NFR AUTO: 0.4 %
SODIUM SERPL-SCNC: 136 MMOL/L
VANCOMYCIN SERPL-MCNC: 16.8 UG/ML
VANCOMYCIN TROUGH SERPL-MCNC: 32.9 UG/ML
WBC # BLD AUTO: 0.2 K/UL

## 2018-05-13 PROCEDURE — 85045 AUTOMATED RETICULOCYTE COUNT: CPT

## 2018-05-13 PROCEDURE — 11300000 HC PEDIATRIC PRIVATE ROOM

## 2018-05-13 PROCEDURE — 25000003 PHARM REV CODE 250: Performed by: STUDENT IN AN ORGANIZED HEALTH CARE EDUCATION/TRAINING PROGRAM

## 2018-05-13 PROCEDURE — 86850 RBC ANTIBODY SCREEN: CPT

## 2018-05-13 PROCEDURE — 83735 ASSAY OF MAGNESIUM: CPT

## 2018-05-13 PROCEDURE — 63600175 PHARM REV CODE 636 W HCPCS: Performed by: PEDIATRICS

## 2018-05-13 PROCEDURE — 36592 COLLECT BLOOD FROM PICC: CPT

## 2018-05-13 PROCEDURE — 99233 SBSQ HOSP IP/OBS HIGH 50: CPT | Mod: ,,, | Performed by: PEDIATRICS

## 2018-05-13 PROCEDURE — 84100 ASSAY OF PHOSPHORUS: CPT

## 2018-05-13 PROCEDURE — 85025 COMPLETE CBC W/AUTO DIFF WBC: CPT

## 2018-05-13 PROCEDURE — 25000003 PHARM REV CODE 250: Performed by: PEDIATRICS

## 2018-05-13 PROCEDURE — 80202 ASSAY OF VANCOMYCIN: CPT | Mod: 91

## 2018-05-13 PROCEDURE — 36415 COLL VENOUS BLD VENIPUNCTURE: CPT

## 2018-05-13 PROCEDURE — 80053 COMPREHEN METABOLIC PANEL: CPT

## 2018-05-13 PROCEDURE — 80202 ASSAY OF VANCOMYCIN: CPT

## 2018-05-13 RX ADMIN — SULFAMETHOXAZOLE AND TRIMETHOPRIM 1 TABLET: 800; 160 TABLET ORAL at 09:05

## 2018-05-13 RX ADMIN — QUETIAPINE FUMARATE 50 MG: 25 TABLET, FILM COATED ORAL at 09:05

## 2018-05-13 RX ADMIN — CEFEPIME HYDROCHLORIDE 2 G: 2 INJECTION, POWDER, FOR SOLUTION INTRAVENOUS at 09:05

## 2018-05-13 RX ADMIN — ACETAMINOPHEN, DIPHENHYDRAMINE HCL, PHENYLEPHRINE HCL 10 MG: 325; 25; 5 TABLET ORAL at 09:05

## 2018-05-13 RX ADMIN — VANCOMYCIN HYDROCHLORIDE 1500 MG: 10 INJECTION, POWDER, LYOPHILIZED, FOR SOLUTION INTRAVENOUS at 05:05

## 2018-05-13 RX ADMIN — POSACONAZOLE 400 MG: 100 TABLET, COATED ORAL at 09:05

## 2018-05-13 RX ADMIN — ACYCLOVIR 400 MG: 200 CAPSULE ORAL at 09:05

## 2018-05-13 RX ADMIN — FAMOTIDINE 20 MG: 20 TABLET, FILM COATED ORAL at 09:05

## 2018-05-13 RX ADMIN — CEFEPIME HYDROCHLORIDE 2 G: 2 INJECTION, POWDER, FOR SOLUTION INTRAVENOUS at 05:05

## 2018-05-13 RX ADMIN — Medication 500 UNITS: at 05:05

## 2018-05-13 RX ADMIN — VANCOMYCIN HYDROCHLORIDE 750 MG: 750 INJECTION, POWDER, LYOPHILIZED, FOR SOLUTION INTRAVENOUS at 10:05

## 2018-05-13 RX ADMIN — CEFEPIME HYDROCHLORIDE 2 G: 2 INJECTION, POWDER, FOR SOLUTION INTRAVENOUS at 01:05

## 2018-05-13 RX ADMIN — OLANZAPINE 5 MG: 5 TABLET, ORALLY DISINTEGRATING ORAL at 07:05

## 2018-05-13 RX ADMIN — VANCOMYCIN HYDROCHLORIDE 1500 MG: 10 INJECTION, POWDER, LYOPHILIZED, FOR SOLUTION INTRAVENOUS at 01:05

## 2018-05-13 RX ADMIN — OLANZAPINE 5 MG: 5 TABLET, ORALLY DISINTEGRATING ORAL at 09:05

## 2018-05-13 NOTE — PLAN OF CARE
"Problem: Patient Care Overview  Goal: Plan of Care Review  Outcome: Ongoing (interventions implemented as appropriate)  POC reviewed with patient, mother, and step dad. Verbalized understanding. VS WDL, afebrile, no distress noted. Pt oriented to person, self, time, but disoriented to place. Pt stated he was [facility]. Pt paranoid and anxious but cooperative. Took him a little time (about 25 minutes) to take his medications, but after time of explaining to him what the medications were and why they are needed, pt took them. Pt stated he believes "the nurses are trying to burn" him and that we all hate him and want him to die. Sitter and mother is at bedside, very active in care. Double lumen left chest port in place, flushes well, blood return noted. All medications/antibitoics given as scheduled. Labs drawn in am. Pt did sleep for about 5 hours tonight. No PO intake this shift besides juices, voiding well. Pt resting in bed with mother and sitter at bedside. Will continue to monitor.       "

## 2018-05-13 NOTE — ASSESSMENT & PLAN NOTE
Delirium: currently PEC'd and will need a CEC today  - Continue Seroquel 50mg PO Nightly  - Olanzapine PRN  - Continue frequent reorientation and attempt to encourage patient to sleep.   - Stop and avoid deliriogenic drugs.

## 2018-05-13 NOTE — SUBJECTIVE & OBJECTIVE
Interval History: No overnight issues, but patient remains delirious.      Scheduled Meds:   acyclovir  400 mg Oral BID    cefepime in dextrose 5 %  2 g Intravenous Q8H    famotidine  20 mg Oral BID    melatonin  10 mg Oral QHS    posaconazole  400 mg Oral BID    QUEtiapine  50 mg Oral QHS    sulfamethoxazole-trimethoprim 800-160mg  1 tablet Oral twice daily on Friday, Saturday, Sunday    vancomycin (VANCOCIN) IVPB  1,500 mg Intravenous Q8H     Continuous Infusions:   heparin(porcine) 3 Units/hr (05/11/18 1200)    heparin in 0.45% NaCl 3 Units/hr (05/11/18 1200)     PRN Meds:sodium chloride, albuterol sulfate, alteplase, calcium chloride, heparin, porcine (PF), lidocaine-prilocaine, morphine, olanzapine zydis, ondansetron, ondansetron, polyethylene glycol, potassium chloride, potassium chloride 10%    Review of Systems  Objective:     Vital Signs (Most Recent):  Temp: 98.3 °F (36.8 °C) (05/12/18 1939)  Pulse:  (pt sleeping) (05/13/18 0000)  Resp: 18 (05/12/18 1939)  BP:  (pt sleeping) (05/13/18 0000)  SpO2:  (pt sleeping) (05/13/18 0000) Vital Signs (24h Range):  Temp:  [98.1 °F (36.7 °C)-98.4 °F (36.9 °C)] 98.3 °F (36.8 °C)  Pulse:  [] 104  Resp:  [16-20] 18  SpO2:  [99 %-100 %] 99 %  BP: (114-121)/(55-59) 121/59     No data found.    Body mass index is 31.28 kg/m².    Intake/Output - Last 3 Shifts       05/11 0700 - 05/12 0659 05/12 0700 - 05/13 0659    P.O. 380 480    I.V. (mL/kg) 33 (0.4)     Blood  177    IV Piggyback 1230 965    Total Intake(mL/kg) 1643 (18.7) 1622 (18.5)    Urine (mL/kg/hr) 700 (0.3)     Total Output 700      Net +943 +1622          Urine Occurrence 3 x 5 x    Stool Occurrence 2 x 2 x          Lines/Drains/Airways     Central Venous Catheter Line                 Port A Cath Double Lumen 05/10/18 0900 left subclavian 2 days                Physical Exam   Constitutional: He appears well-developed and well-nourished.   Arousing from sedation   HENT:   Head: Normocephalic and  atraumatic.   Nose: Nose normal.   Mouth/Throat: Mucous membranes are normal.   Eyes: Conjunctivae and lids are normal.   Neck: Normal range of motion. Neck supple.   Cardiovascular: Normal rate and regular rhythm.    Pulmonary/Chest: Effort normal. No stridor. He has no decreased breath sounds. He has no wheezes. He has no rales.   Breath sounds markedly improved   Abdominal: Soft. Bowel sounds are normal. He exhibits no distension. There is no hepatosplenomegaly. There is no guarding.   Genitourinary:   Genitourinary Comments: Deferred   Musculoskeletal: Normal range of motion. He exhibits no edema.   Neurological:   Arousing from sedation. Able to follow commands. Answering basic questions. Having simple conversations. somewhat confused.   Skin: Skin is warm and dry. Capillary refill takes less than 2 seconds. No rash noted. He is not diaphoretic. No erythema.   Port CDI   Psychiatric:   Still delirious and intermittently confused though noted to be acting more like baseline today and noted to improve through the course of the day. Somewhat disinhibited.        Significant Labs:  No results for input(s): POCTGLUCOSE in the last 48 hours.    CBC:   Recent Labs  Lab 05/12/18  0336   WBC 0.18*   HGB 8.3*   HCT 24.1*   PLT 6*     CMP:   Recent Labs  Lab 05/12/18  0336   GLU 96      K 4.0      CO2 25   BUN 24*   CREATININE 0.6   CALCIUM 7.9*   MG 1.9   PROT 5.0*   ALBUMIN 2.6*   BILITOT 1.3*   ALKPHOS 51*   AST 28   ALT 78*   ANIONGAP 5*   EGFRNONAA >60.0       Significant Imaging: I have reviewed all pertinent imaging results/findings within the past 24 hours.

## 2018-05-13 NOTE — ASSESSMENT & PLAN NOTE
mmunosuppression 2/2 to chemotherapy: Last WBC 0.03, ANC 0.  - Continue acyclovir ppx  - Continue posaconazole 400mg BID- therapeutic dosing.   - Continue bactrim QFSaSu ppx  - Continue peridex ppx

## 2018-05-13 NOTE — ASSESSMENT & PLAN NOTE
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 On Intensification therapy II following [id number] (Arm A). He was admitted on 4/30/2018 for neutropenia/profund sepsis risk. Stepped up to the PICU for persistent fever >103, tachycardia, and hypotension that was been minimally responsive to fluid resuscitation. PICU stay was complicated by delirium and he currently is PEC'd, needs a one to one sitter.      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 MRD negative after Induction I.    - Treating per COG [id number] (ARM A).  S/p Intensification I and Intensification II started. He was day 13 of intensification II as of 5/2/2018.  - BM biopsy at start of Intensification shows CR.  FISH for t(8;21) negative. Will repeat BM biopsy pending count recovery and clinical improvement  - Heme/Onc on consult/co-management

## 2018-05-13 NOTE — PLAN OF CARE
Problem: Patient Care Overview  Goal: Plan of Care Review  VSS; afebrile. No distress noted. Denies pain. Patient oriented to self, place and time but remains paranoid but cooperative. CEC guidelines in place; sitter and family at bedside. Vanc trough elevated; will repeat at 2100. IV vanc held for now. Double port heparin locked btw medications. Tolerating diet. POC reviewed with patient and family; verbalized understanding. Will continue to monitor.

## 2018-05-13 NOTE — PROGRESS NOTES
[facility] Pediatric [facility] Medicine  Progress Note    Patient Name: Hema Kuo  MRN: [id number]  Admission Date: 4/30/2018  Hospital Length of Stay: 13  Code Status: Prior   Primary Care Physician: Ann Reyna NP  Principal Problem: AML (acute myeloblastic leukemia)    Subjective:     HPI:  Hema Kuo is a 17 y.o. male with AML s/p Intensification I therapy following [id number] (Arm A) who is currently admitted for neutropenia. Patient with ANC of 0 following start of Intensification II therapy.  Hema reports that he has been feeling very well since last visit.  His neck swelling has resolved.  He reports no fevers, no abdominal pain, vomiting, diarrhea or constipation and no excessive bruising or unusual bleeding.  Good appetite and energy level.  No other complaints.     Hospital Course:  Patient admitted to the floor and noted to be comfortable. He reported that he received a platelet transfusion this am and is currently asymptomatic and doing well.    Scheduled Meds:   acyclovir  400 mg Oral BID    cefepime in dextrose 5 %  2 g Intravenous Q8H    famotidine  20 mg Oral BID    melatonin  10 mg Oral QHS    posaconazole  400 mg Oral BID    QUEtiapine  50 mg Oral QHS    sulfamethoxazole-trimethoprim 800-160mg  1 tablet Oral twice daily on Friday, Saturday, Sunday    vancomycin (VANCOCIN) IVPB  1,500 mg Intravenous Q8H     Continuous Infusions:   heparin(porcine) 3 Units/hr (05/11/18 1200)    heparin in 0.45% NaCl 3 Units/hr (05/11/18 1200)     PRN Meds:sodium chloride, albuterol sulfate, alteplase, calcium chloride, heparin, porcine (PF), lidocaine-prilocaine, morphine, olanzapine zydis, ondansetron, ondansetron, polyethylene glycol, potassium chloride, potassium chloride 10%    Interval History: No overnight issues, but patient remains delirious.      Scheduled Meds:   acyclovir  400 mg Oral BID    cefepime in dextrose 5 %  2 g Intravenous Q8H    famotidine  20 mg Oral BID     melatonin  10 mg Oral QHS    posaconazole  400 mg Oral BID    QUEtiapine  50 mg Oral QHS    sulfamethoxazole-trimethoprim 800-160mg  1 tablet Oral twice daily on Friday, Saturday, Sunday    vancomycin (VANCOCIN) IVPB  1,500 mg Intravenous Q8H     Continuous Infusions:   heparin(porcine) 3 Units/hr (05/11/18 1200)    heparin in 0.45% NaCl 3 Units/hr (05/11/18 1200)     PRN Meds:sodium chloride, albuterol sulfate, alteplase, calcium chloride, heparin, porcine (PF), lidocaine-prilocaine, morphine, olanzapine zydis, ondansetron, ondansetron, polyethylene glycol, potassium chloride, potassium chloride 10%    Review of Systems  Objective:     Vital Signs (Most Recent):  Temp: 98.3 °F (36.8 °C) (05/12/18 1939)  Pulse:  (pt sleeping) (05/13/18 0000)  Resp: 18 (05/12/18 1939)  BP:  (pt sleeping) (05/13/18 0000)  SpO2:  (pt sleeping) (05/13/18 0000) Vital Signs (24h Range):  Temp:  [98.1 °F (36.7 °C)-98.4 °F (36.9 °C)] 98.3 °F (36.8 °C)  Pulse:  [] 104  Resp:  [16-20] 18  SpO2:  [99 %-100 %] 99 %  BP: (114-121)/(55-59) 121/59     No data found.    Body mass index is 31.28 kg/m².    Intake/Output - Last 3 Shifts       05/11 0700 - 05/12 0659 05/12 0700 - 05/13 0659    P.O. 380 480    I.V. (mL/kg) 33 (0.4)     Blood  177    IV Piggyback 1230 965    Total Intake(mL/kg) 1643 (18.7) 1622 (18.5)    Urine (mL/kg/hr) 700 (0.3)     Total Output 700      Net +943 +1622          Urine Occurrence 3 x 5 x    Stool Occurrence 2 x 2 x          Lines/Drains/Airways     Central Venous Catheter Line                 Port A Cath Double Lumen 05/10/18 0900 left subclavian 2 days                Physical Exam   Constitutional: He appears well-developed and well-nourished.   Arousing from sedation   HENT:   Head: Normocephalic and atraumatic.   Nose: Nose normal.   Mouth/Throat: Mucous membranes are normal.   Eyes: Conjunctivae and lids are normal.   Neck: Normal range of motion. Neck supple.   Cardiovascular: Normal rate and regular  rhythm.    Pulmonary/Chest: Effort normal. No stridor. He has no decreased breath sounds. He has no wheezes. He has no rales.   Breath sounds markedly improved   Abdominal: Soft. Bowel sounds are normal. He exhibits no distension. There is no hepatosplenomegaly. There is no guarding.   Genitourinary:   Genitourinary Comments: Deferred   Musculoskeletal: Normal range of motion. He exhibits no edema.   Neurological:   Arousing from sedation. Able to follow commands. Answering basic questions. Having simple conversations. somewhat confused.   Skin: Skin is warm and dry. Capillary refill takes less than 2 seconds. No rash noted. He is not diaphoretic. No erythema.   Port CDI   Psychiatric:   Still delirious and intermittently confused though noted to be acting more like baseline today and noted to improve through the course of the day. Somewhat disinhibited.        Significant Labs:  No results for input(s): POCTGLUCOSE in the last 48 hours.    CBC:   Recent Labs  Lab 05/12/18  0336   WBC 0.18*   HGB 8.3*   HCT 24.1*   PLT 6*     CMP:   Recent Labs  Lab 05/12/18  0336   GLU 96      K 4.0      CO2 25   BUN 24*   CREATININE 0.6   CALCIUM 7.9*   MG 1.9   PROT 5.0*   ALBUMIN 2.6*   BILITOT 1.3*   ALKPHOS 51*   AST 28   ALT 78*   ANIONGAP 5*   EGFRNONAA >60.0       Significant Imaging: I have reviewed all pertinent imaging results/findings within the past 24 hours.    Assessment/Plan:     Neuro   Delirium    Delirium: currently PEC'd and will need a CEC today  - Continue Seroquel 50mg PO Nightly  - Olanzapine PRN  - Continue frequent reorientation and attempt to encourage patient to sleep.   - Stop and avoid deliriogenic drugs.          Immunology/Multi System   Immunosuppressed due to chemotherapy    mmunosuppression 2/2 to chemotherapy: Last WBC 0.03, ANC 0.  - Continue acyclovir ppx  - Continue posaconazole 400mg BID- therapeutic dosing.   - Continue bactrim QFSaSu ppx  - Continue peridex ppx            Oncology   * AML (acute myeloblastic leukemia)    Hema is a 17 yr young man with AML (t 8;21) here for chemotherapy. On Intensification therapy II following [id number] (Arm A). He was admitted on 4/30/2018 for neutropenia/profund sepsis risk. Stepped up to the PICU for persistent fever >103, tachycardia, and hypotension that was been minimally responsive to fluid resuscitation. PICU stay was complicated by delirium and he currently is PEC'd, needs a one to one sitter.      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 MRD negative after Induction I.    - Treating per COG [id number] (ARM A).  S/p Intensification I and Intensification II started. He was day 13 of intensification II as of 5/2/2018.  - BM biopsy at start of Intensification shows CR.  FISH for t(8;21) negative. Will repeat BM biopsy pending count recovery and clinical improvement  - Heme/Onc on consult/co-management               Neutropenia    - Soft mechanical diet today, off IVF.  - TFG - 100ml/hr including meds.   - Famotidine 20mg, IV, BID for ppx  - Continue PRN zofran  - Replace electrolytes as needed        Antineoplastic chemotherapy induced pancytopenia    Chemotherapy induced neutropenia:   - Hgb goal >7.  - Plts goal >20.  - Daily CBC and reticulocyte   - Total units of transfusion: 4 units of pRBCs and 7 units of platelets.    - Neupogen 600mcg daily. (5/4 - 5-12/18)                    Anticipated Disposition: Home or Self Care    Raymond Cormier MD  Pediatric Hospital Medicine   [facility]

## 2018-05-14 LAB
ALBUMIN SERPL BCP-MCNC: 2.6 G/DL
ALP SERPL-CCNC: 60 U/L
ALT SERPL W/O P-5'-P-CCNC: 234 U/L
ANION GAP SERPL CALC-SCNC: 6 MMOL/L
ANISOCYTOSIS BLD QL SMEAR: SLIGHT
ANISOCYTOSIS BLD QL SMEAR: SLIGHT
AST SERPL-CCNC: 68 U/L
BASOPHILS # BLD AUTO: ABNORMAL K/UL
BASOPHILS # BLD AUTO: ABNORMAL K/UL
BASOPHILS NFR BLD: 0 %
BASOPHILS NFR BLD: 0 %
BILIRUB SERPL-MCNC: 0.8 MG/DL
BUN SERPL-MCNC: 15 MG/DL
CALCIUM SERPL-MCNC: 8.1 MG/DL
CHLORIDE SERPL-SCNC: 106 MMOL/L
CO2 SERPL-SCNC: 23 MMOL/L
CREAT SERPL-MCNC: 0.6 MG/DL
DIFFERENTIAL METHOD: ABNORMAL
DIFFERENTIAL METHOD: ABNORMAL
EOSINOPHIL # BLD AUTO: ABNORMAL K/UL
EOSINOPHIL # BLD AUTO: ABNORMAL K/UL
EOSINOPHIL NFR BLD: 0 %
EOSINOPHIL NFR BLD: 0 %
ERYTHROCYTE [DISTWIDTH] IN BLOOD BY AUTOMATED COUNT: 15.8 %
ERYTHROCYTE [DISTWIDTH] IN BLOOD BY AUTOMATED COUNT: 15.8 %
EST. GFR  (AFRICAN AMERICAN): >60 ML/MIN/1.73 M^2
EST. GFR  (NON AFRICAN AMERICAN): >60 ML/MIN/1.73 M^2
GLUCOSE SERPL-MCNC: 90 MG/DL
HCT VFR BLD AUTO: 12.8 %
HCT VFR BLD AUTO: 22.4 %
HGB BLD-MCNC: 4.7 G/DL
HGB BLD-MCNC: 7.8 G/DL
HYPOCHROMIA BLD QL SMEAR: ABNORMAL
IMM GRANULOCYTES # BLD AUTO: ABNORMAL K/UL
IMM GRANULOCYTES # BLD AUTO: ABNORMAL K/UL
IMM GRANULOCYTES NFR BLD AUTO: ABNORMAL %
IMM GRANULOCYTES NFR BLD AUTO: ABNORMAL %
LYMPHOCYTES # BLD AUTO: ABNORMAL K/UL
LYMPHOCYTES # BLD AUTO: ABNORMAL K/UL
LYMPHOCYTES NFR BLD: 100 %
LYMPHOCYTES NFR BLD: 100 %
MAGNESIUM SERPL-MCNC: 1.8 MG/DL
MCH RBC QN AUTO: 31.2 PG
MCH RBC QN AUTO: 32 PG
MCHC RBC AUTO-ENTMCNC: 34.8 G/DL
MCHC RBC AUTO-ENTMCNC: 36.7 G/DL
MCV RBC AUTO: 87 FL
MCV RBC AUTO: 90 FL
MONOCYTES # BLD AUTO: ABNORMAL K/UL
MONOCYTES # BLD AUTO: ABNORMAL K/UL
MONOCYTES NFR BLD: 0 %
MONOCYTES NFR BLD: 0 %
NEUTROPHILS NFR BLD: 0 %
NEUTROPHILS NFR BLD: 0 %
NRBC BLD-RTO: 0 /100 WBC
NRBC BLD-RTO: 0 /100 WBC
OVALOCYTES BLD QL SMEAR: ABNORMAL
PHOSPHATE SERPL-MCNC: 2.8 MG/DL
PLATELET # BLD AUTO: 17 K/UL
PLATELET # BLD AUTO: 23 K/UL
PLATELET BLD QL SMEAR: ABNORMAL
PMV BLD AUTO: 12.5 FL
PMV BLD AUTO: ABNORMAL FL
POIKILOCYTOSIS BLD QL SMEAR: SLIGHT
POLYCHROMASIA BLD QL SMEAR: ABNORMAL
POTASSIUM SERPL-SCNC: 4.1 MMOL/L
PROT SERPL-MCNC: 5.1 G/DL
RBC # BLD AUTO: 1.47 M/UL
RBC # BLD AUTO: 2.5 M/UL
RETICS/RBC NFR AUTO: 0.1 %
SODIUM SERPL-SCNC: 135 MMOL/L
SPHEROCYTES BLD QL SMEAR: ABNORMAL
VANCOMYCIN TROUGH SERPL-MCNC: 6.6 UG/ML
WBC # BLD AUTO: 0.31 K/UL
WBC # BLD AUTO: 0.42 K/UL

## 2018-05-14 PROCEDURE — 85027 COMPLETE CBC AUTOMATED: CPT | Mod: 91

## 2018-05-14 PROCEDURE — 97530 THERAPEUTIC ACTIVITIES: CPT

## 2018-05-14 PROCEDURE — 84100 ASSAY OF PHOSPHORUS: CPT

## 2018-05-14 PROCEDURE — 90792 PSYCH DIAG EVAL W/MED SRVCS: CPT | Mod: AF,HA,, | Performed by: PSYCHIATRY & NEUROLOGY

## 2018-05-14 PROCEDURE — 85007 BL SMEAR W/DIFF WBC COUNT: CPT

## 2018-05-14 PROCEDURE — 36592 COLLECT BLOOD FROM PICC: CPT

## 2018-05-14 PROCEDURE — 83735 ASSAY OF MAGNESIUM: CPT

## 2018-05-14 PROCEDURE — 93005 ELECTROCARDIOGRAM TRACING: CPT

## 2018-05-14 PROCEDURE — 36415 COLL VENOUS BLD VENIPUNCTURE: CPT

## 2018-05-14 PROCEDURE — 80053 COMPREHEN METABOLIC PANEL: CPT

## 2018-05-14 PROCEDURE — 93010 ELECTROCARDIOGRAM REPORT: CPT | Mod: ,,, | Performed by: PEDIATRICS

## 2018-05-14 PROCEDURE — 63600175 PHARM REV CODE 636 W HCPCS: Performed by: PEDIATRICS

## 2018-05-14 PROCEDURE — 25000003 PHARM REV CODE 250: Performed by: STUDENT IN AN ORGANIZED HEALTH CARE EDUCATION/TRAINING PROGRAM

## 2018-05-14 PROCEDURE — 80202 ASSAY OF VANCOMYCIN: CPT

## 2018-05-14 PROCEDURE — 85045 AUTOMATED RETICULOCYTE COUNT: CPT

## 2018-05-14 PROCEDURE — 99233 SBSQ HOSP IP/OBS HIGH 50: CPT | Mod: ,,, | Performed by: PEDIATRICS

## 2018-05-14 PROCEDURE — 25000003 PHARM REV CODE 250: Performed by: PEDIATRICS

## 2018-05-14 PROCEDURE — 11300000 HC PEDIATRIC PRIVATE ROOM

## 2018-05-14 RX ORDER — RISPERIDONE 1 MG/1
1 TABLET, ORALLY DISINTEGRATING ORAL 2 TIMES DAILY
Status: DISCONTINUED | OUTPATIENT
Start: 2018-05-14 | End: 2018-05-15

## 2018-05-14 RX ORDER — VANCOMYCIN/0.9 % SOD CHLORIDE 1 G/100 ML
1000 PLASTIC BAG, INJECTION (ML) INTRAVENOUS
Status: DISCONTINUED | OUTPATIENT
Start: 2018-05-15 | End: 2018-05-16

## 2018-05-14 RX ADMIN — ACYCLOVIR 400 MG: 200 CAPSULE ORAL at 09:05

## 2018-05-14 RX ADMIN — ACYCLOVIR 400 MG: 200 CAPSULE ORAL at 08:05

## 2018-05-14 RX ADMIN — POSACONAZOLE 400 MG: 100 TABLET, COATED ORAL at 08:05

## 2018-05-14 RX ADMIN — VANCOMYCIN HYDROCHLORIDE 750 MG: 750 INJECTION, POWDER, LYOPHILIZED, FOR SOLUTION INTRAVENOUS at 02:05

## 2018-05-14 RX ADMIN — FAMOTIDINE 20 MG: 20 TABLET, FILM COATED ORAL at 08:05

## 2018-05-14 RX ADMIN — ACETAMINOPHEN, DIPHENHYDRAMINE HCL, PHENYLEPHRINE HCL 10 MG: 325; 25; 5 TABLET ORAL at 08:05

## 2018-05-14 RX ADMIN — CEFEPIME HYDROCHLORIDE 2 G: 2 INJECTION, POWDER, FOR SOLUTION INTRAVENOUS at 08:05

## 2018-05-14 RX ADMIN — CEFEPIME HYDROCHLORIDE 2 G: 2 INJECTION, POWDER, FOR SOLUTION INTRAVENOUS at 04:05

## 2018-05-14 RX ADMIN — RISPERIDONE 1 MG: 1 TABLET, ORALLY DISINTEGRATING ORAL at 08:05

## 2018-05-14 RX ADMIN — VANCOMYCIN HYDROCHLORIDE 750 MG: 750 INJECTION, POWDER, LYOPHILIZED, FOR SOLUTION INTRAVENOUS at 11:05

## 2018-05-14 RX ADMIN — CEFEPIME HYDROCHLORIDE 2 G: 2 INJECTION, POWDER, FOR SOLUTION INTRAVENOUS at 01:05

## 2018-05-14 RX ADMIN — POSACONAZOLE 400 MG: 100 TABLET, COATED ORAL at 09:05

## 2018-05-14 RX ADMIN — FAMOTIDINE 20 MG: 20 TABLET, FILM COATED ORAL at 09:05

## 2018-05-14 RX ADMIN — VANCOMYCIN HYDROCHLORIDE 750 MG: 750 INJECTION, POWDER, LYOPHILIZED, FOR SOLUTION INTRAVENOUS at 05:05

## 2018-05-14 NOTE — ASSESSMENT & PLAN NOTE
Delirium: currently CEC  - Continue Seroquel 50mg PO Nightly  - Olanzapine PRN  - Continue frequent reorientation and attempt to encourage patient to sleep.   - Stop and avoid deliriogenic drugs.  - Psych to see today

## 2018-05-14 NOTE — ASSESSMENT & PLAN NOTE
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  - Continue acyclovir ppx  - Continue posaconazole 400mg BID- therapeutic dosing.   - Continue bactrim QFSaSu ppx  - Continue peridex ppx

## 2018-05-14 NOTE — PROGRESS NOTES
[facility] Pediatric Hospital Medicine  Progress Note    Patient Name: Hema Kuo  MRN: [id number]  Admission Date: 4/30/2018  Hospital Length of Stay: 14  Code Status: Prior   Primary Care Physician: Ann Reyna NP  Principal Problem: Bacteremia    Subjective:     HPI:  Hema Kuo is a 17 y.o. male with AML s/p Intensification I therapy following [id number] (Arm A) who is currently admitted for neutropenia. Patient with ANC of 0 following start of Intensification II therapy.  Hema reports that he has been feeling very well since last visit.  His neck swelling has resolved.  He reports no fevers, no abdominal pain, vomiting, diarrhea or constipation and no excessive bruising or unusual bleeding.  Good appetite and energy level.  No other complaints.     Hospital Course:  Patient admitted to the floor and noted to be comfortable. He reported that he received a platelet transfusion this am and is currently asymptomatic and doing well.    Scheduled Meds:   acyclovir  400 mg Oral BID    cefepime in dextrose 5 %  2 g Intravenous Q8H    famotidine  20 mg Oral BID    melatonin  10 mg Oral QHS    posaconazole  400 mg Oral BID    QUEtiapine  50 mg Oral QHS    sulfamethoxazole-trimethoprim 800-160mg  1 tablet Oral twice daily on Friday, Saturday, Sunday    vancomycin (VANCOCIN) IVPB  750 mg Intravenous Q8H     Continuous Infusions:  PRN Meds:albuterol sulfate, alteplase, heparin, porcine (PF), lidocaine-prilocaine, morphine, olanzapine zydis, ondansetron, polyethylene glycol    Interval History: VSS, NAEO. Remained paranoid. CEC in place.     Scheduled Meds:   acyclovir  400 mg Oral BID    cefepime in dextrose 5 %  2 g Intravenous Q8H    famotidine  20 mg Oral BID    melatonin  10 mg Oral QHS    posaconazole  400 mg Oral BID    QUEtiapine  50 mg Oral QHS    sulfamethoxazole-trimethoprim 800-160mg  1 tablet Oral twice daily on Friday, Saturday, Sunday    vancomycin (VANCOCIN) IVPB  750 mg  Intravenous Q8H     Continuous Infusions:  PRN Meds:albuterol sulfate, alteplase, calcium chloride, heparin, porcine (PF), lidocaine-prilocaine, morphine, olanzapine zydis, ondansetron, ondansetron, polyethylene glycol, potassium chloride, potassium chloride 10%    Review of Systems   Constitutional: Negative for fever.   Psychiatric/Behavioral: Positive for agitation.     Objective:     Vital Signs (Most Recent):  Temp: 98.8 °F (37.1 °C) (05/14/18 0544)  Pulse: 94 (05/14/18 0544)  Resp: 18 (05/14/18 0544)  BP: (!) 106/57 (05/14/18 0544)  SpO2: 100 % (05/14/18 0544) Vital Signs (24h Range):  Temp:  [98.3 °F (36.8 °C)-98.8 °F (37.1 °C)] 98.8 °F (37.1 °C)  Pulse:  [] 94  Resp:  [18] 18  SpO2:  [98 %-100 %] 100 %  BP: (106-121)/(56-59) 106/57     No data found.    Body mass index is 31.28 kg/m².    Intake/Output - Last 3 Shifts       05/12 0700 - 05/13 0659 05/13 0700 - 05/14 0659    P.O. 480 1260    Blood 177     IV Piggyback 965 175    Total Intake(mL/kg) 1622 (18.5) 1435 (16.3)    Net +1622 +1435          Urine Occurrence 5 x 2 x    Stool Occurrence 2 x 1 x    Emesis Occurrence  1 x          Lines/Drains/Airways     Central Venous Catheter Line                 Port A Cath Double Lumen 05/10/18 0900 left subclavian 3 days                Physical Exam  Constitutional: He appears well-developed and well-nourished.   Head: Normocephalic and atraumatic.   Nose: Nose normal.   Mouth/Throat: Mucous membranes are normal.   Eyes: Conjunctivae and lids are normal.   Neck: Normal range of motion. Neck supple.   Cardiovascular: Normal rate and regular rhythm.    Pulmonary/Chest: Effort normal. No stridor. He has no decreased breath sounds. He has no wheezes. He has no rales.   Abdominal: Soft. Bowel sounds are normal. He exhibits no distension. There is no hepatosplenomegaly. There is no guarding.   Musculoskeletal: Normal range of motion. He exhibits no edema.   Skin: Skin is warm and dry. Capillary refill takes less  than 2 seconds. No rash noted. He is not diaphoretic. No erythema. Port CDI.   Psychiatric: Still delirious ,incomprehensible speech.     Significant Labs:  Recent Results (from the past 24 hour(s))   VANCOMYCIN, TROUGH before 4th dose    Collection Time: 05/13/18  2:00 PM   Result Value Ref Range    Vancomycin-Trough 32.9 (HH) 10.0 - 22.0 ug/mL   Vancomycin, random    Collection Time: 05/13/18  9:15 PM   Result Value Ref Range    Vancomycin, Random 16.8 Not established ug/mL   Type & Screen    Collection Time: 05/13/18  9:15 PM   Result Value Ref Range    Group & Rh AB POS     Indirect Nathan NEG    Reticulocytes    Collection Time: 05/14/18  4:51 AM   Result Value Ref Range    Retic 0.1 (L) 0.4 - 2.0 %   Comprehensive metabolic panel    Collection Time: 05/14/18  4:51 AM   Result Value Ref Range    Sodium 135 (L) 136 - 145 mmol/L    Potassium 4.1 3.5 - 5.1 mmol/L    Chloride 106 95 - 110 mmol/L    CO2 23 23 - 29 mmol/L    Glucose 90 70 - 110 mg/dL    BUN, Bld 15 6 - 20 mg/dL    Creatinine 0.6 0.5 - 1.4 mg/dL    Calcium 8.1 (L) 8.7 - 10.5 mg/dL    Total Protein 5.1 (L) 6.0 - 8.4 g/dL    Albumin 2.6 (L) 3.5 - 5.2 g/dL    Total Bilirubin 0.8 0.1 - 1.0 mg/dL    Alkaline Phosphatase 60 55 - 135 U/L    AST 68 (H) 10 - 40 U/L     (H) 10 - 44 U/L    Anion Gap 6 (L) 8 - 16 mmol/L    eGFR if African American >60.0 >60 mL/min/1.73 m^2    eGFR if non African American >60.0 >60 mL/min/1.73 m^2   Magnesium    Collection Time: 05/14/18  4:51 AM   Result Value Ref Range    Magnesium 1.8 1.6 - 2.6 mg/dL   Phosphorus    Collection Time: 05/14/18  4:51 AM   Result Value Ref Range    Phosphorus 2.8 2.7 - 4.5 mg/dL   CBC auto differential    Collection Time: 05/14/18  4:51 AM   Result Value Ref Range    WBC 0.42 (LL) 3.90 - 12.70 K/uL    RBC 1.47 (L) 4.60 - 6.20 M/uL    Hemoglobin 4.7 (LL) 14.0 - 18.0 g/dL    Hematocrit 12.8 (LL) 40.0 - 54.0 %    MCV 87 82 - 98 fL    MCH 32.0 (H) 27.0 - 31.0 pg    MCHC 36.7 (H) 32.0 - 36.0 g/dL     RDW 15.8 (H) 11.5 - 14.5 %    MPV 12.5 9.2 - 12.9 fL       Significant Imaging:   none    Assessment/Plan:     Neuro   Delirium    Delirium: currently CEC  - Continue Seroquel 50mg PO Nightly  - Olanzapine PRN  - Continue frequent reorientation and attempt to encourage patient to sleep.   - Stop and avoid deliriogenic drugs.  - Psych to see today        ID   * Bacteremia    Patient with Strep mitis sepsis. Bcx from 5/2 growing organism-repeat cultures NG.      - Continue Cefepime 2g Q8h  - Continue Vancomycin 750mg Q8h, follow Vanc trough at 2200 on 5/14  - repeat Cx NGTD        Immunology/Multi System   Immunosuppressed due to chemotherapy    Immunosuppression 2/2 to chemotherapy: Last WBC 0.03, ANC 0.  - Continue acyclovir ppx  - Continue posaconazole 400mg BID- therapeutic dosing.   - Continue bactrim QFSaSu ppx  - Continue peridex ppx           Oncology   Neutropenia    - Soft mechanical diet, off IVF.  - TFG - 100ml/hr including meds.   - Famotidine 20mg, IV, BID for ppx  - Continue PRN zofran  - Replace electrolytes as needed        AML (acute myeloblastic leukemia)    Hema is a 17 yr young man with AML (t 8;21) here for chemotherapy. On Intensification therapy II following [id number] (Arm A). He was admitted on 4/30/2018 for neutropenia/profund sepsis risk. Stepped up to the PICU for persistent fever >103, tachycardia, and hypotension that was been minimally responsive to fluid resuscitation. PICU stay was complicated by delirium and he currently is PEC'd, needs a one to one sitter.      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 MRD negative after Induction I.    - Treating per COG [id number] (ARM A).  S/p Intensification I and Intensification II started. He was day 13 of intensification II as of 5/2/2018.  - BM biopsy at start of Intensification shows CR.  FISH for t(8;21) negative. Will repeat BM biopsy pending count recovery and clinical improvement  - Heme/Onc on  consult/co-management               Antineoplastic chemotherapy induced pancytopenia    Chemotherapy induced neutropenia:   - Hgb goal >7.  - Plts goal >10.  - Daily CBC and reticulocyte, stable H/H, platelets today   - Total units of transfusion: 4 units of pRBCs and 7 units of platelets.    - Neupogen 600mcg daily. (5/4 - 5-12/18)                    Anticipated Disposition: Home or Self Care    Ayanna Ramirez DO  Pediatric Hospital Medicine   [facility]

## 2018-05-14 NOTE — SUBJECTIVE & OBJECTIVE
Patient History           Medical as of 5/14/2018     Past Medical History     Diagnosis Date Comments Source    AML (acute myeloblastic leukemia) 10/2017 -- Provider    Asthma -- -- Provider    Encounter for blood transfusion -- -- Provider    Seasonal allergies -- -- Provider          Pertinent Negatives     Diagnosis Date Noted Comments Source    Anticoagulant long-term use 4/19/2018 -- Provider    Arthritis 4/19/2018 -- Provider    CHF (congestive heart failure) 4/19/2018 -- Provider    COPD (chronic obstructive pulmonary disease) 4/19/2018 -- Provider    Coronary artery disease 4/19/2018 -- Provider    Diabetes mellitus 4/19/2018 -- Provider    Hypertension 4/19/2018 -- Provider    Seizures 4/19/2018 -- Provider    Stroke 4/19/2018 -- Provider    Thyroid disease 4/19/2018 -- Provider    Transfusion reaction 4/19/2018 -- Provider                  Surgical as of 5/14/2018     Past Surgical History     Procedure Laterality Date Comments Source    TONSILLECTOMY -- -- -- Provider    PORTACATH PLACEMENT -- 10/31/2017 -- Provider                  Family as of 5/14/2018     Problem Relation Name Age of Onset Comments Source    Heart disease Mother -- -- -- Provider    Cancer Mother -- -- -- Provider    No Known Problems Father -- -- -- Provider            Tobacco Use as of 5/14/2018     Smoking Status Smoking Start Date Smoking Quit Date Packs/day Years Used    Former Smoker -- 10/30/2017 0.50 --    Types Comments Smokeless Tobacco Status Smokeless Tobacco Quit Date Source     Cigarettes -- Never Used -- Provider            Alcohol Use as of 5/14/2018     Alcohol Use Drinks/Week Alcohol/Week Comments Source    Yes -- -- rare occasions Provider            Drug Use as of 5/14/2018     Drug Use Types Frequency Comments Source    No -- -- -- Provider            Sexual Activity as of 5/14/2018     Sexually Active Birth Control Partners Comments Source    Yes -- Female -- Provider            Activities of Daily Living  as of 5/14/2018    **None**           Social Documentation as of 5/14/2018    **None**           Occupational as of 5/14/2018    **None**           Socioeconomic as of 5/14/2018     Marital Status Spouse Name Number of Children Years Education Preferred Language Ethnicity Race Source    Single -- -- -- English /White White --         Pertinent History Q A Comments    as of 5/14/2018 Lives with      Place in Birth Order      Lives in      Number of Siblings      Raised by      Legal Involvement      Childhood Trauma      Criminal History of      Financial Status      Highest Level of Education      Does patient have access to a firearm?       Service      Primary Leisure Activity      Spirituality       Past Medical History:   Diagnosis Date    AML (acute myeloblastic leukemia) 10/2017    Asthma     Encounter for blood transfusion     Seasonal allergies      Past Surgical History:   Procedure Laterality Date    PORTACATH PLACEMENT  10/31/2017    TONSILLECTOMY       Family History     Problem Relation (Age of Onset)    Cancer Mother    Heart disease Mother    No Known Problems Father        Social History Main Topics    Smoking status: Former Smoker     Packs/day: 0.50     Types: Cigarettes     Quit date: 10/30/2017    Smokeless tobacco: Never Used    Alcohol use Yes      Comment: rare occasions    Drug use: No    Sexual activity: Yes     Partners: Female     Review of patient's allergies indicates:   Allergen Reactions    Nsaids (non-steroidal anti-inflammatory drug) Anaphylaxis    Nuts [tree nut] Anaphylaxis    Strawberry     Vancomycin analogues Itching     Premedicate with benadryl and admin over 2hr.       No current facility-administered medications on file prior to encounter.      Current Outpatient Prescriptions on File Prior to Encounter   Medication Sig    acyclovir (ZOVIRAX) 400 MG tablet Take 1 tablet (400 mg total) by mouth 2 (two) times daily.    chlorhexidine (PERIDEX)  "0.12 % solution     ciprofloxacin HCl (CIPRO) 500 MG tablet Take 1 tablet (500 mg total) by mouth every 12 (twelve) hours.    ondansetron (ZOFRAN-ODT) 8 MG TbDL Take 1 tablet (8 mg total) by mouth every 8 (eight) hours as needed.    oxyCODONE (ROXICODONE) 10 mg Tab immediate release tablet Take 1 tablet (10 mg total) by mouth every 6 (six) hours as needed.    polyethylene glycol (GLYCOLAX) 17 gram PwPk Take 17 g by mouth daily as needed (No bowel movement).    posaconazole 100 mg TbEC tablet Take 3 tablets (300 mg total) by mouth once daily.    sulfamethoxazole-trimethoprim 800-160mg (BACTRIM DS) 800-160 mg Tab Take 1 tablet by mouth twice daily only on Friday, Saturday, Sunday.    lisdexamfetamine (VYVANSE) 60 MG capsule Take 60 mg by mouth every morning.    LORazepam (ATIVAN) 1 MG tablet Take 1 tablet (1 mg total) by mouth every 6 (six) hours as needed (Nausea).    pantoprazole (PROTONIX) 40 MG tablet Take 1 tablet (40 mg total) by mouth once daily.     Psychotherapeutics     Start     Stop Route Frequency Ordered    05/11/18 1029  olanzapine zydis disintegrating tablet 5 mg      -- Oral As needed (PRN) 05/11/18 0932    05/09/18 2245  QUEtiapine tablet 50 mg      -- Oral Nightly 05/09/18 2130        Review of Systems  Strengths and Liabilities: Strength: Patient has positive support network.    Objective:     Vital Signs (Most Recent):  Temp: 98.8 °F (37.1 °C) (05/14/18 0806)  Pulse: 105 (05/14/18 0806)  Resp: 18 (05/14/18 0806)  BP: (!) 125/57 (05/14/18 0806)  SpO2: 98 % (05/14/18 0806) Vital Signs (24h Range):  Temp:  [98.4 °F (36.9 °C)-98.8 °F (37.1 °C)] 98.8 °F (37.1 °C)  Pulse:  [] 105  Resp:  [18] 18  SpO2:  [98 %-100 %] 98 %  BP: (106-125)/(56-59) 125/57     Height: 5' 6" (167.6 cm)  Weight: 87.9 kg (193 lb 12.6 oz)  Body mass index is 31.28 kg/m².      Intake/Output Summary (Last 24 hours) at 05/14/18 1054  Last data filed at 05/13/18 1700   Gross per 24 hour   Intake             1255 ml "   Output                0 ml   Net             1255 ml       Physical Exam    Mental Status Exam  General appearance and behavior: No acute distress, no abnormal involuntary movements, guarded, minimally cooperative  Level of Consciousness: awake  Attention: did not cooperate with SAVEAHAART  Orientation: intact to self; disoriented to place, month, day of the week, situation  Psychomotor Behavior: + retardation   Speech: slow, soft, impoverished  Language: prosody intact, non-spontaneous, and appropriate without evidence of neologisms or gross idiosyncrasies   Mood: unable to assess   Affect: constricted  Thought Process: evidence of unwarranted suspicion, disorganized at times   Associations: intact  Thought Content: unable to assess. + Persecutory delusions per collateral   Memory: unable to assess  Fund of Knowledge: unable to assess  Abstraction: unable to assess  Calculation/Concentration: unable to assess  Insight: impaired/limited  Judgment: Behavior adequate for circumstances    Significant Labs:   Last 24 Hours:   Recent Lab Results       05/14/18  0559 05/14/18  0451 05/13/18  2115 05/13/18  1400      Immature Granulocytes CANCELED  Comment:  Result canceled by the ancillary CANCELED  Comment:  Result canceled by the ancillary       Immature Grans (Abs) CANCELED  Comment:  Mild elevation in immature granulocytes is non specific and   can be seen in a variety of conditions including stress response,   acute inflammation, trauma and pregnancy. Correlation with other   laboratory and clinical findings is essential.    Result canceled by the ancillary   CANCELED  Comment:  Mild elevation in immature granulocytes is non specific and   can be seen in a variety of conditions including stress response,   acute inflammation, trauma and pregnancy. Correlation with other   laboratory and clinical findings is essential.    Result canceled by the ancillary         Albumin  2.6(L)       Alkaline Phosphatase  60        ALT  234(H)       Anion Gap  6(L)       Aniso Slight Slight       AST  68(H)       Baso # Test Not Performed  Comment:  Corrected result; previously reported as 0.00 on %DDDDDDDD% at %TTT%.[C] Test Not Performed  Comment:  Corrected result; previously reported as 0.01 on %DDDDDDDD% at %TTT%.[C]       Basophil% 0.0 0.0  Comment:  Corrected result; previously reported as 2.4 on %DDDDDDDD% at %TTT%.[C]       Total Bilirubin  0.8  Comment:  For infants and newborns, interpretation of results should be based  on gestational age, weight and in agreement with clinical  observations.  Premature Infant recommended reference ranges:  Up to 24 hours.............<8.0 mg/dL  Up to 48 hours............<12.0 mg/dL  3-5 days..................<15.0 mg/dL  6-29 days.................<15.0 mg/dL         BUN, Bld  15       Calcium  8.1(L)       Chloride  106       CO2  23       Creatinine  0.6       Differential Method Manual Manual       eGFR if   >60.0       eGFR if non   >60.0  Comment:  Calculation used to obtain the estimated glomerular filtration  rate (eGFR) is the CKD-EPI equation.          Eos # Test Not Performed  Comment:  Corrected result; previously reported as 0.0 on %DDDDDDDD% at %TTT%.[C] Test Not Performed  Comment:  Corrected result; previously reported as 0.0 on %DDDDDDDD% at %TTT%.[C]       Eosinophil% 0.0 0.0       Glucose  90       Gran% 0.0  Comment:  Corrected result; previously reported as 12.8 on %DDDDDDDD% at %TTT%.(L)[C] 0.0  Comment:  Corrected result; previously reported as 7.2 on %DDDDDDDD% at %TTT%.(L)[C]       Group & Rh   AB POS      Hematocrit 22.4(L) 12.8  Comment:  wbc,hgb,hct  critical result(s) called and verbal readback obtained   from lucero schafer rn, 05/14/2018 05:34  (LL)       Hemoglobin 7.8(L) 4.7  Comment:  wbc,hgb,hct  critical result(s) called and verbal readback obtained   from lucero schafer rn, 05/14/2018 05:34  (LL)       Hypo  Occasional       INDIRECT  LARRY   NEG      Lymph # Test Not Performed  Comment:  Corrected result; previously reported as 0.2 on %DDDDDDDD% at %TTT%.[C] Test Not Performed  Comment:  Corrected result; previously reported as 0.3 on %DDDDDDDD% at %TTT%.[C]       Lymph% 100.0  Comment:  Corrected result; previously reported as 74.2 on %DDDDDDDD% at %TTT%.(H)[C] 100.0  Comment:  Corrected result; previously reported as 71.4 on %DDDDDDDD% at %TTT%.(H)[C]       Magnesium  1.8       MCH 31.2(H) 32.0(H)       MCHC 34.8 36.7(H)       MCV 90 87       Mono # Test Not Performed  Comment:  Corrected result; previously reported as 0.0 on %DDDDDDDD% at %TTT%.[C] Test Not Performed  Comment:  Corrected result; previously reported as 0.0 on %DDDDDDDD% at %TTT%.[C]       Mono% 0.0  Comment:  Corrected result; previously reported as 6.5 on %DDDDDDDD% at %TTT%.(L)[C] 0.0  Comment:  Corrected result; previously reported as 9.5 on %DDDDDDDD% at %TTT%.(L)[C]       MPV SEE COMMENT  Comment:  Result not available. 12.5       nRBC 0 0       Ovalocytes  Occasional       Phosphorus  2.8       Platelet Estimate Decreased(A)        Platelets 17  Comment:  PLATELET COUNT  critical result(s) called and verbal readback   obtained from LUCERO COLES RN, 05/14/2018 08:21  (LL) 23  Comment:  PLT  critical result(s) called and verbal readback obtained from   NURSE CHAKA, 05/14/2018 08:10  (LL)       Poik  Slight       Poly  Occasional       Potassium  4.1       Total Protein  5.1(L)       RBC 2.50(L) 1.47(L)       RDW 15.8(H) 15.8(H)       Retic  0.1(L)       Sodium  135(L)       Spherocytes  Occasional       Vancomycin, Random   16.8      Vancomycin-Trough    32.9  Comment:  *Critical value -   Results called to and read back by:Manuela Retana RN  ()     WBC 0.31  Comment:  wbc critical result(s) called and verbal readback obtained from   lucero coles rn, 05/14/2018 06:23  (LL) 0.42  Comment:  wbc,hgb,hct  critical result(s) called and verbal readback  obtained   from lucero schafer rn, 05/14/2018 05:34  (LL)             Significant Imaging: I have reviewed all pertinent imaging results/findings within the past 24 hours.

## 2018-05-14 NOTE — PLAN OF CARE
Problem: Patient Care Overview  Goal: Plan of Care Review  POC discussed w pt and family, questions and concerns addressed. Pt stable, NAD noted. Pt remains confused and paranoid, but family at bedside most of shift helping to reorient and reassure patient. Pt AAOx3, unsure of the day/time. PAC drsg changed when pt removed 1/2 needles from PAC, medical team aware. Recommend nursing maintain NS KVO to avoid drawing pt attn to line. Psych to bedside this shift. Intake poor, voided x1 this shift. PT and OT with pt today. EKG done.  at bedside at all times, engaging with pt and in pt care. Safety maintained, will cont to monitor.

## 2018-05-14 NOTE — ASSESSMENT & PLAN NOTE
Patient without significant past psychiatric history has been exhibiting waxing and waning mental status, disorientation and paranoid delusions since being extubated on 5/9 consistent with delirium. Recommend changing scheduled Seroquel 50 mg qHS to Risperdal M tabs 1 mg PO BID. Continue Zyprexa 5 mg PO/IM q8 PRN nonredirectable agitation. QTc on 5/14 459. Recommend checking daily EKG to monitor QTc. Continue to monitor CBC for any worsening leukopenia/neutropenia with antipsychotic medications.  · DELIRIUM BEHAVIOR MANAGEMENT  · PLEASE utilize CHEMICAL restraints with PRN meds first for agitation. Minimize use of PHYSICAL restraints  · Keep window shades open and room lit during day and room dim at night in order to promote normal sleep-wake cycles  · Encourage family at bedside. [place] patient often to situation, location, date.  · Continue to Limit or Discontinue use of Narcotics, Benzos and Anti-cholinergic (Avoid Benadryl) medications as they may worsen delirium.   · Continue medical workup for causative etiology of Delirium. Recommend CT head with and without contrast if possible to assess for any acute intracranial processes and repeat CXR to rule out additional infectious process. Recommend check amylase, lipase, B12, folate, HIV, RPR.

## 2018-05-14 NOTE — ASSESSMENT & PLAN NOTE
Chemotherapy induced neutropenia:   - Hgb goal >7.  - Plts goal >10.  - Daily CBC and reticulocyte, stable H/H, platelets today   - Total units of transfusion: 4 units of pRBCs and 7 units of platelets.    - Neupogen 600mcg daily. (5/4 - 5-12/18)

## 2018-05-14 NOTE — PT/OT/SLP PROGRESS
"Physical Therapy Treatment    Patient Name:  Hema Kuo   MRN:  [id number]    Recommendations:     Discharge Recommendations:  home health PT   Discharge Equipment Recommendations: none   Barriers to discharge: None    Assessment:     Hema Kuo is a 17 y.o. male admitted with a medical diagnosis of Bacteremia.  He presents with the following impairments/functional limitations:  weakness, impaired endurance, impaired self care skills, impaired functional mobilty, gait instability, impaired balance, impaired cognition, decreased coordination, decreased upper extremity function, decreased lower extremity function, impaired cardiopulmonary response to activity, decreased ROM.  Pt tolerated treatment session fair this morning.  Pt displays signs of delirium secondary to lack of sleep and medications throughout treatment session leading to limitation of treatment.  Pt not orientated to person, time, place or situations and needs constant reminders to orient from therapist.  Sitter present during treatment session.  Pt educated on PT POC.  Pt will benefit from skilled acute pt services to address the above listed impairments and to progress functional mobility.      Rehab Prognosis:  Good; patient would benefit from acute skilled PT services to address these deficits and reach maximum level of function.      Recent Surgery: * No surgery found *      Plan:     During this hospitalization, patient to be seen 6 x/week to address the above listed problems via gait training, therapeutic activities, therapeutic exercises, neuromuscular re-education  · Plan of Care Expires:  06/09/18   Plan of Care Reviewed with: patient    Subjective     Communicated with Rn prior to session.  Patient found awake supine in bed with sitter present upon PT entry to room, agreeable to treatment.      Chief Complaint: Bacteremia  Patient comments/goals: "Are you two suppose to be my parents?"  Pain/Comfort:  · Pain Rating 1: 0/10  · Pain Rating " Post-Intervention 1: 0/10    Patients cultural, spiritual, Temple conflicts given the current situation: Patient has no barriers to learning. Patient verbalizes understanding of his/her program and goals and demonstrates them correctly. No cultural, spiritual or educational needs identified.    Objective:     Patient found with: telemetry, peripheral IV     General Precautions: Standard, fall   Orthopedic Precautions:N/A   Braces: N/A     Functional Mobility:  · Bed Mobility:     · Scooting: total assistance  · Supine to Sit: maximal assistance  · Sit to Supine: stand by assistance  · Transfers:     · Sit to Stand:  maximal assistance with no AD (Pt requiring total A to initiate sit to stand)  · Balance: Pt displays poor balance once standing and maintains static stance for short duration of 3 sec before returning to sitting at EOB.      AM-PAC 6 CLICK MOBILITY  Turning over in bed (including adjusting bedclothes, sheets and blankets)?: 4  Sitting down on and standing up from a chair with arms (e.g., wheelchair, bedside commode, etc.): 3  Moving from lying on back to sitting on the side of the bed?: 3  Moving to and from a bed to a chair (including a wheelchair)?: 3  Need to walk in hospital room?: 2  Climbing 3-5 steps with a railing?: 2  Total Score: 17       Therapeutic Activities and Exercises:   Pt educated on PT services and helped by therapist to orient to person, place, time and situation.    Patient left supine with call button in reach, RN notified and Sitter present..    GOALS:    Physical Therapy Goals        Problem: Physical Therapy Goal    Goal Priority Disciplines Outcome Goal Variances Interventions   Physical Therapy Goal     PT/OT, PT      Description:  Goals to be met by: 18     Patient will increase functional independence with mobility by performin. Supine to sit with [place] - Not met  2. Sit to supine with [place] - MET ()  3. Sit to stand transfer with  [place] - Not met  4. Bed to chair transfer with Stand-by Assistance using least restrictive AD - Not met  5. Gait  x 500 feet with Stand-by Assistance using least restrictive Ad - Not met  6. Lower extremity exercise program x30 reps per handout, with supervision - Not met                    Time Tracking:     PT Received On: 05/14/18  PT Start Time: 0902     PT Stop Time: 0936  PT Total Time (min): 34 min     Billable Minutes: Therapeutic Activity 34    Treatment Type: Treatment  PT/PTA: PT           Yassine Andrea, [facility]   05/14/2018

## 2018-05-14 NOTE — PLAN OF CARE
Problem: Patient Care Overview  Goal: Plan of Care Review  Pt tolerated treatment session fair this morning.  Pt displays signs of delirium secondary to lack of sleep and medications throughout treatment session leading to limitation of treatment.  Pt not orientated to person, time, place or situations and needs constant reminders to orient from therapist.  Sitter present during treatment session.  Pt educated on PT POC.  Pt will benefit from skilled acute pt services to address the above listed impairments and to progress functional mobility.      Yassine Andrea  5/14/2018

## 2018-05-14 NOTE — ASSESSMENT & PLAN NOTE
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 On Intensification therapy II following [id number] (Arm A). He was admitted on 4/30/2018 for neutropenia/profund sepsis risk. Stepped up to the PICU for persistent fever >103, tachycardia, and hypotension that was been minimally responsive to fluid resuscitation. PICU stay was complicated by delirium and he currently is PEC'd, needs a one to one sitter.      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 MRD negative after Induction I.    - Treating per COG [id number] (ARM A).  S/p Intensification I and Intensification II started. He was day 13 of intensification II as of 5/2/2018.  - BM biopsy at start of Intensification shows CR.  FISH for t(8;21) negative. Will repeat BM biopsy pending count recovery and clinical improvement  - Heme/Onc on consult/co-management

## 2018-05-14 NOTE — SUBJECTIVE & OBJECTIVE
Interval History: VSS, NAEO. Remained paranoid. CEC in place.     Scheduled Meds:   acyclovir  400 mg Oral BID    cefepime in dextrose 5 %  2 g Intravenous Q8H    famotidine  20 mg Oral BID    melatonin  10 mg Oral QHS    posaconazole  400 mg Oral BID    QUEtiapine  50 mg Oral QHS    sulfamethoxazole-trimethoprim 800-160mg  1 tablet Oral twice daily on Friday, Saturday, Sunday    vancomycin (VANCOCIN) IVPB  750 mg Intravenous Q8H     Continuous Infusions:  PRN Meds:albuterol sulfate, alteplase, calcium chloride, heparin, porcine (PF), lidocaine-prilocaine, morphine, olanzapine zydis, ondansetron, ondansetron, polyethylene glycol, potassium chloride, potassium chloride 10%    Review of Systems   Constitutional: Negative for fever.   Psychiatric/Behavioral: Positive for agitation.     Objective:     Vital Signs (Most Recent):  Temp: 98.8 °F (37.1 °C) (05/14/18 0544)  Pulse: 94 (05/14/18 0544)  Resp: 18 (05/14/18 0544)  BP: (!) 106/57 (05/14/18 0544)  SpO2: 100 % (05/14/18 0544) Vital Signs (24h Range):  Temp:  [98.3 °F (36.8 °C)-98.8 °F (37.1 °C)] 98.8 °F (37.1 °C)  Pulse:  [] 94  Resp:  [18] 18  SpO2:  [98 %-100 %] 100 %  BP: (106-121)/(56-59) 106/57     No data found.    Body mass index is 31.28 kg/m².    Intake/Output - Last 3 Shifts       05/12 0700 - 05/13 0659 05/13 0700 - 05/14 0659    P.O. 480 1260    Blood 177     IV Piggyback 965 175    Total Intake(mL/kg) 1622 (18.5) 1435 (16.3)    Net +1622 +1435          Urine Occurrence 5 x 2 x    Stool Occurrence 2 x 1 x    Emesis Occurrence  1 x          Lines/Drains/Airways     Central Venous Catheter Line                 Port A Cath Double Lumen 05/10/18 0900 left subclavian 3 days                Physical Exam  Constitutional: He appears well-developed and well-nourished.   Head: Normocephalic and atraumatic.   Nose: Nose normal.   Mouth/Throat: Mucous membranes are normal.   Eyes: Conjunctivae and lids are normal.   Neck: Normal range of motion. Neck  supple.   Cardiovascular: Normal rate and regular rhythm.    Pulmonary/Chest: Effort normal. No stridor. He has no decreased breath sounds. He has no wheezes. He has no rales.   Abdominal: Soft. Bowel sounds are normal. He exhibits no distension. There is no hepatosplenomegaly. There is no guarding.   Musculoskeletal: Normal range of motion. He exhibits no edema.   Skin: Skin is warm and dry. Capillary refill takes less than 2 seconds. No rash noted. He is not diaphoretic. No erythema. Port CDI.   Psychiatric: Still delirious ,incomprehensible speech.     Significant Labs:  Recent Results (from the past 24 hour(s))   VANCOMYCIN, TROUGH before 4th dose    Collection Time: 05/13/18  2:00 PM   Result Value Ref Range    Vancomycin-Trough 32.9 (HH) 10.0 - 22.0 ug/mL   Vancomycin, random    Collection Time: 05/13/18  9:15 PM   Result Value Ref Range    Vancomycin, Random 16.8 Not established ug/mL   Type & Screen    Collection Time: 05/13/18  9:15 PM   Result Value Ref Range    Group & Rh AB POS     Indirect Nathan NEG    Reticulocytes    Collection Time: 05/14/18  4:51 AM   Result Value Ref Range    Retic 0.1 (L) 0.4 - 2.0 %   Comprehensive metabolic panel    Collection Time: 05/14/18  4:51 AM   Result Value Ref Range    Sodium 135 (L) 136 - 145 mmol/L    Potassium 4.1 3.5 - 5.1 mmol/L    Chloride 106 95 - 110 mmol/L    CO2 23 23 - 29 mmol/L    Glucose 90 70 - 110 mg/dL    BUN, Bld 15 6 - 20 mg/dL    Creatinine 0.6 0.5 - 1.4 mg/dL    Calcium 8.1 (L) 8.7 - 10.5 mg/dL    Total Protein 5.1 (L) 6.0 - 8.4 g/dL    Albumin 2.6 (L) 3.5 - 5.2 g/dL    Total Bilirubin 0.8 0.1 - 1.0 mg/dL    Alkaline Phosphatase 60 55 - 135 U/L    AST 68 (H) 10 - 40 U/L     (H) 10 - 44 U/L    Anion Gap 6 (L) 8 - 16 mmol/L    eGFR if African American >60.0 >60 mL/min/1.73 m^2    eGFR if non African American >60.0 >60 mL/min/1.73 m^2   Magnesium    Collection Time: 05/14/18  4:51 AM   Result Value Ref Range    Magnesium 1.8 1.6 - 2.6 mg/dL    Phosphorus    Collection Time: 05/14/18  4:51 AM   Result Value Ref Range    Phosphorus 2.8 2.7 - 4.5 mg/dL   CBC auto differential    Collection Time: 05/14/18  4:51 AM   Result Value Ref Range    WBC 0.42 (LL) 3.90 - 12.70 K/uL    RBC 1.47 (L) 4.60 - 6.20 M/uL    Hemoglobin 4.7 (LL) 14.0 - 18.0 g/dL    Hematocrit 12.8 (LL) 40.0 - 54.0 %    MCV 87 82 - 98 fL    MCH 32.0 (H) 27.0 - 31.0 pg    MCHC 36.7 (H) 32.0 - 36.0 g/dL    RDW 15.8 (H) 11.5 - 14.5 %    MPV 12.5 9.2 - 12.9 fL       Significant Imaging:   none

## 2018-05-14 NOTE — ASSESSMENT & PLAN NOTE
Patient with Strep mitis sepsis. Bcx from 5/2 growing organism-repeat cultures NG.      - Continue Cefepime 2g Q8h  - Continue Vancomycin 750mg Q8h, follow Vanc trough at 2200 on 5/14  - repeat Cx NGTD

## 2018-05-14 NOTE — HPI
"Patient is a 18 y/o man with PMH AML dx 2017 s/p 4 cycles of chemotherapy admitted to pediatrics team with Strep mitis sepsis and ARDS. Psychiatry was consulted for "paranoia, now CEC'd, possible ICU delirium."     Per staff MD:  "17 year old young man with AML s/p 4 cycles of chemotherapy now transferred from the  PICU with Strep mitis sepsis and ARDS.     For his AML we are awaiting count recovery.  ANC next to nothing still.   For his Strep sepsis, he is on cefepime and vancomycin.  Cultures from 5/2 grew Strep mitis (sensitive to vanc).  Subsequent cultures negative.    Will cont a 14 day course of vanc.        .  For his chemotherapy induced pancytopenia, recommend transfusion for hemoglobin under 7 and platelets under 10 K.  No transfusions today.    For his ARDS will get one last dose of lasix today and then we will stop.  He continues to have paranoid delerium since extubation, however this is improving.  Will cont seroquel with prn zyprexa.  Will have psych see tomorrow.   Still needs a 1:1 sitter."    Per RN note 5/14:  "Pt oriented x4. Pt paranoid and anxious but cooperative. Very slow to proccess what's being communicated to him and very slow to respond. Similar to last nights shift, it took patient a little while for him to take his medications, but after much convincing, pt took them with no problem. Pt making death statements throughout night and still believes he is dying and nurses are out to get him. Double lumen left chest port in place, flushes well, blood return noted from each lumen. All medications/antibitoics given as scheduled. Labs drawn in am. Critical results resulted, MD notified and labs redrawn. Pt did sleep for about 6 hours tonight."    Per RN note 5/13:  "Pt oriented to person, self, time, but disoriented to place. Pt stated he was in the [facility] waiting to die. Pt paranoid and anxious but cooperative. Took him a little time (about 25 minutes) to take his medications, but " "after time of explaining to him what the medications were and why they are needed, pt took them. Pt stated he believes "the nurses are trying to burn" him and that we all hate him and want him to die."    On interview, patient with sitter at bedside and uneaten breakfast tray in front of him. Interview was limited as his answers to questions are impoverished and inappropriate. He asked interviewer "are you my biological father?" And stated "I think I'm dead." Was able to state his reason for admission was "cancer" but did not respond to further questions about his care.     Per sitter, patient slept overnight and has had limited appetite. Has been paranoid but not physically aggressive. Minimally talkative with her. Says patient's mother is currently at a doctor's appointment but will return to hospital this afternoon.    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 2 sodas/day       "

## 2018-05-14 NOTE — PT/OT/SLP PROGRESS
"Occupational Therapy      Patient Name:  Hema Kuo   MRN:  [id number]    Met with pt to engage in ADLs. Pt with delayed processing and inappropriate responses. I asked if he would like to brush his teeth in the restroom.  Pt stated no.  He discussed pulling out his port stating, "I'm surprised my heart is still beating." "I should have bled out by now." Asked sitter to come back on the pt side of the room and while briefly looking away to talk to sitter pt reached inside of shirt. Sitter inspected area and said the port is out. OT left to get Malia RN. Returned with Malia. Offered assist to get supplies she may need, left pt in care of nursing.     KURTIS Chun  5/14/2018  "

## 2018-05-14 NOTE — CONSULTS
"[facility]  Psychiatry  Consult Note    Patient Name: Hema Kuo  MRN: [id number]   Code Status: Prior  Admission Date: 4/30/2018  Hospital Length of Stay: 14 days  Attending Physician: Christian Emerson MD  Primary Care Provider: Ann Reyna NP    Current Legal Status: CEC    Patient information was obtained from patient, past medical records and ER records.   Inpatient consult to Psychiatry  Consult performed by: EMILEE HERNÁNDEZ  Consult ordered by: HERBER CHONG        Subjective:     Principal Problem:Bacteremia    Chief Complaint:  paranoia     HPI: Patient is a 16 y/o man with PMH AML dx 2017 s/p 4 cycles of chemotherapy admitted to pediatrics team with Strep mitis sepsis and ARDS. Psychiatry was consulted for "paranoia, now CEC'd, possible ICU delirium."     Per staff MD:  "17 year old young man with AML s/p 4 cycles of chemotherapy now transferred from the  PICU with Strep mitis sepsis and ARDS.     For his AML we are awaiting count recovery.  ANC next to nothing still.   For his Strep sepsis, he is on cefepime and vancomycin.  Cultures from 5/2 grew Strep mitis (sensitive to vanc).  Subsequent cultures negative.    Will cont a 14 day course of vanc.        .  For his chemotherapy induced pancytopenia, recommend transfusion for hemoglobin under 7 and platelets under 10 K.  No transfusions today.    For his ARDS will get one last dose of lasix today and then we will stop.  He continues to have paranoid delerium since extubation, however this is improving.  Will cont seroquel with prn zyprexa.  Will have psych see tomorrow.   Still needs a 1:1 sitter."    Per RN note 5/14:  "Pt oriented x4. Pt paranoid and anxious but cooperative. Very slow to proccess what's being communicated to him and very slow to respond. Similar to last nights shift, it took patient a little while for him to take his medications, but after much convincing, pt took them with no problem. Pt making death " "statements throughout night and still believes he is dying and nurses are out to get him. Double lumen left chest port in place, flushes well, blood return noted from each lumen. All medications/antibitoics given as scheduled. Labs drawn in am. Critical results resulted, MD notified and labs redrawn. Pt did sleep for about 6 hours tonight."    Per RN note 5/13:  "Pt oriented to person, self, time, but disoriented to place. Pt stated he was [facility] die. Pt paranoid and anxious but cooperative. Took him a little time (about 25 minutes) to take his medications, but after time of explaining to him what the medications were and why they are needed, pt took them. Pt stated he believes "the nurses are trying to burn" him and that we all hate him and want him to die."    On interview, patient with sitter at bedside and uneaten breakfast tray in front of him. Interview was limited as his answers to questions are impoverished and inappropriate. He asked interviewer "are you my biological father?" And stated "I think I'm dead." Was able to state his reason for admission was "cancer" but did not respond to further questions about his care.     Per sitter, patient slept overnight and has had limited appetite. Has been paranoid but not physically aggressive. Minimally talkative with her. Says patient's mother is currently at a doctor's appointment but will return to hospital this afternoon.    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 2 sodas/day         Hospital Course: No notes on file         Patient History           Medical as of 5/14/2018     Past Medical History     Diagnosis Date Comments Source    AML (acute myeloblastic leukemia) 10/2017 -- Provider    Asthma -- -- Provider    Encounter for blood transfusion -- -- Provider    Seasonal allergies -- -- Provider          Pertinent Negatives     Diagnosis Date Noted Comments Source    Anticoagulant long-term use 4/19/2018 -- Provider    Arthritis 4/19/2018 -- Provider    CHF (congestive heart failure) 4/19/2018 -- Provider    COPD (chronic obstructive pulmonary disease) 4/19/2018 -- Provider    Coronary artery disease 4/19/2018 -- Provider    Diabetes mellitus 4/19/2018 -- Provider    Hypertension 4/19/2018 -- Provider    Seizures 4/19/2018 -- Provider    Stroke 4/19/2018 -- Provider    Thyroid disease 4/19/2018 -- Provider    Transfusion reaction 4/19/2018 -- Provider                  Surgical as of 5/14/2018     Past Surgical History     Procedure Laterality Date Comments Source    TONSILLECTOMY -- -- -- Provider    PORTACATH PLACEMENT -- 10/31/2017 -- Provider                  Family as of 5/14/2018     Problem Relation Name Age of Onset Comments Source    Heart disease Mother -- -- -- Provider    Cancer Mother -- -- -- Provider    No Known Problems Father -- -- -- Provider            Tobacco Use as of 5/14/2018     Smoking Status Smoking Start Date Smoking Quit Date Packs/day Years Used    Former Smoker -- 10/30/2017 0.50 --    Types Comments Smokeless Tobacco Status Smokeless Tobacco Quit Date Source     Cigarettes -- Never Used -- Provider            Alcohol Use as of 5/14/2018     Alcohol Use Drinks/Week Alcohol/Week Comments Source    Yes -- -- rare occasions Provider            Drug Use as of 5/14/2018     Drug Use Types Frequency Comments Source    No -- -- -- Provider            Sexual Activity as of 5/14/2018     Sexually Active  Birth Control Partners Comments Source    Yes -- Female -- Provider            Activities of Daily Living as of 5/14/2018    **None**           Social Documentation as of 5/14/2018    **None**           Occupational as of 5/14/2018    **None**           Socioeconomic as of 5/14/2018     Marital Status Spouse Name Number of Children Years Education Preferred Language Ethnicity Race Source    Single -- -- -- English /White White --         Pertinent History Q A Comments    as of 5/14/2018 Lives with      Place in Birth Order      Lives in      Number of Siblings      Raised by      Legal Involvement      Childhood Trauma      Criminal History of      Financial Status      Highest Level of Education      Does patient have access to a firearm?       Service      Primary Leisure Activity      Spirituality       Past Medical History:   Diagnosis Date    AML (acute myeloblastic leukemia) 10/2017    Asthma     Encounter for blood transfusion     Seasonal allergies      Past Surgical History:   Procedure Laterality Date    PORTACATH PLACEMENT  10/31/2017    TONSILLECTOMY       Family History     Problem Relation (Age of Onset)    Cancer Mother    Heart disease Mother    No Known Problems Father        Social History Main Topics    Smoking status: Former Smoker     Packs/day: 0.50     Types: Cigarettes     Quit date: 10/30/2017    Smokeless tobacco: Never Used    Alcohol use Yes      Comment: rare occasions    Drug use: No    Sexual activity: Yes     Partners: Female     Review of patient's allergies indicates:   Allergen Reactions    Nsaids (non-steroidal anti-inflammatory drug) Anaphylaxis    Nuts [tree nut] Anaphylaxis    Strawberry     Vancomycin analogues Itching     Premedicate with benadryl and admin over 2hr.       No current facility-administered medications on file prior to encounter.      Current Outpatient Prescriptions on File Prior to Encounter   Medication Sig    acyclovir  "(ZOVIRAX) 400 MG tablet Take 1 tablet (400 mg total) by mouth 2 (two) times daily.    chlorhexidine (PERIDEX) 0.12 % solution     ciprofloxacin HCl (CIPRO) 500 MG tablet Take 1 tablet (500 mg total) by mouth every 12 (twelve) hours.    ondansetron (ZOFRAN-ODT) 8 MG TbDL Take 1 tablet (8 mg total) by mouth every 8 (eight) hours as needed.    oxyCODONE (ROXICODONE) 10 mg Tab immediate release tablet Take 1 tablet (10 mg total) by mouth every 6 (six) hours as needed.    polyethylene glycol (GLYCOLAX) 17 gram PwPk Take 17 g by mouth daily as needed (No bowel movement).    posaconazole 100 mg TbEC tablet Take 3 tablets (300 mg total) by mouth once daily.    sulfamethoxazole-trimethoprim 800-160mg (BACTRIM DS) 800-160 mg Tab Take 1 tablet by mouth twice daily only on Friday, Saturday, Sunday.    lisdexamfetamine (VYVANSE) 60 MG capsule Take 60 mg by mouth every morning.    LORazepam (ATIVAN) 1 MG tablet Take 1 tablet (1 mg total) by mouth every 6 (six) hours as needed (Nausea).    pantoprazole (PROTONIX) 40 MG tablet Take 1 tablet (40 mg total) by mouth once daily.     Psychotherapeutics     Start     Stop Route Frequency Ordered    05/11/18 1029  olanzapine zydis disintegrating tablet 5 mg      -- Oral As needed (PRN) 05/11/18 0932    05/09/18 2245  QUEtiapine tablet 50 mg      -- Oral Nightly 05/09/18 2130        Review of Systems  Strengths and Liabilities: Strength: Patient has positive support network.    Objective:     Vital Signs (Most Recent):  Temp: 98.8 °F (37.1 °C) (05/14/18 0806)  Pulse: 105 (05/14/18 0806)  Resp: 18 (05/14/18 0806)  BP: (!) 125/57 (05/14/18 0806)  SpO2: 98 % (05/14/18 0806) Vital Signs (24h Range):  Temp:  [98.4 °F (36.9 °C)-98.8 °F (37.1 °C)] 98.8 °F (37.1 °C)  Pulse:  [] 105  Resp:  [18] 18  SpO2:  [98 %-100 %] 98 %  BP: (106-125)/(56-59) 125/57     Height: 5' 6" (167.6 cm)  Weight: 87.9 kg (193 lb 12.6 oz)  Body mass index is 31.28 kg/m².      Intake/Output Summary (Last 24 " hours) at 05/14/18 1054  Last data filed at 05/13/18 1700   Gross per 24 hour   Intake             1255 ml   Output                0 ml   Net             1255 ml       Physical Exam    Mental Status Exam  General appearance and behavior: No acute distress, no abnormal involuntary movements, guarded, minimally cooperative  Level of Consciousness: awake  Attention: did not cooperate with SAVEAHAART  Orientation: intact to self; disoriented to place, month, day of the week, situation  Psychomotor Behavior: + retardation   Speech: slow, soft, impoverished  Language: prosody intact, non-spontaneous, and appropriate without evidence of neologisms or gross idiosyncrasies   Mood: unable to assess   Affect: constricted  Thought Process: evidence of unwarranted suspicion, disorganized at times   Associations: intact  Thought Content: unable to assess. + Persecutory delusions per collateral   Memory: unable to assess  Fund of Knowledge: unable to assess  Abstraction: unable to assess  Calculation/Concentration: unable to assess  Insight: impaired/limited  Judgment: Behavior adequate for circumstances    Significant Labs:   Last 24 Hours:   Recent Lab Results       05/14/18  0559 05/14/18  0451 05/13/18  2115 05/13/18  1400      Immature Granulocytes CANCELED  Comment:  Result canceled by the ancillary CANCELED  Comment:  Result canceled by the ancillary       Immature Grans (Abs) CANCELED  Comment:  Mild elevation in immature granulocytes is non specific and   can be seen in a variety of conditions including stress response,   acute inflammation, trauma and pregnancy. Correlation with other   laboratory and clinical findings is essential.    Result canceled by the ancillary   CANCELED  Comment:  Mild elevation in immature granulocytes is non specific and   can be seen in a variety of conditions including stress response,   acute inflammation, trauma and pregnancy. Correlation with other   laboratory and clinical findings is  essential.    Result canceled by the ancillary         Albumin  2.6(L)       Alkaline Phosphatase  60       ALT  234(H)       Anion Gap  6(L)       Aniso Slight Slight       AST  68(H)       Baso # Test Not Performed  Comment:  Corrected result; previously reported as 0.00 on %DDDDDDDD% at %TTT%.[C] Test Not Performed  Comment:  Corrected result; previously reported as 0.01 on %DDDDDDDD% at %TTT%.[C]       Basophil% 0.0 0.0  Comment:  Corrected result; previously reported as 2.4 on %DDDDDDDD% at %TTT%.[C]       Total Bilirubin  0.8  Comment:  For infants and newborns, interpretation of results should be based  on gestational age, weight and in agreement with clinical  observations.  Premature Infant recommended reference ranges:  Up to 24 hours.............<8.0 mg/dL  Up to 48 hours............<12.0 mg/dL  3-5 days..................<15.0 mg/dL  6-29 days.................<15.0 mg/dL         BUN, Bld  15       Calcium  8.1(L)       Chloride  106       CO2  23       Creatinine  0.6       Differential Method Manual Manual       eGFR if   >60.0       eGFR if non   >60.0  Comment:  Calculation used to obtain the estimated glomerular filtration  rate (eGFR) is the CKD-EPI equation.          Eos # Test Not Performed  Comment:  Corrected result; previously reported as 0.0 on %DDDDDDDD% at %TTT%.[C] Test Not Performed  Comment:  Corrected result; previously reported as 0.0 on %DDDDDDDD% at %TTT%.[C]       Eosinophil% 0.0 0.0       Glucose  90       Gran% 0.0  Comment:  Corrected result; previously reported as 12.8 on %DDDDDDDD% at %TTT%.(L)[C] 0.0  Comment:  Corrected result; previously reported as 7.2 on %DDDDDDDD% at %TTT%.(L)[C]       Group & Rh   AB POS      Hematocrit 22.4(L) 12.8  Comment:  wbc,hgb,hct  critical result(s) called and verbal readback obtained   from lucero schafer rn, 05/14/2018 05:34  (LL)       Hemoglobin 7.8(L) 4.7  Comment:  wbc,hgb,hct  critical result(s) called and  verbal readback obtained   from lucero coles rn, 05/14/2018 05:34  (LL)       Hypo  Occasional       INDIRECT LARRY   NEG      Lymph # Test Not Performed  Comment:  Corrected result; previously reported as 0.2 on %DDDDDDDD% at %TTT%.[C] Test Not Performed  Comment:  Corrected result; previously reported as 0.3 on %DDDDDDDD% at %TTT%.[C]       Lymph% 100.0  Comment:  Corrected result; previously reported as 74.2 on %DDDDDDDD% at %TTT%.(H)[C] 100.0  Comment:  Corrected result; previously reported as 71.4 on %DDDDDDDD% at %TTT%.(H)[C]       Magnesium  1.8       MCH 31.2(H) 32.0(H)       MCHC 34.8 36.7(H)       MCV 90 87       Mono # Test Not Performed  Comment:  Corrected result; previously reported as 0.0 on %DDDDDDDD% at %TTT%.[C] Test Not Performed  Comment:  Corrected result; previously reported as 0.0 on %DDDDDDDD% at %TTT%.[C]       Mono% 0.0  Comment:  Corrected result; previously reported as 6.5 on %DDDDDDDD% at %TTT%.(L)[C] 0.0  Comment:  Corrected result; previously reported as 9.5 on %DDDDDDDD% at %TTT%.(L)[C]       MPV SEE COMMENT  Comment:  Result not available. 12.5       nRBC 0 0       Ovalocytes  Occasional       Phosphorus  2.8       Platelet Estimate Decreased(A)        Platelets 17  Comment:  PLATELET COUNT  critical result(s) called and verbal readback   obtained from LUCERO COLES RN, 05/14/2018 08:21  (LL) 23  Comment:  PLT  critical result(s) called and verbal readback obtained from   NURSE CHAKA, 05/14/2018 08:10  (LL)       Poik  Slight       Poly  Occasional       Potassium  4.1       Total Protein  5.1(L)       RBC 2.50(L) 1.47(L)       RDW 15.8(H) 15.8(H)       Retic  0.1(L)       Sodium  135(L)       Spherocytes  Occasional       Vancomycin, Random   16.8      Vancomycin-Trough    32.9  Comment:  *Critical value -   Results called to and read back by:Manuela Retana RN  ()     WBC 0.31  Comment:  wbc critical result(s) called and verbal readback obtained from   lucero  georgette schafer, 05/14/2018 06:23  (LL) 0.42  Comment:  wbc,hgb,hct  critical result(s) called and verbal readback obtained   from lucero schafer rn, 05/14/2018 05:34  (LL)             Significant Imaging: I have reviewed all pertinent imaging results/findings within the past 24 hours.    Assessment/Plan:     Delirium    Patient without significant past psychiatric history has been exhibiting waxing and waning mental status, disorientation and paranoid delusions since being extubated on 5/9 consistent with delirium. Recommend changing scheduled Seroquel 50 mg qHS to Risperdal M tabs 1 mg PO BID. Continue Zyprexa 5 mg PO/IM q8 PRN nonredirectable agitation. QTc on 5/14 459. Recommend checking daily EKG to monitor QTc. Continue to monitor CBC for any worsening leukopenia/neutropenia with antipsychotic medications.  · DELIRIUM BEHAVIOR MANAGEMENT  · PLEASE utilize CHEMICAL restraints with PRN meds first for agitation. Minimize use of PHYSICAL restraints  · Keep window shades open and room lit during day and room dim at night in order to promote normal sleep-wake cycles  · Encourage family at bedside. [place] patient often to situation, location, date.  · Continue to Limit or Discontinue use of Narcotics, Benzos and Anti-cholinergic (Avoid Benadryl) medications as they may worsen delirium.   · Continue medical workup for causative etiology of Delirium. Recommend CT head with and without contrast if possible to assess for any acute intracranial processes and repeat CXR to rule out additional infectious process. Recommend check amylase, lipase, B12, folate, HIV, RPR.             Discussed with staff Dr Palm. Will continue to follow.    Total Time:  60 minutes      Clair Taylor MD   Psychiatry  [facility]

## 2018-05-15 LAB
ALBUMIN SERPL BCP-MCNC: 2.6 G/DL
ALP SERPL-CCNC: 66 U/L
ALT SERPL W/O P-5'-P-CCNC: 211 U/L
ANION GAP SERPL CALC-SCNC: 5 MMOL/L
ANISOCYTOSIS BLD QL SMEAR: SLIGHT
AST SERPL-CCNC: 50 U/L
BASOPHILS # BLD AUTO: ABNORMAL K/UL
BASOPHILS NFR BLD: 0 %
BILIRUB SERPL-MCNC: 0.6 MG/DL
BLD PROD TYP BPU: NORMAL
BLOOD UNIT EXPIRATION DATE: NORMAL
BLOOD UNIT TYPE CODE: 600
BLOOD UNIT TYPE: NORMAL
BUN SERPL-MCNC: 14 MG/DL
CALCIUM SERPL-MCNC: 8.3 MG/DL
CHLORIDE SERPL-SCNC: 105 MMOL/L
CO2 SERPL-SCNC: 24 MMOL/L
CODING SYSTEM: NORMAL
CREAT SERPL-MCNC: 0.6 MG/DL
DIFFERENTIAL METHOD: ABNORMAL
DISPENSE STATUS: NORMAL
EOSINOPHIL # BLD AUTO: ABNORMAL K/UL
EOSINOPHIL NFR BLD: 0 %
ERYTHROCYTE [DISTWIDTH] IN BLOOD BY AUTOMATED COUNT: 15.3 %
EST. GFR  (AFRICAN AMERICAN): >60 ML/MIN/1.73 M^2
EST. GFR  (NON AFRICAN AMERICAN): >60 ML/MIN/1.73 M^2
GLUCOSE SERPL-MCNC: 111 MG/DL
HCT VFR BLD AUTO: 20.4 %
HGB BLD-MCNC: 7.4 G/DL
IMM GRANULOCYTES # BLD AUTO: ABNORMAL K/UL
IMM GRANULOCYTES NFR BLD AUTO: ABNORMAL %
LYMPHOCYTES # BLD AUTO: ABNORMAL K/UL
LYMPHOCYTES NFR BLD: 96.7 %
MAGNESIUM SERPL-MCNC: 1.8 MG/DL
MCH RBC QN AUTO: 31.9 PG
MCHC RBC AUTO-ENTMCNC: 36.3 G/DL
MCV RBC AUTO: 88 FL
MONOCYTES # BLD AUTO: ABNORMAL K/UL
MONOCYTES NFR BLD: 0 %
NEUTROPHILS NFR BLD: 3.3 %
NRBC BLD-RTO: 0 /100 WBC
NUM UNITS TRANS WBC-POOR PLATPHERESIS: NORMAL
OVALOCYTES BLD QL SMEAR: ABNORMAL
PHOSPHATE SERPL-MCNC: 3.6 MG/DL
PLATELET # BLD AUTO: 9 K/UL
PLATELET BLD QL SMEAR: ABNORMAL
PMV BLD AUTO: ABNORMAL FL
POIKILOCYTOSIS BLD QL SMEAR: SLIGHT
POTASSIUM SERPL-SCNC: 4.1 MMOL/L
PROT SERPL-MCNC: 5.1 G/DL
RBC # BLD AUTO: 2.32 M/UL
RETICS/RBC NFR AUTO: 0.1 %
SODIUM SERPL-SCNC: 134 MMOL/L
WBC # BLD AUTO: 0.24 K/UL

## 2018-05-15 PROCEDURE — 93005 ELECTROCARDIOGRAM TRACING: CPT

## 2018-05-15 PROCEDURE — 80053 COMPREHEN METABOLIC PANEL: CPT

## 2018-05-15 PROCEDURE — 63600175 PHARM REV CODE 636 W HCPCS: Performed by: STUDENT IN AN ORGANIZED HEALTH CARE EDUCATION/TRAINING PROGRAM

## 2018-05-15 PROCEDURE — 27000135

## 2018-05-15 PROCEDURE — 85045 AUTOMATED RETICULOCYTE COUNT: CPT

## 2018-05-15 PROCEDURE — 97530 THERAPEUTIC ACTIVITIES: CPT

## 2018-05-15 PROCEDURE — 97535 SELF CARE MNGMENT TRAINING: CPT

## 2018-05-15 PROCEDURE — 93010 ELECTROCARDIOGRAM REPORT: CPT | Mod: ,,, | Performed by: PEDIATRICS

## 2018-05-15 PROCEDURE — 36592 COLLECT BLOOD FROM PICC: CPT

## 2018-05-15 PROCEDURE — 84100 ASSAY OF PHOSPHORUS: CPT

## 2018-05-15 PROCEDURE — 63600175 PHARM REV CODE 636 W HCPCS: Performed by: PEDIATRICS

## 2018-05-15 PROCEDURE — 99233 SBSQ HOSP IP/OBS HIGH 50: CPT | Mod: AF,HA,, | Performed by: PSYCHIATRY & NEUROLOGY

## 2018-05-15 PROCEDURE — 83735 ASSAY OF MAGNESIUM: CPT

## 2018-05-15 PROCEDURE — 25000003 PHARM REV CODE 250: Performed by: STUDENT IN AN ORGANIZED HEALTH CARE EDUCATION/TRAINING PROGRAM

## 2018-05-15 PROCEDURE — 85007 BL SMEAR W/DIFF WBC COUNT: CPT

## 2018-05-15 PROCEDURE — 97116 GAIT TRAINING THERAPY: CPT

## 2018-05-15 PROCEDURE — 11300000 HC PEDIATRIC PRIVATE ROOM

## 2018-05-15 PROCEDURE — 99233 SBSQ HOSP IP/OBS HIGH 50: CPT | Mod: ,,, | Performed by: PEDIATRICS

## 2018-05-15 PROCEDURE — 25000003 PHARM REV CODE 250: Performed by: PEDIATRICS

## 2018-05-15 PROCEDURE — 86644 CMV ANTIBODY: CPT

## 2018-05-15 PROCEDURE — P9037 PLATE PHERES LEUKOREDU IRRAD: HCPCS

## 2018-05-15 PROCEDURE — 36415 COLL VENOUS BLD VENIPUNCTURE: CPT

## 2018-05-15 PROCEDURE — 85027 COMPLETE CBC AUTOMATED: CPT

## 2018-05-15 RX ORDER — OLANZAPINE 5 MG/1
5 TABLET, ORALLY DISINTEGRATING ORAL
Status: DISCONTINUED | OUTPATIENT
Start: 2018-05-15 | End: 2018-05-31 | Stop reason: HOSPADM

## 2018-05-15 RX ORDER — DIPHENHYDRAMINE HYDROCHLORIDE 50 MG/ML
50 INJECTION INTRAMUSCULAR; INTRAVENOUS ONCE
Status: COMPLETED | OUTPATIENT
Start: 2018-05-15 | End: 2018-05-15

## 2018-05-15 RX ORDER — CIPROFLOXACIN 500 MG/1
500 TABLET ORAL 2 TIMES DAILY
Status: DISCONTINUED | OUTPATIENT
Start: 2018-05-15 | End: 2018-05-20

## 2018-05-15 RX ORDER — OLANZAPINE 5 MG/1
5 TABLET, ORALLY DISINTEGRATING ORAL
Status: DISCONTINUED | OUTPATIENT
Start: 2018-05-15 | End: 2018-05-15

## 2018-05-15 RX ORDER — RISPERIDONE 1 MG/1
2 TABLET ORAL NIGHTLY
Status: DISCONTINUED | OUTPATIENT
Start: 2018-05-15 | End: 2018-05-17

## 2018-05-15 RX ORDER — HYDROCODONE BITARTRATE AND ACETAMINOPHEN 500; 5 MG/1; MG/1
TABLET ORAL
Status: DISCONTINUED | OUTPATIENT
Start: 2018-05-15 | End: 2018-05-25

## 2018-05-15 RX ORDER — RISPERIDONE 1 MG/1
1 TABLET, ORALLY DISINTEGRATING ORAL DAILY
Status: DISCONTINUED | OUTPATIENT
Start: 2018-05-16 | End: 2018-05-16

## 2018-05-15 RX ADMIN — ACYCLOVIR 400 MG: 200 CAPSULE ORAL at 08:05

## 2018-05-15 RX ADMIN — POSACONAZOLE 400 MG: 100 TABLET, COATED ORAL at 08:05

## 2018-05-15 RX ADMIN — CEFEPIME HYDROCHLORIDE 2 G: 2 INJECTION, POWDER, FOR SOLUTION INTRAVENOUS at 04:05

## 2018-05-15 RX ADMIN — FAMOTIDINE 20 MG: 20 TABLET, FILM COATED ORAL at 08:05

## 2018-05-15 RX ADMIN — FAMOTIDINE 20 MG: 20 TABLET, FILM COATED ORAL at 09:05

## 2018-05-15 RX ADMIN — SODIUM CHLORIDE 1 G: 9 INJECTION, SOLUTION INTRAVENOUS at 10:05

## 2018-05-15 RX ADMIN — SODIUM CHLORIDE 1 G: 9 INJECTION, SOLUTION INTRAVENOUS at 02:05

## 2018-05-15 RX ADMIN — Medication 500 UNITS: at 09:05

## 2018-05-15 RX ADMIN — CIPROFLOXACIN HYDROCHLORIDE 500 MG: 500 TABLET, FILM COATED ORAL at 09:05

## 2018-05-15 RX ADMIN — RISPERIDONE 1 MG: 1 TABLET, ORALLY DISINTEGRATING ORAL at 08:05

## 2018-05-15 RX ADMIN — OLANZAPINE 5 MG: 5 TABLET, ORALLY DISINTEGRATING ORAL at 12:05

## 2018-05-15 RX ADMIN — ACETAMINOPHEN, DIPHENHYDRAMINE HCL, PHENYLEPHRINE HCL 10 MG: 325; 25; 5 TABLET ORAL at 09:05

## 2018-05-15 RX ADMIN — RISPERIDONE 2 MG: 1 TABLET ORAL at 09:05

## 2018-05-15 RX ADMIN — DIPHENHYDRAMINE HYDROCHLORIDE 50 MG: 50 INJECTION, SOLUTION INTRAMUSCULAR; INTRAVENOUS at 08:05

## 2018-05-15 RX ADMIN — SODIUM CHLORIDE 1 G: 9 INJECTION, SOLUTION INTRAVENOUS at 06:05

## 2018-05-15 RX ADMIN — ACYCLOVIR 400 MG: 200 CAPSULE ORAL at 09:05

## 2018-05-15 RX ADMIN — POSACONAZOLE 400 MG: 100 TABLET, COATED ORAL at 09:05

## 2018-05-15 RX ADMIN — Medication 500 UNITS: at 04:05

## 2018-05-15 RX ADMIN — Medication 500 UNITS: at 02:05

## 2018-05-15 NOTE — ASSESSMENT & PLAN NOTE
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   - Continue bactrim QFSaSu ppx  - Continue peridex ppx   -Start home Cipro 500mg BID

## 2018-05-15 NOTE — ASSESSMENT & PLAN NOTE
Patient without significant past psychiatric history has been exhibiting waxing and waning mental status, disorientation and paranoid delusions since being extubated on 5/9 consistent with delirium. Recommend increased scheduled Risperdal M tabs to 1 mg daily and 2 mg qHS. Continue Zyprexa 5 mg PO/IM q8 PRN nonredirectable agitation. QTc on 5/14 459. Recommend checking daily EKG to monitor QTc. Continue to monitor CBC for any worsening leukopenia/neutropenia with antipsychotic medications.  · DELIRIUM BEHAVIOR MANAGEMENT  · PLEASE utilize CHEMICAL restraints with PRN meds first for agitation. Minimize use of PHYSICAL restraints  · Keep window shades open and room lit during day and room dim at night in order to promote normal sleep-wake cycles  · Encourage family at bedside. [place] patient often to situation, location, date.  · Continue to Limit or Discontinue use of Narcotics, Benzos and Anti-cholinergic (Avoid Benadryl) medications as they may worsen delirium.   · Continue medical workup for causative etiology of Delirium. Recommend CT head with and without contrast if possible to assess for any acute intracranial processes and repeat CXR to rule out additional infectious process. Recommend check amylase, lipase, B12, folate, HIV, RPR.

## 2018-05-15 NOTE — PLAN OF CARE
Problem: Patient Care Overview  Goal: Plan of Care Review  POC discussed w pt and mom, questions and concerns addressed. Pt stable, NAD noted, afebrile this shift. Pt remains confused, though less so than yesterday. Oriented to person, situation, place, but confused about time. Fewer comments about death and being dead this shift. Intake much improved, BM this shift. PT and OT worked w pt. Pt premedicated w benadryl prior to rec unit of plt, tolerated well. Psych at bedside in afternoon. Mom visited today. Pt PAC remains accessed, kvo fluids maintained to limit pt attn to needles and tubing. Sitter at bedside at all times. Safety maintained, will cont to monitor.

## 2018-05-15 NOTE — HOSPITAL COURSE
"05/15/2018: Per chart, patient received PRN Zyprexa @ 0009 for insomnia. Per sitter, patient slightly less paranoid this morning, ate most of breakfast and played games with her. On interview, no family at bedside, patient states he is feeling like himself. Still making statements about believing his is dead but more redirectable. Mentioned his nosebleed was because there is "something inside" him besides cancer. Overall more conversational and less paranoid this morning.  05/16/2018: Per chart, no PRN zyprexa given, per RN: "Pt has flat affect and has delayed response of understanding. Pt cooperative with vitals. He states " should never been born" and he also states "ya'll just keeping me alive". He also had some other spontaneous statements that did not make sense. Pt vss, afebrile with tmax of 99.6, no distress noted. Pt did go to sleep around midnight but moaned and cried throughout the night per sitter." On interview, patient again responds to select questions and remains paranoid, will slight improvement. Says he's "either fully alive or fully dead" when asked but does not spontaneously state he is dead. Refused to participate in [facility] but oriented to location, year and month.  05/17/2018: Per chart, no PRN zyprexa given, per RN continued to make paranoid statements about being alive, worked well with PT/OT yesterday. Asleep on interview. Per juan ramonter, remembered her from earlier in the week, has not made any statements this morning so far about being dead.  05/18/2018: Per chart, patient agitated and confused overnight, attempting to disconnect IV and port. Received Zyprexa 5 mg PO PRN @ 0147. Patient asleep on interview. Per sitter did not fall asleep until 4 am. Ate a little breakfast then fell back asleep. No issues with agitation this AM.   05/20/2018: Per RN note: "Mother and step-father visited in evening, updated on plan of care, pleased about pt's continued progress toward baseline " "mood/personality."  05/21/2018: Per chart, no issues with agitation overnight, no PRN Zyprexa given. On interview patient with less paranoid thought content, no longer believes nurses are trying to harm him, states he knows he is alive and remembers last week feeling like he wasn't. Denies AVH. C/o poor sleep overnight 2/2 fever.  05/22/2018: Per chart, no issues with agitation overnight, no PRN Zyprexa given. On interview, patient states he is feeling like himself but c/o feeling "alone." Discussed role of Risperdal, which patient stated was for psychosis; discussed with patient he is being treated for delirium not psychosis and he expressed understanding.   05/23/2018: Per chart, no issues with agitation overnight, no PRN Zyprexa given. Per RN note: "Pt affect remained flat this am, but this afternoon pt much more engaging and talkative.  Speech was less delayed and pt asking appropriate questions.  Pt became tearful and asked for his mom.  Pt states "I'm so lonely, and I'm all by myself" "I miss my family", pt also stated "I know sometimes I think people want to hurt me, but I also know that that's really not right".  Pt also states at this time that he knows he gets confused at times "because its like old people who have to go in and out of the hospital all the time....they get confused why they are there".  This writer also asked pt what he thought happens when he seems to shut down.  Pt states "I think I get scared because I'm roldan confused and my mom isn't here"." On interview, patient sitting up in bed with grandmother, cousin and sitter at bedside. Is able to identify everyone in the room. C/o difficulty sleeping overnight, states he normally sleeps well at home. Discussed patient's life prior to dx as well as basketball game last night, patient smiled when told the [employer] had won.   05/24/2018: Per chart, no issues with agitation overnight, no PRN Zyprexa given. Mother at bedside speaking with primary " "team staff. Per primary team staff patient frustrated by tremor and need for contact precautions. On interview, patient more irritable than on previous visits, at attempts to engage in conversation about his interests outside of the hospital asks "what are you doing?" When asked if he would like interviewer to leave, responds "If I say yes, will you be offended?" Reassured patient his frustrations were valid and would check in with him tomorrow.   05/25/2018: Per chart, no issues with agitation overnight, no PRN Zyprexa given. Per RN note: "Pt smiling, in good mood, with clear logical speech and behavior." Patient not in room on my assessment, however per medical student: "States that he feels a lot better. Has good appetite, He received platelets/RBC yesterday. Still had difficulty falling asleep but has good energy."  05/28/2018: Per chart, no issues with agitation overnight, no PRN Zyprexa given. On interview, patient bright and smiling, many family and friends in room visiting. Decreased latency of response noted. Per mother patient's tremor has resolved. Patient c/o difficulty sleeping overnight but does not expect to have issues with sleep upon discharge. Discussed options for outpatient follow up if needed, patient familiar with [facility] and previously sought counseling there after being bullied at 9 years old.     "

## 2018-05-15 NOTE — ASSESSMENT & PLAN NOTE
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 On Intensification therapy II following [id number] (Arm A). He was admitted on 4/30/2018 for neutropenia/profund sepsis risk. Stepped up to the PICU for persistent fever >103, tachycardia, and hypotension that was been minimally responsive to fluid resuscitation. PICU stay was complicated by delirium. Currently CEC'd.      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 MRD negative after Induction I.    - Treating per COG [id number] (ARM A).  S/p Intensification I and Intensification II started.   - BM biopsy at start of Intensification shows CR.  FISH for t(8;21) negative. Will repeat BM biopsy pending count recovery and clinical improvement

## 2018-05-15 NOTE — PROGRESS NOTES
[facility] Pediatric [facility] Medicine  Progress Note    Patient Name: Hema Kuo  MRN: [id number]  Admission Date: 4/30/2018  Hospital Length of Stay: 15  Code Status: Prior   Primary Care Physician: Ann Reyna NP  Principal Problem: Bacteremia    Subjective:     Interval History: NAEO, VSS. Paranoia and sleep improving. Started on Risperdal. Vanc trough 6-Dose increased to to 1g Q8h.     Scheduled Meds:   acyclovir  400 mg Oral BID    ciprofloxacin HCl  500 mg Oral BID    famotidine  20 mg Oral BID    melatonin  10 mg Oral QHS    posaconazole  400 mg Oral BID    risperiDONE  1 mg Oral BID    sulfamethoxazole-trimethoprim 800-160mg  1 tablet Oral twice daily on Friday, Saturday, Sunday    vancomycin (VANCOCIN) IVPB  1,000 mg Intravenous Q8H     Continuous Infusions:  PRN Meds:sodium chloride, albuterol sulfate, alteplase, heparin, porcine (PF), lidocaine-prilocaine, morphine, olanzapine zydis, ondansetron, polyethylene glycol    Scheduled Meds:   acyclovir  400 mg Oral BID    ciprofloxacin HCl  500 mg Oral BID    famotidine  20 mg Oral BID    melatonin  10 mg Oral QHS    posaconazole  400 mg Oral BID    risperiDONE  1 mg Oral BID    sulfamethoxazole-trimethoprim 800-160mg  1 tablet Oral twice daily on Friday, Saturday, Sunday    vancomycin (VANCOCIN) IVPB  1,000 mg Intravenous Q8H     Continuous Infusions:  PRN Meds:sodium chloride, albuterol sulfate, alteplase, heparin, porcine (PF), lidocaine-prilocaine, morphine, olanzapine zydis, ondansetron, polyethylene glycol    Review of Systems   Psychiatric/Behavioral: Positive for agitation, confusion, hallucinations and sleep disturbance.     Objective:     Vital Signs (Most Recent):  Temp: 98.8 °F (37.1 °C) (05/15/18 0944)  Pulse: 97 (05/15/18 0944)  Resp: 18 (05/15/18 0944)  BP: (!) 118/58 (05/15/18 0944)  SpO2: 99 % (05/15/18 0944) Vital Signs (24h Range):  Temp:  [98.2 °F (36.8 °C)-98.8 °F (37.1 °C)] 98.8 °F (37.1 °C)  Pulse:   [] 97  Resp:  [16-20] 18  SpO2:  [97 %-100 %] 99 %  BP: ()/(45-59) 118/58     Patient Vitals for the past 72 hrs (Last 3 readings):   Weight   05/14/18 2000 77.4 kg (170 lb 10.2 oz)     Body mass index is 27.54 kg/m².    Intake/Output - Last 3 Shifts       05/13 0700 - 05/14 0659 05/14 0700 - 05/15 0659 05/15 0700 - 05/16 0659    P.O. 1260 450 300    IV Piggyback 775 900     Total Intake(mL/kg) 2035 (23.2) 1350 (17.4) 300 (3.9)    Net +2035 +1350 +300           Urine Occurrence 2 x 3 x 1 x    Stool Occurrence 1 x  1 x    Emesis Occurrence 1 x            Lines/Drains/Airways     Central Venous Catheter Line                 Port A Cath Double Lumen 05/10/18 0900 left subclavian 5 days                Physical Exam  Constitutional: He appears well-developed and well-nourished.   Head: Normocephalic and atraumatic.   Nose: Nose normal.   Mouth/Throat: Mucous membranes are normal.   Eyes: Conjunctivae and lids are normal.   Neck: Normal range of motion. Neck supple.   Cardiovascular: Normal rate and regular rhythm.    Pulmonary/Chest: Effort normal. No stridor. He has no decreased breath sounds. He has no wheezes. He has no rales.   Abdominal: Soft. Bowel sounds are normal. He exhibits no distension. There is no hepatosplenomegaly. There is no guarding.   Musculoskeletal: Normal range of motion. He exhibits no edema.   Skin: Skin is warm and dry. Capillary refill takes less than 2 seconds. No rash noted. He is not diaphoretic. No erythema. Port CDI.   Psychiatric: Still delirious ,incomprehensible speech.     Significant Labs:  Recent Results (from the past 24 hour(s))   VANCOMYCIN, TROUGH before 4th dose    Collection Time: 05/14/18 10:05 PM   Result Value Ref Range    Vancomycin-Trough 6.6 (L) 10.0 - 22.0 ug/mL   Reticulocytes    Collection Time: 05/15/18  6:38 AM   Result Value Ref Range    Retic 0.1 (L) 0.4 - 2.0 %   Comprehensive metabolic panel    Collection Time: 05/15/18  6:38 AM   Result Value Ref  Range    Sodium 134 (L) 136 - 145 mmol/L    Potassium 4.1 3.5 - 5.1 mmol/L    Chloride 105 95 - 110 mmol/L    CO2 24 23 - 29 mmol/L    Glucose 111 (H) 70 - 110 mg/dL    BUN, Bld 14 6 - 20 mg/dL    Creatinine 0.6 0.5 - 1.4 mg/dL    Calcium 8.3 (L) 8.7 - 10.5 mg/dL    Total Protein 5.1 (L) 6.0 - 8.4 g/dL    Albumin 2.6 (L) 3.5 - 5.2 g/dL    Total Bilirubin 0.6 0.1 - 1.0 mg/dL    Alkaline Phosphatase 66 55 - 135 U/L    AST 50 (H) 10 - 40 U/L     (H) 10 - 44 U/L    Anion Gap 5 (L) 8 - 16 mmol/L    eGFR if African American >60.0 >60 mL/min/1.73 m^2    eGFR if non African American >60.0 >60 mL/min/1.73 m^2   Magnesium    Collection Time: 05/15/18  6:38 AM   Result Value Ref Range    Magnesium 1.8 1.6 - 2.6 mg/dL   Phosphorus    Collection Time: 05/15/18  6:38 AM   Result Value Ref Range    Phosphorus 3.6 2.7 - 4.5 mg/dL   CBC auto differential    Collection Time: 05/15/18  6:38 AM   Result Value Ref Range    WBC 0.24 (LL) 3.90 - 12.70 K/uL    RBC 2.32 (L) 4.60 - 6.20 M/uL    Hemoglobin 7.4 (L) 14.0 - 18.0 g/dL    Hematocrit 20.4 (L) 40.0 - 54.0 %    MCV 88 82 - 98 fL    MCH 31.9 (H) 27.0 - 31.0 pg    MCHC 36.3 (H) 32.0 - 36.0 g/dL    RDW 15.3 (H) 11.5 - 14.5 %    Platelets 9 (LL) 150 - 350 K/uL    MPV SEE COMMENT 9.2 - 12.9 fL    Immature Granulocytes CANCELED 0.0 - 0.5 %    Immature Grans (Abs) CANCELED 0.00 - 0.04 K/uL    Lymph # Test Not Performed 1.0 - 4.8 K/uL    Mono # Test Not Performed 0.3 - 1.0 K/uL    Eos # Test Not Performed 0.0 - 0.5 K/uL    Baso # Test Not Performed 0.00 - 0.20 K/uL    nRBC 0 0 /100 WBC    Gran% 3.3 (L) 38.0 - 73.0 %    Lymph% 96.7 (H) 18.0 - 48.0 %    Mono% 0.0 (L) 4.0 - 15.0 %    Eosinophil% 0.0 0.0 - 8.0 %    Basophil% 0.0 0.0 - 1.9 %    Platelet Estimate Decreased (A)     Aniso Slight     Poik Slight     Ovalocytes Occasional     Differential Method Manual        Significant Imaging:   none    Assessment/Plan:     Neuro   Acute delirium    Delirium: currently CEC  - Continue  Risperdal 1mg BID  - Olanzapine PRN  - EKG complete on 5/14, repeat on 5/16 then discontinue  - Avoid Benadryl PRN per Psych  - Continue frequent reorientation and attempt to encourage patient to sleep.   - Psych eval complete on 5/14        ID   * Bacteremia    Patient with Strep mitis sepsis. Bcx from 5/2 growing organism-repeat cultures NG.      - Discontinue Cefepime 2g Q8h  - Continue Vancomycin 1g Q8h, vanc trough at 0600 on 5/16  - repeat Cx NGTD          Immunology/Multi System   Immunosuppressed due to chemotherapy    Immunosuppression 2/2 to chemotherapy:  - Continue acyclovir ppx  - Continue posaconazole 400mg BID- therapeutic dosing.   - Continue bactrim QFSaSu ppx  - Continue peridex ppx   -Start home Cipro 500mg BID        Oncology   Neutropenia    - Continue soft mechanical diet  - Famotidine 20mg, IV, BID for ppx  - Continue PRN zofran        AML (acute myeloblastic leukemia)    Hema is a 17 yr young man with AML (t 8;21) here for chemotherapy. On Intensification therapy II following [id number] (Arm A). He was admitted on 4/30/2018 for neutropenia/profund sepsis risk. Stepped up to the PICU for persistent fever >103, tachycardia, and hypotension that was been minimally responsive to fluid resuscitation. PICU stay was complicated by delirium. Currently CEC'd.      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 MRD negative after Induction I.    - Treating per COG [id number] (ARM A).  S/p Intensification I and Intensification II started.   - BM biopsy at start of Intensification shows CR.  FISH for t(8;21) negative. Will repeat BM biopsy pending count recovery and clinical improvement               Antineoplastic chemotherapy induced pancytopenia    Chemotherapy induced neutropenia:   - Hgb goal >7.  - Plts goal >10.  - Daily CBC and reticulocyte, Plt 9 today- transfuse 1 unit with Benadryl premedication. T&S tomorrow.   - Total units of transfusion: 4 units of pRBCs and 8 units of  platelets.    - Neupogen 600mcg daily. (5/4 - 5-12/18)  -CMP EOD                    Anticipated Disposition: Home or Self Care    Ayanna Ramirez DO  Pediatric Hospital Medicine   [facility]

## 2018-05-15 NOTE — ASSESSMENT & PLAN NOTE
Chemotherapy induced neutropenia:   - Hgb goal >7.  - Plts goal >10.  - Daily CBC and reticulocyte, Plt 9 today- transfuse 1 unit with Benadryl premedication. T&S tomorrow.   - Total units of transfusion: 4 units of pRBCs and 8 units of platelets.    - Neupogen 600mcg daily. (5/4 - 5-12/18)  -CMP EOD

## 2018-05-15 NOTE — ASSESSMENT & PLAN NOTE
Patient with Strep mitis sepsis. Bcx from 5/2 growing organism-repeat cultures NG.      - Discontinue Cefepime 2g Q8h  - Continue Vancomycin 1g Q8h, vanc trough at 0600 on 5/16  - repeat Cx NGTD

## 2018-05-15 NOTE — SUBJECTIVE & OBJECTIVE
"Interval History: see hospital course. Per RN: "Awake and alert on assessment, confused, suspicious, paranoid. Disoriented to time but oriented to place, person. At times is oriented to situation, stating "I was intubated". Disoriented at times stating "I'm in a body bag" and "am I alive". Patient fell asleep around 0245 this AM. Zyprexa given around midnight to help with rest."    Family History     Problem Relation (Age of Onset)    Cancer Mother    Heart disease Mother    No Known Problems Father        Social History Main Topics    Smoking status: Former Smoker     Packs/day: 0.50     Types: Cigarettes     Quit date: 10/30/2017    Smokeless tobacco: Never Used    Alcohol use Yes      Comment: rare occasions    Drug use: No    Sexual activity: Yes     Partners: Female     Psychotherapeutics     Start     Stop Route Frequency Ordered    05/14/18 2100  risperiDONE disintegrating tablet 1 mg      -- Oral 2 times daily 05/14/18 1603    05/11/18 1029  olanzapine zydis disintegrating tablet 5 mg      -- Oral As needed (PRN) 05/11/18 0932           Review of Systems  Objective:     Vital Signs (Most Recent):  Temp: 98.8 °F (37.1 °C) (05/15/18 0755)  Pulse: 99 (05/15/18 0755)  Resp: 20 (05/15/18 0755)  BP: (!) 94/45 (05/15/18 0755)  SpO2: 98 % (05/15/18 0755) Vital Signs (24h Range):  Temp:  [98.2 °F (36.8 °C)-98.8 °F (37.1 °C)] 98.8 °F (37.1 °C)  Pulse:  [] 99  Resp:  [16-20] 20  SpO2:  [97 %-100 %] 98 %  BP: ()/(45-59) 94/45     Height: 5' 6" (167.6 cm)  Weight: 77.4 kg (170 lb 10.2 oz)  Body mass index is 27.54 kg/m².      Intake/Output Summary (Last 24 hours) at 05/15/18 0811  Last data filed at 05/15/18 0639   Gross per 24 hour   Intake             1350 ml   Output                0 ml   Net             1350 ml       Physical Exam    Mental Status Exam  General appearance and behavior: No acute distress, no abnormal involuntary movements, guarded, increasingly cooperative  Level of Consciousness: " "awake  Attention: THI with 3 errors  Orientation: intact to self, year; disoriented to place, month, day of the week, situation  Psychomotor Behavior: + retardation   Speech: slow, normal volume and tone  Language: prosody intact, non-spontaneous, and appropriate without evidence of neologisms or gross idiosyncrasies   Mood: "okay"   Affect: constricted  Thought Process: linear, + evidence of unwarranted suspicion  Associations: intact  Thought Content: denies SI/HI/AVH  Memory: remote intact, recent impaired  Calculation/Concentration: WORLD forwards  Insight: impaired/limited  Judgment: Behavior adequate for circumstances    Significant Labs:   Last 24 Hours:   Recent Lab Results       05/15/18  0638 05/14/18  2205      Immature Granulocytes CANCELED  Comment:  Result canceled by the ancillary      Immature Grans (Abs) CANCELED  Comment:  Mild elevation in immature granulocytes is non specific and   can be seen in a variety of conditions including stress response,   acute inflammation, trauma and pregnancy. Correlation with other   laboratory and clinical findings is essential.    Result canceled by the ancillary        Albumin 2.6(L)      Alkaline Phosphatase 66      (H)      Anion Gap 5(L)      Aniso Slight      AST 50(H)      Baso # Test Not Performed  Comment:  Corrected result; previously reported as 0.00 on %DDDDDDDD% at %TTT%.[C]      Basophil% 0.0      Total Bilirubin 0.6  Comment:  For infants and newborns, interpretation of results should be based  on gestational age, weight and in agreement with clinical  observations.  Premature Infant recommended reference ranges:  Up to 24 hours.............<8.0 mg/dL  Up to 48 hours............<12.0 mg/dL  3-5 days..................<15.0 mg/dL  6-29 days.................<15.0 mg/dL        BUN, Bld 14      Calcium 8.3(L)      Chloride 105      CO2 24      Creatinine 0.6      Differential Method Manual      eGFR if African American >60.0      eGFR if non "  >60.0  Comment:  Calculation used to obtain the estimated glomerular filtration  rate (eGFR) is the CKD-EPI equation.         Eos # Test Not Performed  Comment:  Corrected result; previously reported as 0.0 on %DDDDDDDD% at %TTT%.[C]      Eosinophil% 0.0      Glucose 111(H)      Gran% 3.3  Comment:  Corrected result; previously reported as 4.2 on %DDDDDDDD% at %TTT%.(L)[C]      Hematocrit 20.4(L)      Hemoglobin 7.4(L)      Lymph # Test Not Performed  Comment:  Corrected result; previously reported as 0.2 on %DDDDDDDD% at %TTT%.[C]      Lymph% 96.7  Comment:  Corrected result; previously reported as 83.3 on %DDDDDDDD% at %TTT%.(H)[C]      Magnesium 1.8      MCH 31.9(H)      MCHC 36.3(H)      MCV 88      Mono # Test Not Performed  Comment:  Corrected result; previously reported as 0.0 on %DDDDDDDD% at %TTT%.[C]      Mono% 0.0  Comment:  Corrected result; previously reported as 4.2 on %DDDDDDDD% at %TTT%.(L)[C]      MPV SEE COMMENT  Comment:  Result not available.      nRBC 0      Ovalocytes Occasional      Phosphorus 3.6      Platelet Estimate Decreased(A)      Platelets 9  Comment:  plt critical result(s) called and verbal readback obtained from   kaleb lebron rn, 05/15/2018 09:06  (LL)      Poik Slight      Potassium 4.1      Total Protein 5.1(L)      RBC 2.32(L)      RDW 15.3(H)      Retic 0.1(L)      Sodium 134(L)      Vancomycin-Trough  6.6(L)     WBC 0.24  Comment:  WBC critical result(s) called and verbal readback obtained from Kaleb Lebron RN, 05/15/2018 07:22  (LL)            Significant Imaging: None

## 2018-05-15 NOTE — PT/OT/SLP PROGRESS
"Physical Therapy Treatment    Patient Name:  Hema Kuo   MRN:  [id number]    Recommendations:     Discharge Recommendations:  home health PT   Discharge Equipment Recommendations: none   Barriers to discharge: None    Assessment:     Hema Kuo is a 17 y.o. male admitted with a medical diagnosis of Bacteremia.  He presents with the following impairments/functional limitations:  weakness, impaired endurance, impaired self care skills, impaired functional mobilty, gait instability, impaired balance, decreased upper extremity function, decreased lower extremity function, decreased safety awareness.  Pt tolerated treatment session well this morning.  Pt displaying increased ability to orient self to person, place and situation when asked by therapist.  Pt shows progression toward functional independence by performing cognitive task (playing Connect 4 with therapist) in a seated position and progressing to standing position. No family present during pt treatment session.  Pt educated on PT POC.  Pt will continue to benefit from skilled scute Pt intervention to address the above listed impairments, increased functional mobility independence and help progress cognitive status to baseline.      Rehab Prognosis:  Good; patient would benefit from acute skilled PT services to address these deficits and reach maximum level of function.      Recent Surgery: * No surgery found *      Plan:     During this hospitalization, patient to be seen 6 x/week to address the above listed problems via gait training, therapeutic activities, therapeutic exercises, neuromuscular re-education  · Plan of Care Expires:  06/09/18   Plan of Care Reviewed with: patient    Subjective     Communicated with JULIANN Finley prior to session.  Patient found awake in bed with sitter upon PT entry to room, agreeable to treatment.      Chief Complaint: Bacteremia  Patient comments/goals: "I'm red, I won" (Talking about Connect 4)  Pain/Comfort:  · Pain Rating 1: " 0/10  · Pain Rating Post-Intervention 1: 0/10    Patients cultural, spiritual, Amish conflicts given the current situation: Patient has no barriers to learning. Patient verbalizes understanding of his/her program and goals and demonstrates them correctly. No cultural, spiritual or educational needs identified.    Objective:     Patient found with: telemetry, peripheral IV     General Precautions: Standard, fall   Orthopedic Precautions:N/A   Braces: N/A     Functional Mobility:  · Bed Mobility:     · Scooting: total assistance  · Supine to Sit: Pt able to perfrom transition with modified independence when provided with max verbal cues.   · Sit to Supine: stand by assistance.  Pt requiring max verbal cues to perform transition from therapist.  · Transfers:     · Sit to Stand:  moderate assistance with no AD (pt requires max A and verbal cues to initiate movement)  · Toilet Transfer: moderate assistance with  no AD  (pt requires max verbal cues to perform transfer)  Gait: Pt ambulated total of 20 feet.  Assist Level: SBA  AD Used: None  Gait Pattern: Two point, Step through  Gait Deviations: gait instability, decrease velocity  · Impairments due to: fatigue, decrease balance, increased cognitive demand  · Balance: Pt displays fair balance throughout ambulation.  Pt leaning back of legs against bed during static stand trail      AM-PAC 6 CLICK MOBILITY  Turning over in bed (including adjusting bedclothes, sheets and blankets)?: 4  Sitting down on and standing up from a chair with arms (e.g., wheelchair, bedside commode, etc.): 3  Moving from lying on back to sitting on the side of the bed?: 3  Moving to and from a bed to a chair (including a wheelchair)?: 3  Need to walk in hospital room?: 2  Climbing 3-5 steps with a railing?: 2  Total Score: 17       Therapeutic Activities and Exercises:   Pt plays connect 4 games multiple games for executive function training provided with verbal cues to attend to task at hand  and SBA for safety while sitting and standing from therapist.  Pt educated on continuation of PT POC.  Therapist aids pt in using bathroom. Pt voids and BM.      Patient left supine with all lines intact, call button in reach, RN Malia notified and Sitter present..    GOALS:    Physical Therapy Goals        Problem: Physical Therapy Goal    Goal Priority Disciplines Outcome Goal Variances Interventions   Physical Therapy Goal     PT/OT, PT      Description:  Goals to be met by: 18     Patient will increase functional independence with mobility by performin. Supine to sit with [place] - Not met  2. Sit to supine with [place] - MET ()  3. Sit to stand transfer with [place] - Not met  4. Bed to chair transfer with Stand-by Assistance using least restrictive AD - Not met  5. Gait  x 500 feet with Stand-by Assistance using least restrictive Ad - Not met  6. Lower extremity exercise program x30 reps per handout, with supervision - Not met                    Time Tracking:     PT Received On: 05/15/18  PT Start Time: 849     PT Stop Time: 944  PT Total Time (min): 55 min     Billable Minutes: Gait Training 15 and Therapeutic Activity 40    Treatment Type: Treatment  PT/PTA: PT           Yassine Andrea, SPT   05/15/2018

## 2018-05-15 NOTE — PT/OT/SLP PROGRESS
"Occupational Therapy   Treatment    Name: Hema Kuo  MRN: [id number]  Admitting Diagnosis:  Bacteremia       Recommendations:     Discharge Recommendations: home health OT  Discharge Equipment Recommendations:  none  Barriers to discharge:  None    Subjective     After brushing teeth, pt stated "Nael thank Milo for sending y'all to get me stronger so I can jump." Pt easily redirected and communicative in calm manner.     Communicated with: Malia prior to session.  Pain/Comfort:  · Pain Rating 1: 0/10    Patients cultural, spiritual, Yazidism conflicts given the current situation: none    Objective:     Patient found with: telemetry, central line    General Precautions: Standard, fall   Orthopedic Precautions:N/A   Braces: N/A     Occupational Performance:    Bed Mobility:    · Patient completed Rolling/Turning to Left with  stand by assistance  · Patient completed Rolling/Turning to Right with stand by assistance  · Patient completed Scooting/Bridging with minimum assistance  · Patient completed Supine to Sit with modified independence  · Patient completed Sit to Supine with minimum assistance     Functional Mobility/Transfers:  · Patient completed Sit <> Stand Transfer with minimum assistance  with  no assistive device   · Functional Mobility: Pt ambulated to sink to perform oral care. He then ambulated back to bed with CGA and no AD. Pt walked back very fast towards bed seemingly a little more irritated for the moment.    Activities of Daily Living:  · Feeding:  stand by assistance for initiation and set up of task  · Grooming: stand by assistance standing at sink for oral care. Pt able to sequence task appropriately, required cues for initiation but able to complete without further cues. He did clean up and turn water off. Only step he did not complete was placing the toothpaste cap back on.     Cognitive:   · Pt able to recall name of game played in earlier PT session but not color of his token when provided " binary cues guessed wrong.  · Pt following commands with delayed processing    Patient left HOB elevated with all lines intact. Sitter present at bedside and very helpful throughout.     Education:    Encouraged sitter to promote independence with self care such as feeding, dressing, clothing management with toileting etc to promote success and stimulate cognitive processess. Left connect 4 and MELVI to encourage recreational/cognitively stimulating activities to help with delirium.     [facility] 6 Click:  [facility] Total Score: 15  Education:    Assessment:     Hema Kuo is a 17 y.o. male with a medical diagnosis of Bacteremia.  He presents with decline in ADLs and functional mobility.   Pt would benefit from skilled OT services in order to maximize independence with ADLs and facilitate safe discharge.  Performance deficits affecting function are impaired endurance, impaired sensation, impaired self care skills, decreased lower extremity function, decreased upper extremity function, decreased safety awareness, impaired cardiopulmonary response to activity.      Rehab Prognosis:  Good; patient would benefit from acute skilled OT services to address these deficits and reach maximum level of function.       Plan:     Patient to be seen 4 x/week to address the above listed problems via self-care/home management, therapeutic activities, therapeutic exercises, neuromuscular re-education, sensory integration  · Plan of Care Expires:  5/15/2018  · Plan of Care Reviewed with: patient    This Plan of care has been discussed with the patient who was involved in its development and understands and is in agreement with the identified goals and treatment plan    GOALS:    Occupational Therapy Goals        Problem: Occupational Therapy Goal    Goal Priority Disciplines Outcome Interventions   Occupational Therapy Goal     OT, PT/OT Ongoing (interventions implemented as appropriate)    Description:  Goals to be met by: 5/10/2018    Patient  will increase functional independence with ADLs by performing:    Feeding with [place].  UE Dressing with [place].  LE Dressing with Minimal Assistance.  Goal met  Grooming while standing with Minimal Assistance. Goal met  Toileting from bedside commode with Modified [place] for hygiene and clothing management.   Bathing from  edge of bed with Modified [place].  Toilet transfer to toilet with Minimal Assistance.                         Time Tracking:     OT Date of Treatment: 05/15/18  OT Start Time: 1125  OT Stop Time: 1150  OT Total Time (min): 25 min    Billable Minutes:Self Care/Home Management 25    KURTIS Chun  5/15/2018

## 2018-05-15 NOTE — PROGRESS NOTES
"[facility]  Psychiatry  Progress Note    Patient Name: Hema Kuo  MRN: [id number]   Code Status: Prior  Admission Date: 4/30/2018  Hospital Length of Stay: 15 days  Expected Discharge Date: 5/21/2018  Attending Physician: Christian Emerson MD  Primary Care Provider: Ann Reyna NP    Current Legal Status: CEC      Subjective:     Principal Problem:Bacteremia    Chief Complaint: delirium    HPI: Patient is a 18 y/o man with PMH AML dx 2017 s/p 4 cycles of chemotherapy admitted to pediatrics team with Strep mitis sepsis and ARDS. Psychiatry was consulted for "paranoia, now CEC'd, possible ICU delirium."     Per staff MD:  "17 year old young man with AML s/p 4 cycles of chemotherapy now transferred from the  PICU with Strep mitis sepsis and ARDS.     For his AML we are awaiting count recovery.  ANC next to nothing still.   For his Strep sepsis, he is on cefepime and vancomycin.  Cultures from 5/2 grew Strep mitis (sensitive to vanc).  Subsequent cultures negative.    Will cont a 14 day course of vanc.        .  For his chemotherapy induced pancytopenia, recommend transfusion for hemoglobin under 7 and platelets under 10 K.  No transfusions today.    For his ARDS will get one last dose of lasix today and then we will stop.  He continues to have paranoid delerium since extubation, however this is improving.  Will cont seroquel with prn zyprexa.  Will have psych see tomorrow.   Still needs a 1:1 sitter."    Per RN note 5/14:  "Pt oriented x4. Pt paranoid and anxious but cooperative. Very slow to proccess what's being communicated to him and very slow to respond. Similar to last nights shift, it took patient a little while for him to take his medications, but after much convincing, pt took them with no problem. Pt making death statements throughout night and still believes he is dying and nurses are out to get him. Double lumen left chest port in place, flushes well, blood return noted from each " "lumen. All medications/antibitoics given as scheduled. Labs drawn in am. Critical results resulted, MD notified and labs redrawn. Pt did sleep for about 6 hours tonight."    Per RN note 5/13:  "Pt oriented to person, self, time, but disoriented to place. Pt stated he was in [facility] waiting to die. Pt paranoid and anxious but cooperative. Took him a little time (about 25 minutes) to take his medications, but after time of explaining to him what the medications were and why they are needed, pt took them. Pt stated he believes "the nurses are trying to burn" him and that we all hate him and want him to die."    On interview, patient with sitter at bedside and uneaten breakfast tray in front of him. Interview was limited as his answers to questions are impoverished and inappropriate. He asked interviewer "are you my biological father?" And stated "I think I'm dead." Was able to state his reason for admission was "cancer" but did not respond to further questions about his care.     Per iglesia, patient slept overnight and has had limited appetite. Has been paranoid but not physically aggressive. Minimally talkative with her. Says patient's mother is currently at a doctor's appointment but will return to hospital this afternoon.    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 2 sodas/day         Hospital Course: 05/15/2018: Per chart, patient received PRN Zyprexa @ 0009 for insomnia. Per iglesia, " "patient slightly less paranoid this morning, ate most of breakfast and played games with her. On interview, no family at bedside, patient states he is feeling like himself. Still making statements about believing his is dead but more redirectable. Mentioned his nosebleed was because there is "something inside" him besides cancer. Overall more conversational and less paranoid this morning.      Interval History: see hospital course. Per RN: "Awake and alert on assessment, confused, suspicious, paranoid. Disoriented to time but oriented to place, person. At times is oriented to situation, stating "I was intubated". Disoriented at times stating "I'm in a body bag" and "am I alive". Patient fell asleep around 0245 this AM. Zyprexa given around midnight to help with rest."    Family History     Problem Relation (Age of Onset)    Cancer Mother    Heart disease Mother    No Known Problems Father        Social History Main Topics    Smoking status: Former Smoker     Packs/day: 0.50     Types: Cigarettes     Quit date: 10/30/2017    Smokeless tobacco: Never Used    Alcohol use Yes      Comment: rare occasions    Drug use: No    Sexual activity: Yes     Partners: Female     Psychotherapeutics     Start     Stop Route Frequency Ordered    05/14/18 2100  risperiDONE disintegrating tablet 1 mg      -- Oral 2 times daily 05/14/18 1603    05/11/18 1029  olanzapine zydis disintegrating tablet 5 mg      -- Oral As needed (PRN) 05/11/18 0932           Review of Systems  Objective:     Vital Signs (Most Recent):  Temp: 98.8 °F (37.1 °C) (05/15/18 0755)  Pulse: 99 (05/15/18 0755)  Resp: 20 (05/15/18 0755)  BP: (!) 94/45 (05/15/18 0755)  SpO2: 98 % (05/15/18 0755) Vital Signs (24h Range):  Temp:  [98.2 °F (36.8 °C)-98.8 °F (37.1 °C)] 98.8 °F (37.1 °C)  Pulse:  [] 99  Resp:  [16-20] 20  SpO2:  [97 %-100 %] 98 %  BP: ()/(45-59) 94/45     Height: 5' 6" (167.6 cm)  Weight: 77.4 kg (170 lb 10.2 oz)  Body mass index is 27.54 " "kg/m².      Intake/Output Summary (Last 24 hours) at 05/15/18 0811  Last data filed at 05/15/18 0639   Gross per 24 hour   Intake             1350 ml   Output                0 ml   Net             1350 ml       Physical Exam    Mental Status Exam  General appearance and behavior: No acute distress, no abnormal involuntary movements, guarded, increasingly cooperative  Level of Consciousness: awake  Attention: SAVEAHAART with 3 errors  Orientation: intact to self, year; disoriented to place, month, day of the week, situation  Psychomotor Behavior: + retardation   Speech: slow, normal volume and tone  Language: prosody intact, non-spontaneous, and appropriate without evidence of neologisms or gross idiosyncrasies   Mood: "okay"   Affect: constricted  Thought Process: linear, + evidence of unwarranted suspicion  Associations: intact  Thought Content: denies SI/HI/AVH  Memory: remote intact, recent impaired  Calculation/Concentration: WORLD forwards  Insight: impaired/limited  Judgment: Behavior adequate for circumstances    Significant Labs:   Last 24 Hours:   Recent Lab Results       05/15/18  0638 05/14/18  2205      Immature Granulocytes CANCELED  Comment:  Result canceled by the ancillary      Immature Grans (Abs) CANCELED  Comment:  Mild elevation in immature granulocytes is non specific and   can be seen in a variety of conditions including stress response,   acute inflammation, trauma and pregnancy. Correlation with other   laboratory and clinical findings is essential.    Result canceled by the ancillary        Albumin 2.6(L)      Alkaline Phosphatase 66      (H)      Anion Gap 5(L)      Aniso Slight      AST 50(H)      Baso # Test Not Performed  Comment:  Corrected result; previously reported as 0.00 on %DDDDDDDD% at %TTT%.[C]      Basophil% 0.0      Total Bilirubin 0.6  Comment:  For infants and newborns, interpretation of results should be based  on gestational age, weight and in agreement with " clinical  observations.  Premature Infant recommended reference ranges:  Up to 24 hours.............<8.0 mg/dL  Up to 48 hours............<12.0 mg/dL  3-5 days..................<15.0 mg/dL  6-29 days.................<15.0 mg/dL        BUN, Bld 14      Calcium 8.3(L)      Chloride 105      CO2 24      Creatinine 0.6      Differential Method Manual      eGFR if African American >60.0      eGFR if non  >60.0  Comment:  Calculation used to obtain the estimated glomerular filtration  rate (eGFR) is the CKD-EPI equation.         Eos # Test Not Performed  Comment:  Corrected result; previously reported as 0.0 on %DDDDDDDD% at %TTT%.[C]      Eosinophil% 0.0      Glucose 111(H)      Gran% 3.3  Comment:  Corrected result; previously reported as 4.2 on %DDDDDDDD% at %TTT%.(L)[C]      Hematocrit 20.4(L)      Hemoglobin 7.4(L)      Lymph # Test Not Performed  Comment:  Corrected result; previously reported as 0.2 on %DDDDDDDD% at %TTT%.[C]      Lymph% 96.7  Comment:  Corrected result; previously reported as 83.3 on %DDDDDDDD% at %TTT%.(H)[C]      Magnesium 1.8      MCH 31.9(H)      MCHC 36.3(H)      MCV 88      Mono # Test Not Performed  Comment:  Corrected result; previously reported as 0.0 on %DDDDDDDD% at %TTT%.[C]      Mono% 0.0  Comment:  Corrected result; previously reported as 4.2 on %DDDDDDDD% at %TTT%.(L)[C]      MPV SEE COMMENT  Comment:  Result not available.      nRBC 0      Ovalocytes Occasional      Phosphorus 3.6      Platelet Estimate Decreased(A)      Platelets 9  Comment:  plt critical result(s) called and verbal readback obtained from   kaleb lebron rn, 05/15/2018 09:06  (LL)      Poik Slight      Potassium 4.1      Total Protein 5.1(L)      RBC 2.32(L)      RDW 15.3(H)      Retic 0.1(L)      Sodium 134(L)      Vancomycin-Trough  6.6(L)     WBC 0.24  Comment:  WBC critical result(s) called and verbal readback obtained from Kaleb Lebron RN, 05/15/2018 07:22  (LL)            Significant  Imaging: None    Assessment/Plan:     Acute delirium    Patient without significant past psychiatric history has been exhibiting waxing and waning mental status, disorientation and paranoid delusions since being extubated on 5/9 consistent with delirium. Recommend increase scheduled Risperdal M tabs to 1 mg daily and 2 mg qHS. Continue Zyprexa 5 mg PO/IM q8 PRN nonredirectable agitation. QTc on 5/14 459. Recommend checking daily EKG to monitor QTc. Continue to monitor CBC for any worsening leukopenia/neutropenia with antipsychotic medications.  · DELIRIUM BEHAVIOR MANAGEMENT  · PLEASE utilize CHEMICAL restraints with PRN meds first for agitation. Minimize use of PHYSICAL restraints  · Keep window shades open and room lit during day and room dim at night in order to promote normal sleep-wake cycles  · Encourage family at bedside. [place] patient often to situation, location, date.  · Continue to Limit or Discontinue use of Narcotics, Benzos and Anti-cholinergic (Avoid Benadryl) medications as they may worsen delirium.   · Continue medical workup for causative etiology of Delirium. Recommend CT head with and without contrast if possible to assess for any acute intracranial processes and repeat CXR to rule out additional infectious process. Recommend check amylase, lipase, B12, folate, HIV, RPR.               Total time:  25 with greater than 50% of this time spent in counseling and/or coordination of care.       Clair Taylor MD   Psychiatry  [facility]

## 2018-05-15 NOTE — PLAN OF CARE
05/15/18 1400   Discharge Reassessment   Assessment Type Discharge Planning Reassessment   Provided patient/caregiver education on the expected discharge date and the discharge plan Yes   Do you have any problems affording any of your prescribed medications? Yes   Discharge Plan A Home with family   Discharge Plan B [facility] hospital   Patient choice form signed by patient/caregiver N/A   Pt not medically stable for dc, will follow.

## 2018-05-15 NOTE — PLAN OF CARE
Problem: Patient Care Overview  Goal: Plan of Care Review  Pt tolerated treatment session well this morning.  Pt displaying increased ability to orient self to person, place and situation when asked by therapist.  Pt shows progression toward functional independence by performing cognitive task (playing Connect 4 with therapist) in a seated position and progressing to standing position. No family present during pt treatment session.  Pt educated on PT POC.  Pt will continue to benefit from skilled scute Pt intervention to address the above listed impairments, increased functional mobility independence and help progress cognitive status to baseline.      Yassine Andrea  5/15/2018

## 2018-05-15 NOTE — PROGRESS NOTES
Nutrition Assessment    Dx: Neutropenia    Weight: 77.4kg  Height: 167.6cm  BMI: 27    Percentiles   Weight/Age: 90%  Height/Age: 10%  BMI/Age: 96%    Estimated Needs:  2200kcals (25kcal/kg)  105-123g protein (1.2-1.4g/kg protein)  1mL/kcal    Diet: Mechanical Soft/Dysphagia    Meds: famotidine, vancomycin  Labs: Na 134, Glu 111, Ca 8.3, P 5.1, Alb 2.6, AST 50, , WBC 0.24    24 hr I/Os:   Total intake: 1350mL (17.4 mL/kg)  +I/O    Nutrition Hx: 18yo male with hx AML s/p chemo admitted for neutropenia. Pt currently on mechanical soft/dysphagia diet; tolerating well and consumed  of meal this AM. Sitter at bedside. Reports no n/v/d/c but stated pt had a nose bleed this morning. Noted wt loss of 10.5 kg x 16 days.     Nutrition Diagnosis: Inadequate energy intake r/t decreased ability to consume adequate energy AEB intake less than estimated energy needs, new     Intervention:   1. Continue current diet as tolerated   -Monitor for any signs of intolerance    2. Weights daily, lengths weekly     Goal: Pt to meet % EEN and EPN - continuing.   Monitor: TF provision/tolerance, wts, labs  1X/week    Nutrition Discharge Planning: Unclear at this time.     Documented by Sandrine Nix, Dietetic Intern    I certify that I directed the dietetic intern in service delivery and guided them using my skilled judgment. As the cosigning dietitian, I have reviewed the dietetic interns documentation and am responsible for the treatment, assessment, and plan.  Lizabeth James, ISABEL, LDN

## 2018-05-15 NOTE — PLAN OF CARE
"Problem: Patient Care Overview  Goal: Plan of Care Review  Outcome: Ongoing (interventions implemented as appropriate)  Reviewed plan of care with sitter at bedside. Vital signs stable, afebrile. Awake and alert on assessment, confused, suspicious, paranoid. Disoriented to time but oriented to place, person. At times is oriented to situation, stating "I was intubated". Disoriented at times stating "I'm in a body bag" and "am I alive". Patient fell asleep around 0245 this AM. Zyprexa given around midnight to help with rest, asked Dr. SHREE Araya if second dose was indicated after 2 hours of no rest, Dr. Araya said not to give as patient is not agitated. Respiration even and non labored. Breath sounds clear. Bowel sounds active in all quadrants. Tolerating soft diet with fair appetite, needs to be asked if wants juice/water or snacks. Voids per toilet. Patient deaccessed port this morning around 0130. Both ports reaccessed with positive blood return only noted from inner port, outer port heparin locked, Dr. Araya notifed. Outer port was accessed twice with no successful blood return but flushes with ease with no s/s of infilitration. Vanc trough drawn around 10pm, AM labs drawn after 6:30am to allow patient as much rest as possible. Sitter attentive at bedside. Patient is CEC'd, precautions in place. Monitoring       "

## 2018-05-15 NOTE — ASSESSMENT & PLAN NOTE
Delirium: currently CEC  - Continue Risperdal 1mg BID  - Olanzapine PRN  - EKG complete on 5/14, repeat on 5/16 then discontinue  - Avoid Benadryl PRN per Psych  - Continue frequent reorientation and attempt to encourage patient to sleep.   - Psych eval complete on 5/14

## 2018-05-15 NOTE — PLAN OF CARE
Problem: Occupational Therapy Goal  Goal: Occupational Therapy Goal  Goals to be met by: 5/10/2018    Patient will increase functional independence with ADLs by performing:    Feeding with [place].  UE Dressing with [place].  LE Dressing with Minimal Assistance.  Goal met  Grooming while standing with Minimal Assistance. Goal met  Toileting from bedside commode with Modified [place] for hygiene and clothing management.   Bathing from  edge of bed with Modified [place].  Toilet transfer to toilet with Minimal Assistance.       Outcome: Ongoing (interventions implemented as appropriate)  Goals remain appropriate, continue with OT POC.    KURTIS Hazel  5/15/2018  Rehab Services

## 2018-05-15 NOTE — SUBJECTIVE & OBJECTIVE
Interval History: NAEO, VSS. Started on Risperdal. Van trough 6-Dose increased to to 1g Q8h.     Scheduled Meds:   acyclovir  400 mg Oral BID    ciprofloxacin HCl  500 mg Oral BID    famotidine  20 mg Oral BID    melatonin  10 mg Oral QHS    posaconazole  400 mg Oral BID    risperiDONE  1 mg Oral BID    sulfamethoxazole-trimethoprim 800-160mg  1 tablet Oral twice daily on Friday, Saturday, Sunday    vancomycin (VANCOCIN) IVPB  1,000 mg Intravenous Q8H     Continuous Infusions:  PRN Meds:sodium chloride, albuterol sulfate, alteplase, heparin, porcine (PF), lidocaine-prilocaine, morphine, olanzapine zydis, ondansetron, polyethylene glycol    Review of Systems   Psychiatric/Behavioral: Positive for agitation, confusion, hallucinations and sleep disturbance.     Objective:     Vital Signs (Most Recent):  Temp: 98.8 °F (37.1 °C) (05/15/18 0944)  Pulse: 97 (05/15/18 0944)  Resp: 18 (05/15/18 0944)  BP: (!) 118/58 (05/15/18 0944)  SpO2: 99 % (05/15/18 0944) Vital Signs (24h Range):  Temp:  [98.2 °F (36.8 °C)-98.8 °F (37.1 °C)] 98.8 °F (37.1 °C)  Pulse:  [] 97  Resp:  [16-20] 18  SpO2:  [97 %-100 %] 99 %  BP: ()/(45-59) 118/58     Patient Vitals for the past 72 hrs (Last 3 readings):   Weight   05/14/18 2000 77.4 kg (170 lb 10.2 oz)     Body mass index is 27.54 kg/m².    Intake/Output - Last 3 Shifts       05/13 0700 - 05/14 0659 05/14 0700 - 05/15 0659 05/15 0700 - 05/16 0659    P.O. 1260 450 300    IV Piggyback 775 900     Total Intake(mL/kg) 2035 (23.2) 1350 (17.4) 300 (3.9)    Net +2035 +1350 +300           Urine Occurrence 2 x 3 x 1 x    Stool Occurrence 1 x  1 x    Emesis Occurrence 1 x            Lines/Drains/Airways     Central Venous Catheter Line                 Port A Cath Double Lumen 05/10/18 0900 left subclavian 5 days                Physical Exam  Constitutional: He appears well-developed and well-nourished.   Head: Normocephalic and atraumatic.   Nose: Nose normal.   Mouth/Throat: Mucous  membranes are normal.   Eyes: Conjunctivae and lids are normal.   Neck: Normal range of motion. Neck supple.   Cardiovascular: Normal rate and regular rhythm.    Pulmonary/Chest: Effort normal. No stridor. He has no decreased breath sounds. He has no wheezes. He has no rales.   Abdominal: Soft. Bowel sounds are normal. He exhibits no distension. There is no hepatosplenomegaly. There is no guarding.   Musculoskeletal: Normal range of motion. He exhibits no edema.   Skin: Skin is warm and dry. Capillary refill takes less than 2 seconds. No rash noted. He is not diaphoretic. No erythema. Port CDI.   Psychiatric: Still delirious ,incomprehensible speech.     Significant Labs:  Recent Results (from the past 24 hour(s))   VANCOMYCIN, TROUGH before 4th dose    Collection Time: 05/14/18 10:05 PM   Result Value Ref Range    Vancomycin-Trough 6.6 (L) 10.0 - 22.0 ug/mL   Reticulocytes    Collection Time: 05/15/18  6:38 AM   Result Value Ref Range    Retic 0.1 (L) 0.4 - 2.0 %   Comprehensive metabolic panel    Collection Time: 05/15/18  6:38 AM   Result Value Ref Range    Sodium 134 (L) 136 - 145 mmol/L    Potassium 4.1 3.5 - 5.1 mmol/L    Chloride 105 95 - 110 mmol/L    CO2 24 23 - 29 mmol/L    Glucose 111 (H) 70 - 110 mg/dL    BUN, Bld 14 6 - 20 mg/dL    Creatinine 0.6 0.5 - 1.4 mg/dL    Calcium 8.3 (L) 8.7 - 10.5 mg/dL    Total Protein 5.1 (L) 6.0 - 8.4 g/dL    Albumin 2.6 (L) 3.5 - 5.2 g/dL    Total Bilirubin 0.6 0.1 - 1.0 mg/dL    Alkaline Phosphatase 66 55 - 135 U/L    AST 50 (H) 10 - 40 U/L     (H) 10 - 44 U/L    Anion Gap 5 (L) 8 - 16 mmol/L    eGFR if African American >60.0 >60 mL/min/1.73 m^2    eGFR if non African American >60.0 >60 mL/min/1.73 m^2   Magnesium    Collection Time: 05/15/18  6:38 AM   Result Value Ref Range    Magnesium 1.8 1.6 - 2.6 mg/dL   Phosphorus    Collection Time: 05/15/18  6:38 AM   Result Value Ref Range    Phosphorus 3.6 2.7 - 4.5 mg/dL   CBC auto differential    Collection Time:  05/15/18  6:38 AM   Result Value Ref Range    WBC 0.24 (LL) 3.90 - 12.70 K/uL    RBC 2.32 (L) 4.60 - 6.20 M/uL    Hemoglobin 7.4 (L) 14.0 - 18.0 g/dL    Hematocrit 20.4 (L) 40.0 - 54.0 %    MCV 88 82 - 98 fL    MCH 31.9 (H) 27.0 - 31.0 pg    MCHC 36.3 (H) 32.0 - 36.0 g/dL    RDW 15.3 (H) 11.5 - 14.5 %    Platelets 9 (LL) 150 - 350 K/uL    MPV SEE COMMENT 9.2 - 12.9 fL    Immature Granulocytes CANCELED 0.0 - 0.5 %    Immature Grans (Abs) CANCELED 0.00 - 0.04 K/uL    Lymph # Test Not Performed 1.0 - 4.8 K/uL    Mono # Test Not Performed 0.3 - 1.0 K/uL    Eos # Test Not Performed 0.0 - 0.5 K/uL    Baso # Test Not Performed 0.00 - 0.20 K/uL    nRBC 0 0 /100 WBC    Gran% 3.3 (L) 38.0 - 73.0 %    Lymph% 96.7 (H) 18.0 - 48.0 %    Mono% 0.0 (L) 4.0 - 15.0 %    Eosinophil% 0.0 0.0 - 8.0 %    Basophil% 0.0 0.0 - 1.9 %    Platelet Estimate Decreased (A)     Aniso Slight     Poik Slight     Ovalocytes Occasional     Differential Method Manual        Significant Imaging:   none

## 2018-05-16 LAB
ABO + RH BLD: NORMAL
ANISOCYTOSIS BLD QL SMEAR: SLIGHT
BASOPHILS # BLD AUTO: 0 K/UL
BASOPHILS NFR BLD: 0 %
BASOPHILS NFR BLD: 0 %
BLD GP AB SCN CELLS X3 SERPL QL: NORMAL
BLD PROD TYP BPU: NORMAL
BLD PROD TYP BPU: NORMAL
BLOOD UNIT EXPIRATION DATE: NORMAL
BLOOD UNIT EXPIRATION DATE: NORMAL
BLOOD UNIT TYPE CODE: 2800
BLOOD UNIT TYPE CODE: 6200
BLOOD UNIT TYPE: NORMAL
BLOOD UNIT TYPE: NORMAL
CODING SYSTEM: NORMAL
CODING SYSTEM: NORMAL
DIFFERENTIAL METHOD: ABNORMAL
DIFFERENTIAL METHOD: ABNORMAL
DISPENSE STATUS: NORMAL
DISPENSE STATUS: NORMAL
EOSINOPHIL # BLD AUTO: 0 K/UL
EOSINOPHIL NFR BLD: 0 %
EOSINOPHIL NFR BLD: 0 %
ERYTHROCYTE [DISTWIDTH] IN BLOOD BY AUTOMATED COUNT: 15.6 %
ERYTHROCYTE [DISTWIDTH] IN BLOOD BY AUTOMATED COUNT: 15.6 %
HCT VFR BLD AUTO: 16.3 %
HCT VFR BLD AUTO: 17.2 %
HGB BLD-MCNC: 5.8 G/DL
HGB BLD-MCNC: 5.9 G/DL
IMM GRANULOCYTES # BLD AUTO: 0 K/UL
IMM GRANULOCYTES # BLD AUTO: ABNORMAL K/UL
IMM GRANULOCYTES NFR BLD AUTO: 0 %
IMM GRANULOCYTES NFR BLD AUTO: ABNORMAL %
LYMPHOCYTES # BLD AUTO: 0.2 K/UL
LYMPHOCYTES NFR BLD: 100 %
LYMPHOCYTES NFR BLD: 89.5 %
MCH RBC QN AUTO: 31.1 PG
MCH RBC QN AUTO: 31.7 PG
MCHC RBC AUTO-ENTMCNC: 34.3 G/DL
MCHC RBC AUTO-ENTMCNC: 35.6 G/DL
MCV RBC AUTO: 89 FL
MCV RBC AUTO: 91 FL
MONOCYTES # BLD AUTO: 0 K/UL
MONOCYTES NFR BLD: 0 %
MONOCYTES NFR BLD: 0 %
NEUTROPHILS # BLD AUTO: 0 K/UL
NEUTROPHILS NFR BLD: 0 %
NEUTROPHILS NFR BLD: 10.5 %
NRBC BLD-RTO: 0 /100 WBC
NRBC BLD-RTO: 6 /100 WBC
NUM UNITS TRANS PACKED RBC: NORMAL
NUM UNITS TRANS PACKED RBC: NORMAL
OVALOCYTES BLD QL SMEAR: ABNORMAL
PLATELET # BLD AUTO: 26 K/UL
PLATELET # BLD AUTO: 30 K/UL
PLATELET BLD QL SMEAR: ABNORMAL
PMV BLD AUTO: 10.6 FL
PMV BLD AUTO: 9.9 FL
POIKILOCYTOSIS BLD QL SMEAR: SLIGHT
POLYCHROMASIA BLD QL SMEAR: ABNORMAL
RBC # BLD AUTO: 1.83 M/UL
RBC # BLD AUTO: 1.9 M/UL
RETICS/RBC NFR AUTO: 0.1 %
VANCOMYCIN TROUGH SERPL-MCNC: 8.9 UG/ML
WBC # BLD AUTO: 0.19 K/UL
WBC # BLD AUTO: 0.32 K/UL

## 2018-05-16 PROCEDURE — 86644 CMV ANTIBODY: CPT

## 2018-05-16 PROCEDURE — 63600175 PHARM REV CODE 636 W HCPCS: Performed by: PEDIATRICS

## 2018-05-16 PROCEDURE — P9040 RBC LEUKOREDUCED IRRADIATED: HCPCS

## 2018-05-16 PROCEDURE — 25000003 PHARM REV CODE 250: Performed by: STUDENT IN AN ORGANIZED HEALTH CARE EDUCATION/TRAINING PROGRAM

## 2018-05-16 PROCEDURE — 85007 BL SMEAR W/DIFF WBC COUNT: CPT

## 2018-05-16 PROCEDURE — 85025 COMPLETE CBC W/AUTO DIFF WBC: CPT

## 2018-05-16 PROCEDURE — 63600175 PHARM REV CODE 636 W HCPCS: Performed by: STUDENT IN AN ORGANIZED HEALTH CARE EDUCATION/TRAINING PROGRAM

## 2018-05-16 PROCEDURE — 93005 ELECTROCARDIOGRAM TRACING: CPT

## 2018-05-16 PROCEDURE — 97116 GAIT TRAINING THERAPY: CPT

## 2018-05-16 PROCEDURE — 11300000 HC PEDIATRIC PRIVATE ROOM

## 2018-05-16 PROCEDURE — 93010 ELECTROCARDIOGRAM REPORT: CPT | Mod: ,,, | Performed by: PEDIATRICS

## 2018-05-16 PROCEDURE — 36430 TRANSFUSION BLD/BLD COMPNT: CPT

## 2018-05-16 PROCEDURE — 99233 SBSQ HOSP IP/OBS HIGH 50: CPT | Mod: ,,, | Performed by: PEDIATRICS

## 2018-05-16 PROCEDURE — 80202 ASSAY OF VANCOMYCIN: CPT

## 2018-05-16 PROCEDURE — 85045 AUTOMATED RETICULOCYTE COUNT: CPT

## 2018-05-16 PROCEDURE — P9038 RBC IRRADIATED: HCPCS

## 2018-05-16 PROCEDURE — 97530 THERAPEUTIC ACTIVITIES: CPT

## 2018-05-16 PROCEDURE — 85027 COMPLETE CBC AUTOMATED: CPT

## 2018-05-16 PROCEDURE — 86920 COMPATIBILITY TEST SPIN: CPT

## 2018-05-16 PROCEDURE — 27201040 HC RC 50 FILTER

## 2018-05-16 PROCEDURE — 86850 RBC ANTIBODY SCREEN: CPT

## 2018-05-16 RX ORDER — RISPERIDONE 1 MG/1
2 TABLET, ORALLY DISINTEGRATING ORAL DAILY
Status: DISCONTINUED | OUTPATIENT
Start: 2018-05-17 | End: 2018-05-17

## 2018-05-16 RX ORDER — POSACONAZOLE 100 MG/1
300 TABLET, DELAYED RELEASE ORAL DAILY
Status: DISCONTINUED | OUTPATIENT
Start: 2018-05-16 | End: 2018-05-31 | Stop reason: HOSPADM

## 2018-05-16 RX ORDER — ACETAMINOPHEN 325 MG/1
650 TABLET ORAL EVERY 6 HOURS PRN
Status: DISCONTINUED | OUTPATIENT
Start: 2018-05-16 | End: 2018-05-26

## 2018-05-16 RX ADMIN — FAMOTIDINE 20 MG: 20 TABLET, FILM COATED ORAL at 08:05

## 2018-05-16 RX ADMIN — SODIUM CHLORIDE 1 G: 9 INJECTION, SOLUTION INTRAVENOUS at 06:05

## 2018-05-16 RX ADMIN — RISPERIDONE 2 MG: 1 TABLET, ORALLY DISINTEGRATING ORAL at 08:05

## 2018-05-16 RX ADMIN — Medication 500 UNITS: at 03:05

## 2018-05-16 RX ADMIN — ACYCLOVIR 400 MG: 200 CAPSULE ORAL at 09:05

## 2018-05-16 RX ADMIN — CIPROFLOXACIN HYDROCHLORIDE 500 MG: 500 TABLET, FILM COATED ORAL at 09:05

## 2018-05-16 RX ADMIN — RISPERIDONE 1 MG: 1 TABLET, ORALLY DISINTEGRATING ORAL at 09:05

## 2018-05-16 RX ADMIN — Medication 1250 MG: at 03:05

## 2018-05-16 RX ADMIN — ACETAMINOPHEN, DIPHENHYDRAMINE HCL, PHENYLEPHRINE HCL 10 MG: 325; 25; 5 TABLET ORAL at 08:05

## 2018-05-16 RX ADMIN — Medication 1250 MG: at 11:05

## 2018-05-16 RX ADMIN — CIPROFLOXACIN HYDROCHLORIDE 500 MG: 500 TABLET, FILM COATED ORAL at 08:05

## 2018-05-16 RX ADMIN — Medication 500 UNITS: at 07:05

## 2018-05-16 RX ADMIN — FAMOTIDINE 20 MG: 20 TABLET, FILM COATED ORAL at 09:05

## 2018-05-16 RX ADMIN — ACETAMINOPHEN 650 MG: 325 TABLET, FILM COATED ORAL at 10:05

## 2018-05-16 RX ADMIN — POSACONAZOLE 300 MG: 100 TABLET, COATED ORAL at 09:05

## 2018-05-16 RX ADMIN — Medication 500 UNITS: at 06:05

## 2018-05-16 RX ADMIN — ACYCLOVIR 400 MG: 200 CAPSULE ORAL at 08:05

## 2018-05-16 NOTE — ASSESSMENT & PLAN NOTE
Patient with Strep mitis sepsis. Bcx from 5/2 growing organism-repeat cultures NG.      - s/p Cefepime   - Increase Vancomycin 1250 g Q8h, follow vanc trough at 1400 on 5/17. Day 12.  - repeat Cx NGTD

## 2018-05-16 NOTE — SUBJECTIVE & OBJECTIVE
Interval History: NAEO, VSS. Sleep improved. Appetite  Improved.     Scheduled Meds:   acyclovir  400 mg Oral BID    ciprofloxacin HCl  500 mg Oral BID    famotidine  20 mg Oral BID    melatonin  10 mg Oral QHS    posaconazole  300 mg Oral Daily    risperiDONE  1 mg Oral Daily    risperiDONE  2 mg Oral QHS    sulfamethoxazole-trimethoprim 800-160mg  1 tablet Oral twice daily on Friday, Saturday, Sunday    vancomycin (VANCOCIN) IVPB  1,250 mg Intravenous Q8H     Continuous Infusions:  PRN Meds:sodium chloride, acetaminophen, albuterol sulfate, alteplase, heparin, porcine (PF), lidocaine-prilocaine, morphine, olanzapine zydis, ondansetron, polyethylene glycol    Review of Systems   Constitutional: Negative for fever.   Psychiatric/Behavioral: Positive for agitation and hallucinations.     Objective:     Vital Signs (Most Recent):  Temp: 98.9 °F (37.2 °C) (05/16/18 0938)  Pulse: 106 (05/16/18 0938)  Resp: 18 (05/16/18 0938)  BP: (!) 117/57 (05/16/18 0938)  SpO2: 98 % (05/16/18 0938) Vital Signs (24h Range):  Temp:  [98 °F (36.7 °C)-99.6 °F (37.6 °C)] 98.9 °F (37.2 °C)  Pulse:  [] 106  Resp:  [16-20] 18  SpO2:  [98 %-100 %] 98 %  BP: ()/(54-58) 117/57     Patient Vitals for the past 72 hrs (Last 3 readings):   Weight   05/15/18 1945 77.9 kg (171 lb 11.8 oz)   05/14/18 2000 77.4 kg (170 lb 10.2 oz)     Body mass index is 27.72 kg/m².    Intake/Output - Last 3 Shifts       05/14 0700 - 05/15 0659 05/15 0700 - 05/16 0659 05/16 0700 - 05/17 0659    P.O. 450 300     I.V. (mL/kg)  60 (0.8)     IV Piggyback 900 500     Total Intake(mL/kg) 1350 (17.4) 860 (11)     Net +1350 +860             Urine Occurrence 3 x 3 x     Stool Occurrence  1 x           Lines/Drains/Airways     Central Venous Catheter Line                 Port A Cath Double Lumen 05/10/18 0900 left subclavian 6 days                Physical Exam  Constitutional: He appears well-developed and well-nourished.   Head: Normocephalic and atraumatic.    Nose: Nose normal.   Mouth/Throat: Mucous membranes are normal.   Eyes: Conjunctivae and lids are normal.   Neck: Normal range of motion. Neck supple.   Cardiovascular: Normal rate and regular rhythm.    Pulmonary/Chest: Effort normal. No stridor. He has no decreased breath sounds. He has no wheezes. He has no rales.   Abdominal: Soft. Bowel sounds are normal. He exhibits no distension. There is no hepatosplenomegaly. There is no guarding.   Musculoskeletal: Normal range of motion. He exhibits no edema.   Skin: Skin is warm and dry. Capillary refill takes less than 2 seconds. No rash noted. He is not diaphoretic. No erythema. Port CDI.   Psychiatric: Still delirious.     Significant Labs:  Recent Results (from the past 24 hour(s))   Reticulocytes    Collection Time: 05/16/18  6:10 AM   Result Value Ref Range    Retic 0.1 (L) 0.4 - 2.0 %   CBC auto differential    Collection Time: 05/16/18  6:10 AM   Result Value Ref Range    WBC 0.19 (LL) 3.90 - 12.70 K/uL    RBC 1.90 (L) 4.60 - 6.20 M/uL    Hemoglobin 5.9 (LL) 14.0 - 18.0 g/dL    Hematocrit 17.2 (LL) 40.0 - 54.0 %    MCV 91 82 - 98 fL    MCH 31.1 (H) 27.0 - 31.0 pg    MCHC 34.3 32.0 - 36.0 g/dL    RDW 15.6 (H) 11.5 - 14.5 %    Platelets 30 (LL) 150 - 350 K/uL    MPV 9.9 9.2 - 12.9 fL    Immature Granulocytes 0.0 0.0 - 0.5 %    Gran # (ANC) 0.0 (L) 1.8 - 7.7 K/uL    Immature Grans (Abs) 0.00 0.00 - 0.04 K/uL    Lymph # 0.2 (L) 1.0 - 4.8 K/uL    Mono # 0.0 (L) 0.3 - 1.0 K/uL    Eos # 0.0 0.0 - 0.5 K/uL    Baso # 0.00 0.00 - 0.20 K/uL    nRBC 0 0 /100 WBC    Gran% 10.5 (L) 38.0 - 73.0 %    Lymph% 89.5 (H) 18.0 - 48.0 %    Mono% 0.0 (L) 4.0 - 15.0 %    Eosinophil% 0.0 0.0 - 8.0 %    Basophil% 0.0 0.0 - 1.9 %    Differential Method Automated    VANCOMYCIN, TROUGH before 4th dose    Collection Time: 05/16/18  6:10 AM   Result Value Ref Range    Vancomycin-Trough 8.9 (L) 10.0 - 22.0 ug/mL   Type & Screen    Collection Time: 05/16/18  6:10 AM   Result Value Ref Range     Group & Rh AB POS     Indirect Nathan NEG    Prepare RBC 2 Units; Hb  5.8    Collection Time: 05/16/18  6:10 AM   Result Value Ref Range    UNIT NUMBER [id number]     PRODUCT CODE [id number]     DISPENSE STATUS CROSSMATCHED     CODING SYSTEM [id number]     Unit Blood Type Code 2800     Unit Blood Type AB NEG     Unit Expiration [id number]     UNIT NUMBER [id number]     PRODUCT CODE [id number]     DISPENSE STATUS CROSSMATCHED     CODING SYSTEM [id number]     Unit Blood Type Code 6200     Unit Blood Type A POS     Unit Expiration [id number]    CBC auto differential    Collection Time: 05/16/18  7:16 AM   Result Value Ref Range    WBC 0.32 (LL) 3.90 - 12.70 K/uL    RBC 1.83 (L) 4.60 - 6.20 M/uL    Hemoglobin 5.8 (LL) 14.0 - 18.0 g/dL    Hematocrit 16.3 (LL) 40.0 - 54.0 %    MCV 89 82 - 98 fL    MCH 31.7 (H) 27.0 - 31.0 pg    MCHC 35.6 32.0 - 36.0 g/dL    RDW 15.6 (H) 11.5 - 14.5 %    Platelets 26 (LL) 150 - 350 K/uL    MPV 10.6 9.2 - 12.9 fL    Immature Granulocytes CANCELED 0.0 - 0.5 %    Immature Grans (Abs) CANCELED 0.00 - 0.04 K/uL    nRBC 6 (A) 0 /100 WBC    Gran% 0.0 (L) 38.0 - 73.0 %    Lymph% 100.0 (H) 18.0 - 48.0 %    Mono% 0.0 (L) 4.0 - 15.0 %    Eosinophil% 0.0 0.0 - 8.0 %    Basophil% 0.0 0.0 - 1.9 %    Platelet Estimate Decreased (A)     [place] Slight     Poik Slight     Poly Occasional     Ovalocytes Occasional     Differential Method Manual        Significant Imaging:   none

## 2018-05-16 NOTE — PLAN OF CARE
Problem: Occupational Therapy Goal  Goal: Occupational Therapy Goal  Goals to be met by: 5/10/2018    Patient will increase functional independence with ADLs by performing:    Feeding with [place].  UE Dressing with [place].  LE Dressing with Minimal Assistance.  Goal met  Grooming while standing with Minimal Assistance. Goal met  Toileting from bedside commode with Modified [place] for hygiene and clothing management.   Bathing from  edge of bed with Modified [place].  Toilet transfer to toilet with Minimal Assistance.        Goals remain appropriate, continue with OT POC.    KURTIS Hazel  5/16/2018  Rehab Services

## 2018-05-16 NOTE — PROGRESS NOTES
"[facility]  Psychiatry  Progress Note    Patient Name: Hema Kuo  MRN: [id number]   Code Status: Prior  Admission Date: 4/30/2018  Hospital Length of Stay: 16 days  Expected Discharge Date: 5/21/2018  Attending Physician: Christian Emerson MD  Primary Care Provider: Ann Reyna NP    Current Legal Status: CEC        Subjective:     Principal Problem:Bacteremia    Chief Complaint: delirium    HPI: Patient is a 16 y/o man with PMH AML dx 2017 s/p 4 cycles of chemotherapy admitted to pediatrics team with Strep mitis sepsis and ARDS. Psychiatry was consulted for "paranoia, now CEC'd, possible ICU delirium."     Per staff MD:  "17 year old young man with AML s/p 4 cycles of chemotherapy now transferred from the  PICU with Strep mitis sepsis and ARDS.     For his AML we are awaiting count recovery.  ANC next to nothing still.   For his Strep sepsis, he is on cefepime and vancomycin.  Cultures from 5/2 grew Strep mitis (sensitive to vanc).  Subsequent cultures negative.    Will cont a 14 day course of vanc.        .  For his chemotherapy induced pancytopenia, recommend transfusion for hemoglobin under 7 and platelets under 10 K.  No transfusions today.    For his ARDS will get one last dose of lasix today and then we will stop.  He continues to have paranoid delerium since extubation, however this is improving.  Will cont seroquel with prn zyprexa.  Will have psych see tomorrow.   Still needs a 1:1 sitter."    Per RN note 5/14:  "Pt oriented x4. Pt paranoid and anxious but cooperative. Very slow to proccess what's being communicated to him and very slow to respond. Similar to last nights shift, it took patient a little while for him to take his medications, but after much convincing, pt took them with no problem. Pt making death statements throughout night and still believes he is dying and nurses are out to get him. Double lumen left chest port in place, flushes well, blood return noted from each " "lumen. All medications/antibitoics given as scheduled. Labs drawn in am. Critical results resulted, MD notified and labs redrawn. Pt did sleep for about 6 hours tonight."    Per RN note 5/13:  "Pt oriented to person, self, time, but disoriented to place. Pt stated he was [facility] waiting [facility] die. Pt paranoid and anxious but cooperative. Took him a little time (about 25 minutes) to take his medications, but after time of explaining to him what the medications were and why they are needed, pt took them. Pt stated he believes "the nurses are trying to burn" him and that we all hate him and want him to die."    On interview, patient with sitter at bedside and uneaten breakfast tray in front of him. Interview was limited as his answers to questions are impoverished and inappropriate. He asked interviewer "are you my biological father?" And stated "I think I'm dead." Was able to state his reason for admission was "cancer" but did not respond to further questions about his care.     Per iglesia, patient slept overnight and has had limited appetite. Has been paranoid but not physically aggressive. Minimally talkative with her. Says patient's mother is currently at a doctor's appointment but will return to hospital this afternoon.    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 2 sodas/day         Hospital Course: 05/15/2018: Per chart, patient received PRN Zyprexa @ 0009 for insomnia. Per iglesia, " "patient slightly less paranoid this morning, ate most of breakfast and played games with her. On interview, no family at bedside, patient states he is feeling like himself. Still making statements about believing his is dead but more redirectable. Mentioned his nosebleed was because there is "something inside" him besides cancer. Overall more conversational and less paranoid this morning.  05/16/2018: Per chart, no PRN zyprexa given, per RN: "Pt has flat affect and has delayed response of understanding. Pt cooperative with vitals. He states " should never been born" and he also states "ya'll just keeping me alive". He also had some other spontaneous statements that did not make sense. Pt vss, afebrile with tmax of 99.6, no distress noted. Pt did go to sleep around midnight but moaned and cried throughout the night per sitter." On interview, patient again responds to select questions and remains paranoid, will slight improvement. Says he's "either fully alive or fully dead" when asked but does not spontaneously state he is dead. Refused to participate in [employer] but oriented to location, year and month.      Interval History: see hospital course.     Family History     Problem Relation (Age of Onset)    Cancer Mother    Heart disease Mother    No Known Problems Father        Social History Main Topics    Smoking status: Former Smoker     Packs/day: 0.50     Types: Cigarettes     Quit date: 10/30/2017    Smokeless tobacco: Never Used    Alcohol use Yes      Comment: rare occasions    Drug use: No    Sexual activity: Yes     Partners: Female     Psychotherapeutics     Start     Stop Route Frequency Ordered    05/16/18 0900  risperiDONE disintegrating tablet 1 mg      -- Oral Daily 05/15/18 1551    05/15/18 2100  risperiDONE tablet 2 mg      -- Oral Nightly 05/15/18 1551    05/15/18 0847  olanzapine zydis disintegrating tablet 5 mg      -- Oral As needed (PRN) 05/15/18 0848           Review of " "Systems    Objective:     Vital Signs (Most Recent):  Temp: 98.5 °F (36.9 °C) (05/16/18 1044)  Pulse: (!) 111 (05/16/18 1044)  Resp: 17 (05/16/18 1044)  BP: 123/63 (05/16/18 1044)  SpO2: 100 % (05/16/18 1044) Vital Signs (24h Range):  Temp:  [98.4 °F (36.9 °C)-99.6 °F (37.6 °C)] 98.5 °F (36.9 °C)  Pulse:  [104-111] 111  Resp:  [16-20] 17  SpO2:  [98 %-100 %] 100 %  BP: ()/(54-63) 123/63     Height: 5' 6" (167.6 cm)  Weight: 77.9 kg (171 lb 11.8 oz)  Body mass index is 27.72 kg/m².      Intake/Output Summary (Last 24 hours) at 05/16/18 1258  Last data filed at 05/16/18 1015   Gross per 24 hour   Intake              910 ml   Output                0 ml   Net              910 ml       Physical Exam        Mental Status Exam  General appearance and behavior: No acute distress, no abnormal involuntary movements, guarded, increasingly cooperative  Level of Consciousness: awake  Attention: refused to participate in Order Mapper   Orientation: intact to self, year, month, place; disoriented to day of the week, situation  Psychomotor Behavior: + retardation   Speech: slow, normal volume and tone  Language: prosody intact, non-spontaneous, and appropriate without evidence of neologisms or gross idiosyncrasies   Mood: "okay"   Affect: constricted  Thought Process: linear, + evidence of unwarranted suspicion  Associations: intact  Thought Content: denies SI/HI/AVH  Memory: remote intact, recent impaired  Calculation/Concentration: WORLD forwards  Insight: impaired/limited  Judgment: Behavior adequate for circumstances    Significant Labs:   Last 24 Hours:   Recent Lab Results       05/16/18  0716 05/16/18  0610      Immature Granulocytes CANCELED  Comment:  Result canceled by the ancillary 0.0     Immature Grans (Abs) CANCELED  Comment:  Mild elevation in immature granulocytes is non specific and   can be seen in a variety of conditions including stress response,   acute inflammation, trauma and pregnancy. Correlation with " other   laboratory and clinical findings is essential.    Result canceled by the ancillary   0.00  Comment:  Mild elevation in immature granulocytes is non specific and   can be seen in a variety of conditions including stress response,   acute inflammation, trauma and pregnancy. Correlation with other   laboratory and clinical findings is essential.       Unit Blood Type Code  2800[P]       6200[P]     Unit Expiration  [id number][P]       [id number][P]     Unit Blood Type  AB NEG[P]       A POS[P]     [place]iso Slight      Baso #  0.00     Basophil% 0.0  Comment:  Corrected result; previously reported as 3.1 on %DDDDDDDD% at %TTT%.[C] 0.0     CODING SYSTEM  JPTE761[P]       [id number][P]     Differential Method Manual Automated     DISPENSE STATUS  ISSUED[P]       ISSUED[P]     Eos #  0.0     Eosinophil% 0.0 0.0     Gran # (ANC)  0.0(L)     Gran% 0.0  Comment:  Corrected result; previously reported as 18.8 on %DDDDDDDD% at %TTT%.(L)[C] 10.5(L)     Group & Rh  AB POS     Hematocrit 16.3  Comment:  WBC H&H   critical result(s) called and verbal readback obtained from   NURSE TINO, 05/16/2018 08:16  (LL) 17.2  Comment:  WBC, H&H, prelim PLT   critical result(s) called and verbal readback   obtained from Jaz Weber RN, 05/16/2018 06:57  (LL)     Hemoglobin 5.8  Comment:  WBC H&H   critical result(s) called and verbal readback obtained from   NURSE TINO, 05/16/2018 08:16  (LL) 5.9  Comment:  WBC, H&H, prelim PLT   critical result(s) called and verbal readback   obtained from Jaz Weber RN, 05/16/2018 06:57  (LL)     INDIRECT LARRY  NEG     Lymph #  0.2(L)     Lymph% 100.0  Comment:  Corrected result; previously reported as 53.1 on %DDDDDDDD% at %TTT%.(H)[C] 89.5(H)     MCH 31.7(H) 31.1(H)     MCHC 35.6 34.3     MCV 89 91     Mono #  0.0(L)     Mono% 0.0  Comment:  Corrected result; previously reported as 12.5 on %DDDDDDDD% at %TTT%.(L)[C] 0.0(L)     MPV 10.6 9.9     nRBC 6(A) 0      Ovalocytes Occasional      Platelet Estimate Decreased(A)      Platelets 26  Comment:  plt critical result(s) called and verbal readback obtained from   mroa curran rn, 05/16/2018 08:32  (LL) 30  Comment:  plt   critical result(s) called and verbal readback obtained from   jaz weber rn, 05/16/2018 08:28  (LL)     Kimmy Slight      Poly Occasional      PRODUCT CODE  F5409U19[P]       [id number][P]     RBC 1.83(L) 1.90(L)     RDW 15.6(H) 15.6(H)     Retic  0.1(L)     UNIT NUMBER  [id number][P]       [id number][P]     Vancomycin-Trough  8.9(L)     WBC 0.32  Comment:  WBC H&H   critical result(s) called and verbal readback obtained from   NURSE TINO, 05/16/2018 08:16  (LL) 0.19  Comment:  WBC, H&H, prelim PLT   critical result(s) called and verbal readback   obtained from Jaz Weber RN, 05/16/2018 06:57  (LL)           Significant Imaging: None    Assessment/Plan:     Acute delirium    Patient without significant past psychiatric history has been exhibiting waxing and waning mental status, disorientation and paranoid delusions since being extubated on 5/9 consistent with delirium. Recommend increased scheduled Risperdal M tabs to 2 mg BID. Continue Zyprexa 5 mg PO/IM q8 PRN nonredirectable agitation. QTc on 5/16 467. Continue checking daily EKG to monitor QTc. Continue to monitor CBC for any worsening leukopenia/neutropenia with antipsychotic medications.  · DELIRIUM BEHAVIOR MANAGEMENT  · PLEASE utilize CHEMICAL restraints with PRN meds first for agitation. Minimize use of PHYSICAL restraints  · Keep window shades open and room lit during day and room dim at night in order to promote normal sleep-wake cycles  · Encourage family at bedside. [place] patient often to situation, location, date.  · Continue to Limit or Discontinue use of Narcotics, Benzos and Anti-cholinergic (Avoid Benadryl) medications as they may worsen delirium.   · Continue medical workup for causative etiology of  Delirium. Recommend CT head with and without contrast if possible to assess for any acute intracranial processes and repeat CXR to rule out additional infectious process. Recommend check amylase, lipase, B12, folate, HIV, RPR.                 Total time:  15 with greater than 50% of this time spent in counseling and/or coordination of care.       Clair Taylor MD   Psychiatry  [facility]

## 2018-05-16 NOTE — ASSESSMENT & PLAN NOTE
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   - Continue bactrim QFSaSu ppx  - Continue peridex ppx   -Continue home Cipro 500mg BID

## 2018-05-16 NOTE — PLAN OF CARE
Problem: Patient Care Overview  Goal: Plan of Care Review  Pt tolerated treatment session well this morning.  Pt displays increased functional mobility by ambulating in hallway outside of room for total distance of 400 feet with CGA from therapist. Pt able to display increase memory by recalling activities and details performed in yesterday's treatment session. Pt family not present during today's treatment session.  Pt educated on continuation of PT POC during pt hospitalization.  Pt will continue to benefit from skilled acute PT intervention to address the above listed impairments and progress functional mobility independence.     Yassine Andrea  5/16/2018

## 2018-05-16 NOTE — ASSESSMENT & PLAN NOTE
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 On Intensification therapy II following [id number] (Arm A). He was admitted on 4/30/2018 for neutropenia/profund sepsis risk. Stepped up to the PICU for persistent fever >103, tachycardia, and hypotension that was been minimally responsive to fluid resuscitation. PICU stay was complicated by delirium. Currently CEC'd.      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 MRD negative after Induction I. Today is Day 27.  - Treating per COG [id number] (ARM A).  S/p Intensification I and Intensification II started.   - BM biopsy at start of Intensification shows CR.  FISH for t(8;21) negative. Will repeat BM biopsy pending count recovery and clinical improvement

## 2018-05-16 NOTE — ASSESSMENT & PLAN NOTE
Delirium: currently CEC  - Continue Risperdal 1mg qAM and 2mg qHS  - Olanzapine PRN  - EKG complete on 5/14 and 5/15, repeat on 5/16 today  - Avoid Benadryl PRN per Psych  - Continue frequent reorientation and attempt to encourage patient to sleep.   - Psych eval complete on 5/14

## 2018-05-16 NOTE — PT/OT/SLP PROGRESS
"Physical Therapy Treatment    Patient Name:  Hema Kuo   MRN:  [id number]    Recommendations:     Discharge Recommendations:  home health PT   Discharge Equipment Recommendations: none   Barriers to discharge: None    Assessment:     Hema Kuo is a 17 y.o. male admitted with a medical diagnosis of Bacteremia.  He presents with the following impairments/functional limitations:  weakness, impaired endurance, impaired self care skills, impaired functional mobilty, gait instability, impaired balance, impaired cognition, decreased safety awareness  Pt tolerated treatment session well this morning.  Pt displays increased functional mobility by ambulating in hallway outside of room for total distance of 400 feet with CGA from therapist. Pt able to display increase memory by recalling activities and details performed in yesterday's treatment session. Pt family not present during today's treatment session.  Pt educated on continuation of PT POC during pt hospitalization.  Pt will continue to benefit from skilled acute PT intervention to address the above listed impairments and progress functional mobility independence.  .    Rehab Prognosis:  Good; patient would benefit from acute skilled PT services to address these deficits and reach maximum level of function.      Recent Surgery: * No surgery found *      Plan:     During this hospitalization, patient to be seen 6 x/week to address the above listed problems via gait training, therapeutic activities, therapeutic exercises, neuromuscular re-education  · Plan of Care Expires:  06/09/18   Plan of Care Reviewed with: patient    Subjective     Communicated with RN prior to session.  Patient found awake with sitter present upon PT entry to room, agreeable to treatment.      Chief Complaint: Bactremia  Patient comments/goals: "I'm either really dead or really alive"  Pain/Comfort:  · Pain Rating 1: 0/10  · Pain Rating Post-Intervention 1: 0/10    Patients cultural, spiritual, " Zoroastrianism conflicts given the current situation: Patient has no barriers to learning. Patient verbalizes understanding of his/her program and goals and demonstrates them correctly. No cultural, spiritual or educational needs identified.    Objective:     Patient found with: central line, telemetry     General Precautions: Standard, fall   Orthopedic Precautions:N/A   Braces: N/A     Functional Mobility:  · Bed Mobility:     · Scooting: total assistance  · Supine to Sit: moderate assistance (pt requires max A and verbal cues from therapist to initiate movement)  · Sit to Supine: stand by assistance  · Transfers:     · Sit to Stand:  minimum assistance with no AD (pt requiring max A and verbal cues to initiate movement)  Gait: Pt ambulated total of 400 feet.  Assist Level: CGA  AD Used: None  Gait Pattern: Two point, step through  Gait Deviations: gait instability  · Impairments due to: increased cognitive demand, fatigue  · Balance: Pt displays fair balance duration ambulation today with slight signs of instablility and vearing towards left and right.      AM-PAC 6 CLICK MOBILITY  Turning over in bed (including adjusting bedclothes, sheets and blankets)?: 4  Sitting down on and standing up from a chair with arms (e.g., wheelchair, bedside commode, etc.): 3  Moving from lying on back to sitting on the side of the bed?: 3  Moving to and from a bed to a chair (including a wheelchair)?: 3  Need to walk in hospital room?: 2  Climbing 3-5 steps with a railing?: 2  Total Score: 17       Therapeutic Activities and Exercises:   Pt participates in playing cognitive game (Connect 4) for executive function training with verbal cues from therapist to attend to task at hand.    Pt educated on continuation of PT POC.  Pt unable to confirm understanding.      Patient left supine with all lines intact, RN notified and sitter present..    GOALS:    Physical Therapy Goals        Problem: Physical Therapy Goal    Goal Priority  Disciplines Outcome Goal Variances Interventions   Physical Therapy Goal     PT/OT, PT      Description:  Goals to be met by: 18     Patient will increase functional independence with mobility by performin. Supine to sit with [place] - Not met  2. Sit to supine with [place] - MET ()  3. Sit to stand transfer with [place] - Not met  4. Bed to chair transfer with Stand-by Assistance using least restrictive AD - Not met  5. Gait  x 500 feet with Stand-by Assistance using least restrictive Ad - Not met  6. Lower extremity exercise program x30 reps per handout, with supervision - Not met                    Time Tracking:     PT Received On: 18  PT Start Time: 947     PT Stop Time: 1023  PT Total Time (min): 36 min     Billable Minutes: Gait Training 18 and Therapeutic Activity 18    Treatment Type: Treatment  PT/PTA: PT           Yassine Andrea, SPT   2018

## 2018-05-16 NOTE — PT/OT/SLP PROGRESS
Occupational Therapy   Treatment    Name: Hema Kuo  MRN: [id number]  Admitting Diagnosis:  Bacteremia       Recommendations:     Discharge Recommendations: home health OT  Discharge Equipment Recommendations:  none  Barriers to discharge:  None    Subjective     Communicated with: RN prior to session.  Pain/Comfort:  · Pain Rating 1: 0/10  · Pain Rating Post-Intervention 1: 0/10    Patients cultural, spiritual, Tenriism conflicts given the current situation: none    Objective:     Patient found with: central line, telemetry    General Precautions: Standard, fall   Orthopedic Precautions:N/A   Braces: N/A     Occupational Performance:    Bed Mobility:    · Patient completed Rolling/Turning to Right with modified independence  · Patient completed Scooting/Bridging with modified independence and minimum assistance  · Patient completed Supine to Sit with modified independence  · Patient completed Sit to Supine with modified independence     Functional Mobility/Transfers:  · Patient completed Sit <> Stand Transfer with modified independence  with  no assistive device   · Patient completed Bed <> Chair Transfer using Step Transfer technique with contact guard assistance with no assistive device  · Functional Mobility: Pt transferred to w/c, stood at vending machine, transferred from w/c to bed.    Therapeutic activity:   · Pt taken down to vending machine area with sitter and mother present to assist. Hema was provided options and a certain amount of money. He was able to count money $1.85 but not able to pick out $1.60. Pt given extra time but became disinterested in task. Downgraded task. Pt able to stand, make selection for desired candy (Twix) which mother approved to be safe for him. He was able to retrieve from bottom by squatting and reaching across midline.  · Plan was to walk back to room but Hema removed his mask 2x to consume snacks. Deferred walking and got back to room for him to enjoy snacks. Pt walked  400ft with PT earlier today.   · Discussed plan for tomorrow.  · Pt was able to remember therapist name with name badge concealed today. Short term memory tasks performed.     Patient left HOB elevated with all lines intact and sitter present    [facility] 6 Click:  [facility] Total Score: 15  Education:    Assessment:     Hema Kuo is a 17 y.o. male with a medical diagnosis of Bacteremia.  He presents with decline in ADLs and functional mobility. Pt would benefit from skilled OT services in order to maximize independence with ADLs and facilitate safe discharge.  Performance deficits affecting function are weakness, impaired functional mobilty, impaired self care skills, gait instability, impaired balance, decreased upper extremity function, decreased lower extremity function, decreased safety awareness.      Rehab Prognosis:  Good; patient would benefit from acute skilled OT services to address these deficits and reach maximum level of function.       Plan:     Patient to be seen 4 x/week to address the above listed problems via self-care/home management, therapeutic activities, therapeutic exercises, neuromuscular re-education, sensory integration  · Plan of Care Reviewed with: patient    This Plan of care has been discussed with the patient who was involved in its development and understands and is in agreement with the identified goals and treatment plan    GOALS:    Occupational Therapy Goals        Problem: Occupational Therapy Goal    Goal Priority Disciplines Outcome Interventions   Occupational Therapy Goal     OT, PT/OT Ongoing (interventions implemented as appropriate)    Description:  Goals to be met by: 5/10/2018    Patient will increase functional independence with ADLs by performing:    Feeding with [place].  UE Dressing with [place].  LE Dressing with Minimal Assistance.  Goal met  Grooming while standing with Minimal Assistance. Goal met  Toileting from bedside commode with Modified [place]  for hygiene and clothing management.   Bathing from  edge of bed with Modified [place].  Toilet transfer to toilet with Minimal Assistance.                         Time Tracking:     OT Date of Treatment: 05/16/18  OT Start Time: 1407  OT Stop Time: 1432  OT Total Time (min): 25 min    Billable Minutes:Therapeutic Activity 25    KURTIS Chun  5/16/2018

## 2018-05-16 NOTE — PLAN OF CARE
"Problem: Patient Care Overview  Goal: Plan of Care Review  Outcome: Ongoing (interventions implemented as appropriate)  Explained plan of care. Pt has flat affect and has delayed response of understanding. Pt cooperative with vitals. He states " should never been born" and he also states "ya'll just keeping me alive". He also had some other spontaneous statements that did not make sense. Pt vss, afebrile with tmax of 99.6, no distress noted. Pt did go to sleep around midnight but moaned and cried throughout the night per sitter. Labs drawn from PAC, flushed and started vanc. Will continue to monitor.      "

## 2018-05-16 NOTE — SUBJECTIVE & OBJECTIVE
"Interval History: see hospital course.     Family History     Problem Relation (Age of Onset)    Cancer Mother    Heart disease Mother    No Known Problems Father        Social History Main Topics    Smoking status: Former Smoker     Packs/day: 0.50     Types: Cigarettes     Quit date: 10/30/2017    Smokeless tobacco: Never Used    Alcohol use Yes      Comment: rare occasions    Drug use: No    Sexual activity: Yes     Partners: Female     Psychotherapeutics     Start     Stop Route Frequency Ordered    05/16/18 0900  risperiDONE disintegrating tablet 1 mg      -- Oral Daily 05/15/18 1551    05/15/18 2100  risperiDONE tablet 2 mg      -- Oral Nightly 05/15/18 1551    05/15/18 0847  olanzapine zydis disintegrating tablet 5 mg      -- Oral As needed (PRN) 05/15/18 0848           Review of Systems    Objective:     Vital Signs (Most Recent):  Temp: 98.5 °F (36.9 °C) (05/16/18 1044)  Pulse: (!) 111 (05/16/18 1044)  Resp: 17 (05/16/18 1044)  BP: 123/63 (05/16/18 1044)  SpO2: 100 % (05/16/18 1044) Vital Signs (24h Range):  Temp:  [98.4 °F (36.9 °C)-99.6 °F (37.6 °C)] 98.5 °F (36.9 °C)  Pulse:  [104-111] 111  Resp:  [16-20] 17  SpO2:  [98 %-100 %] 100 %  BP: ()/(54-63) 123/63     Height: 5' 6" (167.6 cm)  Weight: 77.9 kg (171 lb 11.8 oz)  Body mass index is 27.72 kg/m².      Intake/Output Summary (Last 24 hours) at 05/16/18 1258  Last data filed at 05/16/18 1015   Gross per 24 hour   Intake              910 ml   Output                0 ml   Net              910 ml       Physical Exam        Mental Status Exam  General appearance and behavior: No acute distress, no abnormal involuntary movements, guarded, increasingly cooperative  Level of Consciousness: awake  Attention: refused to participate in [employer]   Orientation: intact to self, year, month, place; disoriented to day of the week, situation  Psychomotor Behavior: + retardation   Speech: slow, normal volume and tone  Language: prosody intact, " "non-spontaneous, and appropriate without evidence of neologisms or gross idiosyncrasies   Mood: "okay"   Affect: constricted  Thought Process: linear, + evidence of unwarranted suspicion  Associations: intact  Thought Content: denies SI/HI/AVH  Memory: remote intact, recent impaired  Calculation/Concentration: WORLD forwards  Insight: impaired/limited  Judgment: Behavior adequate for circumstances    Significant Labs:   Last 24 Hours:   Recent Lab Results       05/16/18  0716 05/16/18  0610      Immature Granulocytes CANCELED  Comment:  Result canceled by the ancillary 0.0     Immature Grans (Abs) CANCELED  Comment:  Mild elevation in immature granulocytes is non specific and   can be seen in a variety of conditions including stress response,   acute inflammation, trauma and pregnancy. Correlation with other   laboratory and clinical findings is essential.    Result canceled by the ancillary   0.00  Comment:  Mild elevation in immature granulocytes is non specific and   can be seen in a variety of conditions including stress response,   acute inflammation, trauma and pregnancy. Correlation with other   laboratory and clinical findings is essential.       Unit Blood Type Code  2800[P]       6200[P]     Unit Expiration  [id number][P]       [id number][P]     Unit Blood Type  AB NEG[P]       A POS[P]     Aniso Slight      Baso #  0.00     Basophil% 0.0  Comment:  Corrected result; previously reported as 3.1 on %DDDDDDDD% at %TTT%.[C] 0.0     CODING SYSTEM  RLED041[P]       [id number][P]     Differential Method Manual Automated     DISPENSE STATUS  ISSUED[P]       ISSUED[P]     Eos #  0.0     Eosinophil% 0.0 0.0     Gran # (ANC)  0.0(L)     Gran% 0.0  Comment:  Corrected result; previously reported as 18.8 on %DDDDDDDD% at %TTT%.(L)[C] 10.5(L)     Group & Rh  AB POS     Hematocrit 16.3  Comment:  WBC H&H   critical result(s) called and verbal readback obtained from   ALBERT SALAZARNURSE, 05/16/2018 08:16  (LL) " 17.2  Comment:  WBC, H&H, prelim PLT   critical result(s) called and verbal readback   obtained from Jaz Weber RN, 05/16/2018 06:57  (LL)     Hemoglobin 5.8  Comment:  WBC H&H   critical result(s) called and verbal readback obtained from   NURSE TINO, 05/16/2018 08:16  (LL) 5.9  Comment:  WBC, H&H, prelim PLT   critical result(s) called and verbal readback   obtained from Jaz Weber RN, 05/16/2018 06:57  (LL)     INDIRECT LARRY  NEG     Lymph #  0.2(L)     Lymph% 100.0  Comment:  Corrected result; previously reported as 53.1 on %DDDDDDDD% at %TTT%.(H)[C] 89.5(H)     MCH 31.7(H) 31.1(H)     MCHC 35.6 34.3     MCV 89 91     Mono #  0.0(L)     Mono% 0.0  Comment:  Corrected result; previously reported as 12.5 on %DDDDDDDD% at %TTT%.(L)[C] 0.0(L)     MPV 10.6 9.9     nRBC 6(A) 0     Ovalocytes Occasional      Platelet Estimate Decreased(A)      Platelets 26  Comment:  plt critical result(s) called and verbal readback obtained from   mora curran rn, 05/16/2018 08:32  (LL) 30  Comment:  plt   critical result(s) called and verbal readback obtained from   jaz weber rn, 05/16/2018 08:28  (LL)     Poik Slight      Poly Occasional      PRODUCT CODE  S8232L40[P]       [id number][P]     RBC 1.83(L) 1.90(L)     RDW 15.6(H) 15.6(H)     Retic  0.1(L)     UNIT NUMBER  [id number][P]       [id number][P]     Vancomycin-Trough  8.9(L)     WBC 0.32  Comment:  WBC H&H   critical result(s) called and verbal readback obtained from   NURSE TINO, 05/16/2018 08:16  (LL) 0.19  Comment:  WBC, H&H, prelim PLT   critical result(s) called and verbal readback   obtained from Jaz Weber RN, 05/16/2018 06:57  (LL)           Significant Imaging: None

## 2018-05-16 NOTE — PLAN OF CARE
"Problem: Patient Care Overview  Goal: Plan of Care Review  Outcome: Ongoing (interventions implemented as appropriate)  VSS; afebrile. 2 units of PRBC administered. IV vanc administered per orders. PAC HL btw medications. Flat affect and delayed responses. Paranoid thoughts of death, stating "I'm in a body bag.", and "Am I alive?". Patient ambulated with PT and OT today. Sitter at bedside. PEC protocol in place. Tolerating diet. POC reviewed with mom; verbalized understanding. Will continue to monitor.        "

## 2018-05-16 NOTE — ASSESSMENT & PLAN NOTE
Patient without significant past psychiatric history has been exhibiting waxing and waning mental status, disorientation and paranoid delusions since being extubated on 5/9 consistent with delirium. Recommend increased scheduled Risperdal M tabs to 2 mg BID. Continue Zyprexa 5 mg PO/IM q8 PRN nonredirectable agitation. QTc on 5/16 467. Continue checking daily EKG to monitor QTc. Continue to monitor CBC for any worsening leukopenia/neutropenia with antipsychotic medications.  · DELIRIUM BEHAVIOR MANAGEMENT  · PLEASE utilize CHEMICAL restraints with PRN meds first for agitation. Minimize use of PHYSICAL restraints  · Keep window shades open and room lit during day and room dim at night in order to promote normal sleep-wake cycles  · Encourage family at bedside. [place] patient often to situation, location, date.  · Continue to Limit or Discontinue use of Narcotics, Benzos and Anti-cholinergic (Avoid Benadryl) medications as they may worsen delirium.   · Continue medical workup for causative etiology of Delirium. Recommend CT head with and without contrast if possible to assess for any acute intracranial processes and repeat CXR to rule out additional infectious process. Recommend check amylase, lipase, B12, folate, HIV, RPR.

## 2018-05-16 NOTE — ASSESSMENT & PLAN NOTE
Chemotherapy induced neutropenia:   - Hgb goal >7.  - Plts goal >10.  - Daily CBC and reticulocyte, Hgb 5.8 today--will transfuse 1 unit PRBC and premedicate with tylenol.   - Total units of transfusion: 4 units of pRBCs and 8 units of platelets.    - CMP EOD

## 2018-05-17 LAB
ALBUMIN SERPL BCP-MCNC: 2.7 G/DL
ALP SERPL-CCNC: 69 U/L
ALT SERPL W/O P-5'-P-CCNC: 124 U/L
ANION GAP SERPL CALC-SCNC: 8 MMOL/L
ANISOCYTOSIS BLD QL SMEAR: SLIGHT
AST SERPL-CCNC: 25 U/L
BASOPHILS # BLD AUTO: ABNORMAL K/UL
BASOPHILS NFR BLD: 0 %
BILIRUB SERPL-MCNC: 1.1 MG/DL
BUN SERPL-MCNC: 9 MG/DL
CALCIUM SERPL-MCNC: 8.5 MG/DL
CHLORIDE SERPL-SCNC: 103 MMOL/L
CO2 SERPL-SCNC: 26 MMOL/L
CREAT SERPL-MCNC: 0.6 MG/DL
DIFFERENTIAL METHOD: ABNORMAL
EOSINOPHIL # BLD AUTO: ABNORMAL K/UL
EOSINOPHIL NFR BLD: 0 %
ERYTHROCYTE [DISTWIDTH] IN BLOOD BY AUTOMATED COUNT: 14.9 %
EST. GFR  (AFRICAN AMERICAN): >60 ML/MIN/1.73 M^2
EST. GFR  (NON AFRICAN AMERICAN): >60 ML/MIN/1.73 M^2
GLUCOSE SERPL-MCNC: 103 MG/DL
HCT VFR BLD AUTO: 21.2 %
HGB BLD-MCNC: 7.7 G/DL
IMM GRANULOCYTES # BLD AUTO: ABNORMAL K/UL
IMM GRANULOCYTES NFR BLD AUTO: ABNORMAL %
LYMPHOCYTES # BLD AUTO: ABNORMAL K/UL
LYMPHOCYTES NFR BLD: 100 %
MAGNESIUM SERPL-MCNC: 1.7 MG/DL
MCH RBC QN AUTO: 31.4 PG
MCHC RBC AUTO-ENTMCNC: 36.3 G/DL
MCV RBC AUTO: 87 FL
MONOCYTES # BLD AUTO: ABNORMAL K/UL
MONOCYTES NFR BLD: 0 %
NEUTROPHILS NFR BLD: 0 %
NRBC BLD-RTO: 0 /100 WBC
PHOSPHATE SERPL-MCNC: 3.7 MG/DL
PLATELET # BLD AUTO: 18 K/UL
PLATELET BLD QL SMEAR: ABNORMAL
PMV BLD AUTO: 11 FL
POTASSIUM SERPL-SCNC: 3.4 MMOL/L
PROT SERPL-MCNC: 5.4 G/DL
RBC # BLD AUTO: 2.45 M/UL
RETICS/RBC NFR AUTO: 0.3 %
SODIUM SERPL-SCNC: 137 MMOL/L
VANCOMYCIN TROUGH SERPL-MCNC: 10.5 UG/ML
WBC # BLD AUTO: 0.3 K/UL

## 2018-05-17 PROCEDURE — 63600175 PHARM REV CODE 636 W HCPCS: Performed by: STUDENT IN AN ORGANIZED HEALTH CARE EDUCATION/TRAINING PROGRAM

## 2018-05-17 PROCEDURE — 11300000 HC PEDIATRIC PRIVATE ROOM

## 2018-05-17 PROCEDURE — 83735 ASSAY OF MAGNESIUM: CPT

## 2018-05-17 PROCEDURE — 97530 THERAPEUTIC ACTIVITIES: CPT

## 2018-05-17 PROCEDURE — 85045 AUTOMATED RETICULOCYTE COUNT: CPT

## 2018-05-17 PROCEDURE — 63600175 PHARM REV CODE 636 W HCPCS: Performed by: PEDIATRICS

## 2018-05-17 PROCEDURE — 80202 ASSAY OF VANCOMYCIN: CPT

## 2018-05-17 PROCEDURE — 25000003 PHARM REV CODE 250: Performed by: STUDENT IN AN ORGANIZED HEALTH CARE EDUCATION/TRAINING PROGRAM

## 2018-05-17 PROCEDURE — 85027 COMPLETE CBC AUTOMATED: CPT

## 2018-05-17 PROCEDURE — 99233 SBSQ HOSP IP/OBS HIGH 50: CPT | Mod: ,,, | Performed by: PEDIATRICS

## 2018-05-17 PROCEDURE — 84100 ASSAY OF PHOSPHORUS: CPT

## 2018-05-17 PROCEDURE — 36415 COLL VENOUS BLD VENIPUNCTURE: CPT

## 2018-05-17 PROCEDURE — 85007 BL SMEAR W/DIFF WBC COUNT: CPT

## 2018-05-17 PROCEDURE — 80053 COMPREHEN METABOLIC PANEL: CPT

## 2018-05-17 PROCEDURE — 97116 GAIT TRAINING THERAPY: CPT

## 2018-05-17 PROCEDURE — 25000003 PHARM REV CODE 250: Performed by: PSYCHIATRY & NEUROLOGY

## 2018-05-17 RX ORDER — RISPERIDONE 1 MG/1
2 TABLET, ORALLY DISINTEGRATING ORAL 2 TIMES DAILY
Status: DISCONTINUED | OUTPATIENT
Start: 2018-05-17 | End: 2018-05-18

## 2018-05-17 RX ADMIN — RISPERIDONE 2 MG: 1 TABLET, ORALLY DISINTEGRATING ORAL at 10:05

## 2018-05-17 RX ADMIN — Medication 1250 MG: at 10:05

## 2018-05-17 RX ADMIN — ACETAMINOPHEN, DIPHENHYDRAMINE HCL, PHENYLEPHRINE HCL 10 MG: 325; 25; 5 TABLET ORAL at 09:05

## 2018-05-17 RX ADMIN — CIPROFLOXACIN HYDROCHLORIDE 500 MG: 500 TABLET, FILM COATED ORAL at 10:05

## 2018-05-17 RX ADMIN — Medication 500 UNITS: at 05:05

## 2018-05-17 RX ADMIN — CIPROFLOXACIN HYDROCHLORIDE 500 MG: 500 TABLET, FILM COATED ORAL at 09:05

## 2018-05-17 RX ADMIN — Medication 1250 MG: at 06:05

## 2018-05-17 RX ADMIN — ACYCLOVIR 400 MG: 200 CAPSULE ORAL at 09:05

## 2018-05-17 RX ADMIN — FAMOTIDINE 20 MG: 20 TABLET, FILM COATED ORAL at 10:05

## 2018-05-17 RX ADMIN — POSACONAZOLE 300 MG: 100 TABLET, COATED ORAL at 10:05

## 2018-05-17 RX ADMIN — ACYCLOVIR 400 MG: 200 CAPSULE ORAL at 10:05

## 2018-05-17 RX ADMIN — RISPERIDONE 2 MG: 1 TABLET, ORALLY DISINTEGRATING ORAL at 09:05

## 2018-05-17 RX ADMIN — FAMOTIDINE 20 MG: 20 TABLET, FILM COATED ORAL at 09:05

## 2018-05-17 RX ADMIN — Medication 1250 MG: at 03:05

## 2018-05-17 NOTE — PLAN OF CARE
"Problem: Patient Care Overview  Goal: Plan of Care Review  Outcome: Ongoing (interventions implemented as appropriate)  Pt flat affect, slow with answers to questions, asked a few times if he "is still alive", pt comforted. Both pac ports re-accessed both with good blood return, labs sent this am, mom visited for a few hours, sitter remained at bedside for entire shift.      "

## 2018-05-17 NOTE — PROGRESS NOTES
[facility] Pediatric Hospital Medicine  Progress Note    Patient Name: Hema Kuo  MRN: [id number]  Admission Date: 4/30/2018  Hospital Length of Stay: 17  Code Status: Prior   Primary Care Physician: Ann Reyna NP  Principal Problem: Bacteremia    Subjective:     HPI:  Hema Kuo is a 17 y.o. male with AML s/p Intensification I therapy following [id number] (Arm A) who is currently admitted for neutropenia. Patient with ANC of 0 following start of Intensification II therapy.  Hema reports that he has been feeling very well since last visit.  His neck swelling has resolved.  He reports no fevers, no abdominal pain, vomiting, diarrhea or constipation and no excessive bruising or unusual bleeding.  Good appetite and energy level.  No other complaints.     Hospital Course:  Patient admitted to the floor and noted to be comfortable. He reported that he received a platelet transfusion this am and is currently asymptomatic and doing well.    Scheduled Meds:   acyclovir  400 mg Oral BID    ciprofloxacin HCl  500 mg Oral BID    famotidine  20 mg Oral BID    melatonin  10 mg Oral QHS    posaconazole  300 mg Oral Daily    risperiDONE  2 mg Oral BID    sulfamethoxazole-trimethoprim 800-160mg  1 tablet Oral twice daily on Friday, Saturday, Sunday    vancomycin (VANCOCIN) IVPB  1,250 mg Intravenous Q8H     Continuous Infusions:  PRN Meds:sodium chloride, acetaminophen, albuterol sulfate, alteplase, heparin, porcine (PF), lidocaine-prilocaine, morphine, olanzapine zydis, ondansetron, polyethylene glycol    Interval History: NAEO, VSS. S/p PRBC x2 units. Risperdal increased to 2mg BID.     Scheduled Meds:   acyclovir  400 mg Oral BID    ciprofloxacin HCl  500 mg Oral BID    famotidine  20 mg Oral BID    melatonin  10 mg Oral QHS    posaconazole  300 mg Oral Daily    risperiDONE  2 mg Oral BID    sulfamethoxazole-trimethoprim 800-160mg  1 tablet Oral twice daily on Friday, Saturday, Sunday     vancomycin (VANCOCIN) IVPB  1,250 mg Intravenous Q8H     Continuous Infusions:  PRN Meds:sodium chloride, acetaminophen, albuterol sulfate, alteplase, heparin, porcine (PF), lidocaine-prilocaine, morphine, olanzapine zydis, ondansetron, polyethylene glycol    Review of Systems   Constitutional: Negative for fever.   Psychiatric/Behavioral: Positive for confusion.     Objective:     Vital Signs (Most Recent):  Temp: 98.7 °F (37.1 °C) (05/17/18 1042)  Pulse: 105 (05/17/18 1042)  Resp: 16 (05/17/18 1042)  BP: 125/60 (05/17/18 1042)  SpO2: 97 % (05/17/18 1042) Vital Signs (24h Range):  Temp:  [97.8 °F (36.6 °C)-98.7 °F (37.1 °C)] 98.7 °F (37.1 °C)  Pulse:  [] 105  Resp:  [16-20] 16  SpO2:  [97 %-100 %] 97 %  BP: (110-142)/(54-65) 125/60     Patient Vitals for the past 72 hrs (Last 3 readings):   Weight   05/15/18 1945 77.9 kg (171 lb 11.8 oz)   05/14/18 2000 77.4 kg (170 lb 10.2 oz)     Body mass index is 27.72 kg/m².    Intake/Output - Last 3 Shifts       05/15 0700 - 05/16 0659 05/16 0700 - 05/17 0659 05/17 0700 - 05/18 0659    P.O. 300 600     I.V. (mL/kg) 60 (0.8) 62 (0.8)     Blood  625     IV Piggyback 500 500     Total Intake(mL/kg) 860 (11) 1787 (22.9)     Net +860 +1787             Urine Occurrence 3 x 2 x     Stool Occurrence 1 x            Lines/Drains/Airways     Central Venous Catheter Line                 Port A Cath Double Lumen 05/10/18 0900 left subclavian 7 days                Physical Exam  Constitutional: He appears well-developed and well-nourished.   Head: Normocephalic and atraumatic.   Nose: Nose normal.   Mouth/Throat: Mucous membranes are normal.   Eyes: Conjunctivae and lids are normal.   Neck: Normal range of motion. Neck supple.   Cardiovascular: Normal rate and regular rhythm.    Pulmonary/Chest: Effort normal. No stridor. He has no decreased breath sounds. He has no wheezes. He has no rales.   Abdominal: Soft. Bowel sounds are normal. He exhibits no distension. There is no  hepatosplenomegaly. There is no guarding.   Musculoskeletal: Normal range of motion. He exhibits no edema.   Skin: Skin is warm and dry. Capillary refill takes less than 2 seconds. No rash noted. He is not diaphoretic. No erythema. Port CDI.   Psychiatric: Still delirious.     Significant Labs:  Recent Results (from the past 24 hour(s))   Reticulocytes    Collection Time: 05/17/18  5:04 AM   Result Value Ref Range    Retic 0.3 (L) 0.4 - 2.0 %   CBC auto differential    Collection Time: 05/17/18  5:04 AM   Result Value Ref Range    WBC 0.30 (LL) 3.90 - 12.70 K/uL    RBC 2.45 (L) 4.60 - 6.20 M/uL    Hemoglobin 7.7 (L) 14.0 - 18.0 g/dL    Hematocrit 21.2 (L) 40.0 - 54.0 %    MCV 87 82 - 98 fL    MCH 31.4 (H) 27.0 - 31.0 pg    MCHC 36.3 (H) 32.0 - 36.0 g/dL    RDW 14.9 (H) 11.5 - 14.5 %    Platelets 18 (LL) 150 - 350 K/uL    MPV 11.0 9.2 - 12.9 fL    Immature Granulocytes CANCELED 0.0 - 0.5 %    Immature Grans (Abs) CANCELED 0.00 - 0.04 K/uL    Lymph # Test Not Performed 1.0 - 4.8 K/uL    Mono # Test Not Performed 0.3 - 1.0 K/uL    Eos # Test Not Performed 0.0 - 0.5 K/uL    Baso # Test Not Performed 0.00 - 0.20 K/uL    nRBC 0 0 /100 WBC    Gran% 0.0 (L) 38.0 - 73.0 %    Lymph% 100.0 (H) 18.0 - 48.0 %    Mono% 0.0 (L) 4.0 - 15.0 %    Eosinophil% 0.0 0.0 - 8.0 %    Basophil% 0.0 0.0 - 1.9 %    Platelet Estimate Decreased (A)     Aniso Slight     Differential Method Manual    Comprehensive metabolic panel    Collection Time: 05/17/18  5:04 AM   Result Value Ref Range    Sodium 137 136 - 145 mmol/L    Potassium 3.4 (L) 3.5 - 5.1 mmol/L    Chloride 103 95 - 110 mmol/L    CO2 26 23 - 29 mmol/L    Glucose 103 70 - 110 mg/dL    BUN, Bld 9 6 - 20 mg/dL    Creatinine 0.6 0.5 - 1.4 mg/dL    Calcium 8.5 (L) 8.7 - 10.5 mg/dL    Total Protein 5.4 (L) 6.0 - 8.4 g/dL    Albumin 2.7 (L) 3.5 - 5.2 g/dL    Total Bilirubin 1.1 (H) 0.1 - 1.0 mg/dL    Alkaline Phosphatase 69 55 - 135 U/L    AST 25 10 - 40 U/L     (H) 10 - 44 U/L     Anion Gap 8 8 - 16 mmol/L    eGFR if African American >60.0 >60 mL/min/1.73 m^2    eGFR if non African American >60.0 >60 mL/min/1.73 m^2   Magnesium    Collection Time: 05/17/18  5:04 AM   Result Value Ref Range    Magnesium 1.7 1.6 - 2.6 mg/dL   Phosphorus    Collection Time: 05/17/18  5:04 AM   Result Value Ref Range    Phosphorus 3.7 2.7 - 4.5 mg/dL       Significant Imaging:   none    Assessment/Plan:     Neuro   Acute delirium    Delirium: currently CEC  - Continue Risperdal 2mg BID  - Olanzapine PRN  - EKG complete on 5/14, 5/15, 5/16-pending read  - Avoid Benadryl PRN per Psych  - Continue frequent reorientation and attempt to encourage patient to sleep.   - Psych following        ID   * Bacteremia    Patient with Strep mitis sepsis. Bcx from 5/2 growing organism-repeat cultures NG.      - s/p Cefepime   - Continue Vancomycin 1250 g Q8h, follow vanc trough at 1400 on 5/17. Day 13.  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   - Continue bactrim QFSaSu ppx  - Continue peridex ppx   -Continue home Cipro 500mg BID        Oncology   Neutropenia    - Continue soft mechanical diet  - Discontinue Famotidine 20mg, IV, BID for ppx  - Continue PRN zofran        AML (acute myeloblastic leukemia)    Hema is a 17 yr young man with AML (t 8;21) here for chemotherapy. On Intensification therapy II following [id number] (Arm A). He was admitted on 4/30/2018 for neutropenia/profund sepsis risk. Stepped up to the PICU for persistent fever >103, tachycardia, and hypotension that was been minimally responsive to fluid resuscitation. PICU stay was complicated by delirium. Currently CEC'd.      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 MRD negative after Induction I. Today is Day 28.  - Treating per COG [id number] (ARM A).  S/p Intensification I  and Intensification II started.   - BM biopsy at start of Intensification shows CR.  FISH for t(8;21) negative. Will repeat BM biopsy pending count recovery and clinical improvement               Antineoplastic chemotherapy induced pancytopenia    Chemotherapy induced neutropenia:   - Hgb goal >7.  - Plts goal >10.  - Daily CBC and reticulocyte, no transfusions today  - CMP EOD  - If bleeding at all, please transfuse platelets x1 unit. No need to check CBC, evaluate clinically.                     Anticipated Disposition: Home or Self Care    Ayanna Ramirez DO  Pediatric Hospital Medicine   [facility]

## 2018-05-17 NOTE — PROGRESS NOTES
"[facility]  Psychiatry  Progress Note    Patient Name: Hema Kuo  MRN: [id number]   Code Status: Prior  Admission Date: 4/30/2018  Hospital Length of Stay: 17 days  Expected Discharge Date: 5/21/2018  Attending Physician: Christian Emerson MD  Primary Care Provider: Ann Reyna NP    Current Legal Status: CEC      Subjective:     Principal Problem:Bacteremia    Chief Complaint: delirium    HPI: Patient is a 18 y/o man with PMH AML dx 2017 s/p 4 cycles of chemotherapy admitted to pediatrics team with Strep mitis sepsis and ARDS. Psychiatry was consulted for "paranoia, now CEC'd, possible ICU delirium."     Per staff MD:  "17 year old young man with AML s/p 4 cycles of chemotherapy now transferred from the  PICU with Strep mitis sepsis and ARDS.     For his AML we are awaiting count recovery.  ANC next to nothing still.   For his Strep sepsis, he is on cefepime and vancomycin.  Cultures from 5/2 grew Strep mitis (sensitive to vanc).  Subsequent cultures negative.    Will cont a 14 day course of vanc.        .  For his chemotherapy induced pancytopenia, recommend transfusion for hemoglobin under 7 and platelets under 10 K.  No transfusions today.    For his ARDS will get one last dose of lasix today and then we will stop.  He continues to have paranoid delerium since extubation, however this is improving.  Will cont seroquel with prn zyprexa.  Will have psych see tomorrow.   Still needs a 1:1 sitter."    Per RN note 5/14:  "Pt oriented x4. Pt paranoid and anxious but cooperative. Very slow to proccess what's being communicated to him and very slow to respond. Similar to last nights shift, it took patient a little while for him to take his medications, but after much convincing, pt took them with no problem. Pt making death statements throughout night and still believes he is dying and nurses are out to get him. Double lumen left chest port in place, flushes well, blood return noted from each " "lumen. All medications/antibitoics given as scheduled. Labs drawn in am. Critical results resulted, MD notified and labs redrawn. Pt did sleep for about 6 hours tonight."    Per RN note 5/13:  "Pt oriented to person, self, time, but disoriented to place. Pt stated he was [facility] die. Pt paranoid and anxious but cooperative. Took him a little time (about 25 minutes) to take his medications, but after time of explaining to him what the medications were and why they are needed, pt took them. Pt stated he believes "the nurses are trying to burn" him and that we all hate him and want him to die."    On interview, patient with sitter at bedside and uneaten breakfast tray in front of him. Interview was limited as his answers to questions are impoverished and inappropriate. He asked interviewer "are you my biological father?" And stated "I think I'm dead." Was able to state his reason for admission was "cancer" but did not respond to further questions about his care.     Per iglesia, patient slept overnight and has had limited appetite. Has been paranoid but not physically aggressive. Minimally talkative with her. Says patient's mother is currently at a doctor's appointment but will return to hospital this afternoon.    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 2 sodas/day         Hospital Course: 05/15/2018: Per chart, patient received PRN Zyprexa @ 0009 for insomnia. Per iglesia, " "patient slightly less paranoid this morning, ate most of breakfast and played games with her. On interview, no family at bedside, patient states he is feeling like himself. Still making statements about believing his is dead but more redirectable. Mentioned his nosebleed was because there is "something inside" him besides cancer. Overall more conversational and less paranoid this morning.  05/16/2018: Per chart, no PRN zyprexa given, per RN: "Pt has flat affect and has delayed response of understanding. Pt cooperative with vitals. He states " should never been born" and he also states "ya'll just keeping me alive". He also had some other spontaneous statements that did not make sense. Pt vss, afebrile with tmax of 99.6, no distress noted. Pt did go to sleep around midnight but moaned and cried throughout the night per sitter." On interview, patient again responds to select questions and remains paranoid, will slight improvement. Says he's "either fully alive or fully dead" when asked but does not spontaneously state he is dead. Refused to participate in [place] but oriented to location, year and month.  05/17/2018: Per chart, no PRN zyprexa given, per RN continued to make paranoid statements about being alive, worked well with PT/OT yesterday. Asleep on interview. Per iglesia, remembered her from earlier in the week, has not made any statements this morning so far about being dead.      Interval History: see hospital course.     Family History     Problem Relation (Age of Onset)    Cancer Mother    Heart disease Mother    No Known Problems Father        Social History Main Topics    Smoking status: Former Smoker     Packs/day: 0.50     Types: Cigarettes     Quit date: 10/30/2017    Smokeless tobacco: Never Used    Alcohol use Yes      Comment: rare occasions    Drug use: No    Sexual activity: Yes     Partners: Female     Psychotherapeutics     Start     Stop Route Frequency Ordered    05/17/18 0900  " "risperiDONE disintegrating tablet 2 mg      -- Oral 2 times daily 05/17/18 0820    05/15/18 0847  olanzapine zydis disintegrating tablet 5 mg      -- Oral As needed (PRN) 05/15/18 0848           Review of Systems    Objective:     Vital Signs (Most Recent):  Temp: 98.7 °F (37.1 °C) (05/17/18 1042)  Pulse: 105 (05/17/18 1042)  Resp: 16 (05/17/18 1042)  BP: 125/60 (05/17/18 1042)  SpO2: 97 % (05/17/18 1042) Vital Signs (24h Range):  Temp:  [97.8 °F (36.6 °C)-98.7 °F (37.1 °C)] 98.7 °F (37.1 °C)  Pulse:  [] 105  Resp:  [16-20] 16  SpO2:  [97 %-100 %] 97 %  BP: (110-142)/(54-65) 125/60     Height: 5' 6" (167.6 cm)  Weight: 77.9 kg (171 lb 11.8 oz)  Body mass index is 27.72 kg/m².      Intake/Output Summary (Last 24 hours) at 05/17/18 1135  Last data filed at 05/17/18 0500   Gross per 24 hour   Intake             1437 ml   Output                0 ml   Net             1437 ml       Physical Exam        Mental Status Exam  Unable to assess, patient asleep    Significant Labs:   Last 24 Hours:   Recent Lab Results       05/17/18  0504      Immature Granulocytes CANCELED  Comment:  Result canceled by the ancillary     Immature Grans (Abs) CANCELED  Comment:  Mild elevation in immature granulocytes is non specific and   can be seen in a variety of conditions including stress response,   acute inflammation, trauma and pregnancy. Correlation with other   laboratory and clinical findings is essential.    Result canceled by the ancillary       Albumin 2.7(L)     Alkaline Phosphatase 69     (H)     Anion Gap 8     Aniso Slight     AST 25     Baso # Test Not Performed  Comment:  Corrected result; previously reported as 0.01 on %DDDDDDDD% at %TTT%.[C]     Basophil% 0.0  Comment:  Corrected result; previously reported as 3.3 on %DDDDDDDD% at %TTT%.[C]     Total Bilirubin 1.1  Comment:  For infants and newborns, interpretation of results should be based  on gestational age, weight and in agreement with " clinical  observations.  Premature Infant recommended reference ranges:  Up to 24 hours.............<8.0 mg/dL  Up to 48 hours............<12.0 mg/dL  3-5 days..................<15.0 mg/dL  6-29 days.................<15.0 mg/dL  (H)     BUN, Bld 9     Calcium 8.5(L)     Chloride 103     CO2 26     Creatinine 0.6     Differential Method Manual     eGFR if African American >60.0     eGFR if non  >60.0  Comment:  Calculation used to obtain the estimated glomerular filtration  rate (eGFR) is the CKD-EPI equation.        Eos # Test Not Performed  Comment:  Corrected result; previously reported as 0.0 on %DDDDDDDD% at %TTT%.[C]     Eosinophil% 0.0     Glucose 103     Gran% 0.0  Comment:  Corrected result; previously reported as 13.4 on %DDDDDDDD% at %TTT%.(L)[C]     Hematocrit 21.2(L)     Hemoglobin 7.7(L)     Lymph # Test Not Performed  Comment:  Corrected result; previously reported as 0.2 on %DDDDDDDD% at %TTT%.[C]     Lymph% 100.0  Comment:  Corrected result; previously reported as 63.3 on %DDDDDDDD% at %TTT%.(H)[C]     Magnesium 1.7     MCH 31.4(H)     MCHC 36.3(H)     MCV 87     Mono # Test Not Performed  Comment:  Corrected result; previously reported as 0.0 on %DDDDDDDD% at %TTT%.[C]     Mono% 0.0  Comment:  Corrected result; previously reported as 6.7 on %DDDDDDDD% at %TTT%.(L)[C]     MPV 11.0     nRBC 0     Phosphorus 3.7     Platelet Estimate Decreased(A)     Platelets 18  Comment:  PLATELET COUNT critical result(s) called and verbal readback obtained   from HUSSEIN FANG RN, 05/17/2018 07:37  (LL)     Potassium 3.4(L)     Total Protein 5.4(L)     RBC 2.45(L)     RDW 14.9(H)     Retic 0.3(L)     Sodium 137     WBC 0.30  Comment:  WBC AND PREL PLT critical result(s) called and verbal readback   obtained from PERLITA FANG RN, 05/17/2018 06:48  (LL)           Significant Imaging: None    Assessment/Plan:     Acute delirium    Patient without significant past psychiatric history has been  exhibiting waxing and waning mental status, disorientation and paranoid delusions since being extubated on 5/9 consistent with delirium. Continue scheduled Risperdal M tabs 2 mg BID. May consider changing to 1 mg qAM and 3 mg qHS in the future if sleep-wake cycle continues to be disrupted. Continue Zyprexa 5 mg PO/IM q8 PRN nonredirectable agitation. QTc on 5/16 467. Continue checking daily EKG to monitor QTc. Continue to monitor CBC for any worsening leukopenia/neutropenia with antipsychotic medications.  · DELIRIUM BEHAVIOR MANAGEMENT  · PLEASE utilize CHEMICAL restraints with PRN meds first for agitation. Minimize use of PHYSICAL restraints  · Keep window shades open and room lit during day and room dim at night in order to promote normal sleep-wake cycles  · Encourage family at bedside. [place] patient often to situation, location, date.  · Continue to Limit or Discontinue use of Narcotics, Benzos and Anti-cholinergic (Avoid Benadryl) medications as they may worsen delirium.   · Continue medical workup for causative etiology of Delirium. Recommend CT head with and without contrast if possible to assess for any acute intracranial processes and repeat CXR to rule out additional infectious process. Recommend check amylase, lipase, B12, folate, HIV, RPR.               Total time:  15 with greater than 50% of this time spent in counseling and/or coordination of care.       Clair Taylor MD   Psychiatry  [facility]

## 2018-05-17 NOTE — ASSESSMENT & PLAN NOTE
Patient without significant past psychiatric history has been exhibiting waxing and waning mental status, disorientation and paranoid delusions since being extubated on 5/9 consistent with delirium. Continue scheduled Risperdal M tabs 2 mg BID. May consider changing to 1 mg qAM and 3 mg qHS in the future if sleep-wake cycle continues to be disrupted. Continue Zyprexa 5 mg PO/IM q8 PRN nonredirectable agitation. QTc on 5/16 467. Continue checking daily EKG to monitor QTc. Continue to monitor CBC for any worsening leukopenia/neutropenia with antipsychotic medications.  · DELIRIUM BEHAVIOR MANAGEMENT  · PLEASE utilize CHEMICAL restraints with PRN meds first for agitation. Minimize use of PHYSICAL restraints  · Keep window shades open and room lit during day and room dim at night in order to promote normal sleep-wake cycles  · Encourage family at bedside. [place] patient often to situation, location, date.  · Continue to Limit or Discontinue use of Narcotics, Benzos and Anti-cholinergic (Avoid Benadryl) medications as they may worsen delirium.   · Continue medical workup for causative etiology of Delirium. Recommend CT head with and without contrast if possible to assess for any acute intracranial processes and repeat CXR to rule out additional infectious process. Recommend check amylase, lipase, B12, folate, HIV, RPR.

## 2018-05-17 NOTE — ASSESSMENT & PLAN NOTE
Delirium: currently CEC  - Continue Risperdal 2mg BID  - Olanzapine PRN  - EKG complete on 5/14, 5/15, 5/16-pending read  - Avoid Benadryl PRN per Psych  - Continue frequent reorientation and attempt to encourage patient to sleep.   - Psych following

## 2018-05-17 NOTE — SUBJECTIVE & OBJECTIVE
"Interval History: see hospital course.     Family History     Problem Relation (Age of Onset)    Cancer Mother    Heart disease Mother    No Known Problems Father        Social History Main Topics    Smoking status: Former Smoker     Packs/day: 0.50     Types: Cigarettes     Quit date: 10/30/2017    Smokeless tobacco: Never Used    Alcohol use Yes      Comment: rare occasions    Drug use: No    Sexual activity: Yes     Partners: Female     Psychotherapeutics     Start     Stop Route Frequency Ordered    05/17/18 0900  risperiDONE disintegrating tablet 2 mg      -- Oral 2 times daily 05/17/18 0820    05/15/18 0847  olanzapine zydis disintegrating tablet 5 mg      -- Oral As needed (PRN) 05/15/18 0848           Review of Systems    Objective:     Vital Signs (Most Recent):  Temp: 98.7 °F (37.1 °C) (05/17/18 1042)  Pulse: 105 (05/17/18 1042)  Resp: 16 (05/17/18 1042)  BP: 125/60 (05/17/18 1042)  SpO2: 97 % (05/17/18 1042) Vital Signs (24h Range):  Temp:  [97.8 °F (36.6 °C)-98.7 °F (37.1 °C)] 98.7 °F (37.1 °C)  Pulse:  [] 105  Resp:  [16-20] 16  SpO2:  [97 %-100 %] 97 %  BP: (110-142)/(54-65) 125/60     Height: 5' 6" (167.6 cm)  Weight: 77.9 kg (171 lb 11.8 oz)  Body mass index is 27.72 kg/m².      Intake/Output Summary (Last 24 hours) at 05/17/18 1135  Last data filed at 05/17/18 0500   Gross per 24 hour   Intake             1437 ml   Output                0 ml   Net             1437 ml       Physical Exam        Mental Status Exam  Unable to assess, patient asleep    Significant Labs:   Last 24 Hours:   Recent Lab Results       05/17/18  0504      Immature Granulocytes CANCELED  Comment:  Result canceled by the ancillary     Immature Grans (Abs) CANCELED  Comment:  Mild elevation in immature granulocytes is non specific and   can be seen in a variety of conditions including stress response,   acute inflammation, trauma and pregnancy. Correlation with other   laboratory and clinical findings is " essential.    Result canceled by the ancillary       Albumin 2.7(L)     Alkaline Phosphatase 69     (H)     Anion Gap 8     Aniso Slight     AST 25     Baso # Test Not Performed  Comment:  Corrected result; previously reported as 0.01 on %DDDDDDDD% at %TTT%.[C]     Basophil% 0.0  Comment:  Corrected result; previously reported as 3.3 on %DDDDDDDD% at %TTT%.[C]     Total Bilirubin 1.1  Comment:  For infants and newborns, interpretation of results should be based  on gestational age, weight and in agreement with clinical  observations.  Premature Infant recommended reference ranges:  Up to 24 hours.............<8.0 mg/dL  Up to 48 hours............<12.0 mg/dL  3-5 days..................<15.0 mg/dL  6-29 days.................<15.0 mg/dL  (H)     BUN, Bld 9     Calcium 8.5(L)     Chloride 103     CO2 26     Creatinine 0.6     Differential Method Manual     eGFR if African American >60.0     eGFR if non  >60.0  Comment:  Calculation used to obtain the estimated glomerular filtration  rate (eGFR) is the CKD-EPI equation.        Eos # Test Not Performed  Comment:  Corrected result; previously reported as 0.0 on %DDDDDDDD% at %TTT%.[C]     Eosinophil% 0.0     Glucose 103     Gran% 0.0  Comment:  Corrected result; previously reported as 13.4 on %DDDDDDDD% at %TTT%.(L)[C]     Hematocrit 21.2(L)     Hemoglobin 7.7(L)     Lymph # Test Not Performed  Comment:  Corrected result; previously reported as 0.2 on %DDDDDDDD% at %TTT%.[C]     Lymph% 100.0  Comment:  Corrected result; previously reported as 63.3 on %DDDDDDDD% at %TTT%.(H)[C]     Magnesium 1.7     MCH 31.4(H)     MCHC 36.3(H)     MCV 87     Mono # Test Not Performed  Comment:  Corrected result; previously reported as 0.0 on %DDDDDDDD% at %TTT%.[C]     Mono% 0.0  Comment:  Corrected result; previously reported as 6.7 on %DDDDDDDD% at %TTT%.(L)[C]     MPV 11.0     nRBC 0     Phosphorus 3.7     Platelet Estimate Decreased(A)     Platelets  18  Comment:  PLATELET COUNT critical result(s) called and verbal readback obtained   from HUSSEIN FANG RN, 05/17/2018 07:37  (LL)     Potassium 3.4(L)     Total Protein 5.4(L)     RBC 2.45(L)     RDW 14.9(H)     Retic 0.3(L)     Sodium 137     WBC 0.30  Comment:  WBC AND PREL PLT critical result(s) called and verbal readback   obtained from PERLITA FANG RN, 05/17/2018 06:48  (LL)           Significant Imaging: None

## 2018-05-17 NOTE — ASSESSMENT & PLAN NOTE
Chemotherapy induced neutropenia:   - Hgb goal >7.  - Plts goal >10.  - Daily CBC and reticulocyte, no transfusions today  - CMP EOD  - If bleeding at all, please transfuse platelets x1 unit. No need to check CBC, evaluate clinically.

## 2018-05-17 NOTE — ASSESSMENT & PLAN NOTE
Patient with Strep mitis sepsis. Bcx from 5/2 growing organism-repeat cultures NG.      - s/p Cefepime   - Continue Vancomycin 1250 g Q8h, follow vanc trough at 1400 on 5/17. Day 13.  - repeat Cx NGTD

## 2018-05-17 NOTE — PT/OT/SLP PROGRESS
Physical Therapy Treatment    Patient Name:  Hema Kuo   MRN:  [id number]    Recommendations:     Discharge Recommendations:  home health PT   Discharge Equipment Recommendations: none   Barriers to discharge: None    Assessment:     Hema Kuo is a 17 y.o. male admitted with a medical diagnosis of Bacteremia.  He presents with the following impairments/functional limitations:  weakness, impaired endurance, gait instability, impaired balance, impaired functional mobilty, impaired self care skills, decreased coordination, decreased safety awareness, decreased upper extremity function, decreased lower extremity function.   Pt tolerated treatment session well this afternoon.  Pt displays good progression towards functional mobility by ambulating total of 350 feet with SBA from therapist and participating in age appropriate activity (playing foosball) with SBA from therapist.  Pt still presents with flat affect but has better response time when answering questions and displays better memory (stating therapist's name without being asked and recalling previous activities of yesterday's treatment session) with therapist.  Pt family not present during today's treatment session.  Pt educated on continuation of PT POC during pt hospitalization.  Pt  verbalizes understanding.  Pt will continue to benefit from skilled acute PT intervention to address the above listed impairments and progress functional mobility independence.      Rehab Prognosis:  Good; patient would benefit from acute skilled PT services to address these deficits and reach maximum level of function.      Recent Surgery: * No surgery found *      Plan:     During this hospitalization, patient to be seen 6 x/week to address the above listed problems via gait training, therapeutic activities, therapeutic exercises, neuromuscular re-education  · Plan of Care Expires:  06/09/18   Plan of Care Reviewed with: patient    Subjective     Communicated with RN prior to  "session.  Patient found awake supine in bed with sitter present upon PT entry to room, agreeable to treatment.      Chief Complaint: Bacteremia  Patient comments/goals: "  Pain/Comfort:  · Pain Rating 1: 0/10  · Pain Rating Post-Intervention 1: 0/10    Patients cultural, spiritual, Jew conflicts given the current situation: Patient has no barriers to learning. Patient verbalizes understanding of his/her program and goals and demonstrates them correctly. No cultural, spiritual or educational needs identified.    Objective:     Patient found with: central line, telemetry     General Precautions: Standard, fall   Orthopedic Precautions:N/A   Braces: N/A     Functional Mobility:  · Bed Mobility:     · Scooting: stand by assistance  · Supine to Sit: stand by assistance  · Sit to Supine: independence  · Transfers:     · Sit to Stand:  stand by assistance with no AD  Gait: Pt ambulated total of 350 feet.  Assist Level: SBA  AD Used: None  Gait Pattern: Two point ,step through  Gait Deviations: unremarkable  · Balance: Pt displays fair balance throughout ambulation bout and while particpating in age appropriate activity (foosball).  Pt overall shows improved balance from yesterdays treatment session.    AM-PAC 6 CLICK MOBILITY  Turning over in bed (including adjusting bedclothes, sheets and blankets)?: 4  Sitting down on and standing up from a chair with arms (e.g., wheelchair, bedside commode, etc.): 4  Moving from lying on back to sitting on the side of the bed?: 4  Moving to and from a bed to a chair (including a wheelchair)?: 3  Need to walk in hospital room?: 3  Climbing 3-5 steps with a railing?: 3  Total Score: 21       Therapeutic Activities and Exercises:  Pt participates in age appropriate activity (playing foosball) with SBA from therapist.  Pt plays catch with therapist sitting EOB in hospital room (pt able to catch ball consistently when throw directing to pt's hands but unable to catch ball when thrown " away from pt's chest area.  Pt able throw ball underhand and overhand consistently when instructed by therapist.       Pt educated on PT POC. Pt verbalizes understanding.    Patient left supine with all lines intact, call button in reach, RN notified and sitter present..    GOALS:    Physical Therapy Goals        Problem: Physical Therapy Goal    Goal Priority Disciplines Outcome Goal Variances Interventions   Physical Therapy Goal     PT/OT, PT      Description:  Goals to be met by: 18     Patient will increase functional independence with mobility by performin. Supine to sit with [place] - Not met  2. Sit to supine with [place] - MET ()  3. Sit to stand transfer with [place] - Not met  4. Bed to chair transfer with Stand-by Assistance using least restrictive AD - Not met  5. Gait  x 500 feet with Stand-by Assistance using least restrictive Ad - MET ()  6. Lower extremity exercise program x30 reps per handout, with supervision - Not met                     Time Tracking:     PT Received On: 18  PT Start Time: 1257     PT Stop Time: 1327  PT Total Time (min): 30 min     Billable Minutes: Gait Training 15 and Therapeutic Activity 15    Treatment Type: Treatment  PT/PTA: PT           Ysasine Andrea, SPT   2018

## 2018-05-17 NOTE — SUBJECTIVE & OBJECTIVE
Subjective:     Interval History: NAEO, VSS.  Some difficulty sleeping, requested additional melatonin. Also with midline lower back pain last night, improved with tylenol and heat pad.      Oncology Treatment Plan:     PEDS [id number] Intensification II  ARM B (MA) - LOW RISK PATIENTS    Medications:  Continuous Infusions:  Scheduled Meds:   acyclovir  400 mg Oral BID    linezolid 600mg/300ml  600 mg Intravenous Q12H    melatonin  10 mg Oral QHS    posaconazole  300 mg Oral Daily    risperiDONE  1 mg Oral QHS    sulfamethoxazole-trimethoprim 800-160mg  1 tablet Oral twice daily on Friday, Saturday, Sunday     PRN Meds:acetaminophen, albuterol sulfate, alteplase, heparin, porcine (PF), lidocaine-prilocaine, morphine, olanzapine zydis, ondansetron, polyethylene glycol     Review of Systems   Constitutional: Negative for fever.   Gastrointestinal: Positive for constipation.   Musculoskeletal: Positive for back pain.   Psychiatric/Behavioral: Positive for sleep disturbance. Negative for confusion.     Objective:     Vital Signs (Most Recent):  Temp: 98.4 °F (36.9 °C) (05/26/18 0349)  Pulse: 94 (05/26/18 0349)  Resp: 18 (05/26/18 0349)  BP: (!) 123/58 (05/26/18 0349)  SpO2: 98 % (05/26/18 0349) Vital Signs (24h Range):  Temp:  [97.6 °F (36.4 °C)-98.4 °F (36.9 °C)] 98.4 °F (36.9 °C)  Pulse:  [] 94  Resp:  [16-18] 18  SpO2:  [97 %-100 %] 98 %  BP: (113-145)/(57-73) 123/58     Weight: 80.1 kg (176 lb 9.4 oz)  Body mass index is 28.5 kg/m².  Body surface area is 1.93 meters squared.      Intake/Output Summary (Last 24 hours) at 05/26/18 0633  Last data filed at 05/26/18 0625   Gross per 24 hour   Intake             3600 ml   Output                0 ml   Net             3600 ml       Physical Exam   Constitutional: He is oriented to person, place, and time. No distress.   HENT:   Head: Normocephalic and atraumatic.   Nose: Nose normal.   Eyes: Conjunctivae and EOM are normal. Pupils are equal, round, and reactive  to light. Right eye exhibits no discharge. Left eye exhibits no discharge.   Neck: Normal range of motion. Neck supple.   Cardiovascular: Normal rate and regular rhythm.    No murmur heard.  Pulmonary/Chest: Effort normal and breath sounds normal.   Abdominal: Soft. There is tenderness.   Musculoskeletal: Normal range of motion.   TTP midline L4/5 region. No bruising redness swelling.    Neurological: He is alert and oriented to person, place, and time.   Skin: He is not diaphoretic.     Constitutional: He appears well-developed and well-nourished. Awake, sitting up in bed.   Head: Normocephalic and atraumatic.   Nose: Nose normal.   Mouth/Throat: Mucous membranes are normal.   Eyes: Conjunctivae and lids are normal.   Neck: Normal range of motion. Neck supple.   Cardiovascular: Normal rate and regular rhythm.    Pulmonary/Chest: Effort normal. No stridor. He has no decreased breath sounds. He has no wheezes. He has no rales. Port site c/d/i.   Abdominal: Soft. Bowel sounds are normal. He exhibits no distension. There is no hepatosplenomegaly. There is no guarding.   Musculoskeletal: Normal range of motion. He exhibits no edema. TTP midline lower lumbar region. No obvious signs of injury, swelling, bruising.   Skin: Skin is warm and dry. Capillary refill takes less than 2 seconds. No rash noted. He is not diaphoretic. No erythema.   Psych: Alert, oriented x 3. Responds appropriately albeit with some delay.        Labs:   Recent Lab Results       05/26/18  0350      Immature Granulocytes 0.0     Immature Grans (Abs) 0.00  Comment:  Mild elevation in immature granulocytes is non specific and   can be seen in a variety of conditions including stress response,   acute inflammation, trauma and pregnancy. Correlation with other   laboratory and clinical findings is essential.       Aniso Slight     Baso # 0.00     Basophil% 0.0     Differential Method Automated     Eos # 0.0     Eosinophil% 0.0     Gran # (ANC) 0.0(L)      Gran% 10.8(L)     Hematocrit 22.7(L)     Hemoglobin 8.2(L)     Hypo Occasional     Lymph # 0.3(L)     Lymph% 75.7(H)     MCH 30.9     MCHC 36.1(H)     MCV 86     Mono # 0.1(L)     Mono% 13.5     MPV 10.5     nRBC 0     Platelet Estimate Decreased(A)     Platelets 26  Comment:  PLT   critical result(s) called and verbal readback obtained from   Princess Llnaos RN, 05/26/2018 05:44  (LL)     Poik CANCELED  Comment:  Result canceled by the ancillary     RBC 2.65(L)     RDW 13.1     Retic 0.2(L)     WBC 0.37  Comment:  WBC/PRELIM PLT   critical result(s) called and verbal readback   obtained from PRINCESS LLANOS RN AT 0419 ON 05/26/2018 BY BEL,   05/26/2018 04:19  (LL)           Diagnostic Results:  Imaging Results    None

## 2018-05-17 NOTE — ASSESSMENT & PLAN NOTE
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 On Intensification therapy II following [id number] (Arm A). He was admitted on 4/30/2018 for neutropenia/profund sepsis risk. Stepped up to the PICU for persistent fever >103, tachycardia, and hypotension that was been minimally responsive to fluid resuscitation. PICU stay was complicated by delirium. Currently CEC'd.      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 MRD negative after Induction I. Today is Day 28.  - Treating per COG [id number] (ARM A).  S/p Intensification I and Intensification II started.   - BM biopsy at start of Intensification shows CR.  FISH for t(8;21) negative. Will repeat BM biopsy pending count recovery and clinical improvement

## 2018-05-17 NOTE — PLAN OF CARE
Problem: Patient Care Overview  Goal: Plan of Care Review   Pt tolerated treatment session well this afternoon.  Pt displays good progression towards functional mobility by ambulating total of 350 feet with SBA from therapist and participating in age appropriate activity (playing foosball) with SBA from therapist.  Pt still presents with flat affect but has better response time when answering questions and displays better memory (stating therapist's name without being asked and recalling previous activities of yesterday's treatment session) with therapist.  Pt family not present during today's treatment session.  Pt educated on continuation of PT POC during pt hospitalization.  Pt  verbalizes understanding.  Pt will continue to benefit from skilled acute PT intervention to address the above listed impairments and progress functional mobility independence.     Yassine Andrea  5/17/2018

## 2018-05-17 NOTE — ASSESSMENT & PLAN NOTE
- Continue soft mechanical diet  - Discontinue Famotidine 20mg, IV, BID for ppx  - Continue PRN zofran

## 2018-05-17 NOTE — SUBJECTIVE & OBJECTIVE
Interval History: NAEO, VSS. S/p PRBC x2 units. Risperdal increased to 2mg BID.     Scheduled Meds:   acyclovir  400 mg Oral BID    ciprofloxacin HCl  500 mg Oral BID    famotidine  20 mg Oral BID    melatonin  10 mg Oral QHS    posaconazole  300 mg Oral Daily    risperiDONE  2 mg Oral BID    sulfamethoxazole-trimethoprim 800-160mg  1 tablet Oral twice daily on Friday, Saturday, Sunday    vancomycin (VANCOCIN) IVPB  1,250 mg Intravenous Q8H     Continuous Infusions:  PRN Meds:sodium chloride, acetaminophen, albuterol sulfate, alteplase, heparin, porcine (PF), lidocaine-prilocaine, morphine, olanzapine zydis, ondansetron, polyethylene glycol    Review of Systems   Constitutional: Negative for fever.   Psychiatric/Behavioral: Positive for confusion.     Objective:     Vital Signs (Most Recent):  Temp: 98.7 °F (37.1 °C) (05/17/18 1042)  Pulse: 105 (05/17/18 1042)  Resp: 16 (05/17/18 1042)  BP: 125/60 (05/17/18 1042)  SpO2: 97 % (05/17/18 1042) Vital Signs (24h Range):  Temp:  [97.8 °F (36.6 °C)-98.7 °F (37.1 °C)] 98.7 °F (37.1 °C)  Pulse:  [] 105  Resp:  [16-20] 16  SpO2:  [97 %-100 %] 97 %  BP: (110-142)/(54-65) 125/60     Patient Vitals for the past 72 hrs (Last 3 readings):   Weight   05/15/18 1945 77.9 kg (171 lb 11.8 oz)   05/14/18 2000 77.4 kg (170 lb 10.2 oz)     Body mass index is 27.72 kg/m².    Intake/Output - Last 3 Shifts       05/15 0700 - 05/16 0659 05/16 0700 - 05/17 0659 05/17 0700 - 05/18 0659    P.O. 300 600     I.V. (mL/kg) 60 (0.8) 62 (0.8)     Blood  625     IV Piggyback 500 500     Total Intake(mL/kg) 860 (11) 1787 (22.9)     Net +860 +1787             Urine Occurrence 3 x 2 x     Stool Occurrence 1 x            Lines/Drains/Airways     Central Venous Catheter Line                 Port A Cath Double Lumen 05/10/18 0900 left subclavian 7 days                Physical Exam  Constitutional: He appears well-developed and well-nourished.   Head: Normocephalic and atraumatic.   Nose: Nose  normal.   Mouth/Throat: Mucous membranes are normal.   Eyes: Conjunctivae and lids are normal.   Neck: Normal range of motion. Neck supple.   Cardiovascular: Normal rate and regular rhythm.    Pulmonary/Chest: Effort normal. No stridor. He has no decreased breath sounds. He has no wheezes. He has no rales.   Abdominal: Soft. Bowel sounds are normal. He exhibits no distension. There is no hepatosplenomegaly. There is no guarding.   Musculoskeletal: Normal range of motion. He exhibits no edema.   Skin: Skin is warm and dry. Capillary refill takes less than 2 seconds. No rash noted. He is not diaphoretic. No erythema. Port CDI.   Psychiatric: Still delirious.     Significant Labs:  Recent Results (from the past 24 hour(s))   Reticulocytes    Collection Time: 05/17/18  5:04 AM   Result Value Ref Range    Retic 0.3 (L) 0.4 - 2.0 %   CBC auto differential    Collection Time: 05/17/18  5:04 AM   Result Value Ref Range    WBC 0.30 (LL) 3.90 - 12.70 K/uL    RBC 2.45 (L) 4.60 - 6.20 M/uL    Hemoglobin 7.7 (L) 14.0 - 18.0 g/dL    Hematocrit 21.2 (L) 40.0 - 54.0 %    MCV 87 82 - 98 fL    MCH 31.4 (H) 27.0 - 31.0 pg    MCHC 36.3 (H) 32.0 - 36.0 g/dL    RDW 14.9 (H) 11.5 - 14.5 %    Platelets 18 (LL) 150 - 350 K/uL    MPV 11.0 9.2 - 12.9 fL    Immature Granulocytes CANCELED 0.0 - 0.5 %    Immature Grans (Abs) CANCELED 0.00 - 0.04 K/uL    Lymph # Test Not Performed 1.0 - 4.8 K/uL    Mono # Test Not Performed 0.3 - 1.0 K/uL    Eos # Test Not Performed 0.0 - 0.5 K/uL    Baso # Test Not Performed 0.00 - 0.20 K/uL    nRBC 0 0 /100 WBC    Gran% 0.0 (L) 38.0 - 73.0 %    Lymph% 100.0 (H) 18.0 - 48.0 %    Mono% 0.0 (L) 4.0 - 15.0 %    Eosinophil% 0.0 0.0 - 8.0 %    Basophil% 0.0 0.0 - 1.9 %    Platelet Estimate Decreased (A)     Aniso Slight     Differential Method Manual    Comprehensive metabolic panel    Collection Time: 05/17/18  5:04 AM   Result Value Ref Range    Sodium 137 136 - 145 mmol/L    Potassium 3.4 (L) 3.5 - 5.1 mmol/L     Chloride 103 95 - 110 mmol/L    CO2 26 23 - 29 mmol/L    Glucose 103 70 - 110 mg/dL    BUN, Bld 9 6 - 20 mg/dL    Creatinine 0.6 0.5 - 1.4 mg/dL    Calcium 8.5 (L) 8.7 - 10.5 mg/dL    Total Protein 5.4 (L) 6.0 - 8.4 g/dL    Albumin 2.7 (L) 3.5 - 5.2 g/dL    Total Bilirubin 1.1 (H) 0.1 - 1.0 mg/dL    Alkaline Phosphatase 69 55 - 135 U/L    AST 25 10 - 40 U/L     (H) 10 - 44 U/L    Anion Gap 8 8 - 16 mmol/L    eGFR if African American >60.0 >60 mL/min/1.73 m^2    eGFR if non African American >60.0 >60 mL/min/1.73 m^2   Magnesium    Collection Time: 05/17/18  5:04 AM   Result Value Ref Range    Magnesium 1.7 1.6 - 2.6 mg/dL   Phosphorus    Collection Time: 05/17/18  5:04 AM   Result Value Ref Range    Phosphorus 3.7 2.7 - 4.5 mg/dL       Significant Imaging:   none

## 2018-05-17 NOTE — PLAN OF CARE
"Problem: Patient Care Overview  Goal: Plan of Care Review  Outcome: Ongoing (interventions implemented as appropriate)  Pt has remained stable and afebrile this shift.  Pt remains with flat affect, and delayed response time.  Pt following instructions, but still very distrusting of staff and stating "am I alive".  Pt agitated once this shift following restless sleep, mumbling while asleep.Pt darted out of room past .  Pt stopped in the harmon and when approached by this writer stated "for real, I mean I can't walk, for real?" reinforced to patient that he could go for a walk with the nurse or sitter's help and that he had to let us know that he wanted to go somewhere.  To which he repeated "I just have to let the nurse know".  Pt most interactive when his clinic nurses visited, sitting up in bed, and responding yes to being nice to see familiar faces.  Pt ate some this shift with pushing from the sitter.  Talked to mom this shift and she was updated on erasmo's status today.      "

## 2018-05-17 NOTE — MEDICAL/APP STUDENT
"5/17/2018 8:37 AM Hema Kuo 2000 [id number]        PSYCHIATRY CONSULT PROGRESS NOTE   BRIEF HPI   Hema Kuo is a 17 y.o. [unfilled] male who  has a past medical history of AML (acute myeloblastic leukemia) (10/2017); Asthma; Encounter for blood transfusion; and Seasonal allergies., currently presenting with Bacteremia.  Psychiatry was originally consulted to address the patient's symptoms of "paranoia, now CEC'd, possible ICU delirium."     Per staff MD:  "17 year old young man with AML s/p 4 cycles of chemotherapy now transferred from the  PICU with Strep mitis sepsis and ARDS.     For his AML we are awaiting count recovery.  ANC next to nothing still.   For his Strep sepsis, he is on cefepime and vancomycin.  Cultures from 5/2 grew Strep mitis (sensitive to vanc).  Subsequent cultures negative.    Will cont a 14 day course of vanc.        .  For his chemotherapy induced pancytopenia, recommend transfusion for hemoglobin under 7 and platelets under 10 K.  No transfusions today.    For his ARDS will get one last dose of lasix today and then we will stop.  He continues to have paranoid delerium since extubation, however this is improving.  Will cont seroquel with prn zyprexa.  Will have psych see tomorrow.   Still needs a 1:1 sitter."  ----------------------------------------------------------------------------------------------------------------------  SUBJECTIVE:   Nursing note  "Ongoing (interventions implemented as appropriate)  VSS; afebrile. 2 units of PRBC administered. IV vanc administered per orders. PAC HL btw medications. Flat affect and delayed responses. Paranoid thoughts of death, stating "I'm in a body bag.", and "Am I alive?". Patient ambulated with PT and OT today. Sitter at bedside. PEC protocol in place. Tolerating diet. POC reviewed with mom; verbalized understanding. Will continue to monitor.    Pt flat affect, slow with answers to questions, asked a few times if he "is still alive", pt " "comforted. Both pac ports re-accessed both with good blood return, labs sent this am, mom visited for a few hours, sitter remained at bedside for entire shift."    Current Medications:   PRN Meds: sodium chloride, acetaminophen, albuterol sulfate, alteplase, heparin, porcine (PF), lidocaine-prilocaine, morphine, olanzapine zydis, ondansetron, polyethylene glycol   Psychotherapeutics     Start     Stop Route Frequency Ordered    05/17/18 0900  risperiDONE disintegrating tablet 2 mg      -- Oral 2 times daily 05/17/18 0820    05/15/18 0847  olanzapine zydis disintegrating tablet 5 mg      -- Oral As needed (PRN) 05/15/18 0848        Allergies:   Review of patient's allergies indicates:   Allergen Reactions    Nsaids (non-steroidal anti-inflammatory drug) Anaphylaxis    Nuts [tree nut] Anaphylaxis    Strawberry     Vancomycin analogues Itching     Premedicate with benadryl and admin over 2hr.      Past Medical History:   Diagnosis Date    AML (acute myeloblastic leukemia) 10/2017    Asthma     Encounter for blood transfusion     Seasonal allergies    I/O last 3 completed shifts:  In: 2347 [P.O.:600; I.V.:122; Blood:625; IV Piggyback:1000]  Out: -   No intake/output data recorded.    Medical ROS  General ROS: negative for - chills, fatigue or fever  Respiratory ROS: no cough, shortness of breath, or wheezing  Cardiovascular ROS: no chest pain or dyspnea on exertion  Gastrointestinal ROS: no abdominal pain, change in bowel habits, or black/bloody stools  Musculoskeletal ROS: negative for - gait disturbance, joint stiffness, pain/weakness  Neurological ROS: negative for - confusion, dizziness, gait disturbance, headaches, impaired coordination/balance, seizures or visual changes  PSYCHIATRIC ROS (Is patient experiencing or having changes in):    *Patient fell asleep at ~8am, after a sleepless night; moaning and making noises during sleep (per sitter). He did remember the sitter from earlier this week and had " logical conversation with her. He still exhibits a flat affect (per sitter). The following is completed from indirect information:  sleep: yes, patient did not sleep all night and is moaning in his sleep  appetite: yes  weight: no  energy/anergy: no  interest/pleasure/anhedonia: no  somatic symptoms: unknown  libido: unknown  guilty/hopelessness: unknown  concentration: unknown  S.I.B.s/risky behavior: no  SI/SA:  no  anxiety/panic: unknown  Agoraphobia:  no  Social phobia:  unknown  Recurrent nightmares:  yes, this is a possibility since he has been making noises during sleep since 2 days ago  hyper startle response:  no  Avoidance: unknown  Recurrent thoughts:  unknown  Recurrent behaviors:  unknown  Irritability: no  Racing thoughts: unknown  Impulsive behaviors: no  Pressured speech:  no  Paranoia:unknown  Delusions: unknown  AVH:unknown  OBJECTIVE:     Vitals   Vitals:    05/16/18 2328   BP: (!) 142/65   Pulse: 94   Resp: 20      In/Out  I/O last 3 completed shifts:  In: 2347 [P.O.:600; I.V.:122; Blood:625; IV Piggyback:1000]  Out: -   Labs/Imaging/Studies:   Recent Results (from the past 36 hour(s))   Reticulocytes    Collection Time: 05/16/18  6:10 AM   Result Value Ref Range    Retic 0.1 (L) 0.4 - 2.0 %   CBC auto differential    Collection Time: 05/16/18  6:10 AM   Result Value Ref Range    WBC 0.19 (LL) 3.90 - 12.70 K/uL    RBC 1.90 (L) 4.60 - 6.20 M/uL    Hemoglobin 5.9 (LL) 14.0 - 18.0 g/dL    Hematocrit 17.2 (LL) 40.0 - 54.0 %    MCV 91 82 - 98 fL    MCH 31.1 (H) 27.0 - 31.0 pg    MCHC 34.3 32.0 - 36.0 g/dL    RDW 15.6 (H) 11.5 - 14.5 %    Platelets 30 (LL) 150 - 350 K/uL    MPV 9.9 9.2 - 12.9 fL    Immature Granulocytes 0.0 0.0 - 0.5 %    Gran # (ANC) 0.0 (L) 1.8 - 7.7 K/uL    Immature Grans (Abs) 0.00 0.00 - 0.04 K/uL    Lymph # 0.2 (L) 1.0 - 4.8 K/uL    Mono # 0.0 (L) 0.3 - 1.0 K/uL    Eos # 0.0 0.0 - 0.5 K/uL    Baso # 0.00 0.00 - 0.20 K/uL    nRBC 0 0 /100 WBC    Gran% 10.5 (L) 38.0 - 73.0 %     Lymph% 89.5 (H) 18.0 - 48.0 %    Mono% 0.0 (L) 4.0 - 15.0 %    Eosinophil% 0.0 0.0 - 8.0 %    Basophil% 0.0 0.0 - 1.9 %    Differential Method Automated    VANCOMYCIN, TROUGH before 4th dose    Collection Time: 05/16/18  6:10 AM   Result Value Ref Range    Vancomycin-Trough 8.9 (L) 10.0 - 22.0 ug/mL   Type & Screen    Collection Time: 05/16/18  6:10 AM   Result Value Ref Range    Group & Rh AB POS     Indirect Nathan NEG    Prepare RBC 2 Units; Hb  5.8    Collection Time: 05/16/18  6:10 AM   Result Value Ref Range    UNIT NUMBER [id number]     PRODUCT CODE P4159Z25     DISPENSE STATUS TRANSFUSED     CODING SYSTEM ZGVH004     Unit Blood Type Code 2800     Unit Blood Type AB NEG     Unit Expiration [id number]     UNIT NUMBER [id number]     PRODUCT CODE F0218G89     DISPENSE STATUS TRANSFUSED     CODING SYSTEM TEDR364     Unit Blood Type Code 6200     Unit Blood Type A POS     Unit Expiration [id number]    CBC auto differential    Collection Time: 05/16/18  7:16 AM   Result Value Ref Range    WBC 0.32 (LL) 3.90 - 12.70 K/uL    RBC 1.83 (L) 4.60 - 6.20 M/uL    Hemoglobin 5.8 (LL) 14.0 - 18.0 g/dL    Hematocrit 16.3 (LL) 40.0 - 54.0 %    MCV 89 82 - 98 fL    MCH 31.7 (H) 27.0 - 31.0 pg    MCHC 35.6 32.0 - 36.0 g/dL    RDW 15.6 (H) 11.5 - 14.5 %    Platelets 26 (LL) 150 - 350 K/uL    MPV 10.6 9.2 - 12.9 fL    Immature Granulocytes CANCELED 0.0 - 0.5 %    Immature Grans (Abs) CANCELED 0.00 - 0.04 K/uL    nRBC 6 (A) 0 /100 WBC    Gran% 0.0 (L) 38.0 - 73.0 %    Lymph% 100.0 (H) 18.0 - 48.0 %    Mono% 0.0 (L) 4.0 - 15.0 %    Eosinophil% 0.0 0.0 - 8.0 %    Basophil% 0.0 0.0 - 1.9 %    Platelet Estimate Decreased (A)     Aniso Slight     Poik Slight     Poly Occasional     Ovalocytes Occasional     Differential Method Manual    Reticulocytes    Collection Time: 05/17/18  5:04 AM   Result Value Ref Range    Retic 0.3 (L) 0.4 - 2.0 %   CBC auto differential    Collection Time: 05/17/18  5:04 AM   Result Value Ref Range     WBC 0.30 (LL) 3.90 - 12.70 K/uL    RBC 2.45 (L) 4.60 - 6.20 M/uL    Hemoglobin 7.7 (L) 14.0 - 18.0 g/dL    Hematocrit 21.2 (L) 40.0 - 54.0 %    MCV 87 82 - 98 fL    MCH 31.4 (H) 27.0 - 31.0 pg    MCHC 36.3 (H) 32.0 - 36.0 g/dL    RDW 14.9 (H) 11.5 - 14.5 %    Platelets 18 (LL) 150 - 350 K/uL    MPV 11.0 9.2 - 12.9 fL    Immature Granulocytes CANCELED 0.0 - 0.5 %    Immature Grans (Abs) CANCELED 0.00 - 0.04 K/uL    Lymph # Test Not Performed 1.0 - 4.8 K/uL    Mono # Test Not Performed 0.3 - 1.0 K/uL    Eos # Test Not Performed 0.0 - 0.5 K/uL    Baso # Test Not Performed 0.00 - 0.20 K/uL    nRBC 0 0 /100 WBC    Gran% 0.0 (L) 38.0 - 73.0 %    Lymph% 100.0 (H) 18.0 - 48.0 %    Mono% 0.0 (L) 4.0 - 15.0 %    Eosinophil% 0.0 0.0 - 8.0 %    Basophil% 0.0 0.0 - 1.9 %    Platelet Estimate Decreased (A)     Aniso Slight     Differential Method Manual    Comprehensive metabolic panel    Collection Time: 05/17/18  5:04 AM   Result Value Ref Range    Sodium 137 136 - 145 mmol/L    Potassium 3.4 (L) 3.5 - 5.1 mmol/L    Chloride 103 95 - 110 mmol/L    CO2 26 23 - 29 mmol/L    Glucose 103 70 - 110 mg/dL    BUN, Bld 9 6 - 20 mg/dL    Creatinine 0.6 0.5 - 1.4 mg/dL    Calcium 8.5 (L) 8.7 - 10.5 mg/dL    Total Protein 5.4 (L) 6.0 - 8.4 g/dL    Albumin 2.7 (L) 3.5 - 5.2 g/dL    Total Bilirubin 1.1 (H) 0.1 - 1.0 mg/dL    Alkaline Phosphatase 69 55 - 135 U/L    AST 25 10 - 40 U/L     (H) 10 - 44 U/L    Anion Gap 8 8 - 16 mmol/L    eGFR if African American >60.0 >60 mL/min/1.73 m^2    eGFR if non African American >60.0 >60 mL/min/1.73 m^2   Magnesium    Collection Time: 05/17/18  5:04 AM   Result Value Ref Range    Magnesium 1.7 1.6 - 2.6 mg/dL   Phosphorus    Collection Time: 05/17/18  5:04 AM   Result Value Ref Range    Phosphorus 3.7 2.7 - 4.5 mg/dL        Mental Status Exam:    *Unable to complete as the patient was sleeping  Appearance: unremarkable, age appropriate  Behavior/Cooperation: limited/ appopriate  "{behavior:54352::"normal","cooperative"}  Speech: appropriate rate, volume and tone {findings; speech:33758::"normal tone","normal rate","normal pitch","normal volume"}  Language: {EXAM; AMB PSYCH LANGUAGE:20234::"grossly intact"} with spontaneous speech  Mood: {mood:57865}  Affect:  congruent with mood and appropriate to situation/content {DESC; AFFECT:57199}  Thought Process: {thought process:92239::"normal and logical"}  Thought Content: {normal:30654::"normal, no suicidality, no homicidality, delusions, or paranoia"}  Sensorium:  Awake/Delirium/Somnolence  Alert and Oriented: x3 {orientation:48571::"grossly intact"}  Memory: 3/3 immediate, 2/3 at 5 minutes    Recent:  Impaired/  Limited/ Intact; able to report recent events   Remote:  Impaired/  Limited/ Intact; Named 4/4 past presidents   Attention/concentration: appropriate for age/education. w-o-r-l-d; d-l-r-o-w   Similarities:  Impaired/  Limited/ Intact; (difference between apple and orange?)  Abstract reasoning:  Impaired/  Limited/ Intact  Insight:  Impaired/  Limited/ Intact  Judgment: Impaired/  Limited/ Intact      RASS-  [facility] ICU-    ASSESSMENT     Acute delirium     Patient without significant past psychiatric history has been exhibiting waxing and waning mental status, disorientation and paranoid delusions since being extubated on 5/9 consistent with delirium. Recommend increased scheduled Risperdal M tabs to 2 mg BID. Continue Zyprexa 5 mg PO/IM q8 PRN nonredirectable agitation. QTc on 5/16 467. Continue checking daily EKG to monitor QTc. Continue to monitor CBC for any worsening leukopenia/neutropenia with antipsychotic medications.  · DELIRIUM BEHAVIOR MANAGEMENT  ? PLEASE utilize CHEMICAL restraints with PRN meds first for agitation. Minimize use of PHYSICAL restraints  ? Keep window shades open and room lit during day and room dim at night in order to promote normal sleep-wake cycles  ? Encourage family at bedside. [place] patient often to " situation, location, date.  ? Continue to Limit or Discontinue use of Narcotics, Benzos and Anti-cholinergic (Avoid Benadryl) medications as they may worsen delirium.   ? Continue medical workup for causative etiology of Delirium. Recommend CT head with and without contrast if possible to assess for any acute intracranial processes and repeat CXR to rule out additional infectious process. Recommend check amylase, lipase, B12, folate, HIV, RPR.     Insomnia     · Patient has difficulty falling asleep and sleep is reported to be disturbed.     MANAGEMENT  ? Patient is currently on home melatonin + risperidone (6mg)  ? Ramelteon recommended         Elroy Robin (MS3)  5/17/2018 8:37 AM

## 2018-05-18 LAB
ANISOCYTOSIS BLD QL SMEAR: SLIGHT
BASOPHILS NFR BLD: 0 %
DIFFERENTIAL METHOD: ABNORMAL
EOSINOPHIL NFR BLD: 0 %
ERYTHROCYTE [DISTWIDTH] IN BLOOD BY AUTOMATED COUNT: 14.9 %
HCT VFR BLD AUTO: 20.4 %
HGB BLD-MCNC: 7.6 G/DL
IMM GRANULOCYTES # BLD AUTO: ABNORMAL K/UL
IMM GRANULOCYTES NFR BLD AUTO: ABNORMAL %
LYMPHOCYTES NFR BLD: 100 %
MCH RBC QN AUTO: 32.2 PG
MCHC RBC AUTO-ENTMCNC: 37.3 G/DL
MCV RBC AUTO: 86 FL
MONOCYTES NFR BLD: 0 %
NEUTROPHILS NFR BLD: 0 %
NRBC BLD-RTO: 0 /100 WBC
OVALOCYTES BLD QL SMEAR: ABNORMAL
PLATELET # BLD AUTO: 12 K/UL
PLATELET BLD QL SMEAR: ABNORMAL
PMV BLD AUTO: 11.8 FL
POIKILOCYTOSIS BLD QL SMEAR: SLIGHT
RBC # BLD AUTO: 2.36 M/UL
RETICS/RBC NFR AUTO: 0.2 %
WBC # BLD AUTO: 0.3 K/UL

## 2018-05-18 PROCEDURE — 99233 SBSQ HOSP IP/OBS HIGH 50: CPT | Mod: AF,HB,, | Performed by: PSYCHIATRY & NEUROLOGY

## 2018-05-18 PROCEDURE — 36415 COLL VENOUS BLD VENIPUNCTURE: CPT

## 2018-05-18 PROCEDURE — 25000003 PHARM REV CODE 250: Performed by: STUDENT IN AN ORGANIZED HEALTH CARE EDUCATION/TRAINING PROGRAM

## 2018-05-18 PROCEDURE — 99233 SBSQ HOSP IP/OBS HIGH 50: CPT | Mod: ,,, | Performed by: PEDIATRICS

## 2018-05-18 PROCEDURE — 97530 THERAPEUTIC ACTIVITIES: CPT

## 2018-05-18 PROCEDURE — 63600175 PHARM REV CODE 636 W HCPCS: Performed by: PEDIATRICS

## 2018-05-18 PROCEDURE — 63600175 PHARM REV CODE 636 W HCPCS: Performed by: STUDENT IN AN ORGANIZED HEALTH CARE EDUCATION/TRAINING PROGRAM

## 2018-05-18 PROCEDURE — 85007 BL SMEAR W/DIFF WBC COUNT: CPT

## 2018-05-18 PROCEDURE — 85027 COMPLETE CBC AUTOMATED: CPT

## 2018-05-18 PROCEDURE — 85045 AUTOMATED RETICULOCYTE COUNT: CPT

## 2018-05-18 PROCEDURE — 25000003 PHARM REV CODE 250: Performed by: PSYCHIATRY & NEUROLOGY

## 2018-05-18 PROCEDURE — 11300000 HC PEDIATRIC PRIVATE ROOM

## 2018-05-18 RX ORDER — RISPERIDONE 1 MG/1
1 TABLET, ORALLY DISINTEGRATING ORAL DAILY
Status: DISCONTINUED | OUTPATIENT
Start: 2018-05-18 | End: 2018-05-24

## 2018-05-18 RX ORDER — RISPERIDONE 1 MG/1
3 TABLET, ORALLY DISINTEGRATING ORAL NIGHTLY
Status: DISCONTINUED | OUTPATIENT
Start: 2018-05-18 | End: 2018-05-23

## 2018-05-18 RX ADMIN — RISPERIDONE 3 MG: 1 TABLET, ORALLY DISINTEGRATING ORAL at 09:05

## 2018-05-18 RX ADMIN — CIPROFLOXACIN HYDROCHLORIDE 500 MG: 500 TABLET, FILM COATED ORAL at 10:05

## 2018-05-18 RX ADMIN — Medication 1250 MG: at 06:05

## 2018-05-18 RX ADMIN — Medication 1250 MG: at 10:05

## 2018-05-18 RX ADMIN — ACETAMINOPHEN, DIPHENHYDRAMINE HCL, PHENYLEPHRINE HCL 10 MG: 325; 25; 5 TABLET ORAL at 09:05

## 2018-05-18 RX ADMIN — ACYCLOVIR 400 MG: 200 CAPSULE ORAL at 10:05

## 2018-05-18 RX ADMIN — Medication 1250 MG: at 02:05

## 2018-05-18 RX ADMIN — SULFAMETHOXAZOLE AND TRIMETHOPRIM 1 TABLET: 800; 160 TABLET ORAL at 10:05

## 2018-05-18 RX ADMIN — RISPERIDONE 1 MG: 1 TABLET, ORALLY DISINTEGRATING ORAL at 10:05

## 2018-05-18 RX ADMIN — Medication 500 UNITS: at 05:05

## 2018-05-18 RX ADMIN — CIPROFLOXACIN HYDROCHLORIDE 500 MG: 500 TABLET, FILM COATED ORAL at 09:05

## 2018-05-18 RX ADMIN — SULFAMETHOXAZOLE AND TRIMETHOPRIM 1 TABLET: 800; 160 TABLET ORAL at 09:05

## 2018-05-18 RX ADMIN — OLANZAPINE 5 MG: 5 TABLET, ORALLY DISINTEGRATING ORAL at 01:05

## 2018-05-18 RX ADMIN — POSACONAZOLE 300 MG: 100 TABLET, COATED ORAL at 10:05

## 2018-05-18 RX ADMIN — ACYCLOVIR 400 MG: 200 CAPSULE ORAL at 09:05

## 2018-05-18 NOTE — PLAN OF CARE
"Problem: Patient Care Overview  Goal: Plan of Care Review  Pt stable overnight.  VSS, afebrile.  Pt is paranoid and asks if he is "still alive" as he puts his hand over his chest.  Pt continues to have a flat affect and a delayed response to any question that is asked. However, pt remembered my name and that I had taken care of him previously.  Pt follows simple commands at times, but remains very fearful and distrusting of staff.  Pt was awake for majority of the shift, constantly disconnecting himself from his IV infusion and trying to deaccess his port.  Pt tried to leave his room a few times and became increasingly confused and agitated.  Zyprexa 5mg PO given x1.  Didier Mcginnis and Marshal were notified and aware of pt's behavior.  Pt is tolerating PO.  Adequate fluid intake overnight.  Voiding and stooling appropriately.  No indicators of pain or discomfort noted.  Pt went to bed shortly before 0400.  Left chest PAC in place, flushes well with good blood return noted.  Dressing, CDI.  One lumen infusing Vanc IV per schedule and the other lumen is HL'd.  Labs drawn this AM.  Sitter at bedside throughout the shift. CEC protocol in place.  Mother is absent and not feeling well reported the day nurse.  POC reviewed with sitter and pt.  Reinforcement given when needed.  Safety maintained.  Will cont to monitor.            "

## 2018-05-18 NOTE — PROGRESS NOTES
[facility] Pediatric [facility] Medicine  Progress Note    Patient Name: Hema Kuo  MRN: [id number]  Admission Date: 4/30/2018  Hospital Length of Stay: 18  Code Status: Prior   Primary Care Physician: Ann Reyna NP  Principal Problem: Bacteremia    Subjective:     Interval History: VSS. Patient with increased agitation late PM, given Zyprexa x1.     Scheduled Meds:   acyclovir  400 mg Oral BID    ciprofloxacin HCl  500 mg Oral BID    melatonin  10 mg Oral QHS    posaconazole  300 mg Oral Daily    risperiDONE  1 mg Oral Daily    risperiDONE  3 mg Oral QHS    sulfamethoxazole-trimethoprim 800-160mg  1 tablet Oral twice daily on Friday, Saturday, Sunday    vancomycin (VANCOCIN) IVPB  1,250 mg Intravenous Q8H     Continuous Infusions:  PRN Meds:sodium chloride, acetaminophen, albuterol sulfate, alteplase, heparin, porcine (PF), lidocaine-prilocaine, morphine, olanzapine zydis, ondansetron, polyethylene glycol    Scheduled Meds:   acyclovir  400 mg Oral BID    ciprofloxacin HCl  500 mg Oral BID    melatonin  10 mg Oral QHS    posaconazole  300 mg Oral Daily    risperiDONE  1 mg Oral Daily    risperiDONE  3 mg Oral QHS    sulfamethoxazole-trimethoprim 800-160mg  1 tablet Oral twice daily on Friday, Saturday, Sunday    vancomycin (VANCOCIN) IVPB  1,250 mg Intravenous Q8H     Continuous Infusions:  PRN Meds:sodium chloride, acetaminophen, albuterol sulfate, alteplase, heparin, porcine (PF), lidocaine-prilocaine, morphine, olanzapine zydis, ondansetron, polyethylene glycol    Review of Systems   Constitutional: Negative for fever.   Psychiatric/Behavioral: Positive for agitation, confusion and hallucinations.     Objective:     Vital Signs (Most Recent):  Temp: 99.5 °F (37.5 °C) (05/18/18 0835)  Pulse: (!) 113 (05/18/18 0835)  Resp: 20 (05/18/18 0835)  BP: 127/61 (05/18/18 0835)  SpO2: 98 % (05/18/18 0835) Vital Signs (24h Range):  Temp:  [98.2 °F (36.8 °C)-99.5 °F (37.5 °C)] 99.5 °F  (37.5 °C)  Pulse:  [] 113  Resp:  [16-20] 20  SpO2:  [97 %-100 %] 98 %  BP: (103-136)/(52-70) 127/61     Patient Vitals for the past 72 hrs (Last 3 readings):   Weight   05/17/18 2129 77.7 kg (171 lb 4.8 oz)   05/15/18 1945 77.9 kg (171 lb 11.8 oz)     Body mass index is 27.65 kg/m².    Intake/Output - Last 3 Shifts       05/16 0700 - 05/17 0659 05/17 0700 - 05/18 0659 05/18 0700 - 05/19 0659    P.O. 600 1020     I.V. (mL/kg) 62 (0.8) 80 (1)     Blood 625      IV Piggyback 500 1000     Total Intake(mL/kg) 1787 (22.9) 2100 (27)     Net +1787 +2100             Urine Occurrence 2 x 4 x     Stool Occurrence  2 x           Lines/Drains/Airways     Central Venous Catheter Line                 Port A Cath Double Lumen 05/10/18 0900 left subclavian 8 days                Physical Exam  Constitutional: He appears well-developed and well-nourished. Sleeping on exam.   Head: Normocephalic and atraumatic.   Nose: Nose normal.   Mouth/Throat: Mucous membranes are normal.   Eyes: Conjunctivae and lids are normal.   Neck: Normal range of motion. Neck supple.   Cardiovascular: Normal rate and regular rhythm.    Pulmonary/Chest: Effort normal. No stridor. He has no decreased breath sounds. He has no wheezes. He has no rales.   Abdominal: Soft. Bowel sounds are normal. He exhibits no distension. There is no hepatosplenomegaly. There is no guarding.   Musculoskeletal: Normal range of motion. He exhibits no edema.   Skin: Skin is warm and dry. Capillary refill takes less than 2 seconds. No rash noted. He is not diaphoretic. No erythema. Port CDI.   Psychiatric: Still delirious.     Significant Labs:  Recent Results (from the past 24 hour(s))   VANCOMYCIN, TROUGH before 4th dose    Collection Time: 05/17/18  2:26 PM   Result Value Ref Range    Vancomycin-Trough 10.5 10.0 - 22.0 ug/mL   Reticulocytes    Collection Time: 05/18/18  5:48 AM   Result Value Ref Range    Retic 0.2 (L) 0.4 - 2.0 %   CBC auto differential    Collection  Time: 05/18/18  5:48 AM   Result Value Ref Range    WBC 0.30 (LL) 3.90 - 12.70 K/uL    RBC 2.36 (L) 4.60 - 6.20 M/uL    Hemoglobin 7.6 (L) 14.0 - 18.0 g/dL    Hematocrit 20.4 (L) 40.0 - 54.0 %    MCV 86 82 - 98 fL    MCH 32.2 (H) 27.0 - 31.0 pg    MCHC 37.3 (H) 32.0 - 36.0 g/dL    RDW 14.9 (H) 11.5 - 14.5 %    Platelets 12 (LL) 150 - 350 K/uL    MPV 11.8 9.2 - 12.9 fL    Immature Granulocytes CANCELED 0.0 - 0.5 %    Immature Grans (Abs) CANCELED 0.00 - 0.04 K/uL    nRBC 0 0 /100 WBC    Gran% 0.0 (L) 38.0 - 73.0 %    Lymph% 100.0 (H) 18.0 - 48.0 %    Mono% 0.0 (L) 4.0 - 15.0 %    Eosinophil% 0.0 0.0 - 8.0 %    Basophil% 0.0 0.0 - 1.9 %    Platelet Estimate Decreased (A)     Aniso Slight     Poik Slight     Ovalocytes Occasional     Differential Method Manual        Significant Imaging:   none    Assessment/Plan:     Neuro   Acute delirium    Delirium: currently CEC  - Per Psych recs: 1mg Risperdal qAM and 3mg qHS  - Obtain RPR, follow up results  - Olanzapine PRN  - EKG complete on 5/14, 5/15, 5/16. QTc ranging from 437-441.   - Avoid Benadryl PRN per Psych  - Continue frequent reorientation and attempt to encourage patient to sleep.   - Psych following        ID   * Bacteremia    Patient with Strep mitis sepsis. Bcx from 5/2 growing organism-repeat cultures NG.      - s/p Cefepime   - Continue Vancomycin 1250 g Q8h, vanc trough 10.5 on 5/17. Will plan to follow trough every 3 days.  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   - Continue bactrim QFSaSu ppx  - Continue peridex ppx   -Continue home Cipro 500mg BID        Oncology   Neutropenia    - Continue soft mechanical diet  - Continue PRN zofran        AML (acute myeloblastic leukemia)    Hema is a 17 yr young man with AML (t 8;21) here for chemotherapy. On Intensification therapy II following  [id number] (Arm A). He was admitted on 4/30/2018 for neutropenia/profund sepsis risk. Stepped up to the PICU for persistent fever >103, tachycardia, and hypotension that was been minimally responsive to fluid resuscitation. PICU stay was complicated by delirium. Currently CEC'd.      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 MRD negative after Induction I. Today is Day 29.  - Treating per COG [id number] (ARM A).  S/p Intensification I and Intensification II started.   - BM biopsy at start of Intensification shows CR.  FISH for t(8;21) negative. Will repeat BM biopsy pending count recovery and clinical improvement               Antineoplastic chemotherapy induced pancytopenia    Chemotherapy induced neutropenia:   - Hgb goal >7.  - Plts goal >10.  - Daily CBC and reticulocyte, no transfusions today  - CMP EOD  - If bleeding at all, please transfuse platelets x1 unit. No need to check CBC, evaluate clinically.                 Anticipated Disposition: Home or Self Care    Ayanna Ramirez,   Pediatric Hospital Medicine   [facility]

## 2018-05-18 NOTE — PROGRESS NOTES
"[facility]  Psychiatry  Progress Note    Patient Name: Hema Kuo  MRN: [id number]   Code Status: Prior  Admission Date: 4/30/2018  Hospital Length of Stay: 18 days  Expected Discharge Date: 5/21/2018  Attending Physician: Christian Emerson MD  Primary Care Provider: Ann Reyna NP    Current Legal Status: CEC      Subjective:     Principal Problem:Bacteremia    Chief Complaint: delirium    HPI: Patient is a 16 y/o man with PMH AML dx 2017 s/p 4 cycles of chemotherapy admitted to pediatrics team with Strep mitis sepsis and ARDS. Psychiatry was consulted for "paranoia, now CEC'd, possible ICU delirium."     Per staff MD:  "17 year old young man with AML s/p 4 cycles of chemotherapy now transferred from the  PICU with Strep mitis sepsis and ARDS.     For his AML we are awaiting count recovery.  ANC next to nothing still.   For his Strep sepsis, he is on cefepime and vancomycin.  Cultures from 5/2 grew Strep mitis (sensitive to vanc).  Subsequent cultures negative.    Will cont a 14 day course of vanc.        .  For his chemotherapy induced pancytopenia, recommend transfusion for hemoglobin under 7 and platelets under 10 K.  No transfusions today.    For his ARDS will get one last dose of lasix today and then we will stop.  He continues to have paranoid delerium since extubation, however this is improving.  Will cont seroquel with prn zyprexa.  Will have psych see tomorrow.   Still needs a 1:1 sitter."    Per RN note 5/14:  "Pt oriented x4. Pt paranoid and anxious but cooperative. Very slow to proccess what's being communicated to him and very slow to respond. Similar to last nights shift, it took patient a little while for him to take his medications, but after much convincing, pt took them with no problem. Pt making death statements throughout night and still believes he is dying and nurses are out to get him. Double lumen left chest port in place, flushes well, blood return noted from each " "lumen. All medications/antibitoics given as scheduled. Labs drawn in am. Critical results resulted, MD notified and labs redrawn. Pt did sleep for about 6 hours tonight."    Per RN note 5/13:  "Pt oriented to person, self, time, but disoriented to place. Pt stated he was [facility] waiting [facility] die. Pt paranoid and anxious but cooperative. Took him a little time (about 25 minutes) to take his medications, but after time of explaining to him what the medications were and why they are needed, pt took them. Pt stated he believes "the nurses are trying to burn" him and that we all hate him and want him to die."    On interview, patient with sitter at bedside and uneaten breakfast tray in front of him. Interview was limited as his answers to questions are impoverished and inappropriate. He asked interviewer "are you my biological father?" And stated "I think I'm dead." Was able to state his reason for admission was "cancer" but did not respond to further questions about his care.     Per iglesia, patient slept overnight and has had limited appetite. Has been paranoid but not physically aggressive. Minimally talkative with her. Says patient's mother is currently at a doctor's appointment but will return to hospital this afternoon.    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 2 sodas/day         Hospital Course: 05/15/2018: Per chart, patient received PRN Zyprexa @ 0009 for insomnia. Per iglesia, " "patient slightly less paranoid this morning, ate most of breakfast and played games with her. On interview, no family at bedside, patient states he is feeling like himself. Still making statements about believing his is dead but more redirectable. Mentioned his nosebleed was because there is "something inside" him besides cancer. Overall more conversational and less paranoid this morning.  05/16/2018: Per chart, no PRN zyprexa given, per RN: "Pt has flat affect and has delayed response of understanding. Pt cooperative with vitals. He states " should never been born" and he also states "ya'll just keeping me alive". He also had some other spontaneous statements that did not make sense. Pt vss, afebrile with tmax of 99.6, no distress noted. Pt did go to sleep around midnight but moaned and cried throughout the night per sitter." On interview, patient again responds to select questions and remains paranoid, will slight improvement. Says he's "either fully alive or fully dead" when asked but does not spontaneously state he is dead. Refused to participate in [place] but oriented to location, year and month.  05/17/2018: Per chart, no PRN zyprexa given, per RN continued to make paranoid statements about being alive, worked well with PT/OT yesterday. Asleep on interview. Per iglesia, remembered her from earlier in the week, has not made any statements this morning so far about being dead.  05/18/2018: Per chart, patient agitated and confused overnight, attempting to disconnect IV and port. Received Zyprexa 5 mg PO PRN @ 0147. Patient asleep on interview. Per iglesia did not fall asleep until 4 am. Ate a little breakfast then fell back asleep. No issues with agitation this AM.       Interval History: see hospital course.     Family History     Problem Relation (Age of Onset)    Cancer Mother    Heart disease Mother    No Known Problems Father        Social History Main Topics    Smoking status: Former Smoker     " "Packs/day: 0.50     Types: Cigarettes     Quit date: 10/30/2017    Smokeless tobacco: Never Used    Alcohol use Yes      Comment: rare occasions    Drug use: No    Sexual activity: Yes     Partners: Female     Psychotherapeutics     Start     Stop Route Frequency Ordered    05/18/18 2100  risperiDONE disintegrating tablet 3 mg      -- Oral Nightly 05/18/18 0819    05/18/18 0900  risperiDONE disintegrating tablet 1 mg      -- Oral Daily 05/18/18 0819    05/15/18 0847  olanzapine zydis disintegrating tablet 5 mg      -- Oral As needed (PRN) 05/15/18 0848           Review of Systems    Objective:     Vital Signs (Most Recent):  Temp: 99.5 °F (37.5 °C) (05/18/18 0835)  Pulse: (!) 113 (05/18/18 0835)  Resp: 20 (05/18/18 0835)  BP: 127/61 (05/18/18 0835)  SpO2: 98 % (05/18/18 0835) Vital Signs (24h Range):  Temp:  [98.2 °F (36.8 °C)-99.5 °F (37.5 °C)] 99.5 °F (37.5 °C)  Pulse:  [] 113  Resp:  [16-20] 20  SpO2:  [97 %-100 %] 98 %  BP: (103-136)/(52-70) 127/61     Height: 5' 6" (167.6 cm)  Weight: 77.7 kg (171 lb 4.8 oz)  Body mass index is 27.65 kg/m².      Intake/Output Summary (Last 24 hours) at 05/18/18 1249  Last data filed at 05/18/18 1100   Gross per 24 hour   Intake             2280 ml   Output                0 ml   Net             2280 ml       Physical Exam        Mental Status Exam  Unable to assess, patient asleep    Significant Labs:   Last 24 Hours:   Recent Lab Results       05/18/18  0548 05/17/18  1426      Immature Granulocytes CANCELED  Comment:  Result canceled by the ancillary      Immature Grans (Abs) CANCELED  Comment:  Mild elevation in immature granulocytes is non specific and   can be seen in a variety of conditions including stress response,   acute inflammation, trauma and pregnancy. Correlation with other   laboratory and clinical findings is essential.    Result canceled by the ancillary        Aniso Slight      Basophil% 0.0  Comment:  Corrected result; previously reported as 3.3 on " %DDDDDDDD% at %TTT%.[C]      Differential Method Manual      Eosinophil% 0.0      Gran% 0.0  Comment:  Corrected result; previously reported as 3.3 on %DDDDDDDD% at %TTT%.(L)[C]      Hematocrit 20.4(L)      Hemoglobin 7.6(L)      Lymph% 100.0  Comment:  Corrected result; previously reported as 66.7 on %DDDDDDDD% at %TTT%.(H)[C]      MCH 32.2(H)      MCHC 37.3(H)      MCV 86      Mono% 0.0  Comment:  Corrected result; previously reported as 10.0 on %DDDDDDDD% at %TTT%.(L)[C]      MPV 11.8      nRBC 0      Ovalocytes Occasional      Platelet Estimate Decreased(A)      Platelets 12  Comment:  WBC & PLT critical result(s) called and verbal readback obtained from   Laurie Wall RN, 05/18/2018 06:23  (LL)      Poik Slight      RBC 2.36(L)      RDW 14.9(H)      Retic 0.2(L)      Vancomycin-Trough  10.5     WBC 0.30  Comment:  WBC & PLT critical result(s) called and verbal readback obtained from   Laurie Wall RN, 05/18/2018 06:23  (LL)            Significant Imaging: None    Assessment/Plan:     Acute delirium    Patient without significant past psychiatric history has been exhibiting waxing and waning mental status, disorientation and paranoid delusions since being extubated on 5/9 consistent with delirium. Adjusted scheduled Risperdal M tabs to 1 mg qAM and 3 mg qHS (rather than 2 mg BID) to assist with disrupted sleep-wake cycle. Continue Zyprexa 5 mg PO/IM q8 PRN nonredirectable agitation. QTc on 5/16 467. Continue checking daily EKG to monitor QTc. Continue to monitor CBC for any worsening leukopenia/neutropenia with antipsychotic medications.  · DELIRIUM BEHAVIOR MANAGEMENT  · PLEASE utilize CHEMICAL restraints with PRN meds first for agitation. Minimize use of PHYSICAL restraints  · Keep window shades open and room lit during day and room dim at night in order to promote normal sleep-wake cycles  · Encourage family at bedside. [place] patient often to situation, location, date.  · Continue to Limit or  Discontinue use of Narcotics, Benzos and Anti-cholinergic (Avoid Benadryl) medications as they may worsen delirium.   · Continue medical workup for causative etiology of Delirium. Recommend CT head with and without contrast if possible to assess for any acute intracranial processes and repeat CXR to rule out additional infectious process. Recommend check amylase, lipase, B12, folate, HIV, RPR.               Total time:  15 with greater than 50% of this time spent in counseling and/or coordination of care.       Clair Taylor MD   Psychiatry  [facility]

## 2018-05-18 NOTE — PLAN OF CARE
Problem: Patient Care Overview  Goal: Plan of Care Review  Pt tolerated treatment session fair this morning.  Pt unwilling to participate in ambulation and limited to only standing up once during treatment session requiring max encouragement from therapist.  Pt continues to display flat affect and perseverates on subject of death throughout entirety of treatment session  Pt family not present during today's treatment session.  Pt educated on continuation of PT POC during pt hospitalization.  Pt will continue to benefit from skilled acute PT intervention to address the above listed impairments and progress functional mobility independence.      Yassine Andrea  5/18/2018

## 2018-05-18 NOTE — PROGRESS NOTES
Pt becoming very agitated and confused and difficult to redirect.  Pt is continuously disconnecting himself from his IV infusion and trying to deaccess his port.  Luba Hilario) at bedside as well as charge nurse Donavon Anthony RN.  Didier Mcginnis and Marshal notified of pt's behavior.  Zyprexa 5mg PO given at this time.  Will continue to monitor.

## 2018-05-18 NOTE — ASSESSMENT & PLAN NOTE
Patient without significant past psychiatric history has been exhibiting waxing and waning mental status, disorientation and paranoid delusions since being extubated on 5/9 consistent with delirium. Adjusted scheduled Risperdal M tabs to 1 mg qAM and 3 mg qHS (rather than 2 mg BID) to assist with disrupted sleep-wake cycle. Continue Zyprexa 5 mg PO/IM q8 PRN nonredirectable agitation. QTc on 5/16 467. Continue checking daily EKG to monitor QTc. Continue to monitor CBC for any worsening leukopenia/neutropenia with antipsychotic medications.  · DELIRIUM BEHAVIOR MANAGEMENT  · PLEASE utilize CHEMICAL restraints with PRN meds first for agitation. Minimize use of PHYSICAL restraints  · Keep window shades open and room lit during day and room dim at night in order to promote normal sleep-wake cycles  · Encourage family at bedside. [place] patient often to situation, location, date.  · Continue to Limit or Discontinue use of Narcotics, Benzos and Anti-cholinergic (Avoid Benadryl) medications as they may worsen delirium.   · Continue medical workup for causative etiology of Delirium. Recommend CT head with and without contrast if possible to assess for any acute intracranial processes and repeat CXR to rule out additional infectious process. Recommend check amylase, lipase, B12, folate, HIV, RPR.

## 2018-05-18 NOTE — ASSESSMENT & PLAN NOTE
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 On Intensification therapy II following [id number] (Arm A). He was admitted on 4/30/2018 for neutropenia/profund sepsis risk. Stepped up to the PICU for persistent fever >103, tachycardia, and hypotension that was been minimally responsive to fluid resuscitation. PICU stay was complicated by delirium. Currently CEC'd.      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 MRD negative after Induction I. Today is Day 29.  - Treating per COG [id number] (ARM A).  S/p Intensification I and Intensification II started.   - BM biopsy at start of Intensification shows CR.  FISH for t(8;21) negative. Will repeat BM biopsy pending count recovery and clinical improvement

## 2018-05-18 NOTE — SUBJECTIVE & OBJECTIVE
Interval History: VSS. Patient with increased agitation late PM, given Zyprexa x1.     Scheduled Meds:   acyclovir  400 mg Oral BID    ciprofloxacin HCl  500 mg Oral BID    melatonin  10 mg Oral QHS    posaconazole  300 mg Oral Daily    risperiDONE  1 mg Oral Daily    risperiDONE  3 mg Oral QHS    sulfamethoxazole-trimethoprim 800-160mg  1 tablet Oral twice daily on Friday, Saturday, Sunday    vancomycin (VANCOCIN) IVPB  1,250 mg Intravenous Q8H     Continuous Infusions:  PRN Meds:sodium chloride, acetaminophen, albuterol sulfate, alteplase, heparin, porcine (PF), lidocaine-prilocaine, morphine, olanzapine zydis, ondansetron, polyethylene glycol    Review of Systems   Constitutional: Negative for fever.   Psychiatric/Behavioral: Positive for agitation, confusion and hallucinations.     Objective:     Vital Signs (Most Recent):  Temp: 99.5 °F (37.5 °C) (05/18/18 0835)  Pulse: (!) 113 (05/18/18 0835)  Resp: 20 (05/18/18 0835)  BP: 127/61 (05/18/18 0835)  SpO2: 98 % (05/18/18 0835) Vital Signs (24h Range):  Temp:  [98.2 °F (36.8 °C)-99.5 °F (37.5 °C)] 99.5 °F (37.5 °C)  Pulse:  [] 113  Resp:  [16-20] 20  SpO2:  [97 %-100 %] 98 %  BP: (103-136)/(52-70) 127/61     Patient Vitals for the past 72 hrs (Last 3 readings):   Weight   05/17/18 2129 77.7 kg (171 lb 4.8 oz)   05/15/18 1945 77.9 kg (171 lb 11.8 oz)     Body mass index is 27.65 kg/m².    Intake/Output - Last 3 Shifts       05/16 0700 - 05/17 0659 05/17 0700 - 05/18 0659 05/18 0700 - 05/19 0659    P.O. 600 1020     I.V. (mL/kg) 62 (0.8) 80 (1)     Blood 625      IV Piggyback 500 1000     Total Intake(mL/kg) 1787 (22.9) 2100 (27)     Net +1787 +2100             Urine Occurrence 2 x 4 x     Stool Occurrence  2 x           Lines/Drains/Airways     Central Venous Catheter Line                 Port A Cath Double Lumen 05/10/18 0900 left subclavian 8 days                Physical Exam  Constitutional: He appears well-developed and well-nourished. Sleeping on  exam.   Head: Normocephalic and atraumatic.   Nose: Nose normal.   Mouth/Throat: Mucous membranes are normal.   Eyes: Conjunctivae and lids are normal.   Neck: Normal range of motion. Neck supple.   Cardiovascular: Normal rate and regular rhythm.    Pulmonary/Chest: Effort normal. No stridor. He has no decreased breath sounds. He has no wheezes. He has no rales.   Abdominal: Soft. Bowel sounds are normal. He exhibits no distension. There is no hepatosplenomegaly. There is no guarding.   Musculoskeletal: Normal range of motion. He exhibits no edema.   Skin: Skin is warm and dry. Capillary refill takes less than 2 seconds. No rash noted. He is not diaphoretic. No erythema. Port CDI.   Psychiatric: Still delirious.     Significant Labs:  Recent Results (from the past 24 hour(s))   VANCOMYCIN, TROUGH before 4th dose    Collection Time: 05/17/18  2:26 PM   Result Value Ref Range    Vancomycin-Trough 10.5 10.0 - 22.0 ug/mL   Reticulocytes    Collection Time: 05/18/18  5:48 AM   Result Value Ref Range    Retic 0.2 (L) 0.4 - 2.0 %   CBC auto differential    Collection Time: 05/18/18  5:48 AM   Result Value Ref Range    WBC 0.30 (LL) 3.90 - 12.70 K/uL    RBC 2.36 (L) 4.60 - 6.20 M/uL    Hemoglobin 7.6 (L) 14.0 - 18.0 g/dL    Hematocrit 20.4 (L) 40.0 - 54.0 %    MCV 86 82 - 98 fL    MCH 32.2 (H) 27.0 - 31.0 pg    MCHC 37.3 (H) 32.0 - 36.0 g/dL    RDW 14.9 (H) 11.5 - 14.5 %    Platelets 12 (LL) 150 - 350 K/uL    MPV 11.8 9.2 - 12.9 fL    Immature Granulocytes CANCELED 0.0 - 0.5 %    Immature Grans (Abs) CANCELED 0.00 - 0.04 K/uL    nRBC 0 0 /100 WBC    Gran% 0.0 (L) 38.0 - 73.0 %    Lymph% 100.0 (H) 18.0 - 48.0 %    Mono% 0.0 (L) 4.0 - 15.0 %    Eosinophil% 0.0 0.0 - 8.0 %    Basophil% 0.0 0.0 - 1.9 %    Platelet Estimate Decreased (A)     Aniso Slight     Poik Slight     Ovalocytes Occasional     Differential Method Manual        Significant Imaging:   none

## 2018-05-18 NOTE — PT/OT/SLP PROGRESS
"Physical Therapy Treatment    Patient Name:  Hema Kuo   MRN:  [id number]    Recommendations:     Discharge Recommendations:  home health PT   Discharge Equipment Recommendations: none   Barriers to discharge: None    Assessment:     Hema Kuo is a 17 y.o. male admitted with a medical diagnosis of Bacteremia.  He presents with the following impairments/functional limitations:  weakness, impaired endurance, impaired self care skills, impaired functional mobilty, gait instability, impaired balance, impaired cognition, decreased upper extremity function, decreased lower extremity function, decreased safety awareness.  Pt tolerated treatment session fair this morning.  Pt unwilling to participate in ambulation and limited to only standing up once during treatment session requiring max encouragement from therapist.  Pt continues to display flat affect and perseverates on subject of death throughout entirety of treatment session  Pt family not present during today's treatment session.  Pt educated on continuation of PT POC during pt hospitalization.  Pt will continue to benefit from skilled acute PT intervention to address the above listed impairments and progress functional mobility independence.      Rehab Prognosis:  Good; patient would benefit from acute skilled PT services to address these deficits and reach maximum level of function.      Recent Surgery: * No surgery found *      Plan:     During this hospitalization, patient to be seen 6 x/week to address the above listed problems via gait training, therapeutic activities, therapeutic exercises, neuromuscular re-education  · Plan of Care Expires:  06/09/18   Plan of Care Reviewed with: patient    Subjective     Communicated with JULIANN Luna prior to session.  Patient found awake with sitter present upon PT entry to room, agreeable to treatment.      Chief Complaint: Bactermia  Patient comments/goals: "I'm dead"  Pain/Comfort:  · Pain Rating 1: 0/10  · Pain Rating " Post-Intervention 1: 0/10    Patients cultural, spiritual, Muslim conflicts given the current situation: Patient has no barriers to learning. Patient verbalizes understanding of his/her program and goals and demonstrates them correctly. No cultural, spiritual or educational needs identified.    Objective:     Patient found with: central line, telemetry     General Precautions: Standard, fall   Orthopedic Precautions:N/A   Braces: N/A     Functional Mobility:  · Bed Mobility:     · Scooting: moderate assistance  · Supine to Sit: total assistance  · Sit to Supine: stand by assistance  · Transfers:     · Sit to Stand:  moderate assistance with no AD    AM-PAC 6 CLICK MOBILITY  Turning over in bed (including adjusting bedclothes, sheets and blankets)?: 4  Sitting down on and standing up from a chair with arms (e.g., wheelchair, bedside commode, etc.): 4  Moving from lying on back to sitting on the side of the bed?: 4  Moving to and from a bed to a chair (including a wheelchair)?: 3  Need to walk in hospital room?: 3  Climbing 3-5 steps with a railing?: 3  Total Score: 21       Therapeutic Activities and Exercises:   Therapist constantly re-orienting pt to situation throughout treatment session and educating on importance of mobility.  Pt not receptive to therapist education today and declines participation in all therapeutic activity.      Pt educates pt on PT POC.    Patient left supine with all lines intact, RN notified and sitter present..    GOALS:    Physical Therapy Goals        Problem: Physical Therapy Goal    Goal Priority Disciplines Outcome Goal Variances Interventions   Physical Therapy Goal     PT/OT, PT      Description:  Goals to be met by: 18     Patient will increase functional independence with mobility by performin. Supine to sit with [place] - Not met  2. Sit to supine with [place] - MET ()  3. Sit to stand transfer with [place] - Not met  4. Bed to chair transfer  with Stand-by Assistance using least restrictive AD - Not met  5. Gait  x 500 feet with Stand-by Assistance using least restrictive Ad - MET (5/17)  6. Lower extremity exercise program x30 reps per handout, with supervision - Not met                     Time Tracking:     PT Received On: 05/18/18  PT Start Time: 1015     PT Stop Time: 1038  PT Total Time (min): 23 min     Billable Minutes: Therapeutic Activity 23    Treatment Type: Treatment  PT/PTA: PT           Yassine Andrea, IVET   05/18/2018

## 2018-05-18 NOTE — ASSESSMENT & PLAN NOTE
Delirium: currently CEC  - Per Psych recs: 1mg Risperdal qAM and 3mg qHS  - Obtain RPR, follow up results  - Olanzapine PRN  - EKG complete on 5/14, 5/15, 5/16. QTc ranging from 437-441.   - Avoid Benadryl PRN per Psych  - Continue frequent reorientation and attempt to encourage patient to sleep.   - Psych following

## 2018-05-18 NOTE — RESIDENT HANDOFF
DOA: 4/30/2018  Stepped up to the PICU: 5/3/2018    18 y/o male with h/o  AML (t 8;21) s/p intensification therapy I per [id number] (Arm A), and now on intensification therapy II. He was admitted on Day 11(4/30/2018) of Instensification II therapy for febrile neutropenia and started on Cefepime and Vancomycin. He was stepped up to PICU for septic shock - after presenting with waxing mental status, hypotension unresponsive to NS bolus x 2 and 1 unit of blood, chest tightness w/ tachypnea, new oxygen requirement. He is currently intubated and sedated for worsening repsiratory distress with PaOw/Fi ratio concerning for ALI.      CNS: initially at neurological baseline in the PICU. On admission he complained of headache w/o other signs of meningitis, so there was only a low concern for meningitis. Did not get a Lumbar puncture. Prior to intubation he was complainaing of very mild throat pain and significant distress, unable to sleep overnight. While intubated required sedation with Precedex and Fentanyl. Following extubation however, he was noted to be confused and delirious. He was started on Seroquel nightly with no improvement in mental status. Seroquel was then increased in dose and smaller doses tried through the course of the day. Patient still noted to remain delirious and then noted to become increasingly delirious, more paranoid with bizarre fears and unable to sleep. He was note dto try to pull out lines and become agitated and attempt to jump out of bed while confused. PEC was then signed for him and he was switched to Olanzapine.     Plan:   Delirium  - Continue Seroquel 50mg PO Nightly  - Stop Seroquel 12.5mg PO at 0800 and 1400.  - Give one dose of 5mg of Olanzapine. May repeat once to help sleep.   - Will PEC at this point given patient is gravely disabled and unable to care for self. Will require sitter and someone to help redirect him.    - Continue frequent reorientation and attempt to encourage patient  to sleep.   - Stop and avoid deliriogenic drugs.  Sedation  - Now extubated. Awake and alert.  - Continue to monitor neuro/mental status  - Morphine 2mg IV q4h PRN  Fever  - Scheduled tylenol 650mg, IV, Q6H for fever. No NSAIDs given allergy.        CV: Hypotensive to 70s systolic and MAP <60 on floor despite 1L NS bolus x 2 and 1 units pRBC. On admission to the PICU another unit of blood and 2 units of platelets and 500ml NS were given. He was started on Epi 5/3 at 0.02mcg/kg/min for MAP goals >60. His Epi had to be titrated up to 0.04 to maintain pressures. He got an arterial line on 5/3. 5/6 Epi D/dallin and he was started on Norepi to help with the bronchodilation and HR. Through the course of his PICU stay, noted to have improving blood pressures and Norepinephrine drip weaned off. Towards the end of PICu stay, patient's PICC line noted to be very deep. At this point he was noted to have episode of SVT not resolving with vagal maneuvers or ice and he required a one time ose of adenosine. Subsequently PICC pulled and no further episodes of SVT.     Plan:    - s/p one dose of Adenosine.  - D/c epi 5/6  - Off Norepi.   - MAP goal >60.  - Vitals per protocol.       PULM: On admission CXR LLL infiltrates. He was on HFNC @ 15L for desaturations to the 80s. HF was increased to HFNC to 25 L following day. 5/4 AM he developed respiratory distress with increased work and increased O2 requirement to 25L. Albuterol and Duo-Neb back to back for diffuse wheezing and was increased to continuous albuterol. He also got Magnesium x1. Overnight decompensated, tried CPAP and BiPap but patient's anxiety worsened WOB and prn ativan did not help. Progressed to need for intubation 5/5/18, initially vol control but switched to pressure control for concern of ARDS vs atelectasis. 5/6 ARDS worsening and was started on steroids. He was started on a Lasix/Diuril drip on 5/6. Subsequently, drip stopped on 5/8. He was then started on Lasix IV  q6h which was eventually weaned to 20mg PO Daily. His clinical exam was noted to significantly improve through the course of PICU stay and OI improved drastically. CXR findings also suggested marked improvement with resolving ARDS and clearing of fluid from lungs. Following extubation He was noted to clinically improve and though initially on high flow nasal cannula, he was weaned to room air and noted to have reassuring saturations. When first intubated, he was started on Steroids and given marked improvement in condition, steroids were discontinued (without taper since on them for short duration).     Plan  Resolving ARDS and now stable on room air.  - Discontinue CPT since patient coughing.  - Discontinue Steroids  - Albuterol nebs q4 PRN.   - Stopped Lasix/Diuril drip (5/6 - 5/8)  - Lasix 40mg wean to PO daily and will decrease to 20mg PO on 5/13/2018.  - Daily CXR  - ABG - PRN.       FEN/GI: NPO on admission to PICU (5/3-5/6). He was started on custom fludis on 5/4 for low phos, low magnesium. He also got electrolytes repleted as needed. There was concern for fluid overload and the TFG was maintained at 100ml/hr (including meds and med flushes). He was started on NG/SUMP feeds on 5/6 at 10cc and increased to a goal of 50cc/hr. He was also given KPhos via the SUMP BID (5/5 - ) and Kcl PRN via the SUMP for replacement. He was initially noted to not tolerate feeds via SUMP with high residuals. Then started on Miralax and Senna with no improvement. He was then started on daily Reglan. Extubated around this time and following extubation, noted to have increased small volume bowel movements at which time Reglan, Senna and Miralax all discontinued. Slowly advancing diet now.     Plan:  - Clear liquids. Will transition to soft mechanical diet today.  - off IVF.  - TFG - 100ml/hr including meds.   - Famotidine 20mg, IV, BID for ppx  - Continue PRN zofran  - Replace electrolytes as needed       HEME/ONC:   Chemotherapy  induced neutropenia/thrombocytopenia: 4 units of pRBCs and 10 units of platelets since coming to the PICU. He was started on neupogen on 5/3/18 to be continued until the counts recover (>1500 ANC). Neupogen was increased to 600mcg 5/6 from 400 initially.   - Hgb goal >7.  - Plts goal >20.  - Daily CBC and reticulocyte   - Neupogen 600mcg daily. 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  CNS negative.  MRD negative after Induction I.    - Treating per COG [id number] (ARM A).  S/p Intensification I and Intensification II started. He was day 13 of intensification II as of 5/2/2018.  - BM biopsy at start of Intensification shows CR.  FISH for t(8;21) negative. Will repeat BM biopsy pending count recovery and clinical improvement  - Heme/Onc on consult/co-management.    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  - Continue acyclovir ppx  - Continue posaconazole 400mg BID therapeutic dose. Changed on day of stepping down to once daily (prophylactic dose).   - Continue bactrim QFSaSu ppx  - Continue peridex ppx - May have to stop this since he is intubated.      ID: When he was admitted he was on vanc and cefepime. Cefepime switched to meropenam on 5/4. BCx from 5/2 growing strep mitis, sensitivites pending. Amikacin was d/dallin 5/6.     Plan:  - BCx - Strep Mitis (sens pending) from 5/2, blood culture 5/3 NGTD  - Continue to follow previous cultures  - D/C Cefepime 5/4.   - D/C Amikacin 20mg/kg Q24H (5/4 - 5/6)  - Vancomycin 1500mg Q8H (5/3 - ) . Vanc trough 16.6 - therapeutic  - Meropenem 2g IV Q8H (5/4 - 5/11)  - Will switch back to Cefepime on day of step down to 2g q8h.       RENAL: Noted to have some transient rise in BUN when on high doses of Lasix but noted to come back to baseline. Creatinine stayed reassuring.   - Strict Is/Os - monitor for SIADH      SOCIAL: Mother at bedside. Informed of the plan.

## 2018-05-18 NOTE — PLAN OF CARE
05/18/18 1055   Discharge Reassessment   Assessment Type Discharge Planning Reassessment   Provided patient/caregiver education on the expected discharge date and the discharge plan Yes   Do you have any problems affording any of your prescribed medications? Yes   Discharge Plan A Home with family   Discharge Plan B [facility]   Patient choice form signed by patient/caregiver N/A   Can the patient answer the patient profile reliably? No, cognitively impaired   Describe the patient's ability to walk at the present time. No restrictions   How often would a person be available to care for the patient? Whenever needed   Number of comorbid conditions (as recorded on the chart) None   During the past month, has the patient often been bothered by feeling down, depressed or hopeless? No   During the past month, has the patient often been bothered by little interest or pleasure in doing things? No   Pt remains delirious, psych recommended changes in med dosing, sitter remains at bedside, pt still CEC'd and also neutropenic. Will follow for dc or transfer needs.

## 2018-05-18 NOTE — SUBJECTIVE & OBJECTIVE
"Interval History: see hospital course.     Family History     Problem Relation (Age of Onset)    Cancer Mother    Heart disease Mother    No Known Problems Father        Social History Main Topics    Smoking status: Former Smoker     Packs/day: 0.50     Types: Cigarettes     Quit date: 10/30/2017    Smokeless tobacco: Never Used    Alcohol use Yes      Comment: rare occasions    Drug use: No    Sexual activity: Yes     Partners: Female     Psychotherapeutics     Start     Stop Route Frequency Ordered    05/18/18 2100  risperiDONE disintegrating tablet 3 mg      -- Oral Nightly 05/18/18 0819    05/18/18 0900  risperiDONE disintegrating tablet 1 mg      -- Oral Daily 05/18/18 0819    05/15/18 0847  olanzapine zydis disintegrating tablet 5 mg      -- Oral As needed (PRN) 05/15/18 0848           Review of Systems    Objective:     Vital Signs (Most Recent):  Temp: 99.5 °F (37.5 °C) (05/18/18 0835)  Pulse: (!) 113 (05/18/18 0835)  Resp: 20 (05/18/18 0835)  BP: 127/61 (05/18/18 0835)  SpO2: 98 % (05/18/18 0835) Vital Signs (24h Range):  Temp:  [98.2 °F (36.8 °C)-99.5 °F (37.5 °C)] 99.5 °F (37.5 °C)  Pulse:  [] 113  Resp:  [16-20] 20  SpO2:  [97 %-100 %] 98 %  BP: (103-136)/(52-70) 127/61     Height: 5' 6" (167.6 cm)  Weight: 77.7 kg (171 lb 4.8 oz)  Body mass index is 27.65 kg/m².      Intake/Output Summary (Last 24 hours) at 05/18/18 1249  Last data filed at 05/18/18 1100   Gross per 24 hour   Intake             2280 ml   Output                0 ml   Net             2280 ml       Physical Exam        Mental Status Exam  Unable to assess, patient asleep    Significant Labs:   Last 24 Hours:   Recent Lab Results       05/18/18  0548 05/17/18  1426      Immature Granulocytes CANCELED  Comment:  Result canceled by the ancillary      Immature Grans (Abs) CANCELED  Comment:  Mild elevation in immature granulocytes is non specific and   can be seen in a variety of conditions including stress response,   acute " inflammation, trauma and pregnancy. Correlation with other   laboratory and clinical findings is essential.    Result canceled by the ancillary        Aniso Slight      Basophil% 0.0  Comment:  Corrected result; previously reported as 3.3 on %DDDDDDDD% at %TTT%.[C]      Differential Method Manual      Eosinophil% 0.0      Gran% 0.0  Comment:  Corrected result; previously reported as 3.3 on %DDDDDDDD% at %TTT%.(L)[C]      Hematocrit 20.4(L)      Hemoglobin 7.6(L)      Lymph% 100.0  Comment:  Corrected result; previously reported as 66.7 on %DDDDDDDD% at %TTT%.(H)[C]      MCH 32.2(H)      MCHC 37.3(H)      MCV 86      Mono% 0.0  Comment:  Corrected result; previously reported as 10.0 on %DDDDDDDD% at %TTT%.(L)[C]      MPV 11.8      nRBC 0      Ovalocytes Occasional      Platelet Estimate Decreased(A)      Platelets 12  Comment:  WBC & PLT critical result(s) called and verbal readback obtained from   Laurie Wall RN, 05/18/2018 06:23  (LL)      Kimmy Slight      RBC 2.36(L)      RDW 14.9(H)      Retic 0.2(L)      Vancomycin-Trough  10.5     WBC 0.30  Comment:  WBC & PLT critical result(s) called and verbal readback obtained from   Laurie Wall RN, 05/18/2018 06:23  (LL)            Significant Imaging: None

## 2018-05-18 NOTE — PROGRESS NOTES
"Mother returned to unit, many questions about hema's progress, how long psychosis will last.  Upon entering room Hema telling mother "I am alive!".  "I figured out what happened, I was just really confused".  Apologizing to his mother, hugging her, tearful. Telling mother how much he misses her and loves her.  Sitter reporting he ate all of his lunch, got up to bathroom, and has been watching tv.  "

## 2018-05-18 NOTE — PLAN OF CARE
"Problem: Patient Care Overview  Goal: Plan of Care Review  Outcome: Ongoing (interventions implemented as appropriate)  In bed, quiet and withdrawn, on and off sleep.  Does not initiate conversation, eye contact when spoken to but unsure of how much he is processing.  Difficult to get him to respond.  Able to follow simple commands.  Refusing to get up with PT after much coaxing, "don't need to walk, I'm dead".  Vancomycin q8hrs continues, day 14.  Will continue as long as neutropenic.  ANC 0.  No signs bleeding, h&h 7.6/20.4, rbc's 2.36.  Type and cross, RPR pending with AM labs.  Sitter at bedside, ordering trays for him.  No interest in eating, orange juice with meds and a few bites off breakfast try.  Rounds with Dr. Bucio this am.  No other changes.            "

## 2018-05-18 NOTE — ASSESSMENT & PLAN NOTE
Patient with Strep mitis sepsis. Bcx from 5/2 growing organism-repeat cultures NG.      - s/p Cefepime   - Continue Vancomycin 1250 g Q8h, vanc trough 10.5 on 5/17. Will plan to follow trough every 3 days.  - repeat Cx NGTD

## 2018-05-19 PROBLEM — J80 ACUTE RESPIRATORY DISTRESS SYNDROME (ARDS): Status: RESOLVED | Noted: 2018-05-12 | Resolved: 2018-05-19

## 2018-05-19 LAB
ABO + RH BLD: NORMAL
ALBUMIN SERPL BCP-MCNC: 2.6 G/DL
ALP SERPL-CCNC: 71 U/L
ALT SERPL W/O P-5'-P-CCNC: 81 U/L
ANION GAP SERPL CALC-SCNC: 10 MMOL/L
ANISOCYTOSIS BLD QL SMEAR: SLIGHT
AST SERPL-CCNC: 18 U/L
BASOPHILS # BLD AUTO: 0 K/UL
BASOPHILS NFR BLD: 0 %
BILIRUB SERPL-MCNC: 0.4 MG/DL
BLD GP AB SCN CELLS X3 SERPL QL: NORMAL
BLD PROD TYP BPU: NORMAL
BLOOD UNIT EXPIRATION DATE: NORMAL
BLOOD UNIT TYPE CODE: 6200
BLOOD UNIT TYPE: NORMAL
BUN SERPL-MCNC: 9 MG/DL
CALCIUM SERPL-MCNC: 8.5 MG/DL
CHLORIDE SERPL-SCNC: 106 MMOL/L
CO2 SERPL-SCNC: 23 MMOL/L
CODING SYSTEM: NORMAL
CREAT SERPL-MCNC: 0.6 MG/DL
DIFFERENTIAL METHOD: ABNORMAL
DISPENSE STATUS: NORMAL
EOSINOPHIL # BLD AUTO: 0 K/UL
EOSINOPHIL NFR BLD: 0 %
ERYTHROCYTE [DISTWIDTH] IN BLOOD BY AUTOMATED COUNT: 14.5 %
EST. GFR  (AFRICAN AMERICAN): >60 ML/MIN/1.73 M^2
EST. GFR  (NON AFRICAN AMERICAN): >60 ML/MIN/1.73 M^2
GLUCOSE SERPL-MCNC: 111 MG/DL
HCT VFR BLD AUTO: 20.9 %
HGB BLD-MCNC: 7.5 G/DL
HYPOCHROMIA BLD QL SMEAR: ABNORMAL
IMM GRANULOCYTES # BLD AUTO: 0 K/UL
IMM GRANULOCYTES NFR BLD AUTO: 0 %
LYMPHOCYTES # BLD AUTO: 0.2 K/UL
LYMPHOCYTES NFR BLD: 95.7 %
MAGNESIUM SERPL-MCNC: 1.6 MG/DL
MCH RBC QN AUTO: 31.3 PG
MCHC RBC AUTO-ENTMCNC: 35.9 G/DL
MCV RBC AUTO: 87 FL
MONOCYTES # BLD AUTO: 0 K/UL
MONOCYTES NFR BLD: 0 %
NEUTROPHILS # BLD AUTO: 0 K/UL
NEUTROPHILS NFR BLD: 4.3 %
NRBC BLD-RTO: 0 /100 WBC
NUM UNITS TRANS WBC-POOR PLATPHERESIS: NORMAL
OVALOCYTES BLD QL SMEAR: ABNORMAL
PHOSPHATE SERPL-MCNC: 3.8 MG/DL
PLATELET # BLD AUTO: 9 K/UL
PLATELET BLD QL SMEAR: ABNORMAL
PMV BLD AUTO: ABNORMAL FL
POIKILOCYTOSIS BLD QL SMEAR: SLIGHT
POLYCHROMASIA BLD QL SMEAR: ABNORMAL
POTASSIUM SERPL-SCNC: 3.3 MMOL/L
PROT SERPL-MCNC: 5.5 G/DL
RBC # BLD AUTO: 2.4 M/UL
RETICS/RBC NFR AUTO: 0.1 %
RPR SER QL: NORMAL
SODIUM SERPL-SCNC: 139 MMOL/L
WBC # BLD AUTO: 0.23 K/UL

## 2018-05-19 PROCEDURE — 84100 ASSAY OF PHOSPHORUS: CPT

## 2018-05-19 PROCEDURE — 86920 COMPATIBILITY TEST SPIN: CPT

## 2018-05-19 PROCEDURE — 25000003 PHARM REV CODE 250: Performed by: STUDENT IN AN ORGANIZED HEALTH CARE EDUCATION/TRAINING PROGRAM

## 2018-05-19 PROCEDURE — 87186 SC STD MICRODIL/AGAR DIL: CPT

## 2018-05-19 PROCEDURE — 86644 CMV ANTIBODY: CPT

## 2018-05-19 PROCEDURE — 63600175 PHARM REV CODE 636 W HCPCS: Performed by: STUDENT IN AN ORGANIZED HEALTH CARE EDUCATION/TRAINING PROGRAM

## 2018-05-19 PROCEDURE — 97116 GAIT TRAINING THERAPY: CPT

## 2018-05-19 PROCEDURE — P9037 PLATE PHERES LEUKOREDU IRRAD: HCPCS

## 2018-05-19 PROCEDURE — 83735 ASSAY OF MAGNESIUM: CPT

## 2018-05-19 PROCEDURE — 11300000 HC PEDIATRIC PRIVATE ROOM

## 2018-05-19 PROCEDURE — 25000003 PHARM REV CODE 250: Performed by: PSYCHIATRY & NEUROLOGY

## 2018-05-19 PROCEDURE — 99233 SBSQ HOSP IP/OBS HIGH 50: CPT | Mod: ,,, | Performed by: PEDIATRICS

## 2018-05-19 PROCEDURE — 36592 COLLECT BLOOD FROM PICC: CPT

## 2018-05-19 PROCEDURE — 86592 SYPHILIS TEST NON-TREP QUAL: CPT

## 2018-05-19 PROCEDURE — 63600175 PHARM REV CODE 636 W HCPCS: Performed by: PEDIATRICS

## 2018-05-19 PROCEDURE — 86901 BLOOD TYPING SEROLOGIC RH(D): CPT

## 2018-05-19 PROCEDURE — 97530 THERAPEUTIC ACTIVITIES: CPT

## 2018-05-19 PROCEDURE — 85045 AUTOMATED RETICULOCYTE COUNT: CPT

## 2018-05-19 PROCEDURE — 87040 BLOOD CULTURE FOR BACTERIA: CPT | Mod: 59

## 2018-05-19 PROCEDURE — 87077 CULTURE AEROBIC IDENTIFY: CPT

## 2018-05-19 PROCEDURE — 85025 COMPLETE CBC W/AUTO DIFF WBC: CPT

## 2018-05-19 PROCEDURE — 36415 COLL VENOUS BLD VENIPUNCTURE: CPT

## 2018-05-19 PROCEDURE — 80053 COMPREHEN METABOLIC PANEL: CPT

## 2018-05-19 RX ORDER — CEFEPIME HYDROCHLORIDE 1 G/50ML
2 INJECTION, SOLUTION INTRAVENOUS
Status: DISCONTINUED | OUTPATIENT
Start: 2018-05-19 | End: 2018-05-23

## 2018-05-19 RX ORDER — ACETAMINOPHEN 325 MG/1
650 TABLET ORAL ONCE
Status: COMPLETED | OUTPATIENT
Start: 2018-05-19 | End: 2018-05-19

## 2018-05-19 RX ADMIN — RISPERIDONE 3 MG: 1 TABLET, ORALLY DISINTEGRATING ORAL at 08:05

## 2018-05-19 RX ADMIN — RISPERIDONE 1 MG: 1 TABLET, ORALLY DISINTEGRATING ORAL at 10:05

## 2018-05-19 RX ADMIN — ACETAMINOPHEN, DIPHENHYDRAMINE HCL, PHENYLEPHRINE HCL 10 MG: 325; 25; 5 TABLET ORAL at 08:05

## 2018-05-19 RX ADMIN — ACYCLOVIR 400 MG: 200 CAPSULE ORAL at 08:05

## 2018-05-19 RX ADMIN — SULFAMETHOXAZOLE AND TRIMETHOPRIM 1 TABLET: 800; 160 TABLET ORAL at 08:05

## 2018-05-19 RX ADMIN — Medication 500 UNITS: at 06:05

## 2018-05-19 RX ADMIN — CIPROFLOXACIN HYDROCHLORIDE 500 MG: 500 TABLET, FILM COATED ORAL at 08:05

## 2018-05-19 RX ADMIN — Medication 1250 MG: at 09:05

## 2018-05-19 RX ADMIN — CIPROFLOXACIN HYDROCHLORIDE 500 MG: 500 TABLET, FILM COATED ORAL at 10:05

## 2018-05-19 RX ADMIN — ACETAMINOPHEN 650 MG: 325 TABLET ORAL at 10:05

## 2018-05-19 RX ADMIN — CEFEPIME HYDROCHLORIDE 2 G: 2 INJECTION, SOLUTION INTRAVENOUS at 10:05

## 2018-05-19 RX ADMIN — Medication 500 UNITS: at 04:05

## 2018-05-19 RX ADMIN — SULFAMETHOXAZOLE AND TRIMETHOPRIM 1 TABLET: 800; 160 TABLET ORAL at 10:05

## 2018-05-19 RX ADMIN — Medication 500 UNITS: at 10:05

## 2018-05-19 RX ADMIN — ACYCLOVIR 400 MG: 200 CAPSULE ORAL at 10:05

## 2018-05-19 RX ADMIN — Medication 1250 MG: at 06:05

## 2018-05-19 RX ADMIN — POSACONAZOLE 300 MG: 100 TABLET, COATED ORAL at 10:05

## 2018-05-19 RX ADMIN — Medication 1250 MG: at 02:05

## 2018-05-19 NOTE — PLAN OF CARE
Problem: Patient Care Overview  Goal: Plan of Care Review  Outcome: Ongoing (interventions implemented as appropriate)  Remained in room 403 until his familiar room repaired and temperature normalized.  Returned to room 445 this afternoon.  Remains on CEC status with sitter at bedside.   Blinds opened, TV kept on for stimulation.  Visitors at bedside, family members providing reassurance, ordering his lunch.  OT at bedside to try and get him to walk.  Refusing to walk, not wanting to participate.  Therapist attempting to get responses.  Delayed answers to simple requests.  Deliberate, logical answers to questions when he chooses to answer, confusion noted.  No talk of being dead, or being alive today.  Episode when he had to go to bathroom and unhooked platelet transfusion while infusing, losing some platelet volume on floor and bed.  Received more than 2/3 of total volume of 425ml based on volume left in bag.  Pre -transfusion platelet level 9,000.  Will recheck cbc in am.  ANC 9.9, neutropenic precautions maintained.  Vancomycin q8hrs continues.  Afebrile.  H&h 7.5/20.9.  No signs bleeding.  Tolerating regular diet with no nausea or vomiting.  Needs to be encouraged and reminded to eat.

## 2018-05-19 NOTE — ASSESSMENT & PLAN NOTE
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 On Intensification therapy II following [id number] (Arm A). He was admitted on 4/30/2018 for neutropenia/profund sepsis risk. Stepped up to the PICU for persistent fever >103, tachycardia, and hypotension that was been minimally responsive to fluid resuscitation. PICU stay was complicated by delirium. Currently CEC'd.      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 MRD negative after Induction I. Today is Day 30.  - Treating per COG [id number] (ARM A).  S/p Intensification I and Intensification II started.   - BM biopsy at start of Intensification shows CR.  FISH for t(8;21) negative. Will repeat BM biopsy pending count recovery and clinical improvement

## 2018-05-19 NOTE — PT/OT/SLP PROGRESS
"Physical Therapy Treatment    Patient Name:  Hema Kuo   MRN:  [id number]    Recommendations:     Discharge Recommendations:  home health PT   Discharge Equipment Recommendations: none   Barriers to discharge: None    Assessment:     Hema Kuo is a 17 y.o. male admitted with a medical diagnosis of Bacteremia.  He presents with the following impairments/functional limitations:  impaired endurance, impaired self care skills, gait instability, impaired cognition, decreased safety awareness. Patient demonstrated fair participation despite impaired cognition and fatigue. Level of assistance required mod I. Able to tolerate gait training x 70 feet and bed mobility. Recommending HHPT. Patient unable to answer questions 80% of the time throughout the therapy session and starring at PT with some nystagmus noted. Patient occasionally demonstrates correct and appropriate response when prompted. He was able to demonstrate correct reaction responses when wanting to perform a desired movement. 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 Skilled physical therapy is medically necessary to address the above impairments in order to return the patient back to their previous level of function.     PT attempted x 10 minutes sitting EOB with poor participation and patient stating, "I don't want to." Attempted playing connect 4 with patient, patient stated, "I guess you're going to have to play by yourself."      Rehab Prognosis:  good; patient would benefit from acute skilled PT services to address these deficits and reach maximum level of function.      Recent Surgery: * No surgery found *      Plan:     During this hospitalization, patient to be seen 6 x/week to address the above listed problems via gait training, therapeutic activities, therapeutic exercises, neuromuscular re-education  · Plan of Care Expires:  06/09/18   Plan of Care Reviewed with: patient    Subjective     Communicated with nurse Luna " "prior to/during session.  Patient found with HOB elevated in RSLing upon PT entry to room, agreeable to treatment.      Chief Complaint: "I don't want to listen to Yury Bennett"  Patient comments/goals: unable to state  Pain/Comfort:  · Pain Rating 1:  (patient unable to state)    Patients cultural, spiritual, Anglican conflicts given the current situation: none stated    Objective:     Patient found with: central line     General Precautions: Standard, fall   Orthopedic Precautions:N/A   Braces: N/A     Functional Mobility:  · Bed Mobility:   Rolling to the right: Boundary   Supine to Sit: Modified Boundary   Sit to Supine: Modified Boundary   Scooting to HOB: Modified Boundary   Scooting at EOB: Boundary    · Transfers:   Sit to Stand: Independent with No Assistive Device x 1 trial   Stand to Sit: Independent with No Assistive Device x 1 trial    · Gait:   Distance Ambulated: Pt ambulated x70 feet in hallway. Assistance level: Modified Boundary   Assistive Device used:  No Assistive Device , but with IV pole to push    Gait Pattern: 2-point gait      AM-PAC 6 CLICK MOBILITY  Turning over in bed (including adjusting bedclothes, sheets and blankets)?: 4  Sitting down on and standing up from a chair with arms (e.g., wheelchair, bedside commode, etc.): 4  Moving from lying on back to sitting on the side of the bed?: 4  Moving to and from a bed to a chair (including a wheelchair)?: 3  Need to walk in hospital room?: 3  Climbing 3-5 steps with a railing?: 3  Total Score: 21       Therapeutic Activities and Exercises:  PT arrived to patient's room to find patient resting quietly; agreeable to PT session. Patient performed mobility as above with non-skid socks in place.   PT attempted x 10 minutes sitting EOB with poor participation and patient stating, "I don't want to." Attempted playing connect 4 with patient, patient stated, "I guess you're going to have to play by yourself."      Patient " able to participate in fixing a cheeseburger with the appropriate selected toppings when handed the toppings. He was able to state that he did not want tomatoes on his burger. He was able with min A to open straw and place straw in beverage.     Placed patient's bed in chair position; patient lowered bed back down.    · Sitter/nursing Education:   · Importance of awake and alert during the day  · Windows open, lights on, TV on, noise increased during the day to increase stimulation  ·   Bedside table in front of patient and area set up for function, convenience, and safety. RN aware of patient's mobility needs and status. Questions/concerns addressed within PT scope of practice; patient and family with no further questions.       Patient left HOB elevated with all lines intact, call button in reach, nurse Cheryl notified and sitter present.    GOALS:    Physical Therapy Goals        Problem: Physical Therapy Goal    Goal Priority Disciplines Outcome Goal Variances Interventions   Physical Therapy Goal     PT/OT, PT Ongoing (interventions implemented as appropriate)     Description:  Goals to be met by: 18     Patient will increase functional independence with mobility by performin. Supine to sit with Reynolds - Not met  2. Sit to supine with Reynolds - MET ()  3. Sit to stand transfer with Reynolds - MET ()  4. Bed to chair transfer with Stand-by Assistance using least restrictive AD - Not met  5. Gait  x 500 feet with Stand-by Assistance using least restrictive Ad - MET ()  6. Lower extremity exercise program x30 reps per handout, with supervision - Not met                      Time Tracking:     PT Received On: 18  PT Start Time: 1430     PT Stop Time: 1508  PT Total Time (min): 38 min     Billable Minutes: Gait Training 10 and Therapeutic Activity 28    Treatment Type: Treatment  PT/PTA: PT           Gianna Jacobsen, PT  2018

## 2018-05-19 NOTE — NURSING
Pt moved rooms due to a/c malfunction. Sitter and RN accompanied pt during transfer, safety maintained, reoriented to new room.

## 2018-05-19 NOTE — PLAN OF CARE
"Problem: Patient Care Overview  Goal: Plan of Care Review  Outcome: Ongoing (interventions implemented as appropriate)  Pt stable overnight, VSS, afebrile. Pt reports mood as "better" but still flat affect noted, disoriented to date/time but otherwise oriented, apologized for "acting crazy before", positive reinforcement given and reassured that pt's safety and improvement back to baseline is goal, sitter remains at bedside, safety maintained this shift. L PAC remains patent, infusing KVO NS and receiving abx as scheduled, blood return noted, labs obtained and reviewed. Mother at bedside in evening, but did not stay overnight. Pt sleeping comfortably between care, no complaints, no distress. Updated pt on plan of care.      "

## 2018-05-19 NOTE — PROGRESS NOTES
[facility]  Pediatric Hematology Oncology  Progress Note    Patient Name: Hema Kuo  MRN: [id number]  Admission Date: 4/30/2018  Hospital Length of Stay: 19  Code Status: Prior   Primary Care Physician: Ann Reyna NP  Principal Problem: Bacteremia    Subjective:     HPI:  Hema Kuo is a 17 y.o. male with AML s/p Intensification I therapy following [id number] (Arm A) who is currently admitted for neutropenia. Patient with ANC of 0 following start of Intensification II therapy.  Hema reports that he has been feeling very well since last visit.  His neck swelling has resolved.  He reports no fevers, no abdominal pain, vomiting, diarrhea or constipation and no excessive bruising or unusual bleeding.  Good appetite and energy level.  No other complaints.     Hospital Course:  Patient admitted to the floor and noted to be comfortable. He reported that he received a platelet transfusion this am and is currently asymptomatic and doing well.    Scheduled Meds:   acyclovir  400 mg Oral BID    ciprofloxacin HCl  500 mg Oral BID    melatonin  10 mg Oral QHS    posaconazole  300 mg Oral Daily    risperiDONE  1 mg Oral Daily    risperiDONE  3 mg Oral QHS    sulfamethoxazole-trimethoprim 800-160mg  1 tablet Oral twice daily on Friday, Saturday, Sunday    vancomycin (VANCOCIN) IVPB  1,250 mg Intravenous Q8H     Continuous Infusions:  PRN Meds:sodium chloride, acetaminophen, albuterol sulfate, alteplase, heparin, porcine (PF), lidocaine-prilocaine, morphine, olanzapine zydis, ondansetron, polyethylene glycol    Interval History: NAEON, AF, VSS. Improving.    Scheduled Meds:   acyclovir  400 mg Oral BID    ciprofloxacin HCl  500 mg Oral BID    melatonin  10 mg Oral QHS    posaconazole  300 mg Oral Daily    risperiDONE  1 mg Oral Daily    risperiDONE  3 mg Oral QHS    sulfamethoxazole-trimethoprim 800-160mg  1 tablet Oral twice daily on Friday, Saturday, Sunday    vancomycin (VANCOCIN) IVPB  1,250  mg Intravenous Q8H     Continuous Infusions:  PRN Meds:sodium chloride, acetaminophen, albuterol sulfate, alteplase, heparin, porcine (PF), lidocaine-prilocaine, morphine, olanzapine zydis, ondansetron, polyethylene glycol    Review of Systems   HENT: Negative.    Gastrointestinal: Negative.    Genitourinary: Negative.    Skin: Negative.      Objective:     Vital Signs (Most Recent):  Temp: 100.2 °F (37.9 °C) (05/19/18 0411)  Pulse: 109 (05/19/18 0411)  Resp: 20 (05/19/18 0411)  BP: (!) 122/57 (05/19/18 0411)  SpO2: 97 % (05/19/18 0411) Vital Signs (24h Range):  Temp:  [98.3 °F (36.8 °C)-100.2 °F (37.9 °C)] 100.2 °F (37.9 °C)  Pulse:  [] 109  Resp:  [16-24] 20  SpO2:  [97 %-100 %] 97 %  BP: (102-133)/(51-65) 122/57     Patient Vitals for the past 72 hrs (Last 3 readings):   Weight   05/18/18 1935 78.4 kg (172 lb 13.5 oz)   05/17/18 2129 77.7 kg (171 lb 4.8 oz)     Body mass index is 27.9 kg/m².    Intake/Output - Last 3 Shifts       05/17 0700 - 05/18 0659 05/18 0700 - 05/19 0659    P.O. 1020 240    I.V. (mL/kg) 80 (1) 206 (2.6)    IV Piggyback 1000 750    Total Intake(mL/kg) 2100 (27) 1196 (15.3)    Net +2100 +1196          Urine Occurrence 4 x 4 x    Stool Occurrence 2 x           Lines/Drains/Airways     Central Venous Catheter Line                 Port A Cath Double Lumen 05/10/18 0900 left subclavian 8 days                Physical Exam   Constitutional: He appears well-developed and well-nourished. No distress.   Sleeping comfortably, easily awakened, alert and interactive, cooperative with exam. Answers simple questions about symptoms, etc...   HENT:   Head: Normocephalic and atraumatic.   Nose: Nose normal.   Mouth/Throat: Mucous membranes are normal.   Eyes: Conjunctivae and lids are normal.   Neck: Normal range of motion. Neck supple.   Cardiovascular: Normal rate, regular rhythm, normal heart sounds and intact distal pulses.    Pulmonary/Chest: Effort normal and breath sounds normal. No stridor. No  respiratory distress. He has no decreased breath sounds. He has no wheezes. He has no rales.   Abdominal: Soft. Bowel sounds are normal. He exhibits no distension. There is no hepatosplenomegaly. There is no guarding.   Genitourinary:   Genitourinary Comments: Deferred     Musculoskeletal: Normal range of motion. He exhibits no edema.   Neurological: He is alert.   Skin: Skin is warm and dry. Capillary refill takes less than 2 seconds. No rash noted. He is not diaphoretic. No erythema.   Port CDI   Nursing note and vitals reviewed.      Significant Labs:  No results for input(s): POCTGLUCOSE in the last 48 hours.    Recent Lab Results       05/19/18  0425      Immature Granulocytes 0.0     Immature Grans (Abs) 0.00  Comment:  Mild elevation in immature granulocytes is non specific and   can be seen in a variety of conditions including stress response,   acute inflammation, trauma and pregnancy. Correlation with other   laboratory and clinical findings is essential.       Albumin 2.6(L)     Alkaline Phosphatase 71     ALT 81(H)     Anion Gap 10     Aniso Slight     AST 18     Baso # 0.00     Basophil% 0.0     Total Bilirubin 0.4  Comment:  For infants and newborns, interpretation of results should be based  on gestational age, weight and in agreement with clinical  observations.  Premature Infant recommended reference ranges:  Up to 24 hours.............<8.0 mg/dL  Up to 48 hours............<12.0 mg/dL  3-5 days..................<15.0 mg/dL  6-29 days.................<15.0 mg/dL       BUN, Bld 9     Calcium 8.5(L)     Chloride 106     CO2 23     Creatinine 0.6     Differential Method Automated     eGFR if African American >60.0     eGFR if non  >60.0  Comment:  Calculation used to obtain the estimated glomerular filtration  rate (eGFR) is the CKD-EPI equation.        Eos # 0.0     Eosinophil% 0.0     Glucose 111(H)     Gran # (ANC) 0.0(L)     Gran% 4.3(L)     Group & Rh AB POS     Hematocrit 20.9(L)      Hemoglobin 7.5(L)     Hypo Occasional     INDIRECT LARRY NEG     Lymph # 0.2(L)     Lymph% 95.7(H)     Magnesium 1.6     MCH 31.3(H)     MCHC 35.9     MCV 87     Mono # 0.0(L)     Mono% 0.0(L)     MPV SEE COMMENT  Comment:  Result not available.     nRBC 0     Ovalocytes CANCELED  Comment:  Result canceled by the ancillary     Phosphorus 3.8     Platelet Estimate Decreased(A)     Platelets 9  Comment:  PLT critical result(s) called and verbal readback obtained from   ANGEL LUIS ROSS RN, 05/19/2018 05:36  (LL)     Poik Slight     Poly Occasional     Potassium 3.3(L)     Total Protein 5.5(L)     RBC 2.40(L)     RDW 14.5     Retic 0.1(L)     Sodium 139     WBC 0.23  Comment:  wbc  critical result(s) called and verbal readback obtained from   madeline ross rn prelim plt given , 05/19/2018 05:00  (LL)           Significant Imaging: CT: No results found in the last 24 hours.  CXR: No results found in the last 24 hours.  U/S: No results found in the last 24 hours.    Assessment/Plan:     Neuro   Acute delirium    Delirium: currently CEC  - Per Psych recs: 1mg Risperdal qAM and 3mg qHS  - f/u RPR  - Olanzapine PRN  - EKG complete on 5/14, 5/15, 5/16. QTc ranging from 437-441.   - Avoid Benadryl per Psych  - Continue frequent reorientation and attempt to encourage patient to sleep.   - Psych following        ID   * Bacteremia    Patient with Strep mitis sepsis. Bcx from 5/2 growing organism-repeat cultures NG.      - s/p Cefepime   - Continue Vancomycin 1250 g Q8h, vanc trough 10.5 on 5/17. Will plan to follow trough every 3 days.  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   - Continue bactrim QFSaSu ppx  - Continue peridex ppx   -Continue home Cipro 500mg BID  - culture and start abx if febrile        Oncology   Neutropenia    - Continue soft mechanical  diet  - Continue PRN zofran        AML (acute myeloblastic leukemia)    Hema is a 17 yr young man with AML (t 8;21) here for chemotherapy. On Intensification therapy II following [id number] (Arm A). He was admitted on 4/30/2018 for neutropenia/profund sepsis risk. Stepped up to the PICU for persistent fever >103, tachycardia, and hypotension that was been minimally responsive to fluid resuscitation. PICU stay was complicated by delirium. Currently CEC'd.      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 MRD negative after Induction I. Today is Day 30.  - Treating per COG [id number] (ARM A).  S/p Intensification I and Intensification II started.   - BM biopsy at start of Intensification shows CR.  FISH for t(8;21) negative. Will repeat BM biopsy pending count recovery and clinical improvement               Antineoplastic chemotherapy induced pancytopenia    Chemotherapy induced neutropenia:   - Hgb goal >7.  - Plts goal >10.  - Daily CBC and reticulocyte  - CMP EOD  - If bleeding at all, please transfuse platelets x1 unit. No need to check CBC, evaluate clinically.                     Anticipated Disposition: Home or Self Care    Victoria Araya MD  Pediatric Hospital Medicine   [facility]

## 2018-05-19 NOTE — ASSESSMENT & PLAN NOTE
Chemotherapy induced neutropenia:   - Hgb goal >7.  - Plts goal >10.  - Daily CBC and reticulocyte  - CMP EOD  - If bleeding at all, please transfuse platelets x1 unit. No need to check CBC, evaluate clinically.

## 2018-05-19 NOTE — PLAN OF CARE
Problem: Physical Therapy Goal  Goal: Physical Therapy Goal  Goals to be met by: 18     Patient will increase functional independence with mobility by performin. Supine to sit with [place] - Not met  2. Sit to supine with [place] - MET ()  3. Sit to stand transfer with [place] - MET ()  4. Bed to chair transfer with Stand-by Assistance using least restrictive AD - Not met  5. Gait  x 500 feet with Stand-by Assistance using least restrictive Ad - MET ()  6. Lower extremity exercise program x30 reps per handout, with supervision - Not met    Outcome: Ongoing (interventions implemented as appropriate)  Goals updated and appropriate to address patient's current needs.     Gianna Jacobsen PT, DPT  2018  [phone]

## 2018-05-19 NOTE — SUBJECTIVE & OBJECTIVE
Interval History: NAEON, AF, VSS. Improving.    Scheduled Meds:   acyclovir  400 mg Oral BID    ciprofloxacin HCl  500 mg Oral BID    melatonin  10 mg Oral QHS    posaconazole  300 mg Oral Daily    risperiDONE  1 mg Oral Daily    risperiDONE  3 mg Oral QHS    sulfamethoxazole-trimethoprim 800-160mg  1 tablet Oral twice daily on Friday, Saturday, Sunday    vancomycin (VANCOCIN) IVPB  1,250 mg Intravenous Q8H     Continuous Infusions:  PRN Meds:sodium chloride, acetaminophen, albuterol sulfate, alteplase, heparin, porcine (PF), lidocaine-prilocaine, morphine, olanzapine zydis, ondansetron, polyethylene glycol    Review of Systems   HENT: Negative.    Gastrointestinal: Negative.    Genitourinary: Negative.    Skin: Negative.      Objective:     Vital Signs (Most Recent):  Temp: 100.2 °F (37.9 °C) (05/19/18 0411)  Pulse: 109 (05/19/18 0411)  Resp: 20 (05/19/18 0411)  BP: (!) 122/57 (05/19/18 0411)  SpO2: 97 % (05/19/18 0411) Vital Signs (24h Range):  Temp:  [98.3 °F (36.8 °C)-100.2 °F (37.9 °C)] 100.2 °F (37.9 °C)  Pulse:  [] 109  Resp:  [16-24] 20  SpO2:  [97 %-100 %] 97 %  BP: (102-133)/(51-65) 122/57     Patient Vitals for the past 72 hrs (Last 3 readings):   Weight   05/18/18 1935 78.4 kg (172 lb 13.5 oz)   05/17/18 2129 77.7 kg (171 lb 4.8 oz)     Body mass index is 27.9 kg/m².    Intake/Output - Last 3 Shifts       05/17 0700 - 05/18 0659 05/18 0700 - 05/19 0659    P.O. 1020 240    I.V. (mL/kg) 80 (1) 206 (2.6)    IV Piggyback 1000 750    Total Intake(mL/kg) 2100 (27) 1196 (15.3)    Net +2100 +1196          Urine Occurrence 4 x 4 x    Stool Occurrence 2 x           Lines/Drains/Airways     Central Venous Catheter Line                 Port A Cath Double Lumen 05/10/18 0900 left subclavian 8 days                Physical Exam   Constitutional: He appears well-developed and well-nourished. No distress.   Sleeping comfortably, easily awakened, alert and interactive, cooperative with exam. Answers simple  questions about symptoms, etc...   HENT:   Head: Normocephalic and atraumatic.   Nose: Nose normal.   Mouth/Throat: Mucous membranes are normal.   Eyes: Conjunctivae and lids are normal.   Neck: Normal range of motion. Neck supple.   Cardiovascular: Normal rate, regular rhythm, normal heart sounds and intact distal pulses.    Pulmonary/Chest: Effort normal and breath sounds normal. No stridor. No respiratory distress. He has no decreased breath sounds. He has no wheezes. He has no rales.   Abdominal: Soft. Bowel sounds are normal. He exhibits no distension. There is no hepatosplenomegaly. There is no guarding.   Genitourinary:   Genitourinary Comments: Deferred     Musculoskeletal: Normal range of motion. He exhibits no edema.   Neurological: He is alert.   Skin: Skin is warm and dry. Capillary refill takes less than 2 seconds. No rash noted. He is not diaphoretic. No erythema.   Port CDI   Nursing note and vitals reviewed.      Significant Labs:  No results for input(s): POCTGLUCOSE in the last 48 hours.    Recent Lab Results       05/19/18  0425      Immature Granulocytes 0.0     Immature Grans (Abs) 0.00  Comment:  Mild elevation in immature granulocytes is non specific and   can be seen in a variety of conditions including stress response,   acute inflammation, trauma and pregnancy. Correlation with other   laboratory and clinical findings is essential.       Albumin 2.6(L)     Alkaline Phosphatase 71     ALT 81(H)     Anion Gap 10     Aniso Slight     AST 18     Baso # 0.00     Basophil% 0.0     Total Bilirubin 0.4  Comment:  For infants and newborns, interpretation of results should be based  on gestational age, weight and in agreement with clinical  observations.  Premature Infant recommended reference ranges:  Up to 24 hours.............<8.0 mg/dL  Up to 48 hours............<12.0 mg/dL  3-5 days..................<15.0 mg/dL  6-29 days.................<15.0 mg/dL       BUN, Bld 9     Calcium 8.5(L)      Chloride 106     CO2 23     Creatinine 0.6     Differential Method Automated     eGFR if African American >60.0     eGFR if non  >60.0  Comment:  Calculation used to obtain the estimated glomerular filtration  rate (eGFR) is the CKD-EPI equation.        Eos # 0.0     Eosinophil% 0.0     Glucose 111(H)     Gran # (ANC) 0.0(L)     Gran% 4.3(L)     Group & Rh AB POS     Hematocrit 20.9(L)     Hemoglobin 7.5(L)     Hypo Occasional     INDIRECT LARRY NEG     Lymph # 0.2(L)     Lymph% 95.7(H)     Magnesium 1.6     MCH 31.3(H)     MCHC 35.9     MCV 87     Mono # 0.0(L)     Mono% 0.0(L)     MPV SEE COMMENT  Comment:  Result not available.     nRBC 0     Ovalocytes CANCELED  Comment:  Result canceled by the ancillary     Phosphorus 3.8     Platelet Estimate Decreased(A)     Platelets 9  Comment:  PLT critical result(s) called and verbal readback obtained from   ANGEL LUIS ROSS RN, 05/19/2018 05:36  (LL)     Poik Slight     Poly Occasional     Potassium 3.3(L)     Total Protein 5.5(L)     RBC 2.40(L)     RDW 14.5     Retic 0.1(L)     Sodium 139     WBC 0.23  Comment:  wbc  critical result(s) called and verbal readback obtained from   madeline ross rn prelim plt given , 05/19/2018 05:00  (LL)           Significant Imaging: CT: No results found in the last 24 hours.  CXR: No results found in the last 24 hours.  U/S: No results found in the last 24 hours.

## 2018-05-19 NOTE — ASSESSMENT & PLAN NOTE
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   - Continue bactrim QFSaSu ppx  - Continue peridex ppx   -Continue home Cipro 500mg BID  - culture and start abx if febrile

## 2018-05-19 NOTE — ASSESSMENT & PLAN NOTE
Delirium: currently CEC  - Per Psych recs: 1mg Risperdal qAM and 3mg qHS  - f/u RPR  - Olanzapine PRN  - EKG complete on 5/14, 5/15, 5/16. QTc ranging from 437-441.   - Avoid Benadryl per Psych  - Continue frequent reorientation and attempt to encourage patient to sleep.   - Psych following

## 2018-05-20 LAB
BASOPHILS # BLD AUTO: 0 K/UL
BASOPHILS NFR BLD: 0 %
BLD PROD TYP BPU: NORMAL
BLD PROD TYP BPU: NORMAL
BLOOD UNIT EXPIRATION DATE: NORMAL
BLOOD UNIT EXPIRATION DATE: NORMAL
BLOOD UNIT TYPE CODE: 8400
BLOOD UNIT TYPE CODE: 8400
BLOOD UNIT TYPE: NORMAL
BLOOD UNIT TYPE: NORMAL
CODING SYSTEM: NORMAL
CODING SYSTEM: NORMAL
DIFFERENTIAL METHOD: ABNORMAL
DISPENSE STATUS: NORMAL
DISPENSE STATUS: NORMAL
EOSINOPHIL # BLD AUTO: 0 K/UL
EOSINOPHIL NFR BLD: 0 %
ERYTHROCYTE [DISTWIDTH] IN BLOOD BY AUTOMATED COUNT: 14.4 %
HCT VFR BLD AUTO: 18.7 %
HGB BLD-MCNC: 6.6 G/DL
IMM GRANULOCYTES # BLD AUTO: 0 K/UL
IMM GRANULOCYTES NFR BLD AUTO: 0 %
LYMPHOCYTES # BLD AUTO: 0.2 K/UL
LYMPHOCYTES NFR BLD: 88.9 %
MCH RBC QN AUTO: 31.3 PG
MCHC RBC AUTO-ENTMCNC: 35.3 G/DL
MCV RBC AUTO: 89 FL
MONOCYTES # BLD AUTO: 0 K/UL
MONOCYTES NFR BLD: 5.6 %
NEUTROPHILS # BLD AUTO: 0 K/UL
NEUTROPHILS NFR BLD: 5.5 %
NRBC BLD-RTO: 0 /100 WBC
NUM UNITS TRANS PACKED RBC: NORMAL
NUM UNITS TRANS PACKED RBC: NORMAL
PLATELET # BLD AUTO: 14 K/UL
PLATELET BLD QL SMEAR: ABNORMAL
PMV BLD AUTO: 13 FL
RBC # BLD AUTO: 2.11 M/UL
RETICS/RBC NFR AUTO: 0 %
VANCOMYCIN SERPL-MCNC: 14.3 UG/ML
WBC # BLD AUTO: 0.18 K/UL

## 2018-05-20 PROCEDURE — 86644 CMV ANTIBODY: CPT

## 2018-05-20 PROCEDURE — 80202 ASSAY OF VANCOMYCIN: CPT

## 2018-05-20 PROCEDURE — 36592 COLLECT BLOOD FROM PICC: CPT

## 2018-05-20 PROCEDURE — 27201040 HC RC 50 FILTER

## 2018-05-20 PROCEDURE — P9038 RBC IRRADIATED: HCPCS

## 2018-05-20 PROCEDURE — 99233 SBSQ HOSP IP/OBS HIGH 50: CPT | Mod: ,,, | Performed by: PEDIATRICS

## 2018-05-20 PROCEDURE — 36430 TRANSFUSION BLD/BLD COMPNT: CPT

## 2018-05-20 PROCEDURE — 25000003 PHARM REV CODE 250: Performed by: STUDENT IN AN ORGANIZED HEALTH CARE EDUCATION/TRAINING PROGRAM

## 2018-05-20 PROCEDURE — 85025 COMPLETE CBC W/AUTO DIFF WBC: CPT

## 2018-05-20 PROCEDURE — 63600175 PHARM REV CODE 636 W HCPCS: Performed by: PEDIATRICS

## 2018-05-20 PROCEDURE — 85045 AUTOMATED RETICULOCYTE COUNT: CPT

## 2018-05-20 PROCEDURE — 63600175 PHARM REV CODE 636 W HCPCS: Performed by: STUDENT IN AN ORGANIZED HEALTH CARE EDUCATION/TRAINING PROGRAM

## 2018-05-20 PROCEDURE — 87040 BLOOD CULTURE FOR BACTERIA: CPT | Mod: 59

## 2018-05-20 PROCEDURE — 25000003 PHARM REV CODE 250: Performed by: PSYCHIATRY & NEUROLOGY

## 2018-05-20 PROCEDURE — 11300000 HC PEDIATRIC PRIVATE ROOM

## 2018-05-20 PROCEDURE — 36415 COLL VENOUS BLD VENIPUNCTURE: CPT

## 2018-05-20 RX ORDER — CETIRIZINE HYDROCHLORIDE 5 MG/1
5 TABLET ORAL ONCE
Status: DISCONTINUED | OUTPATIENT
Start: 2018-05-20 | End: 2018-05-20

## 2018-05-20 RX ORDER — CETIRIZINE HYDROCHLORIDE 5 MG/5ML
5 SOLUTION ORAL ONCE
Status: DISCONTINUED | OUTPATIENT
Start: 2018-05-20 | End: 2018-05-20

## 2018-05-20 RX ORDER — ACETAMINOPHEN 325 MG/1
650 TABLET ORAL ONCE
Status: COMPLETED | OUTPATIENT
Start: 2018-05-20 | End: 2018-05-20

## 2018-05-20 RX ADMIN — SULFAMETHOXAZOLE AND TRIMETHOPRIM 1 TABLET: 800; 160 TABLET ORAL at 09:05

## 2018-05-20 RX ADMIN — SULFAMETHOXAZOLE AND TRIMETHOPRIM 1 TABLET: 800; 160 TABLET ORAL at 08:05

## 2018-05-20 RX ADMIN — ACETAMINOPHEN 650 MG: 325 TABLET, FILM COATED ORAL at 06:05

## 2018-05-20 RX ADMIN — ACETAMINOPHEN, DIPHENHYDRAMINE HCL, PHENYLEPHRINE HCL 10 MG: 325; 25; 5 TABLET ORAL at 09:05

## 2018-05-20 RX ADMIN — CEFEPIME HYDROCHLORIDE 2 G: 2 INJECTION, SOLUTION INTRAVENOUS at 11:05

## 2018-05-20 RX ADMIN — CEFEPIME HYDROCHLORIDE 2 G: 2 INJECTION, SOLUTION INTRAVENOUS at 02:05

## 2018-05-20 RX ADMIN — POSACONAZOLE 300 MG: 100 TABLET, COATED ORAL at 08:05

## 2018-05-20 RX ADMIN — Medication 500 UNITS: at 03:05

## 2018-05-20 RX ADMIN — ACYCLOVIR 400 MG: 200 CAPSULE ORAL at 09:05

## 2018-05-20 RX ADMIN — Medication 1250 MG: at 10:05

## 2018-05-20 RX ADMIN — Medication 500 UNITS: at 05:05

## 2018-05-20 RX ADMIN — ACETAMINOPHEN 650 MG: 325 TABLET ORAL at 09:05

## 2018-05-20 RX ADMIN — ACETAMINOPHEN 650 MG: 325 TABLET, FILM COATED ORAL at 08:05

## 2018-05-20 RX ADMIN — Medication 500 UNITS: at 09:05

## 2018-05-20 RX ADMIN — Medication 1250 MG: at 06:05

## 2018-05-20 RX ADMIN — ACETAMINOPHEN 650 MG: 325 TABLET, FILM COATED ORAL at 09:05

## 2018-05-20 RX ADMIN — Medication 1250 MG: at 02:05

## 2018-05-20 RX ADMIN — RISPERIDONE 3 MG: 1 TABLET, ORALLY DISINTEGRATING ORAL at 09:05

## 2018-05-20 RX ADMIN — ACYCLOVIR 400 MG: 200 CAPSULE ORAL at 08:05

## 2018-05-20 RX ADMIN — RISPERIDONE 1 MG: 1 TABLET, ORALLY DISINTEGRATING ORAL at 08:05

## 2018-05-20 RX ADMIN — CEFEPIME HYDROCHLORIDE 2 G: 2 INJECTION, SOLUTION INTRAVENOUS at 06:05

## 2018-05-20 NOTE — NURSING
"Pt noted to be febrile, 100.9. Blood cx obtained from inner & outer PACs, tylenol x1 given. Dr. Shore at bedside to assess pt, no acute distress, but pt noted to be sleepy, slow responses, c/o general feeling "sick". Dr. Bucio aware, will consider adding abx.   "

## 2018-05-20 NOTE — PROGRESS NOTES
05/20/18 0610   Vital Signs   Temp (!) 100.7 °F (38.2 °C)   Temp src Oral   Dr. Shore notified of fever, no new orders. Tylenol administered per order.

## 2018-05-20 NOTE — PLAN OF CARE
"Problem: Patient Care Overview  Goal: Plan of Care Review  Outcome: Ongoing (interventions implemented as appropriate)  WBC 0.18. ANC 10 H/H 6.6/18.7. Transfuseed with 2 U PRBC's and tolerated well. Plts 14. Pt attentive and cooperative. Updated on plan of care today. Oriented to person, place and time of day. Afebrile. Remains on Cefepime and Vanc. Heplocked between. Moves independently and up to restroom to use toilet. Good UOP. No BM today. Diet advanced to regular and ate frosted flakes, pizza, and burger without difficulty. Reminded throughout shift to not detach IV line. Pt changed clothes and detached tubing from PAC tubing. Tubing discarded and replaced. Reviewed infection control. Sitter at BS. Other than detachment of tubing,  Pt with no act or verbalization of harm to self. Pt did sit up when replacement sitter entered room.   Hema  remembered and called sitter by name and ststed, "you talking to your food." Mom went home to  today and plans to return tomorrow. Mom expresses she wants pt to come off of PEC so he can have friends visit, use phone.       "

## 2018-05-20 NOTE — PLAN OF CARE
Problem: Patient Care Overview  Goal: Plan of Care Review  Outcome: Ongoing (interventions implemented as appropriate)  Pt stable overnight, Tmax 100.9, relieved with tylenol. Pt noted to be fully oriented in evening, slightly withdrawn and flat affect from baseline noted. Groggy and delayed responses once pt fell asleep, easily arousable, no distress noted. Pt refused supper, only drank Dr. Pepper in evening, reports adequate UOP (unmeasured), no BM reported, weight stable. L PAC remains patent, receiving abx as scheduled, pt made no attempts to pull at tubing/lumens or turn off pump during infusion, RN and mother continue to reinforce CVL safety/infection prevention with patient and he verbalized understanding. CEC remains in effect, sitter remains at bedside, safety precautions maintained. Labs to be drawn this morning, including vanc trough at 0600. Mother and step-father visited in evening, updated on plan of care, pleased about pt's continued progress toward baseline mood/personality. Mother eager to introduce more 'normalcy' to patient. Discussed and provided written info to patient about sepsis and delirium to further understand diagnosis and plan of care.

## 2018-05-21 LAB
ALBUMIN SERPL BCP-MCNC: 2.5 G/DL
ALP SERPL-CCNC: 64 U/L
ALT SERPL W/O P-5'-P-CCNC: 45 U/L
ANION GAP SERPL CALC-SCNC: 8 MMOL/L
ANISOCYTOSIS BLD QL SMEAR: SLIGHT
AST SERPL-CCNC: 10 U/L
BASOPHILS # BLD AUTO: 0 K/UL
BASOPHILS NFR BLD: 0 %
BILIRUB SERPL-MCNC: 0.7 MG/DL
BLD PROD TYP BPU: NORMAL
BLOOD UNIT EXPIRATION DATE: NORMAL
BLOOD UNIT TYPE CODE: 8400
BLOOD UNIT TYPE: NORMAL
BUN SERPL-MCNC: 7 MG/DL
CALCIUM SERPL-MCNC: 8.4 MG/DL
CHLORIDE SERPL-SCNC: 105 MMOL/L
CO2 SERPL-SCNC: 24 MMOL/L
CODING SYSTEM: NORMAL
CREAT SERPL-MCNC: 0.6 MG/DL
DIFFERENTIAL METHOD: ABNORMAL
DISPENSE STATUS: NORMAL
EOSINOPHIL # BLD AUTO: 0 K/UL
EOSINOPHIL NFR BLD: 0 %
ERYTHROCYTE [DISTWIDTH] IN BLOOD BY AUTOMATED COUNT: 14 %
EST. GFR  (AFRICAN AMERICAN): >60 ML/MIN/1.73 M^2
EST. GFR  (NON AFRICAN AMERICAN): >60 ML/MIN/1.73 M^2
GLUCOSE SERPL-MCNC: 102 MG/DL
HCT VFR BLD AUTO: 23.8 %
HGB BLD-MCNC: 8.5 G/DL
IMM GRANULOCYTES # BLD AUTO: 0 K/UL
IMM GRANULOCYTES NFR BLD AUTO: 0 %
LYMPHOCYTES # BLD AUTO: 0.2 K/UL
LYMPHOCYTES NFR BLD: 87 %
MAGNESIUM SERPL-MCNC: 1.7 MG/DL
MCH RBC QN AUTO: 30.9 PG
MCHC RBC AUTO-ENTMCNC: 35.7 G/DL
MCV RBC AUTO: 87 FL
MONOCYTES # BLD AUTO: 0 K/UL
MONOCYTES NFR BLD: 4.3 %
NEUTROPHILS # BLD AUTO: 0 K/UL
NEUTROPHILS NFR BLD: 8.7 %
NRBC BLD-RTO: 0 /100 WBC
NUM UNITS TRANS WBC-POOR PLATPHERESIS: NORMAL
PHOSPHATE SERPL-MCNC: 4.4 MG/DL
PLATELET # BLD AUTO: 9 K/UL
PLATELET BLD QL SMEAR: ABNORMAL
PMV BLD AUTO: 11.5 FL
POTASSIUM SERPL-SCNC: 3.4 MMOL/L
PROT SERPL-MCNC: 5.4 G/DL
RBC # BLD AUTO: 2.75 M/UL
RETICS/RBC NFR AUTO: 0.2 %
SODIUM SERPL-SCNC: 137 MMOL/L
WBC # BLD AUTO: 0.23 K/UL

## 2018-05-21 PROCEDURE — 11300000 HC PEDIATRIC PRIVATE ROOM

## 2018-05-21 PROCEDURE — P9037 PLATE PHERES LEUKOREDU IRRAD: HCPCS

## 2018-05-21 PROCEDURE — 97530 THERAPEUTIC ACTIVITIES: CPT

## 2018-05-21 PROCEDURE — 25000003 PHARM REV CODE 250: Performed by: STUDENT IN AN ORGANIZED HEALTH CARE EDUCATION/TRAINING PROGRAM

## 2018-05-21 PROCEDURE — 63600175 PHARM REV CODE 636 W HCPCS: Performed by: STUDENT IN AN ORGANIZED HEALTH CARE EDUCATION/TRAINING PROGRAM

## 2018-05-21 PROCEDURE — 97535 SELF CARE MNGMENT TRAINING: CPT

## 2018-05-21 PROCEDURE — 36415 COLL VENOUS BLD VENIPUNCTURE: CPT

## 2018-05-21 PROCEDURE — 84100 ASSAY OF PHOSPHORUS: CPT

## 2018-05-21 PROCEDURE — 99232 SBSQ HOSP IP/OBS MODERATE 35: CPT | Mod: AF,HB,, | Performed by: PSYCHIATRY & NEUROLOGY

## 2018-05-21 PROCEDURE — 63600175 PHARM REV CODE 636 W HCPCS: Performed by: PEDIATRICS

## 2018-05-21 PROCEDURE — 97110 THERAPEUTIC EXERCISES: CPT

## 2018-05-21 PROCEDURE — 97116 GAIT TRAINING THERAPY: CPT

## 2018-05-21 PROCEDURE — 83735 ASSAY OF MAGNESIUM: CPT

## 2018-05-21 PROCEDURE — 85045 AUTOMATED RETICULOCYTE COUNT: CPT

## 2018-05-21 PROCEDURE — 36592 COLLECT BLOOD FROM PICC: CPT

## 2018-05-21 PROCEDURE — 25000003 PHARM REV CODE 250: Performed by: PSYCHIATRY & NEUROLOGY

## 2018-05-21 PROCEDURE — 99233 SBSQ HOSP IP/OBS HIGH 50: CPT | Mod: ,,, | Performed by: PEDIATRICS

## 2018-05-21 PROCEDURE — 80053 COMPREHEN METABOLIC PANEL: CPT

## 2018-05-21 PROCEDURE — 85025 COMPLETE CBC W/AUTO DIFF WBC: CPT

## 2018-05-21 PROCEDURE — 86644 CMV ANTIBODY: CPT

## 2018-05-21 PROCEDURE — 87040 BLOOD CULTURE FOR BACTERIA: CPT | Mod: 59

## 2018-05-21 RX ADMIN — Medication 1250 MG: at 03:05

## 2018-05-21 RX ADMIN — Medication 500 UNITS: at 12:05

## 2018-05-21 RX ADMIN — RISPERIDONE 3 MG: 1 TABLET, ORALLY DISINTEGRATING ORAL at 09:05

## 2018-05-21 RX ADMIN — SODIUM CHLORIDE: 9 INJECTION, SOLUTION INTRAVENOUS at 10:05

## 2018-05-21 RX ADMIN — CEFEPIME HYDROCHLORIDE 2 G: 2 INJECTION, SOLUTION INTRAVENOUS at 07:05

## 2018-05-21 RX ADMIN — CEFEPIME HYDROCHLORIDE 2 G: 2 INJECTION, SOLUTION INTRAVENOUS at 03:05

## 2018-05-21 RX ADMIN — SODIUM CHLORIDE: 9 INJECTION, SOLUTION INTRAVENOUS at 11:05

## 2018-05-21 RX ADMIN — POSACONAZOLE 300 MG: 100 TABLET, COATED ORAL at 10:05

## 2018-05-21 RX ADMIN — ACYCLOVIR 400 MG: 200 CAPSULE ORAL at 10:05

## 2018-05-21 RX ADMIN — ACETAMINOPHEN, DIPHENHYDRAMINE HCL, PHENYLEPHRINE HCL 10 MG: 325; 25; 5 TABLET ORAL at 09:05

## 2018-05-21 RX ADMIN — Medication 500 UNITS: at 06:05

## 2018-05-21 RX ADMIN — ACYCLOVIR 400 MG: 200 CAPSULE ORAL at 09:05

## 2018-05-21 RX ADMIN — ACETAMINOPHEN 650 MG: 325 TABLET, FILM COATED ORAL at 12:05

## 2018-05-21 RX ADMIN — Medication 500 UNITS: at 10:05

## 2018-05-21 RX ADMIN — Medication 1250 MG: at 06:05

## 2018-05-21 RX ADMIN — CEFEPIME HYDROCHLORIDE 2 G: 2 INJECTION, SOLUTION INTRAVENOUS at 11:05

## 2018-05-21 RX ADMIN — RISPERIDONE 1 MG: 1 TABLET, ORALLY DISINTEGRATING ORAL at 10:05

## 2018-05-21 NOTE — PLAN OF CARE
Problem: Occupational Therapy Goal  Goal: Occupational Therapy Goal  Goals to be met by: 5/10/2018    Patient will increase functional independence with ADLs by performing:    Feeding with [place].  UE Dressing with [place].  LE Dressing with Minimal Assistance.  Goal met  Grooming while standing with Minimal Assistance. Goal met  Toileting from bedside commode with Modified [place] for hygiene and clothing management.   Bathing from  edge of bed with Modified [place].  Toilet transfer to toilet with Minimal Assistance.        Outcome: Ongoing (interventions implemented as appropriate)  Goals remain appropriate, continue with OT POC.    KURITS Hazel  5/21/2018  Rehab Services

## 2018-05-21 NOTE — ASSESSMENT & PLAN NOTE
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 On Intensification therapy II following [id number] (Arm A). He was admitted on 4/30/2018 for neutropenia/profund sepsis risk. Stepped up to the PICU for persistent fever >103, tachycardia, and hypotension that was been minimally responsive to fluid resuscitation. PICU stay was complicated by delirium. Currently CEC'd.      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 MRD negative after Induction I. Today is Day 32.  - Treating per COG [id number] (ARM A).  S/p Intensification I and Intensification II started.   - BM biopsy at start of Intensification shows CR.  FISH for t(8;21) negative. Will repeat BM biopsy pending count recovery and clinical improvement

## 2018-05-21 NOTE — ASSESSMENT & PLAN NOTE
Patient with Strep mitis sepsis. Bcx from 5/2 growing organism-repeat cultures NG.      - Continue Cefepime 2g Q8h  - Continue Vancomycin 1250 g Q8h, vanc trough 14.3 on 5/20. Will plan to follow trough every 3 days.  - repeat Cx NGTD

## 2018-05-21 NOTE — ASSESSMENT & PLAN NOTE
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   - Continue bactrim QFSaSu ppx  - Continue peridex ppx  - Discontinue home Cipro 500mg BID

## 2018-05-21 NOTE — ASSESSMENT & PLAN NOTE
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   - Continue bactrim QFSaSu ppx  - Continue peridex ppx

## 2018-05-21 NOTE — ASSESSMENT & PLAN NOTE
Delirium: currently CEC  - Per Psych recs: 1mg Risperdal qAM and 3mg qHS  - RPR NR  - Olanzapine PRN  - EKG complete on 5/14, 5/15, 5/16. QTc ranging from 437-441.   - Avoid Benadryl per Psych  - Continue frequent reorientation and attempt to encourage patient to sleep.   - Psych following

## 2018-05-21 NOTE — ASSESSMENT & PLAN NOTE
Patient without significant past psychiatric history has been exhibiting waxing and waning mental status, disorientation and paranoid delusions since being extubated on 5/9 consistent with delirium. Continue scheduled Risperdal M tabs 1 mg qAM and 3 mg qHS. Continue Zyprexa 5 mg PO/IM q8 PRN nonredirectable agitation. QTc on 5/16 467. Continue checking perioidic EKG to monitor QTc. Continue to monitor CBC for any worsening leukopenia/neutropenia with antipsychotic medications.  · DELIRIUM BEHAVIOR MANAGEMENT  · PLEASE utilize CHEMICAL restraints with PRN meds first for agitation. Minimize use of PHYSICAL restraints  · Keep window shades open and room lit during day and room dim at night in order to promote normal sleep-wake cycles  · Encourage family at bedside. [place] patient often to situation, location, date.  · Continue to Limit or Discontinue use of Narcotics, Benzos and Anti-cholinergic (Avoid Benadryl) medications as they may worsen delirium.   · Continue medical workup for causative etiology of Delirium. Recommend CT head with and without contrast if possible to assess for any acute intracranial processes and repeat CXR to rule out additional infectious process. Recommend check amylase, lipase, B12, folate, HIV, RPR.

## 2018-05-21 NOTE — PT/OT/SLP PROGRESS
"Occupational Therapy   Treatment    Name: Hema Kuo  MRN: [id number]  Admitting Diagnosis:  Bacteremia       Recommendations:     Discharge Recommendations: home with home health  Discharge Equipment Recommendations:  none  Barriers to discharge:  None    Subjective     When asked how he felt by Darleen pt stated, "How do I feel? I feel alive."    When asked how his grandpa was doing pt stated "I hate him." This unusual because they seemed very close in ICU. Redirected him back to exercise to prevent Hema from getting upset.    Pt with improved cognitive processing but severely delayed. He stares for about 20 seconds before responding. Eyes move back and forth subtly as if he is processing information.  Pt often repeats question asked of him and follows with an answer.      Communicated with: JULIANN Washington prior to session.  Pain/Comfort:  · Pain Rating 1: 0/10  · Pain Rating Post-Intervention 2: 0/10    Patients cultural, spiritual, Buddhism conflicts given the current situation: none    Objective:     Patient found with: central line    General Precautions: Standard, fall   Orthopedic Precautions:N/A   Braces: N/A     Occupational Performance:    Bed Mobility:    · Patient completed Rolling/Turning to Left with  independence  · Patient completed Rolling/Turning to Right with independence  · Patient completed Scooting/Bridging with independence  · Patient completed Supine to Sit with independence  · Patient completed Sit to Supine with independence     Functional Mobility/Transfers:  · Patient completed Sit <> Stand Transfer with independence  with  no assistive device   · Functional Mobility: Pt ambulated from his room to the family elevators and back without seated rest break. Pt able to locate his room without need for cues.  Able to open 2 heavy doors without LOB.     Activities of Daily Living:  · Feeding:  independence (set up)  · Grooming: supervision for oral care. Supervision needed due to risk for " falls    Patient left supine with sitter present    [facility] 6 Click:  [facility] Total Score: 17    Treatment & Education:  · Tricep extension, bicep curls and shoulder external rotation both sides 1x10. Intended on more but pt with difficulty staying on task requiring frequent cueing to continue, reminding of what exercise he is on. (Green theraband)  Education:    Assessment:     Hema Kuo is a 17 y.o. male with a medical diagnosis of Bacteremia.  He presents with decline in ADLs and functional mobility. Pt would benefit from skilled OT services in order to maximize independence with ADLs and facilitate safe discharge.  Performance deficits affecting function are weakness, impaired endurance, impaired self care skills, impaired functional mobilty, decreased upper extremity function.      Rehab Prognosis:  good; patient would benefit from acute skilled OT services to address these deficits and reach maximum level of function.       Plan:     Patient to be seen 2 x/week to address the above listed problems via self-care/home management, therapeutic activities, therapeutic exercises, neuromuscular re-education, sensory integration, cognitive retraining  · Plan of Care Expires:    · Plan of Care Reviewed with: patient    This Plan of care has been discussed with the patient who was involved in its development and understands and is in agreement with the identified goals and treatment plan    GOALS:    Occupational Therapy Goals        Problem: Occupational Therapy Goal    Goal Priority Disciplines Outcome Interventions   Occupational Therapy Goal     OT, PT/OT Ongoing (interventions implemented as appropriate)    Description:  Goals to be met by: 5/10/2018    Patient will increase functional independence with ADLs by performing:    Feeding with [place].  UE Dressing with [place].  LE Dressing with Minimal Assistance.  Goal met  Grooming while standing with Minimal Assistance. Goal met  Toileting from bedside  commode with Modified [place] for hygiene and clothing management.   Bathing from  edge of bed with Modified [place].  Toilet transfer to toilet with Minimal Assistance.                         Time Tracking:     OT Date of Treatment: 05/21/18  OT Start Time: 1406  OT Stop Time: 1449  OT Total Time (min): 43 min    Billable Minutes:Self Care/Home Management 15  Therapeutic Activity 15  Therapeutic Exercise 13    KURTIS Chun  5/21/2018

## 2018-05-21 NOTE — SUBJECTIVE & OBJECTIVE
"Interval History: see hospital course.     Family History     Problem Relation (Age of Onset)    Cancer Mother    Heart disease Mother    No Known Problems Father        Social History Main Topics    Smoking status: Former Smoker     Packs/day: 0.50     Types: Cigarettes     Quit date: 10/30/2017    Smokeless tobacco: Never Used    Alcohol use Yes      Comment: rare occasions    Drug use: No    Sexual activity: Yes     Partners: Female     Psychotherapeutics     Start     Stop Route Frequency Ordered    05/18/18 2100  risperiDONE disintegrating tablet 3 mg      -- Oral Nightly 05/18/18 0819    05/18/18 0900  risperiDONE disintegrating tablet 1 mg      -- Oral Daily 05/18/18 0819    05/15/18 0847  olanzapine zydis disintegrating tablet 5 mg      -- Oral As needed (PRN) 05/15/18 0848           Review of Systems    Objective:     Vital Signs (Most Recent):  Temp: 99.8 °F (37.7 °C) (05/21/18 0834)  Pulse: 102 (05/21/18 0834)  Resp: 16 (05/21/18 0834)  BP: (!) 105/52 (05/21/18 0834)  SpO2: 99 % (05/21/18 0834) Vital Signs (24h Range):  Temp:  [98.2 °F (36.8 °C)-101 °F (38.3 °C)] 99.8 °F (37.7 °C)  Pulse:  [] 102  Resp:  [16-24] 16  SpO2:  [97 %-99 %] 99 %  BP: (105-136)/(52-68) 105/52     Height: 5' 6" (167.6 cm)  Weight: 78.6 kg (173 lb 4.5 oz)  Body mass index is 27.97 kg/m².      Intake/Output Summary (Last 24 hours) at 05/21/18 1051  Last data filed at 05/21/18 0100   Gross per 24 hour   Intake          2039.58 ml   Output                0 ml   Net          2039.58 ml       Physical Exam        Mental Status Exam:  Appearance: age appropriate, lying in bed  Behavior/Cooperation: improved cooperation, psychomotor retardation, intense eye contact   Speech: slow, low volume, monotone, delayed, increased latency of response  Language: grossly intact, able to name, able to repeat with spontaneous speech  Mood: "okay"  Affect:  blunted  Thought Process: linear  Thought Content: no suicidality, no homicidality, " delusions, or paranoia  Sensorium:  Awake and alert  Alert and Oriented: x3 person, place, situation, time/date, day of week, month of year, year  Memory: 3/3 immediate, 2/3 at 5 minutes               Recent:  Intact; able to report recent events              Remote: Intact; remembers past events/individuals  Attention/concentration: w-o-r-l-d; d-l-r-o-w; SAVEAHAART with 0 erros  Insight:  impaired, improving  Judgment: appropriate to situation    Significant Labs:   Last 24 Hours:   Recent Lab Results       05/21/18  0406 05/20/18 2127 05/20/18 2115      Immature Granulocytes 0.0       Immature Grans (Abs) 0.00  Comment:  Mild elevation in immature granulocytes is non specific and   can be seen in a variety of conditions including stress response,   acute inflammation, trauma and pregnancy. Correlation with other   laboratory and clinical findings is essential.         Albumin 2.5(L)       Alkaline Phosphatase 64       ALT 45(H)       Anion Gap 8       Aniso Slight       AST 10       Baso # 0.00       Basophil% 0.0       Total Bilirubin 0.7  Comment:  For infants and newborns, interpretation of results should be based  on gestational age, weight and in agreement with clinical  observations.  Premature Infant recommended reference ranges:  Up to 24 hours.............<8.0 mg/dL  Up to 48 hours............<12.0 mg/dL  3-5 days..................<15.0 mg/dL  6-29 days.................<15.0 mg/dL         Blood Culture, Routine  No Growth to date[P] No Growth to date[P]     BUN, Bld 7       Calcium 8.4(L)       Chloride 105       CO2 24       Creatinine 0.6       Differential Method Automated       eGFR if  >60.0       eGFR if non  >60.0  Comment:  Calculation used to obtain the estimated glomerular filtration  rate (eGFR) is the CKD-EPI equation.          Eos # 0.0       Eosinophil% 0.0       Glucose 102       Gran # (ANC) 0.0(L)       Gran% 8.7(L)       Hematocrit 23.8(L)        Hemoglobin 8.5(L)       Lymph # 0.2(L)       Lymph% 87.0(H)       Magnesium 1.7       MCH 30.9       MCHC 35.7       MCV 87       Mono # 0.0(L)       Mono% 4.3       MPV 11.5       nRBC 0       Phosphorus 4.4       Platelet Estimate Decreased(A)       Platelets 9  Comment:  Preliminary Platelet critical result(s) called and verbal readback   obtained from Dominick Pritchard RN, result confirmed., 05/21/2018 06:10  (LL)       Potassium 3.4(L)       Total Protein 5.4(L)       RBC 2.75(L)       RDW 14.0       Retic 0.2(L)       Sodium 137       WBC 0.23  Comment:  wbc  critical result(s) called and verbal readback obtained from   dominick pritchard rn, 05/21/2018 04:28  (LL)             Significant Imaging: None

## 2018-05-21 NOTE — PLAN OF CARE
"Problem: Patient Care Overview  Goal: Plan of Care Review  Outcome: Ongoing (interventions implemented as appropriate)  Pt resting in bed, Tmax 101F, resolved after dose of tylenol, cultures drawn. Pt c/o "not feeling well" at time of fever. Tolerating medications and abx. Lines CDI, hep locked between meds. Poor PO intake, reports having no appetite. WBC 0.23, prelim plts 9000. Delayed responses to questions, but appropriate answers. Mom notified of fever and should be back this afternoon. Sitter at bedside. Plan of care reviewed with Srinivasa, verbalized understanding, will continue to monitor.       "

## 2018-05-21 NOTE — ASSESSMENT & PLAN NOTE
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 On Intensification therapy II following [id number] (Arm A). He was admitted on 4/30/2018 for neutropenia/profund sepsis risk. Stepped up to the PICU for persistent fever >103, tachycardia, and hypotension that was been minimally responsive to fluid resuscitation. PICU stay was complicated by delirium. Currently CEC'd.      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 MRD negative after Induction I. Today is Day 31.  - Treating per COG [id number] (ARM A).  S/p Intensification I and Intensification II started.   - BM biopsy at start of Intensification shows CR.  FISH for t(8;21) negative. Will repeat BM biopsy pending count recovery and clinical improvement

## 2018-05-21 NOTE — SUBJECTIVE & OBJECTIVE
Interval History: Tmax 101F-Bcx drawn from both lumens. 5/19 blood cx growing GPC chains resembling chains. S/p PRBC [id number].     Scheduled Meds:   acyclovir  400 mg Oral BID    cefepime in dextrose 5 %  2 g Intravenous Q8H    melatonin  10 mg Oral QHS    posaconazole  300 mg Oral Daily    risperiDONE  1 mg Oral Daily    risperiDONE  3 mg Oral QHS    sulfamethoxazole-trimethoprim 800-160mg  1 tablet Oral twice daily on Friday, Saturday, Sunday    vancomycin (VANCOCIN) IVPB  1,250 mg Intravenous Q8H     Continuous Infusions:  PRN Meds:sodium chloride, acetaminophen, albuterol sulfate, alteplase, heparin, porcine (PF), lidocaine-prilocaine, morphine, olanzapine zydis, ondansetron, polyethylene glycol    Review of Systems   Constitutional: Positive for fever.     Objective:     Vital Signs (Most Recent):  Temp: 99.1 °F (37.3 °C) (05/21/18 0600)  Pulse: 107 (05/21/18 0401)  Resp: 20 (05/21/18 0401)  BP: 136/62 (05/21/18 0401)  SpO2: 98 % (05/21/18 0401) Vital Signs (24h Range):  Temp:  [98.2 °F (36.8 °C)-101 °F (38.3 °C)] 99.1 °F (37.3 °C)  Pulse:  [] 107  Resp:  [20-24] 20  SpO2:  [97 %-100 %] 98 %  BP: (104-136)/(56-68) 136/62     Patient Vitals for the past 72 hrs (Last 3 readings):   Weight   05/20/18 2000 78.6 kg (173 lb 4.5 oz)   05/19/18 1945 78.4 kg (172 lb 13.5 oz)   05/18/18 1935 78.4 kg (172 lb 13.5 oz)     Body mass index is 27.97 kg/m².    Intake/Output - Last 3 Shifts       05/19 0700 - 05/20 0659 05/20 0700 - 05/21 0659 05/21 0700 - 05/22 0659    P.O. 720 1520     Blood 400 789.6     IV Piggyback 850 300     Total Intake(mL/kg) 1970 (25.1) 2609.6 (33.2)     Net +1970 +2609.6             Urine Occurrence 5 x 5 x           Lines/Drains/Airways     Central Venous Catheter Line                 Port A Cath Double Lumen 05/10/18 0900 left subclavian 10 days                Physical Exam  Constitutional: He appears well-developed and well-nourished. Sleeping on exam.   Head: Normocephalic and  atraumatic.   Nose: Nose normal.   Mouth/Throat: Mucous membranes are normal.   Eyes: Conjunctivae and lids are normal.   Neck: Normal range of motion. Neck supple.   Cardiovascular: Normal rate and regular rhythm.    Pulmonary/Chest: Effort normal. No stridor. He has no decreased breath sounds. He has no wheezes. He has no rales.   Abdominal: Soft. Bowel sounds are normal. He exhibits no distension. There is no hepatosplenomegaly. There is no guarding.   Musculoskeletal: Normal range of motion. He exhibits no edema.   Skin: Skin is warm and dry. Capillary refill takes less than 2 seconds. No rash noted. He is not diaphoretic. No erythema. Port CDI.   Psychiatric: Improving delirium.     Significant Labs:  Recent Results (from the past 24 hour(s))   Blood culture    Collection Time: 05/20/18  9:15 PM   Result Value Ref Range    Blood Culture, Routine No Growth to date    Blood culture    Collection Time: 05/20/18  9:27 PM   Result Value Ref Range    Blood Culture, Routine No Growth to date    Reticulocytes    Collection Time: 05/21/18  4:06 AM   Result Value Ref Range    Retic 0.2 (L) 0.4 - 2.0 %   CBC auto differential    Collection Time: 05/21/18  4:06 AM   Result Value Ref Range    WBC 0.23 (LL) 3.90 - 12.70 K/uL    RBC 2.75 (L) 4.60 - 6.20 M/uL    Hemoglobin 8.5 (L) 14.0 - 18.0 g/dL    Hematocrit 23.8 (L) 40.0 - 54.0 %    MCV 87 82 - 98 fL    MCH 30.9 27.0 - 31.0 pg    MCHC 35.7 32.0 - 36.0 g/dL    RDW 14.0 11.5 - 14.5 %    Platelets 9 (LL) 150 - 350 K/uL    MPV 11.5 9.2 - 12.9 fL    Immature Granulocytes 0.0 0.0 - 0.5 %    Gran # (ANC) 0.0 (L) 1.8 - 7.7 K/uL    Immature Grans (Abs) 0.00 0.00 - 0.04 K/uL    Lymph # 0.2 (L) 1.0 - 4.8 K/uL    Mono # 0.0 (L) 0.3 - 1.0 K/uL    Eos # 0.0 0.0 - 0.5 K/uL    Baso # 0.00 0.00 - 0.20 K/uL    nRBC 0 0 /100 WBC    Gran% 8.7 (L) 38.0 - 73.0 %    Lymph% 87.0 (H) 18.0 - 48.0 %    Mono% 4.3 4.0 - 15.0 %    Eosinophil% 0.0 0.0 - 8.0 %    Basophil% 0.0 0.0 - 1.9 %    Platelet  Estimate Decreased (A)     Aniso Slight     Differential Method Automated    Comprehensive metabolic panel    Collection Time: 05/21/18  4:06 AM   Result Value Ref Range    Sodium 137 136 - 145 mmol/L    Potassium 3.4 (L) 3.5 - 5.1 mmol/L    Chloride 105 95 - 110 mmol/L    CO2 24 23 - 29 mmol/L    Glucose 102 70 - 110 mg/dL    BUN, Bld 7 6 - 20 mg/dL    Creatinine 0.6 0.5 - 1.4 mg/dL    Calcium 8.4 (L) 8.7 - 10.5 mg/dL    Total Protein 5.4 (L) 6.0 - 8.4 g/dL    Albumin 2.5 (L) 3.5 - 5.2 g/dL    Total Bilirubin 0.7 0.1 - 1.0 mg/dL    Alkaline Phosphatase 64 55 - 135 U/L    AST 10 10 - 40 U/L    ALT 45 (H) 10 - 44 U/L    Anion Gap 8 8 - 16 mmol/L    eGFR if African American >60.0 >60 mL/min/1.73 m^2    eGFR if non African American >60.0 >60 mL/min/1.73 m^2   Magnesium    Collection Time: 05/21/18  4:06 AM   Result Value Ref Range    Magnesium 1.7 1.6 - 2.6 mg/dL   Phosphorus    Collection Time: 05/21/18  4:06 AM   Result Value Ref Range    Phosphorus 4.4 2.7 - 4.5 mg/dL       Significant Imaging:   none

## 2018-05-21 NOTE — PLAN OF CARE
Problem: Patient Care Overview  Goal: Plan of Care Review    Pt tolerated treatment session well this morning.  Pt displays increased functional mobility by ambulating with therapist outside of room for 150 feet with SBA from therapist.  Pt requring little to no verbal cues to attend to task and participate in ambulation.  Pt participates in dynamic standing activity by playing catch with therapist in hallway.  Pt experiences one episode of loss of balance requiring total A from therapist to return to static standing position.  Pt family not present during today's treatment session.  Pt educated on continuation of PT POC during pt hospitalization.  Pt verbalizes understanding.  Pt will continue to benefit from skilled acute PT intervention to address the above listed impairments and progress functional mobility independence.      Yassine Andrea  5/21/2018

## 2018-05-21 NOTE — ASSESSMENT & PLAN NOTE
Chemotherapy induced neutropenia:   - Hgb goal >7, s/p 2 units PRBC yesterday  - Plts goal >10, platelets 9 today. Transfuse 1 unit platelet today, no benadryl premed.   - Daily CBC and reticulocyte  - CMP EOD  - If bleeding at all, please transfuse platelets x1 unit. No need to check CBC, evaluate clinically.

## 2018-05-21 NOTE — ASSESSMENT & PLAN NOTE
Delirium: currently CEC, improving.  - Per Psych recs: 1mg Risperdal qAM and 3mg qHS  - RPR NR  - Olanzapine PRN  - EKG complete on 5/14, 5/15, 5/16. QTc ranging from 437-441.   - Avoid Benadryl per Psych  - Continue frequent reorientation and attempt to encourage patient to sleep.   - Psych following, appreciate recs on outpatient therapy and follow up. Patient lives in [place].

## 2018-05-21 NOTE — MEDICAL/APP STUDENT
"5/21/2018 8:29 AM Hema Kuo 2000 [id number]        PSYCHIATRY CONSULT PROGRESS NOTE   BRIEF HPI   Hema Kuo is a 17 y.o. white male who  has a past medical history of AML (acute myeloblastic leukemia) (10/2017); Asthma; Encounter for blood transfusion; and Seasonal allergies., currently presenting with Bacteremia.  Psychiatry was consulted for "paranoia, now CEC'd, possible ICU delirium."     Per staff MD:  "17 year old young man with AML s/p 4 cycles of chemotherapy now transferred from the  PICU with Strep mitis sepsis and ARDS.     For his AML we are awaiting count recovery.  ANC next to nothing still.   For his Strep sepsis, he is on cefepime and vancomycin.  Cultures from 5/2 grew Strep mitis (sensitive to vanc).  Subsequent cultures negative.    Will cont a 14 day course of vanc.        .  For his chemotherapy induced pancytopenia, recommend transfusion for hemoglobin under 7 and platelets under 10 K.  No transfusions today.    For his ARDS will get one last dose of lasix today and then we will stop.  He continues to have paranoid delerium since extubation, however this is improving.  Will cont seroquel with prn zyprexa.  Will have psych see tomorrow.   Still needs a 1:1 sitter."    Hospital Course:  Patient admitted to the floor and noted to be comfortable. He reported that he received a platelet transfusion this am and is currently asymptomatic and doing well.     Scheduled Meds:   acyclovir  400 mg Oral BID    ciprofloxacin HCl  500 mg Oral BID    melatonin  10 mg Oral QHS    posaconazole  300 mg Oral Daily    risperiDONE  1 mg Oral Daily    risperiDONE  3 mg Oral QHS    sulfamethoxazole-trimethoprim 800-160mg  1 tablet Oral twice daily on Friday, Saturday, Sunday    vancomycin (VANCOCIN) IVPB  1,250 mg Intravenous Q8H     ----------------------------------------------------------------------------------------------------------------------  SUBJECTIVE:   Nursing note    Pt resting in bed, Tmax " "101F, resolved after dose of tylenol, cultures drawn. Pt c/o "not feeling well" at time of fever. Tolerating medications and abx. Lines CDI, hep locked between meds. Poor PO intake, reports having no appetite. WBC 0.23, prelim plts 9000. Delayed responses to questions, but appropriate answers. Mom notified of fever and should be back this afternoon. Sitter at bedside. Plan of care reviewed with Hema and iglesia, verbalized understanding, will continue to monitor (05/21/2018; 4:45am)    Current Medications:   PRN Meds: sodium chloride, acetaminophen, albuterol sulfate, alteplase, heparin, porcine (PF), lidocaine-prilocaine, morphine, olanzapine zydis, ondansetron, polyethylene glycol   Psychotherapeutics     Start     Stop Route Frequency Ordered    05/18/18 2100  risperiDONE disintegrating tablet 3 mg      -- Oral Nightly 05/18/18 0819    05/18/18 0900  risperiDONE disintegrating tablet 1 mg      -- Oral Daily 05/18/18 0819    05/15/18 0847  olanzapine zydis disintegrating tablet 5 mg      -- Oral As needed (PRN) 05/15/18 0848        Allergies:   Review of patient's allergies indicates:   Allergen Reactions    Nsaids (non-steroidal anti-inflammatory drug) Anaphylaxis    Nuts [tree nut] Anaphylaxis    Strawberry     Vancomycin analogues Itching     Premedicate with benadryl and admin over 2hr.      Past Medical History:   Diagnosis Date    AML (acute myeloblastic leukemia) 10/2017    Asthma     Encounter for blood transfusion     Seasonal allergies      Medical ROS  General ROS: negative for - chills; POS for generalized pain, fatigue and fever  Respiratory ROS: no cough, shortness of breath, or wheezing  Cardiovascular ROS: no chest pain or dyspnea on exertion  Gastrointestinal ROS: no abdominal pain, change in bowel habits, or black/bloody stools  Musculoskeletal ROS: negative for - gait disturbance, joint stiffness, pain/weakness  Neurological ROS: negative for - confusion, gait disturbance, headaches, " impaired coordination/balance, seizures or visual changes; POS for  dizziness  PSYCHIATRIC ROS (Is patient experiencing or having changes in):  sleep: yes; did not sleep well due to fever  appetite: yes; has not eaten since lunch yesterday   weight: no  energy/anergy: yes; feels fatigued and more tired than usual  interest/pleasure/anhedonia: no  somatic symptoms: no  libido: Unknown  guilty/hopelessness: no  concentration: no  S.I.B.s/risky behavior: no  SI/SA:  no  anxiety/panic: no  Agoraphobia:  no  Social phobia:  no  Recurrent nightmares:  no  hyper startle response:  no  Avoidance: no  Recurrent thoughts:  no  Recurrent behaviors:  no  Irritability: no  Racing thoughts: no  Impulsive behaviors: no  Pressured speech:  no  Paranoia:no  Delusions: no  AVH:no  OBJECTIVE:     Vitals   Vitals:    05/21/18 0600   BP:    Pulse:    Resp:    Temp: 99.1 °F (37.3 °C)      In/Out  I/O last 3 completed shifts:  In: 3689.6 [P.O.:2000; Blood:789.6; IV Piggyback:900]  Out: -   Labs/Imaging/Studies:   Recent Results (from the past 36 hour(s))   Blood culture    Collection Time: 05/19/18 10:07 PM   Result Value Ref Range    Blood Culture, Routine No Growth to date     Blood Culture, Routine No Growth to date    Blood culture    Collection Time: 05/19/18 10:07 PM   Result Value Ref Range    Blood Culture, Routine       Gram stain peds bottle: Gram positive cocci in chains resembling Strep     Blood Culture, Routine       Results called to and read back by: Guadalupe Vaughn RN  05/20/2018  21:13   Reticulocytes    Collection Time: 05/20/18  6:18 AM   Result Value Ref Range    Retic 0.0 (L) 0.4 - 2.0 %   CBC auto differential    Collection Time: 05/20/18  6:18 AM   Result Value Ref Range    WBC 0.18 (LL) 3.90 - 12.70 K/uL    RBC 2.11 (L) 4.60 - 6.20 M/uL    Hemoglobin 6.6 (L) 14.0 - 18.0 g/dL    Hematocrit 18.7 (LL) 40.0 - 54.0 %    MCV 89 82 - 98 fL    MCH 31.3 (H) 27.0 - 31.0 pg    MCHC 35.3 32.0 - 36.0 g/dL    RDW 14.4 11.5 -  14.5 %    Platelets 14 (LL) 150 - 350 K/uL    MPV 13.0 (H) 9.2 - 12.9 fL    Immature Granulocytes 0.0 0.0 - 0.5 %    Gran # (ANC) 0.0 (L) 1.8 - 7.7 K/uL    Immature Grans (Abs) 0.00 0.00 - 0.04 K/uL    Lymph # 0.2 (L) 1.0 - 4.8 K/uL    Mono # 0.0 (L) 0.3 - 1.0 K/uL    Eos # 0.0 0.0 - 0.5 K/uL    Baso # 0.00 0.00 - 0.20 K/uL    nRBC 0 0 /100 WBC    Gran% 5.5 (L) 38.0 - 73.0 %    Lymph% 88.9 (H) 18.0 - 48.0 %    Mono% 5.6 4.0 - 15.0 %    Eosinophil% 0.0 0.0 - 8.0 %    Basophil% 0.0 0.0 - 1.9 %    Platelet Estimate Decreased (A)     Differential Method Automated    Vancomycin, random    Collection Time: 05/20/18  6:18 AM   Result Value Ref Range    Vancomycin, Random 14.3 Not established ug/mL   Blood culture    Collection Time: 05/20/18  9:15 PM   Result Value Ref Range    Blood Culture, Routine No Growth to date    Blood culture    Collection Time: 05/20/18  9:27 PM   Result Value Ref Range    Blood Culture, Routine No Growth to date    Reticulocytes    Collection Time: 05/21/18  4:06 AM   Result Value Ref Range    Retic 0.2 (L) 0.4 - 2.0 %   CBC auto differential    Collection Time: 05/21/18  4:06 AM   Result Value Ref Range    WBC 0.23 (LL) 3.90 - 12.70 K/uL    RBC 2.75 (L) 4.60 - 6.20 M/uL    Hemoglobin 8.5 (L) 14.0 - 18.0 g/dL    Hematocrit 23.8 (L) 40.0 - 54.0 %    MCV 87 82 - 98 fL    MCH 30.9 27.0 - 31.0 pg    MCHC 35.7 32.0 - 36.0 g/dL    RDW 14.0 11.5 - 14.5 %    Platelets 9 (LL) 150 - 350 K/uL    MPV 11.5 9.2 - 12.9 fL    Immature Granulocytes 0.0 0.0 - 0.5 %    Gran # (ANC) 0.0 (L) 1.8 - 7.7 K/uL    Immature Grans (Abs) 0.00 0.00 - 0.04 K/uL    Lymph # 0.2 (L) 1.0 - 4.8 K/uL    Mono # 0.0 (L) 0.3 - 1.0 K/uL    Eos # 0.0 0.0 - 0.5 K/uL    Baso # 0.00 0.00 - 0.20 K/uL    nRBC 0 0 /100 WBC    Gran% 8.7 (L) 38.0 - 73.0 %    Lymph% 87.0 (H) 18.0 - 48.0 %    Mono% 4.3 4.0 - 15.0 %    Eosinophil% 0.0 0.0 - 8.0 %    Basophil% 0.0 0.0 - 1.9 %    Platelet Estimate Decreased (A)     [place]iso Slight     Differential  Method Automated    Comprehensive metabolic panel    Collection Time: 05/21/18  4:06 AM   Result Value Ref Range    Sodium 137 136 - 145 mmol/L    Potassium 3.4 (L) 3.5 - 5.1 mmol/L    Chloride 105 95 - 110 mmol/L    CO2 24 23 - 29 mmol/L    Glucose 102 70 - 110 mg/dL    BUN, Bld 7 6 - 20 mg/dL    Creatinine 0.6 0.5 - 1.4 mg/dL    Calcium 8.4 (L) 8.7 - 10.5 mg/dL    Total Protein 5.4 (L) 6.0 - 8.4 g/dL    Albumin 2.5 (L) 3.5 - 5.2 g/dL    Total Bilirubin 0.7 0.1 - 1.0 mg/dL    Alkaline Phosphatase 64 55 - 135 U/L    AST 10 10 - 40 U/L    ALT 45 (H) 10 - 44 U/L    Anion Gap 8 8 - 16 mmol/L    eGFR if African American >60.0 >60 mL/min/1.73 m^2    eGFR if non African American >60.0 >60 mL/min/1.73 m^2   Magnesium    Collection Time: 05/21/18  4:06 AM   Result Value Ref Range    Magnesium 1.7 1.6 - 2.6 mg/dL   Phosphorus    Collection Time: 05/21/18  4:06 AM   Result Value Ref Range    Phosphorus 4.4 2.7 - 4.5 mg/dL        Mental Status Exam:  Appearance: age appropriate, lying in bed. Patient was playing [employer] and a memory game and enjoyed doing so. He interacted well and responded to questions appropriately, although with some delay  Behavior/Cooperation: limited/ appopriate friendly and cooperative, psychomotor retardation, eye contact normal  Speech: low rate, low volume and low tone slowed, delayed, increased latency of response  Language: grossly intact, able to name, able to repeat with spontaneous speech  Mood: steady  Affect:  Decreased and Impaired to some degree  Thought Process: normal and logical  Thought Content: normal, no suicidality, no homicidality, delusions, or paranoia  Sensorium:  Awake and alert  Alert and Oriented: x3 person, place, situation, time/date, day of week, month of year, year  Memory: 3/3 immediate, 2/3 at 5 minutes    Recent:  Intact; able to report recent events   Remote: Intact; remembers past events/individuals  Attention/concentration: appropriate for age/education. w-[id number]-r-l-d;  d-l-r-o-w   Similarities: Intact  Abstract reasoning: DNA  Insight:  DNA  Judgment: Intact      RASS- 0  CAM ICU- NEG    ASSESSMENT         RECOMMENDATIONS      · PSYCH Meds-  · Legal status-  · The above will be discussed with staff psychiatrist and update any changes after rounds in the afternoon.  · Thank you for this consult will continue to follow      Elroy Robin (MS3)  5/21/2018 8:29 AM

## 2018-05-21 NOTE — PROGRESS NOTES
05/20/18 2039   Vital Signs   Temp (!) 101 °F (38.3 °C)   Temp src Oral   Pulse 109   Heart Rate Source Monitor   Resp (!) 24   SpO2 99 %   O2 Device (Oxygen Therapy) room air   /61   MAP (mmHg) 85   BP Location Right arm   Patient Position Lying   Dr. Ramirez notified, cultures obtained, tylenol administered, will recheck.     Temp 99.9 when rechecked.

## 2018-05-21 NOTE — PT/OT/SLP PROGRESS
Physical Therapy Treatment    Patient Name:  Hema Kuo   MRN:  [id number]    Recommendations:     Discharge Recommendations:  home health PT   Discharge Equipment Recommendations: none   Barriers to discharge: None    Assessment:     Hema Kuo is a 17 y.o. male admitted with a medical diagnosis of Bacteremia.  He presents with the following impairments/functional limitations:  impaired self care skills, impaired functional mobilty, gait instability, impaired balance, impaired cognition, decreased safety awareness.  Pt tolerated treatment session well this morning.  Pt displays increased functional mobility by ambulating with therapist outside of room for 150 feet with SBA from therapist.  Pt requring little to no verbal cues to attend to task and participate in ambulation.  Pt participates in dynamic standing activity by playing catch with therapist in hallway.  Pt experiences one episode of loss of balance requiring total A from therapist to return to static standing position.  Pt family not present during today's treatment session.  Pt educated on continuation of PT POC during pt hospitalization.  Pt verbalizes understanding.  Pt will continue to benefit from skilled acute PT intervention to address the above listed impairments and progress functional mobility independence.      Rehab Prognosis:  Good; patient would benefit from acute skilled PT services to address these deficits and reach maximum level of function.      Recent Surgery: * No surgery found *      Plan:     During this hospitalization, patient to be seen 6 x/week to address the above listed problems via gait training, therapeutic activities, therapeutic exercises, neuromuscular re-education  · Plan of Care Expires:  06/09/18   Plan of Care Reviewed with: patient    Subjective     Communicated with JULIANN Washington prior to session.  Patient found awake supine in bed with no family present upon PT entry to room, agreeable to treatment.      Chief Complaint:  "Bacteremia  Patient comments/goals: "Today is Monday"  Pain/Comfort:  · Pain Rating 1: 0/10  · Pain Rating Post-Intervention 1: 0/10    Patients cultural, spiritual, Druze conflicts given the current situation: Patient has no barriers to learning. Patient verbalizes understanding of his/her program and goals and demonstrates them correctly. No cultural, spiritual or educational needs identified.    Objective:     Patient found with: central line     General Precautions: Standard, fall   Orthopedic Precautions:N/A   Braces: N/A     Functional Mobility:  · Bed Mobility:     · Scooting: stand by assistance  · Supine to Sit: stand by assistance  · Sit to Supine: stand by assistance  · Transfers:     · Sit to Stand:  stand by assistance with no AD  Gait: Pt ambulated total of 150 feet.  Assist Level: SBA  AD Used: None  Gait Pattern: Two point, step through  Gait Deviations: slight gait instability   · Impairments due to: fatigue, weakness of LE  · Balance: Pt displays improved balance during ambulation.  Pt loses balance while particpating in static standing throwing and catching activtiy with therapist, pt requiring total A to remain standing.      AM-PAC 6 CLICK MOBILITY  Turning over in bed (including adjusting bedclothes, sheets and blankets)?: 4  Sitting down on and standing up from a chair with arms (e.g., wheelchair, bedside commode, etc.): 4  Moving from lying on back to sitting on the side of the bed?: 4  Moving to and from a bed to a chair (including a wheelchair)?: 3  Need to walk in hospital room?: 3  Climbing 3-5 steps with a railing?: 3  Total Score: 21     Therapeutic Activities and Exercises:   Pt participates in dynamic standing activity by playing catch with therapist in hallway.  Pt experiences one episode of loss of balance requiring total A from therapist to return to static standing position.      Pt participates dynamic trunk stability activity while seated at EOB by playing catch with " therapist in pt's room.      Pt participates in cognitive task (memory card game) while sitting at EOB for executive function training with therapist providing cues to attend to task.    Patient left supine with all lines intact, call button in reach, RN Padmini notified and sitter present..    GOALS:    Physical Therapy Goals        Problem: Physical Therapy Goal    Goal Priority Disciplines Outcome Goal Variances Interventions   Physical Therapy Goal     PT/OT, PT Ongoing (interventions implemented as appropriate)     Description:  Goals re-assessed by SPT on   Continue goals x1 week (18)    Patient will increase functional independence with mobility by performin. Supine to sit with [place] - MET ()  2. Sit to supine with [place] - MET ()  3. Sit to stand transfer with [place] - MET ()  4. Bed to chair transfer with Stand-by Assistance using least restrictive AD - Not met  5. Gait  x 500 feet with Stand-by Assistance using least restrictive Ad - MET ()  6. Lower extremity exercise program x30 reps per handout, with supervision - Not met                       Time Tracking:     PT Received On: 18  PT Start Time: 841     PT Stop Time: 919  PT Total Time (min): 38 min     Billable Minutes: Gait Training 8 and Therapeutic Activity 30    Treatment Type: Treatment  PT/PTA: PT           Yassine Andrea, IVET   2018

## 2018-05-21 NOTE — PROGRESS NOTES
[facility] Pediatric Hospital Medicine  Progress Note    Patient Name: Hema Kuo  MRN: [id number]  Admission Date: 4/30/2018  Hospital Length of Stay: 20  Code Status: Prior   Primary Care Physician: Ann Reyna NP  Principal Problem: Bacteremia    Subjective:     Interval History: Febrile to 100.9, cultured lines. Started Cefepime.     Scheduled Meds:   acyclovir  400 mg Oral BID    cefepime in dextrose 5 %  2 g Intravenous Q8H    melatonin  10 mg Oral QHS    posaconazole  300 mg Oral Daily    risperiDONE  1 mg Oral Daily    risperiDONE  3 mg Oral QHS    sulfamethoxazole-trimethoprim 800-160mg  1 tablet Oral twice daily on Friday, Saturday, Sunday    vancomycin (VANCOCIN) IVPB  1,250 mg Intravenous Q8H     Continuous Infusions:  PRN Meds:sodium chloride, acetaminophen, albuterol sulfate, alteplase, heparin, porcine (PF), lidocaine-prilocaine, morphine, olanzapine zydis, ondansetron, polyethylene glycol      Scheduled Meds:   acyclovir  400 mg Oral BID    cefepime in dextrose 5 %  2 g Intravenous Q8H    melatonin  10 mg Oral QHS    posaconazole  300 mg Oral Daily    risperiDONE  1 mg Oral Daily    risperiDONE  3 mg Oral QHS    sulfamethoxazole-trimethoprim 800-160mg  1 tablet Oral twice daily on Friday, Saturday, Sunday    vancomycin (VANCOCIN) IVPB  1,250 mg Intravenous Q8H     Continuous Infusions:  PRN Meds:sodium chloride, acetaminophen, albuterol sulfate, alteplase, heparin, porcine (PF), lidocaine-prilocaine, morphine, olanzapine zydis, ondansetron, polyethylene glycol    Review of Systems   Constitutional: Positive for fever.     Objective:     Vital Signs (Most Recent):  Temp: (!) 101 °F (38.3 °C) (05/20/18 2039)  Pulse: 109 (05/20/18 2039)  Resp: (!) 24 (05/20/18 2039)  BP: 126/61 (05/20/18 2039)  SpO2: 99 % (05/20/18 2039) Vital Signs (24h Range):  Temp:  [98.2 °F (36.8 °C)-101 °F (38.3 °C)] 101 °F (38.3 °C)  Pulse:  [] 109  Resp:  [20-24] 24  SpO2:  [98 %-100 %]  99 %  BP: (101-132)/(53-68) 126/61     Patient Vitals for the past 72 hrs (Last 3 readings):   Weight   05/19/18 1945 78.4 kg (172 lb 13.5 oz)   05/18/18 1935 78.4 kg (172 lb 13.5 oz)   05/17/18 2129 77.7 kg (171 lb 4.8 oz)     Body mass index is 27.9 kg/m².    Intake/Output - Last 3 Shifts       05/18 0700 - 05/19 0659 05/19 0700 - 05/20 0659 05/20 0700 - 05/21 0659    P.O.     I.V. (mL/kg) 206 (2.6)      Blood  400 789.6    IV Piggyback 750 850 300    Total Intake(mL/kg) 1196 (15.3) 1970 (25.1) 2129.6 (27.2)    Net +1196 +1970 +2129.6           Urine Occurrence 4 x 5 x 4 x          Lines/Drains/Airways     Central Venous Catheter Line                 Port A Cath Double Lumen 05/10/18 0900 left subclavian 10 days                Physical Exam    Significant Labs:  Recent Results (from the past 24 hour(s))   Blood culture    Collection Time: 05/19/18 10:07 PM   Result Value Ref Range    Blood Culture, Routine No Growth to date    Blood culture    Collection Time: 05/19/18 10:07 PM   Result Value Ref Range    Blood Culture, Routine No Growth to date    Reticulocytes    Collection Time: 05/20/18  6:18 AM   Result Value Ref Range    Retic 0.0 (L) 0.4 - 2.0 %   CBC auto differential    Collection Time: 05/20/18  6:18 AM   Result Value Ref Range    WBC 0.18 (LL) 3.90 - 12.70 K/uL    RBC 2.11 (L) 4.60 - 6.20 M/uL    Hemoglobin 6.6 (L) 14.0 - 18.0 g/dL    Hematocrit 18.7 (LL) 40.0 - 54.0 %    MCV 89 82 - 98 fL    MCH 31.3 (H) 27.0 - 31.0 pg    MCHC 35.3 32.0 - 36.0 g/dL    RDW 14.4 11.5 - 14.5 %    Platelets 14 (LL) 150 - 350 K/uL    MPV 13.0 (H) 9.2 - 12.9 fL    Immature Granulocytes 0.0 0.0 - 0.5 %    Gran # (ANC) 0.0 (L) 1.8 - 7.7 K/uL    Immature Grans (Abs) 0.00 0.00 - 0.04 K/uL    Lymph # 0.2 (L) 1.0 - 4.8 K/uL    Mono # 0.0 (L) 0.3 - 1.0 K/uL    Eos # 0.0 0.0 - 0.5 K/uL    Baso # 0.00 0.00 - 0.20 K/uL    nRBC 0 0 /100 WBC    Gran% 5.5 (L) 38.0 - 73.0 %    Lymph% 88.9 (H) 18.0 - 48.0 %    Mono% 5.6 4.0 -  15.0 %    Eosinophil% 0.0 0.0 - 8.0 %    Basophil% 0.0 0.0 - 1.9 %    Platelet Estimate Decreased (A)     Differential Method Automated    Vancomycin, random    Collection Time: 05/20/18  6:18 AM   Result Value Ref Range    Vancomycin, Random 14.3 Not established ug/mL       Significant Imaging:   none    Assessment/Plan:     Neuro   Acute delirium    Delirium: currently CEC  - Per Psych recs: 1mg Risperdal qAM and 3mg qHS  - RPR NR  - Olanzapine PRN  - EKG complete on 5/14, 5/15, 5/16. QTc ranging from 437-441.   - Avoid Benadryl per Psych  - Continue frequent reorientation and attempt to encourage patient to sleep.   - Psych following        ID   * Bacteremia    Patient with Strep mitis sepsis. Bcx from 5/2 growing organism-repeat cultures NG.      - Continue Cefepime 2g Q8h  - Continue Vancomycin 1250 g Q8h, vanc trough 14.3 on 5/20. Will plan to follow trough every 3 days.  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   - Continue bactrim QFSaSu ppx  - Continue peridex ppx  - Discontinue home Cipro 500mg BID          Oncology   Neutropenia    - Advance soft mechanical diet  - Continue PRN zofran        AML (acute myeloblastic leukemia)    Hema is a 17 yr young man with AML (t 8;21) here for chemotherapy. On Intensification therapy II following [id number] (Arm A). He was admitted on 4/30/2018 for neutropenia/profund sepsis risk. Stepped up to the PICU for persistent fever >103, tachycardia, and hypotension that was been minimally responsive to fluid resuscitation. PICU stay was complicated by delirium. Currently CEC'd.      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 MRD negative after Induction I. Today is Day 31.  - Treating per COG [id number] (ARM A).  S/p Intensification I and Intensification II started.   - BM biopsy at start  of Intensification shows CR.  FISH for t(8;21) negative. Will repeat BM biopsy pending count recovery and clinical improvement               Antineoplastic chemotherapy induced pancytopenia    Chemotherapy induced neutropenia:   - Hgb goal >7, transfuse 2 units PRBC today  - Plts goal >10.  - Daily CBC and reticulocyte  - CMP EOD  - If bleeding at all, please transfuse platelets x1 unit. No need to check CBC, evaluate clinically.                     Anticipated Disposition: Home or Self Care    Ayanna Ramirez DO  Pediatric Hospital Medicine   [facility]

## 2018-05-21 NOTE — PROGRESS NOTES
[facility] Medicine  Progress Note    Patient Name: Hema Kuo  MRN: [id number]  Admission Date: 4/30/2018  Hospital Length of Stay: 21  Code Status: Prior   Primary Care Physician: Ann Reyna NP  Principal Problem: Bacteremia    Subjective:     Interval History: Tmax 101F-Bcx drawn from both lumens. 5/19 blood cx growing GPC chains resembling chains. S/p PRBC [id number] yesterday.     Scheduled Meds:   acyclovir  400 mg Oral BID    cefepime in dextrose 5 %  2 g Intravenous Q8H    melatonin  10 mg Oral QHS    posaconazole  300 mg Oral Daily    risperiDONE  1 mg Oral Daily    risperiDONE  3 mg Oral QHS    sulfamethoxazole-trimethoprim 800-160mg  1 tablet Oral twice daily on Friday, Saturday, Sunday    vancomycin (VANCOCIN) IVPB  1,250 mg Intravenous Q8H     Continuous Infusions:  PRN Meds:sodium chloride, acetaminophen, albuterol sulfate, alteplase, heparin, porcine (PF), lidocaine-prilocaine, morphine, olanzapine zydis, ondansetron, polyethylene glycol    Scheduled Meds:   acyclovir  400 mg Oral BID    cefepime in dextrose 5 %  2 g Intravenous Q8H    melatonin  10 mg Oral QHS    posaconazole  300 mg Oral Daily    risperiDONE  1 mg Oral Daily    risperiDONE  3 mg Oral QHS    sulfamethoxazole-trimethoprim 800-160mg  1 tablet Oral twice daily on Friday, Saturday, Sunday    vancomycin (VANCOCIN) IVPB  1,250 mg Intravenous Q8H     Continuous Infusions:  PRN Meds:sodium chloride, acetaminophen, albuterol sulfate, alteplase, heparin, porcine (PF), lidocaine-prilocaine, morphine, olanzapine zydis, ondansetron, polyethylene glycol    Review of Systems   Constitutional: Positive for fever.     Objective:     Vital Signs (Most Recent):  Temp: 99.1 °F (37.3 °C) (05/21/18 0600)  Pulse: 107 (05/21/18 0401)  Resp: 20 (05/21/18 0401)  BP: 136/62 (05/21/18 0401)  SpO2: 98 % (05/21/18 0401) Vital Signs (24h Range):  Temp:  [98.2 °F (36.8 °C)-101 °F (38.3 °C)] 99.1 °F (37.3  °C)  Pulse:  [] 107  Resp:  [20-24] 20  SpO2:  [97 %-100 %] 98 %  BP: (104-136)/(56-68) 136/62     Patient Vitals for the past 72 hrs (Last 3 readings):   Weight   05/20/18 2000 78.6 kg (173 lb 4.5 oz)   05/19/18 1945 78.4 kg (172 lb 13.5 oz)   05/18/18 1935 78.4 kg (172 lb 13.5 oz)     Body mass index is 27.97 kg/m².    Intake/Output - Last 3 Shifts       05/19 0700 - 05/20 0659 05/20 0700 - 05/21 0659 05/21 0700 - 05/22 0659    P.O. 720 1520     Blood 400 789.6     IV Piggyback 850 300     Total Intake(mL/kg) 1970 (25.1) 2609.6 (33.2)     Net +1970 +2609.6             Urine Occurrence 5 x 5 x           Lines/Drains/Airways     Central Venous Catheter Line                 Port A Cath Double Lumen 05/10/18 0900 left subclavian 10 days                Physical Exam  Constitutional: He appears well-developed and well-nourished. Sleeping on exam.   Head: Normocephalic and atraumatic.   Nose: Nose normal.   Mouth/Throat: Mucous membranes are normal.   Eyes: Conjunctivae and lids are normal.   Neck: Normal range of motion. Neck supple.   Cardiovascular: Normal rate and regular rhythm.    Pulmonary/Chest: Effort normal. No stridor. He has no decreased breath sounds. He has no wheezes. He has no rales.   Abdominal: Soft. Bowel sounds are normal. He exhibits no distension. There is no hepatosplenomegaly. There is no guarding.   Musculoskeletal: Normal range of motion. He exhibits no edema.   Skin: Skin is warm and dry. Capillary refill takes less than 2 seconds. No rash noted. He is not diaphoretic. No erythema. Port CDI.   Psychiatric: Improving delirium.     Significant Labs:  Recent Results (from the past 24 hour(s))   Blood culture    Collection Time: 05/20/18  9:15 PM   Result Value Ref Range    Blood Culture, Routine No Growth to date    Blood culture    Collection Time: 05/20/18  9:27 PM   Result Value Ref Range    Blood Culture, Routine No Growth to date    Reticulocytes    Collection Time: 05/21/18  4:06 AM    Result Value Ref Range    Retic 0.2 (L) 0.4 - 2.0 %   CBC auto differential    Collection Time: 05/21/18  4:06 AM   Result Value Ref Range    WBC 0.23 (LL) 3.90 - 12.70 K/uL    RBC 2.75 (L) 4.60 - 6.20 M/uL    Hemoglobin 8.5 (L) 14.0 - 18.0 g/dL    Hematocrit 23.8 (L) 40.0 - 54.0 %    MCV 87 82 - 98 fL    MCH 30.9 27.0 - 31.0 pg    MCHC 35.7 32.0 - 36.0 g/dL    RDW 14.0 11.5 - 14.5 %    Platelets 9 (LL) 150 - 350 K/uL    MPV 11.5 9.2 - 12.9 fL    Immature Granulocytes 0.0 0.0 - 0.5 %    Gran # (ANC) 0.0 (L) 1.8 - 7.7 K/uL    Immature Grans (Abs) 0.00 0.00 - 0.04 K/uL    Lymph # 0.2 (L) 1.0 - 4.8 K/uL    Mono # 0.0 (L) 0.3 - 1.0 K/uL    Eos # 0.0 0.0 - 0.5 K/uL    Baso # 0.00 0.00 - 0.20 K/uL    nRBC 0 0 /100 WBC    Gran% 8.7 (L) 38.0 - 73.0 %    Lymph% 87.0 (H) 18.0 - 48.0 %    Mono% 4.3 4.0 - 15.0 %    Eosinophil% 0.0 0.0 - 8.0 %    Basophil% 0.0 0.0 - 1.9 %    Platelet Estimate Decreased (A)     Aniso Slight     Differential Method Automated    Comprehensive metabolic panel    Collection Time: 05/21/18  4:06 AM   Result Value Ref Range    Sodium 137 136 - 145 mmol/L    Potassium 3.4 (L) 3.5 - 5.1 mmol/L    Chloride 105 95 - 110 mmol/L    CO2 24 23 - 29 mmol/L    Glucose 102 70 - 110 mg/dL    BUN, Bld 7 6 - 20 mg/dL    Creatinine 0.6 0.5 - 1.4 mg/dL    Calcium 8.4 (L) 8.7 - 10.5 mg/dL    Total Protein 5.4 (L) 6.0 - 8.4 g/dL    Albumin 2.5 (L) 3.5 - 5.2 g/dL    Total Bilirubin 0.7 0.1 - 1.0 mg/dL    Alkaline Phosphatase 64 55 - 135 U/L    AST 10 10 - 40 U/L    ALT 45 (H) 10 - 44 U/L    Anion Gap 8 8 - 16 mmol/L    eGFR if African American >60.0 >60 mL/min/1.73 m^2    eGFR if non African American >60.0 >60 mL/min/1.73 m^2   Magnesium    Collection Time: 05/21/18  4:06 AM   Result Value Ref Range    Magnesium 1.7 1.6 - 2.6 mg/dL   Phosphorus    Collection Time: 05/21/18  4:06 AM   Result Value Ref Range    Phosphorus 4.4 2.7 - 4.5 mg/dL       Significant Imaging:   none    Assessment/Plan:     Neuro   Acute delirium     Delirium: currently CEC, improving.  - Per Psych recs: 1mg Risperdal qAM and 3mg qHS  - RPR NR  - Olanzapine PRN  - EKG complete on 5/14, 5/15, 5/16. QTc ranging from 437-441.   - Avoid Benadryl per Psych  - Continue frequent reorientation and attempt to encourage patient to sleep.   - Psych following, appreciate recs on outpatient therapy and follow up. Patient lives in [place].        ID   * Bacteremia    Patient with Strep mitis sepsis. Bcx from 5/2 growing organism, subsequent cultures until 5/19 growing GPC in chains resembling strep from outer lumen of port. Tmax 101F overnight, resolved with tylenol x1. Recultured both lumens of port.    - Continue Cefepime 2g Q8h  - Continue Vancomycin 1250 g Q8h, vanc trough 14.3 on 5/20. Will plan to follow trough every 3 days.  - Follow up blood cultures, speciation and sensitivities  - monitor fever curve  - repeat blood culture both port lumens this AM          Immunology/Multi System   Immunosuppressed due to chemotherapy    Immunosuppression 2/2 to chemotherapy:  - Continue acyclovir ppx  - Continue posaconazole 400mg BID- therapeutic dosing.   - Continue bactrim QFSaSu ppx  - Continue peridex ppx          Oncology   Neutropenia    - ANC still 0  - Advance soft mechanical diet  - Continue PRN zofran        AML (acute myeloblastic leukemia)    Hema is a 17 yr young man with AML (t 8;21) here for chemotherapy. On Intensification therapy II following [id number] (Arm A). He was admitted on 4/30/2018 for neutropenia/profund sepsis risk. Stepped up to the PICU for persistent fever >103, tachycardia, and hypotension that was been minimally responsive to fluid resuscitation. PICU stay was complicated by delirium. Currently CEC'd.      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 MRD negative after Induction I. Today is Day 32.  - Treating per COG [id number] (ARM A).  S/p Intensification I and Intensification II started.   - BM biopsy at start of  Intensification shows CR.  FISH for t(8;21) negative. Will repeat BM biopsy pending count recovery and clinical improvement               Antineoplastic chemotherapy induced pancytopenia    Chemotherapy induced neutropenia:   - Hgb goal >7, s/p 2 units PRBC yesterday  - Plts goal >10, platelets 9 today. Transfuse 1 unit platelet today, no benadryl premed.   - Daily CBC and reticulocyte  - CMP EOD  - If bleeding at all, please transfuse platelets x1 unit. No need to check CBC, evaluate clinically.                   Anticipated Disposition: Home or Self Care    Ayanna Ramirez DO  Pediatric Hospital Medicine   [facility]

## 2018-05-21 NOTE — SUBJECTIVE & OBJECTIVE
Interval History: Febrile to 100.9, cultured lines. Started Cefepime.     Scheduled Meds:   acyclovir  400 mg Oral BID    cefepime in dextrose 5 %  2 g Intravenous Q8H    melatonin  10 mg Oral QHS    posaconazole  300 mg Oral Daily    risperiDONE  1 mg Oral Daily    risperiDONE  3 mg Oral QHS    sulfamethoxazole-trimethoprim 800-160mg  1 tablet Oral twice daily on Friday, Saturday, Sunday    vancomycin (VANCOCIN) IVPB  1,250 mg Intravenous Q8H     Continuous Infusions:  PRN Meds:sodium chloride, acetaminophen, albuterol sulfate, alteplase, heparin, porcine (PF), lidocaine-prilocaine, morphine, olanzapine zydis, ondansetron, polyethylene glycol    Review of Systems   Constitutional: Positive for fever.     Objective:     Vital Signs (Most Recent):  Temp: (!) 101 °F (38.3 °C) (05/20/18 2039)  Pulse: 109 (05/20/18 2039)  Resp: (!) 24 (05/20/18 2039)  BP: 126/61 (05/20/18 2039)  SpO2: 99 % (05/20/18 2039) Vital Signs (24h Range):  Temp:  [98.2 °F (36.8 °C)-101 °F (38.3 °C)] 101 °F (38.3 °C)  Pulse:  [] 109  Resp:  [20-24] 24  SpO2:  [98 %-100 %] 99 %  BP: (101-132)/(53-68) 126/61     Patient Vitals for the past 72 hrs (Last 3 readings):   Weight   05/19/18 1945 78.4 kg (172 lb 13.5 oz)   05/18/18 1935 78.4 kg (172 lb 13.5 oz)   05/17/18 2129 77.7 kg (171 lb 4.8 oz)     Body mass index is 27.9 kg/m².    Intake/Output - Last 3 Shifts       05/18 0700 - 05/19 0659 05/19 0700 - 05/20 0659 05/20 0700 - 05/21 0659    P.O.     I.V. (mL/kg) 206 (2.6)      Blood  400 789.6    IV Piggyback 750 850 300    Total Intake(mL/kg) 1196 (15.3) 1970 (25.1) 2129.6 (27.2)    Net +1196 +1970 +2129.6           Urine Occurrence 4 x 5 x 4 x          Lines/Drains/Airways     Central Venous Catheter Line                 Port A Cath Double Lumen 05/10/18 0900 left subclavian 10 days                Physical Exam    Significant Labs:  Recent Results (from the past 24 hour(s))   Blood culture    Collection Time: 05/19/18  10:07 PM   Result Value Ref Range    Blood Culture, Routine No Growth to date    Blood culture    Collection Time: 05/19/18 10:07 PM   Result Value Ref Range    Blood Culture, Routine No Growth to date    Reticulocytes    Collection Time: 05/20/18  6:18 AM   Result Value Ref Range    Retic 0.0 (L) 0.4 - 2.0 %   CBC auto differential    Collection Time: 05/20/18  6:18 AM   Result Value Ref Range    WBC 0.18 (LL) 3.90 - 12.70 K/uL    RBC 2.11 (L) 4.60 - 6.20 M/uL    Hemoglobin 6.6 (L) 14.0 - 18.0 g/dL    Hematocrit 18.7 (LL) 40.0 - 54.0 %    MCV 89 82 - 98 fL    MCH 31.3 (H) 27.0 - 31.0 pg    MCHC 35.3 32.0 - 36.0 g/dL    RDW 14.4 11.5 - 14.5 %    Platelets 14 (LL) 150 - 350 K/uL    MPV 13.0 (H) 9.2 - 12.9 fL    Immature Granulocytes 0.0 0.0 - 0.5 %    Gran # (ANC) 0.0 (L) 1.8 - 7.7 K/uL    Immature Grans (Abs) 0.00 0.00 - 0.04 K/uL    Lymph # 0.2 (L) 1.0 - 4.8 K/uL    Mono # 0.0 (L) 0.3 - 1.0 K/uL    Eos # 0.0 0.0 - 0.5 K/uL    Baso # 0.00 0.00 - 0.20 K/uL    nRBC 0 0 /100 WBC    Gran% 5.5 (L) 38.0 - 73.0 %    Lymph% 88.9 (H) 18.0 - 48.0 %    Mono% 5.6 4.0 - 15.0 %    Eosinophil% 0.0 0.0 - 8.0 %    Basophil% 0.0 0.0 - 1.9 %    Platelet Estimate Decreased (A)     Differential Method Automated    Vancomycin, random    Collection Time: 05/20/18  6:18 AM   Result Value Ref Range    Vancomycin, Random 14.3 Not established ug/mL       Significant Imaging:   none

## 2018-05-21 NOTE — ASSESSMENT & PLAN NOTE
Chemotherapy induced neutropenia:   - Hgb goal >7, transfuse 2 units PRBC today  - Plts goal >10.  - Daily CBC and reticulocyte  - CMP EOD  - If bleeding at all, please transfuse platelets x1 unit. No need to check CBC, evaluate clinically.

## 2018-05-21 NOTE — PROGRESS NOTES
"[facility]  Psychiatry  Progress Note    Patient Name: Hema Kuo  MRN: [id number]   Code Status: Prior  Admission Date: 4/30/2018  Hospital Length of Stay: 21 days  Expected Discharge Date: 5/28/2018  Attending Physician: Christian Emerson MD  Primary Care Provider: Ann Reyna NP    Current Legal Status: CEC      Subjective:     Principal Problem:Bacteremia    Chief Complaint: delirium    HPI: Patient is a 18 y/o man with PMH AML dx 2017 s/p 4 cycles of chemotherapy admitted to pediatrics team with Strep mitis sepsis and ARDS. Psychiatry was consulted for "paranoia, now CEC'd, possible ICU delirium."     Per staff MD:  "17 year old young man with AML s/p 4 cycles of chemotherapy now transferred from the  PICU with Strep mitis sepsis and ARDS.     For his AML we are awaiting count recovery.  ANC next to nothing still.   For his Strep sepsis, he is on cefepime and vancomycin.  Cultures from 5/2 grew Strep mitis (sensitive to vanc).  Subsequent cultures negative.    Will cont a 14 day course of vanc.        .  For his chemotherapy induced pancytopenia, recommend transfusion for hemoglobin under 7 and platelets under 10 K.  No transfusions today.    For his ARDS will get one last dose of lasix today and then we will stop.  He continues to have paranoid delerium since extubation, however this is improving.  Will cont seroquel with prn zyprexa.  Will have psych see tomorrow.   Still needs a 1:1 sitter."    Per RN note 5/14:  "Pt oriented x4. Pt paranoid and anxious but cooperative. Very slow to proccess what's being communicated to him and very slow to respond. Similar to last nights shift, it took patient a little while for him to take his medications, but after much convincing, pt took them with no problem. Pt making death statements throughout night and still believes he is dying and nurses are out to get him. Double lumen left chest port in place, flushes well, blood return noted from each " "lumen. All medications/antibitoics given as scheduled. Labs drawn in am. Critical results resulted, MD notified and labs redrawn. Pt did sleep for about 6 hours tonight."    Per RN note 5/13:  "Pt oriented to person, self, time, but disoriented to place. Pt stated he was [facility] die. Pt paranoid and anxious but cooperative. Took him a little time (about 25 minutes) to take his medications, but after time of explaining to him what the medications were and why they are needed, pt took them. Pt stated he believes "the nurses are trying to burn" him and that we all hate him and want him to die."    On interview, patient with sitter at bedside and uneaten breakfast tray in front of him. Interview was limited as his answers to questions are impoverished and inappropriate. He asked interviewer "are you my biological father?" And stated "I think I'm dead." Was able to state his reason for admission was "cancer" but did not respond to further questions about his care.     Per iglesia, patient slept overnight and has had limited appetite. Has been paranoid but not physically aggressive. Minimally talkative with her. Says patient's mother is currently at a doctor's appointment but will return to hospital this afternoon.    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 2 sodas/day         Hospital Course: 05/15/2018: Per chart, patient received PRN Zyprexa @ 0009 for insomnia. Per iglesia, " "patient slightly less paranoid this morning, ate most of breakfast and played games with her. On interview, no family at bedside, patient states he is feeling like himself. Still making statements about believing his is dead but more redirectable. Mentioned his nosebleed was because there is "something inside" him besides cancer. Overall more conversational and less paranoid this morning.  05/16/2018: Per chart, no PRN zyprexa given, per RN: "Pt has flat affect and has delayed response of understanding. Pt cooperative with vitals. He states " should never been born" and he also states "ya'll just keeping me alive". He also had some other spontaneous statements that did not make sense. Pt vss, afebrile with tmax of 99.6, no distress noted. Pt did go to sleep around midnight but moaned and cried throughout the night per sitter." On interview, patient again responds to select questions and remains paranoid, will slight improvement. Says he's "either fully alive or fully dead" when asked but does not spontaneously state he is dead. Refused to participate in [facility] but oriented to location, year and month.  05/17/2018: Per chart, no PRN zyprexa given, per RN continued to make paranoid statements about being alive, worked well with PT/OT yesterday. Asleep on interview. Per iglesia, remembered her from earlier in the week, has not made any statements this morning so far about being dead.  05/18/2018: Per chart, patient agitated and confused overnight, attempting to disconnect IV and port. Received Zyprexa 5 mg PO PRN @ 0147. Patient asleep on interview. Per juan ramonter did not fall asleep until 4 am. Ate a little breakfast then fell back asleep. No issues with agitation this AM.   05/20/2018: Per RN note: "Mother and step-father visited in evening, updated on plan of care, pleased about pt's continued progress toward baseline mood/personality."  05/21/2018: Per chart, no issues with agitation overnight, no PRN Zyprexa given. " "On interview patient with less paranoid thought content, no longer believes nurses are trying to harm him, states he knows he is alive and remembers last week feeling like he wasn't. Denies AVH. C/o poor sleep overnight 2/2 fever.      Interval History: see hospital course.     Family History     Problem Relation (Age of Onset)    Cancer Mother    Heart disease Mother    No Known Problems Father        Social History Main Topics    Smoking status: Former Smoker     Packs/day: 0.50     Types: Cigarettes     Quit date: 10/30/2017    Smokeless tobacco: Never Used    Alcohol use Yes      Comment: rare occasions    Drug use: No    Sexual activity: Yes     Partners: Female     Psychotherapeutics     Start     Stop Route Frequency Ordered    05/18/18 2100  risperiDONE disintegrating tablet 3 mg      -- Oral Nightly 05/18/18 0819 05/18/18 0900  risperiDONE disintegrating tablet 1 mg      -- Oral Daily 05/18/18 0819    05/15/18 0847  olanzapine zydis disintegrating tablet 5 mg      -- Oral As needed (PRN) 05/15/18 0848           Review of Systems    Objective:     Vital Signs (Most Recent):  Temp: 99.8 °F (37.7 °C) (05/21/18 0834)  Pulse: 102 (05/21/18 0834)  Resp: 16 (05/21/18 0834)  BP: (!) 105/52 (05/21/18 0834)  SpO2: 99 % (05/21/18 0834) Vital Signs (24h Range):  Temp:  [98.2 °F (36.8 °C)-101 °F (38.3 °C)] 99.8 °F (37.7 °C)  Pulse:  [] 102  Resp:  [16-24] 16  SpO2:  [97 %-99 %] 99 %  BP: (105-136)/(52-68) 105/52     Height: 5' 6" (167.6 cm)  Weight: 78.6 kg (173 lb 4.5 oz)  Body mass index is 27.97 kg/m².      Intake/Output Summary (Last 24 hours) at 05/21/18 1051  Last data filed at 05/21/18 0100   Gross per 24 hour   Intake          2039.58 ml   Output                0 ml   Net          2039.58 ml       Physical Exam        Mental Status Exam:  Appearance: age appropriate, lying in bed  Behavior/Cooperation: improved cooperation, psychomotor retardation, intense eye contact   Speech: slow, low volume, " "monotone, delayed, increased latency of response  Language: grossly intact, able to name, able to repeat with spontaneous speech  Mood: "okay"  Affect:  blunted  Thought Process: linear  Thought Content: no suicidality, no homicidality, delusions, or paranoia  Sensorium:  Awake and alert  Alert and Oriented: x3 person, place, situation, time/date, day of week, month of year, year  Memory: 3/3 immediate, 2/3 at 5 minutes               Recent:  Intact; able to report recent events              Remote: Intact; remembers past events/individuals  Attention/concentration: w-o-r-l-d; d-l-r-o-w; SAVEAHAART with 0 erros  Insight:  impaired, improving  Judgment: appropriate to situation    Significant Labs:   Last 24 Hours:   Recent Lab Results       05/21/18 0406 05/20/18 2127 05/20/18 2115      Immature Granulocytes 0.0       Immature Grans (Abs) 0.00  Comment:  Mild elevation in immature granulocytes is non specific and   can be seen in a variety of conditions including stress response,   acute inflammation, trauma and pregnancy. Correlation with other   laboratory and clinical findings is essential.         Albumin 2.5(L)       Alkaline Phosphatase 64       ALT 45(H)       Anion Gap 8       Aniso Slight       AST 10       Baso # 0.00       Basophil% 0.0       Total Bilirubin 0.7  Comment:  For infants and newborns, interpretation of results should be based  on gestational age, weight and in agreement with clinical  observations.  Premature Infant recommended reference ranges:  Up to 24 hours.............<8.0 mg/dL  Up to 48 hours............<12.0 mg/dL  3-5 days..................<15.0 mg/dL  6-29 days.................<15.0 mg/dL         Blood Culture, Routine  No Growth to date[P] No Growth to date[P]     BUN, Bld 7       Calcium 8.4(L)       Chloride 105       CO2 24       Creatinine 0.6       Differential Method Automated       eGFR if  >60.0       eGFR if non African American " >60.0  Comment:  Calculation used to obtain the estimated glomerular filtration  rate (eGFR) is the CKD-EPI equation.          Eos # 0.0       Eosinophil% 0.0       Glucose 102       Gran # (ANC) 0.0(L)       Gran% 8.7(L)       Hematocrit 23.8(L)       Hemoglobin 8.5(L)       Lymph # 0.2(L)       Lymph% 87.0(H)       Magnesium 1.7       MCH 30.9       MCHC 35.7       MCV 87       Mono # 0.0(L)       Mono% 4.3       MPV 11.5       nRBC 0       Phosphorus 4.4       Platelet Estimate Decreased(A)       Platelets 9  Comment:  Preliminary Platelet critical result(s) called and verbal readback   obtained from Dominick Pritchard RN, result confirmed., 05/21/2018 06:10  (LL)       Potassium 3.4(L)       Total Protein 5.4(L)       RBC 2.75(L)       RDW 14.0       Retic 0.2(L)       Sodium 137       WBC 0.23  Comment:  wbc  critical result(s) called and verbal readback obtained from   dominick pritchard rn, 05/21/2018 04:28  (LL)             Significant Imaging: None    Assessment/Plan:     Acute delirium    Patient without significant past psychiatric history has been exhibiting waxing and waning mental status, disorientation and paranoid delusions since being extubated on 5/9 consistent with delirium. Continue scheduled Risperdal M tabs 1 mg qAM and 3 mg qHS. Continue Zyprexa 5 mg PO/IM q8 PRN nonredirectable agitation. QTc on 5/16 467. Continue checking perioidic EKG to monitor QTc. Continue to monitor CBC for any worsening leukopenia/neutropenia with antipsychotic medications.  · DELIRIUM BEHAVIOR MANAGEMENT  · PLEASE utilize CHEMICAL restraints with PRN meds first for agitation. Minimize use of PHYSICAL restraints  · Keep window shades open and room lit during day and room dim at night in order to promote normal sleep-wake cycles  · Encourage family at bedside. [place] patient often to situation, location, date.  · Continue to Limit or Discontinue use of Narcotics, Benzos and Anti-cholinergic (Avoid Benadryl) medications as  they may worsen delirium.   · Continue medical workup for causative etiology of Delirium. Recommend CT head with and without contrast if possible to assess for any acute intracranial processes and repeat CXR to rule out additional infectious process. Recommend check amylase, lipase, B12, folate, HIV, RPR.               Total time:  15 with greater than 50% of this time spent in counseling and/or coordination of care.       Clair Taylor MD   Psychiatry  [facility]

## 2018-05-21 NOTE — ASSESSMENT & PLAN NOTE
Patient with Strep mitis sepsis. Bcx from 5/2 growing organism, subsequent cultures until 5/19 growing GPC in chains resembling strep from outer lumen of port. Tmax 101F overnight, resolved with tylenol x1. Recultured both lumens of port.    - Continue Cefepime 2g Q8h  - Continue Vancomycin 1250 g Q8h, vanc trough 14.3 on 5/20. Will plan to follow trough every 3 days.  - Follow up blood cultures, speciation and sensitivities  - monitor fever curve  - repeat blood culture both port lumens this AM

## 2018-05-22 LAB
ABO + RH BLD: NORMAL
ANISOCYTOSIS BLD QL SMEAR: SLIGHT
BASOPHILS # BLD AUTO: 0 K/UL
BASOPHILS NFR BLD: 0 %
BLD GP AB SCN CELLS X3 SERPL QL: NORMAL
DACRYOCYTES BLD QL SMEAR: ABNORMAL
DIFFERENTIAL METHOD: ABNORMAL
EOSINOPHIL # BLD AUTO: 0 K/UL
EOSINOPHIL NFR BLD: 0 %
ERYTHROCYTE [DISTWIDTH] IN BLOOD BY AUTOMATED COUNT: 14.1 %
HCT VFR BLD AUTO: 27.4 %
HGB BLD-MCNC: 9.7 G/DL
HYPOCHROMIA BLD QL SMEAR: ABNORMAL
IMM GRANULOCYTES # BLD AUTO: 0 K/UL
IMM GRANULOCYTES NFR BLD AUTO: 0 %
LYMPHOCYTES # BLD AUTO: 0.1 K/UL
LYMPHOCYTES NFR BLD: 87.5 %
MCH RBC QN AUTO: 30.8 PG
MCHC RBC AUTO-ENTMCNC: 35.4 G/DL
MCV RBC AUTO: 87 FL
MONOCYTES # BLD AUTO: 0 K/UL
MONOCYTES NFR BLD: 6.3 %
NEUTROPHILS # BLD AUTO: 0 K/UL
NEUTROPHILS NFR BLD: 6.2 %
NRBC BLD-RTO: 0 /100 WBC
OVALOCYTES BLD QL SMEAR: ABNORMAL
PLATELET # BLD AUTO: 14 K/UL
PMV BLD AUTO: 11 FL
POIKILOCYTOSIS BLD QL SMEAR: SLIGHT
POLYCHROMASIA BLD QL SMEAR: ABNORMAL
RBC # BLD AUTO: 3.15 M/UL
RETICS/RBC NFR AUTO: 0.3 %
WBC # BLD AUTO: 0.16 K/UL

## 2018-05-22 PROCEDURE — 63600175 PHARM REV CODE 636 W HCPCS: Performed by: STUDENT IN AN ORGANIZED HEALTH CARE EDUCATION/TRAINING PROGRAM

## 2018-05-22 PROCEDURE — 25000003 PHARM REV CODE 250: Performed by: STUDENT IN AN ORGANIZED HEALTH CARE EDUCATION/TRAINING PROGRAM

## 2018-05-22 PROCEDURE — 11300000 HC PEDIATRIC PRIVATE ROOM

## 2018-05-22 PROCEDURE — 86920 COMPATIBILITY TEST SPIN: CPT

## 2018-05-22 PROCEDURE — 85025 COMPLETE CBC W/AUTO DIFF WBC: CPT

## 2018-05-22 PROCEDURE — 85045 AUTOMATED RETICULOCYTE COUNT: CPT

## 2018-05-22 PROCEDURE — 63600175 PHARM REV CODE 636 W HCPCS: Performed by: PEDIATRICS

## 2018-05-22 PROCEDURE — 25500020 PHARM REV CODE 255: Performed by: PEDIATRICS

## 2018-05-22 PROCEDURE — 97530 THERAPEUTIC ACTIVITIES: CPT

## 2018-05-22 PROCEDURE — 97116 GAIT TRAINING THERAPY: CPT

## 2018-05-22 PROCEDURE — 86901 BLOOD TYPING SEROLOGIC RH(D): CPT

## 2018-05-22 PROCEDURE — 99233 SBSQ HOSP IP/OBS HIGH 50: CPT | Mod: ,,, | Performed by: PEDIATRICS

## 2018-05-22 PROCEDURE — 36415 COLL VENOUS BLD VENIPUNCTURE: CPT

## 2018-05-22 PROCEDURE — 25000003 PHARM REV CODE 250: Performed by: PSYCHIATRY & NEUROLOGY

## 2018-05-22 RX ADMIN — ACYCLOVIR 400 MG: 200 CAPSULE ORAL at 10:05

## 2018-05-22 RX ADMIN — CEFEPIME HYDROCHLORIDE 2 G: 2 INJECTION, SOLUTION INTRAVENOUS at 10:05

## 2018-05-22 RX ADMIN — Medication 1250 MG: at 11:05

## 2018-05-22 RX ADMIN — IOHEXOL 75 ML: 350 INJECTION, SOLUTION INTRAVENOUS at 09:05

## 2018-05-22 RX ADMIN — RISPERIDONE 3 MG: 1 TABLET, ORALLY DISINTEGRATING ORAL at 09:05

## 2018-05-22 RX ADMIN — ACETAMINOPHEN, DIPHENHYDRAMINE HCL, PHENYLEPHRINE HCL 10 MG: 325; 25; 5 TABLET ORAL at 09:05

## 2018-05-22 RX ADMIN — ACYCLOVIR 400 MG: 200 CAPSULE ORAL at 09:05

## 2018-05-22 RX ADMIN — Medication 1250 MG: at 03:05

## 2018-05-22 RX ADMIN — RISPERIDONE 1 MG: 1 TABLET, ORALLY DISINTEGRATING ORAL at 10:05

## 2018-05-22 RX ADMIN — CEFEPIME HYDROCHLORIDE 2 G: 2 INJECTION, SOLUTION INTRAVENOUS at 05:05

## 2018-05-22 RX ADMIN — Medication 500 UNITS: at 08:05

## 2018-05-22 RX ADMIN — POSACONAZOLE 300 MG: 100 TABLET, COATED ORAL at 10:05

## 2018-05-22 RX ADMIN — Medication 1250 MG: at 06:05

## 2018-05-22 RX ADMIN — CEFEPIME HYDROCHLORIDE 2 G: 2 INJECTION, SOLUTION INTRAVENOUS at 03:05

## 2018-05-22 NOTE — SUBJECTIVE & OBJECTIVE
Interval History: NAEO, VSS. Remained afebrile.     Scheduled Meds:   acyclovir  400 mg Oral BID    cefepime in dextrose 5 %  2 g Intravenous Q8H    melatonin  10 mg Oral QHS    posaconazole  300 mg Oral Daily    risperiDONE  1 mg Oral Daily    risperiDONE  3 mg Oral QHS    sulfamethoxazole-trimethoprim 800-160mg  1 tablet Oral twice daily on Friday, Saturday, Sunday    vancomycin (VANCOCIN) IVPB  1,250 mg Intravenous Q8H     Continuous Infusions:  PRN Meds:sodium chloride, acetaminophen, albuterol sulfate, alteplase, heparin, porcine (PF), lidocaine-prilocaine, morphine, olanzapine zydis, ondansetron, polyethylene glycol    Review of Systems   Constitutional: Negative for fever.   Respiratory: Negative for cough.      Objective:     Vital Signs (Most Recent):  Temp: 98.3 °F (36.8 °C) (05/22/18 0400)  Pulse: 100 (05/22/18 0400)  Resp: 20 (05/22/18 0400)  BP: 132/62 (05/21/18 2312)  SpO2: 98 % (05/21/18 2312) Vital Signs (24h Range):  Temp:  [98.3 °F (36.8 °C)-98.8 °F (37.1 °C)] 98.3 °F (36.8 °C)  Pulse:  [] 100  Resp:  [16-26] 20  SpO2:  [98 %-100 %] 98 %  BP: (130-142)/(62-80) 132/62     Patient Vitals for the past 72 hrs (Last 3 readings):   Weight   05/22/18 0600 78.4 kg (172 lb 13.5 oz)   05/20/18 2000 78.6 kg (173 lb 4.5 oz)   05/19/18 1945 78.4 kg (172 lb 13.5 oz)     Body mass index is 27.9 kg/m².    Intake/Output - Last 3 Shifts       05/20 0700 - 05/21 0659 05/21 0700 - 05/22 0659 05/22 0700 - 05/23 0659    P.O. 1520 1160     Blood 789.6 208     IV Piggyback 300 1000     Total Intake(mL/kg) 2609.6 (33.2) 2368 (30.2)     Net +2609.6 +2368             Urine Occurrence 5 x 5 x     Stool Occurrence  1 x           Lines/Drains/Airways     Central Venous Catheter Line                 Port A Cath Double Lumen 05/10/18 0900 left subclavian 12 days                Physical Exam  Constitutional: He appears well-developed and well-nourished. Sleeping on exam.   Head: Normocephalic and atraumatic.   Nose:  Nose normal.   Mouth/Throat: Mucous membranes are normal.   Eyes: Conjunctivae and lids are normal.   Neck: Normal range of motion. Neck supple.   Cardiovascular: Normal rate and regular rhythm.    Pulmonary/Chest: Effort normal. No stridor. He has no decreased breath sounds. He has no wheezes. He has no rales.   Abdominal: Soft. Bowel sounds are normal. He exhibits no distension. There is no hepatosplenomegaly. There is no guarding.   Musculoskeletal: Normal range of motion. He exhibits no edema.   Skin: Skin is warm and dry. Capillary refill takes less than 2 seconds. No rash noted. He is not diaphoretic. No erythema. Port CDI.     Significant Labs:  Recent Results (from the past 24 hour(s))   Blood culture    Collection Time: 05/21/18 10:57 AM   Result Value Ref Range    Blood Culture, Routine No Growth to date    Blood culture    Collection Time: 05/21/18 10:57 AM   Result Value Ref Range    Blood Culture, Routine No Growth to date    Reticulocytes    Collection Time: 05/22/18  5:52 AM   Result Value Ref Range    Retic 0.3 (L) 0.4 - 2.0 %   CBC auto differential    Collection Time: 05/22/18  5:52 AM   Result Value Ref Range    WBC 0.16 (LL) 3.90 - 12.70 K/uL    RBC 3.15 (L) 4.60 - 6.20 M/uL    Hemoglobin 9.7 (L) 14.0 - 18.0 g/dL    Hematocrit 27.4 (L) 40.0 - 54.0 %    MCV 87 82 - 98 fL    MCH 30.8 27.0 - 31.0 pg    MCHC 35.4 32.0 - 36.0 g/dL    RDW 14.1 11.5 - 14.5 %    Platelets 14 (LL) 150 - 350 K/uL    MPV 11.0 9.2 - 12.9 fL    Immature Granulocytes 0.0 0.0 - 0.5 %    Gran # (ANC) 0.0 (L) 1.8 - 7.7 K/uL    Immature Grans (Abs) 0.00 0.00 - 0.04 K/uL    Lymph # 0.1 (L) 1.0 - 4.8 K/uL    Mono # 0.0 (L) 0.3 - 1.0 K/uL    Eos # 0.0 0.0 - 0.5 K/uL    Baso # 0.00 0.00 - 0.20 K/uL    nRBC 0 0 /100 WBC    Gran% 6.2 (L) 38.0 - 73.0 %    Lymph% 87.5 (H) 18.0 - 48.0 %    Mono% 6.3 4.0 - 15.0 %    Eosinophil% 0.0 0.0 - 8.0 %    Basophil% 0.0 0.0 - 1.9 %    Aniso Slight     Poik Slight     Poly Occasional     Hypo  Occasional     Ovalocytes Occasional     Tear Drop Cells Occasional     Differential Method Automated    Type & Screen    Collection Time: 05/22/18  5:52 AM   Result Value Ref Range    Group & Rh AB POS     Indirect Nathan NEG        Significant Imaging:   none

## 2018-05-22 NOTE — ASSESSMENT & PLAN NOTE
Chemotherapy induced neutropenia:   - Hgb goal >7, Hgb 9.7.   - Plts goal >10, platelets 14 today.    - Daily CBC and reticulocyte  - CMP EOD  - If bleeding at all, please transfuse platelets x1 unit. No need to check CBC, evaluate clinically.

## 2018-05-22 NOTE — PROGRESS NOTES
Nutrition Assessment    Dx: Neutropenia    Weight: 78.4kg  Height: 167.6cm  BMI: 27    Percentiles   Weight/Age: 90%  Height/Age: 10%  BMI/Age: 96%    Estimated Needs:  2200kcals (25kcal/kg)  105-123g protein (1.2-1.4g/kg protein)  1mL/kcal    Diet: Regular    Meds: reviewed  Labs: K 3.4, Ca 8.4    24 hr I/Os:   Total intake: 2368mL (30.2mL/kg)  +I/O    Nutrition Hx: RN reports pt with varied PO intake. Does well with lunch - consumed cheeseburger yesterday and was requesting this again today. Did not eat breakfast this AM and dinner is hit or miss. Noted wt loss since admit, but stable wt since 5/14.     Nutrition Diagnosis: Inadequate energy intake r/t decreased ability to consume adequate energy AEB intake less than estimated energy needs - improving.     Intervention:   1. Continue current diet as tolerated. Encourage PO intake.    -May consider Boost Plus TID if PO <50%.     2. Weights daily, lengths weekly     Goal: Pt to meet % EEN and EPN - continuing.   Monitor: TF provision/tolerance, wts, labs  1X/week    Nutrition Discharge Planning: Unclear at this time.

## 2018-05-22 NOTE — SUBJECTIVE & OBJECTIVE
"Interval History: see hospital course.     Family History     Problem Relation (Age of Onset)    Cancer Mother    Heart disease Mother    No Known Problems Father        Social History Main Topics    Smoking status: Former Smoker     Packs/day: 0.50     Types: Cigarettes     Quit date: 10/30/2017    Smokeless tobacco: Never Used    Alcohol use Yes      Comment: rare occasions    Drug use: No    Sexual activity: Yes     Partners: Female     Psychotherapeutics     Start     Stop Route Frequency Ordered    05/18/18 2100  risperiDONE disintegrating tablet 3 mg      -- Oral Nightly 05/18/18 0819    05/18/18 0900  risperiDONE disintegrating tablet 1 mg      -- Oral Daily 05/18/18 0819    05/15/18 0847  olanzapine zydis disintegrating tablet 5 mg      -- Oral As needed (PRN) 05/15/18 0848           Review of Systems    Objective:     Vital Signs (Most Recent):  Temp: 96.9 °F (36.1 °C) (05/22/18 1028)  Pulse: 86 (05/22/18 1028)  Resp: 18 (05/22/18 1028)  BP: (!) 127/56 (05/22/18 1028)  SpO2: 99 % (05/22/18 1028) Vital Signs (24h Range):  Temp:  [96.9 °F (36.1 °C)-98.8 °F (37.1 °C)] 96.9 °F (36.1 °C)  Pulse:  [] 86  Resp:  [16-26] 18  SpO2:  [98 %-100 %] 99 %  BP: (127-142)/(56-80) 127/56     Height: 5' 6" (167.6 cm)  Weight: 78.4 kg (172 lb 13.5 oz)  Body mass index is 27.9 kg/m².      Intake/Output Summary (Last 24 hours) at 05/22/18 1035  Last data filed at 05/22/18 0540   Gross per 24 hour   Intake             2368 ml   Output                0 ml   Net             2368 ml       Physical Exam        Mental Status Exam:  Appearance: age appropriate, lying in bed  Behavior/Cooperation: improved cooperation, psychomotor retardation, intense eye contact   Speech: slow, low volume, monotone, delayed but improving, increased latency of response  Language: grossly intact, able to name, able to repeat with spontaneous speech  Mood: "okay"  Affect:  blunted  Thought Process: linear  Thought Content: no suicidality, no " homicidality, delusions, or paranoia  Sensorium:  Awake and alert  Alert and Oriented: x3 person, place, situation, time/date, day of week, month of year, year  Memory: 3/3 immediate, 2/3 at 5 minutes   Attention/concentration: SAVEAHAART with 0 erros  Insight:  impaired, improving  Judgment: appropriate to situation    Significant Labs:   Last 24 Hours:   Recent Lab Results       05/22/18  0552 05/21/18  1057      Immature Granulocytes 0.0      Immature Grans (Abs) 0.00  Comment:  Mild elevation in immature granulocytes is non specific and   can be seen in a variety of conditions including stress response,   acute inflammation, trauma and pregnancy. Correlation with other   laboratory and clinical findings is essential.        Aniso Slight      Baso # 0.00      Basophil% 0.0      Blood Culture, Routine  No Growth to date[P]       No Growth to date[P]     Differential Method Automated      Eos # 0.0      Eosinophil% 0.0      Gran # (ANC) 0.0(L)      Gran% 6.2(L)      Group & Rh AB POS      Hematocrit 27.4(L)      Hemoglobin 9.7(L)      Hypo Occasional      INDIRECT LARRY NEG      Lymph # 0.1(L)      Lymph% 87.5(H)      MCH 30.8      MCHC 35.4      MCV 87      Mono # 0.0(L)      Mono% 6.3      MPV 11.0      nRBC 0      Ovalocytes Occasional      Platelets 14  Comment:  plt  critical result(s) called and verbal readback obtained from   nurse denise, 05/22/2018 06:44  (LL)      Poik Slight      Poly Occasional      RBC 3.15(L)      RDW 14.1      Retic 0.3(L)      Tear Drop Cells Occasional      WBC 0.16  Comment:  WBC critical result(s) called and verbal readback obtained from Kelsi Quiñones RN , 05/22/2018 06:31  (LL)            Significant Imaging: None

## 2018-05-22 NOTE — PLAN OF CARE
"In to visit with patient.  Hema stated, "I know you, happy to see you".  Patient was very talkative, although his conversation was slow, it was completely appropriate.  He expressed his sadness and loneliness.  He also stated that he wished he could see his family.  He told me he had been very sick and he did feel like he was getting better.  I explained to him that he was getting better and more coherent.  He was able to answer all of my questions, what day of the wk, what was his nurse's name, did he have siblings and what were there names.  I asked if he would like a calendar for his room and he answered yes.  Will discuss with child life.  Will continue to encourage his family to be with him.  "

## 2018-05-22 NOTE — PLAN OF CARE
"Problem: Patient Care Overview  Goal: Plan of Care Review  Outcome: Ongoing (interventions implemented as appropriate)  Pt has remained stable and afebrile this shift.  Pt remains on IV antibiotics.  Pt affect remained flat this am, but this afternoon pt much more engaging and talkative.  Speech was less delayed and pt asking appropriate questions.  Pt became tearful and asked for his mom.  Pt states "I'm so lonely, and I'm all by myself" "I miss my family", pt also stated "I know sometimes I think people want to hurt me, but I also know that that's really not right".  Pt also states at this time that he knows he gets confused at times "because its like old people who have to go in and out of the hospital all the time....they get confused why they are there".  This writer also asked pt what he thought happens when he seems to shut down.  Pt states "I think I get scared because I'm roldan confused and my mom isn't here".  Reinforced to patient what has gone on during his hospitalization.  Discussed his ICU stay, and confusion thereafter.  Reinforced that he was in fact getting better and that we are waiting on count recovery to be able to go home.  Pt able to verbalize needing an anc of 200 to go home.  Discussed what antibiotics he was getting and the purpose of them. Several staff members went to visit pt this afternoon and pt seems excited to have visitors and familiar faces.  PT able to identify those staff members and able to reminisce of previous hospital stays and experiences      "

## 2018-05-22 NOTE — ASSESSMENT & PLAN NOTE
Delirium: currently CEC, improving.  - Per Psych recs: 1mg Risperdal qAM and 3mg qHS  - RPR NR  - Olanzapine PRN  - EKG complete on 5/14, 5/15, 5/16. QTc ranging from 437-441.   - Avoid Benadryl per Psych  - Continue frequent reorientation and attempt to encourage patient to sleep.   - Psych following, appreciate recs on outpatient therapy and follow up. Patient lives in [place].  - CT head today

## 2018-05-22 NOTE — PLAN OF CARE
05/22/18 1257   Discharge Reassessment   Assessment Type Discharge Planning Reassessment   Provided patient/caregiver education on the expected discharge date and the discharge plan Yes   Do you have any problems affording any of your prescribed medications? Yes   Discharge Plan A Home with family   Discharge Plan B Home with family   Pt remains with sitter at bedside, Ct of head today. Will follow.

## 2018-05-22 NOTE — PT/OT/SLP PROGRESS
"Physical Therapy Treatment    Patient Name:  Hema Kuo   MRN:  [id number]    Recommendations:     Discharge Recommendations:  home health PT   Discharge Equipment Recommendations: none   Barriers to discharge: None    Assessment:     Hema Kuo is a 17 y.o. male admitted with a medical diagnosis of Bacteremia.  He presents with the following impairments/functional limitations:  impaired endurance, impaired self care skills, impaired functional mobilty, gait instability, impaired balance, impaired cognition, decreased safety awareness.   Pt tolerated treatment session well  this afternoon.  Pt displays progressing functional mobility by ambulation total of 620 feet with SBA from therapist. Pt presents with flat affect and delayed response time during conversation with therapist. Pt slow to initiate treatment session but once engaged was a willing participate. Pt family not present during today's treatment session.  Pt educated on continuation of PT POC during pt hospitalization.  Pt verbalizes understanding.  Pt will contiue to benefit from skilled acute PT intervention to address the above listed impairments and progress functional mobility independence.      Rehab Prognosis:  Good; patient would benefit from acute skilled PT services to address these deficits and reach maximum level of function.      Recent Surgery: * No surgery found *      Plan:     During this hospitalization, patient to be seen 6 x/week to address the above listed problems via gait training, therapeutic activities, therapeutic exercises, neuromuscular re-education  · Plan of Care Expires:  06/09/18   Plan of Care Reviewed with: patient    Subjective     Communicated with JULIANN Washington prior to session.  Patient found asleep supine in bed with pt's sitter present upon PT entry to room, agreeable to treatment.      Chief Complaint: Bacteremia  Patient comments/goals: "I want to go back to the room and eat"  Pain/Comfort:  · Pain Rating 1: 0/10  · Pain " Rating Post-Intervention 1: 0/10    Patients cultural, spiritual, Orthodox conflicts given the current situation: Patient has no barriers to learning. Patient verbalizes understanding of his/her program and goals and demonstrates them correctly. No cultural, spiritual or educational needs identified.    Objective:     Patient found with: central line     General Precautions: Standard, fall   Orthopedic Precautions:N/A   Braces: N/A     Functional Mobility:  · Bed Mobility:     · Scooting: modified independence  · Supine to Sit: maximal assistance  · Sit to Supine: independence  · Transfers:     · Sit to Stand:  stand by assistance with no AD  Gait: Pt ambulated total of 620 feet.  Assist Level: SBA  AD Used: None  Gait Pattern: Two point, step through  Gait Deviations: decreased velocity, increased double stance duration  · Impairments due to: fatigue, weakness  · Balance: Pt displays fair balance throughout treatment session and during ambulation bout.  No episodes of loss of balance.    Therapeutic Activities and Exercises:   Pt educated on continuation of PT POC. Pt verbalizes understanding.      Patient left supine with all lines intact, call button in reach, RN notified and pt's sitter present..    GOALS:    Physical Therapy Goals        Problem: Physical Therapy Goal    Goal Priority Disciplines Outcome Goal Variances Interventions   Physical Therapy Goal     PT/OT, PT Ongoing (interventions implemented as appropriate)     Description:  Goals re-assessed by SPT on   Continue goals x1 week (18)    Patient will increase functional independence with mobility by performin. Supine to sit with Stark - MET ()  2. Sit to supine with Stark - MET ()  3. Sit to stand transfer with Stark - MET ()  4. Bed to chair transfer with Stand-by Assistance using least restrictive AD - Not met  5. Gait  x 500 feet with Stand-by Assistance using least restrictive Ad - MET ()  6.  Lower extremity exercise program x30 reps per handout, with supervision - Not met                       Time Tracking:     PT Received On: 05/22/18  PT Start Time: 1323     PT Stop Time: 1350  PT Total Time (min): 27 min     Billable Minutes: Gait Training 8 and Therapeutic Activity 19    Treatment Type: Treatment  PT/PTA: PT           Yassine Andrea, SPT   05/22/2018

## 2018-05-22 NOTE — ASSESSMENT & PLAN NOTE
Patient with Strep mitis sepsis. Bcx from 5/2 growing organism, subsequent cultures until 5/19 growing GPC in chains resembling strep from outer lumen of port (no speciation yet). Subsequent cultures NGTD.     - Continue Cefepime 2g Q8h  - Continue Vancomycin 1250 g Q8h, vanc trough 14.3 on 5/20. Will plan to follow trough every 3 days.  - Follow up blood cultures, speciation and sensitivities  - monitor fever curve

## 2018-05-22 NOTE — PROGRESS NOTES
"[facility]  Psychiatry  Progress Note    Patient Name: Hema Kuo  MRN: [id number]   Code Status: Prior  Admission Date: 4/30/2018  Hospital Length of Stay: 22 days  Expected Discharge Date: 5/28/2018  Attending Physician: Christian Emerson MD  Primary Care Provider: Ann Reyna NP    Current Legal Status: CEC      Subjective:     Principal Problem:Bacteremia    Chief Complaint: delirium    HPI: Patient is a 18 y/o man with PMH AML dx 2017 s/p 4 cycles of chemotherapy admitted to pediatrics team with Strep mitis sepsis and ARDS. Psychiatry was consulted for "paranoia, now CEC'd, possible ICU delirium."     Per staff MD:  "17 year old young man with AML s/p 4 cycles of chemotherapy now transferred from the  PICU with Strep mitis sepsis and ARDS.     For his AML we are awaiting count recovery.  ANC next to nothing still.   For his Strep sepsis, he is on cefepime and vancomycin.  Cultures from 5/2 grew Strep mitis (sensitive to vanc).  Subsequent cultures negative.    Will cont a 14 day course of vanc.        .  For his chemotherapy induced pancytopenia, recommend transfusion for hemoglobin under 7 and platelets under 10 K.  No transfusions today.    For his ARDS will get one last dose of lasix today and then we will stop.  He continues to have paranoid delerium since extubation, however this is improving.  Will cont seroquel with prn zyprexa.  Will have psych see tomorrow.   Still needs a 1:1 sitter."    Per RN note 5/14:  "Pt oriented x4. Pt paranoid and anxious but cooperative. Very slow to proccess what's being communicated to him and very slow to respond. Similar to last nights shift, it took patient a little while for him to take his medications, but after much convincing, pt took them with no problem. Pt making death statements throughout night and still believes he is dying and nurses are out to get him. Double lumen left chest port in place, flushes well, blood return noted from each " "lumen. All medications/antibitoics given as scheduled. Labs drawn in am. Critical results resulted, MD notified and labs redrawn. Pt did sleep for about 6 hours tonight."    Per RN note 5/13:  "Pt oriented to person, self, time, but disoriented to place. Pt stated he was [facility] waiting [facility] die. Pt paranoid and anxious but cooperative. Took him a little time (about 25 minutes) to take his medications, but after time of explaining to him what the medications were and why they are needed, pt took them. Pt stated he believes "the nurses are trying to burn" him and that we all hate him and want him to die."    On interview, patient with sitter at bedside and uneaten breakfast tray in front of him. Interview was limited as his answers to questions are impoverished and inappropriate. He asked interviewer "are you my biological father?" And stated "I think I'm dead." Was able to state his reason for admission was "cancer" but did not respond to further questions about his care.     Per iglesia, patient slept overnight and has had limited appetite. Has been paranoid but not physically aggressive. Minimally talkative with her. Says patient's mother is currently at a doctor's appointment but will return to hospital this afternoon.    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 2 sodas/day         Hospital Course: 05/15/2018: Per chart, patient received PRN Zyprexa @ 0009 for insomnia. Per iglesia, " "patient slightly less paranoid this morning, ate most of breakfast and played games with her. On interview, no family at bedside, patient states he is feeling like himself. Still making statements about believing his is dead but more redirectable. Mentioned his nosebleed was because there is "something inside" him besides cancer. Overall more conversational and less paranoid this morning.  05/16/2018: Per chart, no PRN zyprexa given, per RN: "Pt has flat affect and has delayed response of understanding. Pt cooperative with vitals. He states " should never been born" and he also states "ya'll just keeping me alive". He also had some other spontaneous statements that did not make sense. Pt vss, afebrile with tmax of 99.6, no distress noted. Pt did go to sleep around midnight but moaned and cried throughout the night per sitter." On interview, patient again responds to select questions and remains paranoid, will slight improvement. Says he's "either fully alive or fully dead" when asked but does not spontaneously state he is dead. Refused to participate in [employer] but oriented to location, year and month.  05/17/2018: Per chart, no PRN zyprexa given, per RN continued to make paranoid statements about being alive, worked well with PT/OT yesterday. Asleep on interview. Per iglesia, remembered her from earlier in the week, has not made any statements this morning so far about being dead.  05/18/2018: Per chart, patient agitated and confused overnight, attempting to disconnect IV and port. Received Zyprexa 5 mg PO PRN @ 0147. Patient asleep on interview. Per juan ramonter did not fall asleep until 4 am. Ate a little breakfast then fell back asleep. No issues with agitation this AM.   05/20/2018: Per RN note: "Mother and step-father visited in evening, updated on plan of care, pleased about pt's continued progress toward baseline mood/personality."  05/21/2018: Per chart, no issues with agitation overnight, no PRN Zyprexa given. " "On interview patient with less paranoid thought content, no longer believes nurses are trying to harm him, states he knows he is alive and remembers last week feeling like he wasn't. Denies AVH. C/o poor sleep overnight 2/2 fever.  05/22/2018: Per chart, no issues with agitation overnight, no PRN Zyprexa given. On interview, patient states he is feeling like himself but c/o feeling "alone." Discussed role of Risperdal, which patient stated was for psychosis; discussed with patient he is being treated for delirium not psychosis and he expressed understanding.       Interval History: see hospital course.     Family History     Problem Relation (Age of Onset)    Cancer Mother    Heart disease Mother    No Known Problems Father        Social History Main Topics    Smoking status: Former Smoker     Packs/day: 0.50     Types: Cigarettes     Quit date: 10/30/2017    Smokeless tobacco: Never Used    Alcohol use Yes      Comment: rare occasions    Drug use: No    Sexual activity: Yes     Partners: Female     Psychotherapeutics     Start     Stop Route Frequency Ordered    05/18/18 2100  risperiDONE disintegrating tablet 3 mg      -- Oral Nightly 05/18/18 0819    05/18/18 0900  risperiDONE disintegrating tablet 1 mg      -- Oral Daily 05/18/18 0819    05/15/18 0847  olanzapine zydis disintegrating tablet 5 mg      -- Oral As needed (PRN) 05/15/18 0848           Review of Systems    Objective:     Vital Signs (Most Recent):  Temp: 96.9 °F (36.1 °C) (05/22/18 1028)  Pulse: 86 (05/22/18 1028)  Resp: 18 (05/22/18 1028)  BP: (!) 127/56 (05/22/18 1028)  SpO2: 99 % (05/22/18 1028) Vital Signs (24h Range):  Temp:  [96.9 °F (36.1 °C)-98.8 °F (37.1 °C)] 96.9 °F (36.1 °C)  Pulse:  [] 86  Resp:  [16-26] 18  SpO2:  [98 %-100 %] 99 %  BP: (127-142)/(56-80) 127/56     Height: 5' 6" (167.6 cm)  Weight: 78.4 kg (172 lb 13.5 oz)  Body mass index is 27.9 kg/m².      Intake/Output Summary (Last 24 hours) at 05/22/18 1030  Last data " "filed at 05/22/18 0540   Gross per 24 hour   Intake             2368 ml   Output                0 ml   Net             2368 ml       Physical Exam        Mental Status Exam:  Appearance: age appropriate, lying in bed  Behavior/Cooperation: improved cooperation, psychomotor retardation, intense eye contact   Speech: slow, low volume, monotone, delayed but improving, increased latency of response  Language: grossly intact, able to name, able to repeat with spontaneous speech  Mood: "okay"  Affect:  blunted  Thought Process: linear  Thought Content: no suicidality, no homicidality, delusions, or paranoia  Sensorium:  Awake and alert  Alert and Oriented: x3 person, place, situation, time/date, day of week, month of year, year  Memory: 3/3 immediate, 2/3 at 5 minutes   Attention/concentration: SAVEAHAART with 0 erros  Insight:  impaired, improving  Judgment: appropriate to situation    Significant Labs:   Last 24 Hours:   Recent Lab Results       05/22/18  0552 05/21/18  1057      Immature Granulocytes 0.0      Immature Grans (Abs) 0.00  Comment:  Mild elevation in immature granulocytes is non specific and   can be seen in a variety of conditions including stress response,   acute inflammation, trauma and pregnancy. Correlation with other   laboratory and clinical findings is essential.        Aniso Slight      Baso # 0.00      Basophil% 0.0      Blood Culture, Routine  No Growth to date[P]       No Growth to date[P]     Differential Method Automated      Eos # 0.0      Eosinophil% 0.0      Gran # (ANC) 0.0(L)      Gran% 6.2(L)      Group & Rh AB POS      Hematocrit 27.4(L)      Hemoglobin 9.7(L)      Hypo Occasional      INDIRECT LARRY NEG      Lymph # 0.1(L)      Lymph% 87.5(H)      MCH 30.8      MCHC 35.4      MCV 87      Mono # 0.0(L)      Mono% 6.3      MPV 11.0      nRBC 0      Ovalocytes Occasional      Platelets 14  Comment:  plt  critical result(s) called and verbal readback obtained from   hal cannon,nurse, " 05/22/2018 06:44  (LL)      Poik Slight      Poly Occasional      RBC 3.15(L)      RDW 14.1      Retic 0.3(L)      Tear Drop Cells Occasional      WBC 0.16  Comment:  WBC critical result(s) called and verbal readback obtained from Kelsi Quiñones RN , 05/22/2018 06:31  (LL)            Significant Imaging: None    Assessment/Plan:     Acute delirium    Patient without significant past psychiatric history has been exhibiting waxing and waning mental status, disorientation and paranoid delusions since being extubated on 5/9 consistent with delirium.   - Decrease scheduled Risperdal M tabs to 1 mg qAM and 2 mg qHS. Will continue to titrate as needed.  - Continue Zyprexa 5 mg PO/IM q8 PRN nonredirectable agitation. QTc on 5/16 467. Continue checking perioidic EKG to monitor QTc. Continue to monitor CBC for any worsening leukopenia/neutropenia with antipsychotic medications.  - Upon discharge from hospital, patient may schedule therapy and psychiatry follow up at [facility] ([address] [place] [id number]; Phone: [phone])  · DELIRIUM BEHAVIOR MANAGEMENT  · PLEASE utilize CHEMICAL restraints with PRN meds first for agitation. Minimize use of PHYSICAL restraints  · Keep window shades open and room lit during day and room dim at night in order to promote normal sleep-wake cycles  · Encourage family at bedside. [place] patient often to situation, location, date.  · Continue to Limit or Discontinue use of Narcotics, Benzos and Anti-cholinergic (Avoid Benadryl) medications as they may worsen delirium.   · Continue medical workup for causative etiology of Delirium. Will f/u CT head with and without contrast if possible to assess for any acute intracranial processes. Recommend repeat CXR to rule out additional infectious process, check amylase, lipase, B12, folate, HIV, RPR.               Total time:  25 with greater than 50% of this time spent in counseling and/or coordination of care.       Clair  Radha Taylor MD   Psychiatry  [facility]

## 2018-05-22 NOTE — MEDICAL/APP STUDENT
"5/22/2018 8:09 AM Hema Kuo 2000 [id number]        PSYCHIATRY CONSULT PROGRESS NOTE   SUBJECTIVE:   BRIEF HPI  Hema Kuo is a 17 y.o.  male who  has a past medical history of AML (acute myeloblastic leukemia) (10/2017); Asthma; Encounter for blood transfusion; and Seasonal allergies., currently presenting with Bacteremia.  Psychiatry was consulted for "paranoia, now CEC'd, possible ICU delirium."     Per staff MD:  "17 year old young man with AML s/p 4 cycles of chemotherapy now transferred from the  PICU with Strep mitis sepsis and ARDS.     For his AML we are awaiting count recovery.  ANC next to nothing still.   For his Strep sepsis, he is on cefepime and vancomycin.  Cultures from 5/2 grew Strep mitis (sensitive to vanc).  Subsequent cultures negative.    Will cont a 14 day course of vanc.        .  For his chemotherapy induced pancytopenia, recommend transfusion for hemoglobin under 7 and platelets under 10 K.  No transfusions today.    For his ARDS will get one last dose of lasix today and then we will stop.  He continues to have paranoid delerium since extubation, however this is improving.  Will cont seroquel with prn zyprexa.  Will have psych see tomorrow.   Still needs a 1:1 sitter."    Nursing notes:  5/21/2018, 3:20AM:  Patient will increase functional independence with ADLs by performing:  Feeding with [place].  UE Dressing with [place].  LE Dressing with Minimal Assistance.  Goal met  Grooming while standing with Minimal Assistance. Goal met  Toileting from bedside commode with Modified [place] for hygiene and clothing management.   Bathing from  edge of bed with Modified [place].  Toilet transfer to toilet with Minimal Assistance.   Outcome: Ongoing (interventions implemented as appropriate)  Goals remain appropriate, continue with OT POC.  KURTIS Hazel; Rehab Services    5/22/2018, 5:24AM:  Pt with flat affect, able to follow basic commands after many " "seconds of thought, thoughts illogical at times and pt staring , afebrile, sitter at bedside. Labs obtained from port. Sitter at bedside.    Hospital Course/Interval History:  05/15/2018: Per chart, patient received PRN Zyprexa @ 0009 for insomnia. Per sitter, patient slightly less paranoid this morning, ate most of breakfast and played games with her. On interview, no family at bedside, patient states he is feeling like himself. Still making statements about believing his is dead but more redirectable. Mentioned his nosebleed was because there is "something inside" him besides cancer. Overall more conversational and less paranoid this morning.  05/16/2018: Per chart, no PRN zyprexa given, per RN: "Pt has flat affect and has delayed response of understanding. Pt cooperative with vitals. He states " should never been born" and he also states "ya'll just keeping me alive". He also had some other spontaneous statements that did not make sense. Pt vss, afebrile with tmax of 99.6, no distress noted. Pt did go to sleep around midnight but moaned and cried throughout the night per sitter." On interview, patient again responds to select questions and remains paranoid, will slight improvement. Says he's "either fully alive or fully dead" when asked but does not spontaneously state he is dead. Refused to participate in [facility] but oriented to location, year and month.  05/17/2018: Per chart, no PRN zyprexa given, per RN continued to make paranoid statements about being alive, worked well with PT/OT yesterday. Asleep on interview. Per juan ramonter, remembered her from earlier in the week, has not made any statements this morning so far about being dead.  05/18/2018: Per chart, patient agitated and confused overnight, attempting to disconnect IV and port. Received Zyprexa 5 mg PO PRN @ 0147. Patient asleep on interview. Per sitter did not fall asleep until 4 am. Ate a little breakfast then fell back asleep. No issues with " "agitation this AM.   05/20/2018: Per RN note: "Mother and step-father visited in evening, updated on plan of care, pleased about pt's continued progress toward baseline mood/personality."  05/21/2018: Per chart, no issues with agitation overnight, no PRN Zyprexa given. On interview patient with less paranoid thought content, no longer believes nurses are trying to harm him, states he knows he is alive and remembers last week feeling like he wasn't. Denies AVH. C/o poor sleep overnight 2/2 fever.  5/22/2018: Patient did NOT sleep well last night. He did have dinner (per sitter and nurse). He was asleep this morning at 8:55AM. He was later taken for head CT (9:15AM)    Current Medications:   PRN Meds: sodium chloride, acetaminophen, albuterol sulfate, alteplase, heparin, porcine (PF), lidocaine-prilocaine, morphine, olanzapine zydis, ondansetron, polyethylene glycol   Psychotherapeutics     Start     Stop Route Frequency Ordered    05/18/18 2100  risperiDONE disintegrating tablet 3 mg      -- Oral Nightly 05/18/18 0819    05/18/18 0900  risperiDONE disintegrating tablet 1 mg      -- Oral Daily 05/18/18 0819    05/15/18 0847  olanzapine zydis disintegrating tablet 5 mg      -- Oral As needed (PRN) 05/15/18 0848        Allergies/PMH:   Review of patient's allergies indicates:   Allergen Reactions    Nsaids (non-steroidal anti-inflammatory drug) Anaphylaxis    Nuts [tree nut] Anaphylaxis    Strawberry     Vancomycin analogues Itching     Premedicate with benadryl and admin over 2hr.      Past Medical History:   Diagnosis Date    AML (acute myeloblastic leukemia) 10/2017    Asthma     Encounter for blood transfusion     Seasonal allergies        Medical Review Of Systems:  Pertinent items are noted in HPI.  Psychiatric Review Of Systems:  sleep: yes, Did NOT sleep well last night.  appetite changes: Patient did eat dinner last night.    Please see the Hospital course and nursing notes for further " "information.    OBJECTIVE:   Vitals   Vitals:    05/22/18 0400   BP:    Pulse: 100   Resp: 20   Temp: 98.3 °F (36.8 °C)      In/Out  I/O last 3 completed shifts:  In: 2848 [P.O.:1640; Blood:208; IV Piggyback:1000]  Out: -   Mental Status Exam:  Appearance: unremarkable    Patient did NOT sleep well last night. He did have dinner (per sitter and nurse). He was asleep this morning at 8:55AM. He was later taken for head CT (9:15AM)   Per nursing note: 5/22/2018, 5:24AM:  "Pt with flat affect, able to follow basic commands after many seconds of thought, thoughts illogical at times and pt staring , afebrile, sitter at bedside. Labs obtained from port. Sitter at bedside."      RASS = 0  CAM ICU = NEG    Labs/Imaging/Studies:   Recent Results (from the past 36 hour(s))   Blood culture    Collection Time: 05/20/18  9:15 PM   Result Value Ref Range    Blood Culture, Routine No Growth to date     Blood Culture, Routine No Growth to date    Blood culture    Collection Time: 05/20/18  9:27 PM   Result Value Ref Range    Blood Culture, Routine No Growth to date     Blood Culture, Routine No Growth to date    Reticulocytes    Collection Time: 05/21/18  4:06 AM   Result Value Ref Range    Retic 0.2 (L) 0.4 - 2.0 %   CBC auto differential    Collection Time: 05/21/18  4:06 AM   Result Value Ref Range    WBC 0.23 (LL) 3.90 - 12.70 K/uL    RBC 2.75 (L) 4.60 - 6.20 M/uL    Hemoglobin 8.5 (L) 14.0 - 18.0 g/dL    Hematocrit 23.8 (L) 40.0 - 54.0 %    MCV 87 82 - 98 fL    MCH 30.9 27.0 - 31.0 pg    MCHC 35.7 32.0 - 36.0 g/dL    RDW 14.0 11.5 - 14.5 %    Platelets 9 (LL) 150 - 350 K/uL    MPV 11.5 9.2 - 12.9 fL    Immature Granulocytes 0.0 0.0 - 0.5 %    Gran # (ANC) 0.0 (L) 1.8 - 7.7 K/uL    Immature Grans (Abs) 0.00 0.00 - 0.04 K/uL    Lymph # 0.2 (L) 1.0 - 4.8 K/uL    Mono # 0.0 (L) 0.3 - 1.0 K/uL    Eos # 0.0 0.0 - 0.5 K/uL    Baso # 0.00 0.00 - 0.20 K/uL    nRBC 0 0 /100 WBC    Gran% 8.7 (L) 38.0 - 73.0 %    Lymph% 87.0 (H) 18.0 - " 48.0 %    Mono% 4.3 4.0 - 15.0 %    Eosinophil% 0.0 0.0 - 8.0 %    Basophil% 0.0 0.0 - 1.9 %    Platelet Estimate Decreased (A)     Aniso Slight     Differential Method Automated    Comprehensive metabolic panel    Collection Time: 05/21/18  4:06 AM   Result Value Ref Range    Sodium 137 136 - 145 mmol/L    Potassium 3.4 (L) 3.5 - 5.1 mmol/L    Chloride 105 95 - 110 mmol/L    CO2 24 23 - 29 mmol/L    Glucose 102 70 - 110 mg/dL    BUN, Bld 7 6 - 20 mg/dL    Creatinine 0.6 0.5 - 1.4 mg/dL    Calcium 8.4 (L) 8.7 - 10.5 mg/dL    Total Protein 5.4 (L) 6.0 - 8.4 g/dL    Albumin 2.5 (L) 3.5 - 5.2 g/dL    Total Bilirubin 0.7 0.1 - 1.0 mg/dL    Alkaline Phosphatase 64 55 - 135 U/L    AST 10 10 - 40 U/L    ALT 45 (H) 10 - 44 U/L    Anion Gap 8 8 - 16 mmol/L    eGFR if African American >60.0 >60 mL/min/1.73 m^2    eGFR if non African American >60.0 >60 mL/min/1.73 m^2   Magnesium    Collection Time: 05/21/18  4:06 AM   Result Value Ref Range    Magnesium 1.7 1.6 - 2.6 mg/dL   Phosphorus    Collection Time: 05/21/18  4:06 AM   Result Value Ref Range    Phosphorus 4.4 2.7 - 4.5 mg/dL   Blood culture    Collection Time: 05/21/18 10:57 AM   Result Value Ref Range    Blood Culture, Routine No Growth to date    Blood culture    Collection Time: 05/21/18 10:57 AM   Result Value Ref Range    Blood Culture, Routine No Growth to date    Reticulocytes    Collection Time: 05/22/18  5:52 AM   Result Value Ref Range    Retic 0.3 (L) 0.4 - 2.0 %   CBC auto differential    Collection Time: 05/22/18  5:52 AM   Result Value Ref Range    WBC 0.16 (LL) 3.90 - 12.70 K/uL    RBC 3.15 (L) 4.60 - 6.20 M/uL    Hemoglobin 9.7 (L) 14.0 - 18.0 g/dL    Hematocrit 27.4 (L) 40.0 - 54.0 %    MCV 87 82 - 98 fL    MCH 30.8 27.0 - 31.0 pg    MCHC 35.4 32.0 - 36.0 g/dL    RDW 14.1 11.5 - 14.5 %    Platelets 14 (LL) 150 - 350 K/uL    MPV 11.0 9.2 - 12.9 fL    Immature Granulocytes 0.0 0.0 - 0.5 %    Gran # (ANC) 0.0 (L) 1.8 - 7.7 K/uL    Immature Grans (Abs) 0.00  0.00 - 0.04 K/uL    Lymph # 0.1 (L) 1.0 - 4.8 K/uL    Mono # 0.0 (L) 0.3 - 1.0 K/uL    Eos # 0.0 0.0 - 0.5 K/uL    Baso # 0.00 0.00 - 0.20 K/uL    nRBC 0 0 /100 WBC    Gran% 6.2 (L) 38.0 - 73.0 %    Lymph% 87.5 (H) 18.0 - 48.0 %    Mono% 6.3 4.0 - 15.0 %    Eosinophil% 0.0 0.0 - 8.0 %    Basophil% 0.0 0.0 - 1.9 %    Aniso Slight     Poik Slight     Poly Occasional     Hypo Occasional     Ovalocytes Occasional     Tear Drop Cells Occasional     Differential Method Automated    Type & Screen    Collection Time: 05/22/18  5:52 AM   Result Value Ref Range    Group & Rh AB POS     Indirect Nathan NEG           ASSESSMENT     Acute delirium     Patient without significant past psychiatric history has been exhibiting waxing and waning mental status, disorientation and paranoid delusions since being extubated on 5/9 consistent with delirium.   - Continue scheduled Risperdal M tabs 1 mg qAM and 3 mg qHS.   - Continue Zyprexa 5 mg PO/IM q8 (QTc on 5/16 467)  - Continue checking perioidic EKG to monitor QTc.   - Continue to monitor CBC for any worsening leukopenia/neutropenia with antipsychotic medications.    · DELIRIUM BEHAVIOR MANAGEMENT  ? Utilize CHEMICAL restraints with PRN meds first for agitation. Minimize use of PHYSICAL restraints  ? Keep window shades open and room lit during day and room dim at night in order to promote normal sleep-wake cycles  ? Encourage family at bedside. [place] patient often to situation, location, date.  ? Continue to Limit or Discontinue use of Narcotics, Benzos and Anti-cholinergic (Avoid Benadryl) medications as they may worsen delirium.   ? Continue medical workup for causative etiology of Delirium.   ? Recommend CT head with and without contrast if possible to assess for any acute intracranial processes and repeat CXR to rule out additional infectious process. Recommend check amylase, lipase, B12, folate, HIV, RPR.     RECOMMENDATIONS      · PSYCH Meds-  · Legal status-  · The above will  be discussed with staff psychiatrist and update any changes after rounds in the afternoon.  · Thank you for this consult will continue to follow      Elroy Robin (MS3)  5/22/2018 8:09 AM

## 2018-05-22 NOTE — ASSESSMENT & PLAN NOTE
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 On Intensification therapy II following [id number] (Arm A). He was admitted on 4/30/2018 for neutropenia/profund sepsis risk. Stepped up to the PICU for persistent fever >103, tachycardia, and hypotension that was been minimally responsive to fluid resuscitation. PICU stay was complicated by delirium. Currently CEC'd.      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 MRD negative after Induction I. Today is Day 33.  - Treating per COG [id number] (ARM A).  S/p Intensification I and Intensification II started.   - BM biopsy at start of Intensification shows CR.  FISH for t(8;21) negative. Will repeat BM biopsy pending count recovery and clinical improvement

## 2018-05-22 NOTE — PLAN OF CARE
Problem: Patient Care Overview  Goal: Plan of Care Review  Outcome: Ongoing (interventions implemented as appropriate)  Pt with flat affect, able to follow basic commands after many seconds of thought, thoughts illogical at times and pt staring , afebrile, sitter at bedside. Labs obtained from port. Sitter at bedside.

## 2018-05-22 NOTE — PLAN OF CARE
Problem: Patient Care Overview  Goal: Plan of Care Review   Pt tolerated treatment session well  this afternoon.  Pt displays progressing functional mobility by ambulation total of 620 feet with SBA from therapist. Pt presents with flat affect and delayed response time during conversation with therapist. Pt slow to initiate treatment session but once engaged was a willing participate. Pt family not present during today's treatment session.  Pt educated on continuation of PT POC during pt hospitalization.  Pt verbalizes understanding.  Pt will contiue to benefit from skilled acute PT intervention to address the above listed impairments and progress functional mobility independence.      Yassine Andrea  5/22/2018

## 2018-05-22 NOTE — PROGRESS NOTES
[facility] Pediatric Hospital Medicine  Progress Note    Patient Name: Hema Kuo  MRN: [id number]  Admission Date: 4/30/2018  Hospital Length of Stay: 22  Code Status: Prior   Primary Care Physician: Ann Reyna NP  Principal Problem: Bacteremia    Subjective:     Interval History: NAEO, VSS. Remained afebrile.     Scheduled Meds:   acyclovir  400 mg Oral BID    cefepime in dextrose 5 %  2 g Intravenous Q8H    melatonin  10 mg Oral QHS    posaconazole  300 mg Oral Daily    risperiDONE  1 mg Oral Daily    risperiDONE  3 mg Oral QHS    sulfamethoxazole-trimethoprim 800-160mg  1 tablet Oral twice daily on Friday, Saturday, Sunday    vancomycin (VANCOCIN) IVPB  1,250 mg Intravenous Q8H     Continuous Infusions:  PRN Meds:sodium chloride, acetaminophen, albuterol sulfate, alteplase, heparin, porcine (PF), lidocaine-prilocaine, morphine, olanzapine zydis, ondansetron, polyethylene glycol    Scheduled Meds:   acyclovir  400 mg Oral BID    cefepime in dextrose 5 %  2 g Intravenous Q8H    melatonin  10 mg Oral QHS    posaconazole  300 mg Oral Daily    risperiDONE  1 mg Oral Daily    risperiDONE  3 mg Oral QHS    sulfamethoxazole-trimethoprim 800-160mg  1 tablet Oral twice daily on Friday, Saturday, Sunday    vancomycin (VANCOCIN) IVPB  1,250 mg Intravenous Q8H     Continuous Infusions:  PRN Meds:sodium chloride, acetaminophen, albuterol sulfate, alteplase, heparin, porcine (PF), lidocaine-prilocaine, morphine, olanzapine zydis, ondansetron, polyethylene glycol    Review of Systems   Constitutional: Negative for fever.   Respiratory: Negative for cough.      Objective:     Vital Signs (Most Recent):  Temp: 98.3 °F (36.8 °C) (05/22/18 0400)  Pulse: 100 (05/22/18 0400)  Resp: 20 (05/22/18 0400)  BP: 132/62 (05/21/18 2312)  SpO2: 98 % (05/21/18 2312) Vital Signs (24h Range):  Temp:  [98.3 °F (36.8 °C)-98.8 °F (37.1 °C)] 98.3 °F (36.8 °C)  Pulse:  [] 100  Resp:  [16-26] 20  SpO2:  [98  %-100 %] 98 %  BP: (130-142)/(62-80) 132/62     Patient Vitals for the past 72 hrs (Last 3 readings):   Weight   05/22/18 0600 78.4 kg (172 lb 13.5 oz)   05/20/18 2000 78.6 kg (173 lb 4.5 oz)   05/19/18 1945 78.4 kg (172 lb 13.5 oz)     Body mass index is 27.9 kg/m².    Intake/Output - Last 3 Shifts       05/20 0700 - 05/21 0659 05/21 0700 - 05/22 0659 05/22 0700 - 05/23 0659    P.O. 1520 1160     Blood 789.6 208     IV Piggyback 300 1000     Total Intake(mL/kg) 2609.6 (33.2) 2368 (30.2)     Net +2609.6 +2368             Urine Occurrence 5 x 5 x     Stool Occurrence  1 x           Lines/Drains/Airways     Central Venous Catheter Line                 Port A Cath Double Lumen 05/10/18 0900 left subclavian 12 days                Physical Exam  Constitutional: He appears well-developed and well-nourished. Sleeping on exam.   Head: Normocephalic and atraumatic.   Nose: Nose normal.   Mouth/Throat: Mucous membranes are normal.   Eyes: Conjunctivae and lids are normal.   Neck: Normal range of motion. Neck supple.   Cardiovascular: Normal rate and regular rhythm.    Pulmonary/Chest: Effort normal. No stridor. He has no decreased breath sounds. He has no wheezes. He has no rales.   Abdominal: Soft. Bowel sounds are normal. He exhibits no distension. There is no hepatosplenomegaly. There is no guarding.   Musculoskeletal: Normal range of motion. He exhibits no edema.   Skin: Skin is warm and dry. Capillary refill takes less than 2 seconds. No rash noted. He is not diaphoretic. No erythema. Port CDI.     Significant Labs:  Recent Results (from the past 24 hour(s))   Blood culture    Collection Time: 05/21/18 10:57 AM   Result Value Ref Range    Blood Culture, Routine No Growth to date    Blood culture    Collection Time: 05/21/18 10:57 AM   Result Value Ref Range    Blood Culture, Routine No Growth to date    Reticulocytes    Collection Time: 05/22/18  5:52 AM   Result Value Ref Range    Retic 0.3 (L) 0.4 - 2.0 %   CBC auto  differential    Collection Time: 05/22/18  5:52 AM   Result Value Ref Range    WBC 0.16 (LL) 3.90 - 12.70 K/uL    RBC 3.15 (L) 4.60 - 6.20 M/uL    Hemoglobin 9.7 (L) 14.0 - 18.0 g/dL    Hematocrit 27.4 (L) 40.0 - 54.0 %    MCV 87 82 - 98 fL    MCH 30.8 27.0 - 31.0 pg    MCHC 35.4 32.0 - 36.0 g/dL    RDW 14.1 11.5 - 14.5 %    Platelets 14 (LL) 150 - 350 K/uL    MPV 11.0 9.2 - 12.9 fL    Immature Granulocytes 0.0 0.0 - 0.5 %    Gran # (ANC) 0.0 (L) 1.8 - 7.7 K/uL    Immature Grans (Abs) 0.00 0.00 - 0.04 K/uL    Lymph # 0.1 (L) 1.0 - 4.8 K/uL    Mono # 0.0 (L) 0.3 - 1.0 K/uL    Eos # 0.0 0.0 - 0.5 K/uL    Baso # 0.00 0.00 - 0.20 K/uL    nRBC 0 0 /100 WBC    Gran% 6.2 (L) 38.0 - 73.0 %    Lymph% 87.5 (H) 18.0 - 48.0 %    Mono% 6.3 4.0 - 15.0 %    Eosinophil% 0.0 0.0 - 8.0 %    Basophil% 0.0 0.0 - 1.9 %    Aniso Slight     Poik Slight     Poly Occasional     Hypo Occasional     Ovalocytes Occasional     Tear Drop Cells Occasional     Differential Method Automated    Type & Screen    Collection Time: 05/22/18  5:52 AM   Result Value Ref Range    Group & Rh AB POS     Indirect Nathan NEG        Significant Imaging:   none    Assessment/Plan:     Neuro   Acute delirium    Delirium: currently CEC, improving.  - Per Psych recs: 1mg Risperdal qAM and 3mg qHS  - RPR NR  - Olanzapine PRN  - EKG complete on 5/14, 5/15, 5/16. QTc ranging from 437-441.   - Avoid Benadryl per Psych  - Continue frequent reorientation and attempt to encourage patient to sleep.   - Psych following, appreciate recs on outpatient therapy and follow up. Patient lives in [place].  - CT head today        ID   * Bacteremia    Patient with Strep mitis sepsis. Bcx from 5/2 growing organism, subsequent cultures until 5/19 growing GPC in chains resembling strep from outer lumen of port (no speciation yet). Subsequent cultures NGTD.     - Continue Cefepime 2g Q8h  - Continue Vancomycin 1250 g Q8h, vanc trough 14.3 on 5/20. Will plan to follow trough every 3  days.  - Follow up blood cultures, speciation and sensitivities  - monitor fever curve          Immunology/Multi System   Immunosuppressed due to chemotherapy    Immunosuppression 2/2 to chemotherapy:  - Continue acyclovir ppx  - Continue posaconazole 400mg BID- therapeutic dosing.   - Continue bactrim QFSaSu ppx  - Continue peridex ppx          Oncology   Neutropenia    - ANC still 0  - Advance soft mechanical diet  - Continue PRN zofran        AML (acute myeloblastic leukemia)    Hema is a 17 yr young man with AML (t 8;21) here for chemotherapy. On Intensification therapy II following [id number] (Arm A). He was admitted on 4/30/2018 for neutropenia/profund sepsis risk. Stepped up to the PICU for persistent fever >103, tachycardia, and hypotension that was been minimally responsive to fluid resuscitation. PICU stay was complicated by delirium. Currently CEC'd.      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 MRD negative after Induction I. Today is Day 33.  - Treating per COG [id number] (ARM A).  S/p Intensification I and Intensification II started.   - BM biopsy at start of Intensification shows CR.  FISH for t(8;21) negative. Will repeat BM biopsy pending count recovery and clinical improvement               Antineoplastic chemotherapy induced pancytopenia    Chemotherapy induced neutropenia:   - Hgb goal >7, Hgb 9.7.   - Plts goal >10, platelets 14 today.    - Daily CBC and reticulocyte  - CMP EOD  - If bleeding at all, please transfuse platelets x1 unit. No need to check CBC, evaluate clinically.                     Anticipated Disposition: Home or Self Care    Ayanna Ramirez DO  Pediatric Hospital Medicine   [facility]

## 2018-05-22 NOTE — ASSESSMENT & PLAN NOTE
Patient without significant past psychiatric history has been exhibiting waxing and waning mental status, disorientation and paranoid delusions since being extubated on 5/9 consistent with delirium.   - Decrease scheduled Risperdal M tabs to 1 mg qAM and 2 mg qHS. Will continue to titrate as needed.  - Continue Zyprexa 5 mg PO/IM q8 PRN nonredirectable agitation. QTc on 5/16 467. Continue checking perioidic EKG to monitor QTc. Continue to monitor CBC for any worsening leukopenia/neutropenia with antipsychotic medications.  - Upon discharge from hospital, patient may schedule therapy and psychiatry follow up at [facility] ([address] [place] [id number]; Phone: [phone])  · DELIRIUM BEHAVIOR MANAGEMENT  · PLEASE utilize CHEMICAL restraints with PRN meds first for agitation. Minimize use of PHYSICAL restraints  · Keep window shades open and room lit during day and room dim at night in order to promote normal sleep-wake cycles  · Encourage family at bedside. [place] patient often to situation, location, date.  · Continue to Limit or Discontinue use of Narcotics, Benzos and Anti-cholinergic (Avoid Benadryl) medications as they may worsen delirium.   · Continue medical workup for causative etiology of Delirium. Will f/u CT head with and without contrast if possible to assess for any acute intracranial processes. Recommend repeat CXR to rule out additional infectious process, check amylase, lipase, B12, folate, HIV, RPR.

## 2018-05-23 LAB
ALBUMIN SERPL BCP-MCNC: 2.6 G/DL
ALP SERPL-CCNC: 67 U/L
ALT SERPL W/O P-5'-P-CCNC: 36 U/L
ANION GAP SERPL CALC-SCNC: 10 MMOL/L
ANISOCYTOSIS BLD QL SMEAR: SLIGHT
AST SERPL-CCNC: 11 U/L
BASOPHILS # BLD AUTO: ABNORMAL K/UL
BASOPHILS NFR BLD: 0 %
BILIRUB SERPL-MCNC: 0.5 MG/DL
BUN SERPL-MCNC: 7 MG/DL
CALCIUM SERPL-MCNC: 8.8 MG/DL
CHLORIDE SERPL-SCNC: 104 MMOL/L
CO2 SERPL-SCNC: 26 MMOL/L
CREAT SERPL-MCNC: 0.6 MG/DL
DACRYOCYTES BLD QL SMEAR: ABNORMAL
DIFFERENTIAL METHOD: ABNORMAL
EOSINOPHIL # BLD AUTO: ABNORMAL K/UL
EOSINOPHIL NFR BLD: 0 %
ERYTHROCYTE [DISTWIDTH] IN BLOOD BY AUTOMATED COUNT: 13.6 %
EST. GFR  (AFRICAN AMERICAN): >60 ML/MIN/1.73 M^2
EST. GFR  (NON AFRICAN AMERICAN): >60 ML/MIN/1.73 M^2
GLUCOSE SERPL-MCNC: 107 MG/DL
HCT VFR BLD AUTO: 20.8 %
HGB BLD-MCNC: 7.5 G/DL
IMM GRANULOCYTES # BLD AUTO: ABNORMAL K/UL
IMM GRANULOCYTES NFR BLD AUTO: ABNORMAL %
LYMPHOCYTES # BLD AUTO: ABNORMAL K/UL
LYMPHOCYTES NFR BLD: 90 %
MAGNESIUM SERPL-MCNC: 1.6 MG/DL
MCH RBC QN AUTO: 30.5 PG
MCHC RBC AUTO-ENTMCNC: 36.1 G/DL
MCV RBC AUTO: 85 FL
MONOCYTES # BLD AUTO: ABNORMAL K/UL
MONOCYTES NFR BLD: 2.5 %
NEUTROPHILS NFR BLD: 7.5 %
NRBC BLD-RTO: 0 /100 WBC
OVALOCYTES BLD QL SMEAR: ABNORMAL
PHOSPHATE SERPL-MCNC: 4.5 MG/DL
PLATELET # BLD AUTO: 14 K/UL
PLATELET BLD QL SMEAR: ABNORMAL
PMV BLD AUTO: 11.9 FL
POIKILOCYTOSIS BLD QL SMEAR: SLIGHT
POTASSIUM SERPL-SCNC: 3.2 MMOL/L
PROT SERPL-MCNC: 5.5 G/DL
RBC # BLD AUTO: 2.46 M/UL
RETICS/RBC NFR AUTO: 0.2 %
SODIUM SERPL-SCNC: 140 MMOL/L
SPHEROCYTES BLD QL SMEAR: ABNORMAL
VANCOMYCIN TROUGH SERPL-MCNC: 10 UG/ML
WBC # BLD AUTO: 0.31 K/UL

## 2018-05-23 PROCEDURE — 97535 SELF CARE MNGMENT TRAINING: CPT

## 2018-05-23 PROCEDURE — 99232 SBSQ HOSP IP/OBS MODERATE 35: CPT | Mod: AF,HB,, | Performed by: PSYCHIATRY & NEUROLOGY

## 2018-05-23 PROCEDURE — 36592 COLLECT BLOOD FROM PICC: CPT

## 2018-05-23 PROCEDURE — 85045 AUTOMATED RETICULOCYTE COUNT: CPT

## 2018-05-23 PROCEDURE — 25000003 PHARM REV CODE 250: Performed by: STUDENT IN AN ORGANIZED HEALTH CARE EDUCATION/TRAINING PROGRAM

## 2018-05-23 PROCEDURE — 99233 SBSQ HOSP IP/OBS HIGH 50: CPT | Mod: ,,, | Performed by: PEDIATRICS

## 2018-05-23 PROCEDURE — 80053 COMPREHEN METABOLIC PANEL: CPT

## 2018-05-23 PROCEDURE — 97110 THERAPEUTIC EXERCISES: CPT

## 2018-05-23 PROCEDURE — 97530 THERAPEUTIC ACTIVITIES: CPT

## 2018-05-23 PROCEDURE — 25000003 PHARM REV CODE 250: Performed by: PSYCHIATRY & NEUROLOGY

## 2018-05-23 PROCEDURE — 84100 ASSAY OF PHOSPHORUS: CPT

## 2018-05-23 PROCEDURE — 36415 COLL VENOUS BLD VENIPUNCTURE: CPT

## 2018-05-23 PROCEDURE — 85027 COMPLETE CBC AUTOMATED: CPT

## 2018-05-23 PROCEDURE — 11300000 HC PEDIATRIC PRIVATE ROOM

## 2018-05-23 PROCEDURE — 80202 ASSAY OF VANCOMYCIN: CPT

## 2018-05-23 PROCEDURE — 63600175 PHARM REV CODE 636 W HCPCS: Performed by: PEDIATRICS

## 2018-05-23 PROCEDURE — 63600175 PHARM REV CODE 636 W HCPCS: Performed by: STUDENT IN AN ORGANIZED HEALTH CARE EDUCATION/TRAINING PROGRAM

## 2018-05-23 PROCEDURE — 83735 ASSAY OF MAGNESIUM: CPT

## 2018-05-23 PROCEDURE — 97116 GAIT TRAINING THERAPY: CPT

## 2018-05-23 PROCEDURE — 85007 BL SMEAR W/DIFF WBC COUNT: CPT

## 2018-05-23 RX ORDER — RISPERIDONE 1 MG/1
2 TABLET, ORALLY DISINTEGRATING ORAL NIGHTLY
Status: DISCONTINUED | OUTPATIENT
Start: 2018-05-23 | End: 2018-05-25

## 2018-05-23 RX ORDER — LINEZOLID 2 MG/ML
600 INJECTION, SOLUTION INTRAVENOUS
Status: DISCONTINUED | OUTPATIENT
Start: 2018-05-23 | End: 2018-05-31 | Stop reason: HOSPADM

## 2018-05-23 RX ADMIN — LINEZOLID 600 MG: 600 INJECTION, SOLUTION INTRAVENOUS at 11:05

## 2018-05-23 RX ADMIN — ACYCLOVIR 400 MG: 200 CAPSULE ORAL at 09:05

## 2018-05-23 RX ADMIN — CEFEPIME HYDROCHLORIDE 2 G: 2 INJECTION, SOLUTION INTRAVENOUS at 06:05

## 2018-05-23 RX ADMIN — Medication 500 UNITS: at 01:05

## 2018-05-23 RX ADMIN — Medication 500 UNITS: at 04:05

## 2018-05-23 RX ADMIN — Medication 1250 MG: at 08:05

## 2018-05-23 RX ADMIN — RISPERIDONE 1 MG: 1 TABLET, ORALLY DISINTEGRATING ORAL at 09:05

## 2018-05-23 RX ADMIN — ACETAMINOPHEN, DIPHENHYDRAMINE HCL, PHENYLEPHRINE HCL 10 MG: 325; 25; 5 TABLET ORAL at 07:05

## 2018-05-23 RX ADMIN — POSACONAZOLE 300 MG: 100 TABLET, COATED ORAL at 09:05

## 2018-05-23 RX ADMIN — RISPERIDONE 2 MG: 1 TABLET, ORALLY DISINTEGRATING ORAL at 07:05

## 2018-05-23 RX ADMIN — Medication 1250 MG: at 04:05

## 2018-05-23 RX ADMIN — ACYCLOVIR 400 MG: 200 CAPSULE ORAL at 07:05

## 2018-05-23 NOTE — PLAN OF CARE
Problem: Patient Care Overview  Goal: Plan of Care Review  Pt tolerated treatment session well this morning.  Pt displays good functional mobility by ambulating total of 520 feet with HHA to min A from therapist due to pt fatigue (pt requiring 1 rest break midway through ambulation bout).  Pt greeted therapist with smile today and was much more conversational.  Pt participates in standing LE strengthening exercises at bedside with SBA and verbal cues from therapist. Pt displays episodes of good cognition and awareness followed by episodes of decreased response time and cogntive fatigue.  Pt family present midway through today's treatment session.  Pt and pt's family educated on continuation of PT POC during pt hospitalization.  Pt and pt's family verbalize understanding.  Pt will continue to benefit from skilled acute PT intervention to address the above listed impairments and progress functional mobility independence.      Yassine Andrea  5/23/2018

## 2018-05-23 NOTE — PT/OT/SLP PROGRESS
Physical Therapy Treatment    Patient Name:  Hema Kuo   MRN:  [id number]    Recommendations:     Discharge Recommendations:  home health PT   Discharge Equipment Recommendations: none   Barriers to discharge: None    Assessment:     Hema Kuo is a 17 y.o. male admitted with a medical diagnosis of Bacteremia.  He presents with the following impairments/functional limitations:  weakness, impaired endurance, impaired self care skills, impaired functional mobilty, gait instability, impaired balance, decreased lower extremity function, decreased upper extremity function, impaired cognition, decreased safety awareness.   Pt tolerated treatment session well this morning.  Pt displays good functional mobility by ambulating total of 520 feet (Two bouts of 260 feet with rest break in between) with HHA to min A from therapist due to pt fatigue (pt requiring 1 rest break midway through ambulation bout).  Pt greeted therapist with smile today and was much more conversational.  Pt participates in standing LE strengthening exercises at bedside with SBA and verbal cues from therapist. Pt displays episodes of good cognition and awareness followed by episodes of decreased response time and cogntive fatigue.  Pt family present midway through today's treatment session.  Pt and pt's family educated on continuation of PT POC during pt hospitalization.  Pt and pt's family verbalize understanding.  Pt will continue to benefit from skilled acute PT intervention to address the above listed impairments and progress functional mobility independence.      Rehab Prognosis:  Good; patient would benefit from acute skilled PT services to address these deficits and reach maximum level of function.      Recent Surgery: * No surgery found *      Plan:     During this hospitalization, patient to be seen 6 x/week to address the above listed problems via gait training, therapeutic activities, therapeutic exercises, neuromuscular re-education  · Plan of  "Care Expires:  06/09/18   Plan of Care Reviewed with: patient, family    Subjective     Communicated with JULIANN Finley prior to session.  Patient found awake sitting up in bed with sitter present upon PT entry to room, agreeable to treatment.      Chief Complaint: Bactermia  Patient comments/goals: "I thought you two were mad at me"  Pain/Comfort:  · Pain Rating 1: 0/10  · Pain Rating Post-Intervention 1: 0/10    Patients cultural, spiritual, Nondenominational conflicts given the current situation: Patient has no barriers to learning. Patient verbalizes understanding of his/her program and goals and demonstrates them correctly. No cultural, spiritual or educational needs identified.    Objective:     Patient found with: central line     General Precautions: Standard, fall   Orthopedic Precautions:N/A   Braces: N/A     Functional Mobility:  · Bed Mobility:     · Scooting: stand by assistance  · Supine to Sit: stand by assistance  · Sit to Supine: minimum assistance  · Transfers:     · Sit to Stand:  stand by assistance with no AD  Gait: Pt ambulated total of 520 feet (Two bouts of 260 feet with rest break in between).  Assist Level: HHA to min A when pt becomes fatigued  AD Used: None  Gait Pattern: Two point, step through  Gait Deviations: decreased velocity, hip flexion posturing when fatigues, poor foot clearance, short step length  · Impairments due to: fatigue  · Balance: Pt displays appropriate balance throughout ambulation bout until pt becomes fatigued midway through requiring min A from therapist to maintain upright posture.      AM-PAC 6 CLICK MOBILITY  Turning over in bed (including adjusting bedclothes, sheets and blankets)?: 4  Sitting down on and standing up from a chair with arms (e.g., wheelchair, bedside commode, etc.): 4  Moving from lying on back to sitting on the side of the bed?: 4  Moving to and from a bed to a chair (including a wheelchair)?: 3  Need to walk in hospital room?: 3  Climbing 3-5 steps with " a railing?: 3  Total Score: 21       Therapeutic Activities and Exercises:   Pt performs standing LE strengthening exercises with skilled instruction from therapist for proper form and muscle activation:  · Sit to stand x10  · Standing marching x10  · Heel raises x10 (pt requiring HHA)  · Toes raises x5 (pt requiring HHA)    Pt and pt's family educated on continuation of PT POC.  Pt and pt's family verbalize understanding.      Patient left supine with all lines intact, RN kaleb notified and pt's family and sitter present..    GOALS:    Physical Therapy Goals        Problem: Physical Therapy Goal    Goal Priority Disciplines Outcome Goal Variances Interventions   Physical Therapy Goal     PT/OT, PT Ongoing (interventions implemented as appropriate)     Description:  Goals re-assessed by SPT on   Continue goals x1 week (18)    Patient will increase functional independence with mobility by performin. Supine to sit with [place] - MET ()  2. Sit to supine with [place] - MET ()  3. Sit to stand transfer with [place] - MET ()  4. Bed to chair transfer with Stand-by Assistance using least restrictive AD - Not met  5. Gait  x 500 feet with Stand-by Assistance using least restrictive Ad - MET ()  6. Lower extremity exercise program x30 reps per handout, with supervision - Not met                       Time Tracking:     PT Received On: 18  PT Start Time: 925     PT Stop Time: 1003  PT Total Time (min): 38 min     Billable Minutes: Gait Training 15, Therapeutic Activity 8 and Therapeutic Exercise 15    Treatment Type: Treatment  PT/PTA: PT           Yassine Andrea, SPT   2018

## 2018-05-23 NOTE — SUBJECTIVE & OBJECTIVE
"Interval History: see hospital course.     Family History     Problem Relation (Age of Onset)    Cancer Mother    Heart disease Mother    No Known Problems Father        Social History Main Topics    Smoking status: Former Smoker     Packs/day: 0.50     Types: Cigarettes     Quit date: 10/30/2017    Smokeless tobacco: Never Used    Alcohol use Yes      Comment: rare occasions    Drug use: No    Sexual activity: Yes     Partners: Female     Psychotherapeutics     Start     Stop Route Frequency Ordered    05/23/18 2100  risperiDONE disintegrating tablet 2 mg      -- Oral Nightly 05/23/18 0955    05/18/18 0900  risperiDONE disintegrating tablet 1 mg      -- Oral Daily 05/18/18 0819    05/15/18 0847  olanzapine zydis disintegrating tablet 5 mg      -- Oral As needed (PRN) 05/15/18 0848           Review of Systems    Objective:     Vital Signs (Most Recent):  Temp: 97.6 °F (36.4 °C) (05/23/18 0810)  Pulse: 92 (05/23/18 0810)  Resp: 18 (05/23/18 0810)  BP: 132/71 (05/23/18 0810)  SpO2: 99 % (05/23/18 0810) Vital Signs (24h Range):  Temp:  [97.6 °F (36.4 °C)-98.4 °F (36.9 °C)] 97.6 °F (36.4 °C)  Pulse:  [86-95] 92  Resp:  [16-19] 18  SpO2:  [99 %-100 %] 99 %  BP: (111-132)/(53-71) 132/71     Height: 5' 6" (167.6 cm)  Weight: 79.5 kg (175 lb 4.3 oz)  Body mass index is 28.29 kg/m².      Intake/Output Summary (Last 24 hours) at 05/23/18 1109  Last data filed at 05/23/18 0100   Gross per 24 hour   Intake             2110 ml   Output                0 ml   Net             2110 ml       Physical Exam        Mental Status Exam:  Appearance: age appropriate, lying in bed  Behavior/Cooperation: improved cooperation, psychomotor retardation, intense eye contact   Speech: normal volume, monotone, remains slow & delayed but improving, increased latency of response  Language: grossly intact, able to name, able to repeat with spontaneous speech  Mood: "good"  Affect:  constricted but reactive   Thought Process: linear  Thought " Content: no suicidality, no homicidality, delusions, or paranoia  Sensorium:  Awake and alert  Alert and Oriented: x person, place, situation, time/date, day of week, month of year, year  Insight:  impaired, improving  Judgment: appropriate to situation    Significant Labs:   Last 24 Hours:   Recent Lab Results       05/23/18  0730 05/23/18  0417      Immature Granulocytes  CANCELED  Comment:  Result canceled by the ancillary     Immature Grans (Abs)  CANCELED  Comment:  Mild elevation in immature granulocytes is non specific and   can be seen in a variety of conditions including stress response,   acute inflammation, trauma and pregnancy. Correlation with other   laboratory and clinical findings is essential.    Result canceled by the ancillary       Albumin  2.6(L)     Alkaline Phosphatase  67     ALT  36     Anion Gap  10     Aniso  Slight     AST  11     Baso #  Test Not Performed  Comment:  Corrected result; previously reported as 0.00 on %DDDDDDDD% at %TTT%.[C]     Basophil%  0.0     Total Bilirubin  0.5  Comment:  For infants and newborns, interpretation of results should be based  on gestational age, weight and in agreement with clinical  observations.  Premature Infant recommended reference ranges:  Up to 24 hours.............<8.0 mg/dL  Up to 48 hours............<12.0 mg/dL  3-5 days..................<15.0 mg/dL  6-29 days.................<15.0 mg/dL       BUN, Bld  7     Calcium  8.8     Chloride  104     CO2  26     Creatinine  0.6     Differential Method  Manual     eGFR if African American  >60.0     eGFR if non   >60.0  Comment:  Calculation used to obtain the estimated glomerular filtration  rate (eGFR) is the CKD-EPI equation.        Eos #  Test Not Performed  Comment:  Corrected result; previously reported as 0.0 on %DDDDDDDD% at %TTT%.[C]     Eosinophil%  0.0     Glucose  107     Gran%  7.5  Comment:  Corrected result; previously reported as 12.9 on %DDDDDDDD% at %TTT%.(L)[C]      Hematocrit  20.8(L)     Hemoglobin  7.5(L)     Lymph #  Test Not Performed  Comment:  Corrected result; previously reported as 0.2 on %DDDDDDDD% at %TTT%.[C]     Lymph%  90.0  Comment:  Corrected result; previously reported as 67.7 on %DDDDDDDD% at %TTT%.(H)[C]     Magnesium  1.6     MCH  30.5     MCHC  36.1(H)     MCV  85     Mono #  Test Not Performed  Comment:  Corrected result; previously reported as 0.0 on %DDDDDDDD% at %TTT%.[C]     Mono%  2.5  Comment:  Corrected result; previously reported as 9.7 on %DDDDDDDD% at %TTT%.(L)[C]     MPV  11.9     nRBC  0     Ovalocytes  Occasional     Phosphorus  4.5     Platelet Estimate  Decreased(A)     Platelets  14  Comment:  WBC/PLT   critical result(s) called and verbal readback obtained from   FLAVIO PEOPLES RN AT 0630 ON 05/23/2018 BY BEL  FINAL RESULTS VERIFIED BY REPEAT ANALYSIS, 05/23/2018 06:27  (LL)     Poik  Slight     Potassium  3.2(L)     Total Protein  5.5(L)     RBC  2.46(L)     RDW  13.6     Retic  0.2(L)     Sodium  140     Spherocytes  Occasional     Tear Drop Cells  Occasional     Vancomycin-Trough 10.0      WBC  0.31  Comment:  WBC critical result(s) called and verbal readback obtained from   Flavio Peoples RN , 05/23/2018 05:15  (LL)           Significant Imaging: None

## 2018-05-23 NOTE — PLAN OF CARE
Offered to take patient for a few minutes outside.  Patient stated he did want to go for a few mins.  Took patient to back of hospital per wheelchair.  Very noisy outside due to construction.  Patient asked to go back inside.  Sat inside for a few mins, he then asked to go outside in the front of the hospital.  We went to front and sat outside for approx 20 mins.  He became tearful.  I asked him what was wrong and eventually he stated that he did not like having fearful thoughts.  He could not state what he was fearful about.  He then asked to back to his room.  His grandmother and cousin back in the room.  Hema very happy to see his family.  Grandmother returned Hema his phone.  This also made him very happy.  All of Luis conversations today were rationale and coherent. Sometimes his answers were somewhat slow but he  does answer.  Psych also here to see him at this time.

## 2018-05-23 NOTE — ASSESSMENT & PLAN NOTE
Chemotherapy induced neutropenia:   - Hgb goal >7, Hgb 7.5 today.   - Plts goal >10, platelets 16 today.    - Daily CBC and reticulocyte  - CMP EOD  - If bleeding at all, please transfuse platelets x1 unit. No need to check CBC, evaluate clinically.

## 2018-05-23 NOTE — PLAN OF CARE
Problem: Occupational Therapy Goal  Goal: Occupational Therapy Goal  Goals to be met by: 5/10/2018    Patient will increase functional independence with ADLs by performing:    Feeding with [place]. Goal met  UE Dressing with [place].  LE Dressing with Minimal Assistance.  Goal met  Grooming while standing with Minimal Assistance. Goal met  Toileting from toilet with SBA for safety with transfer.   Bathing from sitting position with SBA for safety.  Toilet transfer to toilet with supervision Assistance.        Outcome: Revised  Goals were revised/upgraded on this date due to improved functioning and mental clarity.     KURTIS Hazel  5/23/2018  Rehab Services         No

## 2018-05-23 NOTE — PLAN OF CARE
"Problem: Patient Care Overview  Goal: Plan of Care Review  POC discussed w pt and sandra, questions and concerns addressed. Pt stable, NAD noted. Denies pain, reports feeling "of my right mind" and experiencing some anxiety. Neuro intact, appropriate. Afebrile this shift. Intake good, u/o good, no bm this shift. CEC removed this shift, risk sitter remains at bedside. Sandra and cousin visiting all day today, mom remains on obs unit in house. Personal cell phone and access to playroom and video norberto system provided. OOB ambulating in harmon with PT, showered with OT. Outside with unit CC today. Some trembling to alternating knees or hands noted intermittently with activity. PAC needles changed, new drsg placed, blood return noted to both lumen. Zyvox admin, cefepime stopped, am vanc held pending trough. Safety maintained, will cont to monitor.       "

## 2018-05-23 NOTE — ASSESSMENT & PLAN NOTE
Patient with Strep mitis sepsis. Bcx from 5/2 growing organism, subsequent cultures until 5/19 growing Enterococcus faecium (sensitivities pending) from outer lumen of port. Subsequent cultures NGTD.     - Continue Cefepime 2g Q8h  - Start Linezolid 600mg Q12h  - Continue Vancomycin 1250 g Q8h, vanc trough 10.0 on 5/23. Will plan to follow trough every 3 days.  - Follow up blood cultures, sensitivities  - monitor fever curve

## 2018-05-23 NOTE — PT/OT/SLP PROGRESS
Occupational Therapy   Treatment    Name: Hema Kuo  MRN: [id number]  Admitting Diagnosis:  Bacteremia       Recommendations:     Discharge Recommendations: home health PT, home health OT  Discharge Equipment Recommendations:  none  Barriers to discharge:  None    Subjective     Communicated with: RN prior to session.  Pain/Comfort:  · Pain Rating 1: 0/10  · Pain Rating Post-Intervention 1: 0/10    Patients cultural, spiritual, Mosque conflicts given the current situation: none    Objective:     Patient found with: central line    General Precautions: Standard, fall   Orthopedic Precautions:N/A   Braces: N/A     Occupational Performance:    Bed Mobility:    · Patient completed Supine to Sit with independence  · Patient completed Sit to Supine with independence     Functional Mobility/Transfers:  · Patient completed Sit <> Stand Transfer with stand by assistance  with  no assistive device   · Patient completed Toilet Transfer Step Transfer technique with contact guard assistance with  no AD  · Patient completed  Shower Transfer Step Transfer technique with contact guard assistance with no AD  · Functional Mobility: Pt ambulated in room for ADLs. Requires CGA due to weakness and new onset of shakiness.     Activities of Daily Living:  · Bathing: minimum assistance for washing body seated on bench in shower  · UB Dressing: minimum assistance (pt appeared to have delayed motor planning or difficulty initiating task  · LB Dressing: minimum assistance with socks due to shakiness  · Toileting: pt can perform transfer with CGA.         [facility] 6 Click:  AMPA Total Score: 18    Treatment & Education:  Pt has been wanting to take a shower and he happened to have a window where his port was removed and before he had to get it replaced.  Pt very happy to take shower and very compliant with all requests/instructions to prevent fall. Pt ambulated to shower and he was noted to be shaking in B LE. He sat on the bench on a towel  to undress. Pt needed assist for temperature control and holding handheld shower head due to design and distance of bench to shower head. Pt performed task with only min A needed for washing back and feet.  Pt performed dressing and ambulated back to bed.     Overall, his responses were much less delayed today. All responses were very appropriate. He did demonstrated some delays with motor planning and initiation of tasks of ~15-20 seconds. For example, when putting on shirt he held it up and looked at it for awhile as it he was trying to remember sequence. OT assisted with bringing towards head and he was able to complete task with only cues to put arms through.      Explained that he may feel emotional following shower and that in my experience some other patients have cried after as it sometimes brings on a flood of emotions.  He hasn't been able to get in shower in a long time. Let RN and sitter know as well.     Patient left HOB elevated with all lines intact. Mother, cousin and sitter at bedside.   Education:    Assessment:     Hema Kuo is a 17 y.o. male with a medical diagnosis of Bacteremia.  He presents with decline in ADLs and functional mobility. Pt would benefit from skilled OT services in order to maximize independence with ADLs and facilitate safe discharge.  Performance deficits affecting function are weakness, impaired functional mobilty, impaired self care skills, decreased lower extremity function, decreased upper extremity function, decreased safety awareness.      Rehab Prognosis:  Good; patient would benefit from acute skilled OT services to address these deficits and reach maximum level of function.       Plan:     Patient to be seen 2 x/week to address the above listed problems via self-care/home management, therapeutic activities, therapeutic exercises, neuromuscular re-education  · Plan of Care Expires:  5/23/2018  · Plan of Care Reviewed with: patient    This Plan of care has been  discussed with the patient who was involved in its development and understands and is in agreement with the identified goals and treatment plan    GOALS:    Occupational Therapy Goals        Problem: Occupational Therapy Goal    Goal Priority Disciplines Outcome Interventions   Occupational Therapy Goal     OT, PT/OT Revised    Description:  Goals to be met by: 5/10/2018    Patient will increase functional independence with ADLs by performing:    Feeding with [place]. Goal met  UE Dressing with [place].  LE Dressing with Minimal Assistance.  Goal met  Grooming while standing with Minimal Assistance. Goal met  Toileting from toilet with SBA for safety with transfer.   Bathing from sitting position with SBA for safety.  Toilet transfer to toilet with supervision Assistance.                          Time Tracking:     OT Date of Treatment: 05/23/18  OT Start Time: 1405  OT Stop Time: 1447  OT Total Time (min): 42 min    Billable Minutes:Self Care/Home Management 42    KURTIS Chun  5/23/2018

## 2018-05-23 NOTE — PROGRESS NOTES
"[facility]  Psychiatry  Progress Note    Patient Name: Hema Kuo  MRN: [id number]   Code Status: Prior  Admission Date: 4/30/2018  Hospital Length of Stay: 23 days  Expected Discharge Date: 5/28/2018  Attending Physician: Christian Emerson MD  Primary Care Provider: Ann Reyna NP    Current Legal Status: CEC-recommend rescind      Subjective:     Principal Problem:Bacteremia    Chief Complaint: delirium    HPI: Patient is a 16 y/o man with PMH AML dx 2017 s/p 4 cycles of chemotherapy admitted to pediatrics team with Strep mitis sepsis and ARDS. Psychiatry was consulted for "paranoia, now CEC'd, possible ICU delirium."     Per staff MD:  "17 year old young man with AML s/p 4 cycles of chemotherapy now transferred from the  PICU with Strep mitis sepsis and ARDS.     For his AML we are awaiting count recovery.  ANC next to nothing still.   For his Strep sepsis, he is on cefepime and vancomycin.  Cultures from 5/2 grew Strep mitis (sensitive to vanc).  Subsequent cultures negative.    Will cont a 14 day course of vanc.        .  For his chemotherapy induced pancytopenia, recommend transfusion for hemoglobin under 7 and platelets under 10 K.  No transfusions today.    For his ARDS will get one last dose of lasix today and then we will stop.  He continues to have paranoid delerium since extubation, however this is improving.  Will cont seroquel with prn zyprexa.  Will have psych see tomorrow.   Still needs a 1:1 sitter."    Per RN note 5/14:  "Pt oriented x4. Pt paranoid and anxious but cooperative. Very slow to proccess what's being communicated to him and very slow to respond. Similar to last nights shift, it took patient a little while for him to take his medications, but after much convincing, pt took them with no problem. Pt making death statements throughout night and still believes he is dying and nurses are out to get him. Double lumen left chest port in place, flushes well, blood return " "noted from each lumen. All medications/antibitoics given as scheduled. Labs drawn in am. Critical results resulted, MD notified and labs redrawn. Pt did sleep for about 6 hours tonight."    Per RN note 5/13:  "Pt oriented to person, self, time, but disoriented to place. Pt stated he was [facility] waiting [facility] die. Pt paranoid and anxious but cooperative. Took him a little time (about 25 minutes) to take his medications, but after time of explaining to him what the medications were and why they are needed, pt took them. Pt stated he believes "the nurses are trying to burn" him and that we all hate him and want him to die."    On interview, patient with sitter at bedside and uneaten breakfast tray in front of him. Interview was limited as his answers to questions are impoverished and inappropriate. He asked interviewer "are you my biological father?" And stated "I think I'm dead." Was able to state his reason for admission was "cancer" but did not respond to further questions about his care.     Per sitter, patient slept overnight and has had limited appetite. Has been paranoid but not physically aggressive. Minimally talkative with her. Says patient's mother is currently at a doctor's appointment but will return to hospital this afternoon.    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 2 sodas/day         Hospital Course: 05/15/2018: Per chart, patient received PRN Zyprexa @ 0009 for insomnia. " "Per sitter, patient slightly less paranoid this morning, ate most of breakfast and played games with her. On interview, no family at bedside, patient states he is feeling like himself. Still making statements about believing his is dead but more redirectable. Mentioned his nosebleed was because there is "something inside" him besides cancer. Overall more conversational and less paranoid this morning.  05/16/2018: Per chart, no PRN zyprexa given, per RN: "Pt has flat affect and has delayed response of understanding. Pt cooperative with vitals. He states " should never been born" and he also states "ya'll just keeping me alive". He also had some other spontaneous statements that did not make sense. Pt vss, afebrile with tmax of 99.6, no distress noted. Pt did go to sleep around midnight but moaned and cried throughout the night per sitter." On interview, patient again responds to select questions and remains paranoid, will slight improvement. Says he's "either fully alive or fully dead" when asked but does not spontaneously state he is dead. Refused to participate in path intelligenceKanmu but oriented to location, year and month.  05/17/2018: Per chart, no PRN zyprexa given, per RN continued to make paranoid statements about being alive, worked well with PT/OT yesterday. Asleep on interview. Per iglesia, remembered her from earlier in the week, has not made any statements this morning so far about being dead.  05/18/2018: Per chart, patient agitated and confused overnight, attempting to disconnect IV and port. Received Zyprexa 5 mg PO PRN @ 0147. Patient asleep on interview. Per juan ramonter did not fall asleep until 4 am. Ate a little breakfast then fell back asleep. No issues with agitation this AM.   05/20/2018: Per RN note: "Mother and step-father visited in evening, updated on plan of care, pleased about pt's continued progress toward baseline mood/personality."  05/21/2018: Per chart, no issues with agitation overnight, no PRN " "Zyprexa given. On interview patient with less paranoid thought content, no longer believes nurses are trying to harm him, states he knows he is alive and remembers last week feeling like he wasn't. Denies AVH. C/o poor sleep overnight 2/2 fever.  05/22/2018: Per chart, no issues with agitation overnight, no PRN Zyprexa given. On interview, patient states he is feeling like himself but c/o feeling "alone." Discussed role of Risperdal, which patient stated was for psychosis; discussed with patient he is being treated for delirium not psychosis and he expressed understanding.   05/23/2018: Per chart, no issues with agitation overnight, no PRN Zyprexa given. Per RN note: "Pt affect remained flat this am, but this afternoon pt much more engaging and talkative.  Speech was less delayed and pt asking appropriate questions.  Pt became tearful and asked for his mom.  Pt states "I'm so lonely, and I'm all by myself" "I miss my family", pt also stated "I know sometimes I think people want to hurt me, but I also know that that's really not right".  Pt also states at this time that he knows he gets confused at times "because its like old people who have to go in and out of the hospital all the time....they get confused why they are there".  This writer also asked pt what he thought happens when he seems to shut down.  Pt states "I think I get scared because I'm roldan confused and my mom isn't here"." On interview, patient sitting up in bed with grandmother, cousin and sitter at bedside. Is able to identify everyone in the room. C/o difficulty sleeping overnight, states he normally sleeps well at home. Discussed patient's life prior to dx as well as basketball game last night, patient smiled when told the [employer] had won.       Interval History: see hospital course.     Family History     Problem Relation (Age of Onset)    Cancer Mother    Heart disease Mother    No Known Problems Father        Social History Main Topics    " "Smoking status: Former Smoker     Packs/day: 0.50     Types: Cigarettes     Quit date: 10/30/2017    Smokeless tobacco: Never Used    Alcohol use Yes      Comment: rare occasions    Drug use: No    Sexual activity: Yes     Partners: Female     Psychotherapeutics     Start     Stop Route Frequency Ordered    05/23/18 2100  risperiDONE disintegrating tablet 2 mg      -- Oral Nightly 05/23/18 0955    05/18/18 0900  risperiDONE disintegrating tablet 1 mg      -- Oral Daily 05/18/18 0819    05/15/18 0847  olanzapine zydis disintegrating tablet 5 mg      -- Oral As needed (PRN) 05/15/18 0848           Review of Systems    Objective:     Vital Signs (Most Recent):  Temp: 97.6 °F (36.4 °C) (05/23/18 0810)  Pulse: 92 (05/23/18 0810)  Resp: 18 (05/23/18 0810)  BP: 132/71 (05/23/18 0810)  SpO2: 99 % (05/23/18 0810) Vital Signs (24h Range):  Temp:  [97.6 °F (36.4 °C)-98.4 °F (36.9 °C)] 97.6 °F (36.4 °C)  Pulse:  [86-95] 92  Resp:  [16-19] 18  SpO2:  [99 %-100 %] 99 %  BP: (111-132)/(53-71) 132/71     Height: 5' 6" (167.6 cm)  Weight: 79.5 kg (175 lb 4.3 oz)  Body mass index is 28.29 kg/m².      Intake/Output Summary (Last 24 hours) at 05/23/18 1109  Last data filed at 05/23/18 0100   Gross per 24 hour   Intake             2110 ml   Output                0 ml   Net             2110 ml       Physical Exam        Mental Status Exam:  Appearance: age appropriate, lying in bed  Behavior/Cooperation: improved cooperation, psychomotor retardation, intense eye contact   Speech: normal volume, monotone, remains slow & delayed but improving, increased latency of response  Language: grossly intact, able to name, able to repeat with spontaneous speech  Mood: "good"  Affect:  constricted but reactive   Thought Process: linear  Thought Content: no suicidality, no homicidality, delusions, or paranoia  Sensorium:  Awake and alert  Alert and Oriented: x person, place, situation, time/date, day of week, month of year, year  Insight:  " impaired, improving  Judgment: appropriate to situation    Significant Labs:   Last 24 Hours:   Recent Lab Results       05/23/18  0730 05/23/18  0417      Immature Granulocytes  CANCELED  Comment:  Result canceled by the ancillary     Immature Grans (Abs)  CANCELED  Comment:  Mild elevation in immature granulocytes is non specific and   can be seen in a variety of conditions including stress response,   acute inflammation, trauma and pregnancy. Correlation with other   laboratory and clinical findings is essential.    Result canceled by the ancillary       Albumin  2.6(L)     Alkaline Phosphatase  67     ALT  36     Anion Gap  10     Aniso  Slight     AST  11     Baso #  Test Not Performed  Comment:  Corrected result; previously reported as 0.00 on %DDDDDDDD% at %TTT%.[C]     Basophil%  0.0     Total Bilirubin  0.5  Comment:  For infants and newborns, interpretation of results should be based  on gestational age, weight and in agreement with clinical  observations.  Premature Infant recommended reference ranges:  Up to 24 hours.............<8.0 mg/dL  Up to 48 hours............<12.0 mg/dL  3-5 days..................<15.0 mg/dL  6-29 days.................<15.0 mg/dL       BUN, Bld  7     Calcium  8.8     Chloride  104     CO2  26     Creatinine  0.6     Differential Method  Manual     eGFR if African American  >60.0     eGFR if non   >60.0  Comment:  Calculation used to obtain the estimated glomerular filtration  rate (eGFR) is the CKD-EPI equation.        Eos #  Test Not Performed  Comment:  Corrected result; previously reported as 0.0 on %DDDDDDDD% at %TTT%.[C]     Eosinophil%  0.0     Glucose  107     Gran%  7.5  Comment:  Corrected result; previously reported as 12.9 on %DDDDDDDD% at %TTT%.(L)[C]     Hematocrit  20.8(L)     Hemoglobin  7.5(L)     Lymph #  Test Not Performed  Comment:  Corrected result; previously reported as 0.2 on %DDDDDDDD% at %TTT%.[C]     Lymph%  90.0  Comment:  Corrected  result; previously reported as 67.7 on %DDDDDDDD% at %TTT%.(H)[C]     Magnesium  1.6     MCH  30.5     MCHC  36.1(H)     MCV  85     Mono #  Test Not Performed  Comment:  Corrected result; previously reported as 0.0 on %DDDDDDDD% at %TTT%.[C]     Mono%  2.5  Comment:  Corrected result; previously reported as 9.7 on %DDDDDDDD% at %TTT%.(L)[C]     MPV  11.9     nRBC  0     Ovalocytes  Occasional     Phosphorus  4.5     Platelet Estimate  Decreased(A)     Platelets  14  Comment:  WBC/PLT   critical result(s) called and verbal readback obtained from   FLAVIO PEOPLES RN AT 0630 ON 05/23/2018 BY BEL  FINAL RESULTS VERIFIED BY REPEAT ANALYSIS, 05/23/2018 06:27  (LL)     Poik  Slight     Potassium  3.2(L)     Total Protein  5.5(L)     RBC  2.46(L)     RDW  13.6     Retic  0.2(L)     Sodium  140     Spherocytes  Occasional     Tear Drop Cells  Occasional     Vancomycin-Trough 10.0      WBC  0.31  Comment:  WBC critical result(s) called and verbal readback obtained from   Flavio Peoples RN , 05/23/2018 05:15  (LL)           Significant Imaging: None    Assessment/Plan:     Acute delirium    Patient without significant past psychiatric history has been exhibiting waxing and waning mental status, disorientation and paranoid delusions since being extubated on 5/9 consistent with delirium.   - Decrease scheduled Risperdal M tabs to 1 mg qAM and 2 mg qHS. Will continue to titrate as needed.  - Continue Zyprexa 5 mg PO/IM q8 PRN nonredirectable agitation. QTc on 5/16 467. Continue checking perioidic EKG to monitor QTc. Continue to monitor CBC for any worsening leukopenia/neutropenia with antipsychotic medications.  - Patient no longer gravely disabled, may rescind CEC; patient has benefited from presence of sitter to reorient and prevent from pulling out IVs/touching lines, pumps and would continue to benefit from a sitter if available. Upon discharge from hospital, patient may schedule therapy and psychiatry follow up at [facility] ([address] [place] [id number]; Phone: [phone]; accepts Medicaid)  · DELIRIUM BEHAVIOR MANAGEMENT  · PLEASE utilize CHEMICAL restraints with PRN meds first for agitation. Minimize use of PHYSICAL restraints  · Keep window shades open and room lit during day and room dim at night in order to promote normal sleep-wake cycles  · Encourage family at bedside. Malone patient often to situation, location, date.  · Continue to Limit or Discontinue use of Narcotics, Benzos and Anti-cholinergic (Avoid Benadryl) medications as they may worsen delirium.   · Continue medical workup for causative etiology of Delirium. CT head with and without contrast without acute intracranial processes. Recommend repeat CXR to rule out additional infectious process, check amylase, lipase, B12, folate, HIV, RPR.               Need for Continued Hospitalization:   No need for inpatient psychiatric hospitalization. Continue medical care as per the primary team.    Anticipated Disposition: Home or Self Care     Total time:  25 with greater than 50% of this time spent in counseling and/or coordination of care.       Clair aTylor MD   Psychiatry  [facility]

## 2018-05-23 NOTE — SUBJECTIVE & OBJECTIVE
Interval History: NAEO, VSS. Remained afebrile. CT head complete yesterday.     Scheduled Meds:   acyclovir  400 mg Oral BID    cefepime in dextrose 5 %  2 g Intravenous Q8H    melatonin  10 mg Oral QHS    posaconazole  300 mg Oral Daily    risperiDONE  1 mg Oral Daily    risperiDONE  3 mg Oral QHS    sulfamethoxazole-trimethoprim 800-160mg  1 tablet Oral twice daily on Friday, Saturday, Sunday    vancomycin (VANCOCIN) IVPB  1,250 mg Intravenous Q8H     Continuous Infusions:  PRN Meds:sodium chloride, acetaminophen, albuterol sulfate, alteplase, heparin, porcine (PF), lidocaine-prilocaine, morphine, olanzapine zydis, ondansetron, polyethylene glycol    Review of Systems   Constitutional: Negative for fever.   Respiratory: Negative for shortness of breath.    Psychiatric/Behavioral: Positive for confusion.     Objective:     Vital Signs (Most Recent):  Temp: 97.6 °F (36.4 °C) (05/23/18 0810)  Pulse: 92 (05/23/18 0810)  Resp: 18 (05/23/18 0810)  BP: 132/71 (05/23/18 0810)  SpO2: 99 % (05/23/18 0810) Vital Signs (24h Range):  Temp:  [96.9 °F (36.1 °C)-98.4 °F (36.9 °C)] 97.6 °F (36.4 °C)  Pulse:  [86-95] 92  Resp:  [16-19] 18  SpO2:  [99 %-100 %] 99 %  BP: (111-132)/(53-71) 132/71     Patient Vitals for the past 72 hrs (Last 3 readings):   Weight   05/22/18 2100 79.5 kg (175 lb 4.3 oz)   05/22/18 0600 78.4 kg (172 lb 13.5 oz)   05/20/18 2000 78.6 kg (173 lb 4.5 oz)     Body mass index is 28.29 kg/m².    Intake/Output - Last 3 Shifts       05/21 0700 - 05/22 0659 05/22 0700 - 05/23 0659 05/23 0700 - 05/24 0659    P.O. 1160 1700     Blood 208      IV Piggyback 1000 850     Total Intake(mL/kg) 2368 (30.2) 2550 (32.1)     Net +2368 +2550             Urine Occurrence 5 x 4 x     Stool Occurrence 1 x            Lines/Drains/Airways     Central Venous Catheter Line                 Port A Cath Double Lumen 05/10/18 0900 left subclavian 12 days                Physical Exam  Constitutional: He appears well-developed and  well-nourished. Sleeping on exam.   Head: Normocephalic and atraumatic.   Nose: Nose normal.   Mouth/Throat: Mucous membranes are normal.   Eyes: Conjunctivae and lids are normal.   Neck: Normal range of motion. Neck supple.   Cardiovascular: Normal rate and regular rhythm.    Pulmonary/Chest: Effort normal. No stridor. He has no decreased breath sounds. He has no wheezes. He has no rales.   Abdominal: Soft. Bowel sounds are normal. He exhibits no distension. There is no hepatosplenomegaly. There is no guarding.   Musculoskeletal: Normal range of motion. He exhibits no edema.   Skin: Skin is warm and dry. Capillary refill takes less than 2 seconds. No rash noted. He is not diaphoretic. No erythema. Port CDI.     Significant Labs:  Recent Results (from the past 24 hour(s))   Reticulocytes    Collection Time: 05/23/18  4:17 AM   Result Value Ref Range    Retic 0.2 (L) 0.4 - 2.0 %   CBC auto differential    Collection Time: 05/23/18  4:17 AM   Result Value Ref Range    WBC 0.31 (LL) 3.90 - 12.70 K/uL    RBC 2.46 (L) 4.60 - 6.20 M/uL    Hemoglobin 7.5 (L) 14.0 - 18.0 g/dL    Hematocrit 20.8 (L) 40.0 - 54.0 %    MCV 85 82 - 98 fL    MCH 30.5 27.0 - 31.0 pg    MCHC 36.1 (H) 32.0 - 36.0 g/dL    RDW 13.6 11.5 - 14.5 %    Platelets 14 (LL) 150 - 350 K/uL    MPV 11.9 9.2 - 12.9 fL    Immature Granulocytes CANCELED 0.0 - 0.5 %    Immature Grans (Abs) CANCELED 0.00 - 0.04 K/uL    Lymph # Test Not Performed 1.0 - 4.8 K/uL    Mono # Test Not Performed 0.3 - 1.0 K/uL    Eos # Test Not Performed 0.0 - 0.5 K/uL    Baso # Test Not Performed 0.00 - 0.20 K/uL    nRBC 0 0 /100 WBC    Gran% 7.5 (L) 38.0 - 73.0 %    Lymph% 90.0 (H) 18.0 - 48.0 %    Mono% 2.5 (L) 4.0 - 15.0 %    Eosinophil% 0.0 0.0 - 8.0 %    Basophil% 0.0 0.0 - 1.9 %    Platelet Estimate Decreased (A)     Aniso Slight     Poik Slight     Ovalocytes Occasional     Tear Drop Cells Occasional     Spherocytes Occasional     Differential Method Manual    Comprehensive  metabolic panel    Collection Time: 05/23/18  4:17 AM   Result Value Ref Range    Sodium 140 136 - 145 mmol/L    Potassium 3.2 (L) 3.5 - 5.1 mmol/L    Chloride 104 95 - 110 mmol/L    CO2 26 23 - 29 mmol/L    Glucose 107 70 - 110 mg/dL    BUN, Bld 7 6 - 20 mg/dL    Creatinine 0.6 0.5 - 1.4 mg/dL    Calcium 8.8 8.7 - 10.5 mg/dL    Total Protein 5.5 (L) 6.0 - 8.4 g/dL    Albumin 2.6 (L) 3.5 - 5.2 g/dL    Total Bilirubin 0.5 0.1 - 1.0 mg/dL    Alkaline Phosphatase 67 55 - 135 U/L    AST 11 10 - 40 U/L    ALT 36 10 - 44 U/L    Anion Gap 10 8 - 16 mmol/L    eGFR if African American >60.0 >60 mL/min/1.73 m^2    eGFR if non African American >60.0 >60 mL/min/1.73 m^2   Magnesium    Collection Time: 05/23/18  4:17 AM   Result Value Ref Range    Magnesium 1.6 1.6 - 2.6 mg/dL   Phosphorus    Collection Time: 05/23/18  4:17 AM   Result Value Ref Range    Phosphorus 4.5 2.7 - 4.5 mg/dL   VANCOMYCIN, TROUGH before 4th dose    Collection Time: 05/23/18  7:30 AM   Result Value Ref Range    Vancomycin-Trough 10.0 10.0 - 22.0 ug/mL       Significant Imaging:   CT: Ct Head W Wo Contrast    Result Date: 5/22/2018  No evidence of acute intracranial pathology.   Electronically signed by resident: Landon Contreras Date:05/22/2018 Time:10:38 Electronically signed by:Terry Gomez MD Date:05/22/2018 Time:10:52

## 2018-05-23 NOTE — ASSESSMENT & PLAN NOTE
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 On Intensification therapy II following [id number] (Arm A). He was admitted on 4/30/2018 for neutropenia/profund sepsis risk. Stepped up to the PICU for persistent fever >103, tachycardia, and hypotension that was been minimally responsive to fluid resuscitation. PICU stay was complicated by delirium. Currently CEC'd.      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 MRD negative after Induction I. Today is Day 34.  - Treating per COG [id number] (ARM A).  S/p Intensification I and Intensification II started.   - BM biopsy at start of Intensification shows CR.  FISH for t(8;21) negative. Will repeat BM biopsy pending count recovery and clinical improvement

## 2018-05-23 NOTE — MEDICAL/APP STUDENT
"5/23/2018 9:32 AM Hema Kuo 2000 [id number]        PSYCHIATRY CONSULT PROGRESS NOTE   SUBJECTIVE:   BRIEF HPI  Hema Kuo is a 17 y.o.  male who  has a past medical history of AML (acute myeloblastic leukemia) (10/2017); Asthma; Encounter for blood transfusion; and Seasonal allergies., currently presenting with Bacteremia. Encounter for blood transfusion; and Seasonal allergies., currently presenting with Bacteremia.  Psychiatry was consulted for "paranoia, now CEC'd, possible ICU delirium."      Per staff MD:  "17 year old young man with AML s/p 4 cycles of chemotherapy now transferred from the  PICU with Strep mitis sepsis and ARDS.     For his AML we are awaiting count recovery.  ANC next to nothing still.   For his Strep sepsis, he is on cefepime and vancomycin.  Cultures from 5/2 grew Strep mitis (sensitive to vanc).  Subsequent cultures negative.    Will cont a 14 day course of vanc.        .  For his chemotherapy induced pancytopenia, recommend transfusion for hemoglobin under 7 and platelets under 10 K.  No transfusions today.    For his ARDS will get one last dose of lasix today and then we will stop.  He continues to have paranoid delerium since extubation, however this is improving.  Will cont seroquel with prn zyprexa.  Will have psych see tomorrow.   Still needs a 1:1 sitter."     Nursing notes:  5/22/2018, 6:43AM:  Pt has remained stable and afebrile this shift.  Pt remains on IV antibiotics.  Pt affect remained flat this am, but this afternoon pt much more engaging and talkative.  Speech was less delayed and pt asking appropriate questions.  Pt became tearful and asked for his mom.  Pt states "I'm so lonely, and I'm all by myself" "I miss my family", pt also stated "I know sometimes I think people want to hurt me, but I also know that that's really not right".  Pt also states at this time that he knows he gets confused at times "because its like old people who have to go in and out of the " "hospital all the time....they get confused why they are there".  This writer also asked pt what he thought happens when he seems to shut down.  Pt states "I think I get scared because I'm roldan confused and my mom isn't here".  Reinforced to patient what has gone on during his hospitalization.  Discussed his ICU stay, and confusion thereafter.  Reinforced that he was in fact getting better and that we are waiting on count recovery to be able to go home.  Pt able to verbalize needing an anc of 200 to go home.  Discussed what antibiotics he was getting and the purpose of them. Several staff members went to visit pt this afternoon and pt seems excited to have visitors and familiar faces.  PT able to identify those staff members and able to reminisce of previous hospital stays and experiences.  5/22/2018, 6:23PM:  Reviewed plan of care with pt. Pt verbalized understanding and was in a coherent, positive mood. Pt stated " I feel childish that I have to have a sitter at all times". This RN gave positive reinforcement, and encouraged pt to focus on the positive. Pt is improving and interacting more appropriately.Pt requested to have xbox to play games and interact with family members that are far away. He did state concerns and fear about going to sleep. Encouraged pt to pray and sitter in room also prayed with him. He stated that did make him feel better. Vss. Afebrile, no distress noted. Pt able to rest between care. Tolerating po well, and voiding well. Will continue to monitor.     Hospital Course/Interval History:  05/15/2018: Per chart, patient received PRN Zyprexa @ 0009 for insomnia. Per sitter, patient slightly less paranoid this morning, ate most of breakfast and played games with her. On interview, no family at bedside, patient states he is feeling like himself. Still making statements about believing his is dead but more redirectable. Mentioned his nosebleed was because there is "something inside" him besides " "cancer. Overall more conversational and less paranoid this morning.  05/16/2018: Per chart, no PRN zyprexa given, per RN: "Pt has flat affect and has delayed response of understanding. Pt cooperative with vitals. He states " should never been born" and he also states "ya'll just keeping me alive". He also had some other spontaneous statements that did not make sense. Pt vss, afebrile with tmax of 99.6, no distress noted. Pt did go to sleep around midnight but moaned and cried throughout the night per sitter." On interview, patient again responds to select questions and remains paranoid, will slight improvement. Says he's "either fully alive or fully dead" when asked but does not spontaneously state he is dead. Refused to participate in [facility] but oriented to location, year and month.  05/17/2018: Per chart, no PRN zyprexa given, per RN continued to make paranoid statements about being alive, worked well with PT/OT yesterday. Asleep on interview. Per iglesia, remembered her from earlier in the week, has not made any statements this morning so far about being dead.  05/18/2018: Per chart, patient agitated and confused overnight, attempting to disconnect IV and port. Received Zyprexa 5 mg PO PRN @ 0147. Patient asleep on interview. Per sitter did not fall asleep until 4 am. Ate a little breakfast then fell back asleep. No issues with agitation this AM.   05/20/2018: Per RN note: "Mother and step-father visited in evening, updated on plan of care, pleased about pt's continued progress toward baseline mood/personality."  05/21/2018: Per chart, no issues with agitation overnight, no PRN Zyprexa given. On interview patient with less paranoid thought content, no longer believes nurses are trying to harm him, states he knows he is alive and remembers last week feeling like he wasn't. Denies AVH. C/o poor sleep overnight 2/2 fever.  05/22/2018: Per chart, no issues with agitation overnight, no PRN Zyprexa given. On " "interview, patient states he is feeling like himself but c/o feeling "alone." Discussed role of Risperdal, which patient stated was for psychosis; discussed with patient he is being treated for delirium not psychosis and he expressed understanding.   5/23/2018: Patient did not sleep well last night but better than yesterday. He is more talkative and improvement in speech (rate, tome, latency of response and volume) is noted. Mode has improved but affect is still blunted. His appetite has improved but he still had difficulty falling asleep last night.    Current Medications:   PRN Meds: sodium chloride, acetaminophen, albuterol sulfate, alteplase, heparin, porcine (PF), lidocaine-prilocaine, morphine, olanzapine zydis, ondansetron, polyethylene glycol   Psychotherapeutics     Start     Stop Route Frequency Ordered    05/18/18 2100  risperiDONE disintegrating tablet 3 mg      -- Oral Nightly 05/18/18 0819    05/18/18 0900  risperiDONE disintegrating tablet 1 mg      -- Oral Daily 05/18/18 0819    05/15/18 0847  olanzapine zydis disintegrating tablet 5 mg      -- Oral As needed (PRN) 05/15/18 0848        Allergies/PMH:   Review of patient's allergies indicates:   Allergen Reactions    Nsaids (non-steroidal anti-inflammatory drug) Anaphylaxis    Nuts [tree nut] Anaphylaxis    Strawberry     Vancomycin analogues Itching     Premedicate with benadryl and admin over 2hr.      Past Medical History:   Diagnosis Date    AML (acute myeloblastic leukemia) 10/2017    Asthma     Encounter for blood transfusion     Seasonal allergies        Medical Review Of Systems:  Pertinent items are noted in HPI.  Psychiatric Review Of Systems:  sleep: no  appetite changes: yes, appetite has improved since yesterday      OBJECTIVE:   Vitals   Vitals:    05/23/18 0810   BP: 132/71   Pulse: 92   Resp: 18   Temp: 97.6 °F (36.4 °C)      In/Out  I/O last 3 completed shifts:  In: 2890 [P.O.:1940; IV Piggyback:950]  Out: -   Mental Status " "Exam:  Appearance: age appropriate, lying in bed  Behavior/Cooperation: improved cooperation, psychomotor retardation, intense eye contact   Speech: slow, low volume, monotone, delayed but improving, increased latency of response                More talkative today; improvement in all aspects of speech noted.  Language: grossly intact, able to name, able to repeat with spontaneous speech  Mood: "okay"  Affect:  blunted  Thought Process: linear  Thought Content: no suicidality, no homicidality, delusions, or paranoia  Sensorium:  Awake and alert  Alert and Oriented: x3 person, place, situation, time/date, day of week, month of year, year  Memory: 3/3 immediate, 2/3 at 5 minutes   Attention/concentration: SAVEAHAART with 0 erros  Insight:  impaired, improving  Judgment: appropriate to situation    RASS = 0  CAM ICU = NEG    Labs/Imaging/Studies:   Recent Results (from the past 36 hour(s))   Reticulocytes    Collection Time: 05/22/18  5:52 AM   Result Value Ref Range    Retic 0.3 (L) 0.4 - 2.0 %   CBC auto differential    Collection Time: 05/22/18  5:52 AM   Result Value Ref Range    WBC 0.16 (LL) 3.90 - 12.70 K/uL    RBC 3.15 (L) 4.60 - 6.20 M/uL    Hemoglobin 9.7 (L) 14.0 - 18.0 g/dL    Hematocrit 27.4 (L) 40.0 - 54.0 %    MCV 87 82 - 98 fL    MCH 30.8 27.0 - 31.0 pg    MCHC 35.4 32.0 - 36.0 g/dL    RDW 14.1 11.5 - 14.5 %    Platelets 14 (LL) 150 - 350 K/uL    MPV 11.0 9.2 - 12.9 fL    Immature Granulocytes 0.0 0.0 - 0.5 %    Gran # (ANC) 0.0 (L) 1.8 - 7.7 K/uL    Immature Grans (Abs) 0.00 0.00 - 0.04 K/uL    Lymph # 0.1 (L) 1.0 - 4.8 K/uL    Mono # 0.0 (L) 0.3 - 1.0 K/uL    Eos # 0.0 0.0 - 0.5 K/uL    Baso # 0.00 0.00 - 0.20 K/uL    nRBC 0 0 /100 WBC    Gran% 6.2 (L) 38.0 - 73.0 %    Lymph% 87.5 (H) 18.0 - 48.0 %    Mono% 6.3 4.0 - 15.0 %    Eosinophil% 0.0 0.0 - 8.0 %    Basophil% 0.0 0.0 - 1.9 %    Aniso Slight     Poik Slight     Poly Occasional     Hypo Occasional     Ovalocytes Occasional     Tear Drop Cells " Occasional     Differential Method Automated    Type & Screen    Collection Time: 05/22/18  5:52 AM   Result Value Ref Range    Group & Rh AB POS     Indirect Nathan NEG    Reticulocytes    Collection Time: 05/23/18  4:17 AM   Result Value Ref Range    Retic 0.2 (L) 0.4 - 2.0 %   CBC auto differential    Collection Time: 05/23/18  4:17 AM   Result Value Ref Range    WBC 0.31 (LL) 3.90 - 12.70 K/uL    RBC 2.46 (L) 4.60 - 6.20 M/uL    Hemoglobin 7.5 (L) 14.0 - 18.0 g/dL    Hematocrit 20.8 (L) 40.0 - 54.0 %    MCV 85 82 - 98 fL    MCH 30.5 27.0 - 31.0 pg    MCHC 36.1 (H) 32.0 - 36.0 g/dL    RDW 13.6 11.5 - 14.5 %    Platelets 14 (LL) 150 - 350 K/uL    MPV 11.9 9.2 - 12.9 fL    Immature Granulocytes CANCELED 0.0 - 0.5 %    Immature Grans (Abs) CANCELED 0.00 - 0.04 K/uL    Lymph # Test Not Performed 1.0 - 4.8 K/uL    Mono # Test Not Performed 0.3 - 1.0 K/uL    Eos # Test Not Performed 0.0 - 0.5 K/uL    Baso # Test Not Performed 0.00 - 0.20 K/uL    nRBC 0 0 /100 WBC    Gran% 7.5 (L) 38.0 - 73.0 %    Lymph% 90.0 (H) 18.0 - 48.0 %    Mono% 2.5 (L) 4.0 - 15.0 %    Eosinophil% 0.0 0.0 - 8.0 %    Basophil% 0.0 0.0 - 1.9 %    Platelet Estimate Decreased (A)     Aniso Slight     Poik Slight     Ovalocytes Occasional     Tear Drop Cells Occasional     Spherocytes Occasional     Differential Method Manual    Comprehensive metabolic panel    Collection Time: 05/23/18  4:17 AM   Result Value Ref Range    Sodium 140 136 - 145 mmol/L    Potassium 3.2 (L) 3.5 - 5.1 mmol/L    Chloride 104 95 - 110 mmol/L    CO2 26 23 - 29 mmol/L    Glucose 107 70 - 110 mg/dL    BUN, Bld 7 6 - 20 mg/dL    Creatinine 0.6 0.5 - 1.4 mg/dL    Calcium 8.8 8.7 - 10.5 mg/dL    Total Protein 5.5 (L) 6.0 - 8.4 g/dL    Albumin 2.6 (L) 3.5 - 5.2 g/dL    Total Bilirubin 0.5 0.1 - 1.0 mg/dL    Alkaline Phosphatase 67 55 - 135 U/L    AST 11 10 - 40 U/L    ALT 36 10 - 44 U/L    Anion Gap 10 8 - 16 mmol/L    eGFR if African American >60.0 >60 mL/min/1.73 m^2    eGFR if  non African American >60.0 >60 mL/min/1.73 m^2   Magnesium    Collection Time: 05/23/18  4:17 AM   Result Value Ref Range    Magnesium 1.6 1.6 - 2.6 mg/dL   Phosphorus    Collection Time: 05/23/18  4:17 AM   Result Value Ref Range    Phosphorus 4.5 2.7 - 4.5 mg/dL   VANCOMYCIN, TROUGH before 4th dose    Collection Time: 05/23/18  7:30 AM   Result Value Ref Range    Vancomycin-Trough 10.0 10.0 - 22.0 ug/mL      CT HEAD IMAGING:  Impression     No evidence of acute intracranial pathology.    Electronically signed by resident: Landon April  Date: 05/22/2018  Time: 10:38         ASSESSMENT     Acute delirium     Patient without significant past psychiatric history has been exhibiting waxing and waning mental status, disorientation and paranoid delusions since being extubated on 5/9 consistent with delirium.   - Decrease scheduled Risperdal M tabs to 1 mg qAM and 2 mg qHS. Will continue to titrate as needed.  - Continue Zyprexa 5 mg PO/IM q8 PRN nonredirectable agitation. QTc on 5/16 467. Continue checking perioidic EKG to monitor QTc. Continue to monitor CBC for any worsening leukopenia/neutropenia with antipsychotic medications.  - Upon discharge from hospital, patient may schedule therapy and psychiatry follow up at [facility] ([address] [place] [address]; Phone: [phone])  · DELIRIUM BEHAVIOR MANAGEMENT  ? Head CT was unremarkable  ? PLEASE utilize CHEMICAL restraints with PRN meds first for agitation. Minimize use of PHYSICAL restraints  ? Keep window shades open and room lit during day and room dim at night in order to promote normal sleep-wake cycles  ? Encourage family at bedside. [place] patient often to situation, location, date.  ? Continue to Limit or Discontinue use of Narcotics, Benzos and Anti-cholinergic (Avoid Benadryl) medications as they may worsen delirium.   ? Continue medical workup for causative etiology of Delirium.   ? Recommend repeat CXR to rule out additional  infectious process, check amylase, lipase, B12, folate, HIV, RPR.         RECOMMENDATIONS      · PSYCH Meds-  · Legal status-  · The above will be discussed with staff psychiatrist and update any changes after rounds in the afternoon.  · Thank you for this consult will continue to follow      Elroy Robin (MS3)  5/23/2018 9:32 AM

## 2018-05-23 NOTE — PLAN OF CARE
"Problem: Patient Care Overview  Goal: Plan of Care Review  Outcome: Ongoing (interventions implemented as appropriate)  Reviewed plan of care with pt. Pt verbalized understanding and was in a coherent, positive mood. Pt stated " I feel childish that I have to have a sitter at all times". This RN gave positive reinforcement, and encouraged pt to focus on the positive. Pt is improving and interacting more appropriately.Pt requested to have xbox to play games and interact with family members that are far away. He did state concerns and fear about going to sleep. Encouraged pt to pray and sitter in room also prayed with him. He stated that did make him feel better. Vss. Afebrile, no distress noted. Pt able to rest between care. Tolerating po well, and voiding well. Will continue to monitor.      "

## 2018-05-23 NOTE — PROGRESS NOTES
[facility] Pediatric Hospital Medicine  Progress Note    Patient Name: Hema Kuo  MRN: [id number]  Admission Date: 4/30/2018  Hospital Length of Stay: 23  Code Status: Prior   Primary Care Physician: Ann Reyna NP  Principal Problem: Bacteremia    Subjective:     Interval History: NAEO, VSS. Remained afebrile. CT head complete yesterday.     Scheduled Meds:   acyclovir  400 mg Oral BID    cefepime in dextrose 5 %  2 g Intravenous Q8H    linezolid 600mg/300ml  600 mg Intravenous Q12H    melatonin  10 mg Oral QHS    posaconazole  300 mg Oral Daily    risperiDONE  1 mg Oral Daily    risperiDONE  2 mg Oral QHS    sulfamethoxazole-trimethoprim 800-160mg  1 tablet Oral twice daily on Friday, Saturday, Sunday    vancomycin (VANCOCIN) IVPB  1,250 mg Intravenous Q8H     Continuous Infusions:  PRN Meds:sodium chloride, acetaminophen, albuterol sulfate, alteplase, heparin, porcine (PF), lidocaine-prilocaine, morphine, olanzapine zydis, ondansetron, polyethylene glycol      Scheduled Meds:   acyclovir  400 mg Oral BID    cefepime in dextrose 5 %  2 g Intravenous Q8H    melatonin  10 mg Oral QHS    posaconazole  300 mg Oral Daily    risperiDONE  1 mg Oral Daily    risperiDONE  3 mg Oral QHS    sulfamethoxazole-trimethoprim 800-160mg  1 tablet Oral twice daily on Friday, Saturday, Sunday    vancomycin (VANCOCIN) IVPB  1,250 mg Intravenous Q8H     Continuous Infusions:  PRN Meds:sodium chloride, acetaminophen, albuterol sulfate, alteplase, heparin, porcine (PF), lidocaine-prilocaine, morphine, olanzapine zydis, ondansetron, polyethylene glycol    Review of Systems   Constitutional: Negative for fever.   Respiratory: Negative for shortness of breath.    Psychiatric/Behavioral: Positive for confusion.     Objective:     Vital Signs (Most Recent):  Temp: 97.6 °F (36.4 °C) (05/23/18 0810)  Pulse: 92 (05/23/18 0810)  Resp: 18 (05/23/18 0810)  BP: 132/71 (05/23/18 0810)  SpO2: 99 % (05/23/18 0810)  Vital Signs (24h Range):  Temp:  [96.9 °F (36.1 °C)-98.4 °F (36.9 °C)] 97.6 °F (36.4 °C)  Pulse:  [86-95] 92  Resp:  [16-19] 18  SpO2:  [99 %-100 %] 99 %  BP: (111-132)/(53-71) 132/71     Patient Vitals for the past 72 hrs (Last 3 readings):   Weight   05/22/18 2100 79.5 kg (175 lb 4.3 oz)   05/22/18 0600 78.4 kg (172 lb 13.5 oz)   05/20/18 2000 78.6 kg (173 lb 4.5 oz)     Body mass index is 28.29 kg/m².    Intake/Output - Last 3 Shifts       05/21 0700 - 05/22 0659 05/22 0700 - 05/23 0659 05/23 0700 - 05/24 0659    P.O. 1160 1700     Blood 208      IV Piggyback 1000 850     Total Intake(mL/kg) 2368 (30.2) 2550 (32.1)     Net +2368 +2550             Urine Occurrence 5 x 4 x     Stool Occurrence 1 x            Lines/Drains/Airways     Central Venous Catheter Line                 Port A Cath Double Lumen 05/10/18 0900 left subclavian 12 days                Physical Exam  Constitutional: He appears well-developed and well-nourished. Sleeping on exam.   Head: Normocephalic and atraumatic.   Nose: Nose normal.   Mouth/Throat: Mucous membranes are normal.   Eyes: Conjunctivae and lids are normal.   Neck: Normal range of motion. Neck supple.   Cardiovascular: Normal rate and regular rhythm.    Pulmonary/Chest: Effort normal. No stridor. He has no decreased breath sounds. He has no wheezes. He has no rales.   Abdominal: Soft. Bowel sounds are normal. He exhibits no distension. There is no hepatosplenomegaly. There is no guarding.   Musculoskeletal: Normal range of motion. He exhibits no edema.   Skin: Skin is warm and dry. Capillary refill takes less than 2 seconds. No rash noted. He is not diaphoretic. No erythema. Port CDI.     Significant Labs:  Recent Results (from the past 24 hour(s))   Reticulocytes    Collection Time: 05/23/18  4:17 AM   Result Value Ref Range    Retic 0.2 (L) 0.4 - 2.0 %   CBC auto differential    Collection Time: 05/23/18  4:17 AM   Result Value Ref Range    WBC 0.31 (LL) 3.90 - 12.70 K/uL    RBC  2.46 (L) 4.60 - 6.20 M/uL    Hemoglobin 7.5 (L) 14.0 - 18.0 g/dL    Hematocrit 20.8 (L) 40.0 - 54.0 %    MCV 85 82 - 98 fL    MCH 30.5 27.0 - 31.0 pg    MCHC 36.1 (H) 32.0 - 36.0 g/dL    RDW 13.6 11.5 - 14.5 %    Platelets 14 (LL) 150 - 350 K/uL    MPV 11.9 9.2 - 12.9 fL    Immature Granulocytes CANCELED 0.0 - 0.5 %    Immature Grans (Abs) CANCELED 0.00 - 0.04 K/uL    Lymph # Test Not Performed 1.0 - 4.8 K/uL    Mono # Test Not Performed 0.3 - 1.0 K/uL    Eos # Test Not Performed 0.0 - 0.5 K/uL    Baso # Test Not Performed 0.00 - 0.20 K/uL    nRBC 0 0 /100 WBC    Gran% 7.5 (L) 38.0 - 73.0 %    Lymph% 90.0 (H) 18.0 - 48.0 %    Mono% 2.5 (L) 4.0 - 15.0 %    Eosinophil% 0.0 0.0 - 8.0 %    Basophil% 0.0 0.0 - 1.9 %    Platelet Estimate Decreased (A)     Aniso Slight     Poik Slight     Ovalocytes Occasional     Tear Drop Cells Occasional     Spherocytes Occasional     Differential Method Manual    Comprehensive metabolic panel    Collection Time: 05/23/18  4:17 AM   Result Value Ref Range    Sodium 140 136 - 145 mmol/L    Potassium 3.2 (L) 3.5 - 5.1 mmol/L    Chloride 104 95 - 110 mmol/L    CO2 26 23 - 29 mmol/L    Glucose 107 70 - 110 mg/dL    BUN, Bld 7 6 - 20 mg/dL    Creatinine 0.6 0.5 - 1.4 mg/dL    Calcium 8.8 8.7 - 10.5 mg/dL    Total Protein 5.5 (L) 6.0 - 8.4 g/dL    Albumin 2.6 (L) 3.5 - 5.2 g/dL    Total Bilirubin 0.5 0.1 - 1.0 mg/dL    Alkaline Phosphatase 67 55 - 135 U/L    AST 11 10 - 40 U/L    ALT 36 10 - 44 U/L    Anion Gap 10 8 - 16 mmol/L    eGFR if African American >60.0 >60 mL/min/1.73 m^2    eGFR if non African American >60.0 >60 mL/min/1.73 m^2   Magnesium    Collection Time: 05/23/18  4:17 AM   Result Value Ref Range    Magnesium 1.6 1.6 - 2.6 mg/dL   Phosphorus    Collection Time: 05/23/18  4:17 AM   Result Value Ref Range    Phosphorus 4.5 2.7 - 4.5 mg/dL   VANCOMYCIN, TROUGH before 4th dose    Collection Time: 05/23/18  7:30 AM   Result Value Ref Range    Vancomycin-Trough 10.0 10.0 - 22.0 ug/mL        Significant Imaging:   CT: Ct Head W Wo Contrast    Result Date: 5/22/2018  No evidence of acute intracranial pathology.   Electronically signed by resident: Landon Contreras Date:05/22/2018 Time:10:38 Electronically signed by:Terry Gomez MD Date:05/22/2018 Time:10:52    Assessment/Plan:     Neuro   Acute delirium    Delirium: discontinued CEC, improving.  - Decrease Risperdal to 1mg qAM and 2mg qHS  - RPR NR  - Olanzapine PRN  - EKG complete on 5/14, 5/15, 5/16. QTc ranging from 437-441.   - Avoid Benadryl per Psych  - Continue frequent reorientation and attempt to encourage patient to sleep.   - Psych following, rec'd follow up at [facility] ([address] [place] [address]; Phone: [phone]) as outpatient.   - CT head complete on 5/22: unremarkable        ID   * Bacteremia    Patient with Strep mitis sepsis. Bcx from 5/2 growing organism, subsequent cultures until 5/19 growing Enterococcus faecium (sensitivities pending) from outer lumen of port. Subsequent cultures NGTD.     - Continue Cefepime 2g Q8h  - Start Linezolid 600mg Q12h  - Continue Vancomycin 1250 g Q8h, vanc trough 10.0 on 5/23. Will plan to follow trough every 3 days.  - Follow up blood cultures, sensitivities  - monitor fever curve          Immunology/Multi System   Immunosuppressed due to chemotherapy    Immunosuppression 2/2 to chemotherapy:  - Continue acyclovir ppx  - Continue posaconazole 400mg BID- therapeutic dosing.   - Continue bactrim QFSaSu ppx  - Continue peridex ppx          Oncology   Neutropenia    - ANC 23 today  - Advance soft mechanical diet  - Continue PRN zofran        AML (acute myeloblastic leukemia)    Hema is a 17 yr young man with AML (t 8;21) here for chemotherapy. On Intensification therapy II following [id number] (Arm A). He was admitted on 4/30/2018 for neutropenia/profund sepsis risk. Stepped up to the PICU for persistent fever >103, tachycardia, and hypotension that was been minimally  responsive to fluid resuscitation. PICU stay was complicated by delirium. Currently CEC'd.      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 MRD negative after Induction I. Today is Day 34.  - Treating per COG [id number] (ARM A).  S/p Intensification I and Intensification II started.   - BM biopsy at start of Intensification shows CR.  FISH for t(8;21) negative. Will repeat BM biopsy pending count recovery and clinical improvement               Antineoplastic chemotherapy induced pancytopenia    Chemotherapy induced neutropenia:   - Hgb goal >7, Hgb 7.5 today.   - Plts goal >10, platelets 16 today.    - Daily CBC and reticulocyte  - CMP EOD  - If bleeding at all, please transfuse platelets x1 unit. No need to check CBC, evaluate clinically.                 Anticipated Disposition: Home or Self Care    Ayanna Ramirez,   Pediatric Hospital Medicine   [facility]

## 2018-05-24 LAB
ANISOCYTOSIS BLD QL SMEAR: SLIGHT
BACTERIA BLD CULT: NORMAL
BASOPHILS # BLD AUTO: ABNORMAL K/UL
BASOPHILS NFR BLD: 0 %
BLD PROD TYP BPU: NORMAL
BLD PROD TYP BPU: NORMAL
BLOOD UNIT EXPIRATION DATE: NORMAL
BLOOD UNIT EXPIRATION DATE: NORMAL
BLOOD UNIT TYPE CODE: 6200
BLOOD UNIT TYPE CODE: 6200
BLOOD UNIT TYPE: NORMAL
BLOOD UNIT TYPE: NORMAL
CODING SYSTEM: NORMAL
CODING SYSTEM: NORMAL
DIFFERENTIAL METHOD: ABNORMAL
DISPENSE STATUS: NORMAL
DISPENSE STATUS: NORMAL
EOSINOPHIL # BLD AUTO: ABNORMAL K/UL
EOSINOPHIL NFR BLD: 0 %
ERYTHROCYTE [DISTWIDTH] IN BLOOD BY AUTOMATED COUNT: 13.2 %
HCT VFR BLD AUTO: 18.1 %
HGB BLD-MCNC: 6.4 G/DL
IMM GRANULOCYTES # BLD AUTO: ABNORMAL K/UL
IMM GRANULOCYTES NFR BLD AUTO: ABNORMAL %
LYMPHOCYTES # BLD AUTO: ABNORMAL K/UL
LYMPHOCYTES NFR BLD: 100 %
MCH RBC QN AUTO: 30.5 PG
MCHC RBC AUTO-ENTMCNC: 35.4 G/DL
MCV RBC AUTO: 86 FL
MONOCYTES # BLD AUTO: ABNORMAL K/UL
MONOCYTES NFR BLD: 0 %
NEUTROPHILS NFR BLD: 0 %
NRBC BLD-RTO: 0 /100 WBC
NUM UNITS TRANS PACKED RBC: NORMAL
NUM UNITS TRANS WBC-POOR PLATPHERESIS: NORMAL
PLATELET # BLD AUTO: 7 K/UL
PLATELET BLD QL SMEAR: ABNORMAL
PMV BLD AUTO: ABNORMAL FL
POLYCHROMASIA BLD QL SMEAR: ABNORMAL
RBC # BLD AUTO: 2.1 M/UL
RETICS/RBC NFR AUTO: 0.3 %
WBC # BLD AUTO: 0.27 K/UL

## 2018-05-24 PROCEDURE — P9037 PLATE PHERES LEUKOREDU IRRAD: HCPCS

## 2018-05-24 PROCEDURE — P9040 RBC LEUKOREDUCED IRRADIATED: HCPCS

## 2018-05-24 PROCEDURE — 97110 THERAPEUTIC EXERCISES: CPT

## 2018-05-24 PROCEDURE — 85027 COMPLETE CBC AUTOMATED: CPT

## 2018-05-24 PROCEDURE — 99233 SBSQ HOSP IP/OBS HIGH 50: CPT | Mod: ,,, | Performed by: PEDIATRICS

## 2018-05-24 PROCEDURE — 63600175 PHARM REV CODE 636 W HCPCS: Performed by: STUDENT IN AN ORGANIZED HEALTH CARE EDUCATION/TRAINING PROGRAM

## 2018-05-24 PROCEDURE — 36415 COLL VENOUS BLD VENIPUNCTURE: CPT

## 2018-05-24 PROCEDURE — 25000003 PHARM REV CODE 250: Performed by: STUDENT IN AN ORGANIZED HEALTH CARE EDUCATION/TRAINING PROGRAM

## 2018-05-24 PROCEDURE — 97116 GAIT TRAINING THERAPY: CPT

## 2018-05-24 PROCEDURE — 86644 CMV ANTIBODY: CPT

## 2018-05-24 PROCEDURE — 11300000 HC PEDIATRIC PRIVATE ROOM

## 2018-05-24 PROCEDURE — 25000003 PHARM REV CODE 250: Performed by: PSYCHIATRY & NEUROLOGY

## 2018-05-24 PROCEDURE — 85045 AUTOMATED RETICULOCYTE COUNT: CPT

## 2018-05-24 PROCEDURE — P9021 RED BLOOD CELLS UNIT: HCPCS

## 2018-05-24 PROCEDURE — 85007 BL SMEAR W/DIFF WBC COUNT: CPT

## 2018-05-24 PROCEDURE — 63600175 PHARM REV CODE 636 W HCPCS: Performed by: PEDIATRICS

## 2018-05-24 RX ADMIN — RISPERIDONE 1 MG: 1 TABLET, ORALLY DISINTEGRATING ORAL at 09:05

## 2018-05-24 RX ADMIN — Medication 1250 MG: at 12:05

## 2018-05-24 RX ADMIN — ACYCLOVIR 400 MG: 200 CAPSULE ORAL at 08:05

## 2018-05-24 RX ADMIN — RISPERIDONE 2 MG: 1 TABLET, ORALLY DISINTEGRATING ORAL at 08:05

## 2018-05-24 RX ADMIN — Medication 500 UNITS: at 04:05

## 2018-05-24 RX ADMIN — ACYCLOVIR 400 MG: 200 CAPSULE ORAL at 09:05

## 2018-05-24 RX ADMIN — POSACONAZOLE 300 MG: 100 TABLET, COATED ORAL at 09:05

## 2018-05-24 RX ADMIN — LINEZOLID 600 MG: 600 INJECTION, SOLUTION INTRAVENOUS at 11:05

## 2018-05-24 RX ADMIN — Medication 1250 MG: at 08:05

## 2018-05-24 RX ADMIN — ACETAMINOPHEN 650 MG: 325 TABLET, FILM COATED ORAL at 12:05

## 2018-05-24 RX ADMIN — ACETAMINOPHEN, DIPHENHYDRAMINE HCL, PHENYLEPHRINE HCL 10 MG: 325; 25; 5 TABLET ORAL at 08:05

## 2018-05-24 NOTE — PLAN OF CARE
"Problem: Patient Care Overview  Goal: Plan of Care Review  Outcome: Ongoing (interventions implemented as appropriate)  Hema has been alert and answering questions appropriately and oriented to person and place.  Hema interested in his ANC and had his blood culture results, asking questions regarding antibiotic.  At one point today he said " I know where I got my infection, when I was trying to escape and disconnected my tubing, the end fell in the toilet and I reattached it to my PAC."  He was reassured at that time that we are know treating the appropriate infection and that it isn't his fault because he didn't realize what he was doing at the time.  Hema received blood and platelets today, tolerated well.  Afebrile and this evening he stated that "I feel much better and have more energy."  He is smiling and carrying on conversations with mom and the sitter at the bedside.  Only complained of mild pain noted to his left ankle but after repositioning he stated it felt better.  No tremors noted by nurse this shift.      "

## 2018-05-24 NOTE — PROGRESS NOTES
"[facility]  Psychiatry  Progress Note    Patient Name: Hema Kuo  MRN: [id number]   Code Status: Prior  Admission Date: 4/30/2018  Hospital Length of Stay: 24 days  Expected Discharge Date: 5/28/2018  Attending Physician: Sanford Bucio MD  Primary Care Provider: Ann Reyna NP    Current Legal Status: N/A      Subjective:     Principal Problem:Bacteremia    Chief Complaint: delirium    HPI: Patient is a 16 y/o man with PMH AML dx 2017 s/p 4 cycles of chemotherapy admitted to pediatrics team with Strep mitis sepsis and ARDS. Psychiatry was consulted for "paranoia, now CEC'd, possible ICU delirium."     Per staff MD:  "17 year old young man with AML s/p 4 cycles of chemotherapy now transferred from the  PICU with Strep mitis sepsis and ARDS.     For his AML we are awaiting count recovery.  ANC next to nothing still.   For his Strep sepsis, he is on cefepime and vancomycin.  Cultures from 5/2 grew Strep mitis (sensitive to vanc).  Subsequent cultures negative.    Will cont a 14 day course of vanc.        .  For his chemotherapy induced pancytopenia, recommend transfusion for hemoglobin under 7 and platelets under 10 K.  No transfusions today.    For his ARDS will get one last dose of lasix today and then we will stop.  He continues to have paranoid delerium since extubation, however this is improving.  Will cont seroquel with prn zyprexa.  Will have psych see tomorrow.   Still needs a 1:1 sitter."    Per RN note 5/14:  "Pt oriented x4. Pt paranoid and anxious but cooperative. Very slow to proccess what's being communicated to him and very slow to respond. Similar to last nights shift, it took patient a little while for him to take his medications, but after much convincing, pt took them with no problem. Pt making death statements throughout night and still believes he is dying and nurses are out to get him. Double lumen left chest port in place, flushes well, blood return noted from each " "lumen. All medications/antibitoics given as scheduled. Labs drawn in am. Critical results resulted, MD notified and labs redrawn. Pt did sleep for about 6 hours tonight."    Per RN note 5/13:  "Pt oriented to person, self, time, but disoriented to place. Pt stated he was [facility] waiting [facility] die. Pt paranoid and anxious but cooperative. Took him a little time (about 25 minutes) to take his medications, but after time of explaining to him what the medications were and why they are needed, pt took them. Pt stated he believes "the nurses are trying to burn" him and that we all hate him and want him to die."    On interview, patient with sitter at bedside and uneaten breakfast tray in front of him. Interview was limited as his answers to questions are impoverished and inappropriate. He asked interviewer "are you my biological father?" And stated "I think I'm dead." Was able to state his reason for admission was "cancer" but did not respond to further questions about his care.     Per iglesia, patient slept overnight and has had limited appetite. Has been paranoid but not physically aggressive. Minimally talkative with her. Says patient's mother is currently at a doctor's appointment but will return to hospital this afternoon.    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 2 sodas/day         Hospital Course: 05/15/2018: Per chart, patient received PRN Zyprexa @ 0009 for insomnia. Per iglesia, " "patient slightly less paranoid this morning, ate most of breakfast and played games with her. On interview, no family at bedside, patient states he is feeling like himself. Still making statements about believing his is dead but more redirectable. Mentioned his nosebleed was because there is "something inside" him besides cancer. Overall more conversational and less paranoid this morning.  05/16/2018: Per chart, no PRN zyprexa given, per RN: "Pt has flat affect and has delayed response of understanding. Pt cooperative with vitals. He states " should never been born" and he also states "ya'll just keeping me alive". He also had some other spontaneous statements that did not make sense. Pt vss, afebrile with tmax of 99.6, no distress noted. Pt did go to sleep around midnight but moaned and cried throughout the night per sitter." On interview, patient again responds to select questions and remains paranoid, will slight improvement. Says he's "either fully alive or fully dead" when asked but does not spontaneously state he is dead. Refused to participate in [facility] but oriented to location, year and month.  05/17/2018: Per chart, no PRN zyprexa given, per RN continued to make paranoid statements about being alive, worked well with PT/OT yesterday. Asleep on interview. Per iglesia, remembered her from earlier in the week, has not made any statements this morning so far about being dead.  05/18/2018: Per chart, patient agitated and confused overnight, attempting to disconnect IV and port. Received Zyprexa 5 mg PO PRN @ 0147. Patient asleep on interview. Per juan ramonter did not fall asleep until 4 am. Ate a little breakfast then fell back asleep. No issues with agitation this AM.   05/20/2018: Per RN note: "Mother and step-father visited in evening, updated on plan of care, pleased about pt's continued progress toward baseline mood/personality."  05/21/2018: Per chart, no issues with agitation overnight, no PRN Zyprexa given. " "On interview patient with less paranoid thought content, no longer believes nurses are trying to harm him, states he knows he is alive and remembers last week feeling like he wasn't. Denies AVH. C/o poor sleep overnight 2/2 fever.  05/22/2018: Per chart, no issues with agitation overnight, no PRN Zyprexa given. On interview, patient states he is feeling like himself but c/o feeling "alone." Discussed role of Risperdal, which patient stated was for psychosis; discussed with patient he is being treated for delirium not psychosis and he expressed understanding.   05/23/2018: Per chart, no issues with agitation overnight, no PRN Zyprexa given. Per RN note: "Pt affect remained flat this am, but this afternoon pt much more engaging and talkative.  Speech was less delayed and pt asking appropriate questions.  Pt became tearful and asked for his mom.  Pt states "I'm so lonely, and I'm all by myself" "I miss my family", pt also stated "I know sometimes I think people want to hurt me, but I also know that that's really not right".  Pt also states at this time that he knows he gets confused at times "because its like old people who have to go in and out of the hospital all the time....they get confused why they are there".  This writer also asked pt what he thought happens when he seems to shut down.  Pt states "I think I get scared because I'm roldan confused and my mom isn't here"." On interview, patient sitting up in bed with grandmother, cousin and sitter at bedside. Is able to identify everyone in the room. C/o difficulty sleeping overnight, states he normally sleeps well at home. Discussed patient's life prior to dx as well as basketball game last night, patient smiled when told the [employer] had won.   05/24/2018: Per chart, no issues with agitation overnight, no PRN Zyprexa given. Mother at bedside speaking with primary team staff. Per primary team staff patient frustrated by tremor and need for contact precautions. On " "interview, patient more irritable than on previous visits, at attempts to engage in conversation about his interests outside of the hospital asks "what are you doing?" When asked if he would like interviewer to leave, responds "If I say yes, will you be offended?" Reassured patient his frustrations were valid and would check in with him tomorrow.       Interval History: see hospital course.     Family History     Problem Relation (Age of Onset)    Cancer Mother    Heart disease Mother    No Known Problems Father        Social History Main Topics    Smoking status: Former Smoker     Packs/day: 0.50     Types: Cigarettes     Quit date: 10/30/2017    Smokeless tobacco: Never Used    Alcohol use Yes      Comment: rare occasions    Drug use: No    Sexual activity: Yes     Partners: Female     Psychotherapeutics     Start     Stop Route Frequency Ordered    05/23/18 2100  risperiDONE disintegrating tablet 2 mg      -- Oral Nightly 05/23/18 0955    05/15/18 0847  olanzapine zydis disintegrating tablet 5 mg      -- Oral As needed (PRN) 05/15/18 0848           Review of Systems    Objective:     Vital Signs (Most Recent):  Temp: 98.1 °F (36.7 °C) (05/24/18 0806)  Pulse: 94 (05/24/18 0806)  Resp: 18 (05/24/18 0806)  BP: 130/73 (05/24/18 0806)  SpO2: 97 % (05/24/18 0806) Vital Signs (24h Range):  Temp:  [97.4 °F (36.3 °C)-98.9 °F (37.2 °C)] 98.1 °F (36.7 °C)  Pulse:  [] 94  Resp:  [16-20] 18  SpO2:  [97 %-100 %] 97 %  BP: (130-137)/(64-73) 130/73     Height: 5' 6" (167.6 cm)  Weight: 79.5 kg (175 lb 4.3 oz)  Body mass index is 28.29 kg/m².      Intake/Output Summary (Last 24 hours) at 05/24/18 1117  Last data filed at 05/24/18 0630   Gross per 24 hour   Intake             1560 ml   Output             1150 ml   Net              410 ml       Physical Exam        Mental Status Exam:  Appearance: age appropriate, lying in bed  Behavior/Cooperation: guarded, psychomotor retardation, fair eye contact   Speech: normal " volume, monotone, remains slow & delayed but improving, increased latency of response  Language: grossly intact, able to name, able to repeat with spontaneous speech  Mood: irritable  Affect:  constricted but reactive   Thought Process: linear  Thought Content: unable to assess  Sensorium:  Awake and alert  Alert and Oriented: unable to assess  Insight:  impaired, improving  Judgment: appropriate to situation    Significant Labs:   Last 24 Hours:   Recent Lab Results       05/24/18  0530      Immature Granulocytes CANCELED  Comment:  Result canceled by the ancillary     Immature Grans (Abs) CANCELED  Comment:  Mild elevation in immature granulocytes is non specific and   can be seen in a variety of conditions including stress response,   acute inflammation, trauma and pregnancy. Correlation with other   laboratory and clinical findings is essential.    Result canceled by the ancillary       Aniso Slight     Baso # Test Not Performed  Comment:  Corrected result; previously reported as 0.00 on %DDDDDDDD% at %TTT%.[C]     Basophil% 0.0     Differential Method Manual     Eos # Test Not Performed  Comment:  Corrected result; previously reported as 0.0 on %DDDDDDDD% at %TTT%.[C]     Eosinophil% 0.0     Gran% 0.0  Comment:  Corrected result; previously reported as 14.8 on %DDDDDDDD% at %TTT%.(L)[C]     Hematocrit 18.1  Comment:  WBC, HCT critical result(s) called and verbal readback obtained from   Alberta Anderson RN , 05/24/2018 07:01  (LL)     Hemoglobin 6.4(L)     Lymph # Test Not Performed  Comment:  Corrected result; previously reported as 0.2 on %DDDDDDDD% at %TTT%.[C]     Lymph% 100.0  Comment:  Corrected result; previously reported as 77.8 on %DDDDDDDD% at %TTT%.(H)[C]     MCH 30.5     MCHC 35.4     MCV 86     Mono # Test Not Performed  Comment:  Corrected result; previously reported as 0.0 on %DDDDDDDD% at %TTT%.[C]     Mono% 0.0  Comment:  Corrected result; previously reported as 7.4 on %DDDDDDDD% at  %TTT%.(L)[C]     MPV SEE COMMENT  Comment:  Result not available.     nRBC 0     Platelet Estimate Decreased(A)     Platelets 7  Comment:  PLATELET COUNT  critical result(s) called and verbal readback   obtained from FOREST ANDERSON RN, 05/24/2018 07:54  (LL)     Poly Occasional     RBC 2.10(L)     RDW 13.2     Retic 0.3(L)     WBC 0.27  Comment:  WBC, HCT critical result(s) called and verbal readback obtained from   Forest Anderson RN , 05/24/2018 07:01  (LL)           Significant Imaging: None    Assessment/Plan:     Acute delirium    Patient without significant past psychiatric history has been exhibiting waxing and waning mental status, disorientation and paranoid delusions since being extubated on 5/9 consistent with delirium.   - Decrease scheduled Risperdal M tabs to 2 mg qHS starting 5/25. Will continue to titrate as needed.  - Continue Zyprexa 5 mg PO/IM q8 PRN nonredirectable agitation. QTc on 5/16 467. Continue checking perioidic EKG to monitor QTc. Continue to monitor CBC for any worsening leukopenia/neutropenia with antipsychotic medications.  - Patient has benefited from presence of sitter to reorient and prevent from pulling out IVs/touching lines, pumps and would continue to benefit from a sitter if available. Upon discharge from hospital, patient may schedule therapy and psychiatry follow up at [facility] ([address] [place] [id number]; Phone: [phone]; accepts Medicaid)  · DELIRIUM BEHAVIOR MANAGEMENT  · PLEASE utilize CHEMICAL restraints with PRN meds first for agitation. Minimize use of PHYSICAL restraints  · Keep window shades open and room lit during day and room dim at night in order to promote normal sleep-wake cycles  · Encourage family at bedside. [place] patient often to situation, location, date.  · Continue to Limit or Discontinue use of Narcotics, Benzos and Anti-cholinergic (Avoid Benadryl) medications as they may worsen delirium.   · Continue medical  workup for causative etiology of Delirium. CT head with and without contrast without acute intracranial processes. Patient currently bacteremic growing VRE, on abx. check amylase, lipase, B12, folate, HIV, RPR.          Plan discussed with staff Dr Palm and primary team staff.     Need for Continued Hospitalization:   No need for inpatient psychiatric hospitalization. Continue medical care as per the primary team.    Anticipated Disposition: Home or Self Care     Total time:  15 with greater than 50% of this time spent in counseling and/or coordination of care.       Clair Taylor MD   Psychiatry  [facility]

## 2018-05-24 NOTE — ASSESSMENT & PLAN NOTE
Chemotherapy induced neutropenia:   - Hgb goal >7, Hgb 6.4 today. Transfuse PRBC x1 unit.  - Plts goal >10, platelets 7 today. Transfuse platelets x1 unit.  - Daily CBC and reticulocyte  - CMP EOD  - If bleeding at all, please transfuse platelets x1 unit. No need to check CBC, evaluate clinically.

## 2018-05-24 NOTE — PLAN OF CARE
Problem: Patient Care Overview  Goal: Plan of Care Review   Pt tolerated treatment session well this morning.  Pt displays improved functional mobility by ambulating outside of hospital room for total of 450 feet with SBA and verbal cues to assume upright position from therapist (Pt donns gown, gloves and mask before exiting pt's room).  Pt displays observable fatigue towards end of ambulation bout (flexing and bending over at the hips).  Pt performs standing LE exercises with SBA and verbal cues from therapist. Pt family not present during today's treatment session.  Pt educated on continuation of PT POC during pt hospitalization.  Pt verbalizes understanding.  Pt will continue to benefit from skilled acute PT intervention to address the above listed impairments and progress functional mobility independence.      Yassine Andrea  5/24/2018

## 2018-05-24 NOTE — SUBJECTIVE & OBJECTIVE
Interval History: VSS, afebrile. Some tremors noted throughout night.     Scheduled Meds:   acyclovir  400 mg Oral BID    linezolid 600mg/300ml  600 mg Intravenous Q12H    melatonin  10 mg Oral QHS    posaconazole  300 mg Oral Daily    risperiDONE  2 mg Oral QHS    sulfamethoxazole-trimethoprim 800-160mg  1 tablet Oral twice daily on Friday, Saturday, Sunday     Continuous Infusions:  PRN Meds:sodium chloride, acetaminophen, albuterol sulfate, alteplase, heparin, porcine (PF), lidocaine-prilocaine, morphine, olanzapine zydis, ondansetron, polyethylene glycol    Review of Systems   Constitutional: Negative for fever.     Objective:     Vital Signs (Most Recent):  Temp: 98.1 °F (36.7 °C) (05/24/18 0806)  Pulse: 94 (05/24/18 0806)  Resp: 18 (05/24/18 0806)  BP: 130/73 (05/24/18 0806)  SpO2: 97 % (05/24/18 0806) Vital Signs (24h Range):  Temp:  [97.4 °F (36.3 °C)-98.9 °F (37.2 °C)] 98.1 °F (36.7 °C)  Pulse:  [] 94  Resp:  [16-20] 18  SpO2:  [97 %-100 %] 97 %  BP: (130-137)/(64-73) 130/73     Patient Vitals for the past 72 hrs (Last 3 readings):   Weight   05/22/18 2100 79.5 kg (175 lb 4.3 oz)   05/22/18 0600 78.4 kg (172 lb 13.5 oz)     Body mass index is 28.29 kg/m².    Intake/Output - Last 3 Shifts       05/22 0700 - 05/23 0659 05/23 0700 - 05/24 0659 05/24 0700 - 05/25 0659    P.O. 1700 1200     I.V. (mL/kg)  160 (2)     IV Piggyback 900 1350     Total Intake(mL/kg) 2600 (32.7) 2710 (34.1)     Urine (mL/kg/hr)  1150 (0.6)     Total Output   1150      Net +2600 +1560             Urine Occurrence 4 x 2 x           Lines/Drains/Airways     Central Venous Catheter Line                 Port A Cath Double Lumen 05/10/18 0900 left subclavian 14 days                Physical Exam  Constitutional: He appears well-developed and well-nourished. Sleeping on exam.   Head: Normocephalic and atraumatic.   Nose: Nose normal.   Mouth/Throat: Mucous membranes are normal.   Eyes: Conjunctivae and lids are normal.   Neck:  Normal range of motion. Neck supple.   Cardiovascular: Normal rate and regular rhythm.    Pulmonary/Chest: Effort normal. No stridor. He has no decreased breath sounds. He has no wheezes. He has no rales.   Abdominal: Soft. Bowel sounds are normal. He exhibits no distension. There is no hepatosplenomegaly. There is no guarding.   Musculoskeletal: Normal range of motion. He exhibits no edema.   Skin: Skin is warm and dry. Capillary refill takes less than 2 seconds. No rash noted. He is not diaphoretic. No erythema. Port with some erythema noted at superior border, non tender.  Psych: Cooperative, able to answer questions appropriately.    Significant Labs:  Recent Results (from the past 24 hour(s))   Reticulocytes    Collection Time: 05/24/18  5:30 AM   Result Value Ref Range    Retic 0.3 (L) 0.4 - 2.0 %   CBC auto differential    Collection Time: 05/24/18  5:30 AM   Result Value Ref Range    WBC 0.27 (LL) 3.90 - 12.70 K/uL    RBC 2.10 (L) 4.60 - 6.20 M/uL    Hemoglobin 6.4 (L) 14.0 - 18.0 g/dL    Hematocrit 18.1 (LL) 40.0 - 54.0 %    MCV 86 82 - 98 fL    MCH 30.5 27.0 - 31.0 pg    MCHC 35.4 32.0 - 36.0 g/dL    RDW 13.2 11.5 - 14.5 %    Platelets 7 (LL) 150 - 350 K/uL    MPV SEE COMMENT 9.2 - 12.9 fL    Immature Granulocytes CANCELED 0.0 - 0.5 %    Immature Grans (Abs) CANCELED 0.00 - 0.04 K/uL    Lymph # Test Not Performed 1.0 - 4.8 K/uL    Mono # Test Not Performed 0.3 - 1.0 K/uL    Eos # Test Not Performed 0.0 - 0.5 K/uL    Baso # Test Not Performed 0.00 - 0.20 K/uL    nRBC 0 0 /100 WBC    Gran% 0.0 (L) 38.0 - 73.0 %    Lymph% 100.0 (H) 18.0 - 48.0 %    Mono% 0.0 (L) 4.0 - 15.0 %    Eosinophil% 0.0 0.0 - 8.0 %    Basophil% 0.0 0.0 - 1.9 %    Platelet Estimate Decreased (A)     Aniso Slight     Poly Occasional     Differential Method Manual      Microbiology Results (last 7 days)     Procedure Component Value Units Date/Time    Blood culture [[id number]]  (Susceptibility) Collected:  05/19/18 5446    Order Status:   Completed Specimen:  Blood from Line, Port A Cath Updated:  05/24/18 0717     Blood Culture, Routine Gram stain peds bottle: Gram positive cocci in chains resembling Strep      Blood Culture, Routine Results called to and read back by: Guadalupe Vaughn RN  05/20/2018  21:13     Blood Culture, Routine ENTEROCOCCUS FAECIUM VRE    Narrative:       Outer port    Blood culture [[id number]] Collected:  05/20/18 2127    Order Status:  Completed Specimen:  Blood from Line, Port A Cath Updated:  05/24/18 0612     Blood Culture, Routine No Growth to date     Blood Culture, Routine No Growth to date     Blood Culture, Routine No Growth to date     Blood Culture, Routine No Growth to date    Narrative:       Please obtain blood from both lumens    Blood culture [[id number]] Collected:  05/20/18 2115    Order Status:  Completed Specimen:  Blood from Line, Port A Cath Updated:  05/24/18 0612     Blood Culture, Routine No Growth to date     Blood Culture, Routine No Growth to date     Blood Culture, Routine No Growth to date     Blood Culture, Routine No Growth to date    Blood culture [[id number]] Collected:  05/19/18 2207    Order Status:  Completed Specimen:  Blood from Line, Port A Cath Updated:  05/24/18 0612     Blood Culture, Routine No Growth to date     Blood Culture, Routine No Growth to date     Blood Culture, Routine No Growth to date     Blood Culture, Routine No Growth to date     Blood Culture, Routine No Growth to date    Narrative:       Inner PAC    Blood culture [[id number]] Collected:  05/21/18 1057    Order Status:  Completed Specimen:  Blood from Line, Subclavian, Left Updated:  05/23/18 1412     Blood Culture, Routine No Growth to date     Blood Culture, Routine No Growth to date     Blood Culture, Routine No Growth to date    Narrative:       One culture from each lumen    Blood culture [[id number]] Collected:  05/21/18 1057    Order Status:  Completed Specimen:  Blood from Line, Subclavian, Left Updated:   05/23/18 1412     Blood Culture, Routine No Growth to date     Blood Culture, Routine No Growth to date     Blood Culture, Routine No Growth to date    Narrative:       One culture from each lumen    Fungus Culture, Blood or Bone Marrow [[id number]] Collected:  05/06/18 1233    Order Status:  Completed Specimen:  Blood from Blood Updated:  05/21/18 0945     Fungus Cult, blood or BM Culture in progress     Fungus Cult, blood or BM No fungus isolated after 2 weeks    Blood culture [[id number]]     Order Status:  Canceled Specimen:  Blood     Blood culture [[id number]]     Order Status:  Canceled Specimen:  Blood            Significant Imaging:   none

## 2018-05-24 NOTE — SUBJECTIVE & OBJECTIVE
"Interval History: see hospital course.     Family History     Problem Relation (Age of Onset)    Cancer Mother    Heart disease Mother    No Known Problems Father        Social History Main Topics    Smoking status: Former Smoker     Packs/day: 0.50     Types: Cigarettes     Quit date: 10/30/2017    Smokeless tobacco: Never Used    Alcohol use Yes      Comment: rare occasions    Drug use: No    Sexual activity: Yes     Partners: Female     Psychotherapeutics     Start     Stop Route Frequency Ordered    05/23/18 2100  risperiDONE disintegrating tablet 2 mg      -- Oral Nightly 05/23/18 0955    05/15/18 0847  olanzapine zydis disintegrating tablet 5 mg      -- Oral As needed (PRN) 05/15/18 0848           Review of Systems    Objective:     Vital Signs (Most Recent):  Temp: 98.1 °F (36.7 °C) (05/24/18 0806)  Pulse: 94 (05/24/18 0806)  Resp: 18 (05/24/18 0806)  BP: 130/73 (05/24/18 0806)  SpO2: 97 % (05/24/18 0806) Vital Signs (24h Range):  Temp:  [97.4 °F (36.3 °C)-98.9 °F (37.2 °C)] 98.1 °F (36.7 °C)  Pulse:  [] 94  Resp:  [16-20] 18  SpO2:  [97 %-100 %] 97 %  BP: (130-137)/(64-73) 130/73     Height: 5' 6" (167.6 cm)  Weight: 79.5 kg (175 lb 4.3 oz)  Body mass index is 28.29 kg/m².      Intake/Output Summary (Last 24 hours) at 05/24/18 1117  Last data filed at 05/24/18 0630   Gross per 24 hour   Intake             1560 ml   Output             1150 ml   Net              410 ml       Physical Exam        Mental Status Exam:  Appearance: age appropriate, lying in bed  Behavior/Cooperation: guarded, psychomotor retardation, fair eye contact   Speech: normal volume, monotone, remains slow & delayed but improving, increased latency of response  Language: grossly intact, able to name, able to repeat with spontaneous speech  Mood: irritable  Affect:  constricted but reactive   Thought Process: linear  Thought Content: unable to assess  Sensorium:  Awake and alert  Alert and Oriented: unable to assess  Insight:  " impaired, improving  Judgment: appropriate to situation    Significant Labs:   Last 24 Hours:   Recent Lab Results       05/24/18  0530      Immature Granulocytes CANCELED  Comment:  Result canceled by the ancillary     Immature Grans (Abs) CANCELED  Comment:  Mild elevation in immature granulocytes is non specific and   can be seen in a variety of conditions including stress response,   acute inflammation, trauma and pregnancy. Correlation with other   laboratory and clinical findings is essential.    Result canceled by the ancillary       [place]iso Slight     Baso # Test Not Performed  Comment:  Corrected result; previously reported as 0.00 on %DDDDDDDD% at %TTT%.[C]     Basophil% 0.0     Differential Method Manual     Eos # Test Not Performed  Comment:  Corrected result; previously reported as 0.0 on %DDDDDDDD% at %TTT%.[C]     Eosinophil% 0.0     Gran% 0.0  Comment:  Corrected result; previously reported as 14.8 on %DDDDDDDD% at %TTT%.(L)[C]     Hematocrit 18.1  Comment:  WBC, HCT critical result(s) called and verbal readback obtained from   Forest Anderson RN , 05/24/2018 07:01  (LL)     Hemoglobin 6.4(L)     Lymph # Test Not Performed  Comment:  Corrected result; previously reported as 0.2 on %DDDDDDDD% at %TTT%.[C]     Lymph% 100.0  Comment:  Corrected result; previously reported as 77.8 on %DDDDDDDD% at %TTT%.(H)[C]     MCH 30.5     MCHC 35.4     MCV 86     Mono # Test Not Performed  Comment:  Corrected result; previously reported as 0.0 on %DDDDDDDD% at %TTT%.[C]     Mono% 0.0  Comment:  Corrected result; previously reported as 7.4 on %DDDDDDDD% at %TTT%.(L)[C]     MPV SEE COMMENT  Comment:  Result not available.     nRBC 0     Platelet Estimate Decreased(A)     Platelets 7  Comment:  PLATELET COUNT  critical result(s) called and verbal readback   obtained from FOREST ANDERSON RN, 05/24/2018 07:54  (LL)     Poly Occasional     RBC 2.10(L)     RDW 13.2     Retic 0.3(L)     WBC 0.27  Comment:  WBC, HCT  critical result(s) called and verbal readback obtained from   Alberta Anderson RN , 05/24/2018 07:01  (LL)           Significant Imaging: None

## 2018-05-24 NOTE — PT/OT/SLP PROGRESS
Physical Therapy Treatment    Patient Name:  Hema Kuo   MRN:  [id number]    Recommendations:     Discharge Recommendations:  home health PT   Discharge Equipment Recommendations: none   Barriers to discharge: None    Assessment:     Hema Kuo is a 17 y.o. male admitted with a medical diagnosis of Bacteremia.  He presents with the following impairments/functional limitations:  weakness, impaired endurance, impaired self care skills, impaired functional mobilty, gait instability, impaired balance, impaired cognition, decreased lower extremity function, decreased upper extremity function, decreased safety awareness.   Pt tolerated treatment session well this morning.  Pt displays improved functional mobility by ambulating outside of hospital room for total of 450 feet with SBA and verbal cues to assume upright position from therapist (Pt donns gown, gloves and mask before exiting pt's room).  Pt displays observable fatigue towards end of ambulation bout (flexing and bending over at the hips).  Pt performs standing LE exercises with SBA and verbal cues from therapist. Pt family not present during today's treatment session.  Pt educated on continuation of PT POC during pt hospitalization.  Pt verbalizes understanding.  Pt will continue to benefit from skilled acute PT intervention to address the above listed impairments and progress functional mobility independence.      Rehab Prognosis:  Good; patient would benefit from acute skilled PT services to address these deficits and reach maximum level of function.      Recent Surgery: * No surgery found *      Plan:     During this hospitalization, patient to be seen 6 x/week to address the above listed problems via gait training, therapeutic activities, therapeutic exercises, neuromuscular re-education  · Plan of Care Expires:  06/09/18   Plan of Care Reviewed with: patient    Subjective     Communicated with RN prior to session.  Patient found awake in bed with sitter  "present upon PT entry to room, agreeable to treatment.      Chief Complaint: Bactermia  Patient comments/goals: "Yeah I like the [place], I want to go meet [employer]!"  Pain/Comfort:  · Pain Rating 1: 0/10  · Pain Rating Post-Intervention 1: 0/10    Patients cultural, spiritual, Restorationism conflicts given the current situation: Patient has no barriers to learning. Patient verbalizes understanding of his/her program and goals and demonstrates them correctly. No cultural, spiritual or educational needs identified.    Objective:     Patient found with: central line     General Precautions: Standard, fall   Orthopedic Precautions:N/A   Braces: N/A     Functional Mobility:  · Bed Mobility:     · Supine to Sit: supervision  · Sit to Supine: supervision  · Transfers:     · Sit to Stand:  supervision with no AD  Gait: Pt ambulated total of 450 feet.  Assist Level: SBA to HHA as pt fatigues  AD Used: None  Gait Pattern: Two point, Step through  Gait Deviations: decreased velocity, short step length, poor foot clearance, gait instability (as pt fatigues)  · Impairments due to: fatigue, weakness  · Balance: Pt displays appropriate level of balance during initial ambulation bout but displays gait instability 2/2 to fatigue.      AM-PAC 6 CLICK MOBILITY  Turning over in bed (including adjusting bedclothes, sheets and blankets)?: 4  Sitting down on and standing up from a chair with arms (e.g., wheelchair, bedside commode, etc.): 4  Moving from lying on back to sitting on the side of the bed?: 4  Moving to and from a bed to a chair (including a wheelchair)?: 3  Need to walk in hospital room?: 3  Climbing 3-5 steps with a railing?: 3  Total Score: 21       Therapeutic Activities and Exercises:   Pt performs standing LE strengthening exercises with skilled instruction from therapist for proper form and muscle activation:  ? Sit to stand x10  ? Standing marching x10  ? Heel raises x10 (pt requiring HHA)  ? Toes raises x10 (pt " requiring HHA)     Pt educated on continuation of PT POC.  Pt verbalizes understanding.      Patient left supine with all lines intact, RN notified and pt's sitter present..    GOALS:    Physical Therapy Goals        Problem: Physical Therapy Goal    Goal Priority Disciplines Outcome Goal Variances Interventions   Physical Therapy Goal     PT/OT, PT Ongoing (interventions implemented as appropriate)     Description:  Goals re-assessed by SPT on   Continue goals x1 week (18)    Patient will increase functional independence with mobility by performin. Supine to sit with [place] - MET ()  2. Sit to supine with Pratt - MET ()  3. Sit to stand transfer with [place]att - MET ()  4. Bed to chair transfer with Stand-by Assistance using least restrictive AD - Not met  5. Gait  x 500 feet with Stand-by Assistance using least restrictive Ad - MET ()  6. Lower extremity exercise program x30 reps per handout, with supervision - Not met                       Time Tracking:     PT Received On: 18  PT Start Time: 1047     PT Stop Time: 1114  PT Total Time (min): 27 min     Billable Minutes: Gait Training 8 and Therapeutic Exercise 19    Treatment Type: Treatment  PT/PTA: PT           Yassine Andrea, SPT   2018

## 2018-05-24 NOTE — ASSESSMENT & PLAN NOTE
Patient without significant past psychiatric history has been exhibiting waxing and waning mental status, disorientation and paranoid delusions since being extubated on 5/9 consistent with delirium.   - Decrease scheduled Risperdal M tabs to 2 mg qHS starting 5/25. Will continue to titrate as needed.  - Continue Zyprexa 5 mg PO/IM q8 PRN nonredirectable agitation. QTc on 5/16 467. Continue checking perioidic EKG to monitor QTc. Continue to monitor CBC for any worsening leukopenia/neutropenia with antipsychotic medications.  - Patient has benefited from presence of sitter to reorient and prevent from pulling out IVs/touching lines, pumps and would continue to benefit from a sitter if available. Upon discharge from hospital, patient may schedule therapy and psychiatry follow up at [facility] ([address] [place] [id number]; Phone: [phone]; accepts Medicaid)  · DELIRIUM BEHAVIOR MANAGEMENT  · PLEASE utilize CHEMICAL restraints with PRN meds first for agitation. Minimize use of PHYSICAL restraints  · Keep window shades open and room lit during day and room dim at night in order to promote normal sleep-wake cycles  · Encourage family at bedside. [place] patient often to situation, location, date.  · Continue to Limit or Discontinue use of Narcotics, Benzos and Anti-cholinergic (Avoid Benadryl) medications as they may worsen delirium.   · Continue medical workup for causative etiology of Delirium. CT head with and without contrast without acute intracranial processes. Patient currently bacteremic growing VRE, on abx. check amylase, lipase, B12, folate, HIV, RPR.

## 2018-05-24 NOTE — PLAN OF CARE
Problem: Patient Care Overview  Goal: Plan of Care Review  Outcome: Ongoing (interventions implemented as appropriate)  Pt neuro intact, pt able to have logical thoughts and sentences. Pt interactive and tolerated po. Afebrile, labs obtained this am, pt with a few brief (lasting a few seconds)twitching leg and hand episodes, dr. shaffer notified and in room to assess pt. Pt anxious at times, mother and sitter at bedside.

## 2018-05-24 NOTE — ASSESSMENT & PLAN NOTE
Patient with bacteremia. Bcx from 5/2 growing strep mitis, Bcx 5/19 growing Enterococcus faecium VRE (sens to linezolid) from outer lumen of port. Subsequent cultures NGTD.     - s/p Cefepime 2g Q8h  - Continue Linezolid 600mg Q12h  - Discontinue Vancomycin 1250 g Q8h  - Follow up blood cultures  - monitor fever curve  - contact precautions

## 2018-05-24 NOTE — ASSESSMENT & PLAN NOTE
Delirium: discontinued CEC, improving. Patient now with intentional tremors.   - Decrease Risperdal to only 2mg qHS  - RPR NR  - Olanzapine PRN  - EKG complete on 5/14, 5/15, 5/16. QTc ranging from 437-441.   - Avoid Benadryl per Psych  - Continue frequent reorientation and attempt to encourage patient to sleep.   - Psych following, rec'd follow up at [facility] ([address] [place] [address]; Phone: [phone]) as outpatient.   - CT head complete on 5/22: unremarkable

## 2018-05-24 NOTE — MEDICAL/APP STUDENT
"5/24/2018 8:08 AM Hema Kuo 2000 [id number]        PSYCHIATRY CONSULT PROGRESS NOTE   SUBJECTIVE:   BRIEF HPI  Hema Kuo is a 17 y.o. [unfilled] male who  has a past medical history of AML (acute myeloblastic leukemia) (10/2017); Asthma; Encounter for blood transfusion; and Seasonal allergies., currently presenting with Bacteremia.  Psychiatry was consulted for "paranoia, now CEC'd, possible ICU delirium."      Per staff MD:  "17 year old young man with AML s/p 4 cycles of chemotherapy now transferred from the  PICU with Strep mitis sepsis and ARDS.     For his AML we are awaiting count recovery.  ANC next to nothing still.   For his Strep sepsis, he is on cefepime and vancomycin.  Cultures from 5/2 grew Strep mitis (sensitive to vanc).  Subsequent cultures negative.    Will cont a 14 day course of vanc.        .  For his chemotherapy induced pancytopenia, recommend transfusion for hemoglobin under 7 and platelets under 10 K.  No transfusions today.    For his ARDS will get one last dose of lasix today and then we will stop.  He continues to have paranoid delerium since extubation, however this is improving.  Will cont seroquel with prn zyprexa.  Will have psych see tomorrow.   Still needs a 1:1 sitter."     Per RN note 5/14:  "Pt oriented x4. Pt paranoid and anxious but cooperative. Very slow to proccess what's being communicated to him and very slow to respond. Similar to last nights shift, it took patient a little while for him to take his medications, but after much convincing, pt took them with no problem. Pt making death statements throughout night and still believes he is dying and nurses are out to get him. Double lumen left chest port in place, flushes well, blood return noted from each lumen. All medications/antibitoics given as scheduled. Labs drawn in am. Critical results resulted, MD notified and labs redrawn. Pt did sleep for about 6 hours tonight."     Per RN note 5/13:  "Pt oriented to person, self, " "time, but disoriented to place. Pt stated he was in the [facility] waiting to die. Pt paranoid and anxious but cooperative. Took him a little time (about 25 minutes) to take his medications, but after time of explaining to him what the medications were and why they are needed, pt took them. Pt stated he believes "the nurses are trying to burn" him and that we all hate him and want him to die."    Per RN note 5/24:  "Pt neuro intact, pt able to have logical thoughts and sentences. Pt interactive and tolerated po. Afebrile, labs obtained this am, pt with a few brief (lasting a few seconds)twitching leg and hand episodes, dr. shaffer notified and in room to assess pt. Pt anxious at times, mother and sitter at bedside."    On interview, patient is awake and feels very weak (compared to yesterday). He has had a few episodes of involuntery shaking/tremors. He exhibits tremors if limbs are not supported. Much improved speech and thought process. No delusions and racing thoughts. Happy that family is present. Speech has improved a lot and mood has improved as well. Patient with sitter and mom at bedside. Appetite is improved. Per sitter, patient slept overnight and has had good. Has NOT been paranoid. Much more talkative with her. Says patient's mother is currently present.     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 2 sodas/day    Hospital Course: 05/15/2018: Per chart, patient received PRN Zyprexa @ " "0009 for insomnia. Per sitter, patient slightly less paranoid this morning, ate most of breakfast and played games with her. On interview, no family at bedside, patient states he is feeling like himself. Still making statements about believing his is dead but more redirectable. Mentioned his nosebleed was because there is "something inside" him besides cancer. Overall more conversational and less paranoid this morning.  05/16/2018: Per chart, no PRN zyprexa given, per RN: "Pt has flat affect and has delayed response of understanding. Pt cooperative with vitals. He states " should never been born" and he also states "ya'll just keeping me alive". He also had some other spontaneous statements that did not make sense. Pt vss, afebrile with tmax of 99.6, no distress noted. Pt did go to sleep around midnight but moaned and cried throughout the night per sitter." On interview, patient again responds to select questions and remains paranoid, will slight improvement. Says he's "either fully alive or fully dead" when asked but does not spontaneously state he is dead. Refused to participate in [employer] but oriented to location, year and month.  05/17/2018: Per chart, no PRN zyprexa given, per RN continued to make paranoid statements about being alive, worked well with PT/OT yesterday. Asleep on interview. Per juan ramonter, remembered her from earlier in the week, has not made any statements this morning so far about being dead.  05/18/2018: Per chart, patient agitated and confused overnight, attempting to disconnect IV and port. Received Zyprexa 5 mg PO PRN @ 0147. Patient asleep on interview. Per sitter did not fall asleep until 4 am. Ate a little breakfast then fell back asleep. No issues with agitation this AM.   05/20/2018: Per RN note: "Mother and step-father visited in evening, updated on plan of care, pleased about pt's continued progress toward baseline mood/personality."  05/21/2018: Per chart, no issues with agitation " "overnight, no PRN Zyprexa given. On interview patient with less paranoid thought content, no longer believes nurses are trying to harm him, states he knows he is alive and remembers last week feeling like he wasn't. Denies AVH. C/o poor sleep overnight 2/2 fever.  05/22/2018: Per chart, no issues with agitation overnight, no PRN Zyprexa given. On interview, patient states he is feeling like himself but c/o feeling "alone." Discussed role of Risperdal, which patient stated was for psychosis; discussed with patient he is being treated for delirium not psychosis and he expressed understanding.   05/23/2018: Per chart, no issues with agitation overnight, no PRN Zyprexa given. Per RN note: "Pt affect remained flat this am, but this afternoon pt much more engaging and talkative.  Speech was less delayed and pt asking appropriate questions.  Pt became tearful and asked for his mom.  Pt states "I'm so lonely, and I'm all by myself" "I miss my family", pt also stated "I know sometimes I think people want to hurt me, but I also know that that's really not right".  Pt also states at this time that he knows he gets confused at times "because its like old people who have to go in and out of the hospital all the time....they get confused why they are there".  This writer also asked pt what he thought happens when he seems to shut down.  Pt states "I think I get scared because I'm roldan confused and my mom isn't here"." On interview, patient sitting up in bed with grandmother, cousin and sitter at bedside. Is able to identify everyone in the room. C/o difficulty sleeping overnight, states he normally sleeps well at home. Discussed patient's life prior to dx as well as basketball game last night, patient smiled when told the [employer] had won.      5/24/2018: Patient is awake and feels very weak (compared to yesterday). He has had a few episodes of involuntery shaking/tremors. He exhibits tremors if limbs are not supported. Much " "improved speech and thought process. No delusions and racing thoughts. Happy that family is present. Speech has improved a lot and mood has improved as well.     Interval History: see hospital course.     Current Medications:   PRN Meds: sodium chloride, acetaminophen, albuterol sulfate, alteplase, heparin, porcine (PF), lidocaine-prilocaine, morphine, olanzapine zydis, ondansetron, polyethylene glycol   Psychotherapeutics     Start     Stop Route Frequency Ordered    05/23/18 2100  risperiDONE disintegrating tablet 2 mg      -- Oral Nightly 05/23/18 0955    05/18/18 0900  risperiDONE disintegrating tablet 1 mg      -- Oral Daily 05/18/18 0819    05/15/18 0847  olanzapine zydis disintegrating tablet 5 mg      -- Oral As needed (PRN) 05/15/18 0848        Allergies/PMH:   Review of patient's allergies indicates:   Allergen Reactions    Nsaids (non-steroidal anti-inflammatory drug) Anaphylaxis    Nuts [tree nut] Anaphylaxis    Strawberry     Vancomycin analogues Itching     Premedicate with benadryl and admin over 2hr.      Past Medical History:   Diagnosis Date    AML (acute myeloblastic leukemia) 10/2017    Asthma     Encounter for blood transfusion     Seasonal allergies        Medical Review Of Systems:  Pertinent items are noted in HPI.  Psychiatric Review Of Systems:  See notes above    OBJECTIVE:   Vitals   Vitals:    05/24/18 0806   BP: 130/73   Pulse: 94   Resp: 18   Temp: 98.1 °F (36.7 °C)      In/Out  I/O last 3 completed shifts:  In: 4020 [P.O.:2160; I.V.:160; IV Piggyback:1700]  Out: 1150 [Urine:1150]  Mental Status Exam:  Appearance: age appropriate, lying in bed  Behavior/Cooperation: improved cooperation, psychomotor retardation, intense eye contact   Speech: normal volume, monotone, remains slow & delayed but improving, increased latency of response  Language: grossly intact, able to name, able to repeat with spontaneous speech  Mood: "good"  Affect:  constricted but reactive   Thought Process: " linear  Thought Content: no suicidality, no homicidality, delusions, or paranoia  Sensorium:  Awake and alert  Alert and Oriented: x person, place, situation, time/date, day of week, month of year, year  Insight:  impaired, improving  Judgment: appropriate to situation    RASS = 0  CAM ICU = NEG    Labs/Imaging/Studies:   Recent Results (from the past 36 hour(s))   Reticulocytes    Collection Time: 05/23/18  4:17 AM   Result Value Ref Range    Retic 0.2 (L) 0.4 - 2.0 %   CBC auto differential    Collection Time: 05/23/18  4:17 AM   Result Value Ref Range    WBC 0.31 (LL) 3.90 - 12.70 K/uL    RBC 2.46 (L) 4.60 - 6.20 M/uL    Hemoglobin 7.5 (L) 14.0 - 18.0 g/dL    Hematocrit 20.8 (L) 40.0 - 54.0 %    MCV 85 82 - 98 fL    MCH 30.5 27.0 - 31.0 pg    MCHC 36.1 (H) 32.0 - 36.0 g/dL    RDW 13.6 11.5 - 14.5 %    Platelets 14 (LL) 150 - 350 K/uL    MPV 11.9 9.2 - 12.9 fL    Immature Granulocytes CANCELED 0.0 - 0.5 %    Immature Grans (Abs) CANCELED 0.00 - 0.04 K/uL    Lymph # Test Not Performed 1.0 - 4.8 K/uL    Mono # Test Not Performed 0.3 - 1.0 K/uL    Eos # Test Not Performed 0.0 - 0.5 K/uL    Baso # Test Not Performed 0.00 - 0.20 K/uL    nRBC 0 0 /100 WBC    Gran% 7.5 (L) 38.0 - 73.0 %    Lymph% 90.0 (H) 18.0 - 48.0 %    Mono% 2.5 (L) 4.0 - 15.0 %    Eosinophil% 0.0 0.0 - 8.0 %    Basophil% 0.0 0.0 - 1.9 %    Platelet Estimate Decreased (A)     Aniso Slight     Poik Slight     Ovalocytes Occasional     Tear Drop Cells Occasional     Spherocytes Occasional     Differential Method Manual    Comprehensive metabolic panel    Collection Time: 05/23/18  4:17 AM   Result Value Ref Range    Sodium 140 136 - 145 mmol/L    Potassium 3.2 (L) 3.5 - 5.1 mmol/L    Chloride 104 95 - 110 mmol/L    CO2 26 23 - 29 mmol/L    Glucose 107 70 - 110 mg/dL    BUN, Bld 7 6 - 20 mg/dL    Creatinine 0.6 0.5 - 1.4 mg/dL    Calcium 8.8 8.7 - 10.5 mg/dL    Total Protein 5.5 (L) 6.0 - 8.4 g/dL    Albumin 2.6 (L) 3.5 - 5.2 g/dL    Total Bilirubin 0.5  0.1 - 1.0 mg/dL    Alkaline Phosphatase 67 55 - 135 U/L    AST 11 10 - 40 U/L    ALT 36 10 - 44 U/L    Anion Gap 10 8 - 16 mmol/L    eGFR if African American >60.0 >60 mL/min/1.73 m^2    eGFR if non African American >60.0 >60 mL/min/1.73 m^2   Magnesium    Collection Time: 05/23/18  4:17 AM   Result Value Ref Range    Magnesium 1.6 1.6 - 2.6 mg/dL   Phosphorus    Collection Time: 05/23/18  4:17 AM   Result Value Ref Range    Phosphorus 4.5 2.7 - 4.5 mg/dL   VANCOMYCIN, TROUGH before 4th dose    Collection Time: 05/23/18  7:30 AM   Result Value Ref Range    Vancomycin-Trough 10.0 10.0 - 22.0 ug/mL   Reticulocytes    Collection Time: 05/24/18  5:30 AM   Result Value Ref Range    Retic 0.3 (L) 0.4 - 2.0 %   CBC auto differential    Collection Time: 05/24/18  5:30 AM   Result Value Ref Range    WBC 0.27 (LL) 3.90 - 12.70 K/uL    RBC 2.10 (L) 4.60 - 6.20 M/uL    Hemoglobin 6.4 (L) 14.0 - 18.0 g/dL    Hematocrit 18.1 (LL) 40.0 - 54.0 %    MCV 86 82 - 98 fL    MCH 30.5 27.0 - 31.0 pg    MCHC 35.4 32.0 - 36.0 g/dL    RDW 13.2 11.5 - 14.5 %    Platelets 7 (LL) 150 - 350 K/uL    MPV SEE COMMENT 9.2 - 12.9 fL    Immature Granulocytes CANCELED 0.0 - 0.5 %    Immature Grans (Abs) CANCELED 0.00 - 0.04 K/uL    Lymph # Test Not Performed 1.0 - 4.8 K/uL    Mono # Test Not Performed 0.3 - 1.0 K/uL    Eos # Test Not Performed 0.0 - 0.5 K/uL    Baso # Test Not Performed 0.00 - 0.20 K/uL    nRBC 0 0 /100 WBC    Gran% 0.0 (L) 38.0 - 73.0 %    Lymph% 100.0 (H) 18.0 - 48.0 %    Mono% 0.0 (L) 4.0 - 15.0 %    Eosinophil% 0.0 0.0 - 8.0 %    Basophil% 0.0 0.0 - 1.9 %    Platelet Estimate Decreased (A)     Aniso Slight     Poly Occasional     Differential Method Manual           ASSESSMENT     Acute delirium     Patient without significant past psychiatric history has been exhibiting waxing and waning mental status, disorientation and paranoid delusions since being extubated on 5/9 consistent with delirium.   - Decrease scheduled Risperdal M  tabs to 1 mg qAM and 2 mg qHS. Will continue to titrate as needed.  - Continue Zyprexa 5 mg PO/IM q8 PRN nonredirectable agitation. QTc on 5/16 467. Continue checking perioidic EKG to monitor QTc. Continue to monitor CBC for any worsening leukopenia/neutropenia with antipsychotic medications.  - Patient no longer gravely disabled, may rescind CEC; patient has benefited from presence of sitter to reorient and prevent from pulling out IVs/touching lines, pumps and would continue to benefit from a sitter if available. Upon discharge from hospital, patient may schedule therapy and psychiatry follow up at [facility] ([address], [place] [address]; Phone: [phone]; accepts Medicaid)  · DELIRIUM BEHAVIOR MANAGEMENT  ? PLEASE utilize CHEMICAL restraints with PRN meds first for agitation. Minimize use of PHYSICAL restraints  ? Keep window shades open and room lit during day and room dim at night in order to promote normal sleep-wake cycles  ? Encourage family at bedside. [place] patient often to situation, location, date.  ? Continue to Limit or Discontinue use of Narcotics, Benzos and Anti-cholinergic (Avoid Benadryl) medications as they may worsen delirium.   ? Continue medical workup for causative etiology of Delirium. CT head with and without contrast without acute intracranial processes. Recommend repeat CXR to rule out additional infectious process, check amylase, lipase, B12, folate, HIV, RPR.                  RECOMMENDATIONS      · PSYCH Meds-  · Legal status-  · The above will be discussed with staff psychiatrist and update any changes after rounds in the afternoon.  · Thank you for this consult will continue to follow      Elroy Robin ([id number])  5/24/2018 8:08 AM

## 2018-05-24 NOTE — PROGRESS NOTES
[facility] Pediatric [facility] Medicine  Progress Note    Patient Name: Hema Kuo  MRN: [id number]  Admission Date: 4/30/2018  Hospital Length of Stay: 24  Code Status: Prior   Primary Care Physician: Ann Reyna NP  Principal Problem: Bacteremia    Subjective:     Interval History: VSS, afebrile. Some tremors noted throughout night.    Scheduled Meds:   acyclovir  400 mg Oral BID    linezolid 600mg/300ml  600 mg Intravenous Q12H    melatonin  10 mg Oral QHS    posaconazole  300 mg Oral Daily    risperiDONE  2 mg Oral QHS    sulfamethoxazole-trimethoprim 800-160mg  1 tablet Oral twice daily on Friday, Saturday, Sunday     Continuous Infusions:  PRN Meds:sodium chloride, acetaminophen, albuterol sulfate, alteplase, heparin, porcine (PF), lidocaine-prilocaine, morphine, olanzapine zydis, ondansetron, polyethylene glycol     Scheduled Meds:   acyclovir  400 mg Oral BID    linezolid 600mg/300ml  600 mg Intravenous Q12H    melatonin  10 mg Oral QHS    posaconazole  300 mg Oral Daily    risperiDONE  2 mg Oral QHS    sulfamethoxazole-trimethoprim 800-160mg  1 tablet Oral twice daily on Friday, Saturday, Sunday     Continuous Infusions:  PRN Meds:sodium chloride, acetaminophen, albuterol sulfate, alteplase, heparin, porcine (PF), lidocaine-prilocaine, morphine, olanzapine zydis, ondansetron, polyethylene glycol    Review of Systems   Constitutional: Negative for fever.     Objective:     Vital Signs (Most Recent):  Temp: 98.1 °F (36.7 °C) (05/24/18 0806)  Pulse: 94 (05/24/18 0806)  Resp: 18 (05/24/18 0806)  BP: 130/73 (05/24/18 0806)  SpO2: 97 % (05/24/18 0806) Vital Signs (24h Range):  Temp:  [97.4 °F (36.3 °C)-98.9 °F (37.2 °C)] 98.1 °F (36.7 °C)  Pulse:  [] 94  Resp:  [16-20] 18  SpO2:  [97 %-100 %] 97 %  BP: (130-137)/(64-73) 130/73     Patient Vitals for the past 72 hrs (Last 3 readings):   Weight   05/22/18 2100 79.5 kg (175 lb 4.3 oz)   05/22/18 0600 78.4 kg (172 lb 13.5 oz)      Body mass index is 28.29 kg/m².    Intake/Output - Last 3 Shifts       05/22 0700 - 05/23 0659 05/23 0700 - 05/24 0659 05/24 0700 - 05/25 0659    P.O. 1700 1200     I.V. (mL/kg)  160 (2)     IV Piggyback 900 1350     Total Intake(mL/kg) 2600 (32.7) 2710 (34.1)     Urine (mL/kg/hr)  1150 (0.6)     Total Output   1150      Net +2600 +1560             Urine Occurrence 4 x 2 x           Lines/Drains/Airways     Central Venous Catheter Line                 Port A Cath Double Lumen 05/10/18 0900 left subclavian 14 days                Physical Exam  Constitutional: He appears well-developed and well-nourished. Awake, sitting up in bed.   Head: Normocephalic and atraumatic.   Nose: Nose normal.   Mouth/Throat: Mucous membranes are normal.   Eyes: Conjunctivae and lids are normal.   Neck: Normal range of motion. Neck supple.   Cardiovascular: Normal rate and regular rhythm.    Pulmonary/Chest: Effort normal. No stridor. He has no decreased breath sounds. He has no wheezes. He has no rales.   Abdominal: Soft. Bowel sounds are normal. He exhibits no distension. There is no hepatosplenomegaly. There is no guarding.   Musculoskeletal: Normal range of motion. He exhibits no edema.   Skin: Skin is warm and dry. Capillary refill takes less than 2 seconds. No rash noted. He is not diaphoretic. No erythema. Port with some erythema noted at superior border, non tender.  Neuro: tremor with intentional movements noted, intermittent.   Psych: Cooperative, able to answer questions appropriately.    Significant Labs:  Recent Results (from the past 24 hour(s))   Reticulocytes    Collection Time: 05/24/18  5:30 AM   Result Value Ref Range    Retic 0.3 (L) 0.4 - 2.0 %   CBC auto differential    Collection Time: 05/24/18  5:30 AM   Result Value Ref Range    WBC 0.27 (LL) 3.90 - 12.70 K/uL    RBC 2.10 (L) 4.60 - 6.20 M/uL    Hemoglobin 6.4 (L) 14.0 - 18.0 g/dL    Hematocrit 18.1 (LL) 40.0 - 54.0 %    MCV 86 82 - 98 fL    MCH 30.5 27.0 -  31.0 pg    MCHC 35.4 32.0 - 36.0 g/dL    RDW 13.2 11.5 - 14.5 %    Platelets 7 (LL) 150 - 350 K/uL    MPV SEE COMMENT 9.2 - 12.9 fL    Immature Granulocytes CANCELED 0.0 - 0.5 %    Immature Grans (Abs) CANCELED 0.00 - 0.04 K/uL    Lymph # Test Not Performed 1.0 - 4.8 K/uL    Mono # Test Not Performed 0.3 - 1.0 K/uL    Eos # Test Not Performed 0.0 - 0.5 K/uL    Baso # Test Not Performed 0.00 - 0.20 K/uL    nRBC 0 0 /100 WBC    Gran% 0.0 (L) 38.0 - 73.0 %    Lymph% 100.0 (H) 18.0 - 48.0 %    Mono% 0.0 (L) 4.0 - 15.0 %    Eosinophil% 0.0 0.0 - 8.0 %    Basophil% 0.0 0.0 - 1.9 %    Platelet Estimate Decreased (A)     Aniso Slight     Poly Occasional     Differential Method Manual      Microbiology Results (last 7 days)     Procedure Component Value Units Date/Time    Blood culture [[id number]]  (Susceptibility) Collected:  05/19/18 2207    Order Status:  Completed Specimen:  Blood from Line, Port A Cath Updated:  05/24/18 0717     Blood Culture, Routine Gram stain peds bottle: Gram positive cocci in chains resembling Strep      Blood Culture, Routine Results called to and read back by: Guadalupe Vaughn RN  05/20/2018  21:13     Blood Culture, Routine ENTEROCOCCUS FAECIUM VRE    Narrative:       Outer port    Blood culture [[id number]] Collected:  05/20/18 2127    Order Status:  Completed Specimen:  Blood from Line, Port A Cath Updated:  05/24/18 0612     Blood Culture, Routine No Growth to date     Blood Culture, Routine No Growth to date     Blood Culture, Routine No Growth to date     Blood Culture, Routine No Growth to date    Narrative:       Please obtain blood from both lumens    Blood culture [[id number]] Collected:  05/20/18 2115    Order Status:  Completed Specimen:  Blood from Line, Port A Cath Updated:  05/24/18 0612     Blood Culture, Routine No Growth to date     Blood Culture, Routine No Growth to date     Blood Culture, Routine No Growth to date     Blood Culture, Routine No Growth to date    Blood culture  [[id number]] Collected:  05/19/18 2207    Order Status:  Completed Specimen:  Blood from Line, Port A Cath Updated:  05/24/18 0612     Blood Culture, Routine No Growth to date     Blood Culture, Routine No Growth to date     Blood Culture, Routine No Growth to date     Blood Culture, Routine No Growth to date     Blood Culture, Routine No Growth to date    Narrative:       Inner PAC    Blood culture [[id number]] Collected:  05/21/18 1057    Order Status:  Completed Specimen:  Blood from Line, Subclavian, Left Updated:  05/23/18 1412     Blood Culture, Routine No Growth to date     Blood Culture, Routine No Growth to date     Blood Culture, Routine No Growth to date    Narrative:       One culture from each lumen    Blood culture [[id number]] Collected:  05/21/18 1057    Order Status:  Completed Specimen:  Blood from Line, Subclavian, Left Updated:  05/23/18 1412     Blood Culture, Routine No Growth to date     Blood Culture, Routine No Growth to date     Blood Culture, Routine No Growth to date    Narrative:       One culture from each lumen    Fungus Culture, Blood or Bone Marrow [[id number]] Collected:  05/06/18 1233    Order Status:  Completed Specimen:  Blood from Blood Updated:  05/21/18 0945     Fungus Cult, blood or BM Culture in progress     Fungus Cult, blood or BM No fungus isolated after 2 weeks    Blood culture [[id number]]     Order Status:  Canceled Specimen:  Blood     Blood culture [[id number]]     Order Status:  Canceled Specimen:  Blood            Significant Imaging:   none    Assessment/Plan:     Neuro   Acute delirium    Delirium: discontinued CEC, improving. Patient now with intentional tremors.   - Decrease Risperdal to only 2mg qHS  - RPR NR  - Olanzapine PRN  - EKG complete on 5/14, 5/15, 5/16. QTc ranging from 437-441.   - Avoid Benadryl per Psych  - Continue frequent reorientation and attempt to encourage patient to sleep.   - Psych following, rec'd follow up at [facility]  ([address] [place] [address]; Phone: [phone]) as outpatient.   - CT head complete on 5/22: unremarkable        ID   * Bacteremia    Patient with bacteremia. Bcx from 5/2 growing strep mitis, Bcx 5/19 growing Enterococcus faecium VRE (sens to linezolid) from outer lumen of port. Subsequent cultures NGTD.     - s/p Cefepime 2g Q8h  - Continue Linezolid 600mg Q12h  - Discontinue Vancomycin 1250 g Q8h  - Follow up blood cultures  - monitor fever curve  - contact precautions          Immunology/Multi System   Immunosuppressed due to chemotherapy    Immunosuppression 2/2 to chemotherapy:  - Continue acyclovir ppx  - Continue posaconazole 400mg BID- therapeutic dosing.   - Continue bactrim QFSaSu ppx  - Continue peridex ppx          Oncology   Neutropenia    - ANC 0 today  - Advance soft mechanical diet  - Continue PRN zofran        AML (acute myeloblastic leukemia)    Hema is a 17 yr young man with AML (t 8;21) here for chemotherapy. On Intensification therapy II following [id number] (Arm A). He was admitted on 4/30/2018 for neutropenia/profund sepsis risk. Stepped up to the PICU for persistent fever >103, tachycardia, and hypotension that was been minimally responsive to fluid resuscitation. PICU stay was complicated by delirium. Currently CEC'd.      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 MRD negative after Induction I. Today is Day 35.  - Treating per COG [id number] (ARM A).  S/p Intensification I and Intensification II started.   - BM biopsy at start of Intensification shows CR.  FISH for t(8;21) negative. Will repeat BM biopsy pending count recovery and clinical improvement               Antineoplastic chemotherapy induced pancytopenia    Chemotherapy induced neutropenia:   - Hgb goal >7, Hgb 6.4 today. Transfuse PRBC x1 unit.  - Plts goal >10, platelets 7 today. Transfuse platelets x1 unit.  - Daily CBC and reticulocyte  - CMP EOD  - If bleeding at all, please transfuse platelets  x1 unit. No need to check CBC, evaluate clinically.                 Anticipated Disposition: Home or Self Care    Ayanna Ramirez,   Pediatric Hospital Medicine   [facility]

## 2018-05-24 NOTE — ASSESSMENT & PLAN NOTE
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 On Intensification therapy II following [id number] (Arm A). He was admitted on 4/30/2018 for neutropenia/profund sepsis risk. Stepped up to the PICU for persistent fever >103, tachycardia, and hypotension that was been minimally responsive to fluid resuscitation. PICU stay was complicated by delirium. Currently CEC'd.      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 MRD negative after Induction I. Today is Day 35.  - Treating per COG [id number] (ARM A).  S/p Intensification I and Intensification II started.   - BM biopsy at start of Intensification shows CR.  FISH for t(8;21) negative. Will repeat BM biopsy pending count recovery and clinical improvement

## 2018-05-25 LAB
ABO + RH BLD: NORMAL
ALBUMIN SERPL BCP-MCNC: 2.5 G/DL
ALP SERPL-CCNC: 66 U/L
ALT SERPL W/O P-5'-P-CCNC: 30 U/L
ANION GAP SERPL CALC-SCNC: 11 MMOL/L
ANISOCYTOSIS BLD QL SMEAR: ABNORMAL
AST SERPL-CCNC: 12 U/L
BACTERIA BLD CULT: NORMAL
BASOPHILS # BLD AUTO: 0 K/UL
BASOPHILS NFR BLD: 0 %
BILIRUB SERPL-MCNC: 0.6 MG/DL
BLD GP AB SCN CELLS X3 SERPL QL: NORMAL
BUN SERPL-MCNC: 5 MG/DL
CALCIUM SERPL-MCNC: 8.6 MG/DL
CHLORIDE SERPL-SCNC: 103 MMOL/L
CO2 SERPL-SCNC: 25 MMOL/L
CREAT SERPL-MCNC: 0.6 MG/DL
DIFFERENTIAL METHOD: ABNORMAL
EOSINOPHIL # BLD AUTO: 0 K/UL
EOSINOPHIL NFR BLD: 0 %
ERYTHROCYTE [DISTWIDTH] IN BLOOD BY AUTOMATED COUNT: 13.2 %
EST. GFR  (AFRICAN AMERICAN): >60 ML/MIN/1.73 M^2
EST. GFR  (NON AFRICAN AMERICAN): >60 ML/MIN/1.73 M^2
GLUCOSE SERPL-MCNC: 132 MG/DL
HCT VFR BLD AUTO: 22.1 %
HGB BLD-MCNC: 7.7 G/DL
IMM GRANULOCYTES # BLD AUTO: 0 K/UL
IMM GRANULOCYTES NFR BLD AUTO: 0 %
LYMPHOCYTES # BLD AUTO: 0.2 K/UL
LYMPHOCYTES NFR BLD: 75.9 %
MAGNESIUM SERPL-MCNC: 1.6 MG/DL
MCH RBC QN AUTO: 30.3 PG
MCHC RBC AUTO-ENTMCNC: 34.8 G/DL
MCV RBC AUTO: 87 FL
MONOCYTES # BLD AUTO: 0 K/UL
MONOCYTES NFR BLD: 10.3 %
NEUTROPHILS # BLD AUTO: 0 K/UL
NEUTROPHILS NFR BLD: 13.8 %
NRBC BLD-RTO: 0 /100 WBC
PHOSPHATE SERPL-MCNC: 4.4 MG/DL
PLATELET # BLD AUTO: 28 K/UL
PLATELET BLD QL SMEAR: ABNORMAL
PMV BLD AUTO: 10.9 FL
POTASSIUM SERPL-SCNC: 3.1 MMOL/L
PROT SERPL-MCNC: 5.3 G/DL
RBC # BLD AUTO: 2.54 M/UL
RETICS/RBC NFR AUTO: 0.3 %
SODIUM SERPL-SCNC: 139 MMOL/L
WBC # BLD AUTO: 0.29 K/UL

## 2018-05-25 PROCEDURE — 83735 ASSAY OF MAGNESIUM: CPT

## 2018-05-25 PROCEDURE — 97110 THERAPEUTIC EXERCISES: CPT

## 2018-05-25 PROCEDURE — 97116 GAIT TRAINING THERAPY: CPT

## 2018-05-25 PROCEDURE — 85045 AUTOMATED RETICULOCYTE COUNT: CPT

## 2018-05-25 PROCEDURE — 63600175 PHARM REV CODE 636 W HCPCS: Performed by: STUDENT IN AN ORGANIZED HEALTH CARE EDUCATION/TRAINING PROGRAM

## 2018-05-25 PROCEDURE — 25000003 PHARM REV CODE 250: Performed by: STUDENT IN AN ORGANIZED HEALTH CARE EDUCATION/TRAINING PROGRAM

## 2018-05-25 PROCEDURE — 25000003 PHARM REV CODE 250: Performed by: PEDIATRICS

## 2018-05-25 PROCEDURE — 99232 SBSQ HOSP IP/OBS MODERATE 35: CPT | Mod: AF,HA,, | Performed by: PSYCHIATRY & NEUROLOGY

## 2018-05-25 PROCEDURE — 90832 PSYTX W PT 30 MINUTES: CPT | Mod: HP,HA,, | Performed by: PSYCHOLOGIST

## 2018-05-25 PROCEDURE — 84100 ASSAY OF PHOSPHORUS: CPT

## 2018-05-25 PROCEDURE — 63600175 PHARM REV CODE 636 W HCPCS: Performed by: PEDIATRICS

## 2018-05-25 PROCEDURE — 86901 BLOOD TYPING SEROLOGIC RH(D): CPT

## 2018-05-25 PROCEDURE — 85025 COMPLETE CBC W/AUTO DIFF WBC: CPT

## 2018-05-25 PROCEDURE — 99233 SBSQ HOSP IP/OBS HIGH 50: CPT | Mod: ,,, | Performed by: PEDIATRICS

## 2018-05-25 PROCEDURE — 80053 COMPREHEN METABOLIC PANEL: CPT

## 2018-05-25 PROCEDURE — 11300000 HC PEDIATRIC PRIVATE ROOM

## 2018-05-25 RX ORDER — RISPERIDONE 1 MG/1
1 TABLET ORAL NIGHTLY
Status: DISCONTINUED | OUTPATIENT
Start: 2018-05-26 | End: 2018-05-25

## 2018-05-25 RX ORDER — RISPERIDONE 1 MG/1
1 TABLET, ORALLY DISINTEGRATING ORAL NIGHTLY
Status: COMPLETED | OUTPATIENT
Start: 2018-05-26 | End: 2018-05-27

## 2018-05-25 RX ORDER — RISPERIDONE 1 MG/1
2 TABLET, ORALLY DISINTEGRATING ORAL NIGHTLY
Status: COMPLETED | OUTPATIENT
Start: 2018-05-25 | End: 2018-05-25

## 2018-05-25 RX ADMIN — SULFAMETHOXAZOLE AND TRIMETHOPRIM 1 TABLET: 800; 160 TABLET ORAL at 08:05

## 2018-05-25 RX ADMIN — LINEZOLID 600 MG: 600 INJECTION, SOLUTION INTRAVENOUS at 12:05

## 2018-05-25 RX ADMIN — Medication 500 UNITS: at 10:05

## 2018-05-25 RX ADMIN — ACYCLOVIR 400 MG: 200 CAPSULE ORAL at 08:05

## 2018-05-25 RX ADMIN — POSACONAZOLE 300 MG: 100 TABLET, COATED ORAL at 09:05

## 2018-05-25 RX ADMIN — SULFAMETHOXAZOLE AND TRIMETHOPRIM 1 TABLET: 800; 160 TABLET ORAL at 09:05

## 2018-05-25 RX ADMIN — POLYETHYLENE GLYCOL 3350 17 G: 17 POWDER, FOR SOLUTION ORAL at 09:05

## 2018-05-25 RX ADMIN — RISPERIDONE 2 MG: 1 TABLET, ORALLY DISINTEGRATING ORAL at 08:05

## 2018-05-25 RX ADMIN — ACETAMINOPHEN, DIPHENHYDRAMINE HCL, PHENYLEPHRINE HCL 10 MG: 325; 25; 5 TABLET ORAL at 08:05

## 2018-05-25 RX ADMIN — LINEZOLID 600 MG: 600 INJECTION, SOLUTION INTRAVENOUS at 11:05

## 2018-05-25 RX ADMIN — Medication 500 UNITS: at 05:05

## 2018-05-25 RX ADMIN — Medication 500 UNITS: at 11:05

## 2018-05-25 RX ADMIN — ACYCLOVIR 400 MG: 200 CAPSULE ORAL at 09:05

## 2018-05-25 RX ADMIN — LINEZOLID 600 MG: 600 INJECTION, SOLUTION INTRAVENOUS at 10:05

## 2018-05-25 NOTE — PLAN OF CARE
Problem: Patient Care Overview  Goal: Plan of Care Review  Pt tolerated treatment session well this morning.  Pt displays improved functional mobility by ambulating total of 720 feet (SBA from therapist) with one episode of loss of balance toward end of ambulation bout.  Pt performs standing LE exercises for LE strengthening with demo and skilled instruction provided by therapist.   Pt family present during today's treatment session.  Pt and pt's mother educated on continuation of PT POC during pt hospitalization.  Pt and pt's mother verbalize understanding.  Pt will continue to benefit from skilled acute PT intervention to address the above listed impairments and progress functional mobility independence.      Yassine Andrea  5/25/2018

## 2018-05-25 NOTE — PROGRESS NOTES
"[facility]  Psychology  Progress Note  Individual Psychotherapy (PhD/LCSW)    Patient Name: Hema Kuo  MRN: [id number]    Patient Class: IP- Inpatient  Admission Date: 4/30/2018  Hospital Length of Stay: 25 days  Attending Physician: Sanford Bucio MD  Primary Care Provider: Ann Reyna NP    Therapeutic Intervention: Met with patient and mother.  Inpatient/Partial Hospital - Insight oriented psychotherapy 30 min - CPT code 06893    Chief Complaint/Reason for Encounter: psychosocial factors associated with cancer, including recent symptoms of paranoia and delusional thinking; symptoms appear improved at this time.     Interval History and Content of Current Session: Patient reported that he "has a clear head again," which he qualified as being able to think more clearly than he has been able to recently, as well as understanding the difference between delusions and reality.  Patient indicated that he feels he has "PTSD" about being in his hospital bed, noting that he experiences restlessness and fearfulness about falling asleep.  Provided psychoeducation that this is more likely reflective of an acute stress response to all he has been through - medically and psychiatrically - in the past several weeks, in an attempt to help him engage in cognitive restructuring that these symptoms will resolve.  Discussed also a behavioral plan for spending more time out of the bed.       Risk Parameters:  Patient reports no suicidal ideation  Patient reports no homicidal ideation  Patient reports no self-injurious behavior  Patient reports no violent behavior    Verbal Deficits: Sometimes slow to respond    Patient's response to intervention:  The patient's response to intervention is accepting.    Progress toward goals and other mental status changes:  The patient's progress toward goals is fair .  Mental status appears more consistent with baseline.    Diagnostic Impression - Plan:     Psychological " factor affecting cancer    Thank you for your consult. I will follow-up with patient again during the week of 5/28. Please contact me if you have any additional questions.                Treatment Plan:  · Target symptoms: adjustment  · Why chosen therapy is appropriate versus another modality: relevant to diagnosis  · Outcome monitoring methods: self-report, observation, feedback from family  · Therapeutic intervention type: insight oriented psychotherapy    Plan:  individual psychotherapy      Length of Service (minutes): 30    Francia Evans, PhD  Psychology  [facility]

## 2018-05-25 NOTE — SUBJECTIVE & OBJECTIVE
Interval History: VSS, afebrile. NAEO.     Scheduled Meds:   acyclovir  400 mg Oral BID    linezolid 600mg/300ml  600 mg Intravenous Q12H    melatonin  10 mg Oral QHS    posaconazole  300 mg Oral Daily    risperiDONE  2 mg Oral QHS    sulfamethoxazole-trimethoprim 800-160mg  1 tablet Oral twice daily on Friday, Saturday, Sunday     Continuous Infusions:  PRN Meds:acetaminophen, albuterol sulfate, alteplase, heparin, porcine (PF), lidocaine-prilocaine, morphine, olanzapine zydis, ondansetron, polyethylene glycol    Review of Systems   Constitutional: Negative for fever.     Objective:     Vital Signs (Most Recent):  Temp: 98.4 °F (36.9 °C) (05/25/18 0745)  Pulse: 85 (05/25/18 0745)  Resp: 16 (05/25/18 0745)  BP: 113/62 (05/25/18 0745)  SpO2: 98 % (05/25/18 0745) Vital Signs (24h Range):  Temp:  [97.5 °F (36.4 °C)-98.7 °F (37.1 °C)] 98.4 °F (36.9 °C)  Pulse:  [] 85  Resp:  [16-20] 16  SpO2:  [98 %-100 %] 98 %  BP: (113-136)/(57-75) 113/62     Patient Vitals for the past 72 hrs (Last 3 readings):   Weight   05/22/18 2100 79.5 kg (175 lb 4.3 oz)     Body mass index is 28.29 kg/m².    Intake/Output - Last 3 Shifts       05/23 0700 - 05/24 0659 05/24 0700 - 05/25 0659 05/25 0700 - 05/26 0659    P.O. 1200 780 600    I.V. (mL/kg) 160 (2)      Blood  860     IV Piggyback 1350 850 300    Total Intake(mL/kg) 2710 (34.1) 2490 (31.3) 900 (11.3)    Urine (mL/kg/hr) 1150 (0.6) 1550 (0.8)     Total Output 1150 1550      Net +1560 +940 +900           Urine Occurrence 2 x 4 x 1 x          Lines/Drains/Airways     Central Venous Catheter Line                 Port A Cath Double Lumen 05/10/18 0900 left subclavian 15 days                Physical Exam  Constitutional: He appears well-developed and well-nourished. Awake, sitting up in bed.   Head: Normocephalic and atraumatic.   Nose: Nose normal.   Mouth/Throat: Mucous membranes are normal.   Eyes: Conjunctivae and lids are normal.   Neck: Normal range of motion. Neck supple.    Cardiovascular: Normal rate and regular rhythm.    Pulmonary/Chest: Effort normal. No stridor. He has no decreased breath sounds. He has no wheezes. He has no rales.   Abdominal: Soft. Bowel sounds are normal. He exhibits no distension. There is no hepatosplenomegaly. There is no guarding.   Musculoskeletal: Normal range of motion. He exhibits no edema.   Skin: Skin is warm and dry. Capillary refill takes less than 2 seconds. No rash noted. He is not diaphoretic. No erythema.  Psych: Cooperative, able to answer questions appropriately.    Significant Labs:  Recent Results (from the past 24 hour(s))   Reticulocytes    Collection Time: 05/25/18  5:33 AM   Result Value Ref Range    Retic 0.3 (L) 0.4 - 2.0 %   CBC auto differential    Collection Time: 05/25/18  5:33 AM   Result Value Ref Range    WBC 0.29 (LL) 3.90 - 12.70 K/uL    RBC 2.54 (L) 4.60 - 6.20 M/uL    Hemoglobin 7.7 (L) 14.0 - 18.0 g/dL    Hematocrit 22.1 (L) 40.0 - 54.0 %    MCV 87 82 - 98 fL    MCH 30.3 27.0 - 31.0 pg    MCHC 34.8 32.0 - 36.0 g/dL    RDW 13.2 11.5 - 14.5 %    Platelets 28 (LL) 150 - 350 K/uL    MPV 10.9 9.2 - 12.9 fL    Immature Granulocytes 0.0 0.0 - 0.5 %    Gran # (ANC) 0.0 (L) 1.8 - 7.7 K/uL    Immature Grans (Abs) 0.00 0.00 - 0.04 K/uL    Lymph # 0.2 (L) 1.0 - 4.8 K/uL    Mono # 0.0 (L) 0.3 - 1.0 K/uL    Eos # 0.0 0.0 - 0.5 K/uL    Baso # 0.00 0.00 - 0.20 K/uL    nRBC 0 0 /100 WBC    Gran% 13.8 (L) 38.0 - 73.0 %    Lymph% 75.9 (H) 18.0 - 48.0 %    Mono% 10.3 4.0 - 15.0 %    Eosinophil% 0.0 0.0 - 8.0 %    Basophil% 0.0 0.0 - 1.9 %    Platelet Estimate Decreased (A)     Aniso CANCELED     Differential Method Automated    Type & Screen    Collection Time: 05/25/18  5:33 AM   Result Value Ref Range    Group & Rh AB POS     Indirect Nathan NEG    Comprehensive metabolic panel    Collection Time: 05/25/18  5:33 AM   Result Value Ref Range    Sodium 139 136 - 145 mmol/L    Potassium 3.1 (L) 3.5 - 5.1 mmol/L    Chloride 103 95 - 110 mmol/L     CO2 25 23 - 29 mmol/L    Glucose 132 (H) 70 - 110 mg/dL    BUN, Bld 5 (L) 6 - 20 mg/dL    Creatinine 0.6 0.5 - 1.4 mg/dL    Calcium 8.6 (L) 8.7 - 10.5 mg/dL    Total Protein 5.3 (L) 6.0 - 8.4 g/dL    Albumin 2.5 (L) 3.5 - 5.2 g/dL    Total Bilirubin 0.6 0.1 - 1.0 mg/dL    Alkaline Phosphatase 66 55 - 135 U/L    AST 12 10 - 40 U/L    ALT 30 10 - 44 U/L    Anion Gap 11 8 - 16 mmol/L    eGFR if African American >60.0 >60 mL/min/1.73 m^2    eGFR if non African American >60.0 >60 mL/min/1.73 m^2   Magnesium    Collection Time: 05/25/18  5:33 AM   Result Value Ref Range    Magnesium 1.6 1.6 - 2.6 mg/dL   Phosphorus    Collection Time: 05/25/18  5:33 AM   Result Value Ref Range    Phosphorus 4.4 2.7 - 4.5 mg/dL       Significant Imaging:   none

## 2018-05-25 NOTE — PLAN OF CARE
Problem: Patient Care Overview  Goal: Plan of Care Review  Outcome: Ongoing (interventions implemented as appropriate)  Pt smiling, in good mood, with clear logical speech and behavior. Afebrile, labs sent from pac in am, isolation maintained, pt ambulated to bathroom with minimal assistance, mom reported one episode of leg twitching  which lasted a few seconds. Sitter was at bedside until midnight, mother at bedside for entire shift.

## 2018-05-25 NOTE — ASSESSMENT & PLAN NOTE
Thank you for your consult. I will follow-up with patient again during the week of 5/28. Please contact me if you have any additional questions.

## 2018-05-25 NOTE — PROGRESS NOTES
"[facility]  Psychiatry  Progress Note    Patient Name: Hema Kuo  MRN: [id number]   Code Status: Prior  Admission Date: 4/30/2018  Hospital Length of Stay: 25 days  Expected Discharge Date: 5/28/2018  Attending Physician: Sanford Bucio MD  Primary Care Provider: Ann Reyna NP    Current Legal Status: N/A      Subjective:     Principal Problem:Bacteremia    Chief Complaint: delirium    HPI: Patient is a 16 y/o man with PMH AML dx 2017 s/p 4 cycles of chemotherapy admitted to pediatrics team with Strep mitis sepsis and ARDS. Psychiatry was consulted for "paranoia, now CEC'd, possible ICU delirium."     Per staff MD:  "17 year old young man with AML s/p 4 cycles of chemotherapy now transferred from the  PICU with Strep mitis sepsis and ARDS.     For his AML we are awaiting count recovery.  ANC next to nothing still.   For his Strep sepsis, he is on cefepime and vancomycin.  Cultures from 5/2 grew Strep mitis (sensitive to vanc).  Subsequent cultures negative.    Will cont a 14 day course of vanc.        .  For his chemotherapy induced pancytopenia, recommend transfusion for hemoglobin under 7 and platelets under 10 K.  No transfusions today.    For his ARDS will get one last dose of lasix today and then we will stop.  He continues to have paranoid delerium since extubation, however this is improving.  Will cont seroquel with prn zyprexa.  Will have psych see tomorrow.   Still needs a 1:1 sitter."    Per RN note 5/14:  "Pt oriented x4. Pt paranoid and anxious but cooperative. Very slow to proccess what's being communicated to him and very slow to respond. Similar to last nights shift, it took patient a little while for him to take his medications, but after much convincing, pt took them with no problem. Pt making death statements throughout night and still believes he is dying and nurses are out to get him. Double lumen left chest port in place, flushes well, blood return noted from each " "lumen. All medications/antibitoics given as scheduled. Labs drawn in am. Critical results resulted, MD notified and labs redrawn. Pt did sleep for about 6 hours tonight."    Per RN note 5/13:  "Pt oriented to person, self, time, but disoriented to place. Pt stated he was [facility] waiting to die. Pt paranoid and anxious but cooperative. Took him a little time (about 25 minutes) to take his medications, but after time of explaining to him what the medications were and why they are needed, pt took them. Pt stated he believes "the nurses are trying to burn" him and that we all hate him and want him to die."    On interview, patient with sitter at bedside and uneaten breakfast tray in front of him. Interview was limited as his answers to questions are impoverished and inappropriate. He asked interviewer "are you my biological father?" And stated "I think I'm dead." Was able to state his reason for admission was "cancer" but did not respond to further questions about his care.     Per iglesia, patient slept overnight and has had limited appetite. Has been paranoid but not physically aggressive. Minimally talkative with her. Says patient's mother is currently at a doctor's appointment but will return to hospital this afternoon.    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 2 sodas/day         Hospital Course: 05/15/2018: Per chart, patient received PRN Zyprexa @ 0009 for insomnia. Per iglesia, " "patient slightly less paranoid this morning, ate most of breakfast and played games with her. On interview, no family at bedside, patient states he is feeling like himself. Still making statements about believing his is dead but more redirectable. Mentioned his nosebleed was because there is "something inside" him besides cancer. Overall more conversational and less paranoid this morning.  05/16/2018: Per chart, no PRN zyprexa given, per RN: "Pt has flat affect and has delayed response of understanding. Pt cooperative with vitals. He states " should never been born" and he also states "ya'll just keeping me alive". He also had some other spontaneous statements that did not make sense. Pt vss, afebrile with tmax of 99.6, no distress noted. Pt did go to sleep around midnight but moaned and cried throughout the night per sitter." On interview, patient again responds to select questions and remains paranoid, will slight improvement. Says he's "either fully alive or fully dead" when asked but does not spontaneously state he is dead. Refused to participate in [employer] but oriented to location, year and month.  05/17/2018: Per chart, no PRN zyprexa given, per RN continued to make paranoid statements about being alive, worked well with PT/OT yesterday. Asleep on interview. Per iglesia, remembered her from earlier in the week, has not made any statements this morning so far about being dead.  05/18/2018: Per chart, patient agitated and confused overnight, attempting to disconnect IV and port. Received Zyprexa 5 mg PO PRN @ 0147. Patient asleep on interview. Per juan ramonter did not fall asleep until 4 am. Ate a little breakfast then fell back asleep. No issues with agitation this AM.   05/20/2018: Per RN note: "Mother and step-father visited in evening, updated on plan of care, pleased about pt's continued progress toward baseline mood/personality."  05/21/2018: Per chart, no issues with agitation overnight, no PRN Zyprexa given. " "On interview patient with less paranoid thought content, no longer believes nurses are trying to harm him, states he knows he is alive and remembers last week feeling like he wasn't. Denies AVH. C/o poor sleep overnight 2/2 fever.  05/22/2018: Per chart, no issues with agitation overnight, no PRN Zyprexa given. On interview, patient states he is feeling like himself but c/o feeling "alone." Discussed role of Risperdal, which patient stated was for psychosis; discussed with patient he is being treated for delirium not psychosis and he expressed understanding.   05/23/2018: Per chart, no issues with agitation overnight, no PRN Zyprexa given. Per RN note: "Pt affect remained flat this am, but this afternoon pt much more engaging and talkative.  Speech was less delayed and pt asking appropriate questions.  Pt became tearful and asked for his mom.  Pt states "I'm so lonely, and I'm all by myself" "I miss my family", pt also stated "I know sometimes I think people want to hurt me, but I also know that that's really not right".  Pt also states at this time that he knows he gets confused at times "because its like old people who have to go in and out of the hospital all the time....they get confused why they are there".  This writer also asked pt what he thought happens when he seems to shut down.  Pt states "I think I get scared because I'm roldan confused and my mom isn't here"." On interview, patient sitting up in bed with grandmother, cousin and sitter at bedside. Is able to identify everyone in the room. C/o difficulty sleeping overnight, states he normally sleeps well at home. Discussed patient's life prior to dx as well as basketball game last night, patient smiled when told the [employer] had won.   05/24/2018: Per chart, no issues with agitation overnight, no PRN Zyprexa given. Mother at bedside speaking with primary team staff. Per primary team staff patient frustrated by tremor and need for contact precautions. On " "interview, patient more irritable than on previous visits, at attempts to engage in conversation about his interests outside of the hospital asks "what are you doing?" When asked if he would like interviewer to leave, responds "If I say yes, will you be offended?" Reassured patient his frustrations were valid and would check in with him tomorrow.   05/25/2018: Per chart, no issues with agitation overnight, no PRN Zyprexa given. Per RN note: "Pt smiling, in good mood, with clear logical speech and behavior." Patient not in room on my assessment, however per medical student: "States that he feels a lot better. Has good appetite, He received platelets/RBC yesterday. Still had difficulty falling asleep but has good energy."      Interval History: see hospital course.     Family History     Problem Relation (Age of Onset)    Cancer Mother    Heart disease Mother    No Known Problems Father        Social History Main Topics    Smoking status: Former Smoker     Packs/day: 0.50     Types: Cigarettes     Quit date: 10/30/2017    Smokeless tobacco: Never Used    Alcohol use Yes      Comment: rare occasions    Drug use: No    Sexual activity: Yes     Partners: Female     Psychotherapeutics     Start     Stop Route Frequency Ordered    05/23/18 2100  risperiDONE disintegrating tablet 2 mg      -- Oral Nightly 05/23/18 0955    05/15/18 0847  olanzapine zydis disintegrating tablet 5 mg      -- Oral As needed (PRN) 05/15/18 0848           Review of Systems    Objective:     Vital Signs (Most Recent):  Temp: 98.4 °F (36.9 °C) (05/25/18 0745)  Pulse: 85 (05/25/18 0745)  Resp: 16 (05/25/18 0745)  BP: 113/62 (05/25/18 0745)  SpO2: 98 % (05/25/18 0745) Vital Signs (24h Range):  Temp:  [97.5 °F (36.4 °C)-98.7 °F (37.1 °C)] 98.4 °F (36.9 °C)  Pulse:  [] 85  Resp:  [16-20] 16  SpO2:  [98 %-100 %] 98 %  BP: (113-136)/(57-75) 113/62     Height: 5' 6" (167.6 cm)  Weight: 79.5 kg (175 lb 4.3 oz)  Body mass index is 28.29 " kg/m².      Intake/Output Summary (Last 24 hours) at 05/25/18 1121  Last data filed at 05/25/18 1049   Gross per 24 hour   Intake             2720 ml   Output             1550 ml   Net             1170 ml       Physical Exam        Mental Status Exam:  Patient not in room at time of my assessment    Significant Labs:   Last 24 Hours:   Recent Lab Results       05/25/18  0533      Immature Granulocytes 0.0     Immature Grans (Abs) 0.00  Comment:  Mild elevation in immature granulocytes is non specific and   can be seen in a variety of conditions including stress response,   acute inflammation, trauma and pregnancy. Correlation with other   laboratory and clinical findings is essential.       Albumin 2.5(L)     Alkaline Phosphatase 66     ALT 30     Anion Gap 11     Aniso CANCELED  Comment:  Result canceled by the ancillary     AST 12     Baso # 0.00     Basophil% 0.0     Total Bilirubin 0.6  Comment:  For infants and newborns, interpretation of results should be based  on gestational age, weight and in agreement with clinical  observations.  Premature Infant recommended reference ranges:  Up to 24 hours.............<8.0 mg/dL  Up to 48 hours............<12.0 mg/dL  3-5 days..................<15.0 mg/dL  6-29 days.................<15.0 mg/dL       BUN, Bld 5(L)     Calcium 8.6(L)     Chloride 103     CO2 25     Creatinine 0.6     Differential Method Automated     eGFR if African American >60.0     eGFR if non  >60.0  Comment:  Calculation used to obtain the estimated glomerular filtration  rate (eGFR) is the CKD-EPI equation.        Eos # 0.0     Eosinophil% 0.0     Glucose 132(H)     Gran # (ANC) 0.0(L)     Gran% 13.8(L)     Group & Rh AB POS     Hematocrit 22.1(L)     Hemoglobin 7.7(L)     INDIRECT LARRY NEG     Lymph # 0.2(L)     Lymph% 75.9(H)     Magnesium 1.6     MCH 30.3     MCHC 34.8     MCV 87     Mono # 0.0(L)     Mono% 10.3     MPV 10.9     nRBC 0     Phosphorus 4.4     Platelet Estimate  Decreased(A)     Platelets 28  Comment:  plt  critical result(s) called and verbal readback obtained from   margoth fishman rn, 05/25/2018 07:26  (LL)     Potassium 3.1(L)     Total Protein 5.3(L)     RBC 2.54(L)     RDW 13.2     Retic 0.3(L)     Sodium 139     WBC 0.29  Comment:  WBC, prelim PLT   critical result(s) called and verbal readback   obtained from Margoth Fishmna RN, 05/25/2018 06:34  (LL)           Significant Imaging: None    Assessment/Plan:     Acute delirium    Patient without significant past psychiatric history has been exhibiting waxing and waning mental status, disorientation and paranoid delusions since being extubated on 5/9 consistent with delirium.   - Continue titration as follows: Risperdal M tabs 2 mg qHS on 5/25, Risperdal M tabs 1 mg qHS from 5/26-5/27, then discontinue. Tremor is a known common side effect of Risperdal and likely to improve and eventually resolve with discontinuation of medication.  - Continue Zyprexa 5 mg PO/IM q8 PRN nonredirectable agitation. QTc on 5/16 467. Continue checking perioidic EKG to monitor QTc. Continue to monitor CBC for any worsening leukopenia/neutropenia with antipsychotic medications.  - Upon discharge from hospital, patient may schedule therapy and psychiatry follow up at [facility] ([address] [place] [id number]; Phone: [phone]; accepts Medicaid). Upon discharge may also provide short term rx for Risperdal (not M tabs 2/2 increased cost) 1 mg qHS PRN insomnia #5 if requested by patient's mother for transition home; recommend against long term use unless recommended by outpatient psychiatrist.   · DELIRIUM BEHAVIOR MANAGEMENT  · PLEASE utilize CHEMICAL restraints with PRN meds first for agitation. Minimize use of PHYSICAL restraints  · Keep window shades open and room lit during day and room dim at night in order to promote normal sleep-wake cycles  · Encourage family at bedside. [place] patient often to situation, location,  date.  · Continue to Limit or Discontinue use of Narcotics, Benzos and Anti-cholinergic (Avoid Benadryl) medications as they may worsen delirium.   · Continue medical workup for causative etiology of Delirium. CT head with and without contrast without acute intracranial processes. Patient currently bacteremic growing VRE, on abx. Recommended checking amylase, lipase, B12, folate, HIV, RPR; however delirium resolving.          Please contact psychiatry on call with any issues over the weekend, CL team will plan to follow up with patient on 5/28.     Need for Continued Hospitalization:   No need for inpatient psychiatric hospitalization. Continue medical care as per the primary team.    Anticipated Disposition: Home or Self Care     Total time:  15 with greater than 50% of this time spent in counseling and/or coordination of care.       Clair Taylor MD   Psychiatry  [facility]

## 2018-05-25 NOTE — PT/OT/SLP PROGRESS
Physical Therapy Treatment    Patient Name:  Hema Kuo   MRN:  [id number]    Recommendations:     Discharge Recommendations:  home health PT   Discharge Equipment Recommendations: none   Barriers to discharge: None    Assessment:     Hema Kuo is a 17 y.o. male admitted with a medical diagnosis of Bacteremia.  He presents with the following impairments/functional limitations:  weakness, impaired endurance, impaired self care skills, impaired functional mobilty, gait instability, impaired balance, decreased upper extremity function, decreased lower extremity function, decreased safety awareness.   Pt tolerated treatment session well this morning.  Pt displays improved functional mobility by ambulating total of 720 feet (SBA from therapist) with one episode of loss of balance toward end of ambulation bout.  Pt performs standing LE exercises for LE strengthening with demo and skilled instruction provided by therapist.   Pt family present during today's treatment session.  Pt and pt's mother educated on continuation of PT POC during pt hospitalization.  Pt and pt's mother verbalize understanding.  Pt will continue to benefit from skilled acute PT intervention to address the above listed impairments and progress functional mobility independence.      Rehab Prognosis:  Good; patient would benefit from acute skilled PT services to address these deficits and reach maximum level of function.      Recent Surgery: * No surgery found *      Plan:     During this hospitalization, patient to be seen 5 x/week to address the above listed problems via gait training, therapeutic activities, therapeutic exercises, neuromuscular re-education  · Plan of Care Expires:  06/09/18   Plan of Care Reviewed with: patient, mother    Subjective     Communicated with JULIANN Finley prior to session.  Patient found awake with pt's mother present upon PT entry to room, agreeable to treatment.      Chief Complaint: Bacteremia  Patient comments/goals:  ""I'm not crazy Hema anymore!"  Pain/Comfort:  · Pain Rating 1: 0/10  · Pain Rating Post-Intervention 1: 0/10    Patients cultural, spiritual, Latter-day conflicts given the current situation: Patient has no barriers to learning. Patient verbalizes understanding of his/her program and goals and demonstrates them correctly. No cultural, spiritual or educational needs identified.    Objective:     Patient found with: central line     General Precautions: Standard, fall   Orthopedic Precautions:N/A   Braces: N/A     Functional Mobility:  · Bed Mobility:     · Supine to Sit: stand by assistance  · Sit to Supine: stand by assistance  · Transfers:     · Sit to Stand:  stand by assistance with no AD  Gait: Pt ambulated total of 720 feet with one episode of loss of balance due to pt fatigue.  Assist Level: supervision to SBA  AD Used: None  Gait Pattern: Two point, Step through  Gait Deviations: gait instability, decreased velocity, decreased step length  · Impairments due to: fatigue  · Balance: Pt displays good balance through initail gait and LE strengthening actitives but displays instability when fatigued.      AM-PAC 6 CLICK MOBILITY  Turning over in bed (including adjusting bedclothes, sheets and blankets)?: 4  Sitting down on and standing up from a chair with arms (e.g., wheelchair, bedside commode, etc.): 4  Moving from lying on back to sitting on the side of the bed?: 4  Moving to and from a bed to a chair (including a wheelchair)?: 4  Need to walk in hospital room?: 3  Climbing 3-5 steps with a railing?: 3  Total Score: 22       Therapeutic Activities and Exercises:   Pt performs standing LE strengthening/balance exercises with skilled instruction from therapist for proper form and muscle activation:  ? Sit to stand x10  ? Standing marching x10  ? Heel raises x10 (pt requiring HHA)  ? Toes raises x5 (pt requiring HHA)  ? Static narrow base stance (feet together) x30 sec  ? Static narrow base stance (eye closed) " x30 sec  ? Right Tandem stance x20 sec  ? Left Tandem stance x29 sec  ? Right single leg stance x1 sec  ? Left single leg stance- unable to attempt due to fatigue     Pt and pt's mother educated on continuation of PT POC, Pt's mother provided with LE exercise sheet for use at home.  Pt and pt's mother verbalize understanding.      Patient left supine with all lines intact, RN notified and pt's mother present..    GOALS:    Physical Therapy Goals        Problem: Physical Therapy Goal    Goal Priority Disciplines Outcome Goal Variances Interventions   Physical Therapy Goal     PT/OT, PT Ongoing (interventions implemented as appropriate)     Description:  Goals re-assessed by SPT on   Continue goals x1 week (18)    Patient will increase functional independence with mobility by performin. Supine to sit with [place] - MET ()  2. Sit to supine with [place] - MET ()  3. Sit to stand transfer with [place] - MET ()  4. Bed to chair transfer with Stand-by Assistance using least restrictive AD - MET ()  5. Gait  x 500 feet with Stand-by Assistance using least restrictive Ad - MET ()  6. Lower extremity exercise program x30 reps per handout, with supervision - Not met                        Time Tracking:     PT Received On: 18  PT Start Time: 931     PT Stop Time: 1018  PT Total Time (min): 47 min     Billable Minutes: Gait Training 8 and Therapeutic Exercise 39    Treatment Type: Treatment  PT/PTA: PT           Yassine Andrea, SPT   2018

## 2018-05-25 NOTE — MEDICAL/APP STUDENT
"5/25/2018 11:02 AM Hema Kou 2000 [id number]        PSYCHIATRY CONSULT PROGRESS NOTE   SUBJECTIVE:   BRIEF HPI  Hema Kuo is a 17 y.o.  male who  has a past medical history of AML (acute myeloblastic leukemia) (10/2017); Asthma; Encounter for blood transfusion; and Seasonal allergies., currently presenting with Bacteremia.  Psychiatry was consulted for "paranoia, now CEC'd, possible ICU delirium."       HPI: Patient is a 18 y/o man with PMH AML dx 2017 s/p 4 cycles of chemotherapy admitted to pediatrics team with Strep mitis sepsis and ARDS. Psychiatry was consulted for "paranoia, now CEC'd, possible ICU delirium."      Per staff MD:  "17 year old young man with AML s/p 4 cycles of chemotherapy now transferred from the  PICU with Strep mitis sepsis and ARDS.     For his AML we are awaiting count recovery.  ANC next to nothing still.   For his Strep sepsis, he is on cefepime and vancomycin.  Cultures from 5/2 grew Strep mitis (sensitive to vanc).  Subsequent cultures negative.    Will cont a 14 day course of vanc.        .  For his chemotherapy induced pancytopenia, recommend transfusion for hemoglobin under 7 and platelets under 10 K.  No transfusions today.    For his ARDS will get one last dose of lasix today and then we will stop.  He continues to have paranoid delerium since extubation, however this is improving.  Will cont seroquel with prn zyprexa.  Will have psych see tomorrow.   Still needs a 1:1 sitter."     Per RN note 5/14:  "Pt oriented x4. Pt paranoid and anxious but cooperative. Very slow to proccess what's being communicated to him and very slow to respond. Similar to last nights shift, it took patient a little while for him to take his medications, but after much convincing, pt took them with no problem. Pt making death statements throughout night and still believes he is dying and nurses are out to get him. Double lumen left chest port in place, flushes well, blood return noted from " "each lumen. All medications/antibitoics given as scheduled. Labs drawn in am. Critical results resulted, MD notified and labs redrawn. Pt did sleep for about 6 hours tonight."     Per RN note 5/13:  "Pt oriented to person, self, time, but disoriented to place. Pt stated he was in the [facility] [place] waiting to die. Pt paranoid and anxious but cooperative. Took him a little time (about 25 minutes) to take his medications, but after time of explaining to him what the medications were and why they are needed, pt took them. Pt stated he believes "the nurses are trying to burn" him and that we all hate him and want him to die."     Per RN note 5/25:  "Pt smiling, in good mood, with clear logical speech and behavior. Afebrile, labs sent from pac in am, isolation maintained, pt ambulated to bathroom with minimal assistance, mom reported one episode of leg twitching  which lasted a few seconds. Sitter was at bedside until midnight, mother at bedside for entire shift." "Hema has been alert and answering questions appropriately and oriented to person and place.  Hema interested in his ANC and had his blood culture results, asking questions regarding antibiotic.  At one point today he said " I know where I got my infection, when I was trying to escape and disconnected my tubing, the end fell in the toilet and I reattached it to my PAC."  He was reassured at that time that we are know treating the appropriate infection and that it isn't his fault because he didn't realize what he was doing at the time.  Hema received blood and platelets today, tolerated well.  Afebrile and this evening he stated that "I feel much better and have more energy."  He is smiling and carrying on conversations with mom and the sitter at the bedside.  Only complained of mild pain noted to his left ankle but after repositioning he stated it felt better.  No tremors noted by nurse this shift."    On interview this morning: Patient resting. States " "that he feels a lot better. Has good appetite, He received platelets/RBC yesterday. Still had difficulty falling asleep but has good energy. Speech, mood and affect are markedly improved. Mother at bedside.     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 2 sodas/day           Hospital Course: 05/15/2018: Per chart, patient received PRN Zyprexa @ 0009 for insomnia. Per sitter, patient slightly less paranoid this morning, ate most of breakfast and played games with her. On interview, no family at bedside, patient states he is feeling like himself. Still making statements about believing his is dead but more redirectable. Mentioned his nosebleed was because there is "something inside" him besides cancer. Overall more conversational and less paranoid this morning.  05/16/2018: Per chart, no PRN zyprexa given, per RN: "Pt has flat affect and has delayed response of understanding. Pt cooperative with vitals. He states " should never been born" and he also states "ya'll just keeping me alive". He also had some other spontaneous statements that did not make sense. Pt vss, afebrile with tmax of 99.6, no distress noted. Pt did go to sleep around midnight but moaned and cried throughout the night per sitter." On interview, patient again responds to select questions and remains paranoid, will slight improvement. Says he's "either fully alive or fully dead" when asked but does not spontaneously state he is dead. Refused to participate in [employer] but oriented to location, year " "and month.  05/17/2018: Per chart, no PRN zyprexa given, per RN continued to make paranoid statements about being alive, worked well with PT/OT yesterday. Asleep on interview. Per sitter, remembered her from earlier in the week, has not made any statements this morning so far about being dead.  05/18/2018: Per chart, patient agitated and confused overnight, attempting to disconnect IV and port. Received Zyprexa 5 mg PO PRN @ 0147. Patient asleep on interview. Per sitter did not fall asleep until 4 am. Ate a little breakfast then fell back asleep. No issues with agitation this AM.   05/20/2018: Per RN note: "Mother and step-father visited in evening, updated on plan of care, pleased about pt's continued progress toward baseline mood/personality."  05/21/2018: Per chart, no issues with agitation overnight, no PRN Zyprexa given. On interview patient with less paranoid thought content, no longer believes nurses are trying to harm him, states he knows he is alive and remembers last week feeling like he wasn't. Denies AVH. C/o poor sleep overnight 2/2 fever.  05/22/2018: Per chart, no issues with agitation overnight, no PRN Zyprexa given. On interview, patient states he is feeling like himself but c/o feeling "alone." Discussed role of Risperdal, which patient stated was for psychosis; discussed with patient he is being treated for delirium not psychosis and he expressed understanding.   05/23/2018: Per chart, no issues with agitation overnight, no PRN Zyprexa given. Per RN note: "Pt affect remained flat this am, but this afternoon pt much more engaging and talkative.  Speech was less delayed and pt asking appropriate questions.  Pt became tearful and asked for his mom.  Pt states "I'm so lonely, and I'm all by myself" "I miss my family", pt also stated "I know sometimes I think people want to hurt me, but I also know that that's really not right".  Pt also states at this time that he knows he gets confused at times " ""because its like old people who have to go in and out of the hospital all the time....they get confused why they are there".  This writer also asked pt what he thought happens when he seems to shut down.  Pt states "I think I get scared because I'm roldan confused and my mom isn't here"." On interview, patient sitting up in bed with grandmother, cousin and sitter at bedside. Is able to identify everyone in the room. C/o difficulty sleeping overnight, states he normally sleeps well at home. Discussed patient's life prior to dx as well as basketball game last night, patient smiled when told the [employer] had won.   05/24/2018: Per chart, no issues with agitation overnight, no PRN Zyprexa given. Mother at bedside speaking with primary team staff. Per primary team staff patient frustrated by tremor and need for contact precautions. On interview, patient more irritable than on previous visits, at attempts to engage in conversation about his interests outside of the hospital asks "what are you doing?" When asked if he would like interviewer to leave, responds "If I say yes, will you be offended?" Reassured patient his frustrations were valid and would check in with him tomorrow.     5/25/2018: Patient resting. States that he feels a lot better. He received platelets/RBC yesterday. Still had difficulty falling asleep but has good energy. Speech, mood and affect are markedly improved. Mother at bedside.     Interval History: see hospital course.     Current Medications:   PRN Meds: acetaminophen, albuterol sulfate, alteplase, heparin, porcine (PF), lidocaine-prilocaine, morphine, olanzapine zydis, ondansetron, polyethylene glycol   Psychotherapeutics     Start     Stop Route Frequency Ordered    05/23/18 2100  risperiDONE disintegrating tablet 2 mg      -- Oral Nightly 05/23/18 0955    05/15/18 0847  olanzapine zydis disintegrating tablet 5 mg      -- Oral As needed (PRN) 05/15/18 0848        Allergies/PMH:   Review of " patient's allergies indicates:   Allergen Reactions    Nsaids (non-steroidal anti-inflammatory drug) Anaphylaxis    Nuts [tree nut] Anaphylaxis    Strawberry     Vancomycin analogues Itching     Premedicate with benadryl and admin over 2hr.      Past Medical History:   Diagnosis Date    AML (acute myeloblastic leukemia) 10/2017    Asthma     Encounter for blood transfusion     Seasonal allergies        Medical Review Of Systems:  Pertinent items are noted in HPI.  Psychiatric Review Of Systems:  sleep: no  appetite changes: IMPROVED  weight changes: no  energy/anergy: IMPROVED  interest/pleasure/anhedonia: no  SI/SA:  no  somatic symptoms: no  libido: no  anxiety/panic: no  guilty/hopeless: no  S.I.B.s/risky behavior: no    OBJECTIVE:   Vitals   Vitals:    05/25/18 0745   BP: 113/62   Pulse: 85   Resp: 16   Temp: 98.4 °F (36.9 °C)      In/Out  I/O last 3 completed shifts:  In: 3800 [P.O.:1380; I.V.:160; Blood:860; IV Piggyback:1400]  Out: 2700 [Urine:2700]  Mental Status Exam:  Appearance: unremarkable  Behavior/Cooperation: limited/ appopriate friendly and cooperative  Speech: appropriate rate, volume and tone normal tone, normal rate, normal pitch, normal volume  Language: grossly intact, able to name, able to repeat with spontaneous speech  Mood: steady, happy  Affect:  Constricted but reactive (improving)  Thought Process: normal and logical  Thought Content: normal, no suicidality, no homicidality, delusions, or paranoia  Sensorium:  Awake/Delirium/Somnolence  Alert and Oriented: x3 grossly intact  Memory: 3/3 immediate, 2/3 at 5 minutes    Recent:  Impaired/  Limited/ Intact; able to report recent events   Remote:  Impaired/  Limited/ Intact; Named 4/4 past presidents   Attention/concentration: appropriate for age/education. w-o-r-l-d; d-l-r-o-w   Similarities:  Impaired/  Limited/ Intact; (difference between apple and orange?)  Abstract reasoning:  Impaired/  Limited/ Intact  Insight:  Impaired/   Limited/ Intact  Judgment: Impaired/  Limited/ Intact    RASS-  [facility] ICU-    Labs/Imaging/Studies:   Recent Results (from the past 36 hour(s))   Reticulocytes    Collection Time: 05/24/18  5:30 AM   Result Value Ref Range    Retic 0.3 (L) 0.4 - 2.0 %   CBC auto differential    Collection Time: 05/24/18  5:30 AM   Result Value Ref Range    WBC 0.27 (LL) 3.90 - 12.70 K/uL    RBC 2.10 (L) 4.60 - 6.20 M/uL    Hemoglobin 6.4 (L) 14.0 - 18.0 g/dL    Hematocrit 18.1 (LL) 40.0 - 54.0 %    MCV 86 82 - 98 fL    MCH 30.5 27.0 - 31.0 pg    MCHC 35.4 32.0 - 36.0 g/dL    RDW 13.2 11.5 - 14.5 %    Platelets 7 (LL) 150 - 350 K/uL    MPV SEE COMMENT 9.2 - 12.9 fL    Immature Granulocytes CANCELED 0.0 - 0.5 %    Immature Grans (Abs) CANCELED 0.00 - 0.04 K/uL    Lymph # Test Not Performed 1.0 - 4.8 K/uL    Mono # Test Not Performed 0.3 - 1.0 K/uL    Eos # Test Not Performed 0.0 - 0.5 K/uL    Baso # Test Not Performed 0.00 - 0.20 K/uL    nRBC 0 0 /100 WBC    Gran% 0.0 (L) 38.0 - 73.0 %    Lymph% 100.0 (H) 18.0 - 48.0 %    Mono% 0.0 (L) 4.0 - 15.0 %    Eosinophil% 0.0 0.0 - 8.0 %    Basophil% 0.0 0.0 - 1.9 %    Platelet Estimate Decreased (A)     Aniso Slight     Poly Occasional     Differential Method Manual    Reticulocytes    Collection Time: 05/25/18  5:33 AM   Result Value Ref Range    Retic 0.3 (L) 0.4 - 2.0 %   CBC auto differential    Collection Time: 05/25/18  5:33 AM   Result Value Ref Range    WBC 0.29 (LL) 3.90 - 12.70 K/uL    RBC 2.54 (L) 4.60 - 6.20 M/uL    Hemoglobin 7.7 (L) 14.0 - 18.0 g/dL    Hematocrit 22.1 (L) 40.0 - 54.0 %    MCV 87 82 - 98 fL    MCH 30.3 27.0 - 31.0 pg    MCHC 34.8 32.0 - 36.0 g/dL    RDW 13.2 11.5 - 14.5 %    Platelets 28 (LL) 150 - 350 K/uL    MPV 10.9 9.2 - 12.9 fL    Immature Granulocytes 0.0 0.0 - 0.5 %    Gran # (ANC) 0.0 (L) 1.8 - 7.7 K/uL    Immature Grans (Abs) 0.00 0.00 - 0.04 K/uL    Lymph # 0.2 (L) 1.0 - 4.8 K/uL    Mono # 0.0 (L) 0.3 - 1.0 K/uL    Eos # 0.0 0.0 - 0.5 K/uL    Baso # 0.00  0.00 - 0.20 K/uL    nRBC 0 0 /100 WBC    Gran% 13.8 (L) 38.0 - 73.0 %    Lymph% 75.9 (H) 18.0 - 48.0 %    Mono% 10.3 4.0 - 15.0 %    Eosinophil% 0.0 0.0 - 8.0 %    Basophil% 0.0 0.0 - 1.9 %    Platelet Estimate Decreased (A)     Aniso CANCELED     Differential Method Automated    Type & Screen    Collection Time: 05/25/18  5:33 AM   Result Value Ref Range    Group & Rh AB POS     Indirect Nathan NEG    Comprehensive metabolic panel    Collection Time: 05/25/18  5:33 AM   Result Value Ref Range    Sodium 139 136 - 145 mmol/L    Potassium 3.1 (L) 3.5 - 5.1 mmol/L    Chloride 103 95 - 110 mmol/L    CO2 25 23 - 29 mmol/L    Glucose 132 (H) 70 - 110 mg/dL    BUN, Bld 5 (L) 6 - 20 mg/dL    Creatinine 0.6 0.5 - 1.4 mg/dL    Calcium 8.6 (L) 8.7 - 10.5 mg/dL    Total Protein 5.3 (L) 6.0 - 8.4 g/dL    Albumin 2.5 (L) 3.5 - 5.2 g/dL    Total Bilirubin 0.6 0.1 - 1.0 mg/dL    Alkaline Phosphatase 66 55 - 135 U/L    AST 12 10 - 40 U/L    ALT 30 10 - 44 U/L    Anion Gap 11 8 - 16 mmol/L    eGFR if African American >60.0 >60 mL/min/1.73 m^2    eGFR if non African American >60.0 >60 mL/min/1.73 m^2   Magnesium    Collection Time: 05/25/18  5:33 AM   Result Value Ref Range    Magnesium 1.6 1.6 - 2.6 mg/dL   Phosphorus    Collection Time: 05/25/18  5:33 AM   Result Value Ref Range    Phosphorus 4.4 2.7 - 4.5 mg/dL          ASSESSMENT         RECOMMENDATIONS      · PSYCH Meds-  · Legal status-  · The above will be discussed with staff psychiatrist and update any changes after rounds in the afternoon.  · Thank you for this consult will continue to follow      Elroy Robin (MS3)  5/25/2018 11:02 AM

## 2018-05-25 NOTE — ASSESSMENT & PLAN NOTE
Chemotherapy induced neutropenia:   - Hgb goal >7, Hgb 7.7 today.  - Plts goal >10, platelets 28 today.   - Daily CBC and reticulocyte  - CMP EOD  - If bleeding at all, please transfuse platelets x1 unit. No need to check CBC, evaluate clinically.

## 2018-05-25 NOTE — ASSESSMENT & PLAN NOTE
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 On Intensification therapy II following [id number] (Arm A). He was admitted on 4/30/2018 for neutropenia/profund sepsis risk. Stepped up to the PICU for persistent fever >103, tachycardia, and hypotension that was been minimally responsive to fluid resuscitation. PICU stay was complicated by delirium. Currently CEC'd.      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 MRD negative after Induction I. Today is Day 36.  - Treating per COG [id number] (ARM A).  S/p Intensification I and Intensification II started.   - BM biopsy at start of Intensification shows CR.  FISH for t(8;21) negative. Will repeat BM biopsy pending count recovery and clinical improvement  - Bmp M,Th

## 2018-05-25 NOTE — PLAN OF CARE
05/25/18 1121   Discharge Reassessment   Assessment Type Discharge Planning Reassessment   Provided patient/caregiver education on the expected discharge date and the discharge plan Yes   Do you have any problems affording any of your prescribed medications? No   Discharge Plan A Home with family   Discharge Plan B [facility]   Patient choice form signed by patient/caregiver N/A   Can the patient answer the patient profile reliably? No, cognitively impaired   How does the patient rate their overall health at the present time? Fair   Describe the patient's ability to walk at the present time. No restrictions   How often would a person be available to care for the patient? Whenever needed   Number of comorbid conditions (as recorded on the chart) None   During the past month, has the patient often been bothered by feeling down, depressed or hopeless? Yes   During the past month, has the patient often been bothered by little interest or pleasure in doing things? Yes   Pt remains neutropenic and on antibiotics, psych safe sitter remains at bedside. Will follow.

## 2018-05-25 NOTE — PLAN OF CARE
Problem: Patient Care Overview  Goal: Plan of Care Review  POC discussed w pt and mom, questions and concerns addressed. Pt stable, NAD noted, afebrile. Intake good, u/o good. Mirilax admin, no BM thus far this shift. PAC drsg cdi, line heparin locked when not in use for iv abx admin. Pt oob to chair in afternoon, ambulating in harmon with PT in am. Psych to bedside in evening. Mom at bedside at all times. Contact isolation and safety maintained. Will cont to monitor.

## 2018-05-25 NOTE — ASSESSMENT & PLAN NOTE
Resolved    Patient without significant past psychiatric history has been exhibiting waxing and waning mental status, disorientation and paranoid delusions since being extubated on 5/9 consistent with delirium. Patient with slow improvement with initiation of Risperdal, now close to baseline per pt and family.   - Titration of Risperdal complete, last dose received 5/27. Tremor is a known common side effect of Risperdal and likely to improve and eventually resolve with discontinuation of medication. Per patient's mother significant improvement thus far with decrease in dosage.   - Upon discharge from hospital, patient may schedule therapy and psychiatry follow up at [facility] ([address] [place] [id number]; Phone: [phone]; accepts Medicaid).

## 2018-05-25 NOTE — PROGRESS NOTES
[facility] Pediatric Hospital Medicine  Progress Note    Patient Name: Hema Kuo  MRN: [id number]  Admission Date: 4/30/2018  Hospital Length of Stay: 25  Code Status: Prior   Primary Care Physician: Ann Reyna NP  Principal Problem: Bacteremia    Subjective:     Interval History: VSS, afebrile. NAEO.     Scheduled Meds:   acyclovir  400 mg Oral BID    linezolid 600mg/300ml  600 mg Intravenous Q12H    melatonin  10 mg Oral QHS    posaconazole  300 mg Oral Daily    risperiDONE  2 mg Oral QHS    sulfamethoxazole-trimethoprim 800-160mg  1 tablet Oral twice daily on Friday, Saturday, Sunday     Continuous Infusions:  PRN Meds:acetaminophen, albuterol sulfate, alteplase, heparin, porcine (PF), lidocaine-prilocaine, morphine, olanzapine zydis, ondansetron, polyethylene glycol    Scheduled Meds:   acyclovir  400 mg Oral BID    linezolid 600mg/300ml  600 mg Intravenous Q12H    melatonin  10 mg Oral QHS    posaconazole  300 mg Oral Daily    risperiDONE  2 mg Oral QHS    sulfamethoxazole-trimethoprim 800-160mg  1 tablet Oral twice daily on Friday, Saturday, Sunday     Continuous Infusions:  PRN Meds:acetaminophen, albuterol sulfate, alteplase, heparin, porcine (PF), lidocaine-prilocaine, morphine, olanzapine zydis, ondansetron, polyethylene glycol    Review of Systems   Constitutional: Negative for fever.     Objective:     Vital Signs (Most Recent):  Temp: 98.4 °F (36.9 °C) (05/25/18 0745)  Pulse: 85 (05/25/18 0745)  Resp: 16 (05/25/18 0745)  BP: 113/62 (05/25/18 0745)  SpO2: 98 % (05/25/18 0745) Vital Signs (24h Range):  Temp:  [97.5 °F (36.4 °C)-98.7 °F (37.1 °C)] 98.4 °F (36.9 °C)  Pulse:  [] 85  Resp:  [16-20] 16  SpO2:  [98 %-100 %] 98 %  BP: (113-136)/(57-75) 113/62     Patient Vitals for the past 72 hrs (Last 3 readings):   Weight   05/22/18 2100 79.5 kg (175 lb 4.3 oz)     Body mass index is 28.29 kg/m².    Intake/Output - Last 3 Shifts       05/23 0700 - 05/24 0659 05/24 0700  - 05/25 0659 05/25 0700 - 05/26 0659    P.O. 1200 780 600    I.V. (mL/kg) 160 (2)      Blood  860     IV Piggyback 1350 850 300    Total Intake(mL/kg) 2710 (34.1) 2490 (31.3) 900 (11.3)    Urine (mL/kg/hr) 1150 (0.6) 1550 (0.8)     Total Output 1150 1550      Net +1560 +940 +900           Urine Occurrence 2 x 4 x 1 x          Lines/Drains/Airways     Central Venous Catheter Line                 Port A Cath Double Lumen 05/10/18 0900 left subclavian 15 days                Physical Exam  Constitutional: He appears well-developed and well-nourished. Awake, sitting up in bed.   Head: Normocephalic and atraumatic.   Nose: Nose normal.   Mouth/Throat: Mucous membranes are normal.   Eyes: Conjunctivae and lids are normal.   Neck: Normal range of motion. Neck supple.   Cardiovascular: Normal rate and regular rhythm.    Pulmonary/Chest: Effort normal. No stridor. He has no decreased breath sounds. He has no wheezes. He has no rales.   Abdominal: Soft. Bowel sounds are normal. He exhibits no distension. There is no hepatosplenomegaly. There is no guarding.   Musculoskeletal: Normal range of motion. He exhibits no edema.   Skin: Skin is warm and dry. Capillary refill takes less than 2 seconds. No rash noted. He is not diaphoretic. No erythema.  Psych: Cooperative, able to answer questions appropriately.    Significant Labs:  Recent Results (from the past 24 hour(s))   Reticulocytes    Collection Time: 05/25/18  5:33 AM   Result Value Ref Range    Retic 0.3 (L) 0.4 - 2.0 %   CBC auto differential    Collection Time: 05/25/18  5:33 AM   Result Value Ref Range    WBC 0.29 (LL) 3.90 - 12.70 K/uL    RBC 2.54 (L) 4.60 - 6.20 M/uL    Hemoglobin 7.7 (L) 14.0 - 18.0 g/dL    Hematocrit 22.1 (L) 40.0 - 54.0 %    MCV 87 82 - 98 fL    MCH 30.3 27.0 - 31.0 pg    MCHC 34.8 32.0 - 36.0 g/dL    RDW 13.2 11.5 - 14.5 %    Platelets 28 (LL) 150 - 350 K/uL    MPV 10.9 9.2 - 12.9 fL    Immature Granulocytes 0.0 0.0 - 0.5 %    Gran # (ANC) 0.0 (L)  1.8 - 7.7 K/uL    Immature Grans (Abs) 0.00 0.00 - 0.04 K/uL    Lymph # 0.2 (L) 1.0 - 4.8 K/uL    Mono # 0.0 (L) 0.3 - 1.0 K/uL    Eos # 0.0 0.0 - 0.5 K/uL    Baso # 0.00 0.00 - 0.20 K/uL    nRBC 0 0 /100 WBC    Gran% 13.8 (L) 38.0 - 73.0 %    Lymph% 75.9 (H) 18.0 - 48.0 %    Mono% 10.3 4.0 - 15.0 %    Eosinophil% 0.0 0.0 - 8.0 %    Basophil% 0.0 0.0 - 1.9 %    Platelet Estimate Decreased (A)     Aniso CANCELED     Differential Method Automated    Type & Screen    Collection Time: 05/25/18  5:33 AM   Result Value Ref Range    Group & Rh AB POS     Indirect Nathan NEG    Comprehensive metabolic panel    Collection Time: 05/25/18  5:33 AM   Result Value Ref Range    Sodium 139 136 - 145 mmol/L    Potassium 3.1 (L) 3.5 - 5.1 mmol/L    Chloride 103 95 - 110 mmol/L    CO2 25 23 - 29 mmol/L    Glucose 132 (H) 70 - 110 mg/dL    BUN, Bld 5 (L) 6 - 20 mg/dL    Creatinine 0.6 0.5 - 1.4 mg/dL    Calcium 8.6 (L) 8.7 - 10.5 mg/dL    Total Protein 5.3 (L) 6.0 - 8.4 g/dL    Albumin 2.5 (L) 3.5 - 5.2 g/dL    Total Bilirubin 0.6 0.1 - 1.0 mg/dL    Alkaline Phosphatase 66 55 - 135 U/L    AST 12 10 - 40 U/L    ALT 30 10 - 44 U/L    Anion Gap 11 8 - 16 mmol/L    eGFR if African American >60.0 >60 mL/min/1.73 m^2    eGFR if non African American >60.0 >60 mL/min/1.73 m^2   Magnesium    Collection Time: 05/25/18  5:33 AM   Result Value Ref Range    Magnesium 1.6 1.6 - 2.6 mg/dL   Phosphorus    Collection Time: 05/25/18  5:33 AM   Result Value Ref Range    Phosphorus 4.4 2.7 - 4.5 mg/dL       Significant Imaging:   none    Assessment/Plan:     Neuro   Acute delirium    Delirium: discontinued CEC.   - Continue Risperdal 2mg qHS  - RPR NR  - Olanzapine PRN  - EKG complete on 5/14, 5/15, 5/16. QTc ranging from 437-441.   - Avoid Benadryl per Psych  - Continue frequent reorientation and attempt to encourage patient to sleep.   - Psych following, rec'd follow up at [facility] ([address] [place] [address]; Phone:  [phone]) as outpatient.   - CT head complete on 5/22: unremarkable        ID   * Bacteremia    Patient with bacteremia. Bcx from 5/2 growing strep mitis, Bcx 5/19 growing Enterococcus faecium VRE (sens to linezolid) from outer lumen of port. Subsequent cultures NGTD.     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   - Continue bactrim QFSaSu ppx  - Continue peridex ppx          Oncology   Neutropenia    - ANC 40 today  - Advance soft mechanical diet  - Continue PRN zofran        AML (acute myeloblastic leukemia)    Hema is a 17 yr young man with AML (t 8;21) here for chemotherapy. On Intensification therapy II following [id number] (Arm A). He was admitted on 4/30/2018 for neutropenia/profund sepsis risk. Stepped up to the PICU for persistent fever >103, tachycardia, and hypotension that was been minimally responsive to fluid resuscitation. PICU stay was complicated by delirium. Currently CEC'd.      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 MRD negative after Induction I. Today is Day 36.  - Treating per COG [id number] (ARM A).  S/p Intensification I and Intensification II started.   - BM biopsy at start of Intensification shows CR.  FISH for t(8;21) negative. Will repeat BM biopsy pending count recovery and clinical improvement  - Bmp M,Th        Antineoplastic chemotherapy induced pancytopenia    Chemotherapy induced neutropenia:   - Hgb goal >7, Hgb 7.7 today.  - Plts goal >10, platelets 28 today.   - Daily CBC and reticulocyte  - CMP EOD  - If bleeding at all, please transfuse platelets x1 unit. No need to check CBC, evaluate clinically.                     Anticipated Disposition:  Home or Self Care    Ayanna Ramirez,   Pediatric Hospital Medicine   [facility]

## 2018-05-25 NOTE — HPI
Hema Kuo, a 17 y.o. male, for therapy visit.  Met with patient and mother.    Chief Complaint/Reason for Encounter: psychosocial factors associated with cancer

## 2018-05-25 NOTE — SUBJECTIVE & OBJECTIVE
"Therapeutic Intervention: Met with patient and mother.  Inpatient/Partial Hospital - Insight oriented psychotherapy 30 min - CPT code 23905    Chief Complaint/Reason for Encounter: psychosocial factors associated with cancer, including recent symptoms of paranoia and delusional thinking; symptoms appear improved at this time.     Interval History and Content of Current Session: Patient reported that he "has a clear head again," which he qualified as being able to think more clearly than he has been able to recently, as well as understanding the difference between delusions and reality.  Patient indicated that he feels he has "PTSD" about being in his hospital bed, noting that he experiences restlessness and fearfulness about falling asleep.  Provided psychoeducation that this is more likely reflective of an acute stress response to all he has been through - medically and psychiatrically - in the past several weeks, in an attempt to help him engage in cognitive restructuring that these symptoms will resolve.  Discussed also a behavioral plan for spending more time out of the bed.       Risk Parameters:  Patient reports no suicidal ideation  Patient reports no homicidal ideation  Patient reports no self-injurious behavior  Patient reports no violent behavior    Verbal Deficits: Sometimes slow to respond    Patient's response to intervention:  The patient's response to intervention is accepting.    Progress toward goals and other mental status changes:  The patient's progress toward goals is fair .  Mental status appears more consistent with baseline.  "

## 2018-05-25 NOTE — ASSESSMENT & PLAN NOTE
Patient without significant past psychiatric history has been exhibiting waxing and waning mental status, disorientation and paranoid delusions since being extubated on 5/9 consistent with delirium.   - Continue titration as follows: Risperdal M tabs 2 mg qHS on 5/25, Risperdal M tabs 1 mg qHS from 5/26-5/27, then discontinue. Tremor is a known common side effect of Risperdal and likely to improve and eventually resolve with discontinuation of medication.  - Continue Zyprexa 5 mg PO/IM q8 PRN nonredirectable agitation. QTc on 5/16 467. Continue checking perioidic EKG to monitor QTc. Continue to monitor CBC for any worsening leukopenia/neutropenia with antipsychotic medications.  - Upon discharge from hospital, patient may schedule therapy and psychiatry follow up at [facility] ([address] [place] [address]; Phone: [phone]; accepts Medicaid). Upon discharge may also provide short term rx for Risperdal (not M tabs 2/2 increased cost) 1 mg qHS PRN insomnia #10 if requested by patient's mother for transition home; recommend against long term use unless recommended by outpatient psychiatrist.   · DELIRIUM BEHAVIOR MANAGEMENT  · PLEASE utilize CHEMICAL restraints with PRN meds first for agitation. Minimize use of PHYSICAL restraints  · Keep window shades open and room lit during day and room dim at night in order to promote normal sleep-wake cycles  · Encourage family at bedside. [place] patient often to situation, location, date.  · Continue to Limit or Discontinue use of Narcotics, Benzos and Anti-cholinergic (Avoid Benadryl) medications as they may worsen delirium.   · Continue medical workup for causative etiology of Delirium. CT head with and without contrast without acute intracranial processes. Patient currently bacteremic growing VRE, on abx. Recommended checking amylase, lipase, B12, folate, HIV, RPR; however delirium resolving.

## 2018-05-25 NOTE — SUBJECTIVE & OBJECTIVE
"Interval History: see hospital course.     Family History     Problem Relation (Age of Onset)    Cancer Mother    Heart disease Mother    No Known Problems Father        Social History Main Topics    Smoking status: Former Smoker     Packs/day: 0.50     Types: Cigarettes     Quit date: 10/30/2017    Smokeless tobacco: Never Used    Alcohol use Yes      Comment: rare occasions    Drug use: No    Sexual activity: Yes     Partners: Female     Psychotherapeutics     Start     Stop Route Frequency Ordered    05/23/18 2100  risperiDONE disintegrating tablet 2 mg      -- Oral Nightly 05/23/18 0955    05/15/18 0847  olanzapine zydis disintegrating tablet 5 mg      -- Oral As needed (PRN) 05/15/18 0848           Review of Systems    Objective:     Vital Signs (Most Recent):  Temp: 98.4 °F (36.9 °C) (05/25/18 0745)  Pulse: 85 (05/25/18 0745)  Resp: 16 (05/25/18 0745)  BP: 113/62 (05/25/18 0745)  SpO2: 98 % (05/25/18 0745) Vital Signs (24h Range):  Temp:  [97.5 °F (36.4 °C)-98.7 °F (37.1 °C)] 98.4 °F (36.9 °C)  Pulse:  [] 85  Resp:  [16-20] 16  SpO2:  [98 %-100 %] 98 %  BP: (113-136)/(57-75) 113/62     Height: 5' 6" (167.6 cm)  Weight: 79.5 kg (175 lb 4.3 oz)  Body mass index is 28.29 kg/m².      Intake/Output Summary (Last 24 hours) at 05/25/18 1121  Last data filed at 05/25/18 1049   Gross per 24 hour   Intake             2720 ml   Output             1550 ml   Net             1170 ml       Physical Exam        Mental Status Exam:  Patient not in room at time of my assessment    Significant Labs:   Last 24 Hours:   Recent Lab Results       05/25/18  0533      Immature Granulocytes 0.0     Immature Grans (Abs) 0.00  Comment:  Mild elevation in immature granulocytes is non specific and   can be seen in a variety of conditions including stress response,   acute inflammation, trauma and pregnancy. Correlation with other   laboratory and clinical findings is essential.       Albumin 2.5(L)     Alkaline Phosphatase 66  "    ALT 30     Anion Gap 11     Aniso CANCELED  Comment:  Result canceled by the ancillary     AST 12     Baso # 0.00     Basophil% 0.0     Total Bilirubin 0.6  Comment:  For infants and newborns, interpretation of results should be based  on gestational age, weight and in agreement with clinical  observations.  Premature Infant recommended reference ranges:  Up to 24 hours.............<8.0 mg/dL  Up to 48 hours............<12.0 mg/dL  3-5 days..................<15.0 mg/dL  6-29 days.................<15.0 mg/dL       BUN, Bld 5(L)     Calcium 8.6(L)     Chloride 103     CO2 25     Creatinine 0.6     Differential Method Automated     eGFR if African American >60.0     eGFR if non  >60.0  Comment:  Calculation used to obtain the estimated glomerular filtration  rate (eGFR) is the CKD-EPI equation.        Eos # 0.0     Eosinophil% 0.0     Glucose 132(H)     Gran # (ANC) 0.0(L)     Gran% 13.8(L)     Group & Rh AB POS     Hematocrit 22.1(L)     Hemoglobin 7.7(L)     INDIRECT LARRY NEG     Lymph # 0.2(L)     Lymph% 75.9(H)     Magnesium 1.6     MCH 30.3     MCHC 34.8     MCV 87     Mono # 0.0(L)     Mono% 10.3     MPV 10.9     nRBC 0     Phosphorus 4.4     Platelet Estimate Decreased(A)     Platelets 28  Comment:  plt  critical result(s) called and verbal readback obtained from   margoth fishman rn, 05/25/2018 07:26  (LL)     Potassium 3.1(L)     Total Protein 5.3(L)     RBC 2.54(L)     RDW 13.2     Retic 0.3(L)     Sodium 139     WBC 0.29  Comment:  WBC, prelim PLT   critical result(s) called and verbal readback   obtained from Margoth Fishman RN, 05/25/2018 06:34  (LL)           Significant Imaging: None

## 2018-05-25 NOTE — ASSESSMENT & PLAN NOTE
Patient with bacteremia. Bcx from 5/2 growing strep mitis, Bcx 5/19 growing Enterococcus faecium VRE (sens to linezolid) from outer lumen of port. Subsequent cultures NGTD.     - s/p Cefepime 2g Q8h  - Continue Linezolid 600mg Q12h  - s/p Vancomycin 1250 g Q8h  - Follow up blood cultures  - monitor fever curve  - contact precautions

## 2018-05-25 NOTE — PLAN OF CARE
05/22/18 1000   Discharge Reassessment   Assessment Type Discharge Planning Reassessment   Provided patient/caregiver education on the expected discharge date and the discharge plan Yes   Do you have any problems affording any of your prescribed medications? Yes   Discharge Plan A Home with family   Discharge Plan B [facility]   Patient choice form signed by patient/caregiver N/A   Can the patient answer the patient profile reliably? No, cognitively impaired   How does the patient rate their overall health at the present time? Good   Pt remains neutropenic and CEC'd. Will follow.

## 2018-05-25 NOTE — ASSESSMENT & PLAN NOTE
Delirium: discontinued CEC.   - Continue Risperdal 2mg qHS  - RPR NR  - Olanzapine PRN  - EKG complete on 5/14, 5/15, 5/16. QTc ranging from 437-441.   - Avoid Benadryl per Psych  - Continue frequent reorientation and attempt to encourage patient to sleep.   - Psych following, rec'd follow up at [facility] ([address] [place] [address]; Phone: [phone]) as outpatient.   - CT head complete on 5/22: unremarkable

## 2018-05-26 LAB
ANISOCYTOSIS BLD QL SMEAR: SLIGHT
BACTERIA BLD CULT: NORMAL
BASOPHILS # BLD AUTO: 0 K/UL
BASOPHILS NFR BLD: 0 %
DIFFERENTIAL METHOD: ABNORMAL
EOSINOPHIL # BLD AUTO: 0 K/UL
EOSINOPHIL NFR BLD: 0 %
ERYTHROCYTE [DISTWIDTH] IN BLOOD BY AUTOMATED COUNT: 13.1 %
HCT VFR BLD AUTO: 22.7 %
HGB BLD-MCNC: 8.2 G/DL
HYPOCHROMIA BLD QL SMEAR: ABNORMAL
IMM GRANULOCYTES # BLD AUTO: 0 K/UL
IMM GRANULOCYTES NFR BLD AUTO: 0 %
LYMPHOCYTES # BLD AUTO: 0.3 K/UL
LYMPHOCYTES NFR BLD: 75.7 %
MCH RBC QN AUTO: 30.9 PG
MCHC RBC AUTO-ENTMCNC: 36.1 G/DL
MCV RBC AUTO: 86 FL
MONOCYTES # BLD AUTO: 0.1 K/UL
MONOCYTES NFR BLD: 13.5 %
NEUTROPHILS # BLD AUTO: 0 K/UL
NEUTROPHILS NFR BLD: 10.8 %
NRBC BLD-RTO: 0 /100 WBC
PLATELET # BLD AUTO: 26 K/UL
PLATELET BLD QL SMEAR: ABNORMAL
PMV BLD AUTO: 10.5 FL
POIKILOCYTOSIS BLD QL SMEAR: ABNORMAL
RBC # BLD AUTO: 2.65 M/UL
RETICS/RBC NFR AUTO: 0.2 %
WBC # BLD AUTO: 0.37 K/UL

## 2018-05-26 PROCEDURE — 36592 COLLECT BLOOD FROM PICC: CPT

## 2018-05-26 PROCEDURE — 25000003 PHARM REV CODE 250: Performed by: STUDENT IN AN ORGANIZED HEALTH CARE EDUCATION/TRAINING PROGRAM

## 2018-05-26 PROCEDURE — 36415 COLL VENOUS BLD VENIPUNCTURE: CPT

## 2018-05-26 PROCEDURE — 85025 COMPLETE CBC W/AUTO DIFF WBC: CPT

## 2018-05-26 PROCEDURE — 99233 SBSQ HOSP IP/OBS HIGH 50: CPT | Mod: ,,, | Performed by: PEDIATRICS

## 2018-05-26 PROCEDURE — 85045 AUTOMATED RETICULOCYTE COUNT: CPT

## 2018-05-26 PROCEDURE — 63600175 PHARM REV CODE 636 W HCPCS: Performed by: PEDIATRICS

## 2018-05-26 PROCEDURE — 11300000 HC PEDIATRIC PRIVATE ROOM

## 2018-05-26 PROCEDURE — 63600175 PHARM REV CODE 636 W HCPCS: Performed by: STUDENT IN AN ORGANIZED HEALTH CARE EDUCATION/TRAINING PROGRAM

## 2018-05-26 PROCEDURE — 25000003 PHARM REV CODE 250: Performed by: PEDIATRICS

## 2018-05-26 RX ORDER — ACETAMINOPHEN 325 MG/1
650 TABLET ORAL EVERY 6 HOURS PRN
Status: DISCONTINUED | OUTPATIENT
Start: 2018-05-26 | End: 2018-05-31 | Stop reason: HOSPADM

## 2018-05-26 RX ADMIN — LINEZOLID 600 MG: 600 INJECTION, SOLUTION INTRAVENOUS at 11:05

## 2018-05-26 RX ADMIN — ACETAMINOPHEN, DIPHENHYDRAMINE HCL, PHENYLEPHRINE HCL 10 MG: 325; 25; 5 TABLET ORAL at 08:05

## 2018-05-26 RX ADMIN — POSACONAZOLE 300 MG: 100 TABLET, COATED ORAL at 08:05

## 2018-05-26 RX ADMIN — ACETAMINOPHEN, DIPHENHYDRAMINE HCL, PHENYLEPHRINE HCL 5 MG: 325; 25; 5 TABLET ORAL at 04:05

## 2018-05-26 RX ADMIN — ACYCLOVIR 400 MG: 200 CAPSULE ORAL at 08:05

## 2018-05-26 RX ADMIN — Medication 500 UNITS: at 06:05

## 2018-05-26 RX ADMIN — RISPERIDONE 1 MG: 1 TABLET, ORALLY DISINTEGRATING ORAL at 08:05

## 2018-05-26 RX ADMIN — LINEZOLID 600 MG: 600 INJECTION, SOLUTION INTRAVENOUS at 12:05

## 2018-05-26 RX ADMIN — SULFAMETHOXAZOLE AND TRIMETHOPRIM 1 TABLET: 800; 160 TABLET ORAL at 08:05

## 2018-05-26 RX ADMIN — POLYETHYLENE GLYCOL 3350 17 G: 17 POWDER, FOR SOLUTION ORAL at 12:05

## 2018-05-26 RX ADMIN — Medication 500 UNITS: at 03:05

## 2018-05-26 RX ADMIN — ACETAMINOPHEN 650 MG: 325 TABLET, FILM COATED ORAL at 03:05

## 2018-05-26 RX ADMIN — Medication 500 UNITS: at 12:05

## 2018-05-26 NOTE — PROGRESS NOTES
[facility]  Pediatric Hematology/Oncology  Progress Note    Patient Name: Hema Kuo  Admission Date: 4/30/2018  Hospital Length of Stay: 26 days  Code Status: Prior     Subjective:     Interval History: NAEO, VSS.  Some difficulty sleeping, requested additional melatonin. Also with midline lower back pain last night, improved with tylenol and heat pad.      Oncology Treatment Plan:     PEDS [id number] Intensification II  ARM B (MA) - LOW RISK PATIENTS    Medications:  Continuous Infusions:  Scheduled Meds:   acyclovir  400 mg Oral BID    linezolid 600mg/300ml  600 mg Intravenous Q12H    melatonin  10 mg Oral QHS    posaconazole  300 mg Oral Daily    risperiDONE  1 mg Oral QHS    sulfamethoxazole-trimethoprim 800-160mg  1 tablet Oral twice daily on Friday, Saturday, Sunday     PRN Meds:acetaminophen, albuterol sulfate, alteplase, heparin, porcine (PF), lidocaine-prilocaine, morphine, olanzapine zydis, ondansetron, polyethylene glycol     Review of Systems   Constitutional: Negative for fever.   Gastrointestinal: Positive for constipation.   Musculoskeletal: Positive for back pain.   Psychiatric/Behavioral: Positive for sleep disturbance. Negative for confusion.     Objective:     Vital Signs (Most Recent):  Temp: 98.4 °F (36.9 °C) (05/26/18 0349)  Pulse: 94 (05/26/18 0349)  Resp: 18 (05/26/18 0349)  BP: (!) 123/58 (05/26/18 0349)  SpO2: 98 % (05/26/18 0349) Vital Signs (24h Range):  Temp:  [97.6 °F (36.4 °C)-98.4 °F (36.9 °C)] 98.4 °F (36.9 °C)  Pulse:  [] 94  Resp:  [16-18] 18  SpO2:  [97 %-100 %] 98 %  BP: (113-145)/(57-73) 123/58     Weight: 80.1 kg (176 lb 9.4 oz)  Body mass index is 28.5 kg/m².  Body surface area is 1.93 meters squared.      Intake/Output Summary (Last 24 hours) at 05/26/18 0632  Last data filed at 05/26/18 0625   Gross per 24 hour   Intake             3600 ml   Output                0 ml   Net             3600 ml       Physical Exam   Constitutional: He is  oriented to person, place, and time. No distress.   HENT:   Head: Normocephalic and atraumatic.   Nose: Nose normal.   Eyes: Conjunctivae and EOM are normal. Pupils are equal, round, and reactive to light. Right eye exhibits no discharge. Left eye exhibits no discharge.   Neck: Normal range of motion. Neck supple.   Cardiovascular: Normal rate and regular rhythm.    No murmur heard.  Pulmonary/Chest: Effort normal and breath sounds normal.   Abdominal: Soft. There is tenderness.   Musculoskeletal: Normal range of motion.   TTP midline L4/5 region. No bruising redness swelling.    Neurological: He is alert and oriented to person, place, and time.   Skin: He is not diaphoretic.     Constitutional: He appears well-developed and well-nourished. Awake, sitting up in bed.   Head: Normocephalic and atraumatic.   Nose: Nose normal.   Mouth/Throat: Mucous membranes are normal.   Eyes: Conjunctivae and lids are normal.   Neck: Normal range of motion. Neck supple.   Cardiovascular: Normal rate and regular rhythm.    Pulmonary/Chest: Effort normal. No stridor. He has no decreased breath sounds. He has no wheezes. He has no rales. Port site c/d/i.   Abdominal: Soft. Bowel sounds are normal. He exhibits no distension. There is no hepatosplenomegaly. There is no guarding.   Musculoskeletal: Normal range of motion. He exhibits no edema. TTP midline lower lumbar region. No obvious signs of injury, swelling, bruising.   Skin: Skin is warm and dry. Capillary refill takes less than 2 seconds. No rash noted. He is not diaphoretic. No erythema.   Psych: Alert, oriented x 3. Responds appropriately albeit with some delay.        Labs:   Recent Lab Results       05/26/18  0350      Immature Granulocytes 0.0     Immature Grans (Abs) 0.00  Comment:  Mild elevation in immature granulocytes is non specific and   can be seen in a variety of conditions including stress response,   acute inflammation, trauma and pregnancy. Correlation with other    laboratory and clinical findings is essential.       Aniso Slight     Baso # 0.00     Basophil% 0.0     Differential Method Automated     Eos # 0.0     Eosinophil% 0.0     Gran # (ANC) 0.0(L)     Gran% 10.8(L)     Hematocrit 22.7(L)     Hemoglobin 8.2(L)     Hypo Occasional     Lymph # 0.3(L)     Lymph% 75.7(H)     MCH 30.9     MCHC 36.1(H)     MCV 86     Mono # 0.1(L)     Mono% 13.5     MPV 10.5     nRBC 0     Platelet Estimate Decreased(A)     Platelets 26  Comment:  PLT   critical result(s) called and verbal readback obtained from   Princess Llanos RN, 05/26/2018 05:44  (LL)     Poik CANCELED  Comment:  Result canceled by the ancillary     RBC 2.65(L)     RDW 13.1     Retic 0.2(L)     WBC 0.37  Comment:  WBC/PRELIM PLT   critical result(s) called and verbal readback   obtained from PRINCESS LLANOS RN AT 0419 ON 05/26/2018 BY BEL,   05/26/2018 04:19  (LL)           Diagnostic Results:  Imaging Results    None               Assessment/Plan:     Neutropenia    17 y.o. 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  He is Day 13 of Intensification therapy II following [id number] (Arm A).  He reports feeling well since discharge home and is well appearing. Now admitted for neutropenia.     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  CNS negative.  MRD negative after Induction I.    -Treating per COG [id number] (ARM A).  S/p Intensification I and Intensification II started.  -BM biopsy at start of Intensification shows CR.  FISH for t(8;21) negative.     For his chemotherapy induced Neutropenia:    Wbc = 0.32. ANC = 0  - On neutropenic precautions   - Daily CBC and Retic        For his immunosuppression secondary to chemotherapy  - Resume acyclovir, ciprofloxacin and posaconazole prophylaxis  - Continue Bactrim on Fri, Sat and Sun  - Continue Peridex        Antineoplastic chemotherapy induced pancytopenia        AML (acute myeloblastic leukemia)     Hema is a 17 yr young man with AML (t 8;21) here for  chemotherapy. On Intensification therapy II following [id number] (Arm A). He was admitted on 4/30/2018 for neutropenia/profund sepsis risk. Stepped up to the PICU for persistent fever >103, tachycardia, and hypotension that was been minimally responsive to fluid resuscitation. PICU stay was complicated by delirium, was CEC'd, now improved and weaning off resperidone.      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 MRD negative after Induction I. Today is Day 37.  - Treating per COG [id number] (ARM A).  S/p Intensification I and Intensification II started.   - BM biopsy at start of Intensification shows CR.  FISH for t(8;21) negative. Will repeat BM biopsy pending count recovery and clinical improvement  - Bmp M,Th          Neuro   Acute delirium     Delirium: discontinued CEC. Alert, oriented, no active psychosis or paranoia.   - Continue Risperdal 1mg qHS for two days as part of taper.   - RPR NR  - Olanzapine PRN  - EKG complete on 5/14, 5/15, 5/16. QTc ranging from 437-441.   - Avoid Benadryl per Psych  - Continue frequent reorientation and attempt to encourage patient to sleep.   - Psych following, rec'd follow up at [facility] ([address] [place] [address]; Phone: [phone]) as outpatient.   - CT head complete on 5/22: unremarkable          ID   * Bacteremia     Patient with bacteremia. Bcx from 5/2 growing strep mitis, Bcx 5/19 growing Enterococcus faecium VRE (sens to linezolid) from outer lumen of port. Subsequent cultures NGTD.      - s/p Cefepime 2g Q8h  - Continue Linezolid 600mg Q12h  - s/p Vancomycin 1250 g Q8h  - Follow up blood cultures  - monitor fever curve  - contact precautions  -May require port removal if subsequent cultures are positive.              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   -  Continue bactrim QFSaSu ppx  - Continue peridex ppx             Oncology   Neutropenia     - ANC 40 today  - Advance soft mechanical diet  - Continue PRN zofran                   Antineoplastic chemotherapy induced pancytopenia     Chemotherapy induced neutropenia:   - Hgb goal >7, Hgb 8.2 today.  - Plts goal >10, platelets 26 today.   - Daily CBC and reticulocyte  - CMP EOD  - If bleeding at all, please transfuse platelets x1 unit. No need to check CBC, evaluate clinically.          Constipation: Treat with miralax if no stool today.                    Cindy Jacinto MD  Pediatric Hematology/Oncology  [facility]

## 2018-05-26 NOTE — PLAN OF CARE
Problem: Patient Care Overview  Goal: Plan of Care Review  Outcome: Ongoing (interventions implemented as appropriate)  Pt VSS and afebrile.  IV linezolid infused, double lumen PAC hep locked in between antibiotics.  Pt AAO throughout shift. Good appetite, UOP. Miralax administered, pt had 1 large formed BM. POC discussed with pt and mom, both verbalized their understanding. Safety maintained. Will continue to monitor.

## 2018-05-26 NOTE — PLAN OF CARE
Problem: Patient Care Overview  Goal: Plan of Care Review  Outcome: Ongoing (interventions implemented as appropriate)  Pt VSS throughout shift, afebrile.  IV linezolid infused, double lumen PAC hep locked in between antibiotics.  Labs sent this morning.  Pt c/o lower back pain, repositioned and gave tylenol x1 with relief noted.  Pt alert, oriented, and coherent throughout shift.  POC discussed with pt and mom; understanding verbalized and all questions answered.  Will continue to monitor.

## 2018-05-26 NOTE — ASSESSMENT & PLAN NOTE
AML (acute myeloblastic leukemia)     Hema is a 17 yr young man with AML (t 8;21) here for chemotherapy. On Intensification therapy II following [id number] (Arm A). He was admitted on 4/30/2018 for neutropenia/profund sepsis risk. Stepped up to the PICU for persistent fever >103, tachycardia, and hypotension that was been minimally responsive to fluid resuscitation. PICU stay was complicated by delirium, was CEC'd, now improved and weaning off resperidone.      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 MRD negative after Induction I. Today is Day 37.  - Treating per COG [id number] (ARM A).  S/p Intensification I and Intensification II started.   - BM biopsy at start of Intensification shows CR.  FISH for t(8;21) negative. Will repeat BM biopsy pending count recovery and clinical improvement  - Bmp M,Th          Neuro   Acute delirium     Delirium: discontinued CEC. Alert, oriented, no active psychosis or paranoia.   - Continue Risperdal 1mg qHS for two days as part of taper.   - RPR NR  - Olanzapine PRN  - EKG complete on 5/14, 5/15, 5/16. QTc ranging from 437-441.   - Avoid Benadryl per Psych  - Continue frequent reorientation and attempt to encourage patient to sleep.   - Psych following, rec'd follow up at [facility] ([address] [place] [address]; Phone: [phone]) as outpatient.   - CT head complete on 5/22: unremarkable          ID   * Bacteremia     Patient with bacteremia. Bcx from 5/2 growing strep mitis, Bcx 5/19 growing Enterococcus faecium VRE (sens to linezolid) from outer lumen of port. Subsequent cultures NGTD.      - s/p Cefepime 2g Q8h  - Continue Linezolid 600mg Q12h  - s/p Vancomycin 1250 g Q8h  - Follow up blood cultures  - monitor fever curve  - contact precautions  -May require port removal if subsequent cultures are positive.              Immunology/Multi System   Immunosuppressed due to chemotherapy     Immunosuppression 2/2 to  chemotherapy:  - Continue acyclovir ppx  - Continue posaconazole 400mg BID- therapeutic dosing.   - Continue bactrim QFSaSu ppx  - Continue peridex ppx             Oncology   Neutropenia     - ANC 40 today  - Advance soft mechanical diet  - Continue PRN zofran                   Antineoplastic chemotherapy induced pancytopenia     Chemotherapy induced neutropenia:   - Hgb goal >7, Hgb 8.2 today.  - Plts goal >10, platelets 26 today.   - Daily CBC and reticulocyte  - CMP EOD  - If bleeding at all, please transfuse platelets x1 unit. No need to check CBC, evaluate clinically.          Constipation: Treat with miralax if no stool today.

## 2018-05-27 LAB
ANISOCYTOSIS BLD QL SMEAR: SLIGHT
BASOPHILS # BLD AUTO: 0 K/UL
BASOPHILS NFR BLD: 0 %
DIFFERENTIAL METHOD: ABNORMAL
EOSINOPHIL # BLD AUTO: 0 K/UL
EOSINOPHIL NFR BLD: 0 %
ERYTHROCYTE [DISTWIDTH] IN BLOOD BY AUTOMATED COUNT: 13.1 %
HCT VFR BLD AUTO: 21.7 %
HGB BLD-MCNC: 7.6 G/DL
HYPOCHROMIA BLD QL SMEAR: ABNORMAL
IMM GRANULOCYTES # BLD AUTO: 0 K/UL
IMM GRANULOCYTES NFR BLD AUTO: 0 %
LYMPHOCYTES # BLD AUTO: 0.3 K/UL
LYMPHOCYTES NFR BLD: 71.1 %
MCH RBC QN AUTO: 30.9 PG
MCHC RBC AUTO-ENTMCNC: 35 G/DL
MCV RBC AUTO: 88 FL
MONOCYTES # BLD AUTO: 0 K/UL
MONOCYTES NFR BLD: 10.5 %
NEUTROPHILS # BLD AUTO: 0.1 K/UL
NEUTROPHILS NFR BLD: 18.4 %
NRBC BLD-RTO: 0 /100 WBC
PLATELET # BLD AUTO: 17 K/UL
PLATELET BLD QL SMEAR: ABNORMAL
PMV BLD AUTO: 9.8 FL
RBC # BLD AUTO: 2.46 M/UL
RETICS/RBC NFR AUTO: 0.1 %
WBC # BLD AUTO: 0.38 K/UL

## 2018-05-27 PROCEDURE — 25000003 PHARM REV CODE 250: Performed by: STUDENT IN AN ORGANIZED HEALTH CARE EDUCATION/TRAINING PROGRAM

## 2018-05-27 PROCEDURE — 11300000 HC PEDIATRIC PRIVATE ROOM

## 2018-05-27 PROCEDURE — 99233 SBSQ HOSP IP/OBS HIGH 50: CPT | Mod: ,,, | Performed by: PEDIATRICS

## 2018-05-27 PROCEDURE — 63600175 PHARM REV CODE 636 W HCPCS: Performed by: STUDENT IN AN ORGANIZED HEALTH CARE EDUCATION/TRAINING PROGRAM

## 2018-05-27 PROCEDURE — 85045 AUTOMATED RETICULOCYTE COUNT: CPT

## 2018-05-27 PROCEDURE — 36415 COLL VENOUS BLD VENIPUNCTURE: CPT

## 2018-05-27 PROCEDURE — 36592 COLLECT BLOOD FROM PICC: CPT

## 2018-05-27 PROCEDURE — 85025 COMPLETE CBC W/AUTO DIFF WBC: CPT

## 2018-05-27 PROCEDURE — 63600175 PHARM REV CODE 636 W HCPCS: Performed by: PEDIATRICS

## 2018-05-27 RX ADMIN — SULFAMETHOXAZOLE AND TRIMETHOPRIM 1 TABLET: 800; 160 TABLET ORAL at 09:05

## 2018-05-27 RX ADMIN — LINEZOLID 600 MG: 600 INJECTION, SOLUTION INTRAVENOUS at 11:05

## 2018-05-27 RX ADMIN — ACETAMINOPHEN, DIPHENHYDRAMINE HCL, PHENYLEPHRINE HCL 10 MG: 325; 25; 5 TABLET ORAL at 08:05

## 2018-05-27 RX ADMIN — ACYCLOVIR 400 MG: 200 CAPSULE ORAL at 08:05

## 2018-05-27 RX ADMIN — Medication 500 UNITS: at 03:05

## 2018-05-27 RX ADMIN — Medication 500 UNITS: at 12:05

## 2018-05-27 RX ADMIN — POSACONAZOLE 300 MG: 100 TABLET, COATED ORAL at 09:05

## 2018-05-27 RX ADMIN — SULFAMETHOXAZOLE AND TRIMETHOPRIM 1 TABLET: 800; 160 TABLET ORAL at 08:05

## 2018-05-27 RX ADMIN — Medication 500 UNITS: at 11:05

## 2018-05-27 RX ADMIN — ACYCLOVIR 400 MG: 200 CAPSULE ORAL at 09:05

## 2018-05-27 RX ADMIN — RISPERIDONE 1 MG: 1 TABLET, ORALLY DISINTEGRATING ORAL at 09:05

## 2018-05-27 NOTE — PROGRESS NOTES
[facility] Pediatric Hospital Medicine  Progress Note    Patient Name: Hema Kuo  MRN: [id number]  Admission Date: 4/30/2018  Hospital Length of Stay: 27  Code Status: Prior   Primary Care Physician: Ann Reyna NP  Principal Problem: Bacteremia    Subjective:     Interval History: VSS, afebrile. NAEO.     Scheduled Meds:   acyclovir  400 mg Oral BID    linezolid 600mg/300ml  600 mg Intravenous Q12H    melatonin  10 mg Oral QHS    posaconazole  300 mg Oral Daily    risperiDONE  1 mg Oral QHS    sulfamethoxazole-trimethoprim 800-160mg  1 tablet Oral twice daily on Friday, Saturday, Sunday     Continuous Infusions:  PRN Meds:acetaminophen, albuterol sulfate, alteplase, heparin, porcine (PF), lidocaine-prilocaine, morphine, olanzapine zydis, ondansetron, polyethylene glycol      Scheduled Meds:   acyclovir  400 mg Oral BID    linezolid 600mg/300ml  600 mg Intravenous Q12H    melatonin  10 mg Oral QHS    posaconazole  300 mg Oral Daily    risperiDONE  1 mg Oral QHS    sulfamethoxazole-trimethoprim 800-160mg  1 tablet Oral twice daily on Friday, Saturday, Sunday     Continuous Infusions:  PRN Meds:acetaminophen, albuterol sulfate, alteplase, heparin, porcine (PF), lidocaine-prilocaine, morphine, olanzapine zydis, ondansetron, polyethylene glycol    Review of Systems   Constitutional: Negative for fever.     Objective:     Vital Signs (Most Recent):  Temp: 98.3 °F (36.8 °C) (05/27/18 0404)  Pulse: 81 (05/27/18 0404)  Resp: 16 (05/27/18 0404)  BP: (!) 106/50 (05/27/18 0404)  SpO2: 99 % (05/27/18 0404) Vital Signs (24h Range):  Temp:  [97.6 °F (36.4 °C)-98.8 °F (37.1 °C)] 98.3 °F (36.8 °C)  Pulse:  [] 81  Resp:  [16-20] 16  SpO2:  [99 %-100 %] 99 %  BP: (106-123)/(50-65) 106/50     Patient Vitals for the past 72 hrs (Last 3 readings):   Weight   05/26/18 1940 80.7 kg (177 lb 14.6 oz)   05/25/18 1927 80.1 kg (176 lb 9.4 oz)     Body mass index is 28.72 kg/m².    Intake/Output - Last 3  Shifts       05/25 0700 - 05/26 0659 05/26 0700 - 05/27 0659 05/27 0700 - 05/28 0659    P.O. 3000 720     I.V. (mL/kg)  25 (0.3)     IV Piggyback 600 600     Total Intake(mL/kg) 3600 (44.9) 1345 (16.7)     Net +3600 +1345             Urine Occurrence 5 x 8 x     Stool Occurrence  1 x     Emesis Occurrence  0 x           Lines/Drains/Airways     Central Venous Catheter Line                 Port A Cath Double Lumen 05/10/18 0900 left subclavian 16 days                Physical Exam  Constitutional: He appears well-developed and well-nourished. Awake, sitting up in bed.   Head: Normocephalic and atraumatic.   Nose: Nose normal.   Mouth/Throat: Mucous membranes are normal.   Eyes: Conjunctivae and lids are normal.   Neck: Normal range of motion. Neck supple.   Cardiovascular: Normal rate and regular rhythm.    Pulmonary/Chest: Effort normal. No stridor. He has no decreased breath sounds. He has no wheezes. He has no rales.   Abdominal: Soft. Bowel sounds are normal. He exhibits no distension. There is no hepatosplenomegaly. There is no guarding.   Musculoskeletal: Normal range of motion. He exhibits no edema.   Skin: Skin is warm and dry. Capillary refill takes less than 2 seconds. No rash noted. He is not diaphoretic. No erythema.  Psych: Cooperative, able to answer questions appropriately.    Significant Labs:  Recent Results (from the past 24 hour(s))   Reticulocytes    Collection Time: 05/27/18  4:00 AM   Result Value Ref Range    Retic 0.1 (L) 0.4 - 2.0 %   CBC auto differential    Collection Time: 05/27/18  4:00 AM   Result Value Ref Range    WBC 0.38 (LL) 3.90 - 12.70 K/uL    RBC 2.46 (L) 4.60 - 6.20 M/uL    Hemoglobin 7.6 (L) 14.0 - 18.0 g/dL    Hematocrit 21.7 (L) 40.0 - 54.0 %    MCV 88 82 - 98 fL    MCH 30.9 27.0 - 31.0 pg    MCHC 35.0 32.0 - 36.0 g/dL    RDW 13.1 11.5 - 14.5 %    Platelets 17 (LL) 150 - 350 K/uL    MPV 9.8 9.2 - 12.9 fL    Immature Granulocytes 0.0 0.0 - 0.5 %    Gran # (ANC) 0.1 (L) 1.8 -  7.7 K/uL    Immature Grans (Abs) 0.00 0.00 - 0.04 K/uL    Lymph # 0.3 (L) 1.0 - 4.8 K/uL    Mono # 0.0 (L) 0.3 - 1.0 K/uL    Eos # 0.0 0.0 - 0.5 K/uL    Baso # 0.00 0.00 - 0.20 K/uL    nRBC 0 0 /100 WBC    Gran% 18.4 (L) 38.0 - 73.0 %    Lymph% 71.1 (H) 18.0 - 48.0 %    Mono% 10.5 4.0 - 15.0 %    Eosinophil% 0.0 0.0 - 8.0 %    Basophil% 0.0 0.0 - 1.9 %    Platelet Estimate Decreased (A)     Aniso Slight     Hypo Occasional     Differential Method Automated        Significant Imaging:   none    Assessment/Plan:     Neuro   Acute delirium    Delirium: discontinued CEC.   - Last dose Risperdal 2mg qHS today  - RPR NR  - Olanzapine PRN  - EKG complete on 5/14, 5/15, 5/16. QTc ranging from 437-441.   - Avoid Benadryl per Psych  - Continue frequent reorientation and attempt to encourage patient to sleep.   - Psych following, rec'd follow up at [facility] ([address] [place] [address]; Phone: [phone]) as outpatient.   - CT head complete on 5/22: unremarkable        ID   * Bacteremia    Patient with bacteremia. Bcx from 5/2 growing strep mitis, Bcx 5/19 growing Enterococcus faecium VRE (sens to linezolid) from outer lumen of port. Subsequent cultures NGTD.     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   - Continue bactrim QFSaSu ppx  - Continue peridex ppx          Oncology   Neutropenia    - ANC 80 today  - Advance soft mechanical diet  - Continue PRN zofran        AML (acute myeloblastic leukemia)    Hema is a 17 yr young man with AML (t 8;21) here for chemotherapy. On Intensification therapy II following [id number] (Arm A). He was admitted on 4/30/2018 for neutropenia/profund sepsis  risk. Stepped up to the PICU for persistent fever >103, tachycardia, and hypotension that was been minimally responsive to fluid resuscitation. PICU stay was complicated by delirium. Currently CEC'd.      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 MRD negative after Induction I.   - Treating per COG [id number] (ARM A).  S/p Intensification I and Intensification II started.   - BM biopsy at start of Intensification shows CR.  FISH for t(8;21) negative. Will repeat BM biopsy pending count recovery and clinical improvement  - Bmp M,Th        Antineoplastic chemotherapy induced pancytopenia    Chemotherapy induced neutropenia:   - Hgb goal >7  - Plts goal >10  - Daily CBC and reticulocyte  - CMP EOD  - If bleeding at all, please transfuse platelets x1 unit. No need to check CBC, evaluate clinically.                 Anticipated Disposition: Home or Self Care    Ayanna Ramirez DO  Pediatric Hospital Medicine   [facility]

## 2018-05-27 NOTE — ASSESSMENT & PLAN NOTE
Chemotherapy induced neutropenia:   - Hgb goal >7  - Plts goal >10  - Daily CBC and reticulocyte  - CMP EOD  - If bleeding at all, please transfuse platelets x1 unit. No need to check CBC, evaluate clinically.

## 2018-05-27 NOTE — PLAN OF CARE
"Problem: Patient Care Overview  Goal: Plan of Care Review  Outcome: Ongoing (interventions implemented as appropriate)  Pt stable overnight, VSS, afebrile, sleeping comfortably between care. Pt remains flat, not back to baseline personality/mood, but oriented and appropriate behavior noted, reports "bed anxiety" - anxiety/fear related to getting into bed, stayed on other side of room in chair most of evening. L chest PAC x2 remains patent, HL, receiving IV abx as scheduled, good blood return from both ports, labs obtained. WBC 0.38, prelim platelets 17, H/H 7.6/21.7, awaiting CBC diff. Pt tolerating regular diet, reports adequate PO intake, adequate UOP, no BM reported this shift. General fine tremors related to risperdal effect noted, pt hopeful it will subside as med is weaned. Pt ambulating independently, but generalized weakness noted. Mother and family members remain at bedside, attentive and appropriate, aware of plan of care.      "

## 2018-05-27 NOTE — ASSESSMENT & PLAN NOTE
Delirium: discontinued CEC.   - Last dose Risperdal 2mg qHS today  - RPR NR  - Olanzapine PRN  - EKG complete on 5/14, 5/15, 5/16. QTc ranging from 437-441.   - Avoid Benadryl per Psych  - Continue frequent reorientation and attempt to encourage patient to sleep.   - Psych following, rec'd follow up at [facility] ([address] [place] [address]; Phone: [phone]) as outpatient.   - CT head complete on 5/22: unremarkable

## 2018-05-27 NOTE — PLAN OF CARE
Problem: Patient Care Overview  Goal: Plan of Care Review  Outcome: Ongoing (interventions implemented as appropriate)  Pt/VSS and afebrile. Administered meds as documented in MAR, no PRN meds. Pt denies any pain. Great appetite, UOP, no stool. Had visitors today. Pt c/o feeling tired and no energy, asked about getting blood products as they make him feel better, MD aware. Ambulating slowly but w/out difficulty. Pt stated he would like to shower this evening before bedtime.POC discussed w/ pt and mom, both verbalized their understanding. Safety maintained. Will monitor.

## 2018-05-27 NOTE — SUBJECTIVE & OBJECTIVE
Interval History: VSS, afebrile. NAEO.     Scheduled Meds:   acyclovir  400 mg Oral BID    linezolid 600mg/300ml  600 mg Intravenous Q12H    melatonin  10 mg Oral QHS    posaconazole  300 mg Oral Daily    risperiDONE  1 mg Oral QHS    sulfamethoxazole-trimethoprim 800-160mg  1 tablet Oral twice daily on Friday, Saturday, Sunday     Continuous Infusions:  PRN Meds:acetaminophen, albuterol sulfate, alteplase, heparin, porcine (PF), lidocaine-prilocaine, morphine, olanzapine zydis, ondansetron, polyethylene glycol    Review of Systems   Constitutional: Negative for fever.     Objective:     Vital Signs (Most Recent):  Temp: 98.3 °F (36.8 °C) (05/27/18 0404)  Pulse: 81 (05/27/18 0404)  Resp: 16 (05/27/18 0404)  BP: (!) 106/50 (05/27/18 0404)  SpO2: 99 % (05/27/18 0404) Vital Signs (24h Range):  Temp:  [97.6 °F (36.4 °C)-98.8 °F (37.1 °C)] 98.3 °F (36.8 °C)  Pulse:  [] 81  Resp:  [16-20] 16  SpO2:  [99 %-100 %] 99 %  BP: (106-123)/(50-65) 106/50     Patient Vitals for the past 72 hrs (Last 3 readings):   Weight   05/26/18 1940 80.7 kg (177 lb 14.6 oz)   05/25/18 1927 80.1 kg (176 lb 9.4 oz)     Body mass index is 28.72 kg/m².    Intake/Output - Last 3 Shifts       05/25 0700 - 05/26 0659 05/26 0700 - 05/27 0659 05/27 0700 - 05/28 0659    P.O. 3000 720     I.V. (mL/kg)  25 (0.3)     IV Piggyback 600 600     Total Intake(mL/kg) 3600 (44.9) 1345 (16.7)     Net +3600 +1345             Urine Occurrence 5 x 8 x     Stool Occurrence  1 x     Emesis Occurrence  0 x           Lines/Drains/Airways     Central Venous Catheter Line                 Port A Cath Double Lumen 05/10/18 0900 left subclavian 16 days                Physical Exam    Constitutional: He appears well-developed and well-nourished. Awake, sitting up in bed.   Head: Normocephalic and atraumatic.   Nose: Nose normal.   Mouth/Throat: Mucous membranes are normal.   Eyes: Conjunctivae and lids are normal.   Neck: Normal range of motion. Neck supple.    Cardiovascular: Normal rate and regular rhythm.    Pulmonary/Chest: Effort normal. No stridor. He has no decreased breath sounds. He has no wheezes. He has no rales.   Abdominal: Soft. Bowel sounds are normal. He exhibits no distension. There is no hepatosplenomegaly. There is no guarding.   Musculoskeletal: Normal range of motion. He exhibits no edema.   Skin: Skin is warm and dry. Capillary refill takes less than 2 seconds. No rash noted. He is not diaphoretic. No erythema.  Psych: Cooperative, able to answer questions appropriately.    Significant Labs:  Recent Results (from the past 24 hour(s))   Reticulocytes    Collection Time: 05/27/18  4:00 AM   Result Value Ref Range    Retic 0.1 (L) 0.4 - 2.0 %   CBC auto differential    Collection Time: 05/27/18  4:00 AM   Result Value Ref Range    WBC 0.38 (LL) 3.90 - 12.70 K/uL    RBC 2.46 (L) 4.60 - 6.20 M/uL    Hemoglobin 7.6 (L) 14.0 - 18.0 g/dL    Hematocrit 21.7 (L) 40.0 - 54.0 %    MCV 88 82 - 98 fL    MCH 30.9 27.0 - 31.0 pg    MCHC 35.0 32.0 - 36.0 g/dL    RDW 13.1 11.5 - 14.5 %    Platelets 17 (LL) 150 - 350 K/uL    MPV 9.8 9.2 - 12.9 fL    Immature Granulocytes 0.0 0.0 - 0.5 %    Gran # (ANC) 0.1 (L) 1.8 - 7.7 K/uL    Immature Grans (Abs) 0.00 0.00 - 0.04 K/uL    Lymph # 0.3 (L) 1.0 - 4.8 K/uL    Mono # 0.0 (L) 0.3 - 1.0 K/uL    Eos # 0.0 0.0 - 0.5 K/uL    Baso # 0.00 0.00 - 0.20 K/uL    nRBC 0 0 /100 WBC    Gran% 18.4 (L) 38.0 - 73.0 %    Lymph% 71.1 (H) 18.0 - 48.0 %    Mono% 10.5 4.0 - 15.0 %    Eosinophil% 0.0 0.0 - 8.0 %    Basophil% 0.0 0.0 - 1.9 %    Platelet Estimate Decreased (A)     Aniso Slight     Hypo Occasional     Differential Method Automated        Significant Imaging:   none

## 2018-05-28 LAB
ABO + RH BLD: NORMAL
ALBUMIN SERPL BCP-MCNC: 2.7 G/DL
ALP SERPL-CCNC: 67 U/L
ALT SERPL W/O P-5'-P-CCNC: 36 U/L
ANION GAP SERPL CALC-SCNC: 9 MMOL/L
ANISOCYTOSIS BLD QL SMEAR: SLIGHT
AST SERPL-CCNC: 14 U/L
BASOPHILS # BLD AUTO: ABNORMAL K/UL
BASOPHILS NFR BLD: 0 %
BILIRUB SERPL-MCNC: 0.4 MG/DL
BLD GP AB SCN CELLS X3 SERPL QL: NORMAL
BUN SERPL-MCNC: 8 MG/DL
CALCIUM SERPL-MCNC: 8.9 MG/DL
CHLORIDE SERPL-SCNC: 103 MMOL/L
CO2 SERPL-SCNC: 25 MMOL/L
CREAT SERPL-MCNC: 0.6 MG/DL
DIFFERENTIAL METHOD: ABNORMAL
EOSINOPHIL # BLD AUTO: ABNORMAL K/UL
EOSINOPHIL NFR BLD: 0 %
ERYTHROCYTE [DISTWIDTH] IN BLOOD BY AUTOMATED COUNT: 12.8 %
EST. GFR  (AFRICAN AMERICAN): >60 ML/MIN/1.73 M^2
EST. GFR  (NON AFRICAN AMERICAN): >60 ML/MIN/1.73 M^2
GLUCOSE SERPL-MCNC: 104 MG/DL
HCT VFR BLD AUTO: 21.4 %
HGB BLD-MCNC: 7.6 G/DL
IMM GRANULOCYTES # BLD AUTO: ABNORMAL K/UL
IMM GRANULOCYTES NFR BLD AUTO: ABNORMAL %
LYMPHOCYTES # BLD AUTO: ABNORMAL K/UL
LYMPHOCYTES NFR BLD: 71 %
MAGNESIUM SERPL-MCNC: 1.5 MG/DL
MCH RBC QN AUTO: 30.4 PG
MCHC RBC AUTO-ENTMCNC: 35.5 G/DL
MCV RBC AUTO: 86 FL
MONOCYTES # BLD AUTO: ABNORMAL K/UL
MONOCYTES NFR BLD: 12 %
NEUTROPHILS NFR BLD: 16 %
NEUTS BAND NFR BLD MANUAL: 1 %
NRBC BLD-RTO: 0 /100 WBC
OVALOCYTES BLD QL SMEAR: ABNORMAL
PHOSPHATE SERPL-MCNC: 5.1 MG/DL
PLATELET # BLD AUTO: 11 K/UL
PLATELET BLD QL SMEAR: ABNORMAL
PMV BLD AUTO: 12 FL
POIKILOCYTOSIS BLD QL SMEAR: SLIGHT
POLYCHROMASIA BLD QL SMEAR: ABNORMAL
POTASSIUM SERPL-SCNC: 3.7 MMOL/L
PROT SERPL-MCNC: 5.4 G/DL
RBC # BLD AUTO: 2.5 M/UL
RETICS/RBC NFR AUTO: 0.2 %
SODIUM SERPL-SCNC: 137 MMOL/L
WBC # BLD AUTO: 0.56 K/UL

## 2018-05-28 PROCEDURE — 97112 NEUROMUSCULAR REEDUCATION: CPT

## 2018-05-28 PROCEDURE — 83735 ASSAY OF MAGNESIUM: CPT

## 2018-05-28 PROCEDURE — 97116 GAIT TRAINING THERAPY: CPT

## 2018-05-28 PROCEDURE — 80053 COMPREHEN METABOLIC PANEL: CPT

## 2018-05-28 PROCEDURE — 25000003 PHARM REV CODE 250: Performed by: STUDENT IN AN ORGANIZED HEALTH CARE EDUCATION/TRAINING PROGRAM

## 2018-05-28 PROCEDURE — 63600175 PHARM REV CODE 636 W HCPCS: Performed by: STUDENT IN AN ORGANIZED HEALTH CARE EDUCATION/TRAINING PROGRAM

## 2018-05-28 PROCEDURE — 86920 COMPATIBILITY TEST SPIN: CPT

## 2018-05-28 PROCEDURE — G8978 MOBILITY CURRENT STATUS: HCPCS | Mod: CI

## 2018-05-28 PROCEDURE — 36592 COLLECT BLOOD FROM PICC: CPT

## 2018-05-28 PROCEDURE — 84100 ASSAY OF PHOSPHORUS: CPT

## 2018-05-28 PROCEDURE — 86850 RBC ANTIBODY SCREEN: CPT

## 2018-05-28 PROCEDURE — 63600175 PHARM REV CODE 636 W HCPCS: Performed by: PEDIATRICS

## 2018-05-28 PROCEDURE — 97110 THERAPEUTIC EXERCISES: CPT

## 2018-05-28 PROCEDURE — 11300000 HC PEDIATRIC PRIVATE ROOM

## 2018-05-28 PROCEDURE — 85027 COMPLETE CBC AUTOMATED: CPT

## 2018-05-28 PROCEDURE — 85045 AUTOMATED RETICULOCYTE COUNT: CPT

## 2018-05-28 PROCEDURE — 85007 BL SMEAR W/DIFF WBC COUNT: CPT

## 2018-05-28 PROCEDURE — 36415 COLL VENOUS BLD VENIPUNCTURE: CPT

## 2018-05-28 PROCEDURE — G8979 MOBILITY GOAL STATUS: HCPCS | Mod: CH

## 2018-05-28 PROCEDURE — 99233 SBSQ HOSP IP/OBS HIGH 50: CPT | Mod: ,,, | Performed by: PEDIATRICS

## 2018-05-28 RX ADMIN — ACETAMINOPHEN 650 MG: 325 TABLET, FILM COATED ORAL at 08:05

## 2018-05-28 RX ADMIN — Medication 500 UNITS: at 04:05

## 2018-05-28 RX ADMIN — LINEZOLID 600 MG: 600 INJECTION, SOLUTION INTRAVENOUS at 11:05

## 2018-05-28 RX ADMIN — POSACONAZOLE 300 MG: 100 TABLET, COATED ORAL at 09:05

## 2018-05-28 RX ADMIN — ACETAMINOPHEN, DIPHENHYDRAMINE HCL, PHENYLEPHRINE HCL 10 MG: 325; 25; 5 TABLET ORAL at 10:05

## 2018-05-28 RX ADMIN — Medication 500 UNITS: at 11:05

## 2018-05-28 RX ADMIN — Medication 500 UNITS: at 12:05

## 2018-05-28 RX ADMIN — ACYCLOVIR 400 MG: 200 CAPSULE ORAL at 10:05

## 2018-05-28 RX ADMIN — Medication 500 UNITS: at 05:05

## 2018-05-28 NOTE — SUBJECTIVE & OBJECTIVE
Interval History: VSS, afebrile. NAEO.     Scheduled Meds:   acyclovir  400 mg Oral BID    linezolid 600mg/300ml  600 mg Intravenous Q12H    melatonin  10 mg Oral QHS    posaconazole  300 mg Oral Daily    sulfamethoxazole-trimethoprim 800-160mg  1 tablet Oral twice daily on Friday, Saturday, Sunday     Continuous Infusions:  PRN Meds:acetaminophen, albuterol sulfate, alteplase, heparin, porcine (PF), lidocaine-prilocaine, morphine, olanzapine zydis, ondansetron, polyethylene glycol    Review of Systems   Constitutional: Negative for fever.     Objective:     Vital Signs (Most Recent):  Temp: 97.7 °F (36.5 °C) (05/28/18 0428)  Pulse: 89 (05/28/18 0800)  Resp: 18 (05/28/18 0800)  BP: 124/67 (05/28/18 0800)  SpO2: 100 % (05/28/18 0800) Vital Signs (24h Range):  Temp:  [97.7 °F (36.5 °C)-98.4 °F (36.9 °C)] 97.7 °F (36.5 °C)  Pulse:  [] 89  Resp:  [16-18] 18  SpO2:  [99 %-100 %] 100 %  BP: (118-133)/(55-70) 124/67     Patient Vitals for the past 72 hrs (Last 3 readings):   Weight   05/27/18 1955 81 kg (178 lb 9.2 oz)   05/26/18 1940 80.7 kg (177 lb 14.6 oz)   05/25/18 1927 80.1 kg (176 lb 9.4 oz)     Body mass index is 28.82 kg/m².    Intake/Output - Last 3 Shifts       05/26 0700 - 05/27 0659 05/27 0700 - 05/28 0659 05/28 0700 - 05/29 0659    P.O. 720 240 240    I.V. (mL/kg) 25 (0.3) 25 (0.3) 25 (0.3)    IV Piggyback 600 600 300    Total Intake(mL/kg) 1345 (16.7) 865 (10.7) 565 (7)    Net +1345 +865 +565           Urine Occurrence 8 x 7 x 2 x    Stool Occurrence 1 x 0 x 1 x    Emesis Occurrence 0 x 0 x 0 x          Lines/Drains/Airways     Central Venous Catheter Line                 Port A Cath Double Lumen 05/10/18 0900 left subclavian 18 days                Physical Exam    Constitutional: He appears well-developed and well-nourished. Awake, sitting up in bed.   Head: Normocephalic and atraumatic.   Nose: Nose normal.   Mouth/Throat: Mucous membranes are normal.   Eyes: Conjunctivae and lids are normal.    Neck: Normal range of motion. Neck supple.   Cardiovascular: Normal rate and regular rhythm.    Pulmonary/Chest: Effort normal. No stridor. He has no decreased breath sounds. He has no wheezes. He has no rales.   Abdominal: Soft. Bowel sounds are normal. He exhibits no distension. There is no hepatosplenomegaly. There is no guarding.   Musculoskeletal: Normal range of motion. He exhibits no edema.   Skin: Skin is warm and dry. Capillary refill takes less than 2 seconds. No rash noted. He is not diaphoretic. No erythema.  Psych: Cooperative, able to answer questions appropriately.    Significant Labs:  Recent Results (from the past 24 hour(s))   Reticulocytes    Collection Time: 05/28/18  4:34 AM   Result Value Ref Range    Retic 0.2 (L) 0.4 - 2.0 %   CBC auto differential    Collection Time: 05/28/18  4:34 AM   Result Value Ref Range    WBC 0.56 (LL) 3.90 - 12.70 K/uL    RBC 2.50 (L) 4.60 - 6.20 M/uL    Hemoglobin 7.6 (L) 14.0 - 18.0 g/dL    Hematocrit 21.4 (L) 40.0 - 54.0 %    MCV 86 82 - 98 fL    MCH 30.4 27.0 - 31.0 pg    MCHC 35.5 32.0 - 36.0 g/dL    RDW 12.8 11.5 - 14.5 %    Platelets 11 (LL) 150 - 350 K/uL    MPV 12.0 9.2 - 12.9 fL    Immature Granulocytes CANCELED 0.0 - 0.5 %    Immature Grans (Abs) CANCELED 0.00 - 0.04 K/uL    Lymph # CANCELED 1.0 - 4.8 K/uL    Mono # CANCELED 0.3 - 1.0 K/uL    Eos # CANCELED 0.0 - 0.5 K/uL    Baso # CANCELED 0.00 - 0.20 K/uL    nRBC 0 0 /100 WBC    Gran% 16.0 (L) 38.0 - 73.0 %    Lymph% 71.0 (H) 18.0 - 48.0 %    Mono% 12.0 4.0 - 15.0 %    Eosinophil% 0.0 0.0 - 8.0 %    Basophil% 0.0 0.0 - 1.9 %    Bands 1.0 %    Platelet Estimate Decreased (A)     Aniso Slight     Poik Slight     Poly Occasional     Ovalocytes Occasional     Differential Method Manual    Type & Screen    Collection Time: 05/28/18  4:34 AM   Result Value Ref Range    Group & Rh AB POS     Indirect Nathan NEG    Comprehensive metabolic panel    Collection Time: 05/28/18  4:34 AM   Result Value Ref Range     Sodium 137 136 - 145 mmol/L    Potassium 3.7 3.5 - 5.1 mmol/L    Chloride 103 95 - 110 mmol/L    CO2 25 23 - 29 mmol/L    Glucose 104 70 - 110 mg/dL    BUN, Bld 8 6 - 20 mg/dL    Creatinine 0.6 0.5 - 1.4 mg/dL    Calcium 8.9 8.7 - 10.5 mg/dL    Total Protein 5.4 (L) 6.0 - 8.4 g/dL    Albumin 2.7 (L) 3.5 - 5.2 g/dL    Total Bilirubin 0.4 0.1 - 1.0 mg/dL    Alkaline Phosphatase 67 55 - 135 U/L    AST 14 10 - 40 U/L    ALT 36 10 - 44 U/L    Anion Gap 9 8 - 16 mmol/L    eGFR if African American >60.0 >60 mL/min/1.73 m^2    eGFR if non African American >60.0 >60 mL/min/1.73 m^2   Magnesium    Collection Time: 05/28/18  4:34 AM   Result Value Ref Range    Magnesium 1.5 (L) 1.6 - 2.6 mg/dL   Phosphorus    Collection Time: 05/28/18  4:34 AM   Result Value Ref Range    Phosphorus 5.1 (H) 2.7 - 4.5 mg/dL       Significant Imaging:   none

## 2018-05-28 NOTE — PROGRESS NOTES
[facility] Pediatric Hospital Medicine  Progress Note    Patient Name: Hema Kuo  MRN: [id number]  Admission Date: 4/30/2018  Hospital Length of Stay: 28  Code Status: Prior   Primary Care Physician: Ann Reyna NP  Principal Problem: Bacteremia    Subjective:     Interval History: VSS, afebrile. NAEO.     Scheduled Meds:   acyclovir  400 mg Oral BID    linezolid 600mg/300ml  600 mg Intravenous Q12H    melatonin  10 mg Oral QHS    posaconazole  300 mg Oral Daily    sulfamethoxazole-trimethoprim 800-160mg  1 tablet Oral twice daily on Friday, Saturday, Sunday     Continuous Infusions:  PRN Meds:acetaminophen, albuterol sulfate, alteplase, heparin, porcine (PF), lidocaine-prilocaine, morphine, olanzapine zydis, ondansetron, polyethylene glycol      Scheduled Meds:   acyclovir  400 mg Oral BID    linezolid 600mg/300ml  600 mg Intravenous Q12H    melatonin  10 mg Oral QHS    posaconazole  300 mg Oral Daily    sulfamethoxazole-trimethoprim 800-160mg  1 tablet Oral twice daily on Friday, Saturday, Sunday     Continuous Infusions:  PRN Meds:acetaminophen, albuterol sulfate, alteplase, heparin, porcine (PF), lidocaine-prilocaine, morphine, olanzapine zydis, ondansetron, polyethylene glycol    Review of Systems   Constitutional: Negative for fever.     Objective:     Vital Signs (Most Recent):  Temp: 97.7 °F (36.5 °C) (05/28/18 0428)  Pulse: 89 (05/28/18 0800)  Resp: 18 (05/28/18 0800)  BP: 124/67 (05/28/18 0800)  SpO2: 100 % (05/28/18 0800) Vital Signs (24h Range):  Temp:  [97.7 °F (36.5 °C)-98.4 °F (36.9 °C)] 97.7 °F (36.5 °C)  Pulse:  [] 89  Resp:  [16-18] 18  SpO2:  [99 %-100 %] 100 %  BP: (118-133)/(55-70) 124/67     Patient Vitals for the past 72 hrs (Last 3 readings):   Weight   05/27/18 1955 81 kg (178 lb 9.2 oz)   05/26/18 1940 80.7 kg (177 lb 14.6 oz)   05/25/18 1927 80.1 kg (176 lb 9.4 oz)     Body mass index is 28.82 kg/m².    Intake/Output - Last 3 Shifts       05/26 0700 -  05/27 0659 05/27 0700 - 05/28 0659 05/28 0700 - 05/29 0659    P.O. 720 240 240    I.V. (mL/kg) 25 (0.3) 25 (0.3) 25 (0.3)    IV Piggyback 600 600 300    Total Intake(mL/kg) 1345 (16.7) 865 (10.7) 565 (7)    Net +1345 +865 +565           Urine Occurrence 8 x 7 x 2 x    Stool Occurrence 1 x 0 x 1 x    Emesis Occurrence 0 x 0 x 0 x          Lines/Drains/Airways     Central Venous Catheter Line                 Port A Cath Double Lumen 05/10/18 0900 left subclavian 18 days                Physical Exam    Constitutional: He appears well-developed and well-nourished. Awake, sitting up in bed.   Head: Normocephalic and atraumatic.   Nose: Nose normal.   Mouth/Throat: Mucous membranes are normal.   Eyes: Conjunctivae and lids are normal.   Neck: Normal range of motion. Neck supple.   Cardiovascular: Normal rate and regular rhythm.    Pulmonary/Chest: Effort normal. No stridor. He has no decreased breath sounds. He has no wheezes. He has no rales.   Abdominal: Soft. Bowel sounds are normal. He exhibits no distension. There is no hepatosplenomegaly. There is no guarding.   Musculoskeletal: Normal range of motion. He exhibits no edema.   Skin: Skin is warm and dry. Capillary refill takes less than 2 seconds. No rash noted. He is not diaphoretic. No erythema.  Psych: Cooperative, able to answer questions appropriately.    Significant Labs:  Recent Results (from the past 24 hour(s))   Reticulocytes    Collection Time: 05/28/18  4:34 AM   Result Value Ref Range    Retic 0.2 (L) 0.4 - 2.0 %   CBC auto differential    Collection Time: 05/28/18  4:34 AM   Result Value Ref Range    WBC 0.56 (LL) 3.90 - 12.70 K/uL    RBC 2.50 (L) 4.60 - 6.20 M/uL    Hemoglobin 7.6 (L) 14.0 - 18.0 g/dL    Hematocrit 21.4 (L) 40.0 - 54.0 %    MCV 86 82 - 98 fL    MCH 30.4 27.0 - 31.0 pg    MCHC 35.5 32.0 - 36.0 g/dL    RDW 12.8 11.5 - 14.5 %    Platelets 11 (LL) 150 - 350 K/uL    MPV 12.0 9.2 - 12.9 fL    Immature Granulocytes CANCELED 0.0 - 0.5 %     Immature Grans (Abs) CANCELED 0.00 - 0.04 K/uL    Lymph # CANCELED 1.0 - 4.8 K/uL    Mono # CANCELED 0.3 - 1.0 K/uL    Eos # CANCELED 0.0 - 0.5 K/uL    Baso # CANCELED 0.00 - 0.20 K/uL    nRBC 0 0 /100 WBC    Gran% 16.0 (L) 38.0 - 73.0 %    Lymph% 71.0 (H) 18.0 - 48.0 %    Mono% 12.0 4.0 - 15.0 %    Eosinophil% 0.0 0.0 - 8.0 %    Basophil% 0.0 0.0 - 1.9 %    Bands 1.0 %    Platelet Estimate Decreased (A)     Aniso Slight     Poik Slight     Poly Occasional     Ovalocytes Occasional     Differential Method Manual    Type & Screen    Collection Time: 05/28/18  4:34 AM   Result Value Ref Range    Group & Rh AB POS     Indirect Nathan NEG    Comprehensive metabolic panel    Collection Time: 05/28/18  4:34 AM   Result Value Ref Range    Sodium 137 136 - 145 mmol/L    Potassium 3.7 3.5 - 5.1 mmol/L    Chloride 103 95 - 110 mmol/L    CO2 25 23 - 29 mmol/L    Glucose 104 70 - 110 mg/dL    BUN, Bld 8 6 - 20 mg/dL    Creatinine 0.6 0.5 - 1.4 mg/dL    Calcium 8.9 8.7 - 10.5 mg/dL    Total Protein 5.4 (L) 6.0 - 8.4 g/dL    Albumin 2.7 (L) 3.5 - 5.2 g/dL    Total Bilirubin 0.4 0.1 - 1.0 mg/dL    Alkaline Phosphatase 67 55 - 135 U/L    AST 14 10 - 40 U/L    ALT 36 10 - 44 U/L    Anion Gap 9 8 - 16 mmol/L    eGFR if African American >60.0 >60 mL/min/1.73 m^2    eGFR if non African American >60.0 >60 mL/min/1.73 m^2   Magnesium    Collection Time: 05/28/18  4:34 AM   Result Value Ref Range    Magnesium 1.5 (L) 1.6 - 2.6 mg/dL   Phosphorus    Collection Time: 05/28/18  4:34 AM   Result Value Ref Range    Phosphorus 5.1 (H) 2.7 - 4.5 mg/dL       Significant Imaging:   none    Assessment/Plan:     Neuro   Acute delirium    Delirium: discontinued CEC.   - Off of risperdal today  - RPR NR  - Olanzapine PRN  - EKG complete on 5/14, 5/15, 5/16. QTc ranging from 437-441.   - Avoid Benadryl per Psych  - Continue frequent reorientation and attempt to encourage patient to sleep.   - Psych following, rec'd follow up at [facility] ([address] [place] [id number]; Phone: [phone]) as outpatient.   - CT head complete on 5/22: unremarkable        ID   * Bacteremia    Patient with bacteremia. Bcx from 5/2 growing strep mitis, Bcx 5/19 growing Enterococcus faecium VRE (sens to linezolid) from outer lumen of port. Subsequent cultures NGTD.     - s/p Cefepime 2g Q8h  - Continue Linezolid 600mg Q12h  - s/p Vancomycin 1250 g Q8h  - Follow up blood cultures  - monitor fever curve          Immunology/Multi System   Immunosuppressed due to chemotherapy    Immunosuppression 2/2 to chemotherapy:  - Continue acyclovir ppx  - Continue posaconazole 400mg BID- therapeutic dosing.   - Continue bactrim QFSaSu ppx  - Continue peridex ppx          Oncology   Neutropenia    - ANC up trending, waiting on count recovery for discharge  - Advance soft mechanical diet  - Continue PRN zofran        AML (acute myeloblastic leukemia)    Hema is a 17 yr young man with AML (t 8;21) here for chemotherapy. On Intensification therapy II following [id number] (Arm A). He was admitted on 4/30/2018 for neutropenia/profund sepsis risk. Stepped up to the PICU for persistent fever >103, tachycardia, and hypotension that was been minimally responsive to fluid resuscitation. PICU stay was complicated by delirium. Currently CEC'd.      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 MRD negative after Induction I.   - Treating per COG [id number] (ARM A).  S/p Intensification I and Intensification II started.   - BM biopsy at start of Intensification shows CR.  FISH for t(8;21) negative. Will repeat BM biopsy pending count recovery and clinical improvement  - Bmp M,Th        Antineoplastic chemotherapy induced pancytopenia    Chemotherapy induced neutropenia:   - Hgb goal >7  - Plts goal >10  - Daily CBC and reticulocyte  - CMP EOD  - If bleeding at all, please transfuse platelets x1 unit. No need to check CBC, evaluate clinically.                      Anticipated Disposition: Home or Self Care    Ayanna Ramirez DO  Pediatric Hospital Medicine   [facility]

## 2018-05-28 NOTE — SUBJECTIVE & OBJECTIVE
"Interval History: see hospital course.     Family History     Problem Relation (Age of Onset)    Cancer Mother    Heart disease Mother    No Known Problems Father        Social History Main Topics    Smoking status: Former Smoker     Packs/day: 0.50     Types: Cigarettes     Quit date: 10/30/2017    Smokeless tobacco: Never Used    Alcohol use Yes      Comment: rare occasions    Drug use: No    Sexual activity: Yes     Partners: Female     Psychotherapeutics     Start     Stop Route Frequency Ordered    05/15/18 0847  olanzapine zydis disintegrating tablet 5 mg      -- Oral As needed (PRN) 05/15/18 0848           Review of Systems    Objective:     Vital Signs (Most Recent):  Temp: 97.7 °F (36.5 °C) (05/28/18 0428)  Pulse: 89 (05/28/18 0800)  Resp: 18 (05/28/18 0800)  BP: 124/67 (05/28/18 0800)  SpO2: 100 % (05/28/18 0800) Vital Signs (24h Range):  Temp:  [97.7 °F (36.5 °C)-98.4 °F (36.9 °C)] 97.7 °F (36.5 °C)  Pulse:  [] 89  Resp:  [16-18] 18  SpO2:  [99 %-100 %] 100 %  BP: (118-133)/(55-70) 124/67     Height: 5' 6" (167.6 cm)  Weight: 81 kg (178 lb 9.2 oz)  Body mass index is 28.82 kg/m².      Intake/Output Summary (Last 24 hours) at 05/28/18 1250  Last data filed at 05/28/18 1112   Gross per 24 hour   Intake             1105 ml   Output                0 ml   Net             1105 ml       Physical Exam        Mental Status Exam:  Appearance: age appropriate, lying in bed  Behavior/Cooperation: calm, cooperative, improving psychomotor retardation, good eye contact   Speech: normal volume, tone, rate  Language: grossly intact, able to name, able to repeat with spontaneous speech  Mood: "good"  Affect:  euthymic, smiling   Thought Process: linear  Thought Content: no SI/HI/AVH  Sensorium:  Awake and alert  Alert and Oriented: AOx4  Insight:  intact, appropriate  Judgment: good    Significant Labs:   Last 24 Hours:   Recent Lab Results       05/28/18  0434      Immature Granulocytes " CANCELED  Comment:  Result canceled by the ancillary     Immature Grans (Abs) CANCELED  Comment:  Mild elevation in immature granulocytes is non specific and   can be seen in a variety of conditions including stress response,   acute inflammation, trauma and pregnancy. Correlation with other   laboratory and clinical findings is essential.    Result canceled by the ancillary       Albumin 2.7(L)     Alkaline Phosphatase 67     ALT 36     Anion Gap 9     Aniso Slight     AST 14     BANDS 1.0     Baso # CANCELED  Comment:  Result canceled by the ancillary     Basophil% 0.0     Total Bilirubin 0.4  Comment:  For infants and newborns, interpretation of results should be based  on gestational age, weight and in agreement with clinical  observations.  Premature Infant recommended reference ranges:  Up to 24 hours.............<8.0 mg/dL  Up to 48 hours............<12.0 mg/dL  3-5 days..................<15.0 mg/dL  6-29 days.................<15.0 mg/dL       BUN, Bld 8     Calcium 8.9     Chloride 103     CO2 25     Creatinine 0.6     Differential Method Manual     eGFR if African American >60.0     eGFR if non  >60.0  Comment:  Calculation used to obtain the estimated glomerular filtration  rate (eGFR) is the CKD-EPI equation.        Eos # CANCELED  Comment:  Result canceled by the ancillary     Eosinophil% 0.0     Glucose 104     Gran% 16.0(L)     Group & Rh AB POS     Hematocrit 21.4(L)     Hemoglobin 7.6(L)     INDIRECT LARRY NEG     Lymph # CANCELED  Comment:  Result canceled by the ancillary     Lymph% 71.0(H)     Magnesium 1.5(L)     MCH 30.4     MCHC 35.5     MCV 86     Mono # CANCELED  Comment:  Result canceled by the ancillary     Mono% 12.0     MPV 12.0     nRBC 0     Ovalocytes Occasional     Phosphorus 5.1(H)     Platelet Estimate Decreased(A)     Platelets 11  Comment:  plt critical result(s) called and verbal readback obtained from   lenard campo rn, 05/28/2018 07:45  (LL)     Poik Slight      Poly Occasional     Potassium 3.7     Total Protein 5.4(L)     RBC 2.50(L)     RDW 12.8     Retic 0.2(L)     Sodium 137     WBC 0.56  Comment:  WBC/PRELIM PLT   critical result(s) called and verbal readback   obtained from SHANE YANG RN AT 0554 ON 05/28/2018 BY BEL,   05/28/2018 05:54  (LL)           Significant Imaging: None

## 2018-05-28 NOTE — ASSESSMENT & PLAN NOTE
Delirium: discontinued CEC.   - Off of risperdal today  - RPR NR  - Olanzapine PRN  - EKG complete on 5/14, 5/15, 5/16. QTc ranging from 437-441.   - Avoid Benadryl per Psych  - Continue frequent reorientation and attempt to encourage patient to sleep.   - Psych following, rec'd follow up at [facility] ([address] [place] [address]; Phone: [phone]) as outpatient.   - CT head complete on 5/22: unremarkable

## 2018-05-28 NOTE — ASSESSMENT & PLAN NOTE
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 On Intensification therapy II following [id number] (Arm A). He was admitted on 4/30/2018 for neutropenia/profund sepsis risk. Stepped up to the PICU for persistent fever >103, tachycardia, and hypotension that was been minimally responsive to fluid resuscitation. PICU stay was complicated by delirium. Currently CEC'd.      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 MRD negative after Induction I.   - Treating per COG [id number] (ARM A).  S/p Intensification I and Intensification II started.   - BM biopsy at start of Intensification shows CR.  FISH for t(8;21) negative. Will repeat BM biopsy pending count recovery and clinical improvement  - Bmp M,Th

## 2018-05-28 NOTE — ASSESSMENT & PLAN NOTE
- ANC 80 today, waiting on count recovery for discharge  - Advance soft mechanical diet  - Continue PRN zofran

## 2018-05-28 NOTE — ASSESSMENT & PLAN NOTE
Patient with bacteremia. Bcx from 5/2 growing strep mitis, Bcx 5/19 growing Enterococcus faecium VRE (sens to linezolid) from outer lumen of port. Subsequent cultures NGTD.     - s/p Cefepime 2g Q8h  - Continue Linezolid 600mg Q12h  - s/p Vancomycin 1250 g Q8h  - Follow up blood cultures  - monitor fever curve

## 2018-05-28 NOTE — PT/OT/SLP PROGRESS
"Physical Therapy Treatment    Patient Name:  Hema Kuo   MRN:  [id number]    Recommendations:     Discharge Recommendations:  home health PT   Discharge Equipment Recommendations: none   Barriers to discharge: None    Assessment:     Hema Kuo is a 17 y.o. male admitted with a medical diagnosis of Bacteremia.  He presents with the following impairments/functional limitations:  weakness, impaired endurance, gait instability, decreased lower extremity function, impaired cardiopulmonary response to activity, decreased upper extremity function, impaired balance. Patient demonstrated excellent participation despite weakness. Level of assistance required supervision - mod I. Able to tolerate gait training x 1000 feet, balance training in standing, LE strength training, and UE reaching and throwing. Recommending HHPT. Skilled physical therapy is medically necessary to address the above impairments in order to return the patient back to their previous level of function.       Rehab Prognosis:  good; patient would benefit from acute skilled PT services to address these deficits and reach maximum level of function.      Recent Surgery: * No surgery found *      Plan:     During this hospitalization, patient to be seen 6 x/week to address the above listed problems via gait training, therapeutic activities, therapeutic exercises, neuromuscular re-education  · Plan of Care Expires:  06/09/18   Plan of Care Reviewed with: patient    Subjective     Patient found with HOB elevated and family present upon PT entry to room, agreeable to treatment.      Chief Complaint: "I'm ready to go home", subjective complaints of muscle fatigue  Patient comments/goals: to return home cancer free  Pain/Comfort:  · Pain Rating 1: 0/10  · Pain Addressed 1: Distraction, Cessation of Activity  · Pain Rating Post-Intervention 1: 0/10    Patients cultural, spiritual, Anabaptist conflicts given the current situation: none stated    Objective: "     Patient found with: central line     General Precautions: Standard, fall, neutropenic   Orthopedic Precautions:N/A   Braces: N/A     Functional Mobility:    · Gait:   Distance Ambulated: Pt ambulated x1000 feet in hallway. Pt demo increased shuffling gait.   Assistance level: Supervision   Assistive Device used:  No Assistive Device   Gait Pattern: 2-point gait   Gait Deviation(s): decreased hank, increased time in double stance and decreased toe-to-floor clearance   Impairments due to: weakness and fatigue      AM-PAC 6 CLICK MOBILITY  Turning over in bed (including adjusting bedclothes, sheets and blankets)?: 4  Sitting down on and standing up from a chair with arms (e.g., wheelchair, bedside commode, etc.): 4  Moving from lying on back to sitting on the side of the bed?: 4  Moving to and from a bed to a chair (including a wheelchair)?: 4  Need to walk in hospital room?: 4  Climbing 3-5 steps with a railing?: 3  Total Score: 23       Therapeutic Activities and Exercises:  PT arrived to patient's room to find patient resting quietly; agreeable to PT session. Patient performed mobility as above with non-skid socks in place.    · Patient Education:   · PT POC  · Gait training  · Ambulation in harmon with family or nsg staff  · Muscle fatigue  · Balance training   · PT POC post d/c from facility   · Endurance training   · Therapeutic exercise:  · Sit to stand x 13 from swiss ball  · 10 reps x 2 sets of calf raises with 1 finger on wall for balance  · Standing on pillow with NBOS and throwing x 3 minutes  · Partial squats x 10 x 2 sets  · Sitting on swiss ball and throwing overhead and underhand with different size balls and different strength bounces and throws  · Tandem stance on pillow with each LE leading at a given point during throwing activity x 7 minutes     3 LOB noted   Bedside table in front of patient and area set up for function, convenience, and safety. RN aware of patient's mobility needs and  status. Questions/concerns addressed within PT scope of practice; patient and family with no further questions.     Patient left HOB elevated with all lines intact and call button in reach..    GOALS:    Physical Therapy Goals        Problem: Physical Therapy Goal    Goal Priority Disciplines Outcome Goal Variances Interventions   Physical Therapy Goal     PT/OT, PT Ongoing (interventions implemented as appropriate)     Description:  Goals re-assessed by SPT on   Continue goals x1 week (18)    Patient will increase functional independence with mobility by performin. Supine to sit with [place] - MET ()  2. Sit to supine with [place] - MET ()  3. Sit to stand transfer with [place] - MET ()  4. Bed to chair transfer with Stand-by Assistance using least restrictive AD - MET ()  5. Gait  x 500 feet with Stand-by Assistance using least restrictive Ad - MET ()  6. Lower extremity exercise program x30 reps per handout, with supervision - Not met         Added:  7. Sit to stand from swiss ball with supervision x 20 - not met  8. Tandem stance x 5 minutes with UE activity and no LOB - not met   9. Stair gait x 9 steps with 1HR and close SBA - not met                     Time Tracking:     PT Received On: 18  PT Start Time: 1447     PT Stop Time: 1534  PT Total Time (min): 47 min     Billable Minutes: Gait Training 10, Therapeutic Exercise 20 and Neuromuscular Re-education 17    Treatment Type: Treatment  PT/PTA: PT           Gianna Jacobsen, PT  2018

## 2018-05-28 NOTE — PLAN OF CARE
"Problem: Patient Care Overview  Goal: Plan of Care Review  Outcome: Ongoing (interventions implemented as appropriate)  Pt/vss and afebrile. Meds administered as documented in MAR. C/o back pain, rec'd tylenol w/ good relief. Family/friends visiting throughout the day. Great appetite. Good UOP, 1 stool. PT worked w/ pt today. Contact ISO discontinued per MD. Redness above PAC, MD in to assess. No new orders. Pt expressed continued "bed anxiety" stating that he is "afraid to move around in the bed" and having "bizarre dreams" @ night since he started taking Melatonin. Will notify MD. POC discussed w/ pt and mom, both verbalized their understanding. Safety maintained. Will monitor.      "

## 2018-05-28 NOTE — PLAN OF CARE
Problem: Physical Therapy Goal  Goal: Physical Therapy Goal  Goals re-assessed by SPT on   Continue goals x1 week (18)    Patient will increase functional independence with mobility by performin. Supine to sit with [place] - MET ()  2. Sit to supine with [place] - MET ()  3. Sit to stand transfer with [place] - MET ()  4. Bed to chair transfer with Stand-by Assistance using least restrictive AD - MET ()  5. Gait  x 500 feet with Stand-by Assistance using least restrictive Ad - MET ()  6. Lower extremity exercise program x30 reps per handout, with supervision - Not met         Added:  7. Sit to stand from swiss ball with supervision x 20 - not met  8. Tandem stance x 5 minutes with UE activity and no LOB - not met   9. Stair gait x 9 steps with 1HR and close SBA - not met   Outcome: Ongoing (interventions implemented as appropriate)  Goals updated and appropriate to address patient's current needs. Patient tolerated increased cardiorespiratory activity and balance training during today's session.     Gianna Jacobsen PT, DPT  2018  [phone]

## 2018-05-28 NOTE — PROGRESS NOTES
"[facility]  Psychiatry  Progress Note    Patient Name: Hema Kuo  MRN: [id number]   Code Status: Prior  Admission Date: 4/30/2018  Hospital Length of Stay: 28 days  Expected Discharge Date: 6/1/2018  Attending Physician: Sanford Bucio MD  Primary Care Provider: Ann Reyna NP    Current Legal Status: N/A      Subjective:     Principal Problem:Bacteremia    Chief Complaint: delirium    HPI: Patient is a 16 y/o man with PMH AML dx 2017 s/p 4 cycles of chemotherapy admitted to pediatrics team with Strep mitis sepsis and ARDS. Psychiatry was consulted for "paranoia, now CEC'd, possible ICU delirium."     Per staff MD:  "17 year old young man with AML s/p 4 cycles of chemotherapy now transferred from the  PICU with Strep mitis sepsis and ARDS.     For his AML we are awaiting count recovery.  ANC next to nothing still.   For his Strep sepsis, he is on cefepime and vancomycin.  Cultures from 5/2 grew Strep mitis (sensitive to vanc).  Subsequent cultures negative.    Will cont a 14 day course of vanc.        .  For his chemotherapy induced pancytopenia, recommend transfusion for hemoglobin under 7 and platelets under 10 K.  No transfusions today.    For his ARDS will get one last dose of lasix today and then we will stop.  He continues to have paranoid delerium since extubation, however this is improving.  Will cont seroquel with prn zyprexa.  Will have psych see tomorrow.   Still needs a 1:1 sitter."    Per RN note 5/14:  "Pt oriented x4. Pt paranoid and anxious but cooperative. Very slow to proccess what's being communicated to him and very slow to respond. Similar to last nights shift, it took patient a little while for him to take his medications, but after much convincing, pt took them with no problem. Pt making death statements throughout night and still believes he is dying and nurses are out to get him. Double lumen left chest port in place, flushes well, blood return noted from each " "lumen. All medications/antibitoics given as scheduled. Labs drawn in am. Critical results resulted, MD notified and labs redrawn. Pt did sleep for about 6 hours tonight."    Per RN note 5/13:  "Pt oriented to person, self, time, but disoriented to place. Pt stated he was in [facility] waiting to die. Pt paranoid and anxious but cooperative. Took him a little time (about 25 minutes) to take his medications, but after time of explaining to him what the medications were and why they are needed, pt took them. Pt stated he believes "the nurses are trying to burn" him and that we all hate him and want him to die."    On interview, patient with sitter at bedside and uneaten breakfast tray in front of him. Interview was limited as his answers to questions are impoverished and inappropriate. He asked interviewer "are you my biological father?" And stated "I think I'm dead." Was able to state his reason for admission was "cancer" but did not respond to further questions about his care.     Per iglesia, patient slept overnight and has had limited appetite. Has been paranoid but not physically aggressive. Minimally talkative with her. Says patient's mother is currently at a doctor's appointment but will return to hospital this afternoon.    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 2 sodas/day         Hospital Course: 05/15/2018: Per chart, patient received PRN Zyprexa @ 0009 for insomnia. Per iglesia, " "patient slightly less paranoid this morning, ate most of breakfast and played games with her. On interview, no family at bedside, patient states he is feeling like himself. Still making statements about believing his is dead but more redirectable. Mentioned his nosebleed was because there is "something inside" him besides cancer. Overall more conversational and less paranoid this morning.  05/16/2018: Per chart, no PRN zyprexa given, per RN: "Pt has flat affect and has delayed response of understanding. Pt cooperative with vitals. He states " should never been born" and he also states "ya'll just keeping me alive". He also had some other spontaneous statements that did not make sense. Pt vss, afebrile with tmax of 99.6, no distress noted. Pt did go to sleep around midnight but moaned and cried throughout the night per sitter." On interview, patient again responds to select questions and remains paranoid, will slight improvement. Says he's "either fully alive or fully dead" when asked but does not spontaneously state he is dead. Refused to participate in [employer] but oriented to location, year and month.  05/17/2018: Per chart, no PRN zyprexa given, per RN continued to make paranoid statements about being alive, worked well with PT/OT yesterday. Asleep on interview. Per iglesia, remembered her from earlier in the week, has not made any statements this morning so far about being dead.  05/18/2018: Per chart, patient agitated and confused overnight, attempting to disconnect IV and port. Received Zyprexa 5 mg PO PRN @ 0147. Patient asleep on interview. Per juan ramonter did not fall asleep until 4 am. Ate a little breakfast then fell back asleep. No issues with agitation this AM.   05/20/2018: Per RN note: "Mother and step-father visited in evening, updated on plan of care, pleased about pt's continued progress toward baseline mood/personality."  05/21/2018: Per chart, no issues with agitation overnight, no PRN Zyprexa given. " "On interview patient with less paranoid thought content, no longer believes nurses are trying to harm him, states he knows he is alive and remembers last week feeling like he wasn't. Denies AVH. C/o poor sleep overnight 2/2 fever.  05/22/2018: Per chart, no issues with agitation overnight, no PRN Zyprexa given. On interview, patient states he is feeling like himself but c/o feeling "alone." Discussed role of Risperdal, which patient stated was for psychosis; discussed with patient he is being treated for delirium not psychosis and he expressed understanding.   05/23/2018: Per chart, no issues with agitation overnight, no PRN Zyprexa given. Per RN note: "Pt affect remained flat this am, but this afternoon pt much more engaging and talkative.  Speech was less delayed and pt asking appropriate questions.  Pt became tearful and asked for his mom.  Pt states "I'm so lonely, and I'm all by myself" "I miss my family", pt also stated "I know sometimes I think people want to hurt me, but I also know that that's really not right".  Pt also states at this time that he knows he gets confused at times "because its like old people who have to go in and out of the hospital all the time....they get confused why they are there".  This writer also asked pt what he thought happens when he seems to shut down.  Pt states "I think I get scared because I'm rodlan confused and my mom isn't here"." On interview, patient sitting up in bed with grandmother, cousin and sitter at bedside. Is able to identify everyone in the room. C/o difficulty sleeping overnight, states he normally sleeps well at home. Discussed patient's life prior to dx as well as basketball game last night, patient smiled when told the [employer] had won.   05/24/2018: Per chart, no issues with agitation overnight, no PRN Zyprexa given. Mother at bedside speaking with primary team staff. Per primary team staff patient frustrated by tremor and need for contact precautions. On " "interview, patient more irritable than on previous visits, at attempts to engage in conversation about his interests outside of the hospital asks "what are you doing?" When asked if he would like interviewer to leave, responds "If I say yes, will you be offended?" Reassured patient his frustrations were valid and would check in with him tomorrow.   05/25/2018: Per chart, no issues with agitation overnight, no PRN Zyprexa given. Per RN note: "Pt smiling, in good mood, with clear logical speech and behavior." Patient not in room on my assessment, however per medical student: "States that he feels a lot better. Has good appetite, He received platelets/RBC yesterday. Still had difficulty falling asleep but has good energy."  05/28/2018: Per chart, no issues with agitation overnight, no PRN Zyprexa given. On interview, patient bright and smiling, many family and friends in room visiting. Decreased latency of response noted. Per mother patient's tremor has resolved. Patient c/o difficulty sleeping overnight but does not expect to have issues with sleep upon discharge. Discussed options for outpatient follow up if needed, patient familiar with [facility] and previously sought counseling there after being bullied at 9 years old.       Interval History: see hospital course.     Family History     Problem Relation (Age of Onset)    Cancer Mother    Heart disease Mother    No Known Problems Father        Social History Main Topics    Smoking status: Former Smoker     Packs/day: 0.50     Types: Cigarettes     Quit date: 10/30/2017    Smokeless tobacco: Never Used    Alcohol use Yes      Comment: rare occasions    Drug use: No    Sexual activity: Yes     Partners: Female     Psychotherapeutics     Start     Stop Route Frequency Ordered    05/15/18 0847  olanzapine zydis disintegrating tablet 5 mg      -- Oral As needed (PRN) 05/15/18 0848           Review of Systems    Objective:     Vital Signs (Most Recent):  Temp: " "97.7 °F (36.5 °C) (05/28/18 0428)  Pulse: 89 (05/28/18 0800)  Resp: 18 (05/28/18 0800)  BP: 124/67 (05/28/18 0800)  SpO2: 100 % (05/28/18 0800) Vital Signs (24h Range):  Temp:  [97.7 °F (36.5 °C)-98.4 °F (36.9 °C)] 97.7 °F (36.5 °C)  Pulse:  [] 89  Resp:  [16-18] 18  SpO2:  [99 %-100 %] 100 %  BP: (118-133)/(55-70) 124/67     Height: 5' 6" (167.6 cm)  Weight: 81 kg (178 lb 9.2 oz)  Body mass index is 28.82 kg/m².      Intake/Output Summary (Last 24 hours) at 05/28/18 1250  Last data filed at 05/28/18 1112   Gross per 24 hour   Intake             1105 ml   Output                0 ml   Net             1105 ml       Physical Exam        Mental Status Exam:  Appearance: age appropriate, lying in bed  Behavior/Cooperation: calm, cooperative, improving psychomotor retardation, good eye contact   Speech: normal volume, tone, rate  Language: grossly intact, able to name, able to repeat with spontaneous speech  Mood: "good"  Affect:  euthymic, smiling   Thought Process: linear  Thought Content: no SI/HI/AVH  Sensorium:  Awake and alert  Alert and Oriented: AOx4  Insight:  intact, appropriate  Judgment: good    Significant Labs:   Last 24 Hours:   Recent Lab Results       05/28/18  0434      Immature Granulocytes CANCELED  Comment:  Result canceled by the ancillary     Immature Grans (Abs) CANCELED  Comment:  Mild elevation in immature granulocytes is non specific and   can be seen in a variety of conditions including stress response,   acute inflammation, trauma and pregnancy. Correlation with other   laboratory and clinical findings is essential.    Result canceled by the ancillary       Albumin 2.7(L)     Alkaline Phosphatase 67     ALT 36     Anion Gap 9     Aniso Slight     AST 14     BANDS 1.0     Baso # CANCELED  Comment:  Result canceled by the ancillary     Basophil% 0.0     Total Bilirubin 0.4  Comment:  For infants and newborns, interpretation of results should be based  on gestational age, weight and in " agreement with clinical  observations.  Premature Infant recommended reference ranges:  Up to 24 hours.............<8.0 mg/dL  Up to 48 hours............<12.0 mg/dL  3-5 days..................<15.0 mg/dL  6-29 days.................<15.0 mg/dL       BUN, Bld 8     Calcium 8.9     Chloride 103     CO2 25     Creatinine 0.6     Differential Method Manual     eGFR if African American >60.0     eGFR if non  >60.0  Comment:  Calculation used to obtain the estimated glomerular filtration  rate (eGFR) is the CKD-EPI equation.        Eos # CANCELED  Comment:  Result canceled by the ancillary     Eosinophil% 0.0     Glucose 104     Gran% 16.0(L)     Group & Rh AB POS     Hematocrit 21.4(L)     Hemoglobin 7.6(L)     INDIRECT LARRY NEG     Lymph # CANCELED  Comment:  Result canceled by the ancillary     Lymph% 71.0(H)     Magnesium 1.5(L)     MCH 30.4     MCHC 35.5     MCV 86     Mono # CANCELED  Comment:  Result canceled by the ancillary     Mono% 12.0     MPV 12.0     nRBC 0     Ovalocytes Occasional     Phosphorus 5.1(H)     Platelet Estimate Decreased(A)     Platelets 11  Comment:  plt critical result(s) called and verbal readback obtained from   lenard yang rn, 05/28/2018 07:45  (LL)     Poik Slight     Poly Occasional     Potassium 3.7     Total Protein 5.4(L)     RBC 2.50(L)     RDW 12.8     Retic 0.2(L)     Sodium 137     WBC 0.56  Comment:  WBC/PRELIM PLT   critical result(s) called and verbal readback   obtained from LENARD YANG RN AT 0554 ON 05/28/2018 BY BEL,   05/28/2018 05:54  (LL)           Significant Imaging: None    Assessment/Plan:     Acute delirium    Resolved    Patient without significant past psychiatric history has been exhibiting waxing and waning mental status, disorientation and paranoid delusions since being extubated on 5/9 consistent with delirium. Patient with slow improvement with initiation of Risperdal, now close to baseline per pt and family.   - Titration of  Risperdal complete, last dose received 5/27. Tremor is a known common side effect of Risperdal and likely to improve and eventually resolve with discontinuation of medication. Per patient's mother significant improvement thus far with decrease in dosage.   - Upon discharge from hospital, patient may schedule therapy and psychiatry follow up at [facility] ([address] [place] [id number]; Phone: [phone]; accepts Medicaid).         Psychiatry will sign off at this time, please contact CL team with any further questions regarding this patient.     Need for Continued Hospitalization:   No need for inpatient psychiatric hospitalization. Continue medical care as per the primary team.    Anticipated Disposition: Home or Self Care     Total time:  25 with greater than 50% of this time spent in counseling and/or coordination of care.       Clair Taylor MD   Psychiatry  [facility]

## 2018-05-28 NOTE — PLAN OF CARE
Problem: Patient Care Overview  Goal: Plan of Care Review  Outcome: Ongoing (interventions implemented as appropriate)  Pt stable overnight, VSS, afebrile, sleeping comfortably between care. Pt in good mood and energy level in evening, remains oriented and appropriate. L PAC remains patent, HL, receiving zyvox as scheduled, labs obtained this morning, will monitor results. Redness and slight tenderness noted above site, will monitor closely. Pt tolerating regular diet, good PO intake, adequate UOP, no BM reported, appropriate weight gain noted. Pt c/o lower back pain related to position/inactivity in bed, encouraged frequent activity OOB in room. Contact isolation precautions remain in place. Tremors noted to be subsiding. Mother remains at bedside, attentive and appropriate, aware of plan of care.

## 2018-05-29 PROBLEM — G47.00 INSOMNIA: Status: ACTIVE | Noted: 2018-05-29

## 2018-05-29 LAB
ANISOCYTOSIS BLD QL SMEAR: SLIGHT
BASOPHILS # BLD AUTO: ABNORMAL K/UL
BASOPHILS NFR BLD: 0 %
BLD PROD TYP BPU: NORMAL
BLOOD UNIT EXPIRATION DATE: NORMAL
BLOOD UNIT TYPE CODE: 6200
BLOOD UNIT TYPE: NORMAL
CODING SYSTEM: NORMAL
DIFFERENTIAL METHOD: ABNORMAL
DISPENSE STATUS: NORMAL
EOSINOPHIL # BLD AUTO: ABNORMAL K/UL
EOSINOPHIL NFR BLD: 0 %
ERYTHROCYTE [DISTWIDTH] IN BLOOD BY AUTOMATED COUNT: 12.7 %
HCT VFR BLD AUTO: 20.7 %
HGB BLD-MCNC: 7.6 G/DL
HYPOCHROMIA BLD QL SMEAR: ABNORMAL
IMM GRANULOCYTES # BLD AUTO: ABNORMAL K/UL
IMM GRANULOCYTES NFR BLD AUTO: ABNORMAL %
LYMPHOCYTES # BLD AUTO: ABNORMAL K/UL
LYMPHOCYTES NFR BLD: 80.9 %
MCH RBC QN AUTO: 31.1 PG
MCHC RBC AUTO-ENTMCNC: 36.7 G/DL
MCV RBC AUTO: 85 FL
MONOCYTES # BLD AUTO: ABNORMAL K/UL
MONOCYTES NFR BLD: 1.5 %
NEUTROPHILS NFR BLD: 17.6 %
NRBC BLD-RTO: 0 /100 WBC
NUM UNITS TRANS WBC-POOR PLATPHERESIS: NORMAL
OVALOCYTES BLD QL SMEAR: ABNORMAL
PLATELET # BLD AUTO: 8 K/UL
PMV BLD AUTO: 8.8 FL
POIKILOCYTOSIS BLD QL SMEAR: SLIGHT
POLYCHROMASIA BLD QL SMEAR: ABNORMAL
RBC # BLD AUTO: 2.44 M/UL
RETICS/RBC NFR AUTO: 0.3 %
WBC # BLD AUTO: 0.56 K/UL

## 2018-05-29 PROCEDURE — 63600175 PHARM REV CODE 636 W HCPCS: Performed by: STUDENT IN AN ORGANIZED HEALTH CARE EDUCATION/TRAINING PROGRAM

## 2018-05-29 PROCEDURE — 63600175 PHARM REV CODE 636 W HCPCS: Performed by: PEDIATRICS

## 2018-05-29 PROCEDURE — 99233 SBSQ HOSP IP/OBS HIGH 50: CPT | Mod: ,,, | Performed by: PEDIATRICS

## 2018-05-29 PROCEDURE — 85007 BL SMEAR W/DIFF WBC COUNT: CPT

## 2018-05-29 PROCEDURE — 36415 COLL VENOUS BLD VENIPUNCTURE: CPT

## 2018-05-29 PROCEDURE — 85027 COMPLETE CBC AUTOMATED: CPT

## 2018-05-29 PROCEDURE — P9037 PLATE PHERES LEUKOREDU IRRAD: HCPCS

## 2018-05-29 PROCEDURE — 97110 THERAPEUTIC EXERCISES: CPT

## 2018-05-29 PROCEDURE — 25000003 PHARM REV CODE 250: Performed by: STUDENT IN AN ORGANIZED HEALTH CARE EDUCATION/TRAINING PROGRAM

## 2018-05-29 PROCEDURE — G8979 MOBILITY GOAL STATUS: HCPCS | Mod: CH

## 2018-05-29 PROCEDURE — 36592 COLLECT BLOOD FROM PICC: CPT

## 2018-05-29 PROCEDURE — 97535 SELF CARE MNGMENT TRAINING: CPT

## 2018-05-29 PROCEDURE — 90832 PSYTX W PT 30 MINUTES: CPT | Mod: HP,HA,, | Performed by: PSYCHOLOGIST

## 2018-05-29 PROCEDURE — 85045 AUTOMATED RETICULOCYTE COUNT: CPT

## 2018-05-29 PROCEDURE — 11300000 HC PEDIATRIC PRIVATE ROOM

## 2018-05-29 PROCEDURE — 86644 CMV ANTIBODY: CPT

## 2018-05-29 PROCEDURE — G8978 MOBILITY CURRENT STATUS: HCPCS | Mod: CI

## 2018-05-29 RX ORDER — HYDROCODONE BITARTRATE AND ACETAMINOPHEN 500; 5 MG/1; MG/1
TABLET ORAL
Status: DISCONTINUED | OUTPATIENT
Start: 2018-05-29 | End: 2018-05-30

## 2018-05-29 RX ORDER — DIPHENHYDRAMINE HCL 25 MG
25 CAPSULE ORAL ONCE
Status: COMPLETED | OUTPATIENT
Start: 2018-05-29 | End: 2018-05-29

## 2018-05-29 RX ADMIN — HYPROMELLOSE 2910 1 DROP: 5 SOLUTION OPHTHALMIC at 09:05

## 2018-05-29 RX ADMIN — DIPHENHYDRAMINE HYDROCHLORIDE 25 MG: 25 CAPSULE ORAL at 10:05

## 2018-05-29 RX ADMIN — Medication 500 UNITS: at 04:05

## 2018-05-29 RX ADMIN — Medication 500 UNITS: at 10:05

## 2018-05-29 RX ADMIN — Medication 500 UNITS: at 12:05

## 2018-05-29 RX ADMIN — ACYCLOVIR 400 MG: 200 CAPSULE ORAL at 10:05

## 2018-05-29 RX ADMIN — POSACONAZOLE 300 MG: 100 TABLET, COATED ORAL at 10:05

## 2018-05-29 RX ADMIN — LINEZOLID 600 MG: 600 INJECTION, SOLUTION INTRAVENOUS at 11:05

## 2018-05-29 RX ADMIN — LINEZOLID 600 MG: 600 INJECTION, SOLUTION INTRAVENOUS at 10:05

## 2018-05-29 RX ADMIN — ACYCLOVIR 400 MG: 200 CAPSULE ORAL at 09:05

## 2018-05-29 RX ADMIN — ACETAMINOPHEN 650 MG: 325 TABLET, FILM COATED ORAL at 10:05

## 2018-05-29 RX ADMIN — ACETAMINOPHEN, DIPHENHYDRAMINE HCL, PHENYLEPHRINE HCL 10 MG: 325; 25; 5 TABLET ORAL at 09:05

## 2018-05-29 NOTE — PROGRESS NOTES
[facility] Pediatric Hospital Medicine  Progress Note    Patient Name: Hema Kuo  MRN: [id number]  Admission Date: 4/30/2018  Hospital Length of Stay: 29  Code Status: Prior   Primary Care Physician: Ann Reyna NP  Principal Problem: Bacteremia    Subjective:     Interval History: VSS, afebrile. NAEO. Some right eye blurry vision, resolved with artifical eye drops.    Scheduled Meds:   acyclovir  400 mg Oral BID    linezolid 600mg/300ml  600 mg Intravenous Q12H    melatonin  10 mg Oral QHS    posaconazole  300 mg Oral Daily    sulfamethoxazole-trimethoprim 800-160mg  1 tablet Oral twice daily on Friday, Saturday, Sunday     Continuous Infusions:  PRN Meds:sodium chloride, acetaminophen, albuterol sulfate, alteplase, artificial tears, heparin, porcine (PF), lidocaine-prilocaine, morphine, olanzapine zydis, ondansetron, polyethylene glycol       Scheduled Meds:   acyclovir  400 mg Oral BID    linezolid 600mg/300ml  600 mg Intravenous Q12H    melatonin  10 mg Oral QHS    posaconazole  300 mg Oral Daily    sulfamethoxazole-trimethoprim 800-160mg  1 tablet Oral twice daily on Friday, Saturday, Sunday     Continuous Infusions:  PRN Meds:sodium chloride, acetaminophen, albuterol sulfate, alteplase, artificial tears, heparin, porcine (PF), lidocaine-prilocaine, morphine, olanzapine zydis, ondansetron, polyethylene glycol    Review of Systems   Constitutional: Negative for fever.     Objective:     Vital Signs (Most Recent):  Temp: 98 °F (36.7 °C) (05/29/18 1121)  Pulse: 91 (05/29/18 1121)  Resp: 18 (05/29/18 1121)  BP: (!) 99/50 (05/29/18 1121)  SpO2: 99 % (05/29/18 1121) Vital Signs (24h Range):  Temp:  [97.7 °F (36.5 °C)-98.3 °F (36.8 °C)] 98 °F (36.7 °C)  Pulse:  [] 91  Resp:  [16-20] 18  SpO2:  [98 %-100 %] 99 %  BP: ()/(50-72) 99/50     Patient Vitals for the past 72 hrs (Last 3 readings):   Weight   05/28/18 2015 81.7 kg (180 lb 1.9 oz)   05/27/18 1955 81 kg (178 lb 9.2 oz)    05/26/18 1940 80.7 kg (177 lb 14.6 oz)     Body mass index is 29.07 kg/m².    Intake/Output - Last 3 Shifts       05/27 0700 - 05/28 0659 05/28 0700 - 05/29 0659 05/29 0700 - 05/30 0659    P.O. 240 720     I.V. (mL/kg) 25 (0.3) 25 (0.3)     Blood   239    IV Piggyback 600 600     Total Intake(mL/kg) 865 (10.7) 1345 (16.5) 239 (2.9)    Net +865 +1345 +239           Urine Occurrence 7 x 6 x     Stool Occurrence 0 x 1 x     Emesis Occurrence 0 x 0 x           Lines/Drains/Airways     Central Venous Catheter Line                 Port A Cath Double Lumen 05/10/18 0900 left subclavian 19 days                Physical Exam  Constitutional: He appears well-developed and well-nourished. Sleeping on exam.   Head: Normocephalic and atraumatic.   Nose: Nose normal.   Mouth/Throat: Mucous membranes are normal.   Eyes: Conjunctivae and lids are normal.   Neck: Normal range of motion. Neck supple.   Cardiovascular: Normal rate and regular rhythm.    Pulmonary/Chest: Effort normal. No stridor. He has no decreased breath sounds. He has no wheezes. He has no rales.   Abdominal: Soft. Bowel sounds are normal. He exhibits no distension. There is no hepatosplenomegaly. There is no guarding.   Musculoskeletal: Normal range of motion. He exhibits no edema.   Skin: Skin is warm and dry. Capillary refill takes less than 2 seconds. No rash noted. He is not diaphoretic. No erythema.  Psych: Cooperative, able to answer questions appropriately.    Significant Labs:  Recent Results (from the past 24 hour(s))   CBC auto differential    Collection Time: 05/29/18  4:35 AM   Result Value Ref Range    WBC 0.56 (LL) 3.90 - 12.70 K/uL    RBC 2.44 (L) 4.60 - 6.20 M/uL    Hemoglobin 7.6 (L) 14.0 - 18.0 g/dL    Hematocrit 20.7 (L) 40.0 - 54.0 %    MCV 85 82 - 98 fL    MCH 31.1 (H) 27.0 - 31.0 pg    MCHC 36.7 (H) 32.0 - 36.0 g/dL    RDW 12.7 11.5 - 14.5 %    Platelets 8 (LL) 150 - 350 K/uL    MPV 8.8 (L) 9.2 - 12.9 fL    Immature Granulocytes CANCELED 0.0  - 0.5 %    Immature Grans (Abs) CANCELED 0.00 - 0.04 K/uL    Lymph # CANCELED 1.0 - 4.8 K/uL    Mono # CANCELED 0.3 - 1.0 K/uL    Eos # CANCELED 0.0 - 0.5 K/uL    Baso # CANCELED 0.00 - 0.20 K/uL    nRBC 0 0 /100 WBC    Gran% 17.6 (L) 38.0 - 73.0 %    Lymph% 80.9 (H) 18.0 - 48.0 %    Mono% 1.5 (L) 4.0 - 15.0 %    Eosinophil% 0.0 0.0 - 8.0 %    Basophil% 0.0 0.0 - 1.9 %    Aniso Slight     Poik Slight     Poly Occasional     Hypo Occasional     Ovalocytes Occasional     Differential Method Manual    Reticulocytes    Collection Time: 05/29/18  4:35 AM   Result Value Ref Range    Retic 0.3 (L) 0.4 - 2.0 %       Significant Imaging:   none    Assessment/Plan:     Neuro   Acute delirium    Resolved. discontinued CEC. Psych has signed off.   - s/p risperdal with improved delirium  - RPR NR  - Olanzapine PRN  - Psych following, rec'd follow up at [facility] ([address] [place] [address]; Phone: [phone]) as outpatient.   - CT head complete on 5/22: unremarkable        ID   * Bacteremia    He was admitted on 4/30/2018 for neutropenia/profund sepsis risk. Bcx from 5/2 growing strep mitis, Bcx 5/19 growing Enterococcus faecium VRE (sens to linezolid) from outer lumen of port. Two subsequent cultures NGTD.     - s/p Cefepime 2g Q8h  - Continue Linezolid 600mg Q12h  - s/p Vancomycin 1250 g Q8h  - Blood cultures NGTD  - monitor fever curve          Immunology/Multi System   Immunosuppressed due to chemotherapy    Immunosuppression 2/2 to chemotherapy:  - Continue acyclovir ppx  - Continue posaconazole    - Continue bactrim QFSaSu ppx  - Continue peridex ppx          Oncology   Neutropenia    - ANC 99 today, waiting on count recovery for discharge  - Advance soft mechanical diet          AML (acute myeloblastic leukemia)    Hema is a 17 yr young man with AML (t 8;21) here for chemotherapy. On Intensification therapy II following [id number] (Arm A).      AML, 8;21 translocation, c-kit mutation  negative: CNS negative. MRD negative after Induction I.   - Treating per COG [id number] (ARM A).  S/p Intensification I and Intensification II started.   - BM biopsy at start of Intensification shows CR.  FISH for t(8;21) negative. Will plan to repeat BM biopsy pending count recovery and clinical improvement.   - BMP M,Th        Antineoplastic chemotherapy induced pancytopenia    Chemotherapy induced neutropenia:   - Hgb goal >7  - Plts goal >10, platelets 8 today--transfuse 1 unit  - Daily CBC and reticulocyte  - CMP EOD  - If bleeding at all, please transfuse platelets x1 unit. No need to check CBC, evaluate clinically.             Other   Insomnia    - melatonin qHs            Anticipated Disposition: Home or Self Care    Ayanna Ramirez DO  Pediatric Hospital Medicine   [facility]

## 2018-05-29 NOTE — ASSESSMENT & PLAN NOTE
Thank you for your consult. I will sign off for inpatient hospital sessions, but will plan to check in with patient through outpatient oncology clinic as needed. Please contact me if you have any additional questions.

## 2018-05-29 NOTE — SUBJECTIVE & OBJECTIVE
Interval History: VSS, afebrile. NAEO. Some right eye blurry vision, resolved with artifical eye drops.     Scheduled Meds:   acyclovir  400 mg Oral BID    linezolid 600mg/300ml  600 mg Intravenous Q12H    melatonin  10 mg Oral QHS    posaconazole  300 mg Oral Daily    sulfamethoxazole-trimethoprim 800-160mg  1 tablet Oral twice daily on Friday, Saturday, Sunday     Continuous Infusions:  PRN Meds:sodium chloride, acetaminophen, albuterol sulfate, alteplase, artificial tears, heparin, porcine (PF), lidocaine-prilocaine, morphine, olanzapine zydis, ondansetron, polyethylene glycol    Review of Systems   Constitutional: Negative for fever.     Objective:     Vital Signs (Most Recent):  Temp: 98 °F (36.7 °C) (05/29/18 1121)  Pulse: 91 (05/29/18 1121)  Resp: 18 (05/29/18 1121)  BP: (!) 99/50 (05/29/18 1121)  SpO2: 99 % (05/29/18 1121) Vital Signs (24h Range):  Temp:  [97.7 °F (36.5 °C)-98.3 °F (36.8 °C)] 98 °F (36.7 °C)  Pulse:  [] 91  Resp:  [16-20] 18  SpO2:  [98 %-100 %] 99 %  BP: ()/(50-72) 99/50     Patient Vitals for the past 72 hrs (Last 3 readings):   Weight   05/28/18 2015 81.7 kg (180 lb 1.9 oz)   05/27/18 1955 81 kg (178 lb 9.2 oz)   05/26/18 1940 80.7 kg (177 lb 14.6 oz)     Body mass index is 29.07 kg/m².    Intake/Output - Last 3 Shifts       05/27 0700 - 05/28 0659 05/28 0700 - 05/29 0659 05/29 0700 - 05/30 0659    P.O. 240 720     I.V. (mL/kg) 25 (0.3) 25 (0.3)     Blood   239    IV Piggyback 600 600     Total Intake(mL/kg) 865 (10.7) 1345 (16.5) 239 (2.9)    Net +865 +1345 +239           Urine Occurrence 7 x 6 x     Stool Occurrence 0 x 1 x     Emesis Occurrence 0 x 0 x           Lines/Drains/Airways     Central Venous Catheter Line                 Port A Cath Double Lumen 05/10/18 0900 left subclavian 19 days                Physical Exam  Constitutional: He appears well-developed and well-nourished. Sleeping on exam.   Head: Normocephalic and atraumatic.   Nose: Nose normal.    Mouth/Throat: Mucous membranes are normal.   Eyes: Conjunctivae and lids are normal.   Neck: Normal range of motion. Neck supple.   Cardiovascular: Normal rate and regular rhythm.    Pulmonary/Chest: Effort normal. No stridor. He has no decreased breath sounds. He has no wheezes. He has no rales.   Abdominal: Soft. Bowel sounds are normal. He exhibits no distension. There is no hepatosplenomegaly. There is no guarding.   Musculoskeletal: Normal range of motion. He exhibits no edema.   Skin: Skin is warm and dry. Capillary refill takes less than 2 seconds. No rash noted. He is not diaphoretic. No erythema.  Psych: Cooperative, able to answer questions appropriately.    Significant Labs:  Recent Results (from the past 24 hour(s))   CBC auto differential    Collection Time: 05/29/18  4:35 AM   Result Value Ref Range    WBC 0.56 (LL) 3.90 - 12.70 K/uL    RBC 2.44 (L) 4.60 - 6.20 M/uL    Hemoglobin 7.6 (L) 14.0 - 18.0 g/dL    Hematocrit 20.7 (L) 40.0 - 54.0 %    MCV 85 82 - 98 fL    MCH 31.1 (H) 27.0 - 31.0 pg    MCHC 36.7 (H) 32.0 - 36.0 g/dL    RDW 12.7 11.5 - 14.5 %    Platelets 8 (LL) 150 - 350 K/uL    MPV 8.8 (L) 9.2 - 12.9 fL    Immature Granulocytes CANCELED 0.0 - 0.5 %    Immature Grans (Abs) CANCELED 0.00 - 0.04 K/uL    Lymph # CANCELED 1.0 - 4.8 K/uL    Mono # CANCELED 0.3 - 1.0 K/uL    Eos # CANCELED 0.0 - 0.5 K/uL    Baso # CANCELED 0.00 - 0.20 K/uL    nRBC 0 0 /100 WBC    Gran% 17.6 (L) 38.0 - 73.0 %    Lymph% 80.9 (H) 18.0 - 48.0 %    Mono% 1.5 (L) 4.0 - 15.0 %    Eosinophil% 0.0 0.0 - 8.0 %    Basophil% 0.0 0.0 - 1.9 %    Aniso Slight     Poik Slight     Poly Occasional     Hypo Occasional     Ovalocytes Occasional     Differential Method Manual    Reticulocytes    Collection Time: 05/29/18  4:35 AM   Result Value Ref Range    Retic 0.3 (L) 0.4 - 2.0 %       Significant Imaging:   none

## 2018-05-29 NOTE — ASSESSMENT & PLAN NOTE
Immunosuppression 2/2 to chemotherapy:  - Continue acyclovir ppx  - Continue posaconazole    - Continue bactrim QFSaSu ppx  - Continue peridex ppx

## 2018-05-29 NOTE — ASSESSMENT & PLAN NOTE
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 On Intensification therapy II following [id number] (Arm A).      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 MRD negative after Induction I.   - Treating per COG [id number] (ARM A).  S/p Intensification I and Intensification II started.   - BM biopsy at start of Intensification shows CR.  FISH for t(8;21) negative. Will plan to repeat BM biopsy pending count recovery and clinical improvement.   - BMP M,Th

## 2018-05-29 NOTE — PT/OT/SLP PROGRESS
Physical Therapy Treatment    Patient Name:  Hema Kuo   MRN:  [id number]    Recommendations:     Discharge Recommendations:  home health PT   Discharge Equipment Recommendations: none   Barriers to discharge: None    Assessment:     Hema Kuo is a 17 y.o. male admitted with a medical diagnosis of Bacteremia.  He presents with the following impairments/functional limitations:  weakness, impaired balance, impaired self care skills. Patient demonstrated excellent participation despite weakness. Level of assistance required supervision. Able to tolerate stair gait training and sit to stand therex from swiss ball. Recommending HHPT. Skilled physical therapy is medically necessary to address the above impairments in order to return the patient back to their previous level of function.     Rehab Prognosis:  good; patient would benefit from acute skilled PT services to address these deficits and reach maximum level of function.      Recent Surgery: * No surgery found *      Plan:     During this hospitalization, patient to be seen 6 x/week to address the above listed problems via gait training, therapeutic activities, therapeutic exercises, neuromuscular re-education  · Plan of Care Expires:  06/09/18   Plan of Care Reviewed with: patient, mother    Subjective     Patient found in right SL upon PT entry to room, agreeable to treatment.      Chief Complaint: none  Patient comments/goals: to return home at [facility]  Pain/Comfort:  · Pain Rating 1: 0/10    Patients cultural, spiritual, Yazidi conflicts given the current situation: none stated    Objective:     Patient found with: central line     General Precautions: Standard, fall, neutropenic   Orthopedic Precautions:N/A   Braces: N/A     Functional Mobility:  · Bed Mobility:     · Rolling Right: independence  · Supine to Sit: independence  · Transfers:     · Sit to Stand:  independence with no AD  · Gait: 100 feet with supervision and wide base of support  · Stair gait  training x 3 flights with no HR and close SBA      AM-PAC 6 CLICK MOBILITY  Turning over in bed (including adjusting bedclothes, sheets and blankets)?: 4  Sitting down on and standing up from a chair with arms (e.g., wheelchair, bedside commode, etc.): 4  Moving from lying on back to sitting on the side of the bed?: 4  Moving to and from a bed to a chair (including a wheelchair)?: 4  Need to walk in hospital room?: 4  Climbing 3-5 steps with a railing?: 3  Total Score: 23       Therapeutic Activities and Exercises:  PT arrived to patient's room to find patient resting quietly; agreeable to PT session. Patient performed mobility as above with non-skid socks in place.    · Patient Education:   · HEP therex program   · Calf raises, gait training, and mini squats with supervision  · PT POC  · Therapeutic exercise:  · Sit to stand from low seated swiss ball x 10 reps x 2 trials  · Stair gait training for endurance and strengthening with rest breaks as needed  · Education to complete squats and calf raises with parental supervision later  Bedside table in front of patient and area set up for function, convenience, and safety. RN aware of patient's mobility needs and status. Questions/concerns addressed within PT scope of practice; patient and family with no further questions.     Patient left HOB elevated with all lines intact, call button in reach and nurse/mother present..    GOALS:    Physical Therapy Goals        Problem: Physical Therapy Goal    Goal Priority Disciplines Outcome Goal Variances Interventions   Physical Therapy Goal     PT/OT, PT Ongoing (interventions implemented as appropriate)     Description:  Goals re-assessed by SPT on   Continue goals x1 week (18)    Patient will increase functional independence with mobility by performin. Supine to sit with [place] - MET ()  2. Sit to supine with [place] - MET ()  3. Sit to stand transfer with [place] - MET ()  4. Bed  to chair transfer with Stand-by Assistance using least restrictive AD - MET (5/25)  5. Gait  x 500 feet with Stand-by Assistance using least restrictive Ad - MET (5/17)  6. Lower extremity exercise program x30 reps per handout, with supervision - Not met         Added:  7. Sit to stand from swiss ball with supervision x 20 - met 5/29  8. Tandem stance x 5 minutes with UE activity and no LOB - not met   9. Stair gait x 9 steps with 1HR and close SBA - met 5/29                      Time Tracking:     PT Received On: 05/29/18  PT Start Time: 1048     PT Stop Time: 1103  PT Total Time (min): 15 min     Billable Minutes: Therapeutic Exercise 15    Treatment Type: Treatment  PT/PTA: PT           Gianna Jacobsen, PT  05/29/2018

## 2018-05-29 NOTE — PLAN OF CARE
Problem: Occupational Therapy Goal  Goal: Occupational Therapy Goal  Goals to be met by: 5/10/2018    Patient will increase functional independence with ADLs by performing:    Feeding with [place]. Goal met  UE Dressing with [place].  LE Dressing with Minimal Assistance.  Goal met  Grooming while standing with Minimal Assistance. Goal met  Toileting from toilet with SBA for safety with transfer.   Bathing from sitting position with SBA for safety.  Toilet transfer to toilet with supervision Assistance.         D/C acute OT services     Comments: Michael Baugh OTR/L  5/29/2018

## 2018-05-29 NOTE — PLAN OF CARE
05/29/18 1441   Discharge Reassessment   Assessment Type Discharge Planning Reassessment   Provided patient/caregiver education on the expected discharge date and the discharge plan Yes   Do you have any problems affording any of your prescribed medications? Yes   Discharge Plan A Home with family   Discharge Plan B Home with family   Patient choice form signed by patient/caregiver N/A   Can the patient answer the patient profile reliably? Yes, cognitively intact   How does the patient rate their overall health at the present time? Fair   Dc home pending count recovery. No dc needs at this time. Will follow.

## 2018-05-29 NOTE — SUBJECTIVE & OBJECTIVE
Therapeutic Intervention: Met with patient and mother.  Inpatient/Partial Hospital - Supportive psychotherapy 30 min - CPT Code 26508    Chief Complaint/Reason for Encounter: psychosocial factors associated with cancer     Interval History and Content of Current Session: Patient presents with improved affect today, even as compared to last session on 5/24.  He noted that he has been making improvements with physical therapy and is getting out of bed more often.  He also reported that he has been feeling less anxious and uncomfortable when he is lying in bed, further supporting the notion that he was having an acute stress reaction to the current situation (as opposed to what he thought was PTSD).  Discussed a plan for follow-up with this writer through outpatient oncology clinic; both he and his mother agreed.    Risk Parameters:  Patient reports no suicidal ideation  Patient reports no homicidal ideation  Patient reports no self-injurious behavior  Patient reports no violent behavior    Verbal Deficits: None    Patient's response to intervention:  The patient's response to intervention is accepting.    Progress toward goals and other mental status changes:  The patient's progress toward goals is fair .

## 2018-05-29 NOTE — PLAN OF CARE
Problem: Patient Care Overview  Goal: Plan of Care Review  Outcome: Ongoing (interventions implemented as appropriate)  Pt VSS throughout shift, afebrile.  Pt in good spirits, went for walk with mom off the unit.  Linezolid infused per order.  Labs drawn this morning.  POC discussed with pt and mom; understanding verbalized and all questions answered.  Will continue to monitor.

## 2018-05-29 NOTE — ASSESSMENT & PLAN NOTE
He was admitted on 4/30/2018 for neutropenia/profund sepsis risk. Bcx from 5/2 growing strep mitis, Bcx 5/19 growing Enterococcus faecium VRE (sens to linezolid) from outer lumen of port. Two subsequent cultures NGTD.     - s/p Cefepime 2g Q8h  - Continue Linezolid 600mg Q12h  - s/p Vancomycin 1250 g Q8h  - Blood cultures NGTD  - monitor fever curve

## 2018-05-29 NOTE — ASSESSMENT & PLAN NOTE
Chemotherapy induced neutropenia:   - Hgb goal >7  - Plts goal >10, platelets 8 today--transfuse 1 unit  - Daily CBC and reticulocyte  - CMP EOD  - If bleeding at all, please transfuse platelets x1 unit. No need to check CBC, evaluate clinically.

## 2018-05-29 NOTE — PT/OT/SLP PROGRESS
Occupational Therapy   Treatment    Name: Hema Kuo  MRN: [id number]  Admitting Diagnosis:  Bacteremia       Recommendations:     Discharge Recommendations: home health PT  Discharge Equipment Recommendations:  none  Barriers to discharge:  None    Subjective     Communicated with: RN prior to session.  Pain/Comfort:  · Pain Rating 1: 0/10  · Pain Rating Post-Intervention 1: 0/10    Patients cultural, spiritual, Roman Catholic conflicts given the current situation: none    Objective:     Patient found with: central line    General Precautions: Standard, fall, neutropenic   Orthopedic Precautions:N/A   Braces: N/A     Occupational Performance:    Bed Mobility:    · Patient completed Rolling/Turning to Right with independence  · Patient completed Scooting/Bridging with independence  · Patient completed Supine to Sit with independence     Functional Mobility/Transfers:  · Patient completed Sit <> Stand Transfer with independence  with  no assistive device   · Functional Mobility: Pt ambulated ~300 ft indep.     Activities of Daily Living:  · Feeding:  independence used fork and knife to cut food then performed motor feeding pattern   · Grooming: independence oral hygiene   · LB Dressing: independence donned/doffed socks and slippers     Patient left up in chair with all lines intact, call button in reach and mother present    [facility] 6 Click:  AMPA Total Score: 24    Treatment & Education:  Pt and pt's mother were educated on POC.   Education:    Assessment:     Hema Kuo is a 17 y.o. male with a medical diagnosis of Bacteremia.  He presents with Impairments listed below. Pt is not currently displaying a need for acute OT services. D/C acute OT services and recommend pt D/C home w/ OP PT.  Performance deficits affecting function are  .      Rehab Prognosis:  good; patient would benefit from acute skilled OT services to address these deficits and reach maximum level of function.       Plan:     Patient to be seen  (D/C acute  OT services) to address the above listed problems via self-care/home management, therapeutic activities, therapeutic exercises, neuromuscular re-education  · Plan of Care Expires:    · Plan of Care Reviewed with: patient, mother    This Plan of care has been discussed with the patient who was involved in its development and understands and is in agreement with the identified goals and treatment plan    GOALS:    Occupational Therapy Goals     Not on file          Multidisciplinary Problems (Resolved)        Problem: Occupational Therapy Goal    Goal Priority Disciplines Outcome Interventions   Occupational Therapy Goal   (Resolved)     OT, PT/OT Outcome(s) achieved    Description:  Goals to be met by: 5/10/2018    Patient will increase functional independence with ADLs by performing:    Feeding with [place]. Goal met  UE Dressing with [place].  LE Dressing with Minimal Assistance.  Goal met  Grooming while standing with Minimal Assistance. Goal met  Toileting from toilet with SBA for safety with transfer.   Bathing from sitting position with SBA for safety.  Toilet transfer to toilet with supervision Assistance.                          Time Tracking:     OT Date of Treatment: 05/29/18  OT Start Time: 0943  OT Stop Time: 0955  OT Total Time (min): 12 min    Billable Minutes:Self Care/Home Management 8 minutes  Therapeutic Activity 4 minutes    Michael Baugh, OT  5/29/2018

## 2018-05-29 NOTE — ASSESSMENT & PLAN NOTE
Resolved. discontinued CEC. Psych has signed off.   - s/p risperdal with improved delirium  - RPR NR  - Olanzapine PRN  - Psych following, rec'd follow up at [facility] ([address] [place] [address]; Phone: [phone]) as outpatient.   - CT head complete on 5/22: unremarkable

## 2018-05-29 NOTE — PLAN OF CARE
Problem: Physical Therapy Goal  Goal: Physical Therapy Goal  Goals re-assessed by SPT on   Continue goals x1 week (18)    Patient will increase functional independence with mobility by performin. Supine to sit with [place] - MET ()  2. Sit to supine with [place] - MET ()  3. Sit to stand transfer with [place] - MET ()  4. Bed to chair transfer with Stand-by Assistance using least restrictive AD - MET ()  5. Gait  x 500 feet with Stand-by Assistance using least restrictive Ad - MET ()  6. Lower extremity exercise program x30 reps per handout, with supervision - Not met         Added:  7. Sit to stand from swiss ball with supervision x 20 - met   8. Tandem stance x 5 minutes with UE activity and no LOB - not met   9. Stair gait x 9 steps with 1HR and close SBA - met     Outcome: Ongoing (interventions implemented as appropriate)  Goals updated and appropriate to address patient's current needs.     Gianna Jacobsen PT, DPT  2018  [phone]

## 2018-05-29 NOTE — PROGRESS NOTES
[facility]  Psychology  Progress Note  Individual Psychotherapy (PhD/LCSW)    Patient Name: Hema Kuo  MRN: [id number]    Patient Class: IP- Inpatient  Admission Date: 4/30/2018  Hospital Length of Stay: 29 days  Attending Physician: Sanford Bucio MD  Primary Care Provider: Ann Reyna NP    Therapeutic Intervention: Met with patient and mother.  Inpatient/Partial Hospital - Supportive psychotherapy 30 min - CPT Code 76840    Chief Complaint/Reason for Encounter: psychosocial factors associated with cancer     Interval History and Content of Current Session: Patient presents with improved affect today, even as compared to last session on 5/24.  He noted that he has been making improvements with physical therapy and is getting out of bed more often.  He also reported that he has been feeling less anxious and uncomfortable when he is lying in bed, further supporting the notion that he was having an acute stress reaction to the current situation (as opposed to what he thought was PTSD).  Discussed a plan for follow-up with this writer through outpatient oncology clinic; both he and his mother agreed.    Risk Parameters:  Patient reports no suicidal ideation  Patient reports no homicidal ideation  Patient reports no self-injurious behavior  Patient reports no violent behavior    Verbal Deficits: None    Patient's response to intervention:  The patient's response to intervention is accepting.    Progress toward goals and other mental status changes:  The patient's progress toward goals is fair .    Diagnostic Impression - Plan:     Psychological factor affecting cancer    Thank you for your consult. I will sign off for inpatient hospital sessions, but will plan to check in with patient through outpatient oncology clinic as needed. Please contact me if you have any additional questions.                Treatment Plan:  · Target symptoms: psychosocial factors affecting cancer  · Why chosen  therapy is appropriate versus another modality: relevant to diagnosis  · Outcome monitoring methods: self-report, observation, feedback from family, feedback from medical team  · Therapeutic intervention type: supportive psychotherapy    Plan:  Follow up through outpatient oncology clinic as needed      Length of Service (minutes): 30    Francia Evans, PhD  Psychology  [facility]

## 2018-05-29 NOTE — PLAN OF CARE
Problem: Patient Care Overview  Goal: Plan of Care Review  POC discussed w pt and mom, questions and concerns addressed. Pt stable, NAD noted, afebrile. Intake and output good, no BM this shift. Premedicated for plt with tylenol and benadryl, transfusion given in am. PAC drsg reinforced, blood return noted to both lumen, heparin locked when no in use for abx admin. Mom at bedside throughout shift. Pt napped midday. Safety maintained, will cont to monitor.

## 2018-05-30 LAB
BASOPHILS # BLD AUTO: 0 K/UL
BASOPHILS NFR BLD: 0 %
BLD PROD TYP BPU: NORMAL
BLD PROD TYP BPU: NORMAL
BLOOD UNIT EXPIRATION DATE: NORMAL
BLOOD UNIT EXPIRATION DATE: NORMAL
BLOOD UNIT TYPE CODE: 6200
BLOOD UNIT TYPE CODE: 6200
BLOOD UNIT TYPE: NORMAL
BLOOD UNIT TYPE: NORMAL
CODING SYSTEM: NORMAL
CODING SYSTEM: NORMAL
DIFFERENTIAL METHOD: ABNORMAL
DISPENSE STATUS: NORMAL
DISPENSE STATUS: NORMAL
EOSINOPHIL # BLD AUTO: 0 K/UL
EOSINOPHIL NFR BLD: 0 %
ERYTHROCYTE [DISTWIDTH] IN BLOOD BY AUTOMATED COUNT: 12.5 %
HCT VFR BLD AUTO: 19.6 %
HGB BLD-MCNC: 6.9 G/DL
IMM GRANULOCYTES # BLD AUTO: 0.02 K/UL
IMM GRANULOCYTES NFR BLD AUTO: 3.2 %
LYMPHOCYTES # BLD AUTO: 0.4 K/UL
LYMPHOCYTES NFR BLD: 60.3 %
MCH RBC QN AUTO: 30.3 PG
MCHC RBC AUTO-ENTMCNC: 35.2 G/DL
MCV RBC AUTO: 86 FL
MONOCYTES # BLD AUTO: 0.1 K/UL
MONOCYTES NFR BLD: 15.9 %
NEUTROPHILS # BLD AUTO: 0.1 K/UL
NEUTROPHILS NFR BLD: 20.6 %
NRBC BLD-RTO: 0 /100 WBC
NUM UNITS TRANS PACKED RBC: NORMAL
NUM UNITS TRANS PACKED RBC: NORMAL
PLATELET # BLD AUTO: 26 K/UL
PLATELET BLD QL SMEAR: ABNORMAL
PMV BLD AUTO: 11.7 FL
RBC # BLD AUTO: 2.28 M/UL
RETICS/RBC NFR AUTO: 0.1 %
WBC # BLD AUTO: 0.63 K/UL

## 2018-05-30 PROCEDURE — 25000242 PHARM REV CODE 250 ALT 637 W/ HCPCS: Performed by: STUDENT IN AN ORGANIZED HEALTH CARE EDUCATION/TRAINING PROGRAM

## 2018-05-30 PROCEDURE — 94761 N-INVAS EAR/PLS OXIMETRY MLT: CPT

## 2018-05-30 PROCEDURE — 11300000 HC PEDIATRIC PRIVATE ROOM

## 2018-05-30 PROCEDURE — P9038 RBC IRRADIATED: HCPCS

## 2018-05-30 PROCEDURE — 25000003 PHARM REV CODE 250: Performed by: STUDENT IN AN ORGANIZED HEALTH CARE EDUCATION/TRAINING PROGRAM

## 2018-05-30 PROCEDURE — 27201040 HC RC 50 FILTER

## 2018-05-30 PROCEDURE — 99233 SBSQ HOSP IP/OBS HIGH 50: CPT | Mod: ,,, | Performed by: PEDIATRICS

## 2018-05-30 PROCEDURE — 36415 COLL VENOUS BLD VENIPUNCTURE: CPT

## 2018-05-30 PROCEDURE — 36592 COLLECT BLOOD FROM PICC: CPT

## 2018-05-30 PROCEDURE — 63600175 PHARM REV CODE 636 W HCPCS: Performed by: STUDENT IN AN ORGANIZED HEALTH CARE EDUCATION/TRAINING PROGRAM

## 2018-05-30 PROCEDURE — 85025 COMPLETE CBC W/AUTO DIFF WBC: CPT

## 2018-05-30 PROCEDURE — 63600175 PHARM REV CODE 636 W HCPCS: Performed by: PEDIATRICS

## 2018-05-30 PROCEDURE — 85045 AUTOMATED RETICULOCYTE COUNT: CPT

## 2018-05-30 PROCEDURE — 94640 AIRWAY INHALATION TREATMENT: CPT

## 2018-05-30 RX ORDER — HYDROCODONE BITARTRATE AND ACETAMINOPHEN 500; 5 MG/1; MG/1
TABLET ORAL
Status: DISCONTINUED | OUTPATIENT
Start: 2018-05-30 | End: 2018-05-30

## 2018-05-30 RX ORDER — DIPHENHYDRAMINE HCL 25 MG
25 CAPSULE ORAL
Status: DISCONTINUED | OUTPATIENT
Start: 2018-05-30 | End: 2018-05-31 | Stop reason: HOSPADM

## 2018-05-30 RX ORDER — PENTAMIDINE ISETHIONATE 300 MG/300MG
300 INHALANT RESPIRATORY (INHALATION) ONCE
Status: COMPLETED | OUTPATIENT
Start: 2018-05-30 | End: 2018-05-30

## 2018-05-30 RX ADMIN — ACYCLOVIR 400 MG: 200 CAPSULE ORAL at 10:05

## 2018-05-30 RX ADMIN — LINEZOLID 600 MG: 600 INJECTION, SOLUTION INTRAVENOUS at 10:05

## 2018-05-30 RX ADMIN — POSACONAZOLE 300 MG: 100 TABLET, COATED ORAL at 10:05

## 2018-05-30 RX ADMIN — ACETAMINOPHEN, DIPHENHYDRAMINE HCL, PHENYLEPHRINE HCL 10 MG: 325; 25; 5 TABLET ORAL at 10:05

## 2018-05-30 RX ADMIN — Medication 500 UNITS: at 04:05

## 2018-05-30 RX ADMIN — ACETAMINOPHEN 650 MG: 325 TABLET, FILM COATED ORAL at 03:05

## 2018-05-30 RX ADMIN — ALBUTEROL SULFATE 5 MG: 2.5 SOLUTION RESPIRATORY (INHALATION) at 02:05

## 2018-05-30 RX ADMIN — PENTAMIDINE ISETHIONATE 300 MG: 300 INHALANT RESPIRATORY (INHALATION) at 02:05

## 2018-05-30 RX ADMIN — DIPHENHYDRAMINE HYDROCHLORIDE 25 MG: 25 CAPSULE ORAL at 03:05

## 2018-05-30 RX ADMIN — LINEZOLID 600 MG: 600 INJECTION, SOLUTION INTRAVENOUS at 11:05

## 2018-05-30 NOTE — ASSESSMENT & PLAN NOTE
Chemotherapy induced neutropenia:   - Hgb goal >7, transfuse 2 units PRBC today.  - Plts goal >10, tranfuse 1 unit platelets tomorrow.  - Daily CBC and reticulocyte  - CMP EOD  - If bleeding at all, please transfuse platelets x1 unit. No need to check CBC, evaluate clinically.

## 2018-05-30 NOTE — PLAN OF CARE
Problem: Physical Therapy Goal  Goal: Physical Therapy Goal  Goals re-assessed by SPT on   Continue goals x1 week (18)    Patient will increase functional independence with mobility by performin. Supine to sit with [place] - MET ()  2. Sit to supine with [place] - MET ()  3. Sit to stand transfer with [place] - MET ()  4. Bed to chair transfer with Stand-by Assistance using least restrictive AD - MET ()  5. Gait  x 500 feet with Stand-by Assistance using least restrictive Ad - MET ()  6. Lower extremity exercise program x30 reps per handout, with supervision - Not met         Added:  7. Sit to stand from swiss ball with supervision x 20 - met   8. Tandem stance x 5 minutes with UE activity and no LOB - not met   9. Stair gait x 9 steps with 1HR and close SBA - met      Outcome: Outcome(s) achieved Date Met: 18  No new goals established; discharge from acute care PT services. Rec: HHPT.     Gianna Jacobsen PT, DPT  2018  [phone]

## 2018-05-30 NOTE — ASSESSMENT & PLAN NOTE
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

## 2018-05-30 NOTE — PLAN OF CARE
"Problem: Patient Care Overview  Goal: Plan of Care Review  Outcome: Ongoing (interventions implemented as appropriate)  Pt stable throughout shift, afebrile. PRBC administered, one unit still infusing, no s/s of reaction, premedicated with benadryl and tylenol. Pt to receive 1 unit plt tonight at time of a.m. labs and should be premedicated w benadryl and tylenol per Dr. Ramirez. Pt received pentamidine tx per respiratory, c/o "wheezing and difficulty breathing." Albuterol tx given for difficulty breathing, full relief obtained. Mom at bedside, verbalized understanding of poc, will continue to monitor.      "

## 2018-05-30 NOTE — ASSESSMENT & PLAN NOTE
Resolved. discontinued CEC. Psych has signed off.   - s/p risperdal with improved delirium  - RPR NR  - Olanzapine PRN  - Psych following, rec'd follow up at [facility] ([address] [place] [address]; Phone: [phone]) as outpatient.   - CT head complete on 5/22: unremarkable

## 2018-05-30 NOTE — PROGRESS NOTES
[facility] Pediatric Hospital Medicine  Progress Note    Patient Name: Hema Kuo  MRN: [id number]  Admission Date: 4/30/2018  Hospital Length of Stay: 30  Code Status: Prior   Primary Care Physician: Ann Reyna NP  Principal Problem: Bacteremia    Subjective:     Interval History: VSS, afebrile. NAEO.     Scheduled Meds:   acyclovir  400 mg Oral BID    linezolid 600mg/300ml  600 mg Intravenous Q12H    melatonin  10 mg Oral QHS    pentamidine  300 mg Inhalation Once    posaconazole  300 mg Oral Daily     Continuous Infusions:  PRN Meds:acetaminophen, albuterol sulfate, alteplase, artificial tears, heparin, porcine (PF), lidocaine-prilocaine, morphine, olanzapine zydis, ondansetron, polyethylene glycol    Scheduled Meds:   acyclovir  400 mg Oral BID    linezolid 600mg/300ml  600 mg Intravenous Q12H    melatonin  10 mg Oral QHS    pentamidine  300 mg Inhalation Once    posaconazole  300 mg Oral Daily     Continuous Infusions:  PRN Meds:acetaminophen, albuterol sulfate, alteplase, artificial tears, heparin, porcine (PF), lidocaine-prilocaine, morphine, olanzapine zydis, ondansetron, polyethylene glycol    Review of Systems   Constitutional: Negative for fever.     Objective:     Vital Signs (Most Recent):  Temp: 98 °F (36.7 °C) (05/30/18 1140)  Pulse: 92 (05/30/18 1140)  Resp: 18 (05/30/18 0846)  BP: (!) 110/56 (05/30/18 1140)  SpO2: 99 % (05/30/18 1140) Vital Signs (24h Range):  Temp:  [97.6 °F (36.4 °C)-98.4 °F (36.9 °C)] 98 °F (36.7 °C)  Pulse:  [] 92  Resp:  [16-20] 18  SpO2:  [99 %-100 %] 99 %  BP: (103-128)/(56-67) 110/56     Patient Vitals for the past 72 hrs (Last 3 readings):   Weight   05/29/18 1950 80.1 kg (176 lb 9.4 oz)   05/28/18 2015 81.7 kg (180 lb 1.9 oz)   05/27/18 1955 81 kg (178 lb 9.2 oz)     Body mass index is 28.5 kg/m².    Intake/Output - Last 3 Shifts       05/28 0700 - 05/29 0659 05/29 0700 - 05/30 0659 05/30 0700 - 05/31 0659    P.O. 720 2500     I.V.  (mL/kg) 25 (0.3)      Blood  588.7     IV Piggyback 600 600     Total Intake(mL/kg) 1345 (16.5) 3688.7 (46.1)     Net +1345 +3688.7             Urine Occurrence 6 x 7 x     Stool Occurrence 1 x 1 x     Emesis Occurrence 0 x            Lines/Drains/Airways     Central Venous Catheter Line                 Port A Cath Double Lumen 05/10/18 0900 left subclavian 20 days                Physical Exam  Constitutional: He appears well-developed and well-nourished. Sleeping on exam.   Head: Normocephalic and atraumatic.   Nose: Nose normal.   Mouth/Throat: Mucous membranes are normal.   Eyes: Conjunctivae and lids are normal.   Neck: Normal range of motion. Neck supple.   Cardiovascular: Normal rate and regular rhythm.    Pulmonary/Chest: Effort normal. No stridor. He has no decreased breath sounds. He has no wheezes. He has no rales.   Abdominal: Soft. Bowel sounds are normal. He exhibits no distension. There is no hepatosplenomegaly. There is no guarding.   Musculoskeletal: Normal range of motion. He exhibits no edema.   Skin: Skin is warm and dry. Capillary refill takes less than 2 seconds. No rash noted. He is not diaphoretic. No erythema.  Psych: Cooperative, able to answer questions appropriately.    Significant Labs:  Recent Results (from the past 24 hour(s))   CBC auto differential    Collection Time: 05/30/18  5:00 AM   Result Value Ref Range    WBC 0.63 (LL) 3.90 - 12.70 K/uL    RBC 2.28 (L) 4.60 - 6.20 M/uL    Hemoglobin 6.9 (L) 14.0 - 18.0 g/dL    Hematocrit 19.6 (LL) 40.0 - 54.0 %    MCV 86 82 - 98 fL    MCH 30.3 27.0 - 31.0 pg    MCHC 35.2 32.0 - 36.0 g/dL    RDW 12.5 11.5 - 14.5 %    Platelets 26 (LL) 150 - 350 K/uL    MPV 11.7 9.2 - 12.9 fL    Immature Granulocytes 3.2 (H) 0.0 - 0.5 %    Gran # (ANC) 0.1 (L) 1.8 - 7.7 K/uL    Immature Grans (Abs) 0.02 0.00 - 0.04 K/uL    Lymph # 0.4 (L) 1.0 - 4.8 K/uL    Mono # 0.1 (L) 0.3 - 1.0 K/uL    Eos # 0.0 0.0 - 0.5 K/uL    Baso # 0.00 0.00 - 0.20 K/uL    nRBC 0 0 /100  WBC    Gran% 20.6 (L) 38.0 - 73.0 %    Lymph% 60.3 (H) 18.0 - 48.0 %    Mono% 15.9 (H) 4.0 - 15.0 %    Eosinophil% 0.0 0.0 - 8.0 %    Basophil% 0.0 0.0 - 1.9 %    Platelet Estimate Decreased (A)     Differential Method Automated    Reticulocytes    Collection Time: 05/30/18  5:00 AM   Result Value Ref Range    Retic 0.1 (L) 0.4 - 2.0 %       Significant Imaging:   none    Assessment/Plan:     Neuro   Acute delirium    Resolved. discontinued CEC. Psych has signed off.   - s/p risperdal with improved delirium  - RPR NR  - Olanzapine PRN  - Psych following, rec'd follow up at [facility] ([address] [place] [id number]; Phone: [phone]) as outpatient.   - CT head complete on 5/22: unremarkable        ID   * Bacteremia    He was admitted on 4/30/2018 for neutropenia/profund sepsis risk. Bcx from 5/2 growing strep mitis, Bcx 5/19 growing Enterococcus faecium VRE (sens to linezolid) from outer lumen of port. Two subsequent cultures NGTD.     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 On Intensification therapy II following [id number] (Arm A).      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 MRD negative after Induction I.   - Treating per COG [id number] (ARM A).  S/p Intensification I and Intensification II started.   - BM biopsy at start of Intensification shows CR.  FISH for t(8;21) negative. Will plan  to repeat BM biopsy pending count recovery and clinical improvement.   - BMP M,Th        Antineoplastic chemotherapy induced pancytopenia    Chemotherapy induced neutropenia:   - Hgb goal >7, transfuse 2 units PRBC today.  - Plts goal >10, tranfuse 1 unit platelets tomorrow.  - Daily CBC and reticulocyte  - CMP EOD  - If bleeding at all, please transfuse platelets x1 unit. No need to check CBC, evaluate clinically.             Psychological factor affecting cancer    - Francia Evans to follow as outpatient        Other   Insomnia    - melatonin qHs            Follow-up Information     [facility] . Schedule an appointment as soon as possible for a visit in 1 week.    Contact information:  [address] [place] [address];   Phone: [phone]                 Anticipated Disposition: Home or Self Care    Ayanna Ramirez DO  Pediatric Hospital Medicine   [facility]

## 2018-05-30 NOTE — PLAN OF CARE
Pt will be discharging tomorrow. ALEXANDRU provided pt's mom with an application for a temporary handi-capped parking pass signed by MD. ALEXANDRU notified Vidya ([id number]) with [facility] that MD put in order for wheelchair. Vidya stated that the wheelchair will be delivered tomorrow. MD is giving pt a prescription for outpatient PT. Pt and family have no further needs at this time.

## 2018-05-30 NOTE — ASSESSMENT & PLAN NOTE
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 On Intensification therapy II following [id number] ([facility] A).      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 MRD negative after Induction I.   - Treating per COG [id number] (ARM A).  S/p Intensification I and Intensification II started.   - BM biopsy at start of Intensification shows CR.  FISH for t(8;21) negative. Will plan to repeat BM biopsy pending count recovery and clinical improvement.   - BMP M,Th

## 2018-05-30 NOTE — PLAN OF CARE
Problem: Patient Care Overview  Goal: Plan of Care Review  POC discussed w pt and mom, questions and concerns addressed. Pt stable, NAD noted, afebrile thus far this shift. Pt denies pain. Intake and output good. PAC drsg cdi, reinforced, both lumen hepain locked when not in use for iv abx. Pt to rec PRBC x2 today and plt x1 in am. Mom at bedside throughout shift. Pt pleasant and engaging with staff and visitors. Safety maintained, will cont to monitor.

## 2018-05-30 NOTE — PLAN OF CARE
Problem: Patient Care Overview  Goal: Plan of Care Review  Outcome: Ongoing (interventions implemented as appropriate)  Pt VSS throughout shift, afebrile.  Pt c/o bad dreams when sleeping that wake him up.  Labs drawn this morning.  POC discussed with mom and pt; understanding verbalized and all questions answered.  Will continue to monitor.

## 2018-05-30 NOTE — PT/OT/SLP DISCHARGE
Physical Therapy Discharge Summary    Name: Hema Kuo  MRN: [id number]   Principal Problem: Bacteremia     Patient Discharged from acute Physical Therapy on 18.  Please refer to prior PT noted date on 18 for functional status.     Assessment:     Patient appropriate for care in another setting.    Objective:     GOALS:    Physical Therapy Goals     Not on file          Multidisciplinary Problems (Resolved)        Problem: Physical Therapy Goal    Goal Priority Disciplines Outcome Goal Variances Interventions   Physical Therapy Goal   (Resolved)     PT/OT, PT Outcome(s) achieved     Description:  Goals re-assessed by SPT on   Continue goals x1 week (18)    Patient will increase functional independence with mobility by performin. Supine to sit with [employer] - MET ()  2. Sit to supine with [employer] - MET ()  3. Sit to stand transfer with [employer] - MET ()  4. Bed to chair transfer with Stand-by Assistance using least restrictive AD - MET ()  5. Gait  x 500 feet with Stand-by Assistance using least restrictive Ad - MET ()  6. Lower extremity exercise program x30 reps per handout, with supervision - Not met         Added:  7. Sit to stand from swiss ball with supervision x 20 - met   8. Tandem stance x 5 minutes with UE activity and no LOB - not met   9. Stair gait x 9 steps with 1HR and close SBA - met                       Reasons for Discontinuation of Therapy Services  Transfer to alternate level of care.      Plan:     Patient Discharged to: Home with Home Health Service.    Gianna Jacobsen, PT  2018

## 2018-05-30 NOTE — SUBJECTIVE & OBJECTIVE
Interval History: VSS, afebrile. NAEO.     Scheduled Meds:   acyclovir  400 mg Oral BID    linezolid 600mg/300ml  600 mg Intravenous Q12H    melatonin  10 mg Oral QHS    pentamidine  300 mg Inhalation Once    posaconazole  300 mg Oral Daily     Continuous Infusions:  PRN Meds:acetaminophen, albuterol sulfate, alteplase, artificial tears, heparin, porcine (PF), lidocaine-prilocaine, morphine, olanzapine zydis, ondansetron, polyethylene glycol    Review of Systems   Constitutional: Negative for fever.     Objective:     Vital Signs (Most Recent):  Temp: 98 °F (36.7 °C) (05/30/18 1140)  Pulse: 92 (05/30/18 1140)  Resp: 18 (05/30/18 0846)  BP: (!) 110/56 (05/30/18 1140)  SpO2: 99 % (05/30/18 1140) Vital Signs (24h Range):  Temp:  [97.6 °F (36.4 °C)-98.4 °F (36.9 °C)] 98 °F (36.7 °C)  Pulse:  [] 92  Resp:  [16-20] 18  SpO2:  [99 %-100 %] 99 %  BP: (103-128)/(56-67) 110/56     Patient Vitals for the past 72 hrs (Last 3 readings):   Weight   05/29/18 1950 80.1 kg (176 lb 9.4 oz)   05/28/18 2015 81.7 kg (180 lb 1.9 oz)   05/27/18 1955 81 kg (178 lb 9.2 oz)     Body mass index is 28.5 kg/m².    Intake/Output - Last 3 Shifts       05/28 0700 - 05/29 0659 05/29 0700 - 05/30 0659 05/30 0700 - 05/31 0659    P.O. 720 2500     I.V. (mL/kg) 25 (0.3)      Blood  588.7     IV Piggyback 600 600     Total Intake(mL/kg) 1345 (16.5) 3688.7 (46.1)     Net +1345 +3688.7             Urine Occurrence 6 x 7 x     Stool Occurrence 1 x 1 x     Emesis Occurrence 0 x            Lines/Drains/Airways     Central Venous Catheter Line                 Port A Cath Double Lumen 05/10/18 0900 left subclavian 20 days                Physical Exam  Constitutional: He appears well-developed and well-nourished. Sleeping on exam.   Head: Normocephalic and atraumatic.   Nose: Nose normal.   Mouth/Throat: Mucous membranes are normal.   Eyes: Conjunctivae and lids are normal.   Neck: Normal range of motion. Neck supple.   Cardiovascular: Normal  rate and regular rhythm.    Pulmonary/Chest: Effort normal. No stridor. He has no decreased breath sounds. He has no wheezes. He has no rales.   Abdominal: Soft. Bowel sounds are normal. He exhibits no distension. There is no hepatosplenomegaly. There is no guarding.   Musculoskeletal: Normal range of motion. He exhibits no edema.   Skin: Skin is warm and dry. Capillary refill takes less than 2 seconds. No rash noted. He is not diaphoretic. No erythema.  Psych: Cooperative, able to answer questions appropriately.    Significant Labs:  Recent Results (from the past 24 hour(s))   CBC auto differential    Collection Time: 05/30/18  5:00 AM   Result Value Ref Range    WBC 0.63 (LL) 3.90 - 12.70 K/uL    RBC 2.28 (L) 4.60 - 6.20 M/uL    Hemoglobin 6.9 (L) 14.0 - 18.0 g/dL    Hematocrit 19.6 (LL) 40.0 - 54.0 %    MCV 86 82 - 98 fL    MCH 30.3 27.0 - 31.0 pg    MCHC 35.2 32.0 - 36.0 g/dL    RDW 12.5 11.5 - 14.5 %    Platelets 26 (LL) 150 - 350 K/uL    MPV 11.7 9.2 - 12.9 fL    Immature Granulocytes 3.2 (H) 0.0 - 0.5 %    Gran # (ANC) 0.1 (L) 1.8 - 7.7 K/uL    Immature Grans (Abs) 0.02 0.00 - 0.04 K/uL    Lymph # 0.4 (L) 1.0 - 4.8 K/uL    Mono # 0.1 (L) 0.3 - 1.0 K/uL    Eos # 0.0 0.0 - 0.5 K/uL    Baso # 0.00 0.00 - 0.20 K/uL    nRBC 0 0 /100 WBC    Gran% 20.6 (L) 38.0 - 73.0 %    Lymph% 60.3 (H) 18.0 - 48.0 %    Mono% 15.9 (H) 4.0 - 15.0 %    Eosinophil% 0.0 0.0 - 8.0 %    Basophil% 0.0 0.0 - 1.9 %    Platelet Estimate Decreased (A)     Differential Method Automated    Reticulocytes    Collection Time: 05/30/18  5:00 AM   Result Value Ref Range    Retic 0.1 (L) 0.4 - 2.0 %       Significant Imaging:   none

## 2018-05-31 VITALS
WEIGHT: 176.56 LBS | TEMPERATURE: 98 F | HEIGHT: 66 IN | DIASTOLIC BLOOD PRESSURE: 68 MMHG | HEART RATE: 91 BPM | SYSTOLIC BLOOD PRESSURE: 132 MMHG | OXYGEN SATURATION: 100 % | BODY MASS INDEX: 28.38 KG/M2 | RESPIRATION RATE: 18 BRPM

## 2018-05-31 LAB
ABO + RH BLD: NORMAL
ALBUMIN SERPL BCP-MCNC: 2.9 G/DL
ALP SERPL-CCNC: 75 U/L
ALT SERPL W/O P-5'-P-CCNC: 50 U/L
ANION GAP SERPL CALC-SCNC: 7 MMOL/L
ANISOCYTOSIS BLD QL SMEAR: SLIGHT
AST SERPL-CCNC: 24 U/L
BASOPHILS NFR BLD: 0 %
BILIRUB SERPL-MCNC: 0.9 MG/DL
BLD GP AB SCN CELLS X3 SERPL QL: NORMAL
BLD PROD TYP BPU: NORMAL
BLOOD UNIT EXPIRATION DATE: NORMAL
BLOOD UNIT TYPE CODE: 6200
BLOOD UNIT TYPE: NORMAL
BUN SERPL-MCNC: 11 MG/DL
CALCIUM SERPL-MCNC: 9 MG/DL
CHLORIDE SERPL-SCNC: 103 MMOL/L
CO2 SERPL-SCNC: 29 MMOL/L
CODING SYSTEM: NORMAL
CREAT SERPL-MCNC: 0.7 MG/DL
DIFFERENTIAL METHOD: ABNORMAL
DISPENSE STATUS: NORMAL
EOSINOPHIL NFR BLD: 0 %
ERYTHROCYTE [DISTWIDTH] IN BLOOD BY AUTOMATED COUNT: 12.7 %
EST. GFR  (AFRICAN AMERICAN): >60 ML/MIN/1.73 M^2
EST. GFR  (NON AFRICAN AMERICAN): >60 ML/MIN/1.73 M^2
GLUCOSE SERPL-MCNC: 101 MG/DL
HCT VFR BLD AUTO: 25.9 %
HGB BLD-MCNC: 9 G/DL
IMM GRANULOCYTES # BLD AUTO: ABNORMAL K/UL
IMM GRANULOCYTES NFR BLD AUTO: ABNORMAL %
LYMPHOCYTES NFR BLD: 64 %
MAGNESIUM SERPL-MCNC: 1.4 MG/DL
MCH RBC QN AUTO: 29.7 PG
MCHC RBC AUTO-ENTMCNC: 34.7 G/DL
MCV RBC AUTO: 86 FL
MONOCYTES NFR BLD: 9 %
NEUTROPHILS NFR BLD: 27 %
NRBC BLD-RTO: 0 /100 WBC
NUM UNITS TRANS WBC-POOR PLATPHERESIS: NORMAL
PHOSPHATE SERPL-MCNC: 5.9 MG/DL
PLATELET # BLD AUTO: 20 K/UL
PLATELET BLD QL SMEAR: ABNORMAL
PMV BLD AUTO: 11.3 FL
POTASSIUM SERPL-SCNC: 3.8 MMOL/L
PROT SERPL-MCNC: 5.6 G/DL
RBC # BLD AUTO: 3.03 M/UL
RETICS/RBC NFR AUTO: 0.3 %
SODIUM SERPL-SCNC: 139 MMOL/L
WBC # BLD AUTO: 0.92 K/UL

## 2018-05-31 PROCEDURE — 99239 HOSP IP/OBS DSCHRG MGMT >30: CPT | Mod: ,,, | Performed by: PEDIATRICS

## 2018-05-31 PROCEDURE — P9037 PLATE PHERES LEUKOREDU IRRAD: HCPCS

## 2018-05-31 PROCEDURE — 86850 RBC ANTIBODY SCREEN: CPT

## 2018-05-31 PROCEDURE — 85045 AUTOMATED RETICULOCYTE COUNT: CPT

## 2018-05-31 PROCEDURE — 83735 ASSAY OF MAGNESIUM: CPT

## 2018-05-31 PROCEDURE — 25000003 PHARM REV CODE 250: Performed by: STUDENT IN AN ORGANIZED HEALTH CARE EDUCATION/TRAINING PROGRAM

## 2018-05-31 PROCEDURE — 36415 COLL VENOUS BLD VENIPUNCTURE: CPT

## 2018-05-31 PROCEDURE — 84100 ASSAY OF PHOSPHORUS: CPT

## 2018-05-31 PROCEDURE — 80053 COMPREHEN METABOLIC PANEL: CPT

## 2018-05-31 PROCEDURE — 63600175 PHARM REV CODE 636 W HCPCS: Performed by: PEDIATRICS

## 2018-05-31 RX ORDER — HYDROCODONE BITARTRATE AND ACETAMINOPHEN 500; 5 MG/1; MG/1
TABLET ORAL
Status: DISCONTINUED | OUTPATIENT
Start: 2018-05-31 | End: 2018-05-31 | Stop reason: HOSPADM

## 2018-05-31 RX ORDER — POSACONAZOLE 100 MG/1
300 TABLET, DELAYED RELEASE ORAL DAILY
Qty: 90 TABLET | Refills: 0 | Status: SHIPPED | OUTPATIENT
Start: 2018-05-31 | End: 2018-06-30

## 2018-05-31 RX ADMIN — Medication 500 UNITS: at 06:05

## 2018-05-31 RX ADMIN — DIPHENHYDRAMINE HYDROCHLORIDE 25 MG: 25 CAPSULE ORAL at 03:05

## 2018-05-31 RX ADMIN — Medication 500 UNITS: at 09:05

## 2018-05-31 RX ADMIN — ACYCLOVIR 400 MG: 200 CAPSULE ORAL at 09:05

## 2018-05-31 RX ADMIN — POSACONAZOLE 300 MG: 100 TABLET, COATED ORAL at 09:05

## 2018-05-31 NOTE — PLAN OF CARE
Problem: Patient Care Overview  Goal: Plan of Care Review  Outcome: Ongoing (interventions implemented as appropriate)  No issues overnight. Platelets administered this am, premedicated with benadryl. Labs and T&S sent prior, critical labs reported to MARA Jacinto. Patient ready and excited to go home, anxious for discharge, stated he did not sleep well due to excitement. Mother at bedside, POC discussed. Will monitor.

## 2018-05-31 NOTE — PROGRESS NOTES
Received call from Kiki the DC coordinator at [facility], she found two outpatient PT providers near [place], [place] for Hema that accept his insurance.  The first is [facility] in [place], [phone].  The second is [facility] in [place], [phone], fax [phone].  Gave these numbers to the [facility] residents and gave Kiki the pt's mother's phone number for follow up.

## 2018-05-31 NOTE — PLAN OF CARE
Pt is discharging today. Mom and pt are picking up wheelchair from [facility] on the way out. Pt and family have no further needs at this time.

## 2018-05-31 NOTE — PLAN OF CARE
05/31/18 1222   Final Note   Assessment Type Final Discharge Note   Discharge Disposition Home   Hospital Follow Up  Appt(s) scheduled? Yes   Discharge plans and expectations educations in teach back method with documentation complete? Yes   Right Care Referral Info   Post Acute Recommendation No Care      [facility] . Schedule an appointment as soon as possible for a visit in 1 week.    Contact information:  [address] [facility] [address] [facility], [place] [address];   Phone: [phone]

## 2018-05-31 NOTE — SUBJECTIVE & OBJECTIVE
Interval History: VSS, afebrile. NAEO. PRBC x2 yesterday, Platelets x1 today.     Scheduled Meds:    Continuous Infusions:  PRN Meds:    Review of Systems   Constitutional: Negative for fever.     Objective:     Vital Signs (Most Recent):  Temp: 97.5 °F (36.4 °C) (05/31/18 0849)  Pulse: 91 (05/31/18 0849)  Resp: 18 (05/31/18 0849)  BP: 132/68 (05/31/18 0849)  SpO2: 100 % (05/31/18 0849) Vital Signs (24h Range):  Temp:  [97.5 °F (36.4 °C)-98.6 °F (37 °C)] 97.5 °F (36.4 °C)  Pulse:  [] 91  Resp:  [16-22] 18  SpO2:  [98 %-100 %] 100 %  BP: (111-133)/(59-68) 132/68     Patient Vitals for the past 72 hrs (Last 3 readings):   Weight   05/29/18 1950 80.1 kg (176 lb 9.4 oz)   05/28/18 2015 81.7 kg (180 lb 1.9 oz)     Body mass index is 28.5 kg/m².    Intake/Output - Last 3 Shifts       05/29 0700 - 05/30 0659 05/30 0700 - 05/31 0659 05/31 0700 - 06/01 0659    P.O. 2500 890     Blood 588.7 1479.9     IV Piggyback 600 600     Total Intake(mL/kg) 3688.7 (46.1) 2969.9 (37.1)     Urine (mL/kg/hr)  500 (0.3)     Total Output   500      Net +3688.7 +2469.9             Urine Occurrence 7 x      Stool Occurrence 1 x            Lines/Drains/Airways     Central Venous Catheter Line                 Port A Cath Double Lumen 05/10/18 0900 left subclavian 21 days                Physical Exam  Constitutional: He appears well-developed and well-nourished. Sleeping on exam.   Head: Normocephalic and atraumatic.   Nose: Nose normal.   Mouth/Throat: Mucous membranes are normal.   Eyes: Conjunctivae and lids are normal.   Neck: Normal range of motion. Neck supple.   Cardiovascular: Normal rate and regular rhythm.    Pulmonary/Chest: Effort normal. No stridor. He has no decreased breath sounds. He has no wheezes. He has no rales.   Abdominal: Soft. Bowel sounds are normal. He exhibits no distension. There is no hepatosplenomegaly. There is no guarding.   Musculoskeletal: Normal range of motion. He exhibits no edema.   Skin: Skin is  warm and dry. Capillary refill takes less than 2 seconds. No rash noted. He is not diaphoretic. No erythema.  Psych: Cooperative, able to answer questions appropriately.    Significant Labs:  Recent Results (from the past 24 hour(s))   Reticulocytes    Collection Time: 05/31/18  3:54 AM   Result Value Ref Range    Retic 0.3 (L) 0.4 - 2.0 %   CBC auto differential    Collection Time: 05/31/18  3:54 AM   Result Value Ref Range    WBC 0.92 (LL) 3.90 - 12.70 K/uL    RBC 3.03 (L) 4.60 - 6.20 M/uL    Hemoglobin 9.0 (L) 14.0 - 18.0 g/dL    Hematocrit 25.9 (L) 40.0 - 54.0 %    MCV 86 82 - 98 fL    MCH 29.7 27.0 - 31.0 pg    MCHC 34.7 32.0 - 36.0 g/dL    RDW 12.7 11.5 - 14.5 %    Platelets 20 (LL) 150 - 350 K/uL    MPV 11.3 9.2 - 12.9 fL    Immature Granulocytes CANCELED 0.0 - 0.5 %    Immature Grans (Abs) CANCELED 0.00 - 0.04 K/uL    nRBC 0 0 /100 WBC    Gran% 27.0 (L) 38.0 - 73.0 %    Lymph% 64.0 (H) 18.0 - 48.0 %    Mono% 9.0 4.0 - 15.0 %    Eosinophil% 0.0 0.0 - 8.0 %    Basophil% 0.0 0.0 - 1.9 %    Platelet Estimate Decreased (A)     Aniso Slight     Differential Method Manual    Type & Screen    Collection Time: 05/31/18  3:54 AM   Result Value Ref Range    Group & Rh AB POS     Indirect Nathan NEG    Comprehensive metabolic panel    Collection Time: 05/31/18  3:54 AM   Result Value Ref Range    Sodium 139 136 - 145 mmol/L    Potassium 3.8 3.5 - 5.1 mmol/L    Chloride 103 95 - 110 mmol/L    CO2 29 23 - 29 mmol/L    Glucose 101 70 - 110 mg/dL    BUN, Bld 11 6 - 20 mg/dL    Creatinine 0.7 0.5 - 1.4 mg/dL    Calcium 9.0 8.7 - 10.5 mg/dL    Total Protein 5.6 (L) 6.0 - 8.4 g/dL    Albumin 2.9 (L) 3.5 - 5.2 g/dL    Total Bilirubin 0.9 0.1 - 1.0 mg/dL    Alkaline Phosphatase 75 55 - 135 U/L    AST 24 10 - 40 U/L    ALT 50 (H) 10 - 44 U/L    Anion Gap 7 (L) 8 - 16 mmol/L    eGFR if African American >60.0 >60 mL/min/1.73 m^2    eGFR if non African American >60.0 >60 mL/min/1.73 m^2   Magnesium    Collection Time: 05/31/18  3:54  AM   Result Value Ref Range    Magnesium 1.4 (L) 1.6 - 2.6 mg/dL   Phosphorus    Collection Time: 05/31/18  3:54 AM   Result Value Ref Range    Phosphorus 5.9 (H) 2.7 - 4.5 mg/dL   Prepare Platelets 1 Dose    Collection Time: 05/31/18  3:54 AM   Result Value Ref Range    UNIT NUMBER [id number]     PRODUCT CODE [id number]     DISPENSE STATUS ISSUED     CODING SYSTEM [id number]     Unit Blood Type Code 6200     Unit Blood Type A POS     Unit Expiration [id number]        Significant Imaging:   none

## 2018-05-31 NOTE — NURSING
AVS discussed w pt and mom, questions and concerns addressed. Pt stable, NAD noted. PAC deaccessed, drsg removed. Discussed f/u appt, home meds, and s/s of concern w pt and mom. Mom to  wheelchair for home locally once home, PT orders provided. Pt left unit via wheelchair, mask in place, accompanied by mom, safety maintained.

## 2018-05-31 NOTE — PROGRESS NOTES
[facility] Pediatric [facility] Medicine  Progress Note    Patient Name: Hema Kuo  MRN: [id number]  Admission Date: 4/30/2018  Hospital Length of Stay: 31  Code Status: Prior   Primary Care Physician: Ann Reyna NP  Principal Problem: Bacteremia    Subjective:     Interval History: VSS, afebrile. NAEO. PRBC x2 yesterday, platelets x1 today.     Review of Systems   Constitutional: Negative for fever.     Objective:     Vital Signs (Most Recent):  Temp: 97.5 °F (36.4 °C) (05/31/18 0849)  Pulse: 91 (05/31/18 0849)  Resp: 18 (05/31/18 0849)  BP: 132/68 (05/31/18 0849)  SpO2: 100 % (05/31/18 0849) Vital Signs (24h Range):  Temp:  [97.5 °F (36.4 °C)-98.6 °F (37 °C)] 97.5 °F (36.4 °C)  Pulse:  [] 91  Resp:  [16-22] 18  SpO2:  [98 %-100 %] 100 %  BP: (111-133)/(59-68) 132/68     Patient Vitals for the past 72 hrs (Last 3 readings):   Weight   05/29/18 1950 80.1 kg (176 lb 9.4 oz)   05/28/18 2015 81.7 kg (180 lb 1.9 oz)     Body mass index is 28.5 kg/m².    Intake/Output - Last 3 Shifts       05/29 0700 - 05/30 0659 05/30 0700 - 05/31 0659 05/31 0700 - 06/01 0659    P.O. 2500 890     Blood 588.7 1479.9     IV Piggyback 600 600     Total Intake(mL/kg) 3688.7 (46.1) 2969.9 (37.1)     Urine (mL/kg/hr)  500 (0.3)     Total Output   500      Net +3688.7 +2469.9             Urine Occurrence 7 x      Stool Occurrence 1 x            Lines/Drains/Airways     Central Venous Catheter Line                 Port A Cath Double Lumen 05/10/18 0900 left subclavian 21 days                Physical Exam  Constitutional: He appears well-developed and well-nourished. Sleeping on exam.   Head: Normocephalic and atraumatic.   Nose: Nose normal.   Mouth/Throat: Mucous membranes are normal.   Eyes: Conjunctivae and lids are normal.   Neck: Normal range of motion. Neck supple.   Cardiovascular: Normal rate and regular rhythm.    Pulmonary/Chest: Effort normal. No stridor. He has no decreased breath sounds. He has no  wheezes. He has no rales.   Abdominal: Soft. Bowel sounds are normal. He exhibits no distension. There is no hepatosplenomegaly. There is no guarding.   Musculoskeletal: Normal range of motion. He exhibits no edema.   Skin: Skin is warm and dry. Capillary refill takes less than 2 seconds. No rash noted. He is not diaphoretic. No erythema.  Psych: Cooperative, able to answer questions appropriately.    Significant Labs:  Recent Results (from the past 24 hour(s))   Reticulocytes    Collection Time: 05/31/18  3:54 AM   Result Value Ref Range    Retic 0.3 (L) 0.4 - 2.0 %   CBC auto differential    Collection Time: 05/31/18  3:54 AM   Result Value Ref Range    WBC 0.92 (LL) 3.90 - 12.70 K/uL    RBC 3.03 (L) 4.60 - 6.20 M/uL    Hemoglobin 9.0 (L) 14.0 - 18.0 g/dL    Hematocrit 25.9 (L) 40.0 - 54.0 %    MCV 86 82 - 98 fL    MCH 29.7 27.0 - 31.0 pg    MCHC 34.7 32.0 - 36.0 g/dL    RDW 12.7 11.5 - 14.5 %    Platelets 20 (LL) 150 - 350 K/uL    MPV 11.3 9.2 - 12.9 fL    Immature Granulocytes CANCELED 0.0 - 0.5 %    Immature Grans (Abs) CANCELED 0.00 - 0.04 K/uL    nRBC 0 0 /100 WBC    Gran% 27.0 (L) 38.0 - 73.0 %    Lymph% 64.0 (H) 18.0 - 48.0 %    Mono% 9.0 4.0 - 15.0 %    Eosinophil% 0.0 0.0 - 8.0 %    Basophil% 0.0 0.0 - 1.9 %    Platelet Estimate Decreased (A)     Aniso Slight     Differential Method Manual    Type & Screen    Collection Time: 05/31/18  3:54 AM   Result Value Ref Range    Group & Rh AB POS     Indirect Nathan NEG    Comprehensive metabolic panel    Collection Time: 05/31/18  3:54 AM   Result Value Ref Range    Sodium 139 136 - 145 mmol/L    Potassium 3.8 3.5 - 5.1 mmol/L    Chloride 103 95 - 110 mmol/L    CO2 29 23 - 29 mmol/L    Glucose 101 70 - 110 mg/dL    BUN, Bld 11 6 - 20 mg/dL    Creatinine 0.7 0.5 - 1.4 mg/dL    Calcium 9.0 8.7 - 10.5 mg/dL    Total Protein 5.6 (L) 6.0 - 8.4 g/dL    Albumin 2.9 (L) 3.5 - 5.2 g/dL    Total Bilirubin 0.9 0.1 - 1.0 mg/dL    Alkaline Phosphatase 75 55 - 135 U/L    AST  24 10 - 40 U/L    ALT 50 (H) 10 - 44 U/L    Anion Gap 7 (L) 8 - 16 mmol/L    eGFR if African American >60.0 >60 mL/min/1.73 m^2    eGFR if non African American >60.0 >60 mL/min/1.73 m^2   Magnesium    Collection Time: 05/31/18  3:54 AM   Result Value Ref Range    Magnesium 1.4 (L) 1.6 - 2.6 mg/dL   Phosphorus    Collection Time: 05/31/18  3:54 AM   Result Value Ref Range    Phosphorus 5.9 (H) 2.7 - 4.5 mg/dL   Prepare Platelets 1 Dose    Collection Time: 05/31/18  3:54 AM   Result Value Ref Range    UNIT NUMBER [id number]     PRODUCT CODE [id number]     DISPENSE STATUS ISSUED     CODING SYSTEM [id number]     Unit Blood Type Code 6200     Unit Blood Type A POS     Unit Expiration [id number]        Significant Imaging:   none    Assessment/Plan:     Neuro   Acute delirium    Resolved. discontinued CEC. Psych has signed off.   - s/p risperdal with improved delirium  - RPR NR  - Olanzapine PRN  - Psych following, rec'd follow up at [facility] ([address] [place] [id number]; Phone: [phone]) as outpatient.   - CT head complete on 5/22: unremarkable  - Rx given for outpatient PT        ID   * Bacteremia    He was admitted on 4/30/2018 for neutropenia/profund sepsis risk. Bcx from 5/2 growing strep mitis, Bcx 5/19 growing Enterococcus faecium VRE (sens to linezolid) from outer lumen of port. Two subsequent cultures NGTD.     - s/p Cefepime 2g Q8h  - Discontinue Linezolid 600mg Q12h  - s/p Vancomycin 1250 g Q8h  - Blood cultures NGTD  - monitor fever curve          Immunology/Multi System   Immunosuppressed due to chemotherapy    Immunosuppression 2/2 to chemotherapy:  - Continue acyclovir ppx  - Continue posaconazole    - s/p Pentamidine, HOLD bactrim QFSaSu ppx [id number]  - Continue peridex ppx          Oncology   Neutropenia    -  today  - Advance soft mechanical diet    Dispo: DC to home today with improved ANC.       AML (acute myeloblastic leukemia)    Hema is a 17 yr young man  with AML (t 8;21) here for chemotherapy. On Intensification therapy II following [id number] (Arm A).      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 MRD negative after Induction I.   - Treating per [facility] [id number] (ARM A).  S/p Intensification I and Intensification II started.   - BM biopsy at start of Intensification shows CR.  FISH for t(8;21) negative. Will plan to repeat BM biopsy pending count recovery and clinical improvement.   - BMP M,Th        Antineoplastic chemotherapy induced pancytopenia    Chemotherapy induced neutropenia:   - Hgb goal >7  - Plts goal >10  - Daily CBC and reticulocyte  - CMP EOD  - If bleeding at all, please transfuse platelets x1 unit. No need to check CBC, evaluate clinically.             Psychological factor affecting cancer    - Francia Evans to follow as outpatient        Other   Insomnia    - melatonin qHs            Follow-up Information     [facility] . Schedule an appointment as soon as possible for a visit in 1 week.    Contact information:  [address] [place] [address];   Phone: [phone]           [facility].    Specialty:  Emergency Medicine  Why:  As needed, If symptoms worsen  Contact information:  [address] [facility] [address] [phone]           Sanford Bucio MD On 6/5/2018.    Specialties:  Pediatric Hematology and Oncology, Pediatric Hematology  Contact information:  [address]  [place] [id number] [phone]                   Anticipated Disposition: Home or Self Care    Ayanna Ramirez DO  Pediatric Hospital Medicine   [facility]

## 2018-06-01 NOTE — DISCHARGE SUMMARY
[facility] Pediatric Hospital Medicine  Discharge Summary      Patient Name: Hema Kuo  MRN: [id number]  Admission Date: 4/30/2018  Hospital Length of Stay: 31 days  Discharge Date and Time:  05/31/2018 7:22 PM  Discharging Provider: Ayanna Ramirez DO  Primary Care Provider: Ann Reyna NP    Reason for Admission: Febrile Neutropenia    HPI:   Hema Kuo is a 17 y.o. male with AML s/p Intensification I therapy following [id number] (Arm A) who was admitted for neutropenia/sepsis risk. Patient with ANC of 0 following start of Intensification II therapy.      * No surgery found *      Indwelling Lines/Drains at time of discharge:   Lines/Drains/Airways     Central Venous Catheter Line                 Port A Cath Double Lumen 05/10/18 0900 left subclavian 21 days                Hospital Course: Bacteremia: Admitted on Day 11(4/30/18) of Intensification II therapy for febrile neutropenia and started on Cefepime and Vancomycin. He was stepped up to PICU for septic shock after presenting with waxing mental status, hypotension (unresponsive to fluid bolus and blood transfusion), chest tightness w/ tachypnea, and new oxygen requirement. On admission CXR revealed LLL infiltrates and he was placed on HFNC @ 15L and titrated up to 25L for desaturations to the 80s. Patient was intubated and sedated for worsening respiratory distress with PaOw/Fi ratio concerning for acute lung injury. Patient switched to meropenam from cefepime, for worsening respiratory status and eventually switched back to vancomycin and cefepime once blood cultures grew Strep mitis. Given steroids and lasix. Repeat CXR findings showed marked improvement, with resolving ARDS, and clearing of fluid from lungs. Following extubation He was noted to clinically improve and though initially on high flow nasal cannula, he was weaned to room air and noted to have reassuring saturations. Patient given multiple boluses and blood transfusions  to manage hypotension, however was ultimately started on an epinephrine drip. Epinephrine gtt was switched to norepinephrine to help with the bronchodilation and HR. Patient started having improving blood pressures and Norepinephrine drip was weaned off. Towards the end of PICU stay, patient's PICC line noted to be very deep and was noted to have episodes of SVT not resolving with vagal maneuvers or ice. Hema required a one time dose of adenosine and subsequently PICC pulled and no further episodes of SVT. With continued stability on room air, and abx controlling fever curve, patient was stepped down to the pediatric floor. On the pediatric floor, patient continued on Vanc and Cefepime. Developed fever and blood culture obtained, found to be growing VRE on outer port lumen. Repeat cultures drawn and then started on Linezolid. Two subsequent cultures remained NG, there fore linezolid was discontinued. Patient was discharged to home with improved ANC count in the mid-200s with no antibiotics.     Delirium: Initially at neurological baseline in the PICU. While intubated for respiratory distress, Hema required sedation with Precedex and Fentanyl. Following extubation however, he was noted to be confused and delirious. He was started on Seroquel nightly with no improvement in mental status. Seroquel was then increased in dose and smaller doses tried through the course of the day. Patient still noted to remain delirious and then noted to become increasingly delirious, more paranoid with bizarre fears and unable to sleep. He was noted to try to pull out lines and become agitated and attempt to jump out of bed while confused. PEC was then signed for him and he was switched to Olanzapine. On the pediatric floor, patient was CECd. Psychiatry was consulted and recommended starting on risperdal and titrated up. Eventually, patient started returning to neurologic baseline, however developed tremors 2/2 medication. With  improving near exam, Hema was weaned off of risperdal. CT head was obtained and WBL, as well as RPR. Melatonin was used for sleep. Psychologist, Dr. Francia Evans, will follow Hema as an outpatient.    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 Prior to discharge, patient given inhaled pentamidine and told to hold bactrim for the next month (to aid in recovery of cell counts).     Consults:   Consults         Status Ordering Provider     Inpatient consult to Pediatric Hematology/Oncology  Once     Provider:  Sanford Bucio MD    Completed MICHELLE SMART     Inpatient consult to Psychiatry  Once     Provider:  Francia Evans, PhD    Completed HERBER CHONG          Significant Labs:   Recent Results (from the past 48 hour(s))   CBC auto differential    Collection Time: 05/30/18  5:00 AM   Result Value Ref Range    WBC 0.63 (LL) 3.90 - 12.70 K/uL    RBC 2.28 (L) 4.60 - 6.20 M/uL    Hemoglobin 6.9 (L) 14.0 - 18.0 g/dL    Hematocrit 19.6 (LL) 40.0 - 54.0 %    MCV 86 82 - 98 fL    MCH 30.3 27.0 - 31.0 pg    MCHC 35.2 32.0 - 36.0 g/dL    RDW 12.5 11.5 - 14.5 %    Platelets 26 (LL) 150 - 350 K/uL    MPV 11.7 9.2 - 12.9 fL    Immature Granulocytes 3.2 (H) 0.0 - 0.5 %    Gran # (ANC) 0.1 (L) 1.8 - 7.7 K/uL    Immature Grans (Abs) 0.02 0.00 - 0.04 K/uL    Lymph # 0.4 (L) 1.0 - 4.8 K/uL    Mono # 0.1 (L) 0.3 - 1.0 K/uL    Eos # 0.0 0.0 - 0.5 K/uL    Baso # 0.00 0.00 - 0.20 K/uL    nRBC 0 0 /100 WBC    Gran% 20.6 (L) 38.0 - 73.0 %    Lymph% 60.3 (H) 18.0 - 48.0 %    Mono% 15.9 (H) 4.0 - 15.0 %    Eosinophil% 0.0 0.0 - 8.0 %    Basophil% 0.0 0.0 - 1.9 %    Platelet Estimate Decreased (A)     Differential Method Automated    Reticulocytes    Collection Time: 05/30/18  5:00 AM   Result Value Ref Range    Retic 0.1 (L) 0.4 - 2.0 %   Reticulocytes    Collection Time: 05/31/18  3:54 AM   Result Value Ref Range    Retic 0.3 (L) 0.4 - 2.0 %   CBC auto  differential    Collection Time: 05/31/18  3:54 AM   Result Value Ref Range    WBC 0.92 (LL) 3.90 - 12.70 K/uL    RBC 3.03 (L) 4.60 - 6.20 M/uL    Hemoglobin 9.0 (L) 14.0 - 18.0 g/dL    Hematocrit 25.9 (L) 40.0 - 54.0 %    MCV 86 82 - 98 fL    MCH 29.7 27.0 - 31.0 pg    MCHC 34.7 32.0 - 36.0 g/dL    RDW 12.7 11.5 - 14.5 %    Platelets 20 (LL) 150 - 350 K/uL    MPV 11.3 9.2 - 12.9 fL    Immature Granulocytes CANCELED 0.0 - 0.5 %    Immature Grans (Abs) CANCELED 0.00 - 0.04 K/uL    nRBC 0 0 /100 WBC    Gran% 27.0 (L) 38.0 - 73.0 %    Lymph% 64.0 (H) 18.0 - 48.0 %    Mono% 9.0 4.0 - 15.0 %    Eosinophil% 0.0 0.0 - 8.0 %    Basophil% 0.0 0.0 - 1.9 %    Platelet Estimate Decreased (A)     Aniso Slight     Differential Method Manual    Type & Screen    Collection Time: 05/31/18  3:54 AM   Result Value Ref Range    Group & Rh AB POS     Indirect Nathan NEG    Comprehensive metabolic panel    Collection Time: 05/31/18  3:54 AM   Result Value Ref Range    Sodium 139 136 - 145 mmol/L    Potassium 3.8 3.5 - 5.1 mmol/L    Chloride 103 95 - 110 mmol/L    CO2 29 23 - 29 mmol/L    Glucose 101 70 - 110 mg/dL    BUN, Bld 11 6 - 20 mg/dL    Creatinine 0.7 0.5 - 1.4 mg/dL    Calcium 9.0 8.7 - 10.5 mg/dL    Total Protein 5.6 (L) 6.0 - 8.4 g/dL    Albumin 2.9 (L) 3.5 - 5.2 g/dL    Total Bilirubin 0.9 0.1 - 1.0 mg/dL    Alkaline Phosphatase 75 55 - 135 U/L    AST 24 10 - 40 U/L    ALT 50 (H) 10 - 44 U/L    Anion Gap 7 (L) 8 - 16 mmol/L    eGFR if African American >60.0 >60 mL/min/1.73 m^2    eGFR if non African American >60.0 >60 mL/min/1.73 m^2   Magnesium    Collection Time: 05/31/18  3:54 AM   Result Value Ref Range    Magnesium 1.4 (L) 1.6 - 2.6 mg/dL   Phosphorus    Collection Time: 05/31/18  3:54 AM   Result Value Ref Range    Phosphorus 5.9 (H) 2.7 - 4.5 mg/dL   Prepare Platelets 1 Dose    Collection Time: 05/31/18  3:54 AM   Result Value Ref Range    UNIT NUMBER [id number]     PRODUCT CODE S4795X22     DISPENSE STATUS ISSUED      CODING SYSTEM TRAP656     Unit Blood Type Code 6200     Unit Blood Type A POS     Unit Expiration [id number]      Significant Imaging:   CT Head with and without contrast 5/22/18  No evidence of acute intracranial pathology.    Electronically signed by resident: Landon Contreras  Date: 05/22/2018  Time: 10:38    Electronically signed by: Terry Gomez MD  Date: 05/22/2018  Time: 10:52      Pending Diagnostic Studies:     Procedure Component Value Units Date/Time    CBC auto differential [[id number]] Collected:  05/03/18 0757    Order Status:  Sent Lab Status:  In process Updated:  05/03/18 0757    Specimen:  Blood from Blood     Platelet count [[id number]] Collected:  05/06/18 1118    Order Status:  Sent Lab Status:  In process Updated:  05/06/18 1119    Specimen:  Blood from Blood           Final Active Diagnoses:    Diagnosis Date Noted POA    PRINCIPAL PROBLEM:  Bacteremia [R78.81] 05/03/2018 Yes    Insomnia [G47.00] 05/29/2018 No    Nutritional assessment [Z00.8] 05/12/2018 Not Applicable    Acute delirium [R41.0] 05/11/2018 No    Neutropenia [D70.9] 05/01/2018 Yes    AML (acute myeloblastic leukemia) [C92.00] 12/12/2017 Yes    Immunosuppressed due to chemotherapy [Z79.899] 11/28/2017 Yes    Antineoplastic chemotherapy induced pancytopenia [D61.810, T45.1X5A] 11/28/2017 Yes    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 Schedule an appointment as soon as possible for a visit in 1 week.    Contact information:  [address] [place] [address];   Phone: [phone]      "      [facility].    Specialty:  Emergency Medicine  Why:  As needed, If symptoms worsen  Contact information:  [address]  [facility] [address] [phone]           Sanford Bucio MD On 6/5/2018.    Specialties:  Pediatric Hematology and Oncology, Pediatric Hematology  Contact information:  [address]  [facility] [address]  [phone]                 Patient Instructions:     WHEELCHAIR FOR HOME USE   Order Specific Question Answer Comments   Hours in W/C per day: 5    Type of Wheelchair: Standard    Size(Width): 18"(STD adult)    Leg Support: STD footrests    Lap Belt: Buckle    Cushion: Basic    Height: 5' 6" (1.676 m)    Weight: 80.1 kg (176 lb 9.4 oz)    Does patient have medical equipment at home? none    Length of need (1-99 months): 5    Please check all that apply: Caregiver is capable and willing to operate wheelchair safely.    Please check all that apply: Patient's upper body strength is sufficient for propulsion.    Vendor: [employer]-Pt lives in [place]   Expected Date of Delivery: 5/30/2018      Ambulatory Referral to Physical/Occupational Therapy   Referral Priority: Routine Referral Type: Physical Medicine   Referral Reason: Specialty Services Required    Requested Specialty: Physical Therapy    Number of Visits Requested: 1      Diet Adult Regular     Activity as tolerated     Notify your health care provider if you experience any of the following:  temperature >100.4     Notify your health care provider if you experience any of the following:  persistent nausea and vomiting or diarrhea     Notify your health care provider if you experience any of the following:  severe uncontrolled pain     Notify your health care provider if you experience any of the following:  redness, tenderness, or signs of infection (pain, swelling, redness, odor or green/yellow discharge around incision site)     Notify your health care provider if you experience any " of the following:  difficulty breathing or increased cough     Notify your health care provider if you experience any of the following:  severe persistent headache     Notify your health care provider if you experience any of the following:  worsening rash     Notify your health care provider if you experience any of the following:  persistent dizziness, light-headedness, or visual disturbances     Notify your health care provider if you experience any of the following:  increased confusion or weakness       Medications:  Reconciled Home Medications:      Medication List      CONTINUE taking these medications    acyclovir 400 MG tablet  Commonly known as:  ZOVIRAX  Take 1 tablet (400 mg total) by mouth 2 (two) times daily.     chlorhexidine 0.12 % solution  Commonly known as:  PERIDEX     lisdexamfetamine 60 MG capsule  Commonly known as:  VYVANSE  Take 60 mg by mouth every morning.     ondansetron 8 MG Tbdl  Commonly known as:  ZOFRAN-ODT  Take 1 tablet (8 mg total) by mouth every 8 (eight) hours as needed.     oxyCODONE 10 mg Tab immediate release tablet  Commonly known as:  ROXICODONE  Take 1 tablet (10 mg total) by mouth every 6 (six) hours as needed.     polyethylene glycol 17 gram Pwpk  Commonly known as:  GLYCOLAX  Take 17 g by mouth daily as needed (No bowel movement).     posaconazole 100 mg Tbec tablet  Take 3 tablets (300 mg total) by mouth once daily.        STOP taking these medications    ciprofloxacin HCl 500 MG tablet  Commonly known as:  CIPRO     LORazepam 1 MG tablet  Commonly known as:  ATIVAN     pantoprazole 40 MG tablet  Commonly known as:  PROTONIX     sulfamethoxazole-trimethoprim 800-160mg 800-160 mg Tab  Commonly known as:  BACTRIM DS             Ayanna Ramirez DO  Pediatric Hospital Medicine  [facility]

## 2018-06-04 NOTE — PHYSICIAN QUERY
PT Name: Hema Kuo  MR #: [id number]    Physician Query Form - Cause and Effect Relationship Clarification      CDS/: Yanira Cotto RN              Contact information:[email or web address]    This form is a permanent document in the medical record.     Query Date: June 4, 2018    By submitting this query, we are merely seeking further clarification of documentation. Please utilize your independent clinical judgment when addressing the question(s) below.    The Medical record contains the following:  Supporting Clinical Findings   Location in record      Sepsis    18 y/o male with h/o  AML (t 8;21) s/p intensification therapy II per [id number] (Arm A) stepped up to PICU for sepsis after presenting with waxing mental status, hypotension unresponsive to NS bolus x 2 and blood 1/2, chest tightness w/ tachypnea, new oxygen requirement and concern for PNA on CXR. He is currently AOx3 on 25LPM HFNC @ 100% with worsening resp status this AM                                                                                                                                                                             Progress note 5/4      Streptococcus mitis/oralis      For his Strep sepsis, he is on cefepime and vancomycin.  Cultures from 5/2 grew Strep mitis (sensitive to vanc).  Subsequent cultures negative.                                                                                                                                                                                            Central line blood culture 5/2    Progress note 5/14         Provider, please clarify if there is any correlation between ____Strep Sepsis_________ and _ Central line ____.               Are the conditions:     [  ] Due to or associated with each other     [x  ] Unrelated to each other     [  ] Other (Please Specify): _________________________     [  ] Clinically Undetermined

## 2018-06-05 ENCOUNTER — OFFICE VISIT (OUTPATIENT)
Dept: PEDIATRIC HEMATOLOGY/ONCOLOGY | Facility: CLINIC | Age: 18
End: 2018-06-05
Payer: MEDICAID

## 2018-06-05 ENCOUNTER — HOSPITAL ENCOUNTER (OUTPATIENT)
Dept: INFUSION THERAPY | Facility: HOSPITAL | Age: 18
Discharge: HOME | End: 2018-06-05
Attending: NURSE PRACTITIONER
Payer: MEDICAID

## 2018-06-05 ENCOUNTER — TELEPHONE (OUTPATIENT)
Dept: PEDIATRIC HEMATOLOGY/ONCOLOGY | Facility: CLINIC | Age: 18
End: 2018-06-05

## 2018-06-05 VITALS
HEIGHT: 66 IN | WEIGHT: 184.5 LBS | TEMPERATURE: 98 F | BODY MASS INDEX: 29.65 KG/M2 | DIASTOLIC BLOOD PRESSURE: 65 MMHG | RESPIRATION RATE: 18 BRPM | HEART RATE: 98 BPM | SYSTOLIC BLOOD PRESSURE: 124 MMHG

## 2018-06-05 VITALS
SYSTOLIC BLOOD PRESSURE: 107 MMHG | TEMPERATURE: 98 F | RESPIRATION RATE: 18 BRPM | DIASTOLIC BLOOD PRESSURE: 62 MMHG | HEART RATE: 93 BPM

## 2018-06-05 DIAGNOSIS — C92.01 ACUTE MYELOID LEUKEMIA IN REMISSION: Primary | ICD-10-CM

## 2018-06-05 DIAGNOSIS — F29 PSYCHOSIS, UNSPECIFIED PSYCHOSIS TYPE: ICD-10-CM

## 2018-06-05 DIAGNOSIS — D70.1 CHEMOTHERAPY-INDUCED NEUTROPENIA: ICD-10-CM

## 2018-06-05 DIAGNOSIS — Z79.899 IMMUNOSUPPRESSED DUE TO CHEMOTHERAPY: ICD-10-CM

## 2018-06-05 DIAGNOSIS — D84.821 IMMUNOSUPPRESSED DUE TO CHEMOTHERAPY: ICD-10-CM

## 2018-06-05 DIAGNOSIS — T45.1X5A IMMUNOSUPPRESSED DUE TO CHEMOTHERAPY: ICD-10-CM

## 2018-06-05 DIAGNOSIS — R53.81 PHYSICAL DECONDITIONING: ICD-10-CM

## 2018-06-05 DIAGNOSIS — T45.1X5A ANTINEOPLASTIC CHEMOTHERAPY INDUCED PANCYTOPENIA: ICD-10-CM

## 2018-06-05 DIAGNOSIS — C92.00 AML (ACUTE MYELOBLASTIC LEUKEMIA): ICD-10-CM

## 2018-06-05 DIAGNOSIS — D61.810 ANTINEOPLASTIC CHEMOTHERAPY INDUCED PANCYTOPENIA: ICD-10-CM

## 2018-06-05 DIAGNOSIS — T45.1X5A CHEMOTHERAPY-INDUCED NEUTROPENIA: ICD-10-CM

## 2018-06-05 PROBLEM — R41.0 ACUTE DELIRIUM: Status: RESOLVED | Noted: 2018-05-11 | Resolved: 2018-06-05

## 2018-06-05 PROBLEM — R78.81 BACTEREMIA: Status: RESOLVED | Noted: 2018-05-03 | Resolved: 2018-06-05

## 2018-06-05 LAB
ALBUMIN SERPL BCP-MCNC: 3.4 G/DL
ALP SERPL-CCNC: 81 U/L
ALT SERPL W/O P-5'-P-CCNC: 73 U/L
ANION GAP SERPL CALC-SCNC: 9 MMOL/L
ANISOCYTOSIS BLD QL SMEAR: SLIGHT
AST SERPL-CCNC: 21 U/L
BASOPHILS # BLD AUTO: 0 K/UL
BASOPHILS NFR BLD: 0 %
BILIRUB SERPL-MCNC: 0.4 MG/DL
BLD PROD TYP BPU: NORMAL
BLOOD UNIT EXPIRATION DATE: NORMAL
BLOOD UNIT TYPE CODE: 5100
BLOOD UNIT TYPE: NORMAL
BUN SERPL-MCNC: 13 MG/DL
CALCIUM SERPL-MCNC: 9 MG/DL
CHLORIDE SERPL-SCNC: 104 MMOL/L
CO2 SERPL-SCNC: 29 MMOL/L
CODING SYSTEM: NORMAL
CREAT SERPL-MCNC: 0.8 MG/DL
DIFFERENTIAL METHOD: ABNORMAL
DISPENSE STATUS: NORMAL
EOSINOPHIL # BLD AUTO: 0 K/UL
EOSINOPHIL NFR BLD: 0 %
ERYTHROCYTE [DISTWIDTH] IN BLOOD BY AUTOMATED COUNT: 12.4 %
EST. GFR  (AFRICAN AMERICAN): >60 ML/MIN/1.73 M^2
EST. GFR  (NON AFRICAN AMERICAN): >60 ML/MIN/1.73 M^2
FUNGUS BLD CULT: NORMAL
FUNGUS BLD CULT: NORMAL
GLUCOSE SERPL-MCNC: 93 MG/DL
HCT VFR BLD AUTO: 26.3 %
HGB BLD-MCNC: 9.1 G/DL
LYMPHOCYTES # BLD AUTO: 0.6 K/UL
LYMPHOCYTES NFR BLD: 49.6 %
MCH RBC QN AUTO: 29.2 PG
MCHC RBC AUTO-ENTMCNC: 34.6 G/DL
MCV RBC AUTO: 84 FL
MONOCYTES # BLD AUTO: 0.3 K/UL
MONOCYTES NFR BLD: 20.2 %
NEUTROPHILS # BLD AUTO: 0.4 K/UL
NEUTROPHILS NFR BLD: 30.2 %
NUM UNITS TRANS WBC-POOR PLATPHERESIS: NORMAL
PLATELET # BLD AUTO: 10 K/UL
PLATELET BLD QL SMEAR: ABNORMAL
PMV BLD AUTO: 9.3 FL
POIKILOCYTOSIS BLD QL SMEAR: SLIGHT
POTASSIUM SERPL-SCNC: 3.8 MMOL/L
PROT SERPL-MCNC: 6.3 G/DL
RBC # BLD AUTO: 3.12 M/UL
RETICS/RBC NFR AUTO: 0.2 %
SODIUM SERPL-SCNC: 142 MMOL/L
WBC # BLD AUTO: 1.29 K/UL

## 2018-06-05 PROCEDURE — 36430 TRANSFUSION BLD/BLD COMPNT: CPT | Mod: PO

## 2018-06-05 PROCEDURE — 99999 PR PBB SHADOW E&M-EST. PATIENT-LVL IV: CPT | Mod: PBBFAC,,, | Performed by: PEDIATRICS

## 2018-06-05 PROCEDURE — P9037 PLATE PHERES LEUKOREDU IRRAD: HCPCS

## 2018-06-05 PROCEDURE — 85045 AUTOMATED RETICULOCYTE COUNT: CPT | Mod: PO

## 2018-06-05 PROCEDURE — 99214 OFFICE O/P EST MOD 30 MIN: CPT | Mod: S$PBB,,, | Performed by: PEDIATRICS

## 2018-06-05 PROCEDURE — 25000003 PHARM REV CODE 250: Mod: PO | Performed by: PEDIATRICS

## 2018-06-05 PROCEDURE — A4216 STERILE WATER/SALINE, 10 ML: HCPCS | Mod: PO | Performed by: PEDIATRICS

## 2018-06-05 PROCEDURE — 99214 OFFICE O/P EST MOD 30 MIN: CPT | Mod: PBBFAC | Performed by: PEDIATRICS

## 2018-06-05 PROCEDURE — 63600175 PHARM REV CODE 636 W HCPCS: Mod: PO | Performed by: PEDIATRICS

## 2018-06-05 PROCEDURE — 80053 COMPREHEN METABOLIC PANEL: CPT

## 2018-06-05 PROCEDURE — 86644 CMV ANTIBODY: CPT

## 2018-06-05 PROCEDURE — 85025 COMPLETE CBC W/AUTO DIFF WBC: CPT | Mod: PO

## 2018-06-05 RX ORDER — HYDROCODONE BITARTRATE AND ACETAMINOPHEN 500; 5 MG/1; MG/1
TABLET ORAL ONCE
Status: DISCONTINUED | OUTPATIENT
Start: 2018-06-05 | End: 2018-06-06 | Stop reason: HOSPADM

## 2018-06-05 RX ORDER — ACETAMINOPHEN 325 MG/1
650 TABLET ORAL
Status: COMPLETED | OUTPATIENT
Start: 2018-06-05 | End: 2018-06-05

## 2018-06-05 RX ORDER — SODIUM CHLORIDE 0.9 % (FLUSH) 0.9 %
10 SYRINGE (ML) INJECTION
Status: DISCONTINUED | OUTPATIENT
Start: 2018-06-05 | End: 2018-06-06 | Stop reason: HOSPADM

## 2018-06-05 RX ORDER — HEPARIN 100 UNIT/ML
500 SYRINGE INTRAVENOUS
Status: DISCONTINUED | OUTPATIENT
Start: 2018-06-05 | End: 2018-06-06 | Stop reason: HOSPADM

## 2018-06-05 RX ADMIN — ACETAMINOPHEN 650 MG: 325 TABLET ORAL at 09:06

## 2018-06-05 RX ADMIN — HEPARIN 500 UNITS: 100 SYRINGE at 10:06

## 2018-06-05 RX ADMIN — SODIUM CHLORIDE, PRESERVATIVE FREE 10 ML: 5 INJECTION INTRAVENOUS at 10:06

## 2018-06-05 NOTE — NURSING
Pt transfused 1 unit apheresed platelets as ordered, complete at this time. Pt tolerated platelets well with no S&S of adverse reaction noted. VS remained stable and pt remained afebrile. + blood return noted from PAC, then flushed with 10 ml NS, heplocked with 500 units heparin, and deaccessed per central line guidelines by JACKSON Alejandra, JULIANNC. Pt tolerated procedure well. Pt to get labs locally Tuesday AM 6/12/18. Pt verbalized complete understanding.

## 2018-06-05 NOTE — PLAN OF CARE
Problem: Patient Care Overview  Goal: Plan of Care Review  1.  Pt has a left double upper chest pac.  2.  Use 1 inch needle to access each PAC.  3.  Pt likes to keller.   Outcome: Ongoing (interventions implemented as appropriate)  Pt reports he is very happy to be out of the hospital and at home. Pt tolerated platelet transfusion well with no issues. Pt visited with another pt for duration of treatment today.

## 2018-06-05 NOTE — PROGRESS NOTES
"                                                     Pediatric Hematology Note      Subjective:       Patient ID: Hema Kuo is a 17 y.o. male      Chief Complaint:    Chief Complaint   Patient presents with    Leukemia    Follow-up       History of Present Illness:   Hema Kuo is a 17 y.o. male with AML s/p Intensification II therapy following [id number] (Arm A) here today for hospital follow-up.  He was admitted for a prolonged hospital stay (4/30-5/31) for fever and neutropenia, developed Strep viridans sepsis complicated by respiratory failure.  He subsequently developed delirium/psychosis requiring multiple antipsychotic medications, the symptoms of which have all resolved.  He feeling very well since discharge.  Reports that he is "back to himself".  Mood excellent, no further paranoid or bizarre thoughts, denies A/V hallucinations, HI/SI.  Appetite and energy level normalizing.  Reports some residual muscle weakness, would like to continue PT.  He reports no fevers, no abdominal pain, vomiting, diarrhea or constipation and no excessive bruising or unusual bleeding.  No other complaints.      Past Medical History:   Diagnosis Date    AML (acute myeloblastic leukemia) 10/2017    Asthma     Encounter for blood transfusion     Seasonal allergies      Past Surgical History:   Procedure Laterality Date    PORTACATH PLACEMENT  10/31/2017    TONSILLECTOMY         ROS:   Gen: Negative for fever. Negative for night sweats.    HEENT: Negative for nosebleeds.  Negative for sore throat.  Negative for visual problems.  Pulm: Negative for cough.  Negative for shortness of breath.  CV: Negative for chest pain.  Negative for cyanosis.  GI: Negative for abdominal pain, nausea or vomiting.    : Negative for changes in frequency or dysuria.   Skin: Negative for bruising.  Negative for rash.    MS: Negative for joint swelling or pain.  Neuro:  Negative for headaches, seizures, weakness.  Hematology:  Positive for AML.  " Positive for recent chemotherapy  Endocrine:  Negative for heat or cold intolerance.  Negative for increased thirst.  Psych: Negative for hyperactivity.  Negative for behavioral issues.      Physical Examination:   Vitals:    06/05/18 0854   BP: 124/65   Pulse: 98   Resp: 18   Temp: 97.8 °F (36.6 °C)     General: well developed, well nourished, no distress.    HENT: Head:normocephalic, atraumatic. Ears:bilateral TM's and external ear canals normal. Nose: Nares normal. Mucosa normal. No discharge. Throat: lips, mucosa, and tongue normal; teeth and gums normal and no throat erythema.  Eyes: conjunctivae pale/corneas clear. PERRL.   Neck: supple, symmetrical, and thyroid not enlarged, symmetric, no tenderness/mass/nodules  Lungs:  clear to auscultation bilaterally and normal respiratory effort  Cardiovascular: regular rate and rhythm, S1, S2 normal, no murmur, click, rub or gallop.   Chest Wall: Port site clear  Extremities: no cyanosis or edema, or clubbing. Pulses: 2+ and symmetric.  Abdomen: soft, non-tender non-distented; bowel sounds normal; no masses,  no organomegaly.   Skin: Pale.  Texture and turgor normal. No rashes or lesions  Musculoskeletal: No obvious joint swelling or erythema  Lymph Nodes: No cervical or supraclavicular adenopathy. No axillary or inguinal adenopathy.  Neurologic: Cranial nerves intact.  Normal strength and tone. No focal numbness or weakness  Psych: appropriate mood and affect    Objective:     Pathology:  Initial BM bx:    BONE MARROW, RIGHT ILIAC CREST, ASPIRATE, CLOT, AND CORE BIOPSY:  --Cellular marrow, positive for acute myeloid leukemia, with blasts representing approximately 25% of cellularity, see comment  --Background developing myeloid cells present with some cytologic atypia, see comment  COMMENT: Concomitantly submitted flow cytometric analysis detects an increased population of blasts consistent with acute myeloid leukemia, non-M3 subtype. Additional immunophenotyping was  "performed on peripheral blood flow cytometric studies (please see separate report). The marrow blasts showing expression of CD34, CD13, and cytoplasmic myeloperoxidase as well as dim CD19. The population however is negative for TdT, and cytoplasmic CD22 and CD79a. These findings are similar to that seen on the peripheral blood study and are consistent with acute myeloid leukemia, non-M3 subtype by JOVANNI classification.  Given the dim expression of CD19, a translocation of chromosomes 8 and 21 is a diagnostic possibility. The cytologic atypia/dysplasia seen in the myeloid series can also be seen with this translocation. Clinical correlation and correlation with any available cytogenetic and molecular studies is required for definitive classification of this process.    AML FISH: The result is abnormal and indicates QUSJ7U2/RUNX1 fusion in 90.6% of nuclei. This result is most consistent with acute myeloid leukemia with t(8;21)(q22;q22) (Grimwade et al., Blood 116:354-65, 2010; Solh et al., Am J Hematol 89:5044-9812, 2014; Bernarda and Radha, Am J Clin Pathol 144:6-18, 2015). For monitoring response to therapy in this patient, we suggest using FISH for QBMP8N8/RUNX1 fusion."    Peripheral blood, FLT3 mutation analysis: Negative. Neither expansion of the region susceptible to internal tandem duplication (FLT-ITD) nor changes involving codon D835 and/or I836 in the tyrosine kinase domain (FLT3-TKD) was detected."     KIT genetic alteration analysis, exons 8, 9, 10, 11, and 17: Negative.  No pathogenic genetic alterations were detected in the  KIT  gene, exons 8, 9, 10, 11, and 17.     Cytogenetics:  45,X,-Y,nevin(8)t(8;21)(q22;q22),nevin(20)t(20;21)(p13;q22)t(8;21),nevin(21)t(8;21)t(20;21)[17]/46,XY[3]  Comments: The result is abnormal. Of 20 metaphases, 3 metaphases were normal, and 17 metaphases had a complex translocation involving chromosomes 8, 21, and 20. FISH studies confirmed EPDT0F9/RUNX1 fusion associated with this " "translocation (reported separately). Fusion of XICK2C1/RUNX1 is reportedly associated with a favorable prognosis in AML (Grimwade et al., Blood 116:354-365, 2010). For monitoring response to therapy in this patient, FISH for HYML3D4/RUNX1 fusion is available, test AMLF (AML, FISH)."    End of Induction I:  BONE MARROW, RIGHT ILIAC CREST, ASPIRATE AND CLOT:  --Cellular marrow, approximately 60%, with trilineage hematopoiesis, see comment  --No definitive morphologic evidence of residual/recurrent acute myeloid leukemia, see comment  --Increased stainable iron  COMMENT: Concomitantly submitted flow cytometric analysis detects no diagnostic abnormal hematopoietic population. B cells are polyclonal with a subset of CD19/CD10 positive cells favored to be hematogones, and T cells are immunophenotypically unremarkable. The blast gate is not increased.    AML FISH:  This result is within normal limits for the HXRN5X4 and RUNX1 gene regions."    Labs:   Results for HEMA BHATIA (MRN [id number]) as of 6/5/2018 10:33   6/5/2018 08:51   WBC 1.29 (LL)   RBC 3.12 (L)   Hemoglobin 9.1 (L)   Hematocrit 26.3 (L)   MCV 84   MCH 29.2   MCHC 34.6   RDW 12.4   Platelets 10 (LL)   MPV 9.3   Retic 0.2 (L)     Assessment/Plan:   Hema was seen today for leukemia and follow-up.    Diagnoses and all orders for this visit:    Acute myeloid leukemia in remission  -     Ambulatory Referral to Physical/Occupational Therapy    AML (acute myeloblastic leukemia)  -     Comprehensive metabolic panel  -     CBC auto differential  -     Reticulocytes    Immunosuppressed due to chemotherapy    Antineoplastic chemotherapy induced pancytopenia    Chemotherapy-induced neutropenia    Psychosis, unspecified psychosis type    Physical deconditioning  -     Ambulatory Referral to Physical/Occupational Therapy        Discussion:   17 y.o. young man with AML (t 8;21) here for follow-up.  He reports feeling well since discharge home and was well appearing today in " clinic.    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  CNS negative.  MRD negative after Induction I.    -Treated per COG [id number] (ARM A).  S/p Intensification II.  End of treatment April 2018.    -BM biopsy at start of Intensification shows CR.  FISH for t(8;21) negative.  -Repeat bone marrow biopsy upon count recovery.    For his chemotherapy induced pancytopenia  -Transfuse platelets today.  -Neutropenic precautions reinforced    For his psychosis  -Appears to be resolving, has outpatient f/u with Psychology arranged in [place]    For his deconditioning  -Refer to outpatient PT    For his immunosuppression secondary to chemotherapy  -Continue acyclovir, ciprofloxacin and posaconazole prophylaxis upon completion of chemotherapy  -Received Pentam neb inpatient, next due 6/30.    -Continue Peridex    Labs at home in 1 week, return to clinic in 1 month for BM biopsy and PAC removal.      Sanford Bucio    Total time 30 minutes with >50% spent in face-to-face counseling regarding plan of care, chemo side effects and arranging coordination of care.

## 2018-06-05 NOTE — TELEPHONE ENCOUNTER
Per Dr Bucio, pt to get counts locally on Tuesday morning 6/12/18. Left a message for Rigoberto with above info. She called back, states pt is scheduled with them Tuesday 6/12 at 0730, will call pt to inform him.

## 2018-06-06 NOTE — ADDENDUM NOTE
Encounter addended by: Jaz Dennison RN on: 6/6/2018  4:21 PM<BR>    Actions taken: Flowsheet accepted, Chief Complaint modified, Follow-up modified

## 2018-06-12 ENCOUNTER — HISTORICAL (OUTPATIENT)
Dept: HEMATOLOGY/ONCOLOGY | Facility: CLINIC | Age: 18
End: 2018-06-12

## 2018-06-12 LAB
ABS NEUT (OLG): 0.64 X10(3)/MCL (ref 2.1–9.2)
ALBUMIN SERPL-MCNC: 3.7 GM/DL (ref 3.4–5)
ALBUMIN/GLOB SERPL: 1 RATIO (ref 1–2)
ALP SERPL-CCNC: 97 UNIT/L (ref 45–117)
ALT SERPL-CCNC: 69 UNIT/L (ref 12–78)
AST SERPL-CCNC: 23 UNIT/L (ref 15–37)
BILIRUB SERPL-MCNC: 0.5 MG/DL (ref 0.2–1)
BILIRUBIN DIRECT+TOT PNL SERPL-MCNC: 0.2 MG/DL
BILIRUBIN DIRECT+TOT PNL SERPL-MCNC: 0.3 MG/DL
BUN SERPL-MCNC: 9 MG/DL (ref 7–18)
CALCIUM SERPL-MCNC: 9.1 MG/DL (ref 8.5–10.1)
CHLORIDE SERPL-SCNC: 104 MMOL/L (ref 98–107)
CO2 SERPL-SCNC: 30 MMOL/L (ref 21–32)
CREAT SERPL-MCNC: 0.6 MG/DL (ref 0.6–1.3)
ERYTHROCYTE [DISTWIDTH] IN BLOOD BY AUTOMATED COUNT: 12.2 % (ref 11.5–14.5)
GLOBULIN SER-MCNC: 3.3 GM/ML (ref 2.3–3.5)
GLUCOSE SERPL-MCNC: 95 MG/DL (ref 74–106)
HCT VFR BLD AUTO: 23.6 % (ref 40–51)
HGB BLD-MCNC: 8.1 GM/DL (ref 13.5–17.5)
IMM GRANULOCYTES # BLD AUTO: 0.01 10*3/UL
IMM GRANULOCYTES NFR BLD AUTO: 1 %
LYMPHOCYTES # BLD AUTO: 0.42 X10(3)/MCL
LYMPHOCYTES NFR BLD AUTO: 34 % (ref 13–40)
MCH RBC QN AUTO: 29.1 PG (ref 26–34)
MCHC RBC AUTO-ENTMCNC: 34.3 GM/DL (ref 31–37)
MCV RBC AUTO: 84.9 FL (ref 80–100)
MONOCYTES # BLD AUTO: 0.19 X10(3)/MCL
MONOCYTES NFR BLD AUTO: 15 % (ref 0–10)
NEUTROPHILS # BLD AUTO: 0.64 X10(3)/MCL
NEUTROPHILS NFR BLD AUTO: 51 X10(3)/MCL
PLATELET # BLD AUTO: 23 X10(3)/MCL (ref 130–400)
PMV BLD AUTO: 12.2 FL (ref 7.4–10.4)
POTASSIUM SERPL-SCNC: 3.7 MMOL/L (ref 3.5–5.1)
PROT SERPL-MCNC: 7 GM/DL (ref 6.4–8.2)
RBC # BLD AUTO: 2.78 X10(6)/MCL (ref 4.5–5.9)
RET# (OHS): 0.03 X10(6)/MCL (ref 0.02–0.09)
RETICULOCYTE COUNT AUTOMATED (OLG): 1.1 % (ref 0.5–1.5)
SODIUM SERPL-SCNC: 142 MMOL/L (ref 136–145)
WBC # SPEC AUTO: 1.2 X10(3)/MCL (ref 4.5–11)

## 2018-06-13 ENCOUNTER — TELEPHONE (OUTPATIENT)
Dept: PEDIATRIC HEMATOLOGY/ONCOLOGY | Facility: CLINIC | Age: 18
End: 2018-06-13

## 2018-06-13 NOTE — TELEPHONE ENCOUNTER
Pt had labs done locally yesterday, results reviewed by Dr Bucio, states for pt to repeat labs locally in 1 week. Spoke with Rigoberto at [facility] in [place], informed her of above, she states she will contact pt and schedule him for labs Tuesday morning 6/19. Called to inform pt and mom of above and to review lab results, no answer, left message to call back to [phone].

## 2018-06-14 NOTE — TELEPHONE ENCOUNTER
Have not heard back from pt or mom, called at this time, spoke with pt. Informed him of his counts from Tuesday and to repeat counts locally next Tuesday 6/19. Pt repeated back and verbalized complete understanding, stating he has an appt locally at 0730 for counts check on 6/19.

## 2018-06-18 ENCOUNTER — TELEPHONE (OUTPATIENT)
Dept: PEDIATRIC HEMATOLOGY/ONCOLOGY | Facility: CLINIC | Age: 18
End: 2018-06-18

## 2018-06-18 NOTE — TELEPHONE ENCOUNTER
Pt called back stating that he did not tell his mother but he started having chest pain that comes and goes yesterday as well 5/10 and that he wanted us to know because it makes him nervous. He stated that he is not having any shortness of breath or difficulty breathing. Informed him that since he is so symptomatic, he needs to go to the ED now and that I will let Dr. Bucio know what is going on. Pt stated that he will go to [facility]. Dr. Bucio notified.

## 2018-06-18 NOTE — TELEPHONE ENCOUNTER
Spoke to pt mother, Madeline, with pt next to her and she stated that the pt has been c/o feeling dizzy intermittently that is not just with movement and also with lying down that does not involve any issues with vision changes or nausea/vomitting. She stated that he does get headaches as well and actually passed out at [facility] yesterday. Informed her to see if they can go to local infusion center to have labs done today as this most likely is related to low counts. Pt mother stated that they will just go tomorrow as scheduled. Encouraged them to call 911 or go to nearest ED with any emergent issues arising related to dizziness and for any additional blacking out. Pt mother verbalized an understanding with no further needs noted. Dr. Bucio notified.

## 2018-06-19 ENCOUNTER — TELEPHONE (OUTPATIENT)
Dept: PEDIATRIC HEMATOLOGY/ONCOLOGY | Facility: CLINIC | Age: 18
End: 2018-06-19

## 2018-06-19 ENCOUNTER — HISTORICAL (OUTPATIENT)
Dept: INFUSION THERAPY | Facility: HOSPITAL | Age: 18
End: 2018-06-19

## 2018-06-19 LAB
CROSSMATCH INTERPRETATION: NORMAL
PRODUCT READY: NORMAL
TRANSFUSION ORDER: NORMAL

## 2018-06-19 NOTE — TELEPHONE ENCOUNTER
Spoke to pt and informed him that I received records from ED visit last night and that all results were clear that were performed in regards to his c/o chest pain. Pt stated that he does still have the pain and dizziness intermittently and was told that this was due to his low hemoglobin. Informed him that he will be getting a unit of prbc today since he is still symptomatic and that these symptoms are most likely due to his hemoglobin being 7.9. Informed him that if symptoms continue or get worse after transfusion, to call our office or after hours number and that he will need to be evaluated further. Pt verbalized an understanding with no further needs noted. Pt mother, Madeline, also called and updated with same information per pt request.

## 2018-06-19 NOTE — TELEPHONE ENCOUNTER
Spoke to Rigoberto RN at [facility] in [place], [place] and she verbalized pt lab results as well as CXR and CT of chest results from ED last night. Dr. Bucio notified that they were normal and hgb 7.9, plt 26, ANC 1180. Orders given for pt to receive 1 unit PRBC leukoreduced, irradiated, CMV appropriate today and will call to discuss with pt later today. No further needs noted.

## 2018-06-20 ENCOUNTER — PATIENT MESSAGE (OUTPATIENT)
Dept: PEDIATRIC HEMATOLOGY/ONCOLOGY | Facility: CLINIC | Age: 18
End: 2018-06-20

## 2018-06-24 ENCOUNTER — PATIENT MESSAGE (OUTPATIENT)
Dept: PEDIATRIC HEMATOLOGY/ONCOLOGY | Facility: CLINIC | Age: 18
End: 2018-06-24

## 2018-06-25 ENCOUNTER — TELEPHONE (OUTPATIENT)
Dept: PEDIATRIC HEMATOLOGY/ONCOLOGY | Facility: CLINIC | Age: 18
End: 2018-06-25

## 2018-06-25 ENCOUNTER — HISTORICAL (OUTPATIENT)
Dept: HEMATOLOGY/ONCOLOGY | Facility: CLINIC | Age: 18
End: 2018-06-25

## 2018-06-25 DIAGNOSIS — C92.01 ACUTE MYELOID LEUKEMIA IN REMISSION: Primary | ICD-10-CM

## 2018-06-25 LAB
ABS NEUT (OLG): 1.06 X10(3)/MCL (ref 2.1–9.2)
ALBUMIN SERPL-MCNC: 3.8 GM/DL (ref 3.4–5)
ALBUMIN/GLOB SERPL: 1 RATIO (ref 1–2)
ALP SERPL-CCNC: 94 UNIT/L (ref 45–117)
ALT SERPL-CCNC: 70 UNIT/L (ref 12–78)
AST SERPL-CCNC: 22 UNIT/L (ref 15–37)
BILIRUB SERPL-MCNC: 0.4 MG/DL (ref 0.2–1)
BILIRUBIN DIRECT+TOT PNL SERPL-MCNC: 0.1 MG/DL
BILIRUBIN DIRECT+TOT PNL SERPL-MCNC: 0.3 MG/DL
BUN SERPL-MCNC: 14 MG/DL (ref 7–18)
CALCIUM SERPL-MCNC: 9 MG/DL (ref 8.5–10.1)
CHLORIDE SERPL-SCNC: 107 MMOL/L (ref 98–107)
CO2 SERPL-SCNC: 28 MMOL/L (ref 21–32)
CREAT SERPL-MCNC: 0.7 MG/DL (ref 0.6–1.3)
EOSINOPHIL # BLD AUTO: 0.02 10*3/UL
EOSINOPHIL NFR BLD AUTO: 1 %
ERYTHROCYTE [DISTWIDTH] IN BLOOD BY AUTOMATED COUNT: 18.8 % (ref 11.5–14.5)
GLOBULIN SER-MCNC: 2.8 GM/ML (ref 2.3–3.5)
GLUCOSE SERPL-MCNC: 100 MG/DL (ref 74–106)
HCT VFR BLD AUTO: 22 % (ref 40–51)
HGB BLD-MCNC: 7.5 GM/DL (ref 13.5–17.5)
IMM GRANULOCYTES # BLD AUTO: 0.01 10*3/UL
IMM GRANULOCYTES NFR BLD AUTO: 0 %
LYMPHOCYTES # BLD AUTO: 0.54 X10(3)/MCL
LYMPHOCYTES NFR BLD AUTO: 30 % (ref 13–40)
MCH RBC QN AUTO: 31 PG (ref 26–34)
MCHC RBC AUTO-ENTMCNC: 34.1 GM/DL (ref 31–37)
MCV RBC AUTO: 90.9 FL (ref 80–100)
MONOCYTES # BLD AUTO: 0.2 X10(3)/MCL
MONOCYTES NFR BLD AUTO: 11 % (ref 0–10)
NEUTROPHILS # BLD AUTO: 1.06 X10(3)/MCL
NEUTROPHILS NFR BLD AUTO: 58 X10(3)/MCL
PLATELET # BLD AUTO: 35 X10(3)/MCL (ref 130–400)
PMV BLD AUTO: 11.9 FL (ref 7.4–10.4)
POTASSIUM SERPL-SCNC: 4 MMOL/L (ref 3.5–5.1)
PROT SERPL-MCNC: 6.6 GM/DL (ref 6.4–8.2)
RBC # BLD AUTO: 2.42 X10(6)/MCL (ref 4.5–5.9)
SODIUM SERPL-SCNC: 141 MMOL/L (ref 136–145)
WBC # SPEC AUTO: 1.8 X10(3)/MCL (ref 4.5–11)

## 2018-06-25 RX ORDER — SULFAMETHOXAZOLE AND TRIMETHOPRIM 800; 160 MG/1; MG/1
1 TABLET ORAL 2 TIMES DAILY
Qty: 30 TABLET | Refills: 3 | Status: SHIPPED | OUTPATIENT
Start: 2018-06-25 | End: 2018-11-29

## 2018-06-25 NOTE — TELEPHONE ENCOUNTER
Spoke to pt mother and gave her the pt lab results from today of WBC 1.8, ANC 1060, plt 35, and hgb 7.5. Pt stated the blood helped last week and that he is not having any issues this week. No transfusions this week and will recheck labs on Monday locally. Informed her that we will be giving him Pentamidine at his next visit as he is due. Pt mother stated that the pt had an allergic reaction with wheezing and needed Albuterol treatment after. Dr. Bucio notified and Bactrim to start on weekends again and informed her to stop the acyclovir and posaconazole since his ANC is WNL. Pt mother verbalized an understanding with no further needs noted. Educated her to have the pt call if feeling symptomatic before then.

## 2018-07-03 ENCOUNTER — HISTORICAL (OUTPATIENT)
Dept: HEMATOLOGY/ONCOLOGY | Facility: CLINIC | Age: 18
End: 2018-07-03

## 2018-07-03 LAB
ABS NEUT (OLG): 1.3 X10(3)/MCL (ref 2.1–9.2)
EOSINOPHIL # BLD AUTO: 0.03 X10(3)/MCL
EOSINOPHIL NFR BLD AUTO: 1 %
ERYTHROCYTE [DISTWIDTH] IN BLOOD BY AUTOMATED COUNT: ABNORMAL % (ref 11.5–14.5)
HCT VFR BLD AUTO: 24.7 % (ref 40–51)
HGB BLD-MCNC: 8.3 GM/DL (ref 13.5–17.5)
IMM GRANULOCYTES # BLD AUTO: 0.01 10*3/UL
IMM GRANULOCYTES NFR BLD AUTO: 0 %
LYMPHOCYTES # BLD AUTO: 0.72 X10(3)/MCL
LYMPHOCYTES NFR BLD AUTO: 31 % (ref 13–40)
MCH RBC QN AUTO: 32.5 PG (ref 26–34)
MCHC RBC AUTO-ENTMCNC: 33.6 GM/DL (ref 31–37)
MCV RBC AUTO: 96.9 FL (ref 80–100)
MONOCYTES # BLD AUTO: 0.28 X10(3)/MCL
MONOCYTES NFR BLD AUTO: 12 % (ref 0–10)
NEUTROPHILS # BLD AUTO: 1.3 X10(3)/MCL
NEUTROPHILS NFR BLD AUTO: 55 X10(3)/MCL
PLATELET # BLD AUTO: 48 X10(3)/MCL (ref 130–400)
PMV BLD AUTO: 11.5 FL (ref 7.4–10.4)
RBC # BLD AUTO: 2.55 X10(6)/MCL (ref 4.5–5.9)
RET# (OHS): 0.18 X10(6)/MCL (ref 0.02–0.09)
RETICULOCYTE COUNT AUTOMATED (OLG): 7 % (ref 0.5–1.5)
WBC # SPEC AUTO: 2.4 X10(3)/MCL (ref 4.5–11)

## 2018-07-05 DIAGNOSIS — C92.01 AML (ACUTE MYELOID LEUKEMIA) IN REMISSION: Primary | ICD-10-CM

## 2018-07-09 ENCOUNTER — OFFICE VISIT (OUTPATIENT)
Dept: PEDIATRIC HEMATOLOGY/ONCOLOGY | Facility: CLINIC | Age: 18
End: 2018-07-09
Payer: MEDICAID

## 2018-07-09 ENCOUNTER — LAB VISIT (OUTPATIENT)
Dept: LAB | Facility: HOSPITAL | Age: 18
End: 2018-07-09
Attending: PEDIATRICS
Payer: MEDICAID

## 2018-07-09 ENCOUNTER — OFFICE VISIT (OUTPATIENT)
Dept: PSYCHIATRY | Facility: CLINIC | Age: 18
End: 2018-07-09
Payer: MEDICAID

## 2018-07-09 VITALS
WEIGHT: 200.31 LBS | SYSTOLIC BLOOD PRESSURE: 137 MMHG | DIASTOLIC BLOOD PRESSURE: 74 MMHG | HEART RATE: 107 BPM | TEMPERATURE: 98 F | HEIGHT: 66 IN | BODY MASS INDEX: 32.19 KG/M2 | RESPIRATION RATE: 18 BRPM

## 2018-07-09 DIAGNOSIS — C92.00 AML (ACUTE MYELOBLASTIC LEUKEMIA): ICD-10-CM

## 2018-07-09 DIAGNOSIS — C80.1 PSYCHOLOGICAL FACTOR AFFECTING CANCER: ICD-10-CM

## 2018-07-09 DIAGNOSIS — F54 PSYCHOLOGICAL FACTOR AFFECTING CANCER: ICD-10-CM

## 2018-07-09 DIAGNOSIS — C92.01 ACUTE MYELOID LEUKEMIA IN REMISSION: Primary | ICD-10-CM

## 2018-07-09 LAB
ANISOCYTOSIS BLD QL SMEAR: SLIGHT
BASO STIPL BLD QL SMEAR: ABNORMAL
BASOPHILS # BLD AUTO: 0 K/UL
BASOPHILS NFR BLD: 0 %
DACRYOCYTES BLD QL SMEAR: ABNORMAL
DIFFERENTIAL METHOD: ABNORMAL
EOSINOPHIL # BLD AUTO: 0 K/UL
EOSINOPHIL NFR BLD: 1 %
ERYTHROCYTE [DISTWIDTH] IN BLOOD BY AUTOMATED COUNT: ABNORMAL %
HCT VFR BLD AUTO: 26.5 %
HGB BLD-MCNC: 8.6 G/DL
LYMPHOCYTES # BLD AUTO: 0.7 K/UL
LYMPHOCYTES NFR BLD: 24.9 %
MCH RBC QN AUTO: 33.2 PG
MCHC RBC AUTO-ENTMCNC: 32.5 G/DL
MCV RBC AUTO: 102 FL
MONOCYTES # BLD AUTO: 0.5 K/UL
MONOCYTES NFR BLD: 15.9 %
NEUTROPHILS # BLD AUTO: 1.7 K/UL
NEUTROPHILS NFR BLD: 58.2 %
PLATELET # BLD AUTO: 59 K/UL
PLATELET BLD QL SMEAR: ABNORMAL
PMV BLD AUTO: 10.9 FL
POIKILOCYTOSIS BLD QL SMEAR: SLIGHT
POLYCHROMASIA BLD QL SMEAR: ABNORMAL
RBC # BLD AUTO: 2.59 M/UL
RETICS/RBC NFR AUTO: 6 %
SCHISTOCYTES BLD QL SMEAR: ABNORMAL
WBC # BLD AUTO: 2.89 K/UL

## 2018-07-09 PROCEDURE — 36415 COLL VENOUS BLD VENIPUNCTURE: CPT | Mod: PO

## 2018-07-09 PROCEDURE — 85045 AUTOMATED RETICULOCYTE COUNT: CPT | Mod: PO

## 2018-07-09 PROCEDURE — 99999 PR PBB SHADOW E&M-EST. PATIENT-LVL III: CPT | Mod: PBBFAC,,, | Performed by: PEDIATRICS

## 2018-07-09 PROCEDURE — 99214 OFFICE O/P EST MOD 30 MIN: CPT | Mod: S$PBB,,, | Performed by: PEDIATRICS

## 2018-07-09 PROCEDURE — 90834 PSYTX W PT 45 MINUTES: CPT | Mod: HP,HA,S$PBB, | Performed by: PSYCHOLOGIST

## 2018-07-09 PROCEDURE — 99213 OFFICE O/P EST LOW 20 MIN: CPT | Mod: PBBFAC | Performed by: PEDIATRICS

## 2018-07-09 PROCEDURE — 90834 PSYTX W PT 45 MINUTES: CPT | Mod: PBBFAC | Performed by: PSYCHOLOGIST

## 2018-07-09 PROCEDURE — 85025 COMPLETE CBC W/AUTO DIFF WBC: CPT | Mod: PO

## 2018-07-09 NOTE — PROGRESS NOTES
"  Name: Hema Kuo YOB: 2000   Gender: Male Age: 17 y.o.   School: Not in school  Date of Evaluation: 7/9/2018   Grade: not in school Race/Ethnicity: White//White     45-minute session of individual therapy (98133) was completed with patient Hema .      Chief Complaint  Hema is known to this writer through inpatient consultation throughout his treatment for leukemia.  Hema requested outpatient treatment to address psychosocial adjustment to having cancer.    Content of Current Session  Met with Hema individually for the entirety of the session.  He described how his social and family relationships have changed since his diagnosis.  He described feeling "numb" since being home from the hospital, which he (appropriately) attributed to not having anything to help him feel productive, whereas in the past he would have been working and hanging out with friends.  Hema also discussed some feelings of guilt about how well he is doing medically compared to his mother's ongoing medical issues.  Problem-solved ways that Hema can begin to feel more productive and to work on defining his "new normal".  Hema was receptive to the conversation and offered a lot of information about his insights into his situation.    Based on the diagnostic evaluation and background information provided, the current diagnoses are:     ICD-10-CM ICD-9-CM   1. Acute myeloid leukemia in remission C92.01 205.01   2. Psychological factor affecting cancer C80.1 199.1    F54 [id number]       Hema is schedule to return to clinic on 7/20/18; will attempt to meet with him again then.  "

## 2018-07-09 NOTE — PROGRESS NOTES
"                                                     Pediatric Hematology Note      Subjective:       Patient ID: Hema Kuo is a 17 y.o. male      Chief Complaint:    Chief Complaint   Patient presents with    Leukemia    Follow-up       History of Present Illness:   Hema Kuo is a 17 y.o. male with AML s/p Intensification II therapy following [id number] (Arm A) here today for hospital follow-up.   He feeling very well since last visit.  Na PT extremely well.    Reports that he is "back to himself".  Mood excellent, no further paranoid or bizarre thoughts, denies A/V hallucinations, HI/SI.  Appetite and energy level normal.    He reports no fevers, no abdominal pain, vomiting, diarrhea or constipation and no excessive bruising or unusual bleeding.  No other complaints.      Past Medical History:   Diagnosis Date    AML (acute myeloblastic leukemia) 10/2017    Asthma     Encounter for blood transfusion     Seasonal allergies      Past Surgical History:   Procedure Laterality Date    PORTACATH PLACEMENT  10/31/2017    TONSILLECTOMY         ROS:   Gen: Negative for fever. Negative for night sweats.    HEENT: Negative for nosebleeds.  Negative for sore throat.  Negative for visual problems.  Pulm: Negative for cough.  Negative for shortness of breath.  CV: Negative for chest pain.  Negative for cyanosis.  GI: Negative for abdominal pain, nausea or vomiting.    : Negative for changes in frequency or dysuria.   Skin: Negative for bruising.  Negative for rash.    MS: Negative for joint swelling or pain.  Neuro:  Negative for headaches, seizures, weakness.  Hematology:  Positive for AML.  Positive for recent chemotherapy  Endocrine:  Negative for heat or cold intolerance.  Negative for increased thirst.  Psych: Negative for hyperactivity.  Negative for behavioral issues.      Physical Examination:                             General: well developed, well nourished, no distress.    HENT: Head:normocephalic, " atraumatic. Ears:bilateral TM's and external ear canals normal. Nose: Nares normal. Mucosa normal. No discharge. Throat: lips, mucosa, and tongue normal; teeth and gums normal and no throat erythema.  Eyes: conjunctivae pale/corneas clear. PERRL.   Neck: supple, symmetrical, and thyroid not enlarged, symmetric, no tenderness/mass/nodules  Lungs:  clear to auscultation bilaterally and normal respiratory effort  Cardiovascular: regular rate and rhythm, S1, S2 normal, no murmur, click, rub or gallop.   Chest Wall: Port site clear  Extremities: no cyanosis or edema, or clubbing. Pulses: 2+ and symmetric.  Abdomen: soft, non-tender non-distented; bowel sounds normal; no masses,  no organomegaly.   Skin: Pale.  Texture and turgor normal. No rashes or lesions  Musculoskeletal: No obvious joint swelling or erythema  Lymph Nodes: No cervical or supraclavicular adenopathy. No axillary or inguinal adenopathy.  Neurologic: Cranial nerves intact.  Normal strength and tone. No focal numbness or weakness  Psych: appropriate mood and affect    Objective:     Pathology:  Initial BM bx:    BONE MARROW, RIGHT ILIAC CREST, ASPIRATE, CLOT, AND CORE BIOPSY:  --Cellular marrow, positive for acute myeloid leukemia, with blasts representing approximately 25% of cellularity, see comment  --Background developing myeloid cells present with some cytologic atypia, see comment  COMMENT: Concomitantly submitted flow cytometric analysis detects an increased population of blasts consistent with acute myeloid leukemia, non-M3 subtype. Additional immunophenotyping was performed on peripheral blood flow cytometric studies (please see separate report). The marrow blasts showing expression of CD34, CD13, and cytoplasmic myeloperoxidase as well as dim CD19. The population however is negative for TdT, and cytoplasmic CD22 and CD79a. These findings are similar to that seen on the peripheral blood study and are consistent with acute myeloid leukemia, non-M3  "subtype by JOVANNI classification.  Given the dim expression of CD19, a translocation of chromosomes 8 and 21 is a diagnostic possibility. The cytologic atypia/dysplasia seen in the myeloid series can also be seen with this translocation. Clinical correlation and correlation with any available cytogenetic and molecular studies is required for definitive classification of this process.    AML FISH: The result is abnormal and indicates KNTS2K9/RUNX1 fusion in 90.6% of nuclei. This result is most consistent with acute myeloid leukemia with t(8;21)(q22;q22) (Grimwade et al., Blood 116:354-65, 2010; Zander et al., Am J Hematol 89:3243-5835, 2014; Bernarda and Radha, Am J Clin Pathol 144:6-18, 2015). For monitoring response to therapy in this patient, we suggest using FISH for IJBN9W3/RUNX1 fusion."    Peripheral blood, FLT3 mutation analysis: Negative. Neither expansion of the region susceptible to internal tandem duplication (FLT-ITD) nor changes involving codon D835 and/or I836 in the tyrosine kinase domain (FLT3-TKD) was detected."     KIT genetic alteration analysis, exons 8, 9, 10, 11, and 17: Negative.  No pathogenic genetic alterations were detected in the  KIT  gene, exons 8, 9, 10, 11, and 17.     Cytogenetics:  45,X,-Y,nevin(8)t(8;21)(q22;q22),nevin(20)t(20;21)(p13;q22)t(8;21),nevin(21)t(8;21)t(20;21)[17]/46,XY[3]  Comments: The result is abnormal. Of 20 metaphases, 3 metaphases were normal, and 17 metaphases had a complex translocation involving chromosomes 8, 21, and 20. FISH studies confirmed ZSWJ9N7/RUNX1 fusion associated with this translocation (reported separately). Fusion of LDLL8D2/RUNX1 is reportedly associated with a favorable prognosis in AML (Grimwade et al., Blood 116:354-365, 2010). For monitoring response to therapy in this patient, FISH for KATA0B1/RUNX1 fusion is available, test AMLF (AML, FISH)."    End of Induction I:  BONE MARROW, RIGHT ILIAC CREST, ASPIRATE AND CLOT:  --Cellular marrow, approximately " "60%, with trilineage hematopoiesis, see comment  --No definitive morphologic evidence of residual/recurrent acute myeloid leukemia, see comment  --Increased stainable iron  COMMENT: Concomitantly submitted flow cytometric analysis detects no diagnostic abnormal hematopoietic population. B cells are polyclonal with a subset of CD19/CD10 positive cells favored to be hematogones, and T cells are immunophenotypically unremarkable. The blast gate is not increased.    AML FISH:  This result is within normal limits for the BGWG5B7 and RUNX1 gene regions."         Assessment/Plan:   Hema was seen today for leukemia and follow-up.    Diagnoses and all orders for this visit:    Acute myeloid leukemia in remission       AML (acute myeloblastic leukemia)  -     Immunosuppressed due to chemotherapy    Antineoplastic chemotherapy induced pancytopenia    Chemotherapy-induced neutropenia    Psychosis, unspecified psychosis type    Physical deconditioning         Discussion:   17 y.o. young man with AML (t 8;21) here for follow-up.  He reports feeling well since discharge home and was well appearing today in clinic.    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  CNS negative.  MRD negative after Induction I.    -Treated per COG [id number] (ARM A).  S/p Intensification II.  End of treatment April 2018.    -BM biopsy at start of Intensification shows CR.  FISH for t(8;21) negative.  -Counts today are excellent.  Will get end of therapy BMA/B next week         For his psychosis  -Appears to be resolved, has outpatient f/u with Psychology arranged in [place]    For his deconditioning  -Cont outpatient pt.  An well    For his immunosuppression secondary to chemotherapy  -Continue Bactrim    RTC 1 wk for bone amrrow         Total time 25 minutes with >50% spent in face-to-face counseling regarding plan of care, chemo side effects and arranging coordination of care.      "

## 2018-07-09 NOTE — LETTER
July 9, 2018      Sanford Bucio MD  [address]  [place] [address]           [facility]  [address]  [facility] [address]  Phone: [phone]          Patient: Hema Kuo   MR Number: [id number]   YOB: 2000   Date of Visit: 7/9/2018       Dear Dr. Sanford Bucio:    Thank you for referring Hema Kuo to me for evaluation. Attached you will find relevant portions of my assessment and plan of care.    If you have questions, please do not hesitate to call me. I look forward to following Hema Kuo along with you.    Sincerely,    Christian Emerson MD    Enclosure  CC:  No Recipients    If you would like to receive this communication electronically, please contact [email or web address] or [phone] to request more information on [employer] Link access.    For providers and/or their staff who would like to refer a patient to [facility], please contact us through our one-stop-shop provider referral line, [facility] , at [phone].    If you feel you have received this communication in error or would no longer like to receive these types of communications, please e-mail [email or web address]

## 2018-07-18 ENCOUNTER — SOCIAL WORK (OUTPATIENT)
Dept: CASE MANAGEMENT | Facility: HOSPITAL | Age: 18
End: 2018-07-18

## 2018-07-18 NOTE — PROGRESS NOTES
ALEXANDRU was contacted by peds hem/onc nurse to discuss overnight lodging accommodations for upcoming procedure since the Pt's family lives out of town. ALEXANDRU spoke with Pt's mom, who requested [facility] reservations for two nights. ALEXANDRU emailed billing authorization to  [email or web address]) reserving one room in mom's name for 07/19/18 through 07/21/18 using the [employer] to cover the charges.      No further known needs at this time.

## 2018-07-19 ENCOUNTER — ANESTHESIA EVENT (OUTPATIENT)
Dept: SURGERY | Facility: HOSPITAL | Age: 18
End: 2018-07-19
Payer: MEDICAID

## 2018-07-19 ENCOUNTER — TELEPHONE (OUTPATIENT)
Dept: SURGERY | Facility: CLINIC | Age: 18
End: 2018-07-19

## 2018-07-19 NOTE — ANESTHESIA PREPROCEDURE EVALUATION
[facility]  Anesthesia Pre-Operative Evaluation         Patient Name: Hema Kuo  YOB: 2000  MRN: [id number]    SUBJECTIVE:     Pre-operative evaluation for Procedure(s) (LRB):  REMOVAL, CATHETER, CENTRAL VENOUS, TUNNELED, WITH PORT (N/A)  Biopsy-bone marrow (N/A)     07/20/2018    Hema Kuo is a 17 y.o. male w/ a significant PMHx of asthma and AML .    Patient now presents for the above procedure(s).      LDA: Port A cath L subclavian    Prev airway: None documented.    Patient Active Problem List   Diagnosis    Acute myeloid leukemia in remission    Psychological factor affecting cancer    Antineoplastic chemotherapy induced pancytopenia    Immunosuppressed due to chemotherapy    Acute myeloid leukemia in remission    Anemia    Neutropenia    Nutritional assessment    Insomnia    Leukemia       Review of patient's allergies indicates:   Allergen Reactions    Nsaids (non-steroidal anti-inflammatory drug) Anaphylaxis    Nuts [tree nut] Anaphylaxis    Strawberry     Vancomycin analogues Itching     Premedicate with benadryl and admin over 2hr.       No current facility-administered medications on file prior to encounter.      Current Outpatient Prescriptions on File Prior to Encounter   Medication Sig Dispense Refill    sulfamethoxazole-trimethoprim 800-160mg (BACTRIM DS) 800-160 mg Tab Take 1 tablet by mouth 2 (two) times daily. Take only on Friday, Saturday, and Sundays 30 tablet 3    lisdexamfetamine (VYVANSE) 60 MG capsule Take 60 mg by mouth every morning.         Past Surgical History:   Procedure Laterality Date    PORTACATH PLACEMENT  10/31/2017    TONSILLECTOMY         Social History     Social History    Marital status: Single     Spouse name: N/A    Number of children: N/A    Years of education: N/A     Occupational History    Not on file.     Social History Main Topics    Smoking status: Former Smoker     Packs/day: 0.50     Types: Cigarettes      Quit date: 10/30/2017    Smokeless tobacco: Never Used    Alcohol use Yes      Comment: rare occasions    Drug use: No    Sexual activity: Yes     Partners: Female     Other Topics Concern    Not on file     Social History Narrative    No narrative on file       OBJECTIVE:     Vital Signs Range (Last 24H):  Temp:  [36.5 °C (97.7 °F)-37.1 °C (98.7 °F)]   Pulse:  [78-97]   Resp:  [18]   BP: (102-105)/(50-62)   SpO2:  [100 %]       Significant Labs:  Lab Results   Component Value Date    WBC 2.51 (L) 07/20/2018    HGB 10.0 (L) 07/20/2018    HCT 30.6 (L) 07/20/2018    PLT 72 (L) 07/20/2018    ALT 24 07/20/2018    AST 66 (H) 07/20/2018     07/20/2018    K 3.5 07/20/2018     07/20/2018    CREATININE 0.8 07/20/2018    BUN 12 07/20/2018    CO2 25 07/20/2018    INR 1.2 05/09/2018       Diagnostic Studies: No relevant studies.    EKG: No recent studies available.    2D ECHO:  No results found for this or any previous visit.      ASSESSMENT/PLAN:                      Anesthesia Evaluation    I have reviewed the Patient Summary Reports.    I have reviewed the Nursing Notes.   I have reviewed the Medications.     Review of Systems  Anesthesia Hx:  No problems with previous Anesthesia  Denies Family Hx of Anesthesia complications.   Denies Personal Hx of Anesthesia complications.   Hematology/Oncology:        Hematology Comments: AML   Cardiovascular:  Cardiovascular Normal     Pulmonary:   Asthma    Renal/:  Renal/ Normal     Neurological:  Neurology Normal        Physical Exam  General:  Obesity, Well nourished    Airway/Jaw/Neck:  Airway Findings: Mouth Opening: Normal Tongue: Normal  General Airway Assessment: Adult  Mallampati: II  Improves to II with phonation.  TM Distance: Normal, at least 6 cm      Dental:  Dental Findings: In tact   Chest/Lungs:  Chest/Lungs Findings: Clear to auscultation     Heart/Vascular:  Heart Findings: Rate: Normal  Rhythm: Regular Rhythm  Sounds: Normal        Mental  Status:  Mental Status Findings:  Cooperative, Alert and Oriented         Anesthesia Plan  Type of Anesthesia, risks & benefits discussed:  Anesthesia Type:  general  Patient's Preference:   Intra-op Monitoring Plan: standard ASA monitors  Intra-op Monitoring Plan Comments:   Post Op Pain Control Plan: per primary service following discharge from PACU  Post Op Pain Control Plan Comments:   Induction:   IV and Inhalation  Beta Blocker:  Patient is not currently on a Beta-Blocker (No further documentation required).       Informed Consent: Patient understands risks and agrees with Anesthesia plan.  Questions answered. Anesthesia consent signed with patient.  ASA Score: 2     Day of Surgery Review of History & Physical:    H&P update referred to the surgeon.     Anesthesia Plan Notes:   17M mild-int asthma, AML in remission for port removal/BMB under GA LMA        Ready For Surgery From Anesthesia Perspective.

## 2018-07-20 ENCOUNTER — OFFICE VISIT (OUTPATIENT)
Dept: PEDIATRIC HEMATOLOGY/ONCOLOGY | Facility: CLINIC | Age: 18
End: 2018-07-20
Payer: MEDICAID

## 2018-07-20 ENCOUNTER — SURGERY (OUTPATIENT)
Age: 18
End: 2018-07-20

## 2018-07-20 ENCOUNTER — CLINICAL SUPPORT (OUTPATIENT)
Dept: PEDIATRIC HEMATOLOGY/ONCOLOGY | Facility: CLINIC | Age: 18
End: 2018-07-20
Payer: MEDICAID

## 2018-07-20 ENCOUNTER — ANESTHESIA (OUTPATIENT)
Dept: SURGERY | Facility: HOSPITAL | Age: 18
End: 2018-07-20
Payer: MEDICAID

## 2018-07-20 ENCOUNTER — HOSPITAL ENCOUNTER (OUTPATIENT)
Facility: HOSPITAL | Age: 18
Discharge: HOME | End: 2018-07-20
Attending: SURGERY | Admitting: SURGERY
Payer: MEDICAID

## 2018-07-20 VITALS
HEART RATE: 70 BPM | OXYGEN SATURATION: 95 % | SYSTOLIC BLOOD PRESSURE: 108 MMHG | HEIGHT: 67 IN | RESPIRATION RATE: 18 BRPM | DIASTOLIC BLOOD PRESSURE: 49 MMHG | BODY MASS INDEX: 30.92 KG/M2 | TEMPERATURE: 98 F | WEIGHT: 197 LBS

## 2018-07-20 VITALS
DIASTOLIC BLOOD PRESSURE: 62 MMHG | RESPIRATION RATE: 18 BRPM | SYSTOLIC BLOOD PRESSURE: 105 MMHG | BODY MASS INDEX: 31.02 KG/M2 | HEART RATE: 97 BPM | HEIGHT: 67 IN | TEMPERATURE: 98 F | WEIGHT: 197.63 LBS

## 2018-07-20 DIAGNOSIS — C92.01 ACUTE MYELOID LEUKEMIA IN REMISSION: Primary | ICD-10-CM

## 2018-07-20 DIAGNOSIS — R29.898 MUSCULAR DECONDITIONING: ICD-10-CM

## 2018-07-20 DIAGNOSIS — T45.1X5A ANTINEOPLASTIC CHEMOTHERAPY INDUCED PANCYTOPENIA: ICD-10-CM

## 2018-07-20 DIAGNOSIS — C95.90 LEUKEMIA: ICD-10-CM

## 2018-07-20 DIAGNOSIS — C92.01 ACUTE MYELOID LEUKEMIA IN REMISSION: ICD-10-CM

## 2018-07-20 DIAGNOSIS — D84.821 IMMUNOSUPPRESSED DUE TO CHEMOTHERAPY: ICD-10-CM

## 2018-07-20 DIAGNOSIS — D61.810 ANTINEOPLASTIC CHEMOTHERAPY INDUCED PANCYTOPENIA: ICD-10-CM

## 2018-07-20 DIAGNOSIS — Z79.899 IMMUNOSUPPRESSED DUE TO CHEMOTHERAPY: ICD-10-CM

## 2018-07-20 DIAGNOSIS — F29 PSYCHOSIS, UNSPECIFIED PSYCHOSIS TYPE: ICD-10-CM

## 2018-07-20 DIAGNOSIS — T45.1X5A IMMUNOSUPPRESSED DUE TO CHEMOTHERAPY: ICD-10-CM

## 2018-07-20 PROBLEM — Z00.8 NUTRITIONAL ASSESSMENT: Status: RESOLVED | Noted: 2018-05-12 | Resolved: 2018-07-20

## 2018-07-20 PROBLEM — D70.9 NEUTROPENIA: Status: RESOLVED | Noted: 2018-05-01 | Resolved: 2018-07-20

## 2018-07-20 LAB
ALBUMIN SERPL BCP-MCNC: 3.9 G/DL
ALP SERPL-CCNC: 89 U/L
ALT SERPL W/O P-5'-P-CCNC: 24 U/L
ANION GAP SERPL CALC-SCNC: 10 MMOL/L
ANISOCYTOSIS BLD QL SMEAR: SLIGHT
AST SERPL-CCNC: 66 U/L
BASOPHILS # BLD AUTO: 0.01 K/UL
BASOPHILS NFR BLD: 0.4 %
BILIRUB SERPL-MCNC: 0.6 MG/DL
BONE MARROW WRIGHT STAIN COMMENT: NORMAL
BUN SERPL-MCNC: 12 MG/DL
CALCIUM SERPL-MCNC: 9.4 MG/DL
CHLORIDE SERPL-SCNC: 105 MMOL/L
CO2 SERPL-SCNC: 25 MMOL/L
CREAT SERPL-MCNC: 0.8 MG/DL
DIFFERENTIAL METHOD: ABNORMAL
EOSINOPHIL # BLD AUTO: 0 K/UL
EOSINOPHIL NFR BLD: 1.2 %
ERYTHROCYTE [DISTWIDTH] IN BLOOD BY AUTOMATED COUNT: ABNORMAL %
EST. GFR  (AFRICAN AMERICAN): >60 ML/MIN/1.73 M^2
EST. GFR  (NON AFRICAN AMERICAN): >60 ML/MIN/1.73 M^2
GLUCOSE SERPL-MCNC: 91 MG/DL
HCT VFR BLD AUTO: 30.6 %
HGB BLD-MCNC: 10 G/DL
LYMPHOCYTES # BLD AUTO: 1.1 K/UL
LYMPHOCYTES NFR BLD: 43.4 %
MCH RBC QN AUTO: 34.4 PG
MCHC RBC AUTO-ENTMCNC: 32.7 G/DL
MCV RBC AUTO: 105 FL
MONOCYTES # BLD AUTO: 0.3 K/UL
MONOCYTES NFR BLD: 11.2 %
NEUTROPHILS # BLD AUTO: 1.1 K/UL
NEUTROPHILS NFR BLD: 43.8 %
PLATELET # BLD AUTO: 72 K/UL
PLATELET BLD QL SMEAR: ABNORMAL
PMV BLD AUTO: 10.2 FL
POIKILOCYTOSIS BLD QL SMEAR: SLIGHT
POLYCHROMASIA BLD QL SMEAR: ABNORMAL
POTASSIUM SERPL-SCNC: 3.5 MMOL/L
PROT SERPL-MCNC: 6.5 G/DL
RBC # BLD AUTO: 2.91 M/UL
RETICS/RBC NFR AUTO: 4.2 %
SODIUM SERPL-SCNC: 140 MMOL/L
WBC # BLD AUTO: 2.51 K/UL

## 2018-07-20 PROCEDURE — 99999 PR PBB SHADOW E&M-EST. PATIENT-LVL I: CPT | Mod: PBBFAC,,,

## 2018-07-20 PROCEDURE — 99214 OFFICE O/P EST MOD 30 MIN: CPT | Mod: S$PBB,,, | Performed by: PEDIATRICS

## 2018-07-20 PROCEDURE — 71000015 HC POSTOP RECOV 1ST HR: Performed by: SURGERY

## 2018-07-20 PROCEDURE — 71000044 HC DOSC ROUTINE RECOVERY FIRST HOUR: Performed by: SURGERY

## 2018-07-20 PROCEDURE — 63600175 PHARM REV CODE 636 W HCPCS: Performed by: STUDENT IN AN ORGANIZED HEALTH CARE EDUCATION/TRAINING PROGRAM

## 2018-07-20 PROCEDURE — 38222 DX BONE MARROW BX & ASPIR: CPT | Mod: RT,,, | Performed by: PEDIATRICS

## 2018-07-20 PROCEDURE — 99211 OFF/OP EST MAY X REQ PHY/QHP: CPT | Mod: PBBFAC,25

## 2018-07-20 PROCEDURE — 88300 SURGICAL PATH GROSS: CPT | Performed by: PATHOLOGY

## 2018-07-20 PROCEDURE — 88189 FLOWCYTOMETRY/READ 16 & >: CPT | Mod: ,,, | Performed by: PATHOLOGY

## 2018-07-20 PROCEDURE — 99999 PR PBB SHADOW E&M-EST. PATIENT-LVL III: CPT | Mod: PBBFAC,,, | Performed by: PEDIATRICS

## 2018-07-20 PROCEDURE — 36590 REMOVAL TUNNELED CV CATH: CPT | Mod: ,,, | Performed by: SURGERY

## 2018-07-20 PROCEDURE — 37000008 HC ANESTHESIA 1ST 15 MINUTES: Performed by: SURGERY

## 2018-07-20 PROCEDURE — 71000016 HC POSTOP RECOV ADDL HR: Performed by: SURGERY

## 2018-07-20 PROCEDURE — S0020 INJECTION, BUPIVICAINE HYDRO: HCPCS | Performed by: SURGERY

## 2018-07-20 PROCEDURE — 88275 CYTOGENETICS 100-300: CPT

## 2018-07-20 PROCEDURE — D9220A PRA ANESTHESIA: Mod: ,,, | Performed by: ANESTHESIOLOGY

## 2018-07-20 PROCEDURE — 88185 FLOWCYTOMETRY/TC ADD-ON: CPT | Mod: 59 | Performed by: PATHOLOGY

## 2018-07-20 PROCEDURE — 25000003 PHARM REV CODE 250: Performed by: SURGERY

## 2018-07-20 PROCEDURE — 88313 SPECIAL STAINS GROUP 2: CPT | Mod: 26,,, | Performed by: PATHOLOGY

## 2018-07-20 PROCEDURE — 88271 CYTOGENETICS DNA PROBE: CPT

## 2018-07-20 PROCEDURE — 01112 ANES BONE MARROW ASPIR&/BX: CPT | Performed by: SURGERY

## 2018-07-20 PROCEDURE — 96523 IRRIG DRUG DELIVERY DEVICE: CPT | Mod: PBBFAC

## 2018-07-20 PROCEDURE — 37000009 HC ANESTHESIA EA ADD 15 MINS: Performed by: SURGERY

## 2018-07-20 PROCEDURE — 88237 TISSUE CULTURE BONE MARROW: CPT

## 2018-07-20 PROCEDURE — 99213 OFFICE O/P EST LOW 20 MIN: CPT | Mod: PBBFAC,27 | Performed by: PEDIATRICS

## 2018-07-20 PROCEDURE — 88184 FLOWCYTOMETRY/ TC 1 MARKER: CPT | Performed by: PATHOLOGY

## 2018-07-20 PROCEDURE — 80053 COMPREHEN METABOLIC PANEL: CPT

## 2018-07-20 PROCEDURE — 36000706: Performed by: SURGERY

## 2018-07-20 PROCEDURE — 36000707: Performed by: SURGERY

## 2018-07-20 PROCEDURE — 36591 DRAW BLOOD OFF VENOUS DEVICE: CPT | Mod: PBBFAC

## 2018-07-20 PROCEDURE — 88264 CHROMOSOME ANALYSIS 20-25: CPT

## 2018-07-20 PROCEDURE — 85025 COMPLETE CBC W/AUTO DIFF WBC: CPT | Mod: PO

## 2018-07-20 PROCEDURE — 88311 DECALCIFY TISSUE: CPT | Performed by: PATHOLOGY

## 2018-07-20 PROCEDURE — 85097 BONE MARROW INTERPRETATION: CPT | Mod: ,,, | Performed by: PATHOLOGY

## 2018-07-20 PROCEDURE — 88300 SURGICAL PATH GROSS: CPT | Mod: 26,,, | Performed by: PATHOLOGY

## 2018-07-20 PROCEDURE — 85045 AUTOMATED RETICULOCYTE COUNT: CPT | Mod: PO

## 2018-07-20 PROCEDURE — 88305 TISSUE EXAM BY PATHOLOGIST: CPT | Mod: 26,,, | Performed by: PATHOLOGY

## 2018-07-20 RX ORDER — PROPOFOL 10 MG/ML
VIAL (ML) INTRAVENOUS
Status: DISCONTINUED | OUTPATIENT
Start: 2018-07-20 | End: 2018-07-20

## 2018-07-20 RX ORDER — SODIUM CHLORIDE 0.9 % (FLUSH) 0.9 %
3 SYRINGE (ML) INJECTION
Status: DISCONTINUED | OUTPATIENT
Start: 2018-07-20 | End: 2018-07-20 | Stop reason: HOSPADM

## 2018-07-20 RX ORDER — OXYCODONE AND ACETAMINOPHEN 5; 325 MG/1; MG/1
1 TABLET ORAL EVERY 4 HOURS PRN
Qty: 10 TABLET | Refills: 0 | Status: SHIPPED | OUTPATIENT
Start: 2018-07-20 | End: 2019-02-06

## 2018-07-20 RX ORDER — ONDANSETRON 2 MG/ML
INJECTION INTRAMUSCULAR; INTRAVENOUS
Status: DISCONTINUED | OUTPATIENT
Start: 2018-07-20 | End: 2018-07-20

## 2018-07-20 RX ORDER — LIDOCAINE HYDROCHLORIDE 10 MG/ML
1 INJECTION, SOLUTION EPIDURAL; INFILTRATION; INTRACAUDAL; PERINEURAL ONCE
Status: COMPLETED | OUTPATIENT
Start: 2018-07-20 | End: 2018-07-20

## 2018-07-20 RX ORDER — LIDOCAINE HCL/PF 100 MG/5ML
SYRINGE (ML) INTRAVENOUS
Status: DISCONTINUED | OUTPATIENT
Start: 2018-07-20 | End: 2018-07-20

## 2018-07-20 RX ORDER — MIDAZOLAM HYDROCHLORIDE 1 MG/ML
INJECTION, SOLUTION INTRAMUSCULAR; INTRAVENOUS
Status: DISCONTINUED | OUTPATIENT
Start: 2018-07-20 | End: 2018-07-20

## 2018-07-20 RX ORDER — OXYCODONE AND ACETAMINOPHEN 5; 325 MG/1; MG/1
1 TABLET ORAL EVERY 4 HOURS PRN
Status: DISCONTINUED | OUTPATIENT
Start: 2018-07-20 | End: 2018-07-20 | Stop reason: HOSPADM

## 2018-07-20 RX ORDER — BUPIVACAINE HYDROCHLORIDE 5 MG/ML
INJECTION, SOLUTION EPIDURAL; INTRACAUDAL
Status: DISCONTINUED | OUTPATIENT
Start: 2018-07-20 | End: 2018-07-20 | Stop reason: HOSPADM

## 2018-07-20 RX ORDER — FENTANYL CITRATE 50 UG/ML
INJECTION, SOLUTION INTRAMUSCULAR; INTRAVENOUS
Status: DISCONTINUED | OUTPATIENT
Start: 2018-07-20 | End: 2018-07-20

## 2018-07-20 RX ORDER — SODIUM CHLORIDE 9 MG/ML
INJECTION, SOLUTION INTRAVENOUS CONTINUOUS
Status: DISCONTINUED | OUTPATIENT
Start: 2018-07-20 | End: 2018-07-20 | Stop reason: HOSPADM

## 2018-07-20 RX ADMIN — LIDOCAINE HYDROCHLORIDE 75 MG: 20 INJECTION, SOLUTION INTRAVENOUS at 11:07

## 2018-07-20 RX ADMIN — LIDOCAINE HYDROCHLORIDE 10 MG: 10 INJECTION, SOLUTION EPIDURAL; INFILTRATION; INTRACAUDAL; PERINEURAL at 10:07

## 2018-07-20 RX ADMIN — FENTANYL CITRATE 25 MCG: 50 INJECTION, SOLUTION INTRAMUSCULAR; INTRAVENOUS at 12:07

## 2018-07-20 RX ADMIN — SODIUM CHLORIDE 1000 ML: 0.9 INJECTION, SOLUTION INTRAVENOUS at 10:07

## 2018-07-20 RX ADMIN — PROPOFOL 20 MG: 10 INJECTION, EMULSION INTRAVENOUS at 12:07

## 2018-07-20 RX ADMIN — PROPOFOL 200 MG: 10 INJECTION, EMULSION INTRAVENOUS at 11:07

## 2018-07-20 RX ADMIN — MIDAZOLAM HYDROCHLORIDE 2 MG: 1 INJECTION, SOLUTION INTRAMUSCULAR; INTRAVENOUS at 11:07

## 2018-07-20 RX ADMIN — BUPIVACAINE HYDROCHLORIDE 3 ML: 5 INJECTION, SOLUTION EPIDURAL; INTRACAUDAL; PERINEURAL at 12:07

## 2018-07-20 RX ADMIN — SODIUM CHLORIDE: 0.9 INJECTION, SOLUTION INTRAVENOUS at 11:07

## 2018-07-20 RX ADMIN — ONDANSETRON 4 MG: 2 INJECTION INTRAMUSCULAR; INTRAVENOUS at 12:07

## 2018-07-20 NOTE — TRANSFER OF CARE
"Anesthesia Transfer of Care Note    Patient: Hema Kuo    Procedure(s) Performed: Procedure(s) (LRB):  REMOVAL, CATHETER, CENTRAL VENOUS, TUNNELED, WITH PORT (Left)  Biopsy-bone marrow (N/A)    Patient location: PACU    Anesthesia Type: general    Transport from OR: Transported from OR on 6-10 L/min O2 by face mask with adequate spontaneous ventilation    Post pain: adequate analgesia    Post assessment: no apparent anesthetic complications    Post vital signs: stable    Level of consciousness: responds to stimulation and sedated    Nausea/Vomiting: no nausea/vomiting    Complications: none    Transfer of care protocol was followed      Last vitals:   Visit Vitals  BP (!) 102/50 (BP Location: Right arm, Patient Position: Lying)   Pulse 78   Temp 37.1 °C (98.7 °F) (Oral)   Resp 18   Ht 5' 7" (1.702 m)   Wt 89.4 kg (197 lb)   SpO2 100%   BMI 30.85 kg/m²     "

## 2018-07-20 NOTE — BRIEF OP NOTE
[facility]  Brief Operative Note     SUMMARY     Surgery Date: 7/20/2018     Surgeon(s) and Role:  Panel 1:     * Anatoliy Block MD - Primary     * Gabriel Calero MD - Resident - Assisting    Panel 2:     * Sanford Bucio MD - Primary        Pre-op Diagnosis:  AML (acute myeloid leukemia) in remission [C92.01]    Post-op Diagnosis:  Post-Op Diagnosis Codes:     * AML (acute myeloid leukemia) in remission [C92.01]    Procedure(s) (LRB):  REMOVAL, CATHETER, CENTRAL VENOUS, TUNNELED, WITH PORT (Left)  Biopsy-bone marrow (N/A)    Anesthesia: General    Description of the findings of the procedure: Left subclavian port removal without apparent complication. Bone marrow biopsy performed by hematology.     Findings/Key Components: port and catheter    Estimated Blood Loss: 5cc         Specimens:   Specimen (12h ago through future)    Start     Ordered    07/20/18 1223  Specimen to Pathology - Surgery  Once     Comments:  1. Tunneled central venous catheter with port; gross only      07/20/18 1223          Discharge Note    SUMMARY     Admit Date: 7/20/2018    Discharge Date and Time:  07/20/2018 12:39 PM    Hospital Course (synopsis of major diagnoses, care, treatment, and services provided during the course of the hospital stay): The patient came into the hospital for an outpatient procedure, tolerated it well, and was discharged in good condition from the recovery area with the below follow-up, instructions, and medications.       Final Diagnosis: Post-Op Diagnosis Codes:     * AML (acute myeloid leukemia) in remission [C92.01]    Disposition: Home or Self Care    Follow Up/Patient Instructions:     Medications:  Reconciled Home Medications:      Medication List      START taking these medications    oxyCODONE-acetaminophen 5-325 mg per tablet  Commonly known as:  PERCOCET  Take 1 tablet by mouth every 4 (four) hours as needed for Pain.        CONTINUE taking these medications     lisdexamfetamine 60 MG capsule  Commonly known as:  VYVANSE  Take 60 mg by mouth every morning.     sulfamethoxazole-trimethoprim 800-160mg 800-160 mg Tab  Commonly known as:  BACTRIM DS  Take 1 tablet by mouth 2 (two) times daily. Take only on Friday, Saturday, and Sundays            Discharge Procedure Orders  Call MD for:  temperature >100.4     Call MD for:  persistent nausea and vomiting or diarrhea     Call MD for:  severe uncontrolled pain     Call MD for:  difficulty breathing or increased cough     Remove dressing in 24 hours   Order Comments: Remove outer dressing tomorrow. Steri strip below will fall off on its own over the next 2 weeks.     Activity as tolerated     Shower on day dressing removed (No bath)   Order Comments: Shower as normal starting tomorrow       Follow-up Information     Call Anatoliy Block MD.    Specialty:  Pediatric Surgery  Why:  As needed, If symptoms worsen  Contact information:  [address]  [facility] [address]  [phone]

## 2018-07-20 NOTE — DISCHARGE INSTRUCTIONS
Anesthesia: General Anesthesia     You are watched continuously during your procedure by your anesthesia provider.     Youre due to have surgery. During surgery, youll be given medicine called anesthesia or anesthetic. This will keep you comfortable and pain-free. Your anesthesia provider will use general anesthesia.  What is general anesthesia?  General anesthesia puts you into a state like deep sleep. It goes into the bloodstream (IV anesthetics), into the lungs (gas anesthetics), or both. You feel nothing during the procedure. You will not remember it. During the procedure, the anesthesia provider monitors you continuously. He or she checks your heart rate and rhythm, blood pressure, breathing, and blood oxygen.  · IV anesthetics. IV anesthetics are given through an IV line in your arm. Theyre often given first. This is so you are asleep before a gas anesthetic is started. Some kinds of IV anesthetics relieve pain. Others relax you. Your doctor will decide which kind is best in your case.  · Gas anesthetics. Gas anesthetics are breathed into the lungs. They are often used to keep you asleep. They can be given through a facemask or a tube placed in your larynx or trachea (breathing tube).  ¨ If you have a facemask, your anesthesia provider will most likely place it over your nose and mouth while youre still awake. Youll breathe oxygen through the mask as your IV anesthetic is started. Gas anesthetic may be added through the mask.  ¨ If you have a tube in the larynx or trachea, it will be inserted into your throat after youre asleep.  Anesthesia tools and medicines  You will likely have:  · IV anesthetics. These are put into an IV line into your bloodstream.  · Gas anesthetics. You breathe these anesthetics into your lungs, where they pass into your bloodstream.  · Pulse oximeter. This is a small clip that is attached to the end of your finger. This measures your blood oxygen level.  · Electrocardiography  leads (electrodes). These are small sticky pads that are placed on your chest. They record your heart rate and rhythm.  · Blood pressure cuff. This reads your blood pressure.  Risks and possible complications  General anesthesia has some risks. These include:  · Breathing problems  · Nausea and vomiting  · Sore throat or hoarseness (usually temporary)  · Allergic reaction to the anesthetic  · Irregular heartbeat (rare)  · Cardiac arrest (rare)   Anesthesia safety  · Follow all instructions you are given for how long not to eat or drink before your procedure.  · Be sure your doctor knows what medicines and drugs you take. This includes over-the-counter medicines, herbs, supplements, alcohol or other drugs. You will be asked when those were last taken.  · Have an adult family member or friend drive you home after the procedure.  · For the first 24 hours after your surgery:  ¨ Do not drive or use heavy equipment.  ¨ Do not make important decisions or sign legal documents. If important decisions or signing legal documents is necessary during the first 24 hours after surgery, have a trusted family member or spouse act on your behalf.  ¨ Avoid alcohol.  ¨ Have a responsible adult stay with you. He or she can watch for problems and help keep you safe.  Date Last Reviewed: 12/1/2016  © 4510-8397 [employer]. [address] [place] [address], [place] [address]. All rights reserved. This information is not intended as a substitute for professional medical care. Always follow your healthcare professional's instructions.      GENERAL POST-OPERATIVE  PATIENT INSTRUCTIONS      FOLLOW-UP:  Please make an appointment with your physician.  Call your physician immediately if you have any fevers greater than 102.5, drainage from you wound that is not clear or looks infected, persistent bleeding, increasing abdominal pain, problems urinating, or persistent nausea/vomiting.      WOUND CARE INSTRUCTIONS:  Keep a dry clean dressing on  the wound if there is drainage. The initial bandage may be removed after 24 hours.  Once the wound has quit draining you may leave it open to air.  If clothing rubs against the wound or causes irritation and the wound is not draining you may cover it with a dry dressing during the daytime.  Try to keep the wound dry and avoid ointments on the wound unless directed to do so.  If the wound becomes bright red and painful or starts to drain infected material that is not clear, please contact your physician immediately.  If the wound is mildly pink and has a thick firm ridge underneath it, this is normal, and is referred to as a healing ridge.  This will resolve over the next 4-6 weeks.    DIET:  You may eat any foods that you can tolerate.  It is a good idea to eat a high fiber diet and take in plenty of fluids to prevent constipation.  If you do become constipated you may want to take a mild laxative or take ducolax tablets on a daily basis until your bowel habits are regular.  Constipation can be very uncomfortable, along with straining, after recent surgery.    ACTIVITY:  You are encouraged to cough and deep breath or use your incentive spirometer if you were given one, every 15-30 minutes when awake.  This will help prevent respiratory complications and low grade fevers post-operatively if you had a general anesthetic.  You may want to hug a pillow when coughing and sneezing to add additional support to the surgical area, if you had abdominal or chest surgery, which will decrease pain during these times.  You are encouraged to walk and engage in light activity for the next two weeks.  You should not lift more than 20 pounds during this time frame as it could put you at increased risk for complications.  Twenty pounds is roughly equivalent to a plastic bag of groceries.      MEDICATIONS:  Try to take narcotic medications and anti-inflammatory medications, such as tylenol, ibuprofen, naprosyn, etc., with food.  This  will minimize stomach upset from the medication.  Should you develop nausea and vomiting from the pain medication, or develop a rash, please discontinue the medication and contact your physician.  You should not drive, make important decisions, or operate machinery when taking narcotic pain medication.    QUESTIONS:  Please feel free to call your physician or the hospital  if you have any questions, and they will be glad to assist you.

## 2018-07-20 NOTE — OP NOTE
DATE OF PROCEDURE:  07/20/2018    PREOPERATIVE DIAGNOSIS:  Leukemia.    POSTOPERATIVE DIAGNOSIS:  Leukemia.    PROCEDURE PERFORMED:  Port-A-Cath removal.    SURGEON:  Anatoliy Block M.D.    ASSISTANT:  Abdias.    ANESTHESIA:  General.    ESTIMATED BLOOD LOSS:  Minimal.    REPLACEMENT:  None.    SPECIMENS:  Port-A-Cath.    PROCEDURE IN DETAIL:  After the induction of adequate anesthesia, the left chest   was prepped and draped in a sterile fashion.  Then, 0.5% plain Marcaine was   used for local anesthesia and was used to infiltrate the wound area.  An   elliptical incision was made around the old scar excising the scar tissue.  The   subcutaneous tissues were opened with electrocautery and the Port-A-Cath   dissected from the port.  The catheter tract was oversewn with a 3-0 Vicryl   suture.  The subcutaneous tissues were closed with interrupted 3-0 Vicryl suture   and the skin closed with subcuticular 5-0 Monocryl.      ADONISA/IN  dd: 07/20/2018 12:21:18 (CDT)  td: 07/20/2018 12:46:00 (CDT)  Doc ID   #[id number]  Job ID #[id number]    CC:

## 2018-07-20 NOTE — PROGRESS NOTES
0845--Left upper chest double PAC outer port accessed with 20g 1 inch currie needle using sterile technique, + blood return obtained, labs drawn/labeled at bedside, and sent to lab. PAC then flushed and saline locked with 10 ml NS. Tegaderm/biopatch applied to PAC site, remains clean, dry, and intact without redness/swelling/drainage noted. Pt tolerated procedure well.

## 2018-07-20 NOTE — OP NOTE
Bone Marrow Biopsy  Procedure Note      Date of Service: 7/20/2018      Indication/Diagnosis: leukemia    Consent Source: self    Consent Type: indications/complications discussed with parent (s); written consent obtained    Time Out Completed: yes    Aseptic Technique: Betadine    Anesthesia: systemic    Anesthetic Drugs Used: Per Anesthesia    Instrumentation: bone marrow kit    Procedure Site: right posterior iliac crest    Patient Position: lying on side    Volume Removed (in cc): 10    Aspirate Obtained: yes: EDTA - sent to Hem Path for analysis and heparin sodium - sent to cytogenetics for analysis    Fluid Characteristics: spicules found    Sterile Dressing: yes    Complications: none    Narrative:  Right posterior illiac crests prepped in sterile fashion.  Biopsy needle inserted into right illiac crest, core biopsy obtained.  Needle reinserted into different site, aspirate obtained.  A sterile pressure dressing was applied.

## 2018-07-20 NOTE — H&P (VIEW-ONLY)
"                                                     Pediatric Hematology Note      Subjective:       Patient ID: Hema Kuo is a 17 y.o. male      Chief Complaint:    Chief Complaint   Patient presents with    Leukemia    Follow-up       History of Present Illness:   Hema Kuo is a 17 y.o. male with AML s/p Intensification II therapy following [id number] (Arm A) here today for hospital follow-up.   He feeling very well since last visit.  Na PT extremely well.    Reports that he is "back to himself".  Mood excellent, no further paranoid or bizarre thoughts, denies A/V hallucinations, HI/SI.  Appetite and energy level normal.    He reports no fevers, no abdominal pain, vomiting, diarrhea or constipation and no excessive bruising or unusual bleeding.  No other complaints.      Past Medical History:   Diagnosis Date    AML (acute myeloblastic leukemia) 10/2017    Asthma     Encounter for blood transfusion     Seasonal allergies      Past Surgical History:   Procedure Laterality Date    PORTACATH PLACEMENT  10/31/2017    TONSILLECTOMY         ROS:   Gen: Negative for fever. Negative for night sweats.    HEENT: Negative for nosebleeds.  Negative for sore throat.  Negative for visual problems.  Pulm: Negative for cough.  Negative for shortness of breath.  CV: Negative for chest pain.  Negative for cyanosis.  GI: Negative for abdominal pain, nausea or vomiting.    : Negative for changes in frequency or dysuria.   Skin: Negative for bruising.  Negative for rash.    MS: Negative for joint swelling or pain.  Neuro:  Negative for headaches, seizures, weakness.  Hematology:  Positive for AML.  Positive for recent chemotherapy  Endocrine:  Negative for heat or cold intolerance.  Negative for increased thirst.  Psych: Negative for hyperactivity.  Negative for behavioral issues.      Physical Examination:                             General: well developed, well nourished, no distress.    HENT: Head:normocephalic, " atraumatic. Ears:bilateral TM's and external ear canals normal. Nose: Nares normal. Mucosa normal. No discharge. Throat: lips, mucosa, and tongue normal; teeth and gums normal and no throat erythema.  Eyes: conjunctivae pale/corneas clear. PERRL.   Neck: supple, symmetrical, and thyroid not enlarged, symmetric, no tenderness/mass/nodules  Lungs:  clear to auscultation bilaterally and normal respiratory effort  Cardiovascular: regular rate and rhythm, S1, S2 normal, no murmur, click, rub or gallop.   Chest Wall: Port site clear  Extremities: no cyanosis or edema, or clubbing. Pulses: 2+ and symmetric.  Abdomen: soft, non-tender non-distented; bowel sounds normal; no masses,  no organomegaly.   Skin: Pale.  Texture and turgor normal. No rashes or lesions  Musculoskeletal: No obvious joint swelling or erythema  Lymph Nodes: No cervical or supraclavicular adenopathy. No axillary or inguinal adenopathy.  Neurologic: Cranial nerves intact.  Normal strength and tone. No focal numbness or weakness  Psych: appropriate mood and affect    Objective:     Pathology:  Initial BM bx:    BONE MARROW, RIGHT ILIAC CREST, ASPIRATE, CLOT, AND CORE BIOPSY:  --Cellular marrow, positive for acute myeloid leukemia, with blasts representing approximately 25% of cellularity, see comment  --Background developing myeloid cells present with some cytologic atypia, see comment  COMMENT: Concomitantly submitted flow cytometric analysis detects an increased population of blasts consistent with acute myeloid leukemia, non-M3 subtype. Additional immunophenotyping was performed on peripheral blood flow cytometric studies (please see separate report). The marrow blasts showing expression of CD34, CD13, and cytoplasmic myeloperoxidase as well as dim CD19. The population however is negative for TdT, and cytoplasmic CD22 and CD79a. These findings are similar to that seen on the peripheral blood study and are consistent with acute myeloid leukemia, non-M3  "subtype by JOVANNI classification.  Given the dim expression of CD19, a translocation of chromosomes 8 and 21 is a diagnostic possibility. The cytologic atypia/dysplasia seen in the myeloid series can also be seen with this translocation. Clinical correlation and correlation with any available cytogenetic and molecular studies is required for definitive classification of this process.    AML FISH: The result is abnormal and indicates FNYI0E5/RUNX1 fusion in 90.6% of nuclei. This result is most consistent with acute myeloid leukemia with t(8;21)(q22;q22) (Grimwade et al., Blood 116:354-65, 2010; Zander et al., Am J Hematol 89:7342-1983, 2014; Bernarda and Radha, Am J Clin Pathol 144:6-18, 2015). For monitoring response to therapy in this patient, we suggest using FISH for WGTW2F7/RUNX1 fusion."    Peripheral blood, FLT3 mutation analysis: Negative. Neither expansion of the region susceptible to internal tandem duplication (FLT-ITD) nor changes involving codon D835 and/or I836 in the tyrosine kinase domain (FLT3-TKD) was detected."     KIT genetic alteration analysis, exons 8, 9, 10, 11, and 17: Negative.  No pathogenic genetic alterations were detected in the  KIT  gene, exons 8, 9, 10, 11, and 17.     Cytogenetics:  45,X,-Y,nevin(8)t(8;21)(q22;q22),nevin(20)t(20;21)(p13;q22)t(8;21),nevin(21)t(8;21)t(20;21)[17]/46,XY[3]  Comments: The result is abnormal. Of 20 metaphases, 3 metaphases were normal, and 17 metaphases had a complex translocation involving chromosomes 8, 21, and 20. FISH studies confirmed BJCP2F9/RUNX1 fusion associated with this translocation (reported separately). Fusion of BMIZ3R7/RUNX1 is reportedly associated with a favorable prognosis in AML (Grimwade et al., Blood 116:354-365, 2010). For monitoring response to therapy in this patient, FISH for DXKT2M2/RUNX1 fusion is available, test AMLF (AML, FISH)."    End of Induction I:  BONE MARROW, RIGHT ILIAC CREST, ASPIRATE AND CLOT:  --Cellular marrow, approximately " "60%, with trilineage hematopoiesis, see comment  --No definitive morphologic evidence of residual/recurrent acute myeloid leukemia, see comment  --Increased stainable iron  COMMENT: Concomitantly submitted flow cytometric analysis detects no diagnostic abnormal hematopoietic population. B cells are polyclonal with a subset of CD19/CD10 positive cells favored to be hematogones, and T cells are immunophenotypically unremarkable. The blast gate is not increased.    AML FISH:  This result is within normal limits for the XHSS8B9 and RUNX1 gene regions."         Assessment/Plan:   Hema was seen today for leukemia and follow-up.    Diagnoses and all orders for this visit:    Acute myeloid leukemia in remission       AML (acute myeloblastic leukemia)  -     Immunosuppressed due to chemotherapy    Antineoplastic chemotherapy induced pancytopenia    Chemotherapy-induced neutropenia    Psychosis, unspecified psychosis type    Physical deconditioning         Discussion:   17 y.o. young man with AML (t 8;21) here for follow-up.  He reports feeling well since discharge home and was well appearing today in clinic.    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  CNS negative.  MRD negative after Induction I.    -Treated per COG [id number] (ARM A).  S/p Intensification II.  End of treatment April 2018.    -BM biopsy at start of Intensification shows CR.  FISH for t(8;21) negative.  -Counts today are excellent.  Will get end of therapy BMA/B next week         For his psychosis  -Appears to be resolved, has outpatient f/u with Psychology arranged in [place]    For his deconditioning  -Cont outpatient pt.  Na well    For his immunosuppression secondary to chemotherapy  -Continue Bactrim    RTC 1 wk for bone amrrow         Total time 25 minutes with >50% spent in face-to-face counseling regarding plan of care, chemo side effects and arranging coordination of care.      "

## 2018-07-21 NOTE — ASSESSMENT & PLAN NOTE
17 year old male with diagnosed AML (t 8;21) admitted for Induction 2 therapy following [id number] (Arm A) s/p BMA, and LP with IT chemotherapy induction.    AML   Induction 2 Arm A--Day 3  - Etoposide at 4:30pm over 2 hours   - Daunorubicin at 4:30pm over 15mg  - No labs tomorrow     chemotherapy-induced nausea   - Will receive Aloxi .25mg Q48H starting at 3pm today   - One time dose of Emend 150mg IV today at 3:30pm  - 3 day course of Dexamethasone 8mg IV to start at 3:30pm today      Immunosuppression secondary to chemotherapy  - hold ciprofloxacin, posaconazole and acyclovir during chemotherapy   - continue Peridex  - Bactrim on Fri, Sat and Sun    Back Pain from LP   - Oxycodone PRN for pain relief        Problem: Patient Care Overview  Goal: Plan of Care Review  Outcome: Ongoing (interventions implemented as appropriate)  Patient to the cath lab today.  Site checked per orders. DP pulses palpable. See site assessment per flowsheet. R. SC TLC dressing intact Blood return to all ports. Bedrest remained until 5pm.  Back pain resolved once able to sit up.  Site check after sitting up; no change to R. Groin site. Safety maintained through the shift. Call light in reach.

## 2018-07-23 LAB
BODY SITE - BONE MARROW: NORMAL
CLINICAL DIAGNOSIS - BONE MARROW: NORMAL
FLOW CYTOMETRY ANTIBODIES ANALYZED - BONE MARROW: NORMAL
FLOW CYTOMETRY COMMENT - BONE MARROW: NORMAL
FLOW CYTOMETRY INTERPRETATION - BONE MARROW: NORMAL

## 2018-07-23 NOTE — ANESTHESIA POSTPROCEDURE EVALUATION
"Anesthesia Post Evaluation    Patient: Hema Kuo    Procedure(s) Performed: Procedure(s) (LRB):  REMOVAL, CATHETER, CENTRAL VENOUS, TUNNELED, WITH PORT (Left)  Biopsy-bone marrow (N/A)    Final Anesthesia Type: general  Patient location during evaluation: PACU  Patient participation: Yes- Able to Participate  Level of consciousness: awake and alert  Pain management: adequate  Airway patency: patent  PONV status at discharge: No PONV  Anesthetic complications: no      Cardiovascular status: blood pressure returned to baseline  Respiratory status: unassisted, spontaneous ventilation and room air  Hydration status: euvolemic  Follow-up not needed.        Visit Vitals  BP (!) 108/49   Pulse 70   Temp 36.7 °C (98.1 °F) (Temporal)   Resp 18   Ht 5' 7" (1.702 m)   Wt 89.4 kg (197 lb)   SpO2 95%   BMI 30.85 kg/m²       Pain/Waldo Score: No Data Recorded      "

## 2018-07-30 LAB
CHROM BANDING METHOD: NORMAL
CHROMOSOME ANALYSIS BM ADDITIONAL INFORMATION: NORMAL
CHROMOSOME ANALYSIS BM RELEASED BY: NORMAL
CHROMOSOME ANALYSIS BM RESULT SUMMARY: NORMAL
CLINICAL CYTOGENETICIST REVIEW: NORMAL
KARYOTYP MAR: NORMAL
REASON FOR REFERRAL (NARRATIVE): NORMAL
REF LAB TEST METHOD: NORMAL
SPECIMEN SOURCE: NORMAL
SPECIMEN: NORMAL

## 2018-08-23 ENCOUNTER — OFFICE VISIT (OUTPATIENT)
Dept: PSYCHOLOGY | Facility: CLINIC | Age: 18
End: 2018-08-23
Payer: MEDICAID

## 2018-08-23 ENCOUNTER — OFFICE VISIT (OUTPATIENT)
Dept: PEDIATRIC HEMATOLOGY/ONCOLOGY | Facility: CLINIC | Age: 18
End: 2018-08-23
Payer: MEDICAID

## 2018-08-23 VITALS
RESPIRATION RATE: 18 BRPM | DIASTOLIC BLOOD PRESSURE: 61 MMHG | WEIGHT: 205.56 LBS | SYSTOLIC BLOOD PRESSURE: 118 MMHG | BODY MASS INDEX: 34.25 KG/M2 | HEART RATE: 84 BPM | HEIGHT: 65 IN | TEMPERATURE: 98 F

## 2018-08-23 DIAGNOSIS — C92.01 ACUTE MYELOID LEUKEMIA IN REMISSION: Primary | ICD-10-CM

## 2018-08-23 DIAGNOSIS — F54 PSYCHOLOGICAL FACTOR AFFECTING CANCER: ICD-10-CM

## 2018-08-23 DIAGNOSIS — C92.01 ACUTE MYELOID LEUKEMIA IN REMISSION: ICD-10-CM

## 2018-08-23 DIAGNOSIS — C80.1 PSYCHOLOGICAL FACTOR AFFECTING CANCER: ICD-10-CM

## 2018-08-23 LAB
ALBUMIN SERPL BCP-MCNC: 4 G/DL
ALP SERPL-CCNC: 119 U/L
ALT SERPL W/O P-5'-P-CCNC: 20 U/L
ANION GAP SERPL CALC-SCNC: 5 MMOL/L
AST SERPL-CCNC: 19 U/L
BASOPHILS # BLD AUTO: 0.01 K/UL
BASOPHILS NFR BLD: 0.4 %
BILIRUB SERPL-MCNC: 0.4 MG/DL
BUN SERPL-MCNC: 11 MG/DL
CALCIUM SERPL-MCNC: 9.2 MG/DL
CHLORIDE SERPL-SCNC: 105 MMOL/L
CO2 SERPL-SCNC: 28 MMOL/L
CREAT SERPL-MCNC: 0.8 MG/DL
DIFFERENTIAL METHOD: ABNORMAL
EOSINOPHIL # BLD AUTO: 0.1 K/UL
EOSINOPHIL NFR BLD: 2.2 %
ERYTHROCYTE [DISTWIDTH] IN BLOOD BY AUTOMATED COUNT: 13.8 %
EST. GFR  (AFRICAN AMERICAN): >60 ML/MIN/1.73 M^2
EST. GFR  (NON AFRICAN AMERICAN): >60 ML/MIN/1.73 M^2
GLUCOSE SERPL-MCNC: 109 MG/DL
HCT VFR BLD AUTO: 37.4 %
HGB BLD-MCNC: 12.6 G/DL
LYMPHOCYTES # BLD AUTO: 1 K/UL
LYMPHOCYTES NFR BLD: 35.6 %
MCH RBC QN AUTO: 36.2 PG
MCHC RBC AUTO-ENTMCNC: 33.7 G/DL
MCV RBC AUTO: 108 FL
MONOCYTES # BLD AUTO: 0.3 K/UL
MONOCYTES NFR BLD: 10.4 %
NEUTROPHILS # BLD AUTO: 1.4 K/UL
NEUTROPHILS NFR BLD: 51.4 %
PLATELET # BLD AUTO: 82 K/UL
PMV BLD AUTO: 9.6 FL
POTASSIUM SERPL-SCNC: 4.4 MMOL/L
PROT SERPL-MCNC: 6.5 G/DL
RBC # BLD AUTO: 3.48 M/UL
RETICS/RBC NFR AUTO: 0.8 %
SODIUM SERPL-SCNC: 138 MMOL/L
WBC # BLD AUTO: 2.78 K/UL

## 2018-08-23 PROCEDURE — 85025 COMPLETE CBC W/AUTO DIFF WBC: CPT | Mod: PO

## 2018-08-23 PROCEDURE — 99213 OFFICE O/P EST LOW 20 MIN: CPT | Mod: S$PBB,,, | Performed by: PEDIATRICS

## 2018-08-23 PROCEDURE — 99999 PR PBB SHADOW E&M-EST. PATIENT-LVL III: CPT | Mod: PBBFAC,,, | Performed by: PEDIATRICS

## 2018-08-23 PROCEDURE — 90834 PSYTX W PT 45 MINUTES: CPT | Mod: PBBFAC

## 2018-08-23 PROCEDURE — 85045 AUTOMATED RETICULOCYTE COUNT: CPT | Mod: PO

## 2018-08-23 PROCEDURE — 36415 COLL VENOUS BLD VENIPUNCTURE: CPT | Mod: PO

## 2018-08-23 PROCEDURE — 90834 PSYTX W PT 45 MINUTES: CPT | Mod: HP,HA,S$PBB, | Performed by: PSYCHOLOGIST

## 2018-08-23 PROCEDURE — 99213 OFFICE O/P EST LOW 20 MIN: CPT | Mod: PBBFAC | Performed by: PEDIATRICS

## 2018-08-23 PROCEDURE — 80053 COMPREHEN METABOLIC PANEL: CPT

## 2018-08-24 NOTE — PROGRESS NOTES
Pediatric Hematology Note      Subjective:       Patient ID: Hema Kuo is a 17 y.o. male      Chief Complaint:    Chief Complaint   Patient presents with    Follow-up       History of Present Illness:   Hema Kuo is a 17 y.o. male with AML s/p Intensification II therapy following [id number] (Arm A) here today for off treatment follow-up.  He reports feeling very well since last visit.  Mood excellent, no further paranoid or bizarre thoughts, denies A/V hallucinations, HI/SI.  Appetite and energy level normal.  Has started working out, continuing with PT.  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  He reports no fevers, no abdominal pain, vomiting, diarrhea or constipation and no excessive bruising or unusual bleeding.  No other complaints.     Past Medical History:   Diagnosis Date    AML (acute myeloblastic leukemia) 10/2017    Asthma     Encounter for blood transfusion     Seasonal allergies      Past Surgical History:   Procedure Laterality Date    PORTACATH PLACEMENT  10/31/2017    TONSILLECTOMY         ROS:   Gen: Negative for fever. Negative for night sweats.    HEENT: Negative for nosebleeds.  Negative for sore throat.  Negative for visual problems.  Pulm: Negative for cough.  Negative for shortness of breath.  CV: Negative for chest pain.  Negative for cyanosis.  GI: Negative for abdominal pain, nausea or vomiting.    : Negative for changes in frequency or dysuria.   Skin: Negative for bruising.  Negative for rash.    MS: Negative for joint swelling or pain.  Neuro:  Negative for headaches, seizures, weakness.  Hematology:  Positive for AML.  Positive for recent chemotherapy  Endocrine:  Negative for heat or cold intolerance.  Negative for increased thirst.  Psych: Negative for hyperactivity.  Negative for behavioral issues.      Physical Examination:   Vitals:    08/23/18 1105   BP: 118/61   Pulse: 84   Resp: 18   Temp: 97.8 °F (36.6 °C)      General: well developed, well nourished, no distress.    HENT: Head:normocephalic, atraumatic. Ears:bilateral TM's and external ear canals normal. Nose: Nares normal. Mucosa normal. No discharge. Throat: lips, mucosa, and tongue normal; teeth and gums normal and no throat erythema.  Eyes: conjunctivae pale/corneas clear. PERRL.   Neck: supple, symmetrical, and thyroid not enlarged, symmetric, no tenderness/mass/nodules  Lungs:  clear to auscultation bilaterally and normal respiratory effort  Cardiovascular: regular rate and rhythm, S1, S2 normal, no murmur, click, rub or gallop.   Chest Wall: Port site clear  Extremities: no cyanosis or edema, or clubbing. Pulses: 2+ and symmetric.  Abdomen: soft, non-tender non-distented; bowel sounds normal; no masses,  no organomegaly.   Skin: Pale.  Texture and turgor normal. No rashes or lesions  Musculoskeletal: No obvious joint swelling or erythema  Lymph Nodes: No cervical or supraclavicular adenopathy. No axillary or inguinal adenopathy.  Neurologic: Cranial nerves intact.  Normal strength and tone. No focal numbness or weakness  Psych: appropriate mood and affect    Objective:     Pathology:  Initial BM bx:    BONE MARROW, RIGHT ILIAC CREST, ASPIRATE, CLOT, AND CORE BIOPSY:  --Cellular marrow, positive for acute myeloid leukemia, with blasts representing approximately 25% of cellularity, see comment  --Background developing myeloid cells present with some cytologic atypia, see comment  COMMENT: Concomitantly submitted flow cytometric analysis detects an increased population of blasts consistent with acute myeloid leukemia, non-M3 subtype. Additional immunophenotyping was performed on peripheral blood flow cytometric studies (please see separate report). The marrow blasts showing expression of CD34, CD13, and cytoplasmic myeloperoxidase as well as dim CD19. The population however is negative for TdT, and cytoplasmic CD22 and CD79a. These findings are similar to that  "seen on the peripheral blood study and are consistent with acute myeloid leukemia, non-M3 subtype by JOVANNI classification.  Given the dim expression of CD19, a translocation of chromosomes 8 and 21 is a diagnostic possibility. The cytologic atypia/dysplasia seen in the myeloid series can also be seen with this translocation. Clinical correlation and correlation with any available cytogenetic and molecular studies is required for definitive classification of this process.    AML FISH: The result is abnormal and indicates QIOY9Q5/RUNX1 fusion in 90.6% of nuclei. This result is most consistent with acute myeloid leukemia with t(8;21)(q22;q22) (Grimwade et al., Blood 116:354-65, 2010; Solh et al., Am J Hematol 89:6346-0666, 2014; Bernarda and Radha, Am J Clin Pathol 144:6-18, 2015). For monitoring response to therapy in this patient, we suggest using FISH for XUPC1C1/RUNX1 fusion."    Peripheral blood, FLT3 mutation analysis: Negative. Neither expansion of the region susceptible to internal tandem duplication (FLT-ITD) nor changes involving codon D835 and/or I836 in the tyrosine kinase domain (FLT3-TKD) was detected."     KIT genetic alteration analysis, exons 8, 9, 10, 11, and 17: Negative.  No pathogenic genetic alterations were detected in the  KIT  gene, exons 8, 9, 10, 11, and 17.     Cytogenetics:  45,X,-Y,nevin(8)t(8;21)(q22;q22),nevin(20)t(20;21)(p13;q22)t(8;21),nevin(21)t(8;21)t(20;21)[17]/46,XY[3]  Comments: The result is abnormal. Of 20 metaphases, 3 metaphases were normal, and 17 metaphases had a complex translocation involving chromosomes 8, 21, and 20. FISH studies confirmed NRUA4R7/RUNX1 fusion associated with this translocation (reported separately). Fusion of QWXE3A7/RUNX1 is reportedly associated with a favorable prognosis in AML (Grimwade et al., Blood 116:354-365, 2010). For monitoring response to therapy in this patient, FISH for OCAI1M6/RUNX1 fusion is available, test AMLF (AML, FISH)."    End of " "Induction I:  BONE MARROW, RIGHT ILIAC CREST, ASPIRATE AND CLOT:  --Cellular marrow, approximately 60%, with trilineage hematopoiesis, see comment  --No definitive morphologic evidence of residual/recurrent acute myeloid leukemia, see comment  --Increased stainable iron  COMMENT: Concomitantly submitted flow cytometric analysis detects no diagnostic abnormal hematopoietic population. B cells are polyclonal with a subset of CD19/CD10 positive cells favored to be hematogones, and T cells are immunophenotypically unremarkable. The blast gate is not increased.    AML FISH:  This result is within normal limits for the LJIQ7I9 and RUNX1 gene regions."    End of treatment:  BONE MARROW, RIGHT ILIAC CREST, ASPIRATE, CLOT, AND CORE BIOPSY:  --Hypercellular marrow, approximately 80% overall, with trilineage hematopoiesis, see comment  --No morphologic evidence of residual/recurrent acute myeloid leukemia, see comment  --Mild erythroid hyperplasia  --Adequate megakaryocytes  --Increased stainable iron  COMMENT: Concomitantly submitted flow cytometric analysis detects no diagnostic abnormal hematopoietic population. B cells are polyclonal with a population of immature B-cells showing a pattern most compatible with hematogones, and T-cells are immunophenotypically unremarkable. The blast gate is not increased.    Cytogenetics and AML FISH:  Normal    Labs:   Results for SEGUN BHATIA (MRN [id number]) as of 8/24/2018 10:42   6/5/2018 08:51 7/9/2018 09:48 7/20/2018 08:38 8/23/2018 12:26   WBC 1.29 (LL) 2.89 (L) 2.51 (L) 2.78 (L)   RBC 3.12 (L) 2.59 (L) 2.91 (L) 3.48 (L)   Hemoglobin 9.1 (L) 8.6 (L) 10.0 (L) 12.6 (L)   Hematocrit 26.3 (L) 26.5 (L) 30.6 (L) 37.4 (L)   MCV 84 102 (H) 105 (H) 108 (H)   MCH 29.2 33.2 (H) 34.4 (H) 36.2 (H)   MCHC 34.6 32.5 32.7 33.7   RDW 12.4 SEE COMMENT SEE COMMENT 13.8   Platelets 10 (LL) 59 (L) 72 (L) 82 (L)   MPV 9.3 10.9 10.2 9.6   Gran% 30.2 (L) 58.2 43.8 51.4   Gran # (ANC) 0.4 (L) 1.7 (L) 1.1 (L) " 1.4 (L)   Lymph% 49.6 (H) 24.9 43.4 35.6   Lymph # 0.6 (L) 0.7 (L) 1.1 1.0   Mono% 20.2 (H) 15.9 (H) 11.2 10.4   Mono # 0.3 0.5 0.3 0.3   Eosinophil% 0.0 1.0 1.2 2.2   Eos # 0.0 0.0 0.0 0.1   Basophil% 0.0 0.0 0.4 0.4   Baso # 0.00 0.00 0.01 0.01   Aniso Slight Slight Slight    Poik Slight Slight Slight    Poly  Moderate Occasional    Platelet Estimate Decreased (A) Decreased (A) Decreased (A)    Tear Drop Cells  Moderate     Retic 0.2 (L) 6.0 (H) 4.2 (H) 0.8     Assessment/Plan:   Hema was seen today for follow-up.    Diagnoses and all orders for this visit:    Acute myeloid leukemia in remission  -     CBC auto differential; Standing  -     CBC auto differential  -     Reticulocytes; Standing  -     Reticulocytes  -     Comprehensive metabolic panel; Standing  -     Comprehensive metabolic panel        Discussion:   17 y.o. young man with AML (t 8;21) here for follow-up.  He reports feeling well since discharge home and was well appearing today in clinic.    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  CNS negative.  MRD negative after Induction I.    -Treated per COG [id number] (ARM A).  S/p Intensification II.  End of treatment April 2018.    -BM biopsy at start of Intensification shows CR.  FISH for t(8;21) negative.  BM biopsy at end of treatment with ELIECER.    For his chemotherapy induced pancytopenia  -Blood counts uptrending, follow monthly.    For his psychosis  -Resolved, cont outpatient f/u with Psychology in [place]    For his deconditioning  -Improving, continue outpatient PT    Labs at home in 1 month.  Return to clinic in 3 months.      Sanford Bucio    Total time 30 minutes with >50% spent in face-to-face counseling regarding plan of care, chemo side effects and arranging coordination of care.

## 2018-08-24 NOTE — PROGRESS NOTES
"  Name: Hema Kuo YOB: 2000   Gender: Male Age: 17 y.o.   School: Not in school  Date of Evaluation: 8/23/2018   Grade: not in school Race/Ethnicity: White//White     45-minute session of individual therapy ([id number]) was completed with patient Hema .    Chief Complaint  Hema is known to this writer through inpatient consultation throughout his treatment for leukemia.  Hema requested outpatient treatment to address psychosocial adjustment to having cancer.    Content of Current Session  Met with Hema individually for the entirety of the session.  Hema presented with a stable mood and significantly improved affect since his last appointment.  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  Hema reported that he feels "pretty much back to normal," which he appropriately attributes to the improvement in his mood.  Hema's mother is battling a serious medical condition, which is his main source of ongoing anxiousness.  Hema stated that he finds it helpful to process his feelings through these appointments, and would like to continue to meet with this writer periodically when he comes in for clinic visits.    Based on the diagnostic evaluation and background information provided, the current diagnoses are:     ICD-10-CM ICD-9-CM   1. Acute myeloid leukemia in remission C92.01 205.01   2. Psychological factor affecting cancer C80.1 199.1    [id number]         "

## 2018-09-21 ENCOUNTER — PATIENT MESSAGE (OUTPATIENT)
Dept: INFUSION THERAPY | Facility: HOSPITAL | Age: 18
End: 2018-09-21

## 2018-10-03 ENCOUNTER — HISTORICAL (OUTPATIENT)
Dept: ADMINISTRATIVE | Facility: HOSPITAL | Age: 18
End: 2018-10-03

## 2018-10-03 LAB
ABS NEUT (OLG): 2.41 X10(3)/MCL (ref 2.1–9.2)
BASOPHILS # BLD AUTO: 0.01 X10(3)/MCL
BASOPHILS NFR BLD AUTO: 0 %
EOSINOPHIL # BLD AUTO: 0.09 X10(3)/MCL
EOSINOPHIL NFR BLD AUTO: 2 %
ERYTHROCYTE [DISTWIDTH] IN BLOOD BY AUTOMATED COUNT: 12.4 % (ref 11.5–14.5)
HCT VFR BLD AUTO: 38.2 % (ref 40–51)
HGB BLD-MCNC: 13 GM/DL (ref 13.5–17.5)
IMM GRANULOCYTES # BLD AUTO: 0.01 10*3/UL
IMM GRANULOCYTES NFR BLD AUTO: 0 %
LYMPHOCYTES # BLD AUTO: 1.13 X10(3)/MCL
LYMPHOCYTES NFR BLD AUTO: 28 % (ref 13–40)
MCH RBC QN AUTO: 35.8 PG (ref 26–34)
MCHC RBC AUTO-ENTMCNC: 34 GM/DL (ref 31–37)
MCV RBC AUTO: 105.2 FL (ref 80–100)
MONOCYTES # BLD AUTO: 0.35 X10(3)/MCL
MONOCYTES NFR BLD AUTO: 9 % (ref 0–10)
NEUTROPHILS # BLD AUTO: 2.41 X10(3)/MCL
NEUTROPHILS NFR BLD AUTO: 60 X10(3)/MCL
PLATELET # BLD AUTO: 95 X10(3)/MCL (ref 130–400)
PMV BLD AUTO: 10.2 FL (ref 7.4–10.4)
RBC # BLD AUTO: 3.63 X10(6)/MCL (ref 4.5–5.9)
RET# (OHS): 0.07 X10(6)/MCL (ref 0.02–0.09)
RETICULOCYTE COUNT AUTOMATED (OLG): 1.9 % (ref 0.5–1.5)
WBC # SPEC AUTO: 4 X10(3)/MCL (ref 4.5–11)

## 2018-10-04 ENCOUNTER — TELEPHONE (OUTPATIENT)
Dept: PEDIATRIC HEMATOLOGY/ONCOLOGY | Facility: CLINIC | Age: 18
End: 2018-10-04

## 2018-10-04 NOTE — TELEPHONE ENCOUNTER
Spoke to pt mother, Madeline, and read pt lab results from yesterday, stating that they are improving each month and that there are no concerns. Stated Dr. Bucio would like to recheck in 1 month and then see the pt the following month. Pt scheduled to get labs at [facility] in [place] on 10/31/18 and labs orders faxed. Pt doing well with no further needs noted.

## 2018-10-12 ENCOUNTER — TELEPHONE (OUTPATIENT)
Dept: PEDIATRIC HEMATOLOGY/ONCOLOGY | Facility: CLINIC | Age: 18
End: 2018-10-12

## 2018-10-12 NOTE — TELEPHONE ENCOUNTER
Spoke to pt and informed him that I spoke to his mother who stated that he has not being feeling well and refusing to go to MD. Pt stated that he did not want to miss work. Informed him that we need to make sure his counts are still well and needs a CXR to see if he needs antibiotics since it is taking so long for cough to clear. Pt verbalized an understanding stating that he will go to PCP or urgent care today. No further needs noted.

## 2018-10-12 NOTE — TELEPHONE ENCOUNTER
Spoke to pt mother, Madeline, and she stated that the pt has been sick with bronchitis for 1-2 wks and self treating with Theraflu and Nyquil and still sounds bad. She stated that he was diagnosed a year ago with the same issues and worried and that the pt will not listen to her and go to MD office. Pt mother inquired about me calling to encourage him to be seen. Stated I will do that. No further needs noted.

## 2018-10-15 ENCOUNTER — DOCUMENTATION ONLY (OUTPATIENT)
Dept: PEDIATRIC HEMATOLOGY/ONCOLOGY | Facility: CLINIC | Age: 18
End: 2018-10-15

## 2018-10-21 NOTE — PROGRESS NOTES
[facility]  Pediatric Hematology/Oncology  Progress Note    Patient Name: Hema Kuo  Admission Date: 3/5/2018  Hospital Length of Stay: 3 days  Code Status: Full Code     Subjective:     Interval History: No acute events overnight. Patient tolerated blood transfusion well. Afebrile overnight.     Oncology Treatment Plan:     PEDS [id number]  ARM A - LR Risk Patients    Medications:  Continuous Infusions:  Scheduled Meds:   acyclovir  400 mg Oral BID    chlorhexidine  15 mL Mouth/Throat BID    ciprofloxacin HCl  500 mg Oral Q12H    posaconazole  300 mg Oral BID    [START ON 3/9/2018] sulfamethoxazole-trimethoprim 800-160mg  1 tablet Oral twice daily on Friday, Saturday, Sunday     PRN Meds:(Magic mouthwash) 1:1:1 Benadryl 12.5mg/5ml liq, aluminum & magnesium hydroxide-simehticone (Maalox), lidocaine viscous 2%, sodium chloride, acetaminophen, LORazepam, ondansetron, polyethylene glycol     Review of Systems  Objective:     Vital Signs (Most Recent):  Temp: 99.7 °F (37.6 °C) (03/08/18 0345)  Pulse: 85 (03/08/18 0345)  Resp: 20 (03/08/18 0345)  BP: (!) 100/51 (03/08/18 0345)  SpO2: 98 % (03/08/18 0345) Vital Signs (24h Range):  Temp:  [98.3 °F (36.8 °C)-99.7 °F (37.6 °C)] 99.7 °F (37.6 °C)  Pulse:  [77-99] 85  Resp:  [15-20] 20  SpO2:  [98 %-100 %] 98 %  BP: (100-128)/(51-63) 100/51     Weight: 83.4 kg (183 lb 15.6 oz)  Body mass index is 30.61 kg/m².  Body surface area is 1.96 meters squared.      Intake/Output Summary (Last 24 hours) at 03/08/18 0634  Last data filed at 03/07/18 2230   Gross per 24 hour   Intake             1700 ml   Output              680 ml   Net             1020 ml       Physical Exam   Constitutional: He is oriented to person, place, and time. He appears well-developed and well-nourished. No distress.   HENT:   Head: Normocephalic and atraumatic.   Right Ear: External ear normal.   Left Ear: External ear normal.   Nose: Nose normal.   Mouth/Throat: Oropharynx is clear  abdominal pain and moist. No oropharyngeal exudate.   Eyes: Conjunctivae and EOM are normal. Pupils are equal, round, and reactive to light. Right eye exhibits no discharge. Left eye exhibits no discharge. No scleral icterus.   Neck: Normal range of motion. Neck supple. No JVD present. No tracheal deviation present. No thyromegaly present.   Cardiovascular: Normal rate, regular rhythm, normal heart sounds and intact distal pulses.  Exam reveals no friction rub.    No murmur heard.  Pulmonary/Chest: Breath sounds normal. No stridor. No respiratory distress. He has no wheezes. He has no rales. He exhibits no tenderness.   Abdominal: Soft. Bowel sounds are normal. He exhibits no distension and no mass. There is no tenderness. There is no rebound and no guarding. No hernia.   Musculoskeletal: Normal range of motion. He exhibits no edema, tenderness or deformity.   Lymphadenopathy:     He has no cervical adenopathy.   Neurological: He is alert and oriented to person, place, and time.   Skin: Skin is warm. Capillary refill takes less than 2 seconds. He is not diaphoretic.   Psychiatric: He has a normal mood and affect.   Nursing note and vitals reviewed.    Labs:   None    Imaging:   None    Micro:   None    Assessment/Plan:     Hema is a 17 y.o. male with AML (t8;21) receiving chemotherapy following COG [id number] (Arm A) here today for neutropenia (without fever).  Currently does not need platelets or fluids.     - CBC, CMP, Type and Screen MWF  - Continue all home meds              - Acyclovir 400 mg Oral BID              - Chlorhexidine 15 ml Oral BID              - Ciprofloxacin 500 mg Oral q12 hours              - Lisdexamfetamine 60 mg oral qAM              - Bactrim 800-160 mg Oral BID on Friday, Saturday, and Sunday              - Posaconazole 300 mg BID  PRN Meds: Lorazepam, Ondansetron, Miralax  - Continue cipro, posconazole, and bactrim  - Transfuse 2 units PRBC's on 3/7 for hemoglobin of 6.6  - if febrile, can culture from  both bports and start Vanc and cefpeime     Dispo: pending resolution of counts    Pascual Archer MD  Pediatric Hematology/Oncology  [facility]

## 2018-10-25 ENCOUNTER — PATIENT MESSAGE (OUTPATIENT)
Dept: INFUSION THERAPY | Facility: HOSPITAL | Age: 18
End: 2018-10-25

## 2018-10-31 ENCOUNTER — HISTORICAL (OUTPATIENT)
Dept: HEMATOLOGY/ONCOLOGY | Facility: CLINIC | Age: 18
End: 2018-10-31

## 2018-10-31 LAB
ABS NEUT (OLG): 2.2 X10(3)/MCL (ref 2.1–9.2)
BASOPHILS # BLD AUTO: 0.01 X10(3)/MCL
BASOPHILS NFR BLD AUTO: 0 %
EOSINOPHIL # BLD AUTO: 0.06 X10(3)/MCL
EOSINOPHIL NFR BLD AUTO: 2 %
ERYTHROCYTE [DISTWIDTH] IN BLOOD BY AUTOMATED COUNT: 12.5 % (ref 11.5–14.5)
HCT VFR BLD AUTO: 37.5 % (ref 40–51)
HGB BLD-MCNC: 13.3 GM/DL (ref 13.5–17.5)
LYMPHOCYTES # BLD AUTO: 0.92 X10(3)/MCL
LYMPHOCYTES NFR BLD AUTO: 26 % (ref 13–40)
MCH RBC QN AUTO: 36.8 PG (ref 26–34)
MCHC RBC AUTO-ENTMCNC: 35.5 GM/DL (ref 31–37)
MCV RBC AUTO: 103.9 FL (ref 80–100)
MONOCYTES # BLD AUTO: 0.34 X10(3)/MCL
MONOCYTES NFR BLD AUTO: 10 % (ref 0–10)
NEUTROPHILS # BLD AUTO: 2.2 X10(3)/MCL
NEUTROPHILS NFR BLD AUTO: 62 X10(3)/MCL
PLATELET # BLD AUTO: 117 X10(3)/MCL (ref 130–400)
PMV BLD AUTO: 9.7 FL (ref 7.4–10.4)
RBC # BLD AUTO: 3.61 X10(6)/MCL (ref 4.5–5.9)
RET# (OHS): 0.07 X10(6)/MCL (ref 0.02–0.09)
RETICULOCYTE COUNT AUTOMATED (OLG): 1.8 % (ref 0.5–1.5)
WBC # SPEC AUTO: 3.5 X10(3)/MCL (ref 4.5–11)

## 2018-11-19 ENCOUNTER — LAB VISIT (OUTPATIENT)
Dept: LAB | Facility: HOSPITAL | Age: 18
End: 2018-11-19
Attending: PEDIATRICS
Payer: MEDICAID

## 2018-11-19 ENCOUNTER — OFFICE VISIT (OUTPATIENT)
Dept: PEDIATRIC HEMATOLOGY/ONCOLOGY | Facility: CLINIC | Age: 18
End: 2018-11-19
Payer: MEDICAID

## 2018-11-19 VITALS
BODY MASS INDEX: 36.72 KG/M2 | HEIGHT: 67 IN | WEIGHT: 233.94 LBS | HEART RATE: 99 BPM | SYSTOLIC BLOOD PRESSURE: 130 MMHG | DIASTOLIC BLOOD PRESSURE: 55 MMHG | TEMPERATURE: 99 F

## 2018-11-19 DIAGNOSIS — C92.01 ACUTE MYELOID LEUKEMIA IN REMISSION: ICD-10-CM

## 2018-11-19 DIAGNOSIS — C92.01 ACUTE MYELOID LEUKEMIA IN REMISSION: Primary | ICD-10-CM

## 2018-11-19 LAB
ALBUMIN SERPL BCP-MCNC: 4 G/DL
ALP SERPL-CCNC: 116 U/L
ALT SERPL W/O P-5'-P-CCNC: 28 U/L
ANION GAP SERPL CALC-SCNC: 10 MMOL/L
AST SERPL-CCNC: 21 U/L
BASOPHILS # BLD AUTO: 0.01 K/UL
BASOPHILS NFR BLD: 0.2 %
BILIRUB SERPL-MCNC: 0.5 MG/DL
BUN SERPL-MCNC: 13 MG/DL
CALCIUM SERPL-MCNC: 9.4 MG/DL
CHLORIDE SERPL-SCNC: 103 MMOL/L
CO2 SERPL-SCNC: 27 MMOL/L
CREAT SERPL-MCNC: 0.8 MG/DL
DIFFERENTIAL METHOD: ABNORMAL
EOSINOPHIL # BLD AUTO: 0.1 K/UL
EOSINOPHIL NFR BLD: 2.7 %
ERYTHROCYTE [DISTWIDTH] IN BLOOD BY AUTOMATED COUNT: 12.7 %
EST. GFR  (AFRICAN AMERICAN): >60 ML/MIN/1.73 M^2
EST. GFR  (NON AFRICAN AMERICAN): >60 ML/MIN/1.73 M^2
GLUCOSE SERPL-MCNC: 89 MG/DL
HCT VFR BLD AUTO: 38.7 %
HGB BLD-MCNC: 13.8 G/DL
LYMPHOCYTES # BLD AUTO: 1.3 K/UL
LYMPHOCYTES NFR BLD: 30 %
MCH RBC QN AUTO: 37 PG
MCHC RBC AUTO-ENTMCNC: 35.7 G/DL
MCV RBC AUTO: 104 FL
MONOCYTES # BLD AUTO: 0.4 K/UL
MONOCYTES NFR BLD: 9.6 %
NEUTROPHILS # BLD AUTO: 2.6 K/UL
NEUTROPHILS NFR BLD: 57.5 %
PLATELET # BLD AUTO: 145 K/UL
PMV BLD AUTO: 9.4 FL
POTASSIUM SERPL-SCNC: 4.3 MMOL/L
PROT SERPL-MCNC: 6.8 G/DL
RBC # BLD AUTO: 3.73 M/UL
RETICS/RBC NFR AUTO: 1.3 %
SODIUM SERPL-SCNC: 140 MMOL/L
WBC # BLD AUTO: 4.46 K/UL

## 2018-11-19 PROCEDURE — 36415 COLL VENOUS BLD VENIPUNCTURE: CPT | Mod: PO

## 2018-11-19 PROCEDURE — 99214 OFFICE O/P EST MOD 30 MIN: CPT | Mod: S$PBB,,, | Performed by: PEDIATRICS

## 2018-11-19 PROCEDURE — 85025 COMPLETE CBC W/AUTO DIFF WBC: CPT | Mod: PO

## 2018-11-19 PROCEDURE — 99213 OFFICE O/P EST LOW 20 MIN: CPT | Mod: PBBFAC | Performed by: PEDIATRICS

## 2018-11-19 PROCEDURE — 85045 AUTOMATED RETICULOCYTE COUNT: CPT | Mod: PO

## 2018-11-19 PROCEDURE — 99999 PR PBB SHADOW E&M-EST. PATIENT-LVL III: CPT | Mod: PBBFAC,,, | Performed by: PEDIATRICS

## 2018-11-19 PROCEDURE — 80053 COMPREHEN METABOLIC PANEL: CPT

## 2018-11-29 PROBLEM — Z79.899 IMMUNOSUPPRESSED DUE TO CHEMOTHERAPY: Status: RESOLVED | Noted: 2017-11-28 | Resolved: 2018-11-29

## 2018-11-29 PROBLEM — D84.821 IMMUNOSUPPRESSED DUE TO CHEMOTHERAPY: Status: RESOLVED | Noted: 2017-11-28 | Resolved: 2018-11-29

## 2018-11-29 PROBLEM — T45.1X5A IMMUNOSUPPRESSED DUE TO CHEMOTHERAPY: Status: RESOLVED | Noted: 2017-11-28 | Resolved: 2018-11-29

## 2018-11-29 PROBLEM — D64.9 ANEMIA: Status: RESOLVED | Noted: 2018-04-24 | Resolved: 2018-11-29

## 2018-11-29 NOTE — PROGRESS NOTES
Pediatric Hematology Note      Subjective:       Patient ID: Hema Kuo is a 17 y.o. male      Chief Complaint:    Chief Complaint   Patient presents with    Follow-up       History of Present Illness:   Hema Kuo is a 17 y.o. male with AML s/p Intensification II therapy following [id number] (Arm A) here today for off treatment follow-up.  He reports feeling very well since last visit.  Mood excellent, no further paranoid or bizarre thoughts, denies A/V hallucinations, HI/SI.  Appetite and energy level normal.  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  He reports no fevers, no abdominal pain, vomiting, diarrhea or constipation and no excessive bruising or unusual bleeding.  No other complaints.     Past Medical History:   Diagnosis Date    AML (acute myeloblastic leukemia) 10/2017    Asthma     Encounter for blood transfusion     Seasonal allergies      Past Surgical History:   Procedure Laterality Date    ASPIRATION-BONE MARROW N/A 10/31/2017    Performed by Sanford Bucio MD at [facility] OR 2ND FLR    Biopsy-bone marrow N/A 7/20/2018    Performed by Sanford Bucio MD at [facility] OR 2ND FLR    BIOPSY-BONE MARROW N/A 2/20/2018    Performed by Sanford Bucio MD at [facility] OR 1ST FLR    BIOPSY-BONE MARROW N/A 12/12/2017    Performed by Sanford Bucio MD at [facility] OR 1ST FLR    BIOPSY-BONE MARROW N/A 10/31/2017    Performed by Sanford Bucio MD at [facility] OR 2ND FLR    BONE MARROW BIOPSY N/A 7/20/2018    Procedure: Biopsy-bone marrow;  Surgeon: Sanford Bucio MD;  Location: [facility] OR [address] [place] [address];  Service: Oncology;  Laterality: N/A;    [id number] N/A 10/31/2017    Performed by Anatoliy Block MD at [facility] OR 2ND FLR    MEDIPORT REMOVAL Left 7/20/2018    Procedure: REMOVAL, CATHETER, CENTRAL VENOUS, TUNNELED, WITH PORT;  Surgeon: Anatoliy Block MD;  Location: [facility] OR [address] [place] [address];  Service: Pediatrics;  Laterality: Left;     PORTACATH PLACEMENT  10/31/2017    PUNCTURE-LUMBAR N/A 2/20/2018    Performed by [facility] MD at [facility] OR 1ST FLR    PUNCTURE-LUMBAR N/A 12/12/2017    Performed by Sanford Bucio MD at [facility] OR 1ST FLR    REMOVAL, CATHETER, CENTRAL VENOUS, TUNNELED, WITH PORT Left 7/20/2018    Performed by An[facility]y Block MD at [facility] OR 2ND FLR    TONSILLECTOMY         ROS:   Gen: Negative for fever. Negative for night sweats.    HEENT: Negative for nosebleeds.  Negative for sore throat.  Negative for visual problems.  Pulm: Negative for cough.  Negative for shortness of breath.  CV: Negative for chest pain.  Negative for cyanosis.  GI: Negative for abdominal pain, nausea or vomiting.    : Negative for changes in frequency or dysuria.   Skin: Negative for bruising.  Negative for rash.    MS: Negative for joint swelling or pain.  Neuro:  Negative for headaches, seizures, weakness.  Hematology:  Positive for AML.  Positive for recent chemotherapy  Endocrine:  Negative for heat or cold intolerance.  Negative for increased thirst.  Psych: Negative for hyperactivity.  Negative for behavioral issues.      Physical Examination:   Vitals:    11/19/18 1503   BP: (!) 130/55   Pulse: 99   Temp: 98.5 °F (36.9 °C)     General: well developed, well nourished, no distress.    HENT: Head:normocephalic, atraumatic. Ears:bilateral TM's and external ear canals normal. Nose: Nares normal. Mucosa normal. No discharge. Throat: lips, mucosa, and tongue normal; teeth and gums normal and no throat erythema.  Eyes: conjunctivae pale/corneas clear. PERRL.   Neck: supple, symmetrical, and thyroid not enlarged, symmetric, no tenderness/mass/nodules  Lungs:  clear to auscultation bilaterally and normal respiratory effort  Cardiovascular: regular rate and rhythm, S1, S2 normal, no murmur, click, rub or gallop.   Chest Wall: Port site clear  Extremities: no cyanosis or edema, or clubbing. Pulses: 2+ and symmetric.  Abdomen: soft, non-tender  non-distented; bowel sounds normal; no masses,  no organomegaly.   Skin: Pale.  Texture and turgor normal. No rashes or lesions  Musculoskeletal: No obvious joint swelling or erythema  Lymph Nodes: No cervical or supraclavicular adenopathy. No axillary or inguinal adenopathy.  Neurologic: Cranial nerves intact.  Normal strength and tone. No focal numbness or weakness  Psych: appropriate mood and affect    Objective:     Pathology:  Initial BM bx:    BONE MARROW, RIGHT ILIAC CREST, ASPIRATE, CLOT, AND CORE BIOPSY:  --Cellular marrow, positive for acute myeloid leukemia, with blasts representing approximately 25% of cellularity, see comment  --Background developing myeloid cells present with some cytologic atypia, see comment  COMMENT: Concomitantly submitted flow cytometric analysis detects an increased population of blasts consistent with acute myeloid leukemia, non-M3 subtype. Additional immunophenotyping was performed on peripheral blood flow cytometric studies (please see separate report). The marrow blasts showing expression of CD34, CD13, and cytoplasmic myeloperoxidase as well as dim CD19. The population however is negative for TdT, and cytoplasmic CD22 and CD79a. These findings are similar to that seen on the peripheral blood study and are consistent with acute myeloid leukemia, non-M3 subtype by JOVANNI classification.  Given the dim expression of CD19, a translocation of chromosomes 8 and 21 is a diagnostic possibility. The cytologic atypia/dysplasia seen in the myeloid series can also be seen with this translocation. Clinical correlation and correlation with any available cytogenetic and molecular studies is required for definitive classification of this process.    AML FISH: The result is abnormal and indicates LJNK1L4/RUNX1 fusion in 90.6% of nuclei. This result is most consistent with acute myeloid leukemia with t(8;21)(q22;q22) (Grimwade et al., Blood 116:354-65, 2010; Solbre et al., Am J Hematol  "89:1379-1993, 2014; Sean, Am J Clin Pathol 144:6-18, 2015). For monitoring response to therapy in this patient, we suggest using FISH for OMSL8N5/RUNX1 fusion."    Peripheral blood, FLT3 mutation analysis: Negative. Neither expansion of the region susceptible to internal tandem duplication (FLT-ITD) nor changes involving codon D835 and/or I836 in the tyrosine kinase domain (FLT3-TKD) was detected."     KIT genetic alteration analysis, exons 8, 9, 10, 11, and 17: Negative.  No pathogenic genetic alterations were detected in the  KIT  gene, exons 8, 9, 10, 11, and 17.     Cytogenetics:  45,X,-Y,nevin(8)t(8;21)(q22;q22),nevin(20)t(20;21)(p13;q22)t(8;21),nevin(21)t(8;21)t(20;21)[17]/46,XY[3]  Comments: The result is abnormal. Of 20 metaphases, 3 metaphases were normal, and 17 metaphases had a complex translocation involving chromosomes 8, 21, and 20. FISH studies confirmed BCJW4I7/RUNX1 fusion associated with this translocation (reported separately). Fusion of AFFB9A1/RUNX1 is reportedly associated with a favorable prognosis in AML (Grimwade et al., Blood 116:354-365, 2010). For monitoring response to therapy in this patient, FISH for OFMR6T8/RUNX1 fusion is available, test AMLF (AML, FISH)."    End of Induction I:  BONE MARROW, RIGHT ILIAC CREST, ASPIRATE AND CLOT:  --Cellular marrow, approximately 60%, with trilineage hematopoiesis, see comment  --No definitive morphologic evidence of residual/recurrent acute myeloid leukemia, see comment  --Increased stainable iron  COMMENT: Concomitantly submitted flow cytometric analysis detects no diagnostic abnormal hematopoietic population. B cells are polyclonal with a subset of CD19/CD10 positive cells favored to be hematogones, and T cells are immunophenotypically unremarkable. The blast gate is not increased.    AML FISH:  This result is within normal limits for the WJPI7D9 and RUNX1 gene regions."    End of treatment:  BONE MARROW, RIGHT ILIAC CREST, ASPIRATE, " CLOT, AND CORE BIOPSY:  --Hypercellular marrow, approximately 80% overall, with trilineage hematopoiesis, see comment  --No morphologic evidence of residual/recurrent acute myeloid leukemia, see comment  --Mild erythroid hyperplasia  --Adequate megakaryocytes  --Increased stainable iron  COMMENT: Concomitantly submitted flow cytometric analysis detects no diagnostic abnormal hematopoietic population. B cells are polyclonal with a population of immature B-cells showing a pattern most compatible with hematogones, and T-cells are immunophenotypically unremarkable. The blast gate is not increased.    Cytogenetics and AML FISH:  Normal    Labs:   Results for HEMA BHATIA (MRN [id number]) as of 11/29/2018 15:15   7/20/2018 08:38 8/23/2018 12:26 11/19/2018 15:07   WBC 2.51 (L) 2.78 (L) 4.46   RBC 2.91 (L) 3.48 (L) 3.73 (L)   Hemoglobin 10.0 (L) 12.6 (L) 13.8 (L)   Hematocrit 30.6 (L) 37.4 (L) 38.7 (L)    (H) 108 (H) 104 (H)   MCH 34.4 (H) 36.2 (H) 37.0 (H)   MCHC 32.7 33.7 35.7   RDW SEE COMMENT 13.8 12.7   Platelets 72 (L) 82 (L) 145 (L)   MPV 10.2 9.6 9.4   Gran% 43.8 51.4 57.5   Gran # (ANC) 1.1 (L) 1.4 (L) 2.6         Assessment/Plan:   Hema was seen today for follow-up.    Diagnoses and all orders for this visit:    Acute myeloid leukemia in remission        Discussion:   17 y.o. young man with AML (t 8;21) here for follow-up.  He reports feeling well since discharge home and was well appearing today in clinic.    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  CNS negative.  MRD negative after Induction I.    -Treated per COG [id number] (ARM A).  S/p Intensification II.  End of treatment April 2018.    -BM biopsy at start of Intensification shows CR.  FISH for t(8;21) negative.  BM biopsy at end of treatment with ELIECER.    Weight gain  -~25# weight gain since end of treatment.  I discussed the increased risks of obesity and metabolic syndrome in leukemia survivors, discussed dietary and exercise  modifications.    For his chemotherapy induced pancytopenia  -Blood counts uptrending, follow monthly.    For his psychosis  -Resolved, cont outpatient f/u with Psychology in [place]    For his deconditioning  -Improving, continue outpatient PT    Labs at home in 1 month.  Return to clinic in 3 months.      Sanford Bucio    Total time 30 minutes with >50% spent in face-to-face counseling regarding plan of care, chemo side effects and arranging coordination of care.

## 2018-11-30 ENCOUNTER — TELEPHONE (OUTPATIENT)
Dept: PEDIATRIC HEMATOLOGY/ONCOLOGY | Facility: CLINIC | Age: 18
End: 2018-11-30

## 2018-11-30 NOTE — TELEPHONE ENCOUNTER
----- Message from Sanford Bucio MD sent at 11/29/2018  3:24 PM CST -----  Local labs monthly, f/u here in 3 mo w/ ECHO

## 2018-12-02 ENCOUNTER — PATIENT MESSAGE (OUTPATIENT)
Dept: PEDIATRIC HEMATOLOGY/ONCOLOGY | Facility: CLINIC | Age: 18
End: 2018-12-02

## 2018-12-03 ENCOUNTER — PATIENT MESSAGE (OUTPATIENT)
Dept: INFUSION THERAPY | Facility: HOSPITAL | Age: 18
End: 2018-12-03

## 2018-12-18 ENCOUNTER — HISTORICAL (OUTPATIENT)
Dept: HEMATOLOGY/ONCOLOGY | Facility: CLINIC | Age: 18
End: 2018-12-18

## 2018-12-18 LAB
ABS NEUT (OLG): 3.37 X10(3)/MCL (ref 2.1–9.2)
BASOPHILS # BLD AUTO: 0.01 X10(3)/MCL
BASOPHILS NFR BLD AUTO: 0 %
EOSINOPHIL # BLD AUTO: 0.16 10*3/UL
EOSINOPHIL NFR BLD AUTO: 3 %
ERYTHROCYTE [DISTWIDTH] IN BLOOD BY AUTOMATED COUNT: 11.9 % (ref 11.5–14.5)
HCT VFR BLD AUTO: 38.4 % (ref 40–51)
HGB BLD-MCNC: 13.1 GM/DL (ref 13.5–17.5)
IMM GRANULOCYTES # BLD AUTO: 0.01 10*3/UL
IMM GRANULOCYTES NFR BLD AUTO: 0 %
LYMPHOCYTES # BLD AUTO: 1.03 X10(3)/MCL
LYMPHOCYTES NFR BLD AUTO: 21 % (ref 13–40)
MCH RBC QN AUTO: 36.2 PG (ref 26–34)
MCHC RBC AUTO-ENTMCNC: 34.1 GM/DL (ref 31–37)
MCV RBC AUTO: 106.1 FL (ref 80–100)
MONOCYTES # BLD AUTO: 0.43 X10(3)/MCL
MONOCYTES NFR BLD AUTO: 9 % (ref 4–12)
NEUTROPHILS # BLD AUTO: 3.37 X10(3)/MCL
NEUTROPHILS NFR BLD AUTO: 67 X10(3)/MCL
PLATELET # BLD AUTO: 119 X10(3)/MCL (ref 130–400)
PMV BLD AUTO: 9.8 FL (ref 7.4–10.4)
RBC # BLD AUTO: 3.62 X10(6)/MCL (ref 4.5–5.9)
RET# (OHS): 0.06 X10(6)/MCL (ref 0.02–0.09)
RETICULOCYTE COUNT AUTOMATED (OLG): 1.8 % (ref 0.5–1.5)
WBC # SPEC AUTO: 5 X10(3)/MCL (ref 4.5–11)

## 2019-01-01 NOTE — ASSESSMENT & PLAN NOTE
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        Evaluation for Acute Leukemia: Flow cytometry analysis concerning for AML.   - Today will be day 4 of chemotherapy with cytarabine, Daunorubicin, and Etoposide.    - Started Dexrazoxane yesterday  - Etoposide and Cytarabine today  - CMV positive. Hep negative.  - Echo and EKG 11/2 normal.  - Molecular testing pending    - Continue on fluids at 1.5 maintenance (180ml/hr) for tumor lysis prevention   - Tumor lysis labs Q12 during treatment  - Miralax for constipation.   - start Periactin for poor appetite    Immunosuppressed State:   -Currently on Bactrim, Acyclovir.   -After chemo completed, start on voriconazole and cipro     Thrombocytopenia: Platelets at 22 (down from 23 yesterday). Will prepare platelets for him as he will likely need it tomorrow.   - s/p 2 units 10/29-30. 1 unit 10/31. 1 unit 11/2.   -1 unit platelets today   -Threshold for him is <20    Anemia: Hemoglobin was 7.7 this Am. Patient received 3 units pRBCs 10/29-10/30. 10/31 2 units.   - s/p 2 units of pRBCs  - Threshold for him is <7.   - He is CMV +, can give him just leukoreduced, irradiated blood products.     Cough  Improved. Given history of asthma and clinical improvement with albuterol neb therapy, may repeat as needed if cough persists or worsens. Patient was afebrile overnight. CXR reports concern for pneumonia vs atelectasis in left perihilar region. Will evaluate him and continue to monitor for possible pneumonia  -Blood culture no growth, rocephin discontinued.    (4) patient too young to ambulate or walks frequently

## 2019-01-01 NOTE — SUBJECTIVE & OBJECTIVE
Interval History: Both No acute events overnight. Etoposide infusion completed. Asked for prn medications for pain at LP site x2. Tolerating PO intake- had sushi for dinner. Good UOP and single BM    Scheduled Meds:   cytarabine (CYTOSAR) chemo infusion  100 mg/m2 (Treatment Plan Recorded) Intravenous Q12H    DAUNorubicin (CERUBIDINE) chemo infusion  50 mg/m2 (Treatment Plan Recorded) Intravenous Q48H    dexrazoxane infusion  500 mg/m2 (Order-Specific) Intravenous Q48H    etoposide (VEPESID) chemo infusion  100 mg/m2 (Treatment Plan Recorded) Intravenous Q24H    ondansetron  16 mg Intravenous Daily    [START ON 12/15/2017] sulfamethoxazole-trimethoprim 800-160mg  1 tablet Oral twice daily on Friday, Saturday, Sunday     Continuous Infusions:   PRN Meds:diphenhydrAMINE, heparin, porcine (PF), lorazepam, oxyCODONE, polyethylene glycol    Review of Systems   Constitutional: Positive for activity change and fatigue. Negative for appetite change and fever.   HENT: Negative for congestion, rhinorrhea and sore throat.    Eyes: Negative for discharge and visual disturbance.   Respiratory: Negative for cough.    Gastrointestinal: Positive for nausea. Negative for abdominal distention, abdominal pain, constipation, diarrhea and vomiting.   Genitourinary: Negative for decreased urine volume.   Musculoskeletal: Positive for back pain. Negative for myalgias.   Skin: Positive for pallor. Negative for rash.   Neurological: Negative for dizziness and headaches.     Objective:     Vital Signs (Most Recent):  Temp: 97.4 °F (36.3 °C) (12/13/17 0429)  Pulse: 61 (12/13/17 0429)  Resp: 16 (12/13/17 0429)  BP: (!) 88/49 (12/13/17 0429)  SpO2: 97 % (12/13/17 0429) Vital Signs (24h Range):  Temp:  [97.4 °F (36.3 °C)-98.2 °F (36.8 °C)] 97.4 °F (36.3 °C)  Pulse:  [56-72] 61  Resp:  [16-20] 16  SpO2:  [97 %-100 %] 97 %  BP: ()/(49-68) 88/49     Patient Vitals for the past 72 hrs (Last 3 readings):   Weight   12/12/17 1430 78.2 kg  (172 lb 6.4 oz)     Body mass index is 27.41 kg/m².    Intake/Output - Last 3 Shifts       12/11 0700 - 12/12 0659 12/12 0700 - 12/13 0659    P.O.  600    I.V. (mL/kg)  23 (0.3)    IV Piggyback  850    Total Intake(mL/kg)  1473 (18.8)    Net   +1473          Urine Occurrence  2 x    Stool Occurrence  2 x          Lines/Drains/Airways     Central Venous Catheter Line                 Port A Cath Double Lumen 10/31/17 1826 left subclavian 42 days                Physical Exam   Constitutional: He is oriented to person, place, and time. He appears well-developed and well-nourished. No distress.   HENT:   Nose: Nose normal.   Mouth/Throat: Oropharynx is clear and moist. No oropharyngeal exudate.   Eyes: Conjunctivae are normal. Right eye exhibits no discharge. Left eye exhibits no discharge.   Neck: Normal range of motion. Neck supple. No thyromegaly present.   Cardiovascular: Normal rate and regular rhythm.    No murmur heard.  Port-a-cath    Pulmonary/Chest: Effort normal and breath sounds normal. No respiratory distress.   Abdominal: Soft. Bowel sounds are normal. He exhibits no distension.   Musculoskeletal: Normal range of motion. He exhibits no edema.   Lymphadenopathy:     He has no cervical adenopathy.   Neurological: He is alert and oriented to person, place, and time. He exhibits normal muscle tone.   Skin: Skin is warm. Capillary refill takes less than 2 seconds. There is pallor.   Bandage of lumbar spine from LP    Psychiatric: He has a normal mood and affect. Judgment and thought content normal.   Nursing note and vitals reviewed.      Significant Labs:  Recent Results (from the past 24 hour(s))   CBC auto differential    Collection Time: 12/12/17  9:11 AM   Result Value Ref Range    WBC 3.78 (L) 3.90 - 12.70 K/uL    RBC 3.51 (L) 4.60 - 6.20 M/uL    Hemoglobin 10.8 (L) 14.0 - 18.0 g/dL    Hematocrit 31.8 (L) 40.0 - 54.0 %    MCV 91 82 - 98 fL    MCH 30.8 27.0 - 31.0 pg    MCHC 34.0 32.0 - 36.0 g/dL    RDW  20.1 (H) 11.5 - 14.5 %    Platelets 212 150 - 350 K/uL    MPV 9.4 9.2 - 12.9 fL    Gran # 1.8 1.8 - 7.7 K/uL    Lymph # 1.5 1.0 - 4.8 K/uL    Mono # 0.5 0.3 - 1.0 K/uL    Eos # 0.0 0.0 - 0.5 K/uL    Baso # 0.01 0.00 - 0.20 K/uL    Gran% 47.9 38.0 - 73.0 %    Lymph% 38.6 18.0 - 48.0 %    Mono% 13.2 4.0 - 15.0 %    Eosinophil% 0.0 0.0 - 8.0 %    Basophil% 0.3 0.0 - 1.9 %    Differential Method Automated    Comprehensive metabolic panel    Collection Time: 12/12/17  9:11 AM   Result Value Ref Range    Sodium 136 136 - 145 mmol/L    Potassium 3.8 3.5 - 5.1 mmol/L    Chloride 106 95 - 110 mmol/L    CO2 23 23 - 29 mmol/L    Glucose 94 70 - 110 mg/dL    BUN, Bld 9 6 - 20 mg/dL    Creatinine 0.9 0.5 - 1.4 mg/dL    Calcium 9.2 8.7 - 10.5 mg/dL    Total Protein 6.8 6.0 - 8.4 g/dL    Albumin 3.9 3.5 - 5.2 g/dL    Total Bilirubin 0.5 0.1 - 1.0 mg/dL    Alkaline Phosphatase 67 55 - 135 U/L    AST 12 10 - 40 U/L    ALT 14 10 - 44 U/L    Anion Gap 7 (L) 8 - 16 mmol/L    eGFR if African American >60.0 >60 mL/min/1.73 m^2    eGFR if non African American >60.0 >60 mL/min/1.73 m^2   Acute Myeloid Leukemia, FISH, Bone Marrow    Collection Time: 12/12/17  9:43 AM   Result Value Ref Range    AML FISH Reason for Referral (BM) aml     AML FISH Specimen Source (BM) Bone Marrow    Leukemia/Lymphoma Screen - Bone Marrow Left Posterior Iliac Crest    Collection Time: 12/12/17  9:43 AM   Result Value Ref Range    Clinical Diagnosis - Bone Marrow AML     Body Site - Bone Marrow LPI    Glucose, CSF    Collection Time: 12/12/17  1:07 PM   Result Value Ref Range    Glucose, CSF 54 40 - 70 mg/dL   Protein, CSF    Collection Time: 12/12/17  1:07 PM   Result Value Ref Range    Protein, CSF 50 (H) 15 - 40 mg/dL   CSF cell count with differential    Collection Time: 12/12/17  1:07 PM   Result Value Ref Range    Heme Aliquot 0.3 mL    Appearance, CSF Slightly bloody (A) Clear    Color, CSF Pink Colorless    WBC, CSF 1 0 - 5 /cu mm    RBC, CSF 1868 (A) 0  /cu mm    Segmented Neutrophils, CSF 36 (H) 0 - 6 %    Lymphs, CSF 64 40 - 80 %   Pathologist Interpretation, Hem/Onc CSF    Collection Time: 12/12/17  1:07 PM   Result Value Ref Range    Path Interpretation Hem/Onc CSF Review required    Comprehensive metabolic panel    Collection Time: 12/13/17  4:14 AM   Result Value Ref Range    Sodium 138 136 - 145 mmol/L    Potassium 3.6 3.5 - 5.1 mmol/L    Chloride 108 95 - 110 mmol/L    CO2 24 23 - 29 mmol/L    Glucose 126 (H) 70 - 110 mg/dL    BUN, Bld 6 6 - 20 mg/dL    Creatinine 0.8 0.5 - 1.4 mg/dL    Calcium 8.4 (L) 8.7 - 10.5 mg/dL    Total Protein 5.6 (L) 6.0 - 8.4 g/dL    Albumin 3.3 (L) 3.5 - 5.2 g/dL    Total Bilirubin 0.6 0.1 - 1.0 mg/dL    Alkaline Phosphatase 53 (L) 55 - 135 U/L    AST 9 (L) 10 - 40 U/L    ALT 10 10 - 44 U/L    Anion Gap 6 (L) 8 - 16 mmol/L    eGFR if African American >60.0 >60 mL/min/1.73 m^2    eGFR if non African American >60.0 >60 mL/min/1.73 m^2           Significant Imaging: none   Statement Selected

## 2019-01-04 NOTE — ASSESSMENT & PLAN NOTE
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 On Intensification therapy II following [id number] (Arm A). He was admitted on 4/30/2018 for neutropenia/profund sepsis risk. Stepped up to the PICU for persistent fever >103, tachycardia, and hypotension that was been minimally responsive to fluid resuscitation. PICU stay was complicated by delirium. Currently CEC'd.      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 MRD negative after Induction I.   - Treating per COG [id number] (ARM A).  S/p Intensification I and Intensification II started.   - BM biopsy at start of Intensification shows CR.  FISH for t(8;21) negative. Will repeat BM biopsy pending count recovery and clinical improvement  - Bmp M,Th   foot pain/injury

## 2019-01-05 ENCOUNTER — HOSPITAL ENCOUNTER (EMERGENCY)
Facility: HOSPITAL | Age: 19
Discharge: HOME | End: 2019-01-05
Attending: EMERGENCY MEDICINE
Payer: MEDICAID

## 2019-01-05 VITALS
HEIGHT: 66 IN | WEIGHT: 232.56 LBS | SYSTOLIC BLOOD PRESSURE: 135 MMHG | RESPIRATION RATE: 18 BRPM | OXYGEN SATURATION: 99 % | BODY MASS INDEX: 37.38 KG/M2 | DIASTOLIC BLOOD PRESSURE: 75 MMHG | TEMPERATURE: 98 F | HEART RATE: 81 BPM

## 2019-01-05 DIAGNOSIS — R11.10 NON-INTRACTABLE VOMITING, PRESENCE OF NAUSEA NOT SPECIFIED, UNSPECIFIED VOMITING TYPE: Primary | ICD-10-CM

## 2019-01-05 DIAGNOSIS — T50.905A ADVERSE DRUG INTERACTION WITH DIETARY SUPPLEMENT: ICD-10-CM

## 2019-01-05 LAB
ALBUMIN SERPL BCP-MCNC: 4.6 G/DL
ALP SERPL-CCNC: 123 U/L
ALT SERPL W/O P-5'-P-CCNC: 52 U/L
ANION GAP SERPL CALC-SCNC: 11 MMOL/L
AST SERPL-CCNC: 36 U/L
BASOPHILS # BLD AUTO: 0.01 K/UL
BASOPHILS NFR BLD: 0.2 %
BILIRUB SERPL-MCNC: 0.9 MG/DL
BILIRUB UR QL STRIP: NEGATIVE
BUN SERPL-MCNC: 13 MG/DL
CALCIUM SERPL-MCNC: 10 MG/DL
CHLORIDE SERPL-SCNC: 102 MMOL/L
CLARITY UR REFRACT.AUTO: CLEAR
CO2 SERPL-SCNC: 26 MMOL/L
COLOR UR AUTO: YELLOW
CREAT SERPL-MCNC: 0.9 MG/DL
CTP QC/QA: YES
DIFFERENTIAL METHOD: ABNORMAL
EOSINOPHIL # BLD AUTO: 0 K/UL
EOSINOPHIL NFR BLD: 0.6 %
ERYTHROCYTE [DISTWIDTH] IN BLOOD BY AUTOMATED COUNT: 12.1 %
EST. GFR  (AFRICAN AMERICAN): >60 ML/MIN/1.73 M^2
EST. GFR  (NON AFRICAN AMERICAN): >60 ML/MIN/1.73 M^2
GLUCOSE SERPL-MCNC: 103 MG/DL
GLUCOSE UR QL STRIP: NEGATIVE
HCT VFR BLD AUTO: 40.9 %
HGB BLD-MCNC: 14.1 G/DL
HGB UR QL STRIP: NEGATIVE
IMM GRANULOCYTES # BLD AUTO: 0.01 K/UL
IMM GRANULOCYTES NFR BLD AUTO: 0.2 %
KETONES UR QL STRIP: ABNORMAL
LEUKOCYTE ESTERASE UR QL STRIP: NEGATIVE
LYMPHOCYTES # BLD AUTO: 0.8 K/UL
LYMPHOCYTES NFR BLD: 14.7 %
MCH RBC QN AUTO: 35 PG
MCHC RBC AUTO-ENTMCNC: 34.5 G/DL
MCV RBC AUTO: 102 FL
MONOCYTES # BLD AUTO: 0.5 K/UL
MONOCYTES NFR BLD: 8.7 %
NEUTROPHILS # BLD AUTO: 3.9 K/UL
NEUTROPHILS NFR BLD: 75.6 %
NITRITE UR QL STRIP: NEGATIVE
NRBC BLD-RTO: 0 /100 WBC
PH UR STRIP: 7 [PH] (ref 5–8)
PLATELET # BLD AUTO: 117 K/UL
PMV BLD AUTO: 9.4 FL
POC MOLECULAR INFLUENZA A AGN: NEGATIVE
POC MOLECULAR INFLUENZA B AGN: NEGATIVE
POTASSIUM SERPL-SCNC: 3.9 MMOL/L
PROT SERPL-MCNC: 7.5 G/DL
PROT UR QL STRIP: NEGATIVE
RBC # BLD AUTO: 4.03 M/UL
SODIUM SERPL-SCNC: 139 MMOL/L
SP GR UR STRIP: 1.01 (ref 1–1.03)
URN SPEC COLLECT METH UR: ABNORMAL
WBC # BLD AUTO: 5.18 K/UL

## 2019-01-05 PROCEDURE — 85025 COMPLETE CBC W/AUTO DIFF WBC: CPT

## 2019-01-05 PROCEDURE — 63600175 PHARM REV CODE 636 W HCPCS: Performed by: EMERGENCY MEDICINE

## 2019-01-05 PROCEDURE — 81003 URINALYSIS AUTO W/O SCOPE: CPT

## 2019-01-05 PROCEDURE — 99284 EMERGENCY DEPT VISIT MOD MDM: CPT | Mod: 25

## 2019-01-05 PROCEDURE — 96361 HYDRATE IV INFUSION ADD-ON: CPT

## 2019-01-05 PROCEDURE — 80053 COMPREHEN METABOLIC PANEL: CPT

## 2019-01-05 PROCEDURE — 99284 PR EMERGENCY DEPT VISIT,LEVEL IV: ICD-10-PCS | Mod: ,,, | Performed by: EMERGENCY MEDICINE

## 2019-01-05 PROCEDURE — 96374 THER/PROPH/DIAG INJ IV PUSH: CPT

## 2019-01-05 PROCEDURE — 25000003 PHARM REV CODE 250: Performed by: EMERGENCY MEDICINE

## 2019-01-05 PROCEDURE — 99284 EMERGENCY DEPT VISIT MOD MDM: CPT | Mod: ,,, | Performed by: EMERGENCY MEDICINE

## 2019-01-05 RX ORDER — ONDANSETRON 4 MG/1
8 TABLET, FILM COATED ORAL EVERY 6 HOURS PRN
Qty: 12 TABLET | Refills: 0 | Status: SHIPPED | OUTPATIENT
Start: 2019-01-05 | End: 2019-01-07

## 2019-01-05 RX ORDER — ONDANSETRON 2 MG/ML
8 INJECTION INTRAMUSCULAR; INTRAVENOUS
Status: COMPLETED | OUTPATIENT
Start: 2019-01-05 | End: 2019-01-05

## 2019-01-05 RX ADMIN — SODIUM CHLORIDE 1000 ML: 0.9 INJECTION, SOLUTION INTRAVENOUS at 03:01

## 2019-01-05 RX ADMIN — ONDANSETRON 8 MG: 2 INJECTION INTRAMUSCULAR; INTRAVENOUS at 03:01

## 2019-01-05 NOTE — ED NOTES
LOC:The patient is awake, alert and cooperative with a calm affect, patient is aware of environment and behaving in an age appropriate manor, patient recognizes caregiver and is speaking appropriately for age.  APPEARANCE: Resting comfortably, in no acute distress, the patient has clean hair, skin and nails, patient's clothing is properly fastened.  RESPIRATORY: Airway is open and patent, respirations are spontaneous, normal respiratory effort and rate noted.   MUSCULOSKELETAL: Patient moving all extremities well, no obvious deformities noted.  SKIN: The skin is warm and dry, patient has normal skin turgor and moist mucus membranes, no breakdown or brusing noted.  ABDOMEN: Soft and non tender in all four quadrants.  HEENT:WNL

## 2019-01-05 NOTE — ED PROVIDER NOTES
"Encounter Date: 1/5/2019       History     Chief Complaint   Patient presents with    Emesis     Onset this am x4    Cough     x 2 days    Nasal Congestion     x 2 days     Hema Kuo is a 18 y/o m with PMHx of AML (t 8;21) in remission s/p COG AAML 1031 (Arm A) with intensification II (ended in April 2018) who presents now with chief complaint of n/v since shortly prior to presentation.     Hema reports he was last seen in clinic by heme-onc in 11/2018 at which time he was noted to be doing well. He reports he continued in normal state of health until 2-3 wks ago when he developed a cough for which he went to an urgent care near him in [place], where he was dx'd with bronchitis and rx'd with a z-pack, leading to resolution of sx until ~ 2 days prior to current presentation when he again developed cough, this time also associated with sore throat and runny nose. Late the evening prior to presentation, he drove from [place] to [place] with girlfriend and friend who is visiting from out of town, arriving in [place] around 2-3 am. When he awoke the morning of presentation, he took a zyrtec and had a cup of coffee as well as his friend's pre-workout energy drink (brand name "Psychotic"). Shortly thereafter, while walking around, he started to feel cold and clammy and subsequently began vomiting. Reports about 6 episodes of nbnb emesis associated with diaphoresis, chills, headache, and lightheadedness, for which he presents to ED for evaluation.    He denies fevers, constipation, abdominal pain, cp, sob, neck pain/stiffness, syncope, or urinary sx.    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     Past Medical History:   Diagnosis Date    ADD (attention deficit disorder)     AML (acute myeloblastic leukemia) 10/2017    Asthma     Encounter for blood transfusion     Seasonal allergies      Past Surgical History:   Procedure Laterality Date    ASPIRATION-BONE MARROW N/A 10/31/2017    [facility] 2ND FLR    Biopsy-bone marrow N/A 2018    [facility] 2ND FLR    BIOPSY-BONE MARROW N/A 2018    [facility] 1ST FLR    BIOPSY-BONE MARROW N/A 2017    [facility] 1ST FLR    BIOPSY-BONE MARROW N/A 10/31/2017    [facility] 2ND FLR    MYQNIYSFK-PYIM-K-CATH N/A 10/31/2017    [facility] at [facility] OR 2ND FLR    PORTACATH PLACEMENT  10/31/2017    PUNCTURE-LUMBAR N/A 2018    [facility] 1ST FLR    PUNCTURE-LUMBAR N/A 2017    [facility] at [facility] OR 1ST FLR    REMOVAL, CATHETER, CENTRAL VENOUS, TUNNELED, WITH PORT Left 2018    [facility] OR 2ND FLR    TONSILLECTOMY       Family History   Problem Relation Age of Onset    Heart disease Mother     Cancer Mother     No Known Problems Father      Social History     Tobacco Use    Smoking status: Former Smoker     Packs/day: 0.50     Types: Cigarettes     Last attempt to quit: 10/30/2017     Years since quittin.1    Smokeless tobacco: Never Used    Substance Use Topics    Alcohol use: Yes     Comment: rare occasions    Drug use: No     Review of Systems   Constitutional: Positive for chills and diaphoresis. Negative for activity change, appetite change, fatigue and fever.   HENT: Positive for congestion, postnasal drip, rhinorrhea, sneezing and sore throat. Negative for trouble swallowing.    Eyes: Positive for visual disturbance. Negative for photophobia, pain, discharge, redness and itching.   Respiratory: Positive for cough. Negative for apnea, choking, chest tightness, shortness of breath and wheezing.    Cardiovascular: Negative for chest pain.   Gastrointestinal: Positive for nausea and vomiting. Negative for abdominal distention, abdominal pain and constipation.   Genitourinary: Negative for dysuria, frequency and urgency.   Musculoskeletal: Negative for back pain, myalgias, neck pain and neck stiffness.   Skin: Negative for color change, pallor and rash.   Neurological: Positive for dizziness and light-headedness. Negative for syncope and weakness.   Hematological: Does not bruise/bleed easily.   Psychiatric/Behavioral: Negative for agitation, behavioral problems, confusion, decreased concentration, dysphoric mood and hallucinations.       Physical Exam     Initial Vitals [01/05/19 1444]   BP Pulse Resp Temp SpO2   135/75 92 18 98.4 °F (36.9 °C) 98 %      MAP       --         Physical Exam    Vitals reviewed.  Constitutional: He appears well-developed and well-nourished. He is not diaphoretic. No distress.   HENT:   Head: Normocephalic.   Nose: Nose normal.   Mouth/Throat: Oropharynx is clear and moist. No oropharyngeal exudate.   Eyes: Conjunctivae and EOM are normal. Pupils are equal, round, and reactive to light. Right eye exhibits no discharge. Left eye exhibits no discharge.   Neck: Normal range of motion. Neck supple. No thyromegaly present. No tracheal deviation present. No JVD present.   Cardiovascular: Normal rate, regular rhythm and  normal heart sounds.   No murmur heard.  Pulmonary/Chest: Breath sounds normal. No stridor. No respiratory distress. He exhibits no tenderness.   Abdominal: Soft. Bowel sounds are normal. He exhibits no distension. There is no tenderness.   Musculoskeletal: Normal range of motion. He exhibits no edema or tenderness.   Lymphadenopathy:     He has no cervical adenopathy.   Neurological: He is alert and oriented to person, place, and time.   Skin: Skin is warm. Capillary refill takes less than 2 seconds.   Psychiatric: He has a normal mood and affect. His behavior is normal. Judgment and thought content normal.         ED Course   Procedures  Labs Reviewed   CBC W/ AUTO DIFFERENTIAL - Abnormal; Notable for the following components:       Result Value    RBC 4.03 (*)      (*)     MCH 35.0 (*)     Platelets 117 (*)     Lymph # 0.8 (*)     Gran% 75.6 (*)     Lymph% 14.7 (*)     All other components within normal limits   COMPREHENSIVE METABOLIC PANEL   URINALYSIS, REFLEX TO URINE CULTURE          Imaging Results    None          Medical Decision Making:   Initial Assessment:   16 y/o m with PMHx of AML (t 8;21) in remission s/p COG AAML 1031 (Arm A) with intensification II (ended in April 2018) who presents now with chief complaint of n/v since shortly prior to presentation. Reports trying new pre-work out sypplment shortly prior to onset of symptoms. Afebrile and HDS on presentation. Exam reassuring.  Differential Diagnosis:   Adverse supplement reaction, gastroenteritis, vomiting, pneumonia, viral infection/influenza  Clinical Tests:   Lab Tests: Ordered and Reviewed  The following lab test(s) were unremarkable: CBC, CMP and Troponin       <> Summary of Lab: Flu negative, cbc with no leukocytosis, but + left shift. CMP largely unremarkable.  ED Management:  Stevan labs, gave zofran and NS bolus. Monitored in ED with improvement in symptoms. Provided script for po zofran. Discharged home with return precautions and  instructions for follow up.              Attending Attestation:   Physician Attestation Statement for Resident:  As the supervising MD   Physician Attestation Statement: I have personally seen and examined this patient.   I agree with the above history. -:   As the supervising MD I agree with the above PE.    As the supervising MD I agree with the above treatment, course, plan, and disposition.  I have reviewed and agree with the residents interpretation of the following: lab data.  I have reviewed the following: old records at this facility.            Attending ED Notes:   I have seen and examined this patient. I have repeated pertinent aspects of history and physical exam documented by the Resident and agree with findings, management plan and disposition as documented in Resident Note.      19 yo WM currently in remission of AML who had cough / nasal congestion for about 2 days and had sudden onset of chills, myalgias, vomiting and subjective weakness while walking in [place].  Denies fever, sore throat, headache, breathing difficulty. Multiple episodes of vomiting about 3 hours ago and associated retching with persistent nausea on arrival to ER.  No bruising , vision changes or abdominal pain except while vomiting.  No known ill contacts       Awake, alert, mildly ill non toxic in NAD  States is feeling better after fluid bolus   HEENT: NC/AT  Sclera anicteric  Nasal mucosa boggy with clear drainage   Oral mucosa wet with mild posterior pharyngeal erythema and post nasal drainage    Neck: Supple   Chest: BBSCE  Abdomen: ND.NT  Hypoactive bowel sounds  No masses or HSM              Clinical Impression:     Adverse reaction to supplement                           Ricardo Adamson MD  Resident  01/05/19 1996

## 2019-01-05 NOTE — ED TRIAGE NOTES
Patient to ED for evaluation of sudden onset of vomiting 3 hours ago x 4. . With c/o cough,congestion and weakness for the past 2 days.  Before I came I did take some tylenol and zyrtec but vomited 30 minutes later.Vomiting bile on arrival.  Last BM this am.  No else is ill at home and he states that he is remission for Leukemia.

## 2019-01-06 NOTE — PROGRESS NOTES
Chemo note: cytarabine 2,090 mg in 250cc normal saline up and infusing to left chest double port. Prior to beginning infusion line was checked for patency and excellent blood return noted. Chemo consent on chart. Chemo precautions maintained. All lines taped and secured per protocol. This is a 2 hour infusion at 125 cc/hr. Checked with Cheryl López RN. Mother at bedside. Antinausea med ODT zofran given.   SYSTOLIC

## 2019-01-10 DIAGNOSIS — C92.01 ACUTE MYELOID LEUKEMIA IN REMISSION: Primary | ICD-10-CM

## 2019-02-01 ENCOUNTER — DOCUMENTATION ONLY (OUTPATIENT)
Dept: PEDIATRIC HEMATOLOGY/ONCOLOGY | Facility: CLINIC | Age: 19
End: 2019-02-01

## 2019-02-06 ENCOUNTER — OFFICE VISIT (OUTPATIENT)
Dept: PEDIATRIC HEMATOLOGY/ONCOLOGY | Facility: CLINIC | Age: 19
End: 2019-02-06
Payer: MEDICAID

## 2019-02-06 ENCOUNTER — LAB VISIT (OUTPATIENT)
Dept: LAB | Facility: HOSPITAL | Age: 19
End: 2019-02-06
Attending: PEDIATRICS
Payer: MEDICAID

## 2019-02-06 VITALS
DIASTOLIC BLOOD PRESSURE: 61 MMHG | TEMPERATURE: 98 F | HEIGHT: 67 IN | SYSTOLIC BLOOD PRESSURE: 117 MMHG | BODY MASS INDEX: 37.74 KG/M2 | WEIGHT: 240.44 LBS | HEART RATE: 90 BPM | RESPIRATION RATE: 18 BRPM

## 2019-02-06 DIAGNOSIS — C92.00 AML (ACUTE MYELOBLASTIC LEUKEMIA): ICD-10-CM

## 2019-02-06 DIAGNOSIS — Z71.3 NUTRITIONAL COUNSELING: Primary | ICD-10-CM

## 2019-02-06 DIAGNOSIS — C95.01 ACUTE LEUKEMIA IN REMISSION: ICD-10-CM

## 2019-02-06 DIAGNOSIS — C92.01 ACUTE MYELOID LEUKEMIA IN REMISSION: Primary | ICD-10-CM

## 2019-02-06 DIAGNOSIS — C92.01 ACUTE MYELOID LEUKEMIA IN REMISSION: ICD-10-CM

## 2019-02-06 DIAGNOSIS — E66.9 OBESITY, UNSPECIFIED CLASSIFICATION, UNSPECIFIED OBESITY TYPE, UNSPECIFIED WHETHER SERIOUS COMORBIDITY PRESENT: ICD-10-CM

## 2019-02-06 LAB
ALBUMIN SERPL BCP-MCNC: 4.2 G/DL
ALP SERPL-CCNC: 117 U/L
ALT SERPL W/O P-5'-P-CCNC: 25 U/L
ANION GAP SERPL CALC-SCNC: 9 MMOL/L
AST SERPL-CCNC: 19 U/L
BASOPHILS # BLD AUTO: 0.01 K/UL
BASOPHILS NFR BLD: 0.2 %
BILIRUB SERPL-MCNC: 0.3 MG/DL
BUN SERPL-MCNC: 14 MG/DL
CALCIUM SERPL-MCNC: 9.7 MG/DL
CHLORIDE SERPL-SCNC: 103 MMOL/L
CO2 SERPL-SCNC: 28 MMOL/L
CREAT SERPL-MCNC: 0.8 MG/DL
DIFFERENTIAL METHOD: ABNORMAL
EOSINOPHIL # BLD AUTO: 0.1 K/UL
EOSINOPHIL NFR BLD: 1.6 %
ERYTHROCYTE [DISTWIDTH] IN BLOOD BY AUTOMATED COUNT: 12.3 %
EST. GFR  (AFRICAN AMERICAN): >60 ML/MIN/1.73 M^2
EST. GFR  (NON AFRICAN AMERICAN): >60 ML/MIN/1.73 M^2
GLUCOSE SERPL-MCNC: 108 MG/DL
HCT VFR BLD AUTO: 40.9 %
HGB BLD-MCNC: 14 G/DL
LYMPHOCYTES # BLD AUTO: 1.3 K/UL
LYMPHOCYTES NFR BLD: 21.8 %
MCH RBC QN AUTO: 35.4 PG
MCHC RBC AUTO-ENTMCNC: 34.2 G/DL
MCV RBC AUTO: 103 FL
MONOCYTES # BLD AUTO: 0.5 K/UL
MONOCYTES NFR BLD: 8 %
NEUTROPHILS # BLD AUTO: 4.2 K/UL
NEUTROPHILS NFR BLD: 68.4 %
PLATELET # BLD AUTO: 176 K/UL
PMV BLD AUTO: 9.5 FL
POTASSIUM SERPL-SCNC: 4.2 MMOL/L
PROT SERPL-MCNC: 7.3 G/DL
RBC # BLD AUTO: 3.96 M/UL
RETICS/RBC NFR AUTO: 1.6 %
SODIUM SERPL-SCNC: 140 MMOL/L
WBC # BLD AUTO: 6.09 K/UL

## 2019-02-06 PROCEDURE — 85045 AUTOMATED RETICULOCYTE COUNT: CPT | Mod: PO

## 2019-02-06 PROCEDURE — 36415 COLL VENOUS BLD VENIPUNCTURE: CPT | Mod: PO

## 2019-02-06 PROCEDURE — 99999 PR PBB SHADOW E&M-EST. PATIENT-LVL III: ICD-10-PCS | Mod: PBBFAC,,,

## 2019-02-06 PROCEDURE — 99213 OFFICE O/P EST LOW 20 MIN: CPT | Mod: PBBFAC

## 2019-02-06 PROCEDURE — 99215 OFFICE O/P EST HI 40 MIN: CPT | Mod: S$PBB,,, | Performed by: INTERNAL MEDICINE

## 2019-02-06 PROCEDURE — 97802 MEDICAL NUTRITION INDIV IN: CPT | Mod: PBBFAC | Performed by: DIETITIAN, REGISTERED

## 2019-02-06 PROCEDURE — 80053 COMPREHEN METABOLIC PANEL: CPT

## 2019-02-06 PROCEDURE — 99999 PR PBB SHADOW E&M-EST. PATIENT-LVL III: CPT | Mod: PBBFAC,,,

## 2019-02-06 PROCEDURE — 99215 PR OFFICE/OUTPT VISIT, EST, LEVL V, 40-54 MIN: ICD-10-PCS | Mod: S$PBB,,, | Performed by: INTERNAL MEDICINE

## 2019-02-06 PROCEDURE — 85025 COMPLETE CBC W/AUTO DIFF WBC: CPT | Mod: PO

## 2019-02-06 NOTE — PROGRESS NOTES
"Referring Physician:No ref. provider found     Reason for visit:  Chief Complaint   Patient presents with    Nutrition Counseling        :2000     Allergies Reviewed  Meds Reviewed    Anthropometrics  Weight:109.1 kg (240 lb 6.6 oz)  Height:5' 7.32" (1.71 m)  BMI:Body mass index is 37.29 kg/m².   IBW: 148#    Meds:  Outpatient Medications Prior to Visit   Medication Sig Dispense Refill    lisdexamfetamine (VYVANSE) 60 MG capsule Take 60 mg by mouth every morning.      oxyCODONE-acetaminophen (PERCOCET) 5-325 mg per tablet Take 1 tablet by mouth every 4 (four) hours as needed for Pain. 10 tablet 0     No facility-administered medications prior to visit.          Labs: No resulted     Estimated Nutrition Needs: 4123-1106 Kcals/day (20-25 kcal/kg--adjusted), 80 g protein (1.0 g/kg)     Diet Hx: Pt here with girlfriend for nutrition counseling in [place]. Current weight of 240# which is a 70# weight gain in 1 year. Pt attributes weight gain to unhealthy eating habits. Pt skips breakfast, but eats lunch and dinner daily. Lunch is typically provided by work and usually consists of hot pockets or other frozen meals. Pt and girlfriend cook in the evening, but typically excess carbohydrates and no vegetables. Pt likes fruit and vegetables, but has to plan ahead to buy it at the store. Pt consumes sugary drinks such as coffee with 4-5 tsp sugar and 1/2 cup creamer as well as 16-20 oz of cranapple juice or soda in the evening.      Breakfast: skips  Lunch: hot pockets or leftovers  Dinner: spaghetti with meat sauce, corn, bread  Snack: ice cream    Assessment: Discussed importance of healthy diet with whole grains, lean protein, and at least 5 servings of fruits and vegetables/day. Explained that to reach the 5 servings of fruits and vegetables/day, pt must include them at every meal and fill 1/2 plate with them. By default, pt will eat less meat and starches/whole grains. Discussed benefit of planning recipes, writing " grocery list, and prepping meals. Encouraged sugar free beverages.    Nutrition Diagnosis: overweight/obesity related to undesirable food choices as evidenced by frequent sugary beverages and large portions    Recommendations: Recommend filling 1/2 plate with fruits and vegetables, 1/4 plate with whole grains, and 1/4 plate with lean protein. Goal of consuming 5 servings fruits and vegetables each day. Pt will bring lunch to work daily and incorporate oatmeal and/or fruit for breakfast. Pt will use a small plate to help cut back on portions. Pt can use 1/2 cup measuring cup to portion out food. Pt will use 2-3 tsp sugar in coffee rather than 4-5 tsp. Pt will choose sugar free beverages.       Consultation Time:30 minutes.    Follow Up: PRN

## 2019-02-06 NOTE — PROGRESS NOTES
[facility] adult provider note   Return Patient Visit   02/11/2019    CHIEF COMPLAINT:   Chief Complaint   Patient presents with    Nutrition Counseling       HISTORY OF PRESENT ILLNESS:   18 y.o. male  with favorable risk AML s/p Intensification II therapy following [id number] (Arm A) here today for off treatment follow-up.  Complete protocol chemotherapy 4/25/18.  EOT bone marrow biopsy July 2018 showed MRD negative CR.    Last seen in oncology clinic 11/29/18 by primary hematologist, Dr. Bucio.    Overall doing well since that time .   Denies fever, chills, nightsweats, bleeding, brusing, lymphadenopathy, signs/symptoms of splenomegaly.      PAST MEDICAL HISTORY:   Past Medical History:   Diagnosis Date    ADD (attention deficit disorder)     AML (acute myeloblastic leukemia) 10/2017    Asthma     Encounter for blood transfusion     Seasonal allergies        PAST SURGICAL HISTORY:   Past Surgical History:   Procedure Laterality Date    ASPIRATION-BONE MARROW N/A 10/31/2017    [facility] 2ND FLR    Biopsy-bone marrow N/A 7/20/2018    [facility] OR 2ND FLR    BIOPSY-BONE MARROW N/A 2/20/2018    [facility] OR 1ST FLR    BIOPSY-BONE MARROW N/A 12/12/2017    [facility] OR 1ST FLR    BIOPSY-BONE MARROW N/A 10/31/2017    [facility] by Sanfrod [facility] at [facility] OR 2ND FLR    [id number]-W-CATH N/A 10/31/2017    Per[facility] by [facility] at [facility] OR 2ND FLR    PORTACATH PLACEMENT  10/31/2017    PUNCTURE-LUMBAR N/A 2/20/2018    [facility] OR 1ST FLR    PUNCTURE-LUMBAR N/A 12/12/2017    [facility] by [facility] at [facility] OR 1ST FLR    REMOVAL, CATHETER, CENTRAL VENOUS, TUNNELED, WITH PORT Left 7/20/2018    Per[facility] by [facility] at [facility] OR 2ND FLR    TONSILLECTOMY         PAST SOCIAL HISTORY:   reports that  "he quit smoking about 15 months ago. His smoking use included cigarettes. He smoked 0.50 packs per day. he has never used smokeless tobacco. He reports that he drinks alcohol. He reports that he does not use drugs.    FAMILY HISTORY:  Family History   Problem Relation Age of Onset    Heart disease Mother     Cancer Mother     No Known Problems Father        CURRENT MEDICATIONS:   Current Outpatient Medications   Medication Sig    lisdexamfetamine (VYVANSE) 60 MG capsule Take 60 mg by mouth every morning.     No current facility-administered medications for this visit.      ALLERGIES:   Review of patient's allergies indicates:   Allergen Reactions    Nsaids (non-steroidal anti-inflammatory drug) Anaphylaxis    Nuts [tree nut] Anaphylaxis    Strawberry     Vancomycin analogues Itching     Premedicate with benadryl and admin over 2hr.           REVIEW OF SYSTEMS:   Review of Systems - History obtained from the patient  General ROS: negative  Ophthalmic ROS: negative  Allergy and Immunology ROS: negative  Endocrine ROS: negative  Breast ROS: negative  Gastrointestinal ROS: negative  Genito-Urinary ROS: negative  Musculoskeletal ROS: negative  Neurological ROS: negative    PHYSICAL EXAM:   Vitals:    02/06/19 1239   BP: 117/61   Pulse: 90   Resp: 18   Temp: 98.1 °F (36.7 °C)   TempSrc: Oral   Weight: 109.1 kg (240 lb 6.6 oz)   Height: 5' 7.32" (1.71 m)   PainSc: 0-No pain     General - well developed, well nourished, no apparent distress  HEENT - oropharynx clear  Chest and Lung - clear to auscultation bilaterally   Cardiovascular - RRR with no MGR, normal S1 and S2  Abdomen-  soft, nontender, no palpable hepatomegaly or splenomegaly  Lymph - no palpable lymphadenopathy  Heme - no bruising, petechiae, pallor  Skin - no rashes or lesions  Psych - appropriate mood and affect      ECOG Performance Status: (foot note - ECOG PS provided by Eastern Cooperative Oncology Group) 1 - Symptomatic but completely " ambulatory    Karnofsky Performance Score:  90%- Able to Carry on Normal Activity: Minor Symptoms of Disease  DATA:   Lab Results   Component Value Date    WBC 6.09 02/06/2019    HGB 14.0 02/06/2019    HCT 40.9 02/06/2019     (H) 02/06/2019     02/06/2019     Gran # (ANC)   Date Value Ref Range Status   02/06/2019 4.2 1.8 - 7.7 K/uL Final     Gran%   Date Value Ref Range Status   02/06/2019 68.4 38.0 - 73.0 % Final     CMP  Sodium   Date Value Ref Range Status   02/06/2019 140 136 - 145 mmol/L Final     Potassium   Date Value Ref Range Status   02/06/2019 4.2 3.5 - 5.1 mmol/L Final     Chloride   Date Value Ref Range Status   02/06/2019 103 95 - 110 mmol/L Final     CO2   Date Value Ref Range Status   02/06/2019 28 23 - 29 mmol/L Final     Glucose   Date Value Ref Range Status   02/06/2019 108 70 - 110 mg/dL Final     BUN, Bld   Date Value Ref Range Status   02/06/2019 14 6 - 20 mg/dL Final     Creatinine   Date Value Ref Range Status   02/06/2019 0.8 0.5 - 1.4 mg/dL Final     Calcium   Date Value Ref Range Status   02/06/2019 9.7 8.7 - 10.5 mg/dL Final     Total Protein   Date Value Ref Range Status   02/06/2019 7.3 6.0 - 8.4 g/dL Final     Albumin   Date Value Ref Range Status   02/06/2019 4.2 3.5 - 5.2 g/dL Final     Total Bilirubin   Date Value Ref Range Status   02/06/2019 0.3 0.1 - 1.0 mg/dL Final     Comment:     For infants and newborns, interpretation of results should be based  on gestational age, weight and in agreement with clinical  observations.  Premature Infant recommended reference ranges:  Up to 24 hours.............<8.0 mg/dL  Up to 48 hours............<12.0 mg/dL  3-5 days..................<15.0 mg/dL  6-29 days.................<15.0 mg/dL       Alkaline Phosphatase   Date Value Ref Range Status   02/06/2019 117 55 - 135 U/L Final     AST   Date Value Ref Range Status   02/06/2019 19 10 - 40 U/L Final     ALT   Date Value Ref Range Status   02/06/2019 25 10 - 44 U/L Final     Anion  Gap   Date Value Ref Range Status   02/06/2019 9 8 - 16 mmol/L Final     eGFR if    Date Value Ref Range Status   02/06/2019 >60.0 >60 mL/min/1.73 m^2 Final     eGFR if non    Date Value Ref Range Status   02/06/2019 >60.0 >60 mL/min/1.73 m^2 Final     Comment:     Calculation used to obtain the estimated glomerular filtration  rate (eGFR) is the CKD-EPI equation.      ]      ASSESSMENT AND PLAN:   Encounter Diagnoses   Name Primary?    Nutritional counseling Yes    Acute myeloid leukemia in remission     Obesity, unspecified classification, unspecified obesity type, unspecified whether serious comorbidity present      18 y.o. male  with favorable risk AML s/p Intensification II therapy following [id number] (Arm A) here today for off treatment follow-up.  Complete protocol chemotherapy 4/25/18.  EOT bone marrow biopsy July 2018 showed MRD negative CR  Normal cbc today with no concerning symptoms for relapse or late term chemo toxicity   Recommend q 3 months lab monitoring through year 2   Discussed importance of modifiable CV risk factors given his cardiac risk with previous anthracycline exposure      Follow Up: maintain regular fu with Dr. Fortunato Brody MD  Hematology/Oncology/Bone Marrow Transplant

## 2019-02-14 NOTE — PROGRESS NOTES
2019  RE: Hema Kuo  2000 MRN [id number]    Met with Hema for his annual [facility] follow-up visit.  He presented to the clinic casually dressed and groomed with his girlfriend, Bhavna.  He was fully oriented and intact with pleasant demeanor.  No reported SI, hallucinations, or delusions.      Hema stated that since ending treatment, he has been experiencing survivors guilt.  He explains this as persistent negative emotions, some anhedonia, and tearfulness.  More recently, he has noticed that he is more easily agitated and annoyed, and has episodes of aggression and anger.  This is usually directed at his mother and brother.  No reported violence to others, but he is often destructive, slamming doors and punching walls.  He identifies this as problematic, and would like to stop.    Hema identifies many stressors in his life.  Besides his residual anxiety and ptsd about his illness (AML, followed by ICU hospitalization for ARDS, followed by ICU psychosis), he helps with caretaking for his disabled mother, has financial concerns, has a number of family of origin and childhood adverse events, and is expecting a baby this fall with his girlfriend.  His primary concern is his anger, which seems to be primarily a stress response.  We did some psychoeducation about trauma and chronic stress, as well as some normalization of what he was experiencing.    Hema has seen a counselor in the past for very brief treatment at walk-in clinics.  He is interested in anger management, which I think would be a good fit for him at this time.  Although he could likely benefit from additional modalities to address some of the deeper issues he might be having, anger management could at least equip him with some strategies to use for more immediate relief.  I will provide him with referrals in his area (provided: ARTHUR Kaur, Cynthia Neal).

## 2019-02-14 NOTE — PROGRESS NOTES
[facility]  [facility] Patient Assessment  Assessment Date: 2/6/2019    Trainer: Andreina Handy   Patient Name: Hema Kuo Age: 18  Height:   Weight:    Current activity level:      active job, no real exercise  Current energy level:     normal  Previous Assessment Date: __N/A___ Previous Assessment Score: ___N/A____ Hand/Leg Dominance: __LEFT____   Interest in [facility] program:     YES   NO    Patients response to physical activity:  Normal--just doesnt seem to find time to exercise, but wants to start again.    Postural and walking analysis:   *Have patient walk if able, stand up, and sit down. Note any back curvature, hip rotation, neck protrusion, etc. Suggest home exercises to help correct posture, eliminate any spinal pressure, and strengthen the legs and back.   __________________________________________________________________________________________________________________________________________________________________________  Limitations:   *Surgical implants, injuries, osteoporosis due to chemotherapy/steroids, anemia, low platelet counts et  __________________________________________________________________________________________________________________________________________________________________________  *Many of our patients have received cardiotoxic chemo. Exercises that increase pressure in the chest cavity (i.e.Valsalva) should NOT be recommended such as squats, bench press, dead lift, rowing with a chest pad. Also, any lifts should be light to moderate resistance.     Personal trainers suggestions for activity:   *Suggest the best and safest activities and exercises for patients current physical abilities. Exertion is not the goal. Postural improvement, some weight loss if possible, increasing muscular strength, quality of life, and general normal movement are the main goal for these  patients.   _______________________________________________________________________________________________________________________________________________________________________________________________________________________________________________________________  Additional Notes: _______________________________________________________________________________________________________________________________________________________________________________________________________________________________________________________________          FUNCTIONAL MOVENT SCREEN SCORING SHEET  Test Score* Standard** Comments   Overhead Squat   3     OPTIMAL Obvious ability to perform the functional movement pattern. Upper torso was parallel with tibia and toward vertical. Femur was below horizontal. Knees were aligned over feet. Arms aligned above head, over feet.   [place] Step L 3   OPTIMAL Obvious ability to perform the functional movement pattern. Hips, knees and ankles remained aligned in sagittal plane. Had minimal to no movement noted in lumbar spine.    R 3     In-line Lunge L 3   OPTIMAL Obvious ability to perform the functional movement pattern. Arms above head maintained throughout movement. No torso movement noted. Arms and feet remain in sagittal plane. Knee able to touch ground behind the heel of the front foot.    R 3     Shoulder Mobility L  2      PASS Ability to perform a functional pattern, but with some degree of compensation. For the right-side fists were within one hand length away, and fingers could touch when extended. For the left side fists were within one-and-a-half hand lengths away    R 3     Active Straight-leg Stretch L 2   PASS Ability to perform a functional pattern, but with some degree of compensation. Vertical line of the malleolus resided between mid-thigh and joint line approximately 45 degrees. The non-moving limb was able to remain in neutral position.    R 2     Trunk Stability: Plank  3 OPTIMAL Obvious ability to perform the functional movement pattern. The movement was performed on the ground, his body lifted as a unit with no lag in the spine.   Total: 16    Goals: Lose the 70lbs he gained after being sick  Start exercise program   Recommendations:  Seek out the help of a  (, physical therapist, doctor, etc.) in [place] to get him started on a fitness routine   Start with some type of exercise 2-3x/week for 30-60 min  o Exercise with girlfriend in the mornings when schedule allows    Invest in some resistance bands or dumbbells for strength training at home                *Raw Score: refers to unilateral score (individual right or left side)    **Standard: The patient will receive a score of 0 if pain is associated with any portion of this test, a score of 1 if mechanics are below the standard for each test, a score of 2 is passing but can be improved, and a score of 3 if mechanics are at an optimal level.     Standards can be seen on the attached she

## 2019-04-08 ENCOUNTER — PATIENT MESSAGE (OUTPATIENT)
Dept: PEDIATRIC HEMATOLOGY/ONCOLOGY | Facility: CLINIC | Age: 19
End: 2019-04-08

## 2019-04-10 DIAGNOSIS — C92.01 AML (ACUTE MYELOID LEUKEMIA) IN REMISSION: Primary | ICD-10-CM

## 2019-04-25 ENCOUNTER — LAB VISIT (OUTPATIENT)
Dept: LAB | Facility: HOSPITAL | Age: 19
End: 2019-04-25
Attending: PEDIATRICS
Payer: MEDICAID

## 2019-04-25 ENCOUNTER — OFFICE VISIT (OUTPATIENT)
Dept: PSYCHOLOGY | Facility: CLINIC | Age: 19
End: 2019-04-25
Payer: MEDICAID

## 2019-04-25 ENCOUNTER — OFFICE VISIT (OUTPATIENT)
Dept: PEDIATRIC HEMATOLOGY/ONCOLOGY | Facility: CLINIC | Age: 19
End: 2019-04-25
Payer: MEDICAID

## 2019-04-25 ENCOUNTER — CLINICAL SUPPORT (OUTPATIENT)
Dept: PEDIATRIC CARDIOLOGY | Facility: CLINIC | Age: 19
End: 2019-04-25
Attending: PEDIATRICS
Payer: MEDICAID

## 2019-04-25 VITALS
RESPIRATION RATE: 16 BRPM | DIASTOLIC BLOOD PRESSURE: 50 MMHG | TEMPERATURE: 98 F | HEIGHT: 66 IN | SYSTOLIC BLOOD PRESSURE: 103 MMHG | WEIGHT: 250.69 LBS | BODY MASS INDEX: 40.29 KG/M2 | HEART RATE: 81 BPM

## 2019-04-25 VITALS
SYSTOLIC BLOOD PRESSURE: 128 MMHG | HEIGHT: 66 IN | BODY MASS INDEX: 40.29 KG/M2 | OXYGEN SATURATION: 97 % | WEIGHT: 250.69 LBS | DIASTOLIC BLOOD PRESSURE: 55 MMHG | HEART RATE: 65 BPM

## 2019-04-25 DIAGNOSIS — C92.01 ACUTE MYELOID LEUKEMIA IN REMISSION: ICD-10-CM

## 2019-04-25 DIAGNOSIS — E66.01 CLASS 3 SEVERE OBESITY WITHOUT SERIOUS COMORBIDITY WITH BODY MASS INDEX (BMI) OF 40.0 TO 44.9 IN ADULT, UNSPECIFIED OBESITY TYPE: ICD-10-CM

## 2019-04-25 DIAGNOSIS — C92.01 ACUTE MYELOID LEUKEMIA IN REMISSION: Primary | ICD-10-CM

## 2019-04-25 DIAGNOSIS — F54 PSYCHOLOGICAL FACTOR AFFECTING CANCER: ICD-10-CM

## 2019-04-25 DIAGNOSIS — C92.01 AML (ACUTE MYELOID LEUKEMIA) IN REMISSION: ICD-10-CM

## 2019-04-25 DIAGNOSIS — C80.1 PSYCHOLOGICAL FACTOR AFFECTING CANCER: ICD-10-CM

## 2019-04-25 PROBLEM — D61.810 ANTINEOPLASTIC CHEMOTHERAPY INDUCED PANCYTOPENIA: Status: RESOLVED | Noted: 2017-11-28 | Resolved: 2019-04-25

## 2019-04-25 PROBLEM — T45.1X5A ANTINEOPLASTIC CHEMOTHERAPY INDUCED PANCYTOPENIA: Status: RESOLVED | Noted: 2017-11-28 | Resolved: 2019-04-25

## 2019-04-25 PROBLEM — E66.813 CLASS 3 SEVERE OBESITY WITHOUT SERIOUS COMORBIDITY WITH BODY MASS INDEX (BMI) OF 40.0 TO 44.9 IN ADULT: Status: ACTIVE | Noted: 2019-04-25

## 2019-04-25 LAB
ALBUMIN SERPL BCP-MCNC: 3.8 G/DL (ref 3.5–5.2)
ALP SERPL-CCNC: 99 U/L (ref 55–135)
ALT SERPL W/O P-5'-P-CCNC: 88 U/L (ref 10–44)
ANION GAP SERPL CALC-SCNC: 9 MMOL/L (ref 8–16)
AST SERPL-CCNC: 36 U/L (ref 10–40)
BASOPHILS # BLD AUTO: 0.01 K/UL (ref 0–0.2)
BASOPHILS NFR BLD: 0.2 % (ref 0–1.9)
BILIRUB SERPL-MCNC: 0.4 MG/DL (ref 0.1–1)
BUN SERPL-MCNC: 13 MG/DL (ref 6–20)
CALCIUM SERPL-MCNC: 9.5 MG/DL (ref 8.7–10.5)
CHLORIDE SERPL-SCNC: 104 MMOL/L (ref 95–110)
CO2 SERPL-SCNC: 25 MMOL/L (ref 23–29)
CREAT SERPL-MCNC: 0.8 MG/DL (ref 0.5–1.4)
DIFFERENTIAL METHOD: ABNORMAL
EOSINOPHIL # BLD AUTO: 0.1 K/UL (ref 0–0.5)
EOSINOPHIL NFR BLD: 1.8 % (ref 0–8)
ERYTHROCYTE [DISTWIDTH] IN BLOOD BY AUTOMATED COUNT: 13.1 % (ref 11.5–14.5)
EST. GFR  (AFRICAN AMERICAN): >60 ML/MIN/1.73 M^2
EST. GFR  (NON AFRICAN AMERICAN): >60 ML/MIN/1.73 M^2
GLUCOSE SERPL-MCNC: 94 MG/DL (ref 70–110)
HCT VFR BLD AUTO: 41.2 % (ref 40–54)
HGB BLD-MCNC: 13.8 G/DL (ref 14–18)
LYMPHOCYTES # BLD AUTO: 1.7 K/UL (ref 1–4.8)
LYMPHOCYTES NFR BLD: 38.4 % (ref 18–48)
MCH RBC QN AUTO: 34.9 PG (ref 27–31)
MCHC RBC AUTO-ENTMCNC: 33.5 G/DL (ref 32–36)
MCV RBC AUTO: 104 FL (ref 82–98)
MONOCYTES # BLD AUTO: 0.4 K/UL (ref 0.3–1)
MONOCYTES NFR BLD: 8.9 % (ref 4–15)
NEUTROPHILS # BLD AUTO: 2.3 K/UL (ref 1.8–7.7)
NEUTROPHILS NFR BLD: 50.7 % (ref 38–73)
PLATELET # BLD AUTO: 164 K/UL (ref 150–350)
PMV BLD AUTO: 8.9 FL (ref 9.2–12.9)
POTASSIUM SERPL-SCNC: 4.3 MMOL/L (ref 3.5–5.1)
PROT SERPL-MCNC: 6.5 G/DL (ref 6–8.4)
RBC # BLD AUTO: 3.95 M/UL (ref 4.6–6.2)
RETICS/RBC NFR AUTO: 1.5 % (ref 0.4–2)
SODIUM SERPL-SCNC: 138 MMOL/L (ref 136–145)
WBC # BLD AUTO: 4.5 K/UL (ref 3.9–12.7)

## 2019-04-25 PROCEDURE — 90837 PSYTX W PT 60 MINUTES: CPT | Mod: HA,HP,, | Performed by: PSYCHOLOGIST

## 2019-04-25 PROCEDURE — 93325 DOPPLER ECHO COLOR FLOW MAPG: CPT | Mod: PBBFAC,PO | Performed by: PEDIATRICS

## 2019-04-25 PROCEDURE — 99999 PR PBB SHADOW E&M-EST. PATIENT-LVL I: ICD-10-PCS | Mod: PBBFAC,,,

## 2019-04-25 PROCEDURE — 93325 PR DOPPLER COLOR FLOW VELOCITY MAP: ICD-10-PCS | Mod: 26,S$PBB,, | Performed by: PEDIATRICS

## 2019-04-25 PROCEDURE — 99213 OFFICE O/P EST LOW 20 MIN: CPT | Mod: PBBFAC,25 | Performed by: PEDIATRICS

## 2019-04-25 PROCEDURE — 93321 DOPPLER ECHO F-UP/LMTD STD: CPT | Mod: PBBFAC,PO | Performed by: PEDIATRICS

## 2019-04-25 PROCEDURE — 99213 OFFICE O/P EST LOW 20 MIN: CPT | Mod: S$PBB,,, | Performed by: PEDIATRICS

## 2019-04-25 PROCEDURE — 93325 DOPPLER ECHO COLOR FLOW MAPG: CPT | Mod: 26,S$PBB,, | Performed by: PEDIATRICS

## 2019-04-25 PROCEDURE — 85045 AUTOMATED RETICULOCYTE COUNT: CPT | Mod: PO

## 2019-04-25 PROCEDURE — 99999 PR PBB SHADOW E&M-EST. PATIENT-LVL II: ICD-10-PCS | Mod: PBBFAC,,,

## 2019-04-25 PROCEDURE — 99999 PR PBB SHADOW E&M-EST. PATIENT-LVL III: ICD-10-PCS | Mod: PBBFAC,,, | Performed by: PEDIATRICS

## 2019-04-25 PROCEDURE — 99211 OFF/OP EST MAY X REQ PHY/QHP: CPT | Mod: PBBFAC,25,27

## 2019-04-25 PROCEDURE — 99999 PR PBB SHADOW E&M-EST. PATIENT-LVL I: CPT | Mod: PBBFAC,,,

## 2019-04-25 PROCEDURE — 90837 PR PSYCHOTHERAPY W/PATIENT, 60 MIN: ICD-10-PCS | Mod: HA,HP,, | Performed by: PSYCHOLOGIST

## 2019-04-25 PROCEDURE — 85025 COMPLETE CBC W/AUTO DIFF WBC: CPT | Mod: PO

## 2019-04-25 PROCEDURE — 93304 ECHO TRANSTHORACIC: CPT | Mod: PBBFAC,PO | Performed by: PEDIATRICS

## 2019-04-25 PROCEDURE — 93304 ECHO TRANSTHORACIC: CPT | Mod: 26,S$PBB,, | Performed by: PEDIATRICS

## 2019-04-25 PROCEDURE — 80053 COMPREHEN METABOLIC PANEL: CPT

## 2019-04-25 PROCEDURE — 93321 DOPPLER ECHO F-UP/LMTD STD: CPT | Mod: 26,S$PBB,, | Performed by: PEDIATRICS

## 2019-04-25 PROCEDURE — 93321 PR DOPPLER ECHO HEART,LIMITED,F/U: ICD-10-PCS | Mod: 26,S$PBB,, | Performed by: PEDIATRICS

## 2019-04-25 PROCEDURE — 99999 PR PBB SHADOW E&M-EST. PATIENT-LVL II: CPT | Mod: PBBFAC,,,

## 2019-04-25 PROCEDURE — 99999 PR PBB SHADOW E&M-EST. PATIENT-LVL III: CPT | Mod: PBBFAC,,, | Performed by: PEDIATRICS

## 2019-04-25 PROCEDURE — 99212 OFFICE O/P EST SF 10 MIN: CPT | Mod: PBBFAC,27,PO

## 2019-04-25 PROCEDURE — 93304 PR ECHO XTHORACIC,CONG A2M,LIMITED: ICD-10-PCS | Mod: 26,S$PBB,, | Performed by: PEDIATRICS

## 2019-04-25 PROCEDURE — 99213 PR OFFICE/OUTPT VISIT, EST, LEVL III, 20-29 MIN: ICD-10-PCS | Mod: S$PBB,,, | Performed by: PEDIATRICS

## 2019-04-25 PROCEDURE — 36415 COLL VENOUS BLD VENIPUNCTURE: CPT | Mod: PO

## 2019-04-25 NOTE — PROGRESS NOTES
Pediatric Hematology Note      Subjective:       Patient ID: Hema Kuo is a 18 y.o. male      Chief Complaint:    Chief Complaint   Patient presents with    AML in remission       History of Present Illness:   Hema Kuo is a 18 y.o. male with AML s/p Intensification II therapy following [id number] (Arm A) here today for off treatment follow-up.  He reports feeling very well since last visit.  Mood excellent, no further paranoid or bizarre thoughts, denies A/V hallucinations, HI/SI.  Appetite and energy level normal, has gained substantial weight since end of therapy.  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  Girlfriend is expecting a baby girl in September.  He reports no fevers, no abdominal pain, vomiting, diarrhea or constipation and no excessive bruising or unusual bleeding.  No other complaints.     Past Medical History:   Diagnosis Date    ADD (attention deficit disorder)     AML (acute myeloblastic leukemia) 10/2017    Asthma     Encounter for blood transfusion     Seasonal allergies      Past Surgical History:   Procedure Laterality Date    ASPIRATION-BONE MARROW N/A 10/31/2017    Performed by Sanford Bucio [facility] at [facility]    Biopsy-bone marrow N/A 7/20/2018    [facility] at [facility]    BIOPSY-BONE MARROW N/A 2/20/2018    [facility] at [facility]    BIOPSY-BONE MARROW N/A 12/12/2017    [facility]    BIOPSY-BONE MARROW N/A 10/31/2017    [facility]    YGVABCVLF-UCHT-X-CATH N/A 10/31/2017    Per[facility] by [facility]at[facility]y Block [facility] at [facility]    PORTACATH PLACEMENT  10/31/2017    PUNCTURE-LUMBAR N/A 2/20/2018    Performed [facility]    PUNCTURE-LUMBAR N/A 12/12/2017    [facility]    REMOVAL, CATHETER,  CENTRAL VENOUS, TUNNELED, WITH PORT Left 7/20/2018    [facility]    TONSILLECTOMY         ROS:   Gen: Negative for fever. Negative for night sweats.    HEENT: Negative for nosebleeds.  Negative for sore throat.  Negative for visual problems.  Pulm: Negative for cough.  Negative for shortness of breath.  CV: Negative for chest pain.  Negative for cyanosis.  GI: Negative for abdominal pain, nausea or vomiting.    : Negative for changes in frequency or dysuria.   Skin: Negative for bruising.  Negative for rash.    MS: Negative for joint swelling or pain.  Neuro:  Negative for headaches, seizures, weakness.  Hematology:  Positive for AML.  Positive for recent chemotherapy  Endocrine:  Negative for heat or cold intolerance.  Negative for increased thirst.  Psych: Negative for hyperactivity.  Negative for behavioral issues.      Physical Examination:   Vitals:    04/25/19 1004   BP: (!) 103/50   Pulse: 81   Resp: 16   Temp: 97.5 °F (36.4 °C)     General: well developed, well nourished, no distress.    HENT: Head:normocephalic, atraumatic. Ears:bilateral TM's and external ear canals normal. Nose: Nares normal. Mucosa normal. No discharge. Throat: lips, mucosa, and tongue normal; teeth and gums normal and no throat erythema.  Eyes: conjunctivae pale/corneas clear. PERRL.   Neck: supple, symmetrical, and thyroid not enlarged, symmetric, no tenderness/mass/nodules  Lungs:  clear to auscultation bilaterally and normal respiratory effort  Cardiovascular: regular rate and rhythm, S1, S2 normal, no murmur, click, rub or gallop.   Chest Wall: Port site clear  Extremities: no cyanosis or edema, or clubbing. Pulses: 2+ and symmetric.  Abdomen: soft, non-tender non-distented; bowel sounds normal; no masses,  no organomegaly.   Skin: Pale.  Texture and turgor normal. No rashes or lesions  Musculoskeletal: No obvious joint swelling or erythema  Lymph Nodes: No cervical or supraclavicular  "adenopathy. No axillary or inguinal adenopathy.  Neurologic: Cranial nerves intact.  Normal strength and tone. No focal numbness or weakness  Psych: appropriate mood and affect    Objective:     Pathology:  Initial BM bx:    BONE MARROW, RIGHT ILIAC CREST, ASPIRATE, CLOT, AND CORE BIOPSY:  --Cellular marrow, positive for acute myeloid leukemia, with blasts representing approximately 25% of cellularity, see comment  --Background developing myeloid cells present with some cytologic atypia, see comment  COMMENT: Concomitantly submitted flow cytometric analysis detects an increased population of blasts consistent with acute myeloid leukemia, non-M3 subtype. Additional immunophenotyping was performed on peripheral blood flow cytometric studies (please see separate report). The marrow blasts showing expression of CD34, CD13, and cytoplasmic myeloperoxidase as well as dim CD19. The population however is negative for TdT, and cytoplasmic CD22 and CD79a. These findings are similar to that seen on the peripheral blood study and are consistent with acute myeloid leukemia, non-M3 subtype by JOVANNI classification.  Given the dim expression of CD19, a translocation of chromosomes 8 and 21 is a diagnostic possibility. The cytologic atypia/dysplasia seen in the myeloid series can also be seen with this translocation. Clinical correlation and correlation with any available cytogenetic and molecular studies is required for definitive classification of this process.    AML FISH: The result is abnormal and indicates ADMX0F8/RUNX1 fusion in 90.6% of nuclei. This result is most consistent with acute myeloid leukemia with t(8;21)(q22;q22) (Grimwade et al., Blood 116:354-65, 2010; Solh et al., Am J Hematol 89:4910-8926, 2014; Bernarda and Radha, Am J Clin Pathol 144:6-18, 2015). For monitoring response to therapy in this patient, we suggest using FISH for SHVZ0W2/RUNX1 fusion."    Peripheral blood, FLT3 mutation analysis: Negative. Neither " "expansion of the region susceptible to internal tandem duplication (FLT-ITD) nor changes involving codon D835 and/or I836 in the tyrosine kinase domain (FLT3-TKD) was detected."     KIT genetic alteration analysis, exons 8, 9, 10, 11, and 17: Negative.  No pathogenic genetic alterations were detected in the  KIT  gene, exons 8, 9, 10, 11, and 17.     Cytogenetics:  45,X,-Y,nevin(8)t(8;21)(q22;q22),nevin(20)t(20;21)(p13;q22)t(8;21),nevin(21)t(8;21)t(20;21)[17]/46,XY[3]  Comments: The result is abnormal. Of 20 metaphases, 3 metaphases were normal, and 17 metaphases had a complex translocation involving chromosomes 8, 21, and 20. FISH studies confirmed HICY2Q0/RUNX1 fusion associated with this translocation (reported separately). Fusion of OHLA0L9/RUNX1 is reportedly associated with a favorable prognosis in AML (Grimwade et al., Blood 116:354-365, 2010). For monitoring response to therapy in this patient, FISH for NGJJ7I6/RUNX1 fusion is available, test AMLF (AML, FISH)."    End of Induction I:  BONE MARROW, RIGHT ILIAC CREST, ASPIRATE AND CLOT:  --Cellular marrow, approximately 60%, with trilineage hematopoiesis, see comment  --No definitive morphologic evidence of residual/recurrent acute myeloid leukemia, see comment  --Increased stainable iron  COMMENT: Concomitantly submitted flow cytometric analysis detects no diagnostic abnormal hematopoietic population. B cells are polyclonal with a subset of CD19/CD10 positive cells favored to be hematogones, and T cells are immunophenotypically unremarkable. The blast gate is not increased.    AML FISH:  This result is within normal limits for the UCFH4F0 and RUNX1 gene regions."    End of treatment:  BONE MARROW, RIGHT ILIAC CREST, ASPIRATE, CLOT, AND CORE BIOPSY:  --Hypercellular marrow, approximately 80% overall, with trilineage hematopoiesis, see comment  --No morphologic evidence of residual/recurrent acute myeloid leukemia, see comment  --Mild erythroid " hyperplasia  --Adequate megakaryocytes  --Increased stainable iron  COMMENT: Concomitantly submitted flow cytometric analysis detects no diagnostic abnormal hematopoietic population. B cells are polyclonal with a population of immature B-cells showing a pattern most compatible with hematogones, and T-cells are immunophenotypically unremarkable. The blast gate is not increased.    Cytogenetics and AML FISH:  Normal    Labs:   Results for HEMA BHATIA (MRN [id number]) as of 4/25/2019 10:50   4/25/2019 10:12   WBC 4.50   RBC 3.95 (L)   Hemoglobin 13.8 (L)   Hematocrit 41.2    (H)   MCH 34.9 (H)   MCHC 33.5   RDW 13.1   Platelets 164   MPV 8.9 (L)   Gran% 50.7   Gran # (ANC) 2.3   Lymph% 38.4   Lymph # 1.7   Mono% 8.9   Mono # 0.4   Eosinophil% 1.8   Eos # 0.1   Basophil% 0.2   Baso # 0.01   Differential Method Automated   Retic 1.5         Assessment/Plan:   Hema was seen today for aml in remission.    Diagnoses and all orders for this visit:    Acute myeloid leukemia in remission    Class 3 severe obesity without serious comorbidity with body mass index (BMI) of 40.0 to 44.9 in adult, unspecified obesity type        Discussion:   18 y.o. young man with AML (t 8;21) here for follow-up.  He reports feeling well since discharge home and was well appearing today in clinic.    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  CNS negative.  MRD negative after Induction I.    -Treated per COG [id number] (ARM A).  S/p Intensification II.  End of treatment April 2018.    -BM biopsy at start of Intensification shows CR.  FISH for t(8;21) negative.  BM biopsy at end of treatment with ELIECER.  -Counts excellent today.    -ECHO today normal, next due April 2020.    Weight gain  -~45# weight gain since end of treatment.  I discussed the increased risks of obesity and metabolic syndrome in leukemia survivors, discussed dietary and exercise modifications.  Body mass index is 40.14 kg/m².      Return to clinic in 3 months.       Sanford Bucio    Total time 30 minutes with >50% spent in face-to-face counseling regarding the above topics.

## 2019-05-09 NOTE — PROGRESS NOTES
Name: Hema Kuo YOB: 2000   Gender: Male Age: 18 y.o.   School: Not in school  Date of Evaluation: 4/25/2019   Grade: not in school Race/Ethnicity: White//White     60-minute session of individual therapy ([id number]) was completed with patient Hema .    Chief Complaint  Hema is known to this writer through inpatient consultation throughout his treatment for leukemia and previous outpatient sessions.  Hema initially requested outpatient treatment to address psychosocial adjustment to having cancer, and currently is adjusting to post-treatment factors.    Content of Current Session  Met with Hema individually for the entirety of the session.  Hema presented with a stable mood and improved affect.  He reported that he and his girlfriend are expecting a child in September.  He continues to navigate through re-establishing a sense of normality post-treatment, and also reported some more general, developmentally-appropriate relationship issues that he is navigating.  Discussed both cognitive and behavioral strategies that he could utilize to impact change in both of these types of situations.  He was engaged in the appointment and was open to suggestion and assistance with problem-solving.    Based on the diagnostic evaluation and background information provided, the current diagnoses are:     ICD-10-CM ICD-9-CM   1. Acute myeloid leukemia in remission C92.01 205.01   2. Psychological factor affecting cancer C80.1 199.1    F54 316     Treatment approach: cognitive-behavioral  Treatment modality: individual therapy  Plan: discussed that this writer will likely not be available at his 3-month follow-up due to maternity leave, but plan to meet again at his next 3 month follow-up, approximately October 2019

## 2019-07-02 ENCOUNTER — TELEPHONE (OUTPATIENT)
Dept: PEDIATRIC HEMATOLOGY/ONCOLOGY | Facility: CLINIC | Age: 19
End: 2019-07-02

## 2019-07-02 NOTE — TELEPHONE ENCOUNTER
"Pt's mom called, reports pt was seen at the walk in clinic today for chest pain and cough, states "xray showed walls of lungs inflamed with possible partial collapsed lung and possible PE." Mom states pt then taken to hospital for further evaluation. Mom states pt has been sick and having a cough. Informed mom this all could be result of infectious process, that he is over a year out from chemo and likely not related to chemo he received or cancerous process, he is where he needs to be for evaluation, and to keep this office updated on pt's status. Mom repeated back and verbalized complete understanding.   "

## 2019-08-23 ENCOUNTER — TELEPHONE (OUTPATIENT)
Dept: PEDIATRIC HEMATOLOGY/ONCOLOGY | Facility: CLINIC | Age: 19
End: 2019-08-23

## 2019-08-23 NOTE — TELEPHONE ENCOUNTER
Pt overdue follow up. Got in touch with pt, scheduled him to see Dr Bucio 8/28 at 2:30, he verbalized complete understanding. Pt states he has been doing well.

## 2019-08-28 ENCOUNTER — OFFICE VISIT (OUTPATIENT)
Dept: PEDIATRIC HEMATOLOGY/ONCOLOGY | Facility: CLINIC | Age: 19
End: 2019-08-28
Payer: MEDICAID

## 2019-08-28 VITALS
HEIGHT: 66 IN | HEART RATE: 86 BPM | WEIGHT: 278.13 LBS | SYSTOLIC BLOOD PRESSURE: 106 MMHG | BODY MASS INDEX: 44.7 KG/M2 | DIASTOLIC BLOOD PRESSURE: 51 MMHG | RESPIRATION RATE: 18 BRPM | TEMPERATURE: 96 F

## 2019-08-28 DIAGNOSIS — C92.01 ACUTE MYELOID LEUKEMIA IN REMISSION: ICD-10-CM

## 2019-08-28 DIAGNOSIS — E66.01 CLASS 3 SEVERE OBESITY WITHOUT SERIOUS COMORBIDITY WITH BODY MASS INDEX (BMI) OF 40.0 TO 44.9 IN ADULT, UNSPECIFIED OBESITY TYPE: ICD-10-CM

## 2019-08-28 DIAGNOSIS — C92.01 AML (ACUTE MYELOID LEUKEMIA) IN REMISSION: Primary | ICD-10-CM

## 2019-08-28 LAB
ALBUMIN SERPL BCP-MCNC: 4 G/DL (ref 3.5–5.2)
ALP SERPL-CCNC: 87 U/L (ref 55–135)
ALT SERPL W/O P-5'-P-CCNC: 85 U/L (ref 10–44)
ANION GAP SERPL CALC-SCNC: 8 MMOL/L (ref 8–16)
AST SERPL-CCNC: 43 U/L (ref 10–40)
BASOPHILS # BLD AUTO: 0.01 K/UL (ref 0–0.2)
BASOPHILS NFR BLD: 0.2 % (ref 0–1.9)
BILIRUB SERPL-MCNC: 0.4 MG/DL (ref 0.1–1)
BUN SERPL-MCNC: 14 MG/DL (ref 6–20)
CALCIUM SERPL-MCNC: 9.4 MG/DL (ref 8.7–10.5)
CHLORIDE SERPL-SCNC: 103 MMOL/L (ref 95–110)
CO2 SERPL-SCNC: 26 MMOL/L (ref 23–29)
CREAT SERPL-MCNC: 0.8 MG/DL (ref 0.5–1.4)
DIFFERENTIAL METHOD: ABNORMAL
EOSINOPHIL # BLD AUTO: 0.1 K/UL (ref 0–0.5)
EOSINOPHIL NFR BLD: 1.6 % (ref 0–8)
ERYTHROCYTE [DISTWIDTH] IN BLOOD BY AUTOMATED COUNT: 12.6 % (ref 11.5–14.5)
EST. GFR  (AFRICAN AMERICAN): >60 ML/MIN/1.73 M^2
EST. GFR  (NON AFRICAN AMERICAN): >60 ML/MIN/1.73 M^2
GLUCOSE SERPL-MCNC: 86 MG/DL (ref 70–110)
HCT VFR BLD AUTO: 43 % (ref 40–54)
HGB BLD-MCNC: 14.5 G/DL (ref 14–18)
LYMPHOCYTES # BLD AUTO: 1.5 K/UL (ref 1–4.8)
LYMPHOCYTES NFR BLD: 26.1 % (ref 18–48)
MCH RBC QN AUTO: 34 PG (ref 27–31)
MCHC RBC AUTO-ENTMCNC: 33.7 G/DL (ref 32–36)
MCV RBC AUTO: 101 FL (ref 82–98)
MONOCYTES # BLD AUTO: 0.7 K/UL (ref 0.3–1)
MONOCYTES NFR BLD: 13 % (ref 4–15)
NEUTROPHILS # BLD AUTO: 3.3 K/UL (ref 1.8–7.7)
NEUTROPHILS NFR BLD: 59.1 % (ref 38–73)
PLATELET # BLD AUTO: 163 K/UL (ref 150–350)
PMV BLD AUTO: 8.9 FL (ref 9.2–12.9)
POTASSIUM SERPL-SCNC: 4.1 MMOL/L (ref 3.5–5.1)
PROT SERPL-MCNC: 6.8 G/DL (ref 6–8.4)
RBC # BLD AUTO: 4.27 M/UL (ref 4.6–6.2)
RETICS/RBC NFR AUTO: 1.7 % (ref 0.4–2)
SODIUM SERPL-SCNC: 137 MMOL/L (ref 136–145)
WBC # BLD AUTO: 5.55 K/UL (ref 3.9–12.7)

## 2019-08-28 PROCEDURE — 85045 AUTOMATED RETICULOCYTE COUNT: CPT

## 2019-08-28 PROCEDURE — 99213 OFFICE O/P EST LOW 20 MIN: CPT | Mod: PBBFAC | Performed by: PEDIATRICS

## 2019-08-28 PROCEDURE — 99999 PR PBB SHADOW E&M-EST. PATIENT-LVL III: CPT | Mod: PBBFAC,,, | Performed by: PEDIATRICS

## 2019-08-28 PROCEDURE — 80053 COMPREHEN METABOLIC PANEL: CPT

## 2019-08-28 PROCEDURE — 99213 OFFICE O/P EST LOW 20 MIN: CPT | Mod: S$PBB,,, | Performed by: PEDIATRICS

## 2019-08-28 PROCEDURE — 99999 PR PBB SHADOW E&M-EST. PATIENT-LVL III: ICD-10-PCS | Mod: PBBFAC,,, | Performed by: PEDIATRICS

## 2019-08-28 PROCEDURE — 99213 PR OFFICE/OUTPT VISIT, EST, LEVL III, 20-29 MIN: ICD-10-PCS | Mod: S$PBB,,, | Performed by: PEDIATRICS

## 2019-08-28 PROCEDURE — 85025 COMPLETE CBC W/AUTO DIFF WBC: CPT

## 2019-08-30 NOTE — PROGRESS NOTES
Pediatric Hematology Note      Subjective:       Patient ID: Hema Kuo is a 18 y.o. male      Chief Complaint:    Chief Complaint   Patient presents with    Follow-up       History of Present Illness:   Hema Kuo is a 18 y.o. male with AML s/p Intensification II therapy following [id number] (Arm A) here today for off treatment follow-up.  He reports feeling very well since last visit.  Mood excellent, no further paranoid or bizarre thoughts, denies A/V hallucinations, HI/SI.  Appetite and energy level normal, continues to gain significant weight since last visit.  Reports some dietary changes, not working out much.  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  Girlfriend is expecting a baby girl at end of September.  He reports no fevers, no abdominal pain, vomiting, diarrhea or constipation and no excessive bruising or unusual bleeding.  No other complaints.     Past Medical History:   Diagnosis Date    ADD (attention deficit disorder)     AML (acute myeloblastic leukemia) 10/2017    Asthma     Encounter for blood transfusion     Seasonal allergies      Past Surgical History:   Procedure Laterality Date    ASPIRATION-BONE MARROW N/A 10/31/2017    Performed by Safnord [facility]io [facility] at [facility] OR 2ND FLR    Biopsy-bone marrow N/A 7/20/2018    Performed by Sanford Bucio MD at [facility] OR 2ND FLR    BIOPSY-BONE MARROW N/A 2/20/2018    Performed by Sanford [facility]io MD at [facility] OR 1ST FLR    BIOPSY-BONE MARROW N/A 12/12/2017    Performed by Sanford [facility]io MD at [facility] OR 1ST FLR    BIOPSY-BONE MARROW N/A 10/31/2017    Performed by Sanford Bucio MD at [facility] OR 2ND FLR    CUQZIQPTP-VGMP-J-CATH N/A 10/31/2017    Performed by Anatoliy Block MD at [facility] OR 2ND FLR    PORTACATH PLACEMENT  10/31/2017    PUNCTURE-LUMBAR N/A 2/20/2018    Performed by Sanford [facility]io MD at [facility] OR 1ST FLR    PUNCTURE-LUMBAR N/A 12/12/2017    Performed by Sanford  ALVINO Bucio MD at [facility] OR 1ST FLR    REMOVAL, CATHETER, CENTRAL VENOUS, TUNNELED, WITH PORT Left 7/20/2018    Performed by Anatoliy Block MD at [facility] OR 2ND FLR    TONSILLECTOMY         ROS:   Gen: Negative for fever. Negative for night sweats.    HEENT: Negative for nosebleeds.  Negative for sore throat.  Negative for visual problems.  Pulm: Negative for cough.  Negative for shortness of breath.  CV: Negative for chest pain.  Negative for cyanosis.  GI: Negative for abdominal pain, nausea or vomiting.    : Negative for changes in frequency or dysuria.   Skin: Negative for bruising.  Negative for rash.    MS: Negative for joint swelling or pain.  Neuro:  Negative for headaches, seizures, weakness.  Hematology:  Positive for AML.  Positive for recent chemotherapy  Endocrine:  Negative for heat or cold intolerance.  Negative for increased thirst.  Psych: Negative for hyperactivity.  Negative for behavioral issues.      Physical Examination:   Vitals:    08/28/19 1447   BP: (!) 106/51   Pulse: 86   Resp: 18   Temp: 96.2 °F (35.7 °C)     General: well developed, well nourished, no distress.    HENT: Head:normocephalic, atraumatic. Ears:bilateral TM's and external ear canals normal. Nose: Nares normal. Mucosa normal. No discharge. Throat: lips, mucosa, and tongue normal; teeth and gums normal and no throat erythema.  Eyes: conjunctivae pale/corneas clear. PERRL.   Neck: supple, symmetrical, and thyroid not enlarged, symmetric, no tenderness/mass/nodules  Lungs:  clear to auscultation bilaterally and normal respiratory effort  Cardiovascular: regular rate and rhythm, S1, S2 normal, no murmur, click, rub or gallop.   Chest Wall: Port site clear  Extremities: no cyanosis or edema, or clubbing. Pulses: 2+ and symmetric.  Abdomen: soft, non-tender non-distented; bowel sounds normal; no masses,  no organomegaly.   Skin: Pale.  Texture and turgor normal. No rashes or lesions  Musculoskeletal: No obvious joint swelling or  erythema  Lymph Nodes: No cervical or supraclavicular adenopathy. No axillary or inguinal adenopathy.  Neurologic: Cranial nerves intact.  Normal strength and tone. No focal numbness or weakness  Psych: appropriate mood and affect    Objective:     Pathology:  Initial BM bx:    BONE MARROW, RIGHT ILIAC CREST, ASPIRATE, CLOT, AND CORE BIOPSY:  --Cellular marrow, positive for acute myeloid leukemia, with blasts representing approximately 25% of cellularity, see comment  --Background developing myeloid cells present with some cytologic atypia, see comment  COMMENT: Concomitantly submitted flow cytometric analysis detects an increased population of blasts consistent with acute myeloid leukemia, non-M3 subtype. Additional immunophenotyping was performed on peripheral blood flow cytometric studies (please see separate report). The marrow blasts showing expression of CD34, CD13, and cytoplasmic myeloperoxidase as well as dim CD19. The population however is negative for TdT, and cytoplasmic CD22 and CD79a. These findings are similar to that seen on the peripheral blood study and are consistent with acute myeloid leukemia, non-M3 subtype by JOVANNI classification.  Given the dim expression of CD19, a translocation of chromosomes 8 and 21 is a diagnostic possibility. The cytologic atypia/dysplasia seen in the myeloid series can also be seen with this translocation. Clinical correlation and correlation with any available cytogenetic and molecular studies is required for definitive classification of this process.    AML FISH: The result is abnormal and indicates IBAI2Q0/RUNX1 fusion in 90.6% of nuclei. This result is most consistent with acute myeloid leukemia with t(8;21)(q22;q22) (Grimwade et al., Blood 116:354-65, 2010; Solh et al., Am J Hematol 89:9965-5447, 2014; Bernarda and Radha, Am J Clin Pathol 144:6-18, 2015). For monitoring response to therapy in this patient, we suggest using FISH for GTSX4G1/RUNX1  "fusion."    Peripheral blood, FLT3 mutation analysis: Negative. Neither expansion of the region susceptible to internal tandem duplication (FLT-ITD) nor changes involving codon D835 and/or I836 in the tyrosine kinase domain (FLT3-TKD) was detected."     KIT genetic alteration analysis, exons 8, 9, 10, 11, and 17: Negative.  No pathogenic genetic alterations were detected in the  KIT  gene, exons 8, 9, 10, 11, and 17.     Cytogenetics:  45,X,-Y,nevin(8)t(8;21)(q22;q22),nevin(20)t(20;21)(p13;q22)t(8;21),nevin(21)t(8;21)t(20;21)[17]/46,XY[3]  Comments: The result is abnormal. Of 20 metaphases, 3 metaphases were normal, and 17 metaphases had a complex translocation involving chromosomes 8, 21, and 20. FISH studies confirmed FVAG8D2/RUNX1 fusion associated with this translocation (reported separately). Fusion of XRTO5S6/RUNX1 is reportedly associated with a favorable prognosis in AML (Grimwade et al., Blood 116:354-365, 2010). For monitoring response to therapy in this patient, FISH for RDNQ6Z3/RUNX1 fusion is available, test AMLF (AML, FISH)."    End of Induction I:  BONE MARROW, RIGHT ILIAC CREST, ASPIRATE AND CLOT:  --Cellular marrow, approximately 60%, with trilineage hematopoiesis, see comment  --No definitive morphologic evidence of residual/recurrent acute myeloid leukemia, see comment  --Increased stainable iron  COMMENT: Concomitantly submitted flow cytometric analysis detects no diagnostic abnormal hematopoietic population. B cells are polyclonal with a subset of CD19/CD10 positive cells favored to be hematogones, and T cells are immunophenotypically unremarkable. The blast gate is not increased.    AML FISH:  This result is within normal limits for the HJIR4B8 and RUNX1 gene regions."    End of treatment:  BONE MARROW, RIGHT ILIAC CREST, ASPIRATE, CLOT, AND CORE BIOPSY:  --Hypercellular marrow, approximately 80% overall, with trilineage hematopoiesis, see comment  --No morphologic evidence of residual/recurrent " acute myeloid leukemia, see comment  --Mild erythroid hyperplasia  --Adequate megakaryocytes  --Increased stainable iron  COMMENT: Concomitantly submitted flow cytometric analysis detects no diagnostic abnormal hematopoietic population. B cells are polyclonal with a population of immature B-cells showing a pattern most compatible with hematogones, and T-cells are immunophenotypically unremarkable. The blast gate is not increased.    Cytogenetics and AML FISH:  Normal    Labs:   Results for HEMA BHATIA (MRN [id number]) as of 8/30/2019 15:56   8/28/2019 15:09   WBC 5.55   RBC 4.27 (L)   Hemoglobin 14.5   Hematocrit 43.0    (H)   MCH 34.0 (H)   MCHC 33.7   RDW 12.6   Platelets 163   MPV 8.9 (L)   Gran% 59.1   Gran # (ANC) 3.3   Lymph% 26.1   Lymph # 1.5   Mono% 13.0   Mono # 0.7   Eosinophil% 1.6   Eos # 0.1   Basophil% 0.2   Baso # 0.01   Differential Method Automated   Retic 1.7   Sodium 137   Potassium 4.1   Chloride 103   CO2 26   Anion Gap 8   BUN, Bld 14   Creatinine 0.8   eGFR if non African American >60.0   eGFR if African American >60.0   Glucose 86   Calcium 9.4   Alkaline Phosphatase 87   PROTEIN TOTAL 6.8   Albumin 4.0   BILIRUBIN TOTAL 0.4   AST 43 (H)   ALT 85 (H)           Assessment/Plan:   Hema was seen today for follow-up.    Diagnoses and all orders for this visit:    AML (acute myeloid leukemia) in remission  -     CBC auto differential; Future  -     Reticulocytes; Future  -     Reticulocytes  -     CBC auto differential  -     Comprehensive metabolic panel    Acute myeloid leukemia in remission    Class 3 severe obesity without serious comorbidity with body mass index (BMI) of 40.0 to 44.9 in adult, unspecified obesity type        Discussion:   18 y.o. young man with AML (t 8;21) here for follow-up.  He reports feeling well since discharge home and was well appearing today in clinic.    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  CNS negative.  MRD negative after Induction I.     -Treated per COG [id number] (ARM A).  S/p Intensification II.  End of treatment April 2018.    -BM biopsy at start of Intensification shows CR.  FISH for t(8;21) negative.  BM biopsy at end of treatment with LEIECER.  -Counts excellent today.    -ECHO next due April 2020.    Weight gain  -~70# weight gain since end of treatment.  I discussed the increased risks of obesity and metabolic syndrome in leukemia survivors, discussed dietary and exercise modifications.  Body mass index is 44.54 kg/m².      Return to clinic in 3 months.      Sanford Bucio    Total time 30 minutes with >50% spent in face-to-face counseling regarding the above topics.

## 2019-12-05 ENCOUNTER — TELEPHONE (OUTPATIENT)
Dept: INFUSION THERAPY | Facility: HOSPITAL | Age: 19
End: 2019-12-05

## 2019-12-05 NOTE — TELEPHONE ENCOUNTER
Pt called to inquire about his next appointment with Dr. Bucio. He stated that he has been feeling weak, tired, + hot. Offered appointment to see Dr. Bucio today or this week, but pt is unable to make it. Appointment scheduled next Wed., 12/10/19, @ 1430 as requested per pt. Pt instructed if symptoms become worse, go to ER or his PCP for evaluation. Pt repeated back instructions, + verbalized complete understanding.

## 2019-12-11 ENCOUNTER — LAB VISIT (OUTPATIENT)
Dept: LAB | Facility: HOSPITAL | Age: 19
End: 2019-12-11
Attending: PEDIATRICS
Payer: MEDICAID

## 2019-12-11 ENCOUNTER — OFFICE VISIT (OUTPATIENT)
Dept: PEDIATRIC HEMATOLOGY/ONCOLOGY | Facility: CLINIC | Age: 19
End: 2019-12-11
Payer: MEDICAID

## 2019-12-11 ENCOUNTER — CLINICAL SUPPORT (OUTPATIENT)
Dept: PEDIATRIC CARDIOLOGY | Facility: CLINIC | Age: 19
End: 2019-12-11
Payer: MEDICAID

## 2019-12-11 VITALS
RESPIRATION RATE: 18 BRPM | BODY MASS INDEX: 40.71 KG/M2 | TEMPERATURE: 97 F | HEART RATE: 99 BPM | HEIGHT: 66 IN | DIASTOLIC BLOOD PRESSURE: 59 MMHG | SYSTOLIC BLOOD PRESSURE: 128 MMHG | WEIGHT: 253.31 LBS

## 2019-12-11 DIAGNOSIS — C92.01 AML (ACUTE MYELOID LEUKEMIA) IN REMISSION: ICD-10-CM

## 2019-12-11 DIAGNOSIS — C92.01 AML (ACUTE MYELOID LEUKEMIA) IN REMISSION: Primary | ICD-10-CM

## 2019-12-11 DIAGNOSIS — E66.01 CLASS 3 SEVERE OBESITY WITHOUT SERIOUS COMORBIDITY WITH BODY MASS INDEX (BMI) OF 40.0 TO 44.9 IN ADULT, UNSPECIFIED OBESITY TYPE: ICD-10-CM

## 2019-12-11 LAB
ALBUMIN SERPL BCP-MCNC: 4.1 G/DL (ref 3.5–5.2)
ALP SERPL-CCNC: 88 U/L (ref 55–135)
ALT SERPL W/O P-5'-P-CCNC: 20 U/L (ref 10–44)
ANION GAP SERPL CALC-SCNC: 9 MMOL/L (ref 8–16)
AST SERPL-CCNC: 18 U/L (ref 10–40)
BASOPHILS # BLD AUTO: 0.01 K/UL (ref 0–0.2)
BASOPHILS NFR BLD: 0.2 % (ref 0–1.9)
BILIRUB SERPL-MCNC: 0.3 MG/DL (ref 0.1–1)
BUN SERPL-MCNC: 12 MG/DL (ref 6–20)
CALCIUM SERPL-MCNC: 9.4 MG/DL (ref 8.7–10.5)
CHLORIDE SERPL-SCNC: 106 MMOL/L (ref 95–110)
CO2 SERPL-SCNC: 26 MMOL/L (ref 23–29)
CREAT SERPL-MCNC: 0.8 MG/DL (ref 0.5–1.4)
DIFFERENTIAL METHOD: ABNORMAL
EOSINOPHIL # BLD AUTO: 0.2 K/UL (ref 0–0.5)
EOSINOPHIL NFR BLD: 2.6 % (ref 0–8)
ERYTHROCYTE [DISTWIDTH] IN BLOOD BY AUTOMATED COUNT: 12.4 % (ref 11.5–14.5)
EST. GFR  (AFRICAN AMERICAN): >60 ML/MIN/1.73 M^2
EST. GFR  (NON AFRICAN AMERICAN): >60 ML/MIN/1.73 M^2
GLUCOSE SERPL-MCNC: 81 MG/DL (ref 70–110)
HCT VFR BLD AUTO: 43.4 % (ref 40–54)
HGB BLD-MCNC: 14.9 G/DL (ref 14–18)
LYMPHOCYTES # BLD AUTO: 2.2 K/UL (ref 1–4.8)
LYMPHOCYTES NFR BLD: 36.7 % (ref 18–48)
MCH RBC QN AUTO: 33.6 PG (ref 27–31)
MCHC RBC AUTO-ENTMCNC: 34.3 G/DL (ref 32–36)
MCV RBC AUTO: 98 FL (ref 82–98)
MONOCYTES # BLD AUTO: 0.5 K/UL (ref 0.3–1)
MONOCYTES NFR BLD: 8.6 % (ref 4–15)
NEUTROPHILS # BLD AUTO: 3.2 K/UL (ref 1.8–7.7)
NEUTROPHILS NFR BLD: 51.9 % (ref 38–73)
PLATELET # BLD AUTO: 173 K/UL (ref 150–350)
PMV BLD AUTO: 9.4 FL (ref 9.2–12.9)
POTASSIUM SERPL-SCNC: 4.2 MMOL/L (ref 3.5–5.1)
PROT SERPL-MCNC: 6.7 G/DL (ref 6–8.4)
RBC # BLD AUTO: 4.43 M/UL (ref 4.6–6.2)
RETICS/RBC NFR AUTO: 1 % (ref 0.4–2)
SODIUM SERPL-SCNC: 141 MMOL/L (ref 136–145)
T4 FREE SERPL-MCNC: 1.08 NG/DL (ref 0.71–1.51)
TSH SERPL DL<=0.005 MIU/L-ACNC: 1.26 UIU/ML (ref 0.4–4)
WBC # BLD AUTO: 6.08 K/UL (ref 3.9–12.7)

## 2019-12-11 PROCEDURE — 36415 COLL VENOUS BLD VENIPUNCTURE: CPT

## 2019-12-11 PROCEDURE — 93010 ELECTROCARDIOGRAM REPORT: CPT | Mod: S$PBB,,, | Performed by: PEDIATRICS

## 2019-12-11 PROCEDURE — 93010 EKG 12-LEAD: ICD-10-PCS | Mod: S$PBB,,, | Performed by: PEDIATRICS

## 2019-12-11 PROCEDURE — 93005 ELECTROCARDIOGRAM TRACING: CPT | Mod: PBBFAC | Performed by: PEDIATRICS

## 2019-12-11 PROCEDURE — 80053 COMPREHEN METABOLIC PANEL: CPT

## 2019-12-11 PROCEDURE — 99213 PR OFFICE/OUTPT VISIT, EST, LEVL III, 20-29 MIN: ICD-10-PCS | Mod: S$PBB,,, | Performed by: PEDIATRICS

## 2019-12-11 PROCEDURE — 99213 OFFICE O/P EST LOW 20 MIN: CPT | Mod: S$PBB,,, | Performed by: PEDIATRICS

## 2019-12-11 PROCEDURE — 85025 COMPLETE CBC W/AUTO DIFF WBC: CPT

## 2019-12-11 PROCEDURE — 84439 ASSAY OF FREE THYROXINE: CPT

## 2019-12-11 PROCEDURE — 99999 PR PBB SHADOW E&M-EST. PATIENT-LVL III: ICD-10-PCS | Mod: PBBFAC,,, | Performed by: PEDIATRICS

## 2019-12-11 PROCEDURE — 99213 OFFICE O/P EST LOW 20 MIN: CPT | Mod: PBBFAC,25 | Performed by: PEDIATRICS

## 2019-12-11 PROCEDURE — 85045 AUTOMATED RETICULOCYTE COUNT: CPT

## 2019-12-11 PROCEDURE — 99999 PR PBB SHADOW E&M-EST. PATIENT-LVL III: CPT | Mod: PBBFAC,,, | Performed by: PEDIATRICS

## 2019-12-11 PROCEDURE — 84443 ASSAY THYROID STIM HORMONE: CPT

## 2019-12-13 NOTE — PROGRESS NOTES
Pediatric Hematology Note      Subjective:       Patient ID: Hema Kuo is a 19 y.o. male      Chief Complaint:    Chief Complaint   Patient presents with    Follow-up       History of Present Illness:   Hema Kuo is a 19 y.o. male with AML s/p Intensification II therapy following [id number] (Arm A) here today for off treatment follow-up.  He reports feeling very well since last visit.  Mood excellent, no further paranoid or bizarre thoughts, denies A/V hallucinations, HI/SI.  Appetite and energy level normal, ~25# weight loss since since last visit, attributed to dietary changes and increased physical activity. 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  Juan had baby girl at end of September, doing well.  He reports no fevers, no abdominal pain, vomiting, diarrhea or constipation and no excessive bruising or unusual bleeding.  No other complaints.     Past Medical History:   Diagnosis Date    ADD (attention deficit disorder)     AML (acute myeloblastic leukemia) 10/2017    Asthma     Encounter for blood transfusion     Seasonal allergies      Past Surgical History:   Procedure Laterality Date    BONE MARROW BIOPSY N/A 7/20/2018    Procedure: Biopsy-bone marrow;  Surgeon: Sanford Bucio MD;  Location: [facility] OR [address] [place] [address];  Service: Oncology;  Laterality: N/A;    MEDIPORT REMOVAL Left 7/20/2018    Procedure: REMOVAL, CATHETER, CENTRAL VENOUS, TUNNELED, WITH PORT;  Surgeon: Anatoliy Block MD;  Location: [facility] OR [address] [place] [address];  Service: Pediatrics;  Laterality: Left;    PORTACATH PLACEMENT  10/31/2017    TONSILLECTOMY         ROS:   Gen: Negative for fever. Negative for night sweats.    HEENT: Negative for nosebleeds.  Negative for sore throat.  Negative for visual problems.  Pulm: Negative for cough.  Negative for shortness of breath.  CV: Negative for chest pain.  Negative for cyanosis.  GI: Negative for abdominal pain, nausea or vomiting.    : Negative  for changes in frequency or dysuria.   Skin: Negative for bruising.  Negative for rash.    MS: Negative for joint swelling or pain.  Neuro:  Negative for headaches, seizures, weakness.  Hematology:  Positive for AML.  Positive for recent chemotherapy  Endocrine:  Negative for heat or cold intolerance.  Negative for increased thirst.  Psych: Negative for hyperactivity.  Negative for behavioral issues.      Physical Examination:   Vitals:    12/11/19 1435   BP: (!) 128/59   Pulse: 99   Resp: 18   Temp: 96.7 °F (35.9 °C)     General: well developed, well nourished, no distress.    HENT: Head:normocephalic, atraumatic. Ears:bilateral TM's and external ear canals normal. Nose: Nares normal. Mucosa normal. No discharge. Throat: lips, mucosa, and tongue normal; teeth and gums normal and no throat erythema.  Eyes: conjunctivae pale/corneas clear. PERRL.   Neck: supple, symmetrical, and thyroid not enlarged, symmetric, no tenderness/mass/nodules  Lungs:  clear to auscultation bilaterally and normal respiratory effort  Cardiovascular: regular rate and rhythm, S1, S2 normal, no murmur, click, rub or gallop.   Chest Wall: Port site clear  Extremities: no cyanosis or edema, or clubbing. Pulses: 2+ and symmetric.  Abdomen: soft, non-tender non-distented; bowel sounds normal; no masses,  no organomegaly.   Skin: Pale.  Texture and turgor normal. No rashes or lesions  Musculoskeletal: No obvious joint swelling or erythema  Lymph Nodes: No cervical or supraclavicular adenopathy. No axillary or inguinal adenopathy.  Neurologic: Cranial nerves intact.  Normal strength and tone. No focal numbness or weakness  Psych: appropriate mood and affect    Objective:     Pathology:  Initial BM bx:    BONE MARROW, RIGHT ILIAC CREST, ASPIRATE, CLOT, AND CORE BIOPSY:  --Cellular marrow, positive for acute myeloid leukemia, with blasts representing approximately 25% of cellularity, see comment  --Background developing myeloid cells present with some  "cytologic atypia, see comment  COMMENT: Concomitantly submitted flow cytometric analysis detects an increased population of blasts consistent with acute myeloid leukemia, non-M3 subtype. Additional immunophenotyping was performed on peripheral blood flow cytometric studies (please see separate report). The marrow blasts showing expression of CD34, CD13, and cytoplasmic myeloperoxidase as well as dim CD19. The population however is negative for TdT, and cytoplasmic CD22 and CD79a. These findings are similar to that seen on the peripheral blood study and are consistent with acute myeloid leukemia, non-M3 subtype by JOVANNI classification.  Given the dim expression of CD19, a translocation of chromosomes 8 and 21 is a diagnostic possibility. The cytologic atypia/dysplasia seen in the myeloid series can also be seen with this translocation. Clinical correlation and correlation with any available cytogenetic and molecular studies is required for definitive classification of this process.    AML FISH: The result is abnormal and indicates JATG0G8/RUNX1 fusion in 90.6% of nuclei. This result is most consistent with acute myeloid leukemia with t(8;21)(q22;q22) (Grimwade et al., Blood 116:354-65, 2010; Solh et al., Am J Hematol 89:0576-6265, 2014; Bernarda and Radha, Am J Clin Pathol 144:6-18, 2015). For monitoring response to therapy in this patient, we suggest using FISH for DMHT9Y9/RUNX1 fusion."    Peripheral blood, FLT3 mutation analysis: Negative. Neither expansion of the region susceptible to internal tandem duplication (FLT-ITD) nor changes involving codon D835 and/or I836 in the tyrosine kinase domain (FLT3-TKD) was detected."     KIT genetic alteration analysis, exons 8, 9, 10, 11, and 17: Negative.  No pathogenic genetic alterations were detected in the  KIT  gene, exons 8, 9, 10, 11, and 17.     Cytogenetics:  " "45,X,-Y,nevin(8)t(8;21)(q22;q22),nevin(20)t(20;21)(p13;q22)t(8;21),nevin(21)t(8;21)t(20;21)[17]/46,XY[3]  Comments: The result is abnormal. Of 20 metaphases, 3 metaphases were normal, and 17 metaphases had a complex translocation involving chromosomes 8, 21, and 20. FISH studies confirmed DIKV9O7/RUNX1 fusion associated with this translocation (reported separately). Fusion of QNVT6R6/RUNX1 is reportedly associated with a favorable prognosis in AML (Grimwade et al., Blood 116:354-365, 2010). For monitoring response to therapy in this patient, FISH for SLXR8H0/RUNX1 fusion is available, test AMLF (AML, FISH)."    End of Induction I:  BONE MARROW, RIGHT ILIAC CREST, ASPIRATE AND CLOT:  --Cellular marrow, approximately 60%, with trilineage hematopoiesis, see comment  --No definitive morphologic evidence of residual/recurrent acute myeloid leukemia, see comment  --Increased stainable iron  COMMENT: Concomitantly submitted flow cytometric analysis detects no diagnostic abnormal hematopoietic population. B cells are polyclonal with a subset of CD19/CD10 positive cells favored to be hematogones, and T cells are immunophenotypically unremarkable. The blast gate is not increased.    AML FISH:  This result is within normal limits for the MKBP2G2 and RUNX1 gene regions."    End of treatment:  BONE MARROW, RIGHT ILIAC CREST, ASPIRATE, CLOT, AND CORE BIOPSY:  --Hypercellular marrow, approximately 80% overall, with trilineage hematopoiesis, see comment  --No morphologic evidence of residual/recurrent acute myeloid leukemia, see comment  --Mild erythroid hyperplasia  --Adequate megakaryocytes  --Increased stainable iron  COMMENT: Concomitantly submitted flow cytometric analysis detects no diagnostic abnormal hematopoietic population. B cells are polyclonal with a population of immature B-cells showing a pattern most compatible with hematogones, and T-cells are immunophenotypically unremarkable. The blast gate is not " increased.    Cytogenetics and AML FISH:  Normal    Labs:   Results for HEMA BHATIA (MRN [id number]) as of 12/13/2019 15:27   12/11/2019 15:10   WBC 6.08   RBC 4.43 (L)   Hemoglobin 14.9   Hematocrit 43.4   MCV 98   MCH 33.6 (H)   MCHC 34.3   RDW 12.4   Platelets 173   MPV 9.4   Gran% 51.9   Gran # (ANC) 3.2   Lymph% 36.7   Lymph # 2.2   Mono% 8.6   Mono # 0.5   Eosinophil% 2.6   Eos # 0.2   Basophil% 0.2   Baso # 0.01   Differential Method Automated   Retic 1.0   Sodium 141   Potassium 4.2   Chloride 106   CO2 26   Anion Gap 9   BUN, Bld 12   Creatinine 0.8   eGFR if non African American >60.0   eGFR if African American >60.0   Glucose 81   Calcium 9.4   Alkaline Phosphatase 88   PROTEIN TOTAL 6.7   Albumin 4.1   BILIRUBIN TOTAL 0.3   AST 18   ALT 20   TSH 1.258   Free T4 1.08         Assessment/Plan:   Hema was seen today for follow-up.    Diagnoses and all orders for this visit:    AML (acute myeloid leukemia) in remission  -     CBC auto differential; Future  -     Reticulocytes; Future  -     Comprehensive metabolic panel; Future  -     TSH; Future  -     T4, free; Future  -     SCHEDULED EKG 12-LEAD (to Muse); Future    Class 3 severe obesity without serious comorbidity with body mass index (BMI) of 40.0 to 44.9 in adult, unspecified obesity type        Discussion:   19 y.o. young man with AML (t 8;21) here for follow-up.  He reports feeling well since discharge home and was well appearing today in clinic.    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  CNS negative.  MRD negative after Induction I.    -Treated per COG [id number] (ARM A).  S/p Intensification II.  End of treatment April 2018.    -BM biopsy at start of Intensification shows CR.  FISH for t(8;21) negative.  BM biopsy at end of treatment with ELIECER.  -Counts excellent today.    -ECHO next due April 2020.    Weight gain  -~25# weight loss since last visit.  Encouraged continue dietary modifcations and increased physical activity.    I discussed  the increased risks of obesity and metabolic syndrome in leukemia survivors  Body mass index is 40.56 kg/m².      Return to clinic in 3 months.      Sanford Bucio    Total time 30 minutes with >50% spent in face-to-face counseling regarding the above topics.     No

## 2020-10-19 ENCOUNTER — DOCUMENTATION ONLY (OUTPATIENT)
Dept: PEDIATRIC HEMATOLOGY/ONCOLOGY | Facility: CLINIC | Age: 20
End: 2020-10-19

## 2021-02-19 NOTE — PLAN OF CARE
Problem: Patient Care Overview  Goal: Plan of Care Review  Outcome: Outcome(s) achieved Date Met: 05/14/18  POC reviewed with patient and sitter. VS WDL, afebrile, no distress noted. Pt oriented x4. Pt paranoid and anxious but cooperative. Very slow to proccess what's being communicated to him and very slow to respond. Similar to last nights shift, it took patient a little while for him to take his medications, but after much convincing, pt took them with no problem. Pt making death statements throughout night and still believes he is dying and nurses are out to get him. Double lumen left chest port in place, flushes well, blood return noted from each lumen. All medications/antibitoics given as scheduled. Labs drawn in am. Critical results resulted, MD notified and labs redrawn. Pt did sleep for about 6 hours tonight. No PO intake this shift besides juices, voiding well. Pt resting in bed with mother and sitter at bedside. Will continue to monitor.        Recommended observation.

## 2021-03-05 ENCOUNTER — CLINICAL SUPPORT (OUTPATIENT)
Dept: PSYCHOLOGY | Facility: CLINIC | Age: 21
End: 2021-03-05
Payer: MEDICAID

## 2021-03-05 ENCOUNTER — PATIENT MESSAGE (OUTPATIENT)
Dept: PSYCHOLOGY | Facility: CLINIC | Age: 21
End: 2021-03-05

## 2021-03-05 ENCOUNTER — PATIENT MESSAGE (OUTPATIENT)
Dept: PSYCHIATRY | Facility: CLINIC | Age: 21
End: 2021-03-05

## 2021-03-05 DIAGNOSIS — F32.4 MAJOR DEPRESSIVE DISORDER WITH SINGLE EPISODE, IN PARTIAL REMISSION: Primary | ICD-10-CM

## 2021-03-05 PROCEDURE — 90839 PSYTX CRISIS INITIAL 60 MIN: CPT | Mod: 95,HP,HB, | Performed by: PSYCHOLOGIST

## 2021-03-05 PROCEDURE — 90839 PR PSYCHOTHERAPY FOR CRISIS; 1ST 60 MINUTES: ICD-10-PCS | Mod: 95,HP,HB, | Performed by: PSYCHOLOGIST

## 2021-03-12 ENCOUNTER — TELEPHONE (OUTPATIENT)
Dept: PSYCHOLOGY | Facility: CLINIC | Age: 21
End: 2021-03-12

## 2021-03-15 ENCOUNTER — TELEPHONE (OUTPATIENT)
Dept: INFUSION THERAPY | Facility: HOSPITAL | Age: 21
End: 2021-03-15

## 2021-03-19 ENCOUNTER — TELEPHONE (OUTPATIENT)
Dept: PSYCHIATRY | Facility: CLINIC | Age: 21
End: 2021-03-19

## 2021-03-23 ENCOUNTER — PATIENT MESSAGE (OUTPATIENT)
Dept: PSYCHIATRY | Facility: CLINIC | Age: 21
End: 2021-03-23

## 2021-03-23 ENCOUNTER — OFFICE VISIT (OUTPATIENT)
Dept: PSYCHIATRY | Facility: CLINIC | Age: 21
End: 2021-03-23
Payer: MEDICAID

## 2021-03-23 DIAGNOSIS — F43.22 ADJUSTMENT DISORDER WITH ANXIETY: Primary | ICD-10-CM

## 2021-03-23 DIAGNOSIS — C92.01 ACUTE MYELOID LEUKEMIA IN REMISSION: ICD-10-CM

## 2021-03-23 DIAGNOSIS — Z63.0 PARTNER RELATIONAL PROBLEM: ICD-10-CM

## 2021-03-23 PROCEDURE — 90832 PR PSYCHOTHERAPY W/PATIENT, 30 MIN: ICD-10-PCS | Mod: 95,,, | Performed by: PSYCHOLOGIST

## 2021-03-23 PROCEDURE — 90832 PSYTX W PT 30 MINUTES: CPT | Mod: 95,,, | Performed by: PSYCHOLOGIST

## 2021-03-23 SDOH — SOCIAL DETERMINANTS OF HEALTH (SDOH): PROBLEMS IN RELATIONSHIP WITH SPOUSE OR PARTNER: Z63.0

## 2021-04-22 ENCOUNTER — TELEPHONE (OUTPATIENT)
Dept: INFUSION THERAPY | Facility: HOSPITAL | Age: 21
End: 2021-04-22

## 2021-04-26 ENCOUNTER — TELEPHONE (OUTPATIENT)
Dept: PEDIATRIC HEMATOLOGY/ONCOLOGY | Facility: CLINIC | Age: 21
End: 2021-04-26

## 2021-04-26 DIAGNOSIS — C92.01 AML (ACUTE MYELOID LEUKEMIA) IN REMISSION: Primary | ICD-10-CM

## 2021-04-27 ENCOUNTER — OFFICE VISIT (OUTPATIENT)
Dept: PEDIATRIC HEMATOLOGY/ONCOLOGY | Facility: CLINIC | Age: 21
End: 2021-04-27
Payer: MEDICARE

## 2021-04-27 ENCOUNTER — HOSPITAL ENCOUNTER (OUTPATIENT)
Dept: PEDIATRIC CARDIOLOGY | Facility: HOSPITAL | Age: 21
Discharge: HOME | End: 2021-04-27
Attending: PEDIATRICS
Payer: MEDICAID

## 2021-04-27 ENCOUNTER — CLINICAL SUPPORT (OUTPATIENT)
Dept: PEDIATRIC CARDIOLOGY | Facility: CLINIC | Age: 21
End: 2021-04-27
Payer: MEDICARE

## 2021-04-27 ENCOUNTER — HOSPITAL ENCOUNTER (OUTPATIENT)
Dept: RADIOLOGY | Facility: HOSPITAL | Age: 21
Discharge: HOME | End: 2021-04-27
Attending: PEDIATRICS
Payer: MEDICARE

## 2021-04-27 ENCOUNTER — PATIENT MESSAGE (OUTPATIENT)
Dept: PEDIATRIC HEMATOLOGY/ONCOLOGY | Facility: CLINIC | Age: 21
End: 2021-04-27

## 2021-04-27 VITALS
DIASTOLIC BLOOD PRESSURE: 60 MMHG | RESPIRATION RATE: 18 BRPM | HEIGHT: 66 IN | WEIGHT: 238.63 LBS | BODY MASS INDEX: 38.35 KG/M2 | HEART RATE: 80 BPM | SYSTOLIC BLOOD PRESSURE: 132 MMHG | TEMPERATURE: 98 F | OXYGEN SATURATION: 98 %

## 2021-04-27 DIAGNOSIS — F54 PSYCHOLOGICAL FACTOR AFFECTING CANCER: ICD-10-CM

## 2021-04-27 DIAGNOSIS — C92.01 AML (ACUTE MYELOID LEUKEMIA) IN REMISSION: ICD-10-CM

## 2021-04-27 DIAGNOSIS — F41.9 ANXIETY: ICD-10-CM

## 2021-04-27 DIAGNOSIS — C92.01 AML (ACUTE MYELOID LEUKEMIA) IN REMISSION: Primary | ICD-10-CM

## 2021-04-27 DIAGNOSIS — C80.1 PSYCHOLOGICAL FACTOR AFFECTING CANCER: ICD-10-CM

## 2021-04-27 PROCEDURE — 93304 PR ECHO XTHORACIC,CONG A2M,LIMITED: ICD-10-PCS | Mod: 26,,, | Performed by: PEDIATRICS

## 2021-04-27 PROCEDURE — 99999 PR PBB SHADOW E&M-EST. PATIENT-LVL IV: ICD-10-PCS | Mod: PBBFAC,,, | Performed by: PEDIATRICS

## 2021-04-27 PROCEDURE — 93321 PR DOPPLER ECHO HEART,LIMITED,F/U: ICD-10-PCS | Mod: 26,,, | Performed by: PEDIATRICS

## 2021-04-27 PROCEDURE — 71046 X-RAY EXAM CHEST 2 VIEWS: CPT | Mod: 26,,, | Performed by: RADIOLOGY

## 2021-04-27 PROCEDURE — 99214 OFFICE O/P EST MOD 30 MIN: CPT | Mod: PBBFAC,25 | Performed by: PEDIATRICS

## 2021-04-27 PROCEDURE — 71046 XR CHEST PA AND LATERAL: ICD-10-PCS | Mod: 26,,, | Performed by: RADIOLOGY

## 2021-04-27 PROCEDURE — 93321 DOPPLER ECHO F-UP/LMTD STD: CPT | Mod: 26,,, | Performed by: PEDIATRICS

## 2021-04-27 PROCEDURE — 93325 DOPPLER ECHO COLOR FLOW MAPG: CPT | Mod: 26,,, | Performed by: PEDIATRICS

## 2021-04-27 PROCEDURE — 71046 X-RAY EXAM CHEST 2 VIEWS: CPT | Mod: TC

## 2021-04-27 PROCEDURE — 93010 EKG 12-LEAD: ICD-10-PCS | Mod: S$PBB,,, | Performed by: PEDIATRICS

## 2021-04-27 PROCEDURE — 93304 ECHO TRANSTHORACIC: CPT | Mod: 26,,, | Performed by: PEDIATRICS

## 2021-04-27 PROCEDURE — 93010 ELECTROCARDIOGRAM REPORT: CPT | Mod: S$PBB,,, | Performed by: PEDIATRICS

## 2021-04-27 PROCEDURE — 99214 PR OFFICE/OUTPT VISIT, EST, LEVL IV, 30-39 MIN: ICD-10-PCS | Mod: S$PBB,,, | Performed by: PEDIATRICS

## 2021-04-27 PROCEDURE — 99999 PR PBB SHADOW E&M-EST. PATIENT-LVL IV: CPT | Mod: PBBFAC,,, | Performed by: PEDIATRICS

## 2021-04-27 PROCEDURE — 99214 OFFICE O/P EST MOD 30 MIN: CPT | Mod: S$PBB,,, | Performed by: PEDIATRICS

## 2021-04-27 PROCEDURE — 93005 ELECTROCARDIOGRAM TRACING: CPT | Mod: PBBFAC | Performed by: PEDIATRICS

## 2021-04-27 PROCEDURE — 93325 PR DOPPLER COLOR FLOW VELOCITY MAP: ICD-10-PCS | Mod: 26,,, | Performed by: PEDIATRICS

## 2021-04-27 RX ORDER — ONDANSETRON HYDROCHLORIDE 8 MG/1
8 TABLET, FILM COATED ORAL
COMMUNITY
Start: 2017-12-24

## 2021-04-27 RX ORDER — OXYCODONE HYDROCHLORIDE 5 MG/1
5 TABLET ORAL
COMMUNITY
Start: 2018-03-27

## 2021-04-27 RX ORDER — ACYCLOVIR 200 MG/1
CAPSULE ORAL
COMMUNITY
Start: 2017-12-24

## 2021-04-27 RX ORDER — LISDEXAMFETAMINE DIMESYLATE 60 MG/1
60 CAPSULE ORAL
COMMUNITY
Start: 2017-12-24

## 2021-04-27 RX ORDER — FLUTICASONE PROPIONATE 50 MCG
1 SPRAY, SUSPENSION (ML) NASAL DAILY
COMMUNITY
Start: 2021-04-20

## 2021-05-17 ENCOUNTER — PATIENT MESSAGE (OUTPATIENT)
Dept: PSYCHIATRY | Facility: CLINIC | Age: 21
End: 2021-05-17

## 2021-05-17 ENCOUNTER — TELEPHONE (OUTPATIENT)
Dept: PSYCHIATRY | Facility: CLINIC | Age: 21
End: 2021-05-17

## 2021-05-17 ENCOUNTER — OFFICE VISIT (OUTPATIENT)
Dept: PSYCHIATRY | Facility: CLINIC | Age: 21
End: 2021-05-17
Payer: MEDICAID

## 2021-05-17 DIAGNOSIS — C92.01 ACUTE MYELOID LEUKEMIA IN REMISSION: ICD-10-CM

## 2021-05-17 DIAGNOSIS — F43.23 ADJUSTMENT DISORDER WITH MIXED ANXIETY AND DEPRESSED MOOD: Primary | ICD-10-CM

## 2021-05-17 DIAGNOSIS — Z63.0 PARTNER RELATIONAL PROBLEM: ICD-10-CM

## 2021-05-17 PROBLEM — F43.22 ADJUSTMENT DISORDER WITH ANXIETY: Status: ACTIVE | Noted: 2021-05-17

## 2021-05-17 PROCEDURE — 90834 PR PSYCHOTHERAPY W/PATIENT, 45 MIN: ICD-10-PCS | Mod: 95,HP,HB, | Performed by: PSYCHOLOGIST

## 2021-05-17 PROCEDURE — 90834 PSYTX W PT 45 MINUTES: CPT | Mod: 95,HP,HB, | Performed by: PSYCHOLOGIST

## 2021-05-17 SDOH — SOCIAL DETERMINANTS OF HEALTH (SDOH): PROBLEMS IN RELATIONSHIP WITH SPOUSE OR PARTNER: Z63.0

## 2021-06-30 ENCOUNTER — TELEPHONE (OUTPATIENT)
Dept: INFUSION THERAPY | Facility: HOSPITAL | Age: 21
End: 2021-06-30

## 2021-08-07 NOTE — PROGRESS NOTES
"Nutrition Assessment     Dx: Neutropenia     Weight: 87.9kg  Height: 167.6cm  BMI: 31.28     Percentiles   Weight/Age: 90%  Height/Age: 10%  BMI/Age: 96%     Estimated Needs:  2200kcals (25kcal/kg)  105-123g protein (1.2-1.4g/kg protein)  1mL/kcal     Diet: Clear liquid     Meds: famotidine, protonix, furosemide  Labs: BUN 29, Glu 126, Ca 8.2     24 hr I/Os:   Total intake: 5,495.3mL (62.5mL/kg)  UOP: 1.9mL/kg/hr, +I/O     Nutrition Hx: Pt oriented upon visit; reports he is "doing good" with clear liquid diet. Nurse at bedside reported an episode of vomiting this AM after drinking water. No family present at bedside.     Nutrition Diagnosis: Inadequate energy intake r/t decreased ability to consume adequate energy AEB TF held, goal TF not meet needs - ongoing.      Intervention:   1. Advance diet as medically appropriate and tolerated.      2. When able, advance to Neutropenic diet.      Goal: Pt to meet % EEN and EPN - ongoing.   Monitor: PO intake, wts, labs  2X/week     Nutrition Discharge Planning: Likely d/c with Neutropenic diet     Holly Dean, Dietetic Intern   " This is a 42 y/o female with h/o DM and HTN who presented to with intractable seizure to [place]. Given versed and intubated for airway protection. CT demonstrated diffuse SAH involving the basilar cisterns. Given Keppra, Mannitol, and Nimodipine. Started on Cardene drip for BP goal < 140 and transferred to [facility] NICU for further management.  42y/o F with h/o recent gastric sleeve (08/04/21 [facility], Dr. Crisostomo ), fibromyalgia, thyroid dysfunction, PTSD, depression, anxiety.  Presented with seizures at home - brought to [place], with 1 more episode of seizure en route.  Intubated, CT showed SAH with IV extension, casted IV, hydrocephalus, given mannitol, levetiracetam 1g,  6mg ativan. Started on Cardene drip for BP goal < 140 and transferred to [facility] NICU for further management.     HH5 MF4   NIHSS 26 on arrival  BD 1 = 8/7

## 2021-08-10 ENCOUNTER — TELEPHONE (OUTPATIENT)
Dept: INFUSION THERAPY | Facility: HOSPITAL | Age: 21
End: 2021-08-10

## 2021-09-27 NOTE — PROGRESS NOTES
[facility] Pediatric [facility] Medicine  Progress Note    Patient Name: Hema Kuo  MRN: [id number]  Admission Date: 10/30/2017  Hospital Length of Stay: 18  Code Status: Full Code   Primary Care Physician: Ann Reyna NP  Principal Problem: Acute leukemia    Subjective:     HPI:  Hema is an 15 y/o male with asthma and ADHD who presented to [facility] on 10/29 with severe anemia and thrombocytopenia, now thought to be likely leukemia.   Patient reports significant fatigue over the past week. He had recently been seen and treated for bronchitis, but otherwise had been healthy and asymptomatic. Over the last week, he would go to bed as soon as he got home from work around 6pm and had little energy to do anything aside from work and sleep. 4 days ago, he started to experience migraines, decreased appetite, shortness of breath, fevers, and body aches. His fever was difficult to control with Tylenol alone and he has severe allergy to NSAIDs. His mother noted that he seemed more withdrawn and pale and grew concerned. When symptoms did not improve, she decided to bring him to the ED 10/29.     ED Course: Found to have significant anemia and thrombocytopenia, marked macrocytosis. He received 80mg Methylprednisolone, IVFs, 3 units pRBCs, and 2 units platelets. Heme/Onc was consulted and recommended additional laboratory testing. CT thorax completed to rule out pulmonary embolism given elevated D-dimer and SOB. Results wnl. CT Abdomen and Pelvis completed to evaluate for evidence of malignancy- no significant findings. Blood smear suspicious for ALL- he was subsequently transferred to OSH for further work-up including bone marrow aspiration/biopsy.    Social Hx: lives at home with mother and father in [place], smokes ~1/2 pack/day, works at an [employer]   PMH: ADHD, asthma   Surgical Hx: wisdom teeth removed, tonsillectomy   Allergies: NSAIDs, peanuts     Hospital Course:  No notes  on file    Scheduled Meds:   acyclovir  400 mg Oral BID    chlorhexidine  15 mL Mouth/Throat BID    ciprofloxacin HCl  500 mg Oral Q12H    cyproheptadine  4 mg Oral BID    cytarabine INTRATHECAL chemo injection (PEDS)  70 mg Intrathecal Once    posaconazole  200 mg Oral TID WM    sulfamethoxazole-trimethoprim 800-160mg  1 tablet Oral twice daily on Friday, Saturday, Sunday    vancomycin (VANCOCIN) IVPB  15 mg/kg Intravenous Q8H     Continuous Infusions:   PRN Meds:sodium chloride, acetaminophen, alteplase, diphenhydrAMINE, heparin, porcine (PF), ondansetron, oxyCODONE, polyethylene glycol, sodium chloride 0.9%    Interval History: PT had increased pain to port site during dressing change and had oxycodone x1. Otherwise doing well. Denies diarrhea.   Scheduled Meds:   acyclovir  400 mg Oral BID    chlorhexidine  15 mL Mouth/Throat BID    ciprofloxacin HCl  500 mg Oral Q12H    cyproheptadine  4 mg Oral BID    cytarabine INTRATHECAL chemo injection (PEDS)  70 mg Intrathecal Once    posaconazole  200 mg Oral TID WM    sulfamethoxazole-trimethoprim 800-160mg  1 tablet Oral twice daily on Friday, Saturday, Sunday    vancomycin (VANCOCIN) IVPB  15 mg/kg Intravenous Q8H     Continuous Infusions:   PRN Meds:sodium chloride, acetaminophen, alteplase, diphenhydrAMINE, heparin, porcine (PF), ondansetron, oxyCODONE, polyethylene glycol, sodium chloride 0.9%    Review of Systems   Constitutional: Positive for activity change, appetite change and fatigue. Negative for chills, fever and unexpected weight change.   HENT: Negative for congestion, rhinorrhea and sore throat.    Eyes: Negative for photophobia and visual disturbance.   Respiratory: Negative for chest tightness, shortness of breath and wheezing.    Gastrointestinal: Negative for abdominal pain, constipation, diarrhea and vomiting.   Genitourinary: Negative for decreased urine volume.   Musculoskeletal: Positive for myalgias. Negative for arthralgias and  back pain.   Skin: Positive for pallor.   Neurological: Negative for syncope, light-headedness and headaches.   Hematological: Negative for adenopathy. Bruises/bleeds easily.     Objective:     Vital Signs (Most Recent):  Temp: 97.8 °F (36.6 °C) (11/17/17 0918)  Pulse: 75 (11/17/17 0944)  Resp: 18 (11/17/17 0944)  BP: (!) 103/55 (11/17/17 0944)  SpO2: 97 % (11/17/17 0911) Vital Signs (24h Range):  Temp:  [97.7 °F (36.5 °C)-98.5 °F (36.9 °C)] 97.8 °F (36.6 °C)  Pulse:  [60-86] 75  Resp:  [18-20] 18  SpO2:  [97 %-100 %] 97 %  BP: ()/(46-55) 103/55     Patient Vitals for the past 72 hrs (Last 3 readings):   Weight   11/16/17 2024 76.7 kg (169 lb 1.5 oz)   11/15/17 2134 76.3 kg (168 lb 3.4 oz)   11/14/17 2100 75.2 kg (165 lb 12.6 oz)     Body mass index is 27.29 kg/m².    Intake/Output - Last 3 Shifts       11/15 0700 - 11/16 0659 11/16 0700 - 11/17 0659 11/17 0700 - 11/18 0659    P.O. 1640 2220     I.V. (mL/kg) 100 (1.3)      Blood 500      IV Piggyback 750 750     Total Intake(mL/kg) 2990 (39.2) 2970 (38.7)     Urine (mL/kg/hr) 1500 (0.8) 2220 (1.2)     Emesis/NG output       Stool 0 (0)      Total Output 1500 2220      Net +1490 +750             Urine Occurrence 1 x      Stool Occurrence 1 x            Lines/Drains/Airways     Central Venous Catheter Line                 Port A Cath Double Lumen 10/31/17 1826 left subclavian 16 days                Physical Exam   Constitutional: He is oriented to person, place, and time. He appears well-developed and well-nourished. No distress.   HENT:   Head: Normocephalic and atraumatic.   Nose: Nose normal.   Eyes: Conjunctivae and EOM are normal. Right eye exhibits no discharge. Left eye exhibits no discharge.   Neck: Normal range of motion. Neck supple. No thyromegaly present.   Cardiovascular: Normal rate, regular rhythm, normal heart sounds and intact distal pulses.    No murmur heard.  Pulmonary/Chest: Effort normal and breath sounds normal. No respiratory distress. He  has no wheezes. He exhibits no tenderness.   Abdominal: Soft. Bowel sounds are normal. There is no tenderness. There is no rebound and no guarding.   Musculoskeletal: Normal range of motion. He exhibits no edema or deformity.   Lymphadenopathy:     He has no cervical adenopathy.   Neurological: He is alert and oriented to person, place, and time. He exhibits normal muscle tone.   Skin: Skin is warm. Capillary refill takes less than 2 seconds. He is not diaphoretic.   Petechiae noted on abdomen waist band area, also on upper thighs. Area above port that is visible is still mildly erythematous and tender, dry serosanguinous drainage noted around the area.     Psychiatric: He has a normal mood and affect. His behavior is normal. Judgment and thought content normal.   Nursing note and vitals reviewed.      Significant Labs:  No results for input(s): POCTGLUCOSE in the last 48 hours.  Recent Results (from the past 24 hour(s))   CBC auto differential    Collection Time: 11/17/17  5:25 AM   Result Value Ref Range    WBC 1.12 (LL) 3.90 - 12.70 K/uL    RBC 2.31 (L) 4.60 - 6.20 M/uL    Hemoglobin 7.3 (L) 14.0 - 18.0 g/dL    Hematocrit 20.1 (L) 40.0 - 54.0 %    MCV 87 82 - 98 fL    MCH 31.6 (H) 27.0 - 31.0 pg    MCHC 36.3 (H) 32.0 - 36.0 g/dL    RDW 13.8 11.5 - 14.5 %    Platelets 5 (LL) 150 - 350 K/uL    MPV 12.4 9.2 - 12.9 fL    Immature Granulocytes 2.7 (H) 0.0 - 0.5 %    Gran # 0.1 (L) 1.8 - 7.7 K/uL    Immature Grans (Abs) 0.03 0.00 - 0.04 K/uL    Lymph # 1.0 1.0 - 4.8 K/uL    Mono # 0.0 (L) 0.3 - 1.0 K/uL    Eos # 0.0 0.0 - 0.5 K/uL    Baso # 0.00 0.00 - 0.20 K/uL    nRBC 0 0 /100 WBC    Gran% 7.1 (L) 38.0 - 73.0 %    Lymph% 88.4 (H) 18.0 - 48.0 %    Mono% 1.8 (L) 4.0 - 15.0 %    Eosinophil% 0.0 0.0 - 8.0 %    Basophil% 0.0 0.0 - 1.9 %    Platelet Estimate Decreased (A)     Aniso Slight     Poik Slight     Hypo Occasional     Ovalocytes Occasional     Differential Method Automated    Reticulocytes    Collection Time:  11/17/17  5:25 AM   Result Value Ref Range    Retic 0.3 (L) 0.4 - 2.0 %   Basic metabolic panel    Collection Time: 11/17/17  5:25 AM   Result Value Ref Range    Sodium 138 136 - 145 mmol/L    Potassium 3.7 3.5 - 5.1 mmol/L    Chloride 107 95 - 110 mmol/L    CO2 25 23 - 29 mmol/L    Glucose 101 70 - 110 mg/dL    BUN, Bld 12 6 - 20 mg/dL    Creatinine 0.9 0.5 - 1.4 mg/dL    Calcium 8.0 (L) 8.7 - 10.5 mg/dL    Anion Gap 6 (L) 8 - 16 mmol/L    eGFR if African American >60.0 >60 mL/min/1.73 m^2    eGFR if non African American >60.0 >60 mL/min/1.73 m^2           Significant Imaging: none    Assessment/Plan:     Oncology   * Acute leukemia    15 y/o male  presenting with fever, fatigue, thrombocytopenia, and anemia with abnormal peripheral blood smear showing blasts, 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        Evaluation for Acute Leukemia: Flow cytometry analysis concerning for AML.   - s/p 10 days of  chemotherapy with cytarabine, Daunorubicin, and Etoposide.    -  No chemotherapy today  - CMV positive. Hep negative.  - Echo and EKG 11/2 normal.  - Molecular testing pending    - continue Periactin and treat nausea for poor appetite      Immunosuppressed State:   -Currently on Bactrim, Acyclovir.   -continue posaconazole and cipro    Neutropenia:   ANC- 110 today    Thrombocytopenia: Platelets at 5 this this am, down from 14 yesterday. Platelet transfusion today.   - s/p  2 units 10/29-30. 1 unit 10/31. 1 unit 11/2. 1 unit 11/7. 1 unit 11/09. 1 unit 11/11- Plts rechecked 15 min after infusion and were 26. 1 unit 11/14.  -Threshold for him is <10  -f/u indirect platelet antibodies due to poor response to platelet transfusions    Anemia: Hemoglobin was 7.3  this Am. No blood transfusion today.   -Patient received 3 units pRBCs 10/29-10/30. 10/31 2 units. 11/10 2 units. 11/15 2 units.   - Threshold for him is <7.   - He is CMV +, can give  him just leukoreduced, irradiated blood products.     Daily CBC with retic counts, BMP MWF      Hypotension:  -likely multifactorial  -if similar episode arises will obtain orthostatics  -will encourage fluid intake    Possible cellulitis around  port site:   -continue Vancomycin    - check vanc trough before next dose   - will not give benadryl prior to vancomycin anymore since he has not had any reactions to vancomycin since the first time  -Blood culture with no growth  -will continue to monitor site      Cough  Improved. Given history of asthma and clinical improvement with albuterol neb therapy, may repeat as needed if cough persists or worsens. Patient was afebrile overnight. CXR reports concern for pneumonia vs atelectasis in left perihilar region. Will evaluate him and continue to monitor for possible pneumonia  -Blood culture no growth, rocephin discontinued.       Dispo: pending cell count recovery                   Anticipated Disposition: Home or Self Care    Jeanne Orozco MD   Pediatrics, PGY-1  Pediatric Hospital Medicine   [facility]   no chest pain, no cough, and no shortness of breath.

## 2021-10-28 ENCOUNTER — TELEPHONE (OUTPATIENT)
Dept: INFUSION THERAPY | Facility: HOSPITAL | Age: 21
End: 2021-10-28
Payer: MEDICAID

## 2022-01-06 ENCOUNTER — PATIENT MESSAGE (OUTPATIENT)
Dept: PEDIATRIC HEMATOLOGY/ONCOLOGY | Facility: CLINIC | Age: 22
End: 2022-01-06
Payer: MEDICARE

## 2022-03-03 ENCOUNTER — HISTORICAL (OUTPATIENT)
Dept: ADMINISTRATIVE | Facility: HOSPITAL | Age: 22
End: 2022-03-03

## 2022-04-10 ENCOUNTER — HISTORICAL (OUTPATIENT)
Dept: ADMINISTRATIVE | Facility: HOSPITAL | Age: 22
End: 2022-04-10

## 2022-04-14 ENCOUNTER — TELEPHONE (OUTPATIENT)
Dept: PEDIATRIC HEMATOLOGY/ONCOLOGY | Facility: CLINIC | Age: 22
End: 2022-04-14
Payer: MEDICARE

## 2022-04-14 ENCOUNTER — CLINICAL SUPPORT (OUTPATIENT)
Dept: PEDIATRIC CARDIOLOGY | Facility: CLINIC | Age: 22
End: 2022-04-14
Payer: MEDICARE

## 2022-04-14 ENCOUNTER — TELEPHONE (OUTPATIENT)
Dept: PSYCHOLOGY | Facility: CLINIC | Age: 22
End: 2022-04-14
Payer: MEDICARE

## 2022-04-14 ENCOUNTER — HOSPITAL ENCOUNTER (OUTPATIENT)
Dept: PEDIATRIC CARDIOLOGY | Facility: HOSPITAL | Age: 22
Discharge: HOME | End: 2022-04-14
Attending: PEDIATRICS
Payer: MEDICARE

## 2022-04-14 ENCOUNTER — OFFICE VISIT (OUTPATIENT)
Dept: PEDIATRIC HEMATOLOGY/ONCOLOGY | Facility: CLINIC | Age: 22
End: 2022-04-14
Payer: MEDICARE

## 2022-04-14 VITALS
SYSTOLIC BLOOD PRESSURE: 120 MMHG | HEART RATE: 72 BPM | HEIGHT: 66 IN | RESPIRATION RATE: 18 BRPM | WEIGHT: 230.63 LBS | BODY MASS INDEX: 37.06 KG/M2 | DIASTOLIC BLOOD PRESSURE: 55 MMHG | TEMPERATURE: 99 F

## 2022-04-14 VITALS — SYSTOLIC BLOOD PRESSURE: 130 MMHG | DIASTOLIC BLOOD PRESSURE: 60 MMHG | OXYGEN SATURATION: 100 % | HEART RATE: 96 BPM

## 2022-04-14 DIAGNOSIS — Z78.9 OTHER SPECIFIED HEALTH STATUS: ICD-10-CM

## 2022-04-14 DIAGNOSIS — R00.2 PALPITATIONS: ICD-10-CM

## 2022-04-14 DIAGNOSIS — C92.01 AML (ACUTE MYELOID LEUKEMIA) IN REMISSION: ICD-10-CM

## 2022-04-14 DIAGNOSIS — C92.01 AML (ACUTE MYELOID LEUKEMIA) IN REMISSION: Primary | ICD-10-CM

## 2022-04-14 DIAGNOSIS — F41.9 ANXIETY DISORDER, UNSPECIFIED: ICD-10-CM

## 2022-04-14 LAB — BSA FOR ECHO PROCEDURE: 2.2 M2

## 2022-04-14 PROCEDURE — 93010 ELECTROCARDIOGRAM REPORT: CPT | Mod: S$PBB,,, | Performed by: PEDIATRICS

## 2022-04-14 PROCEDURE — 99999 PR PBB SHADOW E&M-EST. PATIENT-LVL I: CPT | Mod: PBBFAC,,,

## 2022-04-14 PROCEDURE — 99999 PR PBB SHADOW E&M-EST. PATIENT-LVL I: ICD-10-PCS | Mod: PBBFAC,,,

## 2022-04-14 PROCEDURE — 93005 ELECTROCARDIOGRAM TRACING: CPT | Mod: PBBFAC | Performed by: PEDIATRICS

## 2022-04-14 PROCEDURE — 99999 PR PBB SHADOW E&M-EST. PATIENT-LVL III: CPT | Mod: PBBFAC,,, | Performed by: PEDIATRICS

## 2022-04-14 PROCEDURE — 93304 PEDIATRIC ECHO (CUPID ONLY): ICD-10-PCS | Mod: 26,,, | Performed by: PEDIATRICS

## 2022-04-14 PROCEDURE — 93304 ECHO TRANSTHORACIC: CPT | Mod: 26,,, | Performed by: PEDIATRICS

## 2022-04-14 PROCEDURE — 93321 PEDIATRIC ECHO (CUPID ONLY): ICD-10-PCS | Mod: 26,,, | Performed by: PEDIATRICS

## 2022-04-14 PROCEDURE — 99999 PR PBB SHADOW E&M-EST. PATIENT-LVL III: ICD-10-PCS | Mod: PBBFAC,,, | Performed by: PEDIATRICS

## 2022-04-14 PROCEDURE — 93010 EKG 12-LEAD PEDIATRIC: ICD-10-PCS | Mod: S$PBB,,, | Performed by: PEDIATRICS

## 2022-04-14 PROCEDURE — 93325 PEDIATRIC ECHO (CUPID ONLY): ICD-10-PCS | Mod: 26,,, | Performed by: PEDIATRICS

## 2022-04-14 PROCEDURE — 93321 DOPPLER ECHO F-UP/LMTD STD: CPT | Mod: 26,,, | Performed by: PEDIATRICS

## 2022-04-14 PROCEDURE — 93325 DOPPLER ECHO COLOR FLOW MAPG: CPT

## 2022-04-14 PROCEDURE — 99215 PR OFFICE/OUTPT VISIT, EST, LEVL V, 40-54 MIN: ICD-10-PCS | Mod: S$PBB,,, | Performed by: PEDIATRICS

## 2022-04-14 PROCEDURE — 93321 DOPPLER ECHO F-UP/LMTD STD: CPT

## 2022-04-14 PROCEDURE — 99215 OFFICE O/P EST HI 40 MIN: CPT | Mod: S$PBB,,, | Performed by: PEDIATRICS

## 2022-04-14 PROCEDURE — 93325 DOPPLER ECHO COLOR FLOW MAPG: CPT | Mod: 26,,, | Performed by: PEDIATRICS

## 2022-04-14 PROCEDURE — 99211 OFF/OP EST MAY X REQ PHY/QHP: CPT | Mod: PBBFAC,25

## 2022-04-14 PROCEDURE — 99213 OFFICE O/P EST LOW 20 MIN: CPT | Mod: PBBFAC,25,27 | Performed by: PEDIATRICS

## 2022-04-14 NOTE — TELEPHONE ENCOUNTER
"Pt called yesterday requesting appt with Dr Bucio today stating he is having issues with increased HR, was seen and EKG and labs done, reports "lab numbers are low." Pt overdue follow up with Dr Bucio. Scheduled pt today at 10 with Dr Bucio. Pt called just now stating he won't make it for 10 AM appt, requested afternoon appt. Scheduled pt at 2 PM and reinforced pt to make at that time so we have time to send him for anything Dr Bucio may order at this appt. Pt states he can make it to clinic at 2 PM.   "

## 2022-04-18 ENCOUNTER — PATIENT MESSAGE (OUTPATIENT)
Dept: PEDIATRIC HEMATOLOGY/ONCOLOGY | Facility: CLINIC | Age: 22
End: 2022-04-18
Payer: MEDICARE

## 2022-04-28 NOTE — PROGRESS NOTES
Pediatric Hematology Note      Subjective:       Patient ID: Hema Kuo is a 21 y.o. male      Chief Complaint:    Chief Complaint   Patient presents with    Leukemia    Follow-up       History of Present Illness:   Hema Kuo is a 21 y.o. male with AML s/p Intensification II therapy following [id number] (Arm A) here today for off treatment follow-up.  He reports generally feeling very well since last visit, however he has has had several episodes of palpitations/heart racing, during which he reports noting elevated heart rate (140-150) on his Apple Watch.  These events have occurred several times without any clear inciting factors.  He reports feeling otherwise well between episodes.  He is very physically active and reports no restriction of his activity or chest pain with activity.  He does report worsening anxiety symptoms, both social and generalized, and has considered that these episodes could be related to panic attacks, but does not think this is the case.  No further paranoid or bizarre thoughts, denies A/V hallucinations, HI/SI.  He reports no fevers, no abdominal pain, vomiting, diarrhea or constipation and no excessive bruising or unusual bleeding.  No other complaints.     Past Medical History:   Diagnosis Date    ADD (attention deficit disorder)     AML (acute myeloblastic leukemia) 10/2017    Asthma     Encounter for blood transfusion     Seasonal allergies      Past Surgical History:   Procedure Laterality Date    BONE MARROW BIOPSY N/A 7/20/2018    Procedure: Biopsy-bone marrow;  Surgeon: Sanford Bucio MD;  Location: [facility] OR [address] [place] [address];  Service: Oncology;  Laterality: N/A;    MEDIPORT REMOVAL Left 7/20/2018    Procedure: REMOVAL, CATHETER, CENTRAL VENOUS, TUNNELED, WITH PORT;  Surgeon: Anatoliy Block MD;  Location: [facility] OR [address] [place] [address];  Service: Pediatrics;  Laterality: Left;    PORTACATH PLACEMENT  10/31/2017     TONSILLECTOMY         ROS:   Gen: Negative for fever. Negative for night sweats.    HEENT: Negative for nosebleeds.  Negative for sore throat.  Negative for visual problems.  Pulm: Negative for cough.  Negative for shortness of breath.  CV: Negative for chest pain.  Negative for cyanosis.  GI: Negative for abdominal pain, nausea or vomiting.    : Negative for changes in frequency or dysuria.   Skin: Negative for bruising.  Negative for rash.    MS: Negative for joint swelling or pain.  Neuro:  Negative for headaches, seizures, weakness.  Hematology:  Positive for AML.  Positive for recent chemotherapy  Endocrine:  Negative for heat or cold intolerance.  Negative for increased thirst.  Psych: Negative for hyperactivity.  Negative for behavioral issues.      Physical Examination:   Vitals:    04/14/22 1424   BP: (!) 120/55   Pulse: 72   Resp: 18   Temp: 98.7 °F (37.1 °C)     General: well developed, well nourished, no distress.    HENT: Head:normocephalic, atraumatic. Ears:bilateral TM's and external ear canals normal. Nose: Nares normal. Mucosa normal. No discharge. Throat: lips, mucosa, and tongue normal; teeth and gums normal and no throat erythema.  Eyes: conjunctivae pale/corneas clear. PERRL.   Neck: supple, symmetrical, and thyroid not enlarged, symmetric, no tenderness/mass/nodules  Lungs:  clear to auscultation bilaterally and normal respiratory effort  Cardiovascular: regular rate and rhythm, S1, S2 normal, no murmur, click, rub or gallop.   Chest Wall: Port site clear  Extremities: no cyanosis or edema, or clubbing. Pulses: 2+ and symmetric.  Abdomen: soft, non-tender non-distented; bowel sounds normal; no masses,  no organomegaly.   Skin: Pale.  Texture and turgor normal. No rashes or lesions  Musculoskeletal: No obvious joint swelling or erythema  Lymph Nodes: No cervical or supraclavicular adenopathy. No axillary or inguinal adenopathy.  Neurologic: Cranial nerves intact.  Normal strength and tone. No  "focal numbness or weakness  Psych: appropriate mood and affect    Objective:     Pathology:  Initial BM bx:    BONE MARROW, RIGHT ILIAC CREST, ASPIRATE, CLOT, AND CORE BIOPSY:  --Cellular marrow, positive for acute myeloid leukemia, with blasts representing approximately 25% of cellularity, see comment  --Background developing myeloid cells present with some cytologic atypia, see comment  COMMENT: Concomitantly submitted flow cytometric analysis detects an increased population of blasts consistent with acute myeloid leukemia, non-M3 subtype. Additional immunophenotyping was performed on peripheral blood flow cytometric studies (please see separate report). The marrow blasts showing expression of CD34, CD13, and cytoplasmic myeloperoxidase as well as dim CD19. The population however is negative for TdT, and cytoplasmic CD22 and CD79a. These findings are similar to that seen on the peripheral blood study and are consistent with acute myeloid leukemia, non-M3 subtype by JOVANNI classification.  Given the dim expression of CD19, a translocation of chromosomes 8 and 21 is a diagnostic possibility. The cytologic atypia/dysplasia seen in the myeloid series can also be seen with this translocation. Clinical correlation and correlation with any available cytogenetic and molecular studies is required for definitive classification of this process.    AML FISH: The result is abnormal and indicates HFDE8D0/RUNX1 fusion in 90.6% of nuclei. This result is most consistent with acute myeloid leukemia with t(8;21)(q22;q22) (Grimwade et al., Blood 116:354-65, 2010; Solh et al., Am J Hematol 89:5620-6386, 2014; Bernarda and Radha, Am J Clin Pathol 144:6-18, 2015). For monitoring response to therapy in this patient, we suggest using FISH for YIOW3J8/RUNX1 fusion."    Peripheral blood, FLT3 mutation analysis: Negative. Neither expansion of the region susceptible to internal tandem duplication (FLT-ITD) nor changes involving codon D835 " "and/or I836 in the tyrosine kinase domain (FLT3-TKD) was detected."     KIT genetic alteration analysis, exons 8, 9, 10, 11, and 17: Negative.  No pathogenic genetic alterations were detected in the  KIT  gene, exons 8, 9, 10, 11, and 17.     Cytogenetics:  45,X,-Y,nevin(8)t(8;21)(q22;q22),nevin(20)t(20;21)(p13;q22)t(8;21),nevin(21)t(8;21)t(20;21)[17]/46,XY[3]  Comments: The result is abnormal. Of 20 metaphases, 3 metaphases were normal, and 17 metaphases had a complex translocation involving chromosomes 8, 21, and 20. FISH studies confirmed NOQT1Y8/RUNX1 fusion associated with this translocation (reported separately). Fusion of SFHC1D0/RUNX1 is reportedly associated with a favorable prognosis in AML (Grimwade et al., Blood 116:354-365, 2010). For monitoring response to therapy in this patient, FISH for AROM1P3/RUNX1 fusion is available, test AMLF (AML, FISH)."    End of Induction I:  BONE MARROW, RIGHT ILIAC CREST, ASPIRATE AND CLOT:  --Cellular marrow, approximately 60%, with trilineage hematopoiesis, see comment  --No definitive morphologic evidence of residual/recurrent acute myeloid leukemia, see comment  --Increased stainable iron  COMMENT: Concomitantly submitted flow cytometric analysis detects no diagnostic abnormal hematopoietic population. B cells are polyclonal with a subset of CD19/CD10 positive cells favored to be hematogones, and T cells are immunophenotypically unremarkable. The blast gate is not increased.    AML FISH:  This result is within normal limits for the GDOR9Y3 and RUNX1 gene regions."    End of treatment:  BONE MARROW, RIGHT ILIAC CREST, ASPIRATE, CLOT, AND CORE BIOPSY:  --Hypercellular marrow, approximately 80% overall, with trilineage hematopoiesis, see comment  --No morphologic evidence of residual/recurrent acute myeloid leukemia, see comment  --Mild erythroid hyperplasia  --Adequate megakaryocytes  --Increased stainable iron  COMMENT: Concomitantly submitted flow cytometric analysis " detects no diagnostic abnormal hematopoietic population. B cells are polyclonal with a population of immature B-cells showing a pattern most compatible with hematogones, and T-cells are immunophenotypically unremarkable. The blast gate is not increased.    Cytogenetics and AML FISH:  Normal    Labs:   Lab Results   Component Value Date    WBC 7.08 04/14/2022    RBC 4.46 (L) 04/14/2022    HGB 15.2 04/14/2022    HCT 45.5 04/14/2022     (H) 04/14/2022    MCH 34.1 (H) 04/14/2022    MCHC 33.4 04/14/2022    RDW 12.5 04/14/2022     04/14/2022    MPV 10.0 04/14/2022    GRAN 4.2 04/14/2022    GRAN 58.9 04/14/2022    LYMPH 2.0 04/14/2022    LYMPH 28.7 04/14/2022    MONO 0.5 04/14/2022    MONO 7.5 04/14/2022    EOS 0.3 04/14/2022    BASO 0.02 04/14/2022    EOSINOPHIL 4.0 04/14/2022    BASOPHIL 0.3 04/14/2022           Assessment/Plan:   Hema was seen today for leukemia and follow-up.    Diagnoses and all orders for this visit:    AML (acute myeloid leukemia) in remission  -     CBC Auto Differential; Future  -     Comprehensive Metabolic Panel; Future  -     Lactate Dehydrogenase; Future  -     Uric Acid; Future  -     Sedimentation rate; Future  -     CRP, High Sensitivity; Future  -     TSH; Future  -     T4, Free; Future  -     Ekg 12-lead pediatric; Future  -     Echo; Future  -     Pediatric Echo Limited Echo? Yes; Future    Palpitations  -     Pediatric Echo Limited Echo? Yes; Future    Other specified health status   -     CRP, High Sensitivity; Future    Anxiety disorder, unspecified   -     TSH; Future  -     T4, Free; Future        Discussion:   21 y.o. young man with AML (t 8;21) here for follow-up.  He reports feeling well since discharge home and was well appearing today in clinic.    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  CNS negative.  MRD negative after Induction I.    -[place] [facility] [place] [id number] [place]  S/p Intensification II.  End of treatment April 2018.    -BM biopsy at start of  Intensification shows CR.  FISH for t(8;21) negative.  BM biopsy at end of treatment with ELIECER.  -Counts excellent today.    -EKG/ECHO today normal.  Repeat yearly.    Anxiety, depressed mood  -Reports worsened anxiety and mood since breaking up with fiance.  Requests to be seen by Dr. Evans, discussed with her, she will arrange f/u.    Palpitations  -ECHO/EKG normal today.  TFTs normal.  Suspect possible panic attacks.  If persistent or associated with chest pain or shortness of breath, would consider Cardiology referral.    Weight gain  -Cotninued weight loss since last visit.  Encouraged continue dietary modifcations and increased physical activity.    I discussed the increased risks of obesity and metabolic syndrome in leukemia survivors  Body mass index is 37.55 kg/m².    Return to clinic in 6 months.      Sanford Bucio    Total time 40 minutes with >50% spent in face-to-face counseling regarding the above topics.

## 2022-04-30 NOTE — PROGRESS NOTES
Patient:   Hema Kuo            MRN: [id number]            FIN: [id number]               Age:   17 years     Sex:  Male     :  2000   Associated Diagnoses:   None   Author:   Joe Castillo MD      Visit Information   Problem list  AML (t8; 21) which generally has a good prognosis.  CNS negative.  Induction chemotherapy with cytarabine, doxorubicin, and etoposide on AA 1031 (Arm A) (10+3+5) going on at [facility].    HPI/Clinical History:   Past medical history:  ADHD.  Bronchial asthma.  Removal of wisdom teeth.  Tonsillectomy.    Social history:  Lives at home with mother and father in [place].  Smoked a pack of cigarettes daily for 1 year; quit in 2017.  Social alcohol.  Denies illicit drug abuse.  Used to work at an [employer].  Has a 17-year-old brother, being considered as a stem cell donor in case Mr. Villanueva needs stem cell transplant.    Family history:  Great uncle with history of unspecified cancer.  No family history of hematologic disorders or hematologic malignancy.    Allergies:  NSAIDs, peanuts.    History of present illness:  17-year-old gentleman.  Presented to [facility] on 10/29/2017 with severe fatigue, weakness, migraines, decreased appetite, shortness of breath, fevers, and body aches.  Had severe pancytopenia.  Fever was difficult to control with Tylenol alone.  He has severe allergy to NSAID medications.  He was transferred to [facility] emergency department on 10/30/2017.    Diagnosed with AML (t8; 21) which generally has a good prognosis.  CNS negative.    Initial bone marrow examination on 10/31/2017 at [facility] showed % cellularity; acute myeloid leukemia, with blasts 25% of cellularity; non-M3 subtype.    First induction chemotherapy on [facility] 1031 (Arm A) (10+3+5), consisting of daunorubicin, cytarabine, and etoposide.  Started 2017.    Bone marrow aspiration and core biopsy on 2017, prior to second cycle of chemotherapy, showed 60% cellular bone  marrow with trilineage hematopoiesis; no definitive morphologic evidence of residual/recurrent acute myeloid leukemia. Admitted 12/12/2017 for second induction therapy.  Following AAML 1031 (Arm A) protocol.  Status post lumbar puncture with intrathecal chemotherapy induction.  First intrathecal cytarabine induction 10/31/2017.    Admitted to [facility] on 12/24/2017 for 12 days with neutropenic fever following induction chemotherapy #2 from 12/13/2017-12/20/2017 (cytarabine, doxorubicin, etoposide).  Discharged on 01/05/2018.  Discharge medications included prophylaxis with ciprofloxacin, acyclovir, and posaconazole, and Bactrim. In-house, treated with cefepime for the majority of the stay, as well as prophylactic antimicrobials.  Blood and urine cultures were negative.  Required multiple transfusions with platelets and packed RBCs for cytopenias postchemotherapy.    Has been referred to our clinic for transfusion requirements postchemotherapy.    Tells me that he gets chemotherapy every 4 weeks.    Labs reviewed.  He has been consistently severely neutropenic with neutrophil count of 250 on 02/05/2018; 150 on 01/29/2018; 240 on 01/22/2018; 260 on 01/12/2018; 10/mm³ on 12/24/2017; 20/mm³ on 12/01/2017, etc.  He is on broad-spectrum antimicrobial prophylaxis with acyclovir, posaconazole, Bactrim, and quinolone under the direction of his hematologist at [facility].    Hemoglobin 9.2.  Was 6.8 on 01/29/2018, requiring blood transfusion.  Platelets 53,000.  No significant bruising or undue bleeding.  Platelets have ranged between 10,050 3000 over the last month.         Interval History   02/14/2018:  Presents for initial oncology consultation.  Receives supportive blood products in our office on as-needed basis.  Says that he will return to [facility] early next week for another bone marrow examination, probably followed by a third round of chemotherapy.  He says that he has been told that he might need stem cell transplant at  some point.  He has only one sibling, his 17-year-old brother, who has apparently not yet been tested.  Mr. Villanueva denies recurrent fevers, infections, weakness, fatigue, undue bruising or bleeding, etc.    03/27/2018:  Presents for follow-up visit.  Has had 3 cycles of chemotherapy at [facility] so far.  Apparently, second bone marrow examination, prior to third cycle of chemotherapy, also showed remission.  Says that he was admitted to [facility] for 3 weeks with neutropenic infection, and was discharged about a week back.  He says that he had mucositis, followed by a painful, tender swelling behind and below the right ear.  Apparently, he was scanned, and found to have lymphadenopathy at that location.  He feels that the swelling is getting bigger; it is not particularly painful or tender.  He does not have fevers anymore despite the fact that he remains neutropenic.  No abdominal pain, nausea, vomiting, mucositis, pain in the throat, unusual headaches, focal regard to symptoms, chest pain, dyspnea, or hemoptysis.  Has a single spot of small bruise in the left antecubital fossa following venipuncture, and a small bruise along the medial aspect of left shin.  Denies GI bleeding or bleeding in any other form.  No hematuria labs reviewed.  Yesterday, CMP was unremarkable.  WBC 1600/mm³.  Hemoglobin 8.2.  Platelets 12,000/mm³.  Neutrophils 470/mm³.      Histories   Past Medical History:    No active or resolved past medical history items have been selected or recorded.   Family History:    Entire family history is negative.   Procedure history:    Tonsillectomy and adenoidectomy; age 10 or over (31490).   Social History        Social & Psychosocial Habits    Alcohol  10/05/2017  Use: Current    Type: Beer    Frequency: 1-2 times per month    Substance Abuse  10/05/2017  Use: Never    Tobacco  10/05/2017  Use: Current every day smoker    Type: Cigarettes    Comment: 0.5 ppd - 10/05/2017 23:50 - Eric Reeder         Review of Systems   12 point review of systems done in full with pertinent positives as described in interval history. Remainder of review of systems unremarkable.      Health Status   Allergies:    Allergic Reactions (Selected)  Severity Not Documented  NSAIDs- Sob.  Nuts- Sob.,    Allergies (2) Active Reaction  NSAIDs sob  Nuts sob     Current medications:  (Selected)   Outpatient Medications  Ordered  Normal Saline (0.9% NS)  mL: 500 mL, 500 mL, IV, 30 mL/hr, start date 03/26/18 16:19:00 CDT, Use for blood transfusion  Future  acetaminophen: 500 mg, form: Tab, Oral, Once-Unscheduled, *Est. first dose 03/26/18 16:19:00 CDT, Pre-med, Future Order  diphenhydrAMINE: 25 mg, Oral, Once-Unscheduled, *Est. first dose 03/26/18 16:19:00 CDT, Pre-med, Future Order  Documented Medications  Documented  acyclovir 200 mg oral capsule: 0 Refill(s)  lisdexamfetamine 60 mg oral capsule: 60 mg = 1 cap(s), Oral, qAM, 0 Refill(s)  ondansetron 8 mg oral tablet: 8 mg = 1 tab(s), Oral, q8hr, PRN PRN as needed for nausea/vomiting, 0 Refill(s)  polyethylene glycol 3350 ( MiraLax ): 17 gm, Oral, Daily, dissolve in water before taking, 0 Refill(s)  posaconazole 100 mg oral delayed release tablet: 0 Refill(s)  sulfamethoxazole-trimethoprim 800 mg-160 mg oral tablet: 0 Refill(s)   Problem list:    All Problems  Acute leukemia / SNOMED CT 367782248 / Confirmed  ADD - Attention deficit disorder / SNOMED CT 977198428 / Confirmed  Asthma / SNOMED CT 026044506 / Confirmed  Obesity / SNOMED CT 9952318911 / Probable  Tobacco user / SNOMED CT 308018284 / Probable,    Active Problems (5)  Acute leukemia   ADD - Attention deficit disorder   Asthma   Obesity   Tobacco user         Physical Examination   VS/Measurements   General:  Alert and oriented, No acute distress, Very pleasant healthy-appearing young man in no acute discomfort.  Fully alert..    Eye:  Pupils are equal, round and reactive to light, Extraocular movements are intact, Normal  conjunctiva.    HENT:  Normocephalic, Oral mucosa is moist, No pharyngeal erythema, No sinus tenderness.    Neck:  Supple, Non-tender, No jugular venous distention, No lymphadenopathy, No thyromegaly.    Respiratory:  Lungs are clear to auscultation, Respirations are non-labored, Breath sounds are equal, Symmetrical chest wall expansion, No chest wall tenderness.    Cardiovascular:  Normal rate, Regular rhythm, No murmur, No gallop.    Gastrointestinal:  Soft, Non-tender, Non-distended, No organomegaly.    Genitourinary:  No costovertebral angle tenderness, No inguinal tenderness.    Lymphatics:  No lymphadenopathy neck, axilla, groin.    Neurologic:  Alert, Oriented, Normal sensory, Normal motor function, No focal deficits.    Cognition and Speech:  Oriented, Speech clear and coherent, Functional cognition intact.    Psychiatric:  Cooperative, Appropriate mood & affect, Normal judgment, Non-suicidal.    No oral ulceration or thrush.  Mediport in the left infraclavicular area, without overlying erythema or tenderness.  About 5 cm x 5 cm hard minimally tender swelling is noted just posterior and inferior to the right pinna, not a typical location for lymphadenopathy.      Review / Management   Results review:  Lab results   3/26/2018 19:47 CDT      Product Ready             1 plt READY    3/26/2018 8:18 CDT       Sodium Lvl                143 mmol/L                             Potassium Lvl             3.4 mmol/L  LOW                             Chloride                  107 mmol/L                             CO2                       29 mmol/L                             Calcium Lvl               8.4 mg/dL  LOW                             Glucose Lvl               84 mg/dL                             BUN                       9 mg/dL                             Creatinine                0.60 mg/dL                             eGFR-AA                   >105 mL/min                             eGFR-LAURO                   >105 mL/min                             Bili Total                0.3 mg/dL                             Bili Direct               <0.1 mg/dL                             Bili Indirect             calc not valid mg/dL                             AST                       17 unit/L                             ALT                       53 unit/L                             Alk Phos                  106 unit/L                             Total Protein             7.2 gm/dL                             Albumin Lvl               3.5 gm/dL                             Globulin                  3.70 gm/mL  HI                             A/G Ratio                 1 ratio    3/26/2018 8:17 CDT       WBC                       1.6 x10(3)/mcL  LOW                             RBC                       2.81 x10(6)/mcL  LOW                             Hgb                       8.2 gm/dL  LOW                             Hct                       23.2 %  LOW                             Platelet                  12 x10(3)/mcL  CRIT                             MCV                       82.6 fL                             MCH                       29.2 pg                             MCHC                      35.3 gm/dL                             RDW                       13.4 %                             MPV                       8.8 fL                             Abs Neut                  0.47 x10(3)/mcL  LOW                             Segs Man                  36 %  LOW                             Lymph Man                 53.0 %  HI                             Monocyte Man              11 %  HI                             Eos Man                   0 %                             Basophil Man              0 %                             Platelet Est              Decreased                             Anisocyte                 1+                             Poik                      1+                             Microcyte                  1+                             RBC Morph                 Abnormal                             Retic Cnt Auto            0.2 %  LOW                             RET#                      0.01  LOW  .       Impression and Plan     AML (t8; 21) which generally carries a good prognosis.  CNS negative.  Induction chemotherapy with cytarabine, doxorubicin, and etoposide on [facility] 1031 (Arm A) (10+3+5) going on at [facility].  First cycle started 11/01/2017. By 02/14/2018, had 2 cycles of induction chemotherapy, with follow-up bone marrow examination on 12/12/2017, before second cycle of chemotherapy, negative for residual/recurrent leukemia following first cycle of induction chemotherapy.    Plan:  Continue regular follow-up in our office for supportive transfusions.  He is expected to require blood product transfusions quite frequently as a result of bone marrow suppression by induction chemotherapy.  Will transfuse packed RBCs for hemoglobin less than 7 g percent or for symptomatic anemia; and platelet transfusion if platelet count is less than 10,000/mm³, or if he reports severe bruising or bleeding, or prior to any invasive procedure which may require platelet count of 50,000 or greater.    Leuko-reduced, irradiated blood products will be utilized.  He is CMV positive, therefore no need of CMV negative blood products.    He will get 1 unit of irradiated single donor platelets today.    He has been educated in neutropenic precautions, and to report any fevers greater than 100.4, chills, or any focal symptoms of infection including pain, erythema, tenderness around the port catheter to his healthcare providers immediately.  Continues on prophylactic acyclovir, posaconazole, quinolone, and Bactrim.    To continue chemotherapy at [facility] per their protocol.  He has had 3 cycles of chemotherapy so far.  Second bone marrow examination, performed prior to third line of chemotherapy, apparently also showed remission.    Advised  him to keep us informed of these hard swelling noted on physical examination behind and below the right pinna.  If it becomes painful or enlarges, then we will evaluate with imaging studies.  Apparently, he has had CT scan performed at [facility] for this a few weeks back, and the swelling was thought to be lymphadenopathy.    Follow visit in 1 month.  He understands and agrees with this plan.

## 2022-04-30 NOTE — PROGRESS NOTES
Patient:   Hema Kuo            MRN: [id number]            FIN: [id number]               Age:   17 years     Sex:  Male     :  2000   Associated Diagnoses:   None   Author:   Efe Valdovinos MD      Visit Information   Problem list  AML (t8; 21) which generally has a good prognosis.  CNS negative.  Induction chemotherapy with cytarabine, doxorubicin, and etoposide on AA 1031 (Arm A) (10+3+5) going on at [facility].    HPI/Clinical History:   Past medical history:  ADHD.  Bronchial asthma.  Removal of wisdom teeth.  Tonsillectomy.    Social history:  Lives at home with mother and father in [place].  Smoked a pack of cigarettes daily for 1 year; quit in 2017.  Social alcohol.  Denies illicit drug abuse.  Used to work at an [employer].  Has a 17-year-old brother, being considered as a stem cell donor in case Mr. Villanueva needs stem cell transplant.    Family history:  Great uncle with history of unspecified cancer.  No family history of hematologic disorders or hematologic malignancy.    Allergies:  NSAIDs, peanuts.    History of present illness:  17-year-old gentleman.  Presented to [facility] on 10/29/2017 with severe fatigue, weakness, migraines, decreased appetite, shortness of breath, fevers, and body aches.  Had severe pancytopenia.  Fever was difficult to control with Tylenol alone.  He has severe allergy to NSAID medications.  He was transferred to [facility] emergency department on 10/30/2017.    Diagnosed with AML (t8; 21) which generally has a good prognosis.  CNS negative.    Initial bone marrow examination on 10/31/2017 at [facility] showed % cellularity; acute myeloid leukemia, with blasts 25% of cellularity; non-M3 subtype.    First induction chemotherapy on [facility] 1031 (Arm A) (10+3+5), consisting of daunorubicin, cytarabine, and etoposide.  Started 2017.    Bone marrow aspiration and core biopsy on 2017, prior to second cycle of chemotherapy, showed 60% cellular bone  marrow with trilineage hematopoiesis; no definitive morphologic evidence of residual/recurrent acute myeloid leukemia. Admitted 12/12/2017 for second induction therapy.  Following AAML 1031 (Arm A) protocol.  Status post lumbar puncture with intrathecal chemotherapy induction.  First intrathecal cytarabine induction 10/31/2017.    Admitted to [facility] on 12/24/2017 for 12 days with neutropenic fever following induction chemotherapy #2 from 12/13/2017-12/20/2017 (cytarabine, doxorubicin, etoposide).  Discharged on 01/05/2018.  Discharge medications included prophylaxis with ciprofloxacin, acyclovir, and posaconazole, and Bactrim. In-house, treated with cefepime for the majority of the stay, as well as prophylactic antimicrobials.  Blood and urine cultures were negative.  Required multiple transfusions with platelets and packed RBCs for cytopenias postchemotherapy.    Has been referred to our clinic for transfusion requirements postchemotherapy.    Tells me that he gets chemotherapy every 4 weeks.    Labs reviewed.  He has been consistently severely neutropenic with neutrophil count of 250 on 02/05/2018; 150 on 01/29/2018; 240 on 01/22/2018; 260 on 01/12/2018; 10/mm³ on 12/24/2017; 20/mm³ on 12/01/2017, etc.  He is on broad-spectrum antimicrobial prophylaxis with acyclovir, posaconazole, Bactrim, and quinolone under the direction of his hematologist at [facility].    Hemoglobin 9.2.  Was 6.8 on 01/29/2018, requiring blood transfusion.  Platelets 53,000.  No significant bruising or undue bleeding.  Platelets have ranged between 10,050 3000 over the last month.         Interval History   02/14/2018:  Presents for initial oncology consultation.  Receives supportive blood products in our office on as-needed basis.  Says that he will return to [facility] early next week for another bone marrow examination, probably followed by a third round of chemotherapy.  He says that he has been told that he might need stem cell transplant at  some point.  He has only one sibling, his 17-year-old brother, who has apparently not yet been tested.  Mr. Villanueva denies recurrent fevers, infections, weakness, fatigue, undue bruising or bleeding, etc.    03/27/2018:  Presents for follow-up visit.  Labs reviewed.  Yesterday, CMP was unremarkable.  WBC 1600/mm³.  Hemoglobin 8.2.  Platelets 12,000/mm³.  Neutrophils 470/mm³. Will be receiving platelet infusion today. Pt was recently admitted to [facility] for neutropenia after his last chemotherapy treatment 6 weeks ago; pt kept in the hospital for three weeks and discharged about 1 week ago. Pt received 1 pRBC transfusion while at that hospital; Has undergone a second BMB before last round of chemotherapy that was clean and clear for malignant pathology. Pt is to recieve 1 more round of chemotherapy after ANC > 1000, after which he will be done with his AML treatment.     While in the hospital, the patient developed mucositis over the right portion of his lower lip with eventual extension into his right neck in the form of a soft palpable mass. Biopsy not performed 2/2 pt's hematologic status at the time, but CT/US performed with evidence for infectious process. Today the patient states that he continues to have this neck mass which may be stable to slightly worse since discharge from hospital. + night sweats, no fevers, SOB, CAMPO, mucosal bleeding, hematuria, headaches, or syncopal episodes.       Chief Complaint   3/27/2018 11:10 CDT      Acute Leukemia; ECOG 0;  pain to R neck 6/10        Histories   Past Medical History:    No active or resolved past medical history items have been selected or recorded.   Family History:    Entire family history is negative.   Procedure history:    Extraction of wisdom tooth ([id number]) in 2017 at 17 Years.  Comments:  3/27/2018 11:16 - Tammy Peters LPN  top & bottom extraction done  Tonsillectomy and adenoidectomy; age 10 or over (00263).   Social History        Social &  Psychosocial Habits    Alcohol  03/27/2018  Use: Past    Type: Beer    Frequency: 1-2 times per month    Substance Abuse  10/05/2017  Use: Never    Tobacco  03/27/2018  Use: Former smoker    Type: Cigarettes    Comment: 0.5 ppd - 10/05/2017 23:50 - Eric Reeder; Pt states he quit smoking the day he got diagnosed - 03/27/2018 11:18 - Tammy Peters LPN  .        Review of Systems   12 point review of systems done in full with pertinent positives as described in interval history. Remainder of review of systems unremarkable.      Health Status   Allergies:    Allergic Reactions (Selected)  Severity Not Documented  NSAIDs- Sob.  Nuts- Sob.  Strawberries- Rash.,    Allergies (3) Active Reaction  NSAIDs sob  Nuts sob  Strawberries rash     Current medications:  (Selected)   Inpatient Medications  Ordered  Normal Saline (0.9% NS)  mL: 500 mL, 500 mL, IV, 30 mL/hr, start date 03/26/18 16:19:00 CDT, Use for blood transfusion  Future  acetaminophen: 500 mg, form: Tab, Oral, Once-Unscheduled, *Est. first dose 03/26/18 16:19:00 CDT, Pre-med, Future Order  diphenhydrAMINE: 25 mg, Oral, Once-Unscheduled, *Est. first dose 03/26/18 16:19:00 CDT, Pre-med, Future Order  Documented Medications  Documented  CIPROFLOXACIN  MG TA: 500 mg = 1 tab(s), Oral, BID  acyclovir 200 mg oral capsule: 0 Refill(s)  lisdexamfetamine 60 mg oral capsule: 60 mg = 1 cap(s), Oral, qAM, 0 Refill(s)  ondansetron 8 mg oral tablet: 8 mg = 1 tab(s), Oral, q8hr, PRN PRN as needed for nausea/vomiting, 0 Refill(s)  oxycodone 5 mg oral tablet: 5 mg = 1 tab(s), Oral, q6hr, PRN PRN for pain, 0 Refill(s)  polyethylene glycol 3350 ( MiraLax ): 17 gm, Oral, Daily, dissolve in water before taking, 0 Refill(s)  posaconazole 100 mg oral delayed release tablet: 0 Refill(s)  sulfamethoxazole-trimethoprim 800 mg-160 mg oral tablet: 0 Refill(s)   Problem list:    All Problems  Acute leukemia / SNOMED CT 361679159 / Confirmed  ADD - Attention deficit disorder  / SNOMED CT 699003809 / Confirmed  Asthma / SNOMED CT 603965611 / Confirmed  Obesity / SNOMED CT 7173404140 / Probable  Tobacco user / SNOMED CT 016894941 / Probable,    Active Problems (5)  Acute leukemia   ADD - Attention deficit disorder   Asthma   Obesity   Tobacco user         Physical Examination   Vital Signs   3/27/2018 11:10 CDT      Temperature Oral          37.0 DegC                             Temperature Oral (calculated)             98.60 DegF                             Peripheral Pulse Rate     103 bpm  HI                             SpO2                      98 %                             Systolic Blood Pressure   122 mmHg                             Diastolic Blood Pressure  76 mmHg     Measurements from flowsheet : Measurements   3/27/2018 11:10 CDT      Weight Dosing             82.7 kg                             Weight Measured           82.7 kg                             Weight Measured and Calculated in Lbs     182.32 lb                             Height/Length Dosing      167 cm                             Height/Length Measured    167 cm                             BSA Measured              1.96 m2                             Body Mass Index Measured  29.65 kg/m2                             Body Mass Index Percentile                94.53     General:  Alert and oriented, No acute distress, Very pleasant healthy-appearing young man in no acute discomfort.  Fully alert..    Eye:  Pupils are equal, round and reactive to light, Extraocular movements are intact, Normal conjunctiva.    HENT:  Normocephalic, Oral mucosa is moist, No pharyngeal erythema, No sinus tenderness.    Neck:  Supple, 4cm non-fluctuant soft mass is present over posterior neck that is outside of the cervical lymph node chain and without inclusion of pre-auricular or digastric area. TTP. Small healing lip ulceration from mucositis; no areas of infection or bleeding in the oral cavity. .    Respiratory:  Lungs are clear to  auscultation, Respirations are non-labored, Breath sounds are equal, Symmetrical chest wall expansion, No chest wall tenderness.    Cardiovascular:  Normal rate, Regular rhythm, No murmur, No gallop.    Gastrointestinal:  Soft, Non-tender, Non-distended, No organomegaly.    Genitourinary:  No costovertebral angle tenderness, No inguinal tenderness.    Lymphatics:  No lymphadenopathy neck, axilla, groin.    Neurologic:  Alert, Oriented, Normal sensory, Normal motor function, No focal deficits.    Cognition and Speech:  Oriented, Speech clear and coherent, Functional cognition intact.    Psychiatric:  Cooperative, Appropriate mood & affect, Normal judgment, Non-suicidal.    No oral ulceration or thrush.  Mediport in the left infraclavicular area, without overlying erythema or tenderness.      Impression and Plan     AML (t8; 21) which generally carries a good prognosis.  CNS negative.  Induction chemotherapy with cytarabine, doxorubicin, and etoposide on AAML 1031 (Arm A) (10+3+5) going on at [facility].  First cycle started 11/01/2017. By 02/14/2018, had 2 cycles of induction chemotherapy, with follow-up bone marrow examination on 12/12/2017, before second cycle of chemotherapy, negative for residual/recurrent leukemia following first cycle of induction chemotherapy.    Plan:  Continue regular follow-up in our office for supportive transfusions.  He is expected to require blood product transfusions quite frequently as a result of bone marrow suppression by induction chemotherapy.  Will transfuse packed RBCs for hemoglobin less than 7 g percent or for symptomatic anemia; and platelet transfusion if platelet count is less than 10,000/mm³, or if he reports severe bruising or bleeding, or prior to any invasive procedure which may require platelet count of 50,000 or greater.    Leuko-reduced, irradiated blood products will be utilized.  He is CMV positive, therefore no need of CMV negative blood products.    He has been  educated in neutropenic precautions, and to report any fevers greater than 100.4, chills, or any focal symptoms of infection including pain, erythema, tenderness around the port catheter to his healthcare providers immediately.  Continues on prophylactic acyclovir, posaconazole, quinolone, and Bactrim.    To continue chemotherapy at [facility] per their protocol.    Follow visit in 1 month.  He understands and agrees with this plan.

## 2022-08-23 NOTE — PROGRESS NOTES
11/09/17 2045   Vital Signs   Pulse 84   Heart Rate Source Monitor   BP (!) 108/51   MAP (mmHg) 74   BP Location Left arm   BP Method Automatic   Patient Position Lying   Pt reports feeling dizzy and nauseated while taking shower, mother accompanied pt in bathroom. Pt got out of shower, laid down in bed supine with legs elevated. BP at this time noted to be WNL, pt states dizziness resolved with change of position, still reports nausea. Zofran to be given as ordered, will continue to monitor.   Recommended artificial tears to use as directed.

## 2022-08-26 ENCOUNTER — TELEPHONE (OUTPATIENT)
Dept: PEDIATRIC HEMATOLOGY/ONCOLOGY | Facility: CLINIC | Age: 22
End: 2022-08-26
Payer: MEDICARE

## 2023-03-23 NOTE — PROGRESS NOTES
Problem: Fall Injury Risk  Goal: Absence of Fall and Fall-Related Injury  Intervention: Identify and Manage Contributors  Description: Develop a fall prevention plan with the patient and caregiver/family.  Provide reorientation, appropriate sensory stimulation and routines with changes in mental status to decrease risk of fall.  Promote use of personal vision and auditory aids.  Assess assistance level required for safe and effective self-care; provide support as needed, such as toileting, mobilization. For age 65 and older, implement timed toileting with assistance.  Encourage physical activity, such as performance of mobility and self-care at highest level of patient ability, multicomponent exercise program and provision of appropriate assistive devices.  If fall occurs, assess the severity of injury; implement fall injury protocol. Determine the cause and revise fall injury prevention plan.  Regularly review medication contribution to fall risk; adjust medication administration times to minimize risk of falling.  Consider risk related to polypharmacy and age.  Balance adequate pain management with potential for oversedation.  Recent Flowsheet Documentation  Taken 3/23/2023 0000 by Jenn Antonio, RN  Medication Review/Management: medications reviewed  Taken 3/22/2023 2250 by Jenn Antonio, RN  Medication Review/Management: medications reviewed  Taken 3/22/2023 2015 by Jenn Antonio, RN  Medication Review/Management: medications reviewed     Problem: Fall Injury Risk  Goal: Absence of Fall and Fall-Related Injury  Intervention: Promote Injury-Free Environment  Description: Provide a safe, barrier-free environment that encourages independent activity.  Keep care area uncluttered and well-lighted.  Determine need for increased observation or monitoring.  Avoid use of devices that minimize mobility, such as restraints or indwelling urinary catheter.  Recent Flowsheet Documentation  Taken 3/23/2023 0606 by  Pt continues to be hypotensive,tachycardic, tachypnic and febrile and lethargic. 1 unit RBC completed, 2nd unit infusing.   Rapid response nurse and MD at bedside.  Decision made to transfer to PICU.  Pt and belonging transferred to PICU.  Message left on mothers voicemail.    Jenn Antonio, RN  Safety Promotion/Fall Prevention:   activity supervised   assistive device/personal items within reach   clutter free environment maintained   fall prevention program maintained   nonskid shoes/slippers when out of bed   room organization consistent   safety round/check completed  Taken 3/23/2023 0439 by Jenn Antonio, RN  Safety Promotion/Fall Prevention:   activity supervised   assistive device/personal items within reach   clutter free environment maintained   nonskid shoes/slippers when out of bed   room organization consistent   safety round/check completed   fall prevention program maintained  Taken 3/23/2023 0215 by Jenn Antonio, RN  Safety Promotion/Fall Prevention:   assistive device/personal items within reach   clutter free environment maintained   fall prevention program maintained   nonskid shoes/slippers when out of bed   room organization consistent   safety round/check completed   activity supervised  Taken 3/23/2023 0000 by Jenn Antonio, RN  Safety Promotion/Fall Prevention:   activity supervised   assistive device/personal items within reach   clutter free environment maintained   fall prevention program maintained   nonskid shoes/slippers when out of bed   room organization consistent   safety round/check completed  Taken 3/22/2023 2250 by Jenn Antonio, RN  Safety Promotion/Fall Prevention:   activity supervised   assistive device/personal items within reach   clutter free environment maintained   fall prevention program maintained   nonskid shoes/slippers when out of bed   room organization consistent   safety round/check completed  Taken 3/22/2023 2015 by Jenn Antonio, RN  Safety Promotion/Fall Prevention:   activity supervised   assistive device/personal items within reach   clutter free environment maintained   fall prevention program maintained   nonskid shoes/slippers when out of bed   room organization consistent   safety round/check completed  Taken  3/22/2023 1905 by Jenn Antonio, RN  Safety Promotion/Fall Prevention:   activity supervised   assistive device/personal items within reach   clutter free environment maintained   fall prevention program maintained   nonskid shoes/slippers when out of bed   room organization consistent   safety round/check completed     Problem: Heart Failure Comorbidity  Goal: Maintenance of Heart Failure Symptom Control  Intervention: Maintain Heart Failure-Management  Description: Evaluate adherence to home heart failure self-care regimen (e.g., medication, fluid balance, sodium intake, daily weight, physical activity, telemonitoring, support).  Advocate continuation of home medication and schedule.  Consider pharmacologic therapy administration time and effects (e.g., avoid giving diuretic prior to bedtime or nitrates on empty stomach).  Monitor response to pharmacologic therapy, including weight fluctuations, blood pressure and electrolyte levels.  Monitor for signs and symptoms of anxiety and depression, including severity and duration; if present, provide psychosocial support.  Consider need for heart failure clinic or palliative care consult.  Recent Flowsheet Documentation  Taken 3/23/2023 0000 by Jenn Antonio, RN  Medication Review/Management: medications reviewed  Taken 3/22/2023 2250 by Jenn Antonio, RN  Medication Review/Management: medications reviewed  Taken 3/22/2023 2015 by Jenn Antonio, RN  Medication Review/Management: medications reviewed     Problem: Osteoarthritis Comorbidity  Goal: Maintenance of Osteoarthritis Symptom Control  Intervention: Maintain Osteoarthritis Symptom Control  Description: Evaluate adherence to self-management plan, such as medication, exercise and weight management.  Advocate for continuation of home regimen, such as medication, physical activity and thermal agents; monitor response.  Encourage participation in functional activities, such as mobility and ADLs (activities  of daily living) to minimize decline associated with inactivity.  Facilitate use of patient-specific assistive devices, equipment or orthoses.  Evaluate effectiveness of coping skills; encourage expression of feelings, expectations and concerns related to disease management and quality of life; reinforce education to enhance management plan and wellbeing.  Recent Flowsheet Documentation  Taken 3/23/2023 0439 by Jenn Antonio RN  Activity Management: activity adjusted per tolerance  Taken 3/23/2023 0215 by Jenn Antonio RN  Activity Management: activity adjusted per tolerance  Taken 3/23/2023 0000 by Jenn Antonio RN  Activity Management: activity adjusted per tolerance  Medication Review/Management: medications reviewed  Taken 3/22/2023 2250 by Jenn Antonio RN  Activity Management: activity adjusted per tolerance  Medication Review/Management: medications reviewed  Taken 3/22/2023 2015 by Jenn Antonio RN  Activity Management: activity adjusted per tolerance  Medication Review/Management: medications reviewed  Taken 3/22/2023 1905 by Jenn Antonio RN  Activity Management: activity adjusted per tolerance     Problem: Pain Chronic (Persistent) (Comorbidity Management)  Goal: Acceptable Pain Control and Functional Ability  Intervention: Manage Persistent Pain  Description: Evaluate pain level, effect of treatment and patient response at regular intervals.  Minimize pain stimuli; coordinate care and adjust environment (e.g., light, noise, unnecessary movement); promote sleep/rest.  Match pharmacologic analgesia to severity and type of pain mechanism (e.g., neuropathic, muscle, inflammatory); consider multimodal approach (e.g., nonopioid, opioid, adjuvant).  Provide medication at regular intervals; titrate to patient response.  Manage breakthrough pain with additional doses; consider rotation or switching medication.  Monitor for signs of substance tolerance (increased dose to reach desired  effect, decreased effect with same dose).  Avoid abrupt withdrawal of medication, especially agents capable of causing physical dependence.  Manage medication-induced effects, such as constipation, nausea, pruritus, urinary retention, somnolence and dizziness.  Provide multimodal treatment interventions, such as physical activity, therapeutic exercise, yoga, TENS (transcutaneous electrical nerve stimulation) and manual therapy.  Train in functional activity modifications, such as body mechanics, posture, ergonomics, energy conservation and activity pacing.  Consider addition of complementary or alternative therapy, such as acupuncture, hypnosis or therapeutic touch.  Recent Flowsheet Documentation  Taken 3/23/2023 0000 by Jenn Antonio, RN  Medication Review/Management: medications reviewed  Taken 3/22/2023 2250 by Jenn Antonio, RN  Medication Review/Management: medications reviewed  Taken 3/22/2023 2015 by Jenn Antonio, RN  Medication Review/Management: medications reviewed  Intervention: Optimize Psychosocial Wellbeing  Description: Facilitate patient’s self-control over pain by providing pain information and allowing choices in treatment.  Consider and address emotional response to pain.  Explore and promote use of coping strategies; address barriers to successful coping.  Evaluate and assist with psychosocial, cultural and spiritual factors impacting pain.  Modify pain perception by using techniques, such as distraction, mindfulness, guided imagery, meditation or music.  Assess and monitor for signs and symptoms of behavioral health concerns, such as unhealthy substance use, depression and suicidal ideation.  Consider referral for ongoing coping support, such as cognitive behavioral therapy and mindfulness-based stress reduction.  Recent Flowsheet Documentation  Taken 3/23/2023 0439 by Jenn Antonio, RN  Supportive Measures: active listening utilized  Diversional Activities:   smartphone    television  Taken 3/23/2023 0000 by Jenn Antonio RN  Supportive Measures: active listening utilized  Diversional Activities: smartphone  Family/Support System Care:   self-care encouraged   support provided   involvement promoted  Taken 3/22/2023 2015 by Jenn Antonio RN  Supportive Measures: active listening utilized  Diversional Activities:   smartphone   television  Family/Support System Care:   self-care encouraged   support provided   involvement promoted     Problem: Pain Chronic (Persistent) (Comorbidity Management)  Goal: Acceptable Pain Control and Functional Ability  Intervention: Optimize Psychosocial Wellbeing  Description: Facilitate patient’s self-control over pain by providing pain information and allowing choices in treatment.  Consider and address emotional response to pain.  Explore and promote use of coping strategies; address barriers to successful coping.  Evaluate and assist with psychosocial, cultural and spiritual factors impacting pain.  Modify pain perception by using techniques, such as distraction, mindfulness, guided imagery, meditation or music.  Assess and monitor for signs and symptoms of behavioral health concerns, such as unhealthy substance use, depression and suicidal ideation.  Consider referral for ongoing coping support, such as cognitive behavioral therapy and mindfulness-based stress reduction.  Recent Flowsheet Documentation  Taken 3/23/2023 0439 by Jenn Antonio RN  Supportive Measures: active listening utilized  Diversional Activities:   smartphone   television  Taken 3/23/2023 0000 by Jenn Antonio RN  Supportive Measures: active listening utilized  Diversional Activities: smartphone  Family/Support System Care:   self-care encouraged   support provided   involvement promoted  Taken 3/22/2023 2015 by Jenn Antonio RN  Supportive Measures: active listening utilized  Diversional Activities:   smartphone   television  Family/Support System Care:    self-care encouraged   support provided   involvement promoted     Problem: Skin Injury Risk Increased  Goal: Skin Health and Integrity  Intervention: Optimize Skin Protection  Description: Perform a full pressure injury risk assessment, as indicated by screening, upon admission to care unit.  Reassess skin (injury risk, full inspection) frequently (e.g., scheduled interval, with change in condition) to provide optimal early detection and prevention.  Maintain adequate tissue perfusion (e.g., encourage fluid balance; avoid crossing legs, constrictive clothing or devices) to promote tissue oxygenation.  Maintain head of bed at lowest degree of elevation tolerated, considering medical condition and other restrictions.  Avoid positioning onto an area that remains reddened.  Minimize incontinence and moisture (e.g., toileting schedule; moisture-wicking pad, diaper or incontinence collection device; skin moisture barrier).  Cleanse skin promptly and gently when soiled utilizing a pH-balanced cleanser.  Relieve and redistribute pressure (e.g., scheduled position changes, weight shifts, use of support surface, medical device repositioning, protective dressing application, use of positioning device, microclimate control, use of pressure-injury-monitor  Encourage increased activity, such as sitting in a chair at the bedside or early mobilization, when able to tolerate.  Recent Flowsheet Documentation  Taken 3/23/2023 0606 by Jenn Antonio RN  Head of Bed (HOB) Positioning: HOB elevated  Taken 3/23/2023 0439 by Jenn Antonio RN  Head of Bed (HOB) Positioning: HOB at 20-30 degrees  Taken 3/23/2023 0215 by Jenn Antonio RN  Head of Bed (HOB) Positioning: HOB elevated  Taken 3/23/2023 0000 by Jenn Antonio RN  Head of Bed (HOB) Positioning: HOB elevated  Taken 3/22/2023 2015 by Jenn Antonio RN  Pressure Reduction Techniques:   frequent weight shift encouraged   heels elevated off bed   weight shift  assistance provided  Pressure Reduction Devices:   pressure-redistributing mattress utilized   positioning supports utilized   heel offloading device utilized  Skin Protection:   adhesive use limited   incontinence pads utilized   tubing/devices free from skin contact   Goal Outcome Evaluation:

## 2024-10-30 ENCOUNTER — HOSPITAL ENCOUNTER (EMERGENCY)
Facility: HOSPITAL | Age: 24
Discharge: HOME | End: 2024-10-30
Attending: STUDENT IN AN ORGANIZED HEALTH CARE EDUCATION/TRAINING PROGRAM
Payer: MEDICARE

## 2024-10-30 VITALS
OXYGEN SATURATION: 97 % | TEMPERATURE: 98 F | SYSTOLIC BLOOD PRESSURE: 119 MMHG | DIASTOLIC BLOOD PRESSURE: 69 MMHG | RESPIRATION RATE: 20 BRPM | WEIGHT: 250 LBS | BODY MASS INDEX: 40.18 KG/M2 | HEART RATE: 93 BPM | HEIGHT: 66 IN

## 2024-10-30 DIAGNOSIS — L25.9 CONTACT DERMATITIS, UNSPECIFIED CONTACT DERMATITIS TYPE, UNSPECIFIED TRIGGER: Primary | ICD-10-CM

## 2024-10-30 LAB
ALBUMIN SERPL-MCNC: 3.9 G/DL (ref 3.5–5)
ALBUMIN/GLOB SERPL: 1.5 RATIO (ref 1.1–2)
ALP SERPL-CCNC: 78 UNIT/L (ref 40–150)
ALT SERPL-CCNC: 34 UNIT/L (ref 0–55)
ANION GAP SERPL CALC-SCNC: 13 MEQ/L
AST SERPL-CCNC: 20 UNIT/L (ref 5–34)
BASOPHILS # BLD AUTO: 0.05 X10(3)/MCL
BASOPHILS NFR BLD AUTO: 0.7 %
BILIRUB SERPL-MCNC: 0.2 MG/DL
BUN SERPL-MCNC: 16.7 MG/DL (ref 8.9–20.6)
CALCIUM SERPL-MCNC: 9.1 MG/DL (ref 8.4–10.2)
CHLORIDE SERPL-SCNC: 104 MMOL/L (ref 98–107)
CO2 SERPL-SCNC: 27 MMOL/L (ref 22–29)
CREAT SERPL-MCNC: 0.93 MG/DL (ref 0.72–1.25)
CREAT/UREA NIT SERPL: 18
EOSINOPHIL # BLD AUTO: 0.21 X10(3)/MCL (ref 0–0.9)
EOSINOPHIL NFR BLD AUTO: 2.7 %
ERYTHROCYTE [DISTWIDTH] IN BLOOD BY AUTOMATED COUNT: 12.6 % (ref 11.5–17)
GFR SERPLBLD CREATININE-BSD FMLA CKD-EPI: >60 ML/MIN/1.73/M2
GLOBULIN SER-MCNC: 2.6 GM/DL (ref 2.4–3.5)
GLUCOSE SERPL-MCNC: 107 MG/DL (ref 74–100)
HCT VFR BLD AUTO: 40.7 % (ref 42–52)
HGB BLD-MCNC: 13.8 G/DL (ref 14–18)
IMM GRANULOCYTES # BLD AUTO: 0.01 X10(3)/MCL (ref 0–0.04)
IMM GRANULOCYTES NFR BLD AUTO: 0.1 %
LYMPHOCYTES # BLD AUTO: 2.46 X10(3)/MCL (ref 0.6–4.6)
LYMPHOCYTES NFR BLD AUTO: 32.2 %
MCH RBC QN AUTO: 32.9 PG (ref 27–31)
MCHC RBC AUTO-ENTMCNC: 33.9 G/DL (ref 33–36)
MCV RBC AUTO: 96.9 FL (ref 80–94)
MONOCYTES # BLD AUTO: 0.61 X10(3)/MCL (ref 0.1–1.3)
MONOCYTES NFR BLD AUTO: 8 %
NEUTROPHILS # BLD AUTO: 4.3 X10(3)/MCL (ref 2.1–9.2)
NEUTROPHILS NFR BLD AUTO: 56.3 %
NRBC BLD AUTO-RTO: 0 %
PLATELET # BLD AUTO: 189 X10(3)/MCL (ref 130–400)
PMV BLD AUTO: 9.7 FL (ref 7.4–10.4)
POTASSIUM SERPL-SCNC: 4.1 MMOL/L (ref 3.5–5.1)
PROT SERPL-MCNC: 6.5 GM/DL (ref 6.4–8.3)
RBC # BLD AUTO: 4.2 X10(6)/MCL (ref 4.7–6.1)
SODIUM SERPL-SCNC: 144 MMOL/L (ref 136–145)
WBC # BLD AUTO: 7.64 X10(3)/MCL (ref 4.5–11.5)

## 2024-10-30 PROCEDURE — 99284 EMERGENCY DEPT VISIT MOD MDM: CPT | Mod: 25

## 2024-10-30 PROCEDURE — 96372 THER/PROPH/DIAG INJ SC/IM: CPT

## 2024-10-30 PROCEDURE — 85025 COMPLETE CBC W/AUTO DIFF WBC: CPT | Performed by: NURSE PRACTITIONER

## 2024-10-30 PROCEDURE — 80053 COMPREHEN METABOLIC PANEL: CPT | Performed by: NURSE PRACTITIONER

## 2024-10-30 PROCEDURE — 63600175 PHARM REV CODE 636 W HCPCS

## 2024-10-30 RX ORDER — PREDNISONE 20 MG/1
40 TABLET ORAL DAILY
Qty: 10 TABLET | Refills: 0 | Status: SHIPPED | OUTPATIENT
Start: 2024-10-30 | End: 2024-11-04

## 2024-10-30 RX ORDER — HYDROXYZINE PAMOATE 25 MG/1
25 CAPSULE ORAL EVERY 6 HOURS PRN
Qty: 20 CAPSULE | Refills: 0 | Status: SHIPPED | OUTPATIENT
Start: 2024-10-30 | End: 2024-11-04

## 2024-10-30 RX ORDER — DEXAMETHASONE SODIUM PHOSPHATE 4 MG/ML
12 INJECTION, SOLUTION INTRA-ARTICULAR; INTRALESIONAL; INTRAMUSCULAR; INTRAVENOUS; SOFT TISSUE
Status: COMPLETED | OUTPATIENT
Start: 2024-10-30 | End: 2024-10-30

## 2024-10-30 RX ORDER — SULFAMETHOXAZOLE AND TRIMETHOPRIM 800; 160 MG/1; MG/1
1 TABLET ORAL 2 TIMES DAILY
Qty: 20 TABLET | Refills: 0 | Status: SHIPPED | OUTPATIENT
Start: 2024-10-30 | End: 2024-11-09

## 2024-10-30 RX ADMIN — DEXAMETHASONE SODIUM PHOSPHATE 12 MG: 4 INJECTION, SOLUTION INTRA-ARTICULAR; INTRALESIONAL; INTRAMUSCULAR; INTRAVENOUS; SOFT TISSUE at 08:10

## 2024-10-30 NOTE — FIRST PROVIDER EVALUATION
"Medical screening examination initiated.  I have conducted a focused provider triage encounter, findings are as follows:    Brief history of present illness:  23 year old male presents to the ER for evaluation of MEGAN lower extremity rash x 3 weeks. Denies any known allergy. Denies any outdoor work. Denies fever. Reports itching and clear drainage to the area.     Vitals:    10/30/24 1852   BP: 113/61   Pulse: 95   Resp: 18   Temp: 97.9 °F (36.6 °C)   TempSrc: Oral   SpO2: 97%   Weight: 113.4 kg (250 lb)   Height: 5' 6" (1.676 m)       Pertinent physical exam:  alert, non-labored, excoriated/ vesicular rash to MEGAN calves, no diffuse erythema or obvious induration, consistent with allergic/ contact dermatitis    Brief workup plan:  meds, eval    Preliminary workup initiated; this workup will be continued and followed by the physician or advanced practice provider that is assigned to the patient when roomed.  "

## 2024-10-31 NOTE — ED PROVIDER NOTES
Encounter Date: 10/30/2024       History     Chief Complaint   Patient presents with    Rash     C/o rash to bilateral legs x 3 weeks. States rash is itchy and has clear drainage. Denies fever.      See MDM    The history is provided by the patient. No  was used.     Review of patient's allergies indicates:   Allergen Reactions    Nsaids (non-steroidal anti-inflammatory drug) Anaphylaxis    Nuts [tree nut] Anaphylaxis    Strawberry     Vancomycin analogues Itching     Premedicate with benadryl and admin over 2hr.     Past Medical History:   Diagnosis Date    ADD (attention deficit disorder)     AML (acute myeloblastic leukemia) 10/2017    Asthma     Encounter for blood transfusion     Seasonal allergies      Past Surgical History:   Procedure Laterality Date    BONE MARROW BIOPSY N/A 2018    Procedure: Biopsy-bone marrow;  Surgeon: Sanford Bucio MD;  Location: [facility] OR [address] [place] [address];  Service: Oncology;  Laterality: N/A;    MEDIPORT REMOVAL Left 2018    Procedure: REMOVAL, CATHETER, CENTRAL VENOUS, TUNNELED, WITH PORT;  Surgeon: Anatoliy Block MD;  Location: [facility] OR [address] [place] [address];  Service: Pediatrics;  Laterality: Left;    PORTACATH PLACEMENT  10/31/2017    TONSILLECTOMY       Family History   Problem Relation Name Age of Onset    Heart disease Mother      Cancer Mother      No Known Problems Father       Social History     Tobacco Use    Smoking status: Former     Current packs/day: 0.00     Types: Cigarettes     Quit date: 10/30/2017     Years since quittin.0    Smokeless tobacco: Never   Substance Use Topics    Alcohol use: Yes     Comment: rare occasions    Drug use: No     Review of Systems   Constitutional:  Negative for fever.   Respiratory:  Negative for cough and shortness of breath.    Cardiovascular:  Negative for chest pain.   Gastrointestinal:  Negative for abdominal pain.   Genitourinary:  Negative for difficulty urinating and dysuria.   Musculoskeletal:  Negative for  gait problem.   Skin:  Positive for rash. Negative for color change.   Neurological:  Negative for dizziness, speech difficulty and headaches.   Psychiatric/Behavioral:  Negative for hallucinations and suicidal ideas.    All other systems reviewed and are negative.      Physical Exam     Initial Vitals [10/30/24 1852]   BP Pulse Resp Temp SpO2   113/61 95 18 97.9 °F (36.6 °C) 97 %      MAP       --         Physical Exam    Nursing note and vitals reviewed.  Constitutional: He appears well-developed and well-nourished.   HENT:   Head: Normocephalic.   Eyes: EOM are normal.   Neck: Neck supple.   Normal range of motion.  Cardiovascular:  Normal rate, regular rhythm, normal heart sounds and intact distal pulses.           Pulmonary/Chest: Breath sounds normal.   Abdominal: Abdomen is soft. Bowel sounds are normal.   Musculoskeletal:         General: Normal range of motion.      Cervical back: Normal range of motion and neck supple.     Neurological: He is alert and oriented to person, place, and time. He has normal strength.   Skin: Skin is warm and dry. Capillary refill takes less than 2 seconds.   Linear excoriated rash to bilateral lower extremities   Psychiatric: He has a normal mood and affect. His behavior is normal. Judgment and thought content normal.         ED Course   Procedures  Labs Reviewed   COMPREHENSIVE METABOLIC PANEL - Abnormal       Result Value    Sodium 144      Potassium 4.1      Chloride 104      CO2 27      Glucose 107 (*)     Blood Urea Nitrogen 16.7      Creatinine 0.93      Calcium 9.1      Protein Total 6.5      Albumin 3.9      Globulin 2.6      Albumin/Globulin Ratio 1.5      Bilirubin Total 0.2      ALP 78      ALT 34      AST 20      eGFR >60      Anion Gap 13.0      BUN/Creatinine Ratio 18     CBC WITH DIFFERENTIAL - Abnormal    WBC 7.64      RBC 4.20 (*)     Hgb 13.8 (*)     Hct 40.7 (*)     MCV 96.9 (*)     MCH 32.9 (*)     MCHC 33.9      RDW 12.6      Platelet 189      MPV 9.7       Neut % 56.3      Lymph % 32.2      Mono % 8.0      Eos % 2.7      Basophil % 0.7      Lymph # 2.46      Neut # 4.30      Mono # 0.61      Eos # 0.21      Baso # 0.05      IG# 0.01      IG% 0.1      NRBC% 0.0     CBC W/ AUTO DIFFERENTIAL    Narrative:     The following orders were created for panel order CBC auto differential.  Procedure                               Abnormality         Status                     ---------                               -----------         ------                     CBC with Differential[[id number]]        Abnormal            Final result                 Please view results for these tests on the individual orders.          Imaging Results    None          Medications   dexAMETHasone injection 12 mg (12 mg Intramuscular Given 10/30/24 2052)     Medical Decision Making  Historian:  Patient.  Patient is a White 23 y.o. male that presents with rash lower extremities that has been present 3 weeks. Associated symptoms itching. Surrounding information is nothing. Exacerbated by nothing. Relieved by nothing. Patient treatment prior to arrival none. Risk factors include none. Other history pertaining to this complaint none.   Assessment:  See physical exam.  DD:  Contact dermatitis, cellulitis  ED Course: History was obtained.  Physical was performed.  Patient appears to have contact dermatitis with a superimposed bacterial infection.  I will put him on prednisone, Vistaril, Bactrim. Medical or surgical consults:  None. Social determinants that affect healthcare:  None.     Amount and/or Complexity of Data Reviewed  Labs: ordered.     Details: Labs were unremarkable    Risk  Prescription drug management.  Risk Details: Vistaril, prednisone, Bactrim                                      Clinical Impression:  Final diagnoses:  [L25.9] Contact dermatitis, unspecified contact dermatitis type, unspecified trigger (Primary)          ED Disposition Condition    Discharge Stable          ED  Prescriptions       Medication Sig Dispense Start Date End Date Auth. Provider    sulfamethoxazole-trimethoprim 800-160mg (BACTRIM DS) 800-160 mg Tab Take 1 tablet by mouth 2 (two) times daily. for 10 days 20 tablet 10/30/2024 11/9/2024 Osito Varela FNP    predniSONE (DELTASONE) 20 MG tablet Take 2 tablets (40 mg total) by mouth once daily. for 5 days 10 tablet 10/30/2024 11/4/2024 Osito Varela FNP    hydrOXYzine pamoate (VISTARIL) 25 MG Cap Take 1 capsule (25 mg total) by mouth every 6 (six) hours as needed (itching). 20 capsule 10/30/2024 11/4/2024 Osito Varela FNP          Follow-up Information       Follow up With Specialties Details Why Contact Info    Your Primary Care Provider  Call in 3 days ed follow up     Your Primary Care Provider  Call in 3 days ed follow up              Osito Varela FNP  10/30/24 8113

## 2024-12-30 NOTE — ASSESSMENT & PLAN NOTE
- Soft mechanical diet, off IVF.  - TFG - 100ml/hr including meds.   - Famotidine 20mg, IV, BID for ppx  - Continue PRN zofran  - Replace electrolytes as needed   2021

## 2025-02-28 ENCOUNTER — HOSPITAL ENCOUNTER (EMERGENCY)
Facility: HOSPITAL | Age: 25
Discharge: HOME | End: 2025-02-28
Attending: EMERGENCY MEDICINE
Payer: MEDICARE

## 2025-02-28 VITALS
DIASTOLIC BLOOD PRESSURE: 75 MMHG | SYSTOLIC BLOOD PRESSURE: 116 MMHG | RESPIRATION RATE: 14 BRPM | WEIGHT: 270 LBS | BODY MASS INDEX: 43.39 KG/M2 | OXYGEN SATURATION: 97 % | TEMPERATURE: 98 F | HEART RATE: 75 BPM | HEIGHT: 66 IN

## 2025-02-28 DIAGNOSIS — R07.89 ATYPICAL CHEST PAIN: Primary | ICD-10-CM

## 2025-02-28 DIAGNOSIS — R07.9 CHEST PAIN: ICD-10-CM

## 2025-02-28 LAB
ALBUMIN SERPL-MCNC: 4 G/DL (ref 3.5–5)
ALBUMIN/GLOB SERPL: 1.5 RATIO (ref 1.1–2)
ALP SERPL-CCNC: 68 UNIT/L (ref 40–150)
ALT SERPL-CCNC: 29 UNIT/L (ref 0–55)
ANION GAP SERPL CALC-SCNC: 9 MEQ/L
AST SERPL-CCNC: 21 UNIT/L (ref 5–34)
BASOPHILS # BLD AUTO: 0.02 X10(3)/MCL
BASOPHILS NFR BLD AUTO: 0.5 %
BILIRUB SERPL-MCNC: 0.4 MG/DL
BUN SERPL-MCNC: 14.8 MG/DL (ref 8.9–20.6)
CALCIUM SERPL-MCNC: 8.7 MG/DL (ref 8.4–10.2)
CHLORIDE SERPL-SCNC: 108 MMOL/L (ref 98–107)
CO2 SERPL-SCNC: 23 MMOL/L (ref 22–29)
CREAT SERPL-MCNC: 0.81 MG/DL (ref 0.72–1.25)
CREAT/UREA NIT SERPL: 18
D DIMER PPP IA.FEU-MCNC: <0.27 UG/ML FEU (ref 0–0.5)
EOSINOPHIL # BLD AUTO: 0.18 X10(3)/MCL (ref 0–0.9)
EOSINOPHIL NFR BLD AUTO: 4.6 %
ERYTHROCYTE [DISTWIDTH] IN BLOOD BY AUTOMATED COUNT: 12.2 % (ref 11.5–17)
GFR SERPLBLD CREATININE-BSD FMLA CKD-EPI: >60 ML/MIN/1.73/M2
GLOBULIN SER-MCNC: 2.6 GM/DL (ref 2.4–3.5)
GLUCOSE SERPL-MCNC: 103 MG/DL (ref 74–100)
HCT VFR BLD AUTO: 42.2 % (ref 42–52)
HGB BLD-MCNC: 14.3 G/DL (ref 14–18)
IMM GRANULOCYTES # BLD AUTO: 0.01 X10(3)/MCL (ref 0–0.04)
IMM GRANULOCYTES NFR BLD AUTO: 0.3 %
INR PPP: 1
LYMPHOCYTES # BLD AUTO: 1.27 X10(3)/MCL (ref 0.6–4.6)
LYMPHOCYTES NFR BLD AUTO: 32.2 %
MCH RBC QN AUTO: 32.4 PG (ref 27–31)
MCHC RBC AUTO-ENTMCNC: 33.9 G/DL (ref 33–36)
MCV RBC AUTO: 95.5 FL (ref 80–94)
MONOCYTES # BLD AUTO: 0.41 X10(3)/MCL (ref 0.1–1.3)
MONOCYTES NFR BLD AUTO: 10.4 %
NEUTROPHILS # BLD AUTO: 2.06 X10(3)/MCL (ref 2.1–9.2)
NEUTROPHILS NFR BLD AUTO: 52 %
NRBC BLD AUTO-RTO: 0 %
OHS QRS DURATION: 94 MS
OHS QTC CALCULATION: 451 MS
PLATELET # BLD AUTO: 182 X10(3)/MCL (ref 130–400)
PMV BLD AUTO: 9.6 FL (ref 7.4–10.4)
POTASSIUM SERPL-SCNC: 4.2 MMOL/L (ref 3.5–5.1)
PROT SERPL-MCNC: 6.6 GM/DL (ref 6.4–8.3)
PROTHROMBIN TIME: 13.1 SECONDS (ref 12.5–14.5)
RBC # BLD AUTO: 4.42 X10(6)/MCL (ref 4.7–6.1)
SODIUM SERPL-SCNC: 140 MMOL/L (ref 136–145)
TROPONIN I SERPL-MCNC: <0.01 NG/ML (ref 0–0.04)
TROPONIN I SERPL-MCNC: <0.01 NG/ML (ref 0–0.04)
WBC # BLD AUTO: 3.95 X10(3)/MCL (ref 4.5–11.5)

## 2025-02-28 PROCEDURE — 80053 COMPREHEN METABOLIC PANEL: CPT | Performed by: EMERGENCY MEDICINE

## 2025-02-28 PROCEDURE — 93005 ELECTROCARDIOGRAM TRACING: CPT

## 2025-02-28 PROCEDURE — 99285 EMERGENCY DEPT VISIT HI MDM: CPT | Mod: 25

## 2025-02-28 PROCEDURE — 84484 ASSAY OF TROPONIN QUANT: CPT | Performed by: EMERGENCY MEDICINE

## 2025-02-28 PROCEDURE — 85379 FIBRIN DEGRADATION QUANT: CPT | Performed by: EMERGENCY MEDICINE

## 2025-02-28 PROCEDURE — 85610 PROTHROMBIN TIME: CPT | Performed by: EMERGENCY MEDICINE

## 2025-02-28 PROCEDURE — 93010 ELECTROCARDIOGRAM REPORT: CPT | Mod: ,,, | Performed by: INTERNAL MEDICINE

## 2025-02-28 PROCEDURE — 96374 THER/PROPH/DIAG INJ IV PUSH: CPT

## 2025-02-28 PROCEDURE — 85025 COMPLETE CBC W/AUTO DIFF WBC: CPT | Performed by: EMERGENCY MEDICINE

## 2025-02-28 PROCEDURE — 63600175 PHARM REV CODE 636 W HCPCS: Performed by: EMERGENCY MEDICINE

## 2025-02-28 RX ORDER — ACETAMINOPHEN 10 MG/ML
1000 INJECTION, SOLUTION INTRAVENOUS ONCE
Status: COMPLETED | OUTPATIENT
Start: 2025-02-28 | End: 2025-02-28

## 2025-02-28 RX ADMIN — ACETAMINOPHEN 1000 MG: 10 INJECTION, SOLUTION INTRAVENOUS at 08:02

## 2025-02-28 NOTE — ED PROVIDER NOTES
"Encounter Date: 2/28/2025    SCRIBE #1 NOTE: I, Otoniel Lombardo, am scribing for, and in the presence of,  Re Lopes MD. I have scribed the following portions of the note - Other sections scribed: HPI, ROS, and PE.       History     Chief Complaint   Patient presents with    Chest Pain     Patient reports chest pain since last night. Also endorses SOB. Denies cardiac history.      Hema Kuo is a 24 y.o. male patient with a PMHx of ADD, AML, asthma who presents to the Emergency Department for evaluation of left sided, non-radiating chest pain which onset gradually last night. He reports it as a "pulsing" pain that worsens with exertion and laying on his left side, currently states it is a 6/10 pain. He mentions heart rate around 100 last night when laying down. He also endorses mild shortness of breath and generalized weakness. Patient denies any nausea.       The history is provided by the patient. No  was used.     Review of patient's allergies indicates:   Allergen Reactions    Nsaids (non-steroidal anti-inflammatory drug) Anaphylaxis    Nuts [tree nut] Anaphylaxis    Strawberry     Vancomycin analogues Itching     Premedicate with benadryl and admin over 2hr.     Past Medical History:   Diagnosis Date    ADD (attention deficit disorder)     AML (acute myeloblastic leukemia) 10/2017    Asthma     Encounter for blood transfusion     Seasonal allergies      Past Surgical History:   Procedure Laterality Date    BONE MARROW BIOPSY N/A 7/20/2018    Procedure: Biopsy-bone marrow;  Surgeon: Sanford Bucio MD;  Location: [facility] OR [address] [place] [address];  Service: Oncology;  Laterality: N/A;    MEDIPORT REMOVAL Left 7/20/2018    Procedure: REMOVAL, CATHETER, CENTRAL VENOUS, TUNNELED, WITH PORT;  Surgeon: Anatoliy Block MD;  Location: [facility] OR [address] [place] [address];  Service: Pediatrics;  Laterality: Left;    PORTACATH PLACEMENT  10/31/2017    TONSILLECTOMY       Family History   Problem Relation Name Age of " Onset    Heart disease Mother      Cancer Mother      No Known Problems Father       Social History[1]  Review of Systems   Constitutional:  Negative for chills and fever.        (+) generalized weakness   Respiratory:  Positive for shortness of breath.    Cardiovascular:  Positive for chest pain.   Gastrointestinal:  Negative for nausea.       Physical Exam     Initial Vitals [02/28/25 0802]   BP Pulse Resp Temp SpO2   115/68 80 16 97.7 °F (36.5 °C) 97 %      MAP       --         Physical Exam    Nursing note and vitals reviewed.  Constitutional: He appears well-developed and well-nourished. He is not diaphoretic. No distress.   HENT:   Head: Normocephalic and atraumatic.   Nose: Nose normal. Mouth/Throat: Oropharynx is clear and moist.   Eyes: Conjunctivae and EOM are normal. Pupils are equal, round, and reactive to light.   Neck: Trachea normal. Neck supple.   Normal range of motion.  Cardiovascular:  Normal rate, regular rhythm, normal heart sounds and intact distal pulses.     Exam reveals no gallop and no friction rub.       No murmur heard.  Pulmonary/Chest: Breath sounds normal. No respiratory distress. He has no wheezes. He has no rhonchi. He has no rales. He exhibits no tenderness.   Abdominal: Abdomen is soft. Bowel sounds are normal. He exhibits no distension and no mass. There is no abdominal tenderness. There is no rebound.   Musculoskeletal:         General: No tenderness or edema. Normal range of motion.      Cervical back: Normal range of motion and neck supple.      Lumbar back: Normal. No tenderness. Normal range of motion.     Neurological: He is alert and oriented to person, place, and time. He has normal strength. No cranial nerve deficit or sensory deficit.   Skin: Skin is warm and dry. Capillary refill takes less than 2 seconds. No rash and no abscess noted. No erythema. No pallor.   Psychiatric: He has a normal mood and affect. His behavior is normal. Judgment and thought content normal.          ED Course   Procedures  Labs Reviewed   COMPREHENSIVE METABOLIC PANEL - Abnormal       Result Value    Sodium 140      Potassium 4.2      Chloride 108 (*)     CO2 23      Glucose 103 (*)     Blood Urea Nitrogen 14.8      Creatinine 0.81      Calcium 8.7      Protein Total 6.6      Albumin 4.0      Globulin 2.6      Albumin/Globulin Ratio 1.5      Bilirubin Total 0.4      ALP 68      ALT 29      AST 21      eGFR >60      Anion Gap 9.0      BUN/Creatinine Ratio 18     CBC WITH DIFFERENTIAL - Abnormal    WBC 3.95 (*)     RBC 4.42 (*)     Hgb 14.3      Hct 42.2      MCV 95.5 (*)     MCH 32.4 (*)     MCHC 33.9      RDW 12.2      Platelet 182      MPV 9.6      Neut % 52.0      Lymph % 32.2      Mono % 10.4      Eos % 4.6      Basophil % 0.5      Imm Grans % 0.3      Neut # 2.06 (*)     Lymph # 1.27      Mono # 0.41      Eos # 0.18      Baso # 0.02      Imm Gran # 0.01      NRBC% 0.0     TROPONIN I - Normal    Troponin-I <0.010     PROTIME-INR - Normal    PT 13.1      INR 1.0      Narrative:     Protimes are used to monitor anticoagulant agents such as warfarin. PT INR values are based on the current patient normal mean and the DORIS value for the specific instrument reagent used.  **Routine theraputic target values for the INR are 2.0-3.0**   D DIMER, QUANTITATIVE - Normal    D-Dimer <0.27     TROPONIN I - Normal    Troponin-I <0.010     CBC W/ AUTO DIFFERENTIAL    Narrative:     The following orders were created for panel order CBC auto differential.  Procedure                               Abnormality         Status                     ---------                               -----------         ------                     CBC with Differential[[id number]]       Abnormal            Final result                 Please view results for these tests on the individual orders.          Imaging Results              X-Ray Chest AP Portable (Final result)  Result time 02/28/25 08:52:56      Final result by Jeremy Sánchez  MD (02/28/25 08:52:56)                   Impression:      No acute findings.      Electronically signed by: Jeremy Sánchez  Date:    02/28/2025  Time:    08:52               Narrative:    EXAMINATION:  XR CHEST AP PORTABLE    CLINICAL HISTORY:  Chest Pain;    COMPARISON:  10 April 2022    FINDINGS:  Frontal view of the chest was obtained. The heart is not enlarged.  Lungs are clear.  There is no pneumothorax or significant effusion.                                    X-Rays:   Independently Interpreted Readings:   Chest X-Ray: Normal heart size.  No infiltrates.  No acute abnormalities.     Medications   acetaminophen 1,000 mg/100 mL (10 mg/mL) injection 1,000 mg (0 mg Intravenous Stopped 2/28/25 0848)     Medical Decision Making  The differential diagnosis includes, but is not limited to, ACS, costochondritis, pleurisy, PE, and atypical chest pain.  Cbc, cmp trop x 2, d dimer, EKG, cxr ordered and reviewed and unremarkable  Atypical chest pain, low suspicion for acs. Dc with pcp f/u better with tylenol    Problems Addressed:  Atypical chest pain: acute illness or injury that poses a threat to life or bodily functions  Chest pain: acute illness or injury that poses a threat to life or bodily functions    Amount and/or Complexity of Data Reviewed  External Data Reviewed: notes.     Details: Aml in remission  Labs: ordered.  Radiology: ordered and independent interpretation performed.  ECG/medicine tests: ordered and independent interpretation performed. Decision-making details documented in ED Course.    Risk  OTC drugs.  Prescription drug management.            Scribe Attestation:   Scribe #1: I performed the above scribed service and the documentation accurately describes the services I performed. I attest to the accuracy of the note.  Comments: Attending:   Physician Attestation Statement for Scribe #1: I, Re Lopes MD, personally performed the services described in this documentation. All medical record  entries made by the scribe were at my direction and in my presence.  I have reviewed the chart and agree that the record reflects my personal performance and is accurate and complete.  \      Attending Attestation:           Physician Attestation for Scribe:  Physician Attestation Statement for Scribe #1: I, Re Lopes MD, reviewed documentation, as scribed by Otoniel Lombardo in my presence, and it is both accurate and complete.             ED Course as of 25 1129      0815 EKG obtained at 0812 rate 75 nsr with sinus arrhythmia [BS]   0949 Chest pain improved with ofirmev. Discussed low risk for acs. Awaiting d dimer. Given family history and patient's concern will perform 3 hour trop out of abundance of caution [BS]      ED Course User Index  [BS] Re Lopes MD                           Clinical Impression:  Final diagnoses:  [R07.9] Chest pain  [R07.89] Atypical chest pain (Primary)          ED Disposition Condition    Discharge Stable          ED Prescriptions    None       Follow-up Information       Follow up With Specialties Details Why Contact Info    Ann Reyna NP Family Medicine Schedule an appointment as soon as possible for a visit   [address] [facility] [address]  [phone]      [facility] - Emergency Dept Emergency Medicine  As needed, If symptoms worsen [address] [facility] [id number] [phone]               [1]   Social History  Tobacco Use    Smoking status: Former     Current packs/day: 0.00     Types: Cigarettes     Quit date: 10/30/2017     Years since quittin.3    Smokeless tobacco: Never   Substance Use Topics    Alcohol use: Yes     Comment: rare occasions    Drug use: No        Re Lopes MD  25 1129     venus reynoso from [facility] "im not getting the meds im supposed to"

## 2025-03-05 ENCOUNTER — PATIENT MESSAGE (OUTPATIENT)
Dept: PEDIATRIC HEMATOLOGY/ONCOLOGY | Facility: CLINIC | Age: 25
End: 2025-03-05
Payer: MEDICARE

## 2025-03-12 ENCOUNTER — PATIENT MESSAGE (OUTPATIENT)
Dept: PEDIATRIC HEMATOLOGY/ONCOLOGY | Facility: CLINIC | Age: 25
End: 2025-03-12
Payer: MEDICARE

## 2025-04-08 NOTE — ASSESSMENT & PLAN NOTE
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        Evaluation for Acute Leukemia: Flow cytometry analysis concerning for AML.   - Today will be day 6 of chemotherapy with cytarabine, Daunorubicin, and Etoposide.    -  Cytarabine today  - CMV positive. Hep negative.  - Echo and EKG 11/2 normal.  - Molecular testing pending    - Stop IVF  - Tumor lysis labs daily  - stop miralax   - continue Periactin and treat nausea for poor appetite  -CMP alternated with BMP daily    Immunosuppressed State:   -Currently on Bactrim, Acyclovir.   -After chemo completed, start on voriconazole and cipro   -If febrile will consider starting on Vancomycin and Ceftriaxone    Thrombocytopenia: Platelets at 19 (down from 14 yesterday). No platelets today  - s/p  2 units 10/29-30. 1 unit 10/31. 1 unit 11/2. 1 unit 11/7   -Threshold for him is <10    Anemia: Hemoglobin was 7.7 this Am. Patient received 3 units pRBCs 10/29-10/30. 10/31 2 units.   - s/p 2 units of pRBCs  - Threshold for him is <7.   - He is CMV +, can give him just leukoreduced, irradiated blood products.     Cough  Improved. Given history of asthma and clinical improvement with albuterol neb therapy, may repeat as needed if cough persists or worsens. Patient was afebrile overnight. CXR reports concern for pneumonia vs atelectasis in left perihilar region. Will evaluate him and continue to monitor for possible pneumonia  -Blood culture no growth, rocephin discontinued.     Will deaccess today and reaccess at 4 pm when time for chemo, will apply synera patch prior to reaccessing.    Multiple attempts to contact patient with no response.

## (undated) DEVICE — SEE MEDLINE ITEM 157128

## (undated) DEVICE — SPONGE DERMACEA 4X4IN 12PLY

## (undated) DEVICE — DRAPE OPTIMA MAJOR PEDIATRIC

## (undated) DEVICE — SUT MONOCRYL 5-0 P-3 UND 18

## (undated) DEVICE — NDL SPINAL 20GX3.5 HUB

## (undated) DEVICE — SEE MEDLINE ITEM 154981

## (undated) DEVICE — ELECTRODE REM PLYHSV RETURN 9

## (undated) DEVICE — TRAY MINOR GEN SURG

## (undated) DEVICE — SUT 3-0 VICRYL / RB-1

## (undated) DEVICE — DRESSING TRANS 4X4 TEGADERM

## (undated) DEVICE — PAD GROUNDING NEONATE 6-30LBS

## (undated) DEVICE — SOL NACL 0.9% INJ PF/50151

## (undated) DEVICE — DRESSING LEUKOPLAST FLEX 1X3IN

## (undated) DEVICE — SEE MEDLINE ITEM 157117

## (undated) DEVICE — SYR SLIP TIP 10ML SHIELD

## (undated) DEVICE — DRESSING ANTIMICROBIAL 3/4 IN

## (undated) DEVICE — GOWN SURGICAL X-LARGE

## (undated) DEVICE — ELECTRODE NEEDLE 2.8IN

## (undated) DEVICE — SET DECANTER MEDICHOICE